# Patient Record
Sex: FEMALE | Race: WHITE | NOT HISPANIC OR LATINO | Employment: OTHER | URBAN - METROPOLITAN AREA
[De-identification: names, ages, dates, MRNs, and addresses within clinical notes are randomized per-mention and may not be internally consistent; named-entity substitution may affect disease eponyms.]

---

## 2017-01-04 ENCOUNTER — LAB VISIT (OUTPATIENT)
Dept: LAB | Facility: HOSPITAL | Age: 67
End: 2017-01-04
Attending: NURSE PRACTITIONER
Payer: COMMERCIAL

## 2017-01-04 ENCOUNTER — OFFICE VISIT (OUTPATIENT)
Dept: DIABETES | Facility: CLINIC | Age: 67
End: 2017-01-04
Payer: COMMERCIAL

## 2017-01-04 VITALS
HEIGHT: 62 IN | WEIGHT: 166.25 LBS | HEART RATE: 86 BPM | BODY MASS INDEX: 30.59 KG/M2 | DIASTOLIC BLOOD PRESSURE: 74 MMHG | SYSTOLIC BLOOD PRESSURE: 158 MMHG

## 2017-01-04 DIAGNOSIS — E11.59 HYPERTENSION ASSOCIATED WITH DIABETES: ICD-10-CM

## 2017-01-04 DIAGNOSIS — M85.80 OSTEOPENIA: ICD-10-CM

## 2017-01-04 DIAGNOSIS — I15.2 HYPERTENSION ASSOCIATED WITH DIABETES: ICD-10-CM

## 2017-01-04 DIAGNOSIS — D64.9 ANEMIA, UNSPECIFIED: ICD-10-CM

## 2017-01-04 DIAGNOSIS — E66.9 OBESITY, UNSPECIFIED OBESITY SEVERITY, UNSPECIFIED OBESITY TYPE: ICD-10-CM

## 2017-01-04 LAB
25(OH)D3+25(OH)D2 SERPL-MCNC: 13 NG/ML
ALBUMIN SERPL BCP-MCNC: 3.7 G/DL
ALP SERPL-CCNC: 68 U/L
ALT SERPL W/O P-5'-P-CCNC: 14 U/L
ANION GAP SERPL CALC-SCNC: 11 MMOL/L
AST SERPL-CCNC: 17 U/L
BILIRUB SERPL-MCNC: 0.2 MG/DL
BUN SERPL-MCNC: 25 MG/DL
CALCIUM SERPL-MCNC: 9.6 MG/DL
CHLORIDE SERPL-SCNC: 105 MMOL/L
CO2 SERPL-SCNC: 22 MMOL/L
CREAT SERPL-MCNC: 0.8 MG/DL
CREAT UR-MCNC: 207 MG/DL
EST. GFR  (AFRICAN AMERICAN): >60 ML/MIN/1.73 M^2
EST. GFR  (NON AFRICAN AMERICAN): >60 ML/MIN/1.73 M^2
FERRITIN SERPL-MCNC: 13 NG/ML
FOLATE SERPL-MCNC: 10.5 NG/ML
GLUCOSE SERPL-MCNC: 76 MG/DL
GLUCOSE SERPL-MCNC: 84 MG/DL (ref 70–110)
IRON SERPL-MCNC: 52 UG/DL
MICROALBUMIN UR DL<=1MG/L-MCNC: 47 UG/ML
MICROALBUMIN/CREATININE RATIO: 22.7 UG/MG
POTASSIUM SERPL-SCNC: 4.2 MMOL/L
PROT SERPL-MCNC: 7.5 G/DL
SATURATED IRON: 12 %
SODIUM SERPL-SCNC: 138 MMOL/L
TOTAL IRON BINDING CAPACITY: 451 UG/DL
TRANSFERRIN SERPL-MCNC: 305 MG/DL
VIT B12 SERPL-MCNC: 205 PG/ML

## 2017-01-04 PROCEDURE — 99213 OFFICE O/P EST LOW 20 MIN: CPT | Mod: S$GLB,,, | Performed by: NURSE PRACTITIONER

## 2017-01-04 PROCEDURE — 82607 VITAMIN B-12: CPT

## 2017-01-04 PROCEDURE — 36415 COLL VENOUS BLD VENIPUNCTURE: CPT

## 2017-01-04 PROCEDURE — 1157F ADVNC CARE PLAN IN RCRD: CPT | Mod: S$GLB,,, | Performed by: NURSE PRACTITIONER

## 2017-01-04 PROCEDURE — 82570 ASSAY OF URINE CREATININE: CPT

## 2017-01-04 PROCEDURE — 82306 VITAMIN D 25 HYDROXY: CPT

## 2017-01-04 PROCEDURE — 1159F MED LIST DOCD IN RCRD: CPT | Mod: S$GLB,,, | Performed by: NURSE PRACTITIONER

## 2017-01-04 PROCEDURE — 2022F DILAT RTA XM EVC RTNOPTHY: CPT | Mod: S$GLB,,, | Performed by: NURSE PRACTITIONER

## 2017-01-04 PROCEDURE — 3060F POS MICROALBUMINURIA REV: CPT | Mod: S$GLB,,, | Performed by: NURSE PRACTITIONER

## 2017-01-04 PROCEDURE — 3077F SYST BP >= 140 MM HG: CPT | Mod: S$GLB,,, | Performed by: NURSE PRACTITIONER

## 2017-01-04 PROCEDURE — 83036 HEMOGLOBIN GLYCOSYLATED A1C: CPT

## 2017-01-04 PROCEDURE — 83540 ASSAY OF IRON: CPT

## 2017-01-04 PROCEDURE — 82948 REAGENT STRIP/BLOOD GLUCOSE: CPT | Mod: S$GLB,,, | Performed by: NURSE PRACTITIONER

## 2017-01-04 PROCEDURE — 82746 ASSAY OF FOLIC ACID SERUM: CPT

## 2017-01-04 PROCEDURE — 82728 ASSAY OF FERRITIN: CPT

## 2017-01-04 PROCEDURE — 80053 COMPREHEN METABOLIC PANEL: CPT

## 2017-01-04 PROCEDURE — 3078F DIAST BP <80 MM HG: CPT | Mod: S$GLB,,, | Performed by: NURSE PRACTITIONER

## 2017-01-04 PROCEDURE — 3045F PR MOST RECENT HEMOGLOBIN A1C LEVEL 7.0-9.0%: CPT | Mod: S$GLB,,, | Performed by: NURSE PRACTITIONER

## 2017-01-04 PROCEDURE — 99999 PR PBB SHADOW E&M-EST. PATIENT-LVL III: CPT | Mod: PBBFAC,,, | Performed by: NURSE PRACTITIONER

## 2017-01-04 PROCEDURE — 1160F RVW MEDS BY RX/DR IN RCRD: CPT | Mod: S$GLB,,, | Performed by: NURSE PRACTITIONER

## 2017-01-04 NOTE — PROGRESS NOTES
"PCP: Dr. Oneal    HISTORY OF PRESENT ILLNESS: 66 year old  female patient is in clinic today for diabetes follow up.  Most recent A1C was 8.4, ADA recommends less than 7.0.  She was intolerant to Victoza, and developed extreme nausea, vomiting so medication was stopped.  Blood glucose monitoring is performed 2 x a day.  Per records, fasting BGs are 110 - 120; hs around 130 - 160, occasional 200.  She has lost 3 pounds last visit.     In November, patient had shingles.  She denies polyuria, polydipsia, polyphagia, blurred vision.  She denies nausea, vomiting, diarrhea, or hypoglycemia.     Height: 5' 1",  Weight: 166 pounds, BMI 30.90  Blood Glucose reading this am: Not Taken  Blood Glucose reading in clinic: 84 mg/dl at 4:15 pm    DM MEDICATIONS:  Glucophage 1000 mg BID  Glyburide 5 mg BID ac  Toujeo 20 units in AM    Labs Reviewed.    REVIEW OF SYSTEMS:  GENERAL: Denies fever, chills, change in appetite.  HEENT: Denies impaired hearing, dysphagia.  RESPIRATORY: Denies shortness of breath or wheezing.   CARDIOVASCULAR: Denies chest pain, palpitations.  GI: Denies nausea, vomiting, diarrhea.  : Denies hematuria, dysuria or frequency.  MS: Denies difficulty with mobility, muscle or joint pain.  NEURO: Denies numbness or tingling in the hands or feet.   PSYCH: Treated for depression.  ENDO: See HPI.    STANDARDS OF CARE:   Eye exam: Dr. Melchor, last exam 5 / 2016  FOOT EXAM: No podiatrist.  Dental Exams: Recommended regular cleanings.    ACTIVITY LEVEL: No regular exercise.   MEAL PLANNING: Number of meals - 3. Number of Snacks - 0. Breakfast, usually V8 juice or Atkins's Bar. Lunch can be salad, ham.  Dinner can be [employer] chicken tenders, occasional fish sandwich from [employer]. Per dietary recall, patient is  limiting carbohydrates, saturated fats and sodium.     SELF-MONITORING: Self monitoring,  ACCU-CHEK Aga  SOCIAL: . Lives alone with 2 cats, 23 dogs.  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 Never smoker.     PHYSICAL EXAMINATION:  GENERAL: WDWN  female in no acute distress, ambulatory, responds appropriately. AAO X 3.  NECK: Supple, no thyromegaly, no cervical or supraclavicular lymphadenopathy, no carotid bruit.  HEART: Regular rate and rhythm. No rubs, murmurs or gallops.   LUNGS: CTA bilaterally. Unlabored breathing, no use of accessory muscles.   SKIN: Warm, dry skin. No lesions or abrasions. Clean, dry, well-healed injection sites.   MUSCULOSKELETAL: Normal gait and muscle strength.  ABDOMEN: Soft, nontender, no masses. BS active X 4.   NEUROLOGIC: Cranial nerves II-XII grossly intact.   FOOT EVALUATION: Patient declined.    ASSESSMENT:  1. DiabetesType 2 - suboptimal control on metformin, glyburide, Lantus, A1C 8.4  2. Hypertension - suboptimal control on Lisinopril / HCTZ  3. Hyperlipidemia, low HDL - suboptimal control on Lipitor  4. Obesity - BMI 30.90    PLAN:  1. Continue monitoring blood glucose as directed, 2 x daily, fasting and before or 2 hr pp dinner.  Discussed ADA goal for fasting blood sugar, 80 - 130 mg/dL; pp blood sugars below 180 mg/dl. Also, discussed prevention of hypoglycemia and the need to adjust goals to higher levels if persistent hypoglycemia. Reminded her to bring records to each visit.   2. Discussed activity, benefits, methods, and precautions. Recommended patient start some form of exercise and increase as tolerated to 30 minutes per day to facilitate weight loss and aid in control of BGs.  3. Continue metformin 1,000 mg BID.  Continue glyburide 5 mg TWICE DAILY ac.  Continue Toujeo 22 units in am, same time every day.  She was intolerant to Victoza, developed extreme nausea, vomiting so med was stopped.  No records on today, so I am unable to make adjustments.  Depending on A1C results, will adjust accordingly.   4. Reviewed carb counting, portion control, importance of spacing carbs through day to prevent pp highs.  Recommended  low saturated fat, low sodium diet to aid in control of cholesterol. Recommended she eat 3 small meals, 1-2 snacks daily.   5. Labs today: A1C, micral, comprehensive metabolic panel.   6. Return to clinic in 3 months for follow up. Advised to call clinic with any questions or concerns.     Millcient Haq, RADHA-C, CDE

## 2017-01-05 ENCOUNTER — TELEPHONE (OUTPATIENT)
Dept: DIABETES | Facility: CLINIC | Age: 67
End: 2017-01-05

## 2017-01-05 LAB
ESTIMATED AVG GLUCOSE: 212 MG/DL
HBA1C MFR BLD HPLC: 9 %

## 2017-01-05 NOTE — TELEPHONE ENCOUNTER
A1C has increased to 9.0 %.  Please advise patient to increase Toujeo to 30 units daily.  Continue other medications as prescribed.  She should send BG readings for review in one week.

## 2017-01-08 DIAGNOSIS — E11.69 DIABETES MELLITUS TYPE 2 IN OBESE: ICD-10-CM

## 2017-01-08 DIAGNOSIS — E66.9 DIABETES MELLITUS TYPE 2 IN OBESE: ICD-10-CM

## 2017-01-09 RX ORDER — METFORMIN HYDROCHLORIDE 1000 MG/1
TABLET ORAL
Qty: 180 TABLET | Refills: 3 | Status: SHIPPED | OUTPATIENT
Start: 2017-01-09 | End: 2018-02-22 | Stop reason: SDUPTHER

## 2017-01-18 ENCOUNTER — PATIENT MESSAGE (OUTPATIENT)
Dept: INTERNAL MEDICINE | Facility: CLINIC | Age: 67
End: 2017-01-18

## 2017-01-18 DIAGNOSIS — E53.8 VITAMIN B12 DEFICIENCY: Primary | ICD-10-CM

## 2017-01-18 RX ORDER — LANOLIN ALCOHOL/MO/W.PET/CERES
1000 CREAM (GRAM) TOPICAL DAILY
COMMUNITY
Start: 2017-01-18 | End: 2018-11-29

## 2017-01-20 DIAGNOSIS — B02.9 HERPES ZOSTER WITHOUT COMPLICATION: ICD-10-CM

## 2017-01-20 RX ORDER — GABAPENTIN 100 MG/1
CAPSULE ORAL
Qty: 90 CAPSULE | Refills: 1 | Status: SHIPPED | OUTPATIENT
Start: 2017-01-20 | End: 2018-11-29

## 2017-02-23 ENCOUNTER — OFFICE VISIT (OUTPATIENT)
Dept: OPHTHALMOLOGY | Facility: CLINIC | Age: 67
End: 2017-02-23
Payer: COMMERCIAL

## 2017-02-23 DIAGNOSIS — H52.03 HYPEROPIA, BILATERAL: ICD-10-CM

## 2017-02-23 DIAGNOSIS — H52.4 BILATERAL PRESBYOPIA: ICD-10-CM

## 2017-02-23 DIAGNOSIS — E11.9 DIABETES MELLITUS TYPE 2 WITHOUT RETINOPATHY: Primary | ICD-10-CM

## 2017-02-23 PROCEDURE — 99999 PR PBB SHADOW E&M-EST. PATIENT-LVL I: CPT | Mod: PBBFAC,,, | Performed by: OPTOMETRIST

## 2017-02-23 PROCEDURE — 92014 COMPRE OPH EXAM EST PT 1/>: CPT | Mod: S$GLB,,, | Performed by: OPTOMETRIST

## 2017-02-23 PROCEDURE — 92015 DETERMINE REFRACTIVE STATE: CPT | Mod: S$GLB,,, | Performed by: OPTOMETRIST

## 2017-02-23 NOTE — PROGRESS NOTES
HPI     IDDM exam. Itchy, watery eyes sometimes. Last eye exam 05/07/2015 [facility].   Update glasses RX.       Last edited by Rosa Isela Smith on 2/23/2017 10:28 AM.         Assessment /Plan     For exam results, see Encounter Report.    Diabetes mellitus type 2 without retinopathy    Nuclear cataract, bilateral    Hyperopia, bilateral    Bilateral presbyopia      No Background Diabetic Retinopathy  No HTN Retinopathy    Mild cataracts, not surgical    Dispense Final Rx for glasses.  RTC 1 year

## 2017-03-09 RX ORDER — INSULIN GLARGINE 300 [IU]/ML
22 INJECTION, SOLUTION SUBCUTANEOUS DAILY
Qty: 3 SYRINGE | Refills: 2 | Status: SHIPPED | OUTPATIENT
Start: 2017-03-09 | End: 2017-06-13 | Stop reason: SDUPTHER

## 2017-03-27 RX ORDER — BLOOD SUGAR DIAGNOSTIC
STRIP MISCELLANEOUS
Qty: 300 STRIP | Refills: 3 | Status: SHIPPED | OUTPATIENT
Start: 2017-03-27 | End: 2018-04-02 | Stop reason: SDUPTHER

## 2017-04-17 ENCOUNTER — OFFICE VISIT (OUTPATIENT)
Dept: INTERNAL MEDICINE | Facility: CLINIC | Age: 67
End: 2017-04-17
Payer: COMMERCIAL

## 2017-04-17 VITALS
SYSTOLIC BLOOD PRESSURE: 140 MMHG | BODY MASS INDEX: 30.71 KG/M2 | HEART RATE: 88 BPM | WEIGHT: 166.88 LBS | DIASTOLIC BLOOD PRESSURE: 82 MMHG | TEMPERATURE: 97 F | HEIGHT: 62 IN

## 2017-04-17 DIAGNOSIS — R06.2 WHEEZING: ICD-10-CM

## 2017-04-17 DIAGNOSIS — J01.90 ACUTE SINUSITIS, RECURRENCE NOT SPECIFIED, UNSPECIFIED LOCATION: Primary | ICD-10-CM

## 2017-04-17 DIAGNOSIS — R05.9 COUGH: ICD-10-CM

## 2017-04-17 PROCEDURE — 1159F MED LIST DOCD IN RCRD: CPT | Mod: S$GLB,,, | Performed by: NURSE PRACTITIONER

## 2017-04-17 PROCEDURE — 1160F RVW MEDS BY RX/DR IN RCRD: CPT | Mod: S$GLB,,, | Performed by: NURSE PRACTITIONER

## 2017-04-17 PROCEDURE — 3077F SYST BP >= 140 MM HG: CPT | Mod: S$GLB,,, | Performed by: NURSE PRACTITIONER

## 2017-04-17 PROCEDURE — 99999 PR PBB SHADOW E&M-EST. PATIENT-LVL IV: CPT | Mod: PBBFAC,,, | Performed by: NURSE PRACTITIONER

## 2017-04-17 PROCEDURE — 1125F AMNT PAIN NOTED PAIN PRSNT: CPT | Mod: S$GLB,,, | Performed by: NURSE PRACTITIONER

## 2017-04-17 PROCEDURE — 1157F ADVNC CARE PLAN IN RCRD: CPT | Mod: 8P,S$GLB,, | Performed by: NURSE PRACTITIONER

## 2017-04-17 PROCEDURE — 99214 OFFICE O/P EST MOD 30 MIN: CPT | Mod: S$GLB,,, | Performed by: NURSE PRACTITIONER

## 2017-04-17 PROCEDURE — 3079F DIAST BP 80-89 MM HG: CPT | Mod: S$GLB,,, | Performed by: NURSE PRACTITIONER

## 2017-04-17 RX ORDER — BENZONATATE 100 MG/1
100 CAPSULE ORAL 3 TIMES DAILY PRN
Qty: 30 CAPSULE | Refills: 0 | Status: SHIPPED | OUTPATIENT
Start: 2017-04-17 | End: 2017-04-27

## 2017-04-17 RX ORDER — ASPIRIN 325 MG
325 TABLET, DELAYED RELEASE (ENTERIC COATED) ORAL DAILY
Status: ON HOLD | COMMUNITY
End: 2019-11-18 | Stop reason: HOSPADM

## 2017-04-17 RX ORDER — ALBUTEROL SULFATE 90 UG/1
2 AEROSOL, METERED RESPIRATORY (INHALATION) EVERY 6 HOURS PRN
Qty: 18 G | Refills: 1 | Status: SHIPPED | OUTPATIENT
Start: 2017-04-17 | End: 2018-11-29

## 2017-04-17 RX ORDER — DOXYCYCLINE 100 MG/1
100 CAPSULE ORAL 2 TIMES DAILY
Qty: 14 CAPSULE | Refills: 0 | Status: SHIPPED | OUTPATIENT
Start: 2017-04-17 | End: 2017-04-24

## 2017-04-17 NOTE — PROGRESS NOTES
Subjective:       Patient ID: Mateusz Bates is a 66 y.o. female.    Chief Complaint: Sore Throat (on and off for the last 4 months); Cough; and Nasal Congestion (sinus congestion)    HPI Comments: Patient presents for cough and sore throat.    Sore Throat    Associated symptoms include congestion, coughing and headaches. Pertinent negatives include no ear pain or shortness of breath.   Cough   Associated symptoms include headaches, rhinorrhea and a sore throat. Pertinent negatives include no chills, ear pain, fever, rash or shortness of breath.   Sinus Problem   This is a new problem. The current episode started 1 to 4 weeks ago. The problem is unchanged. There has been no fever. She is experiencing no pain. Associated symptoms include congestion, coughing, headaches, sinus pressure and a sore throat. Pertinent negatives include no chills, ear pain or shortness of breath. Treatments tried: mucinex sinus max. The treatment provided moderate relief.     Review of Systems   Constitutional: Negative for chills and fever.   HENT: Positive for congestion, rhinorrhea, sinus pressure and sore throat. Negative for ear pain.    Respiratory: Positive for cough. Negative for shortness of breath.    Skin: Negative for rash.   Neurological: Positive for headaches.       Objective:      Physical Exam   Constitutional: She is oriented to person, place, and time. She appears well-developed and well-nourished. No distress.   HENT:   Head: Normocephalic and atraumatic.   Right Ear: Tympanic membrane, external ear and ear canal normal.   Left Ear: Tympanic membrane, external ear and ear canal normal.   Nose: Right sinus exhibits no maxillary sinus tenderness and no frontal sinus tenderness. Left sinus exhibits no maxillary sinus tenderness and no frontal sinus tenderness.   Mouth/Throat: Oropharynx is clear and moist.   Beefy red appearance to nasal mucosa with yellow discharge.   Eyes: Conjunctivae and EOM are normal. Pupils are  equal, round, and reactive to light.   Neck: Normal range of motion. Neck supple.   Cardiovascular: Normal rate and regular rhythm.  Exam reveals no gallop and no friction rub.    No murmur heard.  Pulmonary/Chest: Effort normal. No respiratory distress. She has wheezes in the right lower field. She has no rales.   Lymphadenopathy:     She has no cervical adenopathy.   Neurological: She is alert and oriented to person, place, and time.   Skin: Skin is warm and dry. No rash noted.   Vitals reviewed.      Assessment:       1. Acute sinusitis, recurrence not specified, unspecified location    2. Cough    3. Wheezing        Plan:   Acute sinusitis, recurrence not specified, unspecified location  -     doxycycline (VIBRAMYCIN) 100 MG Cap; Take 1 capsule (100 mg total) by mouth 2 (two) times daily.  Dispense: 14 capsule; Refill: 0    Cough  -     benzonatate (TESSALON) 100 MG capsule; Take 1 capsule (100 mg total) by mouth 3 (three) times daily as needed.  Dispense: 30 capsule; Refill: 0    Wheezing  -     albuterol 90 mcg/actuation inhaler; Inhale 2 puffs into the lungs every 6 (six) hours as needed for Wheezing. Rescue  Dispense: 18 g; Refill: 1      Return if symptoms worsen or fail to improve, for keep routine follow up.

## 2017-05-26 ENCOUNTER — PATIENT OUTREACH (OUTPATIENT)
Dept: ADMINISTRATIVE | Facility: HOSPITAL | Age: 67
End: 2017-05-26

## 2017-05-29 DIAGNOSIS — F41.9 ANXIETY: ICD-10-CM

## 2017-05-30 RX ORDER — HYDROXYZINE HYDROCHLORIDE 10 MG/1
TABLET, FILM COATED ORAL
Qty: 60 TABLET | Refills: 2 | Status: SHIPPED | OUTPATIENT
Start: 2017-05-30 | End: 2017-11-06 | Stop reason: SDUPTHER

## 2017-06-08 ENCOUNTER — OFFICE VISIT (OUTPATIENT)
Dept: INTERNAL MEDICINE | Facility: CLINIC | Age: 67
End: 2017-06-08
Payer: COMMERCIAL

## 2017-06-08 VITALS
SYSTOLIC BLOOD PRESSURE: 146 MMHG | BODY MASS INDEX: 31.51 KG/M2 | HEIGHT: 61 IN | DIASTOLIC BLOOD PRESSURE: 78 MMHG | HEART RATE: 83 BPM | WEIGHT: 166.88 LBS | TEMPERATURE: 98 F | OXYGEN SATURATION: 96 %

## 2017-06-08 DIAGNOSIS — R30.0 DYSURIA: ICD-10-CM

## 2017-06-08 DIAGNOSIS — N39.0 URINARY TRACT INFECTION WITHOUT HEMATURIA, SITE UNSPECIFIED: Primary | ICD-10-CM

## 2017-06-08 LAB
BACTERIA #/AREA URNS HPF: ABNORMAL /HPF
BILIRUB UR QL STRIP: ABNORMAL
CLARITY UR: ABNORMAL
COLOR UR: ABNORMAL
GLUCOSE UR QL STRIP: ABNORMAL
HGB UR QL STRIP: ABNORMAL
HYALINE CASTS #/AREA URNS LPF: ABNORMAL /LPF
KETONES UR QL STRIP: ABNORMAL
LEUKOCYTE ESTERASE UR QL STRIP: ABNORMAL
MICROSCOPIC COMMENT: ABNORMAL
NITRITE UR QL STRIP: ABNORMAL
NON-SQ EPI CELLS #/AREA URNS HPF: 2 /HPF
PH UR STRIP: ABNORMAL [PH] (ref 5–8)
PROT UR QL STRIP: ABNORMAL
RBC #/AREA URNS HPF: 1 /HPF (ref 0–4)
SP GR UR STRIP: 1.02 (ref 1–1.03)
SQUAMOUS #/AREA URNS HPF: 3 /HPF
URN SPEC COLLECT METH UR: ABNORMAL
WBC #/AREA URNS HPF: >100 /HPF (ref 0–5)

## 2017-06-08 PROCEDURE — 87086 URINE CULTURE/COLONY COUNT: CPT

## 2017-06-08 PROCEDURE — 1159F MED LIST DOCD IN RCRD: CPT | Mod: S$GLB,,, | Performed by: NURSE PRACTITIONER

## 2017-06-08 PROCEDURE — 87186 SC STD MICRODIL/AGAR DIL: CPT

## 2017-06-08 PROCEDURE — 87088 URINE BACTERIA CULTURE: CPT

## 2017-06-08 PROCEDURE — 99999 PR PBB SHADOW E&M-EST. PATIENT-LVL IV: CPT | Mod: PBBFAC,,, | Performed by: NURSE PRACTITIONER

## 2017-06-08 PROCEDURE — 87077 CULTURE AEROBIC IDENTIFY: CPT

## 2017-06-08 PROCEDURE — 81000 URINALYSIS NONAUTO W/SCOPE: CPT

## 2017-06-08 PROCEDURE — 99214 OFFICE O/P EST MOD 30 MIN: CPT | Mod: S$GLB,,, | Performed by: NURSE PRACTITIONER

## 2017-06-08 RX ORDER — SULFAMETHOXAZOLE AND TRIMETHOPRIM 800; 160 MG/1; MG/1
1 TABLET ORAL 2 TIMES DAILY
Qty: 14 TABLET | Refills: 0 | Status: SHIPPED | OUTPATIENT
Start: 2017-06-08 | End: 2017-06-15

## 2017-06-08 NOTE — PROGRESS NOTES
Subjective:       Patient ID: Mateusz Bates is a 66 y.o. female.    Chief Complaint: Urinary Tract Infection    Patient presents for burning with urination.      Dysuria    This is a new problem. The current episode started in the past 7 days. The problem occurs every urination. The problem has been unchanged. The quality of the pain is described as burning. The pain is at a severity of 8/10. There has been no fever. She is not sexually active. Associated symptoms include flank pain, frequency and urgency. Pertinent negatives include no chills, discharge, hematuria, nausea or vomiting. Treatments tried: AZO x3 days. The treatment provided moderate relief.     Review of Systems   Constitutional: Negative for chills and fever.   Gastrointestinal: Negative for nausea and vomiting.   Genitourinary: Positive for dysuria, flank pain, frequency and urgency. Negative for hematuria.       Objective:      Physical Exam   Constitutional: She appears well-developed and well-nourished. No distress.   HENT:   Head: Normocephalic.   Eyes: Conjunctivae and EOM are normal. Pupils are equal, round, and reactive to light.   Cardiovascular: Normal rate and regular rhythm.    No murmur heard.  Pulmonary/Chest: Effort normal and breath sounds normal. No respiratory distress. She has no wheezes.   Abdominal: Bowel sounds are normal. She exhibits no distension and no mass. There is tenderness in the suprapubic area. There is no rebound, no guarding and no CVA tenderness.   Psychiatric: She has a normal mood and affect.   Vitals reviewed.      Assessment:       1. Urinary tract infection without hematuria, site unspecified    2. Dysuria        Plan:   Urinary tract infection without hematuria, site unspecified  -     sulfamethoxazole-trimethoprim 800-160mg (BACTRIM DS) 800-160 mg Tab; Take 1 tablet by mouth 2 (two) times daily.  Dispense: 14 tablet; Refill: 0    Dysuria  -     Urinalysis  -     Urine culture    Other orders  -      Urinalysis Microscopic      UA contaminated due to AZO. Will treat based off symptoms. Culture pending.   Return if symptoms worsen or fail to improve, for keep routine follow up.

## 2017-06-11 LAB — BACTERIA UR CULT: NORMAL

## 2017-06-13 RX ORDER — INSULIN GLARGINE 300 [IU]/ML
22 INJECTION, SOLUTION SUBCUTANEOUS DAILY
Qty: 3 SYRINGE | Refills: 0 | Status: SHIPPED | OUTPATIENT
Start: 2017-06-13 | End: 2017-11-13 | Stop reason: SDUPTHER

## 2017-06-26 ENCOUNTER — PATIENT OUTREACH (OUTPATIENT)
Dept: ADMINISTRATIVE | Facility: HOSPITAL | Age: 67
End: 2017-06-26

## 2017-06-26 NOTE — PROGRESS NOTES
I have attempted without success to contact this patient by phone to schedule annual mammogram exam. Patient not available, left voicemail.

## 2017-07-18 ENCOUNTER — OFFICE VISIT (OUTPATIENT)
Dept: DIABETES | Facility: CLINIC | Age: 67
End: 2017-07-18
Payer: COMMERCIAL

## 2017-07-18 ENCOUNTER — OFFICE VISIT (OUTPATIENT)
Dept: INTERNAL MEDICINE | Facility: CLINIC | Age: 67
End: 2017-07-18
Payer: COMMERCIAL

## 2017-07-18 ENCOUNTER — LAB VISIT (OUTPATIENT)
Dept: LAB | Facility: HOSPITAL | Age: 67
End: 2017-07-18
Attending: NURSE PRACTITIONER
Payer: COMMERCIAL

## 2017-07-18 VITALS
WEIGHT: 160.94 LBS | BODY MASS INDEX: 29.62 KG/M2 | DIASTOLIC BLOOD PRESSURE: 88 MMHG | HEIGHT: 62 IN | SYSTOLIC BLOOD PRESSURE: 138 MMHG

## 2017-07-18 VITALS
BODY MASS INDEX: 29.7 KG/M2 | SYSTOLIC BLOOD PRESSURE: 130 MMHG | OXYGEN SATURATION: 98 % | HEIGHT: 62 IN | WEIGHT: 161.38 LBS | RESPIRATION RATE: 18 BRPM | TEMPERATURE: 97 F | HEART RATE: 84 BPM | DIASTOLIC BLOOD PRESSURE: 82 MMHG

## 2017-07-18 DIAGNOSIS — I15.2 HYPERTENSION ASSOCIATED WITH DIABETES: ICD-10-CM

## 2017-07-18 DIAGNOSIS — F41.8 DEPRESSION WITH ANXIETY: ICD-10-CM

## 2017-07-18 DIAGNOSIS — E11.59 HYPERTENSION ASSOCIATED WITH DIABETES: ICD-10-CM

## 2017-07-18 DIAGNOSIS — E78.5 HYPERLIPIDEMIA, UNSPECIFIED HYPERLIPIDEMIA TYPE: ICD-10-CM

## 2017-07-18 DIAGNOSIS — E66.9 OBESITY, UNSPECIFIED OBESITY SEVERITY, UNSPECIFIED OBESITY TYPE: ICD-10-CM

## 2017-07-18 DIAGNOSIS — M54.32 SCIATICA, LEFT SIDE: Primary | ICD-10-CM

## 2017-07-18 LAB
ALBUMIN SERPL BCP-MCNC: 3.7 G/DL
ALP SERPL-CCNC: 81 U/L
ALT SERPL W/O P-5'-P-CCNC: 13 U/L
ANION GAP SERPL CALC-SCNC: 9 MMOL/L
AST SERPL-CCNC: 13 U/L
BILIRUB SERPL-MCNC: 0.4 MG/DL
BUN SERPL-MCNC: 11 MG/DL
CALCIUM SERPL-MCNC: 10.3 MG/DL
CHLORIDE SERPL-SCNC: 102 MMOL/L
CO2 SERPL-SCNC: 26 MMOL/L
CREAT SERPL-MCNC: 0.8 MG/DL
EST. GFR  (AFRICAN AMERICAN): >60 ML/MIN/1.73 M^2
EST. GFR  (NON AFRICAN AMERICAN): >60 ML/MIN/1.73 M^2
GLUCOSE SERPL-MCNC: 252 MG/DL
GLUCOSE SERPL-MCNC: 279 MG/DL (ref 70–110)
POTASSIUM SERPL-SCNC: 5.5 MMOL/L
PROT SERPL-MCNC: 7.6 G/DL
SODIUM SERPL-SCNC: 137 MMOL/L

## 2017-07-18 PROCEDURE — 1159F MED LIST DOCD IN RCRD: CPT | Mod: S$GLB,,, | Performed by: NURSE PRACTITIONER

## 2017-07-18 PROCEDURE — 99213 OFFICE O/P EST LOW 20 MIN: CPT | Mod: S$GLB,,, | Performed by: FAMILY MEDICINE

## 2017-07-18 PROCEDURE — 99214 OFFICE O/P EST MOD 30 MIN: CPT | Mod: S$GLB,,, | Performed by: NURSE PRACTITIONER

## 2017-07-18 PROCEDURE — 1126F AMNT PAIN NOTED NONE PRSNT: CPT | Mod: S$GLB,,, | Performed by: FAMILY MEDICINE

## 2017-07-18 PROCEDURE — 3045F PR MOST RECENT HEMOGLOBIN A1C LEVEL 7.0-9.0%: CPT | Mod: S$GLB,,, | Performed by: NURSE PRACTITIONER

## 2017-07-18 PROCEDURE — 83036 HEMOGLOBIN GLYCOSYLATED A1C: CPT

## 2017-07-18 PROCEDURE — 1159F MED LIST DOCD IN RCRD: CPT | Mod: S$GLB,,, | Performed by: FAMILY MEDICINE

## 2017-07-18 PROCEDURE — 80053 COMPREHEN METABOLIC PANEL: CPT

## 2017-07-18 PROCEDURE — 99999 PR PBB SHADOW E&M-EST. PATIENT-LVL III: CPT | Mod: PBBFAC,,, | Performed by: NURSE PRACTITIONER

## 2017-07-18 PROCEDURE — 99999 PR PBB SHADOW E&M-EST. PATIENT-LVL III: CPT | Mod: PBBFAC,,, | Performed by: FAMILY MEDICINE

## 2017-07-18 PROCEDURE — 1126F AMNT PAIN NOTED NONE PRSNT: CPT | Mod: S$GLB,,, | Performed by: NURSE PRACTITIONER

## 2017-07-18 PROCEDURE — 36415 COLL VENOUS BLD VENIPUNCTURE: CPT

## 2017-07-18 PROCEDURE — 82948 REAGENT STRIP/BLOOD GLUCOSE: CPT | Mod: S$GLB,,, | Performed by: NURSE PRACTITIONER

## 2017-07-18 RX ORDER — VENLAFAXINE HYDROCHLORIDE 37.5 MG/1
37.5 CAPSULE, EXTENDED RELEASE ORAL DAILY
Qty: 30 CAPSULE | Refills: 11 | Status: SHIPPED | OUTPATIENT
Start: 2017-07-18 | End: 2018-07-12 | Stop reason: DRUGHIGH

## 2017-07-18 NOTE — PATIENT INSTRUCTIONS
Eating a Low-Potassium Diet  Your health care provider has prescribed a low-potassium diet for you. This kind of diet is advised for people who have certain kidney problems. Potassium is needed for muscle function. But too much potassium is a health risk. Potassium is found in many foods. Read below to find out how to change your diet.    Foods to limit  Some foods are high in potassium. Limit your daily intake of the foods in the list below.  · Fruits: apricots (canned and fresh), bananas, cantaloupe, honeydew melon, kiwi, nectarines, pomegranates, oranges, orange juice, pears, dried fruits (apricots, dates, figs, prunes), and prune juice  · Vegetables: asparagus, avocado, artichoke, bamboo shoots, beets, brussels sprouts, cabbage, celery, chard, okra, potatoes (white and sweet), pumpkin, rutabaga, spinach (cooked), squash, tomato, tomato sauce, tomato juice, and vegetable juice cocktail  · Legumes: black-eyed peas, chickpeas, lentils, lima beans, navy beans, red kidney beans, soybeans, and split peas  · Nuts and seeds: almonds, Brazil nuts, cashews, peanuts, peanut butter, pecans, pumpkin seeds, sunflower seeds, and walnuts  · Breads and cereals: bran and whole-grain products  · Dairy foods: milk, cheese, ice cream, yogurt  · Animal protein: all forms of animal protein  · Other: chocolate, cocoa, coconut milk, and molasses  Tips  · Ask your health care provider how much potassium you are allowed each day. This will help you figure out serving sizes for your needs.  · Check labels for potassium. It may be listed as potassium chloride.  · Do not use salt substitutes. These often have potassium in them.  · Cook frozen fruits and vegetables in water. Rinse and drain them well before eating.  · Drain liquid from all canned fruits and vegetables. Rinse them before eating.  · Reduce the potassium in potatoes. Peel them, slice thinly, and soak in water for at least 4 hours.  · Reduce the potassium in green leafy  vegetables. Soak them in water for at least 4 hours.  · Eat white rice and refined white flour products. These include white bread, pasta, and grits.  Follow-up  Make a follow-up appointment as advised by our staff.  When to call your health care provider  Call your health care provider right away if you have any of the following:  · Fatigue  · Shortness of breath  · Chest pain  · Slow, irregular heartbeat  · Fainting  · Dizziness  · Lightheadedness  · Confusion   Date Last Reviewed: 6/21/2015 © 2000-2016 [employer]. [address] [place] [id number]. All rights reserved. This information is not intended as a substitute for professional medical care. Always follow your healthcare professional's instructions.

## 2017-07-18 NOTE — PROGRESS NOTES
"PCP: Dr. Oneal    HISTORY OF PRESENT ILLNESS: 66 year old  female patient is in clinic today for diabetes follow up.  Most recent A1C was 9.0, ADA recommends less than 7.0.  She was intolerant to Victoza, and developed extreme nausea, vomiting so medication was stopped.  Blood glucose monitoring has not been performed in the past several months.  She has lost 6 pounds last visit.  She denies polyuria, polydipsia, polyphagia, blurred vision.  She denies nausea, vomiting, diarrhea, or hypoglycemia.     Height: 5' 1",  Weight: 160 pounds, BMI 30.00  Blood Glucose reading this am: Not Taken  Blood Glucose reading in clinic: 279 mg/dl at 10:23 am    DM MEDICATIONS:  Glucophage 1000 mg BID  Glyburide 5 mg BID ac  Toujeo 22 units in AM    Labs Reviewed.    REVIEW OF SYSTEMS:  GENERAL: Denies fever, chills, change in appetite.  HEENT: Denies impaired hearing, dysphagia.  RESPIRATORY: Denies shortness of breath or wheezing.   CARDIOVASCULAR: Denies chest pain, palpitations.  GI: Denies nausea, vomiting, diarrhea.  : Denies hematuria, dysuria or frequency.  MS: Denies difficulty with mobility, muscle or joint pain.  NEURO: Denies numbness or tingling in the hands or feet.   PSYCH: Treated for depression.  ENDO: See HPI.    STANDARDS OF CARE:   Eye exam: Dr. Melchor, last exam 5 / 2016  FOOT EXAM: No podiatrist.  Dental Exams: Recommended regular cleanings.    ACTIVITY LEVEL: No regular exercise.   MEAL PLANNING: Number of meals - 3. Number of Snacks - 0. Breakfast, usually V8 juice or Atkins's Bar. Lunch can be salad, ham.  Dinner can be [facility] chicken tenders, occasional fish sandwich from [employer]. Per dietary recall, patient is  limiting carbohydrates, saturated fats and sodium.     SELF-MONITORING: Self monitoring,  ACCU-CHEK Aga  SOCIAL: . Lives alone with 2 cats, 3 dogs.  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 Never smoker.     PHYSICAL EXAMINATION:  GENERAL: WDWN "  female in no acute distress, ambulatory, responds appropriately. AAO X 3.  NECK: Supple, no thyromegaly, no cervical or supraclavicular lymphadenopathy, no carotid bruit.  HEART: Regular rate and rhythm. No rubs, murmurs or gallops.   LUNGS: CTA bilaterally. Unlabored breathing, no use of accessory muscles.   SKIN: Warm, dry skin. No lesions or abrasions. Clean, dry, well-healed injection sites.   MUSCULOSKELETAL: Normal gait and muscle strength.  ABDOMEN: Soft, nontender, no masses. BS active X 4.   NEUROLOGIC: Cranial nerves II-XII grossly intact.   FOOT EVALUATION: Protective Sensation (w/ 10 gram monofilament):  Right: Intact  Left: Intact  //  Visual Inspection:  Normal -  Bilateral  // Pedal Pulses:  Right: Present  Left: Present     ASSESSMENT:  1. DiabetesType 2 - uncontrolled on metformin, glyburide, Lantus, noncompliant with monitoring BG,  A1C 9.0  2. Hypertension - suboptimal control on Lisinopril / HCT  3. Hyperlipidemia, low HDL - suboptimal control on Lipitor  4. Obesity - BMI 30.00    PLAN:  1.) Instructed to monitor BG  2 x daily, fasting and before or 2 hr pp dinner.  Discussed ADA goal for fasting blood sugar, 80 - 130 mg/dL; pp blood sugars below 180 mg/dl. Also, discussed prevention of hypoglycemia and the need to adjust goals to higher levels if persistent hypoglycemia. Reminded her to bring records to each visit.   2.) Reviewed pathophysiology of diabetes, complications related to the disease, importance of annual dilated eye exam and daily foot examination.  3.) We discussed the ADA recommendations: Hemoglobin A1c below 7.0 %. All patients with diabetes should be on statins unless contraindicated.  ACE or ARB therapy if not contraindicated.    4.) Continue metformin 1,000 mg BID.  Continue glyburide 5 mg TWICE DAILY ac.  Increase Toujeo 25 units in am, same time every day.  She was intolerant to Victoza, developed extreme nausea and vomiting, so med was stopped.  Patient voices that  she not been monitoring blood sugars for the past several months, so I am unable to make adjustments.  Depending on A1C results, will adjust accordingly.   5.) Meal planning teaching: Carbohydrate definition - one serving is 15 gms. Carbohydrate spacing - carbohydrates should be spaced into approximately 3 meals with 2 snacks ( of one carbohydrate ) between meals or at bedtime. Increase vegetable intake to 2 or more cups of vegetables per day as well as 2 fruit servings. Recommended low saturated fat, low sodium diet to aid in control of hypertension and cholesterol.  6.) Discussed activity, benefits, methods, and precautions. Recommended patient start or continue some form of exercise and increase as tolerated to 30 minutes per day to facilitate weight loss and aid in control of BGs.  7.) Labs today: A1C, comprehensive metabolic panel.   8.) Return to clinic in 3 months for follow up. Advised to call clinic with any questions or concerns.     Millicent Haq, RADHA-C, CDE

## 2017-07-19 LAB
ESTIMATED AVG GLUCOSE: 272 MG/DL
HBA1C MFR BLD HPLC: 11.1 %

## 2017-07-21 NOTE — PROGRESS NOTES
Subjective:       Patient ID: Mateusz Bates is a 67 y.o. female.    Chief Complaint: Hip Pain    Patient presents to clinic today reporting left buttock pain with radiation down her left leg. She denies back pain. She states this seems to be resolving. She would like to decrease her effexor. Patient is otherwise without concerns today.        Review of Systems   Constitutional: Negative for chills, fatigue, fever and unexpected weight change.   Eyes: Negative for visual disturbance.   Respiratory: Negative for shortness of breath.    Cardiovascular: Negative for chest pain.   Musculoskeletal: Negative for myalgias.   Neurological: Negative for headaches.       Objective:      Physical Exam   Constitutional: She is oriented to person, place, and time. She appears well-developed and well-nourished. No distress.   HENT:   Head: Normocephalic and atraumatic.   Eyes: Conjunctivae and EOM are normal. Pupils are equal, round, and reactive to light. No scleral icterus.   Pulmonary/Chest: Effort normal.   Neurological: She is alert and oriented to person, place, and time. No cranial nerve deficit. Gait normal.   Psychiatric: She has a normal mood and affect.   Vitals reviewed.      Assessment:       1. Sciatica, left side    2. Depression with anxiety        Plan:   Mateusz was seen today for hip pain.    Diagnoses and all orders for this visit:    Sciatica, left side  Comments:  resolving, follow up if worse/recurrent    Depression with anxiety  Comments:  decrease effexor to 37.5 mg daily  Orders:  -     venlafaxine (EFFEXOR-XR) 37.5 MG 24 hr capsule; Take 1 capsule (37.5 mg total) by mouth once daily.

## 2017-07-25 ENCOUNTER — PATIENT MESSAGE (OUTPATIENT)
Dept: DIABETES | Facility: CLINIC | Age: 67
End: 2017-07-25

## 2017-07-26 ENCOUNTER — TELEPHONE (OUTPATIENT)
Dept: DIABETES | Facility: CLINIC | Age: 67
End: 2017-07-26

## 2017-07-26 NOTE — TELEPHONE ENCOUNTER
I spoke with patient via telephone yesterday and advised that A1C has increased to 11 %. I advised to increase Toujeo to 35 units daily.  Continue glyburide and metformin as prescribed.  She agrees to starting monitoring BG twice a day, before breakfast and before dinner.  She will call with BG readings in one week for review.

## 2017-07-27 ENCOUNTER — PATIENT OUTREACH (OUTPATIENT)
Dept: ADMINISTRATIVE | Facility: HOSPITAL | Age: 67
End: 2017-07-27

## 2017-08-29 ENCOUNTER — PATIENT MESSAGE (OUTPATIENT)
Dept: INTERNAL MEDICINE | Facility: CLINIC | Age: 67
End: 2017-08-29

## 2017-08-29 ENCOUNTER — PATIENT MESSAGE (OUTPATIENT)
Dept: CARDIOLOGY | Facility: CLINIC | Age: 67
End: 2017-08-29

## 2017-09-01 ENCOUNTER — PATIENT OUTREACH (OUTPATIENT)
Dept: ADMINISTRATIVE | Facility: HOSPITAL | Age: 67
End: 2017-09-01

## 2017-09-01 DIAGNOSIS — E11.59 HYPERTENSION ASSOCIATED WITH DIABETES: Primary | ICD-10-CM

## 2017-09-01 DIAGNOSIS — E78.5 HYPERLIPIDEMIA, UNSPECIFIED HYPERLIPIDEMIA TYPE: ICD-10-CM

## 2017-09-01 DIAGNOSIS — I15.2 HYPERTENSION ASSOCIATED WITH DIABETES: Primary | ICD-10-CM

## 2017-10-06 RX ORDER — AMLODIPINE BESYLATE 2.5 MG/1
TABLET ORAL
Qty: 30 TABLET | Refills: 5 | Status: SHIPPED | OUTPATIENT
Start: 2017-10-06 | End: 2018-11-14 | Stop reason: SDUPTHER

## 2017-10-06 NOTE — TELEPHONE ENCOUNTER
Patient has 37.5 mg daily listed in chart, what is she taking. Also doesn't appear to have been seen for routine visit in a while. Thanks

## 2017-10-10 RX ORDER — VENLAFAXINE HYDROCHLORIDE 75 MG/1
CAPSULE, EXTENDED RELEASE ORAL
Qty: 30 CAPSULE | Refills: 3 | Status: SHIPPED | OUTPATIENT
Start: 2017-10-10 | End: 2018-02-22 | Stop reason: SDUPTHER

## 2017-10-19 ENCOUNTER — PATIENT OUTREACH (OUTPATIENT)
Dept: ADMINISTRATIVE | Facility: HOSPITAL | Age: 67
End: 2017-10-19

## 2017-11-06 DIAGNOSIS — F41.9 ANXIETY: ICD-10-CM

## 2017-11-06 RX ORDER — HYDROXYZINE HYDROCHLORIDE 10 MG/1
10 TABLET, FILM COATED ORAL 3 TIMES DAILY
Qty: 60 TABLET | Refills: 2 | Status: SHIPPED | OUTPATIENT
Start: 2017-11-06 | End: 2018-01-09 | Stop reason: SDUPTHER

## 2017-11-06 RX ORDER — GLYBURIDE 5 MG/1
TABLET ORAL
Qty: 180 TABLET | Refills: 3 | Status: SHIPPED | OUTPATIENT
Start: 2017-11-06 | End: 2017-11-07 | Stop reason: SDUPTHER

## 2017-11-07 ENCOUNTER — PATIENT MESSAGE (OUTPATIENT)
Dept: DIABETES | Facility: CLINIC | Age: 67
End: 2017-11-07

## 2017-11-07 RX ORDER — GLYBURIDE 5 MG/1
10 TABLET ORAL
Qty: 360 TABLET | Refills: 1 | Status: SHIPPED | OUTPATIENT
Start: 2017-11-07 | End: 2018-11-26 | Stop reason: SDUPTHER

## 2017-11-13 RX ORDER — INSULIN GLARGINE 300 [IU]/ML
25 INJECTION, SOLUTION SUBCUTANEOUS DAILY
Qty: 3 SYRINGE | Refills: 3 | Status: SHIPPED | OUTPATIENT
Start: 2017-11-13 | End: 2018-04-02 | Stop reason: SDUPTHER

## 2017-11-14 DIAGNOSIS — E78.5 HYPERLIPIDEMIA, UNSPECIFIED HYPERLIPIDEMIA TYPE: Primary | ICD-10-CM

## 2017-11-14 RX ORDER — ATORVASTATIN CALCIUM 80 MG/1
TABLET, FILM COATED ORAL
Qty: 30 TABLET | Refills: 1 | Status: SHIPPED | OUTPATIENT
Start: 2017-11-14 | End: 2018-01-17 | Stop reason: SDUPTHER

## 2017-11-14 NOTE — TELEPHONE ENCOUNTER
Patient has not had lipids done in over a year. Also overdue for annual exam. Please assist with scheduling.

## 2017-11-27 RX ORDER — PEN NEEDLE, DIABETIC 31 GX5/16"
NEEDLE, DISPOSABLE MISCELLANEOUS
Qty: 100 EACH | Refills: 11 | Status: SHIPPED | OUTPATIENT
Start: 2017-11-27 | End: 2018-06-27 | Stop reason: ALTCHOICE

## 2018-01-09 DIAGNOSIS — F41.9 ANXIETY: ICD-10-CM

## 2018-01-10 RX ORDER — HYDROXYZINE HYDROCHLORIDE 10 MG/1
TABLET, FILM COATED ORAL
Qty: 60 TABLET | Refills: 2 | Status: SHIPPED | OUTPATIENT
Start: 2018-01-10 | End: 2018-07-30 | Stop reason: SDUPTHER

## 2018-01-17 DIAGNOSIS — E78.5 HYPERLIPIDEMIA, UNSPECIFIED HYPERLIPIDEMIA TYPE: ICD-10-CM

## 2018-01-17 RX ORDER — ATORVASTATIN CALCIUM 80 MG/1
TABLET, FILM COATED ORAL
Qty: 30 TABLET | Refills: 0 | Status: SHIPPED | OUTPATIENT
Start: 2018-01-17 | End: 2018-11-14 | Stop reason: SDUPTHER

## 2018-01-17 NOTE — TELEPHONE ENCOUNTER
Patient needs 6 month follow up appt with Dr. Kilpatrick or Lara.  I will refill her LIpitor once  Contact her and get her schedule for labs and appt.

## 2018-02-05 ENCOUNTER — PATIENT OUTREACH (OUTPATIENT)
Dept: ADMINISTRATIVE | Facility: HOSPITAL | Age: 68
End: 2018-02-05

## 2018-02-05 NOTE — PROGRESS NOTES
I have attempted without success to contact this patient by phone to schedule annual mammogram exam. Patient also due for pneumococcal and influenza vaccinations, colonoscopy, Hemoglobin A1C, urine micro albumin, and lipid panel. Patient not available, no answer.

## 2018-02-22 DIAGNOSIS — E11.69 DIABETES MELLITUS TYPE 2 IN OBESE: ICD-10-CM

## 2018-02-22 DIAGNOSIS — E66.9 DIABETES MELLITUS TYPE 2 IN OBESE: ICD-10-CM

## 2018-02-22 RX ORDER — VENLAFAXINE HYDROCHLORIDE 75 MG/1
CAPSULE, EXTENDED RELEASE ORAL
Qty: 30 CAPSULE | Refills: 3 | Status: SHIPPED | OUTPATIENT
Start: 2018-02-22 | End: 2018-07-11 | Stop reason: SDUPTHER

## 2018-02-22 RX ORDER — METFORMIN HYDROCHLORIDE 1000 MG/1
TABLET ORAL
Qty: 60 TABLET | Refills: 0 | Status: SHIPPED | OUTPATIENT
Start: 2018-02-22 | End: 2018-04-10 | Stop reason: SDUPTHER

## 2018-03-06 ENCOUNTER — OFFICE VISIT (OUTPATIENT)
Dept: INTERNAL MEDICINE | Facility: CLINIC | Age: 68
End: 2018-03-06
Payer: COMMERCIAL

## 2018-03-06 VITALS
OXYGEN SATURATION: 97 % | DIASTOLIC BLOOD PRESSURE: 84 MMHG | BODY MASS INDEX: 30.18 KG/M2 | HEIGHT: 62 IN | TEMPERATURE: 97 F | SYSTOLIC BLOOD PRESSURE: 140 MMHG | WEIGHT: 164 LBS | HEART RATE: 85 BPM

## 2018-03-06 DIAGNOSIS — E11.9 TYPE 2 DIABETES MELLITUS WITHOUT COMPLICATION, WITHOUT LONG-TERM CURRENT USE OF INSULIN: ICD-10-CM

## 2018-03-06 DIAGNOSIS — Z23 NEED FOR PNEUMOCOCCAL VACCINATION: ICD-10-CM

## 2018-03-06 DIAGNOSIS — Z12.39 BREAST CANCER SCREENING: ICD-10-CM

## 2018-03-06 DIAGNOSIS — E11.59 HYPERTENSION ASSOCIATED WITH DIABETES: ICD-10-CM

## 2018-03-06 DIAGNOSIS — Z12.11 COLON CANCER SCREENING: ICD-10-CM

## 2018-03-06 DIAGNOSIS — I15.2 HYPERTENSION ASSOCIATED WITH DIABETES: ICD-10-CM

## 2018-03-06 DIAGNOSIS — F41.1 GAD (GENERALIZED ANXIETY DISORDER): Primary | ICD-10-CM

## 2018-03-06 DIAGNOSIS — F32.9 CHRONIC MAJOR DEPRESSIVE DISORDER: Chronic | ICD-10-CM

## 2018-03-06 LAB
CREAT UR-MCNC: 59 MG/DL
MICROALBUMIN UR DL<=1MG/L-MCNC: 18 UG/ML
MICROALBUMIN/CREATININE RATIO: 30.5 UG/MG

## 2018-03-06 PROCEDURE — 3079F DIAST BP 80-89 MM HG: CPT | Mod: S$GLB,,, | Performed by: NURSE PRACTITIONER

## 2018-03-06 PROCEDURE — 90471 IMMUNIZATION ADMIN: CPT | Mod: S$GLB,,, | Performed by: FAMILY MEDICINE

## 2018-03-06 PROCEDURE — 99214 OFFICE O/P EST MOD 30 MIN: CPT | Mod: 25,S$GLB,, | Performed by: NURSE PRACTITIONER

## 2018-03-06 PROCEDURE — 82043 UR ALBUMIN QUANTITATIVE: CPT

## 2018-03-06 PROCEDURE — 90732 PPSV23 VACC 2 YRS+ SUBQ/IM: CPT | Mod: S$GLB,,, | Performed by: FAMILY MEDICINE

## 2018-03-06 PROCEDURE — 3077F SYST BP >= 140 MM HG: CPT | Mod: S$GLB,,, | Performed by: NURSE PRACTITIONER

## 2018-03-06 PROCEDURE — 99999 PR PBB SHADOW E&M-EST. PATIENT-LVL V: CPT | Mod: PBBFAC,,, | Performed by: NURSE PRACTITIONER

## 2018-03-06 NOTE — PATIENT INSTRUCTIONS
Anirudh Randolph, [facility]  Partha Santos, [facility]  Outpatient Counselor/Therapist  [address] [facility] [place] [id number]  [phone]

## 2018-03-06 NOTE — PROGRESS NOTES
"Subjective:       Patient ID: Mateusz Bates is a 67 y.o. female.    Chief Complaint: Depression    Patient presents to clinic today for followup of depression. She was previously on effexor but it is unclear when she stopped. She states someone told her the side effects were horrible and so she stopped taking them. She reports increased unhappiness, increased appetite, "getting into trouble at work for being rude to people" and avoiding life responsibilities. She states she has cancelled routine medical appointments due to avoidance and financial reasons. She states she is not cleaning her home and "running away" from responsibilities. She also stopped taking her amlodipine and atorvastatin several months ago and recently resumed atorvastatin again several weeks ago.       Review of Systems   Constitutional: Negative for chills, fatigue, fever and unexpected weight change.   Eyes: Negative for visual disturbance.   Respiratory: Negative for shortness of breath.    Cardiovascular: Negative for chest pain.   Musculoskeletal: Negative for myalgias.   Neurological: Negative for headaches.   Psychiatric/Behavioral: Positive for dysphoric mood.       Objective:      Physical Exam   Constitutional: She is oriented to person, place, and time. She appears well-developed and well-nourished. No distress.   HENT:   Head: Normocephalic and atraumatic.   Eyes: Conjunctivae are normal.   Cardiovascular: Normal rate and regular rhythm.  Exam reveals no gallop and no friction rub.    No murmur heard.  Pulmonary/Chest: Effort normal and breath sounds normal.   Neurological: She is alert and oriented to person, place, and time.   Skin: Skin is warm and dry.   Psychiatric: She exhibits a depressed mood.   Vitals reviewed.      Protective Sensation (w/ 10 gram monofilament):  Right: Intact  Left: Intact    Visual Inspection:  Normal -  left; rigth foot has two vesicles and Nails Intact - without Evidence of Foot Deformity- " Bilateral    Pedal Pulses:   Right: Present  Left: Present    Posterior tibialis:   Right:Present  Left: Present      Assessment:       1. FAHAD (generalized anxiety disorder)    2. Chronic major depressive disorder    3. Type 2 diabetes mellitus without complication, without long-term current use of insulin    4. Hypertension associated with diabetes    5. Breast cancer screening    6. Colon cancer screening    7. Need for pneumococcal vaccination        Plan:   FAHAD (generalized anxiety disorder)  Comments:  resume effexor, strongly encouraged counselling.    Chronic major depressive disorder  Comments:  not controlled, resume effexor, number for LCSW given    Type 2 diabetes mellitus without complication, without long-term current use of insulin  Comments:  status pending labs, continue current medications; discussed diet control  Orders:  -     Microalbumin/creatinine urine ratio  -     Ambulatory referral to Ophthalmology  -     Ambulatory referral to Outpatient Case Management    Hypertension associated with diabetes  Comments:  not taking norvasc, if elevated at 3/28 appt, will encourage patient to resume; A1C today    Breast cancer screening  -     Mammo Digital Screening Bilateral With CAD; Future; Expected date: 03/06/2018    Colon cancer screening  -     Case request GI: COLONOSCOPY    Need for pneumococcal vaccination  -     (In Office Administered) Pneumococcal Polysaccharide Vaccine (23 Valent) (SQ/IM)      Will check A1C, lipid, and urine micro today. Patient not true fasting but discussed with Dr. Kent. Patient is poor to follow up so will check today while she is here.   Health Maintenance reviewed/updated.    Follow-up if symptoms worsen or fail to improve, for keep routine follow up.

## 2018-03-07 ENCOUNTER — OFFICE VISIT (OUTPATIENT)
Dept: OPHTHALMOLOGY | Facility: CLINIC | Age: 68
End: 2018-03-07
Payer: COMMERCIAL

## 2018-03-07 ENCOUNTER — LAB VISIT (OUTPATIENT)
Dept: LAB | Facility: HOSPITAL | Age: 68
End: 2018-03-07
Payer: COMMERCIAL

## 2018-03-07 DIAGNOSIS — E78.5 HYPERLIPIDEMIA, UNSPECIFIED HYPERLIPIDEMIA TYPE: ICD-10-CM

## 2018-03-07 DIAGNOSIS — E11.9 DIABETES MELLITUS TYPE 2 WITHOUT RETINOPATHY: Primary | ICD-10-CM

## 2018-03-07 DIAGNOSIS — I15.2 HYPERTENSION ASSOCIATED WITH DIABETES: ICD-10-CM

## 2018-03-07 DIAGNOSIS — E11.59 HYPERTENSION ASSOCIATED WITH DIABETES: ICD-10-CM

## 2018-03-07 DIAGNOSIS — H52.4 BILATERAL PRESBYOPIA: ICD-10-CM

## 2018-03-07 DIAGNOSIS — H25.13 CATARACT, NUCLEAR SCLEROTIC SENILE, BILATERAL: ICD-10-CM

## 2018-03-07 DIAGNOSIS — H52.03 HYPEROPIA, BILATERAL: ICD-10-CM

## 2018-03-07 LAB
CHOLEST SERPL-MCNC: 213 MG/DL
CHOLEST/HDLC SERPL: 4.5 {RATIO}
ESTIMATED AVG GLUCOSE: 269 MG/DL
HBA1C MFR BLD HPLC: 11 %
HDLC SERPL-MCNC: 47 MG/DL
HDLC SERPL: 22.1 %
LDLC SERPL CALC-MCNC: 139.8 MG/DL
NONHDLC SERPL-MCNC: 166 MG/DL
TRIGL SERPL-MCNC: 131 MG/DL

## 2018-03-07 PROCEDURE — 36415 COLL VENOUS BLD VENIPUNCTURE: CPT

## 2018-03-07 PROCEDURE — 83036 HEMOGLOBIN GLYCOSYLATED A1C: CPT

## 2018-03-07 PROCEDURE — 92015 DETERMINE REFRACTIVE STATE: CPT | Mod: S$GLB,,, | Performed by: OPTOMETRIST

## 2018-03-07 PROCEDURE — 99999 PR PBB SHADOW E&M-EST. PATIENT-LVL I: CPT | Mod: PBBFAC,,, | Performed by: OPTOMETRIST

## 2018-03-07 PROCEDURE — 80061 LIPID PANEL: CPT

## 2018-03-07 PROCEDURE — 92014 COMPRE OPH EXAM EST PT 1/>: CPT | Mod: S$GLB,,, | Performed by: OPTOMETRIST

## 2018-03-07 NOTE — LETTER
March 7, 2018      Laila Kent NP  [address] [place] [address] Dr Shae SANCHEZ [id number]           [facility]  [address] [place] [address] Nate [place] [id number]  Phone: [phone]  Fax: [phone]          Patient: Mateusz Bates   MR Number: [id number]   YOB: 1950   Date of Visit: 3/7/2018       Dear Laila Kent:    Thank you for referring Mateusz Bates to me for evaluation. Attached you will find relevant portions of my assessment and plan of care.    If you have questions, please do not hesitate to call me. I look forward to following Mateusz Bates along with you.    Sincerely,    Ivan Melchor, OD    Enclosure  CC:  No Recipients    If you would like to receive this communication electronically, please contact [email or web address] or [phone] to request more information on scroll kit Link access.    For providers and/or their staff who would like to refer a patient to [facility], please contact us through our one-stop-shop provider referral line, Lalita Lorenzo, at [phone].    If you feel you have received this communication in error or would no longer like to receive these types of communications, please e-mail [email or web address]

## 2018-03-07 NOTE — PROGRESS NOTES
HPI     IDDM exam. Itchy eye sometimes. Last eye exam 02/23/2017 TRF. Update   glasses RX.    Last edited by Rosa Isela Smith on 3/7/2018  1:12 PM. (History)            Assessment /Plan     For exam results, see Encounter Report.    Diabetes mellitus type 2 without retinopathy    Cataract, nuclear sclerotic senile, bilateral    Hyperopia, bilateral    Bilateral presbyopia      No Background Diabetic Retinopathy    Mild cataracts OU, not surgical.    Dispense Final Rx for glasses.  RTC 1 year

## 2018-03-09 ENCOUNTER — DOCUMENTATION ONLY (OUTPATIENT)
Dept: ENDOSCOPY | Facility: HOSPITAL | Age: 68
End: 2018-03-09

## 2018-03-14 ENCOUNTER — TELEPHONE (OUTPATIENT)
Dept: INTERNAL MEDICINE | Facility: CLINIC | Age: 68
End: 2018-03-14

## 2018-03-14 NOTE — TELEPHONE ENCOUNTER
----- Message from Laila Kent NP sent at 3/8/2018 12:52 PM CST -----  Please notify patient A1c 11 which shows poor diabetes control. She sees Dr. Kilpatrick in 2 weeks but needs to see one of the diabetic NPs ASAP as well. Preferably same day if that works for patient's schedule. Referral was placed for case management so please remind patient to answer when they call. Cholesterol elevated and LDL goal is <70. Continue statin until she sees Dr. Kilpatrick.

## 2018-03-14 NOTE — TELEPHONE ENCOUNTER
Patient notified of results and recommendations. Appointment has been scheduled with  in Diabetes Management for 03/21/18 at 1 pm. Advised to keep upcoming appointment with  and to call the office as needed, patient verbalized understanding.

## 2018-03-16 ENCOUNTER — OUTPATIENT CASE MANAGEMENT (OUTPATIENT)
Dept: ADMINISTRATIVE | Facility: OTHER | Age: 68
End: 2018-03-16

## 2018-03-16 NOTE — PROGRESS NOTES
Please note the following patients information has been forwarded to [facility] for Case Management and/or .    Please see the media section in patient's chart for additional details.    Please contact Outpatient Complex care Management at ext [phone] with any questions.    Thank you,    Edwige Calvert, [facility]  Outpatient Complex Care Mgmt  Ext [phone]

## 2018-03-27 ENCOUNTER — TELEPHONE (OUTPATIENT)
Dept: DIABETES | Facility: CLINIC | Age: 68
End: 2018-03-27

## 2018-03-28 ENCOUNTER — OFFICE VISIT (OUTPATIENT)
Dept: INTERNAL MEDICINE | Facility: CLINIC | Age: 68
End: 2018-03-28
Payer: COMMERCIAL

## 2018-03-28 ENCOUNTER — LAB VISIT (OUTPATIENT)
Dept: LAB | Facility: HOSPITAL | Age: 68
End: 2018-03-28
Attending: FAMILY MEDICINE
Payer: COMMERCIAL

## 2018-03-28 VITALS
SYSTOLIC BLOOD PRESSURE: 136 MMHG | BODY MASS INDEX: 30.95 KG/M2 | TEMPERATURE: 98 F | HEART RATE: 78 BPM | WEIGHT: 168.19 LBS | OXYGEN SATURATION: 97 % | DIASTOLIC BLOOD PRESSURE: 84 MMHG | HEIGHT: 62 IN

## 2018-03-28 DIAGNOSIS — F32.9 CHRONIC MAJOR DEPRESSIVE DISORDER: Chronic | ICD-10-CM

## 2018-03-28 DIAGNOSIS — E66.9 OBESITY (BMI 30.0-34.9): ICD-10-CM

## 2018-03-28 DIAGNOSIS — E11.69 HYPERLIPIDEMIA ASSOCIATED WITH TYPE 2 DIABETES MELLITUS: ICD-10-CM

## 2018-03-28 DIAGNOSIS — E78.5 HYPERLIPIDEMIA ASSOCIATED WITH TYPE 2 DIABETES MELLITUS: ICD-10-CM

## 2018-03-28 DIAGNOSIS — I15.2 HYPERTENSION ASSOCIATED WITH DIABETES: ICD-10-CM

## 2018-03-28 DIAGNOSIS — Z00.00 ROUTINE GENERAL MEDICAL EXAMINATION AT A HEALTH CARE FACILITY: Primary | ICD-10-CM

## 2018-03-28 DIAGNOSIS — Z11.59 NEED FOR HEPATITIS C SCREENING TEST: ICD-10-CM

## 2018-03-28 DIAGNOSIS — F41.1 GAD (GENERALIZED ANXIETY DISORDER): ICD-10-CM

## 2018-03-28 DIAGNOSIS — E11.59 HYPERTENSION ASSOCIATED WITH DIABETES: ICD-10-CM

## 2018-03-28 LAB
ALBUMIN SERPL BCP-MCNC: 3.6 G/DL
ALP SERPL-CCNC: 86 U/L
ALT SERPL W/O P-5'-P-CCNC: 13 U/L
ANION GAP SERPL CALC-SCNC: 12 MMOL/L
AST SERPL-CCNC: 13 U/L
BASOPHILS # BLD AUTO: 0.03 K/UL
BASOPHILS NFR BLD: 0.4 %
BILIRUB SERPL-MCNC: 0.3 MG/DL
BUN SERPL-MCNC: 13 MG/DL
CALCIUM SERPL-MCNC: 9.3 MG/DL
CHLORIDE SERPL-SCNC: 101 MMOL/L
CO2 SERPL-SCNC: 26 MMOL/L
CREAT SERPL-MCNC: 0.8 MG/DL
DIFFERENTIAL METHOD: ABNORMAL
EOSINOPHIL # BLD AUTO: 0.2 K/UL
EOSINOPHIL NFR BLD: 2.8 %
ERYTHROCYTE [DISTWIDTH] IN BLOOD BY AUTOMATED COUNT: 13.4 %
EST. GFR  (AFRICAN AMERICAN): >60 ML/MIN/1.73 M^2
EST. GFR  (NON AFRICAN AMERICAN): >60 ML/MIN/1.73 M^2
GLUCOSE SERPL-MCNC: 235 MG/DL
HCT VFR BLD AUTO: 35.7 %
HGB BLD-MCNC: 10.8 G/DL
IMM GRANULOCYTES # BLD AUTO: 0.03 K/UL
IMM GRANULOCYTES NFR BLD AUTO: 0.4 %
LYMPHOCYTES # BLD AUTO: 1.8 K/UL
LYMPHOCYTES NFR BLD: 23 %
MCH RBC QN AUTO: 25.7 PG
MCHC RBC AUTO-ENTMCNC: 30.3 G/DL
MCV RBC AUTO: 85 FL
MONOCYTES # BLD AUTO: 0.6 K/UL
MONOCYTES NFR BLD: 7.9 %
NEUTROPHILS # BLD AUTO: 5 K/UL
NEUTROPHILS NFR BLD: 65.5 %
NRBC BLD-RTO: 0 /100 WBC
PLATELET # BLD AUTO: 421 K/UL
PMV BLD AUTO: 9.9 FL
POTASSIUM SERPL-SCNC: 4.8 MMOL/L
PROT SERPL-MCNC: 7.2 G/DL
RBC # BLD AUTO: 4.2 M/UL
SODIUM SERPL-SCNC: 139 MMOL/L
TSH SERPL DL<=0.005 MIU/L-ACNC: 0.97 UIU/ML
WBC # BLD AUTO: 7.6 K/UL

## 2018-03-28 PROCEDURE — 84443 ASSAY THYROID STIM HORMONE: CPT

## 2018-03-28 PROCEDURE — 80053 COMPREHEN METABOLIC PANEL: CPT

## 2018-03-28 PROCEDURE — 99999 PR PBB SHADOW E&M-EST. PATIENT-LVL IV: CPT | Mod: PBBFAC,,, | Performed by: FAMILY MEDICINE

## 2018-03-28 PROCEDURE — 99397 PER PM REEVAL EST PAT 65+ YR: CPT | Mod: S$GLB,,, | Performed by: FAMILY MEDICINE

## 2018-03-28 PROCEDURE — 36415 COLL VENOUS BLD VENIPUNCTURE: CPT

## 2018-03-28 PROCEDURE — 86803 HEPATITIS C AB TEST: CPT

## 2018-03-28 PROCEDURE — 85025 COMPLETE CBC W/AUTO DIFF WBC: CPT

## 2018-03-28 NOTE — ASSESSMENT & PLAN NOTE
Improving, continue effexor 37.5 mg, proceed with counseling, may call to increase effexor if needed in mid April

## 2018-03-28 NOTE — PROGRESS NOTES
Subjective:       Patient ID: Mateusz Bates is a 67 y.o. female.    Chief Complaint: Annual Exam    Patient presents to clinic today for annual physical exam.      Review of Systems   Constitutional: Negative for chills, fatigue, fever and unexpected weight change.   HENT: Negative for congestion, dental problem, ear pain, hearing loss, rhinorrhea and trouble swallowing.    Eyes: Negative for pain and visual disturbance.   Respiratory: Negative for cough and shortness of breath.    Cardiovascular: Negative for chest pain, palpitations and leg swelling.   Gastrointestinal: Negative for abdominal distention, abdominal pain, blood in stool, constipation, diarrhea, nausea and vomiting.   Genitourinary: Negative for difficulty urinating and vaginal discharge.   Musculoskeletal: Negative for arthralgias and myalgias.   Skin: Negative for rash.   Neurological: Negative for dizziness, weakness, numbness and headaches.   Hematological: Negative for adenopathy. Does not bruise/bleed easily.   Psychiatric/Behavioral: Positive for dysphoric mood (improving). Negative for sleep disturbance. The patient is not nervous/anxious.        Objective:      Physical Exam   Constitutional: She is oriented to person, place, and time. Vital signs are normal. She appears well-developed and well-nourished. No distress.   HENT:   Head: Normocephalic and atraumatic.   Right Ear: Tympanic membrane, external ear and ear canal normal.   Left Ear: Tympanic membrane, external ear and ear canal normal.   Nose: Nose normal. No mucosal edema or rhinorrhea.   Mouth/Throat: Uvula is midline, oropharynx is clear and moist and mucous membranes are normal.   Eyes: Conjunctivae, EOM and lids are normal. Pupils are equal, round, and reactive to light.   Neck: Normal range of motion. Neck supple. No thyromegaly present.   Cardiovascular: Normal rate and regular rhythm.  Exam reveals no gallop and no friction rub.    No murmur heard.  Pulmonary/Chest: Effort  normal and breath sounds normal. She has no wheezes. She has no rhonchi. She has no rales.   Abdominal: Soft. Normal appearance.   Musculoskeletal: Normal range of motion.   Lymphadenopathy:     She has no cervical adenopathy.   Neurological: She is alert and oriented to person, place, and time. She has normal strength. No cranial nerve deficit or sensory deficit. Gait normal.   Skin: Skin is warm and dry. No lesion and no rash noted. No cyanosis. Nails show no clubbing.   Psychiatric: She has a normal mood and affect.   Vitals reviewed.      Assessment:       1. Routine general medical examination at a health care facility    2. Uncontrolled type 2 diabetes mellitus without complication, with long-term current use of insulin    3. Hyperlipidemia associated with type 2 diabetes mellitus    4. Hypertension associated with diabetes    5. Chronic major depressive disorder    6. FAHAD (generalized anxiety disorder)    7. Obesity (BMI 30.0-34.9)    8. Need for hepatitis C screening test        Plan:     Problem List Items Addressed This Visit     Chronic major depressive disorder (Chronic)    Current Assessment & Plan     Improving, continue effexor 37.5 mg, proceed with counseling, may call to increase effexor if needed in mid April         Hypertension associated with diabetes    Current Assessment & Plan     Controlled, continue amlodipine         Relevant Orders    CBC auto differential    TSH    FAHAD (generalized anxiety disorder)    Current Assessment & Plan     Improving, continue effexor 37.5 mg, proceed with counseling, may call to increase effexor if needed in mid April           Diabetes mellitus type II, uncontrolled    Current Assessment & Plan     A1c 11, patient has recently resumed her diabetes medications, she will continue and keep her appointment with Mrs. Haq in diabetes on April 20th         Obesity (BMI 30.0-34.9)    Current Assessment & Plan     Recommend healthy lifestyle changes to lose weight          Hyperlipidemia associated with type 2 diabetes mellitus    Current Assessment & Plan     Above goal, , patient recently resumed her Lipitor 80 mg daily, will continue and monitor         Relevant Orders    Comprehensive metabolic panel      Other Visit Diagnoses     Routine general medical examination at a health care facility    -  Primary    Need for hepatitis C screening test        Relevant Orders    Hepatitis C antibody          Health Maintenance reviewed/updated. Mammogram scheduled. Colonoscopy ordered previously, will have staff schedule.

## 2018-03-28 NOTE — PATIENT INSTRUCTIONS
Keep appointment with Dez Eun in diabetes.    Continue medications as directed.    DIET: LOW CHOLESTEROL  Cholesterol is needed by the body to build new cells and create certain hormones. There are two kinds of cholesterol in the blood:      · Good cholesterol prevents fat deposits (plaque) from building up in the arteries. In this way it protects against heart disease and stroke.    · Bad cholesterol stays in the body and sticks to artery walls. It may eventually block blood flow to the heart and brain causing heart attack or stroke.   75% of the bodys cholesterol is made in the liver. Only 25% of the bodys cholesterol comes from the food you eat. While the amount of cholesterol in your diet should be limited, it is the cholesterol that your body makes that creates the greatest disease risk. The biggest influence on cholesterol made by your body is the mixture of fat types in your diet. There are two kinds of fats you can eat:  · Good Fats are the unsaturated fats (mono-saturated and poly-unsaturated). They raise the level of good cholesterol and lower the level of bad cholesterol. Good fats are found in vegetable oils such as olive, sunflower, corn and soybean oils, and in nuts and seeds.    · Bad Fats are the saturated fats (including foods high in cholesterol) and trans fats. These increase the risk of disease. They lower the good cholesterol and raise the level of bad cholesterol. Bad fats are found in meat and whole-milk dairy products. Some plants are also high in bad fats (coconut and palm plants). Trans fats are found in hard (stick) margarines and many fast foods and commercially baked goods. Soft margarine sold in tubs has less trans fats and are safer to use.  High blood cholesterol usually is a result of a diet high in saturated fat combined with an inactive lifestyle. In some cases, genetics plays a role in causing high cholesterol. The following tips will help you create healthy eating  habits that will help lower your blood cholesterol level.  STEPS TO CREATING A DIET HIGH IN GOOD FAT, LOW IN BAD FATS (and low in cholesterol)  1. Consult with your doctor before starting a low cholesterol diet or weight loss program.    2. Learn to read nutrition labels and select appropriate portion sizes.    3. When cooking, use plant-based unsaturated vegetable oils (sunflower, corn, soybean, canola, peanut, and olive oils).    4. Avoid saturated oils found in animal products such as meat, dairy (whole-milk, cheese and ice cream), poultry skin, and egg yolks. Plants high in saturated oils include coconut and coconut oil, palm oil and palm kernel oil.  5. If you eat meat, choose smaller portions and lean cuts.  6. Replace meat with fish at least two times a week. Fish is an important source of the unsaturated fat called omega-3 fatty acids. This fat has potential to lower the risk of heart disease.    7. Replace whole-milk dairy products with low-fat or nonfat products. Try soy products. Soy helps to reduce total cholesterol.    8. Supplement your diet with protective fibers. Eat nuts, seeds, and whole grains rather than white rice and bread. These foods lower both cholesterol and triglyceride levels. (Triglycerides are another fat found in the blood.) Walnuts are one of the best sources of an omega-3 fatty acid.  9. Eat plenty of fresh fruits and vegetables daily.  10. Avoid fast foods and commercial baked goods. Assume they contain saturated fat unless labeled otherwise.  © 5678-5115 [employer], [address] [place] [address], [place] [address]. All rights reserved. This information is not intended as a substitute for professional medical care. Always follow your healthcare professional's instructions.

## 2018-03-29 LAB — HCV AB SERPL QL IA: NEGATIVE

## 2018-04-01 DIAGNOSIS — E53.8 VITAMIN B12 DEFICIENCY: ICD-10-CM

## 2018-04-01 DIAGNOSIS — D50.9 IRON DEFICIENCY ANEMIA, UNSPECIFIED IRON DEFICIENCY ANEMIA TYPE: Primary | ICD-10-CM

## 2018-04-02 RX ORDER — INSULIN GLARGINE 300 [IU]/ML
30 INJECTION, SOLUTION SUBCUTANEOUS DAILY
Qty: 4.5 ML | Refills: 0 | Status: SHIPPED | OUTPATIENT
Start: 2018-04-02 | End: 2018-05-24 | Stop reason: SDUPTHER

## 2018-04-04 RX ORDER — BLOOD SUGAR DIAGNOSTIC
STRIP MISCELLANEOUS
Qty: 300 STRIP | Refills: 3 | Status: SHIPPED | OUTPATIENT
Start: 2018-04-04 | End: 2018-06-07 | Stop reason: SDUPTHER

## 2018-04-06 DIAGNOSIS — E11.9 DIABETES MELLITUS WITHOUT COMPLICATION: ICD-10-CM

## 2018-04-10 DIAGNOSIS — E66.9 DIABETES MELLITUS TYPE 2 IN OBESE: ICD-10-CM

## 2018-04-10 DIAGNOSIS — E11.69 DIABETES MELLITUS TYPE 2 IN OBESE: ICD-10-CM

## 2018-04-10 RX ORDER — METFORMIN HYDROCHLORIDE 1000 MG/1
1000 TABLET ORAL 2 TIMES DAILY WITH MEALS
Qty: 60 TABLET | Refills: 5 | Status: SHIPPED | OUTPATIENT
Start: 2018-04-10 | End: 2018-09-30 | Stop reason: SDUPTHER

## 2018-04-10 NOTE — TELEPHONE ENCOUNTER
Pt would like a refill of metformin. She also asked if this med was still good she has been seeing different things about it through the news.

## 2018-04-11 NOTE — TELEPHONE ENCOUNTER
Spoke with pt, informed of refill and informed that med is first line for DM. Pt voiced understanding.

## 2018-04-16 ENCOUNTER — HOSPITAL ENCOUNTER (OUTPATIENT)
Dept: RADIOLOGY | Facility: HOSPITAL | Age: 68
Discharge: HOME | End: 2018-04-16
Attending: NURSE PRACTITIONER
Payer: COMMERCIAL

## 2018-04-16 VITALS — BODY MASS INDEX: 30.91 KG/M2 | HEIGHT: 62 IN | WEIGHT: 168 LBS

## 2018-04-16 DIAGNOSIS — Z12.39 BREAST CANCER SCREENING: ICD-10-CM

## 2018-04-16 PROCEDURE — 77067 SCR MAMMO BI INCL CAD: CPT | Mod: TC

## 2018-04-16 PROCEDURE — 77063 BREAST TOMOSYNTHESIS BI: CPT | Mod: 26,,, | Performed by: RADIOLOGY

## 2018-04-16 PROCEDURE — 77067 SCR MAMMO BI INCL CAD: CPT | Mod: 26,,, | Performed by: RADIOLOGY

## 2018-04-17 ENCOUNTER — PATIENT OUTREACH (OUTPATIENT)
Dept: ADMINISTRATIVE | Facility: HOSPITAL | Age: 68
End: 2018-04-17

## 2018-04-19 RX ORDER — SODIUM, POTASSIUM,MAG SULFATES 17.5-3.13G
SOLUTION, RECONSTITUTED, ORAL ORAL
Qty: 354 ML | Refills: 0 | Status: SHIPPED | OUTPATIENT
Start: 2018-04-19 | End: 2018-10-03

## 2018-05-11 ENCOUNTER — OFFICE VISIT (OUTPATIENT)
Dept: DIABETES | Facility: CLINIC | Age: 68
End: 2018-05-11
Payer: COMMERCIAL

## 2018-05-11 VITALS
SYSTOLIC BLOOD PRESSURE: 128 MMHG | HEIGHT: 62 IN | DIASTOLIC BLOOD PRESSURE: 80 MMHG | WEIGHT: 169.56 LBS | BODY MASS INDEX: 31.2 KG/M2

## 2018-05-11 DIAGNOSIS — E78.5 HYPERLIPIDEMIA ASSOCIATED WITH TYPE 2 DIABETES MELLITUS: ICD-10-CM

## 2018-05-11 DIAGNOSIS — E11.59 HYPERTENSION ASSOCIATED WITH DIABETES: ICD-10-CM

## 2018-05-11 DIAGNOSIS — I15.2 HYPERTENSION ASSOCIATED WITH DIABETES: ICD-10-CM

## 2018-05-11 DIAGNOSIS — E11.69 HYPERLIPIDEMIA ASSOCIATED WITH TYPE 2 DIABETES MELLITUS: ICD-10-CM

## 2018-05-11 LAB — GLUCOSE SERPL-MCNC: 324 MG/DL (ref 70–110)

## 2018-05-11 PROCEDURE — 3079F DIAST BP 80-89 MM HG: CPT | Mod: CPTII,S$GLB,, | Performed by: NURSE PRACTITIONER

## 2018-05-11 PROCEDURE — 99999 PR PBB SHADOW E&M-EST. PATIENT-LVL IV: CPT | Mod: PBBFAC,,, | Performed by: NURSE PRACTITIONER

## 2018-05-11 PROCEDURE — 3074F SYST BP LT 130 MM HG: CPT | Mod: CPTII,S$GLB,, | Performed by: NURSE PRACTITIONER

## 2018-05-11 PROCEDURE — 3046F HEMOGLOBIN A1C LEVEL >9.0%: CPT | Mod: CPTII,S$GLB,, | Performed by: NURSE PRACTITIONER

## 2018-05-11 PROCEDURE — 99214 OFFICE O/P EST MOD 30 MIN: CPT | Mod: S$GLB,,, | Performed by: NURSE PRACTITIONER

## 2018-05-11 PROCEDURE — 82948 REAGENT STRIP/BLOOD GLUCOSE: CPT | Mod: S$GLB,,, | Performed by: NURSE PRACTITIONER

## 2018-05-11 NOTE — PROGRESS NOTES
"PCP: Dr. Oneal    HISTORY OF PRESENT ILLNESS: 67 year old  female patient is in clinic today for diabetes follow up.  Most recent A1C was 11.0, ADA recommends less than 7.0.  She was intolerant to Victoza, and developed extreme nausea, vomiting so medication was stopped.  Has been on Bydureon, Byetta in the past and tolerated.  She has gained 9 pounds last visit.     Blood glucose monitoring is performed, but no meter today. Per recall, fasting  - 180 s; ac dinner 200 s - 250 s.  She denies polyuria, polydipsia, polyphagia, blurred vision.  She denies nausea, vomiting, diarrhea, or hypoglycemia.     Height: 5 ' 1",  Weight: 169 pounds, BMI 31.51  Blood Glucose reading this am: Not Taken  Blood Glucose reading in clinic: 329 mg/dl at 11:41 am ( ate a donut this am )    DM MEDICATIONS:  Glucophage 1000 mg BID  Glyburide 5 mg, 2 tablets BID ac  Toujeo 30 units in AM    Labs Reviewed.    REVIEW OF SYSTEMS:  GENERAL: Denies fever, chills, change in appetite.  HEENT: Denies impaired hearing, dysphagia.  RESPIRATORY: Denies shortness of breath or wheezing.   CARDIOVASCULAR: Denies chest pain, palpitations.  GI: Denies nausea, vomiting, diarrhea.  : Denies hematuria, dysuria or frequency.  MS: Denies difficulty with mobility, muscle or joint pain.  NEURO: Denies numbness or tingling in the hands or feet.   PSYCH: Treated for depression.  ENDO: See HPI.    STANDARDS OF CARE:   Eye exam: Dr. Melchor, Last exam 3 / 2018  FOOT EXAM: No podiatrist.  Dental Exams: Recommended regular cleanings.  ACE / ARB: No  Statin: Yes    ACTIVITY LEVEL: No regular exercise.   MEAL PLANNING: Number of meals - 3. Number of Snacks - 0. Breakfast, usually V8 juice or Atkins Bar OR sausage lupe.  Lunch can be salad, ham.  Dinner can be Michael buffalo chicken tenders, occasional fish sandwich from [employer]. Per dietary recall, patient is  limiting carbohydrates, saturated fats and sodium.     SELF-MONITORING: Self monitoring,  " BARBARAAleshaCULLENSHYANNE Aga  SOCIAL: . Lives alone with 2 cats, 3 dogs.  Works at the [employer] desk at [employer]. Never smoker.     PHYSICAL EXAMINATION:  GENERAL: WDWN  female in no acute distress, ambulatory, responds appropriately. AAO X 3.  NECK: Supple, no thyromegaly, no cervical or supraclavicular lymphadenopathy, no carotid bruit.  HEART: Regular rate and rhythm. No rubs, murmurs or gallops.   LUNGS: CTA bilaterally. Unlabored breathing, no use of accessory muscles.   SKIN: Warm, dry skin. No lesions or abrasions. Clean, dry, well-healed injection sites.   MUSCULOSKELETAL: Normal gait and muscle strength.  ABDOMEN: Soft, nontender, no masses. BS active X 4.   NEUROLOGIC: Cranial nerves II-XII grossly intact.   FOOT EVALUATION: Protective Sensation (w/ 10 gram monofilament):  Right: Intact  Left: Intact  //  Visual Inspection:  Normal -  Bilateral  // Pedal Pulses:  Right: Present  Left: Present     ASSESSMENT:  1. DiabetesType 2 - uncontrolled on metformin, glyburide, Toujeo,  A1C 11.0  2. Hypertension - suboptimal control on Lisinopril / HCT  3. Hyperlipidemia, low HDL - suboptimal control on Lipitor  4. Obesity - BMI 31.51    PLAN:  1.) Instructed to monitor BG  2 x daily, fasting and before or 2 hr pp dinner.  Discussed ADA goal for fasting blood sugar, 80 - 130 mg/dL; pp blood sugars below 180 mg/dl. Also, discussed prevention of hypoglycemia and the need to adjust goals to higher levels if persistent hypoglycemia. Reminded her to bring records to each visit.   2.) Reviewed pathophysiology of diabetes, complications related to the disease, importance of annual dilated eye exam and daily foot examination.  3.) We discussed the ADA recommendations: Hemoglobin A1c below 7.0 %. All patients with diabetes should be on statins unless contraindicated.  ACE or ARB therapy if not contraindicated.    4.) Continue metformin 1,000 mg BID.  Continue glyburide 5 mg TWICE DAILY ac.  Increase  Toujeo 35 units in am, same time every day.  She was intolerant to Victoza, developed extreme nausea and vomiting, so med was stopped.  She is willing to retry Bydureon BCISE 2 mg weekly.  Discussed  importance of rotating sites.  Reviewed possible GI side effects.  Written instructions were given and patient verbalizes understanding.  If no improvement with Bydureon, will start meal time insulin.   5.) Meal planning teaching: Carbohydrate definition - one serving is 15 gms. Carbohydrate spacing - carbohydrates should be spaced into approximately 3 meals with 2 snacks ( of one carbohydrate ) between meals or at bedtime. Increase vegetable intake to 2 or more cups of vegetables per day as well as 2 fruit servings. Recommended low saturated fat, low sodium diet to aid in control of hypertension and cholesterol.  6.) Discussed activity, benefits, methods, and precautions. Recommended patient start or continue some form of exercise and increase as tolerated to 30 minutes per day to facilitate weight loss and aid in control of blood glucoses.   7.) Return to clinic in 1 month for follow up. Future Labs scheduled ( July ): A1C. Advised to call clinic with any questions or concerns.     Millicent Haq, RADHA-C, CDE    A total of 30 minutes was spent in face to face time, of which over 50 % was spent in counseling patient on disease process, complications, treatment, and side effects of medications.

## 2018-05-21 DIAGNOSIS — F41.9 ANXIETY: ICD-10-CM

## 2018-05-21 RX ORDER — HYDROXYZINE HYDROCHLORIDE 10 MG/1
TABLET, FILM COATED ORAL
Qty: 60 TABLET | Refills: 2 | OUTPATIENT
Start: 2018-05-21

## 2018-05-22 ENCOUNTER — PATIENT MESSAGE (OUTPATIENT)
Dept: DIABETES | Facility: CLINIC | Age: 68
End: 2018-05-22

## 2018-05-24 RX ORDER — INSULIN GLARGINE 300 U/ML
INJECTION, SOLUTION SUBCUTANEOUS
Qty: 4.5 ML | Refills: 3 | Status: SHIPPED | OUTPATIENT
Start: 2018-05-24 | End: 2018-06-27 | Stop reason: SDUPTHER

## 2018-05-30 ENCOUNTER — PATIENT OUTREACH (OUTPATIENT)
Dept: ADMINISTRATIVE | Facility: HOSPITAL | Age: 68
End: 2018-05-30

## 2018-06-07 ENCOUNTER — PATIENT MESSAGE (OUTPATIENT)
Dept: DIABETES | Facility: CLINIC | Age: 68
End: 2018-06-07

## 2018-06-27 ENCOUNTER — OFFICE VISIT (OUTPATIENT)
Dept: DIABETES | Facility: CLINIC | Age: 68
End: 2018-06-27
Payer: COMMERCIAL

## 2018-06-27 VITALS
DIASTOLIC BLOOD PRESSURE: 88 MMHG | WEIGHT: 174.63 LBS | SYSTOLIC BLOOD PRESSURE: 146 MMHG | BODY MASS INDEX: 32.14 KG/M2 | HEIGHT: 62 IN

## 2018-06-27 DIAGNOSIS — E11.59 HYPERTENSION ASSOCIATED WITH DIABETES: ICD-10-CM

## 2018-06-27 DIAGNOSIS — I15.2 HYPERTENSION ASSOCIATED WITH DIABETES: ICD-10-CM

## 2018-06-27 DIAGNOSIS — E78.5 HYPERLIPIDEMIA ASSOCIATED WITH TYPE 2 DIABETES MELLITUS: ICD-10-CM

## 2018-06-27 DIAGNOSIS — E66.9 OBESITY (BMI 30.0-34.9): ICD-10-CM

## 2018-06-27 DIAGNOSIS — E11.69 HYPERLIPIDEMIA ASSOCIATED WITH TYPE 2 DIABETES MELLITUS: ICD-10-CM

## 2018-06-27 LAB — GLUCOSE SERPL-MCNC: 251 MG/DL (ref 70–110)

## 2018-06-27 PROCEDURE — 82948 REAGENT STRIP/BLOOD GLUCOSE: CPT | Mod: S$GLB,,, | Performed by: NURSE PRACTITIONER

## 2018-06-27 PROCEDURE — 99999 PR PBB SHADOW E&M-EST. PATIENT-LVL III: CPT | Mod: PBBFAC,,, | Performed by: NURSE PRACTITIONER

## 2018-06-27 PROCEDURE — 99214 OFFICE O/P EST MOD 30 MIN: CPT | Mod: S$GLB,,, | Performed by: NURSE PRACTITIONER

## 2018-06-27 RX ORDER — INSULIN GLARGINE 300 [IU]/ML
45 INJECTION, SOLUTION SUBCUTANEOUS DAILY
Qty: 9 ML | Refills: 3 | Status: SHIPPED | OUTPATIENT
Start: 2018-06-27 | End: 2019-07-01 | Stop reason: SDUPTHER

## 2018-06-27 RX ORDER — PEN NEEDLE, DIABETIC 30 GX3/16"
1 NEEDLE, DISPOSABLE MISCELLANEOUS 3 TIMES DAILY
Qty: 100 EACH | Refills: 11 | Status: SHIPPED | OUTPATIENT
Start: 2018-06-27 | End: 2019-10-28 | Stop reason: SDUPTHER

## 2018-06-27 NOTE — PROGRESS NOTES
"PCP: Dr. Oneal    HISTORY OF PRESENT ILLNESS: 67 year old  female patient is in clinic today for diabetes follow up.  Most recent A1C was 11.0, ADA recommends less than 7.0.  She was intolerant to Victoza and developed extreme nausea, vomiting so medication was stopped.  Has been on Bydureon, Byetta in the past and tolerated.  She has gained 5 pounds last visit.     At her last visit, Bydureon BCISE was prescribed, but patient had difficulty with injecting medication and stopped taking it. Of note, she has also been without her Toujeo for > 1 week and is requesting a refill on her basal insulin.  Blood glucose monitoring is performed, but no meter today. Per recall, fasting  s; pp meals 250 s.     She denies polyuria, polydipsia, polyphagia, blurred vision.  She denies nausea, vomiting, diarrhea, or hypoglycemia.  No recent hospitalizations or emergency room visits.    Height: 5 ' 1",  Weight: 174 pounds, BMI 31.94  Blood Glucose reading this am: Not Taken  Blood Glucose reading in clinic: 251 mg/dl at 1:11 pm ( just finished eating )    DM MEDICATIONS:  Glucophage 1000 mg BID  Glyburide 5 mg, 2 tablets BID ac  Toujeo 30 units in AM ( has been off > 1 week )    Labs Reviewed.    REVIEW OF SYSTEMS:  GENERAL: Denies fever, chills, change in appetite.  HEENT: Denies impaired hearing, dysphagia.  RESPIRATORY: Denies shortness of breath or wheezing.   CARDIOVASCULAR: Denies chest pain, palpitations.  GI: Denies nausea, vomiting, diarrhea.  : Denies hematuria, dysuria or frequency.  MS: Denies difficulty with mobility, muscle or joint pain.  NEURO: Denies numbness or tingling in the hands or feet.   PSYCH: Treated for depression.  ENDO: See HPI.    STANDARDS OF CARE:   Eye exam: Dr. Melchor, Last exam 3 / 2018  FOOT EXAM: No podiatrist.  Dental Exams: Recommended regular cleanings.  ACE / ARB: No  Statin: Yes    ACTIVITY LEVEL: No regular exercise.   MEAL PLANNING: Number of meals - 3. Number of Snacks - " 0. Breakfast, usually V8 juice or Atkins Bar OR sausage lupe.  Lunch can be salad, ham.  Dinner can be Michael buffalo chicken tenders, occasional fish sandwich from [employer]. Per dietary recall, patient is  limiting carbohydrates, saturated fats and sodium.     SELF-MONITORING: Self monitoring,  ACCU-CHESHYANNE Yung  SOCIAL: . Lives alone with 2 cats, 3 dogs.  Works at the "[employer]" desk at [employer]. Never smoker.     PHYSICAL EXAMINATION:  GENERAL: WDWN  female in no acute distress, ambulatory, responds appropriately. AAO X 3.  NECK: Supple, no thyromegaly, no cervical or supraclavicular lymphadenopathy, no carotid bruit.  HEART: Regular rate and rhythm. No rubs, murmurs or gallops.   LUNGS: CTA bilaterally. Unlabored breathing, no use of accessory muscles.   SKIN: Warm, dry skin. No lesions or abrasions. Clean, dry, well-healed injection sites.   MUSCULOSKELETAL: Normal gait and muscle strength.  ABDOMEN: Soft, nontender, no masses. BS active X 4.   NEUROLOGIC: Cranial nerves II-XII grossly intact.   FOOT EVALUATION: Protective Sensation (w/ 10 gram monofilament):  Right: Intact  Left: Intact  //  Visual Inspection:  Normal -  Bilateral  // Pedal Pulses:  Right: Present  Left: Present     ASSESSMENT:  1. DiabetesType 2 - uncontrolled on metformin, glyburide, Toujeo,  A1C 11.0  2. Hypertension - suboptimal control on Lisinopril / HCT  3. Hyperlipidemia, low HDL - suboptimal control on Lipitor  4. Obesity - BMI 31.94    PLAN:  1.) Continue monitoring blood glucose  2 x daily, fasting and before or 2 hr pp dinner.  Discussed ADA goal for fasting blood sugar, 80 - 130 mg/dL; pp blood sugars below 180 mg/dl. Also, discussed prevention of hypoglycemia and the need to adjust goals to higher levels if persistent hypoglycemia. Reminded her to bring records to each visit.   2.) Reviewed pathophysiology of diabetes, complications related to the disease, importance of annual dilated eye exam  and daily foot examination.  3.) We discussed the ADA recommendations: Hemoglobin A1c below 7.0 %. All patients with diabetes should be on statins unless contraindicated.  ACE or ARB therapy if not contraindicated.    4.) Continue metformin 1,000 mg BID.  Continue Toujeo 30 units in am, same time every day - prescription refill sent to pharmacy.   She has been on Victoza in past, but could not tolerate.  She also reports difficulty with injecting Bydureon BCISE.  Start Byetta 5mcg SQ ac BID within one hour of meals. Discussed  importance of rotating sites.  Reviewed possible GI side effects.   Instructed to drink 8 oz of water with injection to help prevent nausea.  Written instructions were given and patient verbalizes understanding.  Since we are starting Byetta, will decrease glyburide 5 mg, 1 tablet twice daily.   We had a discussion about medication compliance to lower blood sugars and control diabetes.  She voiced understanding and plans to start taking her medications as prescribed.   5.) Meal planning teaching: Carbohydrate definition - one serving is 15 gms. Carbohydrate spacing - carbohydrates should be spaced into approximately 3 meals with 2 snacks ( of one carbohydrate ) between meals or at bedtime. Increase vegetable intake to 2 or more cups of vegetables per day as well as 2 fruit servings. Recommended low saturated fat, low sodium diet to aid in control of hypertension and cholesterol.  6.) Discussed activity, benefits, methods, and precautions. Recommended patient start or continue some form of exercise and increase as tolerated to 30 minutes per day to facilitate weight loss and aid in control of blood glucoses.   7.) Return to clinic in 1 month for follow up. Future Labs Scheduled ( July 3 ): A1C, CMP. Advised to call clinic with any questions or concerns.     Millicent Haq, RADHA-C, CDE    A total of 30 minutes was spent in face to face time, of which over 50 % was spent in counseling  patient on disease process, complications, treatment, and side effects of medications.

## 2018-06-28 ENCOUNTER — PATIENT MESSAGE (OUTPATIENT)
Dept: DIABETES | Facility: CLINIC | Age: 68
End: 2018-06-28

## 2018-07-05 ENCOUNTER — LAB VISIT (OUTPATIENT)
Dept: LAB | Facility: HOSPITAL | Age: 68
End: 2018-07-05
Attending: FAMILY MEDICINE
Payer: COMMERCIAL

## 2018-07-05 DIAGNOSIS — E53.8 VITAMIN B12 DEFICIENCY: ICD-10-CM

## 2018-07-05 DIAGNOSIS — D50.9 IRON DEFICIENCY ANEMIA, UNSPECIFIED IRON DEFICIENCY ANEMIA TYPE: ICD-10-CM

## 2018-07-05 LAB
ALBUMIN SERPL BCP-MCNC: 3.4 G/DL
ALP SERPL-CCNC: 80 U/L
ALT SERPL W/O P-5'-P-CCNC: 10 U/L
ANION GAP SERPL CALC-SCNC: 8 MMOL/L
AST SERPL-CCNC: 13 U/L
BASOPHILS # BLD AUTO: 0.03 K/UL
BASOPHILS NFR BLD: 0.4 %
BILIRUB SERPL-MCNC: 0.4 MG/DL
BUN SERPL-MCNC: 22 MG/DL
CALCIUM SERPL-MCNC: 9.6 MG/DL
CHLORIDE SERPL-SCNC: 104 MMOL/L
CO2 SERPL-SCNC: 26 MMOL/L
CREAT SERPL-MCNC: 0.7 MG/DL
DIFFERENTIAL METHOD: ABNORMAL
EOSINOPHIL # BLD AUTO: 0.2 K/UL
EOSINOPHIL NFR BLD: 3.1 %
ERYTHROCYTE [DISTWIDTH] IN BLOOD BY AUTOMATED COUNT: 14.5 %
EST. GFR  (AFRICAN AMERICAN): >60 ML/MIN/1.73 M^2
EST. GFR  (NON AFRICAN AMERICAN): >60 ML/MIN/1.73 M^2
ESTIMATED AVG GLUCOSE: 226 MG/DL
FERRITIN SERPL-MCNC: 9 NG/ML
GLUCOSE SERPL-MCNC: 107 MG/DL
HBA1C MFR BLD HPLC: 9.5 %
HCT VFR BLD AUTO: 35.2 %
HGB BLD-MCNC: 10.8 G/DL
IMM GRANULOCYTES # BLD AUTO: 0.03 K/UL
IMM GRANULOCYTES NFR BLD AUTO: 0.4 %
IRON SERPL-MCNC: 46 UG/DL
LYMPHOCYTES # BLD AUTO: 1.7 K/UL
LYMPHOCYTES NFR BLD: 24.3 %
MCH RBC QN AUTO: 26.2 PG
MCHC RBC AUTO-ENTMCNC: 30.7 G/DL
MCV RBC AUTO: 85 FL
MONOCYTES # BLD AUTO: 0.5 K/UL
MONOCYTES NFR BLD: 7.7 %
NEUTROPHILS # BLD AUTO: 4.5 K/UL
NEUTROPHILS NFR BLD: 64.1 %
NRBC BLD-RTO: 0 /100 WBC
PLATELET # BLD AUTO: 389 K/UL
PMV BLD AUTO: 10.3 FL
POTASSIUM SERPL-SCNC: 5.3 MMOL/L
PROT SERPL-MCNC: 7.2 G/DL
RBC # BLD AUTO: 4.12 M/UL
SATURATED IRON: 11 %
SODIUM SERPL-SCNC: 138 MMOL/L
TOTAL IRON BINDING CAPACITY: 428 UG/DL
TRANSFERRIN SERPL-MCNC: 289 MG/DL
VIT B12 SERPL-MCNC: 178 PG/ML
WBC # BLD AUTO: 7.04 K/UL

## 2018-07-05 PROCEDURE — 83036 HEMOGLOBIN GLYCOSYLATED A1C: CPT

## 2018-07-05 PROCEDURE — 83540 ASSAY OF IRON: CPT

## 2018-07-05 PROCEDURE — 36415 COLL VENOUS BLD VENIPUNCTURE: CPT

## 2018-07-05 PROCEDURE — 85025 COMPLETE CBC W/AUTO DIFF WBC: CPT

## 2018-07-05 PROCEDURE — 82728 ASSAY OF FERRITIN: CPT

## 2018-07-05 PROCEDURE — 80053 COMPREHEN METABOLIC PANEL: CPT

## 2018-07-05 PROCEDURE — 82607 VITAMIN B-12: CPT

## 2018-07-11 DIAGNOSIS — F41.8 DEPRESSION WITH ANXIETY: ICD-10-CM

## 2018-07-11 RX ORDER — VENLAFAXINE HYDROCHLORIDE 37.5 MG/1
37.5 CAPSULE, EXTENDED RELEASE ORAL DAILY
Qty: 30 CAPSULE | Refills: 11 | Status: CANCELLED | OUTPATIENT
Start: 2018-07-11

## 2018-07-11 NOTE — TELEPHONE ENCOUNTER
----- Message from Juany Smith sent at 7/11/2018  3:12 PM CDT -----  Patient requesting a Rx refill for Venlafaxine.    Pt use..  [id number] [facility] [place] [id number]    [id number] [place] - [address] [facility]  [address]  [place] [address]  Phone: [phone] Fax: [phone]    Please adv/call [phone].//cw

## 2018-07-12 RX ORDER — VENLAFAXINE HYDROCHLORIDE 75 MG/1
75 CAPSULE, EXTENDED RELEASE ORAL DAILY
Qty: 30 CAPSULE | Refills: 3 | Status: SHIPPED | OUTPATIENT
Start: 2018-07-12 | End: 2018-07-13 | Stop reason: SDUPTHER

## 2018-07-12 NOTE — TELEPHONE ENCOUNTER
----- Message from Darleen Herrmann sent at 7/12/2018  9:19 AM CDT -----  Contact: Patient  Patient states she needs a new script for Venlafaxine[facility] [address]. Please call patient back if needed at [phone]. Thank you

## 2018-07-13 ENCOUNTER — TELEPHONE (OUTPATIENT)
Dept: INTERNAL MEDICINE | Facility: CLINIC | Age: 68
End: 2018-07-13

## 2018-07-13 RX ORDER — VENLAFAXINE HYDROCHLORIDE 75 MG/1
75 CAPSULE, EXTENDED RELEASE ORAL DAILY
Qty: 30 CAPSULE | Refills: 3 | Status: SHIPPED | OUTPATIENT
Start: 2018-07-13 | End: 2018-10-03

## 2018-07-13 NOTE — TELEPHONE ENCOUNTER
Venlafaxine was sent to pharmacy yesterday. She needs it to be sent to the [facility] on [address] at [facility]

## 2018-07-13 NOTE — TELEPHONE ENCOUNTER
----- Message from Catherine Smith sent at 7/13/2018 11:28 AM CDT -----  Contact: pt  The pt request a call concerning a med refill, no additional info given, can be reached at [phone]///thxMW

## 2018-07-30 DIAGNOSIS — F41.9 ANXIETY: ICD-10-CM

## 2018-07-30 RX ORDER — HYDROXYZINE HYDROCHLORIDE 10 MG/1
10 TABLET, FILM COATED ORAL 3 TIMES DAILY
Qty: 60 TABLET | Refills: 2 | Status: SHIPPED | OUTPATIENT
Start: 2018-07-30 | End: 2018-09-20 | Stop reason: SDUPTHER

## 2018-07-30 NOTE — TELEPHONE ENCOUNTER
----- Message from Diana Maloney sent at 7/30/2018  8:53 AM CDT -----  Pt t [phone]//states she is needing a med refilled//med is Hydroxydine//[facility] [address]//[phone] call when sent in//jess/[facility]

## 2018-08-04 RX ORDER — EXENATIDE 250 UG/ML
5 INJECTION SUBCUTANEOUS 2 TIMES DAILY WITH MEALS
Refills: 0 | OUTPATIENT
Start: 2018-08-04 | End: 2019-08-04

## 2018-08-15 ENCOUNTER — OFFICE VISIT (OUTPATIENT)
Dept: DIABETES | Facility: CLINIC | Age: 68
End: 2018-08-15
Payer: COMMERCIAL

## 2018-08-15 VITALS
HEIGHT: 62 IN | BODY MASS INDEX: 30.16 KG/M2 | WEIGHT: 163.88 LBS | SYSTOLIC BLOOD PRESSURE: 134 MMHG | DIASTOLIC BLOOD PRESSURE: 92 MMHG

## 2018-08-15 DIAGNOSIS — E11.59 HYPERTENSION ASSOCIATED WITH DIABETES: ICD-10-CM

## 2018-08-15 DIAGNOSIS — E11.69 HYPERLIPIDEMIA ASSOCIATED WITH TYPE 2 DIABETES MELLITUS: ICD-10-CM

## 2018-08-15 DIAGNOSIS — I15.2 HYPERTENSION ASSOCIATED WITH DIABETES: ICD-10-CM

## 2018-08-15 DIAGNOSIS — E78.5 HYPERLIPIDEMIA ASSOCIATED WITH TYPE 2 DIABETES MELLITUS: ICD-10-CM

## 2018-08-15 LAB — GLUCOSE SERPL-MCNC: 226 MG/DL (ref 70–110)

## 2018-08-15 PROCEDURE — 82948 REAGENT STRIP/BLOOD GLUCOSE: CPT | Mod: S$GLB,,, | Performed by: NURSE PRACTITIONER

## 2018-08-15 PROCEDURE — 99999 PR PBB SHADOW E&M-EST. PATIENT-LVL III: CPT | Mod: PBBFAC,,, | Performed by: NURSE PRACTITIONER

## 2018-08-15 PROCEDURE — 99214 OFFICE O/P EST MOD 30 MIN: CPT | Mod: S$GLB,,, | Performed by: NURSE PRACTITIONER

## 2018-08-15 NOTE — PROGRESS NOTES
"PCP: Dr. Oneal    HISTORY OF PRESENT ILLNESS: 68 year old  female patient is in clinic today for diabetes follow up.  Most recent A1C was 9.5, ADA recommends less than 7.0.  She was intolerant to Victoza and developed extreme nausea, vomiting so medication was stopped.  A month ago, she was started on Byetta and plans to start the increased dose of 10 mcg next week.  She has lost 11 pounds since last visit.      Per recall, fasting BGs are 130 s - 180 s; hs 200 s < 310 >. She denies polyuria, polydipsia, polyphagia, blurred vision. She denies nausea, vomiting, diarrhea, or hypoglycemia.  No recent hospitalizations or emergency room visits.    Height: 5 ' 1",  Weight: 163 pounds, BMI 29.98  Blood Glucose reading this am: Not Taken  Blood Glucose reading in clinic: 226 mg/dl at 2:39 pm     DM MEDICATIONS:  Glucophage 1000 mg BID  Glyburide 5 mg, 2 tablets BID ac  Toujeo 35 units in AM   Byetta 5 mcg BID ac ( plans to start the 10 mcg dose next week )    Labs Reviewed.    REVIEW OF SYSTEMS:  GENERAL: Denies fever, chills, change in appetite.  HEENT: Denies impaired hearing, dysphagia.  RESPIRATORY: Denies shortness of breath or wheezing.   CARDIOVASCULAR: Denies chest pain, palpitations.  GI: Denies nausea, vomiting, diarrhea.  : Denies hematuria, dysuria or frequency.  MS: Denies difficulty with mobility, muscle or joint pain.  NEURO: Denies numbness or tingling in the hands or feet.   PSYCH: Treated for depression.  ENDO: See HPI.    STANDARDS OF CARE:   Eye exam: Dr. Melchor, Last exam 3 / 2018  FOOT EXAM: No podiatrist.  Dental Exams: Recommended regular cleanings.  ACE / ARB: No  Statin: Yes    ACTIVITY LEVEL: No regular exercise.   MEAL PLANNING: Number of meals - 3. Number of Snacks - 1, such as coconut pecan cookies. Breakfast, usually V8 juice or Atkins Bar OR sausage lupe.  Lunch can be salad, ham.  Dinner can be Michael buffalo chicken tenders, occasional fish sandwich from [employer]. Per " dietary recall, patient is  limiting carbohydrates, saturated fats and sodium.     SELF-MONITORING: Self monitoring,  ACCU-CHEK Aga  SOCIAL: . Lives alone with 2 cats, 3 dogs.  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 Never smoker.     PHYSICAL EXAMINATION:  GENERAL: WDWN  female in no acute distress, ambulatory, responds appropriately. AAO X 3.  NECK: Supple, no thyromegaly, no cervical or supraclavicular lymphadenopathy, no carotid bruit.  HEART: Regular rate and rhythm. No rubs, murmurs or gallops.   LUNGS: CTA bilaterally. Unlabored breathing, no use of accessory muscles.   SKIN: Warm, dry skin. No lesions or abrasions. Clean, dry, well-healed injection sites.   MUSCULOSKELETAL: Normal gait and muscle strength.  ABDOMEN: Soft, nontender, no masses. BS active X 4.   NEUROLOGIC: Cranial nerves II-XII grossly intact.   FOOT EVALUATION: Deferred, Last exam 06 / 2018    ASSESSMENT:  1. DiabetesType 2 - improving control on metformin, glyburide, Toujeo, Byetta  A1C 9.5  2. Hypertension - suboptimal control on Lisinopril / HCT  3. Hyperlipidemia, low HDL - suboptimal control on Lipitor  4. Obesity - BMI 29.98    PLAN:  1.) Continue monitoring blood glucose  2 x daily, fasting and before or 2 hr pp dinner.  Discussed ADA goal for fasting blood sugar, 80 - 130 mg/dL; pp blood sugars below 180 mg/dl. Also, discussed prevention of hypoglycemia and the need to adjust goals to higher levels if persistent hypoglycemia. Reminded her to bring records to each visit.   2.) Reviewed pathophysiology of diabetes, complications related to the disease, importance of annual dilated eye exam and daily foot examination.  3.) We discussed the ADA recommendations: Hemoglobin A1c below 7.0 %. All patients with diabetes should be on statins unless contraindicated.  ACE or ARB therapy if not contraindicated.    4.) Continue metformin 1,000 mg BID.  Continue Toujeo 35 units in am. Glyburide 5 mg, 2 tabs BID.   She has recently picked up the Byetta 10 mcg pens from the pharmacy and plans to start the increased dose next week.  She knows to take Byetta 10 mcg SQ BD ac, within one hour of meals.    5.) Meal planning teaching: Carbohydrate definition - one serving is 15 gms. Carbohydrate spacing - carbohydrates should be spaced into approximately 3 meals with 2 snacks ( of one carbohydrate ) between meals or at bedtime. Increase vegetable intake to 2 or more cups of vegetables per day as well as 2 fruit servings. Recommended low saturated fat, low sodium diet to aid in control of hypertension and cholesterol.  6.) Discussed activity, benefits, methods, and precautions. Recommended patient start or continue some form of exercise and increase as tolerated to 30 minutes per day to facilitate weight loss and aid in control of blood glucoses.   7.) Return to clinic in 6 weeks for follow up. Advised to call clinic with any questions or concerns.     Millicent Haq, NP-C, CDE    A total of 30 minutes was spent in face to face time, of which over 50 % was spent in counseling patient on disease process, complications, treatment, and side effects of medications.

## 2018-09-20 DIAGNOSIS — F41.9 ANXIETY: ICD-10-CM

## 2018-09-21 RX ORDER — HYDROXYZINE HYDROCHLORIDE 10 MG/1
TABLET, FILM COATED ORAL
Qty: 60 TABLET | Refills: 2 | Status: SHIPPED | OUTPATIENT
Start: 2018-09-21 | End: 2019-04-04 | Stop reason: SDUPTHER

## 2018-09-30 DIAGNOSIS — E66.9 DIABETES MELLITUS TYPE 2 IN OBESE: ICD-10-CM

## 2018-09-30 DIAGNOSIS — E11.69 DIABETES MELLITUS TYPE 2 IN OBESE: ICD-10-CM

## 2018-10-02 RX ORDER — METFORMIN HYDROCHLORIDE 1000 MG/1
TABLET ORAL
Qty: 60 TABLET | Refills: 0 | Status: SHIPPED | OUTPATIENT
Start: 2018-10-02 | End: 2018-10-31 | Stop reason: SDUPTHER

## 2018-10-03 ENCOUNTER — OFFICE VISIT (OUTPATIENT)
Dept: DIABETES | Facility: CLINIC | Age: 68
End: 2018-10-03
Payer: COMMERCIAL

## 2018-10-03 ENCOUNTER — LAB VISIT (OUTPATIENT)
Dept: LAB | Facility: HOSPITAL | Age: 68
End: 2018-10-03
Attending: NURSE PRACTITIONER
Payer: COMMERCIAL

## 2018-10-03 ENCOUNTER — OFFICE VISIT (OUTPATIENT)
Dept: INTERNAL MEDICINE | Facility: CLINIC | Age: 68
End: 2018-10-03
Payer: COMMERCIAL

## 2018-10-03 VITALS
BODY MASS INDEX: 30.02 KG/M2 | HEIGHT: 62 IN | DIASTOLIC BLOOD PRESSURE: 76 MMHG | SYSTOLIC BLOOD PRESSURE: 144 MMHG | WEIGHT: 163.13 LBS

## 2018-10-03 VITALS
BODY MASS INDEX: 29.7 KG/M2 | TEMPERATURE: 97 F | HEART RATE: 84 BPM | SYSTOLIC BLOOD PRESSURE: 138 MMHG | HEIGHT: 62 IN | WEIGHT: 161.38 LBS | DIASTOLIC BLOOD PRESSURE: 80 MMHG | OXYGEN SATURATION: 97 %

## 2018-10-03 DIAGNOSIS — F32.9 CHRONIC MAJOR DEPRESSIVE DISORDER: Chronic | ICD-10-CM

## 2018-10-03 DIAGNOSIS — E11.59 HYPERTENSION ASSOCIATED WITH DIABETES: ICD-10-CM

## 2018-10-03 DIAGNOSIS — E11.65 UNCONTROLLED TYPE 2 DIABETES MELLITUS WITH HYPERGLYCEMIA: ICD-10-CM

## 2018-10-03 DIAGNOSIS — E11.65 UNCONTROLLED TYPE 2 DIABETES MELLITUS WITH HYPERGLYCEMIA: Primary | ICD-10-CM

## 2018-10-03 DIAGNOSIS — Z12.11 COLON CANCER SCREENING: Primary | ICD-10-CM

## 2018-10-03 DIAGNOSIS — E66.9 OBESITY (BMI 30.0-34.9): ICD-10-CM

## 2018-10-03 DIAGNOSIS — I15.2 HYPERTENSION ASSOCIATED WITH DIABETES: ICD-10-CM

## 2018-10-03 LAB
ALBUMIN SERPL BCP-MCNC: 3.6 G/DL
ALP SERPL-CCNC: 85 U/L
ALT SERPL W/O P-5'-P-CCNC: 10 U/L
ANION GAP SERPL CALC-SCNC: 10 MMOL/L
AST SERPL-CCNC: 13 U/L
BILIRUB SERPL-MCNC: 0.2 MG/DL
BUN SERPL-MCNC: 15 MG/DL
CALCIUM SERPL-MCNC: 10.1 MG/DL
CHLORIDE SERPL-SCNC: 102 MMOL/L
CO2 SERPL-SCNC: 25 MMOL/L
CREAT SERPL-MCNC: 1 MG/DL
EST. GFR  (AFRICAN AMERICAN): >60 ML/MIN/1.73 M^2
EST. GFR  (NON AFRICAN AMERICAN): 58 ML/MIN/1.73 M^2
GLUCOSE SERPL-MCNC: 242 MG/DL
GLUCOSE SERPL-MCNC: 304 MG/DL (ref 70–110)
POTASSIUM SERPL-SCNC: 5.1 MMOL/L
PROT SERPL-MCNC: 7.6 G/DL
SODIUM SERPL-SCNC: 137 MMOL/L

## 2018-10-03 PROCEDURE — 99999 PR PBB SHADOW E&M-EST. PATIENT-LVL IV: CPT | Mod: PBBFAC,,, | Performed by: FAMILY MEDICINE

## 2018-10-03 PROCEDURE — 83036 HEMOGLOBIN GLYCOSYLATED A1C: CPT

## 2018-10-03 PROCEDURE — 1100F PTFALLS ASSESS-DOCD GE2>/YR: CPT | Mod: CPTII,S$GLB,, | Performed by: FAMILY MEDICINE

## 2018-10-03 PROCEDURE — 99999 PR PBB SHADOW E&M-EST. PATIENT-LVL IV: CPT | Mod: PBBFAC,,, | Performed by: NURSE PRACTITIONER

## 2018-10-03 PROCEDURE — 3079F DIAST BP 80-89 MM HG: CPT | Mod: CPTII,S$GLB,, | Performed by: FAMILY MEDICINE

## 2018-10-03 PROCEDURE — 99214 OFFICE O/P EST MOD 30 MIN: CPT | Mod: S$GLB,,, | Performed by: NURSE PRACTITIONER

## 2018-10-03 PROCEDURE — 99214 OFFICE O/P EST MOD 30 MIN: CPT | Mod: 25,S$GLB,, | Performed by: FAMILY MEDICINE

## 2018-10-03 PROCEDURE — 90662 IIV NO PRSV INCREASED AG IM: CPT | Mod: S$GLB,,, | Performed by: FAMILY MEDICINE

## 2018-10-03 PROCEDURE — 80053 COMPREHEN METABOLIC PANEL: CPT

## 2018-10-03 PROCEDURE — 36415 COLL VENOUS BLD VENIPUNCTURE: CPT | Mod: PO

## 2018-10-03 PROCEDURE — 3288F FALL RISK ASSESSMENT DOCD: CPT | Mod: CPTII,S$GLB,, | Performed by: FAMILY MEDICINE

## 2018-10-03 PROCEDURE — 90471 IMMUNIZATION ADMIN: CPT | Mod: S$GLB,,, | Performed by: FAMILY MEDICINE

## 2018-10-03 PROCEDURE — 3075F SYST BP GE 130 - 139MM HG: CPT | Mod: CPTII,S$GLB,, | Performed by: FAMILY MEDICINE

## 2018-10-03 PROCEDURE — 82948 REAGENT STRIP/BLOOD GLUCOSE: CPT | Mod: S$GLB,,, | Performed by: NURSE PRACTITIONER

## 2018-10-03 RX ORDER — SODIUM, POTASSIUM,MAG SULFATES 17.5-3.13G
SOLUTION, RECONSTITUTED, ORAL ORAL
Qty: 354 ML | Refills: 0 | Status: ON HOLD | OUTPATIENT
Start: 2018-10-03 | End: 2019-11-18 | Stop reason: HOSPADM

## 2018-10-03 RX ORDER — VENLAFAXINE HYDROCHLORIDE 150 MG/1
150 CAPSULE, EXTENDED RELEASE ORAL DAILY
Qty: 30 CAPSULE | Refills: 5 | Status: SHIPPED | OUTPATIENT
Start: 2018-10-03 | End: 2019-10-02 | Stop reason: SDUPTHER

## 2018-10-03 NOTE — PATIENT INSTRUCTIONS
Continue metformin 1,000 mg BID.     Continue Toujeo 30 units in am.      Decrease glyburide 5 mg, 1 tab BID.      Continue Byetta 10 mcg twice daily

## 2018-10-03 NOTE — PATIENT INSTRUCTIONS
Try 3-5 minutes daily of guided meditation  [employer] - has free 10 day introduction  Simply Being  Calm  Simple Habit    Call [phone] to schedule colonoscopy.

## 2018-10-03 NOTE — PROGRESS NOTES
"Subjective:       Patient ID: Mateusz Bates is a 68 y.o. female.    Chief Complaint: Altered Mental Status (Get confused easily affecting job)    Works at [employer]. Has to count money at times. Was helping a coworker who was asking a lot of questions, she states she did something wrong, her count was wrong. She reports this has caused excess worry. She reports "forgetting process to do certain things". She reports coworkers complain that she asks questions about things she should already know. Workplace is a stressful environment. Reports no problems at home, mainly problems only at work.       Review of Systems   Constitutional: Negative for chills, fatigue, fever and unexpected weight change.   Eyes: Negative for visual disturbance.   Respiratory: Negative for shortness of breath.    Cardiovascular: Negative for chest pain.   Musculoskeletal: Negative for myalgias.   Neurological: Negative for headaches.   Psychiatric/Behavioral: Positive for confusion. The patient is nervous/anxious.        Objective:      Physical Exam   Constitutional: She is oriented to person, place, and time. She appears well-developed and well-nourished. No distress.   HENT:   Head: Normocephalic and atraumatic.   Eyes: Conjunctivae and EOM are normal. Pupils are equal, round, and reactive to light. No scleral icterus.   Cardiovascular: Normal rate and regular rhythm. Exam reveals no gallop and no friction rub.   No murmur heard.  Pulmonary/Chest: Effort normal and breath sounds normal.   Neurological: She is alert and oriented to person, place, and time. No cranial nerve deficit. Gait normal.   Psychiatric: She has a normal mood and affect.   Vitals reviewed.      Assessment:       1. Colon cancer screening    2. Chronic major depressive disorder        Plan:     Problem List Items Addressed This Visit     Chronic major depressive disorder (Chronic)    Current Assessment & Plan     Discussed with patient that her work stressors are likely " the cause of her work issues; will Increase effexor to 150 mg daily, she will return in 1 month for reassessment. If issues persist, will consider Neurology evaluation. Patient expressed understanding and agreement with plan.         Relevant Medications    venlafaxine (EFFEXOR-XR) 150 MG Cp24      Other Visit Diagnoses     Colon cancer screening    -  Primary    Relevant Medications    sodium,potassium,mag sulfates (SUPREP BOWEL PREP KIT) 17.5-3.13-1.6 gram SolR    Other Relevant Orders    Case request GI: COLONOSCOPY (Completed)          Health Maintenance reviewed/updated.

## 2018-10-03 NOTE — PROGRESS NOTES
"PCP: Dr. Oneal    HISTORY OF PRESENT ILLNESS: 68 year old  female patient is in clinic today for diabetes follow up.  Most recent A1C has improved to 9.5, ADA recommends less than 7.0.  She was intolerant to Victoza and developed extreme nausea, vomiting so medication was stopped.  However, she is taking Byetta 10 mcg without GI problems.  She has maintained her weight since last visit.     Per meter, blood sugars range from 54 - 260. She denies polyuria, polydipsia, polyphagia, blurred vision. She denies nausea, vomiting, or diarrhea. Patient voices she is having hypoglycemia, BG in 50 s, ac dinner and overnight which she treats with sugary food.  No recent hospitalizations or emergency room visits.    Height: 5 ' 1",  Weight: 163 pounds, BMI 29.84  Blood Glucose reading this am: Not Taken  Blood Glucose reading in clinic: 304 mg/dl at 1:47 pm ( just finished eating cheeseburger, fries, and soda )    DM MEDICATIONS:  Glucophage 1000 mg BID  Glyburide 5 mg, 2 tablets BID ac  Toujeo 30 units in Hs ( bedtime )  Byetta 10 mcg BID ac     Labs Reviewed.    REVIEW OF SYSTEMS:  GENERAL: Denies fever, chills, change in appetite.  HEENT: Denies impaired hearing, dysphagia.  RESPIRATORY: Denies shortness of breath or wheezing.   CARDIOVASCULAR: Denies chest pain, palpitations.  GI: Denies nausea, vomiting, diarrhea.  : Denies hematuria, dysuria or frequency.  MS: Denies difficulty with mobility, muscle or joint pain.  NEURO: Denies numbness or tingling in the hands or feet.   PSYCH: Treated for depression.  ENDO: See HPI.    STANDARDS OF CARE:   Eye exam: Dr. Melchor, Last exam 3 / 2018  FOOT EXAM: No podiatrist.  Dental Exams: Recommended regular cleanings.  ACE / ARB: No  Statin: Yes    ACTIVITY LEVEL: No regular exercise.   MEAL PLANNING: Number of meals - 3. Number of Snacks - 1, such as coconut pecan cookies. Breakfast, usually V8 juice or Atkins Bar OR sausage lupe.  Lunch can be salad, ham.  Dinner can be " Michael buffalo chicken tenders, occasional fish sandwich from [employer]. Per dietary recall, patient is  limiting carbohydrates, saturated fats and sodium.     SELF-MONITORING: Self monitoring,  ACCU-CHEK Aga  SOCIAL: . Lives alone with 2 cats, 3 dogs.  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 Never smoker.     PHYSICAL EXAMINATION:  GENERAL: WDWN  female in no acute distress, ambulatory, responds appropriately. AAO X 3.  NECK: Supple, no thyromegaly, no cervical or supraclavicular lymphadenopathy, no carotid bruit.  HEART: Regular rate and rhythm. No rubs, murmurs or gallops.   LUNGS: CTA bilaterally. Unlabored breathing, no use of accessory muscles.   SKIN: Warm, dry skin. No lesions or abrasions. Clean, dry, well-healed injection sites.   MUSCULOSKELETAL: Normal gait and muscle strength.  ABDOMEN: Soft, nontender, no masses. BS active X 4.   NEUROLOGIC: Cranial nerves II-XII grossly intact.   FOOT EVALUATION: Protective Sensation (w/ 10 gram monofilament):  Right: Intact   Left: Intact  //  Visual Inspection:  Normal -  Bilateral //  Pedal Pulses:  Right: Present  Left: Present    ASSESSMENT:  1. DiabetesType 2 - improving control on metformin, glyburide, Toujeo, Byetta  A1C 9.5  2. Hypertension - suboptimal control on Lisinopril / HCT  3. Hyperlipidemia, low HDL - suboptimal control on Lipitor  4. Obesity - BMI 29.84    PLAN:  1.) Continue monitoring blood glucose  2 x daily, fasting and before or 2 hr pp dinner.  Discussed ADA goal for fasting blood sugar, 80 - 130 mg/dL; pp blood sugars below 180 mg/dl. Also, discussed prevention of hypoglycemia and the need to adjust goals to higher levels if persistent hypoglycemia. Reminded her to bring records to each visit.   2.) Reviewed pathophysiology of diabetes, complications related to the disease, importance of annual dilated eye exam and daily foot examination.  3.) We discussed the ADA recommendations: Hemoglobin A1c below 7.0  %. All patients with diabetes should be on statins unless contraindicated.  ACE or ARB therapy if not contraindicated.    4.) Continue metformin 1,000 mg BID.  Continue Toujeo 30 units in am.  Patient voices she is having lows ac dinner and overnight, but it sounds like she has lows when she does not eat a sufficient amount of dinner.  She has very sporadic eating patterns.  Some days, she eats very little in the evening and drops low, while other days, she eats very high carb meals that lead to postprandial spikes.  Continue Byetta 10 mcg BID ac.  Order placed for 14 day CGM.  Indications for CGMS:  Elevated A1C, Postprandial hyperglycemia, hypoglycemia, and Annual Screening.  Will make adjustments based on CGM report.   5.) Meal planning teaching: Carbohydrate definition - one serving is 15 gms. Carbohydrate spacing - carbohydrates should be spaced into approximately 3 meals with 2 snacks ( of one carbohydrate ) between meals or at bedtime. Increase vegetable intake to 2 or more cups of vegetables per day as well as 2 fruit servings. Recommended low saturated fat, low sodium diet to aid in control of hypertension and cholesterol.  6.) Discussed activity, benefits, methods, and precautions. Recommended patient start or continue some form of exercise and increase as tolerated to 30 minutes per day to facilitate weight loss and aid in control of blood glucoses.   7.) Return to clinic in 2 months for follow up. Labs Today: CMP, A1C.  Advised to call clinic with any questions or concerns.     Millicent Haq, RADHA-C, CDE    A total of 30 minutes was spent in face to face time, of which over 50 % was spent in counseling patient on disease process, complications, treatment, and side effects of medications.

## 2018-10-04 LAB
ESTIMATED AVG GLUCOSE: 212 MG/DL
HBA1C MFR BLD HPLC: 9 %

## 2018-10-08 ENCOUNTER — DOCUMENTATION ONLY (OUTPATIENT)
Dept: ENDOSCOPY | Facility: HOSPITAL | Age: 68
End: 2018-10-08

## 2018-10-08 NOTE — ASSESSMENT & PLAN NOTE
Discussed with patient that her work stressors are likely the cause of her work issues; will Increase effexor to 150 mg daily, she will return in 1 month for reassessment. If issues persist, will consider Neurology evaluation. Patient expressed understanding and agreement with plan.

## 2018-10-10 ENCOUNTER — PATIENT MESSAGE (OUTPATIENT)
Dept: DIABETES | Facility: CLINIC | Age: 68
End: 2018-10-10

## 2018-10-17 ENCOUNTER — OFFICE VISIT (OUTPATIENT)
Dept: DIABETES | Facility: CLINIC | Age: 68
End: 2018-10-17
Payer: COMMERCIAL

## 2018-10-17 VITALS — SYSTOLIC BLOOD PRESSURE: 128 MMHG | DIASTOLIC BLOOD PRESSURE: 78 MMHG | BODY MASS INDEX: 29.84 KG/M2 | HEIGHT: 62 IN

## 2018-10-17 DIAGNOSIS — E66.9 OBESITY (BMI 30.0-34.9): ICD-10-CM

## 2018-10-17 DIAGNOSIS — E11.59 HYPERTENSION ASSOCIATED WITH DIABETES: ICD-10-CM

## 2018-10-17 DIAGNOSIS — E11.69 HYPERLIPIDEMIA ASSOCIATED WITH TYPE 2 DIABETES MELLITUS: ICD-10-CM

## 2018-10-17 DIAGNOSIS — I15.2 HYPERTENSION ASSOCIATED WITH DIABETES: ICD-10-CM

## 2018-10-17 DIAGNOSIS — E11.65 UNCONTROLLED TYPE 2 DIABETES MELLITUS WITH HYPERGLYCEMIA: Primary | ICD-10-CM

## 2018-10-17 DIAGNOSIS — E78.5 HYPERLIPIDEMIA ASSOCIATED WITH TYPE 2 DIABETES MELLITUS: ICD-10-CM

## 2018-10-17 LAB — GLUCOSE SERPL-MCNC: 106 MG/DL (ref 70–110)

## 2018-10-17 PROCEDURE — 99999 PR PBB SHADOW E&M-EST. PATIENT-LVL IV: CPT | Mod: PBBFAC,,, | Performed by: NURSE PRACTITIONER

## 2018-10-17 PROCEDURE — 99214 OFFICE O/P EST MOD 30 MIN: CPT | Mod: S$GLB,,, | Performed by: NURSE PRACTITIONER

## 2018-10-17 PROCEDURE — 82948 REAGENT STRIP/BLOOD GLUCOSE: CPT | Mod: S$GLB,,, | Performed by: NURSE PRACTITIONER

## 2018-10-17 NOTE — PATIENT INSTRUCTIONS
Continue Glucophage 1000 mg twice a day    Continue Glyburide 5 mg, 2 tablets twice a day    Continue Toujeo 30 units at bedtime     Stop Byetta.    Start Januvia 100 mg daily

## 2018-10-17 NOTE — PROGRESS NOTES
"PCP: Dr. Oneal    HISTORY OF PRESENT ILLNESS: 68 year old  female patient is in clinic today for diabetes follow up.  Most recent A1C has improved to 9.0, ADA recommends less than 7.0.  She was intolerant to Victoza and developed extreme nausea, vomiting so medication was stopped.  She has been taking Byetta, but on today patient voices that Byetta is causing skin reaction.  She has a erythematous macule rash to abdomen, no drainage, but itchy ~ about the size of 2 quarters.  The rash has been there for 1 day.  Patient stopped her Byetta after seeing the rash.     She did not bring her meter to clinic today.   Per recall, blood sugars range from 50 - 280, depending upon what she eats or if she skips dinner and then has a low upon awakening.  Patient admits to inconsistent meal patterns. Some days, she eats very high carb meals for lunch and dinner, and other days, she skips meals and then has hypoglycemia.     She denies polyuria, polydipsia, polyphagia, blurred vision. She denies nausea, vomiting, or diarrhea.  No recent hospitalizations or emergency room visits.    Height: 5 ' 1 ",  Weight: 163 pounds, BMI 29.84  Blood Glucose reading this am: 42 mg/dl fasting  Blood Glucose reading in clinic: 106 mg/dl at 3:51 pm     DM MEDICATIONS:  Glucophage 1000 mg BID  Glyburide 5 mg, 2 tablets BID ac  Toujeo 30 units in Hs ( bedtime )  Byetta 10 mcg BID ac ( stopped )    Labs Reviewed.    REVIEW OF SYSTEMS:  GENERAL: Denies fever, chills, change in appetite.  HEENT: Denies impaired hearing, dysphagia.  RESPIRATORY: Denies shortness of breath or wheezing.   CARDIOVASCULAR: Denies chest pain, palpitations.  GI: Denies nausea, vomiting, diarrhea.  : Denies hematuria, dysuria or frequency.  MS: Denies difficulty with mobility, muscle or joint pain.  NEURO: Denies numbness or tingling in the hands or feet.   PSYCH: Treated for depression.  ENDO: See HPI.    STANDARDS OF CARE:   Eye exam: Dr. Melchor, Last exam 3 / " 2018  FOOT EXAM: No podiatrist.  Dental Exams: Recommended regular cleanings.  ACE / ARB: No  Statin: Yes    ACTIVITY LEVEL: No regular exercise.   MEAL PLANNING: Number of meals - 3. Number of Snacks - 1, such as coconut pecan cookies. Breakfast, usually V8 juice or Atkins Bar OR sausage lupe.  Lunch can be salad, ham.  Dinner can be Michael buffalo chicken tenders, occasional fish sandwich from [employer]. Per dietary recall, patient is  limiting carbohydrates, saturated fats and sodium.     SELF-MONITORING: Self monitoring,  ACCU-CHEK Aga  SOCIAL: . Lives alone with 2 cats, 3 dogs.  Works at the [employer]. Never smoker.     PHYSICAL EXAMINATION:  GENERAL: WDWN  female in no acute distress, ambulatory, responds appropriately. AAO X 3.  NECK: Supple, no thyromegaly, no cervical or supraclavicular lymphadenopathy, no carotid bruit.  HEART: Regular rate and rhythm. No rubs, murmurs or gallops.   LUNGS: CTA bilaterally. Unlabored breathing, no use of accessory muscles.   SKIN: Warm, dry skin. No lesions or abrasions. Clean, dry, well-healed injection sites.   MUSCULOSKELETAL: Normal gait and muscle strength.  ABDOMEN: Soft, nontender, no masses. BS active X 4.   NEUROLOGIC: Cranial nerves II-XII grossly intact.   FOOT EVALUATION: Deferred, Last exam 09 / 2018    ASSESSMENT:  1. DiabetesType 2 - improving control on metformin, glyburide, Toujeo, Byetta, A1C 9.0  2. Hypertension - suboptimal control on Lisinopril / HCT  3. Hyperlipidemia, low HDL - suboptimal control on Lipitor  4. Obesity - BMI 29.84    PLAN:  1.) Continue monitoring blood glucose  2 x daily, fasting and before or 2 hr pp dinner.  Discussed ADA goal for fasting blood sugar, 80 - 130 mg/dL; pp blood sugars below 180 mg/dl. Also, discussed prevention of hypoglycemia and the need to adjust goals to higher levels if persistent hypoglycemia. Reminded her to bring records to each visit.   2.) Reviewed  pathophysiology of diabetes, complications related to the disease, importance of annual dilated eye exam and daily foot examination.  3.) We discussed the ADA recommendations: Hemoglobin A1c below 7.0 %. All patients with diabetes should be on statins unless contraindicated.  ACE or ARB therapy if not contraindicated.    4.) Continue metformin 1,000 mg BID.  Continue Toujeo 30 units in am. CGM was not approved per insurance.  Patient voices she is having lows upon awakening, which I suspect is due to skipping or not eating enough at dinner. She has very inconsistent  eating patterns.  Some days, she eats very little in the evening and drops low, while other days, she eats very high carb meals that lead to postprandial spikes.  Referral to CDE / dietitian to discuss nutrition.  Stop Byetta due to skin rash.  We discussed starting DPP- 4.  I explained MOA and side effects.  She agrees to take Januvia 100 mg daily, same time every day.  5.) Meal planning teaching: Carbohydrate definition - one serving is 15 gms. Carbohydrate spacing - carbohydrates should be spaced into approximately 3 meals with 2 snacks ( of one carbohydrate ) between meals or at bedtime. Increase vegetable intake to 2 or more cups of vegetables per day as well as 2 fruit servings. Recommended low saturated fat, low sodium diet to aid in control of hypertension and cholesterol.  6.) Discussed activity, benefits, methods, and precautions. Recommended patient start or continue some form of exercise and increase as tolerated to 30 minutes per day to facilitate weight loss and aid in control of blood glucoses.   7.) Return to clinic in 1 month for follow up.  dvised to call clinic with any questions or concerns.     Millicent Haq, KB, CDE    A total of 30 minutes was spent in face to face time, of which over 50 % was spent in counseling patient on disease process, complications, treatment, and side effects of medications.

## 2018-10-23 ENCOUNTER — TELEPHONE (OUTPATIENT)
Dept: DIABETES | Facility: CLINIC | Age: 68
End: 2018-10-23

## 2018-10-23 NOTE — TELEPHONE ENCOUNTER
----- Message from Gabriela Cohenite sent at 10/23/2018  1:50 PM CDT -----  Contact: Pt   Pt called and stated she is in the [facility] and needs to know the dosage of how much insulin she takes.  She can be reached at [phone].     Thanks,  Tf

## 2018-10-29 RX ORDER — VENLAFAXINE HYDROCHLORIDE 75 MG/1
CAPSULE, EXTENDED RELEASE ORAL
Qty: 30 CAPSULE | Refills: 5 | OUTPATIENT
Start: 2018-10-29

## 2018-10-30 ENCOUNTER — DOCUMENTATION ONLY (OUTPATIENT)
Dept: ENDOSCOPY | Facility: HOSPITAL | Age: 68
End: 2018-10-30

## 2018-10-31 DIAGNOSIS — E11.69 DIABETES MELLITUS TYPE 2 IN OBESE: ICD-10-CM

## 2018-10-31 DIAGNOSIS — E66.9 DIABETES MELLITUS TYPE 2 IN OBESE: ICD-10-CM

## 2018-10-31 RX ORDER — METFORMIN HYDROCHLORIDE 1000 MG/1
TABLET ORAL
Qty: 60 TABLET | Refills: 0 | Status: SHIPPED | OUTPATIENT
Start: 2018-10-31 | End: 2018-12-18 | Stop reason: SDUPTHER

## 2018-11-13 ENCOUNTER — TELEPHONE (OUTPATIENT)
Dept: INTERNAL MEDICINE | Facility: CLINIC | Age: 68
End: 2018-11-13

## 2018-11-13 NOTE — TELEPHONE ENCOUNTER
Spoke with pt states she thinks she may may uti.  Pt has appt scheduled for tomorrow 11/14/18 at 9 am.

## 2018-11-13 NOTE — TELEPHONE ENCOUNTER
----- Message from Marline Collier sent at 11/13/2018 10:23 AM CST -----  Contact: pt  Please call pt @ [phone] , pt states she have a bladder infection, pt need a script called into [facility] [place].

## 2018-11-14 ENCOUNTER — LAB VISIT (OUTPATIENT)
Dept: LAB | Facility: HOSPITAL | Age: 68
End: 2018-11-14
Payer: COMMERCIAL

## 2018-11-14 ENCOUNTER — OFFICE VISIT (OUTPATIENT)
Dept: INTERNAL MEDICINE | Facility: CLINIC | Age: 68
End: 2018-11-14
Payer: COMMERCIAL

## 2018-11-14 ENCOUNTER — PATIENT MESSAGE (OUTPATIENT)
Dept: INTERNAL MEDICINE | Facility: CLINIC | Age: 68
End: 2018-11-14

## 2018-11-14 VITALS
TEMPERATURE: 97 F | HEART RATE: 87 BPM | OXYGEN SATURATION: 97 % | BODY MASS INDEX: 28.8 KG/M2 | SYSTOLIC BLOOD PRESSURE: 134 MMHG | HEIGHT: 62 IN | WEIGHT: 156.5 LBS | DIASTOLIC BLOOD PRESSURE: 78 MMHG

## 2018-11-14 DIAGNOSIS — I15.2 HYPERTENSION ASSOCIATED WITH DIABETES: ICD-10-CM

## 2018-11-14 DIAGNOSIS — R41.3 MEMORY IMPAIRMENT: ICD-10-CM

## 2018-11-14 DIAGNOSIS — E78.5 HYPERLIPIDEMIA ASSOCIATED WITH TYPE 2 DIABETES MELLITUS: ICD-10-CM

## 2018-11-14 DIAGNOSIS — E11.69 HYPERLIPIDEMIA ASSOCIATED WITH TYPE 2 DIABETES MELLITUS: ICD-10-CM

## 2018-11-14 DIAGNOSIS — E11.59 HYPERTENSION ASSOCIATED WITH DIABETES: ICD-10-CM

## 2018-11-14 DIAGNOSIS — E78.5 HYPERLIPIDEMIA, UNSPECIFIED HYPERLIPIDEMIA TYPE: ICD-10-CM

## 2018-11-14 DIAGNOSIS — R31.9 URINARY TRACT INFECTION WITH HEMATURIA, SITE UNSPECIFIED: Primary | ICD-10-CM

## 2018-11-14 DIAGNOSIS — N39.0 URINARY TRACT INFECTION WITH HEMATURIA, SITE UNSPECIFIED: Primary | ICD-10-CM

## 2018-11-14 DIAGNOSIS — E11.65 UNCONTROLLED TYPE 2 DIABETES MELLITUS WITH HYPERGLYCEMIA: ICD-10-CM

## 2018-11-14 LAB
BACTERIA #/AREA URNS HPF: ABNORMAL /HPF
BILIRUB UR QL STRIP: NEGATIVE
CHOLEST SERPL-MCNC: 255 MG/DL
CHOLEST/HDLC SERPL: 5.7 {RATIO}
CLARITY UR: ABNORMAL
COLOR UR: YELLOW
GLUCOSE UR QL STRIP: NEGATIVE
HDLC SERPL-MCNC: 45 MG/DL
HDLC SERPL: 17.6 %
HGB UR QL STRIP: ABNORMAL
HYALINE CASTS #/AREA URNS LPF: 0 /LPF
KETONES UR QL STRIP: NEGATIVE
LDLC SERPL CALC-MCNC: 171 MG/DL
LEUKOCYTE ESTERASE UR QL STRIP: ABNORMAL
MICROSCOPIC COMMENT: ABNORMAL
NITRITE UR QL STRIP: POSITIVE
NONHDLC SERPL-MCNC: 210 MG/DL
PH UR STRIP: 6 [PH] (ref 5–8)
PROT UR QL STRIP: ABNORMAL
RBC #/AREA URNS HPF: >100 /HPF (ref 0–4)
SP GR UR STRIP: 1.03 (ref 1–1.03)
SQUAMOUS #/AREA URNS HPF: 2 /HPF
TRIGL SERPL-MCNC: 195 MG/DL
URN SPEC COLLECT METH UR: ABNORMAL
WBC #/AREA URNS HPF: >100 /HPF (ref 0–5)

## 2018-11-14 PROCEDURE — 99214 OFFICE O/P EST MOD 30 MIN: CPT | Mod: S$GLB,,, | Performed by: FAMILY MEDICINE

## 2018-11-14 PROCEDURE — 99999 PR PBB SHADOW E&M-EST. PATIENT-LVL IV: CPT | Mod: PBBFAC,,, | Performed by: FAMILY MEDICINE

## 2018-11-14 PROCEDURE — 80061 LIPID PANEL: CPT

## 2018-11-14 PROCEDURE — 81000 URINALYSIS NONAUTO W/SCOPE: CPT

## 2018-11-14 PROCEDURE — 3078F DIAST BP <80 MM HG: CPT | Mod: CPTII,S$GLB,, | Performed by: FAMILY MEDICINE

## 2018-11-14 PROCEDURE — 87086 URINE CULTURE/COLONY COUNT: CPT

## 2018-11-14 PROCEDURE — 3045F PR MOST RECENT HEMOGLOBIN A1C LEVEL 7.0-9.0%: CPT | Mod: CPTII,S$GLB,, | Performed by: FAMILY MEDICINE

## 2018-11-14 PROCEDURE — 1101F PT FALLS ASSESS-DOCD LE1/YR: CPT | Mod: CPTII,S$GLB,, | Performed by: FAMILY MEDICINE

## 2018-11-14 PROCEDURE — 36415 COLL VENOUS BLD VENIPUNCTURE: CPT

## 2018-11-14 PROCEDURE — 3075F SYST BP GE 130 - 139MM HG: CPT | Mod: CPTII,S$GLB,, | Performed by: FAMILY MEDICINE

## 2018-11-14 RX ORDER — SITAGLIPTIN 100 MG/1
TABLET, FILM COATED ORAL
Qty: 30 TABLET | Refills: 3 | Status: SHIPPED | OUTPATIENT
Start: 2018-11-14 | End: 2018-11-29

## 2018-11-14 RX ORDER — ATORVASTATIN CALCIUM 80 MG/1
80 TABLET, FILM COATED ORAL DAILY
Qty: 30 TABLET | Refills: 3 | Status: SHIPPED | OUTPATIENT
Start: 2018-11-14 | End: 2019-01-11 | Stop reason: SDUPTHER

## 2018-11-14 RX ORDER — AMLODIPINE BESYLATE 2.5 MG/1
TABLET ORAL
Qty: 30 TABLET | Refills: 3 | Status: SHIPPED | OUTPATIENT
Start: 2018-11-14 | End: 2018-11-16 | Stop reason: SDUPTHER

## 2018-11-14 RX ORDER — CIPROFLOXACIN 500 MG/1
500 TABLET ORAL 2 TIMES DAILY
Qty: 14 TABLET | Refills: 0 | Status: SHIPPED | OUTPATIENT
Start: 2018-11-14 | End: 2018-11-19 | Stop reason: SDUPTHER

## 2018-11-14 NOTE — LETTER
November 14, 2018                   [facility]  Internal Medicine  [address] [place] [id number]  Phone: [phone]  Fax: [phone]   November 14, 2018     Patient: Mateusz Bates   YOB: 1950   Date of Visit: 11/14/2018       To Whom it May Concern:    Mateusz Bates was seen in my clinic on 11/14/2018. She can be excused from work from 10/21/18 - 11/14/18 due to medical conditions.  Pt can return to work 11/15/2018.    If you have any questions or concerns, please don't hesitate to call.    Sincerely,         Serenity Kilpatrick MD/ ESTHER Nowak

## 2018-11-14 NOTE — PATIENT INSTRUCTIONS
Return for office visit, bring family member and all medications that you are taking to review.  Bring family member to Neurology appointment as well.

## 2018-11-15 ENCOUNTER — TELEPHONE (OUTPATIENT)
Dept: INTERNAL MEDICINE | Facility: CLINIC | Age: 68
End: 2018-11-15

## 2018-11-15 LAB
BACTERIA UR CULT: NORMAL
BACTERIA UR CULT: NORMAL

## 2018-11-15 NOTE — TELEPHONE ENCOUNTER
----- Message from Hilda Álvarez sent at 11/15/2018 11:39 AM CST -----  FYI:work release needs to be faxed only to human resources at [phone], attn: elva encinas. pls call when done (pt at work now/urgent per pt)...[phone]

## 2018-11-16 RX ORDER — AMLODIPINE BESYLATE 2.5 MG/1
TABLET ORAL
Qty: 30 TABLET | Refills: 11 | Status: SHIPPED | OUTPATIENT
Start: 2018-11-16 | End: 2019-09-21 | Stop reason: SDUPTHER

## 2018-11-19 ENCOUNTER — TELEPHONE (OUTPATIENT)
Dept: INTERNAL MEDICINE | Facility: CLINIC | Age: 68
End: 2018-11-19

## 2018-11-19 DIAGNOSIS — N39.0 URINARY TRACT INFECTION WITH HEMATURIA, SITE UNSPECIFIED: ICD-10-CM

## 2018-11-19 DIAGNOSIS — R31.9 URINARY TRACT INFECTION WITH HEMATURIA, SITE UNSPECIFIED: ICD-10-CM

## 2018-11-19 RX ORDER — CIPROFLOXACIN 500 MG/1
500 TABLET ORAL 2 TIMES DAILY
Qty: 14 TABLET | Refills: 0 | Status: SHIPPED | OUTPATIENT
Start: 2018-11-19 | End: 2018-11-26

## 2018-11-19 NOTE — TELEPHONE ENCOUNTER
----- Message from Li Jha sent at 11/19/2018  4:13 PM CST -----  Contact: pt   Pt stated she calling to check the status of a refill on medication for bladder infection, she lost her medication, she can be reached at [phone] Thanks

## 2018-11-19 NOTE — TELEPHONE ENCOUNTER
----- Message from Zulay Ireland sent at 11/19/2018  3:42 PM CST -----  Contact: pt  States she was seen earlier and she needs something called in for her bladder infection. States she took one pills and she lost them. She needs to get another prescription called in. Please call pt at [phone]. Thank you

## 2018-11-20 DIAGNOSIS — N39.0 URINARY TRACT INFECTION WITH HEMATURIA, SITE UNSPECIFIED: ICD-10-CM

## 2018-11-20 DIAGNOSIS — R31.9 URINARY TRACT INFECTION WITH HEMATURIA, SITE UNSPECIFIED: ICD-10-CM

## 2018-11-20 NOTE — TELEPHONE ENCOUNTER
----- Message from Maren Gerber sent at 11/20/2018  2:08 PM CST -----  Contact: pt  She's calling in regards to Rx list,pls call pt back at [phone] (home) [phone] (work)

## 2018-11-20 NOTE — TELEPHONE ENCOUNTER
Pt states only took 2 of antibiotic pills Cipro and misplaced bottle ask can a new Rx be sent to pharmacy?

## 2018-11-21 RX ORDER — CIPROFLOXACIN 500 MG/1
500 TABLET ORAL 2 TIMES DAILY
Qty: 14 TABLET | Refills: 0 | OUTPATIENT
Start: 2018-11-21 | End: 2018-11-28

## 2018-11-21 NOTE — PROGRESS NOTES
Subjective:       Patient ID: Mateusz Bates is a 68 y.o. female.    Chief Complaint: Hospital Follow Up and Urinary Frequency    Patient presents to clinic today for hospital follow up. She was admitted to [facility] for confusion after presenting to ER [facility]. Admit H&P available for review. No discharge summary available for review. Patient is alone today. She reports blood sugar this am 79, she has follow up with diabetes provider 11/27. She reports taking medications as directed. She reports she is doing PT/OT at [facility].      Review of Systems   Constitutional: Negative for chills, fatigue, fever and unexpected weight change.   Eyes: Negative for visual disturbance.   Respiratory: Negative for shortness of breath.    Cardiovascular: Negative for chest pain.   Musculoskeletal: Negative for myalgias.   Neurological: Negative for headaches.       Objective:      Physical Exam   Constitutional: She is oriented to person, place, and time. She appears well-developed and well-nourished. No distress.   HENT:   Head: Normocephalic and atraumatic.   Eyes: Conjunctivae and EOM are normal. Pupils are equal, round, and reactive to light. No scleral icterus.   Cardiovascular: Normal rate and regular rhythm. Exam reveals no gallop and no friction rub.   No murmur heard.  Pulmonary/Chest: Effort normal and breath sounds normal.   Neurological: She is alert and oriented to person, place, and time. No cranial nerve deficit. Gait normal.   Psychiatric: She has a normal mood and affect.   Vitals reviewed.      Assessment:       1. Urinary tract infection with hematuria, site unspecified    2. Memory impairment    3. Hyperlipidemia associated with type 2 diabetes mellitus    4. Hypertension associated with diabetes        Plan:     Problem List Items Addressed This Visit     Hypertension associated with diabetes    Current Assessment & Plan     Blood pressure controlled today, unclear what patient is actually taking; she  will return with caregiver and medications in 2 weeks for follow up         Hyperlipidemia associated with type 2 diabetes mellitus    Relevant Orders    Lipid panel (Completed)      Other Visit Diagnoses     Urinary tract infection with hematuria, site unspecified    -  Primary    Relevant Orders    Urinalysis (Completed)    Urine culture (Completed)    Memory impairment        Relevant Orders    Ambulatory referral to Neurology

## 2018-11-21 NOTE — ASSESSMENT & PLAN NOTE
Blood pressure controlled today, unclear what patient is actually taking; she will return with caregiver and medications in 2 weeks for follow up

## 2018-11-26 ENCOUNTER — TELEPHONE (OUTPATIENT)
Dept: INTERNAL MEDICINE | Facility: CLINIC | Age: 68
End: 2018-11-26

## 2018-11-26 RX ORDER — GLYBURIDE 5 MG/1
10 TABLET ORAL
Qty: 360 TABLET | Refills: 1 | Status: ON HOLD | OUTPATIENT
Start: 2018-11-26 | End: 2019-11-20 | Stop reason: HOSPADM

## 2018-11-26 NOTE — TELEPHONE ENCOUNTER
----- Message from Ike Fabian sent at 11/26/2018  3:18 PM CST -----  Contact: pt   Pt has some information she is bringing in to office and will be in lobby in a few min.         ..[phone]

## 2018-11-26 NOTE — TELEPHONE ENCOUNTER
----- Message from Idris Ayala sent at 11/26/2018  9:31 AM CST -----  Contact: pt  Pt is requesting a call back from the nurse in regards to her job not getting her release to work paperwork not being sent to her job  [phone] (home)

## 2018-11-26 NOTE — TELEPHONE ENCOUNTER
----- Message from Darleen Herrmann sent at 11/26/2018  9:40 AM CST -----  Contact: Patient  1. What is the name of the medication you are requesting?Gliburide  2. What is the dose? Does not know  3. How do you take the medication? Orally, topically, etc? orally  4. How often do you take this medication? Twice daily  5. Do you need a 30 day or 90 day supply? 90  6. How many refills are you requesting?   7. What is your preferred pharmacy and location of the pharmacy? [facility] [address]  8. Who can we contact with further questions? Patient

## 2018-11-26 NOTE — TELEPHONE ENCOUNTER
----- Message from Marline Collier sent at 11/26/2018 10:16 AM CST -----  Contact: Cherise/ALEXI  Please all pharmacy @ [phone] regarding pt medication glipizide 5mg, please fax script to [phone]

## 2018-11-26 NOTE — TELEPHONE ENCOUNTER
----- Message from Jenna Mcgowan sent at 11/26/2018  3:34 PM CST -----  Contact: Pt  Pt request call back to verify nurse received forms to be completed to return to work  ...[phone] (home) [phone] (work)

## 2018-11-27 ENCOUNTER — TELEPHONE (OUTPATIENT)
Dept: INTERNAL MEDICINE | Facility: CLINIC | Age: 68
End: 2018-11-27

## 2018-11-27 NOTE — TELEPHONE ENCOUNTER
----- Message from Millicent Ramires sent at 11/27/2018  9:43 AM CST -----  Contact: pt  Would like to come in today and  the paperwork to bring to her employer.

## 2018-11-29 ENCOUNTER — OFFICE VISIT (OUTPATIENT)
Dept: INTERNAL MEDICINE | Facility: CLINIC | Age: 68
End: 2018-11-29
Payer: COMMERCIAL

## 2018-11-29 VITALS
WEIGHT: 156.94 LBS | RESPIRATION RATE: 18 BRPM | BODY MASS INDEX: 28.88 KG/M2 | TEMPERATURE: 98 F | HEIGHT: 62 IN | HEART RATE: 105 BPM | SYSTOLIC BLOOD PRESSURE: 154 MMHG | DIASTOLIC BLOOD PRESSURE: 82 MMHG | OXYGEN SATURATION: 98 %

## 2018-11-29 DIAGNOSIS — E78.5 HYPERLIPIDEMIA ASSOCIATED WITH TYPE 2 DIABETES MELLITUS: ICD-10-CM

## 2018-11-29 DIAGNOSIS — I15.2 HYPERTENSION ASSOCIATED WITH DIABETES: ICD-10-CM

## 2018-11-29 DIAGNOSIS — Z12.11 COLON CANCER SCREENING: ICD-10-CM

## 2018-11-29 DIAGNOSIS — E11.59 HYPERTENSION ASSOCIATED WITH DIABETES: ICD-10-CM

## 2018-11-29 DIAGNOSIS — N39.0 RECURRENT UTI: Primary | ICD-10-CM

## 2018-11-29 DIAGNOSIS — E11.69 HYPERLIPIDEMIA ASSOCIATED WITH TYPE 2 DIABETES MELLITUS: ICD-10-CM

## 2018-11-29 LAB
BACTERIA #/AREA URNS HPF: ABNORMAL /HPF
BILIRUB UR QL STRIP: NEGATIVE
CLARITY UR: CLEAR
COLOR UR: YELLOW
GLUCOSE UR QL STRIP: ABNORMAL
HGB UR QL STRIP: NEGATIVE
KETONES UR QL STRIP: NEGATIVE
LEUKOCYTE ESTERASE UR QL STRIP: NEGATIVE
MICROSCOPIC COMMENT: ABNORMAL
NITRITE UR QL STRIP: NEGATIVE
NON-SQ EPI CELLS #/AREA URNS HPF: 5 /HPF
PH UR STRIP: 6 [PH] (ref 5–8)
PROT UR QL STRIP: NEGATIVE
SP GR UR STRIP: 1 (ref 1–1.03)
URN SPEC COLLECT METH UR: ABNORMAL
YEAST URNS QL MICRO: ABNORMAL

## 2018-11-29 PROCEDURE — 3045F PR MOST RECENT HEMOGLOBIN A1C LEVEL 7.0-9.0%: CPT | Mod: CPTII,S$GLB,, | Performed by: NURSE PRACTITIONER

## 2018-11-29 PROCEDURE — 3077F SYST BP >= 140 MM HG: CPT | Mod: CPTII,S$GLB,, | Performed by: NURSE PRACTITIONER

## 2018-11-29 PROCEDURE — 3079F DIAST BP 80-89 MM HG: CPT | Mod: CPTII,S$GLB,, | Performed by: NURSE PRACTITIONER

## 2018-11-29 PROCEDURE — 81000 URINALYSIS NONAUTO W/SCOPE: CPT

## 2018-11-29 PROCEDURE — 99214 OFFICE O/P EST MOD 30 MIN: CPT | Mod: S$GLB,,, | Performed by: NURSE PRACTITIONER

## 2018-11-29 PROCEDURE — 99999 PR PBB SHADOW E&M-EST. PATIENT-LVL IV: CPT | Mod: PBBFAC,,, | Performed by: NURSE PRACTITIONER

## 2018-11-29 PROCEDURE — 1101F PT FALLS ASSESS-DOCD LE1/YR: CPT | Mod: CPTII,S$GLB,, | Performed by: NURSE PRACTITIONER

## 2018-11-29 NOTE — PROGRESS NOTES
Mateusz Wattser  11/29/2018  [id number]    Serenity Kilpatrick MD  Patient Care Team:  Serenity Kilpatrick MD as PCP - General (Family Medicine)  Tracie Fermin LPN as Care Coordinator (Internal Medicine)  Has the patient seen any provider outside of the [facility] network since the last visit? (no). If yes, HIPPA forms completed and records requested.        Visit Type:a scheduled routine follow-up visit    Chief Complaint:  Chief Complaint   Patient presents with    Urinary Tract Infection       History of Present Illness:    Patient presents for two week follow up of UTI and medication review. She was to come with her sister and bring all medications for review. Her sister was unable to come and she only has three medication bottles with her and two bottles of effexor. She also has metformin and atorvastatin. She states she is taking amlodipine and glyburide and tujeo but does not have them with her. Per Ms. Haq last note, patient was to start januvia. Patient states she is not and has not been since recent hospitalization.     History:  Past Medical History:   Diagnosis Date    Chronic major depressive disorder     Diabetes mellitus type II, uncontrolled 2000    Insulin x 10 years    DM (diabetes mellitus) 2000     am 02/22/2017 Insulin x 2006    DM (diabetes mellitus) 2000     am 03/07/2018 Insulin x 2006    Eczema     Essential hypertension     Generalized anxiety disorder     GERD (gastroesophageal reflux disease)     Glaucoma     OD    Hyperlipidemia     Obesity      Past Surgical History:   Procedure Laterality Date    APPENDECTOMY      ECTOPIC PREGNANCY SURGERY Left     LSO    OOPHORECTOMY       Family History   Problem Relation Age of Onset    Heart attack Mother     Multiple myeloma Father     Hypertension Father     Colon cancer Sister     Hypertension Sister     Diabetes Sister     Depression Maternal Grandmother     Heart disease Maternal Grandmother     Diabetes  Maternal Grandmother     Heart disease Paternal Grandmother     Heart disease Paternal Grandfather     Alcohol abuse Maternal Uncle     Glaucoma Paternal Aunt     Diabetes Paternal Aunt     Glaucoma Paternal Uncle         Great Aunt    Heart failure Sister     Hypertension Sister     Hepatitis Sister     Hypertension Sister     Drug abuse Sister     Alcohol abuse Sister      Social History     Socioeconomic History    Marital status:      Spouse name: Not on file    Number of children: Not on file    Years of education: Not on file    Highest education level: Not on file   Social Needs    Financial resource strain: Not on file    Food insecurity - worry: Not on file    Food insecurity - inability: Not on file    Transportation needs - medical: Not on file    Transportation needs - non-medical: Not on file   Occupational History    Not on file   Tobacco Use    Smoking status: Never Smoker    Smokeless tobacco: Never Used   Substance and Sexual Activity    Alcohol use: No    Drug use: No    Sexual activity: No   Other Topics Concern    Not on file   Social History Narrative    . Lives alone. Patient works full time at [employer].     Patient Active Problem List   Diagnosis    Hypertension associated with diabetes    FAHAD (generalized anxiety disorder)    Chronic major depressive disorder    Anatomical narrow angle borderline glaucoma    Diabetes mellitus type II, uncontrolled    Obesity (BMI 30.0-34.9)    GERD (gastroesophageal reflux disease)    SOB (shortness of breath)    Family history of arteriosclerotic cardiovascular disease    Hyperlipidemia associated with type 2 diabetes mellitus    Iron deficiency anemia     Review of patient's allergies indicates:   Allergen Reactions    Ace inhibitors Other (See Comments)     Cough         The following were reviewed at this visit: active problem list, medication list, allergies, family history, social  "history, and health maintenance.    Medications:  Current Outpatient Medications on File Prior to Visit   Medication Sig Dispense Refill    amLODIPine (NORVASC) 2.5 MG tablet TAKE 1 TABLET BY MOUTH EVERY DAY 30 tablet 11    aspirin (ECOTRIN) 325 MG EC tablet Take 325 mg by mouth once daily.      atorvastatin (LIPITOR) 80 MG tablet Take 1 tablet (80 mg total) by mouth once daily. 30 tablet 3    blood sugar diagnostic (ACCU-CHEK SMARTVIEW TEST STRIP) Strp TEST three times a day 300 strip 3    blood-glucose meter Misc IDDM 250.00  Insurance or Pt choice 1 each 0    glyBURIDE (DIABETA) 5 MG tablet Take 2 tablets (10 mg total) by mouth 2 (two) times daily before meals. 360 tablet 1    hydrOXYzine HCl (ATARAX) 10 MG Tab TAKE 1 TABLET BY MOUTH THREE TIMES A DAY 60 tablet 2    insulin glargine, TOUJEO, (TOUJEO SOLOSTAR U-300 INSULIN) 300 unit/mL (1.5 mL) InPn pen Inject 45 Units into the skin once daily. 3 Syringe 3    metFORMIN (GLUCOPHAGE) 1000 MG tablet TAKE 1 TABLET BY MOUTH TWICE A DAY WITH MEALS 60 tablet 0    pen needle, diabetic 32 gauge x 5/32" Ndle 1 each by Misc.(Non-Drug; Combo Route) route 3 (three) times daily. 100 each 11    sodium,potassium,mag sulfates (SUPREP BOWEL PREP KIT) 17.5-3.13-1.6 gram SolR Take as directed 354 mL 0    venlafaxine (EFFEXOR-XR) 150 MG Cp24 Take 1 capsule (150 mg total) by mouth once daily. 30 capsule 5    triamcinolone acetonide 0.1% (KENALOG) 0.1 % Lotn Apply topically 2 (two) times daily. 60 mL 1    [DISCONTINUED] albuterol 90 mcg/actuation inhaler Inhale 2 puffs into the lungs every 6 (six) hours as needed for Wheezing. Rescue 18 g 1    [DISCONTINUED] cyanocobalamin (VITAMIN B-12) 1000 MCG tablet Take 1 tablet (1,000 mcg total) by mouth once daily.      [DISCONTINUED] gabapentin (NEURONTIN) 100 MG capsule take 1 capsule by mouth three times a day 90 capsule 1    [DISCONTINUED] JANUVIA 100 mg Tab TAKE 1 TABLET BY MOUTH EVERY DAY 30 tablet 3     No current " facility-administered medications on file prior to visit.        Medications have been reviewed and reconciled with patient at this visit.  Barriers to medications present (yes)    Adverse reactions to current medications (no)    Over the counter medications reviewed (Yes ), and if needed added to active Medication list at this visit.     Exam:  Wt Readings from Last 3 Encounters:   11/29/18 71.2 kg (156 lb 15.5 oz)   11/14/18 71 kg (156 lb 8.4 oz)   10/03/18 74 kg (163 lb 2.3 oz)     Temp Readings from Last 3 Encounters:   11/29/18 97.9 °F (36.6 °C) (Tympanic)   11/14/18 97 °F (36.1 °C) (Tympanic)   10/03/18 97.2 °F (36.2 °C) (Tympanic)     BP Readings from Last 3 Encounters:   11/29/18 (!) 154/82   11/14/18 134/78   10/17/18 128/78     Pulse Readings from Last 3 Encounters:   11/29/18 105   11/14/18 87   10/03/18 84     Body mass index is 28.71 kg/m².      Review of Systems   Constitutional: Negative for chills, fever and weight loss.   Eyes: Negative for blurred vision and double vision.   Respiratory: Negative for cough and shortness of breath.    Cardiovascular: Negative for chest pain and leg swelling.   Genitourinary: Positive for frequency. Negative for dysuria.   Skin: Negative for rash.   Neurological: Negative for headaches.   Psychiatric/Behavioral: Positive for memory loss (evaluation with neuro next week).     Physical Exam   Constitutional: She appears well-developed and well-nourished. No distress.   HENT:   Head: Normocephalic and atraumatic.   Cardiovascular: Normal rate and regular rhythm.   Pulmonary/Chest: Effort normal and breath sounds normal.   Abdominal: Soft. Bowel sounds are normal. She exhibits no distension. There is no tenderness.   Neurological: She is alert.   Skin: She is not diaphoretic.   Psychiatric: She has a normal mood and affect.   Vitals reviewed.      Laboratory Reviewed ({Yes)  Lab Results   Component Value Date    WBC 7.04 07/05/2018    HGB 10.8 (L) 07/05/2018    HCT 35.2  (L) 07/05/2018     (H) 07/05/2018    CHOL 255 (H) 11/14/2018    TRIG 195 (H) 11/14/2018    HDL 45 11/14/2018    ALT 10 10/03/2018    AST 13 10/03/2018     10/03/2018    K 5.1 10/03/2018     10/03/2018    CREATININE 1.0 10/03/2018    BUN 15 10/03/2018    CO2 25 10/03/2018    TSH 0.970 03/28/2018    HGBA1C 9.0 (H) 10/03/2018       Mateusz was seen today for urinary tract infection.    Diagnoses and all orders for this visit:    Recurrent UTI  -     Urinalysis    Hypertension associated with diabetes  Comments:  not controlled, medication compliance still a concern, will recheck next week with nurse visit    Colon cancer screening  -     Case request GI: COLONOSCOPY    Hyperlipidemia associated with type 2 diabetes mellitus  Comments:  not controlled, medication compliance still a concern, education provided and will reach out to Ms. Haq regarding januvia.    Other orders  -     Urinalysis Microscopic      UA 4+ glucose, otherwise normal. Likely due to januvia.   25 minutes spent on this visit, with greater than 50% of the time spent on discussion of diagnosis and treatment/coordination of care as documented above.            Care Plan/Goals: Reviewed  (N/A)  Goals     None          Follow up: Follow-up if symptoms worsen or fail to improve.    After visit summary was printed and given to patient upon discharge today.  Patient goals and care plan are included in After Visit Summary.

## 2018-11-29 NOTE — Clinical Note
Hey, she sees you next week for follow up. I told her to bring her meds b/c she does not seem to be taking everything as directed. She said she stopped januvia b/c you told her to but I don't see that in the chart. UA today showed 4+ glucose. Just wanted to give you a heads up.Kelsey Kent, FNP-C

## 2018-12-03 ENCOUNTER — TELEPHONE (OUTPATIENT)
Dept: INTERNAL MEDICINE | Facility: CLINIC | Age: 68
End: 2018-12-03

## 2018-12-03 NOTE — TELEPHONE ENCOUNTER
Spoke with Travis Hoffman NP at [facility]. He is attempting to get a histyory on patient and  He states pt employer sent her there because she she having difficulty remembering how to perform her task at work that she has been performing for years. Pt reported to him that she has Neuro appt this week. I confirmed that she has neuro appt. He will have patient sign a consent to fax after her neuro appt for records.

## 2018-12-03 NOTE — TELEPHONE ENCOUNTER
----- Message from Rickey Lawson sent at 12/3/2018  4:11 PM CST -----  Contact: [facility]  Requesting call back regarding questions about pt previous medical issues on pt. Please call back at [phone].  Thanks,  Rickey Lawson

## 2018-12-04 NOTE — TELEPHONE ENCOUNTER
Please contact patient. It is very important that a family member or friend accompany her to Neurology visit.

## 2018-12-05 ENCOUNTER — OFFICE VISIT (OUTPATIENT)
Dept: DIABETES | Facility: CLINIC | Age: 68
End: 2018-12-05
Payer: COMMERCIAL

## 2018-12-05 VITALS
HEIGHT: 62 IN | WEIGHT: 155.44 LBS | DIASTOLIC BLOOD PRESSURE: 78 MMHG | SYSTOLIC BLOOD PRESSURE: 140 MMHG | BODY MASS INDEX: 28.61 KG/M2

## 2018-12-05 DIAGNOSIS — E11.69 HYPERLIPIDEMIA ASSOCIATED WITH TYPE 2 DIABETES MELLITUS: ICD-10-CM

## 2018-12-05 DIAGNOSIS — E11.65 UNCONTROLLED TYPE 2 DIABETES MELLITUS WITH HYPERGLYCEMIA: Primary | ICD-10-CM

## 2018-12-05 DIAGNOSIS — I15.2 HYPERTENSION ASSOCIATED WITH DIABETES: ICD-10-CM

## 2018-12-05 DIAGNOSIS — E11.59 HYPERTENSION ASSOCIATED WITH DIABETES: ICD-10-CM

## 2018-12-05 DIAGNOSIS — E66.9 OBESITY (BMI 30.0-34.9): ICD-10-CM

## 2018-12-05 DIAGNOSIS — E78.5 HYPERLIPIDEMIA ASSOCIATED WITH TYPE 2 DIABETES MELLITUS: ICD-10-CM

## 2018-12-05 LAB — GLUCOSE SERPL-MCNC: 304 MG/DL (ref 70–110)

## 2018-12-05 PROCEDURE — 82962 GLUCOSE BLOOD TEST: CPT | Mod: S$GLB,,, | Performed by: NURSE PRACTITIONER

## 2018-12-05 PROCEDURE — 99999 PR PBB SHADOW E&M-EST. PATIENT-LVL IV: CPT | Mod: PBBFAC,,, | Performed by: NURSE PRACTITIONER

## 2018-12-05 PROCEDURE — 99214 OFFICE O/P EST MOD 30 MIN: CPT | Mod: S$GLB,,, | Performed by: NURSE PRACTITIONER

## 2018-12-05 RX ORDER — LANCETS
1 EACH MISCELLANEOUS 3 TIMES DAILY
Qty: 100 EACH | Refills: 3 | Status: SHIPPED | OUTPATIENT
Start: 2018-12-05 | End: 2018-12-07 | Stop reason: SDUPTHER

## 2018-12-05 RX ORDER — INSULIN ASPART 100 [IU]/ML
10 INJECTION, SOLUTION INTRAVENOUS; SUBCUTANEOUS
Qty: 15 ML | Refills: 3 | Status: SHIPPED | OUTPATIENT
Start: 2018-12-05 | End: 2019-03-01

## 2018-12-05 NOTE — PATIENT INSTRUCTIONS
Glucophage 1000 mg twice a day    Stop glyburide.    Toujeo 30 units in at bedtime ( long acting insulin )    Start Novolog ( short acting ) using the following scale:   - 199: 2 units    - 249: 4 units   - 299: 6 units    - 349: 8 units   BG Over 350: 10 units

## 2018-12-05 NOTE — PROGRESS NOTES
"PCP: Dr. Oneal    HISTORY OF PRESENT ILLNESS: 68 year old  female patient is in clinic today for diabetes follow up.  No complications from diabetes. Most recent A1C is 9.0, ADA recommends less than 7.0.  In the past, she has been intolerant to Victoza and developed extreme nausea, vomiting so medication was stopped.  Also, failed Byetta and Bydureon.      Last month, patient was admitted to [facility] for confusion after falling in [employer]. Per patient, while in the hospital, she was treated with antibiotics for a severe UTI, which was presumed to be the cause of the confusion and memory changes she had been experiencing lately.  Also, received physical therapy due to fall.       Since discharge, she has not been monitoring blood sugars.  She has tried 3 different Accu-chek meters and they read " E-9 " error  when she inserts a strip.   She denies polyuria, polydipsia, polyphagia, blurred vision.  She denies nausea, vomiting, or diarrhea.  She has lost 8 pounds since last visit.  Of note, patient never did start Januvia.     Height: 5 ' 1 ",  Weight: 155 pounds, BMI 28.43  Blood Glucose reading this am: Not Taken  Blood Glucose reading in clinic: 304 mg/dl at 2:24 pm ( patient voices that she just finished eating cheeseburger meal )    DM MEDICATIONS:  Glucophage 1000 mg BID  Glyburide 5 mg, 2 tablets BID ac  Toujeo 30 units in Hs ( bedtime )  Januvia 100 mg - patient never did start this medication     Labs Reviewed.    REVIEW OF SYSTEMS:  GENERAL: Denies fever, chills, change in appetite.  HEENT: Denies impaired hearing, dysphagia.  RESPIRATORY: Denies shortness of breath or wheezing.   CARDIOVASCULAR: Denies chest pain, palpitations.  GI: Denies nausea, vomiting, diarrhea.  : Denies hematuria, dysuria or frequency.  MS: Denies difficulty with mobility, muscle or joint pain.  NEURO: Denies numbness or tingling in the hands or feet.   PSYCH: Treated for depression.  ENDO: See HPI.    STANDARDS OF " CARE:   Eye exam: Dr. Melchor, Last exam 3 / 2018  FOOT EXAM: No podiatrist.  Dental Exams: Recommended regular cleanings.  ACE / ARB: Yes  Statin: Yes    ACTIVITY LEVEL: No regular exercise.   MEAL PLANNING: Number of meals - 3. Number of Snacks - 1, such as coconut pecan cookies. Breakfast, usually V8 juice or Atkins Bar OR sausage lupe.  Lunch can be salad, ham.  Dinner can be Michael buffalo chicken tenders, occasional fish sandwich from [email or web address]. Per dietary recall, patient is  limiting carbohydrates, saturated fats and sodium.     SELF-MONITORING: Self monitoring,  ACCU-CHEK Aga  SOCIAL: . Lives alone with 2 cats, 3 dogs.  Works at the [employer] at [employer]. Never smoker.     PHYSICAL EXAMINATION:  GENERAL: WDWN  female in no acute distress, ambulatory, responds appropriately. AAO X 3.  NECK: Supple, no thyromegaly, no cervical or supraclavicular lymphadenopathy, no carotid bruit.  HEART: Regular rate and rhythm. No rubs, murmurs or gallops.   LUNGS: CTA bilaterally. Unlabored breathing, no use of accessory muscles.   SKIN: Warm, dry skin. No lesions or abrasions. Clean, dry, well-healed injection sites.   MUSCULOSKELETAL: Normal gait and muscle strength.  ABDOMEN: Soft, nontender, no masses. BS active X 4.   NEUROLOGIC: Cranial nerves II-XII grossly intact.   FOOT EVALUATION: Protective Sensation (w/ 10 gram monofilament):  Right: Intact   Left: Intact  //  Visual Inspection:  Normal -  Bilateral //  Pedal Pulses:   Right: Present  Left: Present    ASSESSMENT:    1.) Uncontrolled type 2 diabetes mellitus with hyperglycemia  -     POCT Glucose, Hand-Held Device  -  Rescheduled appointment with CDE for nutritional counseling.  - Prescription for Ruperto Contour meter and testing supplies sent to pharmacy.     Uncontrolled.  D/C glyburide.  She never did start Januvia, so rather than do this medication we are going to start patient on MDI with Toujeo and Novolog.   Continue Toujeo 30 units daily.  Start Novolog via sliding scale:  - 199: 2 units  //  - 249: 4 units //   - 299: 6 units   //  - 349: 8 units //  BG Over 350: 10 units.  Continue metformin 1,000 mg BID. n the past, have attempted to do professional CGM, but was denied by insurance.    2.) Hyperlipidemia associated with type 2 diabetes mellitus - uncontrolled, continue Lipitor, f/u PCP    3.) Hypertension associated with diabetes - suboptimal control, continue Norvasc, f/u PCP    4.) Obesity (BMI 30.0-34.9)    PLAN:  1.) Continue monitoring blood glucose  4 x daily, fasting and ac meals.  Discussed ADA goal for fasting blood sugar, 80 - 130 mg/dL; pp blood sugars below 180 mg/dl. Also, discussed prevention of hypoglycemia and the need to adjust goals to higher levels if persistent hypoglycemia. Reminded her to bring records to each visit.   2.) Reviewed pathophysiology of diabetes, complications related to the disease, importance of annual dilated eye exam and daily foot examination.  3.) We discussed the ADA recommendations: Hemoglobin A1c below 7.0 %. All patients with diabetes should be on statins unless contraindicated.  ACE or ARB therapy if not contraindicated.    4.) Meal planning teaching: Carbohydrate definition - one serving is 15 gms. Carbohydrate spacing - carbohydrates should be spaced into approximately 3 meals with 2 snacks ( of one carbohydrate ) between meals or at bedtime. Increase vegetable intake to 2 or more cups of vegetables per day as well as 2 fruit servings. Recommended low saturated fat, low sodium diet to aid in control of hypertension and cholesterol.  5.) Discussed activity, benefits, methods, and precautions. Recommended patient start or continue some form of exercise and increase as tolerated to 30 minutes per day to facilitate weight loss and aid in control of blood glucoses.   6.) Return to clinic in 4 weeks for follow up.  dvised to call clinic with any  questions or concerns.     Millicent Haq, RADHA-C, CDE    A total of 30 minutes was spent in face to face time, of which over 50 % was spent in counseling patient on disease process, complications, treatment, and side effects of medications.

## 2018-12-06 NOTE — TELEPHONE ENCOUNTER
----- Message from Hilda Álvarez sent at 12/6/2018  3:24 PM CST -----  test strips rx needed..[phone] (home) [phone] (work)    [id number] - [place] - [address] AT [facility]  [address]  [place] [address]  Phone: [phone] Fax: [phone]

## 2018-12-07 RX ORDER — LANCETS
1 EACH MISCELLANEOUS 3 TIMES DAILY
Qty: 100 EACH | Refills: 3 | Status: SHIPPED | OUTPATIENT
Start: 2018-12-05 | End: 2020-02-19

## 2018-12-14 ENCOUNTER — OFFICE VISIT (OUTPATIENT)
Dept: NEUROLOGY | Facility: CLINIC | Age: 68
End: 2018-12-14
Payer: COMMERCIAL

## 2018-12-14 VITALS
SYSTOLIC BLOOD PRESSURE: 132 MMHG | BODY MASS INDEX: 29.02 KG/M2 | HEIGHT: 61 IN | DIASTOLIC BLOOD PRESSURE: 74 MMHG | WEIGHT: 153.69 LBS | HEART RATE: 80 BPM

## 2018-12-14 DIAGNOSIS — F41.1 GAD (GENERALIZED ANXIETY DISORDER): ICD-10-CM

## 2018-12-14 DIAGNOSIS — E11.59 HYPERTENSION ASSOCIATED WITH DIABETES: ICD-10-CM

## 2018-12-14 DIAGNOSIS — E11.69 HYPERLIPIDEMIA ASSOCIATED WITH TYPE 2 DIABETES MELLITUS: ICD-10-CM

## 2018-12-14 DIAGNOSIS — K21.9 GASTROESOPHAGEAL REFLUX DISEASE WITHOUT ESOPHAGITIS: ICD-10-CM

## 2018-12-14 DIAGNOSIS — R41.3 MEMORY DIFFICULTIES: Primary | ICD-10-CM

## 2018-12-14 DIAGNOSIS — E11.65 UNCONTROLLED TYPE 2 DIABETES MELLITUS WITH HYPERGLYCEMIA: ICD-10-CM

## 2018-12-14 DIAGNOSIS — I15.2 HYPERTENSION ASSOCIATED WITH DIABETES: ICD-10-CM

## 2018-12-14 DIAGNOSIS — H40.033 ANATOMICAL NARROW ANGLE BORDERLINE GLAUCOMA OF BOTH EYES: ICD-10-CM

## 2018-12-14 DIAGNOSIS — D50.9 IRON DEFICIENCY ANEMIA, UNSPECIFIED IRON DEFICIENCY ANEMIA TYPE: ICD-10-CM

## 2018-12-14 DIAGNOSIS — E66.9 OBESITY (BMI 30.0-34.9): ICD-10-CM

## 2018-12-14 DIAGNOSIS — F32.9 CHRONIC MAJOR DEPRESSIVE DISORDER: Chronic | ICD-10-CM

## 2018-12-14 DIAGNOSIS — R41.89 PSEUDODEMENTIA: ICD-10-CM

## 2018-12-14 DIAGNOSIS — R29.818 NEUROLOGICAL DEFICIT PRESENT: ICD-10-CM

## 2018-12-14 DIAGNOSIS — E78.5 HYPERLIPIDEMIA ASSOCIATED WITH TYPE 2 DIABETES MELLITUS: ICD-10-CM

## 2018-12-14 DIAGNOSIS — E53.8 B12 DEFICIENCY: ICD-10-CM

## 2018-12-14 PROCEDURE — 99999 PR PBB SHADOW E&M-EST. PATIENT-LVL V: CPT | Mod: PBBFAC,,, | Performed by: PSYCHIATRY & NEUROLOGY

## 2018-12-14 PROCEDURE — 3075F SYST BP GE 130 - 139MM HG: CPT | Mod: CPTII,S$GLB,, | Performed by: PSYCHIATRY & NEUROLOGY

## 2018-12-14 PROCEDURE — 3045F PR MOST RECENT HEMOGLOBIN A1C LEVEL 7.0-9.0%: CPT | Mod: CPTII,S$GLB,, | Performed by: PSYCHIATRY & NEUROLOGY

## 2018-12-14 PROCEDURE — 1101F PT FALLS ASSESS-DOCD LE1/YR: CPT | Mod: CPTII,S$GLB,, | Performed by: PSYCHIATRY & NEUROLOGY

## 2018-12-14 PROCEDURE — 3078F DIAST BP <80 MM HG: CPT | Mod: CPTII,S$GLB,, | Performed by: PSYCHIATRY & NEUROLOGY

## 2018-12-14 PROCEDURE — 99204 OFFICE O/P NEW MOD 45 MIN: CPT | Mod: S$GLB,,, | Performed by: PSYCHIATRY & NEUROLOGY

## 2018-12-14 RX ORDER — CYANOCOBALAMIN 1000 UG/ML
INJECTION, SOLUTION INTRAMUSCULAR; SUBCUTANEOUS
Qty: 12 ML | Refills: 0 | Status: ON HOLD | OUTPATIENT
Start: 2018-12-14 | End: 2019-11-18 | Stop reason: HOSPADM

## 2018-12-14 NOTE — LETTER
December 14, 2018      Serenity Kilpatrick MD  [address] [place] [address] [address] [place] [id number]           [facility]  [address] [place] [address] [place]  [place] [address]  Phone: [phone]  Fax: [phone]          Patient: Mateusz Bates   MR Number: [id number]   YOB: 1950   Date of Visit: 12/14/2018       Dear Dr. Serenity Kilpatrick:    Thank you for referring Mateusz Bates to me for evaluation. Attached you will find relevant portions of my assessment and plan of care.    If you have questions, please do not hesitate to call me. I look forward to following Mateusz Bates along with you.    Sincerely,    Lucien Keller MD    Enclosure  CC:  No Recipients    If you would like to receive this communication electronically, please contact [email or web address] or [phone] to request more information on [facility] Link access.    For providers and/or their staff who would like to refer a patient to [facility], please contact us through our one-stop-shop provider referral line, [facility], at [phone].    If you feel you have received this communication in error or would no longer like to receive these types of communications, please e-mail [email or web address]

## 2018-12-18 ENCOUNTER — TELEPHONE (OUTPATIENT)
Dept: INTERNAL MEDICINE | Facility: CLINIC | Age: 68
End: 2018-12-18

## 2018-12-18 ENCOUNTER — TELEPHONE (OUTPATIENT)
Dept: NEUROLOGY | Facility: CLINIC | Age: 68
End: 2018-12-18

## 2018-12-18 DIAGNOSIS — E11.69 DIABETES MELLITUS TYPE 2 IN OBESE: ICD-10-CM

## 2018-12-18 DIAGNOSIS — E66.9 DIABETES MELLITUS TYPE 2 IN OBESE: ICD-10-CM

## 2018-12-18 RX ORDER — METFORMIN HYDROCHLORIDE 1000 MG/1
TABLET ORAL
Qty: 60 TABLET | Refills: 5 | Status: SHIPPED | OUTPATIENT
Start: 2018-12-18 | End: 2019-07-01 | Stop reason: SDUPTHER

## 2018-12-18 NOTE — TELEPHONE ENCOUNTER
----- Message from Colette Mukherjee sent at 12/18/2018  8:56 AM CST -----  Contact: [facility]- [phone] - opt-6  Would like to consult with the nurse. About a referral patient had  The doctor  needs notes on the patient and any test result that patient had. Please  Call back at [phone] opt-6. Thanks trace

## 2018-12-18 NOTE — TELEPHONE ENCOUNTER
----- Message from Hilda Álvarez sent at 12/18/2018  8:16 AM CST -----  Contact: dr merrill w/ [facility]  pls fax neurologist report to him at [phone]/ph:[phone]

## 2018-12-28 ENCOUNTER — HOSPITAL ENCOUNTER (OUTPATIENT)
Dept: RADIOLOGY | Facility: HOSPITAL | Age: 68
Discharge: HOME | End: 2018-12-28
Attending: PSYCHIATRY & NEUROLOGY
Payer: COMMERCIAL

## 2018-12-28 DIAGNOSIS — R29.818 NEUROLOGICAL DEFICIT PRESENT: ICD-10-CM

## 2018-12-28 DIAGNOSIS — R41.3 MEMORY DIFFICULTIES: ICD-10-CM

## 2018-12-28 PROCEDURE — 70551 MRI BRAIN STEM W/O DYE: CPT | Mod: TC

## 2019-01-03 ENCOUNTER — TELEPHONE (OUTPATIENT)
Dept: NEUROLOGY | Facility: CLINIC | Age: 69
End: 2019-01-03

## 2019-01-07 ENCOUNTER — TELEPHONE (OUTPATIENT)
Dept: NEUROLOGY | Facility: CLINIC | Age: 69
End: 2019-01-07

## 2019-01-07 NOTE — PROGRESS NOTES
01-    Brain MRI     No acute/new infarction/stroke. Old lacunar infarct within the right internal capsule (old stroke)

## 2019-01-08 ENCOUNTER — TELEPHONE (OUTPATIENT)
Dept: NEUROLOGY | Facility: CLINIC | Age: 69
End: 2019-01-08

## 2019-01-08 ENCOUNTER — PATIENT MESSAGE (OUTPATIENT)
Dept: NEUROLOGY | Facility: CLINIC | Age: 69
End: 2019-01-08

## 2019-01-08 ENCOUNTER — OFFICE VISIT (OUTPATIENT)
Dept: INTERNAL MEDICINE | Facility: CLINIC | Age: 69
End: 2019-01-08
Payer: COMMERCIAL

## 2019-01-08 VITALS
TEMPERATURE: 97 F | DIASTOLIC BLOOD PRESSURE: 84 MMHG | BODY MASS INDEX: 29.59 KG/M2 | HEART RATE: 90 BPM | OXYGEN SATURATION: 96 % | WEIGHT: 156.75 LBS | SYSTOLIC BLOOD PRESSURE: 128 MMHG | HEIGHT: 61 IN

## 2019-01-08 DIAGNOSIS — S09.90XA INJURY OF HEAD, INITIAL ENCOUNTER: Primary | ICD-10-CM

## 2019-01-08 DIAGNOSIS — R41.0 MENTAL CONFUSION: ICD-10-CM

## 2019-01-08 DIAGNOSIS — E53.8 B12 DEFICIENCY: ICD-10-CM

## 2019-01-08 LAB
BACTERIA #/AREA URNS HPF: ABNORMAL /HPF
BILIRUB UR QL STRIP: NEGATIVE
CLARITY UR: CLEAR
COLOR UR: ABNORMAL
GLUCOSE UR QL STRIP: ABNORMAL
HGB UR QL STRIP: NEGATIVE
KETONES UR QL STRIP: ABNORMAL
LEUKOCYTE ESTERASE UR QL STRIP: NEGATIVE
MICROSCOPIC COMMENT: ABNORMAL
NITRITE UR QL STRIP: POSITIVE
PH UR STRIP: 6 [PH] (ref 5–8)
PROT UR QL STRIP: ABNORMAL
RBC #/AREA URNS HPF: 0 /HPF (ref 0–4)
SP GR UR STRIP: 1.01 (ref 1–1.03)
URN SPEC COLLECT METH UR: ABNORMAL
WBC #/AREA URNS HPF: 6 /HPF (ref 0–5)
YEAST URNS QL MICRO: ABNORMAL

## 2019-01-08 PROCEDURE — 87186 SC STD MICRODIL/AGAR DIL: CPT

## 2019-01-08 PROCEDURE — 3074F PR MOST RECENT SYSTOLIC BLOOD PRESSURE < 130 MM HG: ICD-10-PCS | Mod: CPTII,S$GLB,, | Performed by: FAMILY MEDICINE

## 2019-01-08 PROCEDURE — 1100F PR PT FALLS ASSESS DOC 2+ FALLS/FALL W/INJURY/YR: ICD-10-PCS | Mod: CPTII,S$GLB,, | Performed by: FAMILY MEDICINE

## 2019-01-08 PROCEDURE — 99999 PR PBB SHADOW E&M-EST. PATIENT-LVL IV: ICD-10-PCS | Mod: PBBFAC,,, | Performed by: FAMILY MEDICINE

## 2019-01-08 PROCEDURE — 3074F SYST BP LT 130 MM HG: CPT | Mod: CPTII,S$GLB,, | Performed by: FAMILY MEDICINE

## 2019-01-08 PROCEDURE — 3079F DIAST BP 80-89 MM HG: CPT | Mod: CPTII,S$GLB,, | Performed by: FAMILY MEDICINE

## 2019-01-08 PROCEDURE — 3288F FALL RISK ASSESSMENT DOCD: CPT | Mod: CPTII,S$GLB,, | Performed by: FAMILY MEDICINE

## 2019-01-08 PROCEDURE — 99214 PR OFFICE/OUTPT VISIT, EST, LEVL IV, 30-39 MIN: ICD-10-PCS | Mod: S$GLB,,, | Performed by: FAMILY MEDICINE

## 2019-01-08 PROCEDURE — 3079F PR MOST RECENT DIASTOLIC BLOOD PRESSURE 80-89 MM HG: ICD-10-PCS | Mod: CPTII,S$GLB,, | Performed by: FAMILY MEDICINE

## 2019-01-08 PROCEDURE — 81000 URINALYSIS NONAUTO W/SCOPE: CPT

## 2019-01-08 PROCEDURE — 99999 PR PBB SHADOW E&M-EST. PATIENT-LVL IV: CPT | Mod: PBBFAC,,, | Performed by: FAMILY MEDICINE

## 2019-01-08 PROCEDURE — 3288F PR FALLS RISK ASSESSMENT DOCUMENTED: ICD-10-PCS | Mod: CPTII,S$GLB,, | Performed by: FAMILY MEDICINE

## 2019-01-08 PROCEDURE — 87088 URINE BACTERIA CULTURE: CPT

## 2019-01-08 PROCEDURE — 99214 OFFICE O/P EST MOD 30 MIN: CPT | Mod: S$GLB,,, | Performed by: FAMILY MEDICINE

## 2019-01-08 PROCEDURE — 87077 CULTURE AEROBIC IDENTIFY: CPT

## 2019-01-08 PROCEDURE — 1100F PTFALLS ASSESS-DOCD GE2>/YR: CPT | Mod: CPTII,S$GLB,, | Performed by: FAMILY MEDICINE

## 2019-01-08 PROCEDURE — 87086 URINE CULTURE/COLONY COUNT: CPT

## 2019-01-08 NOTE — PROGRESS NOTES
"Subjective:       Patient ID: Mateusz Bates is a 68 y.o. female.    Chief Complaint: Fall (knot on head)    Patient reports falling out of bed last week on Wednesday or Thursday. She report hit back left head on her night stand. She reports having a knot in the area, states it is still sore, but has "gone down". She reports going to [facility] on Friday and was advised she has a weak left eye. She does not notice a change in her vision. She has appointment to see eye doctor today per her report, see appt scheduled for 1/15/19. She reports concern about bladder infection due to feeling confused again, she states this started prior to hitting her head. Denies hypersomnolence, increased confusion since hitting her head. She states she wasn't going to come in, but was concerned that the lady at the [facility] told her her vision in her left eye was weak. She denies LOC after hitting her head.      Review of Systems   Constitutional: Negative for chills, fatigue, fever and unexpected weight change.   Eyes: Negative for visual disturbance.   Respiratory: Negative for shortness of breath.    Cardiovascular: Negative for chest pain.   Musculoskeletal: Negative for myalgias.   Neurological: Negative for dizziness, facial asymmetry, speech difficulty, weakness, numbness and headaches.   Psychiatric/Behavioral: Positive for confusion.       Objective:      Physical Exam   Constitutional: She is oriented to person, place, and time. She appears well-developed and well-nourished. No distress.   HENT:   Head: Normocephalic and atraumatic.       Eyes: Conjunctivae and EOM are normal. Pupils are equal, round, and reactive to light. No scleral icterus.   Pulmonary/Chest: Effort normal.   Neurological: She is alert and oriented to person, place, and time. No cranial nerve deficit. Coordination and gait normal.   Psychiatric: She has a normal mood and affect.   Vitals reviewed.      Assessment:       1. Injury of head, initial encounter    2. Mental " confusion    3. B12 deficiency        Plan:     Problem List Items Addressed This Visit     B12 deficiency      Other Visit Diagnoses     Injury of head, initial encounter    -  Primary    Relevant Orders    CT Head Without Contrast    Mental confusion        Relevant Orders    Urinalysis    Urine culture        Advised to seek medical attention for somnolence/worsening confusion.  Health Maintenance reviewed/updated. Colonoscopy scheduled.  Return in April for annual, sooner as needed.  Advised to contact Dr. Keller regarding if she must continue IM B12 or if she can try OTC B12. Patient expressed understanding and agreement.  Staff will contact Optometry to see if they can see patient today for evaluation.

## 2019-01-09 DIAGNOSIS — N39.0 URINARY TRACT INFECTION WITHOUT HEMATURIA, SITE UNSPECIFIED: Primary | ICD-10-CM

## 2019-01-09 RX ORDER — SULFAMETHOXAZOLE AND TRIMETHOPRIM 800; 160 MG/1; MG/1
1 TABLET ORAL 2 TIMES DAILY
Qty: 14 TABLET | Refills: 0 | Status: SHIPPED | OUTPATIENT
Start: 2019-01-09 | End: 2019-01-18

## 2019-01-11 ENCOUNTER — HOSPITAL ENCOUNTER (OUTPATIENT)
Dept: RADIOLOGY | Facility: HOSPITAL | Age: 69
Discharge: HOME | End: 2019-01-11
Attending: FAMILY MEDICINE
Payer: COMMERCIAL

## 2019-01-11 ENCOUNTER — TELEPHONE (OUTPATIENT)
Dept: INTERNAL MEDICINE | Facility: CLINIC | Age: 69
End: 2019-01-11

## 2019-01-11 DIAGNOSIS — E78.5 HYPERLIPIDEMIA, UNSPECIFIED HYPERLIPIDEMIA TYPE: ICD-10-CM

## 2019-01-11 DIAGNOSIS — S09.90XA INJURY OF HEAD, INITIAL ENCOUNTER: ICD-10-CM

## 2019-01-11 LAB — BACTERIA UR CULT: NORMAL

## 2019-01-11 PROCEDURE — 70450 CT HEAD/BRAIN W/O DYE: CPT | Mod: TC

## 2019-01-11 RX ORDER — ATORVASTATIN CALCIUM 80 MG/1
80 TABLET, FILM COATED ORAL DAILY
Qty: 30 TABLET | Refills: 3 | Status: SHIPPED | OUTPATIENT
Start: 2019-01-11 | End: 2019-06-25 | Stop reason: SDUPTHER

## 2019-01-11 NOTE — TELEPHONE ENCOUNTER
I noticed patient is schedule for head CT on 1/24/19. This study needs to be done ASAP. Please contact patient to see how she is doing and reschedule study at hospital if necessary. If this cannot be done in a timely manner, she needs to go to ER for head CT.

## 2019-01-11 NOTE — TELEPHONE ENCOUNTER
----- Message from Serenity Kilpatrick MD sent at 1/10/2019  4:21 PM CST -----  eRx please  ----- Message -----  From: Dillon Ramos, PharmD  Sent: 1/10/2019   3:58 PM  To: Serenity Kilpatrick MD    Good Evening,  She would like a refill on Lipitor 80mg.  Thanks

## 2019-01-16 ENCOUNTER — OFFICE VISIT (OUTPATIENT)
Dept: NEUROLOGY | Facility: CLINIC | Age: 69
End: 2019-01-16
Payer: COMMERCIAL

## 2019-01-16 ENCOUNTER — LAB VISIT (OUTPATIENT)
Dept: LAB | Facility: HOSPITAL | Age: 69
End: 2019-01-16
Attending: PSYCHIATRY & NEUROLOGY
Payer: COMMERCIAL

## 2019-01-16 ENCOUNTER — OFFICE VISIT (OUTPATIENT)
Dept: DIABETES | Facility: CLINIC | Age: 69
End: 2019-01-16
Payer: COMMERCIAL

## 2019-01-16 ENCOUNTER — PATIENT MESSAGE (OUTPATIENT)
Dept: DIABETES | Facility: CLINIC | Age: 69
End: 2019-01-16

## 2019-01-16 VITALS — BODY MASS INDEX: 28.4 KG/M2 | WEIGHT: 152.75 LBS | DIASTOLIC BLOOD PRESSURE: 70 MMHG | SYSTOLIC BLOOD PRESSURE: 128 MMHG

## 2019-01-16 VITALS
HEART RATE: 82 BPM | WEIGHT: 155.44 LBS | BODY MASS INDEX: 28.61 KG/M2 | HEIGHT: 62 IN | SYSTOLIC BLOOD PRESSURE: 136 MMHG | DIASTOLIC BLOOD PRESSURE: 78 MMHG

## 2019-01-16 DIAGNOSIS — R41.89 PSEUDODEMENTIA: ICD-10-CM

## 2019-01-16 DIAGNOSIS — E53.8 B12 DEFICIENCY: ICD-10-CM

## 2019-01-16 DIAGNOSIS — I15.2 HYPERTENSION ASSOCIATED WITH DIABETES: ICD-10-CM

## 2019-01-16 DIAGNOSIS — E11.59 HYPERTENSION ASSOCIATED WITH DIABETES: ICD-10-CM

## 2019-01-16 DIAGNOSIS — G44.209 TENSION HEADACHE: ICD-10-CM

## 2019-01-16 DIAGNOSIS — R41.3 MEMORY DIFFICULTIES: Primary | ICD-10-CM

## 2019-01-16 DIAGNOSIS — E11.69 HYPERLIPIDEMIA ASSOCIATED WITH TYPE 2 DIABETES MELLITUS: ICD-10-CM

## 2019-01-16 DIAGNOSIS — E11.65 UNCONTROLLED TYPE 2 DIABETES MELLITUS WITH HYPERGLYCEMIA: ICD-10-CM

## 2019-01-16 DIAGNOSIS — E66.9 OBESITY (BMI 30.0-34.9): ICD-10-CM

## 2019-01-16 DIAGNOSIS — E78.5 HYPERLIPIDEMIA ASSOCIATED WITH TYPE 2 DIABETES MELLITUS: ICD-10-CM

## 2019-01-16 DIAGNOSIS — F32.9 CHRONIC MAJOR DEPRESSIVE DISORDER: Chronic | ICD-10-CM

## 2019-01-16 DIAGNOSIS — I67.89 CEREBRAL MICROVASCULAR DISEASE: ICD-10-CM

## 2019-01-16 DIAGNOSIS — E11.65 UNCONTROLLED TYPE 2 DIABETES MELLITUS WITH HYPERGLYCEMIA: Primary | ICD-10-CM

## 2019-01-16 DIAGNOSIS — F41.1 GAD (GENERALIZED ANXIETY DISORDER): ICD-10-CM

## 2019-01-16 DIAGNOSIS — D50.9 IRON DEFICIENCY ANEMIA, UNSPECIFIED IRON DEFICIENCY ANEMIA TYPE: ICD-10-CM

## 2019-01-16 LAB
GLUCOSE SERPL-MCNC: >450 MG/DL (ref 70–110)
VIT B12 SERPL-MCNC: 369 PG/ML

## 2019-01-16 PROCEDURE — 1101F PR PT FALLS ASSESS DOC 0-1 FALLS W/OUT INJ PAST YR: ICD-10-PCS | Mod: CPTII,S$GLB,, | Performed by: PSYCHIATRY & NEUROLOGY

## 2019-01-16 PROCEDURE — 99999 PR PBB SHADOW E&M-EST. PATIENT-LVL IV: CPT | Mod: PBBFAC,,, | Performed by: NURSE PRACTITIONER

## 2019-01-16 PROCEDURE — 82962 POCT GLUCOSE, HAND-HELD DEVICE: ICD-10-PCS | Mod: S$GLB,,, | Performed by: NURSE PRACTITIONER

## 2019-01-16 PROCEDURE — 99999 PR PBB SHADOW E&M-EST. PATIENT-LVL III: ICD-10-PCS | Mod: PBBFAC,,, | Performed by: PSYCHIATRY & NEUROLOGY

## 2019-01-16 PROCEDURE — 99214 OFFICE O/P EST MOD 30 MIN: CPT | Mod: S$GLB,,, | Performed by: NURSE PRACTITIONER

## 2019-01-16 PROCEDURE — 82607 VITAMIN B-12: CPT

## 2019-01-16 PROCEDURE — 3078F PR MOST RECENT DIASTOLIC BLOOD PRESSURE < 80 MM HG: ICD-10-PCS | Mod: CPTII,S$GLB,, | Performed by: PSYCHIATRY & NEUROLOGY

## 2019-01-16 PROCEDURE — 3078F DIAST BP <80 MM HG: CPT | Mod: CPTII,S$GLB,, | Performed by: PSYCHIATRY & NEUROLOGY

## 2019-01-16 PROCEDURE — 99214 PR OFFICE/OUTPT VISIT, EST, LEVL IV, 30-39 MIN: ICD-10-PCS | Mod: S$GLB,,, | Performed by: NURSE PRACTITIONER

## 2019-01-16 PROCEDURE — 99215 OFFICE O/P EST HI 40 MIN: CPT | Mod: S$GLB,,, | Performed by: PSYCHIATRY & NEUROLOGY

## 2019-01-16 PROCEDURE — 36415 COLL VENOUS BLD VENIPUNCTURE: CPT

## 2019-01-16 PROCEDURE — 1101F PT FALLS ASSESS-DOCD LE1/YR: CPT | Mod: CPTII,S$GLB,, | Performed by: PSYCHIATRY & NEUROLOGY

## 2019-01-16 PROCEDURE — 83921 ORGANIC ACID SINGLE QUANT: CPT

## 2019-01-16 PROCEDURE — 99999 PR PBB SHADOW E&M-EST. PATIENT-LVL IV: ICD-10-PCS | Mod: PBBFAC,,, | Performed by: NURSE PRACTITIONER

## 2019-01-16 PROCEDURE — 3045F PR MOST RECENT HEMOGLOBIN A1C LEVEL 7.0-9.0%: ICD-10-PCS | Mod: CPTII,S$GLB,, | Performed by: PSYCHIATRY & NEUROLOGY

## 2019-01-16 PROCEDURE — 99215 PR OFFICE/OUTPT VISIT, EST, LEVL V, 40-54 MIN: ICD-10-PCS | Mod: S$GLB,,, | Performed by: PSYCHIATRY & NEUROLOGY

## 2019-01-16 PROCEDURE — 3075F SYST BP GE 130 - 139MM HG: CPT | Mod: CPTII,S$GLB,, | Performed by: PSYCHIATRY & NEUROLOGY

## 2019-01-16 PROCEDURE — 82962 GLUCOSE BLOOD TEST: CPT | Mod: S$GLB,,, | Performed by: NURSE PRACTITIONER

## 2019-01-16 PROCEDURE — 99999 PR PBB SHADOW E&M-EST. PATIENT-LVL III: CPT | Mod: PBBFAC,,, | Performed by: PSYCHIATRY & NEUROLOGY

## 2019-01-16 PROCEDURE — 3075F PR MOST RECENT SYSTOLIC BLOOD PRESS GE 130-139MM HG: ICD-10-PCS | Mod: CPTII,S$GLB,, | Performed by: PSYCHIATRY & NEUROLOGY

## 2019-01-16 PROCEDURE — 3045F PR MOST RECENT HEMOGLOBIN A1C LEVEL 7.0-9.0%: CPT | Mod: CPTII,S$GLB,, | Performed by: PSYCHIATRY & NEUROLOGY

## 2019-01-16 NOTE — PATIENT INSTRUCTIONS
Vitamin B-12  Does this test have other names?  VB12, serum cobalamin  What is this test?  This test measures the level of vitamin B-12 in your blood. You need this vitamin to make red blood cells and for your nervous system to function as it should.  You get vitamin B-12 from eating foods that come from animals, such as meat, eggs, and dairy products. Vitamin B-12 is also added to some cereals. You can also take this vitamin as a supplement in pill form.  Why do I need this test?  You may need this test if your healthcare provider suspects that your vitamin B-12 level is low. A low level of vitamin B-12 is called vitamin B-12 deficiency. You are more likely to have vitamin deficiency if you are an older adult, have a digestive disorder called malabsorption, have had gastrointestinal surgery, or eat a vegan diet. Women who are pregnant or breastfeeding and eat a vegetarian-type diet are at high risk for this deficiency.  You may also need this test if you've been diagnosed with or your healthcare provider suspects a disease called pernicious anemia. Pernicious anemia affects the lining of your stomach and makes it hard to absorb vitamin B-12.  These are common symptoms of vitamin B-12 deficiency:  · Fatigue  · Weakness  · Weight loss  · Tingling or numbness of the hands and feet  · Constipation  · Poor balance  · Confusion  · Depression  · Memory loss  · Soreness of the mouth or tongue  What other tests might I have along with this test?  Your healthcare provider may order other tests to help find out the cause of your vitamin B-12 deficiency. These tests may include:  · Complete blood count  · Peripheral blood smear, which involves looking at your blood cells under a microscope  · Folic acid level. This vitamin is also important for red blood cell production.  What do my test results mean?  Many things may affect your lab test results. These include the method each lab uses to do the test. Even if your test  results are different from the normal value, you may not have a problem. To learn what the results mean for you, talk with your healthcare provider.  Vitamin B-12 is measured in picograms per milliliter (pg/mL). Normal results are:  · 200 to 835 pg/mL for adults  · 160 to 1,300 pg/mL for newborns  If your results are low, you may have:  · Pernicious anemia  · Malabsorption from inflammatory bowel disease or other causes  · Poor absorption because of surgery  · Tapeworm infection  · Too little intake of animal protein  · Folic acid deficiency  · Iron deficiency  If your levels are high, you may have:  · Liver or kidney disease  · Diabetes  · Obesity  · White blood cell cancer  High levels may also mean that you have chronic obstructive pulmonary disease , congestive heart failure, or a thickening of the blood called polycythemia vera.  How is this test done?  The test requires a blood sample, which is drawn through a needle from a vein in your arm.  Does this test pose any risks?  Taking a blood sample with a needle carries risks that include bleeding, infection, bruising, or feeling dizzy. When the needle pricks your arm, you may feel a slight stinging sensation or pain. Afterward, the site may be slightly sore.  What might affect my test results?  Certain conditions may affect your test results. These include:  · Pregnancy  · Recent blood transfusions  · Smoking  Medicines in general may also affect your results. Specific medicines include supplements of vitamin A or C and birth control pills.  How do I get ready for this test?  You must fast before this test. You may be able to drink water, but you should not eat or drink anything else after midnight on the night before the test. If you are not having the test in the morning, ask your healthcare provider how long you need to fast before the test. You should not have a vitamin B-12 injection before the test. Also be sure your provider knows about all medicines,  herbs, vitamins, and supplements you are taking. This includes medicines that don't need a prescription and any illicit drugs you may use.  © 5259-5028 [employer]. [address] [place] [address], [place] [address]. All rights reserved. This information is not intended as a substitute for professional medical care. Always follow your healthcare professional's instructions.

## 2019-01-16 NOTE — PROGRESS NOTES
"PCP: Dr. Oneal    HISTORY OF PRESENT ILLNESS: 68 year old  female patient is in clinic today for diabetes follow up.  Most recent A1C is 9.0, ADA recommends less than 7.0.  In the past, she has been intolerant to Victoza and developed extreme nausea, vomiting so medication was stopped.  Also, failed Byetta and Bydureon.  Has lost 3 pounds.    In November 2018, patient was hospitalized for confusion secondary to UTI.  On today patient voices that she was just recently treated for another UTI.  Furthermore, she has been evaluated by neurology for amnesia and is currently being treated with B12 due to deficiency. MRI of brain noted an old infarct.     Patient has still NOT been monitoring blood sugars. On today, she voices that she did not know how to use her meter or testing supplies, so she elected not to monitor BG for an additional month. She did not notify our office for further training or explanation.  She also has NOT been taking her Novolog that was prescribed and instead continued glyburide.  She did not attend appointment with dietitian / CDE for nutritional counseling.     She denies polyuria, polydipsia, polyphagia, blurred vision.  She denies nausea, vomiting, or diarrhea.      Height: 5 ' 1 ",  Weight: 152 pounds, BMI 28.40  Blood Glucose reading this am: Not Taken  Blood Glucose reading in clinic: > 450 mg/dl at 2:24 pm.  With her meter, BG reads 546 mg/dl at 2:18 pm ( she only took 10 units of Toujeo yesterday and has not been taking Novolog )    DM MEDICATIONS:  Glucophage 1000 mg BID  Glyburide 5 mg, 2 tablets BID ac  Toujeo 30 units in Hs ( bedtime ) - only took 10 units last night due empty pen     Labs Reviewed.    REVIEW OF SYSTEMS:  GENERAL: Denies fever, chills, change in appetite.  HEENT: Denies impaired hearing, dysphagia.  RESPIRATORY: Denies shortness of breath or wheezing.   GI: Denies nausea, vomiting, diarrhea.  : Denies hematuria, dysuria or frequency.  MS: History of falls, " most recently while getting out of her bed a week ago.   NEURO: Denies numbness or tingling in the hands or feet.   PSYCH: Treated for depression.  ENDO: See HPI.    STANDARDS OF CARE:   Eye exam: Dr. Melchor, Last exam 3 / 2018  FOOT EXAM: No podiatrist.  Dental Exams: Recommended regular cleanings.  ACE / ARB: No  Statin: Yes    ACTIVITY LEVEL: No regular exercise.   MEAL PLANNING: Number of meals - 3. Number of Snacks - 1, such as coconut pecan cookies. Breakfast, usually V8 juice or Atkins Bar OR sausage lupe.  Lunch can be salad, ham.  Dinner can be [employer] chicken tenders, occasional fish sandwich from [employer]. Per dietary recall, patient is  limiting carbohydrates, saturated fats and sodium.     SELF-MONITORING: Self monitoring,  ACCU-CHEK Aga  SOCIAL: . Lives alone with 2 cats, 3 dogs.  Works at the [employer] at [employer]. Never smoker.     PHYSICAL EXAMINATION:  GENERAL: Well developed, well nourished  female in no acute distress, ambulatory.  NECK: Supple, no thyromegaly, normal range of motion.   HEART: Regular rate and rhythm. No rubs, murmurs or gallops.   LUNGS: CTA bilaterally. Unlabored breathing, no use of accessory muscles.   SKIN: Warm, dry skin. No lesions or abrasions.    MUSCULOSKELETAL: Normal gait and muscle strength.  ABDOMEN: Soft, nontender, no masses. BS active X 4.   NEUROLOGIC: Alert and oriented to person, place, and time. Responds appropriately.  FOOT EVALUATION: Protective Sensation (w/ 10 gram monofilament):  Right: Intact   Left: Intact  //  Visual Inspection:  Normal -  Bilateral //  Pedal Pulses:   Right: Present  Left: Present    ASSESSMENT:    1.) Uncontrolled type 2 diabetes mellitus with hyperglycemia  -     POCT Glucose, Hand-Held Device    Uncontrolled.  Advised to D/C glyburide. Continue Toujeo 30 units daily.   Advised patient to start Novolog immediately to assist with BG control. Use the following sliding scale TID ac:             - 199: 2 units  //  - 249: 4 units //   - 299: 6 units   //  - 349: 8 units //  BG Over 350: 10 units.      Continue metformin 1,000 mg BID. Patient would be a great candidate for CGM therapy.  In the meantime, I demonstrated how to test BG using her personal meter. She re-demonstrated correct technique and voiced understanding.    2.) Hyperlipidemia associated with type 2 diabetes mellitus - uncontrolled, continue Lipitor, f/u PCP    3.) Hypertension associated with diabetes - controlled, continue Norvasc    4.) Obesity (BMI 30.0-34.9)    PLAN:  1.) Continue monitoring blood glucose  4 x daily, fasting and ac meals.  Discussed ADA goal for fasting blood sugar, 80 - 130 mg/dL; pp blood sugars below 180 mg/dl. Also, discussed prevention of hypoglycemia and the need to adjust goals to higher levels if persistent hypoglycemia. Reminded her to bring records to each visit.   2.) Reviewed pathophysiology of diabetes, complications related to the disease, importance of annual dilated eye exam and daily foot examination.  3.) We discussed the ADA recommendations: Hemoglobin A1c below 7.0 %. All patients with diabetes should be on statins unless contraindicated.  ACE or ARB therapy if not contraindicated.    4.) Meal planning teaching: Carbohydrate definition - one serving is 15 gms. Carbohydrate spacing - carbohydrates should be spaced into approximately 3 meals with 2 snacks ( of one carbohydrate ) between meals or at bedtime. Increase vegetable intake to 2 or more cups of vegetables per day as well as 2 fruit servings. Recommended low saturated fat, low sodium diet to aid in control of hypertension and cholesterol.  5.) Discussed activity, benefits, methods, and precautions. Recommended patient start or continue some form of exercise and increase as tolerated to 30 minutes per day to facilitate weight loss and aid in control of blood glucoses.   6.) Return to clinic in 2 weeks for follow  up. Labs Today: A1C, CMP. Advised to call clinic with any questions or concerns.     Millicent Haq, RADHA-C, CDE    A total of 30 minutes was spent in face to face time, of which over 50 % was spent in counseling patient on disease process, complications, treatment, and side effects of medications.

## 2019-01-16 NOTE — PROGRESS NOTES
Subjective:       Patient ID: Mateusz Bates is a 68 y.o. female.    Chief Complaint: Memory Loss and Follow-up    HPI       BACKGROUND HISTORY     The patient I here for memory loss. The patient is presenting with few months history of memory loss.The patient is accompanied by her sister.The main problems the patient has are related to gaps in short term memory like forgetting her passwords and things to do. The patient excessively forgets where placed certain things. The patient does not forget familiar names. The patient is driving and denies getting lost. The patient is losing personal items and does not put them in odd places. No confusion around and inside the house. No trouble remembering the date and time, keeping up with medications and appointments and keeping up with major holidays and political changes. The patient is independent in handling finances. The patient is still independent with ADLs. No hallucinations or delusions. No seizures. No behavioral problems. No language problems. No problems handling tools. No history of strokes. No history of headaches. No history of hypothyroidism. No history of alcoholism.  From [id number] B12  has dropped remarkably from 205 to 178 . Positive history of depression. No history of STDs.  No history of HIV infection. No toxic exposures.  No history of traumatic brain injury. No tremors or abnormal movements. No falls or instability. No urinary incontinence. No change in sleep and appetite. No family history of dementia. I recommended Start B12 replacement immediately by injection daily for a week, weekly for a month then monthly of 1000 mcg (1mg) B12, Brain MRI to evaluate the frontal and temporal lobes and Comprehensive Neuropsychological testing.    INTERVAL HISTORY     No new changes in cognitive problems. Her sister seems to think that she is getting more confused. Started B12 but cannot tolerate further IM injections. 01- Brain MRI showed No acute  abnormalities with CMCs and questionable old infarcts. CNP is pending.    The patient is concerned about stroke. HTN is well-controlled, DM and HLP are not well-controlled. No smoking. She was taking Excedrin daily for 2 years.    The patient describes daily tension headache for the last 2 years as well.        Review of Systems   Constitutional: Negative for appetite change and fatigue.   HENT: Negative for hearing loss and tinnitus.    Eyes: Negative for photophobia and visual disturbance.   Respiratory: Negative for apnea and shortness of breath.    Cardiovascular: Negative for chest pain and palpitations.   Gastrointestinal: Negative for nausea and vomiting.   Endocrine: Negative for cold intolerance and heat intolerance.   Genitourinary: Negative for difficulty urinating and urgency.   Musculoskeletal: Negative for arthralgias, back pain, gait problem, joint swelling, myalgias, neck pain and neck stiffness.   Skin: Negative for color change and rash.   Allergic/Immunologic: Negative for environmental allergies and immunocompromised state.   Neurological: Positive for headaches. Negative for dizziness, tremors, seizures, syncope, facial asymmetry, speech difficulty, weakness, light-headedness and numbness.   Hematological: Negative for adenopathy. Does not bruise/bleed easily.   Psychiatric/Behavioral: Positive for confusion and dysphoric mood. Negative for agitation, behavioral problems, decreased concentration, hallucinations, self-injury, sleep disturbance and suicidal ideas. The patient is nervous/anxious. The patient is not hyperactive.          Current Outpatient Medications:     amLODIPine (NORVASC) 2.5 MG tablet, TAKE 1 TABLET BY MOUTH EVERY DAY, Disp: 30 tablet, Rfl: 11    atorvastatin (LIPITOR) 80 MG tablet, Take 1 tablet (80 mg total) by mouth once daily., Disp: 30 tablet, Rfl: 3    blood sugar diagnostic (CONTOUR NEXT TEST STRIPS) Strp, 1 strip by Misc.(Non-Drug; Combo Route) route 3 -4 times  "daily. Conto, Disp: 100 strip, Rfl: 3    blood-glucose meter Misc, IDDM 250.00  Insurance or Pt choice, Disp: 1 each, Rfl: 0    cyanocobalamin 1,000 mcg/mL injection, Daily for a week then weekly for a month then monthly, Disp: 12 mL, Rfl: 0    glyBURIDE (DIABETA) 5 MG tablet, Take 2 tablets (10 mg total) by mouth 2 (two) times daily before meals., Disp: 360 tablet, Rfl: 1    hydrOXYzine HCl (ATARAX) 10 MG Tab, TAKE 1 TABLET BY MOUTH THREE TIMES A DAY, Disp: 60 tablet, Rfl: 2    insulin aspart U-100 (NOVOLOG FLEXPEN U-100 INSULIN) 100 unit/mL InPn pen, Inject 10 Units into the skin 3 (three) times daily with meals., Disp: 15 mL, Rfl: 3    insulin glargine, TOUJEO, (TOUJEO SOLOSTAR U-300 INSULIN) 300 unit/mL (1.5 mL) InPn pen, Inject 45 Units into the skin once daily., Disp: 9 mL, Rfl: 3    insulin lispro 100 unit/mL injection, Inject into the skin., Disp: 10 mL, Rfl: 0    lancets (MICROLET LANCET) Misc, 1 lancet by Misc.(Non-Drug; Combo Route) route 3 (three) times daily., Disp: 100 each, Rfl: 3    metFORMIN (GLUCOPHAGE) 1000 MG tablet, TAKE 1 TABLET BY MOUTH TWICE A DAY WITH MEALS, Disp: 60 tablet, Rfl: 5    OPW TEST CLAIM - DO NOT FILL, 10 tablets 3 (three) times daily. OPW test claim. Do not fill., Disp: 15 mL, Rfl: 0    OPW TEST CLAIM - DO NOT FILL, 10 tablets 3 (three) times daily. OPW test claim. Do not fill., Disp: 15 mL, Rfl: 0    OPW TEST CLAIM - DO NOT FILL, Inject into the skin. OPW test claim. Do not fill., Disp: 15 mL, Rfl: 0    OPW TEST CLAIM - DO NOT FILL, Inject into the skin. OPW test claim. Do not fill., Disp: 15 mL, Rfl: 0    pen needle, diabetic 32 gauge x 5/32" Ndle, 1 each by Misc.(Non-Drug; Combo Route) route 3 (three) times daily., Disp: 100 each, Rfl: 11    sodium,potassium,mag sulfates (SUPREP BOWEL PREP KIT) 17.5-3.13-1.6 gram SolR, Take as directed, Disp: 354 mL, Rfl: 0    sulfamethoxazole-trimethoprim 800-160mg (BACTRIM DS) 800-160 mg Tab, Take 1 tablet by mouth 2 (two) " "times daily. for 7 days, Disp: 14 tablet, Rfl: 0    syringe with needle (SYRINGE 3CC/25GX1") 3 mL 25 gauge x 1" Syrg, Use as Directed, Disp: 30 Syringe, Rfl: 0    triamcinolone acetonide 0.1% (KENALOG) 0.1 % Lotn, Apply topically 2 (two) times daily., Disp: 60 mL, Rfl: 1    venlafaxine (EFFEXOR-XR) 150 MG Cp24, Take 1 capsule (150 mg total) by mouth once daily., Disp: 30 capsule, Rfl: 5    aspirin (ECOTRIN) 325 MG EC tablet, Take 325 mg by mouth once daily., Disp: , Rfl:   Past Medical History:   Diagnosis Date    Chronic major depressive disorder     Diabetes mellitus type II, uncontrolled 2000    Insulin x 10 years    DM (diabetes mellitus) 2000     am 02/22/2017 Insulin x 2006    DM (diabetes mellitus) 2000     am 03/07/2018 Insulin x 2006    Eczema     Essential hypertension     Generalized anxiety disorder     GERD (gastroesophageal reflux disease)     Glaucoma     OD    Hyperlipidemia     Obesity      Past Surgical History:   Procedure Laterality Date    APPENDECTOMY      ECTOPIC PREGNANCY SURGERY Left     LSO    OOPHORECTOMY       Social History     Socioeconomic History    Marital status:      Spouse name: Not on file    Number of children: Not on file    Years of education: Not on file    Highest education level: Not on file   Social Needs    Financial resource strain: Not on file    Food insecurity - worry: Not on file    Food insecurity - inability: Not on file    Transportation needs - medical: Not on file    Transportation needs - non-medical: Not on file   Occupational History    Not on file   Tobacco Use    Smoking status: Never Smoker    Smokeless tobacco: Never Used   Substance and Sexual Activity    Alcohol use: No    Drug use: No    Sexual activity: No   Other Topics Concern    Not on file   Social History Narrative    . Lives alone. Patient works full time at [employer].       Objective:     GENERAL APPEARANCE:     The " patient looks comfortable.    No signs of medical or psychiatric distress.    Normal breathing pattern.    No dysmorphic features    Normal eye contact.     GENERAL MEDICAL EXAM:    HEENT:  Head is atraumatic normocephalic.    Neck and Axillae: No JVD.     Cardiopulmonary: No cyanosis. No tachypnea. Normal respiratory effort.    Gastrointestinal:  No stomas or lesions. No hernias.    Skin, Hair and Nails: No pathognonomic skin rash. No neurofibromatosis. No stigmata of autoimmune disease.     Limbs: No varicose veins. No edema.    Muskoskeletal: No deformities.No signs of longstanding neuropathy. No dislocations or fractures.        Neurologic Exam     Mental Status   Oriented to person, place, and time.   Registration: recalls 3 of 3 objects. Recall at 5 minutes: recalls 3 of 3 objects. Follows 3 step commands.   Attention: normal. Concentration: normal.   Speech: speech is normal   Level of consciousness: alert  Knowledge: good and consistent with education. Able to perform simple calculations.   Able to name object. Able to read. Able to repeat. Able to write. Normal comprehension.     MOCA 30>30    Visuospatial/Executive  5   Naming                          3  Attention                        6  Language                      3  Abstraction                    2  Recall                            5  Orientation                     6       Cranial Nerves     CN II   Visual fields full to confrontation.   Visual acuity: normal with correction  Right visual field deficit: none  Left visual field deficit: none     CN III, IV, VI   Pupils are equal, round, and reactive to light.  Extraocular motions are normal.   Right pupil: Size: 2 mm. Shape: regular. Reactivity: brisk. Consensual response: intact. Accommodation: intact.   Left pupil: Size: 2 mm. Shape: regular. Reactivity: brisk. Consensual response: intact. Accommodation: intact.   CN III: no CN III palsy  CN VI: no CN VI palsy  Nystagmus: none   Diplopia:  none  Ophthalmoparesis: none  Upgaze: normal  Downgaze: normal  Conjugate gaze: present  Vestibulo-ocular reflex: present    CN V   Facial sensation intact.   Right facial sensation deficit: none  Left facial sensation deficit: none  Right corneal reflex: normal  Left corneal reflex: normal    CN VII   Right facial weakness: none  Left facial weakness: none  Right taste: normal  Left taste: normal    CN VIII   CN VIII normal.   Hearing: intact  Right Rinne: AC > BC  Left Rinne: AC > BC  Sánchez: does not lateralize     CN IX, X   CN IX normal.   CN X normal.   Palate: symmetric  Right gag reflex: normal  Left gag reflex: normal    CN XI   CN XI normal.   Right sternocleidomastoid strength: normal  Left sternocleidomastoid strength: normal  Right trapezius strength: normal  Left trapezius strength: normal    CN XII   CN XII normal.   Tongue: not atrophic  Fasciculations: absent  Tongue deviation: none    Motor Exam   Muscle bulk: normal  Overall muscle tone: normal  Right arm tone: normal  Left arm tone: normal  Right arm pronator drift: absent  Left arm pronator drift: absent  Right leg tone: normal  Left leg tone: normal    Strength   Strength 5/5 throughout.   Right neck flexion: 5/5  Left neck flexion: 5/5  Right neck extension: 5/5  Left neck extension: 5/5  Right deltoid: 5/5  Left deltoid: 5/5  Right biceps: 5/5  Left biceps: 5/5  Right triceps: 5/5  Left triceps: 5/5  Right wrist flexion: 5/5  Left wrist flexion: 5/5  Right wrist extension: 5/5  Left wrist extension: 5/5  Right interossei: 5/5  Left interossei: 5/5  Right abdominals: 5/5  Left abdominals: 5/5  Right iliopsoas: 5/5  Left iliopsoas: 5/5  Right quadriceps: 5/5  Left quadriceps: 5/5  Right hamstrin/5  Left hamstrin/5  Right glutei: 5/5  Left glutei: 5/5  Right anterior tibial: 5/5  Left anterior tibial: 5/5  Right posterior tibial: 5/5  Left posterior tibial: 5/5  Right peroneal: 5/5  Left peroneal: 5/5  Right gastroc: 5/5  Left gastroc:  5/5    Sensory Exam   Light touch normal.   Right arm light touch: normal  Left arm light touch: normal  Right leg light touch: normal  Left leg light touch: normal  Vibration normal.   Right arm vibration: normal  Left arm vibration: normal  Right leg vibration: normal  Left leg vibration: normal  Proprioception normal.   Right arm proprioception: normal  Left arm proprioception: normal  Right leg proprioception: normal  Left leg proprioception: normal  Pinprick normal.   Right arm pinprick: normal  Left arm pinprick: normal  Right leg pinprick: normal  Left leg pinprick: normal  Graphesthesia: normal  Stereognosis: normal    Gait, Coordination, and Reflexes     Gait  Gait: normal    Coordination   Romberg: negative  Finger to nose coordination: normal  Heel to shin coordination: normal  Tandem walking coordination: normal    Tremor   Resting tremor: absent  Intention tremor: absent  Action tremor: absent    Reflexes   Right brachioradialis: 2+  Left brachioradialis: 2+  Right biceps: 2+  Left biceps: 2+  Right triceps: 2+  Left triceps: 2+  Right patellar: 2+  Left patellar: 2+  Right achilles: 2+  Left achilles: 2+  Right : 2+  Left : 2+  Right plantar: normal  Left plantar: normal  Right Bonilla: absent  Left Bonilla: absent  Right ankle clonus: absent  Left ankle clonus: absent  Right pendular knee jerk: absent  Left pendular knee jerk: absent      Lab Results   Component Value Date    WBC 7.04 07/05/2018    HGB 10.8 (L) 07/05/2018    HCT 35.2 (L) 07/05/2018    MCV 85 07/05/2018     (H) 07/05/2018     Sodium   Date Value Ref Range Status   10/03/2018 137 136 - 145 mmol/L Final     Potassium   Date Value Ref Range Status   10/03/2018 5.1 3.5 - 5.1 mmol/L Final     Chloride   Date Value Ref Range Status   10/03/2018 102 95 - 110 mmol/L Final     CO2   Date Value Ref Range Status   10/03/2018 25 23 - 29 mmol/L Final     Glucose   Date Value Ref Range Status   10/03/2018 242 (H) 70 - 110 mg/dL Final      BUN, Bld   Date Value Ref Range Status   10/03/2018 15 8 - 23 mg/dL Final     Creatinine   Date Value Ref Range Status   10/03/2018 1.0 0.5 - 1.4 mg/dL Final     Calcium   Date Value Ref Range Status   10/03/2018 10.1 8.7 - 10.5 mg/dL Final     Total Protein   Date Value Ref Range Status   10/03/2018 7.6 6.0 - 8.4 g/dL Final     Albumin   Date Value Ref Range Status   10/03/2018 3.6 3.5 - 5.2 g/dL Final     Total Bilirubin   Date Value Ref Range Status   10/03/2018 0.2 0.1 - 1.0 mg/dL Final     Comment:     For infants and newborns, interpretation of results should be based  on gestational age, weight and in agreement with clinical  observations.  Premature Infant recommended reference ranges:  Up to 24 hours.............<8.0 mg/dL  Up to 48 hours............<12.0 mg/dL  3-5 days..................<15.0 mg/dL  6-29 days.................<15.0 mg/dL       Alkaline Phosphatase   Date Value Ref Range Status   10/03/2018 85 55 - 135 U/L Final     AST   Date Value Ref Range Status   10/03/2018 13 10 - 40 U/L Final     ALT   Date Value Ref Range Status   10/03/2018 10 10 - 44 U/L Final     Anion Gap   Date Value Ref Range Status   10/03/2018 10 8 - 16 mmol/L Final     eGFR if    Date Value Ref Range Status   10/03/2018 >60.0 >60 mL/min/1.73 m^2 Final     eGFR if non    Date Value Ref Range Status   10/03/2018 58.0 (A) >60 mL/min/1.73 m^2 Final     Comment:     Calculation used to obtain the estimated glomerular filtration  rate (eGFR) is the CKD-EPI equation.        Lab Results   Component Value Date    IEMMFVCS54 178 (L) 07/05/2018     Lab Results   Component Value Date    TSH 0.970 03/28/2018 2017-2018    TFT NL    FA NL    B12 205-->178    01-    Brain MRI No acute abnormalities. Old lacunar infarct within the right internal capsule (old stroke)            Assessment:       1. Memory difficulties    2. Chronic major depressive disorder    3. FAHAD (generalized anxiety  disorder)    4. Pseudodementia    5. Hyperlipidemia associated with type 2 diabetes mellitus    6. Hypertension associated with diabetes    7. Iron deficiency anemia, unspecified iron deficiency anemia type    8. Uncontrolled type 2 diabetes mellitus with hyperglycemia    9. B12 deficiency    10. Tension headache    11. Cerebral microvascular disease        Plan:         SUBCORTICAL AMNESIA SECONDARY TO B12 DEFICIENCY AND DEPRESSION     Change B12 to SL     Comprehensive Neuropsychological testing.    Join support group and manage stress    Proofing the house and use labeling.    Avoid antihistamines and anticholinergics.    Avoid changing routine.    Use written reminders.    Avoid multitasking.    Healthy diet, exercise (physical and cognitive).    Good sleep hygiene.      PREVENTION OF DELIRIUM   1. Good hydration and avoid electrolyte imbalance  2. Recognize andtreat infections immediately especially UTI.  3. Bladder emptying and prevent constipation.   4. Provide stimulating activities and familiar objects  5. Use eyeglasses and hearing aids if needed.   6. Use simple and regular communication about people, current place and time  7. Mobility and range-of-motion exercises  8. Reduce noise, lighting and avoid sleep interruptions  9. Non-narcotic pain management.  10.Nondrug treatment for sleep problems or anxiety  11. Avoid antihistamines.  12. Avoid narcotics.  13. Avoid benzodiazepines.       RADIOLOGICALLY DIAGNOSED STROKE    ASA 81 mg QD    Carotid U/S    Vascular Risk Factors (VRFs) stratification (BP, BS, BC control) is the mainstay of stroke prevention (>90%). The benefit of antiplatelets is < 20% stroke risk reduction.       Healthy diet and exercise      Call 911 if any SUDDEN:   Weakness  Numbness  Slurring of speech  Speech difficulty   Vertigo  Loss of balance  Loss of vision  Loss of hearing  Double vision  Trouble swallowing  Trouble breathing  Facial drooping      TENSION HEADACHE    Taper off  Excedrin     Increase water intake     Taper off caffeine     Stress management and massage therapy      RTC in 8 weeks         I spent 50 minutes face to face with the patient    More than 30 minutes of the time spent in counseling and coordination of care including discussions etiology of diagnosis, pathonogenesis of diagnosis, prognosis of diagnosis,, diagnostic results, impression and recommendations, diagnostic studies, management, risks and benefits of treatment, instructions of disease self management, treatment instructions, follow up requirements, patient and family counseling/involvement in care compliance with treatment regimen. All of the patient's questions were answered during this discussion.

## 2019-01-17 ENCOUNTER — PATIENT MESSAGE (OUTPATIENT)
Dept: DIABETES | Facility: CLINIC | Age: 69
End: 2019-01-17

## 2019-01-19 LAB — METHYLMALONATE SERPL-SCNC: 0.32 UMOL/L

## 2019-01-24 ENCOUNTER — TELEPHONE (OUTPATIENT)
Dept: ENDOSCOPY | Facility: HOSPITAL | Age: 69
End: 2019-01-24

## 2019-01-24 ENCOUNTER — PATIENT MESSAGE (OUTPATIENT)
Dept: ADMINISTRATIVE | Facility: OTHER | Age: 69
End: 2019-01-24

## 2019-01-24 NOTE — TELEPHONE ENCOUNTER
Patient called to reschedule appointment. Appointment rescheduled from 1/31/2019  To 3/14/19. New Procedure Instructions sent via Portal.

## 2019-01-26 ENCOUNTER — PATIENT MESSAGE (OUTPATIENT)
Dept: DIABETES | Facility: CLINIC | Age: 69
End: 2019-01-26

## 2019-01-26 ENCOUNTER — OFFICE VISIT (OUTPATIENT)
Dept: DIABETES | Facility: CLINIC | Age: 69
End: 2019-01-26
Payer: COMMERCIAL

## 2019-01-26 VITALS
SYSTOLIC BLOOD PRESSURE: 130 MMHG | BODY MASS INDEX: 28.77 KG/M2 | DIASTOLIC BLOOD PRESSURE: 80 MMHG | WEIGHT: 154.75 LBS

## 2019-01-26 DIAGNOSIS — E11.65 UNCONTROLLED TYPE 2 DIABETES MELLITUS WITH HYPERGLYCEMIA: Primary | ICD-10-CM

## 2019-01-26 DIAGNOSIS — E78.5 HYPERLIPIDEMIA ASSOCIATED WITH TYPE 2 DIABETES MELLITUS: ICD-10-CM

## 2019-01-26 DIAGNOSIS — E11.69 HYPERLIPIDEMIA ASSOCIATED WITH TYPE 2 DIABETES MELLITUS: ICD-10-CM

## 2019-01-26 DIAGNOSIS — E11.59 HYPERTENSION ASSOCIATED WITH DIABETES: ICD-10-CM

## 2019-01-26 DIAGNOSIS — I15.2 HYPERTENSION ASSOCIATED WITH DIABETES: ICD-10-CM

## 2019-01-26 DIAGNOSIS — E66.9 OBESITY (BMI 30.0-34.9): ICD-10-CM

## 2019-01-26 LAB — GLUCOSE SERPL-MCNC: 388 MG/DL (ref 70–110)

## 2019-01-26 PROCEDURE — 99214 OFFICE O/P EST MOD 30 MIN: CPT | Mod: S$GLB,,, | Performed by: NURSE PRACTITIONER

## 2019-01-26 PROCEDURE — 99999 PR PBB SHADOW E&M-EST. PATIENT-LVL IV: ICD-10-PCS | Mod: PBBFAC,,, | Performed by: NURSE PRACTITIONER

## 2019-01-26 PROCEDURE — 99999 PR PBB SHADOW E&M-EST. PATIENT-LVL IV: CPT | Mod: PBBFAC,,, | Performed by: NURSE PRACTITIONER

## 2019-01-26 PROCEDURE — 99214 PR OFFICE/OUTPT VISIT, EST, LEVL IV, 30-39 MIN: ICD-10-PCS | Mod: S$GLB,,, | Performed by: NURSE PRACTITIONER

## 2019-01-26 PROCEDURE — 82962 POCT GLUCOSE, HAND-HELD DEVICE: ICD-10-PCS | Mod: S$GLB,,, | Performed by: NURSE PRACTITIONER

## 2019-01-26 PROCEDURE — 82962 GLUCOSE BLOOD TEST: CPT | Mod: S$GLB,,, | Performed by: NURSE PRACTITIONER

## 2019-01-26 NOTE — PATIENT INSTRUCTIONS
Continue Glucophage 1000 mg twice a day    Increase Toujeo 40 units at bedtime    Continue Novolog with the following sliding scale:   - 199: 4 units   - 249: 6units     - 299: 8 units      - 349: 10 units  BG Over 350: 12 units

## 2019-01-26 NOTE — PROGRESS NOTES
"PCP: Dr. Oneal    HISTORY OF PRESENT ILLNESS: 68 year old  female patient is in clinic today for diabetes follow up.  Most recent A1C is 10.9, ADA recommends less than 7.0.  In the past, she has been intolerant to Victoza and developed extreme nausea, vomiting so medication was stopped.  Also, failed Byetta and Bydureon.  She has gained 2 pounds since last visit.    Despite being advised to bring BG readings to clinic today, patient still did NOT bring them.  She has a few readings recorded ( fasting 190, 233 and ac lunch 149, 273 ); but, not enough to assess overall BG patterns.  She has been taking Toujeo 30 units; but, she misplaced her metformin, as well as her sliding scale, so she has been taking estimated doses of Novolog     She denies polyuria, polydipsia, polyphagia, blurred vision.  She denies nausea, vomiting, or diarrhea. No recent hospitalizations or emergency room visits.    Height: 5 ' 1 ",  Weight: 154 pounds, BMI 28.77  Blood Glucose reading this am: Not Taken  Blood Glucose reading in clinic: 388 mg/dl at 12:50 pm    DM MEDICATIONS:  Glucophage 1000 mg BID  Novolog via sliding scale:   - 199: 2 units  //  - 249: 4 units //   - 299: 6 units   //  - 349: 8 units //  BG Over 350: 10 units.    Toujeo 30 units in Hs ( bedtime )     Labs Reviewed.    REVIEW OF SYSTEMS:  GENERAL: Denies fever, chills, change in appetite.  HEENT: Denies impaired hearing, dysphagia.  RESPIRATORY: Denies shortness of breath or wheezing.   GI: Denies nausea, vomiting, diarrhea.  : Denies hematuria, dysuria or frequency.  MS: History of falls, most recently while getting out of her bed a week ago.   NEURO: Denies numbness or tingling in the hands or feet.   PSYCH: Treated for depression.  ENDO: See HPI.    STANDARDS OF CARE:   Eye exam: Dr. Melchor, Last exam 3 / 2018  FOOT EXAM: No podiatrist.  Dental Exams: Recommended regular cleanings.  ACE / ARB: No  Statin: Yes    ACTIVITY LEVEL: No " regular exercise.   MEAL PLANNING: Number of meals - 3. Number of Snacks - 1, such as coconut pecan cookies. Breakfast, usually V8 juice or Atkins Bar OR sausage lupe.  Lunch can be salad, ham.  Dinner can be Michael buffalo chicken tenders, occasional fish sandwich from [employer].  Per dietary recall, patient is  limiting carbohydrates, saturated fats and sodium.     SELF-MONITORING: Self monitoring,  ACCU-CHEK Aga  SOCIAL: . Lives alone with 2 cats, 3 dogs.  Works at the [employer] at [employer]. Never smoker.     PHYSICAL EXAMINATION:  GENERAL: Well developed, well nourished  female in no acute distress, ambulatory.  NECK: Supple, no thyromegaly, normal range of motion.   HEART: Regular rate and rhythm. No rubs, murmurs or gallops.   LUNGS: CTA bilaterally. Unlabored breathing, no use of accessory muscles.   SKIN: Warm, dry skin. No lesions or abrasions.    MUSCULOSKELETAL: Normal gait and muscle strength.  NEUROLOGIC: Alert and oriented to person, place, and time. Responds appropriately.      ASSESSMENT:    1.) Uncontrolled type 2 diabetes mellitus with hyperglycemia  -     POCT Glucose, Hand-Held Device     Uncontrolled.  Increase Toujeo 40 units daily.   Advised patient to start Novolog immediately to assist with BG control. Here sliding scale was adjusted to:   - 199: 4 units  //  -  249: 6 units //   - 299: 8 units   //  - 349: 10 units //  BG Over 350: 12 units.  A printed copy of the new scale was given to patient.       Order place for outpatient case management to assist with BG control.     Continue metformin 1,000 mg BID. Prescription for Freestyle Ankita sent to pharmacy.     2.) Hyperlipidemia associated with type 2 diabetes mellitus - uncontrolled, continue Lipitor, f/u PCP    3.) Hypertension associated with diabetes - controlled, continue Norvasc    4.) Obesity (BMI 30.0-34.9)    PLAN:  1.) Continue monitoring blood glucose  4 x daily,  fasting and ac meals.  Discussed ADA goal for fasting blood sugar, 80 - 130 mg/dL; pp blood sugars below 180 mg/dl. Also, discussed prevention of hypoglycemia and the need to adjust goals to higher levels if persistent hypoglycemia. Reminded her to bring records to each visit.   2.) Reviewed pathophysiology of diabetes, complications related to the disease, importance of annual dilated eye exam and daily foot examination.  3.) We discussed the ADA recommendations: Hemoglobin A1c below 7.0 %. All patients with diabetes should be on statins unless contraindicated.  ACE or ARB therapy if not contraindicated.    4.) Meal planning teaching: Carbohydrate definition - one serving is 15 gms. Carbohydrate spacing - carbohydrates should be spaced into approximately 3 meals with 2 snacks ( of one carbohydrate ) between meals or at bedtime. Increase vegetable intake to 2 or more cups of vegetables per day as well as 2 fruit servings. Recommended low saturated fat, low sodium diet to aid in control of hypertension and cholesterol.  5.) Discussed activity, benefits, methods, and precautions. Recommended patient start or continue some form of exercise and increase as tolerated to 30 minutes per day to facilitate weight loss and aid in control of blood glucoses.   6.) Return to clinic in 2 weeks for follow up. Advised to call clinic with any questions or concerns.     Millicent Haq, RAVENC, CDE    A total of 30 minutes was spent in face to face time, of which over 50 % was spent in counseling patient on disease process, complications, treatment, and side effects of medications.

## 2019-01-30 ENCOUNTER — PATIENT MESSAGE (OUTPATIENT)
Dept: DIABETES | Facility: CLINIC | Age: 69
End: 2019-01-30

## 2019-01-31 DIAGNOSIS — E78.5 HYPERLIPIDEMIA ASSOCIATED WITH TYPE 2 DIABETES MELLITUS: Primary | ICD-10-CM

## 2019-01-31 DIAGNOSIS — E11.69 HYPERLIPIDEMIA ASSOCIATED WITH TYPE 2 DIABETES MELLITUS: Primary | ICD-10-CM

## 2019-02-05 ENCOUNTER — CLINICAL SUPPORT (OUTPATIENT)
Dept: DIABETES | Facility: CLINIC | Age: 69
End: 2019-02-05
Payer: COMMERCIAL

## 2019-02-05 ENCOUNTER — PATIENT MESSAGE (OUTPATIENT)
Dept: ENDOSCOPY | Facility: HOSPITAL | Age: 69
End: 2019-02-05

## 2019-02-05 VITALS — BODY MASS INDEX: 28.97 KG/M2 | WEIGHT: 157.44 LBS | HEIGHT: 62 IN

## 2019-02-05 DIAGNOSIS — E11.69 HYPERLIPIDEMIA ASSOCIATED WITH TYPE 2 DIABETES MELLITUS: ICD-10-CM

## 2019-02-05 DIAGNOSIS — E78.5 HYPERLIPIDEMIA ASSOCIATED WITH TYPE 2 DIABETES MELLITUS: ICD-10-CM

## 2019-02-05 PROCEDURE — 99999 PR PBB SHADOW E&M-EST. PATIENT-LVL III: ICD-10-PCS | Mod: PBBFAC,,, | Performed by: DIETITIAN, REGISTERED

## 2019-02-05 PROCEDURE — 95249 CONT GLUC MNTR PT PROV EQP: CPT | Mod: S$GLB,,, | Performed by: DIETITIAN, REGISTERED

## 2019-02-05 PROCEDURE — 99999 PR PBB SHADOW E&M-EST. PATIENT-LVL III: CPT | Mod: PBBFAC,,, | Performed by: DIETITIAN, REGISTERED

## 2019-02-05 PROCEDURE — 95249 PR GLUCOSE MONITORING, 72 HRS, SUB-Q SENSOR, PATIENT PROVIDED: ICD-10-PCS | Mod: S$GLB,,, | Performed by: DIETITIAN, REGISTERED

## 2019-02-05 NOTE — PROGRESS NOTES
"FREESTYLE MAR SENSOR START       Patient referred to  clinic today for  Freestyle Mar 10-day wear continuous glucose sensor system training.  Patient arrived with his  fully charged . Education provided using  "Quick Start Guide" per Freestyle protocol.  ·  Overview:  5min glucose reading updates, trending arrows, BG graph screens, battery life indicator, Blue Tooth Symbol.  ·  Menus: trend Graph, start sensor, enter BG, events, Alerts, Settings, Shutdown, Stop Sensor.   ·  Screens and prompts:    * Double blood drop (for start up BG 2 hrs after starting sensor)    * Shaded out blood drop (one more start up BG)    * Single Blood drop (calibration update, due every 12 hrs)     * Low battery Notification     · The  transmitter ID programmed in .  ·  Settings:    * Low alert: ***    * High alert: ***    * Rise rate: ***    * Fall Rate: ***  · Reviewed sensor site selection. Site selected and prepped using aseptic technique Inserted to ***. Transmitter placed in pod and secured.  · Practiced sensor pod/transmitter removal from site, and removal of transmitter from sensor pod.  · Patient able to demonstrate without difficulty.  Encouraged to review manual prior to starting another sensor.   · Review   problem solving aspects of sensor transmission/ variables that can disrupt RF transmission.  range  20 feet, but the first 3 hrs keep within 3 feet of transmitter.  · Advised that acetaminophen use will disrupt normal Dexcom G5 sensor results.  · Pt instructed on lag time of interstitial fluid from CBG and was advised to tx hypoglycemia and dose insulin based on SMBG values.  · Dexcom technical support contact number given and examples of when to contact them discussed. Pt will download software at home for Dexcom download.     Time spent with patient:  "

## 2019-02-06 ENCOUNTER — TELEPHONE (OUTPATIENT)
Dept: ENDOSCOPY | Facility: HOSPITAL | Age: 69
End: 2019-02-06

## 2019-02-06 NOTE — TELEPHONE ENCOUNTER
Rescheduled patient's endoscopy procedure from 3/14/19 to 3/28/19 with Dr. Burden due to transportation issues. New Instructions Mailed.

## 2019-02-08 ENCOUNTER — PATIENT MESSAGE (OUTPATIENT)
Dept: DIABETES | Facility: CLINIC | Age: 69
End: 2019-02-08

## 2019-02-13 NOTE — PROGRESS NOTES
DIABETES EDUCATION  FREESTYLE MAR CGM TRAINING     Patient referred to clinic today for Freestyle Mar continuous glucose sensor system training. Patient arrived with his reader charged and 1 sensor for application. Provided personal FreeStyle Mar training - reviewed the following with patient:   · no fingersticks required but if feeling s/s that do not match with SG reading or in event of hypoglycemia confirm with fingerstick,   · salicylic acid and ascorbic acid can cause false readings  · sensor is 10 day wear  · FDA approved for back of the arm wear  · site rotation  · 12 hour warm-up period  · scan minimum every 8 hours for continual graph. Encouraged pt to scan more often -  before each meal and before bed.      Pt successfully set up reader and placed sensor on back of upper left arm. Reviewed additional adhesive options if having any difficulty with sensor staying on.     Initiated set-up of personal [email or web address] account and discussed he will need to complete verification sent to his email.     Email: unknown   Password: unknown      Time spent with patient: 60 minutes

## 2019-02-15 RX ORDER — PEN NEEDLE, DIABETIC 30 GX3/16"
1 NEEDLE, DISPOSABLE MISCELLANEOUS 3 TIMES DAILY
Qty: 100 EACH | Refills: 11 | Status: CANCELLED | OUTPATIENT
Start: 2019-02-15

## 2019-02-15 RX ORDER — DEXTROSE 4 G
TABLET,CHEWABLE ORAL
Qty: 1 EACH | Refills: 0 | OUTPATIENT
Start: 2019-02-15

## 2019-02-23 DIAGNOSIS — E11.65 UNCONTROLLED TYPE 2 DIABETES MELLITUS WITH HYPERGLYCEMIA: ICD-10-CM

## 2019-03-01 ENCOUNTER — PATIENT MESSAGE (OUTPATIENT)
Dept: DIABETES | Facility: CLINIC | Age: 69
End: 2019-03-01

## 2019-03-02 ENCOUNTER — PATIENT MESSAGE (OUTPATIENT)
Dept: INTERNAL MEDICINE | Facility: CLINIC | Age: 69
End: 2019-03-02

## 2019-03-03 ENCOUNTER — PATIENT MESSAGE (OUTPATIENT)
Dept: DIABETES | Facility: CLINIC | Age: 69
End: 2019-03-03

## 2019-03-03 RX ORDER — INSULIN ASPART 100 [IU]/ML
10 INJECTION, SOLUTION INTRAVENOUS; SUBCUTANEOUS
Qty: 15 ML | Refills: 3 | Status: SHIPPED | OUTPATIENT
Start: 2019-03-03 | End: 2019-09-30 | Stop reason: ALTCHOICE

## 2019-03-04 ENCOUNTER — OFFICE VISIT (OUTPATIENT)
Dept: INTERNAL MEDICINE | Facility: CLINIC | Age: 69
End: 2019-03-04
Payer: COMMERCIAL

## 2019-03-04 ENCOUNTER — TELEPHONE (OUTPATIENT)
Dept: INTERNAL MEDICINE | Facility: CLINIC | Age: 69
End: 2019-03-04

## 2019-03-04 VITALS
SYSTOLIC BLOOD PRESSURE: 128 MMHG | WEIGHT: 155.44 LBS | HEART RATE: 88 BPM | HEIGHT: 62 IN | OXYGEN SATURATION: 97 % | BODY MASS INDEX: 28.61 KG/M2 | TEMPERATURE: 97 F | DIASTOLIC BLOOD PRESSURE: 84 MMHG

## 2019-03-04 DIAGNOSIS — R30.0 DYSURIA: Primary | ICD-10-CM

## 2019-03-04 DIAGNOSIS — N30.00 ACUTE CYSTITIS WITHOUT HEMATURIA: ICD-10-CM

## 2019-03-04 LAB
BACTERIA #/AREA URNS HPF: ABNORMAL /HPF
BILIRUB UR QL STRIP: NEGATIVE
CLARITY UR: ABNORMAL
COLOR UR: YELLOW
GLUCOSE UR QL STRIP: ABNORMAL
HGB UR QL STRIP: ABNORMAL
HYALINE CASTS #/AREA URNS LPF: <1 /LPF
KETONES UR QL STRIP: ABNORMAL
LEUKOCYTE ESTERASE UR QL STRIP: ABNORMAL
MICROSCOPIC COMMENT: ABNORMAL
NITRITE UR QL STRIP: NEGATIVE
PH UR STRIP: 6 [PH] (ref 5–8)
PROT UR QL STRIP: ABNORMAL
RBC #/AREA URNS HPF: 30 /HPF (ref 0–4)
SP GR UR STRIP: 1.03 (ref 1–1.03)
SQUAMOUS #/AREA URNS HPF: 5 /HPF
URN SPEC COLLECT METH UR: ABNORMAL
WBC #/AREA URNS HPF: >100 /HPF (ref 0–5)
YEAST URNS QL MICRO: ABNORMAL

## 2019-03-04 PROCEDURE — 3074F PR MOST RECENT SYSTOLIC BLOOD PRESSURE < 130 MM HG: ICD-10-PCS | Mod: CPTII,S$GLB,, | Performed by: NURSE PRACTITIONER

## 2019-03-04 PROCEDURE — 99999 PR PBB SHADOW E&M-EST. PATIENT-LVL V: CPT | Mod: PBBFAC,,, | Performed by: NURSE PRACTITIONER

## 2019-03-04 PROCEDURE — 3079F DIAST BP 80-89 MM HG: CPT | Mod: CPTII,S$GLB,, | Performed by: NURSE PRACTITIONER

## 2019-03-04 PROCEDURE — 99214 PR OFFICE/OUTPT VISIT, EST, LEVL IV, 30-39 MIN: ICD-10-PCS | Mod: S$GLB,,, | Performed by: NURSE PRACTITIONER

## 2019-03-04 PROCEDURE — 1101F PR PT FALLS ASSESS DOC 0-1 FALLS W/OUT INJ PAST YR: ICD-10-PCS | Mod: CPTII,S$GLB,, | Performed by: NURSE PRACTITIONER

## 2019-03-04 PROCEDURE — 81000 URINALYSIS NONAUTO W/SCOPE: CPT

## 2019-03-04 PROCEDURE — 1101F PT FALLS ASSESS-DOCD LE1/YR: CPT | Mod: CPTII,S$GLB,, | Performed by: NURSE PRACTITIONER

## 2019-03-04 PROCEDURE — 87086 URINE CULTURE/COLONY COUNT: CPT

## 2019-03-04 PROCEDURE — 99999 PR PBB SHADOW E&M-EST. PATIENT-LVL V: ICD-10-PCS | Mod: PBBFAC,,, | Performed by: NURSE PRACTITIONER

## 2019-03-04 PROCEDURE — 3079F PR MOST RECENT DIASTOLIC BLOOD PRESSURE 80-89 MM HG: ICD-10-PCS | Mod: CPTII,S$GLB,, | Performed by: NURSE PRACTITIONER

## 2019-03-04 PROCEDURE — 99214 OFFICE O/P EST MOD 30 MIN: CPT | Mod: S$GLB,,, | Performed by: NURSE PRACTITIONER

## 2019-03-04 PROCEDURE — 3074F SYST BP LT 130 MM HG: CPT | Mod: CPTII,S$GLB,, | Performed by: NURSE PRACTITIONER

## 2019-03-04 RX ORDER — CIPROFLOXACIN 500 MG/1
500 TABLET ORAL 2 TIMES DAILY
Qty: 14 TABLET | Refills: 0 | Status: SHIPPED | OUTPATIENT
Start: 2019-03-04 | End: 2019-04-09

## 2019-03-04 NOTE — TELEPHONE ENCOUNTER
----- Message from Catherine Smith sent at 3/4/2019  9:17 AM CST -----  Contact: pt  The pt states she has a bladder infection and needs a med called into [facility], no additional info given, the pt can be reached at [phone]///[id number]

## 2019-03-04 NOTE — PROGRESS NOTES
Subjective:       Patient ID: Mateusz Bates is a 68 y.o. female.    Chief Complaint: Urinary Tract Infection (started experiencing sx on saturday)    Patient presents for UTI symptoms.        Urinary Tract Infection    This is a new problem. The current episode started in the past 7 days (saturday). The problem occurs every urination. The problem has been gradually worsening. The quality of the pain is described as burning. The pain is severe. There has been no fever. She is not sexually active. Associated symptoms include frequency, hesitancy and urgency. Pertinent negatives include no chills, flank pain, hematuria, nausea, vomiting, bubble bath use or constipation. Treatments tried: AZO. The treatment provided mild relief.     Review of Systems   Constitutional: Negative for chills and fever.   Respiratory: Negative for shortness of breath.    Cardiovascular: Negative for chest pain.   Gastrointestinal: Negative for constipation, nausea and vomiting.   Genitourinary: Positive for decreased urine volume, dysuria, frequency, hesitancy and urgency. Negative for flank pain and hematuria.   Musculoskeletal: Negative for back pain and myalgias.       Objective:      Physical Exam   Constitutional: She appears well-developed and well-nourished. No distress.   HENT:   Head: Normocephalic.   Eyes: Conjunctivae and EOM are normal. Pupils are equal, round, and reactive to light.   Cardiovascular: Normal rate and regular rhythm.   No murmur heard.  Pulmonary/Chest: Effort normal and breath sounds normal. No respiratory distress. She has no wheezes.   Abdominal: Bowel sounds are normal. She exhibits no distension and no mass. There is tenderness. There is no rebound, no guarding and no CVA tenderness.   Psychiatric: She has a normal mood and affect.   Vitals reviewed.      Assessment:       1. Dysuria    2. Acute cystitis without hematuria        Plan:   Dysuria  -     Urinalysis  -     Urine culture    Acute cystitis without  hematuria  -     ciprofloxacin HCl (CIPRO) 500 MG tablet; Take 1 tablet (500 mg total) by mouth 2 (two) times daily.  Dispense: 14 tablet; Refill: 0    Other orders  -     Urinalysis Microscopic        Follow-up if symptoms worsen or fail to improve, for keep routine follow up.

## 2019-03-05 ENCOUNTER — PATIENT MESSAGE (OUTPATIENT)
Dept: INTERNAL MEDICINE | Facility: CLINIC | Age: 69
End: 2019-03-05

## 2019-03-06 ENCOUNTER — PATIENT MESSAGE (OUTPATIENT)
Dept: ENDOSCOPY | Facility: HOSPITAL | Age: 69
End: 2019-03-06

## 2019-03-06 LAB
BACTERIA UR CULT: NORMAL
BACTERIA UR CULT: NORMAL

## 2019-03-08 ENCOUNTER — OUTPATIENT CASE MANAGEMENT (OUTPATIENT)
Dept: ADMINISTRATIVE | Facility: OTHER | Age: 69
End: 2019-03-08

## 2019-03-08 NOTE — PROGRESS NOTES
Please note the following patients information has been forwarded to [facility] for Case Management and/or .    Please see the media section in patient's chart for additional details.    Please contact Outpatient Complex care Management at ext [phone] with any questions.    Thank you,    Edwige Calvert, [facility] Mgmt.  [phone]

## 2019-03-13 ENCOUNTER — OFFICE VISIT (OUTPATIENT)
Dept: OPHTHALMOLOGY | Facility: CLINIC | Age: 69
End: 2019-03-13
Payer: COMMERCIAL

## 2019-03-13 DIAGNOSIS — H25.13 CATARACT, NUCLEAR SCLEROTIC SENILE, BILATERAL: ICD-10-CM

## 2019-03-13 DIAGNOSIS — H40.013 AT LOW RISK FOR OPEN-ANGLE GLAUCOMA IN BOTH EYES: ICD-10-CM

## 2019-03-13 DIAGNOSIS — H52.03 HYPEROPIA, BILATERAL: ICD-10-CM

## 2019-03-13 DIAGNOSIS — E11.9 DIABETES MELLITUS TYPE 2 WITHOUT RETINOPATHY: Primary | ICD-10-CM

## 2019-03-13 DIAGNOSIS — H52.4 BILATERAL PRESBYOPIA: ICD-10-CM

## 2019-03-13 PROCEDURE — 92015 DETERMINE REFRACTIVE STATE: CPT | Mod: S$GLB,,, | Performed by: OPTOMETRIST

## 2019-03-13 PROCEDURE — 92015 PR REFRACTION: ICD-10-PCS | Mod: S$GLB,,, | Performed by: OPTOMETRIST

## 2019-03-13 PROCEDURE — 92014 PR EYE EXAM, EST PATIENT,COMPREHESV: ICD-10-PCS | Mod: S$GLB,,, | Performed by: OPTOMETRIST

## 2019-03-13 PROCEDURE — 99999 PR PBB SHADOW E&M-EST. PATIENT-LVL I: ICD-10-PCS | Mod: PBBFAC,,, | Performed by: OPTOMETRIST

## 2019-03-13 PROCEDURE — 92014 COMPRE OPH EXAM EST PT 1/>: CPT | Mod: S$GLB,,, | Performed by: OPTOMETRIST

## 2019-03-13 PROCEDURE — 99999 PR PBB SHADOW E&M-EST. PATIENT-LVL I: CPT | Mod: PBBFAC,,, | Performed by: OPTOMETRIST

## 2019-03-13 NOTE — PROGRESS NOTES
HPI     IDDM exam.  No visual complaints.   Last eye exam 03/07/2018 TRF.   Update glasses RX.    Last edited by Rosa Isela Smith on 3/13/2019  2:37 PM. (History)            Assessment /Plan     For exam results, see Encounter Report.    Diabetes mellitus type 2 without retinopathy    Cataract, nuclear sclerotic senile, bilateral    At low risk for open-angle glaucoma in both eyes    Hyperopia, bilateral    Bilateral presbyopia      No Background Diabetic Retinopathy    Moderate cataracts OU, not surgical    POAG Suspect, history of drop use, asymmetric IOP, Healthy ONH, thick corneas    Dispense Final Rx for glasses.  RTC 1 year  Discussed above and answered questions.

## 2019-03-15 DIAGNOSIS — N30.00 ACUTE CYSTITIS WITHOUT HEMATURIA: ICD-10-CM

## 2019-03-15 RX ORDER — CIPROFLOXACIN 500 MG/1
500 TABLET ORAL 2 TIMES DAILY
Qty: 14 TABLET | Refills: 0 | Status: CANCELLED | OUTPATIENT
Start: 2019-03-15

## 2019-03-19 DIAGNOSIS — N30.00 ACUTE CYSTITIS WITHOUT HEMATURIA: ICD-10-CM

## 2019-03-19 RX ORDER — CIPROFLOXACIN 500 MG/1
500 TABLET ORAL 2 TIMES DAILY
Qty: 14 TABLET | Refills: 0 | OUTPATIENT
Start: 2019-03-19

## 2019-03-21 ENCOUNTER — PATIENT MESSAGE (OUTPATIENT)
Dept: DIABETES | Facility: CLINIC | Age: 69
End: 2019-03-21

## 2019-03-21 DIAGNOSIS — E11.65 UNCONTROLLED TYPE 2 DIABETES MELLITUS WITH HYPERGLYCEMIA: ICD-10-CM

## 2019-03-24 ENCOUNTER — PATIENT MESSAGE (OUTPATIENT)
Dept: DIABETES | Facility: CLINIC | Age: 69
End: 2019-03-24

## 2019-04-04 DIAGNOSIS — F41.9 ANXIETY: ICD-10-CM

## 2019-04-05 RX ORDER — HYDROXYZINE HYDROCHLORIDE 10 MG/1
TABLET, FILM COATED ORAL
Qty: 60 TABLET | Refills: 2 | Status: SHIPPED | OUTPATIENT
Start: 2019-04-05 | End: 2019-07-24 | Stop reason: SDUPTHER

## 2019-04-09 ENCOUNTER — OFFICE VISIT (OUTPATIENT)
Dept: INTERNAL MEDICINE | Facility: CLINIC | Age: 69
End: 2019-04-09
Payer: COMMERCIAL

## 2019-04-09 VITALS
WEIGHT: 156.5 LBS | HEART RATE: 95 BPM | HEIGHT: 62 IN | SYSTOLIC BLOOD PRESSURE: 110 MMHG | BODY MASS INDEX: 28.8 KG/M2 | TEMPERATURE: 96 F | OXYGEN SATURATION: 97 % | DIASTOLIC BLOOD PRESSURE: 68 MMHG

## 2019-04-09 DIAGNOSIS — R30.0 DYSURIA: Primary | ICD-10-CM

## 2019-04-09 LAB
BACTERIA #/AREA URNS HPF: ABNORMAL /HPF
BILIRUB UR QL STRIP: NEGATIVE
CLARITY UR: ABNORMAL
COLOR UR: YELLOW
GLUCOSE UR QL STRIP: ABNORMAL
HGB UR QL STRIP: ABNORMAL
HYALINE CASTS #/AREA URNS LPF: ABNORMAL /LPF
KETONES UR QL STRIP: NEGATIVE
LEUKOCYTE ESTERASE UR QL STRIP: ABNORMAL
MICROSCOPIC COMMENT: ABNORMAL
NITRITE UR QL STRIP: NEGATIVE
NON-SQ EPI CELLS #/AREA URNS HPF: 2 /HPF
PH UR STRIP: 5 [PH] (ref 5–8)
PROT UR QL STRIP: ABNORMAL
RBC #/AREA URNS HPF: 10 /HPF (ref 0–4)
SP GR UR STRIP: 1.03 (ref 1–1.03)
SQUAMOUS #/AREA URNS HPF: 6 /HPF
URN SPEC COLLECT METH UR: ABNORMAL
WBC #/AREA URNS HPF: >100 /HPF (ref 0–5)
YEAST URNS QL MICRO: ABNORMAL

## 2019-04-09 PROCEDURE — 1101F PR PT FALLS ASSESS DOC 0-1 FALLS W/OUT INJ PAST YR: ICD-10-PCS | Mod: CPTII,S$GLB,, | Performed by: FAMILY MEDICINE

## 2019-04-09 PROCEDURE — 3078F PR MOST RECENT DIASTOLIC BLOOD PRESSURE < 80 MM HG: ICD-10-PCS | Mod: CPTII,S$GLB,, | Performed by: FAMILY MEDICINE

## 2019-04-09 PROCEDURE — 99999 PR PBB SHADOW E&M-EST. PATIENT-LVL IV: ICD-10-PCS | Mod: PBBFAC,,, | Performed by: FAMILY MEDICINE

## 2019-04-09 PROCEDURE — 87086 URINE CULTURE/COLONY COUNT: CPT

## 2019-04-09 PROCEDURE — 99999 PR PBB SHADOW E&M-EST. PATIENT-LVL IV: CPT | Mod: PBBFAC,,, | Performed by: FAMILY MEDICINE

## 2019-04-09 PROCEDURE — 3074F PR MOST RECENT SYSTOLIC BLOOD PRESSURE < 130 MM HG: ICD-10-PCS | Mod: CPTII,S$GLB,, | Performed by: FAMILY MEDICINE

## 2019-04-09 PROCEDURE — 99213 PR OFFICE/OUTPT VISIT, EST, LEVL III, 20-29 MIN: ICD-10-PCS | Mod: S$GLB,,, | Performed by: FAMILY MEDICINE

## 2019-04-09 PROCEDURE — 81000 URINALYSIS NONAUTO W/SCOPE: CPT

## 2019-04-09 PROCEDURE — 3074F SYST BP LT 130 MM HG: CPT | Mod: CPTII,S$GLB,, | Performed by: FAMILY MEDICINE

## 2019-04-09 PROCEDURE — 1101F PT FALLS ASSESS-DOCD LE1/YR: CPT | Mod: CPTII,S$GLB,, | Performed by: FAMILY MEDICINE

## 2019-04-09 PROCEDURE — 99213 OFFICE O/P EST LOW 20 MIN: CPT | Mod: S$GLB,,, | Performed by: FAMILY MEDICINE

## 2019-04-09 PROCEDURE — 3078F DIAST BP <80 MM HG: CPT | Mod: CPTII,S$GLB,, | Performed by: FAMILY MEDICINE

## 2019-04-09 RX ORDER — CIPROFLOXACIN 500 MG/1
500 TABLET ORAL 2 TIMES DAILY
Qty: 20 TABLET | Refills: 0 | Status: SHIPPED | OUTPATIENT
Start: 2019-04-09 | End: 2019-05-22 | Stop reason: ALTCHOICE

## 2019-04-09 NOTE — PROGRESS NOTES
"Mateusz aBtes  04/09/2019  [id number]    Serenity Kilpatrick MD  Patient Care Team:  Serenity Kilpatrick MD as PCP - General (Family Medicine)  Tracie Ferimn LPN as Care Coordinator (Internal Medicine)  Elroy Chavira RD as Dietitian (Endocrinology)        Chief Complaint:  Chief Complaint   Patient presents with    Cystitis     noticed one day over the weekend she noticed sx then it stopped then it started back this morning       History of Present Illness:  This patient states that she started having bladder symptoms again yesterday with pain with urination and more frequent urination.  She has not noticed any unusual smell or consistency to her urine but has had this problem multiple times over the last few months and has taken a few rounds of antibiotics.  She thinks that she did not take the full amount of Cipro the last time it was prescribed because it was put in 2 bottles at the pharmacy and she lost the 2nd bottle.  She states that she has urinary incontinence and also her blood sugars have been somewhat elevated however a week ago she experienced sweats and mild dizziness for several hours and her meter read "Low".  The patient states that the antibiotics do seem to clear her symptoms while she is on them.        History:  Past Medical History:   Diagnosis Date    Chronic major depressive disorder     Diabetes mellitus type II, uncontrolled 2000    Insulin x 10 years    DM (diabetes mellitus) 2000     am 02/22/2017 Insulin x 2006    DM (diabetes mellitus) 2000     am 03/07/2018 Insulin x 2006    Eczema     Essential hypertension     Generalized anxiety disorder     GERD (gastroesophageal reflux disease)     Glaucoma     OD    Hyperlipidemia     Obesity      Past Surgical History:   Procedure Laterality Date    APPENDECTOMY      ECTOPIC PREGNANCY SURGERY Left     LSO    OOPHORECTOMY       Family History   Problem Relation Age of Onset    Heart attack Mother     Multiple " myeloma Father     Hypertension Father     Colon cancer Sister     Hypertension Sister     Diabetes Sister     Depression Maternal Grandmother     Heart disease Maternal Grandmother     Diabetes Maternal Grandmother     Heart disease Paternal Grandmother     Heart disease Paternal Grandfather     Alcohol abuse Maternal Uncle     Glaucoma Paternal Aunt     Diabetes Paternal Aunt     Glaucoma Paternal Uncle         Great Aunt    Heart failure Sister     Hypertension Sister     Hepatitis Sister     Hypertension Sister     Drug abuse Sister     Alcohol abuse Sister      Social History     Socioeconomic History    Marital status:      Spouse name: Not on file    Number of children: Not on file    Years of education: Not on file    Highest education level: Not on file   Occupational History    Not on file   Social Needs    Financial resource strain: Not on file    Food insecurity:     Worry: Not on file     Inability: Not on file    Transportation needs:     Medical: Not on file     Non-medical: Not on file   Tobacco Use    Smoking status: Never Smoker    Smokeless tobacco: Never Used   Substance and Sexual Activity    Alcohol use: No    Drug use: No    Sexual activity: Never   Lifestyle    Physical activity:     Days per week: Not on file     Minutes per session: Not on file    Stress: Not on file   Relationships    Social connections:     Talks on phone: Not on file     Gets together: Not on file     Attends Religion service: Not on file     Active member of club or organization: Not on file     Attends meetings of clubs or organizations: Not on file     Relationship status: Not on file   Other Topics Concern    Not on file   Social History Narrative    . Lives alone. Patient works full time at [employer].     Patient Active Problem List   Diagnosis    Hypertension associated with diabetes    FAHAD (generalized anxiety disorder)    Chronic major  depressive disorder    Anatomical narrow angle borderline glaucoma    Diabetes mellitus type II, uncontrolled    Obesity (BMI 30.0-34.9)    GERD (gastroesophageal reflux disease)    SOB (shortness of breath)    Family history of arteriosclerotic cardiovascular disease    Hyperlipidemia associated with type 2 diabetes mellitus    Iron deficiency anemia    B12 deficiency    Memory difficulties    Neurological deficit present    Pseudodementia    Tension headache    Cerebral microvascular disease     Review of patient's allergies indicates:   Allergen Reactions    Ace inhibitors Other (See Comments)     Cough         The following were reviewed at this visit: active problem list, medication list, allergies, family history, social history, and health maintenance.    Medications:  Current Outpatient Medications on File Prior to Visit   Medication Sig Dispense Refill    amLODIPine (NORVASC) 2.5 MG tablet TAKE 1 TABLET BY MOUTH EVERY DAY 30 tablet 11    aspirin (ECOTRIN) 325 MG EC tablet Take 325 mg by mouth once daily.      atorvastatin (LIPITOR) 80 MG tablet Take 1 tablet (80 mg total) by mouth once daily. 30 tablet 3    blood sugar diagnostic (CONTOUR NEXT TEST STRIPS) Strp 1 strip by Misc.(Non-Drug; Combo Route) route 3 -4 times daily. Conto 100 strip 3    blood sugar diagnostic Strp 1 strip 4 (four) times daily. 100 strip 11    blood-glucose meter Misc IDDM 250.00  Insurance or Pt choice 1 each 0    flash glucose scanning reader (FREESTYLE MAR 10 DAY READER) Misc Use reader to check blood sugar as needed. 1 each 0    FREESTYLE MAR 10 DAY SENSOR Kit APPLY EVERY 10 DAYS AS DIRECTED 3 kit 3    hydrOXYzine HCl (ATARAX) 10 MG Tab TAKE 1 TABLET BY MOUTH THREE TIMES A DAY 60 tablet 2    insulin aspart U-100 (NOVOLOG FLEXPEN U-100 INSULIN) 100 unit/mL InPn pen Inject 10 Units into the skin 3 (three) times daily with meals. 15 mL 3    insulin glargine, TOUJEO, (TOUJEO SOLOSTAR U-300 INSULIN) 300  "unit/mL (1.5 mL) InPn pen Inject 45 Units into the skin once daily. 9 mL 3    lancets (MICROLET LANCET) Misc 1 lancet by Misc.(Non-Drug; Combo Route) route 3 (three) times daily. 100 each 3    metFORMIN (GLUCOPHAGE) 1000 MG tablet TAKE 1 TABLET BY MOUTH TWICE A DAY WITH MEALS 60 tablet 5    pen needle, diabetic 32 gauge x 5/32" Ndle 1 each by Misc.(Non-Drug; Combo Route) route 3 (three) times daily. 100 each 11    syringe with needle (SYRINGE 3CC/25GX1") 3 mL 25 gauge x 1" Syrg Use as Directed 30 Syringe 0    venlafaxine (EFFEXOR-XR) 150 MG Cp24 Take 1 capsule (150 mg total) by mouth once daily. 30 capsule 5    ciprofloxacin HCl (CIPRO) 500 MG tablet Take 1 tablet (500 mg total) by mouth 2 (two) times daily. 14 tablet 0    cyanocobalamin 1,000 mcg/mL injection Daily for a week then weekly for a month then monthly 12 mL 0    glyBURIDE (DIABETA) 5 MG tablet Take 2 tablets (10 mg total) by mouth 2 (two) times daily before meals. 360 tablet 1    sodium,potassium,mag sulfates (SUPREP BOWEL PREP KIT) 17.5-3.13-1.6 gram SolR Take as directed 354 mL 0    triamcinolone acetonide 0.1% (KENALOG) 0.1 % Lotn Apply topically 2 (two) times daily. 60 mL 1    [DISCONTINUED] insulin lispro 100 unit/mL injection Inject into the skin. (Patient taking differently: Inject 12 Units into the skin. ) 10 mL 0     No current facility-administered medications on file prior to visit.        Medications have been reviewed and reconciled with patient at this visit.      Exam:  Wt Readings from Last 3 Encounters:   04/09/19 71 kg (156 lb 8.4 oz)   03/04/19 70.5 kg (155 lb 6.8 oz)   02/05/19 71.4 kg (157 lb 6.5 oz)     Temp Readings from Last 3 Encounters:   04/09/19 96.4 °F (35.8 °C) (Tympanic)   03/04/19 97.4 °F (36.3 °C) (Tympanic)   01/08/19 97.4 °F (36.3 °C) (Tympanic)     BP Readings from Last 3 Encounters:   04/09/19 110/68   03/04/19 128/84   01/26/19 130/80     Pulse Readings from Last 3 Encounters:   04/09/19 95   03/04/19 88 "   01/16/19 82     Body mass index is 29.1 kg/m².      Review of Systems   Constitutional: Negative for chills, fever and weight loss.   Respiratory: Negative for shortness of breath.    Cardiovascular: Negative for chest pain and palpitations.     Physical Exam-very pleasant adult female ambulatory well-nourished well-developed and in no acute distress.  There is a mild odor consistent with urine leakage.  Heart regular rate and rhythm without murmur  Lungs clear to auscultation  Abdomen soft nontender with negative CVA tenderness.  Mild tenderness palpation in the lower mid abdominal region at the bladder area.  No cc or E  Neuro grossly intact  Psychiatric good affect cognition        Mateusz was seen today for cystitis.    Diagnoses and all orders for this visit:    Dysuria  -     Urinalysis; Future  -     Urine culture; Future  -     Urine culture  -     Urinalysis    Other orders  -     Urinalysis Microscopic                  Follow up: No follow-ups on file.    After visit summary was printed and given to patient upon discharge today.  Patient goals and care plan are included in After Visit Summary.

## 2019-04-09 NOTE — Clinical Note
Just a headsup- I put her back on Cipro til she sees you.She has leakage and high BS,recurrent UTIIs now for several months.Itz,TANIYA

## 2019-04-11 LAB
BACTERIA UR CULT: NORMAL
BACTERIA UR CULT: NORMAL

## 2019-04-13 ENCOUNTER — OFFICE VISIT (OUTPATIENT)
Dept: DIABETES | Facility: CLINIC | Age: 69
End: 2019-04-13
Payer: COMMERCIAL

## 2019-04-13 VITALS
WEIGHT: 154.75 LBS | BODY MASS INDEX: 28.48 KG/M2 | HEIGHT: 62 IN | SYSTOLIC BLOOD PRESSURE: 146 MMHG | DIASTOLIC BLOOD PRESSURE: 90 MMHG

## 2019-04-13 DIAGNOSIS — E11.649 UNCONTROLLED TYPE 2 DIABETES MELLITUS WITH HYPOGLYCEMIA WITHOUT COMA: Primary | ICD-10-CM

## 2019-04-13 DIAGNOSIS — E78.5 HYPERLIPIDEMIA ASSOCIATED WITH TYPE 2 DIABETES MELLITUS: ICD-10-CM

## 2019-04-13 DIAGNOSIS — I15.2 HYPERTENSION ASSOCIATED WITH DIABETES: ICD-10-CM

## 2019-04-13 DIAGNOSIS — E11.69 HYPERLIPIDEMIA ASSOCIATED WITH TYPE 2 DIABETES MELLITUS: ICD-10-CM

## 2019-04-13 DIAGNOSIS — E11.59 HYPERTENSION ASSOCIATED WITH DIABETES: ICD-10-CM

## 2019-04-13 LAB — GLUCOSE SERPL-MCNC: 165 MG/DL (ref 70–110)

## 2019-04-13 PROCEDURE — 82962 GLUCOSE BLOOD TEST: CPT | Mod: S$GLB,,, | Performed by: NURSE PRACTITIONER

## 2019-04-13 PROCEDURE — 82962 POCT GLUCOSE, HAND-HELD DEVICE: ICD-10-PCS | Mod: S$GLB,,, | Performed by: NURSE PRACTITIONER

## 2019-04-13 PROCEDURE — 99214 OFFICE O/P EST MOD 30 MIN: CPT | Mod: S$GLB,,, | Performed by: NURSE PRACTITIONER

## 2019-04-13 PROCEDURE — 99214 PR OFFICE/OUTPT VISIT, EST, LEVL IV, 30-39 MIN: ICD-10-PCS | Mod: S$GLB,,, | Performed by: NURSE PRACTITIONER

## 2019-04-13 PROCEDURE — 99999 PR PBB SHADOW E&M-EST. PATIENT-LVL V: ICD-10-PCS | Mod: PBBFAC,,, | Performed by: NURSE PRACTITIONER

## 2019-04-13 PROCEDURE — 99999 PR PBB SHADOW E&M-EST. PATIENT-LVL V: CPT | Mod: PBBFAC,,, | Performed by: NURSE PRACTITIONER

## 2019-04-13 NOTE — PROGRESS NOTES
"PCP: Serenity Kilpatrick MD    Subjective:     Chief Complaint: Diabetes Follow Up    HISTORY OF PRESENT ILLNESS: 68 y.o.   female presenting for diabetes follow up. Patient has had Type II diabetes for several years and has the following complications: none. She  has attended diabetes education in the past.  In the past, she has tried and failed Victoza, Byetta, and Bydureon due to GI side effects.     The patient Freestyle CGM was downloaded and reviewed. The report was technically satisfactory. For the past 14 days, patient average glucose was 178 mg/dL, with standard deviation of 82.9, which means that blood sugars are significantly fluctuating from high to low.   She was above range 45 % of the time, in range 46  % of the time, and below range 9 % of the time. The target range for this patient was 70 - 180 mg/dL.      There were 7 low events with average duration of 248 minutes.  There is no real pattern to blood sugars because they fluctuate from high and low throughout the day. She tends to have hypoglycemia either in the afternoon around 4 - 6 pm, or early am hours between MN - 6 am. On the days when she has lows, she has rebound hyperglycemia, I suspect as a result of overtreating the low.  Also, according to her sliding scale, patient only takes Novolog when BG greater than 150, but patient reports that she takes her Novolog even when BG is lower than 150, so Apr 5, BG dropped to 40 as a result of taking Novolog with BG around 80 and not eating.     I suspect dietary indiscretion is probably one reason, blood sugars fluctuate so much. Some days, she has persistent hyperglycemia throughout the entire day, BG > 200 s.     She denies any recent hospital admissions or emergency room visits.     Height: 5' 1.5" (156.2 cm)  //  Weight: 70.2 kg (154 lb 12.2 oz), Body mass index is 28.77 kg/m².  Home Blood Glucose reading this AM: 73 mg/dl fasting  Her blood sugar in clinic today is:  165 mg/dl at 9:55 " am      Labs Reviewed.       Lab Results   Component Value Date    CPEPTIDE 1.7 08/29/2016     DM MEDICATIONS:  Glucophage 1000 mg BID  Novolog via sliding scale:   - 199: 2 units  //  - 249: 4 units //   - 299: 6 units   //  - 349: 8 units //  BG Over 350: 10 units.    Toujeo 30 units in Hs ( bedtime )     Labs Reviewed.    Review of Systems   Constitutional: Negative for appetite change, fatigue and unexpected weight change.   Eyes: Negative for visual disturbance.   Gastrointestinal: Negative for abdominal pain, constipation, diarrhea and nausea.   Endocrine: Negative for polydipsia, polyphagia and polyuria.   Neurological: Negative for dizziness, numbness and headaches.   Psychiatric/Behavioral: Positive for confusion. Negative for decreased concentration. The patient is not nervous/anxious.      Diabetes Management Status  Statin: Taking  ACE/ARB: Not taking    Screening or Prevention Patient's value Goal Complete/Controlled?   HgA1C Testing and Control   Lab Results   Component Value Date    HGBA1C 10.9 (H) 01/16/2019      Annually/Less than 8% No   Lipid profile : 11/14/2018 Annually Yes   LDL control Lab Results   Component Value Date    LDLCALC 171.0 (H) 11/14/2018    Annually/Less than 100 mg/dl  No   Nephropathy screening Lab Results   Component Value Date    LABMICR 18.0 03/06/2018     Lab Results   Component Value Date    PROTEINUA 1+ (A) 04/09/2019    Annually Yes   Blood pressure BP Readings from Last 1 Encounters:   04/13/19 (!) 146/90    Less than 140/90 No   Dilated retinal exam : 03/13/2019 Annually Yes   Foot exam   : 12/05/2018 Annually Yes     ACTIVITY LEVEL: No regular exercise.   MEAL PLANNING: Number of meals - 3. Number of Snacks - 1, such as coconut pecan cookies. Breakfast, usually V8 juice or Atkins Bar OR sausage lupe.  Lunch can be salad, ham.  Dinner can be [employer] chicken tenders, occasional fish sandwich from [employer].  Per dietary recall, patient  is  limiting carbohydrates, saturated fats and sodium.     SELF-MONITORING: Freestyle CGM  SOCIAL: . Lives alone with 1 cat, 4 dogs.  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 Never smoker.     Objective:     Physical Exam   Constitutional: She appears well-developed and well-nourished.   HENT:   Head: Normocephalic and atraumatic.   Eyes: Pupils are equal, round, and reactive to light. EOM are normal.   Neck: Normal range of motion.   Cardiovascular: Normal rate and regular rhythm.   Pulses:       Dorsalis pedis pulses are 2+ on the right side, and 2+ on the left side.   Pulmonary/Chest: Effort normal and breath sounds normal.   Musculoskeletal: Normal range of motion.   Feet:   Right Foot:   Protective Sensation: 6 sites tested. 6 sites sensed.   Skin Integrity: Negative for ulcer, callus or dry skin.   Left Foot:   Protective Sensation: 6 sites tested. 6 sites sensed.   Skin Integrity: Negative for ulcer, blister, callus or dry skin.   Skin: Skin is warm and dry. No rash noted.   Psychiatric: She has a normal mood and affect. Her behavior is normal. Judgment and thought content normal.     Assessment / Plan:     1.) Uncontrolled type 2 diabetes mellitus with hypoglycemia without coma  -     POCT Glucose, Hand-Held Device  - Appointment scheduled with Elroy for nutritional counseling.  Referral placed for podiatry to evaluate for diabetic shoes.  - Future Labs: CMP, A1C, and micral    Lower Toujeo 27 units daily.  Advised patient to only take Novolog with meals if BG is greater [id number], use this scale:  - 199: 4 units  //  - 249: 6 units //   - 299: 8 units   //  - 349: 10 units //  BG Over 350: 12 units. Continue metformin 1,000 mg BID.     2.) Hyperlipidemia associated with type 2 diabetes mellitus - continue meds as prescribed    3.) Hypertension associated with diabetes - continue meds as prescribed      Additional Plan Details:  1.) Continue monitoring blood  glucose  4 x daily, fasting and ac meals.  Discussed ADA goal for fasting blood sugar, 80 - 130 mg/dL; pp blood sugars below 180 mg/dl. Also, discussed prevention of hypoglycemia and the need to adjust goals to higher levels if persistent hypoglycemia. Reminded her to bring records to each visit.    2.) Return to clinic in 7 weeks for follow up. The patient was explained the above plan and given opportunity to ask questions.  She understands, chooses and consents to this plan and accepts all the risks, which include but are not limited to the risks mentioned above. She understands the alternative of having no testing, interventions or treatments at this time. She left content and without further questions.     Millicent Haq, NP-C, CDE    A total of 30 minutes was spent in face to face time, of which over 50 % was spent in counseling patient on disease process, complications, treatment, and side effects of medications.

## 2019-04-18 ENCOUNTER — TELEPHONE (OUTPATIENT)
Dept: INTERNAL MEDICINE | Facility: CLINIC | Age: 69
End: 2019-04-18

## 2019-04-18 NOTE — TELEPHONE ENCOUNTER
----- Message from Srinath Rodriguez MD sent at 4/9/2019  2:05 PM CDT -----  The patient's urinalysis is abnormal again with possible infection and definitely with too much glucose ; continues to have microscopic amounts of blood present. Ask the patient to drink at least half of her fluid in water and not just diet Coke and to be sure and follow up with her PCP next week.

## 2019-04-24 ENCOUNTER — OFFICE VISIT (OUTPATIENT)
Dept: PODIATRY | Facility: CLINIC | Age: 69
End: 2019-04-24
Payer: COMMERCIAL

## 2019-04-24 ENCOUNTER — LAB VISIT (OUTPATIENT)
Dept: LAB | Facility: HOSPITAL | Age: 69
End: 2019-04-24
Payer: COMMERCIAL

## 2019-04-24 VITALS
BODY MASS INDEX: 29.97 KG/M2 | DIASTOLIC BLOOD PRESSURE: 70 MMHG | SYSTOLIC BLOOD PRESSURE: 130 MMHG | HEART RATE: 91 BPM | HEIGHT: 61 IN | WEIGHT: 158.75 LBS

## 2019-04-24 DIAGNOSIS — E11.65 UNCONTROLLED TYPE 2 DIABETES MELLITUS WITH HYPERGLYCEMIA: ICD-10-CM

## 2019-04-24 DIAGNOSIS — S90.212A SUBUNGUAL HEMATOMA OF GREAT TOE OF LEFT FOOT, INITIAL ENCOUNTER: ICD-10-CM

## 2019-04-24 DIAGNOSIS — L60.0 INGROWING NAIL, LEFT GREAT TOE: Primary | ICD-10-CM

## 2019-04-24 DIAGNOSIS — E11.649 UNCONTROLLED TYPE 2 DIABETES MELLITUS WITH HYPOGLYCEMIA WITHOUT COMA: ICD-10-CM

## 2019-04-24 DIAGNOSIS — M79.675 PAIN OF LEFT GREAT TOE: ICD-10-CM

## 2019-04-24 LAB
ALBUMIN SERPL BCP-MCNC: 3.5 G/DL (ref 3.5–5.2)
ALP SERPL-CCNC: 87 U/L (ref 55–135)
ALT SERPL W/O P-5'-P-CCNC: 10 U/L (ref 10–44)
ANION GAP SERPL CALC-SCNC: 12 MMOL/L (ref 8–16)
AST SERPL-CCNC: 12 U/L (ref 10–40)
BILIRUB SERPL-MCNC: 0.2 MG/DL (ref 0.1–1)
BUN SERPL-MCNC: 21 MG/DL (ref 8–23)
CALCIUM SERPL-MCNC: 9.1 MG/DL (ref 8.7–10.5)
CHLORIDE SERPL-SCNC: 102 MMOL/L (ref 95–110)
CO2 SERPL-SCNC: 24 MMOL/L (ref 23–29)
CREAT SERPL-MCNC: 0.8 MG/DL (ref 0.5–1.4)
EST. GFR  (AFRICAN AMERICAN): >60 ML/MIN/1.73 M^2
EST. GFR  (NON AFRICAN AMERICAN): >60 ML/MIN/1.73 M^2
ESTIMATED AVG GLUCOSE: 223 MG/DL (ref 68–131)
GLUCOSE SERPL-MCNC: 128 MG/DL (ref 70–110)
HBA1C MFR BLD HPLC: 9.4 % (ref 4–5.6)
POTASSIUM SERPL-SCNC: 4.7 MMOL/L (ref 3.5–5.1)
PROT SERPL-MCNC: 7.5 G/DL (ref 6–8.4)
SODIUM SERPL-SCNC: 138 MMOL/L (ref 136–145)

## 2019-04-24 PROCEDURE — 99999 PR PBB SHADOW E&M-EST. PATIENT-LVL III: ICD-10-PCS | Mod: PBBFAC,,, | Performed by: PODIATRIST

## 2019-04-24 PROCEDURE — 3078F DIAST BP <80 MM HG: CPT | Mod: CPTII,S$GLB,, | Performed by: PODIATRIST

## 2019-04-24 PROCEDURE — 3046F HEMOGLOBIN A1C LEVEL >9.0%: CPT | Mod: CPTII,S$GLB,, | Performed by: PODIATRIST

## 2019-04-24 PROCEDURE — 83036 HEMOGLOBIN GLYCOSYLATED A1C: CPT

## 2019-04-24 PROCEDURE — 99203 PR OFFICE/OUTPT VISIT, NEW, LEVL III, 30-44 MIN: ICD-10-PCS | Mod: S$GLB,,, | Performed by: PODIATRIST

## 2019-04-24 PROCEDURE — 1101F PR PT FALLS ASSESS DOC 0-1 FALLS W/OUT INJ PAST YR: ICD-10-PCS | Mod: CPTII,S$GLB,, | Performed by: PODIATRIST

## 2019-04-24 PROCEDURE — 80053 COMPREHEN METABOLIC PANEL: CPT

## 2019-04-24 PROCEDURE — 3075F SYST BP GE 130 - 139MM HG: CPT | Mod: CPTII,S$GLB,, | Performed by: PODIATRIST

## 2019-04-24 PROCEDURE — 99999 PR PBB SHADOW E&M-EST. PATIENT-LVL III: CPT | Mod: PBBFAC,,, | Performed by: PODIATRIST

## 2019-04-24 PROCEDURE — 3078F PR MOST RECENT DIASTOLIC BLOOD PRESSURE < 80 MM HG: ICD-10-PCS | Mod: CPTII,S$GLB,, | Performed by: PODIATRIST

## 2019-04-24 PROCEDURE — 3075F PR MOST RECENT SYSTOLIC BLOOD PRESS GE 130-139MM HG: ICD-10-PCS | Mod: CPTII,S$GLB,, | Performed by: PODIATRIST

## 2019-04-24 PROCEDURE — 3046F PR MOST RECENT HEMOGLOBIN A1C LEVEL > 9.0%: ICD-10-PCS | Mod: CPTII,S$GLB,, | Performed by: PODIATRIST

## 2019-04-24 PROCEDURE — 1101F PT FALLS ASSESS-DOCD LE1/YR: CPT | Mod: CPTII,S$GLB,, | Performed by: PODIATRIST

## 2019-04-24 PROCEDURE — 99203 OFFICE O/P NEW LOW 30 MIN: CPT | Mod: S$GLB,,, | Performed by: PODIATRIST

## 2019-04-24 PROCEDURE — 36415 COLL VENOUS BLD VENIPUNCTURE: CPT

## 2019-04-24 NOTE — PROGRESS NOTES
Subjective:       Patient ID: Mateusz Bates is a 68 y.o. female.    Chief Complaint: Routine Foot Care (PCP: Dr. Kilpatrick last seen by Dr. Haq on 1/26/19; pt reports no pain at present; toenail discoloring. )      HPI: Mateusz Bates presents to the office today, under referral by their Primary Care Provider, Serenity Kilpatrick MD, for her annual diabetic foot assessment and risk evalution Patient is a DMII. Patient states no complications due to the disease state. Millicent Haq, PhD, NP-C does manage her DMII.  Patient was last evaluated on 04/13/2019.    Hemoglobin A1C   Date Value Ref Range Status   01/16/2019 10.9 (H) 4.0 - 5.6 % Final     Comment:     ADA Screening Guidelines:  5.7-6.4%  Consistent with prediabetes  >or=6.5%  Consistent with diabetes  High levels of fetal hemoglobin interfere with the HbA1C  assay. Heterozygous hemoglobin variants (HbS, HgC, etc)do  not significantly interfere with this assay.   However, presence of multiple variants may affect accuracy.     10/03/2018 9.0 (H) 4.0 - 5.6 % Final     Comment:     ADA Screening Guidelines:  5.7-6.4%  Consistent with prediabetes  >or=6.5%  Consistent with diabetes  High levels of fetal hemoglobin interfere with the HbA1C  assay. Heterozygous hemoglobin variants (HbS, HgC, etc)do  not significantly interfere with this assay.   However, presence of multiple variants may affect accuracy.     07/05/2018 9.5 (H) 4.0 - 5.6 % Final     Comment:     ADA Screening Guidelines:  5.7-6.4%  Consistent with prediabetes  >or=6.5%  Consistent with diabetes  High levels of fetal hemoglobin interfere with the HbA1C  assay. Heterozygous hemoglobin variants (HbS, HgC, etc)do  not significantly interfere with this assay.   However, presence of multiple variants may affect accuracy.     .    Review of patient's allergies indicates:   Allergen Reactions    Ace inhibitors Other (See Comments)     Cough         Past Medical History:   Diagnosis Date    Chronic  major depressive disorder     Diabetes mellitus type II, uncontrolled 2000    Insulin x 10 years    DM (diabetes mellitus) 2000     am 02/22/2017 Insulin x 2006    DM (diabetes mellitus) 2000     am 03/07/2018 Insulin x 2006    Eczema     Essential hypertension     Generalized anxiety disorder     GERD (gastroesophageal reflux disease)     Glaucoma     OD    Hyperlipidemia     Obesity        Family History   Problem Relation Age of Onset    Heart attack Mother     Multiple myeloma Father     Hypertension Father     Colon cancer Sister     Hypertension Sister     Diabetes Sister     Depression Maternal Grandmother     Heart disease Maternal Grandmother     Diabetes Maternal Grandmother     Heart disease Paternal Grandmother     Heart disease Paternal Grandfather     Alcohol abuse Maternal Uncle     Glaucoma Paternal Aunt     Diabetes Paternal Aunt     Glaucoma Paternal Uncle         Great Aunt    Heart failure Sister     Hypertension Sister     Hepatitis Sister     Hypertension Sister     Drug abuse Sister     Alcohol abuse Sister        Social History     Socioeconomic History    Marital status:      Spouse name: Not on file    Number of children: Not on file    Years of education: Not on file    Highest education level: Not on file   Occupational History    Not on file   Social Needs    Financial resource strain: Not on file    Food insecurity:     Worry: Not on file     Inability: Not on file    Transportation needs:     Medical: Not on file     Non-medical: Not on file   Tobacco Use    Smoking status: Never Smoker    Smokeless tobacco: Never Used   Substance and Sexual Activity    Alcohol use: No    Drug use: No    Sexual activity: Never   Lifestyle    Physical activity:     Days per week: Not on file     Minutes per session: Not on file    Stress: Not on file   Relationships    Social connections:     Talks on phone: Not on file     Gets  "together: Not on file     Attends Druze service: Not on file     Active member of club or organization: Not on file     Attends meetings of clubs or organizations: Not on file     Relationship status: Not on file   Other Topics Concern    Not on file   Social History Narrative    . Lives alone. Patient works full time at [employer].       Past Surgical History:   Procedure Laterality Date    APPENDECTOMY      ECTOPIC PREGNANCY SURGERY Left     LSO    OOPHORECTOMY         Review of Systems   Constitutional: Negative for chills, fatigue and fever.   HENT: Negative for hearing loss.    Eyes: Negative for photophobia and visual disturbance.   Respiratory: Negative for cough, chest tightness, shortness of breath and wheezing.    Cardiovascular: Negative for chest pain and palpitations.   Gastrointestinal: Negative for constipation, diarrhea, nausea and vomiting.   Endocrine: Negative for cold intolerance and heat intolerance.   Genitourinary: Negative for flank pain.   Musculoskeletal: Negative for neck pain and neck stiffness.   Skin: Positive for wound.   Neurological: Negative for light-headedness and headaches.   Psychiatric/Behavioral: Negative for sleep disturbance.         Objective:   /70 (BP Location: Right arm, Patient Position: Sitting)   Pulse 91   Ht 5' 1" (1.549 m)   Wt 72 kg (158 lb 11.7 oz)   BMI 29.99 kg/m²     LOWER EXTREMITY PHYSICAL EXAMINATION  ORTHOPEDIC: Manual Muscle Testing is 5/5 in all planes on the left and right, without pains, with and without resistance. No pains to palpation of the medial or lateral ankle ligaments. No discomfort to palpation of the posterior tibial tendon, peroneal tendon, Achilles tendon or the anterior ankle tendons.  Rectus foot type is noted. No pain upon range of motion of the midfoot or hindfoot joints. No crepitus upon range of motion and midfoot or hindfoot joints. Bilateral mild HAV is noted. No ankle pathology is noted. No " discomfort palpation of left great toe, distal phalanx.  No edema is noted. Gait pattern is non-antalgic.    VASCULAR: Hair growth is sparse on the left and right dorsal foot and at the digits. The left dorsalis pedis pulse is 1/4 and on the right is 1/4, and the left posterior tibial pulse is 1/4 and is 1/4 on the right. Varicosities are noted. Spider veins are noted to the bilateral lower extremity. Warm to warm, proximal to distal. Capillary refill time is within normal limits and less  than 3 seconds.  Mild edema is noted.    NEUROLOGY: Proprioception is intact. Sensation to light touch is intact. Protective sensation is intact via 5.07 [place] monofilament. Straight leg raise is negative. Negative Tinel's Sign and negative Valleix Sign. No neurological sensations with compression of the area of Monteiro's Nerve in the area of the Abductor Hallucis muscle belly. Upon palpation of the interspaces, there are no neurological sensations stated that radiate proximal or distal. Upon compression of the metatarsal heads from medial to lateral, no neurological sensations or symptoms are stated. Deep tendon reflexes to the lower extremity are WNL.    DERMATOLOGY: Skin is supple, moist, dry and intact.  Subungual hematoma, left great toe. Ingrowing of the left foot a lateral nail border of the great toe. The nail is incurvated into the skin of the affected border, causing pains, which are moderate in nature. There is mild edema. There is cellulitis noted. There is no drainage. No fluctuance. Granuloma formation is absent.  No suggestion of onychomycosis of the bilateral lower extremity.      Assessment:     1. Ingrowing nail, left great toe    2. Pain of left great toe    3. Uncontrolled type 2 diabetes mellitus with hyperglycemia    4. Subungual hematoma of great toe of left foot, initial encounter        Plan:     Ingrowing nail, left great toe  Pain of left great toe  Slant-back procedure is performed to the  lateral border of the left great toe for relief. Patient is made aware that this procedure is by no means completely curative and that she may need a more definitive procedure; partial nail avulsion with matrixectomy for permanent relief and/or cure.  Patient is advised that the area may be tender for quite some time due to the chronicity of ingrowing nail plate and due to the fact that the aforementioned procedure was performed. Patient will start twice daily warm water Epsom salt soaks.    Uncontrolled type 2 diabetes mellitus with hyperglycemia  I counseled the patient on his/her Diabetic Mellitus regarding today's clinical examination and objection findings. We did also discuss recent medication changes, pertinent labs and imaging evaluations and other medical consultation notes and progress notes. Greater than 50% of this visit was spent on counseling and coordination of care. Greater than 20 minutes of this appt. was spent on education about the diabetic foot, in relation to PVD and/or neuropathy, and the prevention of limb loss.     Shoe gear is inspected and wear and proper fit/type. Patient is reminded of the importance of good nutrition and blood sugar control to help prevent podiatric complications of diabetes. Patient instructed on proper foot hygeine. We discussed wearing proper shoe gear, daily foot inspections, never walking without protective shoe gear, never putting sharp instruments to feet.  Patient  will continue to monitor the areas daily, inspect feet, wear protective shoe gear when ambulatory, moisturizer to maintain skin integrity.     Patient's DMI/DMII is managed by Primary Care Provider and/or Endocrinology Advanced Practice Provider. As per the most recent glycohemoglobin level, this patient is not at goal.    Subungual hematoma of great toe of left foot, initial encounter  Thorough discussion is had with the patient today, concerning the diagnosis, its etiology, and the treatment  algorithm at present.  No obvious clinical concern for fracture.  Please allow several months for this nail plate to grow out.       Protective Sensation (w/ 10 gram monofilament):  Right: Intact  Left: Intact    Visual Inspection:  Subungual hematoma, left great toe    Pedal Pulses:   Right: Diminshed  Left: Diminshed    Posterior tibialis:   Right:Diminshed  Left: Diminshed              Future Appointments   Date Time Provider Department Center   5/1/2019 10:30 AM Elroy Chavira RD [facility]   [facility]   6/1/2019 11:00 AM Millicent Haq, PhD, NP-C [facility]   9/18/2019 10:30 [facility]

## 2019-05-01 ENCOUNTER — CLINICAL SUPPORT (OUTPATIENT)
Dept: DIABETES | Facility: CLINIC | Age: 69
End: 2019-05-01
Payer: COMMERCIAL

## 2019-05-01 VITALS — WEIGHT: 154.63 LBS | BODY MASS INDEX: 29.19 KG/M2 | HEIGHT: 61 IN

## 2019-05-01 DIAGNOSIS — E11.649 UNCONTROLLED TYPE 2 DIABETES MELLITUS WITH HYPOGLYCEMIA WITHOUT COMA: Primary | ICD-10-CM

## 2019-05-01 PROCEDURE — 99999 PR PBB SHADOW E&M-EST. PATIENT-LVL II: CPT | Mod: PBBFAC,,, | Performed by: DIETITIAN, REGISTERED

## 2019-05-01 PROCEDURE — 99999 PR PBB SHADOW E&M-EST. PATIENT-LVL II: ICD-10-PCS | Mod: PBBFAC,,, | Performed by: DIETITIAN, REGISTERED

## 2019-05-01 PROCEDURE — G0108 PR DIAB MANAGE TRN  PER INDIV: ICD-10-PCS | Mod: S$GLB,,, | Performed by: DIETITIAN, REGISTERED

## 2019-05-01 PROCEDURE — G0108 DIAB MANAGE TRN  PER INDIV: HCPCS | Mod: S$GLB,,, | Performed by: DIETITIAN, REGISTERED

## 2019-05-01 NOTE — LETTER
May 1, 2019        Millicent Haq, PhD, NP-C  [address]  [place] [address]             [facility]  [id number] [place] [id number]  Phone: [phone]  Fax: [phone]   Patient: Mateusz Bates   MR Number: [id number]   YOB: 1950   Date of Visit: 5/1/2019       Dear Dr. Haq:    Thank you for referring Mateusz Bates to me for evaluation. Below are the relevant portions of my assessment and plan of care.            If you have questions, please do not hesitate to call me. I look forward to following Mateusz along with you.    Sincerely,      Elroy Chavira RD           CC  No Recipients

## 2019-05-01 NOTE — PATIENT INSTRUCTIONS
Per recommendations from Millicent Haq,   Lower dose of Toujeo from 30 units to 27 units    Take Novolog per sliding scale before meals

## 2019-05-01 NOTE — PROGRESS NOTES
"Diabetes Education  Author: Elroy Chavira RD  Date: 5/1/2019    Diabetes Care Management Summary  Diabetes Education Record Assessment/Progress: Initial  Current Diabetes Risk Level: Moderate     Referring Provider: Millicent Haq*  68 y.o. female in clinic today for diabetes education. Patient has taken diabetes education courses in the past, several years ago.     Weight: 70.1 kg (154 lb 10.4 oz)   Height: 5' 1" (154.9 cm)   BMI = 29.22    Last A1c:   Lab Results   Component Value Date    HGBA1C 9.4 (H) 04/24/2019     Last visit with Diabetes Educator: : 05/01/2019      Diabetes Type  Diabetes Type : Type II    Diabetes History  Diabetes Diagnosis: >10 years(dx at age 50)  Current Treatment: Diet, Insulin, Oral Medication  Reviewed Problem List with Patient: Yes   DM medications:  Toujeo, 30 units at night   Novolog SS (usually 4 units if taken; often forgets to take insulin before meal and will miss dose)  Metformin, 1000 mg bid     Health Maintenance was reviewed today with patient. Discussed with patient importance of routine eye exams, foot exams/foot care, blood work (i.e.: A1c, microalbumin, and lipid), dental visits, yearly flu vaccine, and pneumonia vaccine as indicated by PCP. Patient verbalized understanding.     Health Maintenance Topics with due status: Not Due       Topic Last Completion Date    TETANUS VACCINE 03/03/2011    DEXA SCAN 10/05/2016    Mammogram 04/16/2018    Influenza Vaccine 10/03/2018    Lipid Panel 11/14/2018    Eye Exam 03/13/2019    Low Dose Statin 04/24/2019    Foot Exam 04/24/2019    Hemoglobin A1c 04/24/2019    Urine Microalbumin 04/24/2019     Health Maintenance Due   Topic Date Due    Colonoscopy  11/30/2016       Nutrition  Meal Planning: skipping meals, 3 meals per day, eats out often  What type of sweetener do you use?: none  What type of beverages do you drink?: water, milk, diet soda/tea  Meal Plan 24 Hour Recall - Breakfast: none  Meal Plan 24 Hour Recall - " Lunch: 1/2 Whopper, Diet Coke  Meal Plan 24 Hour Recall - Dinner: Spaghetti - 2/3 cup pasta, marinara sauce, ground meat, Diet Coke   Meal Plan 24 Hour Recall - Snack: none    Monitoring   Self Monitoring : uses Ankita from 3-6 times a day (always before each meal) Pt reports fasting bg normally , this am registered low and in clinic 59  Blood Glucose Logs: No  Do you use a personal continuous glucose monitor?: Yes  What kind of glucose monitor do you use?: Freestyle Ankita Flash  In the last month, how often have you had a low blood sugar reaction?: once(pt's BG is low today, at 59; gave orange juice )  Can you tell when your blood sugar is too high?: no      Exercise   Exercise Type: none    Current Diabetes Treatment   Current Treatment: Diet, Insulin, Oral Medication    Social History  Primary Support: Self, Family(sister)  Occupation: works at [employer] - walks a lot at work   Smoking Status: Never a Smoker  Alcohol Use: Never            DDS-2 Score  ( > 3 = SIGNIFICANT DISTRESS): 2                   Barriers to Change  Barriers to Change: None  Learning Challenges : None    Readiness to Learn   Readiness to Learn : Eager    Cultural Influences  Cultural Influences: No    Diabetes Education Assessment/Progress  Diabetes Disease Process (diabetes disease process and treatment options): Discussion, Individual Session, Demonstrates Understanding/Competency(verbalizes/demonstrates)  Nutrition (Incorporating nutritional management into one's lifestyle): Discussion, Individual Session, Demonstrates Understanding/Competency (verbalizes/demonstrates), Instructed, Written Materials Provided  Physical Activity (incorporating physical activity into one's lifestyle): Discussion, Individual Session, Demonstrates Understanding/Competency (verbalizes/demonstrates)  Medications (states correct name, dose, onset, peak, duration, side effects & timing of meds): Discussion, Individual Session, Demonstrates  Understanding/Competency(verbalizes/demonstrates)  Monitoring (monitoring blood glucose/other parameters & using results): Discussion, Individual Session, Demonstrates Understanding/Competency (verbalizes/demonstrates), Written Materials Provided  Acute Complications (preventing, detecting, and treating acute complications): Discussion, Individual Session, Demonstrates Understanding/Competency (verbalizes/demonstrates), Written Materials Provided  Chronic Complications (preventing, detecting, and treating chronic complications): Discussion, Individual Session, Demonstrates Understanding/Competency (verbalizes/demonstrates)  Clinical (diabetes, other pertinent medical history, and relevant comorbidities reviewed during visit): Discussion, Individual Session, Demonstrates Understanding/Competency (verbalizes/demonstrates)  Cognitive (knowledge of self-management skills, functional health literacy): Discussion, Individual Session  Psychosocial (emotional response to diabetes): Discussion, Individual Session  Diabetes Distress and Support Systems: Discussion, Individual Session  Behavioral (readiness for change, lifestyle practices, self-care behaviors): Discussion, Individual Session    Goals  Patient has selected/evaluated goals during today's session: Yes, selected  Healthy Eating: Set(cook more and include non-starchy vegetables; avoid fried foods; Control portions of carb foods per meal )  Start Date: 05/01/19  Target Date: 08/01/19  Monitoring: Set(Check BG at least tid - fasting and ac meals )  Start Date: 05/01/19  Target Date: 08/01/19  Medications: Set(Lower Toujeo to 27 units; Take Novolog before eating meal )  Start Date: 05/01/19  Target Date: 08/01/19     Freestyle Ankita report in media  Pt's BG is very up and down. Often overcorrecting lows.   Pt's dose of Toujeo was supposed to be at 27 units per Millicent Haq, however pt didn't realize this. This should help with lows.   Stressed importance of taking  Novolog before meals to control highs.        Diabetes Care Plan/Intervention  Education Plan/Intervention: Individual Follow-Up DSMT   F/u in 2-3 wks    Diabetes Meal Plan  Calories: 1200, 1400  Carbohydrate Per Meal: 30-45g  Carbohydrate Per Snack : 15-30g    Today's Self-Management Care Plan was developed with the patient's input and is based on barriers identified during today's assessment.    The long and short-term goals in the care plan were written with the patient/caregiver's input. The patient has agreed to work toward these goals to improve her overall diabetes control.      The patient received a copy of today's self-management plan and verbalized understanding of the care plan, goals, and all of today's instructions.      The patient was encouraged to communicate with her physician and care team regarding her condition(s) and treatment.  I provided the patient with my contact information today and encouraged her to contact me via phone or patient portal as needed.     Education Units of Time   Time Spent: 60 min

## 2019-05-09 ENCOUNTER — PATIENT MESSAGE (OUTPATIENT)
Dept: DIABETES | Facility: CLINIC | Age: 69
End: 2019-05-09

## 2019-05-20 ENCOUNTER — TELEPHONE (OUTPATIENT)
Dept: INTERNAL MEDICINE | Facility: CLINIC | Age: 69
End: 2019-05-20

## 2019-05-20 NOTE — TELEPHONE ENCOUNTER
----- Message from Idris Ayala sent at 5/20/2019 11:35 AM CDT -----  Contact: pt   Type:  Needs Medical Advice    Who Called: CANELO CROSS  Symptoms (please be specific): bladder infection   How long has patient had these symptoms:  05/20/2019  Pharmacy name and phone #:       [facility] [address] [facility] [place] [place] [facility] [address] [employer]  [address] [facility] [address]  Phone: [phone] Fax: [phone]    Would the patient rather a call back or a response via [facility]? Call   Best Call Back Number:  [phone] (home)  Additional Information: please leave a message if the pt does not answer

## 2019-05-22 ENCOUNTER — OFFICE VISIT (OUTPATIENT)
Dept: INTERNAL MEDICINE | Facility: CLINIC | Age: 69
End: 2019-05-22
Payer: COMMERCIAL

## 2019-05-22 VITALS
HEART RATE: 88 BPM | HEIGHT: 62 IN | TEMPERATURE: 98 F | OXYGEN SATURATION: 97 % | BODY MASS INDEX: 28.8 KG/M2 | DIASTOLIC BLOOD PRESSURE: 70 MMHG | SYSTOLIC BLOOD PRESSURE: 130 MMHG | WEIGHT: 156.5 LBS

## 2019-05-22 DIAGNOSIS — R30.0 DYSURIA: Primary | ICD-10-CM

## 2019-05-22 LAB
BACTERIA #/AREA URNS HPF: ABNORMAL /HPF
BILIRUB UR QL STRIP: NEGATIVE
CLARITY UR: ABNORMAL
COLOR UR: YELLOW
GLUCOSE UR QL STRIP: NEGATIVE
HGB UR QL STRIP: NEGATIVE
HYALINE CASTS #/AREA URNS LPF: 0 /LPF
KETONES UR QL STRIP: NEGATIVE
LEUKOCYTE ESTERASE UR QL STRIP: ABNORMAL
MICROSCOPIC COMMENT: ABNORMAL
NITRITE UR QL STRIP: NEGATIVE
NON-SQ EPI CELLS #/AREA URNS HPF: <1 /HPF
PH UR STRIP: 5 [PH] (ref 5–8)
PROT UR QL STRIP: ABNORMAL
RBC #/AREA URNS HPF: 0 /HPF (ref 0–4)
SP GR UR STRIP: >1.03 (ref 1–1.03)
SQUAMOUS #/AREA URNS HPF: ABNORMAL /HPF
URN SPEC COLLECT METH UR: ABNORMAL
WBC #/AREA URNS HPF: 37 /HPF (ref 0–5)

## 2019-05-22 PROCEDURE — 87088 URINE BACTERIA CULTURE: CPT

## 2019-05-22 PROCEDURE — 3075F PR MOST RECENT SYSTOLIC BLOOD PRESS GE 130-139MM HG: ICD-10-PCS | Mod: CPTII,S$GLB,, | Performed by: NURSE PRACTITIONER

## 2019-05-22 PROCEDURE — 99214 OFFICE O/P EST MOD 30 MIN: CPT | Mod: S$GLB,,, | Performed by: NURSE PRACTITIONER

## 2019-05-22 PROCEDURE — 3078F PR MOST RECENT DIASTOLIC BLOOD PRESSURE < 80 MM HG: ICD-10-PCS | Mod: CPTII,S$GLB,, | Performed by: NURSE PRACTITIONER

## 2019-05-22 PROCEDURE — 87077 CULTURE AEROBIC IDENTIFY: CPT

## 2019-05-22 PROCEDURE — 3075F SYST BP GE 130 - 139MM HG: CPT | Mod: CPTII,S$GLB,, | Performed by: NURSE PRACTITIONER

## 2019-05-22 PROCEDURE — 1101F PR PT FALLS ASSESS DOC 0-1 FALLS W/OUT INJ PAST YR: ICD-10-PCS | Mod: CPTII,S$GLB,, | Performed by: NURSE PRACTITIONER

## 2019-05-22 PROCEDURE — 81000 URINALYSIS NONAUTO W/SCOPE: CPT

## 2019-05-22 PROCEDURE — 87086 URINE CULTURE/COLONY COUNT: CPT

## 2019-05-22 PROCEDURE — 3078F DIAST BP <80 MM HG: CPT | Mod: CPTII,S$GLB,, | Performed by: NURSE PRACTITIONER

## 2019-05-22 PROCEDURE — 87186 SC STD MICRODIL/AGAR DIL: CPT

## 2019-05-22 PROCEDURE — 99999 PR PBB SHADOW E&M-EST. PATIENT-LVL IV: ICD-10-PCS | Mod: PBBFAC,,, | Performed by: NURSE PRACTITIONER

## 2019-05-22 PROCEDURE — 1101F PT FALLS ASSESS-DOCD LE1/YR: CPT | Mod: CPTII,S$GLB,, | Performed by: NURSE PRACTITIONER

## 2019-05-22 PROCEDURE — 99214 PR OFFICE/OUTPT VISIT, EST, LEVL IV, 30-39 MIN: ICD-10-PCS | Mod: S$GLB,,, | Performed by: NURSE PRACTITIONER

## 2019-05-22 PROCEDURE — 99999 PR PBB SHADOW E&M-EST. PATIENT-LVL IV: CPT | Mod: PBBFAC,,, | Performed by: NURSE PRACTITIONER

## 2019-05-22 RX ORDER — NYSTATIN 100000 [USP'U]/G
POWDER TOPICAL 4 TIMES DAILY
Qty: 60 G | Refills: 0 | Status: ON HOLD | OUTPATIENT
Start: 2019-05-22 | End: 2019-11-18 | Stop reason: HOSPADM

## 2019-05-22 NOTE — PROGRESS NOTES
Subjective:       Patient ID: Mateusz Bates is a 68 y.o. female.    Chief Complaint: burning with urination    Patient presents with burning during urination. She wears incontinence pads.     Dysuria    This is a new problem. The current episode started in the past 7 days. The problem has been waxing and waning. The quality of the pain is described as burning. The pain is at a severity of 6/10. There has been no fever. Associated symptoms include frequency, hesitancy, urgency and constipation. Pertinent negatives include no chills, flank pain, hematuria, nausea or vomiting. She has tried nothing for the symptoms. The treatment provided no relief. Her past medical history is significant for diabetes mellitus and recurrent UTIs.     Review of Systems   Constitutional: Negative for chills.   Gastrointestinal: Positive for constipation. Negative for nausea and vomiting.   Genitourinary: Positive for dysuria, frequency, hesitancy and urgency. Negative for decreased urine volume, flank pain, hematuria, vaginal bleeding, vaginal discharge and vaginal pain.       Objective:      Physical Exam   Constitutional: She appears well-developed and well-nourished. No distress.   HENT:   Head: Normocephalic.   Eyes: Pupils are equal, round, and reactive to light. Conjunctivae and EOM are normal.   Cardiovascular: Normal rate and regular rhythm.   No murmur heard.  Pulmonary/Chest: Effort normal and breath sounds normal. No respiratory distress. She has no wheezes.   Abdominal: Bowel sounds are normal. She exhibits no distension and no mass. There is tenderness in the suprapubic area. There is no rebound, no guarding and no CVA tenderness.   Psychiatric: She has a normal mood and affect.   Vitals reviewed.      Assessment:       1. Dysuria        Plan:   Dysuria  -     Urinalysis  -     Urine culture    Other orders  -     Urinalysis Microscopic  -     nystatin (MYCOSTATIN) powder; Apply topically 4 (four) times daily.  Dispense: 60  g; Refill: 0      UA shows leuk.  Will see what culture grows before treating.  Possibly yeast related to urinary incontinence.   Follow up if symptoms worsen or fail to improve, for keep routine follow up.

## 2019-05-24 DIAGNOSIS — N30.00 ACUTE CYSTITIS WITHOUT HEMATURIA: Primary | ICD-10-CM

## 2019-05-24 RX ORDER — SULFAMETHOXAZOLE AND TRIMETHOPRIM 800; 160 MG/1; MG/1
1 TABLET ORAL 2 TIMES DAILY
Qty: 14 TABLET | Refills: 0 | Status: SHIPPED | OUTPATIENT
Start: 2019-05-24 | End: 2019-05-31

## 2019-05-25 LAB — BACTERIA UR CULT: NORMAL

## 2019-06-14 DIAGNOSIS — E11.65 UNCONTROLLED TYPE 2 DIABETES MELLITUS WITH HYPERGLYCEMIA: ICD-10-CM

## 2019-06-16 ENCOUNTER — PATIENT MESSAGE (OUTPATIENT)
Dept: DIABETES | Facility: CLINIC | Age: 69
End: 2019-06-16

## 2019-06-17 ENCOUNTER — PATIENT MESSAGE (OUTPATIENT)
Dept: DIABETES | Facility: CLINIC | Age: 69
End: 2019-06-17

## 2019-06-18 ENCOUNTER — PATIENT MESSAGE (OUTPATIENT)
Dept: ADMINISTRATIVE | Facility: OTHER | Age: 69
End: 2019-06-18

## 2019-06-19 DIAGNOSIS — E11.9 TYPE 2 DIABETES MELLITUS: ICD-10-CM

## 2019-06-25 DIAGNOSIS — E78.5 HYPERLIPIDEMIA, UNSPECIFIED HYPERLIPIDEMIA TYPE: ICD-10-CM

## 2019-06-26 RX ORDER — ATORVASTATIN CALCIUM 80 MG/1
80 TABLET, FILM COATED ORAL DAILY
Qty: 30 TABLET | Refills: 3 | Status: SHIPPED | OUTPATIENT
Start: 2019-06-26 | End: 2019-09-21 | Stop reason: SDUPTHER

## 2019-07-01 DIAGNOSIS — E11.69 DIABETES MELLITUS TYPE 2 IN OBESE: ICD-10-CM

## 2019-07-01 DIAGNOSIS — E66.9 DIABETES MELLITUS TYPE 2 IN OBESE: ICD-10-CM

## 2019-07-01 RX ORDER — METFORMIN HYDROCHLORIDE 1000 MG/1
TABLET ORAL
Qty: 60 TABLET | Refills: 3 | Status: SHIPPED | OUTPATIENT
Start: 2019-07-01 | End: 2019-09-25 | Stop reason: SDUPTHER

## 2019-07-01 RX ORDER — INSULIN GLARGINE 300 [IU]/ML
45 INJECTION, SOLUTION SUBCUTANEOUS DAILY
Qty: 4.5 ML | Refills: 3 | Status: ON HOLD | OUTPATIENT
Start: 2019-07-01 | End: 2019-11-20 | Stop reason: HOSPADM

## 2019-07-01 NOTE — TELEPHONE ENCOUNTER
----- Message from Jenna Mcgowan sent at 7/1/2019  3:07 PM CDT -----  Contact: pt  .Type:  RX Refill Request    Who Called: pt  Refill or New Rx: refill   RX Name and Strength: toujeo, metformin, and hydrOXYzine   How is the patient currently taking it? (ex. 1XDay): 1xday , 2xday ,   Is this a 30 day or 90 day RX:   Preferred Pharmacy with phone number:      [facility]/pharmacy #[id number] - [place] - [address] [facility]  [address]  [place] [address]  Phone: [phone] Fax: [phone]       Local or Mail Order:local   Ordering Provider: Francisco Javier   Would the patient rather a call back or a response via [facility]?  Call back   Best Call Back Number: [phone] (home)    Additional Information:   Pt is out of the toujeo and metformin

## 2019-07-03 ENCOUNTER — PATIENT OUTREACH (OUTPATIENT)
Dept: OTHER | Facility: OTHER | Age: 69
End: 2019-07-03

## 2019-07-03 NOTE — LETTER
November 25, 2019     Mateusz Bates  [address] [facility] [address] [facility] [address]       Dear Mateusz,    We have made several attempts to encourage your participation in [facility]s [facility] Program. Unfortunately, we have been unsuccessful.     This is an official notice that you are no longer enrolled in the digital medicine program, and thus, we will no longer be managing your disease states. Please note this has no impact on your relationship with [facility] or your providers. Going forward, please reach out to your primary care provider with any questions or concerns regarding your health.    Please contact [phone] if you have any additional questions.    Sincerely,  The [facility] Team

## 2019-07-03 NOTE — LETTER
November 25, 2019     Mateusz Bates  [address] [facility] [address]       Dear Mateusz,    We have made several attempts to encourage your participation in [facility]s [facility] Program. Unfortunately, we have been unsuccessful.     This is an official notice that you are no longer enrolled in the digital medicine program, and thus, we will no longer be managing your disease states. Please note this has no impact on your relationship with [facility] or your providers. Going forward, please reach out to your primary care provider with any questions or concerns regarding your health.    Please contact [phone] if you have any additional questions.    Sincerely,  The [facility] Team

## 2019-07-03 NOTE — PROGRESS NOTES
1st attempt to complete enrollment call. No answer, left voicemail.       Last 5 Patient Entered Readings                                      Current 30 Day Average: 167/82     Recent Readings 7/2/2019    SBP (mmHg) 167    DBP (mmHg) 82    Pulse 81

## 2019-07-03 NOTE — LETTER
October 17, 2019     Mateusz Bates  [address] [facility] [address]       Dear Mateusz,    Thank you for your interest in [facility], a clinically-proven program designed to help you manage your chronic condition more conveniently and effectively. [facility] gives you:   Technology that lets you monitor your health at home and send readings directly to your care team at [facility]   Medications managed by a dedicated pharmacist or clinician    A  who calls and helps you take manageable steps towards a healthier lifestyle       We have tried to reach you via phone and [facility] Message to complete your enrollment in the Digital Medicine program. Unfortunately, weve been unable to reach you.     Please call us to complete your enrollment. Our number is [phone]. If we dont hear from you by 11/7/2019, we will be unable to complete your enrollment at this time.     We look forward to hearing from you soon.    Sincerely,     The Digital Medicine Team

## 2019-07-03 NOTE — LETTER
October 17, 2019     Mateusz Bates  [address] [facility] [address]       Dear Mateusz,    Thank you for your interest in [facility], a clinically-proven program designed to help you manage your chronic condition more conveniently and effectively. [facility] gives you:   Technology that lets you monitor your health at home and send readings directly to your care team at [facility]   Medications managed by a dedicated pharmacist or clinician    A  who calls and helps you take manageable steps towards a healthier lifestyle       We have tried to reach you via phone and [facility] Message to complete your enrollment in the Digital Medicine program. Unfortunately, weve been unable to reach you.     Please call us to complete your enrollment. Our number is [phone]. If we dont hear from you by 11/7/2019, we will be unable to complete your enrollment at this time.     We look forward to hearing from you soon.    Sincerely,     The Digital Medicine Team

## 2019-07-15 NOTE — PROGRESS NOTES
Last 5 Patient Entered Readings                                      Current 30 Day Average: 167/82     Recent Readings 7/2/2019    SBP (mmHg) 167    DBP (mmHg) 82    Pulse 81

## 2019-07-15 NOTE — PROGRESS NOTES
2nd attempt for enrollment call.  No answer, left voicemail.  Sent my portal message.      Last 5 Patient Entered Readings                                      Current 30 Day Average: 167/82     Recent Readings 7/2/2019    SBP (mmHg) 167    DBP (mmHg) 82    Pulse 81

## 2019-07-22 ENCOUNTER — PATIENT MESSAGE (OUTPATIENT)
Dept: DIABETES | Facility: CLINIC | Age: 69
End: 2019-07-22

## 2019-07-22 DIAGNOSIS — F41.9 ANXIETY: ICD-10-CM

## 2019-07-22 NOTE — PROGRESS NOTES
3rd attempt for enrollment call.  No answer, left voicemail.      Last 5 Patient Entered Readings                                      Current 30 Day Average: 167/82     Recent Readings 7/2/2019    SBP (mmHg) 167    DBP (mmHg) 82    Pulse 81

## 2019-07-23 DIAGNOSIS — F41.9 ANXIETY: ICD-10-CM

## 2019-07-24 ENCOUNTER — OFFICE VISIT (OUTPATIENT)
Dept: DERMATOLOGY | Facility: CLINIC | Age: 69
End: 2019-07-24
Payer: COMMERCIAL

## 2019-07-24 DIAGNOSIS — Z85.828 HISTORY OF NONMELANOMA SKIN CANCER: ICD-10-CM

## 2019-07-24 DIAGNOSIS — L57.0 ACTINIC KERATOSIS: ICD-10-CM

## 2019-07-24 DIAGNOSIS — L82.1 SEBORRHEIC KERATOSES: ICD-10-CM

## 2019-07-24 DIAGNOSIS — R23.8 SCALP IRRITATION: Primary | ICD-10-CM

## 2019-07-24 DIAGNOSIS — L30.9 DERMATITIS: ICD-10-CM

## 2019-07-24 PROCEDURE — 17000 PR DESTRUCTION(LASER SURGERY,CRYOSURGERY,CHEMOSURGERY),PREMALIGNANT LESIONS,FIRST LESION: ICD-10-PCS | Mod: S$GLB,,, | Performed by: STUDENT IN AN ORGANIZED HEALTH CARE EDUCATION/TRAINING PROGRAM

## 2019-07-24 PROCEDURE — 17000 DESTRUCT PREMALG LESION: CPT | Mod: S$GLB,,, | Performed by: STUDENT IN AN ORGANIZED HEALTH CARE EDUCATION/TRAINING PROGRAM

## 2019-07-24 PROCEDURE — 99999 PR PBB SHADOW E&M-EST. PATIENT-LVL II: CPT | Mod: PBBFAC,,, | Performed by: STUDENT IN AN ORGANIZED HEALTH CARE EDUCATION/TRAINING PROGRAM

## 2019-07-24 PROCEDURE — 99203 OFFICE O/P NEW LOW 30 MIN: CPT | Mod: 25,S$GLB,, | Performed by: STUDENT IN AN ORGANIZED HEALTH CARE EDUCATION/TRAINING PROGRAM

## 2019-07-24 PROCEDURE — 1101F PR PT FALLS ASSESS DOC 0-1 FALLS W/OUT INJ PAST YR: ICD-10-PCS | Mod: CPTII,S$GLB,, | Performed by: STUDENT IN AN ORGANIZED HEALTH CARE EDUCATION/TRAINING PROGRAM

## 2019-07-24 PROCEDURE — 1101F PT FALLS ASSESS-DOCD LE1/YR: CPT | Mod: CPTII,S$GLB,, | Performed by: STUDENT IN AN ORGANIZED HEALTH CARE EDUCATION/TRAINING PROGRAM

## 2019-07-24 PROCEDURE — 99203 PR OFFICE/OUTPT VISIT, NEW, LEVL III, 30-44 MIN: ICD-10-PCS | Mod: 25,S$GLB,, | Performed by: STUDENT IN AN ORGANIZED HEALTH CARE EDUCATION/TRAINING PROGRAM

## 2019-07-24 PROCEDURE — 99999 PR PBB SHADOW E&M-EST. PATIENT-LVL II: ICD-10-PCS | Mod: PBBFAC,,, | Performed by: STUDENT IN AN ORGANIZED HEALTH CARE EDUCATION/TRAINING PROGRAM

## 2019-07-24 RX ORDER — CLOBETASOL PROPIONATE 0.46 MG/ML
SOLUTION TOPICAL 2 TIMES DAILY
Qty: 50 ML | Refills: 2 | Status: ON HOLD | OUTPATIENT
Start: 2019-07-24 | End: 2019-11-18 | Stop reason: HOSPADM

## 2019-07-24 RX ORDER — HYDROXYZINE HYDROCHLORIDE 10 MG/1
TABLET, FILM COATED ORAL
Qty: 60 TABLET | Refills: 2 | OUTPATIENT
Start: 2019-07-24

## 2019-07-24 RX ORDER — KETOCONAZOLE 20 MG/ML
SHAMPOO, SUSPENSION TOPICAL
Qty: 120 ML | Refills: 6 | Status: ON HOLD | OUTPATIENT
Start: 2019-07-25 | End: 2019-11-18 | Stop reason: HOSPADM

## 2019-07-24 RX ORDER — HYDROXYZINE HYDROCHLORIDE 10 MG/1
TABLET, FILM COATED ORAL
Qty: 60 TABLET | Refills: 0 | Status: SHIPPED | OUTPATIENT
Start: 2019-07-24 | End: 2019-08-26 | Stop reason: SDUPTHER

## 2019-07-24 RX ORDER — TRIAMCINOLONE ACETONIDE 1 MG/G
CREAM TOPICAL
Qty: 454 G | Refills: 0 | Status: ON HOLD | OUTPATIENT
Start: 2019-07-24 | End: 2019-11-18 | Stop reason: HOSPADM

## 2019-07-24 NOTE — PROGRESS NOTES
Subjective:       Patient ID:  Mateusz Bates is a 69 y.o. female who presents for   Chief Complaint   Patient presents with    Itching     c/o itching to scalp x years and breast x 2 weeks tx atarax     Skin Check     c/o spot to face and thighs  x 6 months tx none      History of Present Illness: The patient presents with chief complaint of a rash and itching. This is a high risk patient here to check for the development of new lesions. She has a history of NMSC on the forehead. Here for itching and also evaluation of moles.   Location: scalp, chest, lower back   Duration: months to years   Signs/Symptoms:  Itching and redness, with flaking of the scalp.   Prior treatments:  Atarax           Review of Systems   Skin: Positive for itching and rash.        Objective:    Physical Exam   Constitutional: She appears well-developed and well-nourished. No distress.   Neurological: She is alert and oriented to person, place, and time. She is not disoriented.   Psychiatric: She has a normal mood and affect.   Skin:   Areas Examined (abnormalities noted in diagram):   Scalp / Hair Palpated and Inspected  Head / Face Inspection Performed  Neck Inspection Performed  Chest / Axilla Inspection Performed  Abdomen Inspection Performed  Genitals / Buttocks / Groin Inspection Performed  Back Inspection Performed  RUE Inspected  LUE Inspection Performed  RLE Inspected  LLE Inspection Performed  Nails and Digits Inspection Performed                   Diagram Legend     Erythematous scaling macule/papule c/w actinic keratosis       Vascular papule c/w angioma      Pigmented verrucoid papule/plaque c/w seborrheic keratosis      Yellow umbilicated papule c/w sebaceous hyperplasia      Irregularly shaped tan macule c/w lentigo     1-2 mm smooth white papules consistent with Milia      Movable subcutaneous cyst with punctum c/w epidermal inclusion cyst      Subcutaneous movable cyst c/w pilar cyst      Firm pink to brown papule c/w  dermatofibroma      Pedunculated fleshy papule(s) c/w skin tag(s)      Evenly pigmented macule c/w junctional nevus     Mildly variegated pigmented, slightly irregular-bordered macule c/w mildly atypical nevus      Flesh colored to evenly pigmented papule c/w intradermal nevus       Pink pearly papule/plaque c/w basal cell carcinoma      Erythematous hyperkeratotic cursted plaque c/w SCC      Surgical scar with no sign of skin cancer recurrence      Open and closed comedones      Inflammatory papules and pustules      Verrucoid papule consistent consistent with wart     Erythematous eczematous patches and plaques     Dystrophic onycholytic nail with subungual debris c/w onychomycosis     Umbilicated papule    Erythematous-base heme-crusted tan verrucoid plaque consistent with inflamed seborrheic keratosis     Erythematous Silvery Scaling Plaque c/w Psoriasis     See annotation      Assessment / Plan:        Scalp irritation  -     clobetasol (TEMOVATE) 0.05 % external solution; Apply topically 2 (two) times daily.  Dispense: 50 mL; Refill: 2  -     ketoconazole (NIZORAL) 2 % shampoo; Apply topically twice a week.  Dispense: 120 mL; Refill: 6    Dermatitis  -     triamcinolone acetonide 0.1% (KENALOG) 0.1 % cream; AAA bid  Dispense: 454 g; Refill: 0    Actinic keratosis  Cryosurgery Procedure Note    Verbal consent from the patient is obtained including, but not limited to, risk of hypopigmentation/hyperpigmentation, scar, recurrence of lesion. The patient is aware of the precancerous quality and need for treatment of these lesions. Liquid nitrogen cryosurgery is applied to the 1 actinic keratoses, as detailed in the physical exam, to produce a freeze injury. The patient is aware that blisters may form and is instructed on wound care with gentle cleansing and use of vaseline ointment to keep moist until healed. The patient is supplied a handout on cryosurgery and is instructed to call if lesions do not completely  resolve.    Seborrheic keratoses  These are benign inherited growths without a malignant potential. Reassurance given to patient. No treatment is necessary.     History of nonmelanoma skin cancer  Area of previous NMSC examined. Site well healed with no signs of recurrence.    Total body skin examination performed today including at least 12 points as noted in physical examination. No lesions suspicious for malignancy noted.\         Follow up in about 1 year (around 7/24/2020), or if symptoms worsen or fail to improve.

## 2019-07-24 NOTE — PATIENT INSTRUCTIONS
XEROSIS (DRY SKIN)        1. Definition    Xerosis is the term for dry skin.  We all have a natural oil coating over our skin produced by the skin oil glands.  If this oil is removed, the skin becomes dry which can lead to cracking, which can lead to inflammation.  Xerosis is usually a long-term problem that recurs often, especially in the winter.    2. Cause     Long hot baths or showers can remove our natural oil and lead to xerosis.  One should never take more than one bath or shower a day and for no longer than ten minutes.   Use of harsh soaps such as Zest, Dial, and Ivory can worsen and cause xerosis.   Cold winter weather worsens xerosis because the amount of moisture contained in cold air is much less than the amount of moisture in warm air.    3. Treatment     Treatment is intended to restore the natural oil to your skin.  Keep the skin lubricated.     Do not take more than one bath or shower a day.  Use lukewarm water, not hot.  Hot water dries out the skin.     Use a gentle moisturizing soap such as Cetaphil soap, Oil of Olay, Dove, Basis, Ivory moisture care, Restoraderm cleanser.     When toweling dry, dont rub.  Blot the skin so there is still some water left on the skin.  You should apply a moisturizing cream to all of the skin such as Cerave cream, Cetaphil cream, Restoraderm or Eucerin Original Formula cream.   Alpha hydroxyacid lotions, i.e., AmLactin, also work very well for preventing dry skin, but may burn when used on inflamed or reddened skin.     If you like to swim during the winter months, you should not use soap when getting out of the pool.  When you have finished swimming, rinse off the chlorine with cool to warm water.  If this will be the only shower of the day, then you may use Cetaphil or another mild soap to cleanse your skin.  After the shower, apply a moisturizing cream to

## 2019-07-24 NOTE — TELEPHONE ENCOUNTER
----- Message from Jose Loaiza sent at 7/24/2019  3:33 PM CDT -----  Contact: [phone]  .Type:  RX Refill Request    Who Called: Mateusz Bates    Refill or New Rx:Refill  RX Name and Strength:Hydroxyzine   How is the patient currently taking it? (ex. 1XDay):3xday  Is this a 30 day or 90 day RX:90 day if possible    Preferred Pharmacy with phone number:.  [facility]/pharmacy #[id number] - [place] - [address] [facility]  [address]  [place] [address]  Phone: [phone] Fax: [phone]      Local or Mail Order:Local  Ordering Provider:Dr. Kilpatrick  Would the patient rather a call back or a response via [facility]? Call back   Best Call Back Number:[phone]  Additional Information:

## 2019-07-29 NOTE — PROGRESS NOTES
4th attempt for enrollment call.  No answer, left voicemail.      Last 5 Patient Entered Readings                                      Current 30 Day Average: 167/82     Recent Readings 7/2/2019    SBP (mmHg) 167    DBP (mmHg) 82    Pulse 81

## 2019-08-12 DIAGNOSIS — F41.9 ANXIETY: ICD-10-CM

## 2019-08-12 RX ORDER — HYDROXYZINE HYDROCHLORIDE 10 MG/1
TABLET, FILM COATED ORAL
Qty: 60 TABLET | Refills: 0 | OUTPATIENT
Start: 2019-08-12

## 2019-08-16 ENCOUNTER — PATIENT MESSAGE (OUTPATIENT)
Dept: INTERNAL MEDICINE | Facility: CLINIC | Age: 69
End: 2019-08-16

## 2019-08-16 DIAGNOSIS — F41.9 ANXIETY: ICD-10-CM

## 2019-08-19 RX ORDER — HYDROXYZINE HYDROCHLORIDE 10 MG/1
10 TABLET, FILM COATED ORAL 3 TIMES DAILY
Qty: 60 TABLET | Refills: 0 | Status: CANCELLED | OUTPATIENT
Start: 2019-08-19

## 2019-08-22 ENCOUNTER — TELEPHONE (OUTPATIENT)
Dept: INTERNAL MEDICINE | Facility: CLINIC | Age: 69
End: 2019-08-22

## 2019-08-22 DIAGNOSIS — Z12.11 COLON CANCER SCREENING: Primary | ICD-10-CM

## 2019-08-22 NOTE — TELEPHONE ENCOUNTER
----- Message from Meron Zazueta MA sent at 8/22/2019  2:38 PM CDT -----  Contact: CANELO CROSS [[id number]]  Requesting colonoscopy order due to canceling last appt.   ----- Message -----  From: Donavon Thakur  Sent: 8/22/2019   2:33 PM  To: [facility] Staff    Name of Who is Calling:CANELO CROSS [[id number]]      What is the request in detail: Would like to speak with staff in regards to scheduling a colonoscopy. Please advise      Can the clinic reply by [facility]: yes      What Number to [facility] Back [facility]: [phone]

## 2019-08-24 ENCOUNTER — PATIENT MESSAGE (OUTPATIENT)
Dept: INTERNAL MEDICINE | Facility: CLINIC | Age: 69
End: 2019-08-24

## 2019-08-24 DIAGNOSIS — F41.9 ANXIETY: ICD-10-CM

## 2019-08-25 ENCOUNTER — PATIENT MESSAGE (OUTPATIENT)
Dept: DIABETES | Facility: CLINIC | Age: 69
End: 2019-08-25

## 2019-08-26 RX ORDER — HYDROXYZINE HYDROCHLORIDE 10 MG/1
10 TABLET, FILM COATED ORAL 2 TIMES DAILY PRN
Qty: 60 TABLET | Refills: 0 | Status: SHIPPED | OUTPATIENT
Start: 2019-08-26 | End: 2019-09-23 | Stop reason: SDUPTHER

## 2019-08-27 ENCOUNTER — TELEPHONE (OUTPATIENT)
Dept: ENDOSCOPY | Facility: HOSPITAL | Age: 69
End: 2019-08-27

## 2019-08-27 DIAGNOSIS — F41.9 ANXIETY: ICD-10-CM

## 2019-08-27 RX ORDER — HYDROXYZINE HYDROCHLORIDE 10 MG/1
TABLET, FILM COATED ORAL
Qty: 60 TABLET | Refills: 0 | OUTPATIENT
Start: 2019-08-27

## 2019-08-27 NOTE — TELEPHONE ENCOUNTER
Endoscopy Scheduling Questionnaire:    1. Have you been admitted overnight to the hospital in the past 3 months? no  2. Do you get chest pain and SOB while walking up a flight of stairs? no  3. Have you had a cardiac stent placed in the past 12 months? no  4. Have you had a stroke or heart attack in the past 6 months? no  5. Have you had any chest pain in the past 3 months? no      If so, have you been evaluated by your PCP or Cardiologist? no  6. Do you take medication for weight loss? no  7. Have you been diagnosed with Diverticulitis within the past 3 months? no  8. Are you having any GI symptoms that you feel need to be evaluated prior to your procedure? no  9. Are you on dialysis? no  10. Are you diabetic? Yes. Advised to hold diabetes medication the morning of procedure.    11. Do you have any other health issues that you feel might limit your ability to safely have the procedure and/or sedation? no  12. Is the patient over 79 yo? no        If so, has the patient been seen by their PCP or GI in the last 3 months? N/A       -I have reviewed the last colonoscopy for recommendations regarding surveillance and bowel prep  is NA  -I have reviewed the patient's medications and allergies. She is not on high risk medication, N/A A clearance request NA  -I have verified the pharmacy information. The prep being used is Miralax. The patient's prep instructions were sent via [facility] patient portal..    Date Endoscopy Scheduled: Date: 10/11/19

## 2019-09-06 ENCOUNTER — PATIENT MESSAGE (OUTPATIENT)
Dept: DIABETES | Facility: CLINIC | Age: 69
End: 2019-09-06

## 2019-09-18 ENCOUNTER — PATIENT OUTREACH (OUTPATIENT)
Dept: ADMINISTRATIVE | Facility: HOSPITAL | Age: 69
End: 2019-09-18

## 2019-09-18 ENCOUNTER — OFFICE VISIT (OUTPATIENT)
Dept: DIABETES | Facility: CLINIC | Age: 69
End: 2019-09-18
Payer: COMMERCIAL

## 2019-09-18 VITALS
WEIGHT: 154.31 LBS | DIASTOLIC BLOOD PRESSURE: 82 MMHG | BODY MASS INDEX: 29.13 KG/M2 | HEIGHT: 61 IN | SYSTOLIC BLOOD PRESSURE: 112 MMHG

## 2019-09-18 DIAGNOSIS — E11.59 HYPERTENSION ASSOCIATED WITH DIABETES: ICD-10-CM

## 2019-09-18 DIAGNOSIS — I15.2 HYPERTENSION ASSOCIATED WITH DIABETES: ICD-10-CM

## 2019-09-18 DIAGNOSIS — M85.80 OSTEOPENIA, UNSPECIFIED LOCATION: Primary | ICD-10-CM

## 2019-09-18 DIAGNOSIS — E11.69 HYPERLIPIDEMIA ASSOCIATED WITH TYPE 2 DIABETES MELLITUS: Primary | ICD-10-CM

## 2019-09-18 DIAGNOSIS — E78.5 HYPERLIPIDEMIA ASSOCIATED WITH TYPE 2 DIABETES MELLITUS: Primary | ICD-10-CM

## 2019-09-18 DIAGNOSIS — E66.9 OBESITY (BMI 30.0-34.9): ICD-10-CM

## 2019-09-18 DIAGNOSIS — M89.9 BONE DISORDER: ICD-10-CM

## 2019-09-18 LAB — GLUCOSE SERPL-MCNC: 100 MG/DL (ref 70–110)

## 2019-09-18 PROCEDURE — 99999 PR PBB SHADOW E&M-EST. PATIENT-LVL IV: ICD-10-PCS | Mod: PBBFAC,,, | Performed by: NURSE PRACTITIONER

## 2019-09-18 PROCEDURE — 99214 PR OFFICE/OUTPT VISIT, EST, LEVL IV, 30-39 MIN: ICD-10-PCS | Mod: S$GLB,,, | Performed by: NURSE PRACTITIONER

## 2019-09-18 PROCEDURE — 99999 PR PBB SHADOW E&M-EST. PATIENT-LVL IV: CPT | Mod: PBBFAC,,, | Performed by: NURSE PRACTITIONER

## 2019-09-18 PROCEDURE — 82962 GLUCOSE BLOOD TEST: CPT | Mod: S$GLB,,, | Performed by: NURSE PRACTITIONER

## 2019-09-18 PROCEDURE — 99214 OFFICE O/P EST MOD 30 MIN: CPT | Mod: S$GLB,,, | Performed by: NURSE PRACTITIONER

## 2019-09-18 PROCEDURE — 82962 POCT GLUCOSE, HAND-HELD DEVICE: ICD-10-PCS | Mod: S$GLB,,, | Performed by: NURSE PRACTITIONER

## 2019-09-18 NOTE — PROGRESS NOTES
"  PCP: Serenity Kilpatrick MD    Subjective:     Chief Complaint: Diabetes Follow Up    HISTORY OF PRESENT ILLNESS: 69 y.o.   female presenting for diabetes follow up. Patient has had Type II diabetes for several years and has the following complications: none. Billiehas attended diabetes education in the past.  In the past, she has tried and failed Victoza, Byetta, and Bydureon due to GI side effects.     Patient just started using a new 14-day Freestyle reader, so very limited data was available, not enough to download.  Per meter, fasting BGs 40, 51 - 120, < 182 >; ac lunch 68 - 154; ac dinner 70 - 158;  hs 53, 55 - 122, < 388 >. She reports eating a lower carb, healthier diet, which I suspect is the reason for more hypoglycemia now.    She denies any recent hospital admissions or emergency room visits.     Height: 5' 1" (154.9 cm)  //  Weight: 70 kg (154 lb 5.2 oz), Body mass index is 29.16 kg/m².  Home Blood Glucose reading this AM: 120  mg/dl fasting  Her blood sugar in clinic today is:  83 mg/dl at  am      Labs Reviewed.       Lab Results   Component Value Date    CPEPTIDE 1.7 08/29/2016     DM MEDICATIONS:  Glucophage 1000 mg BID  Novolog via sliding scale:  - 199: 4 units  //  - 249: 6 units //   - 299: 8 units   //  - 349: 10 units //  BG Over 350: 12 units.  Toujeo 30 units in Hs ( bedtime )     Labs Reviewed.    Review of Systems   Constitutional: Negative for appetite change, fatigue and unexpected weight change.   Eyes: Negative for visual disturbance.   Gastrointestinal: Negative for abdominal pain, constipation, diarrhea and nausea.   Endocrine: Negative for polydipsia, polyphagia and polyuria.   Neurological: Negative for dizziness, numbness and headaches.   Psychiatric/Behavioral: Positive for confusion. Negative for decreased concentration. The patient is not nervous/anxious.      Diabetes Management Status  Statin: Taking  ACE/ARB: Not taking    Screening or " Prevention Patient's value Goal Complete/Controlled?   HgA1C Testing and Control   Lab Results   Component Value Date    HGBA1C 9.4 (H) 04/24/2019      Annually/Less than 8% No   Lipid profile : 11/14/2018 Annually Yes   LDL control Lab Results   Component Value Date    LDLCALC 171.0 (H) 11/14/2018    Annually/Less than 100 mg/dl  No   Nephropathy screening Lab Results   Component Value Date    LABMICR 72.0 04/24/2019     Lab Results   Component Value Date    PROTEINUA 1+ (A) 05/22/2019    Annually Yes   Blood pressure BP Readings from Last 1 Encounters:   09/18/19 112/82    Less than 140/90 No   Dilated retinal exam : 03/13/2019 Annually Yes   Foot exam   : 04/24/2019 Annually Yes     ACTIVITY LEVEL: No regular exercise.   MEAL PLANNING: Number of meals - 3. Number of Snacks - 1, such as coconut pecan cookies. Breakfast, usually V8 juice or Atkins Bar OR sausage lupe.  Lunch can be salad, ham.  Dinner can be [employer] chicken tenders, occasional fish sandwich from [employer].  Per dietary recall, patient is  limiting carbohydrates, saturated fats and sodium.     SELF-MONITORING: Freestyle CGM  SOCIAL: . Lives alone with 1 cat, 4 dogs.  Works at the [employer] desk at [employer]. Never smoker.     Objective:     Physical Exam   Constitutional: She appears well-developed and well-nourished.   HENT:   Head: Normocephalic and atraumatic.   Eyes: Pupils are equal, round, and reactive to light. EOM are normal.   Neck: Normal range of motion.   Cardiovascular: Normal rate and regular rhythm.   Pulses:       Dorsalis pedis pulses are 2+ on the right side, and 2+ on the left side.   Pulmonary/Chest: Effort normal and breath sounds normal.   Musculoskeletal: Normal range of motion.   Feet:   Right Foot:   Protective Sensation: 6 sites tested. 6 sites sensed.   Skin Integrity: Negative for ulcer, callus or dry skin.   Left Foot:   Protective Sensation: 6 sites tested. 6 sites sensed.   Skin  Integrity: Negative for ulcer, blister, callus or dry skin.   Skin: Skin is warm and dry. No rash noted.   Psychiatric: She has a normal mood and affect. Her behavior is normal. Judgment and thought content normal.     Assessment / Plan:     1.) Uncontrolled type 2 diabetes mellitus with hypoglycemia without coma  -     POCT Glucose, Hand-Held Device  - Future Labs: CMP, A1C, and micral    Lower Toujeo 25 units daily.  Advised patient to only take Novolog with meals if BG is greater [id number], use this scale:  - 199: 4 units  //  - 249: 6 units //   - 299: 8 units   //  - 349: 10 units //  BG Over 350: 12 units. Continue metformin 1,000 mg BID. We discussed trying a disposable insulin pump, VGO.  AOB signed for VGO.    2.) Hyperlipidemia associated with type 2 diabetes mellitus - continue meds as prescribed    3.) Hypertension associated with diabetes - continue meds as prescribed    Additional Plan Details:  1.) Continue monitoring blood glucose  4 x daily, fasting and ac meals.  Discussed ADA goal for fasting blood sugar, 80 - 130 mg/dL; pp blood sugars below 180 mg/dl. Also, discussed prevention of hypoglycemia and the need to adjust goals to higher levels if persistent hypoglycemia. Reminded her to bring records to each visit.    2.) Return to clinic in 6 weeks for follow up. The patient was explained the above plan and given opportunity to ask questions.  She understands, chooses and consents to this plan and accepts all the risks, which include but are not limited to the risks mentioned above. She understands the alternative of having no testing, interventions or treatments at this time. She left content and without further questions.     Millicent Haq, RADHA-C, CDE    A total of 30 minutes was spent in face to face time, of which over 50 % was spent in counseling patient on disease process, complications, treatment, and side effects of medications.

## 2019-09-19 ENCOUNTER — LAB VISIT (OUTPATIENT)
Dept: LAB | Facility: HOSPITAL | Age: 69
End: 2019-09-19
Attending: NURSE PRACTITIONER
Payer: COMMERCIAL

## 2019-09-19 DIAGNOSIS — E11.69 HYPERLIPIDEMIA ASSOCIATED WITH TYPE 2 DIABETES MELLITUS: ICD-10-CM

## 2019-09-19 DIAGNOSIS — E78.5 HYPERLIPIDEMIA ASSOCIATED WITH TYPE 2 DIABETES MELLITUS: ICD-10-CM

## 2019-09-19 LAB
ALBUMIN SERPL BCP-MCNC: 3.8 G/DL (ref 3.5–5.2)
ALP SERPL-CCNC: 85 U/L (ref 55–135)
ALT SERPL W/O P-5'-P-CCNC: 12 U/L (ref 10–44)
ANION GAP SERPL CALC-SCNC: 10 MMOL/L (ref 8–16)
AST SERPL-CCNC: 15 U/L (ref 10–40)
BILIRUB SERPL-MCNC: 0.4 MG/DL (ref 0.1–1)
BUN SERPL-MCNC: 23 MG/DL (ref 8–23)
CALCIUM SERPL-MCNC: 10.1 MG/DL (ref 8.7–10.5)
CHLORIDE SERPL-SCNC: 103 MMOL/L (ref 95–110)
CO2 SERPL-SCNC: 25 MMOL/L (ref 23–29)
CREAT SERPL-MCNC: 0.8 MG/DL (ref 0.5–1.4)
EST. GFR  (AFRICAN AMERICAN): >60 ML/MIN/1.73 M^2
EST. GFR  (NON AFRICAN AMERICAN): >60 ML/MIN/1.73 M^2
ESTIMATED AVG GLUCOSE: 212 MG/DL (ref 68–131)
GLUCOSE SERPL-MCNC: 197 MG/DL (ref 70–110)
HBA1C MFR BLD HPLC: 9 % (ref 4–5.6)
POTASSIUM SERPL-SCNC: 4.7 MMOL/L (ref 3.5–5.1)
PROT SERPL-MCNC: 7.3 G/DL (ref 6–8.4)
SODIUM SERPL-SCNC: 138 MMOL/L (ref 136–145)

## 2019-09-19 PROCEDURE — 83036 HEMOGLOBIN GLYCOSYLATED A1C: CPT

## 2019-09-19 PROCEDURE — 80053 COMPREHEN METABOLIC PANEL: CPT

## 2019-09-19 PROCEDURE — 36415 COLL VENOUS BLD VENIPUNCTURE: CPT

## 2019-09-21 DIAGNOSIS — E78.5 HYPERLIPIDEMIA, UNSPECIFIED HYPERLIPIDEMIA TYPE: ICD-10-CM

## 2019-09-23 DIAGNOSIS — F41.9 ANXIETY: ICD-10-CM

## 2019-09-23 RX ORDER — ATORVASTATIN CALCIUM 80 MG/1
TABLET, FILM COATED ORAL
Qty: 90 TABLET | Refills: 0 | Status: ON HOLD | OUTPATIENT
Start: 2019-09-23 | End: 2019-11-18 | Stop reason: HOSPADM

## 2019-09-23 RX ORDER — HYDROXYZINE HYDROCHLORIDE 10 MG/1
TABLET, FILM COATED ORAL
Qty: 60 TABLET | Refills: 0 | Status: ON HOLD | OUTPATIENT
Start: 2019-09-23 | End: 2019-11-18 | Stop reason: HOSPADM

## 2019-09-24 RX ORDER — AMLODIPINE BESYLATE 2.5 MG/1
TABLET ORAL
Qty: 90 TABLET | Refills: 0 | Status: ON HOLD | OUTPATIENT
Start: 2019-09-24 | End: 2019-11-18 | Stop reason: HOSPADM

## 2019-09-25 DIAGNOSIS — E11.69 DIABETES MELLITUS TYPE 2 IN OBESE: ICD-10-CM

## 2019-09-25 DIAGNOSIS — F41.9 ANXIETY: ICD-10-CM

## 2019-09-25 DIAGNOSIS — E66.9 DIABETES MELLITUS TYPE 2 IN OBESE: ICD-10-CM

## 2019-09-25 RX ORDER — METFORMIN HYDROCHLORIDE 1000 MG/1
TABLET ORAL
Qty: 180 TABLET | Refills: 0 | Status: ON HOLD | OUTPATIENT
Start: 2019-09-25 | End: 2019-11-20 | Stop reason: HOSPADM

## 2019-09-25 NOTE — TELEPHONE ENCOUNTER
----- Message from Prisca Quinteros sent at 9/25/2019 11:47 AM CDT -----  Contact: Pt  .Type:  RX Refill Request    Who Called:  Pt   Refill or New Rx: refill   RX Name and Strength: HYDROXIZINE   How is the patient currently taking it? (ex. 1XDay):  Is this a 30 day or 90 day RX: 30 day   Preferred Pharmacy with phone number: .  [facility]/pharmacy #[id number] - [place] - [address] AT [facility]  [address]  [place] [address]  Phone: [phone] Fax: [phone]  Local or Mail Order: local   Ordering Provider: Finesse  Would the patient rather a call back or a response via [facility]?  Call back   Best Call Back Number: .[phone] (home) [phone] (work)    Additional Information:

## 2019-09-26 ENCOUNTER — OFFICE VISIT (OUTPATIENT)
Dept: INTERNAL MEDICINE | Facility: CLINIC | Age: 69
End: 2019-09-26
Payer: COMMERCIAL

## 2019-09-26 DIAGNOSIS — L29.9 CHRONIC PRURITUS: ICD-10-CM

## 2019-09-26 DIAGNOSIS — E11.649 UNCONTROLLED TYPE 2 DIABETES MELLITUS WITH HYPOGLYCEMIA WITHOUT COMA: ICD-10-CM

## 2019-09-26 DIAGNOSIS — E11.59 HYPERTENSION ASSOCIATED WITH DIABETES: ICD-10-CM

## 2019-09-26 DIAGNOSIS — I15.2 HYPERTENSION ASSOCIATED WITH DIABETES: ICD-10-CM

## 2019-09-26 DIAGNOSIS — E11.69 HYPERLIPIDEMIA ASSOCIATED WITH TYPE 2 DIABETES MELLITUS: ICD-10-CM

## 2019-09-26 DIAGNOSIS — Z00.00 ROUTINE GENERAL MEDICAL EXAMINATION AT A HEALTH CARE FACILITY: Primary | ICD-10-CM

## 2019-09-26 DIAGNOSIS — F32.9 CHRONIC MAJOR DEPRESSIVE DISORDER: Chronic | ICD-10-CM

## 2019-09-26 DIAGNOSIS — E78.5 HYPERLIPIDEMIA ASSOCIATED WITH TYPE 2 DIABETES MELLITUS: ICD-10-CM

## 2019-09-26 PROCEDURE — 3075F PR MOST RECENT SYSTOLIC BLOOD PRESS GE 130-139MM HG: ICD-10-PCS | Mod: CPTII,S$GLB,, | Performed by: FAMILY MEDICINE

## 2019-09-26 PROCEDURE — 3075F SYST BP GE 130 - 139MM HG: CPT | Mod: CPTII,S$GLB,, | Performed by: FAMILY MEDICINE

## 2019-09-26 PROCEDURE — 3045F PR MOST RECENT HEMOGLOBIN A1C LEVEL 7.0-9.0%: ICD-10-PCS | Mod: CPTII,S$GLB,, | Performed by: FAMILY MEDICINE

## 2019-09-26 PROCEDURE — 99999 PR PBB SHADOW E&M-EST. PATIENT-LVL IV: ICD-10-PCS | Mod: PBBFAC,,, | Performed by: FAMILY MEDICINE

## 2019-09-26 PROCEDURE — 99999 PR PBB SHADOW E&M-EST. PATIENT-LVL IV: CPT | Mod: PBBFAC,,, | Performed by: FAMILY MEDICINE

## 2019-09-26 PROCEDURE — 3045F PR MOST RECENT HEMOGLOBIN A1C LEVEL 7.0-9.0%: CPT | Mod: CPTII,S$GLB,, | Performed by: FAMILY MEDICINE

## 2019-09-26 PROCEDURE — 99397 PER PM REEVAL EST PAT 65+ YR: CPT | Mod: S$GLB,,, | Performed by: FAMILY MEDICINE

## 2019-09-26 PROCEDURE — 3078F DIAST BP <80 MM HG: CPT | Mod: CPTII,S$GLB,, | Performed by: FAMILY MEDICINE

## 2019-09-26 PROCEDURE — 99397 PR PREVENTIVE VISIT,EST,65 & OVER: ICD-10-PCS | Mod: S$GLB,,, | Performed by: FAMILY MEDICINE

## 2019-09-26 PROCEDURE — 3078F PR MOST RECENT DIASTOLIC BLOOD PRESSURE < 80 MM HG: ICD-10-PCS | Mod: CPTII,S$GLB,, | Performed by: FAMILY MEDICINE

## 2019-09-27 NOTE — PROGRESS NOTES
Digital Medicine Program Enrollment  HPI     5th  attempt for enrollment call.  No answer, left voicemail. Also sent enrolling provider message

## 2019-09-30 ENCOUNTER — PATIENT MESSAGE (OUTPATIENT)
Dept: DIABETES | Facility: CLINIC | Age: 69
End: 2019-09-30

## 2019-09-30 ENCOUNTER — TELEPHONE (OUTPATIENT)
Dept: OPHTHALMOLOGY | Facility: CLINIC | Age: 69
End: 2019-09-30

## 2019-09-30 ENCOUNTER — TELEPHONE (OUTPATIENT)
Dept: DIABETES | Facility: CLINIC | Age: 69
End: 2019-09-30

## 2019-09-30 RX ORDER — INSULIN ASPART 100 [IU]/ML
INJECTION, SOLUTION INTRAVENOUS; SUBCUTANEOUS
Qty: 27 SYRINGE | Refills: 1 | OUTPATIENT
Start: 2019-09-30

## 2019-09-30 RX ORDER — INSULIN ASPART 100 [IU]/ML
60 INJECTION, SOLUTION INTRAVENOUS; SUBCUTANEOUS CONTINUOUS
Qty: 2 VIAL | Refills: 3 | Status: ON HOLD | OUTPATIENT
Start: 2019-09-30 | End: 2019-11-20 | Stop reason: HOSPADM

## 2019-09-30 NOTE — TELEPHONE ENCOUNTER
----- Message from Aviva Coleman sent at 9/30/2019  8:16 AM CDT -----  Contact: Pt   Pt is calling regarding  requesting to cancel  appt and reschedule. .[phone] (home) [phone] (work)          .Thank You  Aviva Coleman

## 2019-09-30 NOTE — TELEPHONE ENCOUNTER
----- Message from Darleen Herrmann sent at 9/30/2019 11:29 AM CDT -----  Contact: Patient  Type:  Needs Medical Advice    Who Called: Patient  Symptoms (please be specific):   How long has patient had these symptoms:    Pharmacy name and phone #:    Would the patient rather a call back or a response via [facility]? call  Best Call Back Number: [phone]  Additional Information: Patient states that she was suppose to come in and have someone show her how to use the Major machine, but she has not heard from anyone

## 2019-09-30 NOTE — TELEPHONE ENCOUNTER
Returned patient call. Patient was not sure to come in or not on Wednesday for V-Go start.  Explained to that when she has received all supplies to call back to set up V-Go Start.

## 2019-10-01 ENCOUNTER — OFFICE VISIT (OUTPATIENT)
Dept: OPHTHALMOLOGY | Facility: CLINIC | Age: 69
End: 2019-10-01
Payer: COMMERCIAL

## 2019-10-01 DIAGNOSIS — E11.9 DIABETES MELLITUS TYPE 2 WITHOUT RETINOPATHY: Primary | ICD-10-CM

## 2019-10-01 DIAGNOSIS — H25.13 CATARACT, NUCLEAR SCLEROTIC SENILE, BILATERAL: ICD-10-CM

## 2019-10-01 DIAGNOSIS — H40.013 AT LOW RISK FOR OPEN-ANGLE GLAUCOMA IN BOTH EYES: ICD-10-CM

## 2019-10-01 DIAGNOSIS — H52.4 BILATERAL PRESBYOPIA: ICD-10-CM

## 2019-10-01 DIAGNOSIS — H52.03 HYPEROPIA, BILATERAL: ICD-10-CM

## 2019-10-01 PROCEDURE — 92015 DETERMINE REFRACTIVE STATE: CPT | Mod: S$GLB,,, | Performed by: OPTOMETRIST

## 2019-10-01 PROCEDURE — 92015 PR REFRACTION: ICD-10-PCS | Mod: S$GLB,,, | Performed by: OPTOMETRIST

## 2019-10-01 PROCEDURE — 92014 PR EYE EXAM, EST PATIENT,COMPREHESV: ICD-10-PCS | Mod: S$GLB,,, | Performed by: OPTOMETRIST

## 2019-10-01 PROCEDURE — 92014 COMPRE OPH EXAM EST PT 1/>: CPT | Mod: S$GLB,,, | Performed by: OPTOMETRIST

## 2019-10-01 PROCEDURE — 99999 PR PBB SHADOW E&M-EST. PATIENT-LVL I: ICD-10-PCS | Mod: PBBFAC,,, | Performed by: OPTOMETRIST

## 2019-10-01 PROCEDURE — 99999 PR PBB SHADOW E&M-EST. PATIENT-LVL I: CPT | Mod: PBBFAC,,, | Performed by: OPTOMETRIST

## 2019-10-01 RX ORDER — VENLAFAXINE HYDROCHLORIDE 75 MG/1
CAPSULE, EXTENDED RELEASE ORAL
Qty: 90 CAPSULE | Refills: 1 | OUTPATIENT
Start: 2019-10-01

## 2019-10-01 RX ORDER — HYDROXYZINE HYDROCHLORIDE 10 MG/1
10 TABLET, FILM COATED ORAL 2 TIMES DAILY PRN
Qty: 60 TABLET | Refills: 0 | OUTPATIENT
Start: 2019-10-01

## 2019-10-01 NOTE — PROGRESS NOTES
HPI     6 months diabetic exam.  IDDM exam.  Decrease near visual acuity without glasses.  Last eye exam 03/13/2019 TRF.   Update glasses RX.    Last edited by Rosa Isela Smith on 10/1/2019  9:01 AM. (History)            Assessment /Plan     For exam results, see Encounter Report.    Diabetes mellitus type 2 without retinopathy    Cataract, nuclear sclerotic senile, bilateral    At low risk for open-angle glaucoma in both eyes    Hyperopia, bilateral    Bilateral presbyopia      No Background Diabetic Retinopathy    Mild cataracts OU, not surgical.    Stable IOP    Dispense Final Rx for glasses.  RTC 1 year  Discussed above and answered questions.

## 2019-10-02 ENCOUNTER — TELEPHONE (OUTPATIENT)
Dept: ENDOSCOPY | Facility: HOSPITAL | Age: 69
End: 2019-10-02

## 2019-10-02 VITALS
TEMPERATURE: 97 F | DIASTOLIC BLOOD PRESSURE: 70 MMHG | HEART RATE: 91 BPM | SYSTOLIC BLOOD PRESSURE: 138 MMHG | WEIGHT: 153.25 LBS | OXYGEN SATURATION: 98 % | HEIGHT: 61 IN | BODY MASS INDEX: 28.93 KG/M2

## 2019-10-02 DIAGNOSIS — F32.9 CHRONIC MAJOR DEPRESSIVE DISORDER: Chronic | ICD-10-CM

## 2019-10-02 PROBLEM — L29.9 CHRONIC PRURITUS: Status: ACTIVE | Noted: 2019-10-02

## 2019-10-02 NOTE — TELEPHONE ENCOUNTER
Spoke with patient and rescheduled procedure from 10- d/t transportation concerns to 11- with dr das.  New instructions mailed to address on file and patient verbalized understanding.

## 2019-10-02 NOTE — TELEPHONE ENCOUNTER
----- Message from Zack Gardner sent at 10/2/2019  3:22 PM CDT -----  Contact: Pt   States she's calling to reschd her colonoscopy due to transport reasons and can be reached at [phone]//thanks/dbw

## 2019-10-02 NOTE — TELEPHONE ENCOUNTER
----- Message from Jessica Marte sent at 10/2/2019  9:45 AM CDT -----  Contact: Patient  .Type:  RX Refill Request    Who Called: Patient  Refill or New Rx: Refill  RX Name and Strength:venlafaxine (EFFEXOR-XR) 150 MG Cp24  How is the patient currently taking it? (ex. 1XDay): 1X day  Is this a 30 day or 90 day RX:  Preferred Pharmacy with phone number:.    [facility]/pharmacy #[id number] - [place] - [address] AT [facility]  [address]  [place] [address]  Phone: [phone] Fax: [phone]      Local or Mail Order: Local  Ordering Provider:Finesse  Would the patient rather a call back or a response via [facility]? [facility]  Best Call Back Number: .  Additional Information:

## 2019-10-03 ENCOUNTER — PATIENT MESSAGE (OUTPATIENT)
Dept: DIABETES | Facility: CLINIC | Age: 69
End: 2019-10-03

## 2019-10-03 RX ORDER — VENLAFAXINE HYDROCHLORIDE 150 MG/1
150 CAPSULE, EXTENDED RELEASE ORAL DAILY
Qty: 30 CAPSULE | Refills: 5 | Status: SHIPPED | OUTPATIENT
Start: 2019-10-03 | End: 2020-02-19

## 2019-10-03 NOTE — PROGRESS NOTES
Subjective:       Patient ID: Mateusz Bates is a 69 y.o. female.    Chief Complaint: Annual Exam    Patient presents to clinic today for annual physical exam.    Review of Systems   Constitutional: Negative for activity change and unexpected weight change.   HENT: Negative for hearing loss, rhinorrhea and trouble swallowing.    Eyes: Negative for discharge and visual disturbance.   Respiratory: Negative for chest tightness and wheezing.    Cardiovascular: Negative for chest pain and palpitations.   Gastrointestinal: Negative for blood in stool, constipation, diarrhea and vomiting.   Endocrine: Negative for polydipsia and polyuria.   Genitourinary: Negative for difficulty urinating, dysuria, hematuria and menstrual problem.   Musculoskeletal: Negative for arthralgias, joint swelling and neck pain.   Neurological: Positive for headaches. Negative for weakness.   Psychiatric/Behavioral: Positive for dysphoric mood. Negative for confusion.       Objective:      Physical Exam   Constitutional: She is oriented to person, place, and time. Vital signs are normal. She appears well-developed and well-nourished. No distress.   HENT:   Head: Normocephalic and atraumatic.   Right Ear: Tympanic membrane, external ear and ear canal normal.   Left Ear: Tympanic membrane, external ear and ear canal normal.   Nose: Nose normal. No mucosal edema or rhinorrhea.   Mouth/Throat: Uvula is midline, oropharynx is clear and moist and mucous membranes are normal.   Eyes: Pupils are equal, round, and reactive to light. Conjunctivae, EOM and lids are normal.   Neck: Normal range of motion. Neck supple. No thyromegaly present.   Cardiovascular: Normal rate and regular rhythm. Exam reveals no gallop and no friction rub.   No murmur heard.  Pulmonary/Chest: Effort normal and breath sounds normal. She has no wheezes. She has no rhonchi. She has no rales.   Abdominal: Soft. Normal appearance and bowel sounds are normal. She exhibits no distension  and no mass. There is no hepatosplenomegaly. There is no tenderness.   Musculoskeletal: Normal range of motion.   Lymphadenopathy:     She has no cervical adenopathy.   Neurological: She is alert and oriented to person, place, and time. She has normal strength. No cranial nerve deficit or sensory deficit. Gait normal.   Reflex Scores:       Patellar reflexes are 2+ on the right side and 2+ on the left side.  Skin: Skin is warm and dry. No lesion and no rash noted. No cyanosis. Nails show no clubbing.   Psychiatric: She has a normal mood and affect.   Vitals reviewed.      Assessment:       1. Routine general medical examination at a health care facility    2. Hypertension associated with diabetes    3. Hyperlipidemia associated with type 2 diabetes mellitus    4. Chronic pruritus    5. Chronic major depressive disorder    6. Uncontrolled type 2 diabetes mellitus with hypoglycemia without coma        Plan:     Problem List Items Addressed This Visit     Chronic major depressive disorder (Chronic)    Current Assessment & Plan     Stable on effexor         Chronic pruritus    Current Assessment & Plan     Advised follow up with Dermatology; advised to discuss need for hydroxyzine with dermatology; if purely for anxiety advised we can consider other treatment options; Patient expressed understanding.           Relevant Orders    Ambulatory Referral to Dermatology    Diabetes mellitus type II, uncontrolled    Current Assessment & Plan     Followed by diabetic education         Hyperlipidemia associated with type 2 diabetes mellitus    Current Assessment & Plan     Status pending labs, continue atorvastatin           Relevant Orders    Lipid panel    Hypertension associated with diabetes    Current Assessment & Plan     Controlled, continue amlodipine         Relevant Orders    TSH    CBC auto differential      Other Visit Diagnoses     Routine general medical examination at a health care facility    -  Primary           Health Maintenance reviewed/updated.  Patient reports having evaluation by Dr. Mccann; she has follow up with Dr. Keller, plans to bring family member to appointment.

## 2019-10-03 NOTE — ASSESSMENT & PLAN NOTE
Advised follow up with Dermatology; advised to discuss need for hydroxyzine with dermatology; if purely for anxiety advised we can consider other treatment options; Patient expressed understanding.

## 2019-10-04 ENCOUNTER — LAB VISIT (OUTPATIENT)
Dept: LAB | Facility: HOSPITAL | Age: 69
End: 2019-10-04
Attending: FAMILY MEDICINE
Payer: COMMERCIAL

## 2019-10-04 ENCOUNTER — INITIAL CONSULT (OUTPATIENT)
Dept: DERMATOLOGY | Facility: CLINIC | Age: 69
End: 2019-10-04
Payer: COMMERCIAL

## 2019-10-04 DIAGNOSIS — E11.69 HYPERLIPIDEMIA ASSOCIATED WITH TYPE 2 DIABETES MELLITUS: ICD-10-CM

## 2019-10-04 DIAGNOSIS — E11.59 HYPERTENSION ASSOCIATED WITH DIABETES: ICD-10-CM

## 2019-10-04 DIAGNOSIS — E78.5 HYPERLIPIDEMIA ASSOCIATED WITH TYPE 2 DIABETES MELLITUS: ICD-10-CM

## 2019-10-04 DIAGNOSIS — I15.2 HYPERTENSION ASSOCIATED WITH DIABETES: ICD-10-CM

## 2019-10-04 DIAGNOSIS — L30.9 DERMATITIS: Primary | ICD-10-CM

## 2019-10-04 LAB
BASOPHILS # BLD AUTO: 0.02 K/UL (ref 0–0.2)
BASOPHILS NFR BLD: 0.3 % (ref 0–1.9)
CHOLEST SERPL-MCNC: 189 MG/DL (ref 120–199)
CHOLEST/HDLC SERPL: 4.5 {RATIO} (ref 2–5)
DIFFERENTIAL METHOD: ABNORMAL
EOSINOPHIL # BLD AUTO: 0.2 K/UL (ref 0–0.5)
EOSINOPHIL NFR BLD: 2.9 % (ref 0–8)
ERYTHROCYTE [DISTWIDTH] IN BLOOD BY AUTOMATED COUNT: 14.6 % (ref 11.5–14.5)
HCT VFR BLD AUTO: 37.3 % (ref 37–48.5)
HDLC SERPL-MCNC: 42 MG/DL (ref 40–75)
HDLC SERPL: 22.2 % (ref 20–50)
HGB BLD-MCNC: 11.1 G/DL (ref 12–16)
IMM GRANULOCYTES # BLD AUTO: 0.03 K/UL (ref 0–0.04)
IMM GRANULOCYTES NFR BLD AUTO: 0.4 % (ref 0–0.5)
LDLC SERPL CALC-MCNC: 123.2 MG/DL (ref 63–159)
LYMPHOCYTES # BLD AUTO: 1.7 K/UL (ref 1–4.8)
LYMPHOCYTES NFR BLD: 23.3 % (ref 18–48)
MCH RBC QN AUTO: 26.6 PG (ref 27–31)
MCHC RBC AUTO-ENTMCNC: 29.8 G/DL (ref 32–36)
MCV RBC AUTO: 89 FL (ref 82–98)
MONOCYTES # BLD AUTO: 0.6 K/UL (ref 0.3–1)
MONOCYTES NFR BLD: 8.2 % (ref 4–15)
NEUTROPHILS # BLD AUTO: 4.8 K/UL (ref 1.8–7.7)
NEUTROPHILS NFR BLD: 64.9 % (ref 38–73)
NONHDLC SERPL-MCNC: 147 MG/DL
NRBC BLD-RTO: 0 /100 WBC
PLATELET # BLD AUTO: 325 K/UL (ref 150–350)
PMV BLD AUTO: 10.8 FL (ref 9.2–12.9)
RBC # BLD AUTO: 4.17 M/UL (ref 4–5.4)
TRIGL SERPL-MCNC: 119 MG/DL (ref 30–150)
TSH SERPL DL<=0.005 MIU/L-ACNC: 0.82 UIU/ML (ref 0.4–4)
WBC # BLD AUTO: 7.46 K/UL (ref 3.9–12.7)

## 2019-10-04 PROCEDURE — 1101F PT FALLS ASSESS-DOCD LE1/YR: CPT | Mod: CPTII,S$GLB,, | Performed by: STUDENT IN AN ORGANIZED HEALTH CARE EDUCATION/TRAINING PROGRAM

## 2019-10-04 PROCEDURE — 99213 PR OFFICE/OUTPT VISIT, EST, LEVL III, 20-29 MIN: ICD-10-PCS | Mod: S$GLB,,, | Performed by: STUDENT IN AN ORGANIZED HEALTH CARE EDUCATION/TRAINING PROGRAM

## 2019-10-04 PROCEDURE — 85025 COMPLETE CBC W/AUTO DIFF WBC: CPT

## 2019-10-04 PROCEDURE — 1101F PR PT FALLS ASSESS DOC 0-1 FALLS W/OUT INJ PAST YR: ICD-10-PCS | Mod: CPTII,S$GLB,, | Performed by: STUDENT IN AN ORGANIZED HEALTH CARE EDUCATION/TRAINING PROGRAM

## 2019-10-04 PROCEDURE — 99999 PR PBB SHADOW E&M-EST. PATIENT-LVL II: CPT | Mod: PBBFAC,,, | Performed by: STUDENT IN AN ORGANIZED HEALTH CARE EDUCATION/TRAINING PROGRAM

## 2019-10-04 PROCEDURE — 80061 LIPID PANEL: CPT

## 2019-10-04 PROCEDURE — 99213 OFFICE O/P EST LOW 20 MIN: CPT | Mod: S$GLB,,, | Performed by: STUDENT IN AN ORGANIZED HEALTH CARE EDUCATION/TRAINING PROGRAM

## 2019-10-04 PROCEDURE — 84443 ASSAY THYROID STIM HORMONE: CPT

## 2019-10-04 PROCEDURE — 99999 PR PBB SHADOW E&M-EST. PATIENT-LVL II: ICD-10-PCS | Mod: PBBFAC,,, | Performed by: STUDENT IN AN ORGANIZED HEALTH CARE EDUCATION/TRAINING PROGRAM

## 2019-10-04 PROCEDURE — 36415 COLL VENOUS BLD VENIPUNCTURE: CPT

## 2019-10-04 RX ORDER — LEVOCETIRIZINE DIHYDROCHLORIDE 5 MG/1
5 TABLET, FILM COATED ORAL NIGHTLY
Qty: 30 TABLET | Refills: 3 | Status: SHIPPED | OUTPATIENT
Start: 2019-10-04 | End: 2019-10-07 | Stop reason: SDUPTHER

## 2019-10-04 RX ORDER — BETAMETHASONE VALERATE 1.2 MG/G
CREAM TOPICAL 2 TIMES DAILY
Qty: 45 G | Refills: 2 | Status: ON HOLD | OUTPATIENT
Start: 2019-10-04 | End: 2019-11-18 | Stop reason: HOSPADM

## 2019-10-04 NOTE — LETTER
October 4, 2019      Serenity Kilpatrick MD  [address] [place] [address] [address] [place] [address]           [facility]  [address] [facility]  [place] [address]  Phone: [phone]  Fax: [phone]          Patient: Mateusz Bates   MR Number: [id number]   YOB: 1950   Date of Visit: 10/4/2019       Dear Dr. Serenity Kilpatrick:    Thank you for referring Mateusz Bates to me for evaluation. Attached you will find relevant portions of my assessment and plan of care.    If you have questions, please do not hesitate to call me. I look forward to following Mateusz Bates along with you.    Sincerely,    Robbie Cochran MD    Enclosure  CC:  No Recipients    If you would like to receive this communication electronically, please contact [email or web address] or [phone] to request more information on [employer] Link access.    For providers and/or their staff who would like to refer a patient to [facility], please contact us through our one-stop-shop provider referral line, [facility], at [phone].    If you feel you have received this communication in error or would no longer like to receive these types of communications, please e-mail [email or web address]

## 2019-10-04 NOTE — PROGRESS NOTES
Subjective:       Patient ID:  Mateusz Bates is a 69 y.o. female who presents for   Chief Complaint   Patient presents with    Itching     c/o itching to body x weeks tx none      History of Present Illness: The patient presents for follow-up of a rash. Last seen on 7/24/19. Not currently using anything for her rash. Was previously using hydroxyzine but was taken off treatment. Reports that her symptoms are flared and complains of itching.           Review of Systems   Skin: Positive for itching, rash and dry skin.        Objective:    Physical Exam   Constitutional: She appears well-developed and well-nourished. No distress.   Neurological: She is alert and oriented to person, place, and time. She is not disoriented.   Psychiatric: She has a normal mood and affect.   Skin:   Areas Examined (abnormalities noted in diagram):   Head / Face Inspection Performed  Neck Inspection Performed  Chest / Axilla Inspection Performed  Abdomen Inspection Performed  Back Inspection Performed  RUE Inspected  LUE Inspection Performed              Diagram Legend     Erythematous scaling macule/papule c/w actinic keratosis       Vascular papule c/w angioma      Pigmented verrucoid papule/plaque c/w seborrheic keratosis      Yellow umbilicated papule c/w sebaceous hyperplasia      Irregularly shaped tan macule c/w lentigo     1-2 mm smooth white papules consistent with Milia      Movable subcutaneous cyst with punctum c/w epidermal inclusion cyst      Subcutaneous movable cyst c/w pilar cyst      Firm pink to brown papule c/w dermatofibroma      Pedunculated fleshy papule(s) c/w skin tag(s)      Evenly pigmented macule c/w junctional nevus     Mildly variegated pigmented, slightly irregular-bordered macule c/w mildly atypical nevus      Flesh colored to evenly pigmented papule c/w intradermal nevus       Pink pearly papule/plaque c/w basal cell carcinoma      Erythematous hyperkeratotic cursted plaque c/w SCC      Surgical scar with  no sign of skin cancer recurrence      Open and closed comedones      Inflammatory papules and pustules      Verrucoid papule consistent consistent with wart     Erythematous eczematous patches and plaques     Dystrophic onycholytic nail with subungual debris c/w onychomycosis     Umbilicated papule    Erythematous-base heme-crusted tan verrucoid plaque consistent with inflamed seborrheic keratosis     Erythematous Silvery Scaling Plaque c/w Psoriasis     See annotation      Assessment / Plan:        Dermatitis  -     betamethasone valerate 0.1% (VALISONE) 0.1 % Crea; Apply topically 2 (two) times daily.  Dispense: 45 g; Refill: 2  -     levocetirizine (XYZAL) 5 MG tablet; Take 1 tablet (5 mg total) by mouth every evening.  Dispense: 30 tablet; Refill: 3             Follow up in about 6 weeks (around 11/15/2019).

## 2019-10-05 ENCOUNTER — PATIENT MESSAGE (OUTPATIENT)
Dept: DERMATOLOGY | Facility: CLINIC | Age: 69
End: 2019-10-05

## 2019-10-07 ENCOUNTER — PATIENT MESSAGE (OUTPATIENT)
Dept: DERMATOLOGY | Facility: CLINIC | Age: 69
End: 2019-10-07

## 2019-10-07 DIAGNOSIS — L30.9 DERMATITIS: ICD-10-CM

## 2019-10-08 RX ORDER — LEVOCETIRIZINE DIHYDROCHLORIDE 5 MG/1
5 TABLET, FILM COATED ORAL NIGHTLY
Qty: 30 TABLET | Refills: 3 | Status: ON HOLD | OUTPATIENT
Start: 2019-10-08 | End: 2019-11-18 | Stop reason: HOSPADM

## 2019-10-17 NOTE — PROGRESS NOTES
6th attempt to complete enrollment call. No answer, left voicemail. Sent incomplete enrollment letter.

## 2019-10-27 DIAGNOSIS — F41.9 ANXIETY: ICD-10-CM

## 2019-10-28 ENCOUNTER — OFFICE VISIT (OUTPATIENT)
Dept: INTERNAL MEDICINE | Facility: CLINIC | Age: 69
End: 2019-10-28
Payer: COMMERCIAL

## 2019-10-28 ENCOUNTER — HOSPITAL ENCOUNTER (INPATIENT)
Facility: HOSPITAL | Age: 69
LOS: 23 days | Discharge: NURSING, REHAB OR LONG-TERM CARE | DRG: 233 | End: 2019-11-20
Attending: EMERGENCY MEDICINE | Admitting: INTERNAL MEDICINE
Payer: COMMERCIAL

## 2019-10-28 VITALS
BODY MASS INDEX: 29.41 KG/M2 | WEIGHT: 155.63 LBS | TEMPERATURE: 98 F | OXYGEN SATURATION: 97 % | SYSTOLIC BLOOD PRESSURE: 130 MMHG | HEART RATE: 94 BPM | DIASTOLIC BLOOD PRESSURE: 70 MMHG

## 2019-10-28 DIAGNOSIS — I21.4 NSTEMI (NON-ST ELEVATED MYOCARDIAL INFARCTION): ICD-10-CM

## 2019-10-28 DIAGNOSIS — R07.9 CHEST PAIN, UNSPECIFIED TYPE: Primary | ICD-10-CM

## 2019-10-28 DIAGNOSIS — Z95.1 S/P CABG X 3: ICD-10-CM

## 2019-10-28 DIAGNOSIS — I10 ESSENTIAL (PRIMARY) HYPERTENSION: ICD-10-CM

## 2019-10-28 DIAGNOSIS — I15.2 HYPERTENSION ASSOCIATED WITH DIABETES: ICD-10-CM

## 2019-10-28 DIAGNOSIS — R60.0 EDEMA OF HAND: ICD-10-CM

## 2019-10-28 DIAGNOSIS — I25.10 CAD, MULTIPLE VESSEL: ICD-10-CM

## 2019-10-28 DIAGNOSIS — D69.6 THROMBOCYTOPENIA: ICD-10-CM

## 2019-10-28 DIAGNOSIS — Z99.11 ON MECHANICALLY ASSISTED VENTILATION: ICD-10-CM

## 2019-10-28 DIAGNOSIS — R94.31 EKG ABNORMALITIES: ICD-10-CM

## 2019-10-28 DIAGNOSIS — R06.02 SOB (SHORTNESS OF BREATH): ICD-10-CM

## 2019-10-28 DIAGNOSIS — I82.621 ACUTE DEEP VEIN THROMBOSIS (DVT) OF RIGHT UPPER EXTREMITY: ICD-10-CM

## 2019-10-28 DIAGNOSIS — J96.01 ACUTE HYPOXEMIC RESPIRATORY FAILURE: ICD-10-CM

## 2019-10-28 DIAGNOSIS — D75.829 HIT (HEPARIN-INDUCED THROMBOCYTOPENIA): ICD-10-CM

## 2019-10-28 DIAGNOSIS — I20.0 UNSTABLE ANGINA PECTORIS: ICD-10-CM

## 2019-10-28 DIAGNOSIS — F32.A ANXIETY AND DEPRESSION: ICD-10-CM

## 2019-10-28 DIAGNOSIS — D50.9 IRON DEFICIENCY ANEMIA, UNSPECIFIED IRON DEFICIENCY ANEMIA TYPE: ICD-10-CM

## 2019-10-28 DIAGNOSIS — I46.9 CARDIAC ARREST: ICD-10-CM

## 2019-10-28 DIAGNOSIS — E11.59 HYPERTENSION ASSOCIATED WITH DIABETES: ICD-10-CM

## 2019-10-28 DIAGNOSIS — Z01.810 PRE-OPERATIVE CARDIOVASCULAR EXAMINATION: ICD-10-CM

## 2019-10-28 DIAGNOSIS — F41.9 ANXIETY AND DEPRESSION: ICD-10-CM

## 2019-10-28 DIAGNOSIS — I50.41 ACUTE COMBINED SYSTOLIC AND DIASTOLIC CHF, NYHA CLASS 2: ICD-10-CM

## 2019-10-28 DIAGNOSIS — Z98.890 POST-OPERATIVE STATE: ICD-10-CM

## 2019-10-28 DIAGNOSIS — E16.2 HYPOGLYCEMIA: Primary | ICD-10-CM

## 2019-10-28 DIAGNOSIS — I82.90 ACUTE DEEP VEIN THROMBOSIS (DVT) OF NON-EXTREMITY VEIN: ICD-10-CM

## 2019-10-28 DIAGNOSIS — E11.65 UNCONTROLLED TYPE 2 DIABETES MELLITUS WITH HYPERGLYCEMIA: Chronic | ICD-10-CM

## 2019-10-28 DIAGNOSIS — R07.9 CHEST PAIN: ICD-10-CM

## 2019-10-28 DIAGNOSIS — D62 ACUTE BLOOD LOSS ANEMIA: ICD-10-CM

## 2019-10-28 DIAGNOSIS — I25.10 CAD (CORONARY ARTERY DISEASE): ICD-10-CM

## 2019-10-28 DIAGNOSIS — I25.10 CORONARY ARTERY DISEASE, ANGINA PRESENCE UNSPECIFIED, UNSPECIFIED VESSEL OR LESION TYPE, UNSPECIFIED WHETHER NATIVE OR TRANSPLANTED HEART: ICD-10-CM

## 2019-10-28 PROBLEM — I25.9 CHEST PAIN DUE TO MYOCARDIAL ISCHEMIA: Status: ACTIVE | Noted: 2019-10-28

## 2019-10-28 LAB
ALBUMIN SERPL BCP-MCNC: 3.6 G/DL (ref 3.5–5.2)
ALP SERPL-CCNC: 79 U/L (ref 55–135)
ALT SERPL W/O P-5'-P-CCNC: 13 U/L (ref 10–44)
ANION GAP SERPL CALC-SCNC: 14 MMOL/L (ref 8–16)
AORTIC VALVE REGURGITATION: ABNORMAL
APTT BLDCRRT: 26.2 SEC (ref 21–32)
AST SERPL-CCNC: 29 U/L (ref 10–40)
BASOPHILS # BLD AUTO: 0.03 K/UL (ref 0–0.2)
BASOPHILS NFR BLD: 0.3 % (ref 0–1.9)
BILIRUB SERPL-MCNC: 0.5 MG/DL (ref 0.1–1)
BNP SERPL-MCNC: 1305 PG/ML (ref 0–99)
BUN SERPL-MCNC: 15 MG/DL (ref 8–23)
CALCIUM SERPL-MCNC: 9.6 MG/DL (ref 8.7–10.5)
CHLORIDE SERPL-SCNC: 102 MMOL/L (ref 95–110)
CHOLEST SERPL-MCNC: 181 MG/DL (ref 120–199)
CHOLEST/HDLC SERPL: 4.4 {RATIO} (ref 2–5)
CK SERPL-CCNC: 197 U/L (ref 20–180)
CO2 SERPL-SCNC: 24 MMOL/L (ref 23–29)
CORONARY STENOSIS: ABNORMAL
CREAT SERPL-MCNC: 0.7 MG/DL (ref 0.5–1.4)
DIASTOLIC DYSFUNCTION: YES
DIFFERENTIAL METHOD: ABNORMAL
EOSINOPHIL # BLD AUTO: 0.2 K/UL (ref 0–0.5)
EOSINOPHIL NFR BLD: 1.7 % (ref 0–8)
ERYTHROCYTE [DISTWIDTH] IN BLOOD BY AUTOMATED COUNT: 15.2 % (ref 11.5–14.5)
EST. GFR  (AFRICAN AMERICAN): >60 ML/MIN/1.73 M^2
EST. GFR  (NON AFRICAN AMERICAN): >60 ML/MIN/1.73 M^2
GLUCOSE SERPL-MCNC: 64 MG/DL (ref 70–110)
HCT VFR BLD AUTO: 35.9 % (ref 37–48.5)
HCV AB SERPL QL IA: NEGATIVE
HDLC SERPL-MCNC: 41 MG/DL (ref 40–75)
HDLC SERPL: 22.7 % (ref 20–50)
HGB BLD-MCNC: 11.2 G/DL (ref 12–16)
IMM GRANULOCYTES # BLD AUTO: 0.03 K/UL (ref 0–0.04)
IMM GRANULOCYTES NFR BLD AUTO: 0.3 % (ref 0–0.5)
INR PPP: 1 (ref 0.8–1.2)
LDLC SERPL CALC-MCNC: 124.4 MG/DL (ref 63–159)
LYMPHOCYTES # BLD AUTO: 2.2 K/UL (ref 1–4.8)
LYMPHOCYTES NFR BLD: 25.1 % (ref 18–48)
MCH RBC QN AUTO: 26.9 PG (ref 27–31)
MCHC RBC AUTO-ENTMCNC: 31.2 G/DL (ref 32–36)
MCV RBC AUTO: 86 FL (ref 82–98)
MITRAL VALVE MOBILITY: NORMAL
MITRAL VALVE REGURGITATION: ABNORMAL
MONOCYTES # BLD AUTO: 0.6 K/UL (ref 0.3–1)
MONOCYTES NFR BLD: 7.2 % (ref 4–15)
NEUTROPHILS # BLD AUTO: 5.8 K/UL (ref 1.8–7.7)
NEUTROPHILS NFR BLD: 65.4 % (ref 38–73)
NONHDLC SERPL-MCNC: 140 MG/DL
NRBC BLD-RTO: 0 /100 WBC
PLATELET # BLD AUTO: 381 K/UL (ref 150–350)
PMV BLD AUTO: 9.9 FL (ref 9.2–12.9)
POCT GLUCOSE: 123 MG/DL (ref 70–110)
POCT GLUCOSE: 126 MG/DL (ref 70–110)
POTASSIUM SERPL-SCNC: 3.4 MMOL/L (ref 3.5–5.1)
PROT SERPL-MCNC: 7.4 G/DL (ref 6–8.4)
PROTHROMBIN TIME: 10.6 SEC (ref 9–12.5)
RBC # BLD AUTO: 4.17 M/UL (ref 4–5.4)
RETIRED EF AND QEF - SEE NOTES: 30 (ref 55–65)
SODIUM SERPL-SCNC: 140 MMOL/L (ref 136–145)
TRIGL SERPL-MCNC: 78 MG/DL (ref 30–150)
TROPONIN I SERPL DL<=0.01 NG/ML-MCNC: 2.1 NG/ML (ref 0–0.03)
TROPONIN I SERPL DL<=0.01 NG/ML-MCNC: 3.06 NG/ML (ref 0–0.03)
TROPONIN I SERPL DL<=0.01 NG/ML-MCNC: 3.67 NG/ML (ref 0–0.03)
WBC # BLD AUTO: 8.89 K/UL (ref 3.9–12.7)

## 2019-10-28 PROCEDURE — 80061 LIPID PANEL: CPT

## 2019-10-28 PROCEDURE — 82550 ASSAY OF CK (CPK): CPT

## 2019-10-28 PROCEDURE — 25000003 PHARM REV CODE 250: Performed by: EMERGENCY MEDICINE

## 2019-10-28 PROCEDURE — 1101F PR PT FALLS ASSESS DOC 0-1 FALLS W/OUT INJ PAST YR: ICD-10-PCS | Mod: CPTII,S$GLB,, | Performed by: FAMILY MEDICINE

## 2019-10-28 PROCEDURE — 63600175 PHARM REV CODE 636 W HCPCS

## 2019-10-28 PROCEDURE — 85730 THROMBOPLASTIN TIME PARTIAL: CPT

## 2019-10-28 PROCEDURE — 93005 ELECTROCARDIOGRAM TRACING: CPT

## 2019-10-28 PROCEDURE — 93010 ELECTROCARDIOGRAM REPORT: CPT | Mod: S$GLB,,, | Performed by: INTERNAL MEDICINE

## 2019-10-28 PROCEDURE — 80053 COMPREHEN METABOLIC PANEL: CPT

## 2019-10-28 PROCEDURE — 25000003 PHARM REV CODE 250: Performed by: PHYSICIAN ASSISTANT

## 2019-10-28 PROCEDURE — 99152 CATH LAB PROCEDURE: ICD-10-PCS | Mod: ,,, | Performed by: INTERNAL MEDICINE

## 2019-10-28 PROCEDURE — 93005 EKG 12-LEAD: ICD-10-PCS | Mod: S$GLB,,, | Performed by: FAMILY MEDICINE

## 2019-10-28 PROCEDURE — 85610 PROTHROMBIN TIME: CPT

## 2019-10-28 PROCEDURE — 75625 CONTRAST EXAM ABDOMINL AORTA: CPT | Mod: 26,59,, | Performed by: INTERNAL MEDICINE

## 2019-10-28 PROCEDURE — 93010 EKG 12-LEAD: ICD-10-PCS | Mod: ,,, | Performed by: INTERNAL MEDICINE

## 2019-10-28 PROCEDURE — 99152 MOD SED SAME PHYS/QHP 5/>YRS: CPT

## 2019-10-28 PROCEDURE — 85025 COMPLETE CBC W/AUTO DIFF WBC: CPT

## 2019-10-28 PROCEDURE — 93005 ELECTROCARDIOGRAM TRACING: CPT | Mod: S$GLB,,, | Performed by: FAMILY MEDICINE

## 2019-10-28 PROCEDURE — 75625 CONTRAST EXAM ABDOMINL AORTA: CPT | Mod: 59

## 2019-10-28 PROCEDURE — 99255 PR INITIAL INPATIENT CONSULT,LEVL V: ICD-10-PCS | Mod: 25,,, | Performed by: INTERNAL MEDICINE

## 2019-10-28 PROCEDURE — 99214 OFFICE O/P EST MOD 30 MIN: CPT | Mod: S$GLB,,, | Performed by: FAMILY MEDICINE

## 2019-10-28 PROCEDURE — 86803 HEPATITIS C AB TEST: CPT

## 2019-10-28 PROCEDURE — 93306 2D ECHO WITH COLOR FLOW DOPPLER: ICD-10-PCS | Mod: 26,,, | Performed by: INTERNAL MEDICINE

## 2019-10-28 PROCEDURE — 3078F PR MOST RECENT DIASTOLIC BLOOD PRESSURE < 80 MM HG: ICD-10-PCS | Mod: CPTII,S$GLB,, | Performed by: FAMILY MEDICINE

## 2019-10-28 PROCEDURE — 93306 TTE W/DOPPLER COMPLETE: CPT | Mod: 26,,, | Performed by: INTERNAL MEDICINE

## 2019-10-28 PROCEDURE — 84484 ASSAY OF TROPONIN QUANT: CPT

## 2019-10-28 PROCEDURE — 93458 CATH LAB PROCEDURE: ICD-10-PCS | Mod: 26,,, | Performed by: INTERNAL MEDICINE

## 2019-10-28 PROCEDURE — 36415 COLL VENOUS BLD VENIPUNCTURE: CPT

## 2019-10-28 PROCEDURE — 25000003 PHARM REV CODE 250

## 2019-10-28 PROCEDURE — 75625 CATH LAB PROCEDURE: ICD-10-PCS | Mod: 26,59,, | Performed by: INTERNAL MEDICINE

## 2019-10-28 PROCEDURE — 93010 EKG 12-LEAD: ICD-10-PCS | Mod: S$GLB,,, | Performed by: INTERNAL MEDICINE

## 2019-10-28 PROCEDURE — 3075F PR MOST RECENT SYSTOLIC BLOOD PRESS GE 130-139MM HG: ICD-10-PCS | Mod: CPTII,S$GLB,, | Performed by: FAMILY MEDICINE

## 2019-10-28 PROCEDURE — 99999 PR PBB SHADOW E&M-EST. PATIENT-LVL IV: CPT | Mod: PBBFAC,,, | Performed by: FAMILY MEDICINE

## 2019-10-28 PROCEDURE — 63600175 PHARM REV CODE 636 W HCPCS: Performed by: EMERGENCY MEDICINE

## 2019-10-28 PROCEDURE — 93458 L HRT ARTERY/VENTRICLE ANGIO: CPT | Mod: 26,,, | Performed by: INTERNAL MEDICINE

## 2019-10-28 PROCEDURE — 99291 CRITICAL CARE FIRST HOUR: CPT | Mod: 25

## 2019-10-28 PROCEDURE — 63600175 PHARM REV CODE 636 W HCPCS: Performed by: PHYSICIAN ASSISTANT

## 2019-10-28 PROCEDURE — 93306 TTE W/DOPPLER COMPLETE: CPT

## 2019-10-28 PROCEDURE — 99255 IP/OBS CONSLTJ NEW/EST HI 80: CPT | Mod: 25,,, | Performed by: INTERNAL MEDICINE

## 2019-10-28 PROCEDURE — 84484 ASSAY OF TROPONIN QUANT: CPT | Mod: 91

## 2019-10-28 PROCEDURE — 93010 ELECTROCARDIOGRAM REPORT: CPT | Mod: ,,, | Performed by: INTERNAL MEDICINE

## 2019-10-28 PROCEDURE — 1101F PT FALLS ASSESS-DOCD LE1/YR: CPT | Mod: CPTII,S$GLB,, | Performed by: FAMILY MEDICINE

## 2019-10-28 PROCEDURE — 99999 PR PBB SHADOW E&M-EST. PATIENT-LVL IV: ICD-10-PCS | Mod: PBBFAC,,, | Performed by: FAMILY MEDICINE

## 2019-10-28 PROCEDURE — 3075F SYST BP GE 130 - 139MM HG: CPT | Mod: CPTII,S$GLB,, | Performed by: FAMILY MEDICINE

## 2019-10-28 PROCEDURE — 63600175 PHARM REV CODE 636 W HCPCS: Performed by: INTERNAL MEDICINE

## 2019-10-28 PROCEDURE — 99214 PR OFFICE/OUTPT VISIT, EST, LEVL IV, 30-39 MIN: ICD-10-PCS | Mod: S$GLB,,, | Performed by: FAMILY MEDICINE

## 2019-10-28 PROCEDURE — 63600175 PHARM REV CODE 636 W HCPCS: Performed by: THORACIC SURGERY (CARDIOTHORACIC VASCULAR SURGERY)

## 2019-10-28 PROCEDURE — 21400001 HC TELEMETRY ROOM

## 2019-10-28 PROCEDURE — 25000003 PHARM REV CODE 250: Performed by: THORACIC SURGERY (CARDIOTHORACIC VASCULAR SURGERY)

## 2019-10-28 PROCEDURE — 25000003 PHARM REV CODE 250: Performed by: INTERNAL MEDICINE

## 2019-10-28 PROCEDURE — 99152 MOD SED SAME PHYS/QHP 5/>YRS: CPT | Mod: ,,, | Performed by: INTERNAL MEDICINE

## 2019-10-28 PROCEDURE — 3078F DIAST BP <80 MM HG: CPT | Mod: CPTII,S$GLB,, | Performed by: FAMILY MEDICINE

## 2019-10-28 PROCEDURE — 83880 ASSAY OF NATRIURETIC PEPTIDE: CPT

## 2019-10-28 RX ORDER — AMIODARONE HYDROCHLORIDE 200 MG/1
200 TABLET ORAL ONCE
Status: COMPLETED | OUTPATIENT
Start: 2019-10-30 | End: 2019-10-30

## 2019-10-28 RX ORDER — ACETAMINOPHEN 325 MG/1
650 TABLET ORAL EVERY 6 HOURS PRN
Status: DISCONTINUED | OUTPATIENT
Start: 2019-10-28 | End: 2019-10-31

## 2019-10-28 RX ORDER — POTASSIUM CHLORIDE 20 MEQ/1
20 TABLET, EXTENDED RELEASE ORAL 2 TIMES DAILY
Status: DISCONTINUED | OUTPATIENT
Start: 2019-10-29 | End: 2019-10-31

## 2019-10-28 RX ORDER — ONDANSETRON 8 MG/1
8 TABLET, ORALLY DISINTEGRATING ORAL EVERY 8 HOURS PRN
Status: DISCONTINUED | OUTPATIENT
Start: 2019-10-28 | End: 2019-10-31

## 2019-10-28 RX ORDER — CETIRIZINE HYDROCHLORIDE 10 MG/1
10 TABLET ORAL NIGHTLY
Status: DISCONTINUED | OUTPATIENT
Start: 2019-10-28 | End: 2019-10-29

## 2019-10-28 RX ORDER — POTASSIUM CHLORIDE 20 MEQ/1
40 TABLET, EXTENDED RELEASE ORAL ONCE
Status: COMPLETED | OUTPATIENT
Start: 2019-10-28 | End: 2019-10-28

## 2019-10-28 RX ORDER — METOPROLOL SUCCINATE 25 MG/1
25 TABLET, EXTENDED RELEASE ORAL NIGHTLY
Status: DISCONTINUED | OUTPATIENT
Start: 2019-10-28 | End: 2019-10-31

## 2019-10-28 RX ORDER — AMLODIPINE BESYLATE 2.5 MG/1
2.5 TABLET ORAL DAILY
Status: DISCONTINUED | OUTPATIENT
Start: 2019-10-28 | End: 2019-10-31

## 2019-10-28 RX ORDER — GLUCAGON 1 MG
1 KIT INJECTION
Status: DISCONTINUED | OUTPATIENT
Start: 2019-10-28 | End: 2019-10-30

## 2019-10-28 RX ORDER — HEPARIN SODIUM,PORCINE/D5W 25000/250
12 INTRAVENOUS SOLUTION INTRAVENOUS CONTINUOUS
Status: DISPENSED | OUTPATIENT
Start: 2019-10-28 | End: 2019-10-31

## 2019-10-28 RX ORDER — BLOOD SUGAR DIAGNOSTIC
STRIP MISCELLANEOUS
Qty: 100 STRIP | Refills: 3 | Status: SHIPPED | OUTPATIENT
Start: 2019-10-28 | End: 2020-02-19

## 2019-10-28 RX ORDER — INSULIN ASPART 100 [IU]/ML
0-5 INJECTION, SOLUTION INTRAVENOUS; SUBCUTANEOUS
Status: DISCONTINUED | OUTPATIENT
Start: 2019-10-28 | End: 2019-10-30

## 2019-10-28 RX ORDER — IBUPROFEN 200 MG
16 TABLET ORAL
Status: DISCONTINUED | OUTPATIENT
Start: 2019-10-28 | End: 2019-10-30

## 2019-10-28 RX ORDER — ASPIRIN 325 MG
325 TABLET, DELAYED RELEASE (ENTERIC COATED) ORAL
Status: COMPLETED | OUTPATIENT
Start: 2019-10-28 | End: 2019-10-28

## 2019-10-28 RX ORDER — DEXTROSE MONOHYDRATE 100 MG/ML
25 INJECTION, SOLUTION INTRAVENOUS
Status: COMPLETED | OUTPATIENT
Start: 2019-10-28 | End: 2019-10-28

## 2019-10-28 RX ORDER — VENLAFAXINE HYDROCHLORIDE 150 MG/1
150 CAPSULE, EXTENDED RELEASE ORAL DAILY
Status: DISCONTINUED | OUTPATIENT
Start: 2019-10-28 | End: 2019-10-31

## 2019-10-28 RX ORDER — IBUPROFEN 200 MG
24 TABLET ORAL
Status: DISCONTINUED | OUTPATIENT
Start: 2019-10-28 | End: 2019-10-30

## 2019-10-28 RX ORDER — FUROSEMIDE 10 MG/ML
20 INJECTION INTRAMUSCULAR; INTRAVENOUS 2 TIMES DAILY
Status: DISCONTINUED | OUTPATIENT
Start: 2019-10-28 | End: 2019-10-29

## 2019-10-28 RX ORDER — SODIUM CHLORIDE 9 MG/ML
INJECTION, SOLUTION INTRAVENOUS CONTINUOUS
Status: DISCONTINUED | OUTPATIENT
Start: 2019-10-28 | End: 2019-10-29

## 2019-10-28 RX ORDER — ATORVASTATIN CALCIUM 40 MG/1
80 TABLET, FILM COATED ORAL NIGHTLY
Status: DISCONTINUED | OUTPATIENT
Start: 2019-10-28 | End: 2019-10-31

## 2019-10-28 RX ORDER — PEN NEEDLE, DIABETIC 30 GX3/16"
1 NEEDLE, DISPOSABLE MISCELLANEOUS 3 TIMES DAILY
Qty: 100 EACH | Refills: 11 | Status: SHIPPED | OUTPATIENT
Start: 2019-10-28 | End: 2020-02-19

## 2019-10-28 RX ORDER — SODIUM CHLORIDE 9 MG/ML
INJECTION, SOLUTION INTRAVENOUS CONTINUOUS
Status: DISCONTINUED | OUTPATIENT
Start: 2019-10-28 | End: 2019-10-28

## 2019-10-28 RX ORDER — ASPIRIN 81 MG/1
81 TABLET ORAL DAILY
Status: DISCONTINUED | OUTPATIENT
Start: 2019-10-29 | End: 2019-10-31

## 2019-10-28 RX ORDER — HYDROCODONE BITARTRATE AND ACETAMINOPHEN 5; 325 MG/1; MG/1
1 TABLET ORAL EVERY 6 HOURS PRN
Status: DISCONTINUED | OUTPATIENT
Start: 2019-10-28 | End: 2019-10-31

## 2019-10-28 RX ADMIN — FUROSEMIDE 20 MG: 10 INJECTION, SOLUTION INTRAMUSCULAR; INTRAVENOUS at 08:10

## 2019-10-28 RX ADMIN — ATORVASTATIN CALCIUM 80 MG: 40 TABLET, FILM COATED ORAL at 08:10

## 2019-10-28 RX ADMIN — VENLAFAXINE HYDROCHLORIDE 150 MG: 75 CAPSULE, EXTENDED RELEASE ORAL at 03:10

## 2019-10-28 RX ADMIN — DEXTROSE 25 G: 10 SOLUTION INTRAVENOUS at 01:10

## 2019-10-28 RX ADMIN — SODIUM CHLORIDE: 0.9 INJECTION, SOLUTION INTRAVENOUS at 06:10

## 2019-10-28 RX ADMIN — HEPARIN SODIUM 12 UNITS/KG/HR: 10000 INJECTION, SOLUTION INTRAVENOUS at 02:10

## 2019-10-28 RX ADMIN — AMLODIPINE BESYLATE 2.5 MG: 2.5 TABLET ORAL at 03:10

## 2019-10-28 RX ADMIN — ASPIRIN 325 MG: 325 TABLET, COATED ORAL at 01:10

## 2019-10-28 RX ADMIN — POTASSIUM CHLORIDE 40 MEQ: 1500 TABLET, EXTENDED RELEASE ORAL at 08:10

## 2019-10-28 RX ADMIN — CETIRIZINE HYDROCHLORIDE 10 MG: 10 TABLET, FILM COATED ORAL at 08:10

## 2019-10-28 RX ADMIN — METOPROLOL SUCCINATE 25 MG: 25 TABLET, EXTENDED RELEASE ORAL at 08:10

## 2019-10-28 RX ADMIN — ACETAMINOPHEN 650 MG: 325 TABLET ORAL at 08:10

## 2019-10-28 RX ADMIN — SODIUM CHLORIDE: 0.9 INJECTION, SOLUTION INTRAVENOUS at 01:10

## 2019-10-28 NOTE — ED PROVIDER NOTES
"69 year old female with complaint of chest pain and SOB 2 days ago.  Reports that she has had SOB with exertion since onset of pain 2 days ago. No fever or chills.         SCRIBE #1 NOTE: I, Darby Rice, am scribing for, and in the presence of, Michael Mcdowell Do, MD. I have scribed the entire note.       History     Chief Complaint   Patient presents with    Shortness of Breath     intermittent SOB and chest discomfort x2 months; sent from PCP for abnormal EKG     Review of patient's allergies indicates:   Allergen Reactions    Ace inhibitors Other (See Comments)     Cough           History of Present Illness     HPI    10/28/2019, 12:27 PM  History obtained from the patient      History of Present Illness: Mateusz Bates is a 69 y.o. female patient with a PMHx of HLD, GERD, HTN, and DM who presents to the Emergency Department for evaluation of SOB which onset gradually 2 days ago. Pt visited with Dr. Kilpatrick (PCP) earlier today for sxs and was sent to the ED for further evaluation due to an abnormal EKG. Symptoms are constant and moderate in severity. Sxs are exacerbated with exertion, no mitigating factors reported. Associated sxs include intermittent CP which onset a few days ago. She is c/o "tightness" that radiates to her back. Pt reports she is not currently experiencing any CP. Patient denies any fever, chills, diaphoresis, cough, wheezing, chest tightness, leg swelling, palpitations, n/v, dizziness, extremity weakness/numbness, and all other sxs at this time. No prior Tx reported. No further complaints or concerns at this time.       Arrival mode: Personal vehicle    PCP: Serenity Kilpatrick MD        Past Medical History:  Past Medical History:   Diagnosis Date    Chronic major depressive disorder     Diabetes mellitus type II, uncontrolled 2000    Insulin x 10 years    Eczema     Essential hypertension     Generalized anxiety disorder     GERD (gastroesophageal reflux disease)     Glaucoma     " OD    Hyperlipidemia     Obesity        Past Surgical History:  Past Surgical History:   Procedure Laterality Date    APPENDECTOMY      ECTOPIC PREGNANCY SURGERY Left     LSO    OOPHORECTOMY           Family History:  Family History   Problem Relation Age of Onset    Heart attack Mother     Multiple myeloma Father     Hypertension Father     Colon cancer Sister     Hypertension Sister     Diabetes Sister     Depression Maternal Grandmother     Heart disease Maternal Grandmother     Diabetes Maternal Grandmother     Heart disease Paternal Grandmother     Heart disease Paternal Grandfather     Alcohol abuse Maternal Uncle     Glaucoma Paternal Aunt     Diabetes Paternal Aunt     Glaucoma Paternal Uncle         Great Aunt    Heart failure Sister     Hypertension Sister     Hepatitis Sister     Hypertension Sister     Drug abuse Sister     Alcohol abuse Sister        Social History:  Social History     Tobacco Use    Smoking status: Never Smoker    Smokeless tobacco: Never Used   Substance and Sexual Activity    Alcohol use: No    Drug use: No    Sexual activity: Never        Review of Systems     Review of Systems   Constitutional: Negative for chills, diaphoresis and fever.   HENT: Negative for rhinorrhea and sore throat.    Respiratory: Positive for shortness of breath. Negative for cough, chest tightness and wheezing.    Cardiovascular: Positive for chest pain. Negative for palpitations and leg swelling.   Gastrointestinal: Negative for abdominal pain, diarrhea, nausea and vomiting.   Genitourinary: Negative for dysuria, frequency and urgency.   Musculoskeletal: Negative for back pain.   Skin: Negative for rash.   Neurological: Negative for dizziness, weakness and numbness.   All other systems reviewed and are negative.       Physical Exam     Initial Vitals [10/28/19 1054]   BP Pulse Resp Temp SpO2   119/61 87 16 98.5 °F (36.9 °C) 97 %      MAP       --          Physical Exam  Nursing  Notes and Vital Signs Reviewed.  Constitutional: Patient is in no acute distress. Well-developed and well-nourished.  Head: Atraumatic. Normocephalic.  Eyes: PERRL. EOM intact. Conjunctivae are not pale. No scleral icterus.  ENT: Mucous membranes are moist. Oropharynx is clear and symmetric.    Neck: Supple. Full ROM. No lymphadenopathy.  Cardiovascular: Regular rate. Regular rhythm. No murmurs, rubs, or gallops. Distal pulses are 2+ and symmetric.  Pulmonary/Chest: No respiratory distress. Clear to auscultation bilaterally. No wheezing or rales.  Abdominal: Soft and non-distended.  There is no tenderness.  No rebound, guarding, or rigidity.  Musculoskeletal: Moves all extremities. No obvious deformities. No edema. No calf tenderness.  Skin: Warm and dry.  Neurological:  Alert, awake, and appropriate.  Normal speech.  No acute focal neurological deficits are appreciated.  Psychiatric: Normal affect. Good eye contact. Appropriate in content.     ED Course   Critical Care  Date/Time: 10/28/2019 12:59 PM  Performed by: Michael Mcdowell Do, MD  Authorized by: Michael Mcdowell Do, MD   Direct patient critical care time: 15 minutes  Additional history critical care time: 5 minutes  Ordering / reviewing critical care time: 5 minutes  Documentation critical care time: 5 minutes  Consulting other physicians critical care time: 5 minutes  Total critical care time (exclusive of procedural time) : 35 minutes  Critical care time was exclusive of separately billable procedures and treating other patients and teaching time.  Critical care was necessary to treat or prevent imminent or life-threatening deterioration of the following conditions: NSTEMI.  Critical care was time spent personally by me on the following activities: blood draw for specimens, discussions with consultants, development of treatment plan with patient or surrogate, interpretation of cardiac output measurements, examination of patient, ordering and performing  "treatments and interventions, ordering and review of radiographic studies, re-evaluation of patient's condition, pulse oximetry, ordering and review of laboratory studies, evaluation of patient's response to treatment and obtaining history from patient or surrogate.        ED Vital Signs:  Vitals:    10/28/19 1054 10/28/19 1145 10/28/19 1201 10/28/19 1312   BP: 119/61 133/74 133/70 127/78   Pulse: 87 85 84 90   Resp: 16 18 16 14   Temp: 98.5 °F (36.9 °C)      TempSrc: Oral      SpO2: 97% 96% 97% 100%   Weight: 70 kg (154 lb 5.2 oz)      Height: 5' 1" (1.549 m)       10/28/19 1401 10/28/19 1505 10/28/19 1601 10/28/19 1610   BP: 139/70 139/65 (!) 154/74    Pulse: 85 85 87 86   Resp: 16      Temp:       TempSrc:       SpO2: 99% 98% 99% 99%   Weight:       Height:           Abnormal Lab Results:  Labs Reviewed   CBC W/ AUTO DIFFERENTIAL - Abnormal; Notable for the following components:       Result Value    Hemoglobin 11.2 (*)     Hematocrit 35.9 (*)     Mean Corpuscular Hemoglobin 26.9 (*)     Mean Corpuscular Hemoglobin Conc 31.2 (*)     RDW 15.2 (*)     Platelets 381 (*)     All other components within normal limits   COMPREHENSIVE METABOLIC PANEL - Abnormal; Notable for the following components:    Potassium 3.4 (*)     Glucose 64 (*)     All other components within normal limits   CK - Abnormal; Notable for the following components:     (*)     All other components within normal limits   TROPONIN I - Abnormal; Notable for the following components:    Troponin I 3.665 (*)     All other components within normal limits   B-TYPE NATRIURETIC PEPTIDE - Abnormal; Notable for the following components:    BNP 1,305 (*)     All other components within normal limits   TROPONIN I - Abnormal; Notable for the following components:    Troponin I 3.058 (*)     All other components within normal limits   POCT GLUCOSE - Abnormal; Notable for the following components:    POCT Glucose 126 (*)     All other components within " normal limits   HEPATITIS C ANTIBODY   PROTIME-INR   LIPID PANEL   TROPONIN I   APTT   POCT GLUCOSE MONITORING CONTINUOUS        All Lab Results:  Results for orders placed or performed during the hospital encounter of 10/28/19   Hepatitis C antibody   Result Value Ref Range    Hepatitis C Ab Negative Negative   CBC auto differential   Result Value Ref Range    WBC 8.89 3.90 - 12.70 K/uL    RBC 4.17 4.00 - 5.40 M/uL    Hemoglobin 11.2 (L) 12.0 - 16.0 g/dL    Hematocrit 35.9 (L) 37.0 - 48.5 %    Mean Corpuscular Volume 86 82 - 98 fL    Mean Corpuscular Hemoglobin 26.9 (L) 27.0 - 31.0 pg    Mean Corpuscular Hemoglobin Conc 31.2 (L) 32.0 - 36.0 g/dL    RDW 15.2 (H) 11.5 - 14.5 %    Platelets 381 (H) 150 - 350 K/uL    MPV 9.9 9.2 - 12.9 fL    Immature Granulocytes 0.3 0.0 - 0.5 %    Gran # (ANC) 5.8 1.8 - 7.7 K/uL    Immature Grans (Abs) 0.03 0.00 - 0.04 K/uL    Lymph # 2.2 1.0 - 4.8 K/uL    Mono # 0.6 0.3 - 1.0 K/uL    Eos # 0.2 0.0 - 0.5 K/uL    Baso # 0.03 0.00 - 0.20 K/uL    nRBC 0 0 /100 WBC    Gran% 65.4 38.0 - 73.0 %    Lymph% 25.1 18.0 - 48.0 %    Mono% 7.2 4.0 - 15.0 %    Eosinophil% 1.7 0.0 - 8.0 %    Basophil% 0.3 0.0 - 1.9 %    Differential Method Automated    Comprehensive metabolic panel   Result Value Ref Range    Sodium 140 136 - 145 mmol/L    Potassium 3.4 (L) 3.5 - 5.1 mmol/L    Chloride 102 95 - 110 mmol/L    CO2 24 23 - 29 mmol/L    Glucose 64 (L) 70 - 110 mg/dL    BUN, Bld 15 8 - 23 mg/dL    Creatinine 0.7 0.5 - 1.4 mg/dL    Calcium 9.6 8.7 - 10.5 mg/dL    Total Protein 7.4 6.0 - 8.4 g/dL    Albumin 3.6 3.5 - 5.2 g/dL    Total Bilirubin 0.5 0.1 - 1.0 mg/dL    Alkaline Phosphatase 79 55 - 135 U/L    AST 29 10 - 40 U/L    ALT 13 10 - 44 U/L    Anion Gap 14 8 - 16 mmol/L    eGFR if African American >60 >60 mL/min/1.73 m^2    eGFR if non African American >60 >60 mL/min/1.73 m^2   CPK   Result Value Ref Range     (H) 20 - 180 U/L   Troponin I   Result Value Ref Range    Troponin I 3.665 (H) 0.000  - 0.026 ng/mL   Brain natriuretic peptide   Result Value Ref Range    BNP 1,305 (H) 0 - 99 pg/mL   Protime-INR   Result Value Ref Range    Prothrombin Time 10.6 9.0 - 12.5 sec    INR 1.0 0.8 - 1.2   Troponin I   Result Value Ref Range    Troponin I 3.058 (H) 0.000 - 0.026 ng/mL   2D echo with color flow doppler   Result Value Ref Range    QEF 30 (A) 55 - 65    Mitral Valve Regurgitation MILD TO MODERATE     Diastolic Dysfunction Yes (A)     Aortic Valve Regurgitation MILD     Mitral Valve Mobility NORMAL    POCT glucose   Result Value Ref Range    POCT Glucose 126 (H) 70 - 110 mg/dL       Imaging Results:  Imaging Results          IR Heart Cath Images (Final result)  Result time 10/28/19 17:11:12    Final result by Romina Mclaughlin RN (10/28/19 17:11:12)                 Narrative:    See OP Notes for results.     IMPRESSION: See OP Notes for results.             This procedure was auto-finalized by: [facility]                                 X-Ray Chest PA And Lateral (Final result)  Result time 10/28/19 11:37:01    Final result by Shaun Gutierrez MD (10/28/19 11:37:01)                 Impression:      No acute findings.      Electronically signed by: Shaun Gutierrez  Date:    10/28/2019  Time:    11:37             Narrative:    EXAMINATION:  XR CHEST PA AND LATERAL    CLINICAL HISTORY:  Shortness of breath    COMPARISON:  None    FINDINGS:  Lungs are clear.  Heart size within normal limits.No significant bony findings.  Metallic object overlies the left inferior neck measuring 1.1 x 0.2 cm which is thought to be related to patient's clothing.                                 The EKG was ordered, reviewed, and independently interpreted by the ED provider.  Interpretation time: 1055  Rate: 87 BPM  Rhythm: normal sinus rhythm  Interpretation: Possible left atrial enlargement. LAD. LBBB. No STEMI.           The Emergency Provider reviewed the vital signs and test results, which are outlined  above.     ED Discussion     12:51 PM: Troponin is elevated. Pt has a new LBBB from previous EKG done on 10/31/2016. She is not experiencing any CP actively. Aspirin will be given. Cardiology has been consulted.    12:55 PM: Discussed pt's case with Dr. Tarango (Cardiology) who agrees with plan to start Heparin drip. He recommends keeping NPO, ASA, statin, beta blocker, and pt will need to be taken to cath lab.     12:57 PM: Dr. Tarango is at bedside.     12:58 PM: Emelina wants pt NPO. Will turn off insulin pump, give an amp of D-50, and monitor pt's glucose closely.    1:18 PM: Discussed case with Abdon Winn NP (Hospital Medicine). Dr. Lam agrees with current care and management of pt and accepts admission.   Admitting Service: Hospital Medicine  Admitting Physician: Dr. Lam  Admit to: Telemetry    1:19 PM: Re-evaluated pt. I have discussed test results, shared treatment plan, and the need for admission with patient and family at bedside. Pt and family express understanding at this time and agree with all information. All questions answered. Pt and family have no further questions or concerns at this time. Pt is ready for admit.         Medical Decision Making:   Clinical Tests:   Lab Tests: Ordered and Reviewed  Radiological Study: Ordered and Reviewed  Medical Tests: Ordered and Reviewed           ED Medication(s):  Medications   heparin 25,000 units in dextrose 5% 250 mL (100 units/mL) infusion LOW INTENSITY nomogram - [facility] (12 Units/kg/hr × 56.7 kg (Adjusted) Intravenous New Bag 10/28/19 6774)   heparin 25,000 units in dextrose 5% (100 units/ml) IV bolus from bag - ADDITIONAL PRN BOLUS - 60 units/kg (max bolus 4000 units) (has no administration in time range)   heparin 25,000 units in dextrose 5% (100 units/ml) IV bolus from bag - ADDITIONAL PRN BOLUS - 30 units/kg (max bolus 4000 units) (has no administration in time range)   0.9%  NaCl infusion ( Intravenous New Bag 10/28/19 0548)   metoprolol  "succinate (TOPROL-XL) 24 hr tablet 25 mg (has no administration in time range)   aspirin EC tablet 81 mg (has no administration in time range)   atorvastatin tablet 80 mg (has no administration in time range)   amLODIPine tablet 2.5 mg (2.5 mg Oral Given 10/28/19 1518)   cetirizine tablet 10 mg (has no administration in time range)   venlafaxine 24 hr capsule 150 mg (150 mg Oral Given 10/28/19 1518)   acetaminophen tablet 650 mg (has no administration in time range)   HYDROcodone-acetaminophen 5-325 mg per tablet 1 tablet (has no administration in time range)   ondansetron disintegrating tablet 8 mg (has no administration in time range)   insulin aspart U-100 pen 0-5 Units (has no administration in time range)   glucose chewable tablet 16 g (has no administration in time range)   glucose chewable tablet 24 g (has no administration in time range)   dextrose 10% (D10W) Bolus (has no administration in time range)   dextrose 10% (D10W) Bolus (has no administration in time range)   glucagon (human recombinant) injection 1 mg (has no administration in time range)   aspirin EC tablet 325 mg (325 mg Oral Given 10/28/19 1302)   dextrose 10 % infusion (0 g Intravenous Stopped 10/28/19 1400)   heparin 25,000 units in dextrose 5% (100 units/ml) IV bolus from bag INITIAL BOLUS (max bolus 4000 units) (3,400 Units Intravenous Bolus from Bag 10/28/19 1359)       Current Discharge Medication List      START taking these medications    Details   !! CONTOUR NEXT TEST STRIPS Strp USE ONE STRIP THREE TIMES A DAY  Qty: 100 strip, Refills: 3      pen needle, diabetic 32 gauge x 5/32" Ndle 1 each by Misc.(Non-Drug; Combo Route) route 3 (three) times daily.  Qty: 100 each, Refills: 11    Associated Diagnoses: Uncontrolled type 2 diabetes mellitus without complication, with long-term current use of insulin       !! - Potential duplicate medications found. Please discuss with provider.                  Scribe Attestation:   Scribe #1: I " performed the above scribed service and the documentation accurately describes the services I performed. I attest to the accuracy of the note.     Attending:   Physician Attestation Statement for Scribe #1: I, Michael Mcdowell Do, MD, personally performed the services described in this documentation, as scribed by Darby Rice, in my presence, and it is both accurate and complete.           Clinical Impression       ICD-10-CM ICD-9-CM   1. Hypoglycemia E16.2 251.2   2. Chest pain R07.9 786.50   3. SOB (shortness of breath) R06.02 786.05   4. NSTEMI (non-ST elevated myocardial infarction) I21.4 410.70       Disposition:   Disposition: Admitted  Condition: Serious         Michael Mcdowell Do, MD  10/28/19 0521

## 2019-10-28 NOTE — CONSULTS
[facility]  Cardiology  Consult Note    Patient Name: Mateusz Bates  MRN: [id number]  Admission Date: 10/28/2019  Hospital Length of Stay: 0 days  Code Status: No Order   Attending Provider: Michael Mcdowell Do, MD   Consulting Provider: Josselyn Vann PA-C  Primary Care Physician: Serenity Kilpatrick MD  Principal Problem:<principal problem not specified>    Patient information was obtained from patient, past medical records and ER records.     Inpatient consult to Cardiology  Consult performed by: Josselyn Vann PA-C  Consult ordered by: Michael Mcdowell Do, MD        Subjective:     Chief Complaint:  Chest pain    HPI:   Ms. Bates is a 69 year old female patient whose current medical conditions include HTN, hyperlipidemia, obesity, DM type II, cerebral microvascular disease, FAHAD, and B12 deficiency who was sent to [facility] ED today from her PCP's office due to chest pain and abnormal EKG. Patient complained of substernal chest tightness/heaviness that radiated to her back on Saturday while at rest. Associated symptoms included orthopnea and SOB. Patient denied any associated nausea, vomiting, diaphoresis, palpitations, near syncope, or syncope. Initial workup in ED revealed troponin of 3.665 and BNP of 1305 and patient subsequently admitted for further evaluation and treatment of NSTEMI. Cardiology consulted to assist with management. Patient seen and examined today while in ED. Feels well at time of exam, denies chest pain. She does report similar symptoms over the past few months but states they never lasted as long and were less intense. She reports compliance with her medications. States her father had history of CABG. Chart reviewed. Repeat troponin pending. EKG reviewed and shows SR with new LBBB. 2D echo pending.    Past Medical History:   Diagnosis Date    Chronic major depressive disorder     Diabetes mellitus type II, uncontrolled 2000    Insulin x 10 years    Eczema     Essential  hypertension     Generalized anxiety disorder     GERD (gastroesophageal reflux disease)     Glaucoma     OD    Hyperlipidemia     Obesity        Past Surgical History:   Procedure Laterality Date    APPENDECTOMY      ECTOPIC PREGNANCY SURGERY Left     LSO    OOPHORECTOMY         Review of patient's allergies indicates:   Allergen Reactions    Ace inhibitors Other (See Comments)     Cough         No current facility-administered medications on file prior to encounter.      Current Outpatient Medications on File Prior to Encounter   Medication Sig    amLODIPine (NORVASC) 2.5 MG tablet TAKE 1 TABLET BY MOUTH EVERY DAY    atorvastatin (LIPITOR) 80 MG tablet TAKE 1 TABLET BY MOUTH EVERY DAY    betamethasone valerate 0.1% (VALISONE) 0.1 % Crea Apply topically 2 (two) times daily.    cyanocobalamin 1,000 mcg/mL injection Daily for a week then weekly for a month then monthly    insulin aspart U-100 (NOVOLOG U-100 INSULIN ASPART) 100 unit/mL injection Inject 60 Units into the skin continuous. Via VGO insulin pump.    metFORMIN (GLUCOPHAGE) 1000 MG tablet TAKE 1 TABLET BY MOUTH TWICE A DAY WITH MEALS    aspirin (ECOTRIN) 325 MG EC tablet Take 325 mg by mouth once daily.    blood sugar diagnostic (CONTOUR NEXT TEST STRIPS) Strp 1 strip by Misc.(Non-Drug; Combo Route) route 3 -4 times daily. Conto    blood sugar diagnostic Strp 1 strip 4 (four) times daily.    blood-glucose meter Misc IDDM 250.00  Insurance or Pt choice    clobetasol (TEMOVATE) 0.05 % external solution Apply topically 2 (two) times daily.    CONTOUR NEXT TEST STRIPS Strp USE ONE STRIP THREE TIMES A DAY    flash glucose scanning reader (FREESTYLE MAR 14 DAY READER) Misc 1 Device by Misc.(Non-Drug; Combo Route) route continuous.    flash glucose sensor (FREESTYLE MAR 14 DAY SENSOR) Kit Use 1 sensor every 14 days.    glyBURIDE (DIABETA) 5 MG tablet Take 2 tablets (10 mg total) by mouth 2 (two) times daily before meals.    hydrOXYzine  "HCl (ATARAX) 10 MG Tab TAKE 1 TABLET BY MOUTH TWICE DAILY AS NEEDED (Patient not taking: Reported on 10/28/2019)    insulin glargine, TOUJEO, (TOUJEO SOLOSTAR U-300 INSULIN) 300 unit/mL (1.5 mL) InPn pen Inject 45 Units into the skin once daily.    ketoconazole (NIZORAL) 2 % shampoo Apply topically twice a week.    lancets (MICROLET LANCET) Misc 1 lancet by Misc.(Non-Drug; Combo Route) route 3 (three) times daily.    levocetirizine (XYZAL) 5 MG tablet Take 1 tablet (5 mg total) by mouth every evening.    nystatin (MYCOSTATIN) powder Apply topically 4 (four) times daily. (Patient not taking: Reported on 10/28/2019)    pen needle, diabetic 32 gauge x 5/32" Ndle 1 each by Misc.(Non-Drug; Combo Route) route 3 (three) times daily.    sodium,potassium,mag sulfates (SUPREP BOWEL PREP KIT) 17.5-3.13-1.6 gram SolR Take as directed    sub-q insulin device, 20 unit (V-GO 20) Naty 1 Device by Misc.(Non-Drug; Combo Route) route once daily.    syringe with needle (SYRINGE 3CC/25GX1") 3 mL 25 gauge x 1" Syrg Use as Directed    triamcinolone acetonide 0.1% (KENALOG) 0.1 % cream AAA bid    venlafaxine (EFFEXOR-XR) 150 MG Cp24 Take 1 capsule (150 mg total) by mouth once daily.    [DISCONTINUED] insulin lispro 100 unit/mL injection Inject into the skin. (Patient taking differently: Inject 12 Units into the skin. )    [DISCONTINUED] pen needle, diabetic 32 gauge x 5/32" Ndle 1 each by Misc.(Non-Drug; Combo Route) route 3 (three) times daily.     Family History     Problem Relation (Age of Onset)    Alcohol abuse Maternal Uncle, Sister    Colon cancer Sister    Depression Maternal Grandmother    Diabetes Sister, Maternal Grandmother, Paternal Aunt    Drug abuse Sister    Glaucoma Paternal Aunt, Paternal Uncle    Heart attack Mother    Heart disease Maternal Grandmother, Paternal Grandmother, Paternal Grandfather    Heart failure Sister    Hepatitis Sister    Hypertension Father, Sister, Sister, Sister    Multiple myeloma " Father        Tobacco Use    Smoking status: Never Smoker    Smokeless tobacco: Never Used   Substance and Sexual Activity    Alcohol use: No    Drug use: No    Sexual activity: Never     Review of Systems   Constitution: Negative.   HENT: Negative.    Eyes: Negative.    Cardiovascular: Positive for chest pain, dyspnea on exertion and orthopnea.   Respiratory: Positive for shortness of breath.    Endocrine: Negative.    Hematologic/Lymphatic: Negative.    Skin: Negative.    Musculoskeletal: Negative.    Gastrointestinal: Negative.    Genitourinary: Negative.    Neurological: Negative.    Psychiatric/Behavioral: Negative.    Allergic/Immunologic: Negative.      Objective:     Vital Signs (Most Recent):  Temp: 98.5 °F (36.9 °C) (10/28/19 1054)  Pulse: 87 (10/28/19 1054)  Resp: 16 (10/28/19 1054)  BP: 119/61 (10/28/19 1054)  SpO2: 97 % (10/28/19 1054) Vital Signs (24h Range):  Temp:  [97.7 °F (36.5 °C)-98.5 °F (36.9 °C)] 98.5 °F (36.9 °C)  Pulse:  [87-94] 87  Resp:  [16] 16  SpO2:  [97 %] 97 %  BP: (119-130)/(61-70) 119/61     Weight: 70 kg (154 lb 5.2 oz)  Body mass index is 29.16 kg/m².    SpO2: 97 %  O2 Device (Oxygen Therapy): room air    No intake or output data in the 24 hours ending 10/28/19 1350    Lines/Drains/Airways     Peripheral Intravenous Line                 Peripheral IV - Single Lumen 10/28/19 1201 20 G Left Antecubital less than 1 day         Peripheral IV - Single Lumen 10/28/19 1322 20 G Right Hand less than 1 day                Physical Exam   Constitutional: She is oriented to person, place, and time. She appears well-developed and well-nourished. No distress.   HENT:   Head: Normocephalic and atraumatic.   Eyes: Pupils are equal, round, and reactive to light. Right eye exhibits no discharge. Left eye exhibits no discharge.   Neck: Neck supple. No JVD present.   Cardiovascular: Normal rate, regular rhythm, S1 normal, S2 normal and normal heart sounds. PMI is not displaced.   No murmur  heard.  Pulmonary/Chest: Effort normal and breath sounds normal. No respiratory distress. She has no wheezes. She has no rales.   Abdominal: Soft. She exhibits no distension.   Musculoskeletal: She exhibits no edema.   Neurological: She is alert and oriented to person, place, and time.   Skin: Skin is warm and dry. She is not diaphoretic. No erythema.   Psychiatric: She has a normal mood and affect. Her behavior is normal. Thought content normal.   Nursing note and vitals reviewed.      Significant Labs:   CMP   Recent Labs   Lab 10/28/19  1202      K 3.4*      CO2 24   GLU 64*   BUN 15   CREATININE 0.7   CALCIUM 9.6   PROT 7.4   ALBUMIN 3.6   BILITOT 0.5   ALKPHOS 79   AST 29   ALT 13   ANIONGAP 14   ESTGFRAFRICA >60   EGFRNONAA >60   , CBC   Recent Labs   Lab 10/28/19  1202   WBC 8.89   HGB 11.2*   HCT 35.9*   *   , Troponin   Recent Labs   Lab 10/28/19  1202   TROPONINI 3.665*    and All pertinent lab results from the last 24 hours have been reviewed.    Significant Imaging: Echocardiogram: 2D echo with color flow doppler: No results found for this or any previous visit., EKG: Reviewed and X-Ray: CXR: X-Ray Chest 1 View (CXR): No results found for this visit on 10/28/19. and X-Ray Chest PA and Lateral (CXR):   Results for orders placed or performed during the hospital encounter of 10/28/19   X-Ray Chest PA And Lateral    Narrative    EXAMINATION:  XR CHEST PA AND LATERAL    CLINICAL HISTORY:  Shortness of breath    COMPARISON:  None    FINDINGS:  Lungs are clear.  Heart size within normal limits.No significant bony findings.  Metallic object overlies the left inferior neck measuring 1.1 x 0.2 cm which is thought to be related to patient's clothing.      Impression    No acute findings.      Electronically signed by: Shaun Gutierrez  Date:    10/28/2019  Time:    11:37     Assessment and Plan:   Patient who presents with chest pain/NSTEMI. Continue ASA, statin, BB. Start heparin gtt. [facility]  planned for today. Will make decision regarding DAPT, if needed, post-cath. No ACEi/ARB given impending procedure. Check 2D echo.    Chest pain due to myocardial ischemia  -See plan under NSTEMI    Abnormal EKG  -LBBB noted on EKG  -See plan under NSTEMI    NSTEMI (non-ST elevated myocardial infarction)  -Patient presented with intermittent chest tightness/heaviness that radiated to her back since Saturday evening  -Associated with JONES/orthopnea  -Initial troponin 3.665  -EKG showed SR with new LBBB  -Presentation consistent with NSTEMI  -Continue ASA, statin  -Start Toprol XL  -Start heparin gtt  -Check 2D echo  -Continue to trend troponin  -Keep NPO. [facility] today for further evaluation and PCI if indicated. All risks, benefits, and treatment alternatives explained to patient in detail. All questions answered. She has agreed to proceed. Further rec's to follow.    Hyperlipidemia associated with type 2 diabetes mellitus  -Continue statin    Obesity (BMI 30.0-34.9)  -Weight loss    Diabetes mellitus type II, uncontrolled  -Mgmt as per hospital medicine    Hypertension associated with diabetes  -Continue home meds as BP permits        VTE Risk Mitigation (From admission, onward)         Ordered     heparin 25,000 units in dextrose 5% (100 units/ml) IV bolus from bag INITIAL BOLUS (max bolus 4000 units)  Once      10/28/19 1257     heparin 25,000 units in dextrose 5% 250 mL (100 units/mL) infusion LOW INTENSITY nomogram - [facility]  Continuous      10/28/19 1257     heparin 25,000 units in dextrose 5% (100 units/ml) IV bolus from bag - ADDITIONAL PRN BOLUS - 60 units/kg (max bolus 4000 units)  As needed (PRN)      10/28/19 1257     heparin 25,000 units in dextrose 5% (100 units/ml) IV bolus from bag - ADDITIONAL PRN BOLUS - 30 units/kg (max bolus 4000 units)  As needed (PRN)      10/28/19 1257                Thank you for your consult. I will follow-up with patient. Please contact us if you have any additional  questions.    Josselyn Vann PA-C  Cardiology   [facility]

## 2019-10-28 NOTE — NURSING TRANSFER
Nursing Transfer Note      10/28/2019     Transfer 241    Transfer via BED  Transfer with BELONGINGS AND MONITOR    Transported by CHRISTAL BRONSON RN    Medicines sent: NO    Chart send with patient: YES    Notified: FAMILY AT BEDSIDE     Patient reassessed at: 10/28/19  1800    REPORT TO TWAN    Upon arrival to floor:TRANSFERRED TO ROOM. TRANSFERRED TO BED.  TELEMETRY MONITER ON.  IV FLUIDS ON PUMP AT PRESCRIBED RATE.  PROCEDURAL ACCESS SITE WITHOUT BLEEDING OR HEMATOMA.  DRESSING DRY AND INTACT.  CARE HANDED OFF TO..TWAN........

## 2019-10-28 NOTE — HPI
Ms. Bates is a 69 year old female patient whose current medical conditions include HTN, hyperlipidemia, obesity, DM type II, cerebral microvascular disease, FAHAD, and B12 deficiency who was sent to [facility] ED today from her PCP's office due to chest pain and abnormal EKG. Patient complained of substernal chest tightness/heaviness that radiated to her back on Saturday while at rest. Associated symptoms included orthopnea and SOB. Patient denied any associated nausea, vomiting, diaphoresis, palpitations, near syncope, or syncope. Initial workup in ED revealed troponin of 3.665 and BNP of 1305 and patient subsequently admitted for further evaluation and treatment of NSTEMI. Cardiology consulted to assist with management. Patient seen and examined today while in ED. Feels well at time of exam, denies chest pain. She does report similar symptoms over the past few months but states they never lasted as long and were less intense. She reports compliance with her medications. States her father had history of CABG. Chart reviewed. Repeat troponin pending. EKG reviewed and shows SR with new LBBB. 2D echo pending.

## 2019-10-28 NOTE — SUBJECTIVE & OBJECTIVE
Past Medical History:   Diagnosis Date    Chronic major depressive disorder     Diabetes mellitus type II, uncontrolled 2000    Insulin x 10 years    Eczema     Essential hypertension     Generalized anxiety disorder     GERD (gastroesophageal reflux disease)     Glaucoma     OD    Hyperlipidemia     Obesity        Past Surgical History:   Procedure Laterality Date    APPENDECTOMY      ECTOPIC PREGNANCY SURGERY Left     LSO    OOPHORECTOMY         Review of patient's allergies indicates:   Allergen Reactions    Ace inhibitors Other (See Comments)     Cough         No current facility-administered medications on file prior to encounter.      Current Outpatient Medications on File Prior to Encounter   Medication Sig    amLODIPine (NORVASC) 2.5 MG tablet TAKE 1 TABLET BY MOUTH EVERY DAY    atorvastatin (LIPITOR) 80 MG tablet TAKE 1 TABLET BY MOUTH EVERY DAY    betamethasone valerate 0.1% (VALISONE) 0.1 % Crea Apply topically 2 (two) times daily.    cyanocobalamin 1,000 mcg/mL injection Daily for a week then weekly for a month then monthly    insulin aspart U-100 (NOVOLOG U-100 INSULIN ASPART) 100 unit/mL injection Inject 60 Units into the skin continuous. Via VGO insulin pump.    metFORMIN (GLUCOPHAGE) 1000 MG tablet TAKE 1 TABLET BY MOUTH TWICE A DAY WITH MEALS    aspirin (ECOTRIN) 325 MG EC tablet Take 325 mg by mouth once daily.    blood sugar diagnostic (CONTOUR NEXT TEST STRIPS) Strp 1 strip by Misc.(Non-Drug; Combo Route) route 3 -4 times daily. Conto    blood sugar diagnostic Strp 1 strip 4 (four) times daily.    blood-glucose meter Misc IDDM 250.00  Insurance or Pt choice    clobetasol (TEMOVATE) 0.05 % external solution Apply topically 2 (two) times daily.    CONTOUR NEXT TEST STRIPS Strp USE ONE STRIP THREE TIMES A DAY    flash glucose scanning reader (FREESTYLE MAR 14 DAY READER) Misc 1 Device by Misc.(Non-Drug; Combo Route) route continuous.    flash glucose sensor (FREESTYLE  "MAR 14 DAY SENSOR) Kit Use 1 sensor every 14 days.    glyBURIDE (DIABETA) 5 MG tablet Take 2 tablets (10 mg total) by mouth 2 (two) times daily before meals.    hydrOXYzine HCl (ATARAX) 10 MG Tab TAKE 1 TABLET BY MOUTH TWICE DAILY AS NEEDED (Patient not taking: Reported on 10/28/2019)    insulin glargine, TOUJEO, (TOUJEO SOLOSTAR U-300 INSULIN) 300 unit/mL (1.5 mL) InPn pen Inject 45 Units into the skin once daily.    ketoconazole (NIZORAL) 2 % shampoo Apply topically twice a week.    lancets (MICROLET LANCET) Misc 1 lancet by Misc.(Non-Drug; Combo Route) route 3 (three) times daily.    levocetirizine (XYZAL) 5 MG tablet Take 1 tablet (5 mg total) by mouth every evening.    nystatin (MYCOSTATIN) powder Apply topically 4 (four) times daily. (Patient not taking: Reported on 10/28/2019)    pen needle, diabetic 32 gauge x 5/32" Ndle 1 each by Misc.(Non-Drug; Combo Route) route 3 (three) times daily.    sodium,potassium,mag sulfates (SUPREP BOWEL PREP KIT) 17.5-3.13-1.6 gram SolR Take as directed    sub-q insulin device, 20 unit (V-GO 20) Naty 1 Device by Misc.(Non-Drug; Combo Route) route once daily.    syringe with needle (SYRINGE 3CC/25GX1") 3 mL 25 gauge x 1" Syrg Use as Directed    triamcinolone acetonide 0.1% (KENALOG) 0.1 % cream AAA bid    venlafaxine (EFFEXOR-XR) 150 MG Cp24 Take 1 capsule (150 mg total) by mouth once daily.    [DISCONTINUED] insulin lispro 100 unit/mL injection Inject into the skin. (Patient taking differently: Inject 12 Units into the skin. )    [DISCONTINUED] pen needle, diabetic 32 gauge x 5/32" Ndle 1 each by Misc.(Non-Drug; Combo Route) route 3 (three) times daily.     Family History     Problem Relation (Age of Onset)    Alcohol abuse Maternal Uncle, Sister    Colon cancer Sister    Depression Maternal Grandmother    Diabetes Sister, Maternal Grandmother, Paternal Aunt    Drug abuse Sister    Glaucoma Paternal Aunt, Paternal Uncle    Heart attack Mother    Heart disease " Maternal Grandmother, Paternal Grandmother, Paternal Grandfather    Heart failure Sister    Hepatitis Sister    Hypertension Father, Sister, Sister, Sister    Multiple myeloma Father        Tobacco Use    Smoking status: Never Smoker    Smokeless tobacco: Never Used   Substance and Sexual Activity    Alcohol use: No    Drug use: No    Sexual activity: Never     Review of Systems   Constitution: Negative.   HENT: Negative.    Eyes: Negative.    Cardiovascular: Positive for chest pain, dyspnea on exertion and orthopnea.   Respiratory: Positive for shortness of breath.    Endocrine: Negative.    Hematologic/Lymphatic: Negative.    Skin: Negative.    Musculoskeletal: Negative.    Gastrointestinal: Negative.    Genitourinary: Negative.    Neurological: Negative.    Psychiatric/Behavioral: Negative.    Allergic/Immunologic: Negative.      Objective:     Vital Signs (Most Recent):  Temp: 98.5 °F (36.9 °C) (10/28/19 1054)  Pulse: 87 (10/28/19 1054)  Resp: 16 (10/28/19 1054)  BP: 119/61 (10/28/19 1054)  SpO2: 97 % (10/28/19 1054) Vital Signs (24h Range):  Temp:  [97.7 °F (36.5 °C)-98.5 °F (36.9 °C)] 98.5 °F (36.9 °C)  Pulse:  [87-94] 87  Resp:  [16] 16  SpO2:  [97 %] 97 %  BP: (119-130)/(61-70) 119/61     Weight: 70 kg (154 lb 5.2 oz)  Body mass index is 29.16 kg/m².    SpO2: 97 %  O2 Device (Oxygen Therapy): room air    No intake or output data in the 24 hours ending 10/28/19 1350    Lines/Drains/Airways     Peripheral Intravenous Line                 Peripheral IV - Single Lumen 10/28/19 1201 20 G Left Antecubital less than 1 day         Peripheral IV - Single Lumen 10/28/19 1322 20 G Right Hand less than 1 day                Physical Exam   Constitutional: She is oriented to person, place, and time. She appears well-developed and well-nourished. No distress.   HENT:   Head: Normocephalic and atraumatic.   Eyes: Pupils are equal, round, and reactive to light. Right eye exhibits no discharge. Left eye exhibits no  discharge.   Neck: Neck supple. No JVD present.   Cardiovascular: Normal rate, regular rhythm, S1 normal, S2 normal and normal heart sounds. PMI is not displaced.   No murmur heard.  Pulmonary/Chest: Effort normal and breath sounds normal. No respiratory distress. She has no wheezes. She has no rales.   Abdominal: Soft. She exhibits no distension.   Musculoskeletal: She exhibits no edema.   Neurological: She is alert and oriented to person, place, and time.   Skin: Skin is warm and dry. She is not diaphoretic. No erythema.   Psychiatric: She has a normal mood and affect. Her behavior is normal. Thought content normal.   Nursing note and vitals reviewed.      Significant Labs:   CMP   Recent Labs   Lab 10/28/19  1202      K 3.4*      CO2 24   GLU 64*   BUN 15   CREATININE 0.7   CALCIUM 9.6   PROT 7.4   ALBUMIN 3.6   BILITOT 0.5   ALKPHOS 79   AST 29   ALT 13   ANIONGAP 14   ESTGFRAFRICA >60   EGFRNONAA >60   , CBC   Recent Labs   Lab 10/28/19  1202   WBC 8.89   HGB 11.2*   HCT 35.9*   *   , Troponin   Recent Labs   Lab 10/28/19  1202   TROPONINI 3.665*    and All pertinent lab results from the last 24 hours have been reviewed.    Significant Imaging: Echocardiogram: 2D echo with color flow doppler: No results found for this or any previous visit., EKG: Reviewed and X-Ray: CXR: X-Ray Chest 1 View (CXR): No results found for this visit on 10/28/19. and X-Ray Chest PA and Lateral (CXR):   Results for orders placed or performed during the hospital encounter of 10/28/19   X-Ray Chest PA And Lateral    Narrative    EXAMINATION:  XR CHEST PA AND LATERAL    CLINICAL HISTORY:  Shortness of breath    COMPARISON:  None    FINDINGS:  Lungs are clear.  Heart size within normal limits.No significant bony findings.  Metallic object overlies the left inferior neck measuring 1.1 x 0.2 cm which is thought to be related to patient's clothing.      Impression    No acute findings.      Electronically signed by: Shaun  Brenda  Date:    10/28/2019  Time:    11:37

## 2019-10-28 NOTE — ED NOTES
Patient identifiers verified and correct for Mateusz Bates.    LOC: The patient is awake, alert and aware of environment with an appropriate affect, the patient is oriented x 3 and speaking appropriately.  APPEARANCE: Patient resting comfortably and in no acute distress, patient is clean and well groomed, patient's clothing is properly fastened.  SKIN: The skin is warm and dry, color consistent with ethnicity, patient has normal skin turgor and moist mucus membranes, skin intact, no breakdown or bruising noted.  MUSCULOSKELETAL: Patient moving all extremities spontaneously, no obvious swelling or deformities noted.  RESPIRATORY: Airway is open and patent, respirations are spontaneous, patient has a normal effort and rate, no accessory muscle use noted, bilateral breath sounds clear. Pt reports SOB  CARDIAC: Patient has a normal rate and regular rhythm, no periphreal edema noted, capillary refill < 3 seconds. Pt reports chest tightness over the weekend with radiation to jaw. Pt no longer reports these symptoms.   ABDOMEN: Soft and non tender to palpation, no distention noted, normoactive bowel sounds present in all four quadrants.  NEUROLOGIC: PERRL, **mm bilaterally, eyes open spontaneously, behavior appropriate to situation, follows commands, facial expression symmetrical, bilateral hand grasp equal and even, purposeful motor response noted, normal sensation in all extremities when touched with a finger.

## 2019-10-28 NOTE — ASSESSMENT & PLAN NOTE
-Patient presented with intermittent chest tightness/heaviness that radiated to her back since Saturday evening  -Associated with JONES/orthopnea  -Initial troponin 3.665  -EKG showed SR with new LBBB  -Presentation consistent with NSTEMI  -Continue ASA, statin  -Start Toprol XL  -Start heparin gtt  -Check 2D echo  -Continue to trend troponin  -Keep NPO. [facility] today for further evaluation and PCI if indicated. All risks, benefits, and treatment alternatives explained to patient in detail. All questions answered. She has agreed to proceed. Further rec's to follow.

## 2019-10-28 NOTE — INTERVAL H&P NOTE
The patient has been examined and the H&P has been reviewed:    I concur with the findings and no changes have occurred since H&P was written.    Anesthesia/Surgery risks, benefits and alternative options discussed and understood by patient/family.  I have explained the risks, benefits , and alternatives of the procedure in detail.the patient voices understanding and all questions have been answered.the patient agrees to proceed as planned.          Active Hospital Problems    Diagnosis  POA    *NSTEMI (non-ST elevated myocardial infarction) [I21.4]  Yes    Abnormal EKG [R94.31]  Yes    Hyperlipidemia associated with type 2 diabetes mellitus [E11.69, E78.5]  Yes    Diabetes mellitus type II, uncontrolled [E11.65]  Yes    Obesity (BMI 30.0-34.9) [E66.9]  Yes    Anxiety and depression [F41.9, F32.9]  Yes    Hypertension associated with diabetes [E11.59, I10]  Yes      Resolved Hospital Problems   No resolved problems to display.

## 2019-10-28 NOTE — H&P
[facility] Hospital Medicine  History & Physical    Patient Name: Mateusz Bates  MRN: [id number]  Admission Date: 10/28/2019  Attending Physician: Michael Mcdowell Do, MD   Primary Care Provider: Serenity Kilpatrick MD         Patient information was obtained from patient, past medical records and ER records.     Subjective:     Principal Problem:NSTEMI (non-ST elevated myocardial infarction)    Chief Complaint:   Chief Complaint   Patient presents with    Shortness of Breath     intermittent SOB and chest discomfort x2 months; sent from PCP for abnormal EKG        HPI: Mateusz Bates is a 69 year old female with DM II, hypertension and hyperlipidemia who presented to the ED at the request of her PCP due to finding of an abnormal EKG and complaints of chest tightness with SOB. The patient reports having intermittent symptoms for the last 1-2 months. On 10/26/19, she began having a tightness in her mid chest that radiated into her back with associated SOB. Symptoms were worse with laying flat and improved with sitting up. They lasted only minutes, but this was longer than previous episodes. She denies a trend in when these episodes occur as well as associated nausea, vomiting and diaphoresis. The patient's EKG was significant for a new LBBB compared to previous in 2016. Labs were significant for a BNP of 1,305, troponin of 3.665 and potassium of 3.4. Code status was discussed with the patient and her sister. She is a full code. Her sister, Christina Hernandez, is her surrogate medical decision maker.     Past Medical History:   Diagnosis Date    Chronic major depressive disorder     Diabetes mellitus type II, uncontrolled 2000    Insulin x 10 years    Eczema     Essential hypertension     Generalized anxiety disorder     GERD (gastroesophageal reflux disease)     Glaucoma     OD    Hyperlipidemia     Obesity        Past Surgical History:   Procedure Laterality Date    APPENDECTOMY      ECTOPIC  PREGNANCY SURGERY Left     LSO    OOPHORECTOMY         Review of patient's allergies indicates:   Allergen Reactions    Ace inhibitors Other (See Comments)     Cough         No current facility-administered medications on file prior to encounter.      Current Outpatient Medications on File Prior to Encounter   Medication Sig    amLODIPine (NORVASC) 2.5 MG tablet TAKE 1 TABLET BY MOUTH EVERY DAY    atorvastatin (LIPITOR) 80 MG tablet TAKE 1 TABLET BY MOUTH EVERY DAY    betamethasone valerate 0.1% (VALISONE) 0.1 % Crea Apply topically 2 (two) times daily.    cyanocobalamin 1,000 mcg/mL injection Daily for a week then weekly for a month then monthly    insulin aspart U-100 (NOVOLOG U-100 INSULIN ASPART) 100 unit/mL injection Inject 60 Units into the skin continuous. Via VGO insulin pump.    metFORMIN (GLUCOPHAGE) 1000 MG tablet TAKE 1 TABLET BY MOUTH TWICE A DAY WITH MEALS    aspirin (ECOTRIN) 325 MG EC tablet Take 325 mg by mouth once daily.    blood sugar diagnostic (CONTOUR NEXT TEST STRIPS) Strp 1 strip by Misc.(Non-Drug; Combo Route) route 3 -4 times daily. Conto    blood sugar diagnostic Strp 1 strip 4 (four) times daily.    blood-glucose meter Misc IDDM 250.00  Insurance or Pt choice    clobetasol (TEMOVATE) 0.05 % external solution Apply topically 2 (two) times daily.    CONTOUR NEXT TEST STRIPS Strp USE ONE STRIP THREE TIMES A DAY    flash glucose scanning reader (FREESTYLE MAR 14 DAY READER) Misc 1 Device by Misc.(Non-Drug; Combo Route) route continuous.    flash glucose sensor (FREESTYLE MAR 14 DAY SENSOR) Kit Use 1 sensor every 14 days.    glyBURIDE (DIABETA) 5 MG tablet Take 2 tablets (10 mg total) by mouth 2 (two) times daily before meals.    hydrOXYzine HCl (ATARAX) 10 MG Tab TAKE 1 TABLET BY MOUTH TWICE DAILY AS NEEDED (Patient not taking: Reported on 10/28/2019)    insulin glargine, TOUJEO, (TOUJEO SOLOSTAR U-300 INSULIN) 300 unit/mL (1.5 mL) InPn pen Inject 45 Units into the  "skin once daily.    ketoconazole (NIZORAL) 2 % shampoo Apply topically twice a week.    lancets (MICROLET LANCET) Misc 1 lancet by Misc.(Non-Drug; Combo Route) route 3 (three) times daily.    levocetirizine (XYZAL) 5 MG tablet Take 1 tablet (5 mg total) by mouth every evening.    nystatin (MYCOSTATIN) powder Apply topically 4 (four) times daily. (Patient not taking: Reported on 10/28/2019)    pen needle, diabetic 32 gauge x 5/32" Ndle 1 each by Misc.(Non-Drug; Combo Route) route 3 (three) times daily.    sodium,potassium,mag sulfates (SUPREP BOWEL PREP KIT) 17.5-3.13-1.6 gram SolR Take as directed    sub-q insulin device, 20 unit (V-GO 20) Naty 1 Device by Misc.(Non-Drug; Combo Route) route once daily.    syringe with needle (SYRINGE 3CC/25GX1") 3 mL 25 gauge x 1" Syrg Use as Directed    triamcinolone acetonide 0.1% (KENALOG) 0.1 % cream AAA bid    venlafaxine (EFFEXOR-XR) 150 MG Cp24 Take 1 capsule (150 mg total) by mouth once daily.    [DISCONTINUED] insulin lispro 100 unit/mL injection Inject into the skin. (Patient taking differently: Inject 12 Units into the skin. )    [DISCONTINUED] pen needle, diabetic 32 gauge x 5/32" Ndle 1 each by Misc.(Non-Drug; Combo Route) route 3 (three) times daily.     Family History     Problem Relation (Age of Onset)    Alcohol abuse Maternal Uncle, Sister    Colon cancer Sister    Depression Maternal Grandmother    Diabetes Sister, Maternal Grandmother, Paternal Aunt    Drug abuse Sister    Glaucoma Paternal Aunt, Paternal Uncle    Heart attack Mother    Heart disease Maternal Grandmother, Paternal Grandmother, Paternal Grandfather    Heart failure Sister    Hepatitis Sister    Hypertension Father, Sister, Sister, Sister    Multiple myeloma Father        Tobacco Use    Smoking status: Never Smoker    Smokeless tobacco: Never Used   Substance and Sexual Activity    Alcohol use: No    Drug use: No    Sexual activity: Never     Review of Systems   Constitutional: " Negative for appetite change, chills, diaphoresis, fatigue and fever.   HENT: Negative for congestion, ear pain, mouth sores, sore throat and trouble swallowing.    Eyes: Negative for pain and visual disturbance.   Respiratory: Positive for shortness of breath. Negative for cough and chest tightness.    Cardiovascular: Positive for chest pain. Negative for palpitations and leg swelling.   Gastrointestinal: Negative for abdominal pain, constipation and nausea.   Endocrine: Negative for cold intolerance, heat intolerance, polydipsia and polyuria.   Genitourinary: Negative for dysuria, frequency and hematuria.   Musculoskeletal: Negative for arthralgias, back pain, myalgias and neck pain.   Skin: Negative for pallor, rash and wound.   Allergic/Immunologic: Negative for environmental allergies and immunocompromised state.   Neurological: Negative for dizziness, seizures, syncope, weakness, numbness and headaches.   Hematological: Negative for adenopathy. Does not bruise/bleed easily.   Psychiatric/Behavioral: Negative for agitation, confusion and sleep disturbance.     Objective:     Vital Signs (Most Recent):  Temp: 98.5 °F (36.9 °C) (10/28/19 1054)  Pulse: 87 (10/28/19 1054)  Resp: 16 (10/28/19 1054)  BP: 119/61 (10/28/19 1054)  SpO2: 97 % (10/28/19 1054) Vital Signs (24h Range):  Temp:  [97.7 °F (36.5 °C)-98.5 °F (36.9 °C)] 98.5 °F (36.9 °C)  Pulse:  [87-94] 87  Resp:  [16] 16  SpO2:  [97 %] 97 %  BP: (119-130)/(61-70) 119/61     Weight: 70 kg (154 lb 5.2 oz)  Body mass index is 29.16 kg/m².    Physical Exam   Constitutional: She is oriented to person, place, and time. She appears well-developed and well-nourished. No distress.   HENT:   Head: Normocephalic and atraumatic.   Eyes: Conjunctivae are normal.   PERRL   Neck: Neck supple. No JVD present.   Cardiovascular: Normal rate, regular rhythm and normal heart sounds.   Pulmonary/Chest: Effort normal and breath sounds normal.   Abdominal: Soft. Bowel sounds are  normal. She exhibits no distension. There is no tenderness.   Musculoskeletal: Normal range of motion.   Neurological: She is alert and oriented to person, place, and time.   Skin: Skin is warm and dry.   Psychiatric: She has a normal mood and affect. Her behavior is normal. Thought content normal.   Nursing note and vitals reviewed.          Significant Labs:   CBC:   Recent Labs   Lab 10/28/19  1202   WBC 8.89   HGB 11.2*   HCT 35.9*   *     CMP:   Recent Labs   Lab 10/28/19  1202      K 3.4*      CO2 24   GLU 64*   BUN 15   CREATININE 0.7   CALCIUM 9.6   PROT 7.4   ALBUMIN 3.6   BILITOT 0.5   ALKPHOS 79   AST 29   ALT 13   ANIONGAP 14   EGFRNONAA >60     Troponin:   Recent Labs   Lab 10/28/19  1202   TROPONINI 3.665*     All pertinent labs within the past 24 hours have been reviewed.    Significant Imaging: I have reviewed all pertinent imaging results/findings within the past 24 hours.   Imaging Results          X-Ray Chest PA And Lateral (Final result)  Result time 10/28/19 11:37:01    Final result by Shaun Gutierrez MD (10/28/19 11:37:01)                 Impression:      No acute findings.      Electronically signed by: Shaun Gutierrez  Date:    10/28/2019  Time:    11:37             Narrative:    EXAMINATION:  XR CHEST PA AND LATERAL    CLINICAL HISTORY:  Shortness of breath    COMPARISON:  None    FINDINGS:  Lungs are clear.  Heart size within normal limits.No significant bony findings.  Metallic object overlies the left inferior neck measuring 1.1 x 0.2 cm which is thought to be related to patient's clothing.                                  Assessment/Plan:     * NSTEMI (non-ST elevated myocardial infarction)  -Trend troponin.   -Heparin, metoprolol, ASA and statin.  -Check echocardiogram and lipid panel.    -Hold ACE inhibitor due to intolerance with cough. Hold ARB as patient's blood pressure is marginal. Will consider adding, if her blood pressure will tolerate.   -Hold  second antiplatelet due to pending Cardiology procedure.   -Patient was evaluated for Cardiac Rehab and is not a candidate at this time, but will be referred, when appropriate.   -Cardiology following and plans [facility] on 10/28/19.     Hyperlipidemia associated with type 2 diabetes mellitus  -Patient was improved, but not at goal on 10/4/19.   -Continue statin.       Diabetes mellitus type II, uncontrolled  -Hemoglobin A1C was 9 on 9/19/19.   -NISS.   -ADA diet.       Anxiety and depression  -Stable.   -Continue Effexor.       Hypertension associated with diabetes  -Stable.   -Continue Norvasc.         VTE Risk Mitigation (From admission, onward)         Ordered     Reason for no Mechanical VTE Prophylaxis  Once      10/28/19 1414     heparin 25,000 units in dextrose 5% 250 mL (100 units/mL) infusion LOW INTENSITY nomogram - [facility]  Continuous      10/28/19 1257     heparin 25,000 units in dextrose 5% (100 units/ml) IV bolus from bag - ADDITIONAL PRN BOLUS - 60 units/kg (max bolus 4000 units)  As needed (PRN)      10/28/19 1257     heparin 25,000 units in dextrose 5% (100 units/ml) IV bolus from bag - ADDITIONAL PRN BOLUS - 30 units/kg (max bolus 4000 units)  As needed (PRN)      10/28/19 1257                   MARIZOL Amin  Department of Hospital Medicine   [facility]

## 2019-10-28 NOTE — PROGRESS NOTES
Subjective:       Patient ID: Mateusz Bates is a 69 y.o. female.    Chief Complaint: Chest Pain (and chest tightness with shortness of breath for about a couple of months on and off)    Reports intermittent chest pain/SOB x 2 months. She reports episode over the weekend. She reports trouble breathing with lying down. She reports mild cough. She reports noting something crackling in her chest. She reports symptoms have completely resolved. She reports this episode was longer than previous episodes. She states this has occurred over the last 2 months 3-4 times. No fever or chills. Patient denies current symptoms.    Review of Systems   Constitutional: Negative for chills, fatigue, fever and unexpected weight change.   Eyes: Negative for visual disturbance.   Respiratory: Positive for shortness of breath.    Cardiovascular: Positive for chest pain.   Musculoskeletal: Negative for myalgias.   Neurological: Negative for headaches.       Objective:      Physical Exam   Constitutional: She is oriented to person, place, and time. She appears well-developed and well-nourished. No distress.   HENT:   Head: Normocephalic and atraumatic.   Eyes: Pupils are equal, round, and reactive to light. Conjunctivae and EOM are normal. No scleral icterus.   Cardiovascular: Normal rate and regular rhythm. Exam reveals no gallop and no friction rub.   No murmur heard.  Pulmonary/Chest: Effort normal and breath sounds normal.   Neurological: She is alert and oriented to person, place, and time. No cranial nerve deficit. Gait normal.   Psychiatric: She has a normal mood and affect.   Vitals reviewed.      Assessment:       1. Chest pain, unspecified type        Plan:     Problem List Items Addressed This Visit     None      Visit Diagnoses     Chest pain, unspecified type    -  Primary    Relevant Orders    X-Ray Chest PA And Lateral    EKG 12-lead    Ambulatory referral to Cardiology        EKG shows new left bundle branch block. After  discussion with patient she agrees to go to ER for further evaluation. Patient declines transport by EMS, currently asymptomatic. Patient expressed understanding of risks, but prefers to drive herself to ER next door.     Spoke with Dr. Hodge in ER regarding patient. She is aware patient will be coming for further evaluation.    Patient scheduled to see Dr. Lopez on Thursday this week.

## 2019-10-28 NOTE — PATIENT INSTRUCTIONS
If you have another episode of chest pain/SOB or other concerning symptoms, please seek immediate medical attention in the emergency room.

## 2019-10-28 NOTE — OP NOTE
INPATIENT Operative Note         SUMMARY     Surgery Date: 10/28/2019     Surgeon(s) and Role:     * Ingrid Lopez MD - Primary    ASSISTANT:none    Pre-op Diagnosis:  nstemi cardiomyopathy lbbb      Post-op Diagnosis:  Same as preop +3 vessel cad.    Procedure(s) (LRB):  CATHETERIZATION, HEART, LEFT (Left)  Abdominal aogram left subclavian angio  COMPLICATION:none    Anesthesia: RN IV Sedation    Findings/Key Components:  Lm normal   lcx diffusely diseased.  Ramus 80%   Lad 70% long lesion distal 80%   d1 70%   rca occluded.   Ef 20%   Infra renal aorta has a plaque 30% with 10 mm hg gradient.  Estimated Blood Loss: < 50 ML.         SPECIMEN: NONE    Devices/Prostetics: None    PLAN:  Consult cardiac surgery

## 2019-10-28 NOTE — HPI
Mateusz Bates is a 69 year old female with DM II, hypertension and hyperlipidemia who presented to the ED at the request of her PCP due to finding of an abnormal EKG and complaints of chest tightness with SOB. The patient reports having intermittent symptoms for the last 1-2 months. On 10/26/19, she began having a tightness in her mid chest that radiated into her back with associated SOB. Symptoms were worse with laying flat and improved with sitting up. They lasted only minutes, but this was longer than previous episodes. She denies a trend in when these episodes occur as well as associated nausea, vomiting and diaphoresis. The patient's EKG was significant for a new LBBB compared to previous in 2016. Labs were significant for a BNP of 1,305, troponin of 3.665 and potassium of 3.4. Code status was discussed with the patient and her sister. She is a full code. Her sister, Christina Hernandez, is her surrogate medical decision maker.

## 2019-10-28 NOTE — SUBJECTIVE & OBJECTIVE
Past Medical History:   Diagnosis Date    Chronic major depressive disorder     Diabetes mellitus type II, uncontrolled 2000    Insulin x 10 years    Eczema     Essential hypertension     Generalized anxiety disorder     GERD (gastroesophageal reflux disease)     Glaucoma     OD    Hyperlipidemia     Obesity        Past Surgical History:   Procedure Laterality Date    APPENDECTOMY      ECTOPIC PREGNANCY SURGERY Left     LSO    OOPHORECTOMY         Review of patient's allergies indicates:   Allergen Reactions    Ace inhibitors Other (See Comments)     Cough         No current facility-administered medications on file prior to encounter.      Current Outpatient Medications on File Prior to Encounter   Medication Sig    amLODIPine (NORVASC) 2.5 MG tablet TAKE 1 TABLET BY MOUTH EVERY DAY    atorvastatin (LIPITOR) 80 MG tablet TAKE 1 TABLET BY MOUTH EVERY DAY    betamethasone valerate 0.1% (VALISONE) 0.1 % Crea Apply topically 2 (two) times daily.    cyanocobalamin 1,000 mcg/mL injection Daily for a week then weekly for a month then monthly    insulin aspart U-100 (NOVOLOG U-100 INSULIN ASPART) 100 unit/mL injection Inject 60 Units into the skin continuous. Via VGO insulin pump.    metFORMIN (GLUCOPHAGE) 1000 MG tablet TAKE 1 TABLET BY MOUTH TWICE A DAY WITH MEALS    aspirin (ECOTRIN) 325 MG EC tablet Take 325 mg by mouth once daily.    blood sugar diagnostic (CONTOUR NEXT TEST STRIPS) Strp 1 strip by Misc.(Non-Drug; Combo Route) route 3 -4 times daily. Conto    blood sugar diagnostic Strp 1 strip 4 (four) times daily.    blood-glucose meter Misc IDDM 250.00  Insurance or Pt choice    clobetasol (TEMOVATE) 0.05 % external solution Apply topically 2 (two) times daily.    CONTOUR NEXT TEST STRIPS Strp USE ONE STRIP THREE TIMES A DAY    flash glucose scanning reader (FREESTYLE MAR 14 DAY READER) Misc 1 Device by Misc.(Non-Drug; Combo Route) route continuous.    flash glucose sensor (FREESTYLE  "MAR 14 DAY SENSOR) Kit Use 1 sensor every 14 days.    glyBURIDE (DIABETA) 5 MG tablet Take 2 tablets (10 mg total) by mouth 2 (two) times daily before meals.    hydrOXYzine HCl (ATARAX) 10 MG Tab TAKE 1 TABLET BY MOUTH TWICE DAILY AS NEEDED (Patient not taking: Reported on 10/28/2019)    insulin glargine, TOUJEO, (TOUJEO SOLOSTAR U-300 INSULIN) 300 unit/mL (1.5 mL) InPn pen Inject 45 Units into the skin once daily.    ketoconazole (NIZORAL) 2 % shampoo Apply topically twice a week.    lancets (MICROLET LANCET) Misc 1 lancet by Misc.(Non-Drug; Combo Route) route 3 (three) times daily.    levocetirizine (XYZAL) 5 MG tablet Take 1 tablet (5 mg total) by mouth every evening.    nystatin (MYCOSTATIN) powder Apply topically 4 (four) times daily. (Patient not taking: Reported on 10/28/2019)    pen needle, diabetic 32 gauge x 5/32" Ndle 1 each by Misc.(Non-Drug; Combo Route) route 3 (three) times daily.    sodium,potassium,mag sulfates (SUPREP BOWEL PREP KIT) 17.5-3.13-1.6 gram SolR Take as directed    sub-q insulin device, 20 unit (V-GO 20) Naty 1 Device by Misc.(Non-Drug; Combo Route) route once daily.    syringe with needle (SYRINGE 3CC/25GX1") 3 mL 25 gauge x 1" Syrg Use as Directed    triamcinolone acetonide 0.1% (KENALOG) 0.1 % cream AAA bid    venlafaxine (EFFEXOR-XR) 150 MG Cp24 Take 1 capsule (150 mg total) by mouth once daily.    [DISCONTINUED] insulin lispro 100 unit/mL injection Inject into the skin. (Patient taking differently: Inject 12 Units into the skin. )    [DISCONTINUED] pen needle, diabetic 32 gauge x 5/32" Ndle 1 each by Misc.(Non-Drug; Combo Route) route 3 (three) times daily.     Family History     Problem Relation (Age of Onset)    Alcohol abuse Maternal Uncle, Sister    Colon cancer Sister    Depression Maternal Grandmother    Diabetes Sister, Maternal Grandmother, Paternal Aunt    Drug abuse Sister    Glaucoma Paternal Aunt, Paternal Uncle    Heart attack Mother    Heart disease " Maternal Grandmother, Paternal Grandmother, Paternal Grandfather    Heart failure Sister    Hepatitis Sister    Hypertension Father, Sister, Sister, Sister    Multiple myeloma Father        Tobacco Use    Smoking status: Never Smoker    Smokeless tobacco: Never Used   Substance and Sexual Activity    Alcohol use: No    Drug use: No    Sexual activity: Never     Review of Systems   Constitutional: Negative for appetite change, chills, diaphoresis, fatigue and fever.   HENT: Negative for congestion, ear pain, mouth sores, sore throat and trouble swallowing.    Eyes: Negative for pain and visual disturbance.   Respiratory: Positive for shortness of breath. Negative for cough and chest tightness.    Cardiovascular: Positive for chest pain. Negative for palpitations and leg swelling.   Gastrointestinal: Negative for abdominal pain, constipation and nausea.   Endocrine: Negative for cold intolerance, heat intolerance, polydipsia and polyuria.   Genitourinary: Negative for dysuria, frequency and hematuria.   Musculoskeletal: Negative for arthralgias, back pain, myalgias and neck pain.   Skin: Negative for pallor, rash and wound.   Allergic/Immunologic: Negative for environmental allergies and immunocompromised state.   Neurological: Negative for dizziness, seizures, syncope, weakness, numbness and headaches.   Hematological: Negative for adenopathy. Does not bruise/bleed easily.   Psychiatric/Behavioral: Negative for agitation, confusion and sleep disturbance.     Objective:     Vital Signs (Most Recent):  Temp: 98.5 °F (36.9 °C) (10/28/19 1054)  Pulse: 87 (10/28/19 1054)  Resp: 16 (10/28/19 1054)  BP: 119/61 (10/28/19 1054)  SpO2: 97 % (10/28/19 1054) Vital Signs (24h Range):  Temp:  [97.7 °F (36.5 °C)-98.5 °F (36.9 °C)] 98.5 °F (36.9 °C)  Pulse:  [87-94] 87  Resp:  [16] 16  SpO2:  [97 %] 97 %  BP: (119-130)/(61-70) 119/61     Weight: 70 kg (154 lb 5.2 oz)  Body mass index is 29.16 kg/m².    Physical Exam    Constitutional: She is oriented to person, place, and time. She appears well-developed and well-nourished. No distress.   HENT:   Head: Normocephalic and atraumatic.   Eyes: Conjunctivae are normal.   PERRL   Neck: Neck supple. No JVD present.   Cardiovascular: Normal rate, regular rhythm and normal heart sounds.   Pulmonary/Chest: Effort normal and breath sounds normal.   Abdominal: Soft. Bowel sounds are normal. She exhibits no distension. There is no tenderness.   Musculoskeletal: Normal range of motion.   Neurological: She is alert and oriented to person, place, and time.   Skin: Skin is warm and dry.   Psychiatric: She has a normal mood and affect. Her behavior is normal. Thought content normal.   Nursing note and vitals reviewed.          Significant Labs:   CBC:   Recent Labs   Lab 10/28/19  1202   WBC 8.89   HGB 11.2*   HCT 35.9*   *     CMP:   Recent Labs   Lab 10/28/19  1202      K 3.4*      CO2 24   GLU 64*   BUN 15   CREATININE 0.7   CALCIUM 9.6   PROT 7.4   ALBUMIN 3.6   BILITOT 0.5   ALKPHOS 79   AST 29   ALT 13   ANIONGAP 14   EGFRNONAA >60     Troponin:   Recent Labs   Lab 10/28/19  1202   TROPONINI 3.665*     All pertinent labs within the past 24 hours have been reviewed.    Significant Imaging: I have reviewed all pertinent imaging results/findings within the past 24 hours.   Imaging Results          X-Ray Chest PA And Lateral (Final result)  Result time 10/28/19 11:37:01    Final result by Shaun Gutierrez MD (10/28/19 11:37:01)                 Impression:      No acute findings.      Electronically signed by: Shaun Gutierrez  Date:    10/28/2019  Time:    11:37             Narrative:    EXAMINATION:  XR CHEST PA AND LATERAL    CLINICAL HISTORY:  Shortness of breath    COMPARISON:  None    FINDINGS:  Lungs are clear.  Heart size within normal limits.No significant bony findings.  Metallic object overlies the left inferior neck measuring 1.1 x 0.2 cm which is thought  to be related to patient's clothing.

## 2019-10-28 NOTE — H&P (VIEW-ONLY)
[facility]  Cardiology  Consult Note    Patient Name: Mateusz Bates  MRN: [id number]  Admission Date: 10/28/2019  Hospital Length of Stay: 0 days  Code Status: No Order   Attending Provider: Michael Mcdowell Do, MD   Consulting Provider: Josselyn Vann PA-C  Primary Care Physician: Serenity Kilpatrick MD  Principal Problem:<principal problem not specified>    Patient information was obtained from patient, past medical records and ER records.     Inpatient consult to Cardiology  Consult performed by: Josselyn Vann PA-C  Consult ordered by: Michael Mcdowell Do, MD        Subjective:     Chief Complaint:  Chest pain    HPI:   Ms. Bates is a 69 year old female patient whose current medical conditions include HTN, hyperlipidemia, obesity, DM type II, cerebral microvascular disease, FAHAD, and B12 deficiency who was sent to [facility] ED today from her PCP's office due to chest pain and abnormal EKG. Patient complained of substernal chest tightness/heaviness that radiated to her back on Saturday while at rest. Associated symptoms included orthopnea and SOB. Patient denied any associated nausea, vomiting, diaphoresis, palpitations, near syncope, or syncope. Initial workup in ED revealed troponin of 3.665 and BNP of 1305 and patient subsequently admitted for further evaluation and treatment of NSTEMI. Cardiology consulted to assist with management. Patient seen and examined today while in ED. Feels well at time of exam, denies chest pain. She does report similar symptoms over the past few months but states they never lasted as long and were less intense. She reports compliance with her medications. States her father had history of CABG. Chart reviewed. Repeat troponin pending. EKG reviewed and shows SR with new LBBB. 2D echo pending.    Past Medical History:   Diagnosis Date    Chronic major depressive disorder     Diabetes mellitus type II, uncontrolled 2000    Insulin x 10 years    Eczema     Essential  hypertension     Generalized anxiety disorder     GERD (gastroesophageal reflux disease)     Glaucoma     OD    Hyperlipidemia     Obesity        Past Surgical History:   Procedure Laterality Date    APPENDECTOMY      ECTOPIC PREGNANCY SURGERY Left     LSO    OOPHORECTOMY         Review of patient's allergies indicates:   Allergen Reactions    Ace inhibitors Other (See Comments)     Cough         No current facility-administered medications on file prior to encounter.      Current Outpatient Medications on File Prior to Encounter   Medication Sig    amLODIPine (NORVASC) 2.5 MG tablet TAKE 1 TABLET BY MOUTH EVERY DAY    atorvastatin (LIPITOR) 80 MG tablet TAKE 1 TABLET BY MOUTH EVERY DAY    betamethasone valerate 0.1% (VALISONE) 0.1 % Crea Apply topically 2 (two) times daily.    cyanocobalamin 1,000 mcg/mL injection Daily for a week then weekly for a month then monthly    insulin aspart U-100 (NOVOLOG U-100 INSULIN ASPART) 100 unit/mL injection Inject 60 Units into the skin continuous. Via VGO insulin pump.    metFORMIN (GLUCOPHAGE) 1000 MG tablet TAKE 1 TABLET BY MOUTH TWICE A DAY WITH MEALS    aspirin (ECOTRIN) 325 MG EC tablet Take 325 mg by mouth once daily.    blood sugar diagnostic (CONTOUR NEXT TEST STRIPS) Strp 1 strip by Misc.(Non-Drug; Combo Route) route 3 -4 times daily. Conto    blood sugar diagnostic Strp 1 strip 4 (four) times daily.    blood-glucose meter Misc IDDM 250.00  Insurance or Pt choice    clobetasol (TEMOVATE) 0.05 % external solution Apply topically 2 (two) times daily.    CONTOUR NEXT TEST STRIPS Strp USE ONE STRIP THREE TIMES A DAY    flash glucose scanning reader (FREESTYLE MAR 14 DAY READER) Misc 1 Device by Misc.(Non-Drug; Combo Route) route continuous.    flash glucose sensor (FREESTYLE AMR 14 DAY SENSOR) Kit Use 1 sensor every 14 days.    glyBURIDE (DIABETA) 5 MG tablet Take 2 tablets (10 mg total) by mouth 2 (two) times daily before meals.    hydrOXYzine  "HCl (ATARAX) 10 MG Tab TAKE 1 TABLET BY MOUTH TWICE DAILY AS NEEDED (Patient not taking: Reported on 10/28/2019)    insulin glargine, TOUJEO, (TOUJEO SOLOSTAR U-300 INSULIN) 300 unit/mL (1.5 mL) InPn pen Inject 45 Units into the skin once daily.    ketoconazole (NIZORAL) 2 % shampoo Apply topically twice a week.    lancets (MICROLET LANCET) Misc 1 lancet by Misc.(Non-Drug; Combo Route) route 3 (three) times daily.    levocetirizine (XYZAL) 5 MG tablet Take 1 tablet (5 mg total) by mouth every evening.    nystatin (MYCOSTATIN) powder Apply topically 4 (four) times daily. (Patient not taking: Reported on 10/28/2019)    pen needle, diabetic 32 gauge x 5/32" Ndle 1 each by Misc.(Non-Drug; Combo Route) route 3 (three) times daily.    sodium,potassium,mag sulfates (SUPREP BOWEL PREP KIT) 17.5-3.13-1.6 gram SolR Take as directed    sub-q insulin device, 20 unit (V-GO 20) Naty 1 Device by Misc.(Non-Drug; Combo Route) route once daily.    syringe with needle (SYRINGE 3CC/25GX1") 3 mL 25 gauge x 1" Syrg Use as Directed    triamcinolone acetonide 0.1% (KENALOG) 0.1 % cream AAA bid    venlafaxine (EFFEXOR-XR) 150 MG Cp24 Take 1 capsule (150 mg total) by mouth once daily.    [DISCONTINUED] insulin lispro 100 unit/mL injection Inject into the skin. (Patient taking differently: Inject 12 Units into the skin. )    [DISCONTINUED] pen needle, diabetic 32 gauge x 5/32" Ndle 1 each by Misc.(Non-Drug; Combo Route) route 3 (three) times daily.     Family History     Problem Relation (Age of Onset)    Alcohol abuse Maternal Uncle, Sister    Colon cancer Sister    Depression Maternal Grandmother    Diabetes Sister, Maternal Grandmother, Paternal Aunt    Drug abuse Sister    Glaucoma Paternal Aunt, Paternal Uncle    Heart attack Mother    Heart disease Maternal Grandmother, Paternal Grandmother, Paternal Grandfather    Heart failure Sister    Hepatitis Sister    Hypertension Father, Sister, Sister, Sister    Multiple myeloma " Father        Tobacco Use    Smoking status: Never Smoker    Smokeless tobacco: Never Used   Substance and Sexual Activity    Alcohol use: No    Drug use: No    Sexual activity: Never     Review of Systems   Constitution: Negative.   HENT: Negative.    Eyes: Negative.    Cardiovascular: Positive for chest pain, dyspnea on exertion and orthopnea.   Respiratory: Positive for shortness of breath.    Endocrine: Negative.    Hematologic/Lymphatic: Negative.    Skin: Negative.    Musculoskeletal: Negative.    Gastrointestinal: Negative.    Genitourinary: Negative.    Neurological: Negative.    Psychiatric/Behavioral: Negative.    Allergic/Immunologic: Negative.      Objective:     Vital Signs (Most Recent):  Temp: 98.5 °F (36.9 °C) (10/28/19 1054)  Pulse: 87 (10/28/19 1054)  Resp: 16 (10/28/19 1054)  BP: 119/61 (10/28/19 1054)  SpO2: 97 % (10/28/19 1054) Vital Signs (24h Range):  Temp:  [97.7 °F (36.5 °C)-98.5 °F (36.9 °C)] 98.5 °F (36.9 °C)  Pulse:  [87-94] 87  Resp:  [16] 16  SpO2:  [97 %] 97 %  BP: (119-130)/(61-70) 119/61     Weight: 70 kg (154 lb 5.2 oz)  Body mass index is 29.16 kg/m².    SpO2: 97 %  O2 Device (Oxygen Therapy): room air    No intake or output data in the 24 hours ending 10/28/19 1350    Lines/Drains/Airways     Peripheral Intravenous Line                 Peripheral IV - Single Lumen 10/28/19 1201 20 G Left Antecubital less than 1 day         Peripheral IV - Single Lumen 10/28/19 1322 20 G Right Hand less than 1 day                Physical Exam   Constitutional: She is oriented to person, place, and time. She appears well-developed and well-nourished. No distress.   HENT:   Head: Normocephalic and atraumatic.   Eyes: Pupils are equal, round, and reactive to light. Right eye exhibits no discharge. Left eye exhibits no discharge.   Neck: Neck supple. No JVD present.   Cardiovascular: Normal rate, regular rhythm, S1 normal, S2 normal and normal heart sounds. PMI is not displaced.   No murmur  heard.  Pulmonary/Chest: Effort normal and breath sounds normal. No respiratory distress. She has no wheezes. She has no rales.   Abdominal: Soft. She exhibits no distension.   Musculoskeletal: She exhibits no edema.   Neurological: She is alert and oriented to person, place, and time.   Skin: Skin is warm and dry. She is not diaphoretic. No erythema.   Psychiatric: She has a normal mood and affect. Her behavior is normal. Thought content normal.   Nursing note and vitals reviewed.      Significant Labs:   CMP   Recent Labs   Lab 10/28/19  1202      K 3.4*      CO2 24   GLU 64*   BUN 15   CREATININE 0.7   CALCIUM 9.6   PROT 7.4   ALBUMIN 3.6   BILITOT 0.5   ALKPHOS 79   AST 29   ALT 13   ANIONGAP 14   ESTGFRAFRICA >60   EGFRNONAA >60   , CBC   Recent Labs   Lab 10/28/19  1202   WBC 8.89   HGB 11.2*   HCT 35.9*   *   , Troponin   Recent Labs   Lab 10/28/19  1202   TROPONINI 3.665*    and All pertinent lab results from the last 24 hours have been reviewed.    Significant Imaging: Echocardiogram: 2D echo with color flow doppler: No results found for this or any previous visit., EKG: Reviewed and X-Ray: CXR: X-Ray Chest 1 View (CXR): No results found for this visit on 10/28/19. and X-Ray Chest PA and Lateral (CXR):   Results for orders placed or performed during the hospital encounter of 10/28/19   X-Ray Chest PA And Lateral    Narrative    EXAMINATION:  XR CHEST PA AND LATERAL    CLINICAL HISTORY:  Shortness of breath    COMPARISON:  None    FINDINGS:  Lungs are clear.  Heart size within normal limits.No significant bony findings.  Metallic object overlies the left inferior neck measuring 1.1 x 0.2 cm which is thought to be related to patient's clothing.      Impression    No acute findings.      Electronically signed by: Shaun Gutierrez  Date:    10/28/2019  Time:    11:37     Assessment and Plan:   Patient who presents with chest pain/NSTEMI. Continue ASA, statin, BB. Start heparin gtt. [facility]  planned for today. Will make decision regarding DAPT, if needed, post-cath. No ACEi/ARB given impending procedure. Check 2D echo.    Chest pain due to myocardial ischemia  -See plan under NSTEMI    Abnormal EKG  -LBBB noted on EKG  -See plan under NSTEMI    NSTEMI (non-ST elevated myocardial infarction)  -Patient presented with intermittent chest tightness/heaviness that radiated to her back since Saturday evening  -Associated with JONES/orthopnea  -Initial troponin 3.665  -EKG showed SR with new LBBB  -Presentation consistent with NSTEMI  -Continue ASA, statin  -Start Toprol XL  -Start heparin gtt  -Check 2D echo  -Continue to trend troponin  -Keep NPO. [facility] today for further evaluation and PCI if indicated. All risks, benefits, and treatment alternatives explained to patient in detail. All questions answered. She has agreed to proceed. Further rec's to follow.    Hyperlipidemia associated with type 2 diabetes mellitus  -Continue statin    Obesity (BMI 30.0-34.9)  -Weight loss    Diabetes mellitus type II, uncontrolled  -Mgmt as per hospital medicine    Hypertension associated with diabetes  -Continue home meds as BP permits        VTE Risk Mitigation (From admission, onward)         Ordered     heparin 25,000 units in dextrose 5% (100 units/ml) IV bolus from bag INITIAL BOLUS (max bolus 4000 units)  Once      10/28/19 1257     heparin 25,000 units in dextrose 5% 250 mL (100 units/mL) infusion LOW INTENSITY nomogram - [facility]  Continuous      10/28/19 1257     heparin 25,000 units in dextrose 5% (100 units/ml) IV bolus from bag - ADDITIONAL PRN BOLUS - 60 units/kg (max bolus 4000 units)  As needed (PRN)      10/28/19 1257     heparin 25,000 units in dextrose 5% (100 units/ml) IV bolus from bag - ADDITIONAL PRN BOLUS - 30 units/kg (max bolus 4000 units)  As needed (PRN)      10/28/19 1257                Thank you for your consult. I will follow-up with patient. Please contact us if you have any additional  questions.    Josselyn Vann PA-C  Cardiology   [facility]

## 2019-10-28 NOTE — ASSESSMENT & PLAN NOTE
-Trend troponin.   -Heparin, metoprolol, ASA and statin.  -Check echocardiogram and lipid panel.    -Hold ACE inhibitor due to intolerance with cough. Hold ARB as patient's blood pressure is marginal. Will consider adding, if her blood pressure will tolerate.   -Hold second antiplatelet due to pending Cardiology procedure.   -Patient was evaluated for Cardiac Rehab and is not a candidate at this time, but will be referred, when appropriate.   -Cardiology following and plans [facility] on 10/28/19.

## 2019-10-29 ENCOUNTER — ANESTHESIA EVENT (OUTPATIENT)
Dept: SURGERY | Facility: HOSPITAL | Age: 69
DRG: 233 | End: 2019-10-29
Payer: COMMERCIAL

## 2019-10-29 PROBLEM — I50.41 ACUTE COMBINED SYSTOLIC AND DIASTOLIC CHF, NYHA CLASS 2: Status: ACTIVE | Noted: 2019-10-29

## 2019-10-29 LAB
ANION GAP SERPL CALC-SCNC: 10 MMOL/L (ref 8–16)
APTT BLDCRRT: 24.7 SEC (ref 21–32)
APTT BLDCRRT: 25.6 SEC (ref 21–32)
APTT BLDCRRT: 27.2 SEC (ref 21–32)
APTT BLDCRRT: 31.7 SEC (ref 21–32)
APTT BLDCRRT: 57.8 SEC (ref 21–32)
BASOPHILS # BLD AUTO: 0.02 K/UL (ref 0–0.2)
BASOPHILS NFR BLD: 0.2 % (ref 0–1.9)
BILIRUB UR QL STRIP: NEGATIVE
BUN SERPL-MCNC: 14 MG/DL (ref 8–23)
CALCIUM SERPL-MCNC: 8.7 MG/DL (ref 8.7–10.5)
CHLORIDE SERPL-SCNC: 105 MMOL/L (ref 95–110)
CLARITY UR: CLEAR
CO2 SERPL-SCNC: 21 MMOL/L (ref 23–29)
COLOR UR: YELLOW
CREAT SERPL-MCNC: 0.7 MG/DL (ref 0.5–1.4)
DIFFERENTIAL METHOD: ABNORMAL
EOSINOPHIL # BLD AUTO: 0.2 K/UL (ref 0–0.5)
EOSINOPHIL NFR BLD: 2.4 % (ref 0–8)
ERYTHROCYTE [DISTWIDTH] IN BLOOD BY AUTOMATED COUNT: 15.2 % (ref 11.5–14.5)
EST. GFR  (AFRICAN AMERICAN): >60 ML/MIN/1.73 M^2
EST. GFR  (NON AFRICAN AMERICAN): >60 ML/MIN/1.73 M^2
ESTIMATED AVG GLUCOSE: 189 MG/DL (ref 68–131)
GLUCOSE SERPL-MCNC: 173 MG/DL (ref 70–110)
GLUCOSE UR QL STRIP: ABNORMAL
HBA1C MFR BLD HPLC: 8.2 % (ref 4–5.6)
HCT VFR BLD AUTO: 33.6 % (ref 37–48.5)
HGB BLD-MCNC: 10.1 G/DL (ref 12–16)
HGB UR QL STRIP: ABNORMAL
IMM GRANULOCYTES # BLD AUTO: 0.02 K/UL (ref 0–0.04)
IMM GRANULOCYTES NFR BLD AUTO: 0.2 % (ref 0–0.5)
KETONES UR QL STRIP: ABNORMAL
LEUKOCYTE ESTERASE UR QL STRIP: NEGATIVE
LYMPHOCYTES # BLD AUTO: 2.2 K/UL (ref 1–4.8)
LYMPHOCYTES NFR BLD: 26.8 % (ref 18–48)
MAGNESIUM SERPL-MCNC: 1.2 MG/DL (ref 1.6–2.6)
MCH RBC QN AUTO: 26.4 PG (ref 27–31)
MCHC RBC AUTO-ENTMCNC: 30.1 G/DL (ref 32–36)
MCV RBC AUTO: 88 FL (ref 82–98)
MONOCYTES # BLD AUTO: 0.7 K/UL (ref 0.3–1)
MONOCYTES NFR BLD: 9 % (ref 4–15)
NEUTROPHILS # BLD AUTO: 4.9 K/UL (ref 1.8–7.7)
NEUTROPHILS NFR BLD: 61.4 % (ref 38–73)
NITRITE UR QL STRIP: NEGATIVE
NRBC BLD-RTO: 0 /100 WBC
PH UR STRIP: 6 [PH] (ref 5–8)
PHOSPHATE SERPL-MCNC: 3.6 MG/DL (ref 2.7–4.5)
PLATELET # BLD AUTO: 324 K/UL (ref 150–350)
PMV BLD AUTO: 10.2 FL (ref 9.2–12.9)
POCT GLUCOSE: 186 MG/DL (ref 70–110)
POCT GLUCOSE: 229 MG/DL (ref 70–110)
POCT GLUCOSE: 257 MG/DL (ref 70–110)
POCT GLUCOSE: 323 MG/DL (ref 70–110)
POTASSIUM SERPL-SCNC: 3.8 MMOL/L (ref 3.5–5.1)
PROT UR QL STRIP: NEGATIVE
RBC # BLD AUTO: 3.82 M/UL (ref 4–5.4)
SODIUM SERPL-SCNC: 136 MMOL/L (ref 136–145)
SP GR UR STRIP: 1.01 (ref 1–1.03)
URN SPEC COLLECT METH UR: ABNORMAL
UROBILINOGEN UR STRIP-ACNC: NEGATIVE EU/DL
WBC # BLD AUTO: 8.03 K/UL (ref 3.9–12.7)

## 2019-10-29 PROCEDURE — 25000003 PHARM REV CODE 250: Performed by: INTERNAL MEDICINE

## 2019-10-29 PROCEDURE — 63600175 PHARM REV CODE 636 W HCPCS: Performed by: INTERNAL MEDICINE

## 2019-10-29 PROCEDURE — 63600175 PHARM REV CODE 636 W HCPCS: Performed by: NURSE PRACTITIONER

## 2019-10-29 PROCEDURE — 85730 THROMBOPLASTIN TIME PARTIAL: CPT | Mod: 91

## 2019-10-29 PROCEDURE — 36415 COLL VENOUS BLD VENIPUNCTURE: CPT

## 2019-10-29 PROCEDURE — 84100 ASSAY OF PHOSPHORUS: CPT

## 2019-10-29 PROCEDURE — 80048 BASIC METABOLIC PNL TOTAL CA: CPT

## 2019-10-29 PROCEDURE — 99233 PR SUBSEQUENT HOSPITAL CARE,LEVL III: ICD-10-PCS | Mod: ,,, | Performed by: INTERNAL MEDICINE

## 2019-10-29 PROCEDURE — 99233 SBSQ HOSP IP/OBS HIGH 50: CPT | Mod: ,,, | Performed by: INTERNAL MEDICINE

## 2019-10-29 PROCEDURE — 83735 ASSAY OF MAGNESIUM: CPT

## 2019-10-29 PROCEDURE — 21400001 HC TELEMETRY ROOM

## 2019-10-29 PROCEDURE — 83036 HEMOGLOBIN GLYCOSYLATED A1C: CPT

## 2019-10-29 PROCEDURE — 81003 URINALYSIS AUTO W/O SCOPE: CPT

## 2019-10-29 PROCEDURE — 25000003 PHARM REV CODE 250: Performed by: THORACIC SURGERY (CARDIOTHORACIC VASCULAR SURGERY)

## 2019-10-29 PROCEDURE — 85025 COMPLETE CBC W/AUTO DIFF WBC: CPT

## 2019-10-29 PROCEDURE — 63600175 PHARM REV CODE 636 W HCPCS: Performed by: THORACIC SURGERY (CARDIOTHORACIC VASCULAR SURGERY)

## 2019-10-29 RX ORDER — FUROSEMIDE 10 MG/ML
40 INJECTION INTRAMUSCULAR; INTRAVENOUS 2 TIMES DAILY
Status: DISCONTINUED | OUTPATIENT
Start: 2019-10-29 | End: 2019-10-31

## 2019-10-29 RX ORDER — HYDROXYZINE HYDROCHLORIDE 10 MG/1
TABLET, FILM COATED ORAL
Qty: 60 TABLET | Refills: 0 | OUTPATIENT
Start: 2019-10-29

## 2019-10-29 RX ADMIN — POTASSIUM CHLORIDE 20 MEQ: 20 TABLET, EXTENDED RELEASE ORAL at 09:10

## 2019-10-29 RX ADMIN — ASPIRIN 81 MG: 81 TABLET, COATED ORAL at 09:10

## 2019-10-29 RX ADMIN — POTASSIUM CHLORIDE 20 MEQ: 20 TABLET, EXTENDED RELEASE ORAL at 08:10

## 2019-10-29 RX ADMIN — METOPROLOL SUCCINATE 25 MG: 25 TABLET, EXTENDED RELEASE ORAL at 08:10

## 2019-10-29 RX ADMIN — AMLODIPINE BESYLATE 2.5 MG: 2.5 TABLET ORAL at 09:10

## 2019-10-29 RX ADMIN — VENLAFAXINE HYDROCHLORIDE 150 MG: 75 CAPSULE, EXTENDED RELEASE ORAL at 09:10

## 2019-10-29 RX ADMIN — FUROSEMIDE 40 MG: 10 INJECTION, SOLUTION INTRAMUSCULAR; INTRAVENOUS at 05:10

## 2019-10-29 RX ADMIN — FUROSEMIDE 20 MG: 10 INJECTION, SOLUTION INTRAMUSCULAR; INTRAVENOUS at 09:10

## 2019-10-29 RX ADMIN — SODIUM CHLORIDE: 0.9 INJECTION, SOLUTION INTRAVENOUS at 06:10

## 2019-10-29 RX ADMIN — INSULIN ASPART 2 UNITS: 100 INJECTION, SOLUTION INTRAVENOUS; SUBCUTANEOUS at 05:10

## 2019-10-29 RX ADMIN — ATORVASTATIN CALCIUM 80 MG: 40 TABLET, FILM COATED ORAL at 08:10

## 2019-10-29 RX ADMIN — INSULIN ASPART 2 UNITS: 100 INJECTION, SOLUTION INTRAVENOUS; SUBCUTANEOUS at 09:10

## 2019-10-29 RX ADMIN — HEPARIN SODIUM 15 UNITS/KG/HR: 10000 INJECTION, SOLUTION INTRAVENOUS at 01:10

## 2019-10-29 RX ADMIN — INSULIN ASPART 3 UNITS: 100 INJECTION, SOLUTION INTRAVENOUS; SUBCUTANEOUS at 12:10

## 2019-10-29 RX ADMIN — HEPARIN SODIUM 18 UNITS/KG/HR: 10000 INJECTION, SOLUTION INTRAVENOUS at 09:10

## 2019-10-29 NOTE — PROGRESS NOTES
[facility]  Cardiology  Progress Note    Patient Name: Mateusz Bates  MRN: [id number]  Admission Date: 10/28/2019  Hospital Length of Stay: 1 days  Code Status: Full Code   Attending Physician: Shravan Lam MD   Primary Care Physician: Serenity Kilpatrick MD  Expected Discharge Date:   Principal Problem:NSTEMI (non-ST elevated myocardial infarction)    Subjective:   HPI:  Ms. Bates is a 69 year old female patient whose current medical conditions include HTN, hyperlipidemia, obesity, DM type II, cerebral microvascular disease, FAHAD, and B12 deficiency who was sent to [facility] ED today from her PCP's office due to chest pain and abnormal EKG. Patient complained of substernal chest tightness/heaviness that radiated to her back on Saturday while at rest. Associated symptoms included orthopnea and SOB. Patient denied any associated nausea, vomiting, diaphoresis, palpitations, near syncope, or syncope. Initial workup in ED revealed troponin of 3.665 and BNP of 1305 and patient subsequently admitted for further evaluation and treatment of NSTEMI. Cardiology consulted to assist with management. Patient seen and examined today while in ED. Feels well at time of exam, denies chest pain. She does report similar symptoms over the past few months but states they never lasted as long and were less intense. She reports compliance with her medications. States her father had history of CABG. Chart reviewed. Repeat troponin pending. EKG reviewed and shows SR with new LBBB. 2D echo pending.    Hospital Course:   10/29/19-Patient seen and examined today, s/p C yesterday that showed multivessel CAD. CVT consulted, CABG planned for Thursday. Feels ok. No chest pain or SOB. Labs stable. Being diuresed. 2D echo showed EF of 30-35%, DD.        Review of Systems   Constitution: Negative.   HENT: Negative.    Eyes: Negative.    Cardiovascular: Negative.    Respiratory: Negative.    Endocrine: Negative.     Hematologic/Lymphatic: Negative.    Skin: Negative.    Musculoskeletal: Negative.    Gastrointestinal: Negative.    Genitourinary: Negative.    Neurological: Negative.    Psychiatric/Behavioral: Negative.    Allergic/Immunologic: Negative.      Objective:     Vital Signs (Most Recent):  Temp: 98.5 °F (36.9 °C) (10/29/19 0746)  Pulse: 78 (10/29/19 0900)  Resp: 16 (10/29/19 0746)  BP: 123/63 (10/29/19 0746)  SpO2: 97 % (10/29/19 0746) Vital Signs (24h Range):  Temp:  [97 °F (36.1 °C)-98.5 °F (36.9 °C)] 98.5 °F (36.9 °C)  Pulse:  [73-92] 78  Resp:  [14-21] 16  SpO2:  [94 %-100 %] 97 %  BP: (106-154)/(59-83) 123/63     Weight: 75.3 kg (166 lb 0.1 oz)  Body mass index is 31.37 kg/m².     SpO2: 97 %  O2 Device (Oxygen Therapy): room air      Intake/Output Summary (Last 24 hours) at 10/29/2019 1122  Last data filed at 10/29/2019 0603  Gross per 24 hour   Intake 1670.67 ml   Output 1000 ml   Net 670.67 ml       Lines/Drains/Airways     Peripheral Intravenous Line                 Peripheral IV - Single Lumen 10/28/19 1322 20 G Right Hand less than 1 day                Physical Exam   Constitutional: She is oriented to person, place, and time. She appears well-developed and well-nourished. No distress.   HENT:   Head: Normocephalic and atraumatic.   Eyes: Pupils are equal, round, and reactive to light. Right eye exhibits no discharge. Left eye exhibits no discharge.   Neck: Neck supple. No JVD present.   Cardiovascular: Normal rate, regular rhythm, S1 normal, S2 normal and normal heart sounds.   No murmur heard.  Pulmonary/Chest: Effort normal and breath sounds normal. No respiratory distress. She has no wheezes. She has no rales.   Abdominal: Soft. She exhibits no distension. There is no tenderness.   Musculoskeletal: She exhibits no edema.   Neurological: She is alert and oriented to person, place, and time.   Skin: Skin is warm and dry. She is not diaphoretic. No erythema.   Groin access site C/D/I; no bleeding erythema  or drainage   Psychiatric: She has a normal mood and affect. Her behavior is normal. Thought content normal.   Nursing note and vitals reviewed.      Significant Labs:   CMP   Recent Labs   Lab 10/28/19  1202 10/29/19  0337    136   K 3.4* 3.8    105   CO2 24 21*   GLU 64* 173*   BUN 15 14   CREATININE 0.7 0.7   CALCIUM 9.6 8.7   PROT 7.4  --    ALBUMIN 3.6  --    BILITOT 0.5  --    ALKPHOS 79  --    AST 29  --    ALT 13  --    ANIONGAP 14 10   ESTGFRAFRICA >60 >60   EGFRNONAA >60 >60   , CBC   Recent Labs   Lab 10/28/19  1202 10/29/19  0337   WBC 8.89 8.03   HGB 11.2* 10.1*   HCT 35.9* 33.6*   * 324   , Troponin   Recent Labs   Lab 10/28/19  1202 10/28/19  1437 10/28/19  2007   TROPONINI 3.665* 3.058* 2.095*    and All pertinent lab results from the last 24 hours have been reviewed.    Significant Imaging: Echocardiogram:   2D echo with color flow doppler:   Results for orders placed or performed during the hospital encounter of 10/28/19   2D echo with color flow doppler   Result Value Ref Range    QEF 30 (A) 55 - 65    Mitral Valve Regurgitation MILD TO MODERATE     Diastolic Dysfunction Yes (A)     Aortic Valve Regurgitation MILD     Mitral Valve Mobility NORMAL     Narrative    Date of Procedure: 10/28/2019        TEST DESCRIPTION   Technical Quality: This is a technically adequate study.     Aorta: The aortic root is normal in size, measuring 3.0 cm at sinotubular junction and 3.2 cm at Sinuses of Valsalva. The proximal ascending aorta is normal in size, measuring 3.1 cm across.     Left Atrium: The left atrial volume index is severely enlarged, measuring 52.97 cc/m2.     Left Ventricle: The left ventricle is normal in size, with an end-diastolic diameter of 4.1 cm, and an end-systolic diameter of 3.7 cm. Septal wall thickness is mildly increased, with the septum measuring 1.6 cm and the posterior wall measuring 1.3 cm   across. Relative wall thickness was increased at 0.63, and the LV mass  index was increased at 160.6 g/m2 consistent with concentric left ventricular hypertrophy. The anterior septum is severely hypokinetic. There is global hypokinesis. Left ventricular   systolic function appears moderately depressed. Visually estimated ejection fraction is 30-35%. The LV Doppler derived stroke volume equals 57.0 ccs.     Diastolic indices: E wave velocity 1.1 m/s, E/A ratio 0.9,  msec., E/e' ratio(avg) 16. There is pseudonormalization of mitral inflow pattern consistent with significant diastolic dysfunction.     Right Atrium: The right atrium is normal in size, measuring 3.7 cm in length and 2.8 cm in width in the apical view.     Right Ventricle: The right ventricle is normal in size. Global right ventricular systolic function appears normal. Tricuspid annular plane systolic excursion (TAPSE) is 2.2 cm.     Aortic Valve:  The aortic valve is normal in structure with normal leaflet mobility. The mean gradient obtained across the aortic valve is 6 mmHg. Additionally, there is mild aortic regurgitation.     Mitral Valve:  The mitral valve is normal in structure with normal leaflet mobility. The pressure half time is 45 msec. The calculated mitral valve area is 4.89 cm2. There is mild to moderate mitral regurgitation.     Tricuspid Valve:  The tricuspid valve is normal in structure with normal leaflet mobility.     Pulmonary Valve:  The pulmonic valve is not well seen.     IVC: IVC is normal in size and collapses > 50% with a sniff, suggesting normal right atrial pressure of 3 mmHg.     Intracavitary: There is no evidence of pericardial effusion, intracavity mass, thrombi, or vegetation.         CONCLUSIONS     1 - Moderately depressed left ventricular systolic function (EF 30-35%).     2 - Impaired LV relaxation, elevated LAP (grade 2 diastolic dysfunction).     3 - Normal right ventricular systolic function .     4 - Mild aortic regurgitation.     5 - Severe left atrial enlargement.     6 -  Concentric hypertrophy.     7 - Mild to moderate mitral regurgitation.             This document has been electronically    SIGNED BY: Terry Tarango MD On: 10/28/2019 16:38   , EKG: Reviewed and X-Ray: CXR: X-Ray Chest 1 View (CXR): No results found for this visit on 10/28/19. and X-Ray Chest PA and Lateral (CXR):   Results for orders placed or performed during the hospital encounter of 10/28/19   X-Ray Chest PA And Lateral    Narrative    EXAMINATION:  XR CHEST PA AND LATERAL    CLINICAL HISTORY:  Pre Op;    COMPARISON:  10/28/2019    FINDINGS:  Cardiac silhouette is normal. Aorta demonstrates atherosclerotic disease. The lungs demonstrate no evidence of active disease.  No evidence of pleural effusion or pneumothorax.  Bones appear intact.      Impression    No acute process seen.      Electronically signed by: Jh Anne MD  Date:    10/28/2019  Time:    21:38     Assessment and Plan:   Patient who presents with NSTEMI/ICM/acute CHF s/p LHC that showed multivessel CAD. Continue diuresis. CABG planned for 10/31/19.    * NSTEMI (non-ST elevated myocardial infarction)  -Patient presented with intermittent chest tightness/heaviness that radiated to her back since Saturday evening  -Associated with JONES/orthopnea  -Initial troponin 3.665  -EKG showed SR with new LBBB  -Presentation consistent with NSTEMI  -Continue ASA, statin  -Start Toprol XL  -Start heparin gtt  -Check 2D echo  -Continue to trend troponin  -Keep NPO. LHC today for further evaluation and PCI if indicated. All risks, benefits, and treatment alternatives explained to patient in detail. All questions answered. She has agreed to proceed. Further rec's to follow.    10/29/19  -s/p LHC yesterday that showed multivessel CAD  -CVT consulted, CABG planned for 10/31/19  -Stable overnight, no chest pain  -Continue ASA, statin, BB  -Continue heparin gtt  -Will add ARB if BP permits (ACEi caused cough)    Acute combined systolic and diastolic CHF, NYHA class  2  -Presents with ICM/acute combined CHF  -EF 30-35% by 2D echo  -Continue diuresis with IV Lasix  -Continue BB  -Will add ARB if BP permits    Chest pain  -See plan under NSTEMI    Abnormal EKG  -LBBB noted on EKG  -See plan under NSTEMI    Hyperlipidemia associated with type 2 diabetes mellitus  -Continue statin    Obesity (BMI 30.0-34.9)  -Weight loss    Diabetes mellitus type II, uncontrolled  -Mgmt as per hospital medicine    Hypertension associated with diabetes  -Continue home meds as BP permits        VTE Risk Mitigation (From admission, onward)         Ordered     Reason for no Mechanical VTE Prophylaxis  Once      10/28/19 1414     heparin 25,000 units in dextrose 5% 250 mL (100 units/mL) infusion LOW INTENSITY nomogram - [facility]  Continuous      10/28/19 1257     heparin 25,000 units in dextrose 5% (100 units/ml) IV bolus from bag - ADDITIONAL PRN BOLUS - 60 units/kg (max bolus 4000 units)  As needed (PRN)      10/28/19 1257     heparin 25,000 units in dextrose 5% (100 units/ml) IV bolus from bag - ADDITIONAL PRN BOLUS - 30 units/kg (max bolus 4000 units)  As needed (PRN)      10/28/19 1257                Josselyn Vann PA-C  Cardiology  [facility]

## 2019-10-29 NOTE — ASSESSMENT & PLAN NOTE
-Presents with ICM/acute combined CHF  -EF 30-35% by 2D echo  -Continue diuresis with IV Lasix  -Continue BB  -Will add ARB if BP permits

## 2019-10-29 NOTE — SUBJECTIVE & OBJECTIVE
Interval History:  No acute problems or complaints.  No chest pain.    Review of Systems   Constitutional: Negative for chills and fever.   HENT: Negative for congestion and sore throat.    Eyes: Negative for visual disturbance.   Respiratory: Positive for shortness of breath. Negative for cough and wheezing.    Cardiovascular: Negative for chest pain, palpitations and leg swelling.   Gastrointestinal: Negative for abdominal pain, blood in stool, constipation, diarrhea, nausea and vomiting.   Genitourinary: Negative for dysuria and hematuria.   Musculoskeletal: Negative for arthralgias and back pain.   Skin: Negative for rash and wound.   Neurological: Negative for dizziness, weakness, light-headedness and numbness.   Hematological: Negative for adenopathy.     Objective:     Vital Signs (Most Recent):  Temp: 97.9 °F (36.6 °C) (10/29/19 1241)  Pulse: 82 (10/29/19 1300)  Resp: 17 (10/29/19 1241)  BP: 129/60 (10/29/19 1241)  SpO2: 96 % (10/29/19 1241) Vital Signs (24h Range):  Temp:  [97 °F (36.1 °C)-98.5 °F (36.9 °C)] 97.9 °F (36.6 °C)  Pulse:  [73-92] 82  Resp:  [16-21] 17  SpO2:  [94 %-99 %] 96 %  BP: (106-154)/(59-83) 129/60     Weight: 75.3 kg (166 lb 0.1 oz)  Body mass index is 31.37 kg/m².    Intake/Output Summary (Last 24 hours) at 10/29/2019 1457  Last data filed at 10/29/2019 0603  Gross per 24 hour   Intake 1420.67 ml   Output 1000 ml   Net 420.67 ml      Physical Exam   Constitutional: She is oriented to person, place, and time. She appears well-developed and well-nourished. No distress.   HENT:   Head: Normocephalic and atraumatic.   Mouth/Throat: Oropharynx is clear and moist.   Eyes: Pupils are equal, round, and reactive to light. Conjunctivae and EOM are normal.   Neck: Neck supple. No JVD present. No thyromegaly present.   Cardiovascular: Normal rate and regular rhythm. Exam reveals no gallop and no friction rub.   No murmur heard.  Pulmonary/Chest: Effort normal and breath sounds normal. She has no  wheezes. She has no rales.   Abdominal: Soft. Bowel sounds are normal. She exhibits no distension. There is no tenderness. There is no rebound and no guarding.   Musculoskeletal: Normal range of motion. She exhibits no edema or deformity.   Lymphadenopathy:     She has no cervical adenopathy.   Neurological: She is alert and oriented to person, place, and time. She has normal reflexes.   Skin: Skin is warm and dry. No rash noted.   Psychiatric: She has a normal mood and affect. Her behavior is normal. Judgment and thought content normal.   Nursing note and vitals reviewed.      Significant Labs: All pertinent labs within the past 24 hours have been reviewed.    Significant Imaging: I have reviewed all pertinent imaging results/findings within the past 24 hours.

## 2019-10-29 NOTE — HPI
The patient is a 69-year-old female with hypertension, diabetes, hyperlipidemia, obesity, anxiety and depression, iron deficiency anemia, vitamin B12 deficiency, who presented to the emergency room an NSTEMI.  The patient was in her usual state of health until about a month to 2 months ago when she noted is episodic periods of shortness of breath dyspnea on exertion and chest tightness.  This was most severe about a day or 2 prior to admission.  Has shortness of breath was with mild to moderate exertion and on exposure to cold temperature.  She denies shortness of breath or chest tightness at rest.  She denies any palpitations, dizziness, orthopnea or PND, cough, and ankle swelling. In the emergency room patient had an elevated troponin.  The patient was taken for cardiac catheterization which showed severe multivessel coronary artery disease with a severely depressed ejection fraction and elevated LVEDP.

## 2019-10-29 NOTE — HOSPITAL COURSE
Admitted for evaluation and treatment of chest pain concerning for acute coronary syndrome.  Evaluation by Cardiology and urgent left heart catheterization.  Angiogram discovered severe multi-vessel coronary artery disease.  Cardiac echo showed severely reduced LV function.  Referral to Cardiothoracic Surgery who recommended CABG.  Balloon pump placed 30 October.  CABG x3 by Dr. Zaidi 31 October.  Successfully extubated morning of 01 November.  11/2 - Patient improved s/p extubation. CABG x 3. Discussed with CC Team. BS mild elevation. SA and LA insulin initiated.  11/3 - Improved BS control. Patient + chest tenderness. Tolerating diet. Discussed with cc team  11/4 - Patient with improved strength interactivity. Patient denies n/v/d + Sx site tenderness. Patient reports she is weakened.  11/5 - Patient denies CP. Reports she is weak. Denies SOB. Patient lethargic and clammy sitting in chair at bedside. Discussed with care team. Patient to return to bed with warming blankets, Blood Sugar 88 last check.   11/6 - Patient remains intubated R Hand cyanotic. Patient discussed with CC Team / CV sx.  11/7 - Patient in weaning trial. Extubation attempt this AM. R Hand improved blood flow - warmer to touch. Discussed with CC Team  11/8 - Patient improved Alert and appropriate. R Hand with improved blood flow / warmer.   11/9 - pulled NG tube out overnight. Will await swallow study.   11/10 - ST recommended pureed with thin liquids  11/12 - stepped down from icu

## 2019-10-29 NOTE — HOSPITAL COURSE
Ms. Bates is a 69 year old female patient whose current medical conditions include HTN, hyperlipidemia, obesity, DM type II, cerebral microvascular disease, FAHAD, and B12 deficiency who was sent to [facility] ED today from her PCP's office due to chest pain and abnormal EKG. Patient complained of substernal chest tightness/heaviness that radiated to her back on Saturday while at rest. Associated symptoms included orthopnea and SOB. Patient denied any associated nausea, vomiting, diaphoresis, palpitations, near syncope, or syncope. Initial workup in ED revealed troponin of 3.665 and BNP of 1305 and patient subsequently admitted for further evaluation and treatment of NSTEMI. Cardiology consulted to assist with management. Patient seen and examined today while in ED. Feels well at time of exam, denies chest pain. She does report similar symptoms over the past few months but states they never lasted as long and were less intense. She reports compliance with her medications. States her father had history of CABG. Chart reviewed. Repeat troponin pending. EKG reviewed and shows SR with new LBBB. 2D echo pending.    10/29/19-Patient seen and examined today, s/p LHC yesterday that showed multivessel CAD. CVT consulted, CABG planned for Thursday. Feels ok. No chest pain or SOB. Labs stable. Being diuresed. 2D echo showed EF of 30-35%, DD.    10/30/19-Patient seen and examined today. Feels well. No complaints. Denies chest pain or SOB. Labs stable. IABP placement scheduled for today.    10/31/19- Patient is s/p CABG x3 with LIMA to LAD reverse saphenous vein graft to the RCA and reverse saphenous vein graft to the ramus. Has been transferred to ICU. Is intubated and sedated. CT x3. IABP at 1:1 still in place. Transfused 3 units of PRBC in the OR and has received additional 3 units in ICU, has received 500cc albumin, epi and milrinone gtt.  H/H 6.9/21.0. Renal function stable. Paced rhythm on monitor     11/1/2019--patient seen  and examined in ICU. Extubated o/n, remains on IABP 1:1 today, plans to possibly wean off later today per Dr. Zaidi. Remains on Epi and Milrinone gtt today. Paced on monitor. Labs stable today.     11/2/19-Patient seen and examined today, lying in bed. Feels ok. Complains of post-op pain and fatigue. Labs reviewed. Platelets 47,000. Heme/onc on board.     11/3/19-Patient seen and examined today, sitting up in chair. Feeling better. Still weak. Labs reviewed. Platelets 89,000.    11/4/19- Patient is POD 4 s/p CABG x3. OOB in chair. Feels better this morning. Pain controlled. Platelets improving since holding Plavix.Vitals stable     11/5/19- Patient is POD 5 s/p CABGx3. Has been transferred to [facility]. Is clammy and generally weak this morning. Glucose and vitals stable. Noted to have bump in WBC. Primary team has been notified by nurse. Platelets 73, but H/H stable     11/6 in ICU, intubated, on epi gtt at 0.15 mcg CI 2.0, PAP 20. Sinus rhythm, H&H and Cr stable. Urine output ok. Improved lactate 1.6. const stent cardiogenic shock    11/7 INTUBATED, WEAN OFF EPI AND CONTINUE AMILIRONE, ON SINU RHYTHM AND CI 3.3, Cr STABLE AND DECENT URINE OUTPUT.     11/8 extubated, off pressors, up to the chair.diffuse DVT on upper extremities, no chest pain,pt is weak, Cr normal    11/9/weak, mild reps. Distress, good urine output. BP high.  RUE venous thrombus/DVT in deep and superficial arm veins. H&H stable Cr normal.      11/10 weakness H&H 7.6/24, BP and HR wnl., good urine output. No chest pain. Right arm swelling and ecchymosis. Pulses ok    11/11/19--Patient seen and examined in ICU, sitting on side of bed. Feels ok today. Continued right arm swelling and ecchymosis.     11/12/19-Patient seen and examined today, lying in bed. Feels ok. Denies chest pain. Admits to some SOB. CXR, showed no effusion or infiltrate. Labs stable, platelets 75,000 H/H 7.3/23.1. Hematology noted reviewed, ROSANNA positive.     11/13/19--Patient seen  and examined today, lying in bed. Sleepy on exam today. No chest pain today. Labs reviewed, H/H 7/23, plts 79, INR 4.3, Mag 1.8.     11/14/19--Patient seen and examined in room, lying in bed. Plans for PRBC transfusion today. Labs reviewed, H/H 6.7/21.9.     11/15/19--Patient seen and examined in room, lying in bed. Feels ok today. Has been started on Eliquis. Labs reviewed, H/H 8.2/26.1, K+ 3.6, Cr 0.8.     11/16/19--Patient seen and examined in room, sitting in bedside chair. Feels ok today. Labs reviewed, stable.     11/17/19--Patient seen and examined in room, sitting in chair eating breakfast. Feels good today. Needs to ambulate in hallway. Labs reviewed, stable.     11/18/19-Patient seen and examined today, sitting up in bedside chair. No acute events overnight. Feels ok, does admit to fatigue. Labs reviewed and are stable.

## 2019-10-29 NOTE — PLAN OF CARE
Pt AAO x 4. VSS.  Meds administered per order.   Pt remained free of falls this shift.   Pt has no complaints at this time.   CABG scheduled for Thursday. US of legs performed today,  Educated pt on causes of CAD, managing anxiety, & monitoring I&Os.  Plan of care reviewed. Patient verbalizes understanding.  Pt moving/turing independently. Frequent weight shifting encouraged.  Patient SR on monitor.   Bed low, side rails up x 2, wheels locked, call light in reach.   Bed alarm maintained for safety.   Family is not currently visiting the patient.  Patient instructed to call for assistance.   Hourly rounding completed.   24 hour chart check completed.  Will continue to monitor.

## 2019-10-29 NOTE — ASSESSMENT & PLAN NOTE
The patient is a 69-year-old female with hypertension, diabetes, hyperlipidemia, obesity, anxiety and depression, iron deficiency anemia, vitamin B12 deficiency, who presented to the emergency room with an NSTEMI.  Workup included a cardiac catheterization that shows severe multivessel coronary artery disease with proximal LAD stenosis of 70% and severely depressed ejection fraction of 20%.  The patient is a candidate for urgent coronary artery bypass grafting.  The risks and benefits of surgery were explained to the patient.  The patient verbalized understanding of the issues discussed.  She a segs the risks of surgery and has agreed to proceed with urgent coronary artery bypass grafting.  She understands that the risk of neurologic complication postoperatively is increased due to the presence of cerebral microvascular disease.

## 2019-10-29 NOTE — NURSING
Patient assessed for diabetes educational needs following chart review  Family at bedside for info  She reports was diagnosed over 20 years ago and has attended diabetes education classes in the past  She has a Ankita glucose monitor at home.  She uses the V-Go disposable insulin pump device.  She removed it on 10/28/19.  She does not identify any diabetes educational needs at this time

## 2019-10-29 NOTE — ASSESSMENT & PLAN NOTE
-Troponin peaked at 3.66 now trending down.   -Heparin, metoprolol, ASA and statin.  -Check echocardiogram and lipid panel.    -Hold ACE inhibitor due to intolerance with cough. Hold ARB as patient's blood pressure is marginal. Will consider adding, if her blood pressure will tolerate.   -Hold second antiplatelet due to pending Cardiology procedure.   -Patient was evaluated for Cardiac Rehab and is not a candidate at this time, but will be referred, when appropriate.   -LHC on 28 on October showed severe multi-vessel CAD.  Referral to CVT Surgery who recommended CABG planned for Thursday 31 October.

## 2019-10-29 NOTE — ASSESSMENT & PLAN NOTE
-Patient presented with intermittent chest tightness/heaviness that radiated to her back since Saturday evening  -Associated with JONES/orthopnea  -Initial troponin 3.665  -EKG showed SR with new LBBB  -Presentation consistent with NSTEMI  -Continue ASA, statin  -Start Toprol XL  -Start heparin gtt  -Check 2D echo  -Continue to trend troponin  -Keep NPO. C today for further evaluation and PCI if indicated. All risks, benefits, and treatment alternatives explained to patient in detail. All questions answered. She has agreed to proceed. Further rec's to follow.    10/29/19  -s/p LHC yesterday that showed multivessel CAD  -CVT consulted, CABG planned for 10/31/19  -Stable overnight, no chest pain  -Continue ASA, statin, BB  -Continue heparin gtt  -Will add ARB if BP permits (ACEi caused cough)

## 2019-10-29 NOTE — PROGRESS NOTES
[facility] Hospital Medicine  Progress Note    Patient Name: Mateusz Bates  MRN: [id number]  Patient Class: IP- Inpatient   Admission Date: 10/28/2019  Length of Stay: 1 days  Attending Physician: Shravan Lam MD  Primary Care Provider: Serenity Kilpatrick MD        Subjective:     Principal Problem:NSTEMI (non-ST elevated myocardial infarction)    HPI:  Mateusz Bates is a 69 year old female with DM II, hypertension and hyperlipidemia who presented to the ED at the request of her PCP due to finding of an abnormal EKG and complaints of chest tightness with SOB. The patient reports having intermittent symptoms for the last 1-2 months. On 10/26/19, she began having a tightness in her mid chest that radiated into her back with associated SOB. Symptoms were worse with laying flat and improved with sitting up. They lasted only minutes, but this was longer than previous episodes. She denies a trend in when these episodes occur as well as associated nausea, vomiting and diaphoresis. The patient's EKG was significant for a new LBBB compared to previous in 2016. Labs were significant for a BNP of 1,305, troponin of 3.665 and potassium of 3.4. Code status was discussed with the patient and her sister. She is a full code. Her sister, Christina Hernandez, is her surrogate medical decision maker.     Overview/Hospital Course:  Admitted for evaluation and treatment of chest pain concerning for acute coronary syndrome.  Evaluation by Cardiology and urgent left heart catheterization.  Angiogram discovered severe multi-vessel coronary artery disease.  Cardiac echo showed severely reduced LV function.  Referral to Cardiothoracic Surgery who recommended CABG.    Interval History:  No acute problems or complaints.  No chest pain.    Review of Systems   Constitutional: Negative for chills and fever.   HENT: Negative for congestion and sore throat.    Eyes: Negative for visual disturbance.   Respiratory: Positive for  shortness of breath. Negative for cough and wheezing.    Cardiovascular: Negative for chest pain, palpitations and leg swelling.   Gastrointestinal: Negative for abdominal pain, blood in stool, constipation, diarrhea, nausea and vomiting.   Genitourinary: Negative for dysuria and hematuria.   Musculoskeletal: Negative for arthralgias and back pain.   Skin: Negative for rash and wound.   Neurological: Negative for dizziness, weakness, light-headedness and numbness.   Hematological: Negative for adenopathy.     Objective:     Vital Signs (Most Recent):  Temp: 97.9 °F (36.6 °C) (10/29/19 1241)  Pulse: 82 (10/29/19 1300)  Resp: 17 (10/29/19 1241)  BP: 129/60 (10/29/19 1241)  SpO2: 96 % (10/29/19 1241) Vital Signs (24h Range):  Temp:  [97 °F (36.1 °C)-98.5 °F (36.9 °C)] 97.9 °F (36.6 °C)  Pulse:  [73-92] 82  Resp:  [16-21] 17  SpO2:  [94 %-99 %] 96 %  BP: (106-154)/(59-83) 129/60     Weight: 75.3 kg (166 lb 0.1 oz)  Body mass index is 31.37 kg/m².    Intake/Output Summary (Last 24 hours) at 10/29/2019 1457  Last data filed at 10/29/2019 0603  Gross per 24 hour   Intake 1420.67 ml   Output 1000 ml   Net 420.67 ml      Physical Exam   Constitutional: She is oriented to person, place, and time. She appears well-developed and well-nourished. No distress.   HENT:   Head: Normocephalic and atraumatic.   Mouth/Throat: Oropharynx is clear and moist.   Eyes: Pupils are equal, round, and reactive to light. Conjunctivae and EOM are normal.   Neck: Neck supple. No JVD present. No thyromegaly present.   Cardiovascular: Normal rate and regular rhythm. Exam reveals no gallop and no friction rub.   No murmur heard.  Pulmonary/Chest: Effort normal and breath sounds normal. She has no wheezes. She has no rales.   Abdominal: Soft. Bowel sounds are normal. She exhibits no distension. There is no tenderness. There is no rebound and no guarding.   Musculoskeletal: Normal range of motion. She exhibits no edema or deformity.   Lymphadenopathy:      She has no cervical adenopathy.   Neurological: She is alert and oriented to person, place, and time. She has normal reflexes.   Skin: Skin is warm and dry. No rash noted.   Psychiatric: She has a normal mood and affect. Her behavior is normal. Judgment and thought content normal.   Nursing note and vitals reviewed.      Significant Labs: All pertinent labs within the past 24 hours have been reviewed.    Significant Imaging: I have reviewed all pertinent imaging results/findings within the past 24 hours.      Assessment/Plan:      * NSTEMI (non-ST elevated myocardial infarction)  -Troponin peaked at 3.66 now trending down.   -Heparin, metoprolol, ASA and statin.  -Check echocardiogram and lipid panel.    -Hold ACE inhibitor due to intolerance with cough. Hold ARB as patient's blood pressure is marginal. Will consider adding, if her blood pressure will tolerate.   -Hold second antiplatelet due to pending Cardiology procedure.   -Patient was evaluated for Cardiac Rehab and is not a candidate at this time, but will be referred, when appropriate.   -LHC on 28 on October showed severe multi-vessel CAD.  Referral to CVT Surgery who recommended CABG planned for Thursday 31 October.    Acute combined systolic and diastolic CHF, NYHA class 2  Probable ischemic cardiomyopathy.  Anticipate CABG 31 October.  Optimize medical management.    Hyperlipidemia associated with type 2 diabetes mellitus  -Patient was improved, but not at goal on 10/4/19.   -Continue statin.       Diabetes mellitus type II, uncontrolled  -Hemoglobin A1C was 9 on 9/19/19.   -NISS.   -ADA diet.       Anxiety and depression  -Stable.   -Continue Effexor.       Hypertension associated with diabetes  -Stable.   -Continue Norvasc.         VTE Risk Mitigation (From admission, onward)         Ordered     Reason for no Mechanical VTE Prophylaxis  Once      10/28/19 1414     heparin 25,000 units in dextrose 5% 250 mL (100 units/mL) infusion LOW INTENSITY  nomogram - [facility]  Continuous      10/28/19 1257     heparin 25,000 units in dextrose 5% (100 units/ml) IV bolus from bag - ADDITIONAL PRN BOLUS - 60 units/kg (max bolus 4000 units)  As needed (PRN)      10/28/19 1257     heparin 25,000 units in dextrose 5% (100 units/ml) IV bolus from bag - ADDITIONAL PRN BOLUS - 30 units/kg (max bolus 4000 units)  As needed (PRN)      10/28/19 1257                      Shravan Lam MD  Department of Hospital Medicine   [facility]

## 2019-10-29 NOTE — PLAN OF CARE
Met with patient and her sister. Patient lives at home alone, but has support of her sister. She is independent and seldom uses a straight cane to walk. She transports herself to doctor appointments via car. She states that her sister will help her if needed.  Patient denies any other discharge needs at this time.  CM provided a transitional care folder, information on advanced directives, information on pharmacy bedside delivery (patient opts for bedside delivery), and discharge planning begins on admission with contact information for any needs/questions. Patient has [place] PPO insurance.      D/C Plan: Home no needs  PCP: Dr Ree Kilpatrick  Discharge transportation: Sister (Christina)  My [facility]: Declined        [id number] - [place], [place] - [address]  [address]  [place] [address]  Phone: [phone] Fax: [phone]    [id number] - [place], [place] - [address]  [address]  [place] [address]  Phone: [phone] Fax: [phone]    [id number] - [place], [place] - [address]  [address]  [place] [address]  Phone: [phone] Fax: [phone]    Serenity Kilpatrick MD  Payor: [facility] / Plan: [place] PPO / Product Type: PPO /       10/29/19 1045   Discharge Assessment   Assessment Type Discharge Planning Assessment   Confirmed/corrected address and phone number on facesheet? Yes   Assessment information obtained from? Patient   Communicated expected length of stay with patient/caregiver no   Prior to hospitilization cognitive status: Alert/Oriented   Prior to hospitalization functional status: Independent;Assistive Equipment   Current cognitive status: Alert/Oriented   Current Functional Status: Independent;Assistive Equipment   Facility Arrived From: home   Lives With alone  (support of sisterChristina)   Able to Return to Prior Arrangements yes   Is patient able to  care for self after discharge? Yes   Who are your caregiver(s) and their phone number(s)? colleen Khaner- [phone] or [place] [phone]   Patient's perception of discharge disposition home or selfcare   Readmission Within the Last 30 Days no previous admission in last 30 days   Patient currently being followed by outpatient case management? No   Patient currently receives any other outside agency services? No   Equipment Currently Used at Home cane, straight;shower chair   Do you have any problems affording any of your prescribed medications? No   Is the patient taking medications as prescribed? yes   Does the patient have transportation home? Yes  (Sister will transport patient home on discharge)   Transportation Anticipated family or friend will provide   Dialysis Name and Scheduled days n/a   Does the patient receive services at the [facility]mad[facility] Clinic? No   Discharge Plan A Home   Discharge Plan B Home   DME Needed Upon Discharge  none   Patient/Family in Agreement with Plan yes

## 2019-10-29 NOTE — SUBJECTIVE & OBJECTIVE
No current facility-administered medications on file prior to encounter.      Current Outpatient Medications on File Prior to Encounter   Medication Sig    amLODIPine (NORVASC) 2.5 MG tablet TAKE 1 TABLET BY MOUTH EVERY DAY    atorvastatin (LIPITOR) 80 MG tablet TAKE 1 TABLET BY MOUTH EVERY DAY    betamethasone valerate 0.1% (VALISONE) 0.1 % Crea Apply topically 2 (two) times daily.    cyanocobalamin 1,000 mcg/mL injection Daily for a week then weekly for a month then monthly    insulin aspart U-100 (NOVOLOG U-100 INSULIN ASPART) 100 unit/mL injection Inject 60 Units into the skin continuous. Via VGO insulin pump.    metFORMIN (GLUCOPHAGE) 1000 MG tablet TAKE 1 TABLET BY MOUTH TWICE A DAY WITH MEALS    aspirin (ECOTRIN) 325 MG EC tablet Take 325 mg by mouth once daily.    blood sugar diagnostic (CONTOUR NEXT TEST STRIPS) Strp 1 strip by Misc.(Non-Drug; Combo Route) route 3 -4 times daily. Conto    blood sugar diagnostic Strp 1 strip 4 (four) times daily.    blood-glucose meter Misc IDDM 250.00  Insurance or Pt choice    clobetasol (TEMOVATE) 0.05 % external solution Apply topically 2 (two) times daily.    CONTOUR NEXT TEST STRIPS Strp USE ONE STRIP THREE TIMES A DAY    flash glucose scanning reader (FREESTYLE MAR 14 DAY READER) Misc 1 Device by Misc.(Non-Drug; Combo Route) route continuous.    flash glucose sensor (FREESTYLE MAR 14 DAY SENSOR) Kit Use 1 sensor every 14 days.    glyBURIDE (DIABETA) 5 MG tablet Take 2 tablets (10 mg total) by mouth 2 (two) times daily before meals.    hydrOXYzine HCl (ATARAX) 10 MG Tab TAKE 1 TABLET BY MOUTH TWICE DAILY AS NEEDED (Patient not taking: Reported on 10/28/2019)    insulin glargine, TOUJEO, (TOUJEO SOLOSTAR U-300 INSULIN) 300 unit/mL (1.5 mL) InPn pen Inject 45 Units into the skin once daily.    ketoconazole (NIZORAL) 2 % shampoo Apply topically twice a week.    lancets (MICROLET LANCET) Misc 1 lancet by Misc.(Non-Drug; Combo Route) route 3 (three)  "times daily.    levocetirizine (XYZAL) 5 MG tablet Take 1 tablet (5 mg total) by mouth every evening.    nystatin (MYCOSTATIN) powder Apply topically 4 (four) times daily. (Patient not taking: Reported on 10/28/2019)    pen needle, diabetic 32 gauge x 5/32" Ndle 1 each by Misc.(Non-Drug; Combo Route) route 3 (three) times daily.    sodium,potassium,mag sulfates (SUPREP BOWEL PREP KIT) 17.5-3.13-1.6 gram SolR Take as directed    sub-q insulin device, 20 unit (V-GO 20) Naty 1 Device by Misc.(Non-Drug; Combo Route) route once daily.    syringe with needle (SYRINGE 3CC/25GX1") 3 mL 25 gauge x 1" Syrg Use as Directed    triamcinolone acetonide 0.1% (KENALOG) 0.1 % cream AAA bid    venlafaxine (EFFEXOR-XR) 150 MG Cp24 Take 1 capsule (150 mg total) by mouth once daily.    [DISCONTINUED] insulin lispro 100 unit/mL injection Inject into the skin. (Patient taking differently: Inject 12 Units into the skin. )    [DISCONTINUED] pen needle, diabetic 32 gauge x 5/32" Ndle 1 each by Misc.(Non-Drug; Combo Route) route 3 (three) times daily.       Review of patient's allergies indicates:   Allergen Reactions    Ace inhibitors Other (See Comments)     Cough         Past Medical History:   Diagnosis Date    Chronic major depressive disorder     Diabetes mellitus type II, uncontrolled 2000    Insulin x 10 years    Eczema     Essential hypertension     Generalized anxiety disorder     GERD (gastroesophageal reflux disease)     Glaucoma     OD    Hyperlipidemia     Obesity      Past Surgical History:   Procedure Laterality Date    APPENDECTOMY      ECTOPIC PREGNANCY SURGERY Left     LSO    OOPHORECTOMY       Family History     Problem Relation (Age of Onset)    Alcohol abuse Maternal Uncle, Sister    Colon cancer Sister    Depression Maternal Grandmother    Diabetes Sister, Maternal Grandmother, Paternal Aunt    Drug abuse Sister    Glaucoma Paternal Aunt, Paternal Uncle    Heart attack Mother    Heart disease " Maternal Grandmother, Paternal Grandmother, Paternal Grandfather    Heart failure Sister    Hepatitis Sister    Hypertension Father, Sister, Sister, Sister    Multiple myeloma Father        Tobacco Use    Smoking status: Never Smoker    Smokeless tobacco: Never Used   Substance and Sexual Activity    Alcohol use: No    Drug use: No    Sexual activity: Never     Review of Systems   Constitutional: Positive for activity change and fatigue. Negative for chills and fever.   HENT: Positive for congestion and sinus pressure. Negative for hearing loss and rhinorrhea.    Eyes: Negative for pain and discharge.   Respiratory: Positive for chest tightness and shortness of breath. Negative for cough.    Cardiovascular: Negative for chest pain, palpitations and leg swelling.   Gastrointestinal: Negative for abdominal pain, blood in stool, constipation and diarrhea.   Endocrine: Negative for polyuria.   Genitourinary: Negative for dysuria and hematuria.   Musculoskeletal: Positive for back pain. Negative for arthralgias and gait problem.   Allergic/Immunologic: Positive for environmental allergies.   Neurological: Negative for dizziness and light-headedness.   Hematological: Bruises/bleeds easily.   Psychiatric/Behavioral: Positive for confusion. Negative for hallucinations. The patient is not nervous/anxious.      Objective:     Vital Signs (Most Recent):  Temp: 98 °F (36.7 °C) (10/28/19 1835)  Pulse: 87 (10/28/19 1835)  Resp: 18 (10/28/19 1835)  BP: 130/73 (10/28/19 1835)  SpO2: 97 % (10/28/19 1835) Vital Signs (24h Range):  Temp:  [97.7 °F (36.5 °C)-98.5 °F (36.9 °C)] 98 °F (36.7 °C)  Pulse:  [84-94] 87  Resp:  [14-21] 18  SpO2:  [96 %-100 %] 97 %  BP: (106-154)/(61-78) 130/73     Weight: 70 kg (154 lb 5.2 oz)  Body mass index is 29.16 kg/m².    SpO2: 97 %  O2 Device (Oxygen Therapy): room air     Intake/Output - Last 3 Shifts       10/26 0700 - 10/27 0659 10/27 0700 - 10/28 0659 10/28 0700 - 10/29 0659    I.V. (mL/kg)    250 (3.6)    Total Intake(mL/kg)   250 (3.6)    Net   +250                  Lines/Drains/Airways     Peripheral Intravenous Line                 Peripheral IV - Single Lumen 10/28/19 1201 20 G Left Antecubital less than 1 day         Peripheral IV - Single Lumen 10/28/19 1322 20 G Right Hand less than 1 day                 STS Risk Score:     Risk of Mortality: 3.649%  Renal Failure: 2.933%  Permanent Stroke: 2.989%  Prolonged Ventilation: 12.218%  DSW Infection: 0.270%  Reoperation: 1.974%  Morbidity or Mortality: 19.378%  Short Length of Stay: 24.285%  Long Length of Stay: 7.226%    Physical Exam   Constitutional: She is oriented to person, place, and time. She appears well-developed and well-nourished. No distress.   HENT:   Head: Normocephalic and atraumatic.   Mouth/Throat: Oropharynx is clear and moist.   Eyes: Pupils are equal, round, and reactive to light. EOM are normal. Right eye exhibits no discharge. Left eye exhibits no discharge.   Neck: Normal range of motion. Neck supple. No JVD present.   Cardiovascular: Normal rate, regular rhythm, normal heart sounds and intact distal pulses.   Pulmonary/Chest: Effort normal and breath sounds normal.   Abdominal: Soft. Bowel sounds are normal.   Musculoskeletal: She exhibits no edema.   Neurological: She is alert and oriented to person, place, and time.   Skin: Skin is warm and dry. She is not diaphoretic.   Psychiatric: She has a normal mood and affect. Her behavior is normal. Thought content normal.       Significant Labs:  All pertinent labs from the last 24 hours have been reviewed.    Significant Diagnostics:  I have reviewed all pertinent imaging results/findings within the past 24 hours.

## 2019-10-29 NOTE — SUBJECTIVE & OBJECTIVE
Review of Systems   Constitution: Negative.   HENT: Negative.    Eyes: Negative.    Cardiovascular: Negative.    Respiratory: Negative.    Endocrine: Negative.    Hematologic/Lymphatic: Negative.    Skin: Negative.    Musculoskeletal: Negative.    Gastrointestinal: Negative.    Genitourinary: Negative.    Neurological: Negative.    Psychiatric/Behavioral: Negative.    Allergic/Immunologic: Negative.      Objective:     Vital Signs (Most Recent):  Temp: 98.5 °F (36.9 °C) (10/29/19 0746)  Pulse: 78 (10/29/19 0900)  Resp: 16 (10/29/19 0746)  BP: 123/63 (10/29/19 0746)  SpO2: 97 % (10/29/19 0746) Vital Signs (24h Range):  Temp:  [97 °F (36.1 °C)-98.5 °F (36.9 °C)] 98.5 °F (36.9 °C)  Pulse:  [73-92] 78  Resp:  [14-21] 16  SpO2:  [94 %-100 %] 97 %  BP: (106-154)/(59-83) 123/63     Weight: 75.3 kg (166 lb 0.1 oz)  Body mass index is 31.37 kg/m².     SpO2: 97 %  O2 Device (Oxygen Therapy): room air      Intake/Output Summary (Last 24 hours) at 10/29/2019 1122  Last data filed at 10/29/2019 0603  Gross per 24 hour   Intake 1670.67 ml   Output 1000 ml   Net 670.67 ml       Lines/Drains/Airways     Peripheral Intravenous Line                 Peripheral IV - Single Lumen 10/28/19 1322 20 G Right Hand less than 1 day                Physical Exam   Constitutional: She is oriented to person, place, and time. She appears well-developed and well-nourished. No distress.   HENT:   Head: Normocephalic and atraumatic.   Eyes: Pupils are equal, round, and reactive to light. Right eye exhibits no discharge. Left eye exhibits no discharge.   Neck: Neck supple. No JVD present.   Cardiovascular: Normal rate, regular rhythm, S1 normal, S2 normal and normal heart sounds.   No murmur heard.  Pulmonary/Chest: Effort normal and breath sounds normal. No respiratory distress. She has no wheezes. She has no rales.   Abdominal: Soft. She exhibits no distension. There is no tenderness.   Musculoskeletal: She exhibits no edema.   Neurological: She  is alert and oriented to person, place, and time.   Skin: Skin is warm and dry. She is not diaphoretic. No erythema.   Groin access site C/D/I; no bleeding erythema or drainage   Psychiatric: She has a normal mood and affect. Her behavior is normal. Thought content normal.   Nursing note and vitals reviewed.      Significant Labs:   CMP   Recent Labs   Lab 10/28/19  1202 10/29/19  0337    136   K 3.4* 3.8    105   CO2 24 21*   GLU 64* 173*   BUN 15 14   CREATININE 0.7 0.7   CALCIUM 9.6 8.7   PROT 7.4  --    ALBUMIN 3.6  --    BILITOT 0.5  --    ALKPHOS 79  --    AST 29  --    ALT 13  --    ANIONGAP 14 10   ESTGFRAFRICA >60 >60   EGFRNONAA >60 >60   , CBC   Recent Labs   Lab 10/28/19  1202 10/29/19  0337   WBC 8.89 8.03   HGB 11.2* 10.1*   HCT 35.9* 33.6*   * 324   , Troponin   Recent Labs   Lab 10/28/19  1202 10/28/19  1437 10/28/19  2007   TROPONINI 3.665* 3.058* 2.095*    and All pertinent lab results from the last 24 hours have been reviewed.    Significant Imaging: Echocardiogram:   2D echo with color flow doppler:   Results for orders placed or performed during the hospital encounter of 10/28/19   2D echo with color flow doppler   Result Value Ref Range    QEF 30 (A) 55 - 65    Mitral Valve Regurgitation MILD TO MODERATE     Diastolic Dysfunction Yes (A)     Aortic Valve Regurgitation MILD     Mitral Valve Mobility NORMAL     Narrative    Date of Procedure: 10/28/2019        TEST DESCRIPTION   Technical Quality: This is a technically adequate study.     Aorta: The aortic root is normal in size, measuring 3.0 cm at sinotubular junction and 3.2 cm at Sinuses of Valsalva. The proximal ascending aorta is normal in size, measuring 3.1 cm across.     Left Atrium: The left atrial volume index is severely enlarged, measuring 52.97 cc/m2.     Left Ventricle: The left ventricle is normal in size, with an end-diastolic diameter of 4.1 cm, and an end-systolic diameter of 3.7 cm. Septal wall thickness is  mildly increased, with the septum measuring 1.6 cm and the posterior wall measuring 1.3 cm   across. Relative wall thickness was increased at 0.63, and the LV mass index was increased at 160.6 g/m2 consistent with concentric left ventricular hypertrophy. The anterior septum is severely hypokinetic. There is global hypokinesis. Left ventricular   systolic function appears moderately depressed. Visually estimated ejection fraction is 30-35%. The LV Doppler derived stroke volume equals 57.0 ccs.     Diastolic indices: E wave velocity 1.1 m/s, E/A ratio 0.9,  msec., E/e' ratio(avg) 16. There is pseudonormalization of mitral inflow pattern consistent with significant diastolic dysfunction.     Right Atrium: The right atrium is normal in size, measuring 3.7 cm in length and 2.8 cm in width in the apical view.     Right Ventricle: The right ventricle is normal in size. Global right ventricular systolic function appears normal. Tricuspid annular plane systolic excursion (TAPSE) is 2.2 cm.     Aortic Valve:  The aortic valve is normal in structure with normal leaflet mobility. The mean gradient obtained across the aortic valve is 6 mmHg. Additionally, there is mild aortic regurgitation.     Mitral Valve:  The mitral valve is normal in structure with normal leaflet mobility. The pressure half time is 45 msec. The calculated mitral valve area is 4.89 cm2. There is mild to moderate mitral regurgitation.     Tricuspid Valve:  The tricuspid valve is normal in structure with normal leaflet mobility.     Pulmonary Valve:  The pulmonic valve is not well seen.     IVC: IVC is normal in size and collapses > 50% with a sniff, suggesting normal right atrial pressure of 3 mmHg.     Intracavitary: There is no evidence of pericardial effusion, intracavity mass, thrombi, or vegetation.         CONCLUSIONS     1 - Moderately depressed left ventricular systolic function (EF 30-35%).     2 - Impaired LV relaxation, elevated LAP (grade 2  diastolic dysfunction).     3 - Normal right ventricular systolic function .     4 - Mild aortic regurgitation.     5 - Severe left atrial enlargement.     6 - Concentric hypertrophy.     7 - Mild to moderate mitral regurgitation.             This document has been electronically    SIGNED BY: Terry Tarango MD On: 10/28/2019 16:38   , EKG: Reviewed and X-Ray: CXR: X-Ray Chest 1 View (CXR): No results found for this visit on 10/28/19. and X-Ray Chest PA and Lateral (CXR):   Results for orders placed or performed during the hospital encounter of 10/28/19   X-Ray Chest PA And Lateral    Narrative    EXAMINATION:  XR CHEST PA AND LATERAL    CLINICAL HISTORY:  Pre Op;    COMPARISON:  10/28/2019    FINDINGS:  Cardiac silhouette is normal. Aorta demonstrates atherosclerotic disease. The lungs demonstrate no evidence of active disease.  No evidence of pleural effusion or pneumothorax.  Bones appear intact.      Impression    No acute process seen.      Electronically signed by: Jh Anne MD  Date:    10/28/2019  Time:    21:38

## 2019-10-29 NOTE — CONSULTS
[facility] -   Cardiothoracic Surgery  Consult Note    Patient Name: Mateusz Bates  MRN: [id number]  Admission Date: 10/28/2019  Attending Physician: Shravan Lam MD  Referring Provider: Self, Aaareferral    Patient information was obtained from patient and ER records.     Consults  Subjective:     Principal Problem: NSTEMI (non-ST elevated myocardial infarction)    History of Present Illness: The patient is a 69-year-old female with hypertension, diabetes, hyperlipidemia, obesity, anxiety and depression, iron deficiency anemia, vitamin B12 deficiency, who presented to the emergency room an NSTEMI.  The patient was in her usual state of health until about a month to 2 months ago when she noted is episodic periods of shortness of breath dyspnea on exertion and chest tightness.  The symptoms were stable until about a day or 2 ago when she developed increased severity of symptoms.  She then presented to her primary care physician who sent her to the emergency room..  The patient has  shortness of breath with mild to moderate exertion and on exposure to cold temperature.  She denies shortness of breath or chest tightness at rest.  She denies any palpitations, dizziness, orthopnea or PND, cough, and ankle swelling. In the emergency room patient had an elevated troponin.  The patient was taken for cardiac catheterization which showed severe multivessel coronary artery disease with a severely depressed ejection fraction and elevated LVEDP.  In addition, the patient has elevated BNP of 1305.    No current facility-administered medications on file prior to encounter.      Current Outpatient Medications on File Prior to Encounter   Medication Sig    amLODIPine (NORVASC) 2.5 MG tablet TAKE 1 TABLET BY MOUTH EVERY DAY    atorvastatin (LIPITOR) 80 MG tablet TAKE 1 TABLET BY MOUTH EVERY DAY    betamethasone valerate 0.1% (VALISONE) 0.1 % Crea Apply topically 2 (two) times daily.    cyanocobalamin 1,000  "mcg/mL injection Daily for a week then weekly for a month then monthly    insulin aspart U-100 (NOVOLOG U-100 INSULIN ASPART) 100 unit/mL injection Inject 60 Units into the skin continuous. Via VGO insulin pump.    metFORMIN (GLUCOPHAGE) 1000 MG tablet TAKE 1 TABLET BY MOUTH TWICE A DAY WITH MEALS    aspirin (ECOTRIN) 325 MG EC tablet Take 325 mg by mouth once daily.    blood sugar diagnostic (CONTOUR NEXT TEST STRIPS) Strp 1 strip by Misc.(Non-Drug; Combo Route) route 3 -4 times daily. Conto    blood sugar diagnostic Strp 1 strip 4 (four) times daily.    blood-glucose meter Misc IDDM 250.00  Insurance or Pt choice    clobetasol (TEMOVATE) 0.05 % external solution Apply topically 2 (two) times daily.    CONTOUR NEXT TEST STRIPS Strp USE ONE STRIP THREE TIMES A DAY    flash glucose scanning reader (FREESTYLE MAR 14 DAY READER) Misc 1 Device by Misc.(Non-Drug; Combo Route) route continuous.    flash glucose sensor (FREESTYLE MAR 14 DAY SENSOR) Kit Use 1 sensor every 14 days.    glyBURIDE (DIABETA) 5 MG tablet Take 2 tablets (10 mg total) by mouth 2 (two) times daily before meals.    hydrOXYzine HCl (ATARAX) 10 MG Tab TAKE 1 TABLET BY MOUTH TWICE DAILY AS NEEDED (Patient not taking: Reported on 10/28/2019)    insulin glargine, TOUJEO, (TOUJEO SOLOSTAR U-300 INSULIN) 300 unit/mL (1.5 mL) InPn pen Inject 45 Units into the skin once daily.    ketoconazole (NIZORAL) 2 % shampoo Apply topically twice a week.    lancets (MICROLET LANCET) Misc 1 lancet by Misc.(Non-Drug; Combo Route) route 3 (three) times daily.    levocetirizine (XYZAL) 5 MG tablet Take 1 tablet (5 mg total) by mouth every evening.    nystatin (MYCOSTATIN) powder Apply topically 4 (four) times daily. (Patient not taking: Reported on 10/28/2019)    pen needle, diabetic 32 gauge x 5/32" Ndle 1 each by Misc.(Non-Drug; Combo Route) route 3 (three) times daily.    sodium,potassium,mag sulfates (SUPREP BOWEL PREP KIT) 17.5-3.13-1.6 gram SolR " "Take as directed    sub-q insulin device, 20 unit (V-GO 20) Naty 1 Device by Misc.(Non-Drug; Combo Route) route once daily.    syringe with needle (SYRINGE 3CC/25GX1") 3 mL 25 gauge x 1" Syrg Use as Directed    triamcinolone acetonide 0.1% (KENALOG) 0.1 % cream AAA bid    venlafaxine (EFFEXOR-XR) 150 MG Cp24 Take 1 capsule (150 mg total) by mouth once daily.    [DISCONTINUED] insulin lispro 100 unit/mL injection Inject into the skin. (Patient taking differently: Inject 12 Units into the skin. )    [DISCONTINUED] pen needle, diabetic 32 gauge x 5/32" Ndle 1 each by Misc.(Non-Drug; Combo Route) route 3 (three) times daily.       Review of patient's allergies indicates:   Allergen Reactions    Ace inhibitors Other (See Comments)     Cough         Past Medical History:   Diagnosis Date    Chronic major depressive disorder     Diabetes mellitus type II, uncontrolled 2000    Insulin x 10 years    Eczema     Essential hypertension     Generalized anxiety disorder     GERD (gastroesophageal reflux disease)     Glaucoma     OD    Hyperlipidemia     Obesity      Past Surgical History:   Procedure Laterality Date    APPENDECTOMY      ECTOPIC PREGNANCY SURGERY Left     LSO    OOPHORECTOMY       Family History     Problem Relation (Age of Onset)    Alcohol abuse Maternal Uncle, Sister    Colon cancer Sister    Depression Maternal Grandmother    Diabetes Sister, Maternal Grandmother, Paternal Aunt    Drug abuse Sister    Glaucoma Paternal Aunt, Paternal Uncle    Heart attack Mother    Heart disease Maternal Grandmother, Paternal Grandmother, Paternal Grandfather    Heart failure Sister    Hepatitis Sister    Hypertension Father, Sister, Sister, Sister    Multiple myeloma Father        Tobacco Use    Smoking status: Never Smoker    Smokeless tobacco: Never Used   Substance and Sexual Activity    Alcohol use: No    Drug use: No    Sexual activity: Never     Review of Systems   Constitutional: Positive for " activity change and fatigue. Negative for chills and fever.   HENT: Positive for congestion and sinus pressure. Negative for hearing loss and rhinorrhea.    Eyes: Negative for pain and discharge.   Respiratory: Positive for chest tightness and shortness of breath. Negative for cough.    Cardiovascular: Negative for chest pain, palpitations and leg swelling.   Gastrointestinal: Negative for abdominal pain, blood in stool, constipation and diarrhea.   Endocrine: Negative for polyuria.   Genitourinary: Negative for dysuria and hematuria.   Musculoskeletal: Positive for back pain. Negative for arthralgias and gait problem.   Allergic/Immunologic: Positive for environmental allergies.   Neurological: Negative for dizziness and light-headedness.   Hematological: Bruises/bleeds easily.   Psychiatric/Behavioral: Positive for confusion. Negative for hallucinations. The patient is not nervous/anxious.      Objective:     Vital Signs (Most Recent):  Temp: 98 °F (36.7 °C) (10/28/19 1835)  Pulse: 87 (10/28/19 1835)  Resp: 18 (10/28/19 1835)  BP: 130/73 (10/28/19 1835)  SpO2: 97 % (10/28/19 1835) Vital Signs (24h Range):  Temp:  [97.7 °F (36.5 °C)-98.5 °F (36.9 °C)] 98 °F (36.7 °C)  Pulse:  [84-94] 87  Resp:  [14-21] 18  SpO2:  [96 %-100 %] 97 %  BP: (106-154)/(61-78) 130/73     Weight: 70 kg (154 lb 5.2 oz)  Body mass index is 29.16 kg/m².    SpO2: 97 %  O2 Device (Oxygen Therapy): room air     Intake/Output - Last 3 Shifts       10/26 0700 - 10/27 0659 10/27 0700 - 10/28 0659 10/28 0700 - 10/29 0659    I.V. (mL/kg)   250 (3.6)    Total Intake(mL/kg)   250 (3.6)    Net   +250                  Lines/Drains/Airways     Peripheral Intravenous Line                 Peripheral IV - Single Lumen 10/28/19 1201 20 G Left Antecubital less than 1 day         Peripheral IV - Single Lumen 10/28/19 1322 20 G Right Hand less than 1 day                 STS Risk Score:     Risk of Mortality: 3.649%  Renal Failure: 2.933%  Permanent Stroke:  2.989%  Prolonged Ventilation: 12.218%  DSW Infection: 0.270%  Reoperation: 1.974%  Morbidity or Mortality: 19.378%  Short Length of Stay: 24.285%  Long Length of Stay: 7.226%    Physical Exam   Constitutional: She is oriented to person, place, and time. She appears well-developed and well-nourished. No distress.   HENT:   Head: Normocephalic and atraumatic.   Mouth/Throat: Oropharynx is clear and moist.   Eyes: Pupils are equal, round, and reactive to light. EOM are normal. Right eye exhibits no discharge. Left eye exhibits no discharge.   Neck: Normal range of motion. Neck supple. No JVD present.   Cardiovascular: Normal rate, regular rhythm, normal heart sounds and intact distal pulses.   Pulmonary/Chest: Effort normal and breath sounds normal.   Abdominal: Soft. Bowel sounds are normal.   Musculoskeletal: She exhibits no edema.   Neurological: She is alert and oriented to person, place, and time.   Skin: Skin is warm and dry. She is not diaphoretic.   Psychiatric: She has a normal mood and affect. Her behavior is normal. Thought content normal.       Significant Labs:  All pertinent labs from the last 24 hours have been reviewed.    Significant Diagnostics:  I have reviewed all pertinent imaging results/findings within the past 24 hours.    Assessment/Plan:     NYHA Score: NYHA III: marked limitation of physical activity, comfortable at rest    * NSTEMI (non-ST elevated myocardial infarction)  The patient is a 69-year-old female with hypertension, diabetes, hyperlipidemia, obesity, anxiety and depression, iron deficiency anemia, vitamin B12 deficiency, who presented to the emergency room with an NSTEMI.  Workup included a cardiac catheterization that shows severe multivessel coronary artery disease with proximal LAD stenosis of 70% and severely depressed ejection fraction of 20%.  The patient is a candidate for urgent coronary artery bypass grafting.  The patient has a moderately high risk for coronary artery  bypass surgery.  The risks and benefits of surgery were explained to the patient.  The patient verbalized understanding of the issues discussed.  She understands the risks of surgery and has agreed to proceed with urgent coronary artery bypass grafting.  In the presence of an elevated LVEDP, and BNP of 1305, the patient will be started on diuresis prior to surgery. She understands that the risk of neurologic complication postoperatively is increased due to the presence of cerebral microvascular disease.        Thank you for your consult. I will follow-up with patient. Please contact us if you have any additional questions.    Keny Zaidi MD  Cardiothoracic Surgery  [facility] -

## 2019-10-29 NOTE — PLAN OF CARE
Patient awake and alert free from falls and injury, normal sinus rhythm on monitor, pain controlled with prn medication, frequent weight shift encouraged, dressing clean, dry and intact to right groin, heparin restarted verified with hospital medicine and pharmacy, POC reviewed with patient,  bed low locked, call light within reach, will continue to monitor

## 2019-10-29 NOTE — H&P (VIEW-ONLY)
[facility] -   Cardiothoracic Surgery  Consult Note    Patient Name: Mateusz Bates  MRN: [id number]  Admission Date: 10/28/2019  Attending Physician: Shravan Lam MD  Referring Provider: Self, Aaareferral    Patient information was obtained from patient and ER records.     Consults  Subjective:     Principal Problem: NSTEMI (non-ST elevated myocardial infarction)    History of Present Illness: The patient is a 69-year-old female with hypertension, diabetes, hyperlipidemia, obesity, anxiety and depression, iron deficiency anemia, vitamin B12 deficiency, who presented to the emergency room an NSTEMI.  The patient was in her usual state of health until about a month to 2 months ago when she noted is episodic periods of shortness of breath dyspnea on exertion and chest tightness.  The symptoms were stable until about a day or 2 ago when she developed increased severity of symptoms.  She then presented to her primary care physician who sent her to the emergency room..  The patient has  shortness of breath with mild to moderate exertion and on exposure to cold temperature.  She denies shortness of breath or chest tightness at rest.  She denies any palpitations, dizziness, orthopnea or PND, cough, and ankle swelling. In the emergency room patient had an elevated troponin.  The patient was taken for cardiac catheterization which showed severe multivessel coronary artery disease with a severely depressed ejection fraction and elevated LVEDP.  In addition, the patient has elevated BNP of 1305.    No current facility-administered medications on file prior to encounter.      Current Outpatient Medications on File Prior to Encounter   Medication Sig    amLODIPine (NORVASC) 2.5 MG tablet TAKE 1 TABLET BY MOUTH EVERY DAY    atorvastatin (LIPITOR) 80 MG tablet TAKE 1 TABLET BY MOUTH EVERY DAY    betamethasone valerate 0.1% (VALISONE) 0.1 % Crea Apply topically 2 (two) times daily.    cyanocobalamin 1,000  "mcg/mL injection Daily for a week then weekly for a month then monthly    insulin aspart U-100 (NOVOLOG U-100 INSULIN ASPART) 100 unit/mL injection Inject 60 Units into the skin continuous. Via VGO insulin pump.    metFORMIN (GLUCOPHAGE) 1000 MG tablet TAKE 1 TABLET BY MOUTH TWICE A DAY WITH MEALS    aspirin (ECOTRIN) 325 MG EC tablet Take 325 mg by mouth once daily.    blood sugar diagnostic (CONTOUR NEXT TEST STRIPS) Strp 1 strip by Misc.(Non-Drug; Combo Route) route 3 -4 times daily. Conto    blood sugar diagnostic Strp 1 strip 4 (four) times daily.    blood-glucose meter Misc IDDM 250.00  Insurance or Pt choice    clobetasol (TEMOVATE) 0.05 % external solution Apply topically 2 (two) times daily.    CONTOUR NEXT TEST STRIPS Strp USE ONE STRIP THREE TIMES A DAY    flash glucose scanning reader (FREESTYLE MAR 14 DAY READER) Misc 1 Device by Misc.(Non-Drug; Combo Route) route continuous.    flash glucose sensor (FREESTYLE MAR 14 DAY SENSOR) Kit Use 1 sensor every 14 days.    glyBURIDE (DIABETA) 5 MG tablet Take 2 tablets (10 mg total) by mouth 2 (two) times daily before meals.    hydrOXYzine HCl (ATARAX) 10 MG Tab TAKE 1 TABLET BY MOUTH TWICE DAILY AS NEEDED (Patient not taking: Reported on 10/28/2019)    insulin glargine, TOUJEO, (TOUJEO SOLOSTAR U-300 INSULIN) 300 unit/mL (1.5 mL) InPn pen Inject 45 Units into the skin once daily.    ketoconazole (NIZORAL) 2 % shampoo Apply topically twice a week.    lancets (MICROLET LANCET) Misc 1 lancet by Misc.(Non-Drug; Combo Route) route 3 (three) times daily.    levocetirizine (XYZAL) 5 MG tablet Take 1 tablet (5 mg total) by mouth every evening.    nystatin (MYCOSTATIN) powder Apply topically 4 (four) times daily. (Patient not taking: Reported on 10/28/2019)    pen needle, diabetic 32 gauge x 5/32" Ndle 1 each by Misc.(Non-Drug; Combo Route) route 3 (three) times daily.    sodium,potassium,mag sulfates (SUPREP BOWEL PREP KIT) 17.5-3.13-1.6 gram SolR " "Take as directed    sub-q insulin device, 20 unit (V-GO 20) Naty 1 Device by Misc.(Non-Drug; Combo Route) route once daily.    syringe with needle (SYRINGE 3CC/25GX1") 3 mL 25 gauge x 1" Syrg Use as Directed    triamcinolone acetonide 0.1% (KENALOG) 0.1 % cream AAA bid    venlafaxine (EFFEXOR-XR) 150 MG Cp24 Take 1 capsule (150 mg total) by mouth once daily.    [DISCONTINUED] insulin lispro 100 unit/mL injection Inject into the skin. (Patient taking differently: Inject 12 Units into the skin. )    [DISCONTINUED] pen needle, diabetic 32 gauge x 5/32" Ndle 1 each by Misc.(Non-Drug; Combo Route) route 3 (three) times daily.       Review of patient's allergies indicates:   Allergen Reactions    Ace inhibitors Other (See Comments)     Cough         Past Medical History:   Diagnosis Date    Chronic major depressive disorder     Diabetes mellitus type II, uncontrolled 2000    Insulin x 10 years    Eczema     Essential hypertension     Generalized anxiety disorder     GERD (gastroesophageal reflux disease)     Glaucoma     OD    Hyperlipidemia     Obesity      Past Surgical History:   Procedure Laterality Date    APPENDECTOMY      ECTOPIC PREGNANCY SURGERY Left     LSO    OOPHORECTOMY       Family History     Problem Relation (Age of Onset)    Alcohol abuse Maternal Uncle, Sister    Colon cancer Sister    Depression Maternal Grandmother    Diabetes Sister, Maternal Grandmother, Paternal Aunt    Drug abuse Sister    Glaucoma Paternal Aunt, Paternal Uncle    Heart attack Mother    Heart disease Maternal Grandmother, Paternal Grandmother, Paternal Grandfather    Heart failure Sister    Hepatitis Sister    Hypertension Father, Sister, Sister, Sister    Multiple myeloma Father        Tobacco Use    Smoking status: Never Smoker    Smokeless tobacco: Never Used   Substance and Sexual Activity    Alcohol use: No    Drug use: No    Sexual activity: Never     Review of Systems   Constitutional: Positive for " activity change and fatigue. Negative for chills and fever.   HENT: Positive for congestion and sinus pressure. Negative for hearing loss and rhinorrhea.    Eyes: Negative for pain and discharge.   Respiratory: Positive for chest tightness and shortness of breath. Negative for cough.    Cardiovascular: Negative for chest pain, palpitations and leg swelling.   Gastrointestinal: Negative for abdominal pain, blood in stool, constipation and diarrhea.   Endocrine: Negative for polyuria.   Genitourinary: Negative for dysuria and hematuria.   Musculoskeletal: Positive for back pain. Negative for arthralgias and gait problem.   Allergic/Immunologic: Positive for environmental allergies.   Neurological: Negative for dizziness and light-headedness.   Hematological: Bruises/bleeds easily.   Psychiatric/Behavioral: Positive for confusion. Negative for hallucinations. The patient is not nervous/anxious.      Objective:     Vital Signs (Most Recent):  Temp: 98 °F (36.7 °C) (10/28/19 1835)  Pulse: 87 (10/28/19 1835)  Resp: 18 (10/28/19 1835)  BP: 130/73 (10/28/19 1835)  SpO2: 97 % (10/28/19 1835) Vital Signs (24h Range):  Temp:  [97.7 °F (36.5 °C)-98.5 °F (36.9 °C)] 98 °F (36.7 °C)  Pulse:  [84-94] 87  Resp:  [14-21] 18  SpO2:  [96 %-100 %] 97 %  BP: (106-154)/(61-78) 130/73     Weight: 70 kg (154 lb 5.2 oz)  Body mass index is 29.16 kg/m².    SpO2: 97 %  O2 Device (Oxygen Therapy): room air     Intake/Output - Last 3 Shifts       10/26 0700 - 10/27 0659 10/27 0700 - 10/28 0659 10/28 0700 - 10/29 0659    I.V. (mL/kg)   250 (3.6)    Total Intake(mL/kg)   250 (3.6)    Net   +250                  Lines/Drains/Airways     Peripheral Intravenous Line                 Peripheral IV - Single Lumen 10/28/19 1201 20 G Left Antecubital less than 1 day         Peripheral IV - Single Lumen 10/28/19 1322 20 G Right Hand less than 1 day                 STS Risk Score:     Risk of Mortality: 3.649%  Renal Failure: 2.933%  Permanent Stroke:  2.989%  Prolonged Ventilation: 12.218%  DSW Infection: 0.270%  Reoperation: 1.974%  Morbidity or Mortality: 19.378%  Short Length of Stay: 24.285%  Long Length of Stay: 7.226%    Physical Exam   Constitutional: She is oriented to person, place, and time. She appears well-developed and well-nourished. No distress.   HENT:   Head: Normocephalic and atraumatic.   Mouth/Throat: Oropharynx is clear and moist.   Eyes: Pupils are equal, round, and reactive to light. EOM are normal. Right eye exhibits no discharge. Left eye exhibits no discharge.   Neck: Normal range of motion. Neck supple. No JVD present.   Cardiovascular: Normal rate, regular rhythm, normal heart sounds and intact distal pulses.   Pulmonary/Chest: Effort normal and breath sounds normal.   Abdominal: Soft. Bowel sounds are normal.   Musculoskeletal: She exhibits no edema.   Neurological: She is alert and oriented to person, place, and time.   Skin: Skin is warm and dry. She is not diaphoretic.   Psychiatric: She has a normal mood and affect. Her behavior is normal. Thought content normal.       Significant Labs:  All pertinent labs from the last 24 hours have been reviewed.    Significant Diagnostics:  I have reviewed all pertinent imaging results/findings within the past 24 hours.    Assessment/Plan:     NYHA Score: NYHA III: marked limitation of physical activity, comfortable at rest    * NSTEMI (non-ST elevated myocardial infarction)  The patient is a 69-year-old female with hypertension, diabetes, hyperlipidemia, obesity, anxiety and depression, iron deficiency anemia, vitamin B12 deficiency, who presented to the emergency room with an NSTEMI.  Workup included a cardiac catheterization that shows severe multivessel coronary artery disease with proximal LAD stenosis of 70% and severely depressed ejection fraction of 20%.  The patient is a candidate for urgent coronary artery bypass grafting.  The patient has a moderately high risk for coronary artery  bypass surgery.  The risks and benefits of surgery were explained to the patient.  The patient verbalized understanding of the issues discussed.  She understands the risks of surgery and has agreed to proceed with urgent coronary artery bypass grafting.  In the presence of an elevated LVEDP, and BNP of 1305, the patient will be started on diuresis prior to surgery. She understands that the risk of neurologic complication postoperatively is increased due to the presence of cerebral microvascular disease.        Thank you for your consult. I will follow-up with patient. Please contact us if you have any additional questions.    Keny Zaidi MD  Cardiothoracic Surgery  [facility] -

## 2019-10-30 LAB
ABO + RH BLD: NORMAL
ALBUMIN SERPL BCP-MCNC: 3.5 G/DL (ref 3.5–5.2)
ALP SERPL-CCNC: 77 U/L (ref 55–135)
ALT SERPL W/O P-5'-P-CCNC: 8 U/L (ref 10–44)
ANION GAP SERPL CALC-SCNC: 10 MMOL/L (ref 8–16)
ANION GAP SERPL CALC-SCNC: 12 MMOL/L (ref 8–16)
APTT BLDCRRT: 31.1 SEC (ref 21–32)
APTT BLDCRRT: 33 SEC (ref 21–32)
APTT BLDCRRT: 46 SEC (ref 21–32)
APTT BLDCRRT: 52.9 SEC (ref 21–32)
APTT BLDCRRT: 53.1 SEC (ref 21–32)
AST SERPL-CCNC: 14 U/L (ref 10–40)
BACTERIA #/AREA URNS HPF: ABNORMAL /HPF
BASOPHILS # BLD AUTO: 0.02 K/UL (ref 0–0.2)
BASOPHILS # BLD AUTO: 0.03 K/UL (ref 0–0.2)
BASOPHILS NFR BLD: 0.3 % (ref 0–1.9)
BASOPHILS NFR BLD: 0.4 % (ref 0–1.9)
BILIRUB SERPL-MCNC: 0.4 MG/DL (ref 0.1–1)
BILIRUB UR QL STRIP: NEGATIVE
BLD GP AB SCN CELLS X3 SERPL QL: NORMAL
BNP SERPL-MCNC: 963 PG/ML (ref 0–99)
BRPFT: ABNORMAL
BUN SERPL-MCNC: 15 MG/DL (ref 8–23)
BUN SERPL-MCNC: 16 MG/DL (ref 8–23)
CALCIUM SERPL-MCNC: 9.4 MG/DL (ref 8.7–10.5)
CALCIUM SERPL-MCNC: 9.9 MG/DL (ref 8.7–10.5)
CHLORIDE SERPL-SCNC: 101 MMOL/L (ref 95–110)
CHLORIDE SERPL-SCNC: 99 MMOL/L (ref 95–110)
CLARITY UR: CLEAR
CO2 SERPL-SCNC: 23 MMOL/L (ref 23–29)
CO2 SERPL-SCNC: 28 MMOL/L (ref 23–29)
COLOR UR: YELLOW
CREAT SERPL-MCNC: 0.8 MG/DL (ref 0.5–1.4)
CREAT SERPL-MCNC: 0.8 MG/DL (ref 0.5–1.4)
DIFFERENTIAL METHOD: ABNORMAL
DIFFERENTIAL METHOD: ABNORMAL
EOSINOPHIL # BLD AUTO: 0.2 K/UL (ref 0–0.5)
EOSINOPHIL # BLD AUTO: 0.2 K/UL (ref 0–0.5)
EOSINOPHIL NFR BLD: 2.2 % (ref 0–8)
EOSINOPHIL NFR BLD: 3.1 % (ref 0–8)
ERYTHROCYTE [DISTWIDTH] IN BLOOD BY AUTOMATED COUNT: 15.1 % (ref 11.5–14.5)
ERYTHROCYTE [DISTWIDTH] IN BLOOD BY AUTOMATED COUNT: 15.2 % (ref 11.5–14.5)
EST. GFR  (AFRICAN AMERICAN): >60 ML/MIN/1.73 M^2
EST. GFR  (AFRICAN AMERICAN): >60 ML/MIN/1.73 M^2
EST. GFR  (NON AFRICAN AMERICAN): >60 ML/MIN/1.73 M^2
EST. GFR  (NON AFRICAN AMERICAN): >60 ML/MIN/1.73 M^2
ESTIMATED AVG GLUCOSE: 192 MG/DL (ref 68–131)
FEF 25 75 LLN: 0.83
FEF 25 75 PRE REF: 73.9 %
FEF 25 75 REF: 1.77
FEV1 FVC LLN: 65
FEV1 FVC PRE REF: 87.4 %
FEV1 FVC REF: 79
FEV1 LLN: 1.47
FEV1 PRE REF: 76.9 %
FEV1 REF: 2.01
FIBRINOGEN PPP-MCNC: 522 MG/DL (ref 182–366)
FVC LLN: 1.89
FVC PRE REF: 87.5 %
FVC REF: 2.58
GLUCOSE SERPL-MCNC: 163 MG/DL (ref 70–110)
GLUCOSE SERPL-MCNC: 279 MG/DL (ref 70–110)
GLUCOSE UR QL STRIP: NEGATIVE
HBA1C MFR BLD HPLC: 8.3 % (ref 4–5.6)
HCT VFR BLD AUTO: 35.9 % (ref 37–48.5)
HCT VFR BLD AUTO: 36.2 % (ref 37–48.5)
HGB BLD-MCNC: 10.8 G/DL (ref 12–16)
HGB BLD-MCNC: 11.2 G/DL (ref 12–16)
HGB UR QL STRIP: ABNORMAL
HYALINE CASTS #/AREA URNS LPF: 10 /LPF
IMM GRANULOCYTES # BLD AUTO: 0.03 K/UL (ref 0–0.04)
IMM GRANULOCYTES # BLD AUTO: 0.04 K/UL (ref 0–0.04)
IMM GRANULOCYTES NFR BLD AUTO: 0.4 % (ref 0–0.5)
IMM GRANULOCYTES NFR BLD AUTO: 0.6 % (ref 0–0.5)
INR PPP: 0.9 (ref 0.8–1.2)
KETONES UR QL STRIP: NEGATIVE
LEUKOCYTE ESTERASE UR QL STRIP: NEGATIVE
LYMPHOCYTES # BLD AUTO: 1.7 K/UL (ref 1–4.8)
LYMPHOCYTES # BLD AUTO: 2 K/UL (ref 1–4.8)
LYMPHOCYTES NFR BLD: 21.9 % (ref 18–48)
LYMPHOCYTES NFR BLD: 28.1 % (ref 18–48)
MAGNESIUM SERPL-MCNC: 1.3 MG/DL (ref 1.6–2.6)
MAGNESIUM SERPL-MCNC: 2.5 MG/DL (ref 1.6–2.6)
MCH RBC QN AUTO: 26.2 PG (ref 27–31)
MCH RBC QN AUTO: 26.9 PG (ref 27–31)
MCHC RBC AUTO-ENTMCNC: 29.8 G/DL (ref 32–36)
MCHC RBC AUTO-ENTMCNC: 31.2 G/DL (ref 32–36)
MCV RBC AUTO: 86 FL (ref 82–98)
MCV RBC AUTO: 88 FL (ref 82–98)
MICROSCOPIC COMMENT: ABNORMAL
MONOCYTES # BLD AUTO: 0.7 K/UL (ref 0.3–1)
MONOCYTES # BLD AUTO: 0.7 K/UL (ref 0.3–1)
MONOCYTES NFR BLD: 8.6 % (ref 4–15)
MONOCYTES NFR BLD: 9.5 % (ref 4–15)
NEUTROPHILS # BLD AUTO: 4.2 K/UL (ref 1.8–7.7)
NEUTROPHILS # BLD AUTO: 5.1 K/UL (ref 1.8–7.7)
NEUTROPHILS NFR BLD: 58.3 % (ref 38–73)
NEUTROPHILS NFR BLD: 66.6 % (ref 38–73)
NITRITE UR QL STRIP: POSITIVE
NRBC BLD-RTO: 0 /100 WBC
NRBC BLD-RTO: 0 /100 WBC
PEF LLN: 3.72
PEF PRE REF: 34.3 %
PEF REF: 5.27
PH UR STRIP: 6 [PH] (ref 5–8)
PHOSPHATE SERPL-MCNC: 3.6 MG/DL (ref 2.7–4.5)
PLATELET # BLD AUTO: 330 K/UL (ref 150–350)
PLATELET # BLD AUTO: 362 K/UL (ref 150–350)
PMV BLD AUTO: 10.1 FL (ref 9.2–12.9)
PMV BLD AUTO: 10.3 FL (ref 9.2–12.9)
POCT GLUCOSE: 118 MG/DL (ref 70–110)
POCT GLUCOSE: 171 MG/DL (ref 70–110)
POCT GLUCOSE: 221 MG/DL (ref 70–110)
POCT GLUCOSE: 252 MG/DL (ref 70–110)
POCT GLUCOSE: 300 MG/DL (ref 70–110)
POTASSIUM SERPL-SCNC: 4.1 MMOL/L (ref 3.5–5.1)
POTASSIUM SERPL-SCNC: 4.5 MMOL/L (ref 3.5–5.1)
PRE FEF 25 75: 1.31 L/S (ref 0.83–3.11)
PRE FET 100: 7.06 SEC
PRE FEV1 FVC: 68.73 % (ref 65.39–90.01)
PRE FEV1: 1.55 L (ref 1.47–2.53)
PRE FVC: 2.25 L (ref 1.89–3.3)
PRE PEF: 1.81 L/S (ref 3.72–6.82)
PREALB SERPL-MCNC: 15 MG/DL (ref 20–43)
PREALB SERPL-MCNC: 15 MG/DL (ref 20–43)
PROT SERPL-MCNC: 7.5 G/DL (ref 6–8.4)
PROT UR QL STRIP: NEGATIVE
PROTHROMBIN TIME: 10.3 SEC (ref 9–12.5)
RBC # BLD AUTO: 4.13 M/UL (ref 4–5.4)
RBC # BLD AUTO: 4.17 M/UL (ref 4–5.4)
RBC #/AREA URNS HPF: 0 /HPF (ref 0–4)
SODIUM SERPL-SCNC: 136 MMOL/L (ref 136–145)
SODIUM SERPL-SCNC: 137 MMOL/L (ref 136–145)
SP GR UR STRIP: 1.02 (ref 1–1.03)
URN SPEC COLLECT METH UR: ABNORMAL
UROBILINOGEN UR STRIP-ACNC: NEGATIVE EU/DL
WBC # BLD AUTO: 7.18 K/UL (ref 3.9–12.7)
WBC # BLD AUTO: 7.71 K/UL (ref 3.9–12.7)
WBC #/AREA URNS HPF: 2 /HPF (ref 0–5)

## 2019-10-30 PROCEDURE — 85384 FIBRINOGEN ACTIVITY: CPT

## 2019-10-30 PROCEDURE — 83880 ASSAY OF NATRIURETIC PEPTIDE: CPT

## 2019-10-30 PROCEDURE — 84134 ASSAY OF PREALBUMIN: CPT

## 2019-10-30 PROCEDURE — 99152 CATH LAB PROCEDURE: ICD-10-PCS | Mod: ,,, | Performed by: INTERNAL MEDICINE

## 2019-10-30 PROCEDURE — 83735 ASSAY OF MAGNESIUM: CPT | Mod: 91

## 2019-10-30 PROCEDURE — 25000003 PHARM REV CODE 250: Performed by: INTERNAL MEDICINE

## 2019-10-30 PROCEDURE — 86920 COMPATIBILITY TEST SPIN: CPT

## 2019-10-30 PROCEDURE — 63600175 PHARM REV CODE 636 W HCPCS: Performed by: NURSE PRACTITIONER

## 2019-10-30 PROCEDURE — C9399 UNCLASSIFIED DRUGS OR BIOLOG: HCPCS | Performed by: INTERNAL MEDICINE

## 2019-10-30 PROCEDURE — 25000003 PHARM REV CODE 250: Performed by: THORACIC SURGERY (CARDIOTHORACIC VASCULAR SURGERY)

## 2019-10-30 PROCEDURE — 93010 ELECTROCARDIOGRAM REPORT: CPT | Mod: ,,, | Performed by: INTERNAL MEDICINE

## 2019-10-30 PROCEDURE — 85730 THROMBOPLASTIN TIME PARTIAL: CPT

## 2019-10-30 PROCEDURE — 63600175 PHARM REV CODE 636 W HCPCS: Performed by: INTERNAL MEDICINE

## 2019-10-30 PROCEDURE — 94010 BREATHING CAPACITY TEST: CPT

## 2019-10-30 PROCEDURE — 33967 INSERT I-AORT PERCUT DEVICE: CPT | Mod: ,,, | Performed by: INTERNAL MEDICINE

## 2019-10-30 PROCEDURE — 99291 CRITICAL CARE FIRST HOUR: CPT | Mod: ,,, | Performed by: NURSE PRACTITIONER

## 2019-10-30 PROCEDURE — 33967 CATH LAB PROCEDURE: ICD-10-PCS | Mod: ,,, | Performed by: INTERNAL MEDICINE

## 2019-10-30 PROCEDURE — 84100 ASSAY OF PHOSPHORUS: CPT

## 2019-10-30 PROCEDURE — 93010 EKG 12-LEAD: ICD-10-PCS | Mod: ,,, | Performed by: INTERNAL MEDICINE

## 2019-10-30 PROCEDURE — 94799 UNLISTED PULMONARY SVC/PX: CPT

## 2019-10-30 PROCEDURE — 86901 BLOOD TYPING SEROLOGIC RH(D): CPT

## 2019-10-30 PROCEDURE — 99152 MOD SED SAME PHYS/QHP 5/>YRS: CPT | Mod: ,,, | Performed by: INTERNAL MEDICINE

## 2019-10-30 PROCEDURE — 80053 COMPREHEN METABOLIC PANEL: CPT

## 2019-10-30 PROCEDURE — 63600175 PHARM REV CODE 636 W HCPCS

## 2019-10-30 PROCEDURE — 99900035 HC TECH TIME PER 15 MIN (STAT)

## 2019-10-30 PROCEDURE — 85610 PROTHROMBIN TIME: CPT

## 2019-10-30 PROCEDURE — 85025 COMPLETE CBC W/AUTO DIFF WBC: CPT

## 2019-10-30 PROCEDURE — 93005 ELECTROCARDIOGRAM TRACING: CPT

## 2019-10-30 PROCEDURE — 36415 COLL VENOUS BLD VENIPUNCTURE: CPT

## 2019-10-30 PROCEDURE — 80048 BASIC METABOLIC PNL TOTAL CA: CPT

## 2019-10-30 PROCEDURE — 27200234 CATH LAB PROCEDURE

## 2019-10-30 PROCEDURE — 99291 PR CRITICAL CARE, E/M 30-74 MINUTES: ICD-10-PCS | Mod: ,,, | Performed by: NURSE PRACTITIONER

## 2019-10-30 PROCEDURE — 85730 THROMBOPLASTIN TIME PARTIAL: CPT | Mod: 91

## 2019-10-30 PROCEDURE — 99233 SBSQ HOSP IP/OBS HIGH 50: CPT | Mod: ,,, | Performed by: INTERNAL MEDICINE

## 2019-10-30 PROCEDURE — 83036 HEMOGLOBIN GLYCOSYLATED A1C: CPT

## 2019-10-30 PROCEDURE — 83735 ASSAY OF MAGNESIUM: CPT

## 2019-10-30 PROCEDURE — 81000 URINALYSIS NONAUTO W/SCOPE: CPT

## 2019-10-30 PROCEDURE — 20000000 HC ICU ROOM

## 2019-10-30 PROCEDURE — 84134 ASSAY OF PREALBUMIN: CPT | Mod: 91

## 2019-10-30 PROCEDURE — 99233 PR SUBSEQUENT HOSPITAL CARE,LEVL III: ICD-10-PCS | Mod: ,,, | Performed by: INTERNAL MEDICINE

## 2019-10-30 RX ORDER — GLUCAGON 1 MG
1 KIT INJECTION
Status: DISCONTINUED | OUTPATIENT
Start: 2019-10-30 | End: 2019-10-31

## 2019-10-30 RX ORDER — LANOLIN ALCOHOL/MO/W.PET/CERES
400 CREAM (GRAM) TOPICAL 2 TIMES DAILY
Status: DISCONTINUED | OUTPATIENT
Start: 2019-10-30 | End: 2019-10-30

## 2019-10-30 RX ORDER — METOPROLOL TARTRATE 25 MG/1
25 TABLET, FILM COATED ORAL
Status: DISCONTINUED | OUTPATIENT
Start: 2019-10-31 | End: 2019-11-06

## 2019-10-30 RX ORDER — SODIUM CHLORIDE 450 MG/100ML
INJECTION, SOLUTION INTRAVENOUS CONTINUOUS
Status: DISCONTINUED | OUTPATIENT
Start: 2019-10-30 | End: 2019-10-31

## 2019-10-30 RX ORDER — MAGNESIUM SULFATE HEPTAHYDRATE 40 MG/ML
2 INJECTION, SOLUTION INTRAVENOUS 3 TIMES DAILY
Status: DISCONTINUED | OUTPATIENT
Start: 2019-10-30 | End: 2019-10-31

## 2019-10-30 RX ORDER — CEFAZOLIN SODIUM 2 G/50ML
2 SOLUTION INTRAVENOUS
Status: DISCONTINUED | OUTPATIENT
Start: 2019-10-31 | End: 2019-11-01 | Stop reason: ALTCHOICE

## 2019-10-30 RX ORDER — IBUPROFEN 200 MG
16 TABLET ORAL
Status: DISCONTINUED | OUTPATIENT
Start: 2019-10-30 | End: 2019-10-31

## 2019-10-30 RX ORDER — CHLORHEXIDINE GLUCONATE ORAL RINSE 1.2 MG/ML
10 SOLUTION DENTAL
Status: DISCONTINUED | OUTPATIENT
Start: 2019-10-31 | End: 2019-11-06

## 2019-10-30 RX ORDER — IBUPROFEN 200 MG
24 TABLET ORAL
Status: DISCONTINUED | OUTPATIENT
Start: 2019-10-30 | End: 2019-10-31

## 2019-10-30 RX ORDER — VANCOMYCIN HCL IN 5 % DEXTROSE 1G/250ML
1000 PLASTIC BAG, INJECTION (ML) INTRAVENOUS
Status: DISCONTINUED | OUTPATIENT
Start: 2019-10-31 | End: 2019-10-31

## 2019-10-30 RX ORDER — INSULIN ASPART 100 [IU]/ML
1-10 INJECTION, SOLUTION INTRAVENOUS; SUBCUTANEOUS
Status: DISCONTINUED | OUTPATIENT
Start: 2019-10-30 | End: 2019-10-31

## 2019-10-30 RX ADMIN — Medication 400 MG: at 09:10

## 2019-10-30 RX ADMIN — FUROSEMIDE 40 MG: 10 INJECTION, SOLUTION INTRAMUSCULAR; INTRAVENOUS at 09:10

## 2019-10-30 RX ADMIN — METOPROLOL SUCCINATE 25 MG: 25 TABLET, EXTENDED RELEASE ORAL at 09:10

## 2019-10-30 RX ADMIN — VENLAFAXINE HYDROCHLORIDE 150 MG: 75 CAPSULE, EXTENDED RELEASE ORAL at 09:10

## 2019-10-30 RX ADMIN — ATORVASTATIN CALCIUM 80 MG: 40 TABLET, FILM COATED ORAL at 09:10

## 2019-10-30 RX ADMIN — INSULIN DETEMIR 30 UNITS: 100 INJECTION, SOLUTION SUBCUTANEOUS at 09:10

## 2019-10-30 RX ADMIN — HEPARIN SODIUM 14 UNITS/KG/HR: 10000 INJECTION, SOLUTION INTRAVENOUS at 09:10

## 2019-10-30 RX ADMIN — AMLODIPINE BESYLATE 2.5 MG: 2.5 TABLET ORAL at 09:10

## 2019-10-30 RX ADMIN — POTASSIUM CHLORIDE 20 MEQ: 20 TABLET, EXTENDED RELEASE ORAL at 09:10

## 2019-10-30 RX ADMIN — AMIODARONE HYDROCHLORIDE 200 MG: 200 TABLET ORAL at 09:10

## 2019-10-30 RX ADMIN — MAGNESIUM SULFATE 2 G: 2 INJECTION INTRAVENOUS at 09:10

## 2019-10-30 RX ADMIN — HEPARIN SODIUM 18 UNITS/KG/HR: 10000 INJECTION, SOLUTION INTRAVENOUS at 07:10

## 2019-10-30 RX ADMIN — ACETAMINOPHEN 650 MG: 325 TABLET ORAL at 05:10

## 2019-10-30 RX ADMIN — FUROSEMIDE 40 MG: 10 INJECTION, SOLUTION INTRAMUSCULAR; INTRAVENOUS at 05:10

## 2019-10-30 RX ADMIN — INSULIN ASPART 6 UNITS: 100 INJECTION, SOLUTION INTRAVENOUS; SUBCUTANEOUS at 11:10

## 2019-10-30 RX ADMIN — ASPIRIN 81 MG: 81 TABLET, COATED ORAL at 09:10

## 2019-10-30 RX ADMIN — SODIUM CHLORIDE: 0.45 INJECTION, SOLUTION INTRAVENOUS at 02:10

## 2019-10-30 RX ADMIN — INSULIN ASPART 3 UNITS: 100 INJECTION, SOLUTION INTRAVENOUS; SUBCUTANEOUS at 06:10

## 2019-10-30 NOTE — OP NOTE
INPATIENT Operative Note         SUMMARY     Surgery Date: 10/30/2019     Surgeon(s) and Role:     * Ingrid Lopez MD - Primary    ASSISTANT:none    Pre-op Diagnosis:  Coronary artery disease, angina presence unspecified, unspecified vessel or lesion type, unspecified whether native or transplanted heart [I25.10]      Post-op Diagnosis:  Same as preop    Procedure(s) (LRB):  INSERTION, INTRA-AORTIC BALLOON PUMP (N/A)    COMPLICATION:    Anesthesia: RN IV Sedation    Findings/Key Components:  Successful placement of IABP LEFT CFA.    Estimated Blood Loss: < 50 ML.         SPECIMEN: NONE    Devices/Prostetics: None    PLAN:  PROCEED WITH CABG IN AM.

## 2019-10-30 NOTE — SUBJECTIVE & OBJECTIVE
Interval History:  No acute problems or complaints.  No chest pain.  Balloon pump to be placed today.    Review of Systems   Constitutional: Negative for chills and fever.   HENT: Negative for congestion and sore throat.    Eyes: Negative for visual disturbance.   Respiratory: Positive for shortness of breath. Negative for cough and wheezing.    Cardiovascular: Negative for chest pain, palpitations and leg swelling.   Gastrointestinal: Negative for abdominal pain, blood in stool, constipation, diarrhea, nausea and vomiting.   Genitourinary: Negative for dysuria and hematuria.   Musculoskeletal: Negative for arthralgias and back pain.   Skin: Negative for rash and wound.   Neurological: Negative for dizziness, weakness, light-headedness and numbness.   Hematological: Negative for adenopathy.     Objective:     Vital Signs (Most Recent):  Temp: 98.1 °F (36.7 °C) (10/30/19 1505)  Pulse: 71 (10/30/19 1800)  Resp: (!) 21 (10/30/19 1800)  BP: (!) 166/103 (10/30/19 1800)  SpO2: 96 % (10/30/19 1800) Vital Signs (24h Range):  Temp:  [96.8 °F (36 °C)-98.1 °F (36.7 °C)] 98.1 °F (36.7 °C)  Pulse:  [66-82] 71  Resp:  [16-26] 21  SpO2:  [93 %-100 %] 96 %  BP: (109-171)/() 166/103     Weight: 70.5 kg (155 lb 6.8 oz)  Body mass index is 29.37 kg/m².    Intake/Output Summary (Last 24 hours) at 10/30/2019 1842  Last data filed at 10/30/2019 1800  Gross per 24 hour   Intake 1025.48 ml   Output 500 ml   Net 525.48 ml      Physical Exam   Constitutional: She is oriented to person, place, and time. She appears well-developed and well-nourished. No distress.   HENT:   Head: Normocephalic and atraumatic.   Mouth/Throat: Oropharynx is clear and moist.   Eyes: Pupils are equal, round, and reactive to light. Conjunctivae and EOM are normal.   Neck: Neck supple. No JVD present. No thyromegaly present.   Cardiovascular: Normal rate and regular rhythm. Exam reveals no gallop and no friction rub.   No murmur heard.  Pulmonary/Chest: Effort  normal and breath sounds normal. She has no wheezes. She has no rales.   Abdominal: Soft. Bowel sounds are normal. She exhibits no distension. There is no tenderness. There is no rebound and no guarding.   Musculoskeletal: Normal range of motion. She exhibits no edema or deformity.   Lymphadenopathy:     She has no cervical adenopathy.   Neurological: She is alert and oriented to person, place, and time. She has normal reflexes.   Skin: Skin is warm and dry. No rash noted.   Psychiatric: She has a normal mood and affect. Her behavior is normal. Judgment and thought content normal.   Nursing note and vitals reviewed.      Significant Labs: All pertinent labs within the past 24 hours have been reviewed.    Significant Imaging: I have reviewed all pertinent imaging results/findings within the past 24 hours.

## 2019-10-30 NOTE — HOSPITAL COURSE
Transferred to ICU post IABP placement for hemodynamic monitoring until surgery  10/31 - Admitted to ICU today from OR post 3-vessel CABG.  Intubated and sedated on [facility] ventilation.  Upon arrival to ICU on Vasopressin, Epi, Primacor and Lidocaine infusions.  11/1 - Extubated overnight.  Supine in bed awake and alert and responsive still with IABP in place as well as on Epi, Primacor and Insulin infusions.  No distress noted  11/05: admitted back to MICU; Code blue called, Agonal breathing, cold and clammy, Hypotensive, peripheral dopamine started and intubated, weak thready pulses, Central line [place] and A line placed in MICU, Bicarb drip and LR bolus and albumin given, storm placed. Recent rise in WBCC  11/06: seen and examined: SVR is high CO 3.3, SP 1 PRBC, Insulin drip @ 21, Epi drip @ 0.15, right hand cyanotic and swollen,  mls overnight  11/07: seen and examined : Off precedex, Epi off , primacor @ 0.25 mcg/kg /min, right hand remain swollen dusky, good UO, Na low. CO 6.4, CI 3.8, , CVP 12, T max 100.8F: awake off sedation  11/08: seen and examined : on tube feeds, in chair, awake, converses appropriately, argatroban drip, got IV iron yesterday, cultures -ve so far, Normal WBCC, Na+ improved  11/09: seen and examined; pulled out KO feeding tube, argatroban @3.5mcg/kg/min. Afebrile, US left cephalic vein DVT: blistering and deep cyanosis right thumb and index. Moves with some pain

## 2019-10-30 NOTE — PROGRESS NOTES
Preformed beside spirometry.   Started IS for after surgery.   Educated patient on ventilator and after surgery process.  All Questions answered by patient and family.

## 2019-10-30 NOTE — PROGRESS NOTES
Instructed pt altaf orellana ok to give all pt am meds. Pt up at bedside. brusing teeth. No distress noted. Instructed on balloon cath.

## 2019-10-30 NOTE — PLAN OF CARE
Patient awake and alert resting in bed, normal sinus rhythm on monitor, denies pain at this time, NPO at midnight, frequent weight shift encouraged, dressing clean, dry and intact to right groin, POC reviewed with patient,  bed low locked, call light within reach, will continue to monitor

## 2019-10-30 NOTE — SUBJECTIVE & OBJECTIVE
Review of Systems   Constitution: Negative.   HENT: Negative.    Eyes: Negative.    Cardiovascular: Negative.    Respiratory: Negative.    Endocrine: Negative.    Hematologic/Lymphatic: Negative.    Skin: Negative.    Musculoskeletal: Negative.    Gastrointestinal: Negative.    Genitourinary: Negative.    Neurological: Negative.    Psychiatric/Behavioral: Negative.    Allergic/Immunologic: Negative.      Objective:     Vital Signs (Most Recent):  Temp: 96.8 °F (36 °C) (10/30/19 0947)  Pulse: 69 (10/30/19 0947)  Resp: 18 (10/30/19 0947)  BP: (!) 120/58 (10/30/19 0947)  SpO2: 96 % (10/30/19 0947) Vital Signs (24h Range):  Temp:  [96.8 °F (36 °C)-98.1 °F (36.7 °C)] 96.8 °F (36 °C)  Pulse:  [69-82] 69  Resp:  [16-20] 18  SpO2:  [93 %-99 %] 96 %  BP: (109-136)/(58-62) 120/58     Weight: 70.5 kg (155 lb 6.8 oz)  Body mass index is 29.37 kg/m².     SpO2: 96 %  O2 Device (Oxygen Therapy): room air      Intake/Output Summary (Last 24 hours) at 10/30/2019 1243  Last data filed at 10/30/2019 0600  Gross per 24 hour   Intake 506.93 ml   Output 850 ml   Net -343.07 ml       Lines/Drains/Airways     Peripheral Intravenous Line                 Peripheral IV - Single Lumen 10/28/19 1322 20 G Right Hand 1 day                Physical Exam   Constitutional: She is oriented to person, place, and time. She appears well-developed and well-nourished. No distress.   HENT:   Head: Normocephalic and atraumatic.   Eyes: Pupils are equal, round, and reactive to light. Right eye exhibits no discharge. Left eye exhibits no discharge.   Neck: Neck supple. No JVD present.   Cardiovascular: Normal rate, regular rhythm, S1 normal, S2 normal and normal heart sounds.   No murmur heard.  Pulmonary/Chest: Effort normal and breath sounds normal. No respiratory distress. She has no wheezes. She has no rales.   Abdominal: Soft. She exhibits no distension.   Musculoskeletal: She exhibits no edema.   Neurological: She is alert and oriented to person,  place, and time.   Skin: Skin is warm and dry. She is not diaphoretic. No erythema.   Groin access C/D/I; no bleeding erythema or drainage   Psychiatric: She has a normal mood and affect. Her behavior is normal. Thought content normal.   Nursing note and vitals reviewed.      Significant Labs:   CMP   Recent Labs   Lab 10/29/19  0337 10/30/19  0434    136   K 3.8 4.1    101   CO2 21* 23   * 279*   BUN 14 16   CREATININE 0.7 0.8   CALCIUM 8.7 9.4   ANIONGAP 10 12   ESTGFRAFRICA >60 >60   EGFRNONAA >60 >60   , CBC   Recent Labs   Lab 10/29/19  0337 10/30/19  0434   WBC 8.03 7.18   HGB 10.1* 10.8*   HCT 33.6* 36.2*    330   , Troponin   Recent Labs   Lab 10/28/19  1437 10/28/19  2007   TROPONINI 3.058* 2.095*    and All pertinent lab results from the last 24 hours have been reviewed.    Significant Imaging: Echocardiogram:   2D echo with color flow doppler:   Results for orders placed or performed during the hospital encounter of 10/28/19   2D echo with color flow doppler   Result Value Ref Range    QEF 30 (A) 55 - 65    Mitral Valve Regurgitation MILD TO MODERATE     Diastolic Dysfunction Yes (A)     Aortic Valve Regurgitation MILD     Mitral Valve Mobility NORMAL     Narrative    Date of Procedure: 10/28/2019        TEST DESCRIPTION   Technical Quality: This is a technically adequate study.     Aorta: The aortic root is normal in size, measuring 3.0 cm at sinotubular junction and 3.2 cm at Sinuses of Valsalva. The proximal ascending aorta is normal in size, measuring 3.1 cm across.     Left Atrium: The left atrial volume index is severely enlarged, measuring 52.97 cc/m2.     Left Ventricle: The left ventricle is normal in size, with an end-diastolic diameter of 4.1 cm, and an end-systolic diameter of 3.7 cm. Septal wall thickness is mildly increased, with the septum measuring 1.6 cm and the posterior wall measuring 1.3 cm   across. Relative wall thickness was increased at 0.63, and the LV  mass index was increased at 160.6 g/m2 consistent with concentric left ventricular hypertrophy. The anterior septum is severely hypokinetic. There is global hypokinesis. Left ventricular   systolic function appears moderately depressed. Visually estimated ejection fraction is 30-35%. The LV Doppler derived stroke volume equals 57.0 ccs.     Diastolic indices: E wave velocity 1.1 m/s, E/A ratio 0.9,  msec., E/e' ratio(avg) 16. There is pseudonormalization of mitral inflow pattern consistent with significant diastolic dysfunction.     Right Atrium: The right atrium is normal in size, measuring 3.7 cm in length and 2.8 cm in width in the apical view.     Right Ventricle: The right ventricle is normal in size. Global right ventricular systolic function appears normal. Tricuspid annular plane systolic excursion (TAPSE) is 2.2 cm.     Aortic Valve:  The aortic valve is normal in structure with normal leaflet mobility. The mean gradient obtained across the aortic valve is 6 mmHg. Additionally, there is mild aortic regurgitation.     Mitral Valve:  The mitral valve is normal in structure with normal leaflet mobility. The pressure half time is 45 msec. The calculated mitral valve area is 4.89 cm2. There is mild to moderate mitral regurgitation.     Tricuspid Valve:  The tricuspid valve is normal in structure with normal leaflet mobility.     Pulmonary Valve:  The pulmonic valve is not well seen.     IVC: IVC is normal in size and collapses > 50% with a sniff, suggesting normal right atrial pressure of 3 mmHg.     Intracavitary: There is no evidence of pericardial effusion, intracavity mass, thrombi, or vegetation.         CONCLUSIONS     1 - Moderately depressed left ventricular systolic function (EF 30-35%).     2 - Impaired LV relaxation, elevated LAP (grade 2 diastolic dysfunction).     3 - Normal right ventricular systolic function .     4 - Mild aortic regurgitation.     5 - Severe left atrial enlargement.     6 -  Concentric hypertrophy.     7 - Mild to moderate mitral regurgitation.             This document has been electronically    SIGNED BY: Terry Tarango MD On: 10/28/2019 16:38   , EKG: Reviewed and X-Ray: CXR: X-Ray Chest 1 View (CXR): No results found for this visit on 10/28/19. and X-Ray Chest PA and Lateral (CXR):   Results for orders placed or performed during the hospital encounter of 10/28/19   X-Ray Chest PA And Lateral    Narrative    EXAMINATION:  XR CHEST PA AND LATERAL    CLINICAL HISTORY:  Pre Op;    COMPARISON:  10/28/2019    FINDINGS:  Cardiac silhouette is normal. Aorta demonstrates atherosclerotic disease. The lungs demonstrate no evidence of active disease.  No evidence of pleural effusion or pneumothorax.  Bones appear intact.      Impression    No acute process seen.      Electronically signed by: Jh Anne MD  Date:    10/28/2019  Time:    21:38

## 2019-10-30 NOTE — ASSESSMENT & PLAN NOTE
-Troponin peaked at 3.66 now trending down.   -Heparin, metoprolol, ASA and statin.   -Hold ACE inhibitor due to intolerance with cough. Hold ARB as patient's blood pressure is marginal. Will consider adding, if her blood pressure will tolerate.  Hold second antiplatelet due to pending Cardiology procedure.  Patient was evaluated for Cardiac Rehab and is not a candidate at this time, but will be referred, when appropriate.  LHC on 28 on October showed severe multi-vessel CAD.  Referral to CVT Surgery who recommended CABG planned for Thursday 31 October.  Balloon pump placed 30 October.

## 2019-10-30 NOTE — ASSESSMENT & PLAN NOTE
Multi vessel CAD on [facility]  Now with IABP support  Planned CABG in am  Continue ASA, statin, BB, heparin infusion

## 2019-10-30 NOTE — PLAN OF CARE
Pt AAOx3. NSR on monitor, HR 60-70s. BP stable. Afebrile. IABP to L groin intact - site CDI, no hematoma, pulses +2. Criticore placed. Blood glucose monitored. PIVs intact - heparin and 0.45% NS infusing. NPO after midnight for CABG in AM. Skin intact  - noted some bruises/scabs, previous puncture site from heart cath. Bed low, wheels locked, alarms audible, call light within reach. POC reviewed with patient and sister.

## 2019-10-30 NOTE — PROGRESS NOTES
[facility] Hospital Medicine  Progress Note    Patient Name: Mateusz Bates  MRN: [id number]  Patient Class: IP- Inpatient   Admission Date: 10/28/2019  Length of Stay: 2 days  Attending Physician: Shravan Lam MD  Primary Care Provider: Serenity Kilpatrick MD        Subjective:     Principal Problem:NSTEMI (non-ST elevated myocardial infarction)    HPI:  Mateusz Bates is a 69 year old female with DM II, hypertension and hyperlipidemia who presented to the ED at the request of her PCP due to finding of an abnormal EKG and complaints of chest tightness with SOB. The patient reports having intermittent symptoms for the last 1-2 months. On 10/26/19, she began having a tightness in her mid chest that radiated into her back with associated SOB. Symptoms were worse with laying flat and improved with sitting up. They lasted only minutes, but this was longer than previous episodes. She denies a trend in when these episodes occur as well as associated nausea, vomiting and diaphoresis. The patient's EKG was significant for a new LBBB compared to previous in 2016. Labs were significant for a BNP of 1,305, troponin of 3.665 and potassium of 3.4. Code status was discussed with the patient and her sister. She is a full code. Her sister, Christina Hernandez, is her surrogate medical decision maker.     Overview/Hospital Course:  Admitted for evaluation and treatment of chest pain concerning for acute coronary syndrome.  Evaluation by Cardiology and urgent left heart catheterization.  Angiogram discovered severe multi-vessel coronary artery disease.  Cardiac echo showed severely reduced LV function.  Referral to Cardiothoracic Surgery who recommended CABG.    Interval History:  No acute problems or complaints.  No chest pain.  Balloon pump to be placed today.    Review of Systems   Constitutional: Negative for chills and fever.   HENT: Negative for congestion and sore throat.    Eyes: Negative for visual  disturbance.   Respiratory: Positive for shortness of breath. Negative for cough and wheezing.    Cardiovascular: Negative for chest pain, palpitations and leg swelling.   Gastrointestinal: Negative for abdominal pain, blood in stool, constipation, diarrhea, nausea and vomiting.   Genitourinary: Negative for dysuria and hematuria.   Musculoskeletal: Negative for arthralgias and back pain.   Skin: Negative for rash and wound.   Neurological: Negative for dizziness, weakness, light-headedness and numbness.   Hematological: Negative for adenopathy.     Objective:     Vital Signs (Most Recent):  Temp: 98.1 °F (36.7 °C) (10/30/19 1505)  Pulse: 71 (10/30/19 1800)  Resp: (!) 21 (10/30/19 1800)  BP: (!) 166/103 (10/30/19 1800)  SpO2: 96 % (10/30/19 1800) Vital Signs (24h Range):  Temp:  [96.8 °F (36 °C)-98.1 °F (36.7 °C)] 98.1 °F (36.7 °C)  Pulse:  [66-82] 71  Resp:  [16-26] 21  SpO2:  [93 %-100 %] 96 %  BP: (109-171)/() 166/103     Weight: 70.5 kg (155 lb 6.8 oz)  Body mass index is 29.37 kg/m².    Intake/Output Summary (Last 24 hours) at 10/30/2019 1842  Last data filed at 10/30/2019 1800  Gross per 24 hour   Intake 1025.48 ml   Output 500 ml   Net 525.48 ml      Physical Exam   Constitutional: She is oriented to person, place, and time. She appears well-developed and well-nourished. No distress.   HENT:   Head: Normocephalic and atraumatic.   Mouth/Throat: Oropharynx is clear and moist.   Eyes: Pupils are equal, round, and reactive to light. Conjunctivae and EOM are normal.   Neck: Neck supple. No JVD present. No thyromegaly present.   Cardiovascular: Normal rate and regular rhythm. Exam reveals no gallop and no friction rub.   No murmur heard.  Pulmonary/Chest: Effort normal and breath sounds normal. She has no wheezes. She has no rales.   Abdominal: Soft. Bowel sounds are normal. She exhibits no distension. There is no tenderness. There is no rebound and no guarding.   Musculoskeletal: Normal range of motion. She  exhibits no edema or deformity.   Lymphadenopathy:     She has no cervical adenopathy.   Neurological: She is alert and oriented to person, place, and time. She has normal reflexes.   Skin: Skin is warm and dry. No rash noted.   Psychiatric: She has a normal mood and affect. Her behavior is normal. Judgment and thought content normal.   Nursing note and vitals reviewed.      Significant Labs: All pertinent labs within the past 24 hours have been reviewed.    Significant Imaging: I have reviewed all pertinent imaging results/findings within the past 24 hours.      Assessment/Plan:      * NSTEMI (non-ST elevated myocardial infarction)  -Troponin peaked at 3.66 now trending down.   -Heparin, metoprolol, ASA and statin.   -Hold ACE inhibitor due to intolerance with cough. Hold ARB as patient's blood pressure is marginal. Will consider adding, if her blood pressure will tolerate.  Hold second antiplatelet due to pending Cardiology procedure.  Patient was evaluated for Cardiac Rehab and is not a candidate at this time, but will be referred, when appropriate.  LHC on 28 on October showed severe multi-vessel CAD.  Referral to CVT Surgery who recommended CABG planned for Thursday 31 October.  Balloon pump placed 30 October.    Acute combined systolic and diastolic CHF, NYHA class 2  Probable ischemic cardiomyopathy.  Anticipate CABG 31 October.  Optimize medical management.    Hyperlipidemia associated with type 2 diabetes mellitus  -Patient was improved, but not at goal on 10/4/19.   -Continue statin.       Diabetes mellitus type II, uncontrolled  -Hemoglobin A1C was 9 on 9/19/19.   -NISS.   -ADA diet.       Anxiety and depression  -Stable.   -Continue Effexor.       Hypertension associated with diabetes  -Stable.   -Continue Norvasc.         VTE Risk Mitigation (From admission, onward)         Ordered     IP VTE LOW RISK PATIENT  Once      10/30/19 1405     Place NATHANIEL hose  Until discontinued      10/30/19 1405     Place  sequential compression device  Until discontinued      10/30/19 1405     Reason for no Mechanical VTE Prophylaxis  Once      10/28/19 1414     heparin 25,000 units in dextrose 5% 250 mL (100 units/mL) infusion LOW INTENSITY nomogram - [facility]  Continuous      10/28/19 1257     heparin 25,000 units in dextrose 5% (100 units/ml) IV bolus from bag - ADDITIONAL PRN BOLUS - 60 units/kg (max bolus 4000 units)  As needed (PRN)      10/28/19 1257     heparin 25,000 units in dextrose 5% (100 units/ml) IV bolus from bag - ADDITIONAL PRN BOLUS - 30 units/kg (max bolus 4000 units)  As needed (PRN)      10/28/19 1257                Critical care time spent on the evaluation and treatment of severe organ dysfunction, review of pertinent labs and imaging studies, discussions with consulting providers and discussions with patient/family: 30 minutes.      Shravan Lam MD  Department of Hospital Medicine   [facility]

## 2019-10-30 NOTE — PROGRESS NOTES
Pt arrived to ICU room 2 from cath lab. AAOx3. NAD. IABP in place, pulses +2, site CDI. Heparin & 0.45%NS infusing.

## 2019-10-30 NOTE — PROGRESS NOTES
Pt up on chair. Educated on importance of dbc 10  Q1h. Instructed on getting up atleast 3 x a day. Instructed on showering early am and sitting in recliner. Verbalized understanding.

## 2019-10-30 NOTE — HPI
69 year old female with PMH including DM2 on insulin; HTN; HLD; GERD; anxiety  Presented to ED on 10/28 at instruction of PCP s/t abnormal EKG with c/o chest tightness and SOB  Eval revealed LBBB on EKG along with BNP 1300 and troponin 3.66     Taken for LHC on 10/28 finding 3 vessel CAD, EF 20%, and infra renal aorta plaque 30%  CTS consulted and agree pt is candidate for urgent CABG  Today 10/30 taken to cath lab for placement of IABP to support for planned CABG in am

## 2019-10-30 NOTE — ANESTHESIA PREPROCEDURE EVALUATION
10/30/2019  Mateusz Bates is a 69 y.o., female.    Pre-op Assessment         Review of Systems  Social:  Non-Smoker   Cardiovascular:   Hypertension Past MI (NSTEMI (non-ST elevated myocardial infarction)  ECG has been reviewed. 10/30/2019 EKG  Normal sinus rhythm  Left bundle branch block  Abnormal ECG  When compared with ECG of 28-OCT-2019 10:55,  T wave inversion more evident in Anterior-lateral leads    10/28/2019 Echo  CONCLUSIONS     1 - Moderately depressed left ventricular systolic function (EF 30-35%).     2 - Impaired LV relaxation, elevated LAP (grade 2 diastolic dysfunction).     3 - Normal right ventricular systolic function .     4 - Mild aortic regurgitation.     5 - Severe left atrial enlargement.     6 - Concentric hypertrophy.     7 - Mild to moderate mitral regurgitation.     Cath    The proximal LAD has a 70% stenosis.    The proximal LCX has a 90% stenosis    The proximal RCA has chronic total occlusion   Hepatic/GI:   GERD    Endocrine:   Diabetes, type 2    Psych:   Psychiatric History anxiety depression          Physical Exam  General:  Well nourished    Airway/Jaw/Neck:  Airway Findings: Mouth Opening: Normal Tongue: Normal  Mallampati: II      Dental:  Dental Findings: In tact   Chest/Lungs:  Chest/Lungs Clear    Heart/Vascular:  Heart Findings: Normal Heart murmur: negative       Mental Status:  Mental Status Findings:  Cooperative, Alert and Oriented         Anesthesia Plan  Type of Anesthesia, risks & benefits discussed:  Anesthesia Type:  general  Patient's Preference:   Intra-op Monitoring Plan: standard ASA monitors, cardiac output, [place], central line and arterial line  Intra-op Monitoring Plan Comments:   Post Op Pain Control Plan: multimodal analgesia  Post Op Pain Control Plan Comments:   Induction:   IV  Beta Blocker:  Patient is not currently on a Beta-Blocker (No  further documentation required).       Informed Consent: Patient understands risks and agrees with Anesthesia plan.  Questions answered. Anesthesia consent signed with patient.  ASA Score: 4     Day of Surgery Review of History & Physical: I have interviewed and examined the patient. I have reviewed the patient's H&P dated:    H&P update referred to the surgeon.

## 2019-10-30 NOTE — SUBJECTIVE & OBJECTIVE
Past Medical History:   Diagnosis Date    Chronic major depressive disorder     Diabetes mellitus type II, uncontrolled 2000    Insulin x 10 years    Eczema     Essential hypertension     Generalized anxiety disorder     GERD (gastroesophageal reflux disease)     Glaucoma     OD    Hyperlipidemia     Obesity        Past Surgical History:   Procedure Laterality Date    APPENDECTOMY      ECTOPIC PREGNANCY SURGERY Left     LSO    OOPHORECTOMY         Review of patient's allergies indicates:   Allergen Reactions    Ace inhibitors Other (See Comments)     Cough         Family History     Problem Relation (Age of Onset)    Alcohol abuse Maternal Uncle, Sister    Colon cancer Sister    Depression Maternal Grandmother    Diabetes Sister, Maternal Grandmother, Paternal Aunt    Drug abuse Sister    Glaucoma Paternal Aunt, Paternal Uncle    Heart attack Mother    Heart disease Maternal Grandmother, Paternal Grandmother, Paternal Grandfather    Heart failure Sister    Hepatitis Sister    Hypertension Father, Sister, Sister, Sister    Multiple myeloma Father        Tobacco Use    Smoking status: Never Smoker    Smokeless tobacco: Never Used   Substance and Sexual Activity    Alcohol use: No    Drug use: No    Sexual activity: Never         Review of Systems   Constitutional: Negative for fatigue and fever.        Drowsy post sedation   HENT: Negative.    Respiratory: Negative for chest tightness and shortness of breath.    Cardiovascular: Negative for chest pain and palpitations.   Gastrointestinal: Negative.    Genitourinary: Negative.    Musculoskeletal: Negative.    Neurological: Negative.    Psychiatric/Behavioral: Negative.      Objective:     Vital Signs (Most Recent):  Temp: 96.8 °F (36 °C) (10/30/19 0947)  Pulse: 67 (10/30/19 1500)  Resp: 20 (10/30/19 1500)  BP: (!) 149/81 (10/30/19 1500)  SpO2: 95 % (10/30/19 1500) Vital Signs (24h Range):  Temp:  [96.8 °F (36 °C)-98.1 °F (36.7 °C)] 96.8 °F (36  °C)  Pulse:  [66-82] 67  Resp:  [16-25] 20  SpO2:  [93 %-99 %] 95 %  BP: (109-163)/(58-81) 149/81     Weight: 70.5 kg (155 lb 6.8 oz)  Body mass index is 29.37 kg/m².      Intake/Output Summary (Last 24 hours) at 10/30/2019 1622  Last data filed at 10/30/2019 0600  Gross per 24 hour   Intake 506.93 ml   Output 850 ml   Net -343.07 ml       Physical Exam   Constitutional: She is oriented to person, place, and time. She appears well-developed and well-nourished. No distress.   HENT:   Head: Atraumatic.   Eyes: Pupils are equal, round, and reactive to light. Conjunctivae are normal.   Neck: No JVD present. No tracheal deviation present.   Cardiovascular: Normal rate and regular rhythm.   Pulses:       Radial pulses are 2+ on the right side, and 2+ on the left side.        Dorsalis pedis pulses are 2+ on the right side, and 2+ on the left side.   Pulmonary/Chest: Effort normal and breath sounds normal. No stridor. No respiratory distress.   Abdominal: Soft. Bowel sounds are normal. She exhibits no distension. There is no tenderness.   Musculoskeletal: She exhibits no edema.   Neurological: She is alert and oriented to person, place, and time.   Skin: Skin is warm and dry. Capillary refill takes 2 to 3 seconds.            Vents:       Lines/Drains/Airways     Line                 IABP 10/30/19 1400 less than 1 day          Peripheral Intravenous Line                 Peripheral IV - Single Lumen 10/28/19 1322 20 G Right Hand 2 days                Significant Labs:    CBC/Anemia Profile:  Recent Labs   Lab 10/29/19  0337 10/30/19  0434 10/30/19  1425   WBC 8.03 7.18 7.71   HGB 10.1* 10.8* 11.2*   HCT 33.6* 36.2* 35.9*    330 362*   MCV 88 88 86   RDW 15.2* 15.2* 15.1*        Chemistries:  Recent Labs   Lab 10/29/19  0337 10/30/19  0434 10/30/19  1425    136 137   K 3.8 4.1 4.5    101 99   CO2 21* 23 28   BUN 14 16 15   CREATININE 0.7 0.8 0.8   CALCIUM 8.7 9.4 9.9   ALBUMIN  --   --  3.5   PROT  --   --   7.5   BILITOT  --   --  0.4   ALKPHOS  --   --  77   ALT  --   --  8*   AST  --   --  14   MG 1.2* 1.3* 2.5   PHOS 3.6 3.6  --        All pertinent labs within the past 24 hours have been reviewed.    Significant Imaging:   I have reviewed all pertinent imaging results/findings within the past 24 hours.

## 2019-10-30 NOTE — ASSESSMENT & PLAN NOTE
-Patient presented with intermittent chest tightness/heaviness that radiated to her back since Saturday evening  -Associated with JONES/orthopnea  -Initial troponin 3.665  -EKG showed SR with new LBBB  -Presentation consistent with NSTEMI  -Continue ASA, statin  -Start Toprol XL  -Start heparin gtt  -Check 2D echo  -Continue to trend troponin  -Keep NPO. [facility] today for further evaluation and PCI if indicated. All risks, benefits, and treatment alternatives explained to patient in detail. All questions answered. She has agreed to proceed. Further rec's to follow.    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 All risks, benefits, and treatment alternatives explained to patient in detail. All questions answered. She has agreed to proceed.

## 2019-10-30 NOTE — CONSULTS
[facility]   Critical Care Medicine  Consult Note    Patient Name: Mateusz Bates  MRN: [id number]  Admission Date: 10/28/2019  Hospital Length of Stay: 2 days  Code Status: Full Code  Attending Physician: Shravan Lam MD   Primary Care Provider: Serenity Kilpatrick MD   Principal Problem: NSTEMI (non-ST elevated myocardial infarction)      Subjective:     HPI:  69 year old female with PMH including DM2 on insulin; HTN; HLD; GERD; anxiety  Presented to ED on 10/28 at instruction of PCP s/t abnormal EKG with c/o chest tightness and SOB  Eval revealed LBBB on EKG along with BNP 1300 and troponin 3.66     Taken for LHC on 10/28 finding 3 vessel CAD, EF 20%, and infra renal aorta plaque 30%  CTS consulted and agree pt is candidate for urgent CABG  Today 10/30 taken to cath lab for placement of IABP to support for planned CABG in am    Hospital/ICU Course:  Transferred to ICU post IABP placement for hemodynamic monitoring until surgery    Past Medical History:   Diagnosis Date    Chronic major depressive disorder     Diabetes mellitus type II, uncontrolled 2000    Insulin x 10 years    Eczema     Essential hypertension     Generalized anxiety disorder     GERD (gastroesophageal reflux disease)     Glaucoma     OD    Hyperlipidemia     Obesity        Past Surgical History:   Procedure Laterality Date    APPENDECTOMY      ECTOPIC PREGNANCY SURGERY Left     LSO    OOPHORECTOMY         Review of patient's allergies indicates:   Allergen Reactions    Ace inhibitors Other (See Comments)     Cough         Family History     Problem Relation (Age of Onset)    Alcohol abuse Maternal Uncle, Sister    Colon cancer Sister    Depression Maternal Grandmother    Diabetes Sister, Maternal Grandmother, Paternal Aunt    Drug abuse Sister    Glaucoma Paternal Aunt, Paternal Uncle    Heart attack Mother    Heart disease Maternal Grandmother, Paternal Grandmother, Paternal Grandfather    Heart failure Sister     Hepatitis Sister    Hypertension Father, Sister, Sister, Sister    Multiple myeloma Father        Tobacco Use    Smoking status: Never Smoker    Smokeless tobacco: Never Used   Substance and Sexual Activity    Alcohol use: No    Drug use: No    Sexual activity: Never         Review of Systems   Constitutional: Negative for fatigue and fever.        Drowsy post sedation   HENT: Negative.    Respiratory: Negative for chest tightness and shortness of breath.    Cardiovascular: Negative for chest pain and palpitations.   Gastrointestinal: Negative.    Genitourinary: Negative.    Musculoskeletal: Negative.    Neurological: Negative.    Psychiatric/Behavioral: Negative.      Objective:     Vital Signs (Most Recent):  Temp: 96.8 °F (36 °C) (10/30/19 0947)  Pulse: 67 (10/30/19 1500)  Resp: 20 (10/30/19 1500)  BP: (!) 149/81 (10/30/19 1500)  SpO2: 95 % (10/30/19 1500) Vital Signs (24h Range):  Temp:  [96.8 °F (36 °C)-98.1 °F (36.7 °C)] 96.8 °F (36 °C)  Pulse:  [66-82] 67  Resp:  [16-25] 20  SpO2:  [93 %-99 %] 95 %  BP: (109-163)/(58-81) 149/81     Weight: 70.5 kg (155 lb 6.8 oz)  Body mass index is 29.37 kg/m².      Intake/Output Summary (Last 24 hours) at 10/30/2019 1622  Last data filed at 10/30/2019 0600  Gross per 24 hour   Intake 506.93 ml   Output 850 ml   Net -343.07 ml       Physical Exam   Constitutional: She is oriented to person, place, and time. She appears well-developed and well-nourished. No distress.   HENT:   Head: Atraumatic.   Eyes: Pupils are equal, round, and reactive to light. Conjunctivae are normal.   Neck: No JVD present. No tracheal deviation present.   Cardiovascular: Normal rate and regular rhythm.   Pulses:       Radial pulses are 2+ on the right side, and 2+ on the left side.        Dorsalis pedis pulses are 2+ on the right side, and 2+ on the left side.   Pulmonary/Chest: Effort normal and breath sounds normal. No stridor. No respiratory distress.   Abdominal: Soft. Bowel sounds are  normal. She exhibits no distension. There is no tenderness.   Musculoskeletal: She exhibits no edema.   Neurological: She is alert and oriented to person, place, and time.   Skin: Skin is warm and dry. Capillary refill takes 2 to 3 seconds.            Vents:       Lines/Drains/Airways     Line                 IABP 10/30/19 1400 less than 1 day          Peripheral Intravenous Line                 Peripheral IV - Single Lumen 10/28/19 1322 20 G Right Hand 2 days                Significant Labs:    CBC/Anemia Profile:  Recent Labs   Lab 10/29/19  0337 10/30/19  0434 10/30/19  1425   WBC 8.03 7.18 7.71   HGB 10.1* 10.8* 11.2*   HCT 33.6* 36.2* 35.9*    330 362*   MCV 88 88 86   RDW 15.2* 15.2* 15.1*        Chemistries:  Recent Labs   Lab 10/29/19  0337 10/30/19  0434 10/30/19  1425    136 137   K 3.8 4.1 4.5    101 99   CO2 21* 23 28   BUN 14 16 15   CREATININE 0.7 0.8 0.8   CALCIUM 8.7 9.4 9.9   ALBUMIN  --   --  3.5   PROT  --   --  7.5   BILITOT  --   --  0.4   ALKPHOS  --   --  77   ALT  --   --  8*   AST  --   --  14   MG 1.2* 1.3* 2.5   PHOS 3.6 3.6  --        All pertinent labs within the past 24 hours have been reviewed.    Significant Imaging:   I have reviewed all pertinent imaging results/findings within the past 24 hours.      ABG  No results for input(s): PH, PO2, PCO2, HCO3, BE in the last 168 hours.  Assessment/Plan:     Cardiac/Vascular  * NSTEMI (non-ST elevated myocardial infarction)  Multi vessel CAD on [facility]  Now with IABP support  Planned CABG in am  Continue ASA, statin, BB, heparin infusion    Acute combined systolic and diastolic CHF, NYHA class 2  Strict I/O  Daily wt  Diuresis per cards    Endocrine  Diabetes mellitus type II, uncontrolled  Long acting plus SSI prn with monitoring for glucose control and prevention of insulin toxicity        Critical Care Daily Checklist:    A: Awake: RASS Goal/Actual Goal:    Actual:     B: Spontaneous Breathing Trial Performed?     C: SAT &  SBT Coordinated?  n/a                      D: Delirium: CAM-ICU Overall CAM-ICU: Negative   E: Early Mobility Performed? Mobilization as allowed with IABP   F: Feeding Goal:    Status:     Current Diet Order   Procedures    Diet NPO Except for: Sips with Medication     Order Specific Question:   Except for     Answer:   Sips with Medication      AS: Analgesia/Sedation prn   T: Thromboembolic Prophylaxis Heparin infusion   H: HOB > 300 Yes   U: Stress Ulcer Prophylaxis (if needed) Add pepcid   G: Glucose Control As above   B: Bowel Function     I: Indwelling Catheter (Lines & Jones) Necessity reviewed   D: De-escalation of Antimicrobials/Pharmacotherapies reviewed    Plan for the day/ETD CABG in am    Code Status:  Family/Goals of Care: Full Code  Home post CABG recovery   I have discussed case and plan of care in detail with Dr Baez and Dr Lam; Status and plan of care were discussed with team on multidisciplinary rounds.    Critical Care Time: 45 minutes  Critical secondary to NSTEMI, CAD, with IABP support; Critical care was time spent personally by me on the following activities: development of treatment plan with patient or surrogate and bedside caregivers, discussions with consultants, evaluation of patient's response to treatment, examination of patient, ordering and performing treatments and interventions, ordering and review of laboratory studies, ordering and review of radiographic studies, pulse oximetry, re-evaluation of patient's condition. This critical care time did not overlap with that of any other provider or involve time for any procedures.    Thank you for your consult.      BENITA Olsen-BC  Critical Care Medicine  [facility]

## 2019-10-30 NOTE — PROGRESS NOTES
[facility]  Cardiology  Progress Note    Patient Name: Mateusz Bates  MRN: [id number]  Admission Date: 10/28/2019  Hospital Length of Stay: 2 days  Code Status: Full Code   Attending Physician: Shravan Lam MD   Primary Care Physician: Serenity Kilpatrick MD  Expected Discharge Date:   Principal Problem:NSTEMI (non-ST elevated myocardial infarction)    Subjective:   HPI:  Ms. Bates is a 69 year old female patient whose current medical conditions include HTN, hyperlipidemia, obesity, DM type II, cerebral microvascular disease, FAHAD, and B12 deficiency who was sent to [facility] ED today from her PCP's office due to chest pain and abnormal EKG. Patient complained of substernal chest tightness/heaviness that radiated to her back on Saturday while at rest. Associated symptoms included orthopnea and SOB. Patient denied any associated nausea, vomiting, diaphoresis, palpitations, near syncope, or syncope. Initial workup in ED revealed troponin of 3.665 and BNP of 1305 and patient subsequently admitted for further evaluation and treatment of NSTEMI. Cardiology consulted to assist with management. Patient seen and examined today while in ED. Feels well at time of exam, denies chest pain. She does report similar symptoms over the past few months but states they never lasted as long and were less intense. She reports compliance with her medications. States her father had history of CABG. Chart reviewed. Repeat troponin pending. EKG reviewed and shows SR with new LBBB. 2D echo pending.    Hospital Course:   10/29/19-Patient seen and examined today, s/p LHC yesterday that showed multivessel CAD. CVT consulted, CABG planned for Thursday. Feels ok. No chest pain or SOB. Labs stable. Being diuresed. 2D echo showed EF of 30-35%, DD.    10/30/19-Patient seen and examined today. Feels well. No complaints. Denies chest pain or SOB. Labs stable. IABP placement scheduled for today.        Review of Systems    Constitution: Negative.   HENT: Negative.    Eyes: Negative.    Cardiovascular: Negative.    Respiratory: Negative.    Endocrine: Negative.    Hematologic/Lymphatic: Negative.    Skin: Negative.    Musculoskeletal: Negative.    Gastrointestinal: Negative.    Genitourinary: Negative.    Neurological: Negative.    Psychiatric/Behavioral: Negative.    Allergic/Immunologic: Negative.      Objective:     Vital Signs (Most Recent):  Temp: 96.8 °F (36 °C) (10/30/19 0947)  Pulse: 69 (10/30/19 0947)  Resp: 18 (10/30/19 0947)  BP: (!) 120/58 (10/30/19 0947)  SpO2: 96 % (10/30/19 0947) Vital Signs (24h Range):  Temp:  [96.8 °F (36 °C)-98.1 °F (36.7 °C)] 96.8 °F (36 °C)  Pulse:  [69-82] 69  Resp:  [16-20] 18  SpO2:  [93 %-99 %] 96 %  BP: (109-136)/(58-62) 120/58     Weight: 70.5 kg (155 lb 6.8 oz)  Body mass index is 29.37 kg/m².     SpO2: 96 %  O2 Device (Oxygen Therapy): room air      Intake/Output Summary (Last 24 hours) at 10/30/2019 1243  Last data filed at 10/30/2019 0600  Gross per 24 hour   Intake 506.93 ml   Output 850 ml   Net -343.07 ml       Lines/Drains/Airways     Peripheral Intravenous Line                 Peripheral IV - Single Lumen 10/28/19 1322 20 G Right Hand 1 day                Physical Exam   Constitutional: She is oriented to person, place, and time. She appears well-developed and well-nourished. No distress.   HENT:   Head: Normocephalic and atraumatic.   Eyes: Pupils are equal, round, and reactive to light. Right eye exhibits no discharge. Left eye exhibits no discharge.   Neck: Neck supple. No JVD present.   Cardiovascular: Normal rate, regular rhythm, S1 normal, S2 normal and normal heart sounds.   No murmur heard.  Pulmonary/Chest: Effort normal and breath sounds normal. No respiratory distress. She has no wheezes. She has no rales.   Abdominal: Soft. She exhibits no distension.   Musculoskeletal: She exhibits no edema.   Neurological: She is alert and oriented to person, place, and time.   Skin:  Skin is warm and dry. She is not diaphoretic. No erythema.   Groin access C/D/I; no bleeding erythema or drainage   Psychiatric: She has a normal mood and affect. Her behavior is normal. Thought content normal.   Nursing note and vitals reviewed.      Significant Labs:   CMP   Recent Labs   Lab 10/29/19  0337 10/30/19  0434    136   K 3.8 4.1    101   CO2 21* 23   * 279*   BUN 14 16   CREATININE 0.7 0.8   CALCIUM 8.7 9.4   ANIONGAP 10 12   ESTGFRAFRICA >60 >60   EGFRNONAA >60 >60   , CBC   Recent Labs   Lab 10/29/19  0337 10/30/19  0434   WBC 8.03 7.18   HGB 10.1* 10.8*   HCT 33.6* 36.2*    330   , Troponin   Recent Labs   Lab 10/28/19  1437 10/28/19  2007   TROPONINI 3.058* 2.095*    and All pertinent lab results from the last 24 hours have been reviewed.    Significant Imaging: Echocardiogram:   2D echo with color flow doppler:   Results for orders placed or performed during the hospital encounter of 10/28/19   2D echo with color flow doppler   Result Value Ref Range    QEF 30 (A) 55 - 65    Mitral Valve Regurgitation MILD TO MODERATE     Diastolic Dysfunction Yes (A)     Aortic Valve Regurgitation MILD     Mitral Valve Mobility NORMAL     Narrative    Date of Procedure: 10/28/2019        TEST DESCRIPTION   Technical Quality: This is a technically adequate study.     Aorta: The aortic root is normal in size, measuring 3.0 cm at sinotubular junction and 3.2 cm at Sinuses of Valsalva. The proximal ascending aorta is normal in size, measuring 3.1 cm across.     Left Atrium: The left atrial volume index is severely enlarged, measuring 52.97 cc/m2.     Left Ventricle: The left ventricle is normal in size, with an end-diastolic diameter of 4.1 cm, and an end-systolic diameter of 3.7 cm. Septal wall thickness is mildly increased, with the septum measuring 1.6 cm and the posterior wall measuring 1.3 cm   across. Relative wall thickness was increased at 0.63, and the LV mass index was increased at  160.6 g/m2 consistent with concentric left ventricular hypertrophy. The anterior septum is severely hypokinetic. There is global hypokinesis. Left ventricular   systolic function appears moderately depressed. Visually estimated ejection fraction is 30-35%. The LV Doppler derived stroke volume equals 57.0 ccs.     Diastolic indices: E wave velocity 1.1 m/s, E/A ratio 0.9,  msec., E/e' ratio(avg) 16. There is pseudonormalization of mitral inflow pattern consistent with significant diastolic dysfunction.     Right Atrium: The right atrium is normal in size, measuring 3.7 cm in length and 2.8 cm in width in the apical view.     Right Ventricle: The right ventricle is normal in size. Global right ventricular systolic function appears normal. Tricuspid annular plane systolic excursion (TAPSE) is 2.2 cm.     Aortic Valve:  The aortic valve is normal in structure with normal leaflet mobility. The mean gradient obtained across the aortic valve is 6 mmHg. Additionally, there is mild aortic regurgitation.     Mitral Valve:  The mitral valve is normal in structure with normal leaflet mobility. The pressure half time is 45 msec. The calculated mitral valve area is 4.89 cm2. There is mild to moderate mitral regurgitation.     Tricuspid Valve:  The tricuspid valve is normal in structure with normal leaflet mobility.     Pulmonary Valve:  The pulmonic valve is not well seen.     IVC: IVC is normal in size and collapses > 50% with a sniff, suggesting normal right atrial pressure of 3 mmHg.     Intracavitary: There is no evidence of pericardial effusion, intracavity mass, thrombi, or vegetation.         CONCLUSIONS     1 - Moderately depressed left ventricular systolic function (EF 30-35%).     2 - Impaired LV relaxation, elevated LAP (grade 2 diastolic dysfunction).     3 - Normal right ventricular systolic function .     4 - Mild aortic regurgitation.     5 - Severe left atrial enlargement.     6 - Concentric hypertrophy.      7 - Mild to moderate mitral regurgitation.             This document has been electronically    SIGNED BY: Terry Tarango MD On: 10/28/2019 16:38   , EKG: Reviewed and X-Ray: CXR: X-Ray Chest 1 View (CXR): No results found for this visit on 10/28/19. and X-Ray Chest PA and Lateral (CXR):   Results for orders placed or performed during the hospital encounter of 10/28/19   X-Ray Chest PA And Lateral    Narrative    EXAMINATION:  XR CHEST PA AND LATERAL    CLINICAL HISTORY:  Pre Op;    COMPARISON:  10/28/2019    FINDINGS:  Cardiac silhouette is normal. Aorta demonstrates atherosclerotic disease. The lungs demonstrate no evidence of active disease.  No evidence of pleural effusion or pneumothorax.  Bones appear intact.      Impression    No acute process seen.      Electronically signed by: Jh Anne MD  Date:    10/28/2019  Time:    21:38     Assessment and Plan:   Patient who presents with NSTEMI/acute CHF s/p LHC that showed multivessel CAD. CVT on board, CABG planned for 10/31/19. Continue same cardiac meds/mgmt. IABP placement today.    * NSTEMI (non-ST elevated myocardial infarction)  -Patient presented with intermittent chest tightness/heaviness that radiated to her back since Saturday evening  -Associated with JONES/orthopnea  -Initial troponin 3.665  -EKG showed SR with new LBBB  -Presentation consistent with NSTEMI  -Continue ASA, statin  -Start Toprol XL  -Start heparin gtt  -Check 2D echo  -Continue to trend troponin  -Keep NPO. LHC today for further evaluation and PCI if indicated. All risks, benefits, and treatment alternatives explained to patient in detail. All questions answered. She has agreed to proceed. Further rec's to follow.    10/29/19  -s/p LHC yesterday that showed multivessel CAD  -CVT consulted, CABG planned for 10/31/19  -Stable overnight, no chest pain  -Continue ASA, statin, BB  -Continue heparin gtt  -Will add ARB if BP permits (ACEi caused cough)    10/30/19  -Stable overnight  -No chest  pain or SOB  -Continue ASA, statin, BB, heparin gtt  -Will add ARB if BP permits  -IABP placement scheduled for today. All risks, benefits, and treatment alternatives explained to patient in detail. All questions answered. She has agreed to proceed.    Acute combined systolic and diastolic CHF, NYHA class 2  -Presents with ICM/acute combined CHF  -EF 30-35% by 2D echo  -Continue diuresis with IV Lasix  -Continue BB  -Will add ARB if BP permits    Chest pain  -See plan under NSTEMI    Abnormal EKG  -LBBB noted on EKG  -See plan under NSTEMI    Hyperlipidemia associated with type 2 diabetes mellitus  -Continue statin    Obesity (BMI 30.0-34.9)  -Weight loss    Diabetes mellitus type II, uncontrolled  -Mgmt as per hospital medicine    Hypertension associated with diabetes  -Continue home meds as BP permits        VTE Risk Mitigation (From admission, onward)         Ordered     Reason for no Mechanical VTE Prophylaxis  Once      10/28/19 1414     heparin 25,000 units in dextrose 5% 250 mL (100 units/mL) infusion LOW INTENSITY nomogram - [facility]  Continuous      10/28/19 1257     heparin 25,000 units in dextrose 5% (100 units/ml) IV bolus from bag - ADDITIONAL PRN BOLUS - 60 units/kg (max bolus 4000 units)  As needed (PRN)      10/28/19 1257     heparin 25,000 units in dextrose 5% (100 units/ml) IV bolus from bag - ADDITIONAL PRN BOLUS - 30 units/kg (max bolus 4000 units)  As needed (PRN)      10/28/19 1257                Josselyn Vann PA-C  Cardiology  [facility]

## 2019-10-30 NOTE — INTERVAL H&P NOTE
The patient has been examined and the H&P has been reviewed:    I concur with the findings and changes have been noted since the H&P was written: has 3 vessel cad with cardiomyopathy will place iabp preop.    Anesthesia/Surgery risks, benefits and alternative options discussed and understood by patient/family.  I have explained the risks, benefits , and alternatives of the procedure in detail.the patient voices understanding and all questions have been answered.the patient agrees to proceed as planned.          Active Hospital Problems    Diagnosis  POA    *NSTEMI (non-ST elevated myocardial infarction) [I21.4]  Yes    Acute combined systolic and diastolic CHF, NYHA class 2 [I50.41]  Yes    Abnormal EKG [R94.31]  Yes    Chest pain [R07.9]  Yes    Hyperlipidemia associated with type 2 diabetes mellitus [E11.69, E78.5]  Yes    Diabetes mellitus type II, uncontrolled [E11.65]  Yes    Obesity (BMI 30.0-34.9) [E66.9]  Yes    Anxiety and depression [F41.9, F32.9]  Yes    Hypertension associated with diabetes [E11.59, I10]  Yes      Resolved Hospital Problems   No resolved problems to display.

## 2019-10-31 ENCOUNTER — ANESTHESIA (OUTPATIENT)
Dept: SURGERY | Facility: HOSPITAL | Age: 69
DRG: 233 | End: 2019-10-31
Payer: COMMERCIAL

## 2019-10-31 PROBLEM — Z95.1 S/P CABG X 3: Status: ACTIVE | Noted: 2019-10-31

## 2019-10-31 PROBLEM — Z99.11 ON MECHANICALLY ASSISTED VENTILATION: Status: ACTIVE | Noted: 2019-10-31

## 2019-10-31 PROBLEM — D62 ACUTE BLOOD LOSS ANEMIA: Status: ACTIVE | Noted: 2019-10-31

## 2019-10-31 PROBLEM — I25.10 CAD, MULTIPLE VESSEL: Status: ACTIVE | Noted: 2019-10-31

## 2019-10-31 LAB
ALLENS TEST: ABNORMAL
ANION GAP SERPL CALC-SCNC: 11 MMOL/L (ref 8–16)
ANION GAP SERPL CALC-SCNC: 14 MMOL/L (ref 8–16)
APTT BLDCRRT: 34.7 SEC (ref 21–32)
APTT BLDCRRT: 36 SEC (ref 21–32)
APTT BLDCRRT: 39.5 SEC (ref 21–32)
BASOPHILS # BLD AUTO: 0.02 K/UL (ref 0–0.2)
BASOPHILS # BLD AUTO: 0.03 K/UL (ref 0–0.2)
BASOPHILS # BLD AUTO: 0.04 K/UL (ref 0–0.2)
BASOPHILS NFR BLD: 0.2 % (ref 0–1.9)
BASOPHILS NFR BLD: 0.2 % (ref 0–1.9)
BASOPHILS NFR BLD: 0.4 % (ref 0–1.9)
BLD PROD TYP BPU: NORMAL
BLOOD UNIT EXPIRATION DATE: NORMAL
BLOOD UNIT TYPE CODE: 6200
BLOOD UNIT TYPE: NORMAL
BNP SERPL-MCNC: 628 PG/ML (ref 0–99)
BUN SERPL-MCNC: 16 MG/DL (ref 8–23)
BUN SERPL-MCNC: 17 MG/DL (ref 8–23)
BUN SERPL-MCNC: 17 MG/DL (ref 8–23)
BUN SERPL-MCNC: 20 MG/DL (ref 8–23)
CALCIUM SERPL-MCNC: 10.9 MG/DL (ref 8.7–10.5)
CALCIUM SERPL-MCNC: 8.4 MG/DL (ref 8.7–10.5)
CALCIUM SERPL-MCNC: 8.4 MG/DL (ref 8.7–10.5)
CALCIUM SERPL-MCNC: 9.7 MG/DL (ref 8.7–10.5)
CHLORIDE SERPL-SCNC: 100 MMOL/L (ref 95–110)
CHLORIDE SERPL-SCNC: 106 MMOL/L (ref 95–110)
CHLORIDE SERPL-SCNC: 111 MMOL/L (ref 95–110)
CHLORIDE SERPL-SCNC: 111 MMOL/L (ref 95–110)
CO2 SERPL-SCNC: 21 MMOL/L (ref 23–29)
CO2 SERPL-SCNC: 21 MMOL/L (ref 23–29)
CO2 SERPL-SCNC: 22 MMOL/L (ref 23–29)
CO2 SERPL-SCNC: 22 MMOL/L (ref 23–29)
CODING SYSTEM: NORMAL
CREAT SERPL-MCNC: 0.8 MG/DL (ref 0.5–1.4)
CREAT SERPL-MCNC: 0.9 MG/DL (ref 0.5–1.4)
CREAT SERPL-MCNC: 0.9 MG/DL (ref 0.5–1.4)
CREAT SERPL-MCNC: 1 MG/DL (ref 0.5–1.4)
DELSYS: ABNORMAL
DIFFERENTIAL METHOD: ABNORMAL
DISPENSE STATUS: NORMAL
EOSINOPHIL # BLD AUTO: 0 K/UL (ref 0–0.5)
EOSINOPHIL # BLD AUTO: 0.1 K/UL (ref 0–0.5)
EOSINOPHIL # BLD AUTO: 0.3 K/UL (ref 0–0.5)
EOSINOPHIL NFR BLD: 0.2 % (ref 0–8)
EOSINOPHIL NFR BLD: 0.3 % (ref 0–8)
EOSINOPHIL NFR BLD: 2.7 % (ref 0–8)
ERYTHROCYTE [DISTWIDTH] IN BLOOD BY AUTOMATED COUNT: 14.3 % (ref 11.5–14.5)
ERYTHROCYTE [DISTWIDTH] IN BLOOD BY AUTOMATED COUNT: 14.6 % (ref 11.5–14.5)
ERYTHROCYTE [DISTWIDTH] IN BLOOD BY AUTOMATED COUNT: 14.7 % (ref 11.5–14.5)
ERYTHROCYTE [DISTWIDTH] IN BLOOD BY AUTOMATED COUNT: 15 % (ref 11.5–14.5)
EST. GFR  (AFRICAN AMERICAN): >60 ML/MIN/1.73 M^2
EST. GFR  (NON AFRICAN AMERICAN): 58 ML/MIN/1.73 M^2
EST. GFR  (NON AFRICAN AMERICAN): >60 ML/MIN/1.73 M^2
FIBRINOGEN PPP-MCNC: 152 MG/DL (ref 182–366)
FIBRINOGEN PPP-MCNC: 193 MG/DL (ref 182–366)
GLUCOSE SERPL-MCNC: 116 MG/DL (ref 70–110)
GLUCOSE SERPL-MCNC: 161 MG/DL (ref 70–110)
GLUCOSE SERPL-MCNC: 173 MG/DL (ref 70–110)
GLUCOSE SERPL-MCNC: 174 MG/DL (ref 70–110)
GLUCOSE SERPL-MCNC: 174 MG/DL (ref 70–110)
GLUCOSE SERPL-MCNC: 184 MG/DL (ref 70–110)
GLUCOSE SERPL-MCNC: 199 MG/DL (ref 70–110)
GLUCOSE SERPL-MCNC: 202 MG/DL (ref 70–110)
GLUCOSE SERPL-MCNC: 219 MG/DL (ref 70–110)
GLUCOSE SERPL-MCNC: 225 MG/DL (ref 70–110)
GLUCOSE SERPL-MCNC: 265 MG/DL (ref 70–110)
GLUCOSE SERPL-MCNC: 298 MG/DL (ref 70–110)
HCO3 UR-SCNC: 20.2 MMOL/L (ref 24–28)
HCO3 UR-SCNC: 21.9 MMOL/L (ref 24–28)
HCO3 UR-SCNC: 22.4 MMOL/L (ref 24–28)
HCO3 UR-SCNC: 22.5 MMOL/L (ref 24–28)
HCO3 UR-SCNC: 23.4 MMOL/L (ref 24–28)
HCO3 UR-SCNC: 24.5 MMOL/L (ref 24–28)
HCO3 UR-SCNC: 25.1 MMOL/L (ref 24–28)
HCO3 UR-SCNC: 25.7 MMOL/L (ref 24–28)
HCO3 UR-SCNC: 28.4 MMOL/L (ref 24–28)
HCT VFR BLD AUTO: 21 % (ref 37–48.5)
HCT VFR BLD AUTO: 30.3 % (ref 37–48.5)
HCT VFR BLD AUTO: 34.2 % (ref 37–48.5)
HCT VFR BLD AUTO: 35.9 % (ref 37–48.5)
HCT VFR BLD CALC: 18 %PCV (ref 36–54)
HCT VFR BLD CALC: 21 %PCV (ref 36–54)
HCT VFR BLD CALC: 22 %PCV (ref 36–54)
HCT VFR BLD CALC: 23 %PCV (ref 36–54)
HCT VFR BLD CALC: 24 %PCV (ref 36–54)
HCT VFR BLD CALC: 24 %PCV (ref 36–54)
HCT VFR BLD CALC: 29 %PCV (ref 36–54)
HCT VFR BLD CALC: 29 %PCV (ref 36–54)
HGB BLD-MCNC: 10.1 G/DL (ref 12–16)
HGB BLD-MCNC: 11.1 G/DL (ref 12–16)
HGB BLD-MCNC: 11.6 G/DL (ref 12–16)
HGB BLD-MCNC: 6.9 G/DL (ref 12–16)
IMM GRANULOCYTES # BLD AUTO: 0.05 K/UL (ref 0–0.04)
IMM GRANULOCYTES # BLD AUTO: 0.09 K/UL (ref 0–0.04)
IMM GRANULOCYTES # BLD AUTO: 0.15 K/UL (ref 0–0.04)
IMM GRANULOCYTES NFR BLD AUTO: 0.5 % (ref 0–0.5)
IMM GRANULOCYTES NFR BLD AUTO: 0.7 % (ref 0–0.5)
IMM GRANULOCYTES NFR BLD AUTO: 0.8 % (ref 0–0.5)
INR PPP: 1.4 (ref 0.8–1.2)
INR PPP: 1.6 (ref 0.8–1.2)
LYMPHOCYTES # BLD AUTO: 1.1 K/UL (ref 1–4.8)
LYMPHOCYTES # BLD AUTO: 1.9 K/UL (ref 1–4.8)
LYMPHOCYTES # BLD AUTO: 2.7 K/UL (ref 1–4.8)
LYMPHOCYTES NFR BLD: 13.8 % (ref 18–48)
LYMPHOCYTES NFR BLD: 17.8 % (ref 18–48)
LYMPHOCYTES NFR BLD: 9.1 % (ref 18–48)
MAGNESIUM SERPL-MCNC: 1.7 MG/DL (ref 1.6–2.6)
MAGNESIUM SERPL-MCNC: 2 MG/DL (ref 1.6–2.6)
MAGNESIUM SERPL-MCNC: 2.3 MG/DL (ref 1.6–2.6)
MCH RBC QN AUTO: 26.7 PG (ref 27–31)
MCH RBC QN AUTO: 28.2 PG (ref 27–31)
MCH RBC QN AUTO: 28.9 PG (ref 27–31)
MCH RBC QN AUTO: 28.9 PG (ref 27–31)
MCHC RBC AUTO-ENTMCNC: 30.9 G/DL (ref 32–36)
MCHC RBC AUTO-ENTMCNC: 32.9 G/DL (ref 32–36)
MCHC RBC AUTO-ENTMCNC: 33.3 G/DL (ref 32–36)
MCHC RBC AUTO-ENTMCNC: 33.9 G/DL (ref 32–36)
MCV RBC AUTO: 85 FL (ref 82–98)
MCV RBC AUTO: 86 FL (ref 82–98)
MCV RBC AUTO: 87 FL (ref 82–98)
MCV RBC AUTO: 87 FL (ref 82–98)
MODE: ABNORMAL
MONOCYTES # BLD AUTO: 0.8 K/UL (ref 0.3–1)
MONOCYTES # BLD AUTO: 1.3 K/UL (ref 0.3–1)
MONOCYTES # BLD AUTO: 1.6 K/UL (ref 0.3–1)
MONOCYTES NFR BLD: 10.8 % (ref 4–15)
MONOCYTES NFR BLD: 7.9 % (ref 4–15)
MONOCYTES NFR BLD: 8.3 % (ref 4–15)
NEUTROPHILS # BLD AUTO: 14.8 K/UL (ref 1.8–7.7)
NEUTROPHILS # BLD AUTO: 7.5 K/UL (ref 1.8–7.7)
NEUTROPHILS # BLD AUTO: 9.7 K/UL (ref 1.8–7.7)
NEUTROPHILS NFR BLD: 70.7 % (ref 38–73)
NEUTROPHILS NFR BLD: 76.6 % (ref 38–73)
NEUTROPHILS NFR BLD: 79 % (ref 38–73)
NRBC BLD-RTO: 0 /100 WBC
NUM UNITS TRANS PACKED RBC: NORMAL
PCO2 BLDA: 33.9 MMHG (ref 35–45)
PCO2 BLDA: 34.9 MMHG (ref 35–45)
PCO2 BLDA: 35.6 MMHG (ref 35–45)
PCO2 BLDA: 36.3 MMHG (ref 35–45)
PCO2 BLDA: 36.5 MMHG (ref 35–45)
PCO2 BLDA: 37.3 MMHG (ref 35–45)
PCO2 BLDA: 39 MMHG (ref 35–45)
PCO2 BLDA: 39.8 MMHG (ref 35–45)
PCO2 BLDA: 45.9 MMHG (ref 35–45)
PH SMN: 7.32 [PH] (ref 7.35–7.45)
PH SMN: 7.38 [PH] (ref 7.35–7.45)
PH SMN: 7.39 [PH] (ref 7.35–7.45)
PH SMN: 7.4 [PH] (ref 7.35–7.45)
PH SMN: 7.4 [PH] (ref 7.35–7.45)
PH SMN: 7.42 [PH] (ref 7.35–7.45)
PH SMN: 7.42 [PH] (ref 7.35–7.45)
PH SMN: 7.45 [PH] (ref 7.35–7.45)
PH SMN: 7.51 [PH] (ref 7.35–7.45)
PHOSPHATE SERPL-MCNC: 4 MG/DL (ref 2.7–4.5)
PLATELET # BLD AUTO: 101 K/UL (ref 150–350)
PLATELET # BLD AUTO: 121 K/UL (ref 150–350)
PLATELET # BLD AUTO: 317 K/UL (ref 150–350)
PLATELET # BLD AUTO: 78 K/UL (ref 150–350)
PMV BLD AUTO: 10.6 FL (ref 9.2–12.9)
PMV BLD AUTO: 11 FL (ref 9.2–12.9)
PMV BLD AUTO: 9.8 FL (ref 9.2–12.9)
PMV BLD AUTO: 9.9 FL (ref 9.2–12.9)
PO2 BLDA: 149 MMHG (ref 80–100)
PO2 BLDA: 206 MMHG (ref 80–100)
PO2 BLDA: 281 MMHG (ref 80–100)
PO2 BLDA: 309 MMHG (ref 80–100)
PO2 BLDA: 315 MMHG (ref 80–100)
PO2 BLDA: 385 MMHG (ref 80–100)
PO2 BLDA: 447 MMHG (ref 80–100)
PO2 BLDA: 450 MMHG (ref 80–100)
PO2 BLDA: 94 MMHG (ref 80–100)
POC ACTIVATED CLOTTING TIME K: 120 SEC (ref 74–137)
POC ACTIVATED CLOTTING TIME K: 125 SEC (ref 74–137)
POC ACTIVATED CLOTTING TIME K: 357 SEC (ref 74–137)
POC ACTIVATED CLOTTING TIME K: 516 SEC (ref 74–137)
POC ACTIVATED CLOTTING TIME K: 538 SEC (ref 74–137)
POC ACTIVATED CLOTTING TIME K: 582 SEC (ref 74–137)
POC ACTIVATED CLOTTING TIME K: 604 SEC (ref 74–137)
POC ACTIVATED CLOTTING TIME K: 632 SEC (ref 74–137)
POC ACTIVATED CLOTTING TIME K: 637 SEC (ref 74–137)
POC BE: -2 MMOL/L
POC BE: -2 MMOL/L
POC BE: -3 MMOL/L
POC BE: -3 MMOL/L
POC BE: -5 MMOL/L
POC BE: 0 MMOL/L
POC BE: 1 MMOL/L
POC BE: 2 MMOL/L
POC BE: 5 MMOL/L
POC IONIZED CALCIUM: 0.83 MMOL/L (ref 1.06–1.42)
POC IONIZED CALCIUM: 0.86 MMOL/L (ref 1.06–1.42)
POC IONIZED CALCIUM: 0.93 MMOL/L (ref 1.06–1.42)
POC IONIZED CALCIUM: 0.96 MMOL/L (ref 1.06–1.42)
POC IONIZED CALCIUM: 0.97 MMOL/L (ref 1.06–1.42)
POC IONIZED CALCIUM: 0.98 MMOL/L (ref 1.06–1.42)
POC IONIZED CALCIUM: 1.15 MMOL/L (ref 1.06–1.42)
POC IONIZED CALCIUM: 1.24 MMOL/L (ref 1.06–1.42)
POC SATURATED O2: 100 % (ref 95–100)
POC SATURATED O2: 97 % (ref 95–100)
POC SATURATED O2: 99 % (ref 95–100)
POCT GLUCOSE: 125 MG/DL (ref 70–110)
POCT GLUCOSE: 191 MG/DL (ref 70–110)
POCT GLUCOSE: 237 MG/DL (ref 70–110)
POCT GLUCOSE: 250 MG/DL (ref 70–110)
POCT GLUCOSE: 252 MG/DL (ref 70–110)
POCT GLUCOSE: 275 MG/DL (ref 70–110)
POCT GLUCOSE: 280 MG/DL (ref 70–110)
POCT GLUCOSE: 303 MG/DL (ref 70–110)
POCT GLUCOSE: 310 MG/DL (ref 70–110)
POTASSIUM BLD-SCNC: 3.9 MMOL/L (ref 3.5–5.1)
POTASSIUM BLD-SCNC: 4 MMOL/L (ref 3.5–5.1)
POTASSIUM BLD-SCNC: 4.2 MMOL/L (ref 3.5–5.1)
POTASSIUM BLD-SCNC: 4.6 MMOL/L (ref 3.5–5.1)
POTASSIUM BLD-SCNC: 5.9 MMOL/L (ref 3.5–5.1)
POTASSIUM BLD-SCNC: 6.2 MMOL/L (ref 3.5–5.1)
POTASSIUM BLD-SCNC: 6.8 MMOL/L (ref 3.5–5.1)
POTASSIUM BLD-SCNC: 7 MMOL/L (ref 3.5–5.1)
POTASSIUM SERPL-SCNC: 4 MMOL/L (ref 3.5–5.1)
POTASSIUM SERPL-SCNC: 4.4 MMOL/L (ref 3.5–5.1)
PROTHROMBIN TIME: 14.8 SEC (ref 9–12.5)
PROTHROMBIN TIME: 16.6 SEC (ref 9–12.5)
RBC # BLD AUTO: 2.45 M/UL (ref 4–5.4)
RBC # BLD AUTO: 3.49 M/UL (ref 4–5.4)
RBC # BLD AUTO: 4.01 M/UL (ref 4–5.4)
RBC # BLD AUTO: 4.15 M/UL (ref 4–5.4)
SAMPLE: ABNORMAL
SAMPLE: NORMAL
SAMPLE: NORMAL
SITE: ABNORMAL
SODIUM BLD-SCNC: 132 MMOL/L (ref 136–145)
SODIUM BLD-SCNC: 132 MMOL/L (ref 136–145)
SODIUM BLD-SCNC: 133 MMOL/L (ref 136–145)
SODIUM BLD-SCNC: 133 MMOL/L (ref 136–145)
SODIUM BLD-SCNC: 134 MMOL/L (ref 136–145)
SODIUM BLD-SCNC: 140 MMOL/L (ref 136–145)
SODIUM BLD-SCNC: 141 MMOL/L (ref 136–145)
SODIUM BLD-SCNC: 142 MMOL/L (ref 136–145)
SODIUM SERPL-SCNC: 133 MMOL/L (ref 136–145)
SODIUM SERPL-SCNC: 142 MMOL/L (ref 136–145)
SODIUM SERPL-SCNC: 143 MMOL/L (ref 136–145)
SODIUM SERPL-SCNC: 143 MMOL/L (ref 136–145)
WBC # BLD AUTO: 10.56 K/UL (ref 3.9–12.7)
WBC # BLD AUTO: 12.26 K/UL (ref 3.9–12.7)
WBC # BLD AUTO: 13.63 K/UL (ref 3.9–12.7)
WBC # BLD AUTO: 19.27 K/UL (ref 3.9–12.7)

## 2019-10-31 PROCEDURE — 33518 CABG ARTERY-VEIN TWO: CPT | Mod: ,,, | Performed by: THORACIC SURGERY (CARDIOTHORACIC VASCULAR SURGERY)

## 2019-10-31 PROCEDURE — 33533 CABG ARTERIAL SINGLE: CPT | Mod: ,,, | Performed by: THORACIC SURGERY (CARDIOTHORACIC VASCULAR SURGERY)

## 2019-10-31 PROCEDURE — C1884 EMBOLIZATION PROTECT SYST: HCPCS | Performed by: THORACIC SURGERY (CARDIOTHORACIC VASCULAR SURGERY)

## 2019-10-31 PROCEDURE — 85730 THROMBOPLASTIN TIME PARTIAL: CPT | Mod: 91

## 2019-10-31 PROCEDURE — C1729 CATH, DRAINAGE: HCPCS | Performed by: THORACIC SURGERY (CARDIOTHORACIC VASCULAR SURGERY)

## 2019-10-31 PROCEDURE — 80048 BASIC METABOLIC PNL TOTAL CA: CPT | Mod: 91

## 2019-10-31 PROCEDURE — 71000028 HC RECOVERY HIGH ACUITY/ PER HOUR

## 2019-10-31 PROCEDURE — 80048 BASIC METABOLIC PNL TOTAL CA: CPT

## 2019-10-31 PROCEDURE — P9045 ALBUMIN (HUMAN), 5%, 250 ML: HCPCS | Mod: JG | Performed by: NURSE PRACTITIONER

## 2019-10-31 PROCEDURE — 85027 COMPLETE CBC AUTOMATED: CPT

## 2019-10-31 PROCEDURE — 25000003 PHARM REV CODE 250

## 2019-10-31 PROCEDURE — 94002 VENT MGMT INPAT INIT DAY: CPT

## 2019-10-31 PROCEDURE — 85730 THROMBOPLASTIN TIME PARTIAL: CPT

## 2019-10-31 PROCEDURE — 63600175 PHARM REV CODE 636 W HCPCS: Performed by: NURSE ANESTHETIST, CERTIFIED REGISTERED

## 2019-10-31 PROCEDURE — 99291 PR CRITICAL CARE, E/M 30-74 MINUTES: ICD-10-PCS | Mod: ,,, | Performed by: NURSE PRACTITIONER

## 2019-10-31 PROCEDURE — S0017 INJECTION, AMINOCAPROIC ACID: HCPCS | Performed by: NURSE ANESTHETIST, CERTIFIED REGISTERED

## 2019-10-31 PROCEDURE — 63600175 PHARM REV CODE 636 W HCPCS: Performed by: THORACIC SURGERY (CARDIOTHORACIC VASCULAR SURGERY)

## 2019-10-31 PROCEDURE — 20000000 HC ICU ROOM

## 2019-10-31 PROCEDURE — 93010 ELECTROCARDIOGRAM REPORT: CPT | Mod: ,,, | Performed by: INTERNAL MEDICINE

## 2019-10-31 PROCEDURE — 63600175 PHARM REV CODE 636 W HCPCS

## 2019-10-31 PROCEDURE — 36000713 HC OR TIME LEV V EA ADD 15 MIN: Performed by: THORACIC SURGERY (CARDIOTHORACIC VASCULAR SURGERY)

## 2019-10-31 PROCEDURE — 33508 PR ENDOSCOPY W/VIDEO-ASST VEIN HARVEST,CABG: ICD-10-PCS | Mod: ,,, | Performed by: THORACIC SURGERY (CARDIOTHORACIC VASCULAR SURGERY)

## 2019-10-31 PROCEDURE — 99900038 HC OT GENERIC THERAPY SCREENING (STAT)

## 2019-10-31 PROCEDURE — 37799 UNLISTED PX VASCULAR SURGERY: CPT

## 2019-10-31 PROCEDURE — 63600175 PHARM REV CODE 636 W HCPCS: Performed by: NURSE PRACTITIONER

## 2019-10-31 PROCEDURE — 27200667 HC PACEMAKER, TEMPORARY MONITORING, PER SHIFT

## 2019-10-31 PROCEDURE — 36000712 HC OR TIME LEV V 1ST 15 MIN: Performed by: THORACIC SURGERY (CARDIOTHORACIC VASCULAR SURGERY)

## 2019-10-31 PROCEDURE — 94640 AIRWAY INHALATION TREATMENT: CPT

## 2019-10-31 PROCEDURE — 33533 PR CABG, ARTERIAL, SINGLE: ICD-10-PCS | Mod: ,,, | Performed by: THORACIC SURGERY (CARDIOTHORACIC VASCULAR SURGERY)

## 2019-10-31 PROCEDURE — 25000003 PHARM REV CODE 250: Performed by: THORACIC SURGERY (CARDIOTHORACIC VASCULAR SURGERY)

## 2019-10-31 PROCEDURE — 36600 WITHDRAWAL OF ARTERIAL BLOOD: CPT

## 2019-10-31 PROCEDURE — 25000003 PHARM REV CODE 250: Performed by: INTERNAL MEDICINE

## 2019-10-31 PROCEDURE — 84132 ASSAY OF SERUM POTASSIUM: CPT

## 2019-10-31 PROCEDURE — 33518 CABG ARTERY-VEIN TWO: CPT | Mod: AS,,, | Performed by: NURSE PRACTITIONER

## 2019-10-31 PROCEDURE — P9047 ALBUMIN (HUMAN), 25%, 50ML: HCPCS | Mod: JG

## 2019-10-31 PROCEDURE — 82330 ASSAY OF CALCIUM: CPT

## 2019-10-31 PROCEDURE — 83880 ASSAY OF NATRIURETIC PEPTIDE: CPT

## 2019-10-31 PROCEDURE — 99291 CRITICAL CARE FIRST HOUR: CPT | Mod: ,,, | Performed by: NURSE PRACTITIONER

## 2019-10-31 PROCEDURE — 33533 CABG ARTERIAL SINGLE: CPT | Mod: AS,,, | Performed by: NURSE PRACTITIONER

## 2019-10-31 PROCEDURE — 37000009 HC ANESTHESIA EA ADD 15 MINS: Performed by: THORACIC SURGERY (CARDIOTHORACIC VASCULAR SURGERY)

## 2019-10-31 PROCEDURE — 99900037 HC PT THERAPY SCREENING (STAT): Performed by: PHYSICAL THERAPIST

## 2019-10-31 PROCEDURE — 99291 PR CRITICAL CARE, E/M 30-74 MINUTES: ICD-10-PCS | Mod: ,,, | Performed by: INTERNAL MEDICINE

## 2019-10-31 PROCEDURE — 84100 ASSAY OF PHOSPHORUS: CPT

## 2019-10-31 PROCEDURE — 33508 ENDOSCOPIC VEIN HARVEST: CPT | Mod: ,,, | Performed by: THORACIC SURGERY (CARDIOTHORACIC VASCULAR SURGERY)

## 2019-10-31 PROCEDURE — 85384 FIBRINOGEN ACTIVITY: CPT

## 2019-10-31 PROCEDURE — 82803 BLOOD GASES ANY COMBINATION: CPT

## 2019-10-31 PROCEDURE — 25000003 PHARM REV CODE 250: Performed by: NURSE ANESTHETIST, CERTIFIED REGISTERED

## 2019-10-31 PROCEDURE — 83735 ASSAY OF MAGNESIUM: CPT | Mod: 91

## 2019-10-31 PROCEDURE — 25000003 PHARM REV CODE 250: Performed by: NURSE PRACTITIONER

## 2019-10-31 PROCEDURE — 85610 PROTHROMBIN TIME: CPT

## 2019-10-31 PROCEDURE — 93005 ELECTROCARDIOGRAM TRACING: CPT

## 2019-10-31 PROCEDURE — 33533 PR CABG, ARTERIAL, SINGLE: ICD-10-PCS | Mod: AS,,, | Performed by: NURSE PRACTITIONER

## 2019-10-31 PROCEDURE — 37000008 HC ANESTHESIA 1ST 15 MINUTES: Performed by: THORACIC SURGERY (CARDIOTHORACIC VASCULAR SURGERY)

## 2019-10-31 PROCEDURE — P9016 RBC LEUKOCYTES REDUCED: HCPCS

## 2019-10-31 PROCEDURE — 27201423 OPTIME MED/SURG SUP & DEVICES STERILE SUPPLY: Performed by: THORACIC SURGERY (CARDIOTHORACIC VASCULAR SURGERY)

## 2019-10-31 PROCEDURE — 99900035 HC TECH TIME PER 15 MIN (STAT)

## 2019-10-31 PROCEDURE — 33518 PR CABG, ARTERY-VEIN, TWO: ICD-10-PCS | Mod: AS,,, | Performed by: NURSE PRACTITIONER

## 2019-10-31 PROCEDURE — 33518 PR CABG, ARTERY-VEIN, TWO: ICD-10-PCS | Mod: ,,, | Performed by: THORACIC SURGERY (CARDIOTHORACIC VASCULAR SURGERY)

## 2019-10-31 PROCEDURE — 85014 HEMATOCRIT: CPT

## 2019-10-31 PROCEDURE — S0017 INJECTION, AMINOCAPROIC ACID: HCPCS

## 2019-10-31 PROCEDURE — P9045 ALBUMIN (HUMAN), 5%, 250 ML: HCPCS | Mod: JG | Performed by: NURSE ANESTHETIST, CERTIFIED REGISTERED

## 2019-10-31 PROCEDURE — 83735 ASSAY OF MAGNESIUM: CPT

## 2019-10-31 PROCEDURE — 85025 COMPLETE CBC W/AUTO DIFF WBC: CPT | Mod: 91

## 2019-10-31 PROCEDURE — 85610 PROTHROMBIN TIME: CPT | Mod: 91

## 2019-10-31 PROCEDURE — 85384 FIBRINOGEN ACTIVITY: CPT | Mod: 91

## 2019-10-31 PROCEDURE — 93010 EKG 12-LEAD: ICD-10-PCS | Mod: ,,, | Performed by: INTERNAL MEDICINE

## 2019-10-31 PROCEDURE — 36430 TRANSFUSION BLD/BLD COMPNT: CPT

## 2019-10-31 PROCEDURE — C9290 INJ, BUPIVACAINE LIPOSOME: HCPCS | Performed by: THORACIC SURGERY (CARDIOTHORACIC VASCULAR SURGERY)

## 2019-10-31 PROCEDURE — 99291 CRITICAL CARE FIRST HOUR: CPT | Mod: ,,, | Performed by: INTERNAL MEDICINE

## 2019-10-31 PROCEDURE — 84295 ASSAY OF SERUM SODIUM: CPT

## 2019-10-31 PROCEDURE — 36415 COLL VENOUS BLD VENIPUNCTURE: CPT

## 2019-10-31 PROCEDURE — 25000242 PHARM REV CODE 250 ALT 637 W/ HCPCS: Performed by: NURSE PRACTITIONER

## 2019-10-31 RX ORDER — POLYETHYLENE GLYCOL 3350 17 G/17G
17 POWDER, FOR SOLUTION ORAL DAILY
Status: DISCONTINUED | OUTPATIENT
Start: 2019-11-01 | End: 2019-10-31 | Stop reason: SDUPTHER

## 2019-10-31 RX ORDER — METOPROLOL TARTRATE 25 MG/1
25 TABLET, FILM COATED ORAL 2 TIMES DAILY
Status: DISCONTINUED | OUTPATIENT
Start: 2019-11-01 | End: 2019-11-02

## 2019-10-31 RX ORDER — MAGNESIUM SULFATE HEPTAHYDRATE 40 MG/ML
4 INJECTION, SOLUTION INTRAVENOUS
Status: DISCONTINUED | OUTPATIENT
Start: 2019-10-31 | End: 2019-11-20 | Stop reason: HOSPADM

## 2019-10-31 RX ORDER — ALBUMIN HUMAN 50 G/1000ML
250 SOLUTION INTRAVENOUS
Status: DISCONTINUED | OUTPATIENT
Start: 2019-10-31 | End: 2019-11-20 | Stop reason: HOSPADM

## 2019-10-31 RX ORDER — NICARDIPINE HYDROCHLORIDE 0.2 MG/ML
5 INJECTION INTRAVENOUS CONTINUOUS
Status: DISCONTINUED | OUTPATIENT
Start: 2019-10-31 | End: 2019-11-03

## 2019-10-31 RX ORDER — ALBUMIN HUMAN 50 G/1000ML
SOLUTION INTRAVENOUS CONTINUOUS PRN
Status: DISCONTINUED | OUTPATIENT
Start: 2019-10-31 | End: 2019-10-31

## 2019-10-31 RX ORDER — OXYCODONE HYDROCHLORIDE 5 MG/1
5 TABLET ORAL EVERY 4 HOURS PRN
Status: DISCONTINUED | OUTPATIENT
Start: 2019-10-31 | End: 2019-11-02

## 2019-10-31 RX ORDER — OXYCODONE HYDROCHLORIDE 5 MG/1
10 TABLET ORAL EVERY 4 HOURS PRN
Status: DISCONTINUED | OUTPATIENT
Start: 2019-10-31 | End: 2019-11-02

## 2019-10-31 RX ORDER — ETOMIDATE 2 MG/ML
INJECTION INTRAVENOUS
Status: DISCONTINUED | OUTPATIENT
Start: 2019-10-31 | End: 2019-10-31

## 2019-10-31 RX ORDER — CLOPIDOGREL BISULFATE 75 MG/1
75 TABLET ORAL DAILY
Status: DISCONTINUED | OUTPATIENT
Start: 2019-11-01 | End: 2019-11-01

## 2019-10-31 RX ORDER — POTASSIUM CHLORIDE 14.9 MG/ML
20 INJECTION INTRAVENOUS
Status: DISCONTINUED | OUTPATIENT
Start: 2019-10-31 | End: 2019-11-15

## 2019-10-31 RX ORDER — CEFAZOLIN SODIUM 1 G/3ML
INJECTION, POWDER, FOR SOLUTION INTRAMUSCULAR; INTRAVENOUS
Status: DISCONTINUED | OUTPATIENT
Start: 2019-10-31 | End: 2019-10-31

## 2019-10-31 RX ORDER — VASOPRESSIN 20 [USP'U]/ML
20 INJECTION, SOLUTION INTRAMUSCULAR; SUBCUTANEOUS ONCE
Status: DISCONTINUED | OUTPATIENT
Start: 2019-10-31 | End: 2019-10-31

## 2019-10-31 RX ORDER — MILRINONE LACTATE 0.2 MG/ML
0.25 INJECTION, SOLUTION INTRAVENOUS CONTINUOUS
Status: DISCONTINUED | OUTPATIENT
Start: 2019-10-31 | End: 2019-11-04

## 2019-10-31 RX ORDER — MILRINONE LACTATE 0.2 MG/ML
INJECTION, SOLUTION INTRAVENOUS CONTINUOUS PRN
Status: DISCONTINUED | OUTPATIENT
Start: 2019-10-31 | End: 2019-10-31

## 2019-10-31 RX ORDER — HYDROCODONE BITARTRATE AND ACETAMINOPHEN 500; 5 MG/1; MG/1
TABLET ORAL
Status: DISCONTINUED | OUTPATIENT
Start: 2019-10-31 | End: 2019-11-06

## 2019-10-31 RX ORDER — FUROSEMIDE 10 MG/ML
40 INJECTION INTRAMUSCULAR; INTRAVENOUS 2 TIMES DAILY
Status: DISCONTINUED | OUTPATIENT
Start: 2019-11-01 | End: 2019-11-03

## 2019-10-31 RX ORDER — POTASSIUM CHLORIDE 14.9 MG/ML
60 INJECTION INTRAVENOUS
Status: DISCONTINUED | OUTPATIENT
Start: 2019-10-31 | End: 2019-11-15

## 2019-10-31 RX ORDER — FAMOTIDINE 10 MG/ML
20 INJECTION INTRAVENOUS 2 TIMES DAILY
Status: DISCONTINUED | OUTPATIENT
Start: 2019-11-01 | End: 2019-11-01

## 2019-10-31 RX ORDER — ALBUTEROL SULFATE 0.83 MG/ML
2.5 SOLUTION RESPIRATORY (INHALATION) EVERY 4 HOURS PRN
Status: DISCONTINUED | OUTPATIENT
Start: 2019-10-31 | End: 2019-11-20 | Stop reason: HOSPADM

## 2019-10-31 RX ORDER — ONDANSETRON 2 MG/ML
INJECTION INTRAMUSCULAR; INTRAVENOUS
Status: DISCONTINUED | OUTPATIENT
Start: 2019-10-31 | End: 2019-10-31

## 2019-10-31 RX ORDER — FENTANYL CITRATE 50 UG/ML
INJECTION, SOLUTION INTRAMUSCULAR; INTRAVENOUS
Status: DISCONTINUED | OUTPATIENT
Start: 2019-10-31 | End: 2019-10-31

## 2019-10-31 RX ORDER — ROCURONIUM BROMIDE 10 MG/ML
INJECTION, SOLUTION INTRAVENOUS
Status: DISCONTINUED | OUTPATIENT
Start: 2019-10-31 | End: 2019-10-31

## 2019-10-31 RX ORDER — ASPIRIN 81 MG/1
81 TABLET ORAL DAILY
Status: DISCONTINUED | OUTPATIENT
Start: 2019-11-01 | End: 2019-11-02

## 2019-10-31 RX ORDER — POTASSIUM CHLORIDE 20 MEQ/1
20 TABLET, EXTENDED RELEASE ORAL EVERY 12 HOURS
Status: DISCONTINUED | OUTPATIENT
Start: 2019-11-01 | End: 2019-11-05

## 2019-10-31 RX ORDER — SODIUM CHLORIDE, SODIUM LACTATE, POTASSIUM CHLORIDE, CALCIUM CHLORIDE 600; 310; 30; 20 MG/100ML; MG/100ML; MG/100ML; MG/100ML
500 INJECTION, SOLUTION INTRAVENOUS
Status: DISCONTINUED | OUTPATIENT
Start: 2019-10-31 | End: 2019-11-20 | Stop reason: HOSPADM

## 2019-10-31 RX ORDER — MAGNESIUM SULFATE HEPTAHYDRATE 40 MG/ML
2 INJECTION, SOLUTION INTRAVENOUS
Status: DISCONTINUED | OUTPATIENT
Start: 2019-10-31 | End: 2019-11-20 | Stop reason: HOSPADM

## 2019-10-31 RX ORDER — PROTAMINE SULFATE 10 MG/ML
INJECTION, SOLUTION INTRAVENOUS
Status: DISCONTINUED | OUTPATIENT
Start: 2019-10-31 | End: 2019-10-31

## 2019-10-31 RX ORDER — DEXTROSE, SODIUM CHLORIDE, SODIUM LACTATE, POTASSIUM CHLORIDE, AND CALCIUM CHLORIDE 5; .6; .31; .03; .02 G/100ML; G/100ML; G/100ML; G/100ML; G/100ML
INJECTION, SOLUTION INTRAVENOUS CONTINUOUS
Status: ACTIVE | OUTPATIENT
Start: 2019-10-31 | End: 2019-11-01

## 2019-10-31 RX ORDER — NOREPINEPHRINE BITARTRATE 1 MG/ML
INJECTION, SOLUTION INTRAVENOUS
Status: DISCONTINUED | OUTPATIENT
Start: 2019-10-31 | End: 2019-10-31

## 2019-10-31 RX ORDER — FENTANYL CITRATE 50 UG/ML
25 INJECTION, SOLUTION INTRAMUSCULAR; INTRAVENOUS
Status: DISCONTINUED | OUTPATIENT
Start: 2019-10-31 | End: 2019-11-02

## 2019-10-31 RX ORDER — PAPAVERINE HYDROCHLORIDE 30 MG/ML
INJECTION INTRAMUSCULAR; INTRAVENOUS
Status: DISCONTINUED | OUTPATIENT
Start: 2019-10-31 | End: 2019-10-31 | Stop reason: HOSPADM

## 2019-10-31 RX ORDER — BUPIVACAINE HYDROCHLORIDE 2.5 MG/ML
INJECTION, SOLUTION EPIDURAL; INFILTRATION; INTRACAUDAL
Status: DISCONTINUED | OUTPATIENT
Start: 2019-10-31 | End: 2019-10-31 | Stop reason: HOSPADM

## 2019-10-31 RX ORDER — LIDOCAINE HCL/PF 100 MG/5ML
SYRINGE (ML) INTRAVENOUS
Status: DISCONTINUED | OUTPATIENT
Start: 2019-10-31 | End: 2019-10-31

## 2019-10-31 RX ORDER — LIDOCAINE HYDROCHLORIDE ANHYDROUS AND DEXTROSE MONOHYDRATE .8; 5 G/100ML; G/100ML
INJECTION, SOLUTION INTRAVENOUS CONTINUOUS PRN
Status: DISCONTINUED | OUTPATIENT
Start: 2019-10-31 | End: 2019-10-31

## 2019-10-31 RX ORDER — MIDAZOLAM HYDROCHLORIDE 1 MG/ML
INJECTION, SOLUTION INTRAMUSCULAR; INTRAVENOUS
Status: DISCONTINUED | OUTPATIENT
Start: 2019-10-31 | End: 2019-10-31

## 2019-10-31 RX ORDER — CHLORHEXIDINE GLUCONATE ORAL RINSE 1.2 MG/ML
10 SOLUTION DENTAL 2 TIMES DAILY
Status: COMPLETED | OUTPATIENT
Start: 2019-10-31 | End: 2019-11-05

## 2019-10-31 RX ORDER — FENTANYL CITRATE 50 UG/ML
50 INJECTION, SOLUTION INTRAMUSCULAR; INTRAVENOUS
Status: DISCONTINUED | OUTPATIENT
Start: 2019-10-31 | End: 2019-11-02

## 2019-10-31 RX ORDER — VASOPRESSIN 20 [USP'U]/ML
INJECTION, SOLUTION INTRAMUSCULAR; SUBCUTANEOUS
Status: DISCONTINUED | OUTPATIENT
Start: 2019-10-31 | End: 2019-10-31

## 2019-10-31 RX ORDER — SODIUM CHLORIDE, SODIUM LACTATE, POTASSIUM CHLORIDE, CALCIUM CHLORIDE 600; 310; 30; 20 MG/100ML; MG/100ML; MG/100ML; MG/100ML
INJECTION, SOLUTION INTRAVENOUS CONTINUOUS PRN
Status: DISCONTINUED | OUTPATIENT
Start: 2019-10-31 | End: 2019-10-31

## 2019-10-31 RX ORDER — METOCLOPRAMIDE HYDROCHLORIDE 5 MG/ML
5 INJECTION INTRAMUSCULAR; INTRAVENOUS EVERY 6 HOURS PRN
Status: DISCONTINUED | OUTPATIENT
Start: 2019-10-31 | End: 2019-11-20 | Stop reason: HOSPADM

## 2019-10-31 RX ORDER — ADENOSINE 3 MG/ML
INJECTION INTRAVENOUS
Status: DISCONTINUED | OUTPATIENT
Start: 2019-10-31 | End: 2019-10-31 | Stop reason: HOSPADM

## 2019-10-31 RX ORDER — AMINOCAPROIC ACID 250 MG/ML
INJECTION, SOLUTION INTRAVENOUS
Status: DISCONTINUED | OUTPATIENT
Start: 2019-10-31 | End: 2019-10-31

## 2019-10-31 RX ORDER — PANTOPRAZOLE SODIUM 40 MG/10ML
40 INJECTION, POWDER, LYOPHILIZED, FOR SOLUTION INTRAVENOUS DAILY
Status: DISCONTINUED | OUTPATIENT
Start: 2019-11-01 | End: 2019-10-31 | Stop reason: RX

## 2019-10-31 RX ORDER — HEPARIN SODIUM 1000 [USP'U]/ML
INJECTION, SOLUTION INTRAVENOUS; SUBCUTANEOUS
Status: DISCONTINUED | OUTPATIENT
Start: 2019-10-31 | End: 2019-10-31 | Stop reason: HOSPADM

## 2019-10-31 RX ORDER — VANCOMYCIN HCL IN 5 % DEXTROSE 1G/250ML
1000 PLASTIC BAG, INJECTION (ML) INTRAVENOUS
Status: COMPLETED | OUTPATIENT
Start: 2019-10-31 | End: 2019-10-31

## 2019-10-31 RX ORDER — SODIUM BICARBONATE 84 MG/ML
100 INJECTION, SOLUTION INTRAVENOUS ONCE
Status: COMPLETED | OUTPATIENT
Start: 2019-10-31 | End: 2019-10-31

## 2019-10-31 RX ORDER — DOCUSATE SODIUM 100 MG/1
100 CAPSULE, LIQUID FILLED ORAL 2 TIMES DAILY
Status: DISCONTINUED | OUTPATIENT
Start: 2019-11-01 | End: 2019-11-07

## 2019-10-31 RX ORDER — IPRATROPIUM BROMIDE AND ALBUTEROL SULFATE 2.5; .5 MG/3ML; MG/3ML
3 SOLUTION RESPIRATORY (INHALATION) EVERY 4 HOURS
Status: COMPLETED | OUTPATIENT
Start: 2019-10-31 | End: 2019-11-01

## 2019-10-31 RX ORDER — DEXMEDETOMIDINE HYDROCHLORIDE 4 UG/ML
0.2 INJECTION INTRAVENOUS CONTINUOUS
Status: DISCONTINUED | OUTPATIENT
Start: 2019-10-31 | End: 2019-11-04

## 2019-10-31 RX ORDER — POLYETHYLENE GLYCOL 3350 17 G/17G
17 POWDER, FOR SOLUTION ORAL DAILY
Status: DISCONTINUED | OUTPATIENT
Start: 2019-11-01 | End: 2019-11-08

## 2019-10-31 RX ORDER — POTASSIUM CHLORIDE 29.8 MG/ML
40 INJECTION INTRAVENOUS
Status: DISCONTINUED | OUTPATIENT
Start: 2019-10-31 | End: 2019-11-15

## 2019-10-31 RX ORDER — CEFAZOLIN SODIUM 2 G/50ML
2 SOLUTION INTRAVENOUS
Status: COMPLETED | OUTPATIENT
Start: 2019-10-31 | End: 2019-11-01

## 2019-10-31 RX ORDER — HEPARIN SODIUM 1000 [USP'U]/ML
INJECTION, SOLUTION INTRAVENOUS; SUBCUTANEOUS
Status: DISCONTINUED | OUTPATIENT
Start: 2019-10-31 | End: 2019-10-31

## 2019-10-31 RX ORDER — ONDANSETRON 2 MG/ML
4 INJECTION INTRAMUSCULAR; INTRAVENOUS EVERY 12 HOURS PRN
Status: DISCONTINUED | OUTPATIENT
Start: 2019-10-31 | End: 2019-11-20 | Stop reason: HOSPADM

## 2019-10-31 RX ADMIN — ROCURONIUM BROMIDE 50 MG: 10 INJECTION, SOLUTION INTRAVENOUS at 09:10

## 2019-10-31 RX ADMIN — NOREPINEPHRINE BITARTRATE 0.01 MCG/KG/MIN: 1 INJECTION, SOLUTION, CONCENTRATE INTRAVENOUS at 08:10

## 2019-10-31 RX ADMIN — IPRATROPIUM BROMIDE AND ALBUTEROL SULFATE 3 ML: .5; 3 SOLUTION RESPIRATORY (INHALATION) at 07:10

## 2019-10-31 RX ADMIN — ETOMIDATE 20 MG: 2 INJECTION INTRAVENOUS at 07:10

## 2019-10-31 RX ADMIN — FENTANYL CITRATE 25 MCG: 50 INJECTION INTRAMUSCULAR; INTRAVENOUS at 09:10

## 2019-10-31 RX ADMIN — MIDAZOLAM 4 MG: 1 INJECTION INTRAMUSCULAR; INTRAVENOUS at 07:10

## 2019-10-31 RX ADMIN — SODIUM CHLORIDE 3 UNITS/HR: 9 INJECTION, SOLUTION INTRAVENOUS at 10:10

## 2019-10-31 RX ADMIN — EPINEPHRINE 0.04 MCG/KG/MIN: 1 INJECTION, SOLUTION, CONCENTRATE INTRAVENOUS at 04:10

## 2019-10-31 RX ADMIN — NICARDIPINE HYDROCHLORIDE 2.5 MG/HR: 0.2 INJECTION, SOLUTION INTRAVENOUS at 06:10

## 2019-10-31 RX ADMIN — ROCURONIUM BROMIDE 100 MG: 10 INJECTION, SOLUTION INTRAVENOUS at 07:10

## 2019-10-31 RX ADMIN — VASOPRESSIN 0.04 UNITS/HR: 20 INJECTION INTRAVENOUS at 03:10

## 2019-10-31 RX ADMIN — PROTAMINE SULFATE 400 MG: 10 INJECTION, SOLUTION INTRAVENOUS at 12:10

## 2019-10-31 RX ADMIN — FENTANYL CITRATE 150 MCG: 50 INJECTION, SOLUTION INTRAMUSCULAR; INTRAVENOUS at 07:10

## 2019-10-31 RX ADMIN — ALBUMIN (HUMAN) 250 ML: 12.5 SOLUTION INTRAVENOUS at 07:10

## 2019-10-31 RX ADMIN — LIDOCAINE HYDROCHLORIDE 100 MG: 20 INJECTION, SOLUTION INTRAVENOUS at 07:10

## 2019-10-31 RX ADMIN — SODIUM CHLORIDE, SODIUM LACTATE, POTASSIUM CHLORIDE, AND CALCIUM CHLORIDE: 600; 310; 30; 20 INJECTION, SOLUTION INTRAVENOUS at 09:10

## 2019-10-31 RX ADMIN — AMINOCAPROIC ACID 5 G: 250 INJECTION, SOLUTION INTRAVENOUS at 07:10

## 2019-10-31 RX ADMIN — LIDOCAINE HYDROCHLORIDE 2 MG/MIN: 8 INJECTION, SOLUTION INTRAVENOUS at 08:10

## 2019-10-31 RX ADMIN — ALBUMIN (HUMAN): 12.5 INJECTION, SOLUTION INTRAVENOUS at 01:10

## 2019-10-31 RX ADMIN — NOREPINEPHRINE BITARTRATE 16 MCG: 1 INJECTION, SOLUTION, CONCENTRATE INTRAVENOUS at 07:10

## 2019-10-31 RX ADMIN — INSULIN HUMAN 3 UNITS: 100 INJECTION, SOLUTION PARENTERAL at 10:10

## 2019-10-31 RX ADMIN — EPINEPHRINE 0.01 MCG/KG/MIN: 1 INJECTION PARENTERAL at 11:10

## 2019-10-31 RX ADMIN — ROCURONIUM BROMIDE 50 MG: 10 INJECTION, SOLUTION INTRAVENOUS at 02:10

## 2019-10-31 RX ADMIN — CALCIUM CHLORIDE 1 G: 100 INJECTION, SOLUTION INTRAVENOUS at 12:10

## 2019-10-31 RX ADMIN — INSULIN HUMAN 10 UNITS: 100 INJECTION, SOLUTION PARENTERAL at 10:10

## 2019-10-31 RX ADMIN — SODIUM CHLORIDE, SODIUM LACTATE, POTASSIUM CHLORIDE, AND CALCIUM CHLORIDE: 600; 310; 30; 20 INJECTION, SOLUTION INTRAVENOUS at 01:10

## 2019-10-31 RX ADMIN — NICARDIPINE HYDROCHLORIDE 5 MG/HR: 0.2 INJECTION, SOLUTION INTRAVENOUS at 03:10

## 2019-10-31 RX ADMIN — IPRATROPIUM BROMIDE AND ALBUTEROL SULFATE 3 ML: .5; 3 SOLUTION RESPIRATORY (INHALATION) at 03:10

## 2019-10-31 RX ADMIN — VANCOMYCIN HYDROCHLORIDE 1000 MG: 1 INJECTION, POWDER, LYOPHILIZED, FOR SOLUTION INTRAVENOUS at 08:10

## 2019-10-31 RX ADMIN — SODIUM CHLORIDE, SODIUM LACTATE, POTASSIUM CHLORIDE, AND CALCIUM CHLORIDE: 600; 310; 30; 20 INJECTION, SOLUTION INTRAVENOUS at 07:10

## 2019-10-31 RX ADMIN — SODIUM CHLORIDE 1 UNITS/HR: 9 INJECTION, SOLUTION INTRAVENOUS at 05:10

## 2019-10-31 RX ADMIN — CEFAZOLIN 2 G: 1 INJECTION, POWDER, FOR SOLUTION INTRAMUSCULAR; INTRAVENOUS at 12:10

## 2019-10-31 RX ADMIN — CEFAZOLIN SODIUM 2 G: 2 SOLUTION INTRAVENOUS at 09:10

## 2019-10-31 RX ADMIN — AMINOCAPROIC ACID 5 G: 250 INJECTION, SOLUTION INTRAVENOUS at 12:10

## 2019-10-31 RX ADMIN — SODIUM CHLORIDE 1.4 UNITS/HR: 9 INJECTION, SOLUTION INTRAVENOUS at 05:10

## 2019-10-31 RX ADMIN — DEXMEDETOMIDINE HYDROCHLORIDE 0.2 MCG/KG/HR: 4 INJECTION INTRAVENOUS at 05:10

## 2019-10-31 RX ADMIN — HEPARIN SODIUM 24000 UNITS: 1000 INJECTION, SOLUTION INTRAVENOUS; SUBCUTANEOUS at 08:10

## 2019-10-31 RX ADMIN — FENTANYL CITRATE 50 MCG: 50 INJECTION, SOLUTION INTRAMUSCULAR; INTRAVENOUS at 08:10

## 2019-10-31 RX ADMIN — CEFAZOLIN 2 G: 1 INJECTION, POWDER, FOR SOLUTION INTRAMUSCULAR; INTRAVENOUS at 07:10

## 2019-10-31 RX ADMIN — MILRINONE LACTATE 0.75 MCG/KG/MIN: 200 INJECTION, SOLUTION INTRAVENOUS at 05:10

## 2019-10-31 RX ADMIN — HEPARIN SODIUM 15000 UNITS: 1000 INJECTION, SOLUTION INTRAVENOUS; SUBCUTANEOUS at 09:10

## 2019-10-31 RX ADMIN — VASOPRESSIN 1 UNITS: 20 INJECTION, SOLUTION INTRAMUSCULAR; SUBCUTANEOUS at 12:10

## 2019-10-31 RX ADMIN — CHLORHEXIDINE GLUCONATE 0.12% ORAL RINSE 10 ML: 1.2 LIQUID ORAL at 08:10

## 2019-10-31 RX ADMIN — CALCIUM GLUCONATE 3000 MG: 98 INJECTION, SOLUTION INTRAVENOUS at 04:10

## 2019-10-31 RX ADMIN — DEXTROSE, SODIUM CHLORIDE, SODIUM LACTATE, POTASSIUM CHLORIDE, AND CALCIUM CHLORIDE: 5; .6; .31; .03; .02 INJECTION, SOLUTION INTRAVENOUS at 04:10

## 2019-10-31 RX ADMIN — ROCURONIUM BROMIDE 50 MG: 10 INJECTION, SOLUTION INTRAVENOUS at 11:10

## 2019-10-31 RX ADMIN — SODIUM CHLORIDE, SODIUM LACTATE, POTASSIUM CHLORIDE, AND CALCIUM CHLORIDE: 600; 310; 30; 20 INJECTION, SOLUTION INTRAVENOUS at 02:10

## 2019-10-31 RX ADMIN — VANCOMYCIN HYDROCHLORIDE 1 G: 1 INJECTION, POWDER, LYOPHILIZED, FOR SOLUTION INTRAVENOUS at 07:10

## 2019-10-31 RX ADMIN — ALBUMIN (HUMAN) 250 ML: 12.5 SOLUTION INTRAVENOUS at 03:10

## 2019-10-31 RX ADMIN — ROCURONIUM BROMIDE 50 MG: 10 INJECTION, SOLUTION INTRAVENOUS at 08:10

## 2019-10-31 RX ADMIN — SODIUM BICARBONATE 100 MEQ: 84 INJECTION, SOLUTION INTRAVENOUS at 04:10

## 2019-10-31 RX ADMIN — CALCIUM GLUCONATE 3000 MG: 98 INJECTION, SOLUTION INTRAVENOUS at 07:10

## 2019-10-31 RX ADMIN — MILRINONE LACTATE 0.5 MCG/KG/MIN: 200 INJECTION, SOLUTION INTRAVENOUS at 11:10

## 2019-10-31 RX ADMIN — BUPIVACAINE 266 MG: 13.3 INJECTION, SUSPENSION, LIPOSOMAL INFILTRATION at 08:10

## 2019-10-31 RX ADMIN — MILRINONE LACTATE 0.75 MCG/KG/MIN: 200 INJECTION, SOLUTION INTRAVENOUS at 04:10

## 2019-10-31 RX ADMIN — SODIUM CHLORIDE 1 UNITS/HR: 9 INJECTION, SOLUTION INTRAVENOUS at 04:10

## 2019-10-31 RX ADMIN — ROCURONIUM BROMIDE 50 MG: 10 INJECTION, SOLUTION INTRAVENOUS at 01:10

## 2019-10-31 RX ADMIN — VASOPRESSIN 2 UNITS: 20 INJECTION, SOLUTION INTRAMUSCULAR; SUBCUTANEOUS at 12:10

## 2019-10-31 NOTE — OP NOTE
[facility]  Cardiothoracic Surgery  Operative Note    SUMMARY     Date of Procedure: 10/31/2019     Procedure: Procedure(s) (LRB):  CORONARY ARTERY BYPASS GRAFT (CABG) (N/A)  ECHOCARDIOGRAM,TRANSESOPHAGEAL (N/A)  BLOCK, NERVE, INTERCOSTAL, 2 OR MORE (N/A)    Pre artery bypass grafting x3 with LIMA to LAD reverse saphenous vein graft to the RCA and reverse saphenous vein graft to the ramus.  Surgeon(s) and Role:     * Keny Zaidi MD - Primary     * Yovani Dobbins NP - Assisting        Pre-Operative Diagnosis: NSTEMI (non-ST elevated myocardial infarction) [I21.4]    Post-Operative Diagnosis: Post-Op Diagnosis Codes:     * NSTEMI (non-ST elevated myocardial infarction) [I21.4]    Anesthesia: General    Technical Procedures Used:  The patient was prepped and draped sterilely.  Intercostal spaces in the mid axillary line were then infiltrated with liposomal bupivacaine.  A median sternotomy incision was then made this was carried down sharply to the sternum was encountered the sternum was then opened with a sternal saw.  The left half of the sternum was then elevated with an SHARON retractor.  The left internal mammary artery was then dissected down as a pedicle.  While this was being performed a 2nd member of the team was harvesting the greater saphenous vein from the patient's right lower extremity.  Using an endoscopic vein harvesting technique. The patient was then given heparin.  Pericardium was opened.  Pericardial sutures were placed.  An epiaortic ultrasound was then performed to assess the ascending aorta.  The ascending aorta was found to be free of atherosclerotic plaques.  The aortic cannula pursestring was then placed in the distal ascending aorta.  The venous pursestring was placed in the right atrial appendage.  And the retrograde cardioplegia cannula pursestring was placed in the free wall of the right atrium.  And the antegrade cardioplegia cannula pursestring was placed in the mid  ascending aorta.  Therapeutic ACT was then confirmed.  The patient was then cannulated with a 21 [place] aortic cannula.  Three way venous cannula was then placed in the right atrium.  And the retrograde cardioplegia cannula was placed in the coronary sinus.  And the antegrade cardioplegia cannula was placed in the mid ascending aorta.  The patient was then started on cardiopulmonary bypass.  The aorta was crossclamped.  The heart was then arrested with antegrade warm cardioplegia.  Throughout the procedure the patient was perfused continuously with retrograde cold blood which was intermittently supplemented with antegrade cold blood cardioplegia.  First attention was drawn to the RCA.  Which was identified. The distal RCA was found to be adequate for bypass.  The distal RCA was then opened. The saphenous vein graft was then anastomosed to the out CA as an end-to-side anastomosis using 7 0 continuous Prolene sutures. Next attention was drawn to the aorta with 4.4 punch aortotomy was made the proximal anastomosis was then performed to the aorta using 6 0 continuous Prolene sutures.  Next attention was drawn to the ramus which was found to immediately become intra myocardial.  The dissection was then carried down through the myocardium until an area that was soft enough for bypass was identified. The ramus was then opened. The distal anastomosis of the reverse saphenous vein graft was then performed to the ramus as an end-to-side anastomosis using 7 0 continuous Prolene sutures. Attention was then drawn to the LAD and the diagonal.  The proximal 2/3 of the LAD as well as the diagonals were found to be intra myocardial.  However the LAD became epicardial in the distal 1/3.  The LAD was then opened in this area. The LIMA was then anastomosed to the LAD as an end-to-side anastomosis using 8 0 continuous Prolene sutures.  By this time the patient had been rewarming.  The patient was given hotshot cardioplegia.  Atrial and  ventricular pacemaker wires were placed 2 mediastinal and a left pleural chest tube were placed ventilation was restarted.  The patient was then weaned down from cardiopulmonary bypass without any problems.  The patient was then decannulated.  Surgical sites were inspected.  The right atrial cannulation site as well as the antegrade suture cannulation site where observed to be bleeding. This was then repaired with pledgeted 4 Prolene sutures. Protamine was then given to reverse heparin.  Surgical sites were again inspected and found to be free of bleeding.  The grafts were interrogated with a Doppler probe.  The grafts had excellent Doppler signals.  The patient's chest was then closed with figure-of-eight sternal wires.  The fascia was closed with 1 Vicryl sutures.  And the skin was closed with 4 Monocryl sutures.     Once the chest was closed, with the skin sutures finished, the patient was noticed to have an excessive amount of blood through 1 of the chest tubes.  The decision was then made to reopen the patient's chest.  Upon reopening the chest a chest retractor was placed.  Inspection of the pericardium revealed a consistent bleeding/oozing from the retrograde cardioplegia cannula site. This was then repaired with pledgeted 4 Prolene sutures. Further exploration of the chest was unremarkable.  The intrapericardial space was then irrigated out copiously with warm saline solution. The patient's sternum was then reclosed with figure-of-eight sternal wires.  The fascia was closed with 1.  Vicryl sutures.  And the skin was closed with 4 Monocryl sutures.    Description of the Findings of the Procedure:  See detailed description above    Significant Surgical Tasks Conducted by the Assistant(s), if Applicable:  Endoscopic vein harvesting    Complications: No    Estimated Blood Loss (EBL): 500ml           Implants: * No implants in log *    Specimens:   Specimen (12h ago, onward)    None                  Condition:  Stable    Disposition: ICU - intubated and hemodynamically stable.    Attestation: I performed the procedure.

## 2019-10-31 NOTE — SUBJECTIVE & OBJECTIVE
Review of Systems   Unable to perform ROS: Intubated         Objective:     Vital Signs (Most Recent):  Temp: 98.8 °F (37.1 °C) (10/31/19 0315)  Pulse: 71 (10/31/19 0430)  Resp: 20 (10/31/19 0430)  BP: (!) 155/95 (10/31/19 0400)  SpO2: (!) 94 % (10/31/19 0430) Vital Signs (24h Range):  Temp:  [98.1 °F (36.7 °C)-99 °F (37.2 °C)] 98.8 °F (37.1 °C)  Pulse:  [66-75] 71  Resp:  [11-40] 20  SpO2:  [93 %-100 %] 94 %  BP: (132-176)/() 155/95     Weight: 70.5 kg (155 lb 6.8 oz)  Body mass index is 29.37 kg/m².      Intake/Output Summary (Last 24 hours) at 10/31/2019 1501  Last data filed at 10/31/2019 1438  Gross per 24 hour   Intake 6945.09 ml   Output 792 ml   Net 6153.09 ml       Physical Exam   Constitutional: She appears well-developed and well-nourished.  Non-toxic appearance. She does not have a sickly appearance. She appears ill. No distress. She is sedated and intubated.   HENT:   Head: Normocephalic and atraumatic.   Mouth/Throat: Oropharynx is clear and moist and mucous membranes are normal.   Eyes: Pupils are equal, round, and reactive to light. Lids are normal.   Neck: Trachea normal. Neck supple. Carotid bruit is not present.       Cardiovascular: Normal rate, regular rhythm, normal heart sounds and normal pulses.   Pulses:       Radial pulses are 2+ on the right side, and 2+ on the left side.        Dorsalis pedis pulses are 2+ on the right side, and 2+ on the left side.   Pulmonary/Chest: Effort normal and breath sounds normal. No accessory muscle usage. No tachypnea. She is intubated. No respiratory distress.       Abdominal: Soft. Normal appearance. She exhibits no distension. Bowel sounds are absent. There is no tenderness.   Genitourinary:   Genitourinary Comments: Jones in place   Musculoskeletal:        Right foot: There is no deformity.        Left foot: There is no deformity.   No edema   Lymphadenopathy:     She has no cervical adenopathy.   Neurological: She is unresponsive.   Sedated post op    Skin: Skin is warm and dry. Capillary refill takes less than 2 seconds. No rash noted. No cyanosis.            Lines/Drains/Airways     Central Venous Catheter Line                 Introducer 10/31/19 0745 left internal jugular less than 1 day          Drain                 Chest Tube 10/31/19 1234 3 Right Pleural 24 Fr. less than 1 day         Closed/Suction Drain 10/31/19 0951 Right Leg Bulb 19 Fr. less than 1 day         Closed/Suction Drain 10/31/19 0952 Right Leg Bulb 19 Fr. less than 1 day         Closed/Suction Drain 10/31/19 1423 Midline Chest Other (Comment) less than 1 day         Urethral Catheter 10/30/19 1716 Temperature probe less than 1 day         Y Chest Tube 1 and 2 10/31/19 1233 1 Right Mediastinal 24 Fr. 2 Left Mediastinal 24 Fr. less than 1 day          Airway                 Airway - Non-Surgical 10/31/19 0716 Endotracheal Tube less than 1 day          Arterial Line                 Arterial Line 10/31/19 0723 Right Radial less than 1 day          Line                 IABP 10/30/19 1400 1 day          Peripheral Intravenous Line                 Peripheral IV - Single Lumen 10/28/19 1322 20 G Right Hand 3 days         Peripheral IV - Single Lumen 10/30/19 1715 20 G Anterior;Left Forearm less than 1 day                Significant Labs:    CBC/Anemia Profile:  Recent Labs   Lab 10/30/19  1425 10/31/19  0327 10/31/19  1255   WBC 7.71 10.56 13.63*   HGB 11.2* 11.1* 6.9*   HCT 35.9* 35.9* 21.0*   * 317 121*   MCV 86 87 86   RDW 15.1* 15.0* 14.3        Chemistries:  Recent Labs   Lab 10/30/19  0434 10/30/19  1425 10/31/19  0326    137 133*   K 4.1 4.5 4.4    99 100   CO2 23 28 22*   BUN 16 15 20   CREATININE 0.8 0.8 0.8   CALCIUM 9.4 9.9 9.7   ALBUMIN  --  3.5  --    PROT  --  7.5  --    BILITOT  --  0.4  --    ALKPHOS  --  77  --    ALT  --  8*  --    AST  --  14  --    MG 1.3* 2.5 2.3   PHOS 3.6  --  4.0       ABGs: No results for input(s): PH, PCO2, HCO3, POCSATURATED, BE in the  last 48 hours.  Coagulation:   Recent Labs   Lab 10/31/19  1255   INR 1.6*   APTT 34.7*     POCT Glucose:   Recent Labs   Lab 10/30/19  1728 10/30/19  2140 10/31/19  0625   POCTGLUCOSE 118* 221* 125*     All pertinent labs within the past 24 hours have been reviewed.    Significant Imaging:  CXR pending

## 2019-10-31 NOTE — PROGRESS NOTES
[facility]  Cardiology  Progress Note    Patient Name: Mateusz Bates  MRN: [id number]  Admission Date: 10/28/2019  Hospital Length of Stay: 3 days  Code Status: Full Code   Attending Physician: Shravan Lam MD   Primary Care Physician: Serenity Kilpatrick MD  Expected Discharge Date:   Principal Problem:CAD (coronary artery disease)    Subjective:   Brief HPI:  Ms. Bates is a 69 year old female patient whose current medical conditions include HTN, hyperlipidemia, obesity, DM type II, cerebral microvascular disease, FAHAD, and B12 deficiency who was sent to [facility] ED today from her PCP's office due to chest pain and abnormal EKG. Patient complained of substernal chest tightness/heaviness that radiated to her back on Saturday while at rest. Associated symptoms included orthopnea and SOB. Patient denied any associated nausea, vomiting, diaphoresis, palpitations, near syncope, or syncope. Initial workup in ED revealed troponin of 3.665 and BNP of 1305 and patient subsequently admitted for further evaluation and treatment of NSTEMI. Cardiology consulted to assist with management. Patient seen and examined today while in ED. Feels well at time of exam, denies chest pain. She does report similar symptoms over the past few months but states they never lasted as long and were less intense. She reports compliance with her medications. States her father had history of CABG. Chart reviewed. Repeat troponin pending. EKG reviewed and shows SR with new LBBB. 2D echo pending.    Hospital Course:   10/29/19-Patient seen and examined today, s/p C yesterday that showed multivessel CAD. CVT consulted, CABG planned for Thursday. Feels ok. No chest pain or SOB. Labs stable. Being diuresed. 2D echo showed EF of 30-35%, DD.    10/30/19-Patient seen and examined today. Feels well. No complaints. Denies chest pain or SOB. Labs stable. IABP placement scheduled for today.    10/31/19- Patient is s/p CABG x3 with LIMA to  LAD reverse saphenous vein graft to the RCA and reverse saphenous vein graft to the ramus. Has been transferred to ICU. Is intubated and sedated. CT x3. IABP at 1:1 still in place. Transfused 3 units of PRBC in the OR and has received additional 3 units in ICU, has received 500cc albumin, epi and milrinone gtt.  H/H 6.9/21.0. Renal function stable. Paced rhythm on monitor         Review of Systems   Reason unable to perform ROS: intubated and sedated.     Objective:     Vital Signs (Most Recent):  Temp: 98.8 °F (37.1 °C) (10/31/19 0315)  Pulse: 71 (10/31/19 0430)  Resp: 20 (10/31/19 0430)  BP: (!) 155/95 (10/31/19 0400)  SpO2: 100 % (10/31/19 1512) Vital Signs (24h Range):  Temp:  [98.7 °F (37.1 °C)-99 °F (37.2 °C)] 98.8 °F (37.1 °C)  Pulse:  [67-75] 71  Resp:  [11-40] 20  SpO2:  [93 %-100 %] 100 %  BP: (132-176)/() 155/95     Weight: 70.5 kg (155 lb 6.8 oz)  Body mass index is 29.37 kg/m².     SpO2: 100 %  O2 Device (Oxygen Therapy): ventilator      Intake/Output Summary (Last 24 hours) at 10/31/2019 1538  Last data filed at 10/31/2019 1438  Gross per 24 hour   Intake 6945.09 ml   Output 792 ml   Net 6153.09 ml       Lines/Drains/Airways     Central Venous Catheter Line                 Introducer 10/31/19 0745 left internal jugular less than 1 day          Drain                 Chest Tube 10/31/19 1234 3 Right Pleural 24 Fr. less than 1 day         Closed/Suction Drain 10/31/19 0951 Right Leg Bulb 19 Fr. less than 1 day         Closed/Suction Drain 10/31/19 0952 Right Leg Bulb 19 Fr. less than 1 day         Closed/Suction Drain 10/31/19 1423 Midline Chest Other (Comment) less than 1 day         Urethral Catheter 10/30/19 1716 Temperature probe less than 1 day         Y Chest Tube 1 and 2 10/31/19 1233 1 Right Mediastinal 24 Fr. 2 Left Mediastinal 24 Fr. less than 1 day          Airway                 Airway - Non-Surgical 10/31/19 0716 Endotracheal Tube less than 1 day          Arterial Line                  Arterial Line 10/31/19 0723 Right Radial less than 1 day          Line                 IABP 10/30/19 1400 1 day          Peripheral Intravenous Line                 Peripheral IV - Single Lumen 10/28/19 1322 20 G Right Hand 3 days         Peripheral IV - Single Lumen 10/30/19 1715 20 G Anterior;Left Forearm less than 1 day                Physical Exam   Constitutional: She appears well-developed and well-nourished. She is sedated and intubated.   Neck: Neck supple. No JVD present.   Cardiovascular: Normal rate, regular rhythm, normal heart sounds and normal pulses. Exam reveals no friction rub.   No murmur heard.  Pulmonary/Chest: Effort normal and breath sounds normal. She is intubated. No respiratory distress. She has no wheezes. She has no rales.   Sternal incision CDI  CT x3 to suction   Abdominal: Soft. Bowel sounds are normal. She exhibits no distension.   Musculoskeletal: She exhibits no edema or tenderness.   Neurological:   Patient intubated and sedated      Skin: Skin is warm and dry. No rash noted.   Left femoral IABP   Psychiatric: She has a normal mood and affect. Her behavior is normal.   Nursing note and vitals reviewed.      Significant Labs:   All pertinent lab results from the last 24 hours have been reviewed. and   Recent Lab Results       10/31/19  1513   10/31/19  1255   10/31/19  0625   10/31/19  0327   10/31/19  0326        FEF 25 75 LLN               FEF 25 75 Pre Ref               FEF 25 75 Ref               FEV1 FVC LLN               FEV1 FVC Pre Ref               FEV1 FVC Ref               FEV1 LLN               FEV1 Pre Ref               FEV1 Ref               FVC LLN               FVC Pre Ref               FVC Ref               PEF LLN               PEF Pre Ref               PEF Ref               Allens Test N/A             Anion Gap         11     Appearance, UA               aPTT   34.7  Comment:  aPTT therapeutic range = 39-69 seconds   39.5  Comment:  aPTT therapeutic range = 39-69  seconds       Bacteria, UA               Baso #       0.04       Basophil%       0.4       Bilirubin (UA)               BNP       628  Comment:  Values of less than 100 pg/ml are consistent with non-CHF populations.       Site [place]             Interpretation               BUN, Bld         20     Calcium         9.7     Chloride         100     CO2         22     Color, UA               Creatinine         0.8     Differential Method       Automated       eGFR if          >60     eGFR if non          >60  Comment:  Calculation used to obtain the estimated glomerular filtration  rate (eGFR) is the CKD-EPI equation.        Eos #       0.3       Eosinophil%       2.7       Fibrinogen   193           Glucose         161     Glucose, UA               Gran # (ANC)       7.5       Gran%       70.7       Hematocrit   21.0  Comment:  Results confirmed, test repeated   35.9       Hemoglobin   6.9  Comment:  Results confirmed, test repeated   11.1       Hyaline Casts, UA               Immature Grans (Abs)       0.05  Comment:  Mild elevation in immature granulocytes is non specific and   can be seen in a variety of conditions including stress response,   acute inflammation, trauma and pregnancy. Correlation with other   laboratory and clinical findings is essential.         Immature Granulocytes       0.5       Coumadin Monitoring INR   1.6  Comment:  Coumadin Therapy:  2.0 - 3.0 for INR for all indicators except mechanical heart valves  and antiphospholipid syndromes which should use 2.5 - 3.5.             Ketones, UA               Leukocytes, UA               Lymph #       1.9       Lymph%       17.8       Magnesium         2.3     MCH   28.2   26.7       MCHC   32.9   30.9       MCV   86   87       Microscopic Comment               Mono #       0.8       Mono%       7.9       MPV   10.6   9.8       NITRITE UA               nRBC       0       Occult Blood UA               pH, UA                Phosphorus         4.0     Platelets   121  Comment:  Results confirmed, test repeated   317       POC BE -3             POC Glucose 173             POC HCO3 21.9             POC Hematocrit 29             POC Ionized Calcium 1.24             POC PCO2 36.3             POC PH 7.388             POC PO2 94             POC Potassium 3.9             POC SATURATED O2 97             POC Sodium 142             POCT Glucose     125         Potassium         4.4     Pre FEF 25 75               Pre                Pre FEV1               Pre FEV1 FVC               Pre FVC               Pre PEF               Protein, UA               Protime   16.6           RBC   2.45   4.15       RBC, UA               RDW   14.3   15.0       Sample ARTERIAL             Sodium         133     Specific [place], UA               Specimen UA               UROBILINOGEN UA               WBC, UA               WBC   13.63   10.56                        10/30/19  2140   10/30/19  2006   10/30/19  1746   10/30/19  1728   10/30/19  1723        FEF 25 75 LLN     0.83         FEF 25 75 Pre Ref     73.9         FEF 25 75 Ref     1.77         FEV1 FVC LLN     65         FEV1 FVC Pre Ref     87.4         FEV1 FVC Ref     79         FEV1 LLN     1.47         FEV1 Pre Ref     76.9         FEV1 Ref     2.01         FVC LLN     1.89         FVC Pre Ref     87.5         FVC Ref     2.58         PEF LLN     3.72         PEF Pre Ref     34.3         PEF Ref     5.27         Allens Test               Anion Gap               Appearance, UA         Clear     aPTT   33.0  Comment:  aPTT therapeutic range = 39-69 seconds           Bacteria, UA         Occasional     Baso #               Basophil%               Bilirubin (UA)         Negative     BNP               Site               Interpretation     Normal Airflow.          BUN, Bld               Calcium               Chloride               CO2               Color, UA         Yellow     Creatinine                Differential Method               eGFR if                eGFR if non                Eos #               Eosinophil%               Fibrinogen               Glucose               Glucose, UA         Negative     Gran # (ANC)               Gran%               Hematocrit               Hemoglobin               Hyaline Casts, UA         10     Immature Grans (Abs)               Immature Granulocytes               Coumadin Monitoring INR               Ketones, UA         Negative     Leukocytes, UA         Negative     Lymph #               Lymph%               Magnesium               MCH               MCHC               MCV               Microscopic Comment         SEE COMMENT  Comment:  Other formed elements not mentioned in the report are not   present in the microscopic examination.        Mono #               Mono%               MPV               NITRITE UA         Positive     nRBC               Occult Blood UA         Trace     pH, UA         6.0     Phosphorus               Platelets               POC BE               POC Glucose               POC HCO3               POC Hematocrit               POC Ionized Calcium               POC PCO2               POC PH               POC PO2               POC Potassium               POC SATURATED O2               POC Sodium               POCT Glucose 221     118       Potassium               Pre FEF 25 75     1.31         Pre      7.06         Pre FEV1     1.55         Pre FEV1 FVC     68.73         Pre FVC     2.25         Pre PEF     1.81         Protein, UA         Negative  Comment:  Recommend a 24 hour urine protein or a urine   protein/creatinine ratio if globulin induced proteinuria is  clinically suspected.       Protime               RBC               RBC, UA         0     RDW               Sample               Sodium               Specific [place], UA         1.025     Specimen UA         Urine, Catheterized     UROBILINOGEN UA          Negative     WBC, UA         2     WBC                                    Significant Imaging: Echocardiogram:   2D echo with color flow doppler:   Results for orders placed or performed during the hospital encounter of 10/28/19   2D echo with color flow doppler   Result Value Ref Range    QEF 30 (A) 55 - 65    Mitral Valve Regurgitation MILD TO MODERATE     Diastolic Dysfunction Yes (A)     Aortic Valve Regurgitation MILD     Mitral Valve Mobility NORMAL     Narrative    Date of Procedure: 10/28/2019        TEST DESCRIPTION   Technical Quality: This is a technically adequate study.     Aorta: The aortic root is normal in size, measuring 3.0 cm at sinotubular junction and 3.2 cm at Sinuses of Valsalva. The proximal ascending aorta is normal in size, measuring 3.1 cm across.     Left Atrium: The left atrial volume index is severely enlarged, measuring 52.97 cc/m2.     Left Ventricle: The left ventricle is normal in size, with an end-diastolic diameter of 4.1 cm, and an end-systolic diameter of 3.7 cm. Septal wall thickness is mildly increased, with the septum measuring 1.6 cm and the posterior wall measuring 1.3 cm   across. Relative wall thickness was increased at 0.63, and the LV mass index was increased at 160.6 g/m2 consistent with concentric left ventricular hypertrophy. The anterior septum is severely hypokinetic. There is global hypokinesis. Left ventricular   systolic function appears moderately depressed. Visually estimated ejection fraction is 30-35%. The LV Doppler derived stroke volume equals 57.0 ccs.     Diastolic indices: E wave velocity 1.1 m/s, E/A ratio 0.9,  msec., E/e' ratio(avg) 16. There is pseudonormalization of mitral inflow pattern consistent with significant diastolic dysfunction.     Right Atrium: The right atrium is normal in size, measuring 3.7 cm in length and 2.8 cm in width in the apical view.     Right Ventricle: The right ventricle is normal in size. Global right  ventricular systolic function appears normal. Tricuspid annular plane systolic excursion (TAPSE) is 2.2 cm.     Aortic Valve:  The aortic valve is normal in structure with normal leaflet mobility. The mean gradient obtained across the aortic valve is 6 mmHg. Additionally, there is mild aortic regurgitation.     Mitral Valve:  The mitral valve is normal in structure with normal leaflet mobility. The pressure half time is 45 msec. The calculated mitral valve area is 4.89 cm2. There is mild to moderate mitral regurgitation.     Tricuspid Valve:  The tricuspid valve is normal in structure with normal leaflet mobility.     Pulmonary Valve:  The pulmonic valve is not well seen.     IVC: IVC is normal in size and collapses > 50% with a sniff, suggesting normal right atrial pressure of 3 mmHg.     Intracavitary: There is no evidence of pericardial effusion, intracavity mass, thrombi, or vegetation.         CONCLUSIONS     1 - Moderately depressed left ventricular systolic function (EF 30-35%).     2 - Impaired LV relaxation, elevated LAP (grade 2 diastolic dysfunction).     3 - Normal right ventricular systolic function .     4 - Mild aortic regurgitation.     5 - Severe left atrial enlargement.     6 - Concentric hypertrophy.     7 - Mild to moderate mitral regurgitation.             This document has been electronically    SIGNED BY: Terry Tarango MD On: 10/28/2019 16:38     Assessment and Plan:       Acute blood loss anemia  -Patient transfused 3 units in the OR and additional 3 units in ICU    S/P CABG x 3  -Patient is s/p CABG x3 with LIMA to LAD reverse saphenous vein graft to the RCA and reverse saphenous vein graft to the ramus.  -Continue post CABG supportive therapy  -Continue ASA, Plavix, Statin, BB  -Will continue to follow along     Acute combined systolic and diastolic CHF, NYHA class 2  -Presents with ICM/acute combined CHF  -EF 30-35% by 2D echo  -Continue diuresis with IV Lasix  -Continue BB  -Will add ARB  if BP permits    Chest pain  -See plan under NSTEMI    Abnormal EKG  -LBBB noted on EKG  -See plan under NSTEMI    NSTEMI (non-ST elevated myocardial infarction)  -Patient presented with intermittent chest tightness/heaviness that radiated to her back since Saturday evening  -Associated with JONES/orthopnea  -Initial troponin 3.665  -EKG showed SR with new LBBB  -Presentation consistent with NSTEMI  -Continue ASA, statin  -Start Toprol XL  -Start heparin gtt  -Check 2D echo  -Continue to trend troponin  -Keep NPO. [facility] today for further evaluation and PCI if indicated. All risks, benefits, and treatment alternatives explained to patient in detail. All questions answered. She has agreed to proceed. Further rec's to follow.    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 All risks, benefits, and treatment alternatives explained to patient in detail. All questions answered. She has agreed to proceed.    10/31/19  -Patient is s/p CABG x3.   See plan for CABG     Hyperlipidemia associated with type 2 diabetes mellitus  -Continue statin    Obesity (BMI 30.0-34.9)  -Weight loss    Diabetes mellitus type II, uncontrolled  -Mgmt as per hospital medicine    Hypertension associated with diabetes  -Continue home meds as BP permits        VTE Risk Mitigation (From admission, onward)         Ordered     IP VTE LOW RISK PATIENT  Once      10/30/19 1405     Place NATHANIEL hose  Until discontinued      10/30/19 1405     Place sequential compression device  Until discontinued      10/30/19 1405     Reason for no Mechanical VTE Prophylaxis  Once      10/28/19 1414     heparin 25,000 units in dextrose  5% 250 mL (100 units/mL) infusion LOW INTENSITY nomogram - [facility]  Continuous      10/28/19 1257     heparin 25,000 units in dextrose 5% (100 units/ml) IV bolus from bag - ADDITIONAL PRN BOLUS - 60 units/kg (max bolus 4000 units)  As needed (PRN)      10/28/19 1257     heparin 25,000 units in dextrose 5% (100 units/ml) IV bolus from bag - ADDITIONAL PRN BOLUS - 30 units/kg (max bolus 4000 units)  As needed (PRN)      10/28/19 1257              Chart reviewed. Patient examined by Dr. Tarango and agrees with plan that has been outlined.     Toan Carbone, FNP  Cardiology  [facility]

## 2019-10-31 NOTE — ASSESSMENT & PLAN NOTE
-Patient presented with intermittent chest tightness/heaviness that radiated to her back since Saturday evening  -Associated with JONES/orthopnea  -Initial troponin 3.665  -EKG showed SR with new LBBB  -Presentation consistent with NSTEMI  -Continue ASA, statin  -Start Toprol XL  -Start heparin gtt  -Check 2D echo  -Continue to trend troponin  -Keep NPO. [facility] today for further evaluation and PCI if indicated. All risks, benefits, and treatment alternatives explained to patient in detail. All questions answered. She has agreed to proceed. Further rec's to follow.    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 All risks, benefits, and treatment alternatives explained to patient in detail. All questions answered. She has agreed to proceed.    10/31/19  -Patient is s/p CABG x3.   See plan for CABG

## 2019-10-31 NOTE — SUBJECTIVE & OBJECTIVE
Review of Systems   Reason unable to perform ROS: intubated and sedated.     Objective:     Vital Signs (Most Recent):  Temp: 98.8 °F (37.1 °C) (10/31/19 0315)  Pulse: 71 (10/31/19 0430)  Resp: 20 (10/31/19 0430)  BP: (!) 155/95 (10/31/19 0400)  SpO2: 100 % (10/31/19 1512) Vital Signs (24h Range):  Temp:  [98.7 °F (37.1 °C)-99 °F (37.2 °C)] 98.8 °F (37.1 °C)  Pulse:  [67-75] 71  Resp:  [11-40] 20  SpO2:  [93 %-100 %] 100 %  BP: (132-176)/() 155/95     Weight: 70.5 kg (155 lb 6.8 oz)  Body mass index is 29.37 kg/m².     SpO2: 100 %  O2 Device (Oxygen Therapy): ventilator      Intake/Output Summary (Last 24 hours) at 10/31/2019 1538  Last data filed at 10/31/2019 1438  Gross per 24 hour   Intake 6945.09 ml   Output 792 ml   Net 6153.09 ml       Lines/Drains/Airways     Central Venous Catheter Line                 Introducer 10/31/19 0745 left internal jugular less than 1 day          Drain                 Chest Tube 10/31/19 1234 3 Right Pleural 24 Fr. less than 1 day         Closed/Suction Drain 10/31/19 0951 Right Leg Bulb 19 Fr. less than 1 day         Closed/Suction Drain 10/31/19 0952 Right Leg Bulb 19 Fr. less than 1 day         Closed/Suction Drain 10/31/19 1423 Midline Chest Other (Comment) less than 1 day         Urethral Catheter 10/30/19 1716 Temperature probe less than 1 day         Y Chest Tube 1 and 2 10/31/19 1233 1 Right Mediastinal 24 Fr. 2 Left Mediastinal 24 Fr. less than 1 day          Airway                 Airway - Non-Surgical 10/31/19 0716 Endotracheal Tube less than 1 day          Arterial Line                 Arterial Line 10/31/19 0723 Right Radial less than 1 day          Line                 IABP 10/30/19 1400 1 day          Peripheral Intravenous Line                 Peripheral IV - Single Lumen 10/28/19 1322 20 G Right Hand 3 days         Peripheral IV - Single Lumen 10/30/19 1715 20 G Anterior;Left Forearm less than 1 day                Physical Exam   Constitutional: She  appears well-developed and well-nourished. She is sedated and intubated.   Neck: Neck supple. No JVD present.   Cardiovascular: Normal rate, regular rhythm, normal heart sounds and normal pulses. Exam reveals no friction rub.   No murmur heard.  Pulmonary/Chest: Effort normal and breath sounds normal. She is intubated. No respiratory distress. She has no wheezes. She has no rales.   Sternal incision CDI  CT x3 to suction   Abdominal: Soft. Bowel sounds are normal. She exhibits no distension.   Musculoskeletal: She exhibits no edema or tenderness.   Neurological:   Patient intubated and sedated      Skin: Skin is warm and dry. No rash noted.   Left femoral IABP   Psychiatric: She has a normal mood and affect. Her behavior is normal.   Nursing note and vitals reviewed.      Significant Labs:   All pertinent lab results from the last 24 hours have been reviewed. and   Recent Lab Results       10/31/19  1513   10/31/19  1255   10/31/19  0625   10/31/19  0327   10/31/19  0326        FEF 25 75 LLN               FEF 25 75 Pre Ref               FEF 25 75 Ref               FEV1 FVC LLN               FEV1 FVC Pre Ref               FEV1 FVC Ref               FEV1 LLN               FEV1 Pre Ref               FEV1 Ref               FVC LLN               FVC Pre Ref               FVC Ref               PEF LLN               PEF Pre Ref               PEF Ref               Allens Test N/A             Anion Gap         11     Appearance, UA               aPTT   34.7  Comment:  aPTT therapeutic range = 39-69 seconds   39.5  Comment:  aPTT therapeutic range = 39-69 seconds       Bacteria, UA               Baso #       0.04       Basophil%       0.4       Bilirubin (UA)               BNP       628  Comment:  Values of less than 100 pg/ml are consistent with non-CHF populations.       Site Rajinder/UAC             Interpretation               BUN, Bld         20     Calcium         9.7     Chloride         100     CO2         22     Color,  UA               Creatinine         0.8     Differential Method       Automated       eGFR if          >60     eGFR if non          >60  Comment:  Calculation used to obtain the estimated glomerular filtration  rate (eGFR) is the CKD-EPI equation.        Eos #       0.3       Eosinophil%       2.7       Fibrinogen   193           Glucose         161     Glucose, UA               Gran # (ANC)       7.5       Gran%       70.7       Hematocrit   21.0  Comment:  Results confirmed, test repeated   35.9       Hemoglobin   6.9  Comment:  Results confirmed, test repeated   11.1       Hyaline Casts, UA               Immature Grans (Abs)       0.05  Comment:  Mild elevation in immature granulocytes is non specific and   can be seen in a variety of conditions including stress response,   acute inflammation, trauma and pregnancy. Correlation with other   laboratory and clinical findings is essential.         Immature Granulocytes       0.5       Coumadin Monitoring INR   1.6  Comment:  Coumadin Therapy:  2.0 - 3.0 for INR for all indicators except mechanical heart valves  and antiphospholipid syndromes which should use 2.5 - 3.5.             Ketones, UA               Leukocytes, UA               Lymph #       1.9       Lymph%       17.8       Magnesium         2.3     MCH   28.2   26.7       MCHC   32.9   30.9       MCV   86   87       Microscopic Comment               Mono #       0.8       Mono%       7.9       MPV   10.6   9.8       NITRITE UA               nRBC       0       Occult Blood UA               pH, UA               Phosphorus         4.0     Platelets   121  Comment:  Results confirmed, test repeated   317       POC BE -3             POC Glucose 173             POC HCO3 21.9             POC Hematocrit 29             POC Ionized Calcium 1.24             POC PCO2 36.3             POC PH 7.388             POC PO2 94             POC Potassium 3.9             POC SATURATED O2 97              POC Sodium 142             POCT Glucose     125         Potassium         4.4     Pre FEF 25 75               Pre                Pre FEV1               Pre FEV1 FVC               Pre FVC               Pre PEF               Protein, UA               Protime   16.6           RBC   2.45   4.15       RBC, UA               RDW   14.3   15.0       Sample ARTERIAL             Sodium         133     Specific [place], UA               Specimen UA               UROBILINOGEN UA               WBC, UA               WBC   13.63   10.56                        10/30/19  2140   10/30/19  2006   10/30/19  1746   10/30/19  1728   10/30/19  1723        FEF 25 75 LLN     0.83         FEF 25 75 Pre Ref     73.9         FEF 25 75 Ref     1.77         FEV1 FVC LLN     65         FEV1 FVC Pre Ref     87.4         FEV1 FVC Ref     79         FEV1 LLN     1.47         FEV1 Pre Ref     76.9         FEV1 Ref     2.01         FVC LLN     1.89         FVC Pre Ref     87.5         FVC Ref     2.58         PEF LLN     3.72         PEF Pre Ref     34.3         PEF Ref     5.27         Allens Test               Anion Gap               Appearance, UA         Clear     aPTT   33.0  Comment:  aPTT therapeutic range = 39-69 seconds           Bacteria, UA         Occasional     Baso #               Basophil%               Bilirubin (UA)         Negative     BNP               Site               Interpretation     Normal Airflow.          BUN, Bld               Calcium               Chloride               CO2               Color, UA         Yellow     Creatinine               Differential Method               eGFR if                eGFR if non                Eos #               Eosinophil%               Fibrinogen               Glucose               Glucose, UA         Negative     Gran # (ANC)               Gran%               Hematocrit               Hemoglobin               Hyaline Casts, UA         10      Immature Grans (Abs)               Immature Granulocytes               Coumadin Monitoring INR               Ketones, UA         Negative     Leukocytes, UA         Negative     Lymph #               Lymph%               Magnesium               MCH               MCHC               MCV               Microscopic Comment         SEE COMMENT  Comment:  Other formed elements not mentioned in the report are not   present in the microscopic examination.        Mono #               Mono%               MPV               NITRITE UA         Positive     nRBC               Occult Blood UA         Trace     pH, UA         6.0     Phosphorus               Platelets               POC BE               POC Glucose               POC HCO3               POC Hematocrit               POC Ionized Calcium               POC PCO2               POC PH               POC PO2               POC Potassium               POC SATURATED O2               POC Sodium               POCT Glucose 221     118       Potassium               Pre FEF 25 75     1.31         Pre      7.06         Pre FEV1     1.55         Pre FEV1 FVC     68.73         Pre FVC     2.25         Pre PEF     1.81         Protein, UA         Negative  Comment:  Recommend a 24 hour urine protein or a urine   protein/creatinine ratio if globulin induced proteinuria is  clinically suspected.       Protime               RBC               RBC, UA         0     RDW               Sample               Sodium               Specific [place], UA         1.025     Specimen UA         Urine, Catheterized     UROBILINOGEN UA         Negative     WBC, UA         2     WBC                                    Significant Imaging: Echocardiogram:   2D echo with color flow doppler:   Results for orders placed or performed during the hospital encounter of 10/28/19   2D echo with color flow doppler   Result Value Ref Range    QEF 30 (A) 55 - 65    Mitral Valve Regurgitation MILD TO MODERATE      Diastolic Dysfunction Yes (A)     Aortic Valve Regurgitation MILD     Mitral Valve Mobility NORMAL     Narrative    Date of Procedure: 10/28/2019        TEST DESCRIPTION   Technical Quality: This is a technically adequate study.     Aorta: The aortic root is normal in size, measuring 3.0 cm at sinotubular junction and 3.2 cm at Sinuses of Valsalva. The proximal ascending aorta is normal in size, measuring 3.1 cm across.     Left Atrium: The left atrial volume index is severely enlarged, measuring 52.97 cc/m2.     Left Ventricle: The left ventricle is normal in size, with an end-diastolic diameter of 4.1 cm, and an end-systolic diameter of 3.7 cm. Septal wall thickness is mildly increased, with the septum measuring 1.6 cm and the posterior wall measuring 1.3 cm   across. Relative wall thickness was increased at 0.63, and the LV mass index was increased at 160.6 g/m2 consistent with concentric left ventricular hypertrophy. The anterior septum is severely hypokinetic. There is global hypokinesis. Left ventricular   systolic function appears moderately depressed. Visually estimated ejection fraction is 30-35%. The LV Doppler derived stroke volume equals 57.0 ccs.     Diastolic indices: E wave velocity 1.1 m/s, E/A ratio 0.9,  msec., E/e' ratio(avg) 16. There is pseudonormalization of mitral inflow pattern consistent with significant diastolic dysfunction.     Right Atrium: The right atrium is normal in size, measuring 3.7 cm in length and 2.8 cm in width in the apical view.     Right Ventricle: The right ventricle is normal in size. Global right ventricular systolic function appears normal. Tricuspid annular plane systolic excursion (TAPSE) is 2.2 cm.     Aortic Valve:  The aortic valve is normal in structure with normal leaflet mobility. The mean gradient obtained across the aortic valve is 6 mmHg. Additionally, there is mild aortic regurgitation.     Mitral Valve:  The mitral valve is normal in structure with  normal leaflet mobility. The pressure half time is 45 msec. The calculated mitral valve area is 4.89 cm2. There is mild to moderate mitral regurgitation.     Tricuspid Valve:  The tricuspid valve is normal in structure with normal leaflet mobility.     Pulmonary Valve:  The pulmonic valve is not well seen.     IVC: IVC is normal in size and collapses > 50% with a sniff, suggesting normal right atrial pressure of 3 mmHg.     Intracavitary: There is no evidence of pericardial effusion, intracavity mass, thrombi, or vegetation.         CONCLUSIONS     1 - Moderately depressed left ventricular systolic function (EF 30-35%).     2 - Impaired LV relaxation, elevated LAP (grade 2 diastolic dysfunction).     3 - Normal right ventricular systolic function .     4 - Mild aortic regurgitation.     5 - Severe left atrial enlargement.     6 - Concentric hypertrophy.     7 - Mild to moderate mitral regurgitation.             This document has been electronically    SIGNED BY: Terry Tarango MD On: 10/28/2019 16:38

## 2019-10-31 NOTE — INTERVAL H&P NOTE
The patient has been examined and the H&P has been reviewed:    I concur with the findings and no changes have occurred since H&P was written.    Anesthesia/Surgery risks, benefits and alternative options discussed and understood by patient/family.          Active Hospital Problems    Diagnosis  POA    *NSTEMI (non-ST elevated myocardial infarction) [I21.4]  Yes    CAD, multiple vessel [I25.10]  Yes    Acute combined systolic and diastolic CHF, NYHA class 2 [I50.41]  Yes    Abnormal EKG [R94.31]  Yes    Chest pain [R07.9]  Yes    Hyperlipidemia associated with type 2 diabetes mellitus [E11.69, E78.5]  Yes    Diabetes mellitus type II, uncontrolled [E11.65]  Yes    Obesity (BMI 30.0-34.9) [E66.9]  Yes    Anxiety and depression [F41.9, F32.9]  Yes    Hypertension associated with diabetes [E11.59, I10]  Yes      Resolved Hospital Problems   No resolved problems to display.

## 2019-10-31 NOTE — PLAN OF CARE
Patient vital signs stable overnight. Heparin discontinued at 0400. NPO after midnight for cardiac surgery this morning. Preop teaching reinforced, verbalizes understanding. No complaints of chest pain or shortness of breath, only a slight headache.

## 2019-10-31 NOTE — ASSESSMENT & PLAN NOTE
Received 3 units PRBCs intra-op and Hct 27 post op  Transfusing more PRBCs per CVT surgery  Follow up CBC

## 2019-10-31 NOTE — PROGRESS NOTES
Patient is a 69-year-old female with hypertension diabetes, obesity, anxiety, iron deficiency anemia who presents to the hospital with an NSTEMI.  The patient had a cardiac catheterization the done which showed multivessel coronary artery disease.  In addition her left ventricular ejection fraction was severely depressed about 20%.  The patient was also in heart failure with elevated LVEDP and a BNP of 1305.  The patient had an intra-aortic balloon pump placed prior to surgery. The patient is 9 the operating room for urgent coronary artery bypass grafting.  The risk of the surgery include but not limited to death, stroke, myocardial infarction, heart failure, infection, renal failure, bleeding, return to surgery for reexploration and possible prolonged hospital stay.  The patient understands and has agreed to proceed with urgent coronary artery bypass grafting.

## 2019-10-31 NOTE — PROGRESS NOTES
[facility] -   Critical Care Medicine  Progress Note    Patient Name: Mateusz Bates  MRN: [id number]  Admission Date: 10/28/2019  Hospital Length of Stay: 3 days  Code Status: Full Code  Attending Provider: Shravan Lam MD  Primary Care Provider: Serenity Kilpatrick MD   Principal Problem: CAD, multiple vessel    Subjective:     HPI:  69 year old female with PMH including DM2 on insulin; HTN; HLD; GERD; anxiety  Presented to ED on 10/28 at instruction of PCP s/t abnormal EKG with c/o chest tightness and SOB  Eval revealed LBBB on EKG along with BNP 1300 and troponin 3.66     Taken for LHC on 10/28 finding 3 vessel CAD, EF 20%, and infra renal aorta plaque 30%  CTS consulted and agree pt is candidate for urgent CABG  Today 10/30 taken to cath lab for placement of IABP to support for planned CABG in am    Hospital/ICU Course:  Transferred to ICU post IABP placement for hemodynamic monitoring until surgery  10/31 - Admitted to ICU today from OR post 3-vessel CABG.  Intubated and sedated on [facility] ventilation    Review of Systems   Unable to perform ROS: Intubated         Objective:     Vital Signs (Most Recent):  Temp: 98.8 °F (37.1 °C) (10/31/19 0315)  Pulse: 71 (10/31/19 0430)  Resp: 20 (10/31/19 0430)  BP: (!) 155/95 (10/31/19 0400)  SpO2: (!) 94 % (10/31/19 0430) Vital Signs (24h Range):  Temp:  [98.1 °F (36.7 °C)-99 °F (37.2 °C)] 98.8 °F (37.1 °C)  Pulse:  [66-75] 71  Resp:  [11-40] 20  SpO2:  [93 %-100 %] 94 %  BP: (132-176)/() 155/95     Weight: 70.5 kg (155 lb 6.8 oz)  Body mass index is 29.37 kg/m².      Intake/Output Summary (Last 24 hours) at 10/31/2019 1501  Last data filed at 10/31/2019 1438  Gross per 24 hour   Intake 6945.09 ml   Output 792 ml   Net 6153.09 ml       Physical Exam   Constitutional: She appears well-developed and well-nourished.  Non-toxic appearance. She does not have a sickly appearance. She appears ill. No distress. She is sedated and intubated.   HENT:   Head:  Normocephalic and atraumatic.   Mouth/Throat: Oropharynx is clear and moist and mucous membranes are normal.   Eyes: Pupils are equal, round, and reactive to light. Lids are normal.   Neck: Trachea normal. Neck supple. Carotid bruit is not present.       Cardiovascular: Normal rate, regular rhythm, normal heart sounds and normal pulses.   Pulses:       Radial pulses are 2+ on the right side, and 2+ on the left side.        Dorsalis pedis pulses are 2+ on the right side, and 2+ on the left side.   Pulmonary/Chest: Effort normal and breath sounds normal. No accessory muscle usage. No tachypnea. She is intubated. No respiratory distress.       Abdominal: Soft. Normal appearance. She exhibits no distension. Bowel sounds are absent. There is no tenderness.   Genitourinary:   Genitourinary Comments: Jones in place   Musculoskeletal:        Right foot: There is no deformity.        Left foot: There is no deformity.   No edema   Lymphadenopathy:     She has no cervical adenopathy.   Neurological: She is unresponsive.   Sedated post op   Skin: Skin is warm and dry. Capillary refill takes less than 2 seconds. No rash noted. No cyanosis.            Lines/Drains/Airways     Central Venous Catheter Line                 Introducer 10/31/19 0745 left internal jugular less than 1 day          Drain                 Chest Tube 10/31/19 1234 3 Right Pleural 24 Fr. less than 1 day         Closed/Suction Drain 10/31/19 0951 Right Leg Bulb 19 Fr. less than 1 day         Closed/Suction Drain 10/31/19 0952 Right Leg Bulb 19 Fr. less than 1 day         Closed/Suction Drain 10/31/19 1423 Midline Chest Other (Comment) less than 1 day         Urethral Catheter 10/30/19 1716 Temperature probe less than 1 day         Y Chest Tube 1 and 2 10/31/19 1233 1 Right Mediastinal 24 Fr. 2 Left Mediastinal 24 Fr. less than 1 day          Airway                 Airway - Non-Surgical 10/31/19 0716 Endotracheal Tube less than 1 day          Arterial Line                  Arterial Line 10/31/19 0723 Right Radial less than 1 day          Line                 IABP 10/30/19 1400 1 day          Peripheral Intravenous Line                 Peripheral IV - Single Lumen 10/28/19 1322 20 G Right Hand 3 days         Peripheral IV - Single Lumen 10/30/19 1715 20 G Anterior;Left Forearm less than 1 day                Significant Labs:    CBC/Anemia Profile:  Recent Labs   Lab 10/30/19  1425 10/31/19  0327 10/31/19  1255   WBC 7.71 10.56 13.63*   HGB 11.2* 11.1* 6.9*   HCT 35.9* 35.9* 21.0*   * 317 121*   MCV 86 87 86   RDW 15.1* 15.0* 14.3        Chemistries:  Recent Labs   Lab 10/30/19  0434 10/30/19  1425 10/31/19  0326    137 133*   K 4.1 4.5 4.4    99 100   CO2 23 28 22*   BUN 16 15 20   CREATININE 0.8 0.8 0.8   CALCIUM 9.4 9.9 9.7   ALBUMIN  --  3.5  --    PROT  --  7.5  --    BILITOT  --  0.4  --    ALKPHOS  --  77  --    ALT  --  8*  --    AST  --  14  --    MG 1.3* 2.5 2.3   PHOS 3.6  --  4.0       ABGs: No results for input(s): PH, PCO2, HCO3, POCSATURATED, BE in the last 48 hours.  Coagulation:   Recent Labs   Lab 10/31/19  1255   INR 1.6*   APTT 34.7*     POCT Glucose:   Recent Labs   Lab 10/30/19  1728 10/30/19  2140 10/31/19  0625   POCTGLUCOSE 118* 221* 125*     All pertinent labs within the past 24 hours have been reviewed.    Significant Imaging:  CXR pending    Assessment/Plan:     Psychiatric  Anxiety and depression  Resume home Effexor post extubation    Pulmonary  On mechanically assisted ventilation  SAT/SBT per CVT surgery later today for extubation  Will assist with vent weaning in AM if needed    Cardiac/Vascular  * CAD, multiple vessel  Per Cards and CVT surgery  3-vessel CABG today  Cont ASA, statin, Loppressor    Acute combined systolic and diastolic CHF, NYHA class 2  Strict I/O  Daily wt  Diuresis per cards  IABP in place    S/P CABG x 3  Per CVT surgery  Monitor chest tube output  ICU hemodynamic monitoring post op    NSTEMI (non-ST  elevated myocardial infarction)  Multi vessel CAD now post CABG  Continue ASA, statin, and Lopressor  ICU cardiac monitoring    Oncology  Acute blood loss anemia  Received 3 units PRBCs intra-op and Hct 27 post op  Transfusing more PRBCs per CVT surgery  Follow up CBC    Endocrine  Diabetes mellitus type II, uncontrolled  Insulin infusion post op      Preventive Measures and Monitoring:   Stress Ulcer: PPI  Nutrition: NPO  Glucose control: insulin infusion  Bowel prophylaxis: Miralax  DVT prophylaxis: SCDs  Hx CAD on B-Blocker: Lopressor  Head of Bed/Reposition: Elevate HOB and turn Q1-2 hours   Early Mobility: bed rest today  SAT/SBT: later today  Vent Day: #1  OG Day: #1  Central Line Right IJ PA Catheter Day: #1  Right Radial Arterial Line Day: #1  IABP Day: #2  Jones Day: #1  IVAB Day: #1  Code Status: Full    Counseling/Consultation:I have discussed the care of this patient in detail with the bedside nursing staff and Dr. Rae and Dr. Zaidi    Critical Care Time: 46 minutes  Critical secondary to Patient has a condition that poses threat to life and bodily function: intubated on [facility] ventilation  Patient is currently on drug therapy requiring intensive monitoring for toxicity: on Epi, Vasopressin, Lidocaine and Primacor infusions      Critical care was time spent personally by me on the following activities: development of treatment plan with patient or surrogate and bedside caregivers, discussions with consultants, evaluation of patient's response to treatment, examination of patient, ordering and performing treatments and interventions, ordering and review of laboratory studies, ordering and review of radiographic studies, pulse oximetry, re-evaluation of patient's condition. This critical care time did not overlap with that of any other provider or involve time for any procedures.     Valentin Voss, NP  Critical Care Medicine  [facility] [place]

## 2019-10-31 NOTE — TRANSFER OF CARE
"Anesthesia Transfer of Care Note    Patient: Mateusz Bates    Procedure(s) Performed: Procedure(s) (LRB):  CORONARY ARTERY BYPASS GRAFT (CABG) (N/A)  ECHOCARDIOGRAM,TRANSESOPHAGEAL (N/A)  BLOCK, NERVE, INTERCOSTAL, 2 OR MORE (N/A)    Patient location: ICU    Anesthesia Type: general    Transport from OR: Transported from OR intubated on 100% O2 by AMBU with adequate controlled ventilation. Upon arrival to PACU/ICU, patient attached to ventilator and auscultated to confirm bilateral breath sounds and adequate TV. Continuous ECG monitoring in transport. Continuos invasive BP monitoring in transport    Post pain: adequate analgesia    Post assessment: no apparent anesthetic complications    Post vital signs: stable    Level of consciousness: sedated    Nausea/Vomiting: no nausea/vomiting    Transfer of care protocol was followed      Last vitals:   Visit Vitals  BP (!) 155/95   Pulse 71   Temp 37.1 °C (98.8 °F) (Core Bladder)   Resp 20   Ht 5' 1" (1.549 m)   Wt 70.5 kg (155 lb 6.8 oz)   SpO2 100%   Breastfeeding? No   BMI 29.37 kg/m²     "

## 2019-10-31 NOTE — ANESTHESIA RELEASE NOTE
"Anesthesia Release from PACU Note    Patient: Mateusz Bates    Procedure(s) Performed: Procedure(s) (LRB):  CORONARY ARTERY BYPASS GRAFT (CABG) (N/A)  ECHOCARDIOGRAM,TRANSESOPHAGEAL (N/A)  BLOCK, NERVE, INTERCOSTAL, 2 OR MORE (N/A)    Anesthesia type: general    Post pain: Adequate analgesia    Post assessment: no apparent anesthetic complications    Last Vitals:   Visit Vitals  BP (!) 155/95   Pulse 71   Temp 37.1 °C (98.8 °F) (Core Bladder)   Resp 20   Ht 5' 1" (1.549 m)   Wt 70.5 kg (155 lb 6.8 oz)   SpO2 100%   Breastfeeding? No   BMI 29.37 kg/m²       Post vital signs: stable    Level of consciousness: sedated    Nausea/Vomiting: no nausea/no vomiting    Complications: none    Airway Patency: patent    Respiratory: ETT, ventilator    Cardiovascular: stable and blood pressure at baseline    Hydration: hypovolemic  "

## 2019-10-31 NOTE — ASSESSMENT & PLAN NOTE
-Patient is s/p CABG x3 with LIMA to LAD reverse saphenous vein graft to the RCA and reverse saphenous vein graft to the ramus.  -Continue post CABG supportive therapy  -Continue ASA, Plavix, Statin, BB  -Will continue to follow along

## 2019-10-31 NOTE — PT/OT/SLP PROGRESS
Physical Therapy      Patient Name:  Mateusz Bates   MRN:  [id number]    PT eval initiated through chart review. Pt will be having surgery this AM, PT will attempt to complete eval at later date.     Iza Vieyra, PT/OT   10/31/2019

## 2019-10-31 NOTE — PROGRESS NOTES
CABG teaching done at BS, informed pt what to expect pre and post op, and also gave pt CABG information manual/book for post op. Pt verbalizes understanding. Will reinforce throughout the night.

## 2019-10-31 NOTE — PROGRESS NOTES
Pt arrived at ICU and secured to monitor. IABP secured. VSS. Dr Zaidi at bedside and titrated milirone and epi. PRBC ordered. Albumin and  Calcium given. Bicarb given. PM secured. Chest tubes secured. Vasopressin off on arrival to ICU. Cardene used as needed. precedex started. ACCU checks q 1hr

## 2019-10-31 NOTE — ANESTHESIA POSTPROCEDURE EVALUATION
Anesthesia Post Evaluation    Patient: Mateusz Bates    Procedure(s) Performed: Procedure(s) (LRB):  CORONARY ARTERY BYPASS GRAFT (CABG) (N/A)  ECHOCARDIOGRAM,TRANSESOPHAGEAL (N/A)  BLOCK, NERVE, INTERCOSTAL, 2 OR MORE (N/A)    Final Anesthesia Type: general  Patient location during evaluation: ICU  Patient participation: No - Unable to Participate, Intubation  Level of consciousness: sedated  Post-procedure vital signs: reviewed and stable  Pain management: adequate  Airway patency: patent  PONV status at discharge: No PONV  Anesthetic complications: no      Cardiovascular status: hemodynamically stable  Respiratory status: ETT and ventilator  Hydration status: hypovolemic  Follow-up needed           Vitals Value Taken Time   /56 10/31/2019  3:13 PM   Temp 37.1 °C (98.8 °F) 10/31/2019  3:15 AM   Pulse 90 10/31/2019  3:14 PM   Resp 9 10/31/2019  3:14 PM   SpO2 100 % 10/31/2019  3:14 PM   Vitals shown include unvalidated device data.      No case tracking events are documented in the log.      Pain/Porfirio Score: Pain Rating Prior to Med Admin: 3 (10/30/2019  5:07 PM)  Pain Rating Post Med Admin: 0 (10/30/2019  5:45 PM)

## 2019-10-31 NOTE — PT/OT/SLP PROGRESS
Occupational Therapy      Patient Name:  Mateusz Bates   MRN:  [id number]  Eval initiated via chart review . Pt in surgery. Will complete Eval  on later date  Susan Kilgore OT  10/31/2019   0910

## 2019-10-31 NOTE — ASSESSMENT & PLAN NOTE
SAT/SBT per CVT surgery later today for extubation  Will assist with vent weaning in AM if needed   no

## 2019-11-01 PROBLEM — D69.6 THROMBOCYTOPENIA: Status: ACTIVE | Noted: 2019-11-01

## 2019-11-01 PROBLEM — J96.01 ACUTE HYPOXEMIC RESPIRATORY FAILURE: Status: ACTIVE | Noted: 2019-10-31

## 2019-11-01 LAB
ALLENS TEST: ABNORMAL
ALLENS TEST: ABNORMAL
ANION GAP SERPL CALC-SCNC: 11 MMOL/L (ref 8–16)
ANION GAP SERPL CALC-SCNC: 13 MMOL/L (ref 8–16)
APTT BLDCRRT: 40.7 SEC (ref 21–32)
BASOPHILS # BLD AUTO: 0.02 K/UL (ref 0–0.2)
BASOPHILS # BLD AUTO: 0.03 K/UL (ref 0–0.2)
BASOPHILS # BLD AUTO: 0.03 K/UL (ref 0–0.2)
BASOPHILS NFR BLD: 0.1 % (ref 0–1.9)
BASOPHILS NFR BLD: 0.3 % (ref 0–1.9)
BASOPHILS NFR BLD: 0.3 % (ref 0–1.9)
BUN SERPL-MCNC: 16 MG/DL (ref 8–23)
BUN SERPL-MCNC: 16 MG/DL (ref 8–23)
BUN SERPL-MCNC: 17 MG/DL (ref 8–23)
BUN SERPL-MCNC: 18 MG/DL (ref 8–23)
BUN SERPL-MCNC: 18 MG/DL (ref 8–23)
CALCIUM SERPL-MCNC: 9.1 MG/DL (ref 8.7–10.5)
CALCIUM SERPL-MCNC: 9.2 MG/DL (ref 8.7–10.5)
CALCIUM SERPL-MCNC: 9.2 MG/DL (ref 8.7–10.5)
CALCIUM SERPL-MCNC: 9.6 MG/DL (ref 8.7–10.5)
CALCIUM SERPL-MCNC: 9.6 MG/DL (ref 8.7–10.5)
CHLORIDE SERPL-SCNC: 103 MMOL/L (ref 95–110)
CHLORIDE SERPL-SCNC: 107 MMOL/L (ref 95–110)
CO2 SERPL-SCNC: 22 MMOL/L (ref 23–29)
CO2 SERPL-SCNC: 23 MMOL/L (ref 23–29)
CO2 SERPL-SCNC: 25 MMOL/L (ref 23–29)
CREAT SERPL-MCNC: 0.8 MG/DL (ref 0.5–1.4)
CREAT SERPL-MCNC: 0.9 MG/DL (ref 0.5–1.4)
DELSYS: ABNORMAL
DELSYS: ABNORMAL
DIFFERENTIAL METHOD: ABNORMAL
EOSINOPHIL # BLD AUTO: 0 K/UL (ref 0–0.5)
EOSINOPHIL NFR BLD: 0.1 % (ref 0–8)
EOSINOPHIL NFR BLD: 0.3 % (ref 0–8)
EOSINOPHIL NFR BLD: 0.3 % (ref 0–8)
ERYTHROCYTE [DISTWIDTH] IN BLOOD BY AUTOMATED COUNT: 14.8 % (ref 11.5–14.5)
ERYTHROCYTE [DISTWIDTH] IN BLOOD BY AUTOMATED COUNT: 14.8 % (ref 11.5–14.5)
ERYTHROCYTE [DISTWIDTH] IN BLOOD BY AUTOMATED COUNT: 15.1 % (ref 11.5–14.5)
ERYTHROCYTE [SEDIMENTATION RATE] IN BLOOD BY WESTERGREN METHOD: 20 MM/H
EST. GFR  (AFRICAN AMERICAN): >60 ML/MIN/1.73 M^2
EST. GFR  (NON AFRICAN AMERICAN): >60 ML/MIN/1.73 M^2
FIBRINOGEN PPP-MCNC: 282 MG/DL (ref 182–366)
FIO2: 40
FIO2: 65
GLUCOSE SERPL-MCNC: 133 MG/DL (ref 70–110)
GLUCOSE SERPL-MCNC: 149 MG/DL (ref 70–110)
GLUCOSE SERPL-MCNC: 175 MG/DL (ref 70–110)
GLUCOSE SERPL-MCNC: 250 MG/DL (ref 70–110)
GLUCOSE SERPL-MCNC: 250 MG/DL (ref 70–110)
HCO3 UR-SCNC: 23.2 MMOL/L (ref 24–28)
HCO3 UR-SCNC: 23.6 MMOL/L (ref 24–28)
HCT VFR BLD AUTO: 31.6 % (ref 37–48.5)
HCT VFR BLD AUTO: 34.1 % (ref 37–48.5)
HCT VFR BLD AUTO: 34.1 % (ref 37–48.5)
HGB BLD-MCNC: 10.7 G/DL (ref 12–16)
HGB BLD-MCNC: 11.3 G/DL (ref 12–16)
HGB BLD-MCNC: 11.3 G/DL (ref 12–16)
IMM GRANULOCYTES # BLD AUTO: 0.06 K/UL (ref 0–0.04)
IMM GRANULOCYTES # BLD AUTO: 0.06 K/UL (ref 0–0.04)
IMM GRANULOCYTES # BLD AUTO: 0.08 K/UL (ref 0–0.04)
IMM GRANULOCYTES NFR BLD AUTO: 0.5 % (ref 0–0.5)
IMM GRANULOCYTES NFR BLD AUTO: 0.5 % (ref 0–0.5)
IMM GRANULOCYTES NFR BLD AUTO: 0.6 % (ref 0–0.5)
INR PPP: 1.1 (ref 0.8–1.2)
LYMPHOCYTES # BLD AUTO: 1 K/UL (ref 1–4.8)
LYMPHOCYTES NFR BLD: 7.1 % (ref 18–48)
LYMPHOCYTES NFR BLD: 8.3 % (ref 18–48)
LYMPHOCYTES NFR BLD: 8.3 % (ref 18–48)
MAGNESIUM SERPL-MCNC: 1.6 MG/DL (ref 1.6–2.6)
MAGNESIUM SERPL-MCNC: 2.6 MG/DL (ref 1.6–2.6)
MCH RBC QN AUTO: 28.7 PG (ref 27–31)
MCH RBC QN AUTO: 28.7 PG (ref 27–31)
MCH RBC QN AUTO: 28.8 PG (ref 27–31)
MCHC RBC AUTO-ENTMCNC: 33.1 G/DL (ref 32–36)
MCHC RBC AUTO-ENTMCNC: 33.1 G/DL (ref 32–36)
MCHC RBC AUTO-ENTMCNC: 33.9 G/DL (ref 32–36)
MCV RBC AUTO: 85 FL (ref 82–98)
MCV RBC AUTO: 87 FL (ref 82–98)
MCV RBC AUTO: 87 FL (ref 82–98)
MODE: ABNORMAL
MODE: ABNORMAL
MONOCYTES # BLD AUTO: 1.2 K/UL (ref 0.3–1)
MONOCYTES NFR BLD: 10.3 % (ref 4–15)
MONOCYTES NFR BLD: 10.3 % (ref 4–15)
MONOCYTES NFR BLD: 8.7 % (ref 4–15)
NEUTROPHILS # BLD AUTO: 11.9 K/UL (ref 1.8–7.7)
NEUTROPHILS # BLD AUTO: 9.7 K/UL (ref 1.8–7.7)
NEUTROPHILS # BLD AUTO: 9.7 K/UL (ref 1.8–7.7)
NEUTROPHILS NFR BLD: 80.3 % (ref 38–73)
NEUTROPHILS NFR BLD: 80.3 % (ref 38–73)
NEUTROPHILS NFR BLD: 83.4 % (ref 38–73)
NRBC BLD-RTO: 0 /100 WBC
PCO2 BLDA: 33 MMHG (ref 35–45)
PCO2 BLDA: 34.1 MMHG (ref 35–45)
PEEP: 5
PEEP: 5
PH SMN: 7.44 [PH] (ref 7.35–7.45)
PH SMN: 7.46 [PH] (ref 7.35–7.45)
PHOSPHATE SERPL-MCNC: 5.7 MG/DL (ref 2.7–4.5)
PLATELET # BLD AUTO: 76 K/UL (ref 150–350)
PLATELET # BLD AUTO: 82 K/UL (ref 150–350)
PLATELET # BLD AUTO: 82 K/UL (ref 150–350)
PLATELET BLD QL SMEAR: ABNORMAL
PMV BLD AUTO: 11.1 FL (ref 9.2–12.9)
PMV BLD AUTO: 11.1 FL (ref 9.2–12.9)
PMV BLD AUTO: 11.9 FL (ref 9.2–12.9)
PO2 BLDA: 64 MMHG (ref 80–100)
PO2 BLDA: 67 MMHG (ref 80–100)
POC BE: -1 MMOL/L
POC BE: 0 MMOL/L
POC SATURATED O2: 93 % (ref 95–100)
POC SATURATED O2: 94 % (ref 95–100)
POCT GLUCOSE: 119 MG/DL (ref 70–110)
POCT GLUCOSE: 131 MG/DL (ref 70–110)
POCT GLUCOSE: 138 MG/DL (ref 70–110)
POCT GLUCOSE: 144 MG/DL (ref 70–110)
POCT GLUCOSE: 145 MG/DL (ref 70–110)
POCT GLUCOSE: 149 MG/DL (ref 70–110)
POCT GLUCOSE: 150 MG/DL (ref 70–110)
POCT GLUCOSE: 152 MG/DL (ref 70–110)
POCT GLUCOSE: 157 MG/DL (ref 70–110)
POCT GLUCOSE: 161 MG/DL (ref 70–110)
POCT GLUCOSE: 170 MG/DL (ref 70–110)
POCT GLUCOSE: 171 MG/DL (ref 70–110)
POCT GLUCOSE: 179 MG/DL (ref 70–110)
POCT GLUCOSE: 202 MG/DL (ref 70–110)
POCT GLUCOSE: 229 MG/DL (ref 70–110)
POCT GLUCOSE: 234 MG/DL (ref 70–110)
POCT GLUCOSE: 234 MG/DL (ref 70–110)
POCT GLUCOSE: 253 MG/DL (ref 70–110)
POCT GLUCOSE: 268 MG/DL (ref 70–110)
POCT GLUCOSE: 321 MG/DL (ref 70–110)
POCT GLUCOSE: 68 MG/DL (ref 70–110)
POCT GLUCOSE: 79 MG/DL (ref 70–110)
POCT GLUCOSE: 98 MG/DL (ref 70–110)
POTASSIUM SERPL-SCNC: 3.9 MMOL/L (ref 3.5–5.1)
POTASSIUM SERPL-SCNC: 4.3 MMOL/L (ref 3.5–5.1)
POTASSIUM SERPL-SCNC: 4.3 MMOL/L (ref 3.5–5.1)
POTASSIUM SERPL-SCNC: 4.4 MMOL/L (ref 3.5–5.1)
POTASSIUM SERPL-SCNC: 4.9 MMOL/L (ref 3.5–5.1)
PROTHROMBIN TIME: 12.3 SEC (ref 9–12.5)
PS: 6
RBC # BLD AUTO: 3.72 M/UL (ref 4–5.4)
RBC # BLD AUTO: 3.94 M/UL (ref 4–5.4)
RBC # BLD AUTO: 3.94 M/UL (ref 4–5.4)
SAMPLE: ABNORMAL
SAMPLE: ABNORMAL
SITE: ABNORMAL
SITE: ABNORMAL
SODIUM SERPL-SCNC: 139 MMOL/L (ref 136–145)
SODIUM SERPL-SCNC: 140 MMOL/L (ref 136–145)
SODIUM SERPL-SCNC: 140 MMOL/L (ref 136–145)
SODIUM SERPL-SCNC: 141 MMOL/L (ref 136–145)
SODIUM SERPL-SCNC: 142 MMOL/L (ref 136–145)
VT: 430
WBC # BLD AUTO: 12 K/UL (ref 3.9–12.7)
WBC # BLD AUTO: 12 K/UL (ref 3.9–12.7)
WBC # BLD AUTO: 14.27 K/UL (ref 3.9–12.7)

## 2019-11-01 PROCEDURE — 99291 CRITICAL CARE FIRST HOUR: CPT | Mod: ,,, | Performed by: INTERNAL MEDICINE

## 2019-11-01 PROCEDURE — 37799 UNLISTED PX VASCULAR SURGERY: CPT

## 2019-11-01 PROCEDURE — P9045 ALBUMIN (HUMAN), 5%, 250 ML: HCPCS | Mod: JG | Performed by: NURSE PRACTITIONER

## 2019-11-01 PROCEDURE — 80048 BASIC METABOLIC PNL TOTAL CA: CPT | Mod: 91

## 2019-11-01 PROCEDURE — 99291 CRITICAL CARE FIRST HOUR: CPT | Mod: ,,, | Performed by: NURSE PRACTITIONER

## 2019-11-01 PROCEDURE — 83735 ASSAY OF MAGNESIUM: CPT

## 2019-11-01 PROCEDURE — 99291 PR CRITICAL CARE, E/M 30-74 MINUTES: ICD-10-PCS | Mod: ,,, | Performed by: INTERNAL MEDICINE

## 2019-11-01 PROCEDURE — 27100092 HC HIGH FLOW DELIVERY CANNULA

## 2019-11-01 PROCEDURE — 27200667 HC PACEMAKER, TEMPORARY MONITORING, PER SHIFT

## 2019-11-01 PROCEDURE — 25000003 PHARM REV CODE 250: Performed by: NURSE PRACTITIONER

## 2019-11-01 PROCEDURE — 94799 UNLISTED PULMONARY SVC/PX: CPT

## 2019-11-01 PROCEDURE — 94640 AIRWAY INHALATION TREATMENT: CPT

## 2019-11-01 PROCEDURE — 99900017 HC EXTUBATION W/PARAMETERS (STAT)

## 2019-11-01 PROCEDURE — 84100 ASSAY OF PHOSPHORUS: CPT

## 2019-11-01 PROCEDURE — 25000003 PHARM REV CODE 250: Performed by: THORACIC SURGERY (CARDIOTHORACIC VASCULAR SURGERY)

## 2019-11-01 PROCEDURE — 85730 THROMBOPLASTIN TIME PARTIAL: CPT

## 2019-11-01 PROCEDURE — 99900035 HC TECH TIME PER 15 MIN (STAT)

## 2019-11-01 PROCEDURE — 63600175 PHARM REV CODE 636 W HCPCS: Performed by: NURSE PRACTITIONER

## 2019-11-01 PROCEDURE — 80048 BASIC METABOLIC PNL TOTAL CA: CPT

## 2019-11-01 PROCEDURE — 20000000 HC ICU ROOM

## 2019-11-01 PROCEDURE — 85384 FIBRINOGEN ACTIVITY: CPT

## 2019-11-01 PROCEDURE — 99900038 HC OT GENERIC THERAPY SCREENING (STAT)

## 2019-11-01 PROCEDURE — 63600175 PHARM REV CODE 636 W HCPCS: Performed by: THORACIC SURGERY (CARDIOTHORACIC VASCULAR SURGERY)

## 2019-11-01 PROCEDURE — 94010 BREATHING CAPACITY TEST: CPT

## 2019-11-01 PROCEDURE — 27100171 HC OXYGEN HIGH FLOW UP TO 24 HOURS

## 2019-11-01 PROCEDURE — 27000221 HC OXYGEN, UP TO 24 HOURS

## 2019-11-01 PROCEDURE — 25000242 PHARM REV CODE 250 ALT 637 W/ HCPCS: Performed by: NURSE PRACTITIONER

## 2019-11-01 PROCEDURE — 83735 ASSAY OF MAGNESIUM: CPT | Mod: 91

## 2019-11-01 PROCEDURE — 85025 COMPLETE CBC W/AUTO DIFF WBC: CPT | Mod: 91

## 2019-11-01 PROCEDURE — 99900026 HC AIRWAY MAINTENANCE (STAT)

## 2019-11-01 PROCEDURE — 85610 PROTHROMBIN TIME: CPT

## 2019-11-01 PROCEDURE — 99291 PR CRITICAL CARE, E/M 30-74 MINUTES: ICD-10-PCS | Mod: ,,, | Performed by: NURSE PRACTITIONER

## 2019-11-01 PROCEDURE — 82803 BLOOD GASES ANY COMBINATION: CPT

## 2019-11-01 RX ORDER — MAGNESIUM SULFATE HEPTAHYDRATE 40 MG/ML
2 INJECTION, SOLUTION INTRAVENOUS ONCE
Status: COMPLETED | OUTPATIENT
Start: 2019-11-01 | End: 2019-11-01

## 2019-11-01 RX ORDER — FUROSEMIDE 10 MG/ML
40 INJECTION INTRAMUSCULAR; INTRAVENOUS ONCE
Status: COMPLETED | OUTPATIENT
Start: 2019-11-01 | End: 2019-11-01

## 2019-11-01 RX ORDER — PANTOPRAZOLE SODIUM 40 MG/1
40 TABLET, DELAYED RELEASE ORAL DAILY
Status: DISCONTINUED | OUTPATIENT
Start: 2019-11-01 | End: 2019-11-05

## 2019-11-01 RX ORDER — METOLAZONE 5 MG/1
5 TABLET ORAL ONCE
Status: COMPLETED | OUTPATIENT
Start: 2019-11-01 | End: 2019-11-01

## 2019-11-01 RX ORDER — ATORVASTATIN CALCIUM 40 MG/1
80 TABLET, FILM COATED ORAL NIGHTLY
Status: DISCONTINUED | OUTPATIENT
Start: 2019-11-01 | End: 2019-11-08

## 2019-11-01 RX ADMIN — DOCUSATE SODIUM 100 MG: 100 CAPSULE, LIQUID FILLED ORAL at 09:11

## 2019-11-01 RX ADMIN — IPRATROPIUM BROMIDE AND ALBUTEROL SULFATE 3 ML: .5; 3 SOLUTION RESPIRATORY (INHALATION) at 11:11

## 2019-11-01 RX ADMIN — FUROSEMIDE 40 MG: 10 INJECTION, SOLUTION INTRAMUSCULAR; INTRAVENOUS at 09:11

## 2019-11-01 RX ADMIN — MILRINONE LACTATE 0.75 MCG/KG/MIN: 200 INJECTION, SOLUTION INTRAVENOUS at 05:11

## 2019-11-01 RX ADMIN — METOPROLOL TARTRATE 25 MG: 25 TABLET ORAL at 09:11

## 2019-11-01 RX ADMIN — FENTANYL CITRATE 25 MCG: 50 INJECTION INTRAMUSCULAR; INTRAVENOUS at 01:11

## 2019-11-01 RX ADMIN — FUROSEMIDE 40 MG: 10 INJECTION, SOLUTION INTRAMUSCULAR; INTRAVENOUS at 05:11

## 2019-11-01 RX ADMIN — POTASSIUM CHLORIDE 20 MEQ: 200 INJECTION, SOLUTION INTRAVENOUS at 02:11

## 2019-11-01 RX ADMIN — FENTANYL CITRATE 25 MCG: 50 INJECTION INTRAMUSCULAR; INTRAVENOUS at 04:11

## 2019-11-01 RX ADMIN — ASPIRIN 81 MG: 81 TABLET, COATED ORAL at 09:11

## 2019-11-01 RX ADMIN — DEXTROSE 125 ML: 10 SOLUTION INTRAVENOUS at 07:11

## 2019-11-01 RX ADMIN — METOLAZONE 5 MG: 5 TABLET ORAL at 05:11

## 2019-11-01 RX ADMIN — CHLORHEXIDINE GLUCONATE 0.12% ORAL RINSE 10 ML: 1.2 LIQUID ORAL at 09:11

## 2019-11-01 RX ADMIN — SODIUM CHLORIDE 4.5 UNITS/HR: 9 INJECTION, SOLUTION INTRAVENOUS at 02:11

## 2019-11-01 RX ADMIN — POLYETHYLENE GLYCOL (3350) 17 G: 17 POWDER, FOR SOLUTION ORAL at 09:11

## 2019-11-01 RX ADMIN — POTASSIUM CHLORIDE 20 MEQ: 20 TABLET, EXTENDED RELEASE ORAL at 09:11

## 2019-11-01 RX ADMIN — FUROSEMIDE 40 MG: 10 INJECTION, SOLUTION INTRAMUSCULAR; INTRAVENOUS at 07:11

## 2019-11-01 RX ADMIN — IPRATROPIUM BROMIDE AND ALBUTEROL SULFATE 3 ML: .5; 3 SOLUTION RESPIRATORY (INHALATION) at 09:11

## 2019-11-01 RX ADMIN — OXYCODONE HYDROCHLORIDE 10 MG: 5 TABLET ORAL at 10:11

## 2019-11-01 RX ADMIN — SODIUM CHLORIDE, SODIUM LACTATE, POTASSIUM CHLORIDE, AND CALCIUM CHLORIDE 500 ML: .6; .31; .03; .02 INJECTION, SOLUTION INTRAVENOUS at 01:11

## 2019-11-01 RX ADMIN — MAGNESIUM SULFATE 2 G: 2 INJECTION INTRAVENOUS at 07:11

## 2019-11-01 RX ADMIN — MAGNESIUM SULFATE 2 G: 2 INJECTION INTRAVENOUS at 12:11

## 2019-11-01 RX ADMIN — IPRATROPIUM BROMIDE AND ALBUTEROL SULFATE 3 ML: .5; 3 SOLUTION RESPIRATORY (INHALATION) at 04:11

## 2019-11-01 RX ADMIN — CEFAZOLIN SODIUM 2 G: 2 SOLUTION INTRAVENOUS at 04:11

## 2019-11-01 RX ADMIN — OXYCODONE HYDROCHLORIDE 5 MG: 5 TABLET ORAL at 09:11

## 2019-11-01 RX ADMIN — ATORVASTATIN CALCIUM 80 MG: 40 TABLET, FILM COATED ORAL at 09:11

## 2019-11-01 RX ADMIN — ALBUMIN (HUMAN) 250 ML: 12.5 SOLUTION INTRAVENOUS at 11:11

## 2019-11-01 RX ADMIN — OXYCODONE HYDROCHLORIDE 10 MG: 5 TABLET ORAL at 03:11

## 2019-11-01 RX ADMIN — MAGNESIUM SULFATE 2 G: 2 INJECTION INTRAVENOUS at 06:11

## 2019-11-01 RX ADMIN — DEXMEDETOMIDINE HYDROCHLORIDE 0.2 MCG/KG/HR: 4 INJECTION INTRAVENOUS at 06:11

## 2019-11-01 RX ADMIN — MILRINONE LACTATE 0.75 MCG/KG/MIN: 200 INJECTION, SOLUTION INTRAVENOUS at 12:11

## 2019-11-01 RX ADMIN — PANTOPRAZOLE SODIUM 40 MG: 40 TABLET, DELAYED RELEASE ORAL at 09:11

## 2019-11-01 RX ADMIN — IPRATROPIUM BROMIDE AND ALBUTEROL SULFATE 3 ML: .5; 3 SOLUTION RESPIRATORY (INHALATION) at 01:11

## 2019-11-01 RX ADMIN — MILRINONE LACTATE 0.5 MCG/KG/MIN: 200 INJECTION, SOLUTION INTRAVENOUS at 01:11

## 2019-11-01 NOTE — SUBJECTIVE & OBJECTIVE
Interval History:  Post procedure from CABG x3 by Dr. Zaidi.    Review of Systems   Unable to perform ROS: Intubated   Constitutional: Negative for chills and fever.   HENT: Negative for congestion and sore throat.    Eyes: Negative for visual disturbance.   Respiratory: Negative for cough, shortness of breath and wheezing.    Cardiovascular: Negative for chest pain, palpitations and leg swelling.   Gastrointestinal: Negative for abdominal pain, blood in stool, constipation, diarrhea, nausea and vomiting.   Genitourinary: Negative for dysuria and hematuria.   Musculoskeletal: Negative for arthralgias and back pain.   Skin: Negative for rash and wound.   Neurological: Negative for dizziness, weakness, light-headedness and numbness.   Hematological: Negative for adenopathy.     Objective:     Vital Signs (Most Recent):  Temp: 99.1 °F (37.3 °C) (10/31/19 1745)  Pulse: 91 (10/31/19 1800)  Resp: 20 (10/31/19 1800)  BP: (!) 172/91 (10/31/19 1800)  SpO2: (!) 92 % (10/31/19 1800) Vital Signs (24h Range):  Temp:  [97 °F (36.1 °C)-99.1 °F (37.3 °C)] 99.1 °F (37.3 °C)  Pulse:  [67-91] 91  Resp:  [9-40] 20  SpO2:  [92 %-100 %] 92 %  BP: ()/() 172/91     Weight: 70.5 kg (155 lb 6.8 oz)  Body mass index is 29.37 kg/m².    Intake/Output Summary (Last 24 hours) at 10/31/2019 1910  Last data filed at 10/31/2019 1824  Gross per 24 hour   Intake 50340.19 ml   Output 1071 ml   Net 9276.19 ml      Physical Exam   Constitutional: She is oriented to person, place, and time. She appears well-developed and well-nourished. No distress.   HENT:   Head: Normocephalic and atraumatic.   Mouth/Throat: Oropharynx is clear and moist.   Eyes: Pupils are equal, round, and reactive to light. Conjunctivae and EOM are normal.   Neck: Neck supple. No JVD present. No thyromegaly present.   Cardiovascular: Normal rate and regular rhythm. Exam reveals no gallop and no friction rub.   No murmur heard.  Paced regular rhythm.  Three subxyphoid  mediastinal drain tubes with sero-sanguinous fluid.   Pulmonary/Chest: Effort normal and breath sounds normal. She has no wheezes. She has no rales.   Right IJV PA catheter and CVL.  ETT in place with clear condensation in tube.   Abdominal: Soft. Bowel sounds are normal. She exhibits no distension. There is no tenderness. There is no rebound and no guarding.   Genitourinary:   Genitourinary Comments: Jones catheter with clear yellow urine.   Musculoskeletal: Normal range of motion. She exhibits no edema or deformity.   Lymphadenopathy:     She has no cervical adenopathy.   Neurological: She is alert and oriented to person, place, and time. She has normal reflexes.   Skin: Skin is warm and dry. No rash noted.   Psychiatric: She has a normal mood and affect. Her behavior is normal. Judgment and thought content normal.   Nursing note and vitals reviewed.      Significant Labs: All pertinent labs within the past 24 hours have been reviewed.    Significant Imaging: I have reviewed all pertinent imaging results/findings within the past 24 hours.

## 2019-11-01 NOTE — PROGRESS NOTES
Respiratory at bedside.  Informed of patient beginning to follow commands.  Rate on ventilator decreased by RT to see how patient would tolerate.  Patient made no effort to breath over ventilator.  Previous ventilator rate resumed by RT.

## 2019-11-01 NOTE — ASSESSMENT & PLAN NOTE
-Patient is s/p CABG x3 with LIMA to LAD reverse saphenous vein graft to the RCA and reverse saphenous vein graft to the ramus.  -Continue post CABG supportive therapy  -Continue ASA, Plavix, Statin, BB  -Will continue to follow along     11/1/19  -POD #1 s/p CABG x3  -Continue ASA, Statin, BB  -Remains on Epi and Milrinone gtt today  -Mgmt per CT surgery  -Will follow along

## 2019-11-01 NOTE — PROGRESS NOTES
Yovani ECHEVARRIA at bedside, noted hypotension and low PAD pressures. Orders received to admin 500ml Albumin and 3GM Ca Gluconate now.

## 2019-11-01 NOTE — PLAN OF CARE
intervention: carbohydrate/fat/sodium modified diet     Recommendation:   1) Change diet to DM 1500 kcal, cardiac diet- fluid restriction per MD   2) NFPE and nutrition education at f/u   3) Weigh pt weekly or as needed     Goals: 1) PO intakes > 50% of meals at f/u  Nutrition Goal Status: new  Communication of RD Recs: (POC, sticky note, second sign )

## 2019-11-01 NOTE — PROGRESS NOTES
[facility]  Cardiology  Progress Note    Patient Name: Mateusz Bates  MRN: [id number]  Admission Date: 10/28/2019  Hospital Length of Stay: 4 days  Code Status: Full Code   Attending Physician: Shravan Lam MD   Primary Care Physician: Serenity Kilpatrick MD  Expected Discharge Date:   Principal Problem:CAD (coronary artery disease)    Subjective:   HPI    Ms. Bates is a 69 year old female patient whose current medical conditions include HTN, hyperlipidemia, obesity, DM type II, cerebral microvascular disease, FAHAD, and B12 deficiency who was sent to [facility] ED today from her PCP's office due to chest pain and abnormal EKG. Patient complained of substernal chest tightness/heaviness that radiated to her back on Saturday while at rest. Associated symptoms included orthopnea and SOB. Patient denied any associated nausea, vomiting, diaphoresis, palpitations, near syncope, or syncope. Initial workup in ED revealed troponin of 3.665 and BNP of 1305 and patient subsequently admitted for further evaluation and treatment of NSTEMI. Cardiology consulted to assist with management. Patient seen and examined today while in ED. Feels well at time of exam, denies chest pain. She does report similar symptoms over the past few months but states they never lasted as long and were less intense. She reports compliance with her medications. States her father had history of CABG. Chart reviewed. Repeat troponin pending. EKG reviewed and shows SR with new LBBB. 2D echo pending.    Hospital Course:   10/29/19-Patient seen and examined today, s/p C yesterday that showed multivessel CAD. CVT consulted, CABG planned for Thursday. Feels ok. No chest pain or SOB. Labs stable. Being diuresed. 2D echo showed EF of 30-35%, DD.    10/30/19-Patient seen and examined today. Feels well. No complaints. Denies chest pain or SOB. Labs stable. IABP placement scheduled for today.    10/31/19- Patient is s/p CABG x3 with LIMA to LAD  reverse saphenous vein graft to the RCA and reverse saphenous vein graft to the ramus. Has been transferred to ICU. Is intubated and sedated. CT x3. IABP at 1:1 still in place. Transfused 3 units of PRBC in the OR and has received additional 3 units in ICU, has received 500cc albumin, epi and milrinone gtt.  H/H 6.9/21.0. Renal function stable. Paced rhythm on monitor     11/1/2019--patient seen and examined in ICU. Extubated o/n, remains on IABP 1:1 today, plans to possibly wean off later today per Dr. Zaidi. Remains on Epi and Milrinone gtt today. Paced on monitor. Labs stable today.     Interval History: Extubated o/n. Denies chest pain or SOB today on exam. IABP in place today, plans to possible wean today per CT Surgery.     Review of Systems   Constitution: Positive for malaise/fatigue.   HENT: Negative for hearing loss and hoarse voice.    Eyes: Negative for blurred vision and visual disturbance.   Cardiovascular: Negative for chest pain, claudication, dyspnea on exertion, irregular heartbeat, leg swelling, near-syncope, orthopnea, palpitations, paroxysmal nocturnal dyspnea and syncope.   Respiratory: Negative for cough, hemoptysis, shortness of breath, sleep disturbances due to breathing, snoring and wheezing.    Endocrine: Negative for cold intolerance and heat intolerance.   Hematologic/Lymphatic: Bruises/bleeds easily.   Skin: Negative for color change, dry skin and nail changes.   Musculoskeletal: Positive for arthritis. Negative for back pain, joint pain and myalgias.   Gastrointestinal: Negative for bloating, abdominal pain, constipation, nausea and vomiting.   Genitourinary: Negative for dysuria, flank pain, hematuria and hesitancy.   Neurological: Negative for headaches, light-headedness, loss of balance, numbness, paresthesias and weakness.   Psychiatric/Behavioral: Negative for altered mental status.   Allergic/Immunologic: Negative for environmental allergies.     Objective:     Vital Signs  (Most Recent):  Temp: 98.8 °F (37.1 °C) (11/01/19 1000)  Pulse: 91 (11/01/19 1000)  Resp: (!) 24 (11/01/19 1000)  BP: 96/69 (11/01/19 0900)  SpO2: 97 % (11/01/19 1000) Vital Signs (24h Range):  Temp:  [97 °F (36.1 °C)-100.3 °F (37.9 °C)] 98.8 °F (37.1 °C)  Pulse:  [89-92] 91  Resp:  [9-30] 24  SpO2:  [82 %-100 %] 97 %  BP: ()/() 96/69     Weight: 76.2 kg (167 lb 15.9 oz)  Body mass index is 31.74 kg/m².     SpO2: 97 %  O2 Device (Oxygen Therapy): Vapotherm      Intake/Output Summary (Last 24 hours) at 11/1/2019 1030  Last data filed at 11/1/2019 1000  Gross per 24 hour   Intake 13619.54 ml   Output 2370 ml   Net 8420.54 ml       Lines/Drains/Airways     Central Venous Catheter Line                 Introducer 10/31/19 0745 left internal jugular 1 day         Pulmonary Artery Catheter Assessment  10/31/19 right internal jugular 1 day          Drain                 Closed/Suction Drain 10/31/19 0951 Right Leg Bulb 19 Fr. 1 day         Closed/Suction Drain 10/31/19 0952 Right Leg Bulb 19 Fr. 1 day         Urethral Catheter 10/30/19 1716 Temperature probe 1 day         Chest Tube 10/31/19 1234 3 Right Pleural 24 Fr. less than 1 day         Y Chest Tube 1 and 2 10/31/19 1233 1 Right Mediastinal 24 Fr. 2 Left Mediastinal 24 Fr. less than 1 day          Arterial Line                 Arterial Line 10/31/19 0723 Right Radial 1 day          Line                 IABP 10/30/19 1400 1 day          Peripheral Intravenous Line                 Peripheral IV - Single Lumen 10/28/19 1322 20 G Right Hand 3 days         Peripheral IV - Single Lumen 10/30/19 1715 20 G Anterior;Left Forearm 1 day                Physical Exam   Constitutional: She is oriented to person, place, and time. She appears well-developed and well-nourished. No distress.   HENT:   Head: Normocephalic and atraumatic.   Eyes: Pupils are equal, round, and reactive to light.   Neck: Normal range of motion and full passive range of motion without pain. Neck  supple. No JVD present.   Cardiovascular: Normal rate, regular rhythm, S1 normal, S2 normal and intact distal pulses. PMI is not displaced. Exam reveals no distant heart sounds.   No murmur heard.  Pulses:       Radial pulses are 2+ on the right side, and 2+ on the left side.        Dorsalis pedis pulses are 1+ on the right side, and 1+ on the left side.   IABP 1:1 C/D/I    Mid sternal incision wound vac in place  Pacing wires secured to chest wall  CT x 3 to pleuravac x 2.   Pulmonary/Chest: Effort normal and breath sounds normal. No accessory muscle usage. No respiratory distress. She has no decreased breath sounds. She has no wheezes. She has no rales.   Nasal cannula in place   Abdominal: Soft. Bowel sounds are normal. She exhibits no distension. There is no tenderness.   Musculoskeletal: Normal range of motion. She exhibits no edema.        Right ankle: She exhibits no swelling.        Left ankle: She exhibits no swelling.   RLE NICOLASA Drain x 2   Neurological: She is alert and oriented to person, place, and time.   Skin: Skin is warm and dry. She is not diaphoretic. No cyanosis. Nails show no clubbing.   Psychiatric: She has a normal mood and affect. Her speech is normal and behavior is normal. Judgment and thought content normal. Cognition and memory are normal.   Nursing note and vitals reviewed.      Significant Labs:   CMP   Recent Labs   Lab 10/30/19  1425  10/31/19  1510 10/31/19  2007 11/01/19  0320      < > 143  143 142 140  140   K 4.5   < > 4.0  4.0 4.0 4.3  4.3   CL 99   < > 111*  111* 106 107  107   CO2 28   < > 21*  21* 22* 22*  22*   *   < > 174*  174* 298* 250*  250*   BUN 15   < > 17  17 16 16  16   CREATININE 0.8   < > 0.9  0.9 1.0 0.9  0.9   CALCIUM 9.9   < > 8.4*  8.4* 10.9* 9.6  9.6   PROT 7.5  --   --   --   --    ALBUMIN 3.5  --   --   --   --    BILITOT 0.4  --   --   --   --    ALKPHOS 77  --   --   --   --    AST 14  --   --   --   --    ALT 8*  --   --   --    --    ANIONGAP 10   < > 11  11 14 11  11   ESTGFRAFRICA >60   < > >60  >60 >60 >60  >60   EGFRNONAA >60   < > >60  >60 58* >60  >60    < > = values in this interval not displayed.   , CBC   Recent Labs   Lab 10/31/19  1510  10/31/19  2007 11/01/19  0320   WBC 19.27*  --  12.26 12.00  12.00   HGB 10.1*  --  11.6* 11.3*  11.3*   HCT 30.3*   < > 34.2* 34.1*  34.1*   *  --  78* 82*  82*    < > = values in this interval not displayed.   , Troponin No results for input(s): TROPONINI in the last 48 hours. and All pertinent lab results from the last 24 hours have been reviewed.    Significant Imaging: Echocardiogram:   2D echo with color flow doppler:   Results for orders placed or performed during the hospital encounter of 10/28/19   2D echo with color flow doppler   Result Value Ref Range    QEF 30 (A) 55 - 65    Mitral Valve Regurgitation MILD TO MODERATE     Diastolic Dysfunction Yes (A)     Aortic Valve Regurgitation MILD     Mitral Valve Mobility NORMAL     Narrative    Date of Procedure: 10/28/2019        TEST DESCRIPTION   Technical Quality: This is a technically adequate study.     Aorta: The aortic root is normal in size, measuring 3.0 cm at sinotubular junction and 3.2 cm at Sinuses of Valsalva. The proximal ascending aorta is normal in size, measuring 3.1 cm across.     Left Atrium: The left atrial volume index is severely enlarged, measuring 52.97 cc/m2.     Left Ventricle: The left ventricle is normal in size, with an end-diastolic diameter of 4.1 cm, and an end-systolic diameter of 3.7 cm. Septal wall thickness is mildly increased, with the septum measuring 1.6 cm and the posterior wall measuring 1.3 cm   across. Relative wall thickness was increased at 0.63, and the LV mass index was increased at 160.6 g/m2 consistent with concentric left ventricular hypertrophy. The anterior septum is severely hypokinetic. There is global hypokinesis. Left ventricular   systolic function appears moderately  depressed. Visually estimated ejection fraction is 30-35%. The LV Doppler derived stroke volume equals 57.0 ccs.     Diastolic indices: E wave velocity 1.1 m/s, E/A ratio 0.9,  msec., E/e' ratio(avg) 16. There is pseudonormalization of mitral inflow pattern consistent with significant diastolic dysfunction.     Right Atrium: The right atrium is normal in size, measuring 3.7 cm in length and 2.8 cm in width in the apical view.     Right Ventricle: The right ventricle is normal in size. Global right ventricular systolic function appears normal. Tricuspid annular plane systolic excursion (TAPSE) is 2.2 cm.     Aortic Valve:  The aortic valve is normal in structure with normal leaflet mobility. The mean gradient obtained across the aortic valve is 6 mmHg. Additionally, there is mild aortic regurgitation.     Mitral Valve:  The mitral valve is normal in structure with normal leaflet mobility. The pressure half time is 45 msec. The calculated mitral valve area is 4.89 cm2. There is mild to moderate mitral regurgitation.     Tricuspid Valve:  The tricuspid valve is normal in structure with normal leaflet mobility.     Pulmonary Valve:  The pulmonic valve is not well seen.     IVC: IVC is normal in size and collapses > 50% with a sniff, suggesting normal right atrial pressure of 3 mmHg.     Intracavitary: There is no evidence of pericardial effusion, intracavity mass, thrombi, or vegetation.         CONCLUSIONS     1 - Moderately depressed left ventricular systolic function (EF 30-35%).     2 - Impaired LV relaxation, elevated LAP (grade 2 diastolic dysfunction).     3 - Normal right ventricular systolic function .     4 - Mild aortic regurgitation.     5 - Severe left atrial enlargement.     6 - Concentric hypertrophy.     7 - Mild to moderate mitral regurgitation.             This document has been electronically    SIGNED BY: Terry Tarango MD On: 10/28/2019 16:38    and X-Ray: CXR: X-Ray Chest 1 View (CXR): No  results found for this visit on 10/28/19. and X-Ray Chest PA and Lateral (CXR):   Results for orders placed or performed during the hospital encounter of 10/28/19   X-Ray Chest PA And Lateral    Narrative    EXAMINATION:  XR CHEST PA AND LATERAL    CLINICAL HISTORY:  Pre Op;    COMPARISON:  10/28/2019    FINDINGS:  Cardiac silhouette is normal. Aorta demonstrates atherosclerotic disease. The lungs demonstrate no evidence of active disease.  No evidence of pleural effusion or pneumothorax.  Bones appear intact.      Impression    No acute process seen.      Electronically signed by: Jh Anne MD  Date:    10/28/2019  Time:    21:38     Assessment and Plan:       Acute blood loss anemia  -Patient transfused 3 units in the OR and additional 3 units in ICU    S/P CABG x 3  -Patient is s/p CABG x3 with LIMA to LAD reverse saphenous vein graft to the RCA and reverse saphenous vein graft to the ramus.  -Continue post CABG supportive therapy  -Continue ASA, Plavix, Statin, BB  -Will continue to follow along     11/1/19  -POD #1 s/p CABG x3  -Continue ASA, Statin, BB  -Remains on Epi and Milrinone gtt today  -Mgmt per CT surgery  -Will follow along    Acute combined systolic and diastolic CHF, NYHA class 2  -Presents with ICM/acute combined CHF  -EF 30-35% by 2D echo  -Continue diuresis with IV Lasix  -Continue BB  -Will add ARB if BP permits    Chest pain  -See plan under NSTEMI    Abnormal EKG  -LBBB noted on EKG  -See plan under NSTEMI    NSTEMI (non-ST elevated myocardial infarction)  -Patient presented with intermittent chest tightness/heaviness that radiated to her back since Saturday evening  -Associated with JONES/orthopnea  -Initial troponin 3.665  -EKG showed SR with new LBBB  -Presentation consistent with NSTEMI  -Continue ASA, statin  -Start Toprol XL  -Start heparin gtt  -Check 2D echo  -Continue to trend troponin  -Keep NPO. C today for further evaluation and PCI if indicated. All risks, benefits, and  treatment alternatives explained to patient in detail. All questions answered. She has agreed to proceed. Further rec's to follow.    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 All risks, benefits, and treatment alternatives explained to patient in detail. All questions answered. She has agreed to proceed.    10/31/19  -Patient is s/p CABG x3.   See plan for CABG     Hyperlipidemia associated with type 2 diabetes mellitus  -Continue statin    Obesity (BMI 30.0-34.9)  -Weight loss    Diabetes mellitus type II, uncontrolled  -Mgmt as per hospital medicine    Hypertension associated with diabetes  -Continue home meds as BP permits        VTE Risk Mitigation (From admission, onward)         Ordered     IP VTE LOW RISK PATIENT  Once      10/30/19 1405     Place NATHANIEL hose  Until discontinued      10/30/19 1405     Place sequential compression device  Until discontinued      10/30/19 1405     Reason for no Mechanical VTE Prophylaxis  Once      10/28/19 1414     heparin 25,000 units in dextrose 5% 250 mL (100 units/mL) infusion LOW INTENSITY nomogram - [facility]  Continuous      10/28/19 1257                KOLE Faulkner  Cardiology  [facility]

## 2019-11-01 NOTE — PT/OT/SLP PROGRESS
Occupational Therapy      Patient Name:  Mateusz Bates   MRN:  [id number]    Patient not seen today secondary per nurse Alesha Blevins. Pt is still having balloon pump and is not ready for therapy. Eval initiated via chart review. Will follow-up at next visit.    Flavia Vaughn OT  11/1/2019

## 2019-11-01 NOTE — SUBJECTIVE & OBJECTIVE
Review of Systems   Constitutional: Positive for malaise/fatigue. Negative for chills and fever.   HENT: Negative for congestion.    Eyes: Negative for blurred vision.   Respiratory: Positive for cough. Negative for sputum production and shortness of breath.    Cardiovascular: Positive for chest pain (post op). Negative for leg swelling.   Gastrointestinal: Negative for abdominal pain, nausea and vomiting.   Genitourinary: Negative for dysuria.   Musculoskeletal: Positive for back pain. Negative for myalgias.   Skin: Negative for rash.   Neurological: Negative for dizziness, weakness and headaches.   Endo/Heme/Allergies: Does not bruise/bleed easily.   Psychiatric/Behavioral: The patient is nervous/anxious.          Objective:     Vital Signs (Most Recent):  Temp: 98.8 °F (37.1 °C) (11/01/19 1000)  Pulse: 91 (11/01/19 1159)  Resp: (!) 22 (11/01/19 1159)  BP: 96/69 (11/01/19 0900)  SpO2: 97 % (11/01/19 1159) Vital Signs (24h Range):  Temp:  [97 °F (36.1 °C)-100.3 °F (37.9 °C)] 98.8 °F (37.1 °C)  Pulse:  [89-92] 91  Resp:  [9-30] 22  SpO2:  [82 %-100 %] 97 %  BP: ()/() 96/69     Weight: 76.2 kg (167 lb 15.9 oz)  Body mass index is 31.74 kg/m².      Intake/Output Summary (Last 24 hours) at 11/1/2019 1235  Last data filed at 11/1/2019 1200  Gross per 24 hour   Intake 05685.54 ml   Output 2490 ml   Net 8370.54 ml       Physical Exam   Constitutional: She is oriented to person, place, and time. She appears well-developed and well-nourished. She is cooperative.  Non-toxic appearance. She does not have a sickly appearance. She appears ill. No distress. Nasal cannula in place.   HENT:   Head: Normocephalic and atraumatic.   Mouth/Throat: Oropharynx is clear and moist and mucous membranes are normal.   Eyes: Pupils are equal, round, and reactive to light. EOM and lids are normal.   Neck: Trachea normal and full passive range of motion without pain. Carotid bruit is not present.       Cardiovascular: Normal rate,  regular rhythm, normal heart sounds and normal pulses.   Pulses:       Radial pulses are 2+ on the right side, and 2+ on the left side.        Dorsalis pedis pulses are 2+ on the right side, and 2+ on the left side.   Pulmonary/Chest: Effort normal. No accessory muscle usage. No tachypnea. No respiratory distress. She has decreased breath sounds in the right lower field and the left lower field.       Abdominal: Soft. She exhibits no distension. Bowel sounds are decreased. There is no tenderness.   Genitourinary:   Genitourinary Comments: Jones in place   Musculoskeletal:        Right foot: There is no deformity.        Left foot: There is no deformity.   No edema   Lymphadenopathy:     She has no cervical adenopathy.   Neurological: She is alert and oriented to person, place, and time.   Skin: Skin is warm and dry. Capillary refill takes less than 2 seconds. No rash noted. No cyanosis.        Psychiatric: She has a normal mood and affect. Her speech is normal and behavior is normal. Judgment and thought content normal. Cognition and memory are normal.       Vents:  Vent Mode: Spont (11/01/19 0303)  Ventilator Initiated: Yes (10/31/19 1508)  Set Rate: 0 bmp (11/01/19 0303)  Vt Set: 430 mL (11/01/19 0303)  Pressure Support: 6 cmH20 (11/01/19 0303)  PEEP/CPAP: 5 cmH20 (11/01/19 0303)  Oxygen Concentration (%): 70 (11/01/19 1159)  Peak Airway Pressure: 12 cmH2O (11/01/19 0303)  Plateau Pressure: 0 cmH20 (11/01/19 0303)  Total Ve: 12 mL (11/01/19 0303)  Negative Inspiratory Force (cm H2O): 26 (11/01/19 0250)  F/VT Ratio<105 (RSBI): (!) 43.8 (11/01/19 0100)    Lines/Drains/Airways     Central Venous Catheter Line                 Introducer 10/31/19 0745 left internal jugular 1 day         Pulmonary Artery Catheter Assessment  10/31/19 right internal jugular 1 day          Drain                 Chest Tube 10/31/19 1234 3 Right Pleural 24 Fr. 1 day         Closed/Suction Drain 10/31/19 0951 Right Leg Bulb 19 Fr. 1 day          Closed/Suction Drain 10/31/19 0952 Right Leg Bulb 19 Fr. 1 day         Urethral Catheter 10/30/19 1716 Temperature probe 1 day         Y Chest Tube 1 and 2 10/31/19 1233 1 Right Mediastinal 24 Fr. 2 Left Mediastinal 24 Fr. 1 day          Arterial Line                 Arterial Line 10/31/19 0723 Right Radial 1 day          Line                 IABP 10/30/19 1400 1 day          Peripheral Intravenous Line                 Peripheral IV - Single Lumen 10/28/19 1322 20 G Right Hand 3 days         Peripheral IV - Single Lumen 10/30/19 1715 20 G Anterior;Left Forearm 1 day                Significant Labs:    CBC/Anemia Profile:  Recent Labs   Lab 10/31/19  2007 11/01/19  0320 11/01/19  1126   WBC 12.26 12.00  12.00 14.27*   HGB 11.6* 11.3*  11.3* 10.7*   HCT 34.2* 34.1*  34.1* 31.6*   PLT 78* 82*  82* 76*   MCV 85 87  87 85   RDW 14.7* 14.8*  14.8* 15.1*        Chemistries:  Recent Labs   Lab 10/30/19  1425 10/31/19  0326 10/31/19  1510 10/31/19  2007 11/01/19  0320 11/01/19  1126    133* 143  143 142 140  140 142   K 4.5 4.4 4.0  4.0 4.0 4.3  4.3 3.9   CL 99 100 111*  111* 106 107  107 107   CO2 28 22* 21*  21* 22* 22*  22* 22*   BUN 15 20 17  17 16 16  16 17   CREATININE 0.8 0.8 0.9  0.9 1.0 0.9  0.9 0.9   CALCIUM 9.9 9.7 8.4*  8.4* 10.9* 9.6  9.6 9.2   ALBUMIN 3.5  --   --   --   --   --    PROT 7.5  --   --   --   --   --    BILITOT 0.4  --   --   --   --   --    ALKPHOS 77  --   --   --   --   --    ALT 8*  --   --   --   --   --    AST 14  --   --   --   --   --    MG 2.5 2.3 2.0 1.7 1.6  --    PHOS  --  4.0  --   --  5.7*  --        ABGs:   Recent Labs   Lab 11/01/19  0300   PH 7.440   PCO2 34.1*   HCO3 23.2*   POCSATURATED 93*   BE -1     Coagulation:   Recent Labs   Lab 11/01/19  0320   INR 1.1   APTT 40.7*     POCT Glucose:   Recent Labs   Lab 11/01/19  1015 11/01/19  1122 11/01/19  1212   POCTGLUCOSE 152* 150* 170*     All pertinent labs within the past 24 hours have been  reviewed.    Significant Imaging:  CXR: I have reviewed all pertinent results/findings within the past 24 hours and my personal findings are:  no change from prior film

## 2019-11-01 NOTE — PLAN OF CARE
S/P CAB x 3.  Extubated at 0300, unable to maintain adequate O2 sats with nasal cannula or venturi mask, vapotherm applied at 40L/90% with oxygen sats high 90's.  100% AV paced with epicardial pacemaker all shift. On Epi and Milrinone for hemodynamic status, Epi titrated as needed to maintain CI of > 2.2. Insulin infusion per nomogram.  Rec'd 500 ml Albumin and 500ml LR this shift. Incision vac ot midsternal incision, + seal noted.  IABP @ 1:1 with full augmentation to L groin.  Site WDL. Doppler pulses to L foot.  CT x3, with moderate amount of bleeding noted.  NICOLASA x2 with heavy bloody drainage to NICOLASA #2- MD made aware on rounds this am. UOP tapered off towards end of shift despite fluids, MD aware. Frequent c/o pain and noted increased restlessness.  Pain meds IV and PO given. Sedation restarted at shift change due to restlessness.  Hemodynamically stable. Safety maintained.

## 2019-11-01 NOTE — ASSESSMENT & PLAN NOTE
Received 3 units PRBCs intra-op and Hct 27 post op  Transfused 2 more PRBCs per CVT surgery post op yesterday  Follow up CBC  Monitor chest tube output

## 2019-11-01 NOTE — ASSESSMENT & PLAN NOTE
Contributing Nutrition Diagnosis  Altered nutrition related lab values    Related to (etiology):   Altered absorption of nutrients     Signs and Symptoms (as evidenced by):   Elevated glucose/ A1C    Interventions/Recommendations (treatment strategy):  Above    Nutrition Diagnosis Status:   New

## 2019-11-01 NOTE — HOSPITAL COURSE
11/01/2019  The patient is post-operative day 1, status-post coronary artery bypass grafting x 3.     11/02/2019  The patient is post-operative day 2, status-post coronary artery bypass grafting x 3.     11/03/2019  The patient is post-operative day 3, status-post coronary artery bypass grafting x 3.     11/04/2019  The patient is post-operative day 4, status-post coronary artery bypass grafting x 3.     11/05/2019  The patient is post-operative day 5, status-post coronary artery bypass grafting x 3.     11/06/2019  The patient is post-operative day 6, status-post coronary artery bypass grafting x 3.     11/07/2019  The patient is post-operative day 7, status-post coronary artery bypass grafting x 3.    11/08/2019  The patient is post-operative day 8, status-post coronary artery bypass grafting x 3.    11/09/2019    The patient is postop day 9.  Status post coronary artery bypass grafting x3.    11/10/2019  The patient is postop day 10.  Status post coronary artery bypass grafting x3.    11/11/2019  The patient is post-operative day 11.  Status post coronary artery bypass grafting x3.    11/12/2019  The patient is post-operative day 12.  Status post coronary artery bypass grafting x3.    11/13/2019  The patient is post-operative day 13.  Status post coronary artery bypass grafting x3.    11/14/2019  The patient is post-operative day 14.  Status post coronary artery bypass grafting x3.    11/15/2019  The patient is post-operative day 15.  Status post coronary artery bypass grafting x3.    11/16/2019  The patient is postop day 16.  Status post coronary artery bypass grafting x3.    11/17/2019  The patient is postop day 17.  Status post coronary artery bypass grafting x3    11/18/2019  The patient is post-operative day 18.  Status post coronary artery bypass grafting x3.    11/19/2019  The patient is post-operative day 19.  Status post coronary artery bypass grafting x3.    11/20/2019  The patient is post-operative day  20.  Status post coronary artery bypass grafting x3.

## 2019-11-01 NOTE — CONSULTS
"  [facility] -   Adult Nutrition  Consult Note    SUMMARY    intervention: carbohydrate/fat/sodium modified diet    Recommendation:   1) Change diet to DM 1500 kcal, cardiac diet- fluid restriction per MD   2) NFPE and nutrition education at f/u   3) Weigh pt weekly or as needed    Goals: 1) PO intakes > 50% of meals at f/u  Nutrition Goal Status: new  Communication of RD Recs: (POC, sticky note, second sign )    Reason for Assessment    Reason For Assessment: consult  Diagnosis: (CAD)  Relevant Medical History: DM2, HTN, HLD, GERD, anxiety  Interdisciplinary Rounds: did not attend    General Information Comments: Remote RD note: 68 y/o female s/p NSTEMI and POD 1 CABG x 3. Eating well priot to sx. Just extubated after sx. today, no PO intakes recorded. NFPE and nutrition education to be completed by on-site RD at f/u. Left nutrition education materials in discharge packet. Wt gain since 10/28/19 likely r/t fluid shifts as pt with CHF.     Nutrition Discharge Planning: To be determined- DM 1500 kcal, cardaic diet    Nutrition Risk Screen    Nutrition Risk Screen: no indicators present    Nutrition/Diet History    Spiritual, Cultural Beliefs, Caodaism Practices, Values that Affect Care: no  Food Allergies: NKFA  Factors Affecting Nutritional Intake: None identified at this time    Anthropometrics    Temp: 99.3 °F (37.4 °C)  Height Method: Measured(offcie 19)  Height: 5' 1"  Height (inches): 61 in  Weight Method: Standard Scale(10/29/19)  Weight: 75.3 kg (166 lb 0.1 oz)  Weight (lb): 166.01 lb  Ideal Body Weight (IBW), Female: 105 lb  % Ideal Body Weight, Female (lb): 158.1 lb  BMI (Calculated): 31.4  BMI Grade: 30 - 34.9- obesity - grade I  Usual Body Weight (UBW), k.6 kg  % Usual Body Weight: 106.88  % Weight Change From Usual Weight: 6.66 %       Lab/Procedures/Meds    Pertinent Labs Reviewed: reviewed  BMP  Lab Results   Component Value Date     2019    K 3.9 2019    CL " 107 11/01/2019    CO2 22 (L) 11/01/2019    BUN 17 11/01/2019    CREATININE 0.9 11/01/2019    CALCIUM 9.2 11/01/2019    ANIONGAP 13 11/01/2019    ESTGFRAFRICA >60 11/01/2019    EGFRNONAA >60 11/01/2019     Lab Results   Component Value Date    HGBA1C 8.3 (H) 10/30/2019     POCT Glucose   Date Value Ref Range Status   11/01/2019 161 (H) 70 - 110 mg/dL Final   11/01/2019 170 (H) 70 - 110 mg/dL Final   11/01/2019 150 (H) 70 - 110 mg/dL Final   11/01/2019 152 (H) 70 - 110 mg/dL Final   11/01/2019 157 (H) 70 - 110 mg/dL Final   11/01/2019 179 (H) 70 - 110 mg/dL Final   11/01/2019 202 (H) 70 - 110 mg/dL Final   11/01/2019 234 (H) 70 - 110 mg/dL Final   11/01/2019 229 (H) 70 - 110 mg/dL Final   11/01/2019 234 (H) 70 - 110 mg/dL Final   11/01/2019 253 (H) 70 - 110 mg/dL Final   11/01/2019 268 (H) 70 - 110 mg/dL Final   10/31/2019 321 (H) 70 - 110 mg/dL Final   10/31/2019 310 (H) 70 - 110 mg/dL Final   10/31/2019 303 (H) 70 - 110 mg/dL Final   10/31/2019 275 (H) 70 - 110 mg/dL Final   10/31/2019 280 (H) 70 - 110 mg/dL Final   10/31/2019 252 (H) 70 - 110 mg/dL Final   10/31/2019 250 (H) 70 - 110 mg/dL Final   10/31/2019 237 (H) 70 - 110 mg/dL Final   10/31/2019 191 (H) 70 - 110 mg/dL Final   10/31/2019 125 (H) 70 - 110 mg/dL Final   10/30/2019 221 (H) 70 - 110 mg/dL Final   10/30/2019 118 (H) 70 - 110 mg/dL Final   10/30/2019 171 (H) 70 - 110 mg/dL Final   10/30/2019 300 (H) 70 - 110 mg/dL Final   10/30/2019 252 (H) 70 - 110 mg/dL Final   10/29/2019 323 (H) 70 - 110 mg/dL Final   10/29/2019 229 (H) 70 - 110 mg/dL Final     Lab Results   Component Value Date    CHOL 181 10/28/2019    CHOL 189 10/04/2019    CHOL 255 (H) 11/14/2018     Lab Results   Component Value Date    HDL 41 10/28/2019    HDL 42 10/04/2019    HDL 45 11/14/2018     Lab Results   Component Value Date    LDLCALC 124.4 10/28/2019    LDLCALC 123.2 10/04/2019    LDLCALC 171.0 (H) 11/14/2018     Lab Results   Component Value Date    TRIG 78 10/28/2019    TRIG 119  10/04/2019    TRIG 195 (H) 11/14/2018     Lab Results   Component Value Date    CHOLHDL 22.7 10/28/2019    CHOLHDL 22.2 10/04/2019    CHOLHDL 17.6 (L) 11/14/2018       Pertinent Medications Reviewed: reviewed  Pertinent Medications Comments: statin, docusate, Mg sulfate, polyethylene glycol, KCl, insulin    Estimated/Assessed Needs    Weight Used For Calorie Calculations: 70.6 kg (155 lb 10.3 oz)(estimated dry weight)  Energy Calorie Requirements (kcal): [place] ( x 1.2 as BMI with dry weight 29 kg/m2) = 1401 kcal  Energy Need Method: [place] Jeor  Protein Requirements: 1.1 g protein/kg ( age) = 77 g protein  Weight Used For Protein Calculations: 70.6 kg (155 lb 10.3 oz)  Fluid Requirements (mL): 1500 ml  Estimated Fluid Requirement Method: RDA Method(with CHF)  CHO Requirement: 157-175 g      Nutrition Prescription Ordered    Current Diet Order: DM 2000 kcal, cardiac    Evaluation of Received Nutrient/Fluid Intake    Energy Calories Required: not meeting needs  Protein Required: not meeting needs  Tolerance: tolerating  % Intake of Estimated Energy Needs: 0-100%  % Meal Intake: 0-75% x 4 days     Nutrition Risk    Level of Risk/Frequency of Follow-up: moderate 2 x weekly     Assessment and Plan    Diabetes mellitus type II, uncontrolled  Contributing Nutrition Diagnosis  Altered nutrition related lab values    Related to (etiology):   Altered absorption of nutrients     Signs and Symptoms (as evidenced by):   Elevated glucose/ A1C    Interventions/Recommendations (treatment strategy):  Above    Nutrition Diagnosis Status:   New           Monitor and Evaluation    Food and Nutrient Intake: energy intake, food and beverage intake  Food and Nutrient Adminstration: diet order  Anthropometric Measurements: weight  Biochemical Data, Medical Tests and Procedures: electrolyte and renal panel, glucose/endocrine profile, lipid profile  Nutrition-Focused Physical Findings: overall appearance     Malnutrition  Assessment     Skin (Micronutrient): (ana = 15, chest and leg incisions)  Teeth (Micronutrient): (missing some)   Micronutrient Evaluation: suspected deficiency  Micronutrient Evaluation Comments: check iron               Edema (Fluid Accumulation): (1-2+)             Nutrition Follow-Up    RD Follow-up?: Yes

## 2019-11-01 NOTE — PROGRESS NOTES
[facility] -   Cardiothoracic Surgery  Progress Note    Patient Name: Mateusz Bates  MRN: [id number]  Admission Date: 10/28/2019  Hospital Length of Stay: 4 days  Code Status: Full Code   Attending Physician: Shravan Lam MD   Referring Provider: Self, Aaareferral  Principal Problem:CAD, multiple vessel            Subjective:     Post-Op Info:  Procedure(s) (LRB):  CORONARY ARTERY BYPASS GRAFT (CABG) (N/A)  ECHOCARDIOGRAM,TRANSESOPHAGEAL (N/A)  BLOCK, NERVE, INTERCOSTAL, 2 OR MORE (N/A)   1 Day Post-Op     Interval History:  11/01/2019  The patient is post-operative day 1, status-post coronary artery bypass grafting x 3.     ROS  Medications:  Continuous Infusions:   dexmedetomidine (PRECEDEX) infusion 0.3 mcg/kg/hr (11/01/19 1600)    epinephrine 0.03 mcg/kg/min (11/01/19 1615)    insulin (HUMAN R) infusion (adults) 4.5 Units/hr (11/01/19 1600)    milrinone 20mg/100ml D5W (200mcg/ml) 0.375 mcg/kg/min (11/01/19 1630)    niCARdipine Stopped (10/31/19 1824)    vasopressin (PITRESSIN) infusion       Scheduled Meds:   aspirin  81 mg Oral Daily    atorvastatin  80 mg Oral QHS    chlorhexidine  10 mL Mouth/Throat BID    docusate sodium  100 mg Oral BID    furosemide  40 mg Intravenous BID    furosemide  40 mg Intravenous Once    magnesium sulfate IVPB  2 g Intravenous Once    metOLazone  5 mg Oral Once    metoprolol tartrate  25 mg Oral BID    nozaseptin   Each Nostril BID    pantoprazole  40 mg Oral Daily    polyethylene glycol  17 g Oral Daily    potassium chloride  20 mEq Oral Q12H     PRN Meds:sodium chloride, albumin human 5%, albuterol sulfate, chlorhexidine, Dextrose 10% Bolus, Dextrose 10% Bolus, fentaNYL, fentaNYL, insulin (HUMAN R) infusion (adults), lactated ringers, magnesium sulfate IVPB, magnesium sulfate IVPB, metoclopramide HCl, metoprolol tartrate, nozaseptin, ondansetron, oxyCODONE, oxyCODONE, potassium chloride in water **AND** potassium chloride in water **AND**  potassium chloride in water     Objective:     Vital Signs (Most Recent):  Temp: 99.3 °F (37.4 °C) (11/01/19 1600)  Pulse: 90 (11/01/19 1630)  Resp: (!) 21 (11/01/19 1630)  BP: (!) 111/49 (11/01/19 1500)  SpO2: 95 % (11/01/19 1630) Vital Signs (24h Range):  Temp:  [98.5 °F (36.9 °C)-100.3 °F (37.9 °C)] 99.3 °F (37.4 °C)  Pulse:  [89-92] 90  Resp:  [19-30] 21  SpO2:  [82 %-98 %] 95 %  BP: ()/() 111/49     Weight: 75.3 kg (166 lb 0.1 oz)  Body mass index is 31.37 kg/m².    SpO2: 95 %  O2 Device (Oxygen Therapy): Vapotherm    Intake/Output - Last 3 Shifts       10/30 0700 - 10/31 0659 10/31 0700 - 11/01 0659 11/01 0700 - 11/02 0659    P.O.   160    I.V. (mL/kg) 1643.2 (23.3) 6874 (90.2)     Blood  4396.5     IV Piggyback 17 450     Total Intake(mL/kg) 1660.2 (23.5) 38646.5 (153.8) 160 (2.1)    Urine (mL/kg/hr) 792 (0.5) 842 (0.5) 543 (0.7)    Drains  310     Chest Tube  622 369    Total Output 792 1774 912    Net +868.2 +9946.5 -752           Urine Occurrence 1 x            Lines/Drains/Airways     Central Venous Catheter Line                 Introducer 10/31/19 0745 left internal jugular 1 day         Pulmonary Artery Catheter Assessment  10/31/19 right internal jugular 1 day          Drain                 Urethral Catheter 10/30/19 1716 Temperature probe 2 days         Chest Tube 10/31/19 1234 3 Right Pleural 24 Fr. 1 day         Closed/Suction Drain 10/31/19 0951 Right Leg Bulb 19 Fr. 1 day         Closed/Suction Drain 10/31/19 0952 Right Leg Bulb 19 Fr. 1 day         Y Chest Tube 1 and 2 10/31/19 1233 1 Right Mediastinal 24 Fr. 2 Left Mediastinal 24 Fr. 1 day          Arterial Line                 Arterial Line 10/31/19 0723 Right Radial 1 day          Line                 IABP 10/30/19 1400 2 days          Peripheral Intravenous Line                 Peripheral IV - Single Lumen 10/28/19 1322 20 G Right Hand 4 days         Peripheral IV - Single Lumen 10/30/19 1715 20 G Anterior;Left Forearm 2 days                 Physical Exam   Constitutional: She is oriented to person, place, and time. She appears well-developed and well-nourished. No distress.   HENT:   Head: Normocephalic and atraumatic.   Eyes: Pupils are equal, round, and reactive to light. EOM are normal.   Neck: Normal range of motion. Neck supple. No JVD present.   Cardiovascular: Normal rate, regular rhythm, normal heart sounds and intact distal pulses. Exam reveals no gallop and no friction rub.   No murmur heard.  IABP in place to the left groin. Paced DDD at 90 bpm,NSR via external pacemaker.    Pulmonary/Chest: Effort normal. No stridor. No respiratory distress. She has no wheezes. She has rales. She exhibits tenderness.   Coarse breath sounds generalized.    Abdominal: Soft. She exhibits no distension and no mass. There is no tenderness. There is no rebound and no guarding. No hernia.   Hypoactive bowel sounds, POD 1.    Musculoskeletal: Normal range of motion. She exhibits edema. She exhibits no tenderness or deformity.   1+ pitting edema to the KRISTINE LE. KRISTINE hands and face appear swollen.    Neurological: She is alert and oriented to person, place, and time. She displays normal reflexes. No cranial nerve deficit or sensory deficit. She exhibits normal muscle tone. Coordination normal.   Skin: Skin is warm and dry. Capillary refill takes less than 2 seconds. No rash noted. She is not diaphoretic. No erythema. No pallor.   Psychiatric: She has a normal mood and affect. Her behavior is normal. Judgment and thought content normal.   Nursing note and vitals reviewed.      Significant Labs:  BMP:   Recent Labs   Lab 11/01/19  1608   *      K 4.4      CO2 23   BUN 18   CREATININE 0.9   CALCIUM 9.2   MG 2.6     CBC:   Recent Labs   Lab 11/01/19  1126   WBC 14.27*   RBC 3.72*   HGB 10.7*   HCT 31.6*   PLT 76*   MCV 85   MCH 28.8   MCHC 33.9       Significant Diagnostics:  I have reviewed all pertinent imaging results/findings within the  past 24 hours.    Assessment/Plan:     S/P CABG x 3       11/01/2019  The patient is post-operative day 1, status-post coronary artery bypass grafting x 3.  Overall the patient is making progress, however IABP and inotropic agents will continue today.   Neuro:  The patient is awake alert and oriented x3.  Neuro exam is nonfocal.  Pain is well controlled with IV pain medication.  Cardiac:  Patient is hemodynamically improving. Patient remains on epinephrine and milrinone gtts, which will be weaned over the next 24 hours.  Cardiac index is improving. IABP will remain in place over the next 24 hours.  Patient will be started on beta blockers once gtts are weaned off.  Respiratory: Good sats on high flow nasal cannula. Continue respiratory toilet, continue incentive spirometer. Wean to low flow nasal cannula.  GI:  Diet: sips of water with medications, will slowly advance diet as tolerated. Benign abdominal exam.  Renal:  Urine output is slowly improving. Cr 0.9. K 4.3. Mag 1.6. Replace mag. Continue IV diuresis. Add metolazone.   Heme:  Hct 34.1  Platelet  82. Continue aspirin. Discontinue Plavix for now.  Id:  WBC 12. Tmax 100.3. Continue post-operative antibiotics. Will continue to follow.   Endocrine:  Glucose is controlled with insulin gtt.  Continue insulin gtt for now, until weaned off inotropic agents.   Activities:  Patient is currently bed bound due to IABP.  Advance activities as tolerated once IABP is removed.   Lines tubes and drains:  Continue IABP. Continue [place] and Cord[place].  Continue A-line.  Chest tubes will continue. NICOLASA x 2 will continue. Jones catheter will continue.  Continue pacer wires. Continue prevena wound vac.  Plan: Remain ICU.            NSTEMI (non-ST elevated myocardial infarction)  The patient is a 69-year-old female with hypertension, diabetes, hyperlipidemia, obesity, anxiety and depression, iron deficiency anemia, vitamin B12 deficiency, who presented to the emergency room with an  NSTEMI.  Workup included a cardiac catheterization that shows severe multivessel coronary artery disease with proximal LAD stenosis of 70% and severely depressed ejection fraction of 20%.  The patient is a candidate for urgent coronary artery bypass grafting.  The risks and benefits of surgery were explained to the patient.  The patient verbalized understanding of the issues discussed.  She a segs the risks of surgery and has agreed to proceed with urgent coronary artery bypass grafting.  She understands that the risk of neurologic complication postoperatively is increased due to the presence of cerebral microvascular disease.        Yovani Dobbins NP  Cardiothoracic Surgery  [facility] [place]

## 2019-11-01 NOTE — ASSESSMENT & PLAN NOTE
-Troponin peaked at 3.66 now trending down.   -Heparin, metoprolol, ASA and statin.   -Hold ACE inhibitor due to intolerance with cough. Hold ARB as patient's blood pressure is marginal. Will consider adding, if her blood pressure will tolerate.  Hold second antiplatelet due to pending Cardiology procedure.  Patient was evaluated for Cardiac Rehab and is not a candidate at this time, but will be referred, when appropriate.  LHC on 28 on October showed severe multi-vessel CAD.  Referral to CVT Surgery who recommended CABG planned for Thursday 31 October.  Balloon pump placed 30 October.  CABG x3 by Dr. Zaidi on 31 October.

## 2019-11-01 NOTE — PT/OT/SLP PROGRESS
Physical Therapy      Patient Name:  Mateusz Bates   MRN:  [id number]    PT eval attempted at 8:40am, pt on hold per Nurse Blevins secondary to pt still having balloon pump and not ready for therapy at this time. PT eval initiated through chart review, will attempt tx at later date/ time.     Iza Vieyra, PT/OT   11/1/2019

## 2019-11-01 NOTE — ASSESSMENT & PLAN NOTE
-Patient presented with intermittent chest tightness/heaviness that radiated to her back since Saturday evening  -Associated with JONES/orthopnea  -Initial troponin 3.665  -EKG showed SR with new LBBB  -Presentation consistent with NSTEMI  -Continue ASA, statin  -Start Toprol XL  -Start heparin gtt  -Check 2D echo  -Continue to trend troponin  -Keep NPO. [facility] today for further evaluation and PCI if indicated. All risks, benefits, and treatment alternatives explained to patient in detail. All questions answered. She has agreed to proceed. Further rec's to follow.    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 All risks, benefits, and treatment alternatives explained to patient in detail. All questions answered. She has agreed to proceed.    10/31/19  -Patient is s/p CABG x3.   See plan for CABG

## 2019-11-01 NOTE — ASSESSMENT & PLAN NOTE
11/01/2019  The patient is post-operative day 1, status-post coronary artery bypass grafting x 3.  Overall the patient is making progress, however IABP and inotropic agents will continue today.   Neuro:  The patient is awake alert and oriented x3.  Neuro exam is nonfocal.  Pain is well controlled with IV pain medication.  Cardiac:  Patient is hemodynamically improving. Patient remains on epinephrine and milrinone gtts, which will be weaned over the next 24 hours.  Cardiac index is improving. IABP will remain in place over the next 24 hours.  Patient will be started on beta blockers once gtts are weaned off.  Respiratory: Good sats on high flow nasal cannula. Continue respiratory toilet, continue incentive spirometer. Wean to low flow nasal cannula.  GI:  Diet: sips of water with medications, will slowly advance diet as tolerated. Benign abdominal exam.  Renal:  Urine output is slowly improving. Cr 0.9. K 4.3. Mag 1.6. Replace mag. Continue IV diuresis. Add metolazone.   Heme:  Hct 34.1  Platelet 82. Continue aspirin. Discontinue Plavix for now.  Id:  WBC 12. Tmax 100.3. Continue post-operative antibiotics. Will continue to follow.   Endocrine:  Glucose is controlled with insulin gtt.  Continue insulin gtt for now, until weaned off inotropic agents.   Activities:  Patient is currently bed bound due to IABP.  Advance activities as tolerated once IABP is removed.   Lines tubes and drains:  Continue IABP. Continue [place] and Cordis.  Continue A-line.  Chest tubes will continue. NICOLASA x 2 will continue. Jones catheter will continue.  Continue pacer wires. Continue prevena wound vac.  Plan: Remain ICU.

## 2019-11-01 NOTE — ASSESSMENT & PLAN NOTE
Likely due to post op atelectasis  Extubated overnight  Cont Vapotherm and wean as tolerated  Encourage C&DB and IS use  OOB asap once IABP removed  CXR in AM

## 2019-11-01 NOTE — SUBJECTIVE & OBJECTIVE
Interval History:  11/01/2019  The patient is post-operative day 1, status-post coronary artery bypass grafting x 3.     ROS  Medications:  Continuous Infusions:   dexmedetomidine (PRECEDEX) infusion 0.3 mcg/kg/hr (11/01/19 1600)    epinephrine 0.03 mcg/kg/min (11/01/19 1615)    insulin (HUMAN R) infusion (adults) 4.5 Units/hr (11/01/19 1600)    milrinone 20mg/100ml D5W (200mcg/ml) 0.375 mcg/kg/min (11/01/19 1630)    niCARdipine Stopped (10/31/19 1824)    vasopressin (PITRESSIN) infusion       Scheduled Meds:   aspirin  81 mg Oral Daily    atorvastatin  80 mg Oral QHS    chlorhexidine  10 mL Mouth/Throat BID    docusate sodium  100 mg Oral BID    furosemide  40 mg Intravenous BID    furosemide  40 mg Intravenous Once    magnesium sulfate IVPB  2 g Intravenous Once    metOLazone  5 mg Oral Once    metoprolol tartrate  25 mg Oral BID    nozaseptin   Each Nostril BID    pantoprazole  40 mg Oral Daily    polyethylene glycol  17 g Oral Daily    potassium chloride  20 mEq Oral Q12H     PRN Meds:sodium chloride, albumin human 5%, albuterol sulfate, chlorhexidine, Dextrose 10% Bolus, Dextrose 10% Bolus, fentaNYL, fentaNYL, insulin (HUMAN R) infusion (adults), lactated ringers, magnesium sulfate IVPB, magnesium sulfate IVPB, metoclopramide HCl, metoprolol tartrate, nozaseptin, ondansetron, oxyCODONE, oxyCODONE, potassium chloride in water **AND** potassium chloride in water **AND** potassium chloride in water     Objective:     Vital Signs (Most Recent):  Temp: 99.3 °F (37.4 °C) (11/01/19 1600)  Pulse: 90 (11/01/19 1630)  Resp: (!) 21 (11/01/19 1630)  BP: (!) 111/49 (11/01/19 1500)  SpO2: 95 % (11/01/19 1630) Vital Signs (24h Range):  Temp:  [98.5 °F (36.9 °C)-100.3 °F (37.9 °C)] 99.3 °F (37.4 °C)  Pulse:  [89-92] 90  Resp:  [19-30] 21  SpO2:  [82 %-98 %] 95 %  BP: ()/() 111/49     Weight: 75.3 kg (166 lb 0.1 oz)  Body mass index is 31.37 kg/m².    SpO2: 95 %  O2 Device (Oxygen Therapy):  Vapotherm    Intake/Output - Last 3 Shifts       10/30 0700 - 10/31 0659 10/31 0700 - 11/01 0659 11/01 0700 - 11/02 0659    P.O.   160    I.V. (mL/kg) 1643.2 (23.3) 6874 (90.2)     Blood  4396.5     IV Piggyback 17 450     Total Intake(mL/kg) 1660.2 (23.5) 77174.5 (153.8) 160 (2.1)    Urine (mL/kg/hr) 792 (0.5) 842 (0.5) 543 (0.7)    Drains  310     Chest Tube  622 369    Total Output 792 1774 912    Net +868.2 +9946.5 -752           Urine Occurrence 1 x            Lines/Drains/Airways     Central Venous Catheter Line                 Introducer 10/31/19 0745 left internal jugular 1 day         Pulmonary Artery Catheter Assessment  10/31/19 right internal jugular 1 day          Drain                 Urethral Catheter 10/30/19 1716 Temperature probe 2 days         Chest Tube 10/31/19 1234 3 Right Pleural 24 Fr. 1 day         Closed/Suction Drain 10/31/19 0951 Right Leg Bulb 19 Fr. 1 day         Closed/Suction Drain 10/31/19 0952 Right Leg Bulb 19 Fr. 1 day         Y Chest Tube 1 and 2 10/31/19 1233 1 Right Mediastinal 24 Fr. 2 Left Mediastinal 24 Fr. 1 day          Arterial Line                 Arterial Line 10/31/19 0723 Right Radial 1 day          Line                 IABP 10/30/19 1400 2 days          Peripheral Intravenous Line                 Peripheral IV - Single Lumen 10/28/19 1322 20 G Right Hand 4 days         Peripheral IV - Single Lumen 10/30/19 1715 20 G Anterior;Left Forearm 2 days                Physical Exam   Constitutional: She is oriented to person, place, and time. She appears well-developed and well-nourished. No distress.   HENT:   Head: Normocephalic and atraumatic.   Eyes: Pupils are equal, round, and reactive to light. EOM are normal.   Neck: Normal range of motion. Neck supple. No JVD present.   Cardiovascular: Normal rate, regular rhythm, normal heart sounds and intact distal pulses. Exam reveals no gallop and no friction rub.   No murmur heard.  IABP in place to the left groin. Paced DDD at  90 bpm,NSR via external pacemaker.    Pulmonary/Chest: Effort normal. No stridor. No respiratory distress. She has no wheezes. She has rales. She exhibits tenderness.   Coarse breath sounds generalized.    Abdominal: Soft. She exhibits no distension and no mass. There is no tenderness. There is no rebound and no guarding. No hernia.   Hypoactive bowel sounds, POD 1.    Musculoskeletal: Normal range of motion. She exhibits edema. She exhibits no tenderness or deformity.   1+ pitting edema to the KRISTINE LE. KRISTINE hands and face appear swollen.    Neurological: She is alert and oriented to person, place, and time. She displays normal reflexes. No cranial nerve deficit or sensory deficit. She exhibits normal muscle tone. Coordination normal.   Skin: Skin is warm and dry. Capillary refill takes less than 2 seconds. No rash noted. She is not diaphoretic. No erythema. No pallor.   Psychiatric: She has a normal mood and affect. Her behavior is normal. Judgment and thought content normal.   Nursing note and vitals reviewed.      Significant Labs:  BMP:   Recent Labs   Lab 11/01/19  1608   *      K 4.4      CO2 23   BUN 18   CREATININE 0.9   CALCIUM 9.2   MG 2.6     CBC:   Recent Labs   Lab 11/01/19  1126   WBC 14.27*   RBC 3.72*   HGB 10.7*   HCT 31.6*   PLT 76*   MCV 85   MCH 28.8   MCHC 33.9       Significant Diagnostics:  I have reviewed all pertinent imaging results/findings within the past 24 hours.

## 2019-11-01 NOTE — PLAN OF CARE
Pt steadily progressing post-op. Titrated gtts and vapotherm. Pain control continues to be an issue. Pt to be left on precedex for pain vs PO or IVP meds if possible per Dr. Zaidi's request. Continuing to diurese. Lungs have become and stayed clear throughout shift. Minimal CT output. IABP to be left through evening. Discussed POC with pt and pt's sister extensively throughout day. All questions answered. Pt alert and calm.

## 2019-11-01 NOTE — SUBJECTIVE & OBJECTIVE
Interval History: Extubated o/n. Denies chest pain or SOB today on exam. IABP in place today, plans to possible wean today per CT Surgery.     Review of Systems   Constitution: Positive for malaise/fatigue.   HENT: Negative for hearing loss and hoarse voice.    Eyes: Negative for blurred vision and visual disturbance.   Cardiovascular: Negative for chest pain, claudication, dyspnea on exertion, irregular heartbeat, leg swelling, near-syncope, orthopnea, palpitations, paroxysmal nocturnal dyspnea and syncope.   Respiratory: Negative for cough, hemoptysis, shortness of breath, sleep disturbances due to breathing, snoring and wheezing.    Endocrine: Negative for cold intolerance and heat intolerance.   Hematologic/Lymphatic: Bruises/bleeds easily.   Skin: Negative for color change, dry skin and nail changes.   Musculoskeletal: Positive for arthritis. Negative for back pain, joint pain and myalgias.   Gastrointestinal: Negative for bloating, abdominal pain, constipation, nausea and vomiting.   Genitourinary: Negative for dysuria, flank pain, hematuria and hesitancy.   Neurological: Negative for headaches, light-headedness, loss of balance, numbness, paresthesias and weakness.   Psychiatric/Behavioral: Negative for altered mental status.   Allergic/Immunologic: Negative for environmental allergies.     Objective:     Vital Signs (Most Recent):  Temp: 98.8 °F (37.1 °C) (11/01/19 1000)  Pulse: 91 (11/01/19 1000)  Resp: (!) 24 (11/01/19 1000)  BP: 96/69 (11/01/19 0900)  SpO2: 97 % (11/01/19 1000) Vital Signs (24h Range):  Temp:  [97 °F (36.1 °C)-100.3 °F (37.9 °C)] 98.8 °F (37.1 °C)  Pulse:  [89-92] 91  Resp:  [9-30] 24  SpO2:  [82 %-100 %] 97 %  BP: ()/() 96/69     Weight: 76.2 kg (167 lb 15.9 oz)  Body mass index is 31.74 kg/m².     SpO2: 97 %  O2 Device (Oxygen Therapy): Vapotherm      Intake/Output Summary (Last 24 hours) at 11/1/2019 1030  Last data filed at 11/1/2019 1000  Gross per 24 hour   Intake  50194.54 ml   Output 2370 ml   Net 8420.54 ml       Lines/Drains/Airways     Central Venous Catheter Line                 Introducer 10/31/19 0745 left internal jugular 1 day         Pulmonary Artery Catheter Assessment  10/31/19 right internal jugular 1 day          Drain                 Closed/Suction Drain 10/31/19 0951 Right Leg Bulb 19 Fr. 1 day         Closed/Suction Drain 10/31/19 0952 Right Leg Bulb 19 Fr. 1 day         Urethral Catheter 10/30/19 1716 Temperature probe 1 day         Chest Tube 10/31/19 1234 3 Right Pleural 24 Fr. less than 1 day         Y Chest Tube 1 and 2 10/31/19 1233 1 Right Mediastinal 24 Fr. 2 Left Mediastinal 24 Fr. less than 1 day          Arterial Line                 Arterial Line 10/31/19 0723 Right Radial 1 day          Line                 IABP 10/30/19 1400 1 day          Peripheral Intravenous Line                 Peripheral IV - Single Lumen 10/28/19 1322 20 G Right Hand 3 days         Peripheral IV - Single Lumen 10/30/19 1715 20 G Anterior;Left Forearm 1 day                Physical Exam   Constitutional: She is oriented to person, place, and time. She appears well-developed and well-nourished. No distress.   HENT:   Head: Normocephalic and atraumatic.   Eyes: Pupils are equal, round, and reactive to light.   Neck: Normal range of motion and full passive range of motion without pain. Neck supple. No JVD present.   Cardiovascular: Normal rate, regular rhythm, S1 normal, S2 normal and intact distal pulses. PMI is not displaced. Exam reveals no distant heart sounds.   No murmur heard.  Pulses:       Radial pulses are 2+ on the right side, and 2+ on the left side.        Dorsalis pedis pulses are 1+ on the right side, and 1+ on the left side.   IABP 1:1 C/D/I    Mid sternal incision wound vac in place  Pacing wires secured to chest wall  CT x 3 to pleuravac x 2.   Pulmonary/Chest: Effort normal and breath sounds normal. No accessory muscle usage. No respiratory distress. She has  no decreased breath sounds. She has no wheezes. She has no rales.   Nasal cannula in place   Abdominal: Soft. Bowel sounds are normal. She exhibits no distension. There is no tenderness.   Musculoskeletal: Normal range of motion. She exhibits no edema.        Right ankle: She exhibits no swelling.        Left ankle: She exhibits no swelling.   RLE NICOLASA Drain x 2   Neurological: She is alert and oriented to person, place, and time.   Skin: Skin is warm and dry. She is not diaphoretic. No cyanosis. Nails show no clubbing.   Psychiatric: She has a normal mood and affect. Her speech is normal and behavior is normal. Judgment and thought content normal. Cognition and memory are normal.   Nursing note and vitals reviewed.      Significant Labs:   CMP   Recent Labs   Lab 10/30/19  1425  10/31/19  1510 10/31/19  2007 11/01/19  0320      < > 143  143 142 140  140   K 4.5   < > 4.0  4.0 4.0 4.3  4.3   CL 99   < > 111*  111* 106 107  107   CO2 28   < > 21*  21* 22* 22*  22*   *   < > 174*  174* 298* 250*  250*   BUN 15   < > 17  17 16 16  16   CREATININE 0.8   < > 0.9  0.9 1.0 0.9  0.9   CALCIUM 9.9   < > 8.4*  8.4* 10.9* 9.6  9.6   PROT 7.5  --   --   --   --    ALBUMIN 3.5  --   --   --   --    BILITOT 0.4  --   --   --   --    ALKPHOS 77  --   --   --   --    AST 14  --   --   --   --    ALT 8*  --   --   --   --    ANIONGAP 10   < > 11  11 14 11  11   ESTGFRAFRICA >60   < > >60  >60 >60 >60  >60   EGFRNONAA >60   < > >60  >60 58* >60  >60    < > = values in this interval not displayed.   , CBC   Recent Labs   Lab 10/31/19  1510  10/31/19  2007 11/01/19  0320   WBC 19.27*  --  12.26 12.00  12.00   HGB 10.1*  --  11.6* 11.3*  11.3*   HCT 30.3*   < > 34.2* 34.1*  34.1*   *  --  78* 82*  82*    < > = values in this interval not displayed.   , Troponin No results for input(s): TROPONINI in the last 48 hours. and All pertinent lab results from the last 24 hours have been  reviewed.    Significant Imaging: Echocardiogram:   2D echo with color flow doppler:   Results for orders placed or performed during the hospital encounter of 10/28/19   2D echo with color flow doppler   Result Value Ref Range    QEF 30 (A) 55 - 65    Mitral Valve Regurgitation MILD TO MODERATE     Diastolic Dysfunction Yes (A)     Aortic Valve Regurgitation MILD     Mitral Valve Mobility NORMAL     Narrative    Date of Procedure: 10/28/2019        TEST DESCRIPTION   Technical Quality: This is a technically adequate study.     Aorta: The aortic root is normal in size, measuring 3.0 cm at sinotubular junction and 3.2 cm at Sinuses of Valsalva. The proximal ascending aorta is normal in size, measuring 3.1 cm across.     Left Atrium: The left atrial volume index is severely enlarged, measuring 52.97 cc/m2.     Left Ventricle: The left ventricle is normal in size, with an end-diastolic diameter of 4.1 cm, and an end-systolic diameter of 3.7 cm. Septal wall thickness is mildly increased, with the septum measuring 1.6 cm and the posterior wall measuring 1.3 cm   across. Relative wall thickness was increased at 0.63, and the LV mass index was increased at 160.6 g/m2 consistent with concentric left ventricular hypertrophy. The anterior septum is severely hypokinetic. There is global hypokinesis. Left ventricular   systolic function appears moderately depressed. Visually estimated ejection fraction is 30-35%. The LV Doppler derived stroke volume equals 57.0 ccs.     Diastolic indices: E wave velocity 1.1 m/s, E/A ratio 0.9,  msec., E/e' ratio(avg) 16. There is pseudonormalization of mitral inflow pattern consistent with significant diastolic dysfunction.     Right Atrium: The right atrium is normal in size, measuring 3.7 cm in length and 2.8 cm in width in the apical view.     Right Ventricle: The right ventricle is normal in size. Global right ventricular systolic function appears normal. Tricuspid annular plane  systolic excursion (TAPSE) is 2.2 cm.     Aortic Valve:  The aortic valve is normal in structure with normal leaflet mobility. The mean gradient obtained across the aortic valve is 6 mmHg. Additionally, there is mild aortic regurgitation.     Mitral Valve:  The mitral valve is normal in structure with normal leaflet mobility. The pressure half time is 45 msec. The calculated mitral valve area is 4.89 cm2. There is mild to moderate mitral regurgitation.     Tricuspid Valve:  The tricuspid valve is normal in structure with normal leaflet mobility.     Pulmonary Valve:  The pulmonic valve is not well seen.     IVC: IVC is normal in size and collapses > 50% with a sniff, suggesting normal right atrial pressure of 3 mmHg.     Intracavitary: There is no evidence of pericardial effusion, intracavity mass, thrombi, or vegetation.         CONCLUSIONS     1 - Moderately depressed left ventricular systolic function (EF 30-35%).     2 - Impaired LV relaxation, elevated LAP (grade 2 diastolic dysfunction).     3 - Normal right ventricular systolic function .     4 - Mild aortic regurgitation.     5 - Severe left atrial enlargement.     6 - Concentric hypertrophy.     7 - Mild to moderate mitral regurgitation.             This document has been electronically    SIGNED BY: Terry Tarango MD On: 10/28/2019 16:38    and X-Ray: CXR: X-Ray Chest 1 View (CXR): No results found for this visit on 10/28/19. and X-Ray Chest PA and Lateral (CXR):   Results for orders placed or performed during the hospital encounter of 10/28/19   X-Ray Chest PA And Lateral    Narrative    EXAMINATION:  XR CHEST PA AND LATERAL    CLINICAL HISTORY:  Pre Op;    COMPARISON:  10/28/2019    FINDINGS:  Cardiac silhouette is normal. Aorta demonstrates atherosclerotic disease. The lungs demonstrate no evidence of active disease.  No evidence of pleural effusion or pneumothorax.  Bones appear intact.      Impression    No acute process seen.      Electronically signed  by: Jh Anne MD  Date:    10/28/2019  Time:    21:38

## 2019-11-01 NOTE — PROGRESS NOTES
[facility] Hospital Medicine  Progress Note    Patient Name: Mateusz Bates  MRN: [id number]  Patient Class: IP- Inpatient   Admission Date: 10/28/2019  Length of Stay: 3 days  Attending Physician: Shravan Lam MD  Primary Care Provider: Serenity Kilpatrick MD        Subjective:     Principal Problem:CAD (coronary artery disease)    HPI:  Mateusz Bates is a 69 year old female with DM II, hypertension and hyperlipidemia who presented to the ED at the request of her PCP due to finding of an abnormal EKG and complaints of chest tightness with SOB. The patient reports having intermittent symptoms for the last 1-2 months. On 10/26/19, she began having a tightness in her mid chest that radiated into her back with associated SOB. Symptoms were worse with laying flat and improved with sitting up. They lasted only minutes, but this was longer than previous episodes. She denies a trend in when these episodes occur as well as associated nausea, vomiting and diaphoresis. The patient's EKG was significant for a new LBBB compared to previous in 2016. Labs were significant for a BNP of 1,305, troponin of 3.665 and potassium of 3.4. Code status was discussed with the patient and her sister. She is a full code. Her sister, Christina Hernandez, is her surrogate medical decision maker.     Overview/Hospital Course:  Admitted for evaluation and treatment of chest pain concerning for acute coronary syndrome.  Evaluation by Cardiology and urgent left heart catheterization.  Angiogram discovered severe multi-vessel coronary artery disease.  Cardiac echo showed severely reduced LV function.  Referral to Cardiothoracic Surgery who recommended CABG.    Interval History:  Post procedure from CABG x3 by Dr. Zaidi.    Review of Systems   Unable to perform ROS: Intubated   Constitutional: Negative for chills and fever.   HENT: Negative for congestion and sore throat.    Eyes: Negative for visual disturbance.   Respiratory:  Negative for cough, shortness of breath and wheezing.    Cardiovascular: Negative for chest pain, palpitations and leg swelling.   Gastrointestinal: Negative for abdominal pain, blood in stool, constipation, diarrhea, nausea and vomiting.   Genitourinary: Negative for dysuria and hematuria.   Musculoskeletal: Negative for arthralgias and back pain.   Skin: Negative for rash and wound.   Neurological: Negative for dizziness, weakness, light-headedness and numbness.   Hematological: Negative for adenopathy.     Objective:     Vital Signs (Most Recent):  Temp: 99.1 °F (37.3 °C) (10/31/19 1745)  Pulse: 91 (10/31/19 1800)  Resp: 20 (10/31/19 1800)  BP: (!) 172/91 (10/31/19 1800)  SpO2: (!) 92 % (10/31/19 1800) Vital Signs (24h Range):  Temp:  [97 °F (36.1 °C)-99.1 °F (37.3 °C)] 99.1 °F (37.3 °C)  Pulse:  [67-91] 91  Resp:  [9-40] 20  SpO2:  [92 %-100 %] 92 %  BP: ()/() 172/91     Weight: 70.5 kg (155 lb 6.8 oz)  Body mass index is 29.37 kg/m².    Intake/Output Summary (Last 24 hours) at 10/31/2019 1910  Last data filed at 10/31/2019 1824  Gross per 24 hour   Intake 88695.19 ml   Output 1071 ml   Net 9276.19 ml      Physical Exam   Constitutional: She is oriented to person, place, and time. She appears well-developed and well-nourished. No distress.   HENT:   Head: Normocephalic and atraumatic.   Mouth/Throat: Oropharynx is clear and moist.   Eyes: Pupils are equal, round, and reactive to light. Conjunctivae and EOM are normal.   Neck: Neck supple. No JVD present. No thyromegaly present.   Cardiovascular: Normal rate and regular rhythm. Exam reveals no gallop and no friction rub.   No murmur heard.  Paced regular rhythm.  Three subxyphoid mediastinal drain tubes with sero-sanguinous fluid.   Pulmonary/Chest: Effort normal and breath sounds normal. She has no wheezes. She has no rales.   Right IJV PA catheter and CVL.  ETT in place with clear condensation in tube.   Abdominal: Soft. Bowel sounds are normal. She  exhibits no distension. There is no tenderness. There is no rebound and no guarding.   Genitourinary:   Genitourinary Comments: Jones catheter with clear yellow urine.   Musculoskeletal: Normal range of motion. She exhibits no edema or deformity.   Lymphadenopathy:     She has no cervical adenopathy.   Neurological: She is alert and oriented to person, place, and time. She has normal reflexes.   Skin: Skin is warm and dry. No rash noted.   Psychiatric: She has a normal mood and affect. Her behavior is normal. Judgment and thought content normal.   Nursing note and vitals reviewed.      Significant Labs: All pertinent labs within the past 24 hours have been reviewed.    Significant Imaging: I have reviewed all pertinent imaging results/findings within the past 24 hours.      Assessment/Plan:      NSTEMI (non-ST elevated myocardial infarction)  -Troponin peaked at 3.66 now trending down.   -Heparin, metoprolol, ASA and statin.   -Hold ACE inhibitor due to intolerance with cough. Hold ARB as patient's blood pressure is marginal. Will consider adding, if her blood pressure will tolerate.  Hold second antiplatelet due to pending Cardiology procedure.  Patient was evaluated for Cardiac Rehab and is not a candidate at this time, but will be referred, when appropriate.  LHC on 28 on October showed severe multi-vessel CAD.  Referral to CVT Surgery who recommended CABG planned for Thursday 31 October.  Balloon pump placed 30 October.  CABG x3 by Dr. Zaidi on 31 October.    Acute combined systolic and diastolic CHF, NYHA class 2  Probable ischemic cardiomyopathy.  Anticipate CABG 31 October.  Optimize medical management.    Hyperlipidemia associated with type 2 diabetes mellitus  -Patient was improved, but not at goal on 10/4/19.   -Continue statin.       Diabetes mellitus type II, uncontrolled  -Hemoglobin A1C was 9 on 9/19/19.   -NISS.   -ADA diet.       Anxiety and depression  -Stable.   -Continue Effexor.        Hypertension associated with diabetes  -Stable.   -Continue Norvasc.         VTE Risk Mitigation (From admission, onward)         Ordered     IP VTE LOW RISK PATIENT  Once      10/30/19 1405     Place NATHANIEL hose  Until discontinued      10/30/19 1405     Place sequential compression device  Until discontinued      10/30/19 1405     Reason for no Mechanical VTE Prophylaxis  Once      10/28/19 1414     heparin 25,000 units in dextrose 5% 250 mL (100 units/mL) infusion LOW INTENSITY nomogram - [facility]  Continuous      10/28/19 1257                Critical care time spent on the evaluation and treatment of severe organ dysfunction, review of pertinent labs and imaging studies, discussions with consulting providers and discussions with patient/family: 30 minutes.      Shravan Lam MD  Department of Hospital Medicine   [facility]

## 2019-11-01 NOTE — PROGRESS NOTES
RT attempted to wean vent rate to 10. No spontaneous breaths or effort noted x5 minutes. Rate returned to ordered settings. Will evaluate pt for weaning in the next hour.

## 2019-11-01 NOTE — PROGRESS NOTES
Arousing to voice, beginning to follow commands such as hand squeezing and toe wiggling.  Unable to lift head off bed at this time.  IABP to L groin, site WDL. Posterior ibial pulse + with doppler.

## 2019-11-01 NOTE — PROGRESS NOTES
[facility] -   Critical Care Medicine  Progress Note    Patient Name: Mateusz Bates  MRN: [id number]  Admission Date: 10/28/2019  Hospital Length of Stay: 4 days  Code Status: Full Code  Attending Provider: Shravan Lam MD  Primary Care Provider: Serenity Kilpatrick MD   Principal Problem: CAD (coronary artery disease)    Subjective:     HPI:  69 year old female with PMH including DM2 on insulin; HTN; HLD; GERD; anxiety  Presented to ED on 10/28 at instruction of PCP s/t abnormal EKG with c/o chest tightness and SOB  Eval revealed LBBB on EKG along with BNP 1300 and troponin 3.66     Taken for LHC on 10/28 finding 3 vessel CAD, EF 20%, and infra renal aorta plaque 30%  CTS consulted and agree pt is candidate for urgent CABG  Today 10/30 taken to cath lab for placement of IABP to support for planned CABG in am    Hospital/ICU Course:  Transferred to ICU post IABP placement for hemodynamic monitoring until surgery  10/31 - Admitted to ICU today from OR post 3-vessel CABG.  [facility]  Upon arrival to ICU on Vasopressin, Epi, Primacor and Lidocaine infusions.  11/1 - Extubated overnight.  Supine in bed awake and alert and responsive still with IABP in place as well as on Epi, Primacor and Insulin infusions.  No distress noted    Review of Systems   Constitutional: Positive for malaise/fatigue. Negative for chills and fever.   HENT: Negative for congestion.    Eyes: Negative for blurred vision.   Respiratory: Positive for cough. Negative for sputum production and shortness of breath.    Cardiovascular: Positive for chest pain (post op). Negative for leg swelling.   Gastrointestinal: Negative for abdominal pain, nausea and vomiting.   Genitourinary: Negative for dysuria.   Musculoskeletal: Positive for back pain. Negative for myalgias.   Skin: Negative for rash.   Neurological: Negative for dizziness, weakness and headaches.   Endo/Heme/Allergies: Does not bruise/bleed  easily.   Psychiatric/Behavioral: The patient is nervous/anxious.          Objective:     Vital Signs (Most Recent):  Temp: 98.8 °F (37.1 °C) (11/01/19 1000)  Pulse: 91 (11/01/19 1159)  Resp: (!) 22 (11/01/19 1159)  BP: 96/69 (11/01/19 0900)  SpO2: 97 % (11/01/19 1159) Vital Signs (24h Range):  Temp:  [97 °F (36.1 °C)-100.3 °F (37.9 °C)] 98.8 °F (37.1 °C)  Pulse:  [89-92] 91  Resp:  [9-30] 22  SpO2:  [82 %-100 %] 97 %  BP: ()/() 96/69     Weight: 76.2 kg (167 lb 15.9 oz)  Body mass index is 31.74 kg/m².      Intake/Output Summary (Last 24 hours) at 11/1/2019 1235  Last data filed at 11/1/2019 1200  Gross per 24 hour   Intake 31193.54 ml   Output 2490 ml   Net 8370.54 ml       Physical Exam   Constitutional: She is oriented to person, place, and time. She appears well-developed and well-nourished. She is cooperative.  Non-toxic appearance. She does not have a sickly appearance. She appears ill. No distress. Nasal cannula in place.   HENT:   Head: Normocephalic and atraumatic.   Mouth/Throat: Oropharynx is clear and moist and mucous membranes are normal.   Eyes: Pupils are equal, round, and reactive to light. EOM and lids are normal.   Neck: Trachea normal and full passive range of motion without pain. Carotid bruit is not present.       Cardiovascular: Normal rate, regular rhythm, normal heart sounds and normal pulses.   Pulses:       Radial pulses are 2+ on the right side, and 2+ on the left side.        Dorsalis pedis pulses are 2+ on the right side, and 2+ on the left side.   Pulmonary/Chest: Effort normal. No accessory muscle usage. No tachypnea. No respiratory distress. She has decreased breath sounds in the right lower field and the left lower field.       Abdominal: Soft. She exhibits no distension. Bowel sounds are decreased. There is no tenderness.   Genitourinary:   Genitourinary Comments: Jones in place   Musculoskeletal:        Right foot: There is no deformity.        Left foot: There is no  deformity.   No edema   Lymphadenopathy:     She has no cervical adenopathy.   Neurological: She is alert and oriented to person, place, and time.   Skin: Skin is warm and dry. Capillary refill takes less than 2 seconds. No rash noted. No cyanosis.        Psychiatric: She has a normal mood and affect. Her speech is normal and behavior is normal. Judgment and thought content normal. Cognition and memory are normal.       Vents:  Vent Mode: Spont (11/01/19 0303)  Ventilator Initiated: Yes (10/31/19 1508)  Set Rate: 0 bmp (11/01/19 0303)  Vt Set: 430 mL (11/01/19 0303)  Pressure Support: 6 cmH20 (11/01/19 0303)  PEEP/CPAP: 5 cmH20 (11/01/19 0303)  Oxygen Concentration (%): 70 (11/01/19 1159)  Peak Airway Pressure: 12 cmH2O (11/01/19 0303)  Plateau Pressure: 0 cmH20 (11/01/19 0303)  Total Ve: 12 mL (11/01/19 0303)  Negative Inspiratory Force (cm H2O): 26 (11/01/19 0250)  F/VT Ratio<105 (RSBI): (!) 43.8 (11/01/19 0100)    Lines/Drains/Airways     Central Venous Catheter Line                 Introducer 10/31/19 0745 left internal jugular 1 day         Pulmonary Artery Catheter Assessment  10/31/19 right internal jugular 1 day          Drain                 Chest Tube 10/31/19 1234 3 Right Pleural 24 Fr. 1 day         Closed/Suction Drain 10/31/19 0951 Right Leg Bulb 19 Fr. 1 day         Closed/Suction Drain 10/31/19 0952 Right Leg Bulb 19 Fr. 1 day         Urethral Catheter 10/30/19 1716 Temperature probe 1 day         Y Chest Tube 1 and 2 10/31/19 1233 1 Right Mediastinal 24 Fr. 2 Left Mediastinal 24 Fr. 1 day          Arterial Line                 Arterial Line 10/31/19 0723 Right Radial 1 day          Line                 IABP 10/30/19 1400 1 day          Peripheral Intravenous Line                 Peripheral IV - Single Lumen 10/28/19 1322 20 G Right Hand 3 days         Peripheral IV - Single Lumen 10/30/19 1715 20 G Anterior;Left Forearm 1 day                Significant Labs:    CBC/Anemia Profile:  Recent Labs   Lab  10/31/19  2007 11/01/19  0320 11/01/19  1126   WBC 12.26 12.00  12.00 14.27*   HGB 11.6* 11.3*  11.3* 10.7*   HCT 34.2* 34.1*  34.1* 31.6*   PLT 78* 82*  82* 76*   MCV 85 87  87 85   RDW 14.7* 14.8*  14.8* 15.1*        Chemistries:  Recent Labs   Lab 10/30/19  1425 10/31/19  0326 10/31/19  1510 10/31/19  2007 11/01/19  0320 11/01/19  1126    133* 143  143 142 140  140 142   K 4.5 4.4 4.0  4.0 4.0 4.3  4.3 3.9   CL 99 100 111*  111* 106 107  107 107   CO2 28 22* 21*  21* 22* 22*  22* 22*   BUN 15 20 17  17 16 16  16 17   CREATININE 0.8 0.8 0.9  0.9 1.0 0.9  0.9 0.9   CALCIUM 9.9 9.7 8.4*  8.4* 10.9* 9.6  9.6 9.2   ALBUMIN 3.5  --   --   --   --   --    PROT 7.5  --   --   --   --   --    BILITOT 0.4  --   --   --   --   --    ALKPHOS 77  --   --   --   --   --    ALT 8*  --   --   --   --   --    AST 14  --   --   --   --   --    MG 2.5 2.3 2.0 1.7 1.6  --    PHOS  --  4.0  --   --  5.7*  --        ABGs:   Recent Labs   Lab 11/01/19  0300   PH 7.440   PCO2 34.1*   HCO3 23.2*   POCSATURATED 93*   BE -1     Coagulation:   Recent Labs   Lab 11/01/19  0320   INR 1.1   APTT 40.7*     POCT Glucose:   Recent Labs   Lab 11/01/19  1015 11/01/19  1122 11/01/19  1212   POCTGLUCOSE 152* 150* 170*     All pertinent labs within the past 24 hours have been reviewed.    Significant Imaging:  CXR: I have reviewed all pertinent results/findings within the past 24 hours and my personal findings are:  no change from prior film      ABG  Recent Labs   Lab 11/01/19  0300   PH 7.440   PO2 64*   PCO2 34.1*   HCO3 23.2*   BE -1     Assessment/Plan:     Psychiatric  Anxiety and depression  Recommend resume home Effexor     Pulmonary  Acute hypoxemic respiratory failure  Likely due to post op atelectasis  Extubated overnight  Cont Vapotherm and wean as tolerated  Encourage C&DB and IS use  OOB asap once IABP removed  CXR in AM    Cardiac/Vascular  * CAD (coronary artery disease)  Per Cards and CVT surgery  POD #1 S/P  3-vessel CABG   Cont ASA, statin, Loppressor    Acute combined systolic and diastolic CHF, NYHA class 2  Strict I/O  Daily wt  Diuresis per cards  IABP in place recommend removal asap    S/P CABG x 3  POD #1  Per CVT surgery  Monitor chest tube output  Cont hemodynamic monitoring post op    NSTEMI (non-ST elevated myocardial infarction)  Multi vessel CAD now post CABG  Continue ASA, statin, and Lopressor  Cont cardiac monitoring    Hematology  Thrombocytopenia  Remove IABP asap  Recommend hold ASA  CBC in AM  No active bleeding    Oncology  Acute blood loss anemia  Received 3 units PRBCs intra-op and Hct 27 post op  Transfused 2 more PRBCs per CVT surgery post op yesterday  Follow up CBC  Monitor chest tube output    Endocrine  Diabetes mellitus type II, uncontrolled  Insulin infusion post op      Preventive Measures and Monitoring:   Stress Ulcer: PPI  Nutrition: ice chips  Glucose control: insulin infusion  Bowel prophylaxis: Miralax  DVT prophylaxis: SCDs  Hx CAD on B-Blocker: Lopressor  Head of Bed/Reposition: Elevate HOB and turn Q1-2 hours   Early Mobility: bed rest today due to IABP  Central Line Right IJ PA Catheter Day: #2  Right Radial Arterial Line Day: #2  IABP Day: #3  Chest tubes X 3 Day: #2  Jones Day: #2  IVAB Day: #2  Code Status: Full     Counseling/Consultation:I have discussed the care of this patient in detail with the bedside nursing staff and Dr. Rae and Dr. Lam.    Dr. Zaidi prefers total care management.  Will sign off, please call if needed.      Critical Care Time: 46 minutes  Critical secondary to Patient has a condition that poses threat to life and bodily function: post op hypoxic resp failure  Patient is currently on drug therapy requiring intensive monitoring for toxicity: on Epi, Primacor and insulin infusions      Critical care was time spent personally by me on the following activities: development of treatment plan with patient or surrogate and bedside caregivers,  discussions with consultants, evaluation of patient's response to treatment, examination of patient, ordering and performing treatments and interventions, ordering and review of laboratory studies, ordering and review of radiographic studies, pulse oximetry, re-evaluation of patient's condition. This critical care time did not overlap with that of any other provider or involve time for any procedures.       Valentin Voss NP  Critical Care Medicine  [facility]

## 2019-11-01 NOTE — CARE UPDATE
Pt extubated per unit Vent weaning postop protocols without difficulty following appropriate ABG and NIF values. Pt following commands and tolerated procedure well. Pt placed on NC. Will continue to monitor. RN @ bedside t/o procedure

## 2019-11-02 LAB
ALBUMIN SERPL BCP-MCNC: 2.8 G/DL (ref 3.5–5.2)
ALLENS TEST: ABNORMAL
ALP SERPL-CCNC: 53 U/L (ref 55–135)
ALT SERPL W/O P-5'-P-CCNC: <5 U/L (ref 10–44)
ANION GAP SERPL CALC-SCNC: 12 MMOL/L (ref 8–16)
ANION GAP SERPL CALC-SCNC: 12 MMOL/L (ref 8–16)
ANION GAP SERPL CALC-SCNC: 13 MMOL/L (ref 8–16)
AST SERPL-CCNC: 24 U/L (ref 10–40)
BASOPHILS # BLD AUTO: 0.01 K/UL (ref 0–0.2)
BASOPHILS # BLD AUTO: 0.02 K/UL (ref 0–0.2)
BASOPHILS NFR BLD: 0.1 % (ref 0–1.9)
BASOPHILS NFR BLD: 0.1 % (ref 0–1.9)
BILIRUB SERPL-MCNC: 1.9 MG/DL (ref 0.1–1)
BUN SERPL-MCNC: 20 MG/DL (ref 8–23)
BUN SERPL-MCNC: 24 MG/DL (ref 8–23)
BUN SERPL-MCNC: 28 MG/DL (ref 8–23)
C3 SERPL-MCNC: 70 MG/DL (ref 50–180)
C4 SERPL-MCNC: 15 MG/DL (ref 11–44)
CALCIUM SERPL-MCNC: 8.6 MG/DL (ref 8.7–10.5)
CALCIUM SERPL-MCNC: 8.8 MG/DL (ref 8.7–10.5)
CALCIUM SERPL-MCNC: 9.4 MG/DL (ref 8.7–10.5)
CHLORIDE SERPL-SCNC: 103 MMOL/L (ref 95–110)
CHLORIDE SERPL-SCNC: 103 MMOL/L (ref 95–110)
CHLORIDE SERPL-SCNC: 99 MMOL/L (ref 95–110)
CO2 SERPL-SCNC: 23 MMOL/L (ref 23–29)
CO2 SERPL-SCNC: 23 MMOL/L (ref 23–29)
CO2 SERPL-SCNC: 27 MMOL/L (ref 23–29)
CREAT SERPL-MCNC: 0.9 MG/DL (ref 0.5–1.4)
D DIMER PPP IA.FEU-MCNC: 0.91 MG/L FEU
DELSYS: ABNORMAL
DIFFERENTIAL METHOD: ABNORMAL
DIFFERENTIAL METHOD: ABNORMAL
EOSINOPHIL # BLD AUTO: 0.1 K/UL (ref 0–0.5)
EOSINOPHIL # BLD AUTO: 0.2 K/UL (ref 0–0.5)
EOSINOPHIL NFR BLD: 0.5 % (ref 0–8)
EOSINOPHIL NFR BLD: 1.1 % (ref 0–8)
ERYTHROCYTE [DISTWIDTH] IN BLOOD BY AUTOMATED COUNT: 15.6 % (ref 11.5–14.5)
ERYTHROCYTE [DISTWIDTH] IN BLOOD BY AUTOMATED COUNT: 15.7 % (ref 11.5–14.5)
EST. GFR  (AFRICAN AMERICAN): >60 ML/MIN/1.73 M^2
EST. GFR  (NON AFRICAN AMERICAN): >60 ML/MIN/1.73 M^2
FIBRINOGEN PPP-MCNC: 532 MG/DL (ref 182–366)
FIO2: 30
FLOW: 20
GLUCOSE SERPL-MCNC: 203 MG/DL (ref 70–110)
GLUCOSE SERPL-MCNC: 232 MG/DL (ref 70–110)
GLUCOSE SERPL-MCNC: 270 MG/DL (ref 70–110)
HCO3 UR-SCNC: 26 MMOL/L (ref 24–28)
HCT VFR BLD AUTO: 28.3 % (ref 37–48.5)
HCT VFR BLD AUTO: 32.8 % (ref 37–48.5)
HGB BLD-MCNC: 10.9 G/DL (ref 12–16)
HGB BLD-MCNC: 9.5 G/DL (ref 12–16)
IMM GRANULOCYTES # BLD AUTO: 0.07 K/UL (ref 0–0.04)
IMM GRANULOCYTES # BLD AUTO: 0.08 K/UL (ref 0–0.04)
IMM GRANULOCYTES NFR BLD AUTO: 0.6 % (ref 0–0.5)
IMM GRANULOCYTES NFR BLD AUTO: 0.7 % (ref 0–0.5)
LDH SERPL L TO P-CCNC: 473 U/L (ref 110–260)
LYMPHOCYTES # BLD AUTO: 0.9 K/UL (ref 1–4.8)
LYMPHOCYTES # BLD AUTO: 1.1 K/UL (ref 1–4.8)
LYMPHOCYTES NFR BLD: 7.6 % (ref 18–48)
LYMPHOCYTES NFR BLD: 8.6 % (ref 18–48)
MAGNESIUM SERPL-MCNC: 2.1 MG/DL (ref 1.6–2.6)
MAGNESIUM SERPL-MCNC: 2.2 MG/DL (ref 1.6–2.6)
MCH RBC QN AUTO: 28.9 PG (ref 27–31)
MCH RBC QN AUTO: 29.1 PG (ref 27–31)
MCHC RBC AUTO-ENTMCNC: 33.2 G/DL (ref 32–36)
MCHC RBC AUTO-ENTMCNC: 33.6 G/DL (ref 32–36)
MCV RBC AUTO: 86 FL (ref 82–98)
MCV RBC AUTO: 88 FL (ref 82–98)
MODE: ABNORMAL
MONOCYTES # BLD AUTO: 0.9 K/UL (ref 0.3–1)
MONOCYTES # BLD AUTO: 1 K/UL (ref 0.3–1)
MONOCYTES NFR BLD: 7.2 % (ref 4–15)
MONOCYTES NFR BLD: 8.5 % (ref 4–15)
NEUTROPHILS # BLD AUTO: 11.7 K/UL (ref 1.8–7.7)
NEUTROPHILS # BLD AUTO: 8.6 K/UL (ref 1.8–7.7)
NEUTROPHILS NFR BLD: 81.6 % (ref 38–73)
NEUTROPHILS NFR BLD: 83.4 % (ref 38–73)
NRBC BLD-RTO: 0 /100 WBC
NRBC BLD-RTO: 0 /100 WBC
PATH REV BLD -IMP: NORMAL
PCO2 BLDA: 34.1 MMHG (ref 35–45)
PH SMN: 7.49 [PH] (ref 7.35–7.45)
PLATELET # BLD AUTO: 47 K/UL (ref 150–350)
PLATELET # BLD AUTO: 73 K/UL (ref 150–350)
PMV BLD AUTO: 12 FL (ref 9.2–12.9)
PMV BLD AUTO: 12 FL (ref 9.2–12.9)
PO2 BLDA: 72 MMHG (ref 80–100)
POC BE: 3 MMOL/L
POC SATURATED O2: 96 % (ref 95–100)
POCT GLUCOSE: 161 MG/DL (ref 70–110)
POCT GLUCOSE: 175 MG/DL (ref 70–110)
POCT GLUCOSE: 180 MG/DL (ref 70–110)
POCT GLUCOSE: 186 MG/DL (ref 70–110)
POCT GLUCOSE: 189 MG/DL (ref 70–110)
POCT GLUCOSE: 196 MG/DL (ref 70–110)
POCT GLUCOSE: 214 MG/DL (ref 70–110)
POCT GLUCOSE: 225 MG/DL (ref 70–110)
POCT GLUCOSE: 247 MG/DL (ref 70–110)
POCT GLUCOSE: 251 MG/DL (ref 70–110)
POCT GLUCOSE: 257 MG/DL (ref 70–110)
POCT GLUCOSE: 277 MG/DL (ref 70–110)
POTASSIUM SERPL-SCNC: 4.4 MMOL/L (ref 3.5–5.1)
POTASSIUM SERPL-SCNC: 4.6 MMOL/L (ref 3.5–5.1)
POTASSIUM SERPL-SCNC: 4.9 MMOL/L (ref 3.5–5.1)
PROT SERPL-MCNC: 5 G/DL (ref 6–8.4)
RBC # BLD AUTO: 3.29 M/UL (ref 4–5.4)
RBC # BLD AUTO: 3.74 M/UL (ref 4–5.4)
RETICS/RBC NFR AUTO: 2.1 % (ref 0.5–2.5)
SAMPLE: ABNORMAL
SITE: ABNORMAL
SODIUM SERPL-SCNC: 138 MMOL/L (ref 136–145)
SODIUM SERPL-SCNC: 138 MMOL/L (ref 136–145)
SODIUM SERPL-SCNC: 139 MMOL/L (ref 136–145)
WBC # BLD AUTO: 10.48 K/UL (ref 3.9–12.7)
WBC # BLD AUTO: 14.01 K/UL (ref 3.9–12.7)

## 2019-11-02 PROCEDURE — 99255 PR INITIAL INPATIENT CONSULT,LEVL V: ICD-10-PCS | Mod: ,,, | Performed by: INTERNAL MEDICINE

## 2019-11-02 PROCEDURE — 85060 BLOOD SMEAR INTERPRETATION: CPT | Mod: ,,, | Performed by: PATHOLOGY

## 2019-11-02 PROCEDURE — 86160 COMPLEMENT ANTIGEN: CPT

## 2019-11-02 PROCEDURE — 27200667 HC PACEMAKER, TEMPORARY MONITORING, PER SHIFT

## 2019-11-02 PROCEDURE — 83615 LACTATE (LD) (LDH) ENZYME: CPT

## 2019-11-02 PROCEDURE — 99900035 HC TECH TIME PER 15 MIN (STAT)

## 2019-11-02 PROCEDURE — 27000221 HC OXYGEN, UP TO 24 HOURS

## 2019-11-02 PROCEDURE — 85379 FIBRIN DEGRADATION QUANT: CPT

## 2019-11-02 PROCEDURE — 27100171 HC OXYGEN HIGH FLOW UP TO 24 HOURS

## 2019-11-02 PROCEDURE — 27100092 HC HIGH FLOW DELIVERY CANNULA

## 2019-11-02 PROCEDURE — 85060 PATHOLOGIST REVIEW: ICD-10-PCS | Mod: ,,, | Performed by: PATHOLOGY

## 2019-11-02 PROCEDURE — 83735 ASSAY OF MAGNESIUM: CPT

## 2019-11-02 PROCEDURE — 25000003 PHARM REV CODE 250: Performed by: NURSE PRACTITIONER

## 2019-11-02 PROCEDURE — 85045 AUTOMATED RETICULOCYTE COUNT: CPT

## 2019-11-02 PROCEDURE — 63600175 PHARM REV CODE 636 W HCPCS: Performed by: NURSE PRACTITIONER

## 2019-11-02 PROCEDURE — 99291 CRITICAL CARE FIRST HOUR: CPT | Mod: ,,, | Performed by: INTERNAL MEDICINE

## 2019-11-02 PROCEDURE — 85025 COMPLETE CBC W/AUTO DIFF WBC: CPT

## 2019-11-02 PROCEDURE — 99255 IP/OBS CONSLTJ NEW/EST HI 80: CPT | Mod: ,,, | Performed by: INTERNAL MEDICINE

## 2019-11-02 PROCEDURE — 36600 WITHDRAWAL OF ARTERIAL BLOOD: CPT

## 2019-11-02 PROCEDURE — 94799 UNLISTED PULMONARY SVC/PX: CPT

## 2019-11-02 PROCEDURE — 36569 INSJ PICC 5 YR+ W/O IMAGING: CPT

## 2019-11-02 PROCEDURE — C9399 UNCLASSIFIED DRUGS OR BIOLOG: HCPCS | Performed by: NURSE PRACTITIONER

## 2019-11-02 PROCEDURE — C1751 CATH, INF, PER/CENT/MIDLINE: HCPCS

## 2019-11-02 PROCEDURE — 99291 PR CRITICAL CARE, E/M 30-74 MINUTES: ICD-10-PCS | Mod: ,,, | Performed by: INTERNAL MEDICINE

## 2019-11-02 PROCEDURE — 20000000 HC ICU ROOM

## 2019-11-02 PROCEDURE — 80053 COMPREHEN METABOLIC PANEL: CPT

## 2019-11-02 PROCEDURE — 80048 BASIC METABOLIC PNL TOTAL CA: CPT | Mod: 91

## 2019-11-02 PROCEDURE — 86160 COMPLEMENT ANTIGEN: CPT | Mod: 59

## 2019-11-02 PROCEDURE — 85384 FIBRINOGEN ACTIVITY: CPT

## 2019-11-02 PROCEDURE — 82803 BLOOD GASES ANY COMBINATION: CPT

## 2019-11-02 PROCEDURE — 86022 PLATELET ANTIBODIES: CPT

## 2019-11-02 RX ORDER — IBUPROFEN 200 MG
16 TABLET ORAL
Status: DISCONTINUED | OUTPATIENT
Start: 2019-11-02 | End: 2019-11-05

## 2019-11-02 RX ORDER — SODIUM CHLORIDE 0.9 % (FLUSH) 0.9 %
10 SYRINGE (ML) INJECTION
Status: DISCONTINUED | OUTPATIENT
Start: 2019-11-02 | End: 2019-11-06

## 2019-11-02 RX ORDER — IBUPROFEN 200 MG
24 TABLET ORAL
Status: DISCONTINUED | OUTPATIENT
Start: 2019-11-02 | End: 2019-11-05

## 2019-11-02 RX ORDER — TRAMADOL HYDROCHLORIDE 50 MG/1
50 TABLET ORAL EVERY 8 HOURS PRN
Status: DISCONTINUED | OUTPATIENT
Start: 2019-11-02 | End: 2019-11-20 | Stop reason: HOSPADM

## 2019-11-02 RX ORDER — INSULIN ASPART 100 [IU]/ML
1-10 INJECTION, SOLUTION INTRAVENOUS; SUBCUTANEOUS
Status: DISCONTINUED | OUTPATIENT
Start: 2019-11-02 | End: 2019-11-05

## 2019-11-02 RX ORDER — GLUCAGON 1 MG
1 KIT INJECTION
Status: DISCONTINUED | OUTPATIENT
Start: 2019-11-02 | End: 2019-11-05

## 2019-11-02 RX ORDER — METOPROLOL TARTRATE 50 MG/1
50 TABLET ORAL 2 TIMES DAILY
Status: DISCONTINUED | OUTPATIENT
Start: 2019-11-02 | End: 2019-11-03

## 2019-11-02 RX ADMIN — CHLORHEXIDINE GLUCONATE 0.12% ORAL RINSE 10 ML: 1.2 LIQUID ORAL at 08:11

## 2019-11-02 RX ADMIN — POLYETHYLENE GLYCOL (3350) 17 G: 17 POWDER, FOR SOLUTION ORAL at 10:11

## 2019-11-02 RX ADMIN — DOCUSATE SODIUM 100 MG: 100 CAPSULE, LIQUID FILLED ORAL at 08:11

## 2019-11-02 RX ADMIN — INSULIN ASPART 6 UNITS: 100 INJECTION, SOLUTION INTRAVENOUS; SUBCUTANEOUS at 05:11

## 2019-11-02 RX ADMIN — INSULIN ASPART 4 UNITS: 100 INJECTION, SOLUTION INTRAVENOUS; SUBCUTANEOUS at 11:11

## 2019-11-02 RX ADMIN — CHLORHEXIDINE GLUCONATE 0.12% ORAL RINSE 10 ML: 1.2 LIQUID ORAL at 10:11

## 2019-11-02 RX ADMIN — INSULIN ASPART 3 UNITS: 100 INJECTION, SOLUTION INTRAVENOUS; SUBCUTANEOUS at 08:11

## 2019-11-02 RX ADMIN — METOPROLOL TARTRATE 50 MG: 50 TABLET ORAL at 08:11

## 2019-11-02 RX ADMIN — FUROSEMIDE 40 MG: 10 INJECTION, SOLUTION INTRAMUSCULAR; INTRAVENOUS at 10:11

## 2019-11-02 RX ADMIN — METOPROLOL TARTRATE 25 MG: 25 TABLET ORAL at 10:11

## 2019-11-02 RX ADMIN — PANTOPRAZOLE SODIUM 40 MG: 40 TABLET, DELAYED RELEASE ORAL at 10:11

## 2019-11-02 RX ADMIN — FUROSEMIDE 40 MG: 10 INJECTION, SOLUTION INTRAMUSCULAR; INTRAVENOUS at 08:11

## 2019-11-02 RX ADMIN — INSULIN DETEMIR 10 UNITS: 100 INJECTION, SOLUTION SUBCUTANEOUS at 08:11

## 2019-11-02 RX ADMIN — INSULIN DETEMIR 10 UNITS: 100 INJECTION, SOLUTION SUBCUTANEOUS at 11:11

## 2019-11-02 RX ADMIN — DOCUSATE SODIUM 100 MG: 100 CAPSULE, LIQUID FILLED ORAL at 10:11

## 2019-11-02 RX ADMIN — ATORVASTATIN CALCIUM 80 MG: 40 TABLET, FILM COATED ORAL at 08:11

## 2019-11-02 RX ADMIN — POTASSIUM CHLORIDE 20 MEQ: 20 TABLET, EXTENDED RELEASE ORAL at 08:11

## 2019-11-02 RX ADMIN — OXYCODONE HYDROCHLORIDE 10 MG: 5 TABLET ORAL at 03:11

## 2019-11-02 NOTE — NURSING
Spoke to Dr. Zaidi. Informed of pt's persistent high CBGs despite long and short term insulins given (see MAR). Also informed of pt's continued weakness with need for total assist to chair with minimal input into transfer from pt. Continue to monitor. No new orders noted.

## 2019-11-02 NOTE — ASSESSMENT & PLAN NOTE
Platelets normal at 300K on 10/30, then declined 10/31 78K, 11/1 76K, 11/2 47K.  Possible cause of acute thrombocytopenia: drug induced immune thrombocytopenia (amiodarone given 10/30, lasix started 10/28 and currently receiving), HIT (4T score low prob), TTP (plasmic score intermediate prob), ITP, DIC.  Have sent: HIT antibodies, [id number], fibrinogen, ddimer, C3/C4 complements, pathology review for schistocytes, reticulocytes, haptoglobin, LDH.  If platelets continue to decline, consider stop Lasix, and use bumex.

## 2019-11-02 NOTE — SUBJECTIVE & OBJECTIVE
Interval History:  Successfully extubated around 3 am.  No acute events.    Review of Systems   Constitutional: Negative for chills and fever.   HENT: Negative for congestion and sore throat.    Eyes: Negative for visual disturbance.   Respiratory: Negative for cough, shortness of breath and wheezing.    Cardiovascular: Negative for chest pain, palpitations and leg swelling.   Gastrointestinal: Negative for abdominal pain, blood in stool, constipation, diarrhea, nausea and vomiting.   Genitourinary: Negative for dysuria and hematuria.   Musculoskeletal: Negative for arthralgias and back pain.   Skin: Negative for rash and wound.   Neurological: Negative for dizziness, weakness, light-headedness and numbness.   Hematological: Negative for adenopathy.     Objective:     Vital Signs (Most Recent):  Temp: 99 °F (37.2 °C) (11/01/19 1900)  Pulse: 91 (11/01/19 1900)  Resp: (!) 21 (11/01/19 1900)  BP: 124/69 (11/01/19 1900)  SpO2: 96 % (11/01/19 1900) Vital Signs (24h Range):  Temp:  [98.5 °F (36.9 °C)-100.3 °F (37.9 °C)] 99 °F (37.2 °C)  Pulse:  [89-92] 91  Resp:  [19-30] 21  SpO2:  [82 %-98 %] 96 %  BP: ()/() 124/69     Weight: 75.3 kg (166 lb 0.1 oz)  Body mass index is 31.37 kg/m².    Intake/Output Summary (Last 24 hours) at 11/1/2019 1950  Last data filed at 11/1/2019 1900  Gross per 24 hour   Intake 2805.24 ml   Output 2481 ml   Net 324.24 ml      Physical Exam   Constitutional: She is oriented to person, place, and time. She appears well-developed and well-nourished. No distress.   HENT:   Head: Normocephalic and atraumatic.   Mouth/Throat: Oropharynx is clear and moist.   Eyes: Pupils are equal, round, and reactive to light. Conjunctivae and EOM are normal.   Neck: Neck supple. No JVD present. No thyromegaly present.   Cardiovascular: Normal rate and regular rhythm. Exam reveals no gallop and no friction rub.   No murmur heard.  Paced regular rhythm.  Three subxyphoid mediastinal drain tubes with  sero-sanguinous fluid.   Pulmonary/Chest: Effort normal and breath sounds normal. She has no wheezes. She has no rales.   Right IJV PA catheter and CVL.  ETT in place with clear condensation in tube.   Abdominal: Soft. Bowel sounds are normal. She exhibits no distension. There is no tenderness. There is no rebound and no guarding.   Genitourinary:   Genitourinary Comments: Jones catheter with clear yellow urine.   Musculoskeletal: Normal range of motion. She exhibits no edema or deformity.   Lymphadenopathy:     She has no cervical adenopathy.   Neurological: She is alert and oriented to person, place, and time. She has normal reflexes.   Skin: Skin is warm and dry. No rash noted.   Psychiatric: She has a normal mood and affect. Her behavior is normal. Judgment and thought content normal.   Nursing note and vitals reviewed.      Significant Labs: All pertinent labs within the past 24 hours have been reviewed.    Significant Imaging: I have reviewed all pertinent imaging results/findings within the past 24 hours.

## 2019-11-02 NOTE — PROGRESS NOTES
[facility] Hospital Medicine  Progress Note    Patient Name: Mateusz Bates  MRN: [id number]  Patient Class: IP- Inpatient   Admission Date: 10/28/2019  Length of Stay: 4 days  Attending Physician: Shravan Lam MD  Primary Care Provider: Serenity Kilpatrick MD        Subjective:     Principal Problem:CAD, multiple vessel    HPI:  Mateusz Bates is a 69 year old female with DM II, hypertension and hyperlipidemia who presented to the ED at the request of her PCP due to finding of an abnormal EKG and complaints of chest tightness with SOB. The patient reports having intermittent symptoms for the last 1-2 months. On 10/26/19, she began having a tightness in her mid chest that radiated into her back with associated SOB. Symptoms were worse with laying flat and improved with sitting up. They lasted only minutes, but this was longer than previous episodes. She denies a trend in when these episodes occur as well as associated nausea, vomiting and diaphoresis. The patient's EKG was significant for a new LBBB compared to previous in 2016. Labs were significant for a BNP of 1,305, troponin of 3.665 and potassium of 3.4. Code status was discussed with the patient and her sister. She is a full code. Her sister, Christina Hernandez, is her surrogate medical decision maker.     Overview/Hospital Course:  Admitted for evaluation and treatment of chest pain concerning for acute coronary syndrome.  Evaluation by Cardiology and urgent left heart catheterization.  Angiogram discovered severe multi-vessel coronary artery disease.  Cardiac echo showed severely reduced LV function.  Referral to Cardiothoracic Surgery who recommended CABG.    Interval History:  Successfully extubated around 3 am.  No acute events.    Review of Systems   Constitutional: Negative for chills and fever.   HENT: Negative for congestion and sore throat.    Eyes: Negative for visual disturbance.   Respiratory: Negative for cough, shortness of  breath and wheezing.    Cardiovascular: Negative for chest pain, palpitations and leg swelling.   Gastrointestinal: Negative for abdominal pain, blood in stool, constipation, diarrhea, nausea and vomiting.   Genitourinary: Negative for dysuria and hematuria.   Musculoskeletal: Negative for arthralgias and back pain.   Skin: Negative for rash and wound.   Neurological: Negative for dizziness, weakness, light-headedness and numbness.   Hematological: Negative for adenopathy.     Objective:     Vital Signs (Most Recent):  Temp: 99 °F (37.2 °C) (11/01/19 1900)  Pulse: 91 (11/01/19 1900)  Resp: (!) 21 (11/01/19 1900)  BP: 124/69 (11/01/19 1900)  SpO2: 96 % (11/01/19 1900) Vital Signs (24h Range):  Temp:  [98.5 °F (36.9 °C)-100.3 °F (37.9 °C)] 99 °F (37.2 °C)  Pulse:  [89-92] 91  Resp:  [19-30] 21  SpO2:  [82 %-98 %] 96 %  BP: ()/() 124/69     Weight: 75.3 kg (166 lb 0.1 oz)  Body mass index is 31.37 kg/m².    Intake/Output Summary (Last 24 hours) at 11/1/2019 1950  Last data filed at 11/1/2019 1900  Gross per 24 hour   Intake 2805.24 ml   Output 2481 ml   Net 324.24 ml      Physical Exam   Constitutional: She is oriented to person, place, and time. She appears well-developed and well-nourished. No distress.   HENT:   Head: Normocephalic and atraumatic.   Mouth/Throat: Oropharynx is clear and moist.   Eyes: Pupils are equal, round, and reactive to light. Conjunctivae and EOM are normal.   Neck: Neck supple. No JVD present. No thyromegaly present.   Cardiovascular: Normal rate and regular rhythm. Exam reveals no gallop and no friction rub.   No murmur heard.  Paced regular rhythm.  Three subxyphoid mediastinal drain tubes with sero-sanguinous fluid.   Pulmonary/Chest: Effort normal and breath sounds normal. She has no wheezes. She has no rales.   Right IJV PA catheter and CVL.  ETT in place with clear condensation in tube.   Abdominal: Soft. Bowel sounds are normal. She exhibits no distension. There is no  tenderness. There is no rebound and no guarding.   Genitourinary:   Genitourinary Comments: Jones catheter with clear yellow urine.   Musculoskeletal: Normal range of motion. She exhibits no edema or deformity.   Lymphadenopathy:     She has no cervical adenopathy.   Neurological: She is alert and oriented to person, place, and time. She has normal reflexes.   Skin: Skin is warm and dry. No rash noted.   Psychiatric: She has a normal mood and affect. Her behavior is normal. Judgment and thought content normal.   Nursing note and vitals reviewed.      Significant Labs: All pertinent labs within the past 24 hours have been reviewed.    Significant Imaging: I have reviewed all pertinent imaging results/findings within the past 24 hours.      Assessment/Plan:      NSTEMI (non-ST elevated myocardial infarction)  Troponin peaked at 3.66 now trending down.  Heparin, metoprolol, ASA and statin. Hold ACE inhibitor due to intolerance with cough. Hold ARB as patient's blood pressure is marginal. Will consider adding, if her blood pressure will tolerate.  Hold second antiplatelet due to pending Cardiology procedure.  Patient was evaluated for Cardiac Rehab and is not a candidate at this time, but will be referred, when appropriate.  LHC on 28 on October showed severe multi-vessel CAD.  Referral to CVT Surgery who recommended CABG planned for Thursday 31 October.  Balloon pump placed 30 October.  CABG x3 by Dr. Zaidi on 31 October.    Acute combined systolic and diastolic CHF, NYHA class 2  Probable ischemic cardiomyopathy.  Anticipate CABG 31 October.  Optimize medical management.    Hyperlipidemia associated with type 2 diabetes mellitus  -Patient was improved, but not at goal on 10/4/19.   -Continue statin.       Diabetes mellitus type II, uncontrolled  -Hemoglobin A1C was 9 on 9/19/19.   -NISS.   -ADA diet.       Anxiety and depression  -Stable.   -Continue Effexor.       Hypertension associated with diabetes  -Stable.    -Continue Norvasc.         VTE Risk Mitigation (From admission, onward)         Ordered     IP VTE LOW RISK PATIENT  Once      10/30/19 1405     Place NATHANIEL hose  Until discontinued      10/30/19 1405     Place sequential compression device  Until discontinued      10/30/19 1405     Reason for no Mechanical VTE Prophylaxis  Once      10/28/19 1414     heparin 25,000 units in dextrose 5% 250 mL (100 units/mL) infusion LOW INTENSITY nomogram - [facility]  Continuous      10/28/19 1257                Critical care time spent on the evaluation and treatment of severe organ dysfunction, review of pertinent labs and imaging studies, discussions with consulting providers and discussions with patient/family: 30 minutes.      Shravan Lam MD  Department of Hospital Medicine   [facility]

## 2019-11-02 NOTE — ASSESSMENT & PLAN NOTE
11/01/2019  The patient is post-operative day 1, status-post coronary artery bypass grafting x 3.  Overall the patient is making progress, however IABP and inotropic agents will continue today.   Neuro:  The patient is awake alert and oriented x3.  Neuro exam is nonfocal.  Pain is well controlled with IV pain medication.  Cardiac:  Patient is hemodynamically improving. Patient remains on epinephrine and milrinone gtts, which will be weaned over the next 24 hours.  Cardiac index is improving. IABP will remain in place over the next 24 hours.  Patient will be started on beta blockers once gtts are weaned off.  Respiratory: Good sats on high flow nasal cannula. Continue respiratory toilet, continue incentive spirometer. Wean to low flow nasal cannula.  GI:  Diet: sips of water with medications, will slowly advance diet as tolerated. Benign abdominal exam.  Renal:  Urine output is slowly improving. Cr 0.9. K 4.3. Mag 1.6. Replace mag. Continue IV diuresis. Add metolazone.   Heme:  Hct 34.1  Platelet 82. Continue aspirin. Discontinue Plavix for now.  Id:  WBC 12. Tmax 100.3. Continue post-operative antibiotics. Will continue to follow.   Endocrine:  Glucose is controlled with insulin gtt.  Continue insulin gtt for now, until weaned off inotropic agents.   Activities:  Patient is currently bed bound due to IABP.  Advance activities as tolerated once IABP is removed.   Lines tubes and drains:  Continue IABP. Continue [place] and Cordis.  Continue A-line.  Chest tubes will continue. NICOLASA x 2 will continue. Jones catheter will continue.  Continue pacer wires. Continue prevena wound vac.  Plan: Remain ICU.          11/02/2019  The patient is post-operative day 2, status-post coronary artery bypass grafting x 3.  Overall the patient is making progress, IABP removed and inotropic agents discontinued.   Neuro:  The patient is awake alert and oriented x3.  Neuro exam is nonfocal.  Pain is well controlled. Discontinue fentanyl.    Cardiac:  Patient is hemodynamically improving. Inotropic gtts discontinued. Excellent cardiac index. IABP discontinued.  Patient will be started on beta blockers today.  Respiratory: Good sats on high flow nasal cannula. Continue respiratory toilet, continue incentive spirometer. Wean to low flow nasal cannula.  GI:  Advance diabetic/cardiac diet as tolerated. Benign abdominal exam.  Renal:  Urine output is improving. Cr 0.9. K 4.9. Mag 2.2. Continue IV diuresis. Hold AM K.   Heme:  Hct 28.3  Platelet 47. Discontinue aspirin. Discontinue Plavix. HIT panel. Consult hematology r/t thrombocytopenia.   Id:  WBC 10.4. Tmax 99.4. Will continue to follow.   Endocrine:  Discontinue insulin gtt. Start sliding scale insulin. Start long acting insulin detemir.   Activities: Advance activities as tolerated.   Lines tubes and drains:  Discontinue IABP. Discontinue [place] and Cordis.  Discontinue A-line.  Discontinue chest tubes x 3. Discontinue NICOLASA x 2. Discontinue arterial line. Jones catheter will continue for now.  Continue pacer wires. Continue prevena wound vac. Place PICC line.   Plan: Remain ICU.

## 2019-11-02 NOTE — PLAN OF CARE
/P CAB x 3. Vapotherm weaned to 25L/90%.  100% AV paced with epicardial pacemaker all shift. Remains on Milrinone yareli ZEPEDA order for hemodynamic status. Insulin infusion off but hourly monitoring of CBGs continues.  Incision vac ot midsternal incision, + seal noted.  IABP @ 1:1 with full augmentation to L groin.  Site WDL. DL foot pulses palpable.  CT x3, with moderate amount of bleeding noted.  NICOLASA x2.  UOP 1600ml this shift.  Pain managed wih PO meds only this shift.Remains on low dose Precedex for restlessness.  Hemodynamically stable. Safety maintained.

## 2019-11-02 NOTE — PLAN OF CARE
Pt steadily but slowly progressing. IABP,  CTs x3, NICOLASA drains x 2, swan sada/introducer, and arterial line removed today. Off all gtts by 830 am. Right upper forearm PICC placed. Pt progressed from need for vapotherm to 3LNC in evening. VSS. Pt continues to be generally very, very weak. Pt was assisted to chair as a total assist with minimal input into movement. Pt needs lots of instruction and guidance and encouragement. Continues to have poor appetite. UO remains adequate with diuresis. CBGs remain high. Long and short acting insulin therapy added today.

## 2019-11-02 NOTE — PROGRESS NOTES
[facility] -   Hematology/Oncology  Progress Note    Patient Name: Mateusz Bates  Admission Date: 10/28/2019  Hospital Length of Stay: 5 days  Code Status: Full Code     Subjective:     HPI:  Mrs. Bates is a 69-year-old female with hypertension, diabetes, hyperlipidemia, obesity, anxiety and depression, iron deficiency anemia, vitamin B12 deficiency, who presented to the emergency room with an NSTEMI.  Workup included a cardiac catheterization that shows severe multivessel coronary artery disease with proximal LAD stenosis of 70% and severely depressed ejection fraction of 20%.  The patient is a candidate for urgent coronary artery bypass grafting. S/p CABG today.  Hematology is consulted for thrombocytopenia.    Interval History: Recovering from anesthesia, still drowsy. No active bleeding.    Oncology Treatment Plan:   [No treatment plan]    Medications:  Continuous Infusions:   dexmedetomidine (PRECEDEX) infusion Stopped (11/02/19 0830)    epinephrine Stopped (11/01/19 1800)    milrinone 20mg/100ml D5W (200mcg/ml) Stopped (11/02/19 0830)    niCARdipine Stopped (10/31/19 1824)    vasopressin (PITRESSIN) infusion       Scheduled Meds:   atorvastatin  80 mg Oral QHS    chlorhexidine  10 mL Mouth/Throat BID    docusate sodium  100 mg Oral BID    furosemide  40 mg Intravenous BID    insulin detemir U-100  10 Units Subcutaneous BID    metoprolol tartrate  25 mg Oral BID    nozaseptin   Each Nostril BID    pantoprazole  40 mg Oral Daily    polyethylene glycol  17 g Oral Daily    potassium chloride  20 mEq Oral Q12H     PRN Meds:sodium chloride, albumin human 5%, albuterol sulfate, chlorhexidine, Dextrose 10% Bolus, Dextrose 10% Bolus, Dextrose 10% Bolus, dextrose 50%, glucagon (human recombinant), glucose, glucose, insulin aspart U-100, lactated ringers, magnesium sulfate IVPB, magnesium sulfate IVPB, metoclopramide HCl, metoprolol tartrate, nozaseptin, ondansetron, oxyCODONE, oxyCODONE,  potassium chloride in water **AND** potassium chloride in water **AND** potassium chloride in water     Review of Systems   Constitutional: Negative.    HENT: Negative.    Eyes: Negative.    Respiratory: Negative.    Cardiovascular: Negative.    Gastrointestinal: Negative.    Endocrine: Negative.    Genitourinary: Negative.    Musculoskeletal: Negative.    Skin: Negative.    Allergic/Immunologic: Negative.    Neurological: Negative.    Hematological: Negative.    Psychiatric/Behavioral: Negative.      Objective:     Vital Signs (Most Recent):  Temp: 99 °F (37.2 °C) (11/02/19 0700)  Pulse: 91 (11/02/19 1138)  Resp: (!) 23 (11/02/19 1138)  BP: 136/64 (11/02/19 1028)  SpO2: 95 % (11/02/19 1138) Vital Signs (24h Range):  Temp:  [99 °F (37.2 °C)-99.9 °F (37.7 °C)] 99 °F (37.2 °C)  Pulse:  [89-91] 91  Resp:  [19-31] 23  SpO2:  [89 %-98 %] 95 %  BP: ()/(44-71) 136/64     Weight: 75.3 kg (166 lb 0.1 oz)  Body mass index is 31.37 kg/m².  Body surface area is 1.8 meters squared.      Intake/Output Summary (Last 24 hours) at 11/2/2019 1142  Last data filed at 11/2/2019 0700  Gross per 24 hour   Intake 1487.88 ml   Output 2589 ml   Net -1101.12 ml       Physical Exam   Constitutional: She is oriented to person, place, and time. She appears well-developed and well-nourished.   HENT:   Head: Normocephalic and atraumatic.   Eyes: Conjunctivae and EOM are normal.   Neck: Normal range of motion. Neck supple.   Cardiovascular: Normal rate and regular rhythm.   Pulmonary/Chest: Effort normal and breath sounds normal.   Abdominal: Soft. Bowel sounds are normal.   Musculoskeletal: Normal range of motion.   Neurological: She is alert and oriented to person, place, and time.   Skin: Skin is warm and dry.   Psychiatric: She has a normal mood and affect. Her behavior is normal. Judgment and thought content normal.   Nursing note and vitals reviewed.      Significant Labs:   All pertinent labs from the last 24 hours have been  reviewed.    Diagnostic Results:  I have reviewed all pertinent imaging results/findings within the past 24 hours.    Assessment/Plan:     Thrombocytopenia  Platelets normal at 300K on 10/30, then declined 10/31 78K, 11/1 76K, 11/2 47K.  Possible cause of acute thrombocytopenia: drug induced immune thrombocytopenia (amiodarone given 10/30, lasix started 10/28 and currently receiving), HIT (4T score low prob), TTP (plasmic score intermediate prob), ITP, DIC.  Have sent: HIT antibodies, [id number], fibrinogen, ddimer, C3/C4 complements, pathology review for schistocytes, reticulocytes, haptoglobin, LDH, US of lower and upper extremities.  If platelets continue to decline, consider stop Lasix, and use bumex.        Thank you for your consult. I will follow-up with patient. Please contact us if you have any additional questions.     Dimitri Johnson MD  Hematology/Oncology  [facility] [place]

## 2019-11-02 NOTE — NURSING
Dr. Zaidi and Gustabo Dobbins, NP at bedside. Informed of pt's extreme weakness with inability to perform fine motor skills, need for frequent reorienting, and pt hardly able to keep eyes open with precedex off since 0800 and no narcotics given since 3 am. Neuro assessment performed at bedside with both present. Continue to monitor. No narcotics to be given. No new orders noted.

## 2019-11-02 NOTE — ASSESSMENT & PLAN NOTE
Troponin peaked at 3.66 now trending down.  Heparin, metoprolol, ASA and statin. Hold ACE inhibitor due to intolerance with cough. Hold ARB as patient's blood pressure is marginal. Will consider adding, if her blood pressure will tolerate.  Hold second antiplatelet due to pending Cardiology procedure.  Patient was evaluated for Cardiac Rehab and is not a candidate at this time, but will be referred, when appropriate.  LHC on 28 on October showed severe multi-vessel CAD.  Referral to CVT Surgery who recommended CABG planned for Thursday 31 October.  Balloon pump placed 30 October.  CABG x3 by Dr. Zaidi on 31 October.

## 2019-11-02 NOTE — HPI
Mrs. Bates is a 69-year-old female with hypertension, diabetes, hyperlipidemia, obesity, anxiety and depression, iron deficiency anemia, vitamin B12 deficiency, who presented to the emergency room with an NSTEMI.  Workup included a cardiac catheterization that shows severe multivessel coronary artery disease with proximal LAD stenosis of 70% and severely depressed ejection fraction of 20%.  The patient is a candidate for urgent coronary artery bypass grafting. S/p CABG today.  Hematology is consulted for thrombocytopenia.

## 2019-11-02 NOTE — SUBJECTIVE & OBJECTIVE
Interval History:  11/02/2019  The patient is post-operative day 2, status-post coronary artery bypass grafting x 3.     ROS  Medications:  Continuous Infusions:   dexmedetomidine (PRECEDEX) infusion Stopped (11/02/19 0830)    epinephrine Stopped (11/01/19 1800)    milrinone 20mg/100ml D5W (200mcg/ml) Stopped (11/02/19 0830)    niCARdipine Stopped (10/31/19 1824)    vasopressin (PITRESSIN) infusion       Scheduled Meds:   atorvastatin  80 mg Oral QHS    chlorhexidine  10 mL Mouth/Throat BID    docusate sodium  100 mg Oral BID    furosemide  40 mg Intravenous BID    metoprolol tartrate  25 mg Oral BID    nozaseptin   Each Nostril BID    pantoprazole  40 mg Oral Daily    polyethylene glycol  17 g Oral Daily    potassium chloride  20 mEq Oral Q12H     PRN Meds:sodium chloride, albumin human 5%, albuterol sulfate, chlorhexidine, Dextrose 10% Bolus, Dextrose 10% Bolus, Dextrose 10% Bolus, dextrose 50%, glucagon (human recombinant), glucose, glucose, insulin aspart U-100, lactated ringers, magnesium sulfate IVPB, magnesium sulfate IVPB, metoclopramide HCl, metoprolol tartrate, nozaseptin, ondansetron, oxyCODONE, oxyCODONE, potassium chloride in water **AND** potassium chloride in water **AND** potassium chloride in water     Objective:     Vital Signs (Most Recent):  Temp: 99 °F (37.2 °C) (11/02/19 0700)  Pulse: 90 (11/02/19 0745)  Resp: 20 (11/02/19 0745)  BP: (!) 106/57 (11/02/19 0700)  SpO2: (!) 93 % (11/02/19 0745) Vital Signs (24h Range):  Temp:  [98.8 °F (37.1 °C)-99.9 °F (37.7 °C)] 99 °F (37.2 °C)  Pulse:  [89-91] 90  Resp:  [19-31] 20  SpO2:  [89 %-98 %] 93 %  BP: ()/(44-71) 106/57     Weight: 75.3 kg (166 lb 0.1 oz)  Body mass index is 31.37 kg/m².    SpO2: (!) 93 %  O2 Device (Oxygen Therapy): Vapotherm    Intake/Output - Last 3 Shifts       10/31 0700 - 11/01 0659 11/01 0700 - 11/02 0659 11/02 0700 - 11/03 0659    P.O.  210 0    I.V. (mL/kg) 6874 (90.2) 1247.7 (16.6) 25.2 (0.3)    Blood  4396.5      IV Piggyback 450 125     Total Intake(mL/kg) 92850.5 (153.8) 1582.7 (21) 25.2 (0.3)    Urine (mL/kg/hr) 842 (0.5) 2596 (1.4) 8 (0)    Drains 310 38     Other  0     Chest Tube 622 559 30    Total Output 1774 3193 38    Net +9946.5 -1610.3 -12.8                 Lines/Drains/Airways     Central Venous Catheter Line                 Introducer 10/31/19 0745 left internal jugular 2 days         Pulmonary Artery Catheter Assessment  10/31/19 right internal jugular 2 days          Drain                 Closed/Suction Drain 10/31/19 0951 Right Leg Bulb 19 Fr. 2 days         Closed/Suction Drain 10/31/19 0952 Right Leg Bulb 19 Fr. 2 days         Urethral Catheter 10/30/19 1716 Temperature probe 2 days         Chest Tube 10/31/19 1234 3 Right Pleural 24 Fr. 1 day         Y Chest Tube 1 and 2 10/31/19 1233 1 Right Mediastinal 24 Fr. 2 Left Mediastinal 24 Fr. 1 day          Arterial Line                 Arterial Line 10/31/19 0723 Right Radial 2 days          Line                 IABP 10/30/19 1400 2 days          Peripheral Intravenous Line                 Peripheral IV - Single Lumen 10/28/19 1322 20 G Right Hand 4 days         Peripheral IV - Single Lumen 10/30/19 1715 20 G Anterior;Left Forearm 2 days                Physical Exam   Constitutional: She is oriented to person, place, and time. She appears well-developed and well-nourished. No distress.   HENT:   Head: Normocephalic and atraumatic.   Eyes: Pupils are equal, round, and reactive to light. EOM are normal.   Neck: Normal range of motion. Neck supple. No JVD present.   Cardiovascular: Normal rate, regular rhythm, normal heart sounds and intact distal pulses. Exam reveals no gallop and no friction rub.   No murmur heard.  IABP removed. Paced AAI @ 90 bpm, NSR.    Pulmonary/Chest: Effort normal and breath sounds normal. No stridor. No respiratory distress. She has no wheezes. She has no rales. She exhibits tenderness.   Coarse breath sounds generalized.     Abdominal: Soft. Bowel sounds are normal. She exhibits no distension and no mass. There is no tenderness. There is no rebound and no guarding. No hernia.   Musculoskeletal: Normal range of motion. She exhibits edema. She exhibits no tenderness or deformity.   1+ pitting edema to the KRISTINE LE.    Neurological: She is alert and oriented to person, place, and time. She displays normal reflexes. No cranial nerve deficit or sensory deficit. She exhibits normal muscle tone. Coordination normal.   Skin: Skin is warm and dry. Capillary refill takes less than 2 seconds. No rash noted. She is not diaphoretic. No erythema. No pallor.   Surgical dressings CDI.    Psychiatric: She has a normal mood and affect. Her behavior is normal. Judgment and thought content normal.   Nursing note and vitals reviewed.      Significant Labs:  BMP:   Recent Labs   Lab 11/02/19  0440   *      K 4.9      CO2 23   BUN 20   CREATININE 0.9   CALCIUM 8.8   MG 2.2     CBC:   Recent Labs   Lab 11/02/19  0440   WBC 10.48   RBC 3.29*   HGB 9.5*   HCT 28.3*   PLT 47*   MCV 86   MCH 28.9   MCHC 33.6       Significant Diagnostics:  I have reviewed all pertinent imaging results/findings within the past 24 hours.

## 2019-11-02 NOTE — ASSESSMENT & PLAN NOTE
-Patient is s/p CABG x3 with LIMA to LAD reverse saphenous vein graft to the RCA and reverse saphenous vein graft to the ramus.  -Continue post CABG supportive therapy  -Continue ASA, Plavix, Statin, BB  -Will continue to follow along     11/1/19  -POD #1 s/p CABG x3  -Continue ASA, Statin, BB  -Remains on Epi and Milrinone gtt today  -Mgmt per CT surgery  -Will follow along    11/2/19  -Recovering s/p CABG x 3  -Continue statin, BB  -Platelets 47,000 this AM; heme/onc on board, ASA held  -Await further rec's

## 2019-11-02 NOTE — PROGRESS NOTES
[facility] -   Cardiothoracic Surgery  Progress Note    Patient Name: Mateusz Bates  MRN: [id number]  Admission Date: 10/28/2019  Hospital Length of Stay: 5 days  Code Status: Full Code   Attending Physician: Josse Patel MD   Referring Provider: Self, Aaareferral  Principal Problem:CAD, multiple vessel            Subjective:     Post-Op Info:  Procedure(s) (LRB):  CORONARY ARTERY BYPASS GRAFT (CABG) (N/A)  ECHOCARDIOGRAM,TRANSESOPHAGEAL (N/A)  BLOCK, NERVE, INTERCOSTAL, 2 OR MORE (N/A)   2 Days Post-Op     Interval History:  11/02/2019  The patient is post-operative day 2, status-post coronary artery bypass grafting x 3.     ROS  Medications:  Continuous Infusions:   dexmedetomidine (PRECEDEX) infusion Stopped (11/02/19 0830)    epinephrine Stopped (11/01/19 1800)    milrinone 20mg/100ml D5W (200mcg/ml) Stopped (11/02/19 0830)    niCARdipine Stopped (10/31/19 1824)    vasopressin (PITRESSIN) infusion       Scheduled Meds:   atorvastatin  80 mg Oral QHS    chlorhexidine  10 mL Mouth/Throat BID    docusate sodium  100 mg Oral BID    furosemide  40 mg Intravenous BID    metoprolol tartrate  25 mg Oral BID    nozaseptin   Each Nostril BID    pantoprazole  40 mg Oral Daily    polyethylene glycol  17 g Oral Daily    potassium chloride  20 mEq Oral Q12H     PRN Meds:sodium chloride, albumin human 5%, albuterol sulfate, chlorhexidine, Dextrose 10% Bolus, Dextrose 10% Bolus, Dextrose 10% Bolus, dextrose 50%, glucagon (human recombinant), glucose, glucose, insulin aspart U-100, lactated ringers, magnesium sulfate IVPB, magnesium sulfate IVPB, metoclopramide HCl, metoprolol tartrate, nozaseptin, ondansetron, oxyCODONE, oxyCODONE, potassium chloride in water **AND** potassium chloride in water **AND** potassium chloride in water     Objective:     Vital Signs (Most Recent):  Temp: 99 °F (37.2 °C) (11/02/19 0700)  Pulse: 90 (11/02/19 0745)  Resp: 20 (11/02/19 0745)  BP: (!) 106/57 (11/02/19  0700)  SpO2: (!) 93 % (11/02/19 0745) Vital Signs (24h Range):  Temp:  [98.8 °F (37.1 °C)-99.9 °F (37.7 °C)] 99 °F (37.2 °C)  Pulse:  [89-91] 90  Resp:  [19-31] 20  SpO2:  [89 %-98 %] 93 %  BP: ()/(44-71) 106/57     Weight: 75.3 kg (166 lb 0.1 oz)  Body mass index is 31.37 kg/m².    SpO2: (!) 93 %  O2 Device (Oxygen Therapy): Vapotherm    Intake/Output - Last 3 Shifts       10/31 0700 - 11/01 0659 11/01 0700 - 11/02 0659 11/02 0700 - 11/03 0659    P.O.  210 0    I.V. (mL/kg) 6874 (90.2) 1247.7 (16.6) 25.2 (0.3)    Blood 4396.5      IV Piggyback 450 125     Total Intake(mL/kg) 20477.5 (153.8) 1582.7 (21) 25.2 (0.3)    Urine (mL/kg/hr) 842 (0.5) 2596 (1.4) 8 (0)    Drains 310 38     Other  0     Chest Tube 622 559 30    Total Output 1774 3193 38    Net +9946.5 -1610.3 -12.8                 Lines/Drains/Airways     Central Venous Catheter Line                 Introducer 10/31/19 0745 left internal jugular 2 days         Pulmonary Artery Catheter Assessment  10/31/19 right internal jugular 2 days          Drain                 Closed/Suction Drain 10/31/19 0951 Right Leg Bulb 19 Fr. 2 days         Closed/Suction Drain 10/31/19 0952 Right Leg Bulb 19 Fr. 2 days         Urethral Catheter 10/30/19 1716 Temperature probe 2 days         Chest Tube 10/31/19 1234 3 Right Pleural 24 Fr. 1 day         Y Chest Tube 1 and 2 10/31/19 1233 1 Right Mediastinal 24 Fr. 2 Left Mediastinal 24 Fr. 1 day          Arterial Line                 Arterial Line 10/31/19 0723 Right Radial 2 days          Line                 IABP 10/30/19 1400 2 days          Peripheral Intravenous Line                 Peripheral IV - Single Lumen 10/28/19 1322 20 G Right Hand 4 days         Peripheral IV - Single Lumen 10/30/19 1715 20 G Anterior;Left Forearm 2 days                Physical Exam   Constitutional: She is oriented to person, place, and time. She appears well-developed and well-nourished. No distress.   HENT:   Head: Normocephalic and  atraumatic.   Eyes: Pupils are equal, round, and reactive to light. EOM are normal.   Neck: Normal range of motion. Neck supple. No JVD present.   Cardiovascular: Normal rate, regular rhythm, normal heart sounds and intact distal pulses. Exam reveals no gallop and no friction rub.   No murmur heard.  IABP removed. Paced AAI @ 90 bpm, NSR.    Pulmonary/Chest: Effort normal and breath sounds normal. No stridor. No respiratory distress. She has no wheezes. She has no rales. She exhibits tenderness.   Coarse breath sounds generalized.    Abdominal: Soft. Bowel sounds are normal. She exhibits no distension and no mass. There is no tenderness. There is no rebound and no guarding. No hernia.   Musculoskeletal: Normal range of motion. She exhibits edema. She exhibits no tenderness or deformity.   1+ pitting edema to the KRISTINE LE.    Neurological: She is alert and oriented to person, place, and time. She displays normal reflexes. No cranial nerve deficit or sensory deficit. She exhibits normal muscle tone. Coordination normal.   Skin: Skin is warm and dry. Capillary refill takes less than 2 seconds. No rash noted. She is not diaphoretic. No erythema. No pallor.   Surgical dressings CDI.    Psychiatric: She has a normal mood and affect. Her behavior is normal. Judgment and thought content normal.   Nursing note and vitals reviewed.      Significant Labs:  BMP:   Recent Labs   Lab 11/02/19  0440   *      K 4.9      CO2 23   BUN 20   CREATININE 0.9   CALCIUM 8.8   MG 2.2     CBC:   Recent Labs   Lab 11/02/19  0440   WBC 10.48   RBC 3.29*   HGB 9.5*   HCT 28.3*   PLT 47*   MCV 86   MCH 28.9   MCHC 33.6       Significant Diagnostics:  I have reviewed all pertinent imaging results/findings within the past 24 hours.    Assessment/Plan:     S/P CABG x 3       11/01/2019  The patient is post-operative day 1, status-post coronary artery bypass grafting x 3.  Overall the patient is making progress, however IABP and  inotropic agents will continue today.   Neuro:  The patient is awake alert and oriented x3.  Neuro exam is nonfocal.  Pain is well controlled with IV pain medication.  Cardiac:  Patient is hemodynamically improving. Patient remains on epinephrine and milrinone gtts, which will be weaned over the next 24 hours.  Cardiac index is improving. IABP will remain in place over the next 24 hours.  Patient will be started on beta blockers once gtts are weaned off.  Respiratory: Good sats on high flow nasal cannula. Continue respiratory toilet, continue incentive spirometer. Wean to low flow nasal cannula.  GI:  Diet: sips of water with medications, will slowly advance diet as tolerated. Benign abdominal exam.  Renal:  Urine output is slowly improving. Cr 0.9. K 4.3. Mag 1.6. Replace mag. Continue IV diuresis. Add metolazone.   Heme:  Hct 34.1  Platelet 82. Continue aspirin. Discontinue Plavix for now.  Id:  WBC 12. Tmax 100.3. Continue post-operative antibiotics. Will continue to follow.   Endocrine:  Glucose is controlled with insulin gtt.  Continue insulin gtt for now, until weaned off inotropic agents.   Activities:  Patient is currently bed bound due to IABP.  Advance activities as tolerated once IABP is removed.   Lines tubes and drains:  Continue IABP. Continue [place] and Cordis.  Continue A-line.  Chest tubes will continue. NICOLASA x 2 will continue. Jones catheter will continue.  Continue pacer wires. Continue prevena wound vac.  Plan: Remain ICU.          11/02/2019  The patient is post-operative day 2, status-post coronary artery bypass grafting x 3.  Overall the patient is making progress, IABP removed and inotropic agents discontinued.   Neuro:  The patient is awake alert and oriented x3.  Neuro exam is nonfocal.  Pain is well controlled. Discontinue fentanyl.   Cardiac:  Patient is hemodynamically improving. Inotropic gtts discontinued. Excellent cardiac index. IABP discontinued.  Patient will be started on beta  blockers today.  Respiratory: Good sats on high flow nasal cannula. Continue respiratory toilet, continue incentive spirometer. Wean to low flow nasal cannula.  GI:  Advance diabetic/cardiac diet as tolerated. Benign abdominal exam.  Renal:  Urine output is improving. Cr 0.9. K 4.9. Mag 2.2. Continue IV diuresis. Hold AM K.   Heme:  Hct  28.3  Platelet  47. Discontinue aspirin. Discontinue Plavix. HIT panel. Consult hematology r/t thrombocytopenia.   Id:  WBC  10.4. Tmax 99.4. Will continue to follow.   Endocrine:  Discontinue insulin gtt. Start sliding scale insulin. Start long acting insulin detemir.   Activities: Advance activities as tolerated.   Lines tubes and drains:  Discontinue IABP. Discontinue [place] and Cordis.  Discontinue A-line.  Discontinue chest tubes x 3. Discontinue NICOLASA x 2. Discontinue arterial line. Jones catheter will continue for now.  Continue pacer wires. Continue prevena wound vac. Place PICC line.   Plan: Remain ICU.            NSTEMI (non-ST elevated myocardial infarction)  The patient is a 69-year-old female with hypertension, diabetes, hyperlipidemia, obesity, anxiety and depression, iron deficiency anemia, vitamin B12 deficiency, who presented to the emergency room with an NSTEMI.  Workup included a cardiac catheterization that shows severe multivessel coronary artery disease with proximal LAD stenosis of 70% and severely depressed ejection fraction of 20%.  The patient is a candidate for urgent coronary artery bypass grafting.  The risks and benefits of surgery were explained to the patient.  The patient verbalized understanding of the issues discussed.  She a segs the risks of surgery and has agreed to proceed with urgent coronary artery bypass grafting.  She understands that the risk of neurologic complication postoperatively is increased due to the presence of cerebral microvascular disease.        Yovani Dobbins NP  Cardiothoracic Surgery  [facility] -

## 2019-11-02 NOTE — SUBJECTIVE & OBJECTIVE
Interval History: s/p CABG x 3v. BS elevation addressed with insulin protocol. Thrombocytopenia likely drug related. Discussed with Heme Onc    Review of Systems   Constitutional: Positive for activity change and fatigue. Negative for unexpected weight change.   HENT: Negative.  Negative for trouble swallowing.    Eyes: Negative.    Respiratory: Negative.  Negative for cough, shortness of breath and wheezing.    Cardiovascular: Negative.  Negative for chest pain.   Gastrointestinal: Negative.    Endocrine: Negative.    Genitourinary: Negative.    Musculoskeletal: Positive for myalgias.   Skin: Positive for wound.   Allergic/Immunologic: Negative.    Neurological: Positive for weakness. Negative for dizziness.   Hematological: Negative.    Psychiatric/Behavioral: Negative.    All other systems reviewed and are negative.    Objective:     Vital Signs (Most Recent):  Temp: 99 °F (37.2 °C) (11/02/19 0700)  Pulse: 91 (11/02/19 1028)  Resp: 20 (11/02/19 0745)  BP: 136/64 (11/02/19 1028)  SpO2: (!) 93 % (11/02/19 0745) Vital Signs (24h Range):  Temp:  [99 °F (37.2 °C)-99.9 °F (37.7 °C)] 99 °F (37.2 °C)  Pulse:  [89-91] 91  Resp:  [19-31] 20  SpO2:  [89 %-98 %] 93 %  BP: ()/(44-71) 136/64     Weight: 75.3 kg (166 lb 0.1 oz)  Body mass index is 31.37 kg/m².    Intake/Output Summary (Last 24 hours) at 11/2/2019 1112  Last data filed at 11/2/2019 0700  Gross per 24 hour   Intake 1487.88 ml   Output 2589 ml   Net -1101.12 ml      Physical Exam   Constitutional: She is oriented to person, place, and time. She appears well-developed and well-nourished. No distress.   HENT:   Head: Normocephalic and atraumatic.   Mouth/Throat: Oropharynx is clear and moist.   Eyes: Pupils are equal, round, and reactive to light. Conjunctivae and EOM are normal.   Neck: Neck supple. No JVD present. No thyromegaly present.   Cardiovascular: Normal rate and regular rhythm. Exam reveals no gallop and no friction rub.   No murmur heard.  Paced  regular rhythm.  Three subxyphoid mediastinal drain tubes with sero-sanguinous fluid.   Pulmonary/Chest: Effort normal and breath sounds normal. She has no wheezes. She has no rales.   Right IJV PA catheter and CVL.   Abdominal: Soft. Bowel sounds are normal. She exhibits no distension. There is no tenderness. There is no rebound and no guarding.   Genitourinary:   Genitourinary Comments: Jones catheter with clear yellow urine.   Musculoskeletal: Normal range of motion. She exhibits no edema or deformity.   Lymphadenopathy:     She has no cervical adenopathy.   Neurological: She is alert and oriented to person, place, and time. She has normal reflexes.   Skin: Skin is warm and dry. Capillary refill takes 2 to 3 seconds. No rash noted.   Psychiatric: She has a normal mood and affect. Her behavior is normal. Judgment and thought content normal.   Nursing note and vitals reviewed.      Significant Labs:   ABGs:   Recent Labs   Lab 11/01/19  0300   PH 7.440   PCO2 34.1*   HCO3 23.2*   POCSATURATED 93*   BE -1     Blood Culture: No results for input(s): LABBLOO in the last 48 hours.  BMP:   Recent Labs   Lab 11/02/19  0440   *      K 4.9      CO2 23   BUN 20   CREATININE 0.9   CALCIUM 8.8   MG 2.2     CBC:   Recent Labs   Lab 11/01/19  0320 11/01/19  1126 11/02/19  0440   WBC 12.00  12.00 14.27* 10.48   HGB 11.3*  11.3* 10.7* 9.5*   HCT 34.1*  34.1* 31.6* 28.3*   PLT 82*  82* 76* 47*     CMP:   Recent Labs   Lab 11/01/19  1608 11/01/19  2203 11/02/19  0440    139 138   K 4.4 4.9 4.9    103 103   CO2 23 25 23   * 149* 203*   BUN 18 18 20   CREATININE 0.9 0.8 0.9   CALCIUM 9.2 9.1 8.8   ANIONGAP 11 11 12   EGFRNONAA >60 >60 >60     All pertinent labs within the past 24 hours have been reviewed.    Significant Imaging: I have reviewed all pertinent imaging results/findings within the past 24 hours.

## 2019-11-02 NOTE — SUBJECTIVE & OBJECTIVE
Review of Systems   Constitution: Positive for malaise/fatigue.   HENT: Negative.    Eyes: Negative.    Cardiovascular: Positive for chest pain (incisional).   Respiratory: Negative.    Endocrine: Negative.    Hematologic/Lymphatic: Negative.    Skin: Negative.    Musculoskeletal: Negative.    Gastrointestinal: Negative.    Genitourinary: Negative.    Neurological: Negative.    Psychiatric/Behavioral: Negative.    Allergic/Immunologic: Negative.      Objective:     Vital Signs (Most Recent):  Temp: 99 °F (37.2 °C) (11/02/19 0700)  Pulse: 91 (11/02/19 1138)  Resp: (!) 23 (11/02/19 1138)  BP: 136/64 (11/02/19 1028)  SpO2: 95 % (11/02/19 1138) Vital Signs (24h Range):  Temp:  [99 °F (37.2 °C)-99.9 °F (37.7 °C)] 99 °F (37.2 °C)  Pulse:  [89-91] 91  Resp:  [19-31] 23  SpO2:  [89 %-98 %] 95 %  BP: ()/(44-71) 136/64     Weight: 75.3 kg (166 lb 0.1 oz)  Body mass index is 31.37 kg/m².     SpO2: 95 %  O2 Device (Oxygen Therapy): Vapotherm      Intake/Output Summary (Last 24 hours) at 11/2/2019 1223  Last data filed at 11/2/2019 0700  Gross per 24 hour   Intake 1467.88 ml   Output 2515 ml   Net -1047.12 ml       Lines/Drains/Airways     Central Venous Catheter Line                 Introducer 10/31/19 0745 left internal jugular 2 days         Pulmonary Artery Catheter Assessment  10/31/19 right internal jugular 2 days          Drain                 Closed/Suction Drain 10/31/19 0951 Right Leg Bulb 19 Fr. 2 days         Closed/Suction Drain 10/31/19 0952 Right Leg Bulb 19 Fr. 2 days         Urethral Catheter 10/30/19 1716 Temperature probe 2 days         Chest Tube 10/31/19 1234 3 Right Pleural 24 Fr. 1 day         Y Chest Tube 1 and 2 10/31/19 1233 1 Right Mediastinal 24 Fr. 2 Left Mediastinal 24 Fr. 1 day          Arterial Line                 Arterial Line 10/31/19 0723 Right Radial 2 days          Line                 IABP 10/30/19 1400 2 days          Peripheral Intravenous Line                 Peripheral IV -  Single Lumen 10/28/19 1322 20 G Right Hand 4 days         Peripheral IV - Single Lumen 10/30/19 1715 20 G Anterior;Left Forearm 2 days                Physical Exam   Constitutional: She is oriented to person, place, and time. She appears well-developed and well-nourished. No distress.   HENT:   Head: Normocephalic and atraumatic.   Eyes: Pupils are equal, round, and reactive to light. Right eye exhibits no discharge. Left eye exhibits no discharge.   Neck: Neck supple. No JVD present.   Cardiovascular: Normal rate, regular rhythm, S1 normal, S2 normal and normal heart sounds.   No murmur heard.  Pulmonary/Chest: Effort normal. No respiratory distress. She has no wheezes. She has no rales.   CABG site C/D/I; no bleeding erythema or drainage    Diminished BS at bases   Abdominal: Soft. She exhibits no distension.   Musculoskeletal: She exhibits no edema.   Neurological: She is alert and oriented to person, place, and time.   Skin: Skin is warm and dry. She is not diaphoretic. No erythema.   SVG sites C/D/I; no bleeding erythema or drainage   Psychiatric: She has a normal mood and affect. Her behavior is normal. Thought content normal.   Nursing note and vitals reviewed.      Significant Labs:   CMP   Recent Labs   Lab 11/01/19  1608 11/01/19  2203 11/02/19  0440    139 138   K 4.4 4.9 4.9    103 103   CO2 23 25 23   * 149* 203*   BUN 18 18 20   CREATININE 0.9 0.8 0.9   CALCIUM 9.2 9.1 8.8   ANIONGAP 11 11 12   ESTGFRAFRICA >60 >60 >60   EGFRNONAA >60 >60 >60   , CBC   Recent Labs   Lab 11/01/19  1126 11/02/19  0440 11/02/19  1122   WBC 14.27* 10.48 10.99   HGB 10.7* 9.5* 9.7*   HCT 31.6* 28.3* 29.4*   PLT 76* 47* 46*   , Troponin No results for input(s): TROPONINI in the last 48 hours. and All pertinent lab results from the last 24 hours have been reviewed.    Significant Imaging: Echocardiogram:   2D echo with color flow doppler:   Results for orders placed or performed during the hospital  encounter of 10/28/19   2D echo with color flow doppler   Result Value Ref Range    QEF 30 (A) 55 - 65    Mitral Valve Regurgitation MILD TO MODERATE     Diastolic Dysfunction Yes (A)     Aortic Valve Regurgitation MILD     Mitral Valve Mobility NORMAL     Narrative    Date of Procedure: 10/28/2019        TEST DESCRIPTION   Technical Quality: This is a technically adequate study.     Aorta: The aortic root is normal in size, measuring 3.0 cm at sinotubular junction and 3.2 cm at Sinuses of Valsalva. The proximal ascending aorta is normal in size, measuring 3.1 cm across.     Left Atrium: The left atrial volume index is severely enlarged, measuring 52.97 cc/m2.     Left Ventricle: The left ventricle is normal in size, with an end-diastolic diameter of 4.1 cm, and an end-systolic diameter of 3.7 cm. Septal wall thickness is mildly increased, with the septum measuring 1.6 cm and the posterior wall measuring 1.3 cm   across. Relative wall thickness was increased at 0.63, and the LV mass index was increased at 160.6 g/m2 consistent with concentric left ventricular hypertrophy. The anterior septum is severely hypokinetic. There is global hypokinesis. Left ventricular   systolic function appears moderately depressed. Visually estimated ejection fraction is 30-35%. The LV Doppler derived stroke volume equals 57.0 ccs.     Diastolic indices: E wave velocity 1.1 m/s, E/A ratio 0.9,  msec., E/e' ratio(avg) 16. There is pseudonormalization of mitral inflow pattern consistent with significant diastolic dysfunction.     Right Atrium: The right atrium is normal in size, measuring 3.7 cm in length and 2.8 cm in width in the apical view.     Right Ventricle: The right ventricle is normal in size. Global right ventricular systolic function appears normal. Tricuspid annular plane systolic excursion (TAPSE) is 2.2 cm.     Aortic Valve:  The aortic valve is normal in structure with normal leaflet mobility. The mean gradient  obtained across the aortic valve is 6 mmHg. Additionally, there is mild aortic regurgitation.     Mitral Valve:  The mitral valve is normal in structure with normal leaflet mobility. The pressure half time is 45 msec. The calculated mitral valve area is 4.89 cm2. There is mild to moderate mitral regurgitation.     Tricuspid Valve:  The tricuspid valve is normal in structure with normal leaflet mobility.     Pulmonary Valve:  The pulmonic valve is not well seen.     IVC: IVC is normal in size and collapses > 50% with a sniff, suggesting normal right atrial pressure of 3 mmHg.     Intracavitary: There is no evidence of pericardial effusion, intracavity mass, thrombi, or vegetation.         CONCLUSIONS     1 - Moderately depressed left ventricular systolic function (EF 30-35%).     2 - Impaired LV relaxation, elevated LAP (grade 2 diastolic dysfunction).     3 - Normal right ventricular systolic function .     4 - Mild aortic regurgitation.     5 - Severe left atrial enlargement.     6 - Concentric hypertrophy.     7 - Mild to moderate mitral regurgitation.             This document has been electronically    SIGNED BY: Terry Tarango MD On: 10/28/2019 16:38   , EKG: Reviewed and X-Ray: CXR: X-Ray Chest 1 View (CXR): No results found for this visit on 10/28/19. and X-Ray Chest PA and Lateral (CXR):   Results for orders placed or performed during the hospital encounter of 10/28/19   X-Ray Chest PA And Lateral    Narrative    EXAMINATION:  XR CHEST PA AND LATERAL    CLINICAL HISTORY:  Pre Op;    COMPARISON:  10/28/2019    FINDINGS:  Cardiac silhouette is normal. Aorta demonstrates atherosclerotic disease. The lungs demonstrate no evidence of active disease.  No evidence of pleural effusion or pneumothorax.  Bones appear intact.      Impression    No acute process seen.      Electronically signed by: Jh Anne MD  Date:    10/28/2019  Time:    21:38

## 2019-11-02 NOTE — PROGRESS NOTES
[facility]  Cardiology  Progress Note    Patient Name: Mateusz Bates  MRN: [id number]  Admission Date: 10/28/2019  Hospital Length of Stay: 5 days  Code Status: Full Code   Attending Physician: Josse Patel MD   Primary Care Physician: Serenity Kilpatrick MD  Expected Discharge Date: 11/6/2019  Principal Problem:CAD, multiple vessel    Subjective:   HPI:  Ms. Bates is a 69 year old female patient whose current medical conditions include HTN, hyperlipidemia, obesity, DM type II, cerebral microvascular disease, FAHAD, and B12 deficiency who was sent to [facility] ED today from her PCP's office due to chest pain and abnormal EKG. Patient complained of substernal chest tightness/heaviness that radiated to her back on Saturday while at rest. Associated symptoms included orthopnea and SOB. Patient denied any associated nausea, vomiting, diaphoresis, palpitations, near syncope, or syncope. Initial workup in ED revealed troponin of 3.665 and BNP of 1305 and patient subsequently admitted for further evaluation and treatment of NSTEMI. Cardiology consulted to assist with management. Patient seen and examined today while in ED. Feels well at time of exam, denies chest pain. She does report similar symptoms over the past few months but states they never lasted as long and were less intense. She reports compliance with her medications. States her father had history of CABG. Chart reviewed. Repeat troponin pending. EKG reviewed and shows SR with new LBBB. 2D echo pending.    Hospital Course:   10/29/19-Patient seen and examined today, s/p C yesterday that showed multivessel CAD. CVT consulted, CABG planned for Thursday. Feels ok. No chest pain or SOB. Labs stable. Being diuresed. 2D echo showed EF of 30-35%, DD.    10/30/19-Patient seen and examined today. Feels well. No complaints. Denies chest pain or SOB. Labs stable. IABP placement scheduled for today.    10/31/19- Patient is s/p CABG x3 with LIMA to LAD  reverse saphenous vein graft to the RCA and reverse saphenous vein graft to the ramus. Has been transferred to ICU. Is intubated and sedated. CT x3. IABP at 1:1 still in place. Transfused 3 units of PRBC in the OR and has received additional 3 units in ICU, has received 500cc albumin, epi and milrinone gtt.  H/H 6.9/21.0. Renal function stable. Paced rhythm on monitor     11/1/2019--patient seen and examined in ICU. Extubated o/n, remains on IABP 1:1 today, plans to possibly wean off later today per Dr. Zaidi. Remains on Epi and Milrinone gtt today. Paced on monitor. Labs stable today.     11/2/19-Patient seen and examined today, lying in bed. Feels ok. Complains of post-op pain and fatigue. Labs reviewed. Platelets 47,000. Heme/onc on board.       Review of Systems   Constitution: Positive for malaise/fatigue.   HENT: Negative.    Eyes: Negative.    Cardiovascular: Positive for chest pain (incisional).   Respiratory: Negative.    Endocrine: Negative.    Hematologic/Lymphatic: Negative.    Skin: Negative.    Musculoskeletal: Negative.    Gastrointestinal: Negative.    Genitourinary: Negative.    Neurological: Negative.    Psychiatric/Behavioral: Negative.    Allergic/Immunologic: Negative.      Objective:     Vital Signs (Most Recent):  Temp: 99 °F (37.2 °C) (11/02/19 0700)  Pulse: 91 (11/02/19 1138)  Resp: (!) 23 (11/02/19 1138)  BP: 136/64 (11/02/19 1028)  SpO2: 95 % (11/02/19 1138) Vital Signs (24h Range):  Temp:  [99 °F (37.2 °C)-99.9 °F (37.7 °C)] 99 °F (37.2 °C)  Pulse:  [89-91] 91  Resp:  [19-31] 23  SpO2:  [89 %-98 %] 95 %  BP: ()/(44-71) 136/64     Weight: 75.3 kg (166 lb 0.1 oz)  Body mass index is 31.37 kg/m².     SpO2: 95 %  O2 Device (Oxygen Therapy): Vapotherm      Intake/Output Summary (Last 24 hours) at 11/2/2019 1223  Last data filed at 11/2/2019 0700  Gross per 24 hour   Intake 1467.88 ml   Output 2515 ml   Net -1047.12 ml       Lines/Drains/Airways     Central Venous Catheter Line                  Introducer 10/31/19 0745 left internal jugular 2 days         Pulmonary Artery Catheter Assessment  10/31/19 right internal jugular 2 days          Drain                 Closed/Suction Drain 10/31/19 0951 Right Leg Bulb 19 Fr. 2 days         Closed/Suction Drain 10/31/19 0952 Right Leg Bulb 19 Fr. 2 days         Urethral Catheter 10/30/19 1716 Temperature probe 2 days         Chest Tube 10/31/19 1234 3 Right Pleural 24 Fr. 1 day         Y Chest Tube 1 and 2 10/31/19 1233 1 Right Mediastinal 24 Fr. 2 Left Mediastinal 24 Fr. 1 day          Arterial Line                 Arterial Line 10/31/19 0723 Right Radial 2 days          Line                 IABP 10/30/19 1400 2 days          Peripheral Intravenous Line                 Peripheral IV - Single Lumen 10/28/19 1322 20 G Right Hand 4 days         Peripheral IV - Single Lumen 10/30/19 1715 20 G Anterior;Left Forearm 2 days                Physical Exam   Constitutional: She is oriented to person, place, and time. She appears well-developed and well-nourished. No distress.   HENT:   Head: Normocephalic and atraumatic.   Eyes: Pupils are equal, round, and reactive to light. Right eye exhibits no discharge. Left eye exhibits no discharge.   Neck: Neck supple. No JVD present.   Cardiovascular: Normal rate, regular rhythm, S1 normal, S2 normal and normal heart sounds.   No murmur heard.  Pulmonary/Chest: Effort normal. No respiratory distress. She has no wheezes. She has no rales.   CABG site C/D/I; no bleeding erythema or drainage    Diminished BS at bases   Abdominal: Soft. She exhibits no distension.   Musculoskeletal: She exhibits no edema.   Neurological: She is alert and oriented to person, place, and time.   Skin: Skin is warm and dry. She is not diaphoretic. No erythema.   SVG sites C/D/I; no bleeding erythema or drainage   Psychiatric: She has a normal mood and affect. Her behavior is normal. Thought content normal.   Nursing note and vitals  reviewed.      Significant Labs:   CMP   Recent Labs   Lab 11/01/19  1608 11/01/19  2203 11/02/19  0440    139 138   K 4.4 4.9 4.9    103 103   CO2 23 25 23   * 149* 203*   BUN 18 18 20   CREATININE 0.9 0.8 0.9   CALCIUM 9.2 9.1 8.8   ANIONGAP 11 11 12   ESTGFRAFRICA >60 >60 >60   EGFRNONAA >60 >60 >60   , CBC   Recent Labs   Lab 11/01/19  1126 11/02/19  0440 11/02/19  1122   WBC 14.27* 10.48 10.99   HGB 10.7* 9.5* 9.7*   HCT 31.6* 28.3* 29.4*   PLT 76* 47* 46*   , Troponin No results for input(s): TROPONINI in the last 48 hours. and All pertinent lab results from the last 24 hours have been reviewed.    Significant Imaging: Echocardiogram:   2D echo with color flow doppler:   Results for orders placed or performed during the hospital encounter of 10/28/19   2D echo with color flow doppler   Result Value Ref Range    QEF 30 (A) 55 - 65    Mitral Valve Regurgitation MILD TO MODERATE     Diastolic Dysfunction Yes (A)     Aortic Valve Regurgitation MILD     Mitral Valve Mobility NORMAL     Narrative    Date of Procedure: 10/28/2019        TEST DESCRIPTION   Technical Quality: This is a technically adequate study.     Aorta: The aortic root is normal in size, measuring 3.0 cm at sinotubular junction and 3.2 cm at Sinuses of Valsalva. The proximal ascending aorta is normal in size, measuring 3.1 cm across.     Left Atrium: The left atrial volume index is severely enlarged, measuring 52.97 cc/m2.     Left Ventricle: The left ventricle is normal in size, with an end-diastolic diameter of 4.1 cm, and an end-systolic diameter of 3.7 cm. Septal wall thickness is mildly increased, with the septum measuring 1.6 cm and the posterior wall measuring 1.3 cm   across. Relative wall thickness was increased at 0.63, and the LV mass index was increased at 160.6 g/m2 consistent with concentric left ventricular hypertrophy. The anterior septum is severely hypokinetic. There is global hypokinesis. Left ventricular    systolic function appears moderately depressed. Visually estimated ejection fraction is 30-35%. The LV Doppler derived stroke volume equals 57.0 ccs.     Diastolic indices: E wave velocity 1.1 m/s, E/A ratio 0.9,  msec., E/e' ratio(avg) 16. There is pseudonormalization of mitral inflow pattern consistent with significant diastolic dysfunction.     Right Atrium: The right atrium is normal in size, measuring 3.7 cm in length and 2.8 cm in width in the apical view.     Right Ventricle: The right ventricle is normal in size. Global right ventricular systolic function appears normal. Tricuspid annular plane systolic excursion (TAPSE) is 2.2 cm.     Aortic Valve:  The aortic valve is normal in structure with normal leaflet mobility. The mean gradient obtained across the aortic valve is 6 mmHg. Additionally, there is mild aortic regurgitation.     Mitral Valve:  The mitral valve is normal in structure with normal leaflet mobility. The pressure half time is 45 msec. The calculated mitral valve area is 4.89 cm2. There is mild to moderate mitral regurgitation.     Tricuspid Valve:  The tricuspid valve is normal in structure with normal leaflet mobility.     Pulmonary Valve:  The pulmonic valve is not well seen.     IVC: IVC is normal in size and collapses > 50% with a sniff, suggesting normal right atrial pressure of 3 mmHg.     Intracavitary: There is no evidence of pericardial effusion, intracavity mass, thrombi, or vegetation.         CONCLUSIONS     1 - Moderately depressed left ventricular systolic function (EF 30-35%).     2 - Impaired LV relaxation, elevated LAP (grade 2 diastolic dysfunction).     3 - Normal right ventricular systolic function .     4 - Mild aortic regurgitation.     5 - Severe left atrial enlargement.     6 - Concentric hypertrophy.     7 - Mild to moderate mitral regurgitation.             This document has been electronically    SIGNED BY: Terry Tarango MD On: 10/28/2019 16:38   , EKG:  Reviewed and X-Ray: CXR: X-Ray Chest 1 View (CXR): No results found for this visit on 10/28/19. and X-Ray Chest PA and Lateral (CXR):   Results for orders placed or performed during the hospital encounter of 10/28/19   X-Ray Chest PA And Lateral    Narrative    EXAMINATION:  XR CHEST PA AND LATERAL    CLINICAL HISTORY:  Pre Op;    COMPARISON:  10/28/2019    FINDINGS:  Cardiac silhouette is normal. Aorta demonstrates atherosclerotic disease. The lungs demonstrate no evidence of active disease.  No evidence of pleural effusion or pneumothorax.  Bones appear intact.      Impression    No acute process seen.      Electronically signed by: Jh Anne MD  Date:    10/28/2019  Time:    21:38     Assessment and Plan:   Patient recovering s/p CABG. Platelets 47,000 this AM. Heme/onc on board.    * CAD, multiple vessel  -See plan under CABG    Thrombocytopenia  -Mgmt as per heme/onc    Acute blood loss anemia  -Patient transfused 3 units in the OR and additional 3 units in ICU    11/2/19  -Mgmt as per CVT    S/P CABG x 3  -Patient is s/p CABG x3 with LIMA to LAD reverse saphenous vein graft to the RCA and reverse saphenous vein graft to the ramus.  -Continue post CABG supportive therapy  -Continue ASA, Plavix, Statin, BB  -Will continue to follow along     11/1/19  -POD #1 s/p CABG x3  -Continue ASA, Statin, BB  -Remains on Epi and Milrinone gtt today  -Mgmt per CT surgery  -Will follow along    11/2/19  -Recovering s/p CABG x 3  -Continue statin, BB  -Platelets 47,000 this AM; heme/onc on board, ASA held  -Await further rec's    Acute combined systolic and diastolic CHF, NYHA class 2  -Presents with ICM/acute combined CHF  -EF 30-35% by 2D echo  -Continue diuresis with IV Lasix  -Continue BB  -Will add ARB if BP permits    Chest pain  -See plan under NSTEMI    Abnormal EKG  -LBBB noted on EKG  -See plan under NSTEMI    NSTEMI (non-ST elevated myocardial infarction)  -Patient presented with intermittent chest  tightness/heaviness that radiated to her back since Saturday evening  -Associated with JONES/orthopnea  -Initial troponin 3.665  -EKG showed SR with new LBBB  -Presentation consistent with NSTEMI  -Continue ASA, statin  -Start Toprol XL  -Start heparin gtt  -Check 2D echo  -Continue to trend troponin  -Keep NPO. [facility] today for further evaluation and PCI if indicated. All risks, benefits, and treatment alternatives explained to patient in detail. All questions answered. She has agreed to proceed. Further rec's to follow.    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 All risks, benefits, and treatment alternatives explained to patient in detail. All questions answered. She has agreed to proceed.    10/31/19  -Patient is s/p CABG x3.   See plan for CABG     Hyperlipidemia associated with type 2 diabetes mellitus  -Continue statin    Obesity (BMI 30.0-34.9)  -Weight loss    Diabetes mellitus type II, uncontrolled  -Mgmt as per hospital medicine    Hypertension associated with diabetes  -Continue home meds as BP permits        VTE Risk Mitigation (From admission, onward)         Ordered     IP VTE LOW RISK PATIENT  Once      10/30/19 1405     Place NATHANIEL hose  Until discontinued      10/30/19 1405     Place sequential compression device  Until discontinued      10/30/19 1405     Reason for no Mechanical VTE Prophylaxis  Once      10/28/19 1414     heparin 25,000 units in dextrose 5% 250 mL (100 units/mL) infusion LOW INTENSITY nomogram - [facility]  Continuous      10/28/19 1257                Josselyn Vann PA-C  Cardiology  [facility]

## 2019-11-02 NOTE — PT/OT/SLP PROGRESS
Occupational Therapy      Patient Name:  Mateusz Bates   MRN:  [id number]    OT attempted eal at 8:40am, pt getting balloon pump pulled at this time. Spoke to Nurse Loaiza and pt needs to remain flat and in bed for some time, therefore eval can be attempted again after 11:00am. OT eval initiated through chart review, will attempt to complete at later time.     Eval attempted again at 11:05am, pt's Nurses Josse and Marizol report that pt is too sedated to get out of bed at this time. Will attempt to complete eval at later date.     Iza Vieyra, PT/OT  11/2/2019

## 2019-11-02 NOTE — PROCEDURES
"Mateusz Bates is a 69 y.o. female patient.    Temp: 99 °F (37.2 °C) (11/02/19 0700)  Pulse: 91 (11/02/19 1337)  Resp: (!) 23 (11/02/19 1337)  BP: (!) 148/78 (11/02/19 1215)  SpO2: (!) 93 % (11/02/19 1337)  Weight: 75.3 kg (166 lb 0.1 oz) (11/01/19 1429)  Height: 5' 1" (154.9 cm) (11/01/19 1433)    PICC  Date/Time: 11/2/2019 1:26 PM  Performed by: Chris Madison RN  Supervising provider: Yumiko Rosales RN  Consent Done: Yes  Time out: Immediately prior to procedure a time out was called to verify the correct patient, procedure, equipment, support staff and site/side marked as required  Indications: vascular access and med administration  Anesthesia: local infiltration  Local anesthetic: lidocaine 1% without epinephrine  Anesthetic Total (mL): 3  Skin prep agent dried: skin prep agent completely dried prior to procedure  Sterile barriers: all five maximum sterile barriers used - cap, mask, sterile gown, sterile gloves, and large sterile sheet  Hand hygiene: hand hygiene performed prior to central venous catheter insertion  Location details: right basilic  Catheter type: double lumen  Catheter size: 5 Fr  Catheter Length: 36cm    Ultrasound guidance: yes  Vessel Caliber: medium and patent, compressibility normal  Vascular Doppler: not done  Needle advanced into vessel with real time Ultrasound guidance.  Guidewire confirmed in vessel.  Sterile sheath used.  no esophageal manometryNumber of attempts: 1  Post-procedure: blood return through all ports, chlorhexidine patch and sterile dressing applied  Estimated blood loss (mL): 0  Specimens: No  Implants: No  Assessment: placement verified by x-ray  Tip Termination Explanation: see rad report  Comments: Do not use until placement verified by MD Chris Madison  11/2/2019  "

## 2019-11-02 NOTE — SUBJECTIVE & OBJECTIVE
Interval History: Recovering from anesthesia, still drowsy.    Oncology Treatment Plan:   [No treatment plan]    Medications:  Continuous Infusions:   dexmedetomidine (PRECEDEX) infusion Stopped (11/02/19 0830)    epinephrine Stopped (11/01/19 1800)    milrinone 20mg/100ml D5W (200mcg/ml) Stopped (11/02/19 0830)    niCARdipine Stopped (10/31/19 1824)    vasopressin (PITRESSIN) infusion       Scheduled Meds:   atorvastatin  80 mg Oral QHS    chlorhexidine  10 mL Mouth/Throat BID    docusate sodium  100 mg Oral BID    furosemide  40 mg Intravenous BID    insulin detemir U-100  10 Units Subcutaneous BID    metoprolol tartrate  25 mg Oral BID    nozaseptin   Each Nostril BID    pantoprazole  40 mg Oral Daily    polyethylene glycol  17 g Oral Daily    potassium chloride  20 mEq Oral Q12H     PRN Meds:sodium chloride, albumin human 5%, albuterol sulfate, chlorhexidine, Dextrose 10% Bolus, Dextrose 10% Bolus, Dextrose 10% Bolus, dextrose 50%, glucagon (human recombinant), glucose, glucose, insulin aspart U-100, lactated ringers, magnesium sulfate IVPB, magnesium sulfate IVPB, metoclopramide HCl, metoprolol tartrate, nozaseptin, ondansetron, oxyCODONE, oxyCODONE, potassium chloride in water **AND** potassium chloride in water **AND** potassium chloride in water     Review of Systems   Constitutional: Negative.    HENT: Negative.    Eyes: Negative.    Respiratory: Negative.    Cardiovascular: Negative.    Gastrointestinal: Negative.    Endocrine: Negative.    Genitourinary: Negative.    Musculoskeletal: Negative.    Skin: Negative.    Allergic/Immunologic: Negative.    Neurological: Negative.    Hematological: Negative.    Psychiatric/Behavioral: Negative.      Objective:     Vital Signs (Most Recent):  Temp: 99 °F (37.2 °C) (11/02/19 0700)  Pulse: 91 (11/02/19 1138)  Resp: (!) 23 (11/02/19 1138)  BP: 136/64 (11/02/19 1028)  SpO2: 95 % (11/02/19 1138) Vital Signs (24h Range):  Temp:  [99 °F (37.2 °C)-99.9 °F  (37.7 °C)] 99 °F (37.2 °C)  Pulse:  [89-91] 91  Resp:  [19-31] 23  SpO2:  [89 %-98 %] 95 %  BP: ()/(44-71) 136/64     Weight: 75.3 kg (166 lb 0.1 oz)  Body mass index is 31.37 kg/m².  Body surface area is 1.8 meters squared.      Intake/Output Summary (Last 24 hours) at 11/2/2019 1142  Last data filed at 11/2/2019 0700  Gross per 24 hour   Intake 1487.88 ml   Output 2589 ml   Net -1101.12 ml       Physical Exam   Constitutional: She is oriented to person, place, and time. She appears well-developed and well-nourished.   HENT:   Head: Normocephalic and atraumatic.   Eyes: Conjunctivae and EOM are normal.   Neck: Normal range of motion. Neck supple.   Cardiovascular: Normal rate and regular rhythm.   Pulmonary/Chest: Effort normal and breath sounds normal.   Abdominal: Soft. Bowel sounds are normal.   Musculoskeletal: Normal range of motion.   Neurological: She is alert and oriented to person, place, and time.   Skin: Skin is warm and dry.   Psychiatric: She has a normal mood and affect. Her behavior is normal. Judgment and thought content normal.   Nursing note and vitals reviewed.      Significant Labs:   All pertinent labs from the last 24 hours have been reviewed.    Diagnostic Results:  I have reviewed all pertinent imaging results/findings within the past 24 hours.

## 2019-11-02 NOTE — ASSESSMENT & PLAN NOTE
Troponin peaked at 3.66 now trending down.  Heparin, metoprolol, ASA and statin. Hold ACE inhibitor due to intolerance with cough. Hold ARB as patient's blood pressure is marginal. Will consider adding, if her blood pressure will tolerate.  Hold second antiplatelet due to pending Cardiology procedure.  Patient was evaluated for Cardiac Rehab and is not a candidate at this time, but will be referred, when appropriate.  LHC on 28 on October showed severe multi-vessel CAD.  Referral to CVT Surgery who recommended CABG planned for Thursday 31 October.  Balloon pump placed 30 October.  CABG x3 by Dr. Zaidi on 31 October.  11/2  S/p CABG  Cards  CT Sx  Statin  BB

## 2019-11-02 NOTE — PT/OT/SLP PROGRESS
Physical Therapy      Patient Name:  Mateusz Bates   MRN:  [id number]    Patient not seen today secondary to having balloon pump removed on first attempt and too lethargic on 2nd attempt. Will follow-up on next session.    Emigdio Bean, PT

## 2019-11-02 NOTE — PROGRESS NOTES
[facility] Hospital Medicine  Progress Note    Patient Name: Mateusz Bates  MRN: [id number]  Patient Class: IP- Inpatient   Admission Date: 10/28/2019  Length of Stay: 5 days  Attending Physician: Josse Patel MD  Primary Care Provider: Serenity Kilpatrick MD        Subjective:     Principal Problem:CAD, multiple vessel        HPI:  Mateusz Bates is a 69 year old female with DM II, hypertension and hyperlipidemia who presented to the ED at the request of her PCP due to finding of an abnormal EKG and complaints of chest tightness with SOB. The patient reports having intermittent symptoms for the last 1-2 months. On 10/26/19, she began having a tightness in her mid chest that radiated into her back with associated SOB. Symptoms were worse with laying flat and improved with sitting up. They lasted only minutes, but this was longer than previous episodes. She denies a trend in when these episodes occur as well as associated nausea, vomiting and diaphoresis. The patient's EKG was significant for a new LBBB compared to previous in 2016. Labs were significant for a BNP of 1,305, troponin of 3.665 and potassium of 3.4. Code status was discussed with the patient and her sister. She is a full code. Her sister, Christina Hernandez, is her surrogate medical decision maker.     Overview/Hospital Course:  Admitted for evaluation and treatment of chest pain concerning for acute coronary syndrome.  Evaluation by Cardiology and urgent left heart catheterization.  Angiogram discovered severe multi-vessel coronary artery disease.  Cardiac echo showed severely reduced LV function.  Referral to Cardiothoracic Surgery who recommended CABG.  Balloon pump placed 30 October.  CABG x3 by Dr. Zaidi 31 October.  Successfully extubated morning of 01 November.  11/2 - Patient improved s/p extubation. CABG x 3. Discussed with CC Team. BS mild elevation. SA and LA insulin initiated.    Interval History: s/p CABG x 3v. BS elevation  addressed with insulin protocol. Thrombocytopenia likely drug related. Discussed with Heme Onc    Review of Systems   Constitutional: Positive for activity change and fatigue. Negative for unexpected weight change.   HENT: Negative.  Negative for trouble swallowing.    Eyes: Negative.    Respiratory: Negative.  Negative for cough, shortness of breath and wheezing.    Cardiovascular: Negative.  Negative for chest pain.   Gastrointestinal: Negative.    Endocrine: Negative.    Genitourinary: Negative.    Musculoskeletal: Positive for myalgias.   Skin: Positive for wound.   Allergic/Immunologic: Negative.    Neurological: Positive for weakness. Negative for dizziness.   Hematological: Negative.    Psychiatric/Behavioral: Negative.    All other systems reviewed and are negative.    Objective:     Vital Signs (Most Recent):  Temp: 99 °F (37.2 °C) (11/02/19 0700)  Pulse: 91 (11/02/19 1028)  Resp: 20 (11/02/19 0745)  BP: 136/64 (11/02/19 1028)  SpO2: (!) 93 % (11/02/19 0745) Vital Signs (24h Range):  Temp:  [99 °F (37.2 °C)-99.9 °F (37.7 °C)] 99 °F (37.2 °C)  Pulse:  [89-91] 91  Resp:  [19-31] 20  SpO2:  [89 %-98 %] 93 %  BP: ()/(44-71) 136/64     Weight: 75.3 kg (166 lb 0.1 oz)  Body mass index is 31.37 kg/m².    Intake/Output Summary (Last 24 hours) at 11/2/2019 1112  Last data filed at 11/2/2019 0700  Gross per 24 hour   Intake 1487.88 ml   Output 2589 ml   Net -1101.12 ml      Physical Exam   Constitutional: She is oriented to person, place, and time. She appears well-developed and well-nourished. No distress.   HENT:   Head: Normocephalic and atraumatic.   Mouth/Throat: Oropharynx is clear and moist.   Eyes: Pupils are equal, round, and reactive to light. Conjunctivae and EOM are normal.   Neck: Neck supple. No JVD present. No thyromegaly present.   Cardiovascular: Normal rate and regular rhythm. Exam reveals no gallop and no friction rub.   No murmur heard.  Paced regular rhythm.  Three subxyphoid mediastinal  drain tubes with sero-sanguinous fluid.   Pulmonary/Chest: Effort normal and breath sounds normal. She has no wheezes. She has no rales.   Right IJV PA catheter and CVL.   Abdominal: Soft. Bowel sounds are normal. She exhibits no distension. There is no tenderness. There is no rebound and no guarding.   Genitourinary:   Genitourinary Comments: Jones catheter with clear yellow urine.   Musculoskeletal: Normal range of motion. She exhibits no edema or deformity.   Lymphadenopathy:     She has no cervical adenopathy.   Neurological: She is alert and oriented to person, place, and time. She has normal reflexes.   Skin: Skin is warm and dry. Capillary refill takes 2 to 3 seconds. No rash noted.   Psychiatric: She has a normal mood and affect. Her behavior is normal. Judgment and thought content normal.   Nursing note and vitals reviewed.      Significant Labs:   ABGs:   Recent Labs   Lab 11/01/19  0300   PH 7.440   PCO2 34.1*   HCO3 23.2*   POCSATURATED 93*   BE -1     Blood Culture: No results for input(s): LABBLOO in the last 48 hours.  BMP:   Recent Labs   Lab 11/02/19  0440   *      K 4.9      CO2 23   BUN 20   CREATININE 0.9   CALCIUM 8.8   MG 2.2     CBC:   Recent Labs   Lab 11/01/19  0320 11/01/19  1126 11/02/19  0440   WBC 12.00  12.00 14.27* 10.48   HGB 11.3*  11.3* 10.7* 9.5*   HCT 34.1*  34.1* 31.6* 28.3*   PLT 82*  82* 76* 47*     CMP:   Recent Labs   Lab 11/01/19  1608 11/01/19  2203 11/02/19  0440    139 138   K 4.4 4.9 4.9    103 103   CO2 23 25 23   * 149* 203*   BUN 18 18 20   CREATININE 0.9 0.8 0.9   CALCIUM 9.2 9.1 8.8   ANIONGAP 11 11 12   EGFRNONAA >60 >60 >60     All pertinent labs within the past 24 hours have been reviewed.    Significant Imaging: I have reviewed all pertinent imaging results/findings within the past 24 hours.      Assessment/Plan:      Thrombocytopenia  11/2  Heme On on Consult  HIT panel  Likely drug induced  Monitor      Acute hypoxemic  respiratory failure  11/2  Extubated  Monitor  Pulmonary CC      Acute combined systolic and diastolic CHF, NYHA class 2  Probable ischemic cardiomyopathy.  Anticipate CABG 31 October.  Optimize medical management.    NSTEMI (non-ST elevated myocardial infarction)  Troponin peaked at 3.66 now trending down.  Heparin, metoprolol, ASA and statin. Hold ACE inhibitor due to intolerance with cough. Hold ARB as patient's blood pressure is marginal. Will consider adding, if her blood pressure will tolerate.  Hold second antiplatelet due to pending Cardiology procedure.  Patient was evaluated for Cardiac Rehab and is not a candidate at this time, but will be referred, when appropriate.  LHC on 28 on October showed severe multi-vessel CAD.  Referral to CVT Surgery who recommended CABG planned for Thursday 31 October.  Balloon pump placed 30 October.  CABG x3 by Dr. Zaidi on 31 October.  11/2  S/p CABG  Cards  CT Sx  Statin  BB      Hyperlipidemia associated with type 2 diabetes mellitus  -Patient was improved, but not at goal on 10/4/19.   -Continue statin.       Obesity (BMI 30.0-34.9)  Diet  Nutrition        Diabetes mellitus type II, uncontrolled  -Hemoglobin A1C was 9 on 9/19/19.   -NISS.   -ADA diet.   11/2  Levemir / NISS / Accuchecks      Anxiety and depression  -Stable.   -Continue Effexor.       Hypertension associated with diabetes  -Stable.   -Continue Norvasc.  11/2 -  BB , CCB        VTE Risk Mitigation (From admission, onward)         Ordered     IP VTE LOW RISK PATIENT  Once      10/30/19 1405     Place NATHANIEL hose  Until discontinued      10/30/19 1405     Place sequential compression device  Until discontinued      10/30/19 1405     Reason for no Mechanical VTE Prophylaxis  Once      10/28/19 1414     heparin 25,000 units in dextrose 5% 250 mL (100 units/mL) infusion LOW INTENSITY nomogram - [facility]  Continuous      10/28/19 1257                Critical care time spent on the evaluation and treatment of severe  organ dysfunction, review of pertinent labs and imaging studies, discussions with consulting providers and discussions with patient/family: 32 minutes.      Josse Patel MD  Department of Hospital Medicine   [facility]

## 2019-11-02 NOTE — NURSING
Dr. Zaidi and Gustabo Dobbins NP at bedside. IABP removed by Gustabo Dobbins NP with manual pressure held for 25 minutes. VSS. Hemostasis achieved. Medium sized hematoma brought to their attention. Pressure dressing applied x 1 hour. When dressing removed, site was soft with no hematoma. Bruising around site. Occlusive dressing applied.

## 2019-11-02 NOTE — ASSESSMENT & PLAN NOTE
-Patient presented with intermittent chest tightness/heaviness that radiated to her back since Saturday evening  -Associated with JONES/orthopnea  -Initial troponin 3.665  -EKG showed SR with new LBBB  -Presentation consistent with NSTEMI  -Continue ASA, statin  -Start Toprol XL  -Start heparin gtt  -Check 2D echo  -Continue to trend troponin  -Keep NPO. [facility] today for further evaluation and PCI if indicated. All risks, benefits, and treatment alternatives explained to patient in detail. All questions answered. She has agreed to proceed. Further rec's to follow.    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 All risks, benefits, and treatment alternatives explained to patient in detail. All questions answered. She has agreed to proceed.    10/31/19  -Patient is s/p CABG x3.   See plan for CABG

## 2019-11-03 LAB
ANION GAP SERPL CALC-SCNC: 11 MMOL/L (ref 8–16)
ANION GAP SERPL CALC-SCNC: 13 MMOL/L (ref 8–16)
BASOPHILS # BLD AUTO: 0.03 K/UL (ref 0–0.2)
BASOPHILS NFR BLD: 0.2 % (ref 0–1.9)
BLD PROD TYP BPU: NORMAL
BLOOD UNIT EXPIRATION DATE: NORMAL
BLOOD UNIT TYPE CODE: 5100
BLOOD UNIT TYPE: NORMAL
BUN SERPL-MCNC: 30 MG/DL (ref 8–23)
BUN SERPL-MCNC: 32 MG/DL (ref 8–23)
CALCIUM SERPL-MCNC: 10 MG/DL (ref 8.7–10.5)
CALCIUM SERPL-MCNC: 9.5 MG/DL (ref 8.7–10.5)
CHLORIDE SERPL-SCNC: 93 MMOL/L (ref 95–110)
CHLORIDE SERPL-SCNC: 98 MMOL/L (ref 95–110)
CO2 SERPL-SCNC: 29 MMOL/L (ref 23–29)
CO2 SERPL-SCNC: 30 MMOL/L (ref 23–29)
CODING SYSTEM: NORMAL
CREAT SERPL-MCNC: 0.9 MG/DL (ref 0.5–1.4)
CREAT SERPL-MCNC: 0.9 MG/DL (ref 0.5–1.4)
DIFFERENTIAL METHOD: ABNORMAL
DISPENSE STATUS: NORMAL
EOSINOPHIL # BLD AUTO: 0.2 K/UL (ref 0–0.5)
EOSINOPHIL NFR BLD: 1.5 % (ref 0–8)
ERYTHROCYTE [DISTWIDTH] IN BLOOD BY AUTOMATED COUNT: 15.7 % (ref 11.5–14.5)
EST. GFR  (AFRICAN AMERICAN): >60 ML/MIN/1.73 M^2
EST. GFR  (AFRICAN AMERICAN): >60 ML/MIN/1.73 M^2
EST. GFR  (NON AFRICAN AMERICAN): >60 ML/MIN/1.73 M^2
EST. GFR  (NON AFRICAN AMERICAN): >60 ML/MIN/1.73 M^2
FERRITIN SERPL-MCNC: 186 NG/ML (ref 20–300)
FOLATE SERPL-MCNC: 8.5 NG/ML (ref 4–24)
GLUCOSE SERPL-MCNC: 137 MG/DL (ref 70–110)
GLUCOSE SERPL-MCNC: 389 MG/DL (ref 70–110)
HAPTOGLOB SERPL-MCNC: 156 MG/DL (ref 30–250)
HCT VFR BLD AUTO: 34.7 % (ref 37–48.5)
HGB BLD-MCNC: 11.3 G/DL (ref 12–16)
IMM GRANULOCYTES # BLD AUTO: 0.09 K/UL (ref 0–0.04)
IMM GRANULOCYTES NFR BLD AUTO: 0.6 % (ref 0–0.5)
IRON SERPL-MCNC: 20 UG/DL (ref 30–160)
LYMPHOCYTES # BLD AUTO: 1.7 K/UL (ref 1–4.8)
LYMPHOCYTES NFR BLD: 12.3 % (ref 18–48)
MAGNESIUM SERPL-MCNC: 2 MG/DL (ref 1.6–2.6)
MAGNESIUM SERPL-MCNC: 2.5 MG/DL (ref 1.6–2.6)
MCH RBC QN AUTO: 29 PG (ref 27–31)
MCHC RBC AUTO-ENTMCNC: 32.6 G/DL (ref 32–36)
MCV RBC AUTO: 89 FL (ref 82–98)
MONOCYTES # BLD AUTO: 1.1 K/UL (ref 0.3–1)
MONOCYTES NFR BLD: 7.7 % (ref 4–15)
NEUTROPHILS # BLD AUTO: 10.8 K/UL (ref 1.8–7.7)
NEUTROPHILS NFR BLD: 77.7 % (ref 38–73)
NRBC BLD-RTO: 0 /100 WBC
NUM UNITS TRANS PACKED RBC: NORMAL
PLATELET # BLD AUTO: 89 K/UL (ref 150–350)
PMV BLD AUTO: 11.4 FL (ref 9.2–12.9)
POCT GLUCOSE: 151 MG/DL (ref 70–110)
POCT GLUCOSE: 331 MG/DL (ref 70–110)
POCT GLUCOSE: 380 MG/DL (ref 70–110)
POTASSIUM SERPL-SCNC: 3.8 MMOL/L (ref 3.5–5.1)
POTASSIUM SERPL-SCNC: 4.1 MMOL/L (ref 3.5–5.1)
RBC # BLD AUTO: 3.9 M/UL (ref 4–5.4)
SATURATED IRON: 10 % (ref 20–50)
SODIUM SERPL-SCNC: 134 MMOL/L (ref 136–145)
SODIUM SERPL-SCNC: 140 MMOL/L (ref 136–145)
TOTAL IRON BINDING CAPACITY: 209 UG/DL (ref 250–450)
TRANSFERRIN SERPL-MCNC: 141 MG/DL (ref 200–375)
TSH SERPL DL<=0.005 MIU/L-ACNC: 1.82 UIU/ML (ref 0.4–4)
VIT B12 SERPL-MCNC: 1101 PG/ML (ref 210–950)
WBC # BLD AUTO: 13.88 K/UL (ref 3.9–12.7)

## 2019-11-03 PROCEDURE — 82668 ASSAY OF ERYTHROPOIETIN: CPT

## 2019-11-03 PROCEDURE — 80048 BASIC METABOLIC PNL TOTAL CA: CPT

## 2019-11-03 PROCEDURE — 99233 SBSQ HOSP IP/OBS HIGH 50: CPT | Mod: ,,, | Performed by: INTERNAL MEDICINE

## 2019-11-03 PROCEDURE — 63600175 PHARM REV CODE 636 W HCPCS: Performed by: NURSE PRACTITIONER

## 2019-11-03 PROCEDURE — 27000221 HC OXYGEN, UP TO 24 HOURS

## 2019-11-03 PROCEDURE — 94799 UNLISTED PULMONARY SVC/PX: CPT

## 2019-11-03 PROCEDURE — 83735 ASSAY OF MAGNESIUM: CPT | Mod: 91

## 2019-11-03 PROCEDURE — 80048 BASIC METABOLIC PNL TOTAL CA: CPT | Mod: 91

## 2019-11-03 PROCEDURE — 27200667 HC PACEMAKER, TEMPORARY MONITORING, PER SHIFT

## 2019-11-03 PROCEDURE — 83010 ASSAY OF HAPTOGLOBIN QUANT: CPT

## 2019-11-03 PROCEDURE — 99291 PR CRITICAL CARE, E/M 30-74 MINUTES: ICD-10-PCS | Mod: ,,, | Performed by: INTERNAL MEDICINE

## 2019-11-03 PROCEDURE — 97162 PT EVAL MOD COMPLEX 30 MIN: CPT

## 2019-11-03 PROCEDURE — 97167 OT EVAL HIGH COMPLEX 60 MIN: CPT

## 2019-11-03 PROCEDURE — 25000003 PHARM REV CODE 250: Performed by: NURSE PRACTITIONER

## 2019-11-03 PROCEDURE — 83540 ASSAY OF IRON: CPT

## 2019-11-03 PROCEDURE — 82607 VITAMIN B-12: CPT

## 2019-11-03 PROCEDURE — 84443 ASSAY THYROID STIM HORMONE: CPT

## 2019-11-03 PROCEDURE — 20000000 HC ICU ROOM

## 2019-11-03 PROCEDURE — 82746 ASSAY OF FOLIC ACID SERUM: CPT

## 2019-11-03 PROCEDURE — 99291 CRITICAL CARE FIRST HOUR: CPT | Mod: ,,, | Performed by: INTERNAL MEDICINE

## 2019-11-03 PROCEDURE — 85025 COMPLETE CBC W/AUTO DIFF WBC: CPT

## 2019-11-03 PROCEDURE — 99900035 HC TECH TIME PER 15 MIN (STAT)

## 2019-11-03 PROCEDURE — 97530 THERAPEUTIC ACTIVITIES: CPT

## 2019-11-03 PROCEDURE — 99233 PR SUBSEQUENT HOSPITAL CARE,LEVL III: ICD-10-PCS | Mod: ,,, | Performed by: INTERNAL MEDICINE

## 2019-11-03 PROCEDURE — 82728 ASSAY OF FERRITIN: CPT

## 2019-11-03 RX ORDER — FUROSEMIDE 40 MG/1
40 TABLET ORAL 2 TIMES DAILY
Status: DISCONTINUED | OUTPATIENT
Start: 2019-11-03 | End: 2019-11-04

## 2019-11-03 RX ORDER — METOPROLOL TARTRATE 50 MG/1
100 TABLET ORAL 2 TIMES DAILY
Status: DISCONTINUED | OUTPATIENT
Start: 2019-11-03 | End: 2019-11-05

## 2019-11-03 RX ORDER — SYRING-NEEDL,DISP,INSUL,0.3 ML 29 G X1/2"
296 SYRINGE, EMPTY DISPOSABLE MISCELLANEOUS ONCE
Status: COMPLETED | OUTPATIENT
Start: 2019-11-03 | End: 2019-11-03

## 2019-11-03 RX ORDER — MAGNESIUM SULFATE HEPTAHYDRATE 40 MG/ML
2 INJECTION, SOLUTION INTRAVENOUS ONCE
Status: COMPLETED | OUTPATIENT
Start: 2019-11-03 | End: 2019-11-03

## 2019-11-03 RX ORDER — AMLODIPINE BESYLATE 2.5 MG/1
2.5 TABLET ORAL DAILY
Status: DISCONTINUED | OUTPATIENT
Start: 2019-11-03 | End: 2019-11-05

## 2019-11-03 RX ORDER — ASPIRIN 81 MG/1
81 TABLET ORAL DAILY
Status: DISCONTINUED | OUTPATIENT
Start: 2019-11-03 | End: 2019-11-05

## 2019-11-03 RX ADMIN — METOPROLOL TARTRATE 100 MG: 50 TABLET ORAL at 08:11

## 2019-11-03 RX ADMIN — DOCUSATE SODIUM 100 MG: 100 CAPSULE, LIQUID FILLED ORAL at 09:11

## 2019-11-03 RX ADMIN — AMLODIPINE BESYLATE 2.5 MG: 2.5 TABLET ORAL at 09:11

## 2019-11-03 RX ADMIN — MAGNESIUM CITRATE 296 ML: 1.75 LIQUID ORAL at 10:11

## 2019-11-03 RX ADMIN — INSULIN ASPART 2 UNITS: 100 INJECTION, SOLUTION INTRAVENOUS; SUBCUTANEOUS at 05:11

## 2019-11-03 RX ADMIN — INSULIN ASPART 8 UNITS: 100 INJECTION, SOLUTION INTRAVENOUS; SUBCUTANEOUS at 01:11

## 2019-11-03 RX ADMIN — INSULIN ASPART 10 UNITS: 100 INJECTION, SOLUTION INTRAVENOUS; SUBCUTANEOUS at 05:11

## 2019-11-03 RX ADMIN — PANTOPRAZOLE SODIUM 40 MG: 40 TABLET, DELAYED RELEASE ORAL at 09:11

## 2019-11-03 RX ADMIN — DOCUSATE SODIUM 100 MG: 100 CAPSULE, LIQUID FILLED ORAL at 08:11

## 2019-11-03 RX ADMIN — METOPROLOL TARTRATE 100 MG: 50 TABLET ORAL at 09:11

## 2019-11-03 RX ADMIN — ASPIRIN 81 MG: 81 TABLET, COATED ORAL at 09:11

## 2019-11-03 RX ADMIN — POLYETHYLENE GLYCOL (3350) 17 G: 17 POWDER, FOR SOLUTION ORAL at 09:11

## 2019-11-03 RX ADMIN — POTASSIUM CHLORIDE 20 MEQ: 20 TABLET, EXTENDED RELEASE ORAL at 09:11

## 2019-11-03 RX ADMIN — ATORVASTATIN CALCIUM 80 MG: 40 TABLET, FILM COATED ORAL at 08:11

## 2019-11-03 RX ADMIN — FUROSEMIDE 40 MG: 40 TABLET ORAL at 09:11

## 2019-11-03 RX ADMIN — POTASSIUM CHLORIDE 20 MEQ: 20 TABLET, EXTENDED RELEASE ORAL at 08:11

## 2019-11-03 RX ADMIN — MAGNESIUM SULFATE IN WATER 2 G: 40 INJECTION, SOLUTION INTRAVENOUS at 08:11

## 2019-11-03 RX ADMIN — CHLORHEXIDINE GLUCONATE 0.12% ORAL RINSE 10 ML: 1.2 LIQUID ORAL at 09:11

## 2019-11-03 RX ADMIN — POTASSIUM CHLORIDE 20 MEQ: 200 INJECTION, SOLUTION INTRAVENOUS at 12:11

## 2019-11-03 RX ADMIN — CHLORHEXIDINE GLUCONATE 0.12% ORAL RINSE 15 ML: 1.2 LIQUID ORAL at 08:11

## 2019-11-03 NOTE — PT/OT/SLP EVAL
Physical Therapy Evaluation    Patient Name:  Mateusz Bates   MRN:  [id number]    Recommendations:     Discharge Recommendations:  rehabilitation facility(Patient lives alone)   Discharge Equipment Recommendations: (tbd by next level of care)   Barriers to discharge: Decreased caregiver support    Assessment:     Mateusz Bates is a 69 y.o. female admitted with a medical diagnosis of CAD, multiple vessel.  She presents with the following impairments/functional limitations:  weakness, gait instability, impaired endurance, impaired balance, decreased safety awareness, pain, decreased coordination, impaired functional mobilty, impaired self care skills, impaired cardiopulmonary response to activity, other (comment)(sternal prec).    Rehab Prognosis: Good; patient would benefit from acute skilled PT services to address these deficits and reach maximum level of function.    Recent Surgery: Procedure(s) (LRB):  CORONARY ARTERY BYPASS GRAFT (CABG) (N/A)  ECHOCARDIOGRAM,TRANSESOPHAGEAL (N/A)  BLOCK, NERVE, INTERCOSTAL, 2 OR MORE (N/A) 3 Days Post-Op    Plan:     During this hospitalization, patient to be seen 5 x/week to address the identified rehab impairments via therapeutic activities, therapeutic exercises, gait training and progress toward the following goals:    · Plan of Care Expires:  11/10/19    Subjective     Chief Complaint: weakness/dizziness in standing  Patient/Family Comments/goals: to get stronger/walk and go home/return to work?  Pain/Comfort:  · Pain Rating 1: 5/10  · Location - Orientation 1: generalized  · Location 1: sternal  · Pain Addressed 1: Reposition, Cessation of Activity, Distraction  · Pain Rating Post-Intervention 1: (at rest no pain)    Patients cultural, spiritual, [employer] conflicts given the current situation:      Living Environment:  Lives alone with dogs in 2 story home with 12 stairs inside home and a rail - unsure of accuracy of home environment*  Prior to admission, patients level of  function was mod indep per patient.  Equipment used at home: shower chair, cane, straight(need to check for accuracy).  DME owned (not currently used): ?.  Upon discharge, patient will have assistance from ???/rec inpt. Rehab and to contact patient's family.    Objective:     Communicated with RN Josse prior to session.  Patient found up in chair with blood pressure cuff, storm catheter, wound vac, peripheral IV, PICC line, telemetry, oxygen  upon PT entry to room.    General Precautions: Standard, fall, sternal   Orthopedic Precautions:N/A   Braces: N/A     Exams:  · b LE strength 3+/5 grossly; rom wfl    Functional Mobility:  · Bed Mobility - per nurse report max A with cues for log-rolling and sternal prec  · Transfers - sit to/from stand mod a r/t backward lean; Patient got weak in stance and had to sit  · Gt - wt shifted in place mod A      Therapeutic Activities and Exercises:   PT educated patient on POC, le rom x 15 reps in chair with tc's for technique and safety/fall/sternal precautions with mobility.    AM-PAC 6 CLICK MOBILITY  Total Score:10     Patient left up in chair/reclined with all lines intact, call button in reach and RN notified.    GOALS:   Multidisciplinary Problems     Physical Therapy Goals        Problem: Physical Therapy Goal    Goal Priority Disciplines Outcome Goal Variances Interventions   Physical Therapy Goal     PT, PT/OT      Description:  By 11/10/19  1. Patient will perform supine to/from sit mod A x 1 per sternal prec.  2. Patient will perform sit to/from stand min a no AD per sternal prec.  3. Patient will amb 100ft no AD min A                    History:     Past Medical History:   Diagnosis Date    Chronic major depressive disorder     Diabetes mellitus type II, uncontrolled 2000    Insulin x 10 years    Eczema     Essential hypertension     Generalized anxiety disorder     GERD (gastroesophageal reflux disease)     Glaucoma     OD    Hyperlipidemia     Obesity         Past Surgical History:   Procedure Laterality Date    APPENDECTOMY      CORONARY ARTERY BYPASS GRAFT (CABG) N/A 10/31/2019    Procedure: CORONARY ARTERY BYPASS GRAFT (CABG);  Surgeon: Keny Zaidi MD;  Location: [facility] OR;  Service: Cardiothoracic;  Laterality: N/A;  3-Vessel, Epi-Aortic Ultrasound    ECTOPIC PREGNANCY SURGERY Left     LSO    INJECTION OF ANESTHETIC AGENT AROUND MULTIPLE INTERCOSTAL NERVES N/A 10/31/2019    Procedure: BLOCK, NERVE, INTERCOSTAL, 2 OR MORE;  Surgeon: Keny Zaidi MD;  Location: [facility] OR;  Service: Cardiothoracic;  Laterality: N/A;  Parasternal    OOPHORECTOMY         Time Tracking:     PT Received On: 10/31/19  PT Start Time: 0745     PT Stop Time: 0810  PT Total Time (min): 25 min     Billable Minutes: Evaluation 15 and Therapeutic Activity 10      Emigdio Bean, PT  11/03/2019

## 2019-11-03 NOTE — PT/OT/SLP EVAL
Occupational Therapy   Evaluation    Name: Mateusz Bates  MRN: [id number]  Admitting Diagnosis:  CAD, multiple vessel 3 Days Post-Op    Recommendations:     Discharge Recommendations: rehabilitation facility  Discharge Equipment Recommendations:  (TBA)  Barriers to discharge:  Decreased caregiver support    Assessment:     Mateusz Bates is a 69 y.o. female with a medical diagnosis of CAD, multiple vessel.  She presents with DEBILITY, IMPAIRED ADLS, FUNCTIONAL MOBILITY AND SAFETY AWARENESS. Performance deficits affecting function: weakness, impaired endurance, decreased safety awareness, pain, impaired balance, impaired cognition, impaired self care skills, impaired functional mobilty, orthopedic precautions.      Rehab Prognosis: Good; patient would benefit from acute skilled OT services to address these deficits and reach maximum level of function.       Plan:     Patient to be seen 3 x/week to address the above listed problems via self-care/home management, therapeutic activities, therapeutic exercises  · Plan of Care Expires: 11/10/19  · Plan of Care Reviewed with: patient    Subjective     Chief Complaint: PAIN  Patient/Family Comments/goals: RETURN HOME    Occupational Profile:  Living Environment: LIVES ALONE WITH DOGS IN 2 STORY HOUSE; BED/BATH ON 2ND FLOOR  Previous level of function: INDEPENDENT  Roles and Routines: DRIVES, WORKS FULL TIME AT [employer]  Equipment Used at Home:  cane, straight, shower chair  Assistance upon Discharge: NONE    Pain/Comfort:  · Pain Rating 1: 5/10  · Location - Side 1: Bilateral  · Location - Orientation 1: upper  · Location 1: chest  · Pain Rating Post-Intervention 1: 5/10    Patients cultural, spiritual, Religion conflicts given the current situation:      Objective:     Communicated with: NURSE BREWER prior to session.  Patient found up in chair with blood pressure cuff, peripheral IV, oxygen, telemetry, pulse ox (continuous), wound vac, storm catheter, arterial line upon OT  entry to room.    General Precautions: Standard, fall, sternal   Orthopedic Precautions:N/A   Braces: N/A     Occupational Performance:    Bed Mobility:    · Patient completed Scooting/Bridging with minimum assistance    Functional Mobility/Transfers:  · Patient completed Sit <> Stand Transfer with moderate assistance and of 2 persons  with  hand-held assist   · Functional Mobility: DID NOT OCCUR- PT BECAME CLAMMY AND DIZZY, LOWERED HER BACK TO CHAIR MOD A X2.    Activities of Daily Living:  · Lower Body Dressing: total assistance SOCKS  · Toileting: dependence DELANEY    Cognitive/Visual Perceptual:  Cognitive/Psychosocial Skills:     -       Oriented to: Person and Place   -       Follows Commands/attention:Follows one-step commands  -       Memory: Poor immediate recall  -       Safety awareness/insight to disability: impaired     Physical Exam:  Balance:    -       POOR  Upper Extremity Range of Motion:     -       Right Upper Extremity: WFL WITHIN STERNAL PREC  -       Left Upper Extremity: WFL WITHIN STERNAL PREC  Upper Extremity Strength:    -       Right Upper Extremity: NT  -       Left Upper Extremity: NT    AMPAC 6 Click ADL:  AMPAC Total Score: 13    Treatment & Education:  PT PARTICIPATED IN INITIAL OT EVALUATION TO ASSESS CURRENT LEVEL OF FUNCTION. PT WILL BENEFIT FROM SKILLED OT SERVICES TO INCREASE FUNCTIONAL INDEPENDENCE AND SAFETY AWARENESS.  Education:    Patient left up in chair with all lines intact, call button in reach and NURSING notified    GOALS:   Multidisciplinary Problems     Occupational Therapy Goals        Problem: Occupational Therapy Goal    Goal Priority Disciplines Outcome Interventions   Occupational Therapy Goal     OT, PT/OT     Description:  Goals to be met by: 11/10/19     Patient will increase functional independence with ADLs by performing:    UE Dressing with Moderate Assistance.  Grooming while seated with Supervision.  Toileting from bedside commode with Moderate  Assistance for hygiene and clothing management.   Toilet transfer to bedside commode with Moderate Assistance.  Upper extremity exercise program x10 reps per handout, with assistance as needed.                      History:     Past Medical History:   Diagnosis Date    Chronic major depressive disorder     Diabetes mellitus type II, uncontrolled 2000    Insulin x 10 years    Eczema     Essential hypertension     Generalized anxiety disorder     GERD (gastroesophageal reflux disease)     Glaucoma     OD    Hyperlipidemia     Obesity        Past Surgical History:   Procedure Laterality Date    APPENDECTOMY      CORONARY ARTERY BYPASS GRAFT (CABG) N/A 10/31/2019    Procedure: CORONARY ARTERY BYPASS GRAFT (CABG);  Surgeon: Keny Zaidi MD;  Location: [facility] OR;  Service: Cardiothoracic;  Laterality: N/A;  3-Vessel, Epi-Aortic Ultrasound    ECTOPIC PREGNANCY SURGERY Left     LSO    INJECTION OF ANESTHETIC AGENT AROUND MULTIPLE INTERCOSTAL NERVES N/A 10/31/2019    Procedure: BLOCK, NERVE, INTERCOSTAL, 2 OR MORE;  Surgeon: Kney Zaidi MD;  Location: [facility] OR;  Service: Cardiothoracic;  Laterality: N/A;  Parasternal    OOPHORECTOMY         Time Tracking:     OT Date of Treatment: 11/03/19  OT Start Time: 0810  OT Stop Time: 0825  OT Total Time (min): 15 min    Billable Minutes:Evaluation 15    Selina Fabian OT  11/3/2019

## 2019-11-03 NOTE — SUBJECTIVE & OBJECTIVE
Interval History: s/p CABG x3, reports no chest pain or SOB.    Oncology Treatment Plan:   [No treatment plan]    Medications:  Continuous Infusions:   dexmedetomidine (PRECEDEX) infusion Stopped (11/02/19 0830)    epinephrine Stopped (11/01/19 1800)    milrinone 20mg/100ml D5W (200mcg/ml) Stopped (11/02/19 0830)    vasopressin (PITRESSIN) infusion       Scheduled Meds:   amLODIPine  2.5 mg Oral Daily    aspirin  81 mg Oral Daily    atorvastatin  80 mg Oral QHS    chlorhexidine  10 mL Mouth/Throat BID    docusate sodium  100 mg Oral BID    furosemide  40 mg Oral BID    insulin detemir U-100  12 Units Subcutaneous BID    metoprolol tartrate  100 mg Oral BID    nozaseptin   Each Nostril BID    pantoprazole  40 mg Oral Daily    polyethylene glycol  17 g Oral Daily    potassium chloride  20 mEq Oral Q12H     PRN Meds:sodium chloride, albumin human 5%, albuterol sulfate, chlorhexidine, Dextrose 10% Bolus, Dextrose 10% Bolus, Dextrose 10% Bolus, dextrose 50%, glucagon (human recombinant), glucose, glucose, insulin aspart U-100, lactated ringers, magnesium sulfate IVPB, magnesium sulfate IVPB, metoclopramide HCl, metoprolol tartrate, nozaseptin, ondansetron, potassium chloride in water **AND** potassium chloride in water **AND** potassium chloride in water, Flushing PICC Protocol **AND** sodium chloride 0.9%, traMADol     Review of Systems   Constitutional: Negative.    HENT: Negative.    Eyes: Negative.    Respiratory: Negative.    Cardiovascular: Negative.    Gastrointestinal: Negative.    Endocrine: Negative.    Genitourinary: Negative.    Musculoskeletal: Negative.    Skin: Negative.    Allergic/Immunologic: Negative.    Neurological: Negative.    Hematological: Negative.    Psychiatric/Behavioral: Negative.      Objective:     Vital Signs (Most Recent):  Temp: 98.1 °F (36.7 °C) (11/03/19 0715)  Pulse: 90 (11/03/19 1000)  Resp: (!) 27 (11/03/19 1000)  BP: (!) 147/28 (11/03/19 1000)  SpO2: 100 %  (11/03/19 1000) Vital Signs (24h Range):  Temp:  [97.5 °F (36.4 °C)-99.4 °F (37.4 °C)] 98.1 °F (36.7 °C)  Pulse:  [73-91] 90  Resp:  [20-28] 27  SpO2:  [92 %-100 %] 100 %  BP: (106-164)/(28-78) 147/28     Weight: 75.3 kg (166 lb 0.1 oz)  Body mass index is 31.37 kg/m².  Body surface area is 1.8 meters squared.      Intake/Output Summary (Last 24 hours) at 11/3/2019 1104  Last data filed at 11/3/2019 0600  Gross per 24 hour   Intake 470 ml   Output 2664 ml   Net -2194 ml       Physical Exam   Constitutional: She is oriented to person, place, and time. She appears well-developed and well-nourished.   HENT:   Head: Normocephalic and atraumatic.   Eyes: Conjunctivae and EOM are normal.   Neck: Normal range of motion. Neck supple.   Cardiovascular: Normal rate and regular rhythm.   Pulmonary/Chest: Effort normal and breath sounds normal.   Abdominal: Soft. Bowel sounds are normal.   Musculoskeletal: Normal range of motion.   Neurological: She is alert and oriented to person, place, and time.   Skin: Skin is warm and dry.   Psychiatric: She has a normal mood and affect. Her behavior is normal. Judgment and thought content normal.   Nursing note and vitals reviewed.      Significant Labs:   All pertinent labs from the last 24 hours have been reviewed.    Diagnostic Results:  I have reviewed all pertinent imaging results/findings within the past 24 hours.

## 2019-11-03 NOTE — PLAN OF CARE
Patient currently resting quietly in bed. VS currently stable. Patient paced rhythm on monitor at this time. Patient currently receiving oxygen via 3L nasal cannula. Patient has no complaints of shortness of breath at this time. Patient turned in bed every 2 hours to avoid skin breakdown to back side and prevent wound development. No sign of wound development or skin breakdown noted. Patient has no complaints of pain at this time. Plan of care updated with patient. There are no further questions after updated on plan of care at this time. No distress noted. Will continue to monitor.

## 2019-11-03 NOTE — PROGRESS NOTES
[facility] Hospital Medicine  Progress Note    Patient Name: Mateusz Bates  MRN: [id number]  Patient Class: IP- Inpatient   Admission Date: 10/28/2019  Length of Stay: 6 days  Attending Physician: Josse Patel MD  Primary Care Provider: Serenity Kilpatrick MD        Subjective:     Principal Problem:CAD, multiple vessel        HPI:  Mateusz Bates is a 69 year old female with DM II, hypertension and hyperlipidemia who presented to the ED at the request of her PCP due to finding of an abnormal EKG and complaints of chest tightness with SOB. The patient reports having intermittent symptoms for the last 1-2 months. On 10/26/19, she began having a tightness in her mid chest that radiated into her back with associated SOB. Symptoms were worse with laying flat and improved with sitting up. They lasted only minutes, but this was longer than previous episodes. She denies a trend in when these episodes occur as well as associated nausea, vomiting and diaphoresis. The patient's EKG was significant for a new LBBB compared to previous in 2016. Labs were significant for a BNP of 1,305, troponin of 3.665 and potassium of 3.4. Code status was discussed with the patient and her sister. She is a full code. Her sister, Christina Hernandez, is her surrogate medical decision maker.     Overview/Hospital Course:  Admitted for evaluation and treatment of chest pain concerning for acute coronary syndrome.  Evaluation by Cardiology and urgent left heart catheterization.  Angiogram discovered severe multi-vessel coronary artery disease.  Cardiac echo showed severely reduced LV function.  Referral to Cardiothoracic Surgery who recommended CABG.  Balloon pump placed 30 October.  CABG x3 by Dr. Zaidi 31 October.  Successfully extubated morning of 01 November.  11/2 - Patient improved s/p extubation. CABG x 3. Discussed with CC Team. BS mild elevation. SA and LA insulin initiated.  11/3 - Improved BS control. Patient + chest  tenderness. Tolerating diet. Discussed with cc team    Interval History: s/p CABG x 3v. BS elevation improving. Thrombocytopenia likely drug related improving at 89 today.     Review of Systems   Constitutional: Positive for activity change and fatigue. Negative for unexpected weight change.   HENT: Negative.  Negative for trouble swallowing.    Eyes: Negative.    Respiratory: Negative.  Negative for cough, shortness of breath and wheezing.    Cardiovascular: Negative.  Negative for chest pain.   Gastrointestinal: Negative.    Endocrine: Negative.    Genitourinary: Negative.    Musculoskeletal: Positive for myalgias.   Skin: Positive for wound.   Allergic/Immunologic: Negative.    Neurological: Positive for weakness. Negative for dizziness.   Hematological: Negative.    Psychiatric/Behavioral: Negative.    All other systems reviewed and are negative.    Objective:     Vital Signs (Most Recent):  Temp: 98.1 °F (36.7 °C) (11/03/19 0715)  Pulse: 90 (11/03/19 1000)  Resp: (!) 27 (11/03/19 1000)  BP: (!) 147/28 (11/03/19 1000)  SpO2: 100 % (11/03/19 1000) Vital Signs (24h Range):  Temp:  [97.5 °F (36.4 °C)-98.1 °F (36.7 °C)] 98.1 °F (36.7 °C)  Pulse:  [73-91] 90  Resp:  [20-28] 27  SpO2:  [93 %-100 %] 100 %  BP: (106-164)/(28-78) 147/28     Weight: 75.3 kg (166 lb 0.1 oz)  Body mass index is 31.37 kg/m².    Intake/Output Summary (Last 24 hours) at 11/3/2019 1205  Last data filed at 11/3/2019 0600  Gross per 24 hour   Intake 370 ml   Output 2600 ml   Net -2230 ml      Physical Exam   Constitutional: She is oriented to person, place, and time. She appears well-developed and well-nourished. No distress.   HENT:   Head: Normocephalic and atraumatic.   Mouth/Throat: Oropharynx is clear and moist.   Eyes: Pupils are equal, round, and reactive to light. Conjunctivae and EOM are normal.   Neck: Neck supple. No JVD present. No thyromegaly present.   Cardiovascular: Normal rate and regular rhythm. Exam reveals no gallop and no  friction rub.   No murmur heard.  Paced regular rhythm.  Three subxyphoid mediastinal drain tubes with sero-sanguinous fluid.   Pulmonary/Chest: Effort normal and breath sounds normal. She has no wheezes. She has no rales.   Right IJV PA catheter and CVL.   Abdominal: Soft. Bowel sounds are normal. She exhibits no distension. There is no tenderness. There is no rebound and no guarding.   Genitourinary:   Genitourinary Comments: Jones catheter with clear yellow urine.   Musculoskeletal: Normal range of motion. She exhibits no edema or deformity.   Lymphadenopathy:     She has no cervical adenopathy.   Neurological: She is alert and oriented to person, place, and time. She has normal reflexes.   Skin: Skin is warm and dry. Capillary refill takes 2 to 3 seconds. No rash noted.   Psychiatric: She has a normal mood and affect. Her behavior is normal. Judgment and thought content normal.   Nursing note and vitals reviewed.      Significant Labs:   ABGs:   Recent Labs   Lab 11/02/19  1354   PH 7.490*   PCO2 34.1*   HCO3 26.0   POCSATURATED 96   BE 3     Blood Culture: No results for input(s): LABBLOO in the last 48 hours.  BMP:   Recent Labs   Lab 11/03/19  0355   *      K 3.8   CL 98   CO2 29   BUN 30*   CREATININE 0.9   CALCIUM 10.0   MG 2.0     CBC:   Recent Labs   Lab 11/02/19  1122 11/02/19  1916 11/03/19  0355   WBC 10.99 14.01* 13.88*   HGB 9.7* 10.9* 11.3*   HCT 29.4* 32.8* 34.7*   PLT 46* 73* 89*     CMP:   Recent Labs   Lab 11/02/19  1014 11/02/19  1622 11/03/19  0355    138 140   K 4.6 4.4 3.8    99 98   CO2 23 27 29   * 270* 137*   BUN 24* 28* 30*   CREATININE 0.9 0.9 0.9   CALCIUM 8.6* 9.4 10.0   PROT 5.0*  --   --    ALBUMIN 2.8*  --   --    BILITOT 1.9*  --   --    ALKPHOS 53*  --   --    AST 24  --   --    ALT <5*  --   --    ANIONGAP 13 12 13   EGFRNONAA >60 >60 >60     All pertinent labs within the past 24 hours have been reviewed.    Significant Imaging: I have reviewed  all pertinent imaging results/findings within the past 24 hours.      Assessment/Plan:      Thrombocytopenia  11/2  Heme On on Consult  HIT panel  Likely drug induced  Monitor  11/3  Improved at 89 today  Monitor      Acute hypoxemic respiratory failure  11/2  Extubated  Monitor  Pulmonary CC      Acute combined systolic and diastolic CHF, NYHA class 2  Probable ischemic cardiomyopathy.  Anticipate CABG 31 October.  Optimize medical management.    NSTEMI (non-ST elevated myocardial infarction)  Troponin peaked at 3.66 now trending down.  Heparin, metoprolol, ASA and statin. Hold ACE inhibitor due to intolerance with cough. Hold ARB as patient's blood pressure is marginal. Will consider adding, if her blood pressure will tolerate.  Hold second antiplatelet due to pending Cardiology procedure.  Patient was evaluated for Cardiac Rehab and is not a candidate at this time, but will be referred, when appropriate.  LHC on 28 on October showed severe multi-vessel CAD.  Referral to CVT Surgery who recommended CABG planned for Thursday 31 October.  Balloon pump placed 30 October.  CABG x3 by Dr. Zaidi on 31 October.  11/2  S/p CABG  Cards  CT Sx  Statin  BB      Hyperlipidemia associated with type 2 diabetes mellitus  -Patient was improved, but not at goal on 10/4/19.   -Continue statin.       Obesity (BMI 30.0-34.9)  Diet  Nutrition        Diabetes mellitus type II, uncontrolled  -Hemoglobin A1C was 9 on 9/19/19.   -NISS.   -ADA diet.   11/2  Levemir / NISS / Accuchecks  11/3  Levemir increased to 15 sq bid  NISS  Accuchecks        Anxiety and depression  -Stable.   -Continue Effexor.       Hypertension associated with diabetes  -Stable.   -Continue Norvasc.  11/2 -  BB , CCB  11/3 - Controlled  BB  CCB        VTE Risk Mitigation (From admission, onward)         Ordered     IP VTE LOW RISK PATIENT  Once      10/30/19 1405     Place NATHANIEL hose  Until discontinued      10/30/19 1405     Place sequential compression device   Until discontinued      10/30/19 1405     Reason for no Mechanical VTE Prophylaxis  Once      10/28/19 1414     heparin 25,000 units in dextrose 5% 250 mL (100 units/mL) infusion LOW INTENSITY nomogram - [facility]  Continuous      10/28/19 1257                Critical care time spent on the evaluation and treatment of severe organ dysfunction, review of pertinent labs and imaging studies, discussions with consulting providers and discussions with patient/family: 31 minutes.      Josse Patel MD  Department of Hospital Medicine   [facility]

## 2019-11-03 NOTE — SUBJECTIVE & OBJECTIVE
Interval History:  11/03/2019  The patient is post-operative day 3, status-post coronary artery bypass grafting x 3.     ROS  Medications:  Continuous Infusions:   dexmedetomidine (PRECEDEX) infusion Stopped (11/02/19 0830)    epinephrine Stopped (11/01/19 1800)    milrinone 20mg/100ml D5W (200mcg/ml) Stopped (11/02/19 0830)    vasopressin (PITRESSIN) infusion       Scheduled Meds:   amLODIPine  2.5 mg Oral Daily    aspirin  81 mg Oral Daily    atorvastatin  80 mg Oral QHS    chlorhexidine  10 mL Mouth/Throat BID    docusate sodium  100 mg Oral BID    furosemide  40 mg Oral BID    insulin detemir U-100  12 Units Subcutaneous BID    magnesium citrate  296 mL Oral Once    magnesium sulfate IVPB  2 g Intravenous Once    metoprolol tartrate  100 mg Oral BID    nozaseptin   Each Nostril BID    pantoprazole  40 mg Oral Daily    polyethylene glycol  17 g Oral Daily    potassium chloride  20 mEq Oral Q12H     PRN Meds:sodium chloride, albumin human 5%, albuterol sulfate, chlorhexidine, Dextrose 10% Bolus, Dextrose 10% Bolus, Dextrose 10% Bolus, dextrose 50%, glucagon (human recombinant), glucose, glucose, insulin aspart U-100, lactated ringers, magnesium sulfate IVPB, magnesium sulfate IVPB, metoclopramide HCl, metoprolol tartrate, nozaseptin, ondansetron, potassium chloride in water **AND** potassium chloride in water **AND** potassium chloride in water, Flushing PICC Protocol **AND** sodium chloride 0.9%, traMADol     Objective:     Vital Signs (Most Recent):  Temp: 97.5 °F (36.4 °C) (11/03/19 0305)  Pulse: 74 (11/03/19 0700)  Resp: (!) 27 (11/03/19 0700)  BP: (!) 151/51 (11/03/19 0700)  SpO2: 98 % (11/03/19 0700) Vital Signs (24h Range):  Temp:  [97.5 °F (36.4 °C)-99.4 °F (37.4 °C)] 97.5 °F (36.4 °C)  Pulse:  [73-91] 74  Resp:  [20-28] 27  SpO2:  [92 %-100 %] 98 %  BP: (106-164)/(50-78) 151/51     Weight: 75.3 kg (166 lb 0.1 oz)  Body mass index is 31.37 kg/m².    SpO2: 98 %  O2 Device (Oxygen Therapy):  nasal cannula    Intake/Output - Last 3 Shifts       11/01 0700 - 11/02 0659 11/02 0700 - 11/03 0659 11/03 0700 - 11/04 0659    P.O. 210 520     I.V. (mL/kg) 1247.7 (16.6) 25.2 (0.3)     Blood       IV Piggyback 125      Total Intake(mL/kg) 1582.7 (21) 545.2 (7.2)     Urine (mL/kg/hr) 2596 (1.4) 2847 (1.6)     Drains 38      Other 0      Stool  0     Chest Tube 559 40     Total Output 3193 2887     Net -1610.3 -2341.8            Stool Occurrence  0 x           Lines/Drains/Airways     Peripherally Inserted Central Catheter Line                 PICC Double Lumen 11/02/19 1326 right basilic less than 1 day          Drain                 Urethral Catheter 10/30/19 1716 Temperature probe 3 days          Peripheral Intravenous Line                 Peripheral IV - Single Lumen 10/28/19 1322 20 G Right Hand 5 days                Physical Exam   Constitutional: She is oriented to person, place, and time. She appears well-developed and well-nourished. No distress.   HENT:   Head: Normocephalic and atraumatic.   Eyes: Pupils are equal, round, and reactive to light. EOM are normal.   Neck: Normal range of motion. Neck supple. No JVD present.   Cardiovascular: Normal rate, regular rhythm, normal heart sounds and intact distal pulses. Exam reveals no gallop and no friction rub.   No murmur heard.  Pulmonary/Chest: Effort normal and breath sounds normal. No stridor. No respiratory distress. She has no wheezes. She has no rales. She exhibits tenderness.   Abdominal: Soft. Bowel sounds are normal. She exhibits no distension and no mass. There is no tenderness. There is no rebound and no guarding. No hernia.   Musculoskeletal: Normal range of motion. She exhibits edema. She exhibits no tenderness or deformity.   Neurological: She is alert and oriented to person, place, and time. She displays normal reflexes. No cranial nerve deficit or sensory deficit. She exhibits normal muscle tone. Coordination normal.   Skin: Skin is warm and  dry. Capillary refill takes less than 2 seconds. No rash noted. She is not diaphoretic. No erythema. No pallor.   Psychiatric: She has a normal mood and affect. Her behavior is normal. Judgment and thought content normal.   Nursing note and vitals reviewed.      Significant Labs:  BMP:   Recent Labs   Lab 11/03/19  0355   *      K 3.8   CL 98   CO2 29   BUN 30*   CREATININE 0.9   CALCIUM 10.0   MG 2.0     CBC:   Recent Labs   Lab 11/03/19 0355   WBC 13.88*   RBC 3.90*   HGB 11.3*   HCT 34.7*   PLT 89*   MCV 89   MCH 29.0   MCHC 32.6       Significant Diagnostics:  I have reviewed all pertinent imaging results/findings within the past 24 hours.

## 2019-11-03 NOTE — SUBJECTIVE & OBJECTIVE
Interval History: s/p CABG x 3v. BS elevation improving. Thrombocytopenia likely drug related improving at 89 today.     Review of Systems   Constitutional: Positive for activity change and fatigue. Negative for unexpected weight change.   HENT: Negative.  Negative for trouble swallowing.    Eyes: Negative.    Respiratory: Negative.  Negative for cough, shortness of breath and wheezing.    Cardiovascular: Negative.  Negative for chest pain.   Gastrointestinal: Negative.    Endocrine: Negative.    Genitourinary: Negative.    Musculoskeletal: Positive for myalgias.   Skin: Positive for wound.   Allergic/Immunologic: Negative.    Neurological: Positive for weakness. Negative for dizziness.   Hematological: Negative.    Psychiatric/Behavioral: Negative.    All other systems reviewed and are negative.    Objective:     Vital Signs (Most Recent):  Temp: 98.1 °F (36.7 °C) (11/03/19 0715)  Pulse: 90 (11/03/19 1000)  Resp: (!) 27 (11/03/19 1000)  BP: (!) 147/28 (11/03/19 1000)  SpO2: 100 % (11/03/19 1000) Vital Signs (24h Range):  Temp:  [97.5 °F (36.4 °C)-98.1 °F (36.7 °C)] 98.1 °F (36.7 °C)  Pulse:  [73-91] 90  Resp:  [20-28] 27  SpO2:  [93 %-100 %] 100 %  BP: (106-164)/(28-78) 147/28     Weight: 75.3 kg (166 lb 0.1 oz)  Body mass index is 31.37 kg/m².    Intake/Output Summary (Last 24 hours) at 11/3/2019 1205  Last data filed at 11/3/2019 0600  Gross per 24 hour   Intake 370 ml   Output 2600 ml   Net -2230 ml      Physical Exam   Constitutional: She is oriented to person, place, and time. She appears well-developed and well-nourished. No distress.   HENT:   Head: Normocephalic and atraumatic.   Mouth/Throat: Oropharynx is clear and moist.   Eyes: Pupils are equal, round, and reactive to light. Conjunctivae and EOM are normal.   Neck: Neck supple. No JVD present. No thyromegaly present.   Cardiovascular: Normal rate and regular rhythm. Exam reveals no gallop and no friction rub.   No murmur heard.  Paced regular rhythm.   Three subxyphoid mediastinal drain tubes with sero-sanguinous fluid.   Pulmonary/Chest: Effort normal and breath sounds normal. She has no wheezes. She has no rales.   Right IJV PA catheter and CVL.   Abdominal: Soft. Bowel sounds are normal. She exhibits no distension. There is no tenderness. There is no rebound and no guarding.   Genitourinary:   Genitourinary Comments: Jones catheter with clear yellow urine.   Musculoskeletal: Normal range of motion. She exhibits no edema or deformity.   Lymphadenopathy:     She has no cervical adenopathy.   Neurological: She is alert and oriented to person, place, and time. She has normal reflexes.   Skin: Skin is warm and dry. Capillary refill takes 2 to 3 seconds. No rash noted.   Psychiatric: She has a normal mood and affect. Her behavior is normal. Judgment and thought content normal.   Nursing note and vitals reviewed.      Significant Labs:   ABGs:   Recent Labs   Lab 11/02/19  1354   PH 7.490*   PCO2 34.1*   HCO3 26.0   POCSATURATED 96   BE 3     Blood Culture: No results for input(s): LABBLOO in the last 48 hours.  BMP:   Recent Labs   Lab 11/03/19  0355   *      K 3.8   CL 98   CO2 29   BUN 30*   CREATININE 0.9   CALCIUM 10.0   MG 2.0     CBC:   Recent Labs   Lab 11/02/19  1122 11/02/19  1916 11/03/19  0355   WBC 10.99 14.01* 13.88*   HGB 9.7* 10.9* 11.3*   HCT 29.4* 32.8* 34.7*   PLT 46* 73* 89*     CMP:   Recent Labs   Lab 11/02/19  1014 11/02/19  1622 11/03/19  0355    138 140   K 4.6 4.4 3.8    99 98   CO2 23 27 29   * 270* 137*   BUN 24* 28* 30*   CREATININE 0.9 0.9 0.9   CALCIUM 8.6* 9.4 10.0   PROT 5.0*  --   --    ALBUMIN 2.8*  --   --    BILITOT 1.9*  --   --    ALKPHOS 53*  --   --    AST 24  --   --    ALT <5*  --   --    ANIONGAP 13 12 13   EGFRNONAA >60 >60 >60     All pertinent labs within the past 24 hours have been reviewed.    Significant Imaging: I have reviewed all pertinent imaging results/findings within the past 24  hours.

## 2019-11-03 NOTE — ASSESSMENT & PLAN NOTE
Platelets normal at 300K on 10/30, then declined 10/31 78K, 11/1 76K, 11/2 47K.  Possible cause of acute thrombocytopenia: drug induced immune thrombocytopenia (amiodarone given 10/30, lasix started 10/28 and currently receiving), HIT (4T score low prob), TTP (plasmic score intermediate prob), ITP, DIC.  Have sent: HIT antibodies, [id number], fibrinogen, ddimer, C3/C4 complements, pathology review for schistocytes, reticulocytes, haptoglobin, LDH.  If platelets continue to decline, consider stop Lasix, and use bumex.    11/3/2019 platelets improving 89K today.  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

## 2019-11-03 NOTE — PROGRESS NOTES
[facility]  Cardiology  Progress Note    Patient Name: Mateusz Bates  MRN: [id number]  Admission Date: 10/28/2019  Hospital Length of Stay: 6 days  Code Status: Full Code   Attending Physician: Josse Patel MD   Primary Care Physician: Serenity Kilpatrick MD  Expected Discharge Date: 11/6/2019  Principal Problem:CAD, multiple vessel    Subjective:   HPI:  Ms. Bates is a 69 year old female patient whose current medical conditions include HTN, hyperlipidemia, obesity, DM type II, cerebral microvascular disease, FAHAD, and B12 deficiency who was sent to [facility] ED today from her PCP's office due to chest pain and abnormal EKG. Patient complained of substernal chest tightness/heaviness that radiated to her back on Saturday while at rest. Associated symptoms included orthopnea and SOB. Patient denied any associated nausea, vomiting, diaphoresis, palpitations, near syncope, or syncope. Initial workup in ED revealed troponin of 3.665 and BNP of 1305 and patient subsequently admitted for further evaluation and treatment of NSTEMI. Cardiology consulted to assist with management. Patient seen and examined today while in ED. Feels well at time of exam, denies chest pain. She does report similar symptoms over the past few months but states they never lasted as long and were less intense. She reports compliance with her medications. States her father had history of CABG. Chart reviewed. Repeat troponin pending. EKG reviewed and shows SR with new LBBB. 2D echo pending.    Hospital Course:   10/29/19-Patient seen and examined today, s/p C yesterday that showed multivessel CAD. CVT consulted, CABG planned for Thursday. Feels ok. No chest pain or SOB. Labs stable. Being diuresed. 2D echo showed EF of 30-35%, DD.    10/30/19-Patient seen and examined today. Feels well. No complaints. Denies chest pain or SOB. Labs stable. IABP placement scheduled for today.    10/31/19- Patient is s/p CABG x3 with LIMA to LAD  reverse saphenous vein graft to the RCA and reverse saphenous vein graft to the ramus. Has been transferred to ICU. Is intubated and sedated. CT x3. IABP at 1:1 still in place. Transfused 3 units of PRBC in the OR and has received additional 3 units in ICU, has received 500cc albumin, epi and milrinone gtt.  H/H 6.9/21.0. Renal function stable. Paced rhythm on monitor     11/1/2019--patient seen and examined in ICU. Extubated o/n, remains on IABP 1:1 today, plans to possibly wean off later today per Dr. Zaidi. Remains on Epi and Milrinone gtt today. Paced on monitor. Labs stable today.     11/2/19-Patient seen and examined today, lying in bed. Feels ok. Complains of post-op pain and fatigue. Labs reviewed. Platelets 47,000. Heme/onc on board.     11/3/19-Patient seen and examined today, sitting up in chair. Feeling better. Still weak. Labs reviewed. Platelets 89,000.        Review of Systems   Constitution: Positive for malaise/fatigue.   HENT: Negative.    Eyes: Negative.    Cardiovascular: Negative.         Incisional chest pain     Respiratory: Negative.    Endocrine: Negative.    Hematologic/Lymphatic: Negative.    Skin: Negative.    Musculoskeletal: Negative.    Gastrointestinal: Negative.    Genitourinary: Negative.    Neurological: Positive for weakness.   Psychiatric/Behavioral: Negative.    Allergic/Immunologic: Negative.      Objective:     Vital Signs (Most Recent):  Temp: 98.1 °F (36.7 °C) (11/03/19 0715)  Pulse: 90 (11/03/19 1000)  Resp: (!) 27 (11/03/19 1000)  BP: (!) 147/28 (11/03/19 1000)  SpO2: 100 % (11/03/19 1000) Vital Signs (24h Range):  Temp:  [97.5 °F (36.4 °C)-99.4 °F (37.4 °C)] 98.1 °F (36.7 °C)  Pulse:  [73-91] 90  Resp:  [20-28] 27  SpO2:  [92 %-100 %] 100 %  BP: (106-164)/(28-78) 147/28     Weight: 75.3 kg (166 lb 0.1 oz)  Body mass index is 31.37 kg/m².     SpO2: 100 %  O2 Device (Oxygen Therapy): nasal cannula      Intake/Output Summary (Last 24 hours) at 11/3/2019 1122  Last data  filed at 11/3/2019 0600  Gross per 24 hour   Intake 470 ml   Output 2664 ml   Net -2194 ml       Lines/Drains/Airways     Peripherally Inserted Central Catheter Line                 PICC Double Lumen 11/02/19 1326 right basilic less than 1 day          Drain                 Urethral Catheter 10/30/19 1716 Temperature probe 3 days          Peripheral Intravenous Line                 Peripheral IV - Single Lumen 10/28/19 1322 20 G Right Hand 5 days                Physical Exam   Constitutional: She is oriented to person, place, and time. She appears well-developed and well-nourished. No distress.   HENT:   Head: Normocephalic and atraumatic.   Eyes: Pupils are equal, round, and reactive to light. Right eye exhibits no discharge. Left eye exhibits no discharge.   Neck: Neck supple. No JVD present.   Cardiovascular: Normal rate, regular rhythm, S1 normal, S2 normal and normal heart sounds.   No murmur heard.  Pulmonary/Chest: Effort normal. No respiratory distress. She has no wheezes.   CABG site C/D/I; no bleeding erythema or drainage    Diminished BS at bases   Abdominal: Soft. She exhibits no distension.   Musculoskeletal: She exhibits no edema.   Neurological: She is alert and oriented to person, place, and time.   Skin: Skin is warm and dry. She is not diaphoretic. No erythema.   SVG sites C/D/I; no bleeding erythema or drainage   Psychiatric: She has a normal mood and affect. Her behavior is normal. Thought content normal.   Nursing note and vitals reviewed.      Significant Labs:   CMP   Recent Labs   Lab 11/02/19  1014 11/02/19  1622 11/03/19  0355    138 140   K 4.6 4.4 3.8    99 98   CO2 23 27 29   * 270* 137*   BUN 24* 28* 30*   CREATININE 0.9 0.9 0.9   CALCIUM 8.6* 9.4 10.0   PROT 5.0*  --   --    ALBUMIN 2.8*  --   --    BILITOT 1.9*  --   --    ALKPHOS 53*  --   --    AST 24  --   --    ALT <5*  --   --    ANIONGAP 13 12 13   ESTGFRAFRICA >60 >60 >60   EGFRNONAA >60 >60 >60   , CBC    Recent Labs   Lab 11/02/19  1122 11/02/19  1916 11/03/19  0355   WBC 10.99 14.01* 13.88*   HGB 9.7* 10.9* 11.3*   HCT 29.4* 32.8* 34.7*   PLT 46* 73* 89*   , Troponin No results for input(s): TROPONINI in the last 48 hours. and All pertinent lab results from the last 24 hours have been reviewed.    Significant Imaging: Echocardiogram:   2D echo with color flow doppler:   Results for orders placed or performed during the hospital encounter of 10/28/19   2D echo with color flow doppler   Result Value Ref Range    QEF 30 (A) 55 - 65    Mitral Valve Regurgitation MILD TO MODERATE     Diastolic Dysfunction Yes (A)     Aortic Valve Regurgitation MILD     Mitral Valve Mobility NORMAL     Narrative    Date of Procedure: 10/28/2019        TEST DESCRIPTION   Technical Quality: This is a technically adequate study.     Aorta: The aortic root is normal in size, measuring 3.0 cm at sinotubular junction and 3.2 cm at Sinuses of Valsalva. The proximal ascending aorta is normal in size, measuring 3.1 cm across.     Left Atrium: The left atrial volume index is severely enlarged, measuring 52.97 cc/m2.     Left Ventricle: The left ventricle is normal in size, with an end-diastolic diameter of 4.1 cm, and an end-systolic diameter of 3.7 cm. Septal wall thickness is mildly increased, with the septum measuring 1.6 cm and the posterior wall measuring 1.3 cm   across. Relative wall thickness was increased at 0.63, and the LV mass index was increased at 160.6 g/m2 consistent with concentric left ventricular hypertrophy. The anterior septum is severely hypokinetic. There is global hypokinesis. Left ventricular   systolic function appears moderately depressed. Visually estimated ejection fraction is 30-35%. The LV Doppler derived stroke volume equals 57.0 ccs.     Diastolic indices: E wave velocity 1.1 m/s, E/A ratio 0.9,  msec., E/e' ratio(avg) 16. There is pseudonormalization of mitral inflow pattern consistent with significant  diastolic dysfunction.     Right Atrium: The right atrium is normal in size, measuring 3.7 cm in length and 2.8 cm in width in the apical view.     Right Ventricle: The right ventricle is normal in size. Global right ventricular systolic function appears normal. Tricuspid annular plane systolic excursion (TAPSE) is 2.2 cm.     Aortic Valve:  The aortic valve is normal in structure with normal leaflet mobility. The mean gradient obtained across the aortic valve is 6 mmHg. Additionally, there is mild aortic regurgitation.     Mitral Valve:  The mitral valve is normal in structure with normal leaflet mobility. The pressure half time is 45 msec. The calculated mitral valve area is 4.89 cm2. There is mild to moderate mitral regurgitation.     Tricuspid Valve:  The tricuspid valve is normal in structure with normal leaflet mobility.     Pulmonary Valve:  The pulmonic valve is not well seen.     IVC: IVC is normal in size and collapses > 50% with a sniff, suggesting normal right atrial pressure of 3 mmHg.     Intracavitary: There is no evidence of pericardial effusion, intracavity mass, thrombi, or vegetation.         CONCLUSIONS     1 - Moderately depressed left ventricular systolic function (EF 30-35%).     2 - Impaired LV relaxation, elevated LAP (grade 2 diastolic dysfunction).     3 - Normal right ventricular systolic function .     4 - Mild aortic regurgitation.     5 - Severe left atrial enlargement.     6 - Concentric hypertrophy.     7 - Mild to moderate mitral regurgitation.             This document has been electronically    SIGNED BY: Terry Tarango MD On: 10/28/2019 16:38   , EKG: Reviewed and X-Ray: CXR: X-Ray Chest 1 View (CXR):   Results for orders placed or performed during the hospital encounter of 10/28/19   X-Ray Chest 1 View for PICC_Central line    Narrative    EXAMINATION:  XR CHEST 1 VIEW    CLINICAL HISTORY:  Evaluate PICC line placement;    COMPARISON:  A chest x-ray performed at 06:07 on  11/02/2019.    FINDINGS:  There has been interval placement of a right PICC.  Its tip is in the superior vena cava.  A right internal jugular venous line remains in place.  There has been interval removal of the right Frost Waldo line.  The borders of the heart are not well seen.  There is a mild amount of interstitial and alveolar opacities seen in both lungs with Kerley B-lines visualized bilaterally.  There is blunting of the right costophrenic angle.  The left costophrenic angle is sharp.  There is no pneumothorax.      Impression    1. There has been interval placement of a right PICC.  Its tip is in the superior vena cava.  There is no pneumothorax.  2. A right internal jugular venous line remains in place. There has been interval removal of the right Frost Waldo line.  3. The borders of the heart are not well seen. There is a mild amount of interstitial and alveolar opacities seen in both lungs with Kerley B-lines visualized bilaterally.  This is characteristic of pulmonary edema.  4. There is blunting of the right costophrenic angle.  This is characteristic of a tiny pleural effusion.  .      Electronically signed by: Shaun Doyle MD  Date:    11/02/2019  Time:    14:09    and X-Ray Chest PA and Lateral (CXR):   Results for orders placed or performed during the hospital encounter of 10/28/19   X-Ray Chest PA And Lateral    Narrative    EXAMINATION:  XR CHEST PA AND LATERAL    CLINICAL HISTORY:  Pre Op;    COMPARISON:  10/28/2019    FINDINGS:  Cardiac silhouette is normal. Aorta demonstrates atherosclerotic disease. The lungs demonstrate no evidence of active disease.  No evidence of pleural effusion or pneumothorax.  Bones appear intact.      Impression    No acute process seen.      Electronically signed by: Jh Anne MD  Date:    10/28/2019  Time:    21:38     Assessment and Plan:   Patient who presents with multivessel CAD/ICM, recovering s/p CABG. Continue same meds/mgmt.    * CAD, multiple  vessel  -See plan under CABG    Thrombocytopenia  -Mgmt as per heme/onc    Acute blood loss anemia  -Patient transfused 3 units in the OR and additional 3 units in ICU    11/2/19  -Mgmt as per CVT    S/P CABG x 3  -Patient is s/p CABG x3 with LIMA to LAD reverse saphenous vein graft to the RCA and reverse saphenous vein graft to the ramus.  -Continue post CABG supportive therapy  -Continue ASA, Plavix, Statin, BB  -Will continue to follow along     11/1/19  -POD #1 s/p CABG x3  -Continue ASA, Statin, BB  -Remains on Epi and Milrinone gtt today  -Mgmt per CT surgery  -Will follow along    11/2/19  -Recovering s/p CABG x 3  -Continue statin, BB  -Platelets 47,000 this AM; heme/onc on board, ASA held  -Await further rec's    11/3/19  -Stable, progressing post CABG x 3  -Continue statin, BB  -ASA re-started  -Platelets 89,000, heme/onc on board    Acute combined systolic and diastolic CHF, NYHA class 2  -Presents with ICM/acute combined CHF  -EF 30-35% by 2D echo  -Continue diuresis with IV Lasix  -Continue BB  -Will add ARB if BP permits    Chest pain  -See plan under NSTEMI    Abnormal EKG  -LBBB noted on EKG  -See plan under NSTEMI    NSTEMI (non-ST elevated myocardial infarction)  -Patient presented with intermittent chest tightness/heaviness that radiated to her back since Saturday evening  -Associated with JONES/orthopnea  -Initial troponin 3.665  -EKG showed SR with new LBBB  -Presentation consistent with NSTEMI  -Continue ASA, statin  -Start Toprol XL  -Start heparin gtt  -Check 2D echo  -Continue to trend troponin  -Keep NPO. [facility] today for further evaluation and PCI if indicated. All risks, benefits, and treatment alternatives explained to patient in detail. All questions answered. She has agreed to proceed. Further rec's to follow.    10/29/19  -s/p LHC yesterday that showed multivessel CAD  -CVT consulted, CABG planned for 10/31/19  -Stable overnight, no chest pain  -Continue ASA, statin, BB  -Continue heparin  gtt  -Will add ARB if BP permits (ACEi caused cough)    10/30/19  -Stable overnight  -No chest pain or SOB  -Continue ASA, statin, BB, heparin gtt  -Will add ARB if BP permits  -IABP placement scheduled for today. All risks, benefits, and treatment alternatives explained to patient in detail. All questions answered. She has agreed to proceed.    10/31/19  -Patient is s/p CABG x3.   See plan for CABG     Hyperlipidemia associated with type 2 diabetes mellitus  -Continue statin    Obesity (BMI 30.0-34.9)  -Weight loss    Diabetes mellitus type II, uncontrolled  -Mgmt as per hospital medicine    Hypertension associated with diabetes  -Continue home meds as BP permits        VTE Risk Mitigation (From admission, onward)         Ordered     IP VTE LOW RISK PATIENT  Once      10/30/19 1405     Place NATHANIEL hose  Until discontinued      10/30/19 1405     Place sequential compression device  Until discontinued      10/30/19 1405     Reason for no Mechanical VTE Prophylaxis  Once      10/28/19 1414     heparin 25,000 units in dextrose 5% 250 mL (100 units/mL) infusion LOW INTENSITY nomogram - [facility]  Continuous      10/28/19 1257                Josselyn Vann PA-C  Cardiology  [facility]

## 2019-11-03 NOTE — PROGRESS NOTES
[facility] -   Hematology/Oncology  Progress Note    Patient Name: Mateusz Bates  Admission Date: 10/28/2019  Hospital Length of Stay: 6 days  Code Status: Full Code     Subjective:     HPI:  Mrs. Bates is a 69-year-old female with hypertension, diabetes, hyperlipidemia, obesity, anxiety and depression, iron deficiency anemia, vitamin B12 deficiency, who presented to the emergency room with an NSTEMI.  Workup included a cardiac catheterization that shows severe multivessel coronary artery disease with proximal LAD stenosis of 70% and severely depressed ejection fraction of 20%.  The patient is a candidate for urgent coronary artery bypass grafting. S/p CABG today.  Hematology is consulted for thrombocytopenia.    Interval History: s/p CABG x3, reports no chest pain or SOB.    Oncology Treatment Plan:   [No treatment plan]    Medications:  Continuous Infusions:   dexmedetomidine (PRECEDEX) infusion Stopped (11/02/19 0830)    epinephrine Stopped (11/01/19 1800)    milrinone 20mg/100ml D5W (200mcg/ml) Stopped (11/02/19 0830)    vasopressin (PITRESSIN) infusion       Scheduled Meds:   amLODIPine  2.5 mg Oral Daily    aspirin  81 mg Oral Daily    atorvastatin  80 mg Oral QHS    chlorhexidine  10 mL Mouth/Throat BID    docusate sodium  100 mg Oral BID    furosemide  40 mg Oral BID    insulin detemir U-100  12 Units Subcutaneous BID    metoprolol tartrate  100 mg Oral BID    nozaseptin   Each Nostril BID    pantoprazole  40 mg Oral Daily    polyethylene glycol  17 g Oral Daily    potassium chloride  20 mEq Oral Q12H     PRN Meds:sodium chloride, albumin human 5%, albuterol sulfate, chlorhexidine, Dextrose 10% Bolus, Dextrose 10% Bolus, Dextrose 10% Bolus, dextrose 50%, glucagon (human recombinant), glucose, glucose, insulin aspart U-100, lactated ringers, magnesium sulfate IVPB, magnesium sulfate IVPB, metoclopramide HCl, metoprolol tartrate, nozaseptin, ondansetron, potassium chloride in  water **AND** potassium chloride in water **AND** potassium chloride in water, Flushing PICC Protocol **AND** sodium chloride 0.9%, traMADol     Review of Systems   Constitutional: Negative.    HENT: Negative.    Eyes: Negative.    Respiratory: Negative.    Cardiovascular: Negative.    Gastrointestinal: Negative.    Endocrine: Negative.    Genitourinary: Negative.    Musculoskeletal: Negative.    Skin: Negative.    Allergic/Immunologic: Negative.    Neurological: Negative.    Hematological: Negative.    Psychiatric/Behavioral: Negative.      Objective:     Vital Signs (Most Recent):  Temp: 98.1 °F (36.7 °C) (11/03/19 0715)  Pulse: 90 (11/03/19 1000)  Resp: (!) 27 (11/03/19 1000)  BP: (!) 147/28 (11/03/19 1000)  SpO2: 100 % (11/03/19 1000) Vital Signs (24h Range):  Temp:  [97.5 °F (36.4 °C)-99.4 °F (37.4 °C)] 98.1 °F (36.7 °C)  Pulse:  [73-91] 90  Resp:  [20-28] 27  SpO2:  [92 %-100 %] 100 %  BP: (106-164)/(28-78) 147/28     Weight: 75.3 kg (166 lb 0.1 oz)  Body mass index is 31.37 kg/m².  Body surface area is 1.8 meters squared.      Intake/Output Summary (Last 24 hours) at 11/3/2019 1104  Last data filed at 11/3/2019 0600  Gross per 24 hour   Intake 470 ml   Output 2664 ml   Net -2194 ml       Physical Exam   Constitutional: She is oriented to person, place, and time. She appears well-developed and well-nourished.   HENT:   Head: Normocephalic and atraumatic.   Eyes: Conjunctivae and EOM are normal.   Neck: Normal range of motion. Neck supple.   Cardiovascular: Normal rate and regular rhythm.   Pulmonary/Chest: Effort normal and breath sounds normal.   Abdominal: Soft. Bowel sounds are normal.   Musculoskeletal: Normal range of motion.   Neurological: She is alert and oriented to person, place, and time.   Skin: Skin is warm and dry.   Psychiatric: She has a normal mood and affect. Her behavior is normal. Judgment and thought content normal.   Nursing note and vitals reviewed.      Significant Labs:   All pertinent  labs from the last 24 hours have been reviewed.    Diagnostic Results:  I have reviewed all pertinent imaging results/findings within the past 24 hours.    Assessment/Plan:     Thrombocytopenia  Platelets normal at 300K on 10/30, then declined 10/31 78K, 11/1 76K, 11/2 47K.  Possible cause of acute thrombocytopenia: drug induced immune thrombocytopenia (amiodarone given 10/30, lasix started 10/28 and currently receiving), HIT (4T score low prob), TTP (plasmic score intermediate prob), ITP, DIC.  Have sent: HIT antibodies, [id number], fibrinogen, ddimer, C3/C4 complements, pathology review for schistocytes, reticulocytes, haptoglobin, LDH.  If platelets continue to decline, consider stop Lasix, and use bumex.    11/3/2019 platelets improving 89K today.  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        Thank you for your consult. I will follow-up with patient. Please contact us if you have any additional questions.     Dimitri Johnson MD  Hematology/Oncology  [facility]

## 2019-11-03 NOTE — PROGRESS NOTES
Dr. meneses and Yovani NP at bedside.  Pt up in chair.  Pt continues to be extremely weak and sluggish with delayed responses to questioning.  Pt is oriented x 4.  Pt maybe slightly weaker on left side that right, but so weak overall it is tough to tell.  MD notified.

## 2019-11-03 NOTE — ASSESSMENT & PLAN NOTE
-Patient presented with intermittent chest tightness/heaviness that radiated to her back since Saturday evening  -Associated with JONES/orthopnea  -Initial troponin 3.665  -EKG showed SR with new LBBB  -Presentation consistent with NSTEMI  -Continue ASA, statin  -Start Toprol XL  -Start heparin gtt  -Check 2D echo  -Continue to trend troponin  -Keep NPO. [facility] today for further evaluation and PCI if indicated. All risks, benefits, and treatment alternatives explained to patient in detail. All questions answered. She has agreed to proceed. Further rec's to follow.    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 All risks, benefits, and treatment alternatives explained to patient in detail. All questions answered. She has agreed to proceed.    10/31/19  -Patient is s/p CABG x3.   See plan for CABG

## 2019-11-03 NOTE — ASSESSMENT & PLAN NOTE
11/2  Heme On on Consult  HIT panel  Likely drug induced  Monitor  11/3  Improved at 89 today  Monitor

## 2019-11-03 NOTE — ASSESSMENT & PLAN NOTE
-Hemoglobin A1C was 9 on 9/19/19.   -NISS.   -ADA diet.   11/2  Levemir / NISS / Accuchecks  11/3  Levemir increased to 15 sq bid  NISS  Accuchecks

## 2019-11-03 NOTE — PROGRESS NOTES
[facility] -   Cardiothoracic Surgery  Progress Note    Patient Name: Mateusz Bates  MRN: [id number]  Admission Date: 10/28/2019  Hospital Length of Stay: 6 days  Code Status: Full Code   Attending Physician: Josse Patel MD   Referring Provider: Self, Aaareferral  Principal Problem:CAD, multiple vessel            Subjective:     Post-Op Info:  Procedure(s) (LRB):  CORONARY ARTERY BYPASS GRAFT (CABG) (N/A)  ECHOCARDIOGRAM,TRANSESOPHAGEAL (N/A)  BLOCK, NERVE, INTERCOSTAL, 2 OR MORE (N/A)   3 Days Post-Op     Interval History:  11/03/2019  The patient is post-operative day 3, status-post coronary artery bypass grafting x 3.     ROS  Medications:  Continuous Infusions:   dexmedetomidine (PRECEDEX) infusion Stopped (11/02/19 0830)    epinephrine Stopped (11/01/19 1800)    milrinone 20mg/100ml D5W (200mcg/ml) Stopped (11/02/19 0830)    vasopressin (PITRESSIN) infusion       Scheduled Meds:   amLODIPine  2.5 mg Oral Daily    aspirin  81 mg Oral Daily    atorvastatin  80 mg Oral QHS    chlorhexidine  10 mL Mouth/Throat BID    docusate sodium  100 mg Oral BID    furosemide  40 mg Oral BID    insulin detemir U-100  12 Units Subcutaneous BID    magnesium citrate  296 mL Oral Once    magnesium sulfate IVPB  2 g Intravenous Once    metoprolol tartrate  100 mg Oral BID    nozaseptin   Each Nostril BID    pantoprazole  40 mg Oral Daily    polyethylene glycol  17 g Oral Daily    potassium chloride  20 mEq Oral Q12H     PRN Meds:sodium chloride, albumin human 5%, albuterol sulfate, chlorhexidine, Dextrose 10% Bolus, Dextrose 10% Bolus, Dextrose 10% Bolus, dextrose 50%, glucagon (human recombinant), glucose, glucose, insulin aspart U-100, lactated ringers, magnesium sulfate IVPB, magnesium sulfate IVPB, metoclopramide HCl, metoprolol tartrate, nozaseptin, ondansetron, potassium chloride in water **AND** potassium chloride in water **AND** potassium chloride in water, Flushing PICC Protocol **AND**  sodium chloride 0.9%, traMADol     Objective:     Vital Signs (Most Recent):  Temp: 97.5 °F (36.4 °C) (11/03/19 0305)  Pulse: 74 (11/03/19 0700)  Resp: (!) 27 (11/03/19 0700)  BP: (!) 151/51 (11/03/19 0700)  SpO2: 98 % (11/03/19 0700) Vital Signs (24h Range):  Temp:  [97.5 °F (36.4 °C)-99.4 °F (37.4 °C)] 97.5 °F (36.4 °C)  Pulse:  [73-91] 74  Resp:  [20-28] 27  SpO2:  [92 %-100 %] 98 %  BP: (106-164)/(50-78) 151/51     Weight: 75.3 kg (166 lb 0.1 oz)  Body mass index is 31.37 kg/m².    SpO2: 98 %  O2 Device (Oxygen Therapy): nasal cannula    Intake/Output - Last 3 Shifts       11/01 0700 - 11/02 0659 11/02 0700 - 11/03 0659 11/03 0700 - 11/04 0659    P.O. 210 520     I.V. (mL/kg) 1247.7 (16.6) 25.2 (0.3)     Blood       IV Piggyback 125      Total Intake(mL/kg) 1582.7 (21) 545.2 (7.2)     Urine (mL/kg/hr) 2596 (1.4) 2847 (1.6)     Drains 38      Other 0      Stool  0     Chest Tube 559 40     Total Output 3193 2887     Net -1610.3 -2341.8            Stool Occurrence  0 x           Lines/Drains/Airways     Peripherally Inserted Central Catheter Line                 PICC Double Lumen 11/02/19 1326 right basilic less than 1 day          Drain                 Urethral Catheter 10/30/19 1716 Temperature probe 3 days          Peripheral Intravenous Line                 Peripheral IV - Single Lumen 10/28/19 1322 20 G Right Hand 5 days                Physical Exam   Constitutional: She is oriented to person, place, and time. She appears well-developed and well-nourished. No distress.   HENT:   Head: Normocephalic and atraumatic.   Eyes: Pupils are equal, round, and reactive to light. EOM are normal.   Neck: Normal range of motion. Neck supple. No JVD present.   Cardiovascular: Normal rate, regular rhythm, normal heart sounds and intact distal pulses. Exam reveals no gallop and no friction rub.   No murmur heard.  Pulmonary/Chest: Effort normal and breath sounds normal. No stridor. No respiratory distress. She has no  wheezes. She has no rales. She exhibits tenderness.   Abdominal: Soft. Bowel sounds are normal. She exhibits no distension and no mass. There is no tenderness. There is no rebound and no guarding. No hernia.   Musculoskeletal: Normal range of motion. She exhibits edema. She exhibits no tenderness or deformity.   Neurological: She is alert and oriented to person, place, and time. She displays normal reflexes. No cranial nerve deficit or sensory deficit. She exhibits normal muscle tone. Coordination normal.   Skin: Skin is warm and dry. Capillary refill takes less than 2 seconds. No rash noted. She is not diaphoretic. No erythema. No pallor.   Psychiatric: She has a normal mood and affect. Her behavior is normal. Judgment and thought content normal.   Nursing note and vitals reviewed.      Significant Labs:  BMP:   Recent Labs   Lab 11/03/19  0355   *      K 3.8   CL 98   CO2 29   BUN 30*   CREATININE 0.9   CALCIUM 10.0   MG 2.0     CBC:   Recent Labs   Lab 11/03/19  0355   WBC 13.88*   RBC 3.90*   HGB 11.3*   HCT 34.7*   PLT 89*   MCV 89   MCH 29.0   MCHC 32.6       Significant Diagnostics:  I have reviewed all pertinent imaging results/findings within the past 24 hours.    Assessment/Plan:     S/P CABG x 3       11/01/2019  The patient is post-operative day 1, status-post coronary artery bypass grafting x 3.  Overall the patient is making progress, however IABP and inotropic agents will continue today.   Neuro:  The patient is awake alert and oriented x3.  Neuro exam is nonfocal.  Pain is well controlled with IV pain medication.  Cardiac:  Patient is hemodynamically improving. Patient remains on epinephrine and milrinone gtts, which will be weaned over the next 24 hours.  Cardiac index is improving. IABP will remain in place over the next 24 hours.  Patient will be started on beta blockers once gtts are weaned off.  Respiratory: Good sats on high flow nasal cannula. Continue respiratory toilet, continue  incentive spirometer. Wean to low flow nasal cannula.  GI:  Diet: sips of water with medications, will slowly advance diet as tolerated. Benign abdominal exam.  Renal:  Urine output is slowly improving. Cr 0.9. K 4.3. Mag 1.6. Replace mag. Continue IV diuresis. Add metolazone.   Heme:  Hct 34.1  Platelet 82. Continue aspirin. Discontinue Plavix for now.  Id:  WBC 12. Tmax 100.3. Continue post-operative antibiotics. Will continue to follow.   Endocrine:  Glucose is controlled with insulin gtt.  Continue insulin gtt for now, until weaned off inotropic agents.   Activities:  Patient is currently bed bound due to IABP.  Advance activities as tolerated once IABP is removed.   Lines tubes and drains:  Continue IABP. Continue [place] and Cordis.  Continue A-line.  Chest tubes will continue. NICOLASA x 2 will continue. Jones catheter will continue.  Continue pacer wires. Continue prevena wound vac.  Plan: Remain ICU.          11/02/2019  The patient is post-operative day 2, status-post coronary artery bypass grafting x 3.  Overall the patient is making progress, IABP removed and inotropic agents discontinued.   Neuro:  The patient is awake alert and oriented x3.  Neuro exam is nonfocal.  Pain is well controlled. Discontinue fentanyl.   Cardiac:  Patient is hemodynamically improving. Inotropic gtts discontinued. Excellent cardiac index. IABP discontinued.  Patient will be started on beta blockers today.  Respiratory: Good sats on high flow nasal cannula. Continue respiratory toilet, continue incentive spirometer. Wean to low flow nasal cannula.  GI:  Advance diabetic/cardiac diet as tolerated. Benign abdominal exam.  Renal:  Urine output is improving. Cr 0.9. K 4.9. Mag 2.2. Continue IV diuresis. Hold AM K.   Heme:  Hct 28.3  Platelet 47. Discontinue aspirin. Discontinue Plavix. HIT panel. Consult hematology r/t thrombocytopenia.   Id:  WBC 10.4. Tmax 99.4. Will continue to follow.   Endocrine:  Discontinue insulin gtt. Start sliding  scale insulin. Start long acting insulin detemir.   Activities: Advance activities as tolerated.   Lines tubes and drains:  Discontinue IABP. Discontinue [place] and Cordis.  Discontinue A-line.  Discontinue chest tubes x 3. Discontinue NICOLASA x 2. Discontinue arterial line. Jones catheter will continue for now.  Continue pacer wires. Continue prevena wound vac. Place PICC line.   Plan: Remain ICU.           11/03/2019  The patient is post-operative day 3, status-post coronary artery bypass grafting x 3.  Overall the patient is making progress.  Neuro:  The patient is awake alert and oriented x3.  Neuro exam is nonfocal.  Pain is well controlled. Continue tramadol.   Cardiac:  Patient is hemodynamically stable. Increase metoprolol dose. Start amlodipine.   Respiratory: Good sats on nasal cannula. Continue respiratory toilet, continue incentive spirometer. Wean to room air.  GI:  Advance diabetic/cardiac diet as tolerated. Benign abdominal exam. Passing gas, no BM. Add 1 bottle of mag citrate.   Renal:  Good urine output. Cr 0.9. K 3.8. Mag 2.0. Replace K. Replace Mag. Change to PO diuresis.   Heme:  Hct  34.7  Platelet  89. Restart aspirin. Continue to hold Plavix. HIT panel pending. Hematology following.  Id:  WBC 13.8. Tmax 99.4. Will continue to follow.    Endocrine:  Glucose controlled on sliding scale insulin. Increase long acting insulin detemir dose.   Activities: Advance activities as tolerated.   Lines tubes and drains:  Jones catheter will continue for now.  Continue pacer wires. Continue prevena wound vac. Continue PICC line.   Plan: Remain ICU.            NSTEMI (non-ST elevated myocardial infarction)  The patient is a 69-year-old female with hypertension, diabetes, hyperlipidemia, obesity, anxiety and depression, iron deficiency anemia, vitamin B12 deficiency, who presented to the emergency room with an NSTEMI.  Workup included a cardiac catheterization that shows severe multivessel coronary artery disease  with proximal LAD stenosis of 70% and severely depressed ejection fraction of 20%.  The patient is a candidate for urgent coronary artery bypass grafting.  The risks and benefits of surgery were explained to the patient.  The patient verbalized understanding of the issues discussed.  She a segs the risks of surgery and has agreed to proceed with urgent coronary artery bypass grafting.  She understands that the risk of neurologic complication postoperatively is increased due to the presence of cerebral microvascular disease.        Yovani Dobbins NP  Cardiothoracic Surgery  [facility] [place]

## 2019-11-03 NOTE — ASSESSMENT & PLAN NOTE
-Patient is s/p CABG x3 with LIMA to LAD reverse saphenous vein graft to the RCA and reverse saphenous vein graft to the ramus.  -Continue post CABG supportive therapy  -Continue ASA, Plavix, Statin, BB  -Will continue to follow along     11/1/19  -POD #1 s/p CABG x3  -Continue ASA, Statin, BB  -Remains on Epi and Milrinone gtt today  -Mgmt per CT surgery  -Will follow along    11/2/19  -Recovering s/p CABG x 3  -Continue statin, BB  -Platelets 47,000 this AM; heme/onc on board, ASA held  -Await further rec's    11/3/19  -Stable, progressing post CABG x 3  -Continue statin, BB  -ASA re-started  -Platelets 89,000, heme/onc on board

## 2019-11-03 NOTE — PROGRESS NOTES
Pt helped up to bedside commode.  Pt took shuffle steps to commode with 2 person max assist.  Pt is able to hold her weight up.  Pt tolerated ok.  Pt did not have a bowel movement, only gas.

## 2019-11-03 NOTE — SUBJECTIVE & OBJECTIVE
Review of Systems   Constitution: Positive for malaise/fatigue.   HENT: Negative.    Eyes: Negative.    Cardiovascular: Negative.         Incisional chest pain     Respiratory: Negative.    Endocrine: Negative.    Hematologic/Lymphatic: Negative.    Skin: Negative.    Musculoskeletal: Negative.    Gastrointestinal: Negative.    Genitourinary: Negative.    Neurological: Positive for weakness.   Psychiatric/Behavioral: Negative.    Allergic/Immunologic: Negative.      Objective:     Vital Signs (Most Recent):  Temp: 98.1 °F (36.7 °C) (11/03/19 0715)  Pulse: 90 (11/03/19 1000)  Resp: (!) 27 (11/03/19 1000)  BP: (!) 147/28 (11/03/19 1000)  SpO2: 100 % (11/03/19 1000) Vital Signs (24h Range):  Temp:  [97.5 °F (36.4 °C)-99.4 °F (37.4 °C)] 98.1 °F (36.7 °C)  Pulse:  [73-91] 90  Resp:  [20-28] 27  SpO2:  [92 %-100 %] 100 %  BP: (106-164)/(28-78) 147/28     Weight: 75.3 kg (166 lb 0.1 oz)  Body mass index is 31.37 kg/m².     SpO2: 100 %  O2 Device (Oxygen Therapy): nasal cannula      Intake/Output Summary (Last 24 hours) at 11/3/2019 1122  Last data filed at 11/3/2019 0600  Gross per 24 hour   Intake 470 ml   Output 2664 ml   Net -2194 ml       Lines/Drains/Airways     Peripherally Inserted Central Catheter Line                 PICC Double Lumen 11/02/19 1326 right basilic less than 1 day          Drain                 Urethral Catheter 10/30/19 1716 Temperature probe 3 days          Peripheral Intravenous Line                 Peripheral IV - Single Lumen 10/28/19 1322 20 G Right Hand 5 days                Physical Exam   Constitutional: She is oriented to person, place, and time. She appears well-developed and well-nourished. No distress.   HENT:   Head: Normocephalic and atraumatic.   Eyes: Pupils are equal, round, and reactive to light. Right eye exhibits no discharge. Left eye exhibits no discharge.   Neck: Neck supple. No JVD present.   Cardiovascular: Normal rate, regular rhythm, S1 normal, S2 normal and normal  heart sounds.   No murmur heard.  Pulmonary/Chest: Effort normal. No respiratory distress. She has no wheezes.   CABG site C/D/I; no bleeding erythema or drainage    Diminished BS at bases   Abdominal: Soft. She exhibits no distension.   Musculoskeletal: She exhibits no edema.   Neurological: She is alert and oriented to person, place, and time.   Skin: Skin is warm and dry. She is not diaphoretic. No erythema.   SVG sites C/D/I; no bleeding erythema or drainage   Psychiatric: She has a normal mood and affect. Her behavior is normal. Thought content normal.   Nursing note and vitals reviewed.      Significant Labs:   CMP   Recent Labs   Lab 11/02/19  1014 11/02/19  1622 11/03/19  0355    138 140   K 4.6 4.4 3.8    99 98   CO2 23 27 29   * 270* 137*   BUN 24* 28* 30*   CREATININE 0.9 0.9 0.9   CALCIUM 8.6* 9.4 10.0   PROT 5.0*  --   --    ALBUMIN 2.8*  --   --    BILITOT 1.9*  --   --    ALKPHOS 53*  --   --    AST 24  --   --    ALT <5*  --   --    ANIONGAP 13 12 13   ESTGFRAFRICA >60 >60 >60   EGFRNONAA >60 >60 >60   , CBC   Recent Labs   Lab 11/02/19  1122 11/02/19  1916 11/03/19  0355   WBC 10.99 14.01* 13.88*   HGB 9.7* 10.9* 11.3*   HCT 29.4* 32.8* 34.7*   PLT 46* 73* 89*   , Troponin No results for input(s): TROPONINI in the last 48 hours. and All pertinent lab results from the last 24 hours have been reviewed.    Significant Imaging: Echocardiogram:   2D echo with color flow doppler:   Results for orders placed or performed during the hospital encounter of 10/28/19   2D echo with color flow doppler   Result Value Ref Range    QEF 30 (A) 55 - 65    Mitral Valve Regurgitation MILD TO MODERATE     Diastolic Dysfunction Yes (A)     Aortic Valve Regurgitation MILD     Mitral Valve Mobility NORMAL     Narrative    Date of Procedure: 10/28/2019        TEST DESCRIPTION   Technical Quality: This is a technically adequate study.     Aorta: The aortic root is normal in size, measuring 3.0 cm at  sinotubular junction and 3.2 cm at Sinuses of Valsalva. The proximal ascending aorta is normal in size, measuring 3.1 cm across.     Left Atrium: The left atrial volume index is severely enlarged, measuring 52.97 cc/m2.     Left Ventricle: The left ventricle is normal in size, with an end-diastolic diameter of 4.1 cm, and an end-systolic diameter of 3.7 cm. Septal wall thickness is mildly increased, with the septum measuring 1.6 cm and the posterior wall measuring 1.3 cm   across. Relative wall thickness was increased at 0.63, and the LV mass index was increased at 160.6 g/m2 consistent with concentric left ventricular hypertrophy. The anterior septum is severely hypokinetic. There is global hypokinesis. Left ventricular   systolic function appears moderately depressed. Visually estimated ejection fraction is 30-35%. The LV Doppler derived stroke volume equals 57.0 ccs.     Diastolic indices: E wave velocity 1.1 m/s, E/A ratio 0.9,  msec., E/e' ratio(avg) 16. There is pseudonormalization of mitral inflow pattern consistent with significant diastolic dysfunction.     Right Atrium: The right atrium is normal in size, measuring 3.7 cm in length and 2.8 cm in width in the apical view.     Right Ventricle: The right ventricle is normal in size. Global right ventricular systolic function appears normal. Tricuspid annular plane systolic excursion (TAPSE) is 2.2 cm.     Aortic Valve:  The aortic valve is normal in structure with normal leaflet mobility. The mean gradient obtained across the aortic valve is 6 mmHg. Additionally, there is mild aortic regurgitation.     Mitral Valve:  The mitral valve is normal in structure with normal leaflet mobility. The pressure half time is 45 msec. The calculated mitral valve area is 4.89 cm2. There is mild to moderate mitral regurgitation.     Tricuspid Valve:  The tricuspid valve is normal in structure with normal leaflet mobility.     Pulmonary Valve:  The pulmonic valve is not  well seen.     IVC: IVC is normal in size and collapses > 50% with a sniff, suggesting normal right atrial pressure of 3 mmHg.     Intracavitary: There is no evidence of pericardial effusion, intracavity mass, thrombi, or vegetation.         CONCLUSIONS     1 - Moderately depressed left ventricular systolic function (EF 30-35%).     2 - Impaired LV relaxation, elevated LAP (grade 2 diastolic dysfunction).     3 - Normal right ventricular systolic function .     4 - Mild aortic regurgitation.     5 - Severe left atrial enlargement.     6 - Concentric hypertrophy.     7 - Mild to moderate mitral regurgitation.             This document has been electronically    SIGNED BY: Terry Tarango MD On: 10/28/2019 16:38   , EKG: Reviewed and X-Ray: CXR: X-Ray Chest 1 View (CXR):   Results for orders placed or performed during the hospital encounter of 10/28/19   X-Ray Chest 1 View for PICC_Central line    Narrative    EXAMINATION:  XR CHEST 1 VIEW    CLINICAL HISTORY:  Evaluate PICC line placement;    COMPARISON:  A chest x-ray performed at 06:07 on 11/02/2019.    FINDINGS:  There has been interval placement of a right PICC.  Its tip is in the superior vena cava.  A right internal jugular venous line remains in place.  There has been interval removal of the right [place] line.  The borders of the heart are not well seen.  There is a mild amount of interstitial and alveolar opacities seen in both lungs with Ker[place] B-lines visualized bilaterally.  There is blunting of the right costophrenic angle.  The left costophrenic angle is sharp.  There is no pneumothorax.      Impression    1. There has been interval placement of a right PICC.  Its tip is in the superior vena cava.  There is no pneumothorax.  2. A right internal jugular venous line remains in place. There has been interval removal of the right [place] line.  3. The borders of the heart are not well seen. There is a mild amount of interstitial and alveolar opacities seen  in both lungs with [place] B-lines visualized bilaterally.  This is characteristic of pulmonary edema.  4. There is blunting of the right costophrenic angle.  This is characteristic of a tiny pleural effusion.  .      Electronically signed by: Shaun Doyle MD  Date:    11/02/2019  Time:    14:09    and X-Ray Chest PA and Lateral (CXR):   Results for orders placed or performed during the hospital encounter of 10/28/19   X-Ray Chest PA And Lateral    Narrative    EXAMINATION:  XR CHEST PA AND LATERAL    CLINICAL HISTORY:  Pre Op;    COMPARISON:  10/28/2019    FINDINGS:  Cardiac silhouette is normal. Aorta demonstrates atherosclerotic disease. The lungs demonstrate no evidence of active disease.  No evidence of pleural effusion or pneumothorax.  Bones appear intact.      Impression    No acute process seen.      Electronically signed by: Jh Anne MD  Date:    10/28/2019  Time:    21:38

## 2019-11-04 PROBLEM — J96.01 ACUTE HYPOXEMIC RESPIRATORY FAILURE: Status: RESOLVED | Noted: 2019-10-31 | Resolved: 2019-11-04

## 2019-11-04 LAB
ANION GAP SERPL CALC-SCNC: 10 MMOL/L (ref 8–16)
ANION GAP SERPL CALC-SCNC: 11 MMOL/L (ref 8–16)
ANION GAP SERPL CALC-SCNC: 14 MMOL/L (ref 8–16)
ANISOCYTOSIS BLD QL SMEAR: SLIGHT
ANISOCYTOSIS BLD QL SMEAR: SLIGHT
BASOPHILS # BLD AUTO: 0.02 K/UL (ref 0–0.2)
BASOPHILS # BLD AUTO: 0.04 K/UL (ref 0–0.2)
BASOPHILS NFR BLD: 0.2 % (ref 0–1.9)
BASOPHILS NFR BLD: 0.3 % (ref 0–1.9)
BUN SERPL-MCNC: 27 MG/DL (ref 8–23)
BUN SERPL-MCNC: 29 MG/DL (ref 8–23)
BUN SERPL-MCNC: 30 MG/DL (ref 8–23)
CALCIUM SERPL-MCNC: 9 MG/DL (ref 8.7–10.5)
CALCIUM SERPL-MCNC: 9.5 MG/DL (ref 8.7–10.5)
CALCIUM SERPL-MCNC: 9.9 MG/DL (ref 8.7–10.5)
CHLORIDE SERPL-SCNC: 91 MMOL/L (ref 95–110)
CHLORIDE SERPL-SCNC: 96 MMOL/L (ref 95–110)
CHLORIDE SERPL-SCNC: 97 MMOL/L (ref 95–110)
CO2 SERPL-SCNC: 25 MMOL/L (ref 23–29)
CO2 SERPL-SCNC: 29 MMOL/L (ref 23–29)
CO2 SERPL-SCNC: 30 MMOL/L (ref 23–29)
CREAT SERPL-MCNC: 0.7 MG/DL (ref 0.5–1.4)
CREAT SERPL-MCNC: 0.8 MG/DL (ref 0.5–1.4)
CREAT SERPL-MCNC: 0.9 MG/DL (ref 0.5–1.4)
DIFFERENTIAL METHOD: ABNORMAL
DIFFERENTIAL METHOD: ABNORMAL
EOSINOPHIL # BLD AUTO: 0.1 K/UL (ref 0–0.5)
EOSINOPHIL # BLD AUTO: 0.3 K/UL (ref 0–0.5)
EOSINOPHIL NFR BLD: 1.1 % (ref 0–8)
EOSINOPHIL NFR BLD: 2.6 % (ref 0–8)
ERYTHROCYTE [DISTWIDTH] IN BLOOD BY AUTOMATED COUNT: 15.3 % (ref 11.5–14.5)
ERYTHROCYTE [DISTWIDTH] IN BLOOD BY AUTOMATED COUNT: 15.6 % (ref 11.5–14.5)
EST. GFR  (AFRICAN AMERICAN): >60 ML/MIN/1.73 M^2
EST. GFR  (NON AFRICAN AMERICAN): >60 ML/MIN/1.73 M^2
GLUCOSE SERPL-MCNC: 150 MG/DL (ref 70–110)
GLUCOSE SERPL-MCNC: 162 MG/DL (ref 70–110)
GLUCOSE SERPL-MCNC: 407 MG/DL (ref 70–110)
HCT VFR BLD AUTO: 29.4 % (ref 37–48.5)
HCT VFR BLD AUTO: 34.4 % (ref 37–48.5)
HEPARIN INDUCED THROMBOCYTOPENIA: 0.1 OD (ref 0–0.4)
HEPARIN INDUCED THROMBOCYTOPENIA: 0.1 OD (ref 0–0.4)
HGB BLD-MCNC: 11.1 G/DL (ref 12–16)
HGB BLD-MCNC: 9.7 G/DL (ref 12–16)
IMM GRANULOCYTES # BLD AUTO: 0.08 K/UL (ref 0–0.04)
IMM GRANULOCYTES # BLD AUTO: 0.1 K/UL (ref 0–0.04)
IMM GRANULOCYTES NFR BLD AUTO: 0.7 % (ref 0–0.5)
IMM GRANULOCYTES NFR BLD AUTO: 0.8 % (ref 0–0.5)
LYMPHOCYTES # BLD AUTO: 0.9 K/UL (ref 1–4.8)
LYMPHOCYTES # BLD AUTO: 1.9 K/UL (ref 1–4.8)
LYMPHOCYTES NFR BLD: 14.2 % (ref 18–48)
LYMPHOCYTES NFR BLD: 8.1 % (ref 18–48)
MAGNESIUM SERPL-MCNC: 2.3 MG/DL (ref 1.6–2.6)
MCH RBC QN AUTO: 28.8 PG (ref 27–31)
MCH RBC QN AUTO: 29.1 PG (ref 27–31)
MCHC RBC AUTO-ENTMCNC: 32.3 G/DL (ref 32–36)
MCHC RBC AUTO-ENTMCNC: 33 G/DL (ref 32–36)
MCV RBC AUTO: 88 FL (ref 82–98)
MCV RBC AUTO: 89 FL (ref 82–98)
MONOCYTES # BLD AUTO: 0.8 K/UL (ref 0.3–1)
MONOCYTES # BLD AUTO: 1.2 K/UL (ref 0.3–1)
MONOCYTES NFR BLD: 7.5 % (ref 4–15)
MONOCYTES NFR BLD: 8.9 % (ref 4–15)
NEUTROPHILS # BLD AUTO: 9.1 K/UL (ref 1.8–7.7)
NEUTROPHILS # BLD AUTO: 9.6 K/UL (ref 1.8–7.7)
NEUTROPHILS NFR BLD: 73.2 % (ref 38–73)
NEUTROPHILS NFR BLD: 82.4 % (ref 38–73)
NRBC BLD-RTO: 0 /100 WBC
NRBC BLD-RTO: 0 /100 WBC
OVALOCYTES BLD QL SMEAR: ABNORMAL
OVALOCYTES BLD QL SMEAR: ABNORMAL
PATH REV BLD -IMP: NORMAL
PLATELET # BLD AUTO: 140 K/UL (ref 150–350)
PLATELET # BLD AUTO: 46 K/UL (ref 150–350)
PLATELET BLD QL SMEAR: ABNORMAL
PLATELET BLD QL SMEAR: ABNORMAL
PMV BLD AUTO: 10.7 FL (ref 9.2–12.9)
PMV BLD AUTO: 12.8 FL (ref 9.2–12.9)
POCT GLUCOSE: 172 MG/DL (ref 70–110)
POCT GLUCOSE: 254 MG/DL (ref 70–110)
POCT GLUCOSE: 302 MG/DL (ref 70–110)
POCT GLUCOSE: 316 MG/DL (ref 70–110)
POCT GLUCOSE: 331 MG/DL (ref 70–110)
POCT GLUCOSE: 65 MG/DL (ref 70–110)
POCT GLUCOSE: 79 MG/DL (ref 70–110)
POTASSIUM SERPL-SCNC: 4 MMOL/L (ref 3.5–5.1)
POTASSIUM SERPL-SCNC: 4.5 MMOL/L (ref 3.5–5.1)
POTASSIUM SERPL-SCNC: 4.6 MMOL/L (ref 3.5–5.1)
RBC # BLD AUTO: 3.33 M/UL (ref 4–5.4)
RBC # BLD AUTO: 3.86 M/UL (ref 4–5.4)
SODIUM SERPL-SCNC: 131 MMOL/L (ref 136–145)
SODIUM SERPL-SCNC: 136 MMOL/L (ref 136–145)
SODIUM SERPL-SCNC: 136 MMOL/L (ref 136–145)
WBC # BLD AUTO: 10.99 K/UL (ref 3.9–12.7)
WBC # BLD AUTO: 13.13 K/UL (ref 3.9–12.7)

## 2019-11-04 PROCEDURE — 27200667 HC PACEMAKER, TEMPORARY MONITORING, PER SHIFT

## 2019-11-04 PROCEDURE — 80048 BASIC METABOLIC PNL TOTAL CA: CPT

## 2019-11-04 PROCEDURE — 99232 PR SUBSEQUENT HOSPITAL CARE,LEVL II: ICD-10-PCS | Mod: ,,, | Performed by: INTERNAL MEDICINE

## 2019-11-04 PROCEDURE — 97530 THERAPEUTIC ACTIVITIES: CPT

## 2019-11-04 PROCEDURE — 80048 BASIC METABOLIC PNL TOTAL CA: CPT | Mod: 91

## 2019-11-04 PROCEDURE — 97803 MED NUTRITION INDIV SUBSEQ: CPT

## 2019-11-04 PROCEDURE — 25000003 PHARM REV CODE 250: Performed by: NURSE PRACTITIONER

## 2019-11-04 PROCEDURE — 83735 ASSAY OF MAGNESIUM: CPT

## 2019-11-04 PROCEDURE — 99233 PR SUBSEQUENT HOSPITAL CARE,LEVL III: ICD-10-PCS | Mod: ,,, | Performed by: INTERNAL MEDICINE

## 2019-11-04 PROCEDURE — 25000003 PHARM REV CODE 250: Performed by: THORACIC SURGERY (CARDIOTHORACIC VASCULAR SURGERY)

## 2019-11-04 PROCEDURE — 99233 SBSQ HOSP IP/OBS HIGH 50: CPT | Mod: ,,, | Performed by: INTERNAL MEDICINE

## 2019-11-04 PROCEDURE — 85025 COMPLETE CBC W/AUTO DIFF WBC: CPT

## 2019-11-04 PROCEDURE — 97116 GAIT TRAINING THERAPY: CPT

## 2019-11-04 PROCEDURE — 21400001 HC TELEMETRY ROOM

## 2019-11-04 PROCEDURE — 99232 SBSQ HOSP IP/OBS MODERATE 35: CPT | Mod: ,,, | Performed by: INTERNAL MEDICINE

## 2019-11-04 PROCEDURE — 94799 UNLISTED PULMONARY SVC/PX: CPT

## 2019-11-04 RX ORDER — CLOPIDOGREL BISULFATE 75 MG/1
75 TABLET ORAL DAILY
Status: DISCONTINUED | OUTPATIENT
Start: 2019-11-04 | End: 2019-11-05

## 2019-11-04 RX ORDER — FUROSEMIDE 40 MG/1
40 TABLET ORAL DAILY
Status: DISCONTINUED | OUTPATIENT
Start: 2019-11-05 | End: 2019-11-05

## 2019-11-04 RX ADMIN — POLYETHYLENE GLYCOL (3350) 17 G: 17 POWDER, FOR SOLUTION ORAL at 08:11

## 2019-11-04 RX ADMIN — DOCUSATE SODIUM 100 MG: 100 CAPSULE, LIQUID FILLED ORAL at 09:11

## 2019-11-04 RX ADMIN — DOCUSATE SODIUM 100 MG: 100 CAPSULE, LIQUID FILLED ORAL at 08:11

## 2019-11-04 RX ADMIN — ASPIRIN 81 MG: 81 TABLET, COATED ORAL at 08:11

## 2019-11-04 RX ADMIN — INSULIN ASPART 6 UNITS: 100 INJECTION, SOLUTION INTRAVENOUS; SUBCUTANEOUS at 01:11

## 2019-11-04 RX ADMIN — CHLORHEXIDINE GLUCONATE 0.12% ORAL RINSE 10 ML: 1.2 LIQUID ORAL at 09:11

## 2019-11-04 RX ADMIN — PANTOPRAZOLE SODIUM 40 MG: 40 TABLET, DELAYED RELEASE ORAL at 08:11

## 2019-11-04 RX ADMIN — AMLODIPINE BESYLATE 2.5 MG: 2.5 TABLET ORAL at 08:11

## 2019-11-04 RX ADMIN — INSULIN ASPART 8 UNITS: 100 INJECTION, SOLUTION INTRAVENOUS; SUBCUTANEOUS at 05:11

## 2019-11-04 RX ADMIN — DEXTROSE 125 ML: 10 SOLUTION INTRAVENOUS at 01:11

## 2019-11-04 RX ADMIN — INSULIN ASPART 4 UNITS: 100 INJECTION, SOLUTION INTRAVENOUS; SUBCUTANEOUS at 09:11

## 2019-11-04 RX ADMIN — FUROSEMIDE 40 MG: 40 TABLET ORAL at 08:11

## 2019-11-04 RX ADMIN — METOPROLOL TARTRATE 100 MG: 50 TABLET ORAL at 09:11

## 2019-11-04 RX ADMIN — METOPROLOL TARTRATE 100 MG: 50 TABLET ORAL at 08:11

## 2019-11-04 RX ADMIN — CHLORHEXIDINE GLUCONATE 0.12% ORAL RINSE 10 ML: 1.2 LIQUID ORAL at 08:11

## 2019-11-04 RX ADMIN — POTASSIUM CHLORIDE 20 MEQ: 20 TABLET, EXTENDED RELEASE ORAL at 09:11

## 2019-11-04 RX ADMIN — POTASSIUM CHLORIDE 20 MEQ: 20 TABLET, EXTENDED RELEASE ORAL at 08:11

## 2019-11-04 RX ADMIN — CLOPIDOGREL BISULFATE 75 MG: 75 TABLET ORAL at 02:11

## 2019-11-04 RX ADMIN — ATORVASTATIN CALCIUM 80 MG: 40 TABLET, FILM COATED ORAL at 09:11

## 2019-11-04 NOTE — SUBJECTIVE & OBJECTIVE
Interval History: Tmax of 100.6 over night.  No complaints today.     Oncology Treatment Plan:   [No treatment plan]    Medications:  Continuous Infusions:    Scheduled Meds:   amLODIPine  2.5 mg Oral Daily    aspirin  81 mg Oral Daily    atorvastatin  80 mg Oral QHS    chlorhexidine  10 mL Mouth/Throat BID    docusate sodium  100 mg Oral BID    furosemide  40 mg Oral BID    insulin detemir U-100  15 Units Subcutaneous BID    metoprolol tartrate  100 mg Oral BID    nozaseptin   Each Nostril BID    pantoprazole  40 mg Oral Daily    polyethylene glycol  17 g Oral Daily    potassium chloride  20 mEq Oral Q12H     PRN Meds:sodium chloride, albumin human 5%, albuterol sulfate, chlorhexidine, Dextrose 10% Bolus, Dextrose 10% Bolus, Dextrose 10% Bolus, dextrose 50%, glucagon (human recombinant), glucose, glucose, insulin aspart U-100, lactated ringers, magnesium sulfate IVPB, magnesium sulfate IVPB, metoclopramide HCl, metoprolol tartrate, nozaseptin, ondansetron, potassium chloride in water **AND** potassium chloride in water **AND** potassium chloride in water, Flushing PICC Protocol **AND** sodium chloride 0.9%, traMADol     Review of Systems   Constitutional: Negative.    HENT: Negative.    Eyes: Negative.    Respiratory: Positive for shortness of breath (with exertion).    Cardiovascular: Negative.    Gastrointestinal: Negative.    Endocrine: Negative.    Genitourinary: Negative.    Musculoskeletal: Negative.    Skin: Positive for wound.   Allergic/Immunologic: Negative.    Neurological: Negative.    Hematological: Negative.    Psychiatric/Behavioral: Negative.      Objective:     Vital Signs (Most Recent):  Temp: 97.1 °F (36.2 °C) (11/04/19 0715)  Pulse: 91 (11/04/19 0743)  Resp: (!) 24 (11/04/19 0743)  BP: 113/65 (11/04/19 0700)  SpO2: (!) 94 % (11/04/19 0743) Vital Signs (24h Range):  Temp:  [97.1 °F (36.2 °C)-100.6 °F (38.1 °C)] 97.1 °F (36.2 °C)  Pulse:  [78-91] 91  Resp:  [20-30] 24  SpO2:  [92 %-100  %] 94 %  BP: (100-147)/(28-65) 113/65     Weight: 75.3 kg (166 lb 0.1 oz)  Body mass index is 31.37 kg/m².  Body surface area is 1.8 meters squared.      Intake/Output Summary (Last 24 hours) at 11/4/2019 0937  Last data filed at 11/4/2019 0800  Gross per 24 hour   Intake 1405 ml   Output 909 ml   Net 496 ml       Physical Exam   Constitutional: She is oriented to person, place, and time. She appears well-developed and well-nourished. No distress.   HENT:   Head: Normocephalic and atraumatic.   Eyes: Conjunctivae and EOM are normal.   Neck: Normal range of motion. Neck supple.   Cardiovascular: Normal rate and regular rhythm.   Pulmonary/Chest: Effort normal and breath sounds normal.   Abdominal: Soft. Bowel sounds are normal.   Musculoskeletal: Normal range of motion.   Neurological: She is alert and oriented to person, place, and time.   Skin: Skin is warm and dry. She is not diaphoretic.   Psychiatric: She has a normal mood and affect. Her behavior is normal. Judgment and thought content normal.   Nursing note and vitals reviewed.      Significant Labs:   All pertinent labs from the last 24 hours have been reviewed.    Diagnostic Results:  I have reviewed all pertinent imaging results/findings within the past 24 hours.

## 2019-11-04 NOTE — SUBJECTIVE & OBJECTIVE
Interval History:  11/04/2019  The patient is post-operative day 4, status-post coronary artery bypass grafting x 3.     ROS  Medications:  Continuous Infusions:  Scheduled Meds:   amLODIPine  2.5 mg Oral Daily    aspirin  81 mg Oral Daily    atorvastatin  80 mg Oral QHS    chlorhexidine  10 mL Mouth/Throat BID    clopidogrel  75 mg Oral Daily    docusate sodium  100 mg Oral BID    [START ON 11/5/2019] furosemide  40 mg Oral Daily    insulin detemir U-100  15 Units Subcutaneous BID    metoprolol tartrate  100 mg Oral BID    nozaseptin   Each Nostril BID    pantoprazole  40 mg Oral Daily    polyethylene glycol  17 g Oral Daily    potassium chloride  20 mEq Oral Q12H     PRN Meds:sodium chloride, albumin human 5%, albuterol sulfate, chlorhexidine, Dextrose 10% Bolus, Dextrose 10% Bolus, Dextrose 10% Bolus, dextrose 50%, glucagon (human recombinant), glucose, glucose, insulin aspart U-100, lactated ringers, magnesium sulfate IVPB, magnesium sulfate IVPB, metoclopramide HCl, metoprolol tartrate, nozaseptin, ondansetron, potassium chloride in water **AND** potassium chloride in water **AND** potassium chloride in water, Flushing PICC Protocol **AND** sodium chloride 0.9%, traMADol     Objective:     Vital Signs (Most Recent):  Temp: 98 °F (36.7 °C) (11/04/19 1130)  Pulse: 90 (11/04/19 1430)  Resp: 20 (11/04/19 1430)  BP: (!) 105/55 (11/04/19 1430)  SpO2: (Warming fingers in order to obtain SpO2. Pt denies SOB.) (11/04/19 1430) Vital Signs (24h Range):  Temp:  [97.1 °F (36.2 °C)-100.6 °F (38.1 °C)] 98 °F (36.7 °C)  Pulse:  [90-91] 90  Resp:  [20-30] 20  SpO2:  [92 %-97 %] 95 %  BP: (100-133)/(44-65) 105/55     Weight: 75.3 kg (166 lb 0.1 oz)  Body mass index is 31.37 kg/m².    SpO2: (Warming fingers in order to obtain SpO2. Pt denies SOB.)  O2 Device (Oxygen Therapy): room air    Intake/Output - Last 3 Shifts       11/02 0700 - 11/03 0659 11/03 0700 - 11/04 0659 11/04 0700 - 11/05 0659    P.O. 520 700 480     I.V. (mL/kg) 25.2 (0.3) 50 (0.7)     IV Piggyback  225     Total Intake(mL/kg) 545.2 (7.2) 975 (12.9) 480 (6.4)    Urine (mL/kg/hr) 2847 (1.6) 1033 (0.6)     Drains       Other       Stool 0 0     Chest Tube 40      Total Output 2887 1033     Net -2341.8 -58 +480           Urine Occurrence   3 x    Stool Occurrence 0 x 2 x 2 x          Lines/Drains/Airways     Peripherally Inserted Central Catheter Line                 PICC Double Lumen 11/02/19 1326 right basilic 2 days                Physical Exam   Constitutional: She is oriented to person, place, and time. She appears well-developed and well-nourished. No distress.   HENT:   Head: Normocephalic and atraumatic.   Eyes: Pupils are equal, round, and reactive to light. EOM are normal.   Neck: Normal range of motion. Neck supple. No JVD present.   Cardiovascular: Normal rate, regular rhythm, normal heart sounds and intact distal pulses. Exam reveals no gallop and no friction rub.   No murmur heard.  Pacing wires in place.    Pulmonary/Chest: Effort normal and breath sounds normal. No stridor. No respiratory distress. She has no wheezes. She has no rales. She exhibits no tenderness.   Abdominal: Soft. Bowel sounds are normal. She exhibits no distension and no mass. There is no tenderness. There is no rebound and no guarding. No hernia.   Musculoskeletal: Normal range of motion. She exhibits edema. She exhibits no tenderness or deformity.   Neurological: She is alert and oriented to person, place, and time. She displays normal reflexes. No cranial nerve deficit or sensory deficit. She exhibits normal muscle tone. Coordination normal.   Skin: Skin is warm and dry. Capillary refill takes less than 2 seconds. No rash noted. She is not diaphoretic. No erythema. No pallor.   Surgical dressings CDI.    Psychiatric: She has a normal mood and affect. Her behavior is normal. Judgment and thought content normal.   Nursing note and vitals reviewed.      Significant Labs:  BMP:    Recent Labs   Lab 11/04/19  0435 11/04/19  0659   * 150*    136   K 4.6 4.0   CL 97 96   CO2 25 30*   BUN 29* 27*   CREATININE 0.8 0.7   CALCIUM 9.9 9.5   MG 2.3  --      CBC:   Recent Labs   Lab 11/04/19 0659   WBC 13.13*   RBC 3.86*   HGB 11.1*   HCT 34.4*   *   MCV 89   MCH 28.8   MCHC 32.3       Significant Diagnostics:  I have reviewed all pertinent imaging results/findings within the past 24 hours.

## 2019-11-04 NOTE — PROGRESS NOTES
[facility]  Cardiology  Progress Note    Patient Name: Mateusz Bates  MRN: [id number]  Admission Date: 10/28/2019  Hospital Length of Stay: 7 days  Code Status: Full Code   Attending Physician: Josse Patel MD   Primary Care Physician: Serenity Kilpatrick MD  Expected Discharge Date: 11/6/2019  Principal Problem:CAD, multiple vessel    Subjective:   Brief HPI:  Ms. Bates is a 69 year old female patient whose current medical conditions include HTN, hyperlipidemia, obesity, DM type II, cerebral microvascular disease, FAHAD, and B12 deficiency who was sent to [facility] ED today from her PCP's office due to chest pain and abnormal EKG. Patient complained of substernal chest tightness/heaviness that radiated to her back on Saturday while at rest. Associated symptoms included orthopnea and SOB. Patient denied any associated nausea, vomiting, diaphoresis, palpitations, near syncope, or syncope. Initial workup in ED revealed troponin of 3.665 and BNP of 1305 and patient subsequently admitted for further evaluation and treatment of NSTEMI. Cardiology consulted to assist with management. Patient seen and examined today while in ED. Feels well at time of exam, denies chest pain. She does report similar symptoms over the past few months but states they never lasted as long and were less intense. She reports compliance with her medications. States her father had history of CABG. Chart reviewed. Repeat troponin pending. EKG reviewed and shows SR with new LBBB. 2D echo pending.      Hospital Course:   10/29/19-Patient seen and examined today, s/p C yesterday that showed multivessel CAD. CVT consulted, CABG planned for Thursday. Feels ok. No chest pain or SOB. Labs stable. Being diuresed. 2D echo showed EF of 30-35%, DD.    10/30/19-Patient seen and examined today. Feels well. No complaints. Denies chest pain or SOB. Labs stable. IABP placement scheduled for today.    10/31/19- Patient is s/p CABG x3 with LIMA to  LAD reverse saphenous vein graft to the RCA and reverse saphenous vein graft to the ramus. Has been transferred to ICU. Is intubated and sedated. CT x3. IABP at 1:1 still in place. Transfused 3 units of PRBC in the OR and has received additional 3 units in ICU, has received 500cc albumin, epi and milrinone gtt.  H/H 6.9/21.0. Renal function stable. Paced rhythm on monitor     11/1/2019--patient seen and examined in ICU. Extubated o/n, remains on IABP 1:1 today, plans to possibly wean off later today per Dr. Zaidi. Remains on Epi and Milrinone gtt today. Paced on monitor. Labs stable today.     11/2/19-Patient seen and examined today, lying in bed. Feels ok. Complains of post-op pain and fatigue. Labs reviewed. Platelets 47,000. Heme/onc on board.     11/3/19-Patient seen and examined today, sitting up in chair. Feeling better. Still weak. Labs reviewed. Platelets 89,000.    11/4/19- Patient is POD 4 s/p CABG x3. OOB in chair. Feels better this morning. Pain controlled. Platelets improving since holding Plavix.Vitals stable         Review of Systems   Constitution: Positive for malaise/fatigue. Negative for diaphoresis, weight gain and weight loss.   HENT: Negative for congestion and nosebleeds.    Cardiovascular: Negative for chest pain, claudication, cyanosis, dyspnea on exertion, irregular heartbeat, leg swelling, near-syncope, orthopnea, palpitations, paroxysmal nocturnal dyspnea and syncope.   Respiratory: Negative for cough, hemoptysis, shortness of breath, sleep disturbances due to breathing, snoring, sputum production and wheezing.    Hematologic/Lymphatic: Negative for bleeding problem. Does not bruise/bleed easily.   Skin: Negative for rash.   Musculoskeletal: Negative for arthritis, back pain, falls, joint pain, muscle cramps and muscle weakness.   Gastrointestinal: Negative for abdominal pain, constipation, diarrhea, heartburn, hematemesis, hematochezia, melena, nausea and vomiting.   Genitourinary:  Negative for dysuria, hematuria and nocturia.   Neurological: Negative for excessive daytime sleepiness, dizziness, headaches, light-headedness, loss of balance, numbness, vertigo and weakness.     Objective:     Vital Signs (Most Recent):  Temp: 97.1 °F (36.2 °C) (11/04/19 0715)  Pulse: 91 (11/04/19 0743)  Resp: (!) 24 (11/04/19 0743)  BP: 113/65 (11/04/19 0700)  SpO2: (!) 94 % (11/04/19 0743) Vital Signs (24h Range):  Temp:  [97.1 °F (36.2 °C)-100.6 °F (38.1 °C)] 97.1 °F (36.2 °C)  Pulse:  [78-91] 91  Resp:  [20-30] 24  SpO2:  [92 %-100 %] 94 %  BP: (100-147)/(28-65) 113/65     Weight: 75.3 kg (166 lb 0.1 oz)  Body mass index is 31.37 kg/m².     SpO2: (!) 94 %  O2 Device (Oxygen Therapy): room air      Intake/Output Summary (Last 24 hours) at 11/4/2019 0947  Last data filed at 11/4/2019 0800  Gross per 24 hour   Intake 1405 ml   Output 909 ml   Net 496 ml       Lines/Drains/Airways     Peripherally Inserted Central Catheter Line                 PICC Double Lumen 11/02/19 1326 right basilic 1 day                Physical Exam   Constitutional: She is oriented to person, place, and time. She appears well-developed and well-nourished. No distress.   HENT:   Head: Normocephalic and atraumatic.   Eyes: Pupils are equal, round, and reactive to light. Right eye exhibits no discharge. Left eye exhibits no discharge.   Neck: Neck supple. No JVD present.   Cardiovascular: Normal rate, regular rhythm, S1 normal, S2 normal and normal heart sounds.   No murmur heard.  Pulmonary/Chest: Effort normal. No respiratory distress. She has no wheezes.   CABG site C/D/I; no bleeding erythema or drainage    Diminished BS at bases   Abdominal: Soft. She exhibits no distension.   Musculoskeletal: She exhibits no edema.   Neurological: She is alert and oriented to person, place, and time.   Skin: Skin is warm and dry. She is not diaphoretic. No erythema.   SVG sites C/D/I; no bleeding erythema or drainage   Psychiatric: She has a normal  mood and affect. Her behavior is normal. Thought content normal.   Nursing note and vitals reviewed.      Significant Labs:   All pertinent lab results from the last 24 hours have been reviewed. and   Recent Lab Results       11/04/19  0659   11/04/19  0602   11/04/19  0435   11/04/19  0147   11/04/19  0057        Anion Gap 10   14         Aniso Slight             Baso # 0.04             Basophil% 0.3             BUN, Bld 27   29         Calcium 9.5   9.9         Chloride 96   97         CO2 30   25         Creatinine 0.7   0.8         Differential Method Automated             eGFR if  >60   >60         eGFR if non  >60  Comment:  Calculation used to obtain the estimated glomerular filtration  rate (eGFR) is the CKD-EPI equation.      >60  Comment:  Calculation used to obtain the estimated glomerular filtration  rate (eGFR) is the CKD-EPI equation.            Eos # 0.3             Eosinophil% 2.6             Glucose 150   162         Gran # (ANC) 9.6             Gran% 73.2             Hematocrit 34.4             Hemoglobin 11.1             Immature Grans (Abs) 0.10  Comment:  Mild elevation in immature granulocytes is non specific and   can be seen in a variety of conditions including stress response,   acute inflammation, trauma and pregnancy. Correlation with other   laboratory and clinical findings is essential.               Immature Granulocytes 0.8             Lymph # 1.9             Lymph% 14.2             Magnesium     2.3         MCH 28.8             MCHC 32.3             MCV 89             Mono # 1.2             Mono% 8.9             MPV 10.7             nRBC 0             Ovalocytes Occasional             Platelet Estimate Decreased             Platelets 140             POCT Glucose   172   254 65     Potassium 4.0   4.6         RBC 3.86             RDW 15.3             Sodium 136   136         WBC 13.13                              11/04/19  0050   11/03/19  1740    11/03/19  1706   11/03/19  1308        Anion Gap     11       Aniso             Baso #             Basophil%             BUN, Bld     32       Calcium     9.5       Chloride     93       CO2     30       Creatinine     0.9       Differential Method             eGFR if      >60       eGFR if non      >60  Comment:  Calculation used to obtain the estimated glomerular filtration  rate (eGFR) is the CKD-EPI equation.          Eos #             Eosinophil%             Glucose     389       Gran # (ANC)             Gran%             Hematocrit             Hemoglobin             Immature Grans (Abs)             Immature Granulocytes             Lymph #             Lymph%             Magnesium     2.5       MCH             MCHC             MCV             Mono #             Mono%             MPV             nRBC             Ovalocytes             Platelet Estimate             Platelets             POCT Glucose 79 380   331     Potassium     4.1       RBC             RDW             Sodium     134       WBC                   Significant Imaging: Echocardiogram:   2D echo with color flow doppler:   Results for orders placed or performed during the hospital encounter of 10/28/19   2D echo with color flow doppler   Result Value Ref Range    QEF 30 (A) 55 - 65    Mitral Valve Regurgitation MILD TO MODERATE     Diastolic Dysfunction Yes (A)     Aortic Valve Regurgitation MILD     Mitral Valve Mobility NORMAL     Narrative    Date of Procedure: 10/28/2019        TEST DESCRIPTION   Technical Quality: This is a technically adequate study.     Aorta: The aortic root is normal in size, measuring 3.0 cm at sinotubular junction and 3.2 cm at Sinuses of Valsalva. The proximal ascending aorta is normal in size, measuring 3.1 cm across.     Left Atrium: The left atrial volume index is severely enlarged, measuring 52.97 cc/m2.     Left Ventricle: The left ventricle is normal in size, with an end-diastolic  diameter of 4.1 cm, and an end-systolic diameter of 3.7 cm. Septal wall thickness is mildly increased, with the septum measuring 1.6 cm and the posterior wall measuring 1.3 cm   across. Relative wall thickness was increased at 0.63, and the LV mass index was increased at 160.6 g/m2 consistent with concentric left ventricular hypertrophy. The anterior septum is severely hypokinetic. There is global hypokinesis. Left ventricular   systolic function appears moderately depressed. Visually estimated ejection fraction is 30-35%. The LV Doppler derived stroke volume equals 57.0 ccs.     Diastolic indices: E wave velocity 1.1 m/s, E/A ratio 0.9,  msec., E/e' ratio(avg) 16. There is pseudonormalization of mitral inflow pattern consistent with significant diastolic dysfunction.     Right Atrium: The right atrium is normal in size, measuring 3.7 cm in length and 2.8 cm in width in the apical view.     Right Ventricle: The right ventricle is normal in size. Global right ventricular systolic function appears normal. Tricuspid annular plane systolic excursion (TAPSE) is 2.2 cm.     Aortic Valve:  The aortic valve is normal in structure with normal leaflet mobility. The mean gradient obtained across the aortic valve is 6 mmHg. Additionally, there is mild aortic regurgitation.     Mitral Valve:  The mitral valve is normal in structure with normal leaflet mobility. The pressure half time is 45 msec. The calculated mitral valve area is 4.89 cm2. There is mild to moderate mitral regurgitation.     Tricuspid Valve:  The tricuspid valve is normal in structure with normal leaflet mobility.     Pulmonary Valve:  The pulmonic valve is not well seen.     IVC: IVC is normal in size and collapses > 50% with a sniff, suggesting normal right atrial pressure of 3 mmHg.     Intracavitary: There is no evidence of pericardial effusion, intracavity mass, thrombi, or vegetation.         CONCLUSIONS     1 - Moderately depressed left ventricular  systolic function (EF 30-35%).     2 - Impaired LV relaxation, elevated LAP (grade 2 diastolic dysfunction).     3 - Normal right ventricular systolic function .     4 - Mild aortic regurgitation.     5 - Severe left atrial enlargement.     6 - Concentric hypertrophy.     7 - Mild to moderate mitral regurgitation.             This document has been electronically    SIGNED BY: Terry Tarango MD On: 10/28/2019 16:38     Assessment and Plan:       * CAD, multiple vessel  -See plan under CABG    Thrombocytopenia  -Mgmt as per heme/onc  -Plavix on hold.   -ASA has been resumed     Acute blood loss anemia  -Patient transfused 3 units in the OR and additional 3 units in ICU    11/2/19  -Mgmt as per CVT    S/P CABG x 3  -Patient is s/p CABG x3 with LIMA to LAD reverse saphenous vein graft to the RCA and reverse saphenous vein graft to the ramus.  -Continue post CABG supportive therapy  -Continue ASA, Plavix, Statin, BB  -Will continue to follow along     11/1/19  -POD #1 s/p CABG x3  -Continue ASA, Statin, BB  -Remains on Epi and Milrinone gtt today  -Mgmt per CT surgery  -Will follow along    11/2/19  -Recovering s/p CABG x 3  -Continue statin, BB  -Platelets 47,000 this AM; heme/onc on board, ASA held  -Await further rec's    11/3/19  -Stable, progressing post CABG x 3  -Continue statin, BB  -ASA re-started  -Platelets 89,000, heme/onc on board    11/4/19  -Patient slowly improving post CABG x3  -Continue ASA, Statin and BB  -plavix on hold due to thrombocytopenia that is improving   -IS use encouraged and early ambulation with PT  -Will continue to follow along     Acute combined systolic and diastolic CHF, NYHA class 2  -Presents with ICM/acute combined CHF  -EF 30-35% by 2D echo  -Continue diuresis with IV Lasix  -Continue BB  -Will add ARB if BP permits    Chest pain  -See plan under NSTEMI    Abnormal EKG  -LBBB noted on EKG  -See plan under NSTEMI    NSTEMI (non-ST elevated myocardial infarction)  -Patient presented  with intermittent chest tightness/heaviness that radiated to her back since Saturday evening  -Associated with JONES/orthopnea  -Initial troponin 3.665  -EKG showed SR with new LBBB  -Presentation consistent with NSTEMI  -Continue ASA, statin  -Start Toprol XL  -Start heparin gtt  -Check 2D echo  -Continue to trend troponin  -Keep NPO. C today for further evaluation and PCI if indicated. All risks, benefits, and treatment alternatives explained to patient in detail. All questions answered. She has agreed to proceed. Further rec's to follow.    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 All risks, benefits, and treatment alternatives explained to patient in detail. All questions answered. She has agreed to proceed.    10/31/19  -Patient is s/p CABG x3.   See plan for CABG     Hyperlipidemia associated with type 2 diabetes mellitus  -Continue statin    Obesity (BMI 30.0-34.9)  -Weight loss    Diabetes mellitus type II, uncontrolled  -Mgmt as per hospital medicine    Hypertension associated with diabetes  -Continue home meds as BP permits        VTE Risk Mitigation (From admission, onward)         Ordered     IP VTE LOW RISK PATIENT  Once      10/30/19 1405     Place NATHANIEL hose  Until discontinued      10/30/19 1405     Place sequential compression device  Until discontinued      10/30/19 1405     Reason for no Mechanical VTE Prophylaxis  Once      10/28/19 1414     heparin 25,000 units in dextrose 5% 250 mL (100 units/mL) infusion LOW INTENSITY nomogram - [facility]  Continuous      10/28/19 1257              Chart reviewed. Patient examined by Dr. Matias and agrees with plan that  has been outlined.     Toan Carbone, GURUP  Cardiology  [facility]

## 2019-11-04 NOTE — PT/OT/SLP PROGRESS
Physical Therapy  Treatment    Mateusz Bates   MRN: [id number]   Admitting Diagnosis: CAD, multiple vessel    PT Received On: 11/04/19  PT Start Time: 1410     PT Stop Time: 1435    PT Total Time (min): 25 min       Billable Minutes:  Gait Training 15 and Therapeutic Activity 10    Treatment Type: Treatment  PT/PTA: PTA     PTA Visit Number: 1       General Precautions: Standard, fall, sternal  Orthopedic Precautions: RUE non weight bearing, LUE non weight bearing   Braces: N/A    Spiritual, Cultural Beliefs, Buddhism Practices, Values that Affect Care: no    Subjective:  Communicated with PATIENT NURSE, DENYS AND [facility] CHART REVIEW  prior to session.  PATIENT AGREE TO TX NOW.      Pain/Comfort  Pain Rating 1: 0/10    Objective:   Patient found with: telemetry, LONGSEATED IN BED. ASSISTED PATIENT WITH OOB T/FS, STERNAL PERCAUTIONS REVEIWED, HAND OUT GIVEN, KRISTINE LE EXERCISE INSTRUCTION, STEP ACTIVITY AT HHAX2 AT MOD ASSIST X2.    Functional Mobility:  Bed Mobility:    SUPINE TO SIT AT MOD ASSIST X2.    Transfers:   SIT TO STAND , STAND TO SIT AT MOD ASSIST X2, MAX CUES FOR STERNAL PERCAUTIONS.    Gait:    SEVERAL STEPS TAKEN AT B/S AT HHAX2 AT MOD ASSIST X2, MAX CUES FOR STEP SEQUENCE FOLLOW THROUGH FOR T/F TO B/S CHAIR,    Stairs:N/A    Balance:   Static Sit: FAIR-: Maintains without assist but inconsistent   Dynamic Sit: FAIR: Cannot move trunk without losing balance  Static Stand: POOR: Needs MODERATE assist to maintain  Dynamic stand: POOR: Needs MOD (moderate) assist during gait     Therapeutic Activities and Exercises:  KRISTINE LE EXERCISES, STERNAL PERCAUTIONS REVIEW, OOB T/FS TO B/S CHAIR , STEP GT ACTIVITY.    AM-PAC 6 CLICK MOBILITY  How much help from another person does this patient currently need?   1 = Unable, Total/Dependent Assistance  2 = A lot, Maximum/Moderate Assistance  3 = A little, Minimum/Contact Guard/Supervision  4 = None, Modified [place]/Independent    Turning over in bed (including  adjusting bedclothes, sheets and blankets)?: 2  Sitting down on and standing up from a chair with arms (e.g., wheelchair, bedside commode, etc.): 2  Moving from lying on back to sitting on the side of the bed?: 2  Moving to and from a bed to a chair (including a wheelchair)?: 2  Need to walk in hospital room?: 2  Climbing 3-5 steps with a railing?: 1  Basic Mobility Total Score: 11    AM-PAC Raw Score CMS G-Code Modifier Level of Impairment Assistance   6 % Total / Unable   7 - 9 CM 80 - 100% Maximal Assist   10 - 14 CL 60 - 80% Moderate Assist   15 - 19 CK 40 - 60% Moderate Assist   20 - 22 CJ 20 - 40% Minimal Assist   23 CI 1-20% SBA / CGA   24 CH 0% Independent/ Mod I     Patient left up in chair with all lines intact, call button in reach and chair alarm on.    Assessment:  Mateusz Bates is a 69 y.o. female with a medical diagnosis of CAD, multiple vessel . PATIENT COOPERATIVE, APPEAR LETHARGIC AT TIMES, NURSE NOTIFIED. PATIENT REQUIRE MUCH ASSIST TO MAINTAIN STERNAL PERCAUTIONS AS WELL AS MUCH ASSIST FOR T/FS, STEP GT ACTIVITY. PATIENT APPEAR MOTIVATED TO INCREASE ACTIVITY AS TOLERATED.    Rehab identified problem list/impairments: Rehab identified problem list/impairments: weakness, gait instability, decreased upper extremity function, decreased ROM, impaired balance, impaired endurance, decreased safety awareness, impaired functional mobilty, impaired self care skills    Rehab potential is good.    Activity tolerance: Good    Discharge recommendations: Discharge Facility/Level of Care Needs: rehabilitation facility     Barriers to discharge:      Equipment recommendations: Equipment Needed After Discharge: none     GOALS:   Multidisciplinary Problems     Physical Therapy Goals        Problem: Physical Therapy Goal    Goal Priority Disciplines Outcome Goal Variances Interventions   Physical Therapy Goal     PT, PT/OT      Description:  By 11/10/19  1. Patient will perform supine to/from sit mod A x 1  per sternal prec.  2. Patient will perform sit to/from stand min a no AD per sternal prec.  3. Patient will amb 100ft no AD min A                    PLAN:    Patient to be seen 5 x/week  to address the above listed problems via gait training, therapeutic activities, therapeutic exercises  Plan of Care expires: 11/10/19  Plan of Care reviewed with: patient         Diana Yuen, PTA  11/04/2019

## 2019-11-04 NOTE — PROGRESS NOTES
"  [facility] -   Adult Nutrition  Progress Note    SUMMARY     Recommendations    Recommendation: 1. Continue current diet & ONS. 2. RD to f/u.   Goals: 1) PO intakes > 50% of meals at f/u  Nutrition Goal Status: goal met   Communication of RD Recs: (POC, sticky note)    Reason for Assessment    Reason For Assessment: RD f/u   Diagnosis: (CAD)  Relevant Medical History: DM2, HTN, HLD, GERD, anxiety  Interdisciplinary Rounds: attended   General Information Comments: S/p CABG x 3. Remains in ICU. Currently on diabetic cardiac diet + ONS.  Pt reports fair appetite today, PO intake 75 %. Pt reports good intake PTA & no wt loss. Per epic records, pt weighed 153 lbs on 9/26/19, current wt = 166 lbs (no recent wt loss). NFPE not performed d/t pt w/ no signs of malnutrition. RD provided cardiac and diabetic diet education w/ handouts from the Nutrition Care Manual. RD contact info provided.   Nutrition Discharge Planning: Cardiac diabetic     Nutrition Risk Screen    Nutrition Risk Screen: no indicators present    Nutrition/Diet History    Spiritual, Cultural Beliefs, [employer] Practices, Values that Affect Care: no  Food Allergies: NKFA    Anthropometrics    Temp: 97.1 °F (36.2 °C)  Height Method: Measured(offcie 9/26/19)  Height: 5' 1" (154.9 cm)  Height (inches): 61 in  Weight Method: Standard Scale(10/29/19)  Weight: 75.3 kg (166 lb 0.1 oz)  Weight (lb): 166.01 lb  Ideal Body Weight (IBW), Female: 105 lb  % Ideal Body Weight, Female (lb): 158.1 lb  BMI (Calculated): 31.4  BMI Grade: 30 - 34.9- obesity - grade I         Lab/Procedures/Meds    Pertinent Labs Reviewed: reviewed  BMP  Lab Results   Component Value Date     11/04/2019    K 4.0 11/04/2019    CL 96 11/04/2019    CO2 30 (H) 11/04/2019    BUN 27 (H) 11/04/2019    CREATININE 0.7 11/04/2019    CALCIUM 9.5 11/04/2019    ANIONGAP 10 11/04/2019    ESTGFRAFRICA >60 11/04/2019    EGFRNONAA >60 11/04/2019       Lab Results   Component Value Date    " ALBUMIN 2.8 (L) 11/02/2019     Recent Labs   Lab 11/04/19  0602   POCTGLUCOSE 172*     Lab Results   Component Value Date    HGBA1C 8.3 (H) 10/30/2019       Pertinent Medications Reviewed: reviewed      Estimated/Assessed Needs    Weight Used For Calorie Calculations: 75.3 kg (166 lb 0.1 oz)  Energy Calorie Requirements (kcal): 1215- 1458  Energy Need Method: [place] Jeor(x 1 -1.2 )  Protein Requirements: 95 g  Weight Used For Protein Calculations: 47.6 kg (105 lb)(IBW - Obese ICU)  Estimated Fluid Requirement Method: RDA Method or per MD  RDA Method (mL): 1215  CHO Requirement: 151 g      Nutrition Prescription Ordered    Current Diet Order: DM 2000 kcal, cardiac + boost glucose control all meals     Evaluation of Received Nutrient/Fluid Intake      Intake/Output Summary (Last 24 hours) at 11/4/2019 1058  Last data filed at 11/4/2019 0800  Gross per 24 hour   Intake 1405 ml   Output 898 ml   Net 507 ml     % Intake of Estimated Energy Needs: 75 - 100 %  % Meal Intake: 75 - 100 %    Nutrition Risk    1xweekly    Assessment and Plan    Nutrition Problem  Nutrient decreased needs (Na, fat)    Related to (etiology):   Current medical dx and past medical hx    Signs and Symptoms (as evidenced by):   NSTEMI, CAD, s/p CABG x 3 and hx of HLD, HTN     Interventions:  Nutrition education     Nutrition Diagnosis Status:   New        Monitor and Evaluation    Food and Nutrient Intake: energy intake, food and beverage intake  Food and Nutrient Adminstration: diet order  Anthropometric Measurements: weight  Biochemical Data, Medical Tests and Procedures: electrolyte and renal panel, glucose/endocrine profile, lipid profile  Nutrition-Focused Physical Findings: overall appearance       Nutrition Follow-Up    RD Follow-up?: Yes

## 2019-11-04 NOTE — PLAN OF CARE
CM received a call from patient's sister, Christina.  Patient and sister have discussed the SNF list and would like the following referrals made in this order: 1. [facility]  2. [facility]  3. [facility].  Choice form completed and witnessed.  Referrals initiated in right care.

## 2019-11-04 NOTE — PHYSICIAN QUERY
PT Name: Mateusz Bates  MR #: [id number]    Physician Query Form - Postoperative Relationship Clarification     CDS/: Monica Marcos               Contact information:[email or web address]    This form is a permanent document in the medical record.     Query Date: November 4, 2019    Dear Provider,    By submitting this query, we are merely seeking further clarification of documentation. Please utilize your independent clinical judgment when addressing the question(s) below.    The Medical Record contains the following:    Supporting Clinical Findings   Location in Medical Record   Pulmonary  Acute hypoxemic respiratory failure  Likely due to post op atelectasis  Extubated overnight  Cont Vapotherm and wean as tolerated  Encourage C&DB and IS use  OOB asap once IABP removed  CXR in AM      CABG x3 by Dr. Zaidi on 31 October.       FINDINGS:  The right IJ [place] catheter tip is in the right ventricular outflow tract.  Mediastinal drains are noted and epicardial pacing wires are present.  The intra-aortic balloon pump has its tip at least 4 cm inferior to the aortic knob.    There is no pneumothorax.  There is bilateral hilar and left infrahilar atelectasis and patchy atelectasis along the minor fissure in the right lung. Pulmonology pn 11/1                  HM pn 11/1      Chest xray 11-2       Please clarify the term postoperative as it relates to atelectasis.                 [ x  ]   Condition occurred in the postoperative period (not a complication of surgery)               [   ]   Condition is a complication of surgery               [   ]   Condition is a complication of anesthesia               [   ]   Other intended meaning (please specify) ____________________________

## 2019-11-04 NOTE — ASSESSMENT & PLAN NOTE
11/01/2019  The patient is post-operative day 1, status-post coronary artery bypass grafting x 3.  Overall the patient is making progress, however IABP and inotropic agents will continue today.   Neuro:  The patient is awake alert and oriented x3.  Neuro exam is nonfocal.  Pain is well controlled with IV pain medication.  Cardiac:  Patient is hemodynamically improving. Patient remains on epinephrine and milrinone gtts, which will be weaned over the next 24 hours.  Cardiac index is improving. IABP will remain in place over the next 24 hours.  Patient will be started on beta blockers once gtts are weaned off.  Respiratory: Good sats on high flow nasal cannula. Continue respiratory toilet, continue incentive spirometer. Wean to low flow nasal cannula.  GI:  Diet: sips of water with medications, will slowly advance diet as tolerated. Benign abdominal exam.  Renal:  Urine output is slowly improving. Cr 0.9. K 4.3. Mag 1.6. Replace mag. Continue IV diuresis. Add metolazone.   Heme:  Hct 34.1  Platelet 82. Continue aspirin. Discontinue Plavix for now.  Id:  WBC 12. Tmax 100.3. Continue post-operative antibiotics. Will continue to follow.   Endocrine:  Glucose is controlled with insulin gtt.  Continue insulin gtt for now, until weaned off inotropic agents.   Activities:  Patient is currently bed bound due to IABP.  Advance activities as tolerated once IABP is removed.   Lines tubes and drains:  Continue IABP. Continue [place] and Cordis.  Continue A-line.  Chest tubes will continue. NICOLASA x 2 will continue. Jones catheter will continue.  Continue pacer wires. Continue prevena wound vac.  Plan: Remain ICU.          11/02/2019  The patient is post-operative day 2, status-post coronary artery bypass grafting x 3.  Overall the patient is making progress, IABP removed and inotropic agents discontinued.   Neuro:  The patient is awake alert and oriented x3.  Neuro exam is nonfocal.  Pain is well controlled. Discontinue fentanyl.    Cardiac:  Patient is hemodynamically improving. Inotropic gtts discontinued. Excellent cardiac index. IABP discontinued.  Patient will be started on beta blockers today.  Respiratory: Good sats on high flow nasal cannula. Continue respiratory toilet, continue incentive spirometer. Wean to low flow nasal cannula.  GI:  Advance diabetic/cardiac diet as tolerated. Benign abdominal exam.  Renal:  Urine output is improving. Cr 0.9. K 4.9. Mag 2.2. Continue IV diuresis. Hold AM K.   Heme:  Hct 28.3  Platelet 47. Discontinue aspirin. Discontinue Plavix. HIT panel. Consult hematology r/t thrombocytopenia.   Id:  WBC 10.4. Tmax 99.4. Will continue to follow.   Endocrine:  Discontinue insulin gtt. Start sliding scale insulin. Start long acting insulin detemir.   Activities: Advance activities as tolerated.   Lines tubes and drains:  Discontinue IABP. Discontinue [place] and Cordis.  Discontinue A-line.  Discontinue chest tubes x 3. Discontinue NICOLASA x 2. Discontinue arterial line. Jones catheter will continue for now.  Continue pacer wires. Continue prevena wound vac. Place PICC line.   Plan: Remain ICU.           11/03/2019  The patient is post-operative day 3, status-post coronary artery bypass grafting x 3.  Overall the patient is making progress.  Neuro:  The patient is awake alert and oriented x3.  Neuro exam is nonfocal.  Pain is well controlled. Continue tramadol.   Cardiac:  Patient is hemodynamically stable. Increase metoprolol dose. Start amlodipine.   Respiratory: Good sats on nasal cannula. Continue respiratory toilet, continue incentive spirometer. Wean to room air.  GI:  Advance diabetic/cardiac diet as tolerated. Benign abdominal exam. Passing gas, no BM. Add 1 bottle of mag citrate.   Renal:  Good urine output. Cr 0.9. K 3.8. Mag 2.0. Replace K. Replace Mag. Change to PO diuresis.   Heme:  Hct 34.7  Platelet 89. Restart aspirin. Continue to hold Plavix. HIT panel pending. Hematology following.  Id:  WBC 13.8. Tmax  99.4. Will continue to follow.    Endocrine:  Glucose controlled on sliding scale insulin. Increase long acting insulin detemir dose.   Activities: Advance activities as tolerated.   Lines tubes and drains:  Storm catheter will continue for now.  Continue pacer wires. Continue prevena wound vac. Continue PICC line.   Plan: Remain ICU.          11/04/2019  The patient is post-operative day 4, status-post coronary artery bypass grafting x 3.  Overall the patient is making progress.  Neuro:  The patient is awake alert and oriented x3.  Neuro exam is nonfocal.  Pain is well controlled. Continue tramadol.   Cardiac:  Patient is hemodynamically stable. Continue metoprolol dose. Continue amlodipine dose.   Respiratory: Good sats on room air. Continue respiratory toilet, continue incentive spirometer.  GI:  Advance diabetic/cardiac diet as tolerated. Benign abdominal exam. Regular bowel movements.   Renal:  Good urine output. Cr 0.7. K 4.0. Mag 2.3. Replace K. Decrease PO diuresis.   Heme:  Hct 34.4  Platelet 140. Continue aspirin. Restart Plavix. HIT panel pending. Hematology following.  Id:  WBC down to 13.13. Tmax 98. Will continue to follow.    Endocrine:  Glucose controlled on sliding scale insulin. Increased long acting insulin detemir dose.   Activities: Advance activities as tolerated.   Lines tubes and drains:  Discontinued storm catheter. Continue pacer wires. Continue prevena wound vac. Continue PICC line.   Plan: Transfer to telemetry. Anticipate discharge to SNF in 48 hours.

## 2019-11-04 NOTE — PT/OT/SLP PROGRESS
"Occupational Therapy  Treatment    Mateusz Bates   MRN: [id number]   Admitting Diagnosis: CAD, multiple vessel    OT Date of Treatment: 11/04/19   OT Start Time: 1435  OT Stop Time: 1505  OT Total Time (min): 30 min    Billable Minutes:  Therapeutic Activity 30 MINUTES    General Precautions: Standard, fall, sternal  Orthopedic Precautions: RUE non weight bearing  Braces: N/A         Subjective:  Communicated with NURSE [place]  AND Epic CHART REVIEW prior to session.    Pain/Comfort  Pain Rating 1: 0/10    Objective:  Patient found with: telemetry     Functional Mobility:  Bed Mobility:  MAX A WITH SUPINE>SIT AND ADHERE TO STERNAL PRECAUTIONS  Transfers:       Functional Ambulation: NA    Activities of Daily Living:     Feeding adaptive equipment: NA     UE adaptive equipment: NA     LE adaptive equipment: NA     Bathing adaptive equipment: NA    Balance:   Static Sit: FAIR-: Maintains without assist but inconsistent   Dynamic Sit: FAIR: Cannot move trunk without losing balance  Static Stand: POOR: Needs MODERATE assist to maintain  Dynamic stand: POOR-    Therapeutic Activities and Exercises:  PT EDUCATED ON STERNAL PRECAUTIONS. PT VERBALIZED 0/5 STERNAL PRECAUTIONS.  AM-PAC 6 CLICK ADL   How much help from another person does this patient currently need?   1 = Unable, Total/Dependent Assistance  2 = A lot, Maximum/Moderate Assistance  3 = A little, Minimum/Contact Guard/Supervision  4 = None, Modified [place]/Independent    Putting on and taking off regular lower body clothing? : 2  Bathing (including washing, rinsing, drying)?: 2  Toileting, which includes using toilet, bedpan, or urinal? : 2  Putting on and taking off regular upper body clothing?: 2  Taking care of personal grooming such as brushing teeth?: 2  Eating meals?: 3  Daily Activity Total Score: 13     AM-PAC Raw Score CMS "G-Code Modifier Level of Impairment Assistance   6 % Total / Unable   7 - 8 CM 80 - 100% Maximal Assist   9-13 CL 60 - " 80% Moderate Assist   14 - 19 CK 40 - 60% Moderate Assist   20 - 22 CJ 20 - 40% Minimal Assist   23 CI 1-20% SBA / CGA   24 CH 0% Independent/ Mod I       Patient left up in chair with all lines intact, call button in reach, chair alarm on and NURSE notified    ASSESSMENT:  Mateusz Bates is a 69 y.o. female with a medical diagnosis of CAD, multiple vessel and presents with DEBILITY AND GENERALIZED WEAKNESS. PT WILL CONTINUE WITH POC  .    Rehab identified problem list/impairments: Rehab identified problem list/impairments: weakness, impaired functional mobilty, impaired balance, decreased upper extremity function, decreased safety awareness, impaired endurance, impaired self care skills, gait instability    Rehab potential is good.    Activity tolerance: Good    Discharge recommendations: Discharge Facility/Level of Care Needs: rehabilitation facility     Barriers to discharge: Barriers to Discharge: Decreased caregiver support    Equipment recommendations: none     GOALS:   Multidisciplinary Problems     Occupational Therapy Goals        Problem: Occupational Therapy Goal    Goal Priority Disciplines Outcome Interventions   Occupational Therapy Goal     OT, PT/OT Ongoing, Progressing    Description:  Goals to be met by: 11/10/19     Patient will increase functional independence with ADLs by performing:    UE Dressing with Moderate Assistance.  Grooming while seated with Supervision.  Toileting from bedside commode with Moderate Assistance for hygiene and clothing management.   Toilet transfer to bedside commode with Moderate Assistance.  Upper extremity exercise program x10 reps per handout, with assistance as needed.                      Plan:  Patient to be seen 3 x/week to address the above listed problems via self-care/home management, therapeutic activities, therapeutic exercises  Plan of Care expires: 11/10/19  Plan of Care reviewed with: patient         Susan Joelzier, OT  11/04/2019

## 2019-11-04 NOTE — PLAN OF CARE
CM met with patient/sister (Christina Hernandez) at the bedside to discuss the discharge plan. Patient was living at home alone and still working prior to admission.  Patient states that she will need SNF placement when she is ready to discharge.  CM provided patient/sister with an integrated partner list and an in network BC list.  Patient will review this list with her sister.  CM requested 3 preferences.  Patient will have her sister call when she has looked over the list.       11/04/19 1012   Discharge Reassessment   Assessment Type Discharge Planning Reassessment   Provided patient/caregiver education on the expected discharge date and the discharge plan Yes   Do you have any problems affording any of your prescribed medications? No   Discharge Plan A Skilled Nursing Facility   DME Needed Upon Discharge  none   Patient choice form signed by patient/caregiver N/A   Anticipated Discharge Disposition SNF   Can the patient answer the patient profile reliably? Yes, cognitively intact

## 2019-11-04 NOTE — PROGRESS NOTES
[facility] -   Hematology/Oncology  Progress Note    Patient Name: Mateusz Bates  Admission Date: 10/28/2019  Hospital Length of Stay: 7 days  Code Status: Full Code     Subjective:     HPI:  Mrs. Bates is a 69-year-old female with hypertension, diabetes, hyperlipidemia, obesity, anxiety and depression, iron deficiency anemia, vitamin B12 deficiency, who presented to the emergency room with an NSTEMI.  Workup included a cardiac catheterization that shows severe multivessel coronary artery disease with proximal LAD stenosis of 70% and severely depressed ejection fraction of 20%.  The patient is a candidate for urgent coronary artery bypass grafting. S/p CABG today.  Hematology is consulted for thrombocytopenia.    Interval History: Tmax of 100.6 over night.  No complaints today.     Oncology Treatment Plan:   [No treatment plan]    Medications:  Continuous Infusions:    Scheduled Meds:   amLODIPine  2.5 mg Oral Daily    aspirin  81 mg Oral Daily    atorvastatin  80 mg Oral QHS    chlorhexidine  10 mL Mouth/Throat BID    docusate sodium  100 mg Oral BID    furosemide  40 mg Oral BID    insulin detemir U-100  15 Units Subcutaneous BID    metoprolol tartrate  100 mg Oral BID    nozaseptin   Each Nostril BID    pantoprazole  40 mg Oral Daily    polyethylene glycol  17 g Oral Daily    potassium chloride  20 mEq Oral Q12H     PRN Meds:sodium chloride, albumin human 5%, albuterol sulfate, chlorhexidine, Dextrose 10% Bolus, Dextrose 10% Bolus, Dextrose 10% Bolus, dextrose 50%, glucagon (human recombinant), glucose, glucose, insulin aspart U-100, lactated ringers, magnesium sulfate IVPB, magnesium sulfate IVPB, metoclopramide HCl, metoprolol tartrate, nozaseptin, ondansetron, potassium chloride in water **AND** potassium chloride in water **AND** potassium chloride in water, Flushing PICC Protocol **AND** sodium chloride 0.9%, traMADol     Review of Systems   Constitutional: Negative.    HENT:  Negative.    Eyes: Negative.    Respiratory: Positive for shortness of breath (with exertion).    Cardiovascular: Negative.    Gastrointestinal: Negative.    Endocrine: Negative.    Genitourinary: Negative.    Musculoskeletal: Negative.    Skin: Positive for wound.   Allergic/Immunologic: Negative.    Neurological: Negative.    Hematological: Negative.    Psychiatric/Behavioral: Negative.      Objective:     Vital Signs (Most Recent):  Temp: 97.1 °F (36.2 °C) (11/04/19 0715)  Pulse: 91 (11/04/19 0743)  Resp: (!) 24 (11/04/19 0743)  BP: 113/65 (11/04/19 0700)  SpO2: (!) 94 % (11/04/19 0743) Vital Signs (24h Range):  Temp:  [97.1 °F (36.2 °C)-100.6 °F (38.1 °C)] 97.1 °F (36.2 °C)  Pulse:  [78-91] 91  Resp:  [20-30] 24  SpO2:  [92 %-100 %] 94 %  BP: (100-147)/(28-65) 113/65     Weight: 75.3 kg (166 lb 0.1 oz)  Body mass index is 31.37 kg/m².  Body surface area is 1.8 meters squared.      Intake/Output Summary (Last 24 hours) at 11/4/2019 0937  Last data filed at 11/4/2019 0800  Gross per 24 hour   Intake 1405 ml   Output 909 ml   Net 496 ml       Physical Exam   Constitutional: She is oriented to person, place, and time. She appears well-developed and well-nourished. No distress.   HENT:   Head: Normocephalic and atraumatic.   Eyes: Conjunctivae and EOM are normal.   Neck: Normal range of motion. Neck supple.   Cardiovascular: Normal rate and regular rhythm.   Pulmonary/Chest: Effort normal and breath sounds normal.   Abdominal: Soft. Bowel sounds are normal.   Musculoskeletal: Normal range of motion.   Neurological: She is alert and oriented to person, place, and time.   Skin: Skin is warm and dry. She is not diaphoretic.   Psychiatric: She has a normal mood and affect. Her behavior is normal. Judgment and thought content normal.   Nursing note and vitals reviewed.      Significant Labs:   All pertinent labs from the last 24 hours have been reviewed.    Diagnostic Results:  I have reviewed all pertinent imaging  results/findings within the past 24 hours.    Assessment/Plan:     Thrombocytopenia  Platelets normal at 300K on 10/30, then declined 10/31 78K, 11/1 76K, 11/2 47K.  Possible cause of acute thrombocytopenia: drug induced immune thrombocytopenia (amiodarone given 10/30, lasix started 10/28 and currently receiving), HIT (4T score low prob), TTP (plasmic score intermediate prob), ITP, DIC.  Have sent: HIT antibodies, [id number], fibrinogen, ddimer, C3/C4 complements, pathology review for schistocytes, reticulocytes, haptoglobin, LDH.  If platelets continue to decline, consider stop Lasix, and use bumex.    11/3/2019 platelets improving 89K today.  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    11/4/19 Platelets improved to 140 K.  Most likely due to previous medication.  Continuing to trend upward daily.  Hit panel pending.  --CBC daily        Thank you for your consult. I will follow-up with patient. Please contact us if you have any additional questions.     Anitha Skinner NP  Hematology/Oncology  [facility]

## 2019-11-04 NOTE — ASSESSMENT & PLAN NOTE
Platelets normal at 300K on 10/30, then declined 10/31 78K, 11/1 76K, 11/2 47K.  Possible cause of acute thrombocytopenia: drug induced immune thrombocytopenia (amiodarone given 10/30, lasix started 10/28 and currently receiving), HIT (4T score low prob), TTP (plasmic score intermediate prob), ITP, DIC.  Have sent: HIT antibodies, [id number], fibrinogen, ddimer, C3/C4 complements, pathology review for schistocytes, reticulocytes, haptoglobin, LDH.  If platelets continue to decline, consider stop Lasix, and use bumex.    11/3/2019 platelets improving 89K today.  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    11/4/19 Platelets improved to 140 K.  Most likely due to previous medication.  Continuing to trend upward daily.  Hit panel pending.  --CBC daily

## 2019-11-04 NOTE — PLAN OF CARE
Patient transferred and oriented to the unit this shift.  Plan of care updated and reviewed with patient. Cardiac monitor in place. Pacer wires in place. Patient is paced at 90 bpm. Wound vac in place. Sternal precautions in place. Patient remains free of falls this shift. Safety precautions in place, including call light in reach. Will continue to monitor and treat.

## 2019-11-04 NOTE — PHYSICIAN QUERY
PT Name: Mateusz Bates  MR #: [id number]    Physician Query Form - Respiratory Condition Clarification      CDS/: Monica Marcos               Contact information:[email or web address]     This form is a permanent document in the medical record.    Query Date: November 4, 2019    By submitting this query, we are merely seeking further clarification of documentation. Please utilize your independent clinical judgment when addressing the question(s) below.    The Medical record contains the following   Indicators   Supporting Clinical Findings Location in Medical Record   x   SOB, JONES, Wheezing, Productive Cough, Use of Accessory Muscles, etc. Negative for cough, shortness of breath and wheezing  pn 11/3   x   Acute/Chronic Illness NSTEMI, Acute combined systolic and diastolic CHF H&P      Radiology Findings     x   Respiratory Distress or Failure Acute hypoxemic respiratory failure   Critical care medicine pn 11/1      Hypoxia or Hypercapnia     x   RR         ABGs         O2 sat Resp:  [9-30]   SpO2:  [82 %-100 %]    ABG  PH=7.440  PCO2=34.1  PO2=64  O2 sat=93%   Pulmonology pn 11/1      ABG on vent  11-1 Lab   x   BiPAP/Intubation Intubated  10/31  Extubated 11/1   Critical care medicine pn 11/1  HM pn 11/4   x   Supplemental O2 Vapotherm @25l/min  O2 @3lnc   Vs record 11-1  Vs record 11-2      Home O2, Oxygen Dependence        Treatment     x   Other Acute hypoxemic respiratory failure  Likely due to post op atelectasis  Extubated overnight  Cont Vapotherm and wean as tolerated  S/P CABG x 3 POD # 1     Critical care medicine pn 11/1     Respiratory failure can be acute, chronic or both. It is generally further specified as hypoxic, hypercapnic or both. Lastly, it is important to identify an etiology, if known or suspected.   References::  https://www.acphospitalist.org/archives/2013/10/coding.htm http://[employer]/acute-respiratory-failure-know     The clinical guidelines noted below  are only system guidelines, and do not replace the providers clinical judgment.    Provider, please specify diagnosis or diagnoses associated with above clinical findings.       Based on the signs, symptoms, and treatment Pleasae further specify the respiratory condition.    [  x ] Acute Respiratory Failure with Hypoxia - ABG pO2 < 60 mmHg or O2 sat of 88% on RA and respiratory symptoms documented   [   ] Acute Respiratory Insufficiency - Generally describes less severe respiratory symptoms and measurements (pO2, SpO2, pH, and pCO2) NOT meeting criteria for respiratory failure     [   ] No Respiratory Failure, Maintained on Vent for Routine Care or Airway Protection -  purposely intubated for airway protection (e.g.: angioedema, stroke, trauma); without meeting the criteria for respiratory failure.    [   ] Other Respiratory Diagnosis (please specify): _________________________________   [   ]  Clinically Undetermined       Please document in your progress notes daily for the duration of treatment until resolved and include in your discharge summary.

## 2019-11-04 NOTE — PROGRESS NOTES
[facility] Hospital Medicine  Progress Note    Patient Name: Mateusz Bates  MRN: [id number]  Patient Class: IP- Inpatient   Admission Date: 10/28/2019  Length of Stay: 7 days  Attending Physician: Josse Patel MD  Primary Care Provider: Serenity Kilpatrick MD        Subjective:     Principal Problem:CAD, multiple vessel        HPI:  Mateusz Bates is a 69 year old female with DM II, hypertension and hyperlipidemia who presented to the ED at the request of her PCP due to finding of an abnormal EKG and complaints of chest tightness with SOB. The patient reports having intermittent symptoms for the last 1-2 months. On 10/26/19, she began having a tightness in her mid chest that radiated into her back with associated SOB. Symptoms were worse with laying flat and improved with sitting up. They lasted only minutes, but this was longer than previous episodes. She denies a trend in when these episodes occur as well as associated nausea, vomiting and diaphoresis. The patient's EKG was significant for a new LBBB compared to previous in 2016. Labs were significant for a BNP of 1,305, troponin of 3.665 and potassium of 3.4. Code status was discussed with the patient and her sister. She is a full code. Her sister, Christina Hernandez, is her surrogate medical decision maker.     Overview/Hospital Course:  Admitted for evaluation and treatment of chest pain concerning for acute coronary syndrome.  Evaluation by Cardiology and urgent left heart catheterization.  Angiogram discovered severe multi-vessel coronary artery disease.  Cardiac echo showed severely reduced LV function.  Referral to Cardiothoracic Surgery who recommended CABG.  Balloon pump placed 30 October.  CABG x3 by Dr. Zaidi 31 October.  Successfully extubated morning of 01 November.  11/2 - Patient improved s/p extubation. CABG x 3. Discussed with CC Team. BS mild elevation. SA and LA insulin initiated.  11/3 - Improved BS control. Patient + chest  tenderness. Tolerating diet. Discussed with cc team  11/4 - Patient with improved strength interactivity. Patient denies n/v/d + Sx site tenderness. Patient reports she is weakened.    Interval History: Improving strength, interactivity. Tolerating therapies / diet    Review of Systems   Constitutional: Positive for activity change and fatigue. Negative for unexpected weight change.   HENT: Negative.  Negative for trouble swallowing.    Eyes: Negative.    Respiratory: Negative.  Negative for cough, shortness of breath and wheezing.    Cardiovascular: Negative.  Negative for chest pain.   Gastrointestinal: Negative.    Endocrine: Negative.    Genitourinary: Negative.    Musculoskeletal: Positive for myalgias.   Skin: Positive for wound.   Allergic/Immunologic: Negative.    Neurological: Positive for weakness. Negative for dizziness.   Hematological: Negative.    Psychiatric/Behavioral: Negative.    All other systems reviewed and are negative.    Objective:     Vital Signs (Most Recent):  Temp: 97.9 °F (36.6 °C) (11/04/19 0300)  Pulse: 91 (11/04/19 0743)  Resp: (!) 24 (11/04/19 0743)  BP: (!) 133/59 (11/04/19 0600)  SpO2: (!) 94 % (11/04/19 0743) Vital Signs (24h Range):  Temp:  [97.9 °F (36.6 °C)-100.6 °F (38.1 °C)] 97.9 °F (36.6 °C)  Pulse:  [78-91] 91  Resp:  [20-30] 24  SpO2:  [92 %-100 %] 94 %  BP: (100-147)/(28-62) 133/59     Weight: 75.3 kg (166 lb 0.1 oz)  Body mass index is 31.37 kg/m².    Intake/Output Summary (Last 24 hours) at 11/4/2019 0746  Last data filed at 11/3/2019 1700  Gross per 24 hour   Intake 850 ml   Output 1033 ml   Net -183 ml      Physical Exam   Constitutional: She is oriented to person, place, and time. She appears well-developed and well-nourished. No distress.   HENT:   Head: Normocephalic and atraumatic.   Mouth/Throat: Oropharynx is clear and moist.   Eyes: Pupils are equal, round, and reactive to light. Conjunctivae and EOM are normal.   Neck: Neck supple. No JVD present. No  thyromegaly present.   Cardiovascular: Normal rate and regular rhythm. Exam reveals no gallop and no friction rub.   No murmur heard.  Pulmonary/Chest: Effort normal and breath sounds normal. She has no wheezes. She has no rales. She exhibits tenderness.   + midline chest sx site   Abdominal: Soft. Bowel sounds are normal. She exhibits no distension. There is no tenderness. There is no rebound and no guarding.   Musculoskeletal: Normal range of motion. She exhibits edema. She exhibits no deformity.   Lymphadenopathy:     She has no cervical adenopathy.   Neurological: She is alert and oriented to person, place, and time. She has normal reflexes.   Skin: Skin is warm and dry. Capillary refill takes 2 to 3 seconds. No rash noted.   Psychiatric: She has a normal mood and affect. Her behavior is normal. Judgment and thought content normal.   Nursing note and vitals reviewed.      Significant Labs:   ABGs:   Recent Labs   Lab 11/02/19  1354   PH 7.490*   PCO2 34.1*   HCO3 26.0   POCSATURATED 96   BE 3     Blood Culture: No results for input(s): LABBLOO in the last 48 hours.  BMP:   Recent Labs   Lab 11/04/19  0435   *      K 4.6   CL 97   CO2 25   BUN 29*   CREATININE 0.8   CALCIUM 9.9   MG 2.3     CBC:   Recent Labs   Lab 11/02/19  1916 11/03/19  0355 11/04/19  0659   WBC 14.01* 13.88* 13.13*   HGB 10.9* 11.3* 11.1*   HCT 32.8* 34.7* 34.4*   PLT 73* 89* 140*     CMP:   Recent Labs   Lab 11/02/19  1014  11/03/19  0355 11/03/19  1706 11/04/19  0435      < > 140 134* 136   K 4.6   < > 3.8 4.1 4.6      < > 98 93* 97   CO2 23   < > 29 30* 25   *   < > 137* 389* 162*   BUN 24*   < > 30* 32* 29*   CREATININE 0.9   < > 0.9 0.9 0.8   CALCIUM 8.6*   < > 10.0 9.5 9.9   PROT 5.0*  --   --   --   --    ALBUMIN 2.8*  --   --   --   --    BILITOT 1.9*  --   --   --   --    ALKPHOS 53*  --   --   --   --    AST 24  --   --   --   --    ALT <5*  --   --   --   --    ANIONGAP 13   < > 13 11 14   EGFRNONAA  >60   < > >60 >60 >60    < > = values in this interval not displayed.     All pertinent labs within the past 24 hours have been reviewed.    Significant Imaging: I have reviewed all pertinent imaging results/findings within the past 24 hours.      Assessment/Plan:      * CAD, multiple vessel        Thrombocytopenia  11/2  Heme On on Consult  HIT panel  Likely drug induced  Monitor  11/3  Improved at 89 today  Monitor      S/P CABG x 3  11/4  CABG x 3  ASA  Statin  BB  CV sx      Acute combined systolic and diastolic CHF, NYHA class 2  Probable ischemic cardiomyopathy.  Anticipate CABG 31 October.  Optimize medical management.    NSTEMI (non-ST elevated myocardial infarction)  Troponin peaked at 3.66 now trending down.  Heparin, metoprolol, ASA and statin. Hold ACE inhibitor due to intolerance with cough. Hold ARB as patient's blood pressure is marginal. Will consider adding, if her blood pressure will tolerate.  Hold second antiplatelet due to pending Cardiology procedure.  Patient was evaluated for Cardiac Rehab and is not a candidate at this time, but will be referred, when appropriate.  LHC on 28 on October showed severe multi-vessel CAD.  Referral to CVT Surgery who recommended CABG planned for Thursday 31 October.  Balloon pump placed 30 October.  CABG x3 by Dr. Zaidi on 31 October.  11/2  S/p CABG  Cards  CT Sx  Statin  BB      Hyperlipidemia associated with type 2 diabetes mellitus  -Patient was improved, but not at goal on 10/4/19.   -Continue statin.       Obesity (BMI 30.0-34.9)  Diet  Nutrition        Diabetes mellitus type II, uncontrolled  -Hemoglobin A1C was 9 on 9/19/19.   -NISS.   -ADA diet.   11/2  Levemir / NISS / Accuchecks  11/3  Levemir increased to 15 sq bid  NISS  Accuchecks  11/4  Levemir  NISS  Accuchecks      Anxiety and depression  -Stable.   -Continue Effexor.       Hypertension associated with diabetes  -Stable.   -Continue Norvasc.  11/2 -  BB , CCB  11/3 -  Controlled  BB  CCB  11/4  Controlled  BB  CCB        VTE Risk Mitigation (From admission, onward)         Ordered     IP VTE LOW RISK PATIENT  Once      10/30/19 1405     Place NATHANIEL hose  Until discontinued      10/30/19 1405     Place sequential compression device  Until discontinued      10/30/19 1405     Reason for no Mechanical VTE Prophylaxis  Once      10/28/19 1414     heparin 25,000 units in dextrose 5% 250 mL (100 units/mL) infusion LOW INTENSITY nomogram - [facility]  Continuous      10/28/19 1257                Critical care time spent on the evaluation and treatment of severe organ dysfunction, review of pertinent labs and imaging studies, discussions with consulting providers and discussions with patient/family: 37 minutes.      Josse Patel MD  Department of Hospital Medicine   [facility]

## 2019-11-04 NOTE — PLAN OF CARE
History  Chief Complaint   Patient presents with    Arm Injury     states yesterday fell down a flight of steps  today R arm bruised and cant move arm  denies hitting head,loc or thinners     Pt in the ER with c/o pain to her right arm from a mechanical fall last night, while trying to go to the bathroom  She denies head trauma or LOC  Pt now with limited ROM in RUE secondary to pain  She hasn't taken any meds for pain relief  Pt is right hand dominant  Prior to Admission Medications   Prescriptions Last Dose Informant Patient Reported? Taking?    OXcarbazepine (TRILEPTAL) 600 mg tablet   Yes No   Sig: Take 300 mg by mouth every 12 (twelve) hours   OXcarbazepine (TRILEPTAL) 600 mg tablet   No No   Sig: Take 0 5 tablets (300 mg total) by mouth every 12 (twelve) hours for 30 days   albuterol (2 5 mg/3 mL) 0 083 % nebulizer solution   Yes No   Sig: Inhale   clonazePAM (KlonoPIN) 1 mg tablet   No No   Sig: Take 1 tablet (1 mg total) by mouth 2 (two) times a day for 7 days   docusate sodium (COLACE) 100 mg capsule   No No   Sig: Take 1 capsule (100 mg total) by mouth 2 (two) times a day   escitalopram (LEXAPRO) 10 mg tablet   No No   Sig: Take 1 tablet (10 mg total) by mouth daily   hydrOXYzine HCL (ATARAX) 50 mg tablet   No No   Sig: Take 1 tablet (50 mg total) by mouth 3 (three) times a day   meclizine (ANTIVERT) 12 5 MG tablet   No No   Sig: Take 1 tablet (12 5 mg total) by mouth every 8 (eight) hours as needed for dizziness   meclizine (ANTIVERT) 25 mg tablet   No No   Sig: Take 1 tablet (25 mg total) by mouth 3 (three) times a day as needed for dizziness for up to 3 days   nicotine (NICODERM CQ) 21 mg/24 hr TD 24 hr patch Not Taking at Unknown time  No No   Sig: Place 1 patch on the skin daily   Patient not taking: Reported on 8/24/2019   pantoprazole (PROTONIX) 40 mg tablet   No No   Sig: Take 1 tablet (40 mg total) by mouth daily in the early morning for 30 days   traZODone (DESYREL) 100 mg tablet   Yes PATIENT LETHARGIC , COOPERATIVE WITH OOB T/FS, STERNAL PERCAUTIONS REVIEWED , SEVERAL SMALL STEPS TAKEN TO B/[place]ARDI[place] [facility] [place].   No   Sig: Take 100 mg by mouth daily at bedtime   traZODone (DESYREL) 100 mg tablet   No No   Sig: Take 1 tablet (100 mg total) by mouth daily at bedtime for 30 days   ziprasidone (GEODON) 40 mg capsule   Yes No   Sig: Take 40 mg by mouth 2 (two) times a day with meals   ziprasidone (GEODON) 40 mg capsule   No No   Sig: Take 1 capsule (40 mg total) by mouth daily with breakfast for 30 days Please also take Geodon 80 mg with dinner   ziprasidone (GEODON) 80 mg capsule   No No   Sig: Take 1 capsule (80 mg total) by mouth daily with dinner for 30 days      Facility-Administered Medications: None       Past Medical History:   Diagnosis Date    ADHD     Anxiety     Asthma     Bipolar 1 disorder [facility]     Depression     Diabetes mellitus ([facility]     GERD (gastroesophageal reflux disease)     Mental and behavioral problem     Migraine     Palpitations     Last Assessed [id number]    Psychiatric disorder     Psychiatric illness     Psychosis ([facility]     Schizoid personality ([facility] [facility]     Seizures ([facility] [facility]     last time 2 weeks ago- petit mal    Seizures ([facility] [facility]     Pseudoseizures    Stabbing chest pain     Last Assessed 29Nov2016    [facility])     Tachycardia     Last Assessed 29Nov2016    Upper respiratory disease     Last Assessed 27Jan2016       Past Surgical History:   Procedure Laterality Date    KNEE SURGERY      SC LAP,TUBAL CAUTERY N/A 10/3/2018    Procedure: LAPAROSCOPIC TUBAL LIGATION;  Surgeon: Abby Zabala MD;  Location: [facility];  Service: Gynecology    TUBAL LIGATION         Family History   Problem Relation Age of Onset    Migraines Sister     Cancer Mother     Diabetes Mother     Cancer Father     Diabetes Father     Arthritis Father     Cancer Maternal Grandmother     Cancer Maternal Aunt     Cancer Paternal Uncle     Diabetes Other      I have reviewed and agree with the history as documented      Social History     Tobacco Use    Smoking status: Current Every Day Smoker Packs/day: 1 00     Types: Cigarettes    Smokeless tobacco: Never Used   Substance Use Topics    Alcohol use: Never     Frequency: Never    Drug use: Yes     Frequency: 1 0 times per week     Types: Marijuana     Comment: last smoked 3-4 days ago        Review of Systems   Constitutional: Negative for chills and fever  Respiratory: Negative for cough, chest tightness and shortness of breath  Gastrointestinal: Negative for abdominal pain, diarrhea, nausea and vomiting  Genitourinary: Negative for dysuria, frequency, hematuria and urgency  Musculoskeletal: Negative for back pain, joint swelling, neck pain and neck stiffness  Skin: Positive for color change  Negative for wound  All other systems reviewed and are negative  Physical Exam  Physical Exam   Constitutional: She is oriented to person, place, and time  She appears well-developed and well-nourished  No distress  HENT:   Head: Normocephalic and atraumatic  Eyes: Pupils are equal, round, and reactive to light  Conjunctivae and EOM are normal    Musculoskeletal:        Right shoulder: She exhibits decreased range of motion and pain  She exhibits no tenderness, no bony tenderness, no swelling, no effusion, no crepitus, no deformity, no laceration, no spasm, normal pulse and normal strength  Right elbow: She exhibits decreased range of motion  She exhibits no swelling, no effusion, no deformity and no laceration  Tenderness found  Olecranon process tenderness noted  No radial head, no medial epicondyle and no lateral epicondyle tenderness noted  Right upper arm: She exhibits tenderness, bony tenderness and swelling  She exhibits no edema, no deformity and no laceration  Right forearm: Normal    Ecchymoses noted to right upper arm   Neurological: She is alert and oriented to person, place, and time  Psychiatric: She has a normal mood and affect   Her behavior is normal  Thought content normal    Nursing note and vitals reviewed  Vital Signs  ED Triage Vitals [08/24/19 2246]   Temperature Pulse Respirations Blood Pressure SpO2   98 2 °F (36 8 °C) 104 20 122/74 98 %      Temp Source Heart Rate Source Patient Position - Orthostatic VS BP Location FiO2 (%)   Tympanic Monitor Sitting Left arm --      Pain Score       9           Vitals:    08/24/19 2246   BP: 122/74   Pulse: 104   Patient Position - Orthostatic VS: Sitting         Visual Acuity      ED Medications  Medications   acetaminophen (TYLENOL) tablet 650 mg (650 mg Oral Given 8/24/19 2309)       Diagnostic Studies  Results Reviewed     None                 XR humerus RIGHT   ED Interpretation by Maco Ramachandran DO (08/24 2354)   nad      XR elbow 3+ vw right   ED Interpretation by Maco Ramachandran DO (08/24 2354)   nad       by Imtiaz Ramires (08/24 2340)      XR shoulder 2+ views RIGHT    (Results Pending)              Procedures  Procedures       ED Course                               MDM    Disposition  Final diagnoses:   Contusion of right upper arm, initial encounter   Elbow sprain, initial encounter     Time reflects when diagnosis was documented in both MDM as applicable and the Disposition within this note     Time User Action Codes Description Comment    8/25/2019 12:00 AM Sridhar ACHARYA Add [S40 021A] Contusion of right upper arm, initial encounter     8/25/2019 12:00 AM Sridhar Chamorro [E44 012A] Elbow sprain, initial encounter       ED Disposition     ED Disposition Condition Date/Time Comment    Discharge Stable Sun Aug 25, 2019 12:00 AM [place] Dove discharge to home/self care              Follow-up Information     Follow up With Specialties Details Why Contact Info    Jah Yung DO Family Medicine Schedule an appointment as soon as possible for a visit in 2 days for follow up Uzair Naylor, DO Orthopedic Surgery Schedule an appointment as soon as possible for a visit in 2 days for follow up [address] [place] [address] 90  [phone]            Discharge Medication List as of 8/25/2019 12:03 AM      CONTINUE these medications which have NOT CHANGED    Details   albuterol (2 5 mg/3 mL) 0 083 % nebulizer solution Inhale, Starting Fri 2/6/2015, Historical Med      clonazePAM (KlonoPIN) 1 mg tablet Take 1 tablet (1 mg total) by mouth 2 (two) times a day for 7 days, Starting Tue 4/2/2019, Until Wed 5/22/2019, Print      docusate sodium (COLACE) 100 mg capsule Take 1 capsule (100 mg total) by mouth 2 (two) times a day, Starting Wed 7/10/2019, Print      escitalopram (LEXAPRO) 10 mg tablet Take 1 tablet (10 mg total) by mouth daily, Starting Tue 4/2/2019, Normal      hydrOXYzine HCL (ATARAX) 50 mg tablet Take 1 tablet (50 mg total) by mouth 3 (three) times a day, Starting Wed 7/10/2019, Print      meclizine (ANTIVERT) 12 5 MG tablet Take 1 tablet (12 5 mg total) by mouth every 8 (eight) hours as needed for dizziness, Starting Wed 7/10/2019, Print      nicotine (NICODERM CQ) 21 mg/24 hr TD 24 hr patch Place 1 patch on the skin daily, Starting Thu 7/11/2019, Print      OXcarbazepine (TRILEPTAL) 600 mg tablet Take 300 mg by mouth every 12 (twelve) hours, Historical Med      pantoprazole (PROTONIX) 40 mg tablet Take 1 tablet (40 mg total) by mouth daily in the early morning for 30 days, Starting Thu 7/11/2019, Until Sat 8/10/2019, Print      traZODone (DESYREL) 100 mg tablet Take 100 mg by mouth daily at bedtime, Historical Med      ziprasidone (GEODON) 40 mg capsule Take 40 mg by mouth 2 (two) times a day with meals, Historical Med           No discharge procedures on file      ED Provider  Electronically Signed by           Angel Dove DO  08/25/19 9856

## 2019-11-04 NOTE — SUBJECTIVE & OBJECTIVE
Interval History: Improving strength, interactivity. Tolerating therapies / diet    Review of Systems   Constitutional: Positive for activity change and fatigue. Negative for unexpected weight change.   HENT: Negative.  Negative for trouble swallowing.    Eyes: Negative.    Respiratory: Negative.  Negative for cough, shortness of breath and wheezing.    Cardiovascular: Negative.  Negative for chest pain.   Gastrointestinal: Negative.    Endocrine: Negative.    Genitourinary: Negative.    Musculoskeletal: Positive for myalgias.   Skin: Positive for wound.   Allergic/Immunologic: Negative.    Neurological: Positive for weakness. Negative for dizziness.   Hematological: Negative.    Psychiatric/Behavioral: Negative.    All other systems reviewed and are negative.    Objective:     Vital Signs (Most Recent):  Temp: 97.9 °F (36.6 °C) (11/04/19 0300)  Pulse: 91 (11/04/19 0743)  Resp: (!) 24 (11/04/19 0743)  BP: (!) 133/59 (11/04/19 0600)  SpO2: (!) 94 % (11/04/19 0743) Vital Signs (24h Range):  Temp:  [97.9 °F (36.6 °C)-100.6 °F (38.1 °C)] 97.9 °F (36.6 °C)  Pulse:  [78-91] 91  Resp:  [20-30] 24  SpO2:  [92 %-100 %] 94 %  BP: (100-147)/(28-62) 133/59     Weight: 75.3 kg (166 lb 0.1 oz)  Body mass index is 31.37 kg/m².    Intake/Output Summary (Last 24 hours) at 11/4/2019 0746  Last data filed at 11/3/2019 1700  Gross per 24 hour   Intake 850 ml   Output 1033 ml   Net -183 ml      Physical Exam   Constitutional: She is oriented to person, place, and time. She appears well-developed and well-nourished. No distress.   HENT:   Head: Normocephalic and atraumatic.   Mouth/Throat: Oropharynx is clear and moist.   Eyes: Pupils are equal, round, and reactive to light. Conjunctivae and EOM are normal.   Neck: Neck supple. No JVD present. No thyromegaly present.   Cardiovascular: Normal rate and regular rhythm. Exam reveals no gallop and no friction rub.   No murmur heard.  Pulmonary/Chest: Effort normal and breath sounds normal.  She has no wheezes. She has no rales. She exhibits tenderness.   + midline chest sx site   Abdominal: Soft. Bowel sounds are normal. She exhibits no distension. There is no tenderness. There is no rebound and no guarding.   Musculoskeletal: Normal range of motion. She exhibits edema. She exhibits no deformity.   Lymphadenopathy:     She has no cervical adenopathy.   Neurological: She is alert and oriented to person, place, and time. She has normal reflexes.   Skin: Skin is warm and dry. Capillary refill takes 2 to 3 seconds. No rash noted.   Psychiatric: She has a normal mood and affect. Her behavior is normal. Judgment and thought content normal.   Nursing note and vitals reviewed.      Significant Labs:   ABGs:   Recent Labs   Lab 11/02/19  1354   PH 7.490*   PCO2 34.1*   HCO3 26.0   POCSATURATED 96   BE 3     Blood Culture: No results for input(s): LABBLOO in the last 48 hours.  BMP:   Recent Labs   Lab 11/04/19  0435   *      K 4.6   CL 97   CO2 25   BUN 29*   CREATININE 0.8   CALCIUM 9.9   MG 2.3     CBC:   Recent Labs   Lab 11/02/19  1916 11/03/19  0355 11/04/19  0659   WBC 14.01* 13.88* 13.13*   HGB 10.9* 11.3* 11.1*   HCT 32.8* 34.7* 34.4*   PLT 73* 89* 140*     CMP:   Recent Labs   Lab 11/02/19  1014  11/03/19  0355 11/03/19  1706 11/04/19  0435      < > 140 134* 136   K 4.6   < > 3.8 4.1 4.6      < > 98 93* 97   CO2 23   < > 29 30* 25   *   < > 137* 389* 162*   BUN 24*   < > 30* 32* 29*   CREATININE 0.9   < > 0.9 0.9 0.8   CALCIUM 8.6*   < > 10.0 9.5 9.9   PROT 5.0*  --   --   --   --    ALBUMIN 2.8*  --   --   --   --    BILITOT 1.9*  --   --   --   --    ALKPHOS 53*  --   --   --   --    AST 24  --   --   --   --    ALT <5*  --   --   --   --    ANIONGAP 13   < > 13 11 14   EGFRNONAA >60   < > >60 >60 >60    < > = values in this interval not displayed.     All pertinent labs within the past 24 hours have been reviewed.    Significant Imaging: I have reviewed all pertinent  imaging results/findings within the past 24 hours.

## 2019-11-04 NOTE — ASSESSMENT & PLAN NOTE
-Hemoglobin A1C was 9 on 9/19/19.   -NISS.   -ADA diet.   11/2  Levemir / NISS / Accuchecks  11/3  Levemir increased to 15 sq bid  NISS  Accuchecks  11/4  Levemir  NISS  Accuchecks

## 2019-11-04 NOTE — PROGRESS NOTES
[facility] -   Cardiothoracic Surgery  Progress Note    Patient Name: Mateusz Bates  MRN: [id number]  Admission Date: 10/28/2019  Hospital Length of Stay: 7 days  Code Status: Full Code   Attending Physician: Keny Zaidi MD   Referring Provider: Self, Aaareferral  Principal Problem:CAD, multiple vessel            Subjective:     Post-Op Info:  Procedure(s) (LRB):  CORONARY ARTERY BYPASS GRAFT (CABG) (N/A)  ECHOCARDIOGRAM,TRANSESOPHAGEAL (N/A)  BLOCK, NERVE, INTERCOSTAL, 2 OR MORE (N/A)   4 Days Post-Op     Interval History:  11/04/2019  The patient is post-operative day 4, status-post coronary artery bypass grafting x 3.     ROS  Medications:  Continuous Infusions:  Scheduled Meds:   amLODIPine  2.5 mg Oral Daily    aspirin  81 mg Oral Daily    atorvastatin  80 mg Oral QHS    chlorhexidine  10 mL Mouth/Throat BID    clopidogrel  75 mg Oral Daily    docusate sodium  100 mg Oral BID    [START ON 11/5/2019] furosemide  40 mg Oral Daily    insulin detemir U-100  15 Units Subcutaneous BID    metoprolol tartrate  100 mg Oral BID    nozaseptin   Each Nostril BID    pantoprazole  40 mg Oral Daily    polyethylene glycol  17 g Oral Daily    potassium chloride  20 mEq Oral Q12H     PRN Meds:sodium chloride, albumin human 5%, albuterol sulfate, chlorhexidine, Dextrose 10% Bolus, Dextrose 10% Bolus, Dextrose 10% Bolus, dextrose 50%, glucagon (human recombinant), glucose, glucose, insulin aspart U-100, lactated ringers, magnesium sulfate IVPB, magnesium sulfate IVPB, metoclopramide HCl, metoprolol tartrate, nozaseptin, ondansetron, potassium chloride in water **AND** potassium chloride in water **AND** potassium chloride in water, Flushing PICC Protocol **AND** sodium chloride 0.9%, traMADol     Objective:     Vital Signs (Most Recent):  Temp: 98 °F (36.7 °C) (11/04/19 1130)  Pulse: 90 (11/04/19 1430)  Resp: 20 (11/04/19 1430)  BP: (!) 105/55 (11/04/19 1430)  SpO2: (Warming fingers in order to obtain  SpO2. Pt denies SOB.) (11/04/19 1430) Vital Signs (24h Range):  Temp:  [97.1 °F (36.2 °C)-100.6 °F (38.1 °C)] 98 °F (36.7 °C)  Pulse:  [90-91] 90  Resp:  [20-30] 20  SpO2:  [92 %-97 %] 95 %  BP: (100-133)/(44-65) 105/55     Weight: 75.3 kg (166 lb 0.1 oz)  Body mass index is 31.37 kg/m².    SpO2: (Warming fingers in order to obtain SpO2. Pt denies SOB.)  O2 Device (Oxygen Therapy): room air    Intake/Output - Last 3 Shifts       11/02 0700 - 11/03 0659 11/03 0700 - 11/04 0659 11/04 0700 - 11/05 0659    P.O. 520 700 480    I.V. (mL/kg) 25.2 (0.3) 50 (0.7)     IV Piggyback  225     Total Intake(mL/kg) 545.2 (7.2) 975 (12.9) 480 (6.4)    Urine (mL/kg/hr) 2847 (1.6) 1033 (0.6)     Drains       Other       Stool 0 0     Chest Tube 40      Total Output 2887 1033     Net -2341.8 -58 +480           Urine Occurrence   3 x    Stool Occurrence 0 x 2 x 2 x          Lines/Drains/Airways     Peripherally Inserted Central Catheter Line                 PICC Double Lumen 11/02/19 1326 right basilic 2 days                Physical Exam   Constitutional: She is oriented to person, place, and time. She appears well-developed and well-nourished. No distress.   HENT:   Head: Normocephalic and atraumatic.   Eyes: Pupils are equal, round, and reactive to light. EOM are normal.   Neck: Normal range of motion. Neck supple. No JVD present.   Cardiovascular: Normal rate, regular rhythm, normal heart sounds and intact distal pulses. Exam reveals no gallop and no friction rub.   No murmur heard.  Pacing wires in place.    Pulmonary/Chest: Effort normal and breath sounds normal. No stridor. No respiratory distress. She has no wheezes. She has no rales. She exhibits no tenderness.   Abdominal: Soft. Bowel sounds are normal. She exhibits no distension and no mass. There is no tenderness. There is no rebound and no guarding. No hernia.   Musculoskeletal: Normal range of motion. She exhibits edema. She exhibits no tenderness or deformity.    Neurological: She is alert and oriented to person, place, and time. She displays normal reflexes. No cranial nerve deficit or sensory deficit. She exhibits normal muscle tone. Coordination normal.   Skin: Skin is warm and dry. Capillary refill takes less than 2 seconds. No rash noted. She is not diaphoretic. No erythema. No pallor.   Surgical dressings CDI.    Psychiatric: She has a normal mood and affect. Her behavior is normal. Judgment and thought content normal.   Nursing note and vitals reviewed.      Significant Labs:  BMP:   Recent Labs   Lab 11/04/19  0435 11/04/19  0659   * 150*    136   K 4.6 4.0   CL 97 96   CO2 25 30*   BUN 29* 27*   CREATININE 0.8 0.7   CALCIUM 9.9 9.5   MG 2.3  --      CBC:   Recent Labs   Lab 11/04/19  0659   WBC 13.13*   RBC 3.86*   HGB 11.1*   HCT 34.4*   *   MCV 89   MCH 28.8   MCHC 32.3       Significant Diagnostics:  I have reviewed all pertinent imaging results/findings within the past 24 hours.    Assessment/Plan:     S/P CABG x 3       11/01/2019  The patient is post-operative day 1, status-post coronary artery bypass grafting x 3.  Overall the patient is making progress, however IABP and inotropic agents will continue today.   Neuro:  The patient is awake alert and oriented x3.  Neuro exam is nonfocal.  Pain is well controlled with IV pain medication.  Cardiac:  Patient is hemodynamically improving. Patient remains on epinephrine and milrinone gtts, which will be weaned over the next 24 hours.  Cardiac index is improving. IABP will remain in place over the next 24 hours.  Patient will be started on beta blockers once gtts are weaned off.  Respiratory: Good sats on high flow nasal cannula. Continue respiratory toilet, continue incentive spirometer. Wean to low flow nasal cannula.  GI:  Diet: sips of water with medications, will slowly advance diet as tolerated. Benign abdominal exam.  Renal:  Urine output is slowly improving. Cr 0.9. K 4.3. Mag 1.6.  Replace mag. Continue IV diuresis. Add metolazone.   Heme:  Hct 34.1  Platelet 82. Continue aspirin. Discontinue Plavix for now.  Id:  WBC 12. Tmax 100.3. Continue post-operative antibiotics. Will continue to follow.   Endocrine:  Glucose is controlled with insulin gtt.  Continue insulin gtt for now, until weaned off inotropic agents.   Activities:  Patient is currently bed bound due to IABP.  Advance activities as tolerated once IABP is removed.   Lines tubes and drains:  Continue IABP. Continue Rockledge and Cordis.  Continue A-line.  Chest tubes will continue. NICOLASA x 2 will continue. Jones catheter will continue.  Continue pacer wires. Continue prevena wound vac.  Plan: Remain ICU.          11/02/2019  The patient is post-operative day 2, status-post coronary artery bypass grafting x 3.  Overall the patient is making progress, IABP removed and inotropic agents discontinued.   Neuro:  The patient is awake alert and oriented x3.  Neuro exam is nonfocal.  Pain is well controlled. Discontinue fentanyl.   Cardiac:  Patient is hemodynamically improving. Inotropic gtts discontinued. Excellent cardiac index. IABP discontinued.  Patient will be started on beta blockers today.  Respiratory: Good sats on high flow nasal cannula. Continue respiratory toilet, continue incentive spirometer. Wean to low flow nasal cannula.  GI:  Advance diabetic/cardiac diet as tolerated. Benign abdominal exam.  Renal:  Urine output is improving. Cr 0.9. K 4.9. Mag 2.2. Continue IV diuresis. Hold AM K.   Heme:  Hct 28.3  Platelet 47. Discontinue aspirin. Discontinue Plavix. HIT panel. Consult hematology r/t thrombocytopenia.   Id:  WBC 10.4. Tmax 99.4. Will continue to follow.   Endocrine:  Discontinue insulin gtt. Start sliding scale insulin. Start long acting insulin detemir.   Activities: Advance activities as tolerated.   Lines tubes and drains:  Discontinue IABP. Discontinue Rockledge and Cordis.  Discontinue A-line.  Discontinue chest tubes x 3.  Discontinue NICOLASA x 2. Discontinue arterial line. Jones catheter will continue for now.  Continue pacer wires. Continue prevena wound vac. Place PICC line.   Plan: Remain ICU.           11/03/2019  The patient is post-operative day 3, status-post coronary artery bypass grafting x 3.  Overall the patient is making progress.  Neuro:  The patient is awake alert and oriented x3.  Neuro exam is nonfocal.  Pain is well controlled. Continue tramadol.   Cardiac:  Patient is hemodynamically stable. Increase metoprolol dose. Start amlodipine.   Respiratory: Good sats on nasal cannula. Continue respiratory toilet, continue incentive spirometer. Wean to room air.  GI:  Advance diabetic/cardiac diet as tolerated. Benign abdominal exam. Passing gas, no BM. Add 1 bottle of mag citrate.   Renal:  Good urine output. Cr 0.9. K 3.8. Mag 2.0. Replace K. Replace Mag. Change to PO diuresis.   Heme:  Hct 34.7  Platelet 89. Restart aspirin. Continue to hold Plavix. HIT panel pending. Hematology following.  Id:  WBC 13.8. Tmax 99.4. Will continue to follow.    Endocrine:  Glucose controlled on sliding scale insulin. Increase long acting insulin detemir dose.   Activities: Advance activities as tolerated.   Lines tubes and drains:  Jones catheter will continue for now.  Continue pacer wires. Continue prevena wound vac. Continue PICC line.   Plan: Remain ICU.          11/04/2019  The patient is post-operative day 4, status-post coronary artery bypass grafting x 3.  Overall the patient is making progress.  Neuro:  The patient is awake alert and oriented x3.  Neuro exam is nonfocal.  Pain is well controlled. Continue tramadol.   Cardiac:  Patient is hemodynamically stable. Continue metoprolol dose. Continue amlodipine dose.   Respiratory: Good sats on room air. Continue respiratory toilet, continue incentive spirometer.  GI:  Advance diabetic/cardiac diet as tolerated. Benign abdominal exam. Regular bowel movements.   Renal:  Good urine output. Cr  0.7. K 4.0. Mag 2.3. Replace K. Decrease PO diuresis.   Heme:  Hct 34.4  Platelet  140. Continue aspirin. Restart Plavix. HIT panel pending. Hematology following.  Id:  WBC  down to 13.13. Tmax 98. Will continue to follow.    Endocrine:  Glucose controlled on sliding scale insulin. Increased long acting insulin detemir dose.   Activities: Advance activities as tolerated.   Lines tubes and drains:  Discontinued storm catheter. Continue pacer wires. Continue prevena wound vac. Continue PICC line.   Plan: Transfer to telemetry. Anticipate discharge to SNF in 48 hours.           NSTEMI (non-ST elevated myocardial infarction)  The patient is a 69-year-old female with hypertension, diabetes, hyperlipidemia, obesity, anxiety and depression, iron deficiency anemia, vitamin B12 deficiency, who presented to the emergency room with an NSTEMI.  Workup included a cardiac catheterization that shows severe multivessel coronary artery disease with proximal LAD stenosis of 70% and severely depressed ejection fraction of 20%.  The patient is a candidate for urgent coronary artery bypass grafting.  The risks and benefits of surgery were explained to the patient.  The patient verbalized understanding of the issues discussed.  She a segs the risks of surgery and has agreed to proceed with urgent coronary artery bypass grafting.  She understands that the risk of neurologic complication postoperatively is increased due to the presence of cerebral microvascular disease.        Yovani Dobbins NP  Cardiothoracic Surgery  [facility] [place]

## 2019-11-04 NOTE — SUBJECTIVE & OBJECTIVE
Review of Systems   Constitution: Positive for malaise/fatigue. Negative for diaphoresis, weight gain and weight loss.   HENT: Negative for congestion and nosebleeds.    Cardiovascular: Negative for chest pain, claudication, cyanosis, dyspnea on exertion, irregular heartbeat, leg swelling, near-syncope, orthopnea, palpitations, paroxysmal nocturnal dyspnea and syncope.   Respiratory: Negative for cough, hemoptysis, shortness of breath, sleep disturbances due to breathing, snoring, sputum production and wheezing.    Hematologic/Lymphatic: Negative for bleeding problem. Does not bruise/bleed easily.   Skin: Negative for rash.   Musculoskeletal: Negative for arthritis, back pain, falls, joint pain, muscle cramps and muscle weakness.   Gastrointestinal: Negative for abdominal pain, constipation, diarrhea, heartburn, hematemesis, hematochezia, melena, nausea and vomiting.   Genitourinary: Negative for dysuria, hematuria and nocturia.   Neurological: Negative for excessive daytime sleepiness, dizziness, headaches, light-headedness, loss of balance, numbness, vertigo and weakness.     Objective:     Vital Signs (Most Recent):  Temp: 97.1 °F (36.2 °C) (11/04/19 0715)  Pulse: 91 (11/04/19 0743)  Resp: (!) 24 (11/04/19 0743)  BP: 113/65 (11/04/19 0700)  SpO2: (!) 94 % (11/04/19 0743) Vital Signs (24h Range):  Temp:  [97.1 °F (36.2 °C)-100.6 °F (38.1 °C)] 97.1 °F (36.2 °C)  Pulse:  [78-91] 91  Resp:  [20-30] 24  SpO2:  [92 %-100 %] 94 %  BP: (100-147)/(28-65) 113/65     Weight: 75.3 kg (166 lb 0.1 oz)  Body mass index is 31.37 kg/m².     SpO2: (!) 94 %  O2 Device (Oxygen Therapy): room air      Intake/Output Summary (Last 24 hours) at 11/4/2019 0947  Last data filed at 11/4/2019 0800  Gross per 24 hour   Intake 1405 ml   Output 909 ml   Net 496 ml       Lines/Drains/Airways     Peripherally Inserted Central Catheter Line                 PICC Double Lumen 11/02/19 1326 right basilic 1 day                Physical Exam    Constitutional: She is oriented to person, place, and time. She appears well-developed and well-nourished. No distress.   HENT:   Head: Normocephalic and atraumatic.   Eyes: Pupils are equal, round, and reactive to light. Right eye exhibits no discharge. Left eye exhibits no discharge.   Neck: Neck supple. No JVD present.   Cardiovascular: Normal rate, regular rhythm, S1 normal, S2 normal and normal heart sounds.   No murmur heard.  Pulmonary/Chest: Effort normal. No respiratory distress. She has no wheezes.   CABG site C/D/I; no bleeding erythema or drainage    Diminished BS at bases   Abdominal: Soft. She exhibits no distension.   Musculoskeletal: She exhibits no edema.   Neurological: She is alert and oriented to person, place, and time.   Skin: Skin is warm and dry. She is not diaphoretic. No erythema.   SVG sites C/D/I; no bleeding erythema or drainage   Psychiatric: She has a normal mood and affect. Her behavior is normal. Thought content normal.   Nursing note and vitals reviewed.      Significant Labs:   All pertinent lab results from the last 24 hours have been reviewed. and   Recent Lab Results       11/04/19  0659   11/04/19  0602   11/04/19  0435   11/04/19  0147   11/04/19  0057        Anion Gap 10   14         Aniso Slight             Baso # 0.04             Basophil% 0.3             BUN, Bld 27   29         Calcium 9.5   9.9         Chloride 96   97         CO2 30   25         Creatinine 0.7   0.8         Differential Method Automated             eGFR if  >60   >60         eGFR if non  >60  Comment:  Calculation used to obtain the estimated glomerular filtration  rate (eGFR) is the CKD-EPI equation.      >60  Comment:  Calculation used to obtain the estimated glomerular filtration  rate (eGFR) is the CKD-EPI equation.            Eos # 0.3             Eosinophil% 2.6             Glucose 150   162         Gran # (ANC) 9.6             Gran% 73.2             Hematocrit  34.4             Hemoglobin 11.1             Immature Grans (Abs) 0.10  Comment:  Mild elevation in immature granulocytes is non specific and   can be seen in a variety of conditions including stress response,   acute inflammation, trauma and pregnancy. Correlation with other   laboratory and clinical findings is essential.               Immature Granulocytes 0.8             Lymph # 1.9             Lymph% 14.2             Magnesium     2.3         MCH 28.8             MCHC 32.3             MCV 89             Mono # 1.2             Mono% 8.9             MPV 10.7             nRBC 0             Ovalocytes Occasional             Platelet Estimate Decreased             Platelets 140             POCT Glucose   172   254 65     Potassium 4.0   4.6         RBC 3.86             RDW 15.3             Sodium 136   136         WBC 13.13                              11/04/19  0050   11/03/19  1740   11/03/19  1706   11/03/19  1308        Anion Gap     11       Aniso             Baso #             Basophil%             BUN, Bld     32       Calcium     9.5       Chloride     93       CO2     30       Creatinine     0.9       Differential Method             eGFR if      >60       eGFR if non      >60  Comment:  Calculation used to obtain the estimated glomerular filtration  rate (eGFR) is the CKD-EPI equation.          Eos #             Eosinophil%             Glucose     389       Gran # (ANC)             Gran%             Hematocrit             Hemoglobin             Immature Grans (Abs)             Immature Granulocytes             Lymph #             Lymph%             Magnesium     2.5       MCH             MCHC             MCV             Mono #             Mono%             MPV             nRBC             Ovalocytes             Platelet Estimate             Platelets             POCT Glucose 79 380   331     Potassium     4.1       RBC             RDW             Sodium     134       WBC                    Significant Imaging: Echocardiogram:   2D echo with color flow doppler:   Results for orders placed or performed during the hospital encounter of 10/28/19   2D echo with color flow doppler   Result Value Ref Range    QEF 30 (A) 55 - 65    Mitral Valve Regurgitation MILD TO MODERATE     Diastolic Dysfunction Yes (A)     Aortic Valve Regurgitation MILD     Mitral Valve Mobility NORMAL     Narrative    Date of Procedure: 10/28/2019        TEST DESCRIPTION   Technical Quality: This is a technically adequate study.     Aorta: The aortic root is normal in size, measuring 3.0 cm at sinotubular junction and 3.2 cm at Sinuses of Valsalva. The proximal ascending aorta is normal in size, measuring 3.1 cm across.     Left Atrium: The left atrial volume index is severely enlarged, measuring 52.97 cc/m2.     Left Ventricle: The left ventricle is normal in size, with an end-diastolic diameter of 4.1 cm, and an end-systolic diameter of 3.7 cm. Septal wall thickness is mildly increased, with the septum measuring 1.6 cm and the posterior wall measuring 1.3 cm   across. Relative wall thickness was increased at 0.63, and the LV mass index was increased at 160.6 g/m2 consistent with concentric left ventricular hypertrophy. The anterior septum is severely hypokinetic. There is global hypokinesis. Left ventricular   systolic function appears moderately depressed. Visually estimated ejection fraction is 30-35%. The LV Doppler derived stroke volume equals 57.0 ccs.     Diastolic indices: E wave velocity 1.1 m/s, E/A ratio 0.9,  msec., E/e' ratio(avg) 16. There is pseudonormalization of mitral inflow pattern consistent with significant diastolic dysfunction.     Right Atrium: The right atrium is normal in size, measuring 3.7 cm in length and 2.8 cm in width in the apical view.     Right Ventricle: The right ventricle is normal in size. Global right ventricular systolic function appears normal. Tricuspid annular plane  systolic excursion (TAPSE) is 2.2 cm.     Aortic Valve:  The aortic valve is normal in structure with normal leaflet mobility. The mean gradient obtained across the aortic valve is 6 mmHg. Additionally, there is mild aortic regurgitation.     Mitral Valve:  The mitral valve is normal in structure with normal leaflet mobility. The pressure half time is 45 msec. The calculated mitral valve area is 4.89 cm2. There is mild to moderate mitral regurgitation.     Tricuspid Valve:  The tricuspid valve is normal in structure with normal leaflet mobility.     Pulmonary Valve:  The pulmonic valve is not well seen.     IVC: IVC is normal in size and collapses > 50% with a sniff, suggesting normal right atrial pressure of 3 mmHg.     Intracavitary: There is no evidence of pericardial effusion, intracavity mass, thrombi, or vegetation.         CONCLUSIONS     1 - Moderately depressed left ventricular systolic function (EF 30-35%).     2 - Impaired LV relaxation, elevated LAP (grade 2 diastolic dysfunction).     3 - Normal right ventricular systolic function .     4 - Mild aortic regurgitation.     5 - Severe left atrial enlargement.     6 - Concentric hypertrophy.     7 - Mild to moderate mitral regurgitation.             This document has been electronically    SIGNED BY: Terry Tarango MD On: 10/28/2019 16:38

## 2019-11-04 NOTE — PROGRESS NOTES
Pt paperwork for medical leave given to Isaias DISLA who put it at the charge nurse's desk with note saying that it needs to be given to Dejuan ZEPEDA or Shilpi GARCIA.  MD or NP needs to complete and give back to pt or pt sister.

## 2019-11-04 NOTE — PROGRESS NOTES
Report received from Felicity.  This RN assumed care at this time.  Pt resting comfortably in bed.  Will continue to monitor.

## 2019-11-04 NOTE — PHYSICIAN QUERY
PT Name: Mateusz Bates  MR #: [id number]     PHYSICIAN QUERY -  ELECTROLYTE CLARIFICATION      CDS/: Monica Marcos               Contact information:[email or web address]  This form is a permanent document in the medical record.     Query Date: November 4, 2019    By submitting this query, we are merely seeking further clarification of documentation to reflect the severity of illness of your patient. Please utilize your independent clinical judgment when addressing the question(s) below.    The Medical record reflects the following:     Indicators   Supporting Clinical Findings Location in Medical Record   x Lab Value(s) Magnesium=1.2 to 2.6 Lab 10-29 to 11-1   x Treatment                                 Medication IVPB Magnesium sulfate  Mar 10-30 to 11-1  Mar 11-3    Other       Provider, please specify the diagnosis or diagnoses that correspond(s) to the above indicators. Keny all that apply:    [ x  ] Hypomagnesemia   [   ] Other electrolyte disturbance (please specify): _______   [   ]  Clinically Undetermined       Please document in your progress notes daily for the duration of treatment until resolved, and include in your discharge summary.

## 2019-11-04 NOTE — PROGRESS NOTES
Pt assisted out of bed to bedside toilet. Pt became very lethagic/diaphoretic. Pt lifted back to bed with assist from nurses. Pt VSS. Pt VERY WEAK. Unable to move self without much assist.

## 2019-11-04 NOTE — NURSING
Patient transferred to the telemetry unit from ICU at approximately 2:30 p.m. No signs of acute distress noted. Bed alarm set. Patient oriented to unit. Will continue to monitor and treat.

## 2019-11-04 NOTE — PROGRESS NOTES
Report given to Annabella DISLA.  Pt moved to tele room 206.  Pt transported via bed with meds, IS and lunch tray in tow.  Portable monitor on and working.  This RN called and notified pt sister of transfer to new room via voicemail.

## 2019-11-04 NOTE — ASSESSMENT & PLAN NOTE
-Stable.   -Continue Norvasc.  11/2 -  BB , CCB  11/3 - Controlled  BB  CCB  11/4  Controlled  BB  CCB

## 2019-11-04 NOTE — ASSESSMENT & PLAN NOTE
-Patient is s/p CABG x3 with LIMA to LAD reverse saphenous vein graft to the RCA and reverse saphenous vein graft to the ramus.  -Continue post CABG supportive therapy  -Continue ASA, Plavix, Statin, BB  -Will continue to follow along     11/1/19  -POD #1 s/p CABG x3  -Continue ASA, Statin, BB  -Remains on Epi and Milrinone gtt today  -Mgmt per CT surgery  -Will follow along    11/2/19  -Recovering s/p CABG x 3  -Continue statin, BB  -Platelets 47,000 this AM; heme/onc on board, ASA held  -Await further rec's    11/3/19  -Stable, progressing post CABG x 3  -Continue statin, BB  -ASA re-started  -Platelets 89,000, heme/onc on board    11/4/19  -Patient slowly improving post CABG x3  -Continue ASA, Statin and BB  -plavix on hold due to thrombocytopenia that is improving   -IS use encouraged and early ambulation with PT  -Will continue to follow along

## 2019-11-04 NOTE — PLAN OF CARE
Recommendations     Recommendation: 1. Continue current diet & ONS. 2. RD to f/u.   Goals: 1) PO intakes > 50% of meals at f/u  Nutrition Goal Status: goal met   Communication of RD Recs: (POC, sticky note)

## 2019-11-05 ENCOUNTER — TELEPHONE (OUTPATIENT)
Dept: ENDOSCOPY | Facility: HOSPITAL | Age: 69
End: 2019-11-05

## 2019-11-05 PROBLEM — R65.21 SEPTIC SHOCK: Status: ACTIVE | Noted: 2019-11-05

## 2019-11-05 PROBLEM — A41.9 SEPTIC SHOCK: Status: ACTIVE | Noted: 2019-11-05

## 2019-11-05 PROBLEM — E87.20 METABOLIC ACIDEMIA: Status: ACTIVE | Noted: 2019-11-05

## 2019-11-05 PROBLEM — R34 OLIGURIA: Status: ACTIVE | Noted: 2019-11-05

## 2019-11-05 PROBLEM — I31.4 CARDIAC TAMPONADE: Status: ACTIVE | Noted: 2019-11-05

## 2019-11-05 PROBLEM — E87.20 LACTIC ACID ACIDOSIS: Status: ACTIVE | Noted: 2019-11-05

## 2019-11-05 PROBLEM — R57.8 NONTRAUMATIC HEMORRHAGIC SHOCK: Status: ACTIVE | Noted: 2019-11-05

## 2019-11-05 LAB
ABO + RH BLD: NORMAL
ALBUMIN SERPL BCP-MCNC: 0.9 G/DL (ref 3.5–5.2)
ALBUMIN SERPL BCP-MCNC: 2.7 G/DL (ref 3.5–5.2)
ALLENS TEST: ABNORMAL
ALLENS TEST: ABNORMAL
ALP SERPL-CCNC: 34 U/L (ref 55–135)
ALP SERPL-CCNC: 56 U/L (ref 55–135)
ALT SERPL W/O P-5'-P-CCNC: 151 U/L (ref 10–44)
ALT SERPL W/O P-5'-P-CCNC: <5 U/L (ref 10–44)
ANION GAP SERPL CALC-SCNC: 12 MMOL/L (ref 8–16)
ANION GAP SERPL CALC-SCNC: 13 MMOL/L (ref 8–16)
ANION GAP SERPL CALC-SCNC: 14 MMOL/L (ref 8–16)
ANION GAP SERPL CALC-SCNC: 19 MMOL/L (ref 8–16)
AST SERPL-CCNC: 10 U/L (ref 10–40)
AST SERPL-CCNC: 266 U/L (ref 10–40)
BASOPHILS # BLD AUTO: 0.01 K/UL (ref 0–0.2)
BASOPHILS # BLD AUTO: 0.01 K/UL (ref 0–0.2)
BASOPHILS # BLD AUTO: 0.07 K/UL (ref 0–0.2)
BASOPHILS NFR BLD: 0.1 % (ref 0–1.9)
BASOPHILS NFR BLD: 0.1 % (ref 0–1.9)
BASOPHILS NFR BLD: 0.4 % (ref 0–1.9)
BILIRUB SERPL-MCNC: 0.4 MG/DL (ref 0.1–1)
BILIRUB SERPL-MCNC: 1.4 MG/DL (ref 0.1–1)
BILIRUB UR QL STRIP: NEGATIVE
BLD GP AB SCN CELLS X3 SERPL QL: NORMAL
BUN SERPL-MCNC: 15 MG/DL (ref 8–23)
BUN SERPL-MCNC: 26 MG/DL (ref 8–23)
BUN SERPL-MCNC: 34 MG/DL (ref 8–23)
BUN SERPL-MCNC: 35 MG/DL (ref 8–23)
BURR CELLS BLD QL SMEAR: ABNORMAL
BURR CELLS BLD QL SMEAR: ABNORMAL
CALCIUM SERPL-MCNC: 6.5 MG/DL (ref 8.7–10.5)
CALCIUM SERPL-MCNC: 7.6 MG/DL (ref 8.7–10.5)
CALCIUM SERPL-MCNC: 7.6 MG/DL (ref 8.7–10.5)
CALCIUM SERPL-MCNC: 9.5 MG/DL (ref 8.7–10.5)
CHLORIDE SERPL-SCNC: 101 MMOL/L (ref 95–110)
CHLORIDE SERPL-SCNC: 94 MMOL/L (ref 95–110)
CHLORIDE SERPL-SCNC: 96 MMOL/L (ref 95–110)
CHLORIDE SERPL-SCNC: 99 MMOL/L (ref 95–110)
CLARITY UR: CLEAR
CO2 SERPL-SCNC: 20 MMOL/L (ref 23–29)
CO2 SERPL-SCNC: 22 MMOL/L (ref 23–29)
CO2 SERPL-SCNC: 23 MMOL/L (ref 23–29)
CO2 SERPL-SCNC: 9 MMOL/L (ref 23–29)
COLOR UR: YELLOW
CREAT SERPL-MCNC: 0.6 MG/DL (ref 0.5–1.4)
CREAT SERPL-MCNC: 0.7 MG/DL (ref 0.5–1.4)
CREAT SERPL-MCNC: 1.4 MG/DL (ref 0.5–1.4)
CREAT SERPL-MCNC: 1.4 MG/DL (ref 0.5–1.4)
DACRYOCYTES BLD QL SMEAR: ABNORMAL
DACRYOCYTES BLD QL SMEAR: ABNORMAL
DELSYS: ABNORMAL
DELSYS: ABNORMAL
DIFFERENTIAL METHOD: ABNORMAL
EOSINOPHIL # BLD AUTO: 0.1 K/UL (ref 0–0.5)
EOSINOPHIL # BLD AUTO: 0.1 K/UL (ref 0–0.5)
EOSINOPHIL # BLD AUTO: 0.5 K/UL (ref 0–0.5)
EOSINOPHIL NFR BLD: 1.4 % (ref 0–8)
EOSINOPHIL NFR BLD: 1.4 % (ref 0–8)
EOSINOPHIL NFR BLD: 3.1 % (ref 0–8)
ERYTHROCYTE [DISTWIDTH] IN BLOOD BY AUTOMATED COUNT: 15 % (ref 11.5–14.5)
ERYTHROCYTE [SEDIMENTATION RATE] IN BLOOD BY WESTERGREN METHOD: 20 MM/H
ERYTHROCYTE [SEDIMENTATION RATE] IN BLOOD BY WESTERGREN METHOD: 20 MM/H
EST. GFR  (AFRICAN AMERICAN): 44 ML/MIN/1.73 M^2
EST. GFR  (AFRICAN AMERICAN): 44 ML/MIN/1.73 M^2
EST. GFR  (AFRICAN AMERICAN): >60 ML/MIN/1.73 M^2
EST. GFR  (AFRICAN AMERICAN): >60 ML/MIN/1.73 M^2
EST. GFR  (NON AFRICAN AMERICAN): 38 ML/MIN/1.73 M^2
EST. GFR  (NON AFRICAN AMERICAN): 38 ML/MIN/1.73 M^2
EST. GFR  (NON AFRICAN AMERICAN): >60 ML/MIN/1.73 M^2
EST. GFR  (NON AFRICAN AMERICAN): >60 ML/MIN/1.73 M^2
FIO2: 100
FIO2: 80
GLUCOSE SERPL-MCNC: 109 MG/DL (ref 70–110)
GLUCOSE SERPL-MCNC: 249 MG/DL (ref 70–110)
GLUCOSE SERPL-MCNC: 442 MG/DL (ref 70–110)
GLUCOSE SERPL-MCNC: 495 MG/DL (ref 70–110)
GLUCOSE SERPL-MCNC: 555 MG/DL (ref 70–110)
GLUCOSE UR QL STRIP: ABNORMAL
HCO3 UR-SCNC: 21.8 MMOL/L (ref 24–28)
HCO3 UR-SCNC: 25.9 MMOL/L (ref 24–28)
HCT VFR BLD AUTO: 22 % (ref 37–48.5)
HCT VFR BLD AUTO: 22 % (ref 37–48.5)
HCT VFR BLD AUTO: 28.8 % (ref 37–48.5)
HCT VFR BLD AUTO: 39.5 % (ref 37–48.5)
HGB BLD-MCNC: 12.5 G/DL (ref 12–16)
HGB BLD-MCNC: 6.7 G/DL (ref 12–16)
HGB BLD-MCNC: 6.7 G/DL (ref 12–16)
HGB BLD-MCNC: 9.1 G/DL (ref 12–16)
HGB UR QL STRIP: NEGATIVE
HYPOCHROMIA BLD QL SMEAR: ABNORMAL
HYPOCHROMIA BLD QL SMEAR: ABNORMAL
IMM GRANULOCYTES # BLD AUTO: 0.25 K/UL (ref 0–0.04)
IMM GRANULOCYTES # BLD AUTO: 0.42 K/UL (ref 0–0.04)
IMM GRANULOCYTES # BLD AUTO: 0.42 K/UL (ref 0–0.04)
IMM GRANULOCYTES NFR BLD AUTO: 1.6 % (ref 0–0.5)
IMM GRANULOCYTES NFR BLD AUTO: 4.6 % (ref 0–0.5)
IMM GRANULOCYTES NFR BLD AUTO: 4.6 % (ref 0–0.5)
KETONES UR QL STRIP: ABNORMAL
LACTATE SERPL-SCNC: 1.6 MMOL/L (ref 0.5–2.2)
LACTATE SERPL-SCNC: >12 MMOL/L (ref 0.5–2.2)
LEUKOCYTE ESTERASE UR QL STRIP: NEGATIVE
LYMPHOCYTES # BLD AUTO: 1.2 K/UL (ref 1–4.8)
LYMPHOCYTES # BLD AUTO: 1.2 K/UL (ref 1–4.8)
LYMPHOCYTES # BLD AUTO: 2.6 K/UL (ref 1–4.8)
LYMPHOCYTES NFR BLD: 13 % (ref 18–48)
LYMPHOCYTES NFR BLD: 13 % (ref 18–48)
LYMPHOCYTES NFR BLD: 16.2 % (ref 18–48)
MAGNESIUM SERPL-MCNC: 2.1 MG/DL (ref 1.6–2.6)
MCH RBC QN AUTO: 28.2 PG (ref 27–31)
MCH RBC QN AUTO: 28.9 PG (ref 27–31)
MCH RBC QN AUTO: 28.9 PG (ref 27–31)
MCHC RBC AUTO-ENTMCNC: 30.5 G/DL (ref 32–36)
MCHC RBC AUTO-ENTMCNC: 30.5 G/DL (ref 32–36)
MCHC RBC AUTO-ENTMCNC: 31.6 G/DL (ref 32–36)
MCV RBC AUTO: 89 FL (ref 82–98)
MCV RBC AUTO: 95 FL (ref 82–98)
MCV RBC AUTO: 95 FL (ref 82–98)
MODE: ABNORMAL
MODE: ABNORMAL
MONOCYTES # BLD AUTO: 0.2 K/UL (ref 0.3–1)
MONOCYTES # BLD AUTO: 0.2 K/UL (ref 0.3–1)
MONOCYTES # BLD AUTO: 2.4 K/UL (ref 0.3–1)
MONOCYTES NFR BLD: 1.9 % (ref 4–15)
MONOCYTES NFR BLD: 1.9 % (ref 4–15)
MONOCYTES NFR BLD: 15 % (ref 4–15)
NEUTROPHILS # BLD AUTO: 10.3 K/UL (ref 1.8–7.7)
NEUTROPHILS # BLD AUTO: 7.3 K/UL (ref 1.8–7.7)
NEUTROPHILS # BLD AUTO: 7.3 K/UL (ref 1.8–7.7)
NEUTROPHILS NFR BLD: 63.7 % (ref 38–73)
NEUTROPHILS NFR BLD: 79 % (ref 38–73)
NEUTROPHILS NFR BLD: 79 % (ref 38–73)
NITRITE UR QL STRIP: NEGATIVE
NRBC BLD-RTO: 0 /100 WBC
OVALOCYTES BLD QL SMEAR: ABNORMAL
OVALOCYTES BLD QL SMEAR: ABNORMAL
PCO2 BLDA: 34.1 MMHG (ref 35–45)
PCO2 BLDA: 34.8 MMHG (ref 35–45)
PEEP: 5
PEEP: 5
PH SMN: 7.41 [PH] (ref 7.35–7.45)
PH SMN: 7.48 [PH] (ref 7.35–7.45)
PH UR STRIP: 7 [PH] (ref 5–8)
PLATELET # BLD AUTO: 59 K/UL (ref 150–350)
PLATELET # BLD AUTO: 59 K/UL (ref 150–350)
PLATELET # BLD AUTO: 73 K/UL (ref 150–350)
PLATELET BLD QL SMEAR: ABNORMAL
PMV BLD AUTO: 11.2 FL (ref 9.2–12.9)
PMV BLD AUTO: 11.2 FL (ref 9.2–12.9)
PMV BLD AUTO: 11.5 FL (ref 9.2–12.9)
PO2 BLDA: 279 MMHG (ref 80–100)
PO2 BLDA: 292 MMHG (ref 80–100)
POC BE: -3 MMOL/L
POC BE: 2 MMOL/L
POC SATURATED O2: 100 % (ref 95–100)
POC SATURATED O2: 100 % (ref 95–100)
POCT GLUCOSE: 270 MG/DL (ref 70–110)
POCT GLUCOSE: 63 MG/DL (ref 70–110)
POCT GLUCOSE: 88 MG/DL (ref 70–110)
POCT GLUCOSE: 94 MG/DL (ref 70–110)
POIKILOCYTOSIS BLD QL SMEAR: SLIGHT
POIKILOCYTOSIS BLD QL SMEAR: SLIGHT
POTASSIUM SERPL-SCNC: 4 MMOL/L (ref 3.5–5.1)
POTASSIUM SERPL-SCNC: 4.1 MMOL/L (ref 3.5–5.1)
POTASSIUM SERPL-SCNC: 4.2 MMOL/L (ref 3.5–5.1)
POTASSIUM SERPL-SCNC: 4.8 MMOL/L (ref 3.5–5.1)
PROCALCITONIN SERPL IA-MCNC: 4.44 NG/ML
PROT SERPL-MCNC: 2.1 G/DL (ref 6–8.4)
PROT SERPL-MCNC: 4.8 G/DL (ref 6–8.4)
PROT UR QL STRIP: NEGATIVE
RBC # BLD AUTO: 2.32 M/UL (ref 4–5.4)
RBC # BLD AUTO: 2.32 M/UL (ref 4–5.4)
RBC # BLD AUTO: 4.44 M/UL (ref 4–5.4)
RETIRED EF AND QEF - SEE NOTES: 30 (ref 55–65)
SAMPLE: ABNORMAL
SAMPLE: ABNORMAL
SCHISTOCYTES BLD QL SMEAR: PRESENT
SCHISTOCYTES BLD QL SMEAR: PRESENT
SITE: ABNORMAL
SITE: ABNORMAL
SODIUM SERPL-SCNC: 128 MMOL/L (ref 136–145)
SODIUM SERPL-SCNC: 129 MMOL/L (ref 136–145)
SODIUM SERPL-SCNC: 129 MMOL/L (ref 136–145)
SODIUM SERPL-SCNC: 136 MMOL/L (ref 136–145)
SP GR UR STRIP: 1.01 (ref 1–1.03)
TARGETS BLD QL SMEAR: ABNORMAL
TARGETS BLD QL SMEAR: ABNORMAL
URN SPEC COLLECT METH UR: ABNORMAL
UROBILINOGEN UR STRIP-ACNC: NEGATIVE EU/DL
VT: 450
VT: 450
WBC # BLD AUTO: 16.09 K/UL (ref 3.9–12.7)
WBC # BLD AUTO: 9.18 K/UL (ref 3.9–12.7)
WBC # BLD AUTO: 9.18 K/UL (ref 3.9–12.7)

## 2019-11-05 PROCEDURE — 31500 INSERT EMERGENCY AIRWAY: CPT

## 2019-11-05 PROCEDURE — 36415 COLL VENOUS BLD VENIPUNCTURE: CPT

## 2019-11-05 PROCEDURE — 63600175 PHARM REV CODE 636 W HCPCS: Performed by: INTERNAL MEDICINE

## 2019-11-05 PROCEDURE — 31500 PR INSERT, EMERGENCY ENDOTRACH AIRWAY: ICD-10-PCS | Mod: ,,, | Performed by: INTERNAL MEDICINE

## 2019-11-05 PROCEDURE — 86901 BLOOD TYPING SEROLOGIC RH(D): CPT

## 2019-11-05 PROCEDURE — 85018 HEMOGLOBIN: CPT

## 2019-11-05 PROCEDURE — S0028 INJECTION, FAMOTIDINE, 20 MG: HCPCS | Performed by: INTERNAL MEDICINE

## 2019-11-05 PROCEDURE — 99233 SBSQ HOSP IP/OBS HIGH 50: CPT | Mod: ,,, | Performed by: INTERNAL MEDICINE

## 2019-11-05 PROCEDURE — 83605 ASSAY OF LACTIC ACID: CPT | Mod: 91

## 2019-11-05 PROCEDURE — P9016 RBC LEUKOCYTES REDUCED: HCPCS

## 2019-11-05 PROCEDURE — 97530 THERAPEUTIC ACTIVITIES: CPT

## 2019-11-05 PROCEDURE — 63600175 PHARM REV CODE 636 W HCPCS: Performed by: NURSE PRACTITIONER

## 2019-11-05 PROCEDURE — 87205 SMEAR GRAM STAIN: CPT

## 2019-11-05 PROCEDURE — 84145 PROCALCITONIN (PCT): CPT

## 2019-11-05 PROCEDURE — P9045 ALBUMIN (HUMAN), 5%, 250 ML: HCPCS | Mod: JG | Performed by: NURSE PRACTITIONER

## 2019-11-05 PROCEDURE — 20000000 HC ICU ROOM

## 2019-11-05 PROCEDURE — 51702 INSERT TEMP BLADDER CATH: CPT

## 2019-11-05 PROCEDURE — 93307 2D ECHO ONLY: ICD-10-PCS | Mod: 26,,, | Performed by: INTERNAL MEDICINE

## 2019-11-05 PROCEDURE — 25000003 PHARM REV CODE 250: Performed by: THORACIC SURGERY (CARDIOTHORACIC VASCULAR SURGERY)

## 2019-11-05 PROCEDURE — 85025 COMPLETE CBC W/AUTO DIFF WBC: CPT | Mod: 91

## 2019-11-05 PROCEDURE — 82947 ASSAY GLUCOSE BLOOD QUANT: CPT

## 2019-11-05 PROCEDURE — 25500020 PHARM REV CODE 255: Performed by: THORACIC SURGERY (CARDIOTHORACIC VASCULAR SURGERY)

## 2019-11-05 PROCEDURE — 94002 VENT MGMT INPAT INIT DAY: CPT

## 2019-11-05 PROCEDURE — 97110 THERAPEUTIC EXERCISES: CPT

## 2019-11-05 PROCEDURE — 99291 PR CRITICAL CARE, E/M 30-74 MINUTES: ICD-10-PCS | Mod: 25,,, | Performed by: INTERNAL MEDICINE

## 2019-11-05 PROCEDURE — 80048 BASIC METABOLIC PNL TOTAL CA: CPT | Mod: 91

## 2019-11-05 PROCEDURE — 93010 ELECTROCARDIOGRAM REPORT: CPT | Mod: ,,, | Performed by: INTERNAL MEDICINE

## 2019-11-05 PROCEDURE — 25000003 PHARM REV CODE 250: Performed by: NURSE PRACTITIONER

## 2019-11-05 PROCEDURE — 99233 PR SUBSEQUENT HOSPITAL CARE,LEVL III: ICD-10-PCS | Mod: ,,, | Performed by: INTERNAL MEDICINE

## 2019-11-05 PROCEDURE — 93503 INSERT/PLACE HEART CATHETER: CPT

## 2019-11-05 PROCEDURE — 36620 INSERTION CATHETER ARTERY: CPT

## 2019-11-05 PROCEDURE — 80053 COMPREHEN METABOLIC PANEL: CPT | Mod: 91

## 2019-11-05 PROCEDURE — 99291 CRITICAL CARE FIRST HOUR: CPT | Mod: 25,,, | Performed by: INTERNAL MEDICINE

## 2019-11-05 PROCEDURE — 25000003 PHARM REV CODE 250

## 2019-11-05 PROCEDURE — 81003 URINALYSIS AUTO W/O SCOPE: CPT

## 2019-11-05 PROCEDURE — 63600175 PHARM REV CODE 636 W HCPCS

## 2019-11-05 PROCEDURE — 86920 COMPATIBILITY TEST SPIN: CPT

## 2019-11-05 PROCEDURE — 99900035 HC TECH TIME PER 15 MIN (STAT)

## 2019-11-05 PROCEDURE — 93307 TTE W/O DOPPLER COMPLETE: CPT | Mod: 26,,, | Performed by: INTERNAL MEDICINE

## 2019-11-05 PROCEDURE — 31500 INSERT EMERGENCY AIRWAY: CPT | Mod: ,,, | Performed by: INTERNAL MEDICINE

## 2019-11-05 PROCEDURE — 93010 EKG 12-LEAD: ICD-10-PCS | Mod: ,,, | Performed by: INTERNAL MEDICINE

## 2019-11-05 PROCEDURE — 87070 CULTURE OTHR SPECIMN AEROBIC: CPT

## 2019-11-05 PROCEDURE — 85014 HEMATOCRIT: CPT

## 2019-11-05 PROCEDURE — 83735 ASSAY OF MAGNESIUM: CPT

## 2019-11-05 PROCEDURE — 80048 BASIC METABOLIC PNL TOTAL CA: CPT

## 2019-11-05 PROCEDURE — 93005 ELECTROCARDIOGRAM TRACING: CPT

## 2019-11-05 PROCEDURE — 36600 WITHDRAWAL OF ARTERIAL BLOOD: CPT

## 2019-11-05 PROCEDURE — 37799 UNLISTED PX VASCULAR SURGERY: CPT

## 2019-11-05 PROCEDURE — 63600175 PHARM REV CODE 636 W HCPCS: Performed by: THORACIC SURGERY (CARDIOTHORACIC VASCULAR SURGERY)

## 2019-11-05 PROCEDURE — 25000003 PHARM REV CODE 250: Performed by: INTERNAL MEDICINE

## 2019-11-05 PROCEDURE — 82803 BLOOD GASES ANY COMBINATION: CPT

## 2019-11-05 PROCEDURE — 36430 TRANSFUSION BLD/BLD COMPNT: CPT

## 2019-11-05 PROCEDURE — 93307 TTE W/O DOPPLER COMPLETE: CPT

## 2019-11-05 PROCEDURE — 87040 BLOOD CULTURE FOR BACTERIA: CPT | Mod: 59

## 2019-11-05 RX ORDER — FENTANYL CITRAT/DEXTROSE 5%/PF 100 MCG/10
25 PATIENT CONTROLLED ANALGESIA SYRINGE INTRAVENOUS CONTINUOUS
Status: DISCONTINUED | OUTPATIENT
Start: 2019-11-05 | End: 2019-11-05

## 2019-11-05 RX ORDER — MAGNESIUM SULFATE HEPTAHYDRATE 40 MG/ML
2 INJECTION, SOLUTION INTRAVENOUS ONCE
Status: COMPLETED | OUTPATIENT
Start: 2019-11-05 | End: 2019-11-05

## 2019-11-05 RX ORDER — ALBUMIN HUMAN 50 G/1000ML
25 SOLUTION INTRAVENOUS ONCE
Status: COMPLETED | OUTPATIENT
Start: 2019-11-05 | End: 2019-11-05

## 2019-11-05 RX ORDER — FAMOTIDINE 10 MG/ML
20 INJECTION INTRAVENOUS 2 TIMES DAILY
Status: DISCONTINUED | OUTPATIENT
Start: 2019-11-05 | End: 2019-11-12

## 2019-11-05 RX ORDER — DEXMEDETOMIDINE HYDROCHLORIDE 4 UG/ML
0.2 INJECTION INTRAVENOUS CONTINUOUS
Status: DISCONTINUED | OUTPATIENT
Start: 2019-11-05 | End: 2019-11-07

## 2019-11-05 RX ORDER — METOPROLOL TARTRATE 25 MG/1
25 TABLET, FILM COATED ORAL 2 TIMES DAILY
Status: DISCONTINUED | OUTPATIENT
Start: 2019-11-05 | End: 2019-11-06

## 2019-11-05 RX ORDER — FENTANYL CITRATE 50 UG/ML
50 INJECTION, SOLUTION INTRAMUSCULAR; INTRAVENOUS ONCE
Status: COMPLETED | OUTPATIENT
Start: 2019-11-05 | End: 2019-11-05

## 2019-11-05 RX ORDER — NOREPINEPHRINE BITARTRATE/D5W 4MG/250ML
0.02 PLASTIC BAG, INJECTION (ML) INTRAVENOUS CONTINUOUS
Status: DISCONTINUED | OUTPATIENT
Start: 2019-11-05 | End: 2019-11-08

## 2019-11-05 RX ORDER — CHLORHEXIDINE GLUCONATE ORAL RINSE 1.2 MG/ML
15 SOLUTION DENTAL 2 TIMES DAILY
Status: DISCONTINUED | OUTPATIENT
Start: 2019-11-05 | End: 2019-11-17

## 2019-11-05 RX ORDER — FENTANYL CITRAT/DEXTROSE 5%/PF 100 MCG/10
25 PATIENT CONTROLLED ANALGESIA SYRINGE INTRAVENOUS CONTINUOUS
Status: DISCONTINUED | OUTPATIENT
Start: 2019-11-05 | End: 2019-11-06

## 2019-11-05 RX ORDER — HYDROCODONE BITARTRATE AND ACETAMINOPHEN 500; 5 MG/1; MG/1
TABLET ORAL
Status: DISCONTINUED | OUTPATIENT
Start: 2019-11-05 | End: 2019-11-06

## 2019-11-05 RX ORDER — DOPAMINE HYDROCHLORIDE 160 MG/100ML
INJECTION, SOLUTION INTRAVENOUS
Status: COMPLETED | OUTPATIENT
Start: 2019-11-05 | End: 2019-11-05

## 2019-11-05 RX ORDER — DOPAMINE HYDROCHLORIDE 160 MG/100ML
5 INJECTION, SOLUTION INTRAVENOUS CONTINUOUS
Status: DISCONTINUED | OUTPATIENT
Start: 2019-11-05 | End: 2019-11-06

## 2019-11-05 RX ADMIN — CHLORHEXIDINE GLUCONATE 0.12% ORAL RINSE 15 ML: 1.2 LIQUID ORAL at 09:11

## 2019-11-05 RX ADMIN — Medication 0.02 MCG/KG/MIN: at 03:11

## 2019-11-05 RX ADMIN — DOPAMINE HYDROCHLORIDE IN DEXTROSE 5 MCG/KG/MIN: 1.6 INJECTION, SOLUTION INTRAVENOUS at 03:11

## 2019-11-05 RX ADMIN — TRAMADOL HYDROCHLORIDE 50 MG: 50 TABLET, FILM COATED ORAL at 07:11

## 2019-11-05 RX ADMIN — ALBUMIN (HUMAN) 250 ML: 12.5 SOLUTION INTRAVENOUS at 05:11

## 2019-11-05 RX ADMIN — EPINEPHRINE 0.02 MCG/KG/MIN: 1 INJECTION INTRAMUSCULAR; INTRAVENOUS; SUBCUTANEOUS at 05:11

## 2019-11-05 RX ADMIN — MAGNESIUM SULFATE 2 G: 2 INJECTION INTRAVENOUS at 07:11

## 2019-11-05 RX ADMIN — Medication 25 MCG/HR: at 09:11

## 2019-11-05 RX ADMIN — FENTANYL CITRATE 50 MCG: 50 INJECTION INTRAMUSCULAR; INTRAVENOUS at 05:11

## 2019-11-05 RX ADMIN — DOPAMINE HYDROCHLORIDE 5 MCG/KG/MIN: 160 INJECTION, SOLUTION INTRAVENOUS at 02:11

## 2019-11-05 RX ADMIN — DEXTROSE MONOHYDRATE: 5 INJECTION, SOLUTION INTRAVENOUS at 05:11

## 2019-11-05 RX ADMIN — DEXMEDETOMIDINE HYDROCHLORIDE 1.5 MCG/KG/HR: 4 INJECTION INTRAVENOUS at 05:11

## 2019-11-05 RX ADMIN — INSULIN ASPART 6 UNITS: 100 INJECTION, SOLUTION INTRAVENOUS; SUBCUTANEOUS at 11:11

## 2019-11-05 RX ADMIN — SODIUM CHLORIDE 2 UNITS/HR: 9 INJECTION, SOLUTION INTRAVENOUS at 10:11

## 2019-11-05 RX ADMIN — PIPERACILLIN AND TAZOBACTAM 4.5 G: 4; .5 INJECTION, POWDER, FOR SOLUTION INTRAVENOUS at 11:11

## 2019-11-05 RX ADMIN — SODIUM CHLORIDE 500 ML: 0.9 INJECTION, SOLUTION INTRAVENOUS at 02:11

## 2019-11-05 RX ADMIN — ATORVASTATIN CALCIUM 80 MG: 40 TABLET, FILM COATED ORAL at 09:11

## 2019-11-05 RX ADMIN — CHLORHEXIDINE GLUCONATE 0.12% ORAL RINSE 10 ML: 1.2 LIQUID ORAL at 10:11

## 2019-11-05 RX ADMIN — ALBUMIN (HUMAN) 12.5 G: 12.5 SOLUTION INTRAVENOUS at 07:11

## 2019-11-05 RX ADMIN — CLOPIDOGREL BISULFATE 75 MG: 75 TABLET ORAL at 10:11

## 2019-11-05 RX ADMIN — ASPIRIN 81 MG: 81 TABLET, COATED ORAL at 10:11

## 2019-11-05 RX ADMIN — FAMOTIDINE 20 MG: 10 INJECTION INTRAVENOUS at 09:11

## 2019-11-05 RX ADMIN — VANCOMYCIN HYDROCHLORIDE 2000 MG: 100 INJECTION, POWDER, LYOPHILIZED, FOR SOLUTION INTRAVENOUS at 10:11

## 2019-11-05 RX ADMIN — ETOMIDATE 20 MG: 2 INJECTION INTRAVENOUS at 02:11

## 2019-11-05 RX ADMIN — METOPROLOL TARTRATE 100 MG: 50 TABLET ORAL at 10:11

## 2019-11-05 RX ADMIN — Medication 0.02 MCG/KG/MIN: at 05:11

## 2019-11-05 RX ADMIN — PIPERACILLIN AND TAZOBACTAM 4.5 G: 4; .5 INJECTION, POWDER, FOR SOLUTION INTRAVENOUS at 02:11

## 2019-11-05 RX ADMIN — DEXMEDETOMIDINE HYDROCHLORIDE 0.9 MCG/KG/HR: 4 INJECTION INTRAVENOUS at 09:11

## 2019-11-05 RX ADMIN — PANTOPRAZOLE SODIUM 40 MG: 40 TABLET, DELAYED RELEASE ORAL at 10:11

## 2019-11-05 RX ADMIN — IOHEXOL 30 ML: 350 INJECTION, SOLUTION INTRAVENOUS at 09:11

## 2019-11-05 RX ADMIN — LOSARTAN POTASSIUM 12.5 MG: 25 TABLET, FILM COATED ORAL at 10:11

## 2019-11-05 NOTE — PROCEDURES
"Mateusz Bates is a 69 y.o. female patient.    Temp: 97.9 °F (36.6 °C) (11/05/19 1139)  Pulse: 80 (11/05/19 1709)  Resp: (!) 21 (11/05/19 1709)  BP: (!) 126/59 (11/05/19 1139)  SpO2: (!) 85 % (11/05/19 1451)  Weight: 68.6 kg (151 lb 3.8 oz) (11/05/19 0600)  Height: 5' 1" (154.9 cm) (11/01/19 1433)  Mallampati Scale: Class II  ASA Classification: Class 2    Intubation  Date/Time: 11/5/2019 3:29 PM  Performed by: Riaz Tanner MD  Authorized by: Riaz Tanner MD   Consent Done: Emergent Situation  Indications: respiratory distress and  airway protection  Intubation method: direct  Patient status: sedated  Preoxygenation: mask  Sedatives: etomidate  Laryngoscope size: Mac 3  Tube size: 8.0 mm  Tube type: cuffed  Number of attempts: 1  Ventilation between attempts: BVM  Cricoid pressure: yes  Cords visualized: yes  Post-procedure assessment: CO2 detector  Breath sounds: clear  Cuff inflated: yes  ETT to lip: 24 cm  Tube secured with: ETT dolan  Chest x-ray interpreted by me.  Chest x-ray findings: endotracheal tube in appropriate position  Patient tolerance: Patient tolerated the procedure well with no immediate complications  Complications: No  Specimens: No  Implants: No          Riaz Tanner  11/5/2019  "

## 2019-11-05 NOTE — SUBJECTIVE & OBJECTIVE
Interval History: Uneventful evening.     Oncology Treatment Plan:   [No treatment plan]    Medications:  Continuous Infusions:    Scheduled Meds:   aspirin  81 mg Oral Daily    atorvastatin  80 mg Oral QHS    chlorhexidine  10 mL Mouth/Throat BID    clopidogrel  75 mg Oral Daily    docusate sodium  100 mg Oral BID    insulin detemir U-100  15 Units Subcutaneous BID    losartan  12.5 mg Oral Daily    magnesium sulfate IVPB  2 g Intravenous Once    metoprolol tartrate  100 mg Oral BID    nozaseptin   Each Nostril BID    pantoprazole  40 mg Oral Daily    polyethylene glycol  17 g Oral Daily     PRN Meds:sodium chloride, albumin human 5%, albuterol sulfate, chlorhexidine, Dextrose 10% Bolus, Dextrose 10% Bolus, Dextrose 10% Bolus, dextrose 50%, glucagon (human recombinant), glucose, glucose, insulin aspart U-100, lactated ringers, magnesium sulfate IVPB, magnesium sulfate IVPB, metoclopramide HCl, metoprolol tartrate, nozaseptin, ondansetron, potassium chloride in water **AND** potassium chloride in water **AND** potassium chloride in water, Flushing PICC Protocol **AND** sodium chloride 0.9%, traMADol     Review of Systems   Constitutional: Negative.    HENT: Negative.    Eyes: Negative.    Respiratory: Positive for shortness of breath (with exertion).    Cardiovascular: Negative.    Gastrointestinal: Positive for diarrhea. Negative for nausea and vomiting.   Endocrine: Negative.    Genitourinary: Negative.    Musculoskeletal: Negative.    Skin: Positive for wound.   Allergic/Immunologic: Negative.    Neurological: Negative.    Hematological: Negative.    Psychiatric/Behavioral: Negative.      Objective:     Vital Signs (Most Recent):  Temp: 97.6 °F (36.4 °C) (11/05/19 0801)  Pulse: 79 (11/05/19 0808)  Resp: (!) 22 (11/05/19 0801)  BP: (!) 104/52 (11/05/19 0808)  SpO2: (!) 93 % (11/05/19 0321) Vital Signs (24h Range):  Temp:  [97.6 °F (36.4 °C)-99 °F (37.2 °C)] 97.6 °F (36.4 °C)  Pulse:  [69-91] 79  Resp:   [18-27] 22  SpO2:  [93 %-97 %] 93 %  BP: (104-133)/(44-65) 104/52     Weight: 68.6 kg (151 lb 3.8 oz)  Body mass index is 28.58 kg/m².  Body surface area is 1.72 meters squared.      Intake/Output Summary (Last 24 hours) at 11/5/2019 0853  Last data filed at 11/5/2019 0600  Gross per 24 hour   Intake 120 ml   Output --   Net 120 ml       Physical Exam   Constitutional: She is oriented to person, place, and time. She appears well-developed and well-nourished. No distress.   HENT:   Head: Normocephalic and atraumatic.   Eyes: Conjunctivae and EOM are normal.   Neck: Normal range of motion. Neck supple.   Cardiovascular: Normal rate and regular rhythm.   Pulmonary/Chest: Effort normal and breath sounds normal.   Abdominal: Soft. Bowel sounds are normal.   Musculoskeletal: Normal range of motion.   Neurological: She is alert and oriented to person, place, and time.   Skin: Skin is warm and dry. She is not diaphoretic.   Psychiatric: She has a normal mood and affect. Her behavior is normal. Judgment and thought content normal.   Nursing note and vitals reviewed.      Significant Labs:   All pertinent labs from the last 24 hours have been reviewed.    Diagnostic Results:  I have reviewed all pertinent imaging results/findings within the past 24 hours.

## 2019-11-05 NOTE — PROGRESS NOTES
Spoke with dr. rodriguez about pt taking plavix due to low plt. Count. . Instructed to give per mar.

## 2019-11-05 NOTE — PROGRESS NOTES
Message sent thru secure chat to dr. meneses of pt wbc , bs of 64 , weakness, color , and drop in bp upon getting up to bsc and then chair.

## 2019-11-05 NOTE — ASSESSMENT & PLAN NOTE
-Hemoglobin A1C was 9 on 9/19/19.   -NISS.   -ADA diet.   11/2  Levemir / NISS / Accuchecks  11/3  Levemir increased to 15 sq bid  NISS  Accuchecks  11/4  Levemir  NISS  Accuchecks  11/5  Levemir 10 units BID  NISS  Accuchecks

## 2019-11-05 NOTE — PLAN OF CARE
POC reviewed, verbalized understanding. Pt remained free from falls, fall precautions in place. Pt is paced on monitor. VSS. No other c/o at this time. PICC intact, saline locked. Blood glucose monitored. Pt given chlorhexidine bath. Sternal precautions in place. External pace set to DDD. Wound vac intact. Call bell and personal belongings within reach. Hourly rounding complete. Reminded to call for assistance. Will continue to monitor.

## 2019-11-05 NOTE — PROGRESS NOTES
[facility] Hospital Medicine  Progress Note    Patient Name: Mateusz Bates  MRN: [id number]  Patient Class: IP- Inpatient   Admission Date: 10/28/2019  Length of Stay: 8 days  Attending Physician: Keny Zaidi MD  Primary Care Provider: Serenity Kilpatrick MD        Subjective:     Principal Problem:CAD, multiple vessel        HPI:  Mateusz Bates is a 69 year old female with DM II, hypertension and hyperlipidemia who presented to the ED at the request of her PCP due to finding of an abnormal EKG and complaints of chest tightness with SOB. The patient reports having intermittent symptoms for the last 1-2 months. On 10/26/19, she began having a tightness in her mid chest that radiated into her back with associated SOB. Symptoms were worse with laying flat and improved with sitting up. They lasted only minutes, but this was longer than previous episodes. She denies a trend in when these episodes occur as well as associated nausea, vomiting and diaphoresis. The patient's EKG was significant for a new LBBB compared to previous in 2016. Labs were significant for a BNP of 1,305, troponin of 3.665 and potassium of 3.4. Code status was discussed with the patient and her sister. She is a full code. Her sister, Christina Hernandez, is her surrogate medical decision maker.     Overview/Hospital Course:  Admitted for evaluation and treatment of chest pain concerning for acute coronary syndrome.  Evaluation by Cardiology and urgent left heart catheterization.  Angiogram discovered severe multi-vessel coronary artery disease.  Cardiac echo showed severely reduced LV function.  Referral to Cardiothoracic Surgery who recommended CABG.  Balloon pump placed 30 October.  CABG x3 by Dr. Zaidi 31 October.  Successfully extubated morning of 01 November.  11/2 - Patient improved s/p extubation. CABG x 3. Discussed with CC Team. BS mild elevation. SA and LA insulin initiated.  11/3 - Improved BS control. Patient + chest  tenderness. Tolerating diet. Discussed with cc team  11/4 - Patient with improved strength interactivity. Patient denies n/v/d + Sx site tenderness. Patient reports she is weakened.  11/5 - Patient denies CP. Reports she is weak. Denies SOB. Patient lethargic and clammy sitting in chair at bedside. Discussed with care team. Patient to return to bed with warming blankets, Blood Sugar 88 last check.     Interval History: Lethargic, clammy. BS 88 monitor. Patient denies cp/sob. Sitting in chair at bedside. Appropriate and responds to query. Discussed with [facility] Staff.    Review of Systems   Constitutional: Positive for activity change and fatigue. Negative for unexpected weight change.   HENT: Negative.  Negative for trouble swallowing.    Eyes: Negative.    Respiratory: Negative.  Negative for cough, shortness of breath and wheezing.    Cardiovascular: Negative.  Negative for chest pain.   Gastrointestinal: Negative.    Endocrine: Negative.    Genitourinary: Negative.    Musculoskeletal: Positive for myalgias.   Skin: Positive for wound.   Allergic/Immunologic: Negative.    Neurological: Positive for weakness. Negative for dizziness.   Hematological: Negative.    Psychiatric/Behavioral: Negative.    All other systems reviewed and are negative.    Objective:     Vital Signs (Most Recent):  Temp: 97.6 °F (36.4 °C) (11/05/19 0801)  Pulse: 79 (11/05/19 1024)  Resp: (!) 22 (11/05/19 0801)  BP: (!) 143/63 (11/05/19 1024)  SpO2: (!) 94 % (11/05/19 0945) Vital Signs (24h Range):  Temp:  [97.6 °F (36.4 °C)-99 °F (37.2 °C)] 97.6 °F (36.4 °C)  Pulse:  [69-91] 79  Resp:  [18-27] 22  SpO2:  [93 %-96 %] 94 %  BP: (104-143)/(49-65) 143/63     Weight: 68.6 kg (151 lb 3.8 oz)  Body mass index is 28.58 kg/m².    Intake/Output Summary (Last 24 hours) at 11/5/2019 1044  Last data filed at 11/5/2019 0600  Gross per 24 hour   Intake 120 ml   Output --   Net 120 ml      Physical Exam   Constitutional: She is oriented to person, place, and  time. She appears well-developed and well-nourished. No distress.   Lethargic   HENT:   Head: Normocephalic and atraumatic.   Mouth/Throat: Oropharynx is clear and moist.   Eyes: Pupils are equal, round, and reactive to light. Conjunctivae and EOM are normal.   Neck: Neck supple. No JVD present. No thyromegaly present.   Cardiovascular: Normal rate and regular rhythm. Exam reveals no gallop and no friction rub.   No murmur heard.  Pulmonary/Chest: Effort normal and breath sounds normal. She has no wheezes. She has no rales. She exhibits tenderness.   + midline chest sx site   Abdominal: Soft. Bowel sounds are normal. She exhibits no distension. There is no tenderness. There is no rebound and no guarding.   Musculoskeletal: Normal range of motion. She exhibits edema. She exhibits no deformity.   Lymphadenopathy:     She has no cervical adenopathy.   Neurological: She is oriented to person, place, and time. She has normal reflexes.   Skin: Skin is warm and dry. Capillary refill takes 2 to 3 seconds. No rash noted.   Psychiatric: She has a normal mood and affect.   Nursing note and vitals reviewed.      Significant Labs:   ABGs: No results for input(s): PH, PCO2, HCO3, POCSATURATED, BE, TOTALHB, COHB, METHB, O2HB, POCFIO2 in the last 48 hours.  Blood Culture: No results for input(s): LABBLOO in the last 48 hours.  BMP:   Recent Labs   Lab 11/05/19  0502         K 4.8   CL 99   CO2 23   BUN 26*   CREATININE 0.7   CALCIUM 9.5   MG 2.1     CBC:   Recent Labs   Lab 11/04/19  0659 11/05/19  0502   WBC 13.13* 16.09*   HGB 11.1* 12.5   HCT 34.4* 39.5   * 73*     CMP:   Recent Labs   Lab 11/04/19  0659 11/04/19  1713 11/05/19  0502    131* 136   K 4.0 4.5 4.8   CL 96 91* 99   CO2 30* 29 23   * 407* 109   BUN 27* 30* 26*   CREATININE 0.7 0.9 0.7   CALCIUM 9.5 9.0 9.5   ANIONGAP 10 11 14   EGFRNONAA >60 >60 >60     All pertinent labs within the past 24 hours have been reviewed.    Significant  Imaging: I have reviewed all pertinent imaging results/findings within the past 24 hours.      Assessment/Plan:      * CAD, multiple vessel        Thrombocytopenia  11/2  Heme On on Consult  HIT panel  Likely drug induced  Monitor  11/3  Improved at 89 today  Monitor  11/5  Plts 73  Monitor    S/P CABG x 3  11/4  CABG x 3  ASA  Statin  BB  CV sx      Acute combined systolic and diastolic CHF, NYHA class 2  Probable ischemic cardiomyopathy.  Anticipate CABG 31 October.  Optimize medical management.    NSTEMI (non-ST elevated myocardial infarction)  Troponin peaked at 3.66 now trending down.  Heparin, metoprolol, ASA and statin. Hold ACE inhibitor due to intolerance with cough. Hold ARB as patient's blood pressure is marginal. Will consider adding, if her blood pressure will tolerate.  Hold second antiplatelet due to pending Cardiology procedure.  Patient was evaluated for Cardiac Rehab and is not a candidate at this time, but will be referred, when appropriate.  LHC on 28 on October showed severe multi-vessel CAD.  Referral to CVT Surgery who recommended CABG planned for Thursday 31 October.  Balloon pump placed 30 October.  CABG x3 by Dr. Zaidi on 31 October.  11/2  S/p CABG  Cards  CT Sx  Statin  BB      Hyperlipidemia associated with type 2 diabetes mellitus  -Patient was improved, but not at goal on 10/4/19.   -Continue statin.       Obesity (BMI 30.0-34.9)  Diet  Nutrition        Diabetes mellitus type II, uncontrolled  -Hemoglobin A1C was 9 on 9/19/19.   -NISS.   -ADA diet.   11/2  Levemir / NISS / Accuchecks  11/3  Levemir increased to 15 sq bid  NISS  Accuchecks  11/4  Levemir  NISS  Accuchecks  11/5  Levemir 10 units BID  NISS  Accuchecks      Anxiety and depression  -Stable.   -Continue Effexor.       Hypertension associated with diabetes  -Stable.   -Continue Norvasc.  11/2 -  BB , CCB  11/3 - Controlled  BB  CCB  11/4  Controlled  BB  CCB    11/5  Controlled  BB  CCB        VTE Risk Mitigation (From  admission, onward)         Ordered     IP VTE LOW RISK PATIENT  Once      10/30/19 1405     Place NATHANIEL hose  Until discontinued      10/30/19 1405     Place sequential compression device  Until discontinued      10/30/19 1405     Reason for no Mechanical VTE Prophylaxis  Once      10/28/19 1414     heparin 25,000 units in dextrose 5% 250 mL (100 units/mL) infusion LOW INTENSITY nomogram - [facility]  Continuous      10/28/19 1257                      Josse Patel MD  Department of Hospital Medicine   [facility]

## 2019-11-05 NOTE — PROGRESS NOTES
Code blue called , patient intubated per  . Unable to get abg done. EKG shown to Doctor at bedside . Transported to ICU .  aware of not getting blood gas he said he needs to get in femoral artery

## 2019-11-05 NOTE — TELEPHONE ENCOUNTER
Pt's caregiver (Christina) cancelled procedure due to having heart surgery on 10-31-19. Will call back after recovery period. irina.

## 2019-11-05 NOTE — ASSESSMENT & PLAN NOTE
Platelets normal at 300K on 10/30, then declined 10/31 78K, 11/1 76K, 11/2 47K.  Possible cause of acute thrombocytopenia: drug induced immune thrombocytopenia (amiodarone given 10/30, lasix started 10/28 and currently receiving), HIT (4T score low prob), TTP (plasmic score intermediate prob), ITP, DIC.  Have sent: HIT antibodies, [id number], fibrinogen, ddimer, C3/C4 complements, pathology review for schistocytes, reticulocytes, haptoglobin, LDH.  If platelets continue to decline, consider stop Lasix, and use bumex.    11/3/2019 platelets improving 89K today.  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    11/4/19 Platelets improved to 140 K.  Most likely due to previous medication.  Continuing to trend upward daily.  Hit panel pending.  --CBC daily    11/5/19 Platelets reduced to 73 K today from 140 K yesterday.  Review of peripheral smear show no schistocytes - so not likely DIC or TTP.  Doesn't appear to be on any medications currently that are known to cause thrombocytopenia; however previously on Amiodarone - this has since been stopped.  HIT is negative.  Possible infection.  Temp of 100.6 yesterday; none over the past 24 hours.  Worsening leukocytosis.  States currently with diarrhea without abdominal pain.  Denies dysuria.   Also mid sternal incision is erythremic and well approximated.  Wound vac DC'd today.    --CBC daily  --Consider work up for infection

## 2019-11-05 NOTE — PROGRESS NOTES
picc line remove. Cath tip intact. Pressure to site. New iv to rt wrist. With  20g using aseptic technique. Tolerated well. Good blood return. Pt up to bsc with max assist. Pt more weak than this am. Pt falling asleep while talking to her. Pt did void. Urine for ua. Blood culture done per lab. Sister at bedside. Pt back to bed with max assist.

## 2019-11-05 NOTE — PROGRESS NOTES
[facility]  Cardiology  Progress Note    Patient Name: Mateusz Bates  MRN: [id number]  Admission Date: 10/28/2019  Hospital Length of Stay: 8 days  Code Status: Full Code   Attending Physician: Keny Zaidi MD   Primary Care Physician: Serenity Kilpatrick MD  Expected Discharge Date: 11/8/2019  Principal Problem:CAD, multiple vessel    Subjective:   Brief HPI:  Ms. Bates is a 69 year old female patient whose current medical conditions include HTN, hyperlipidemia, obesity, DM type II, cerebral microvascular disease, FAHAD, and B12 deficiency who was sent to [facility] ED today from her PCP's office due to chest pain and abnormal EKG. Patient complained of substernal chest tightness/heaviness that radiated to her back on Saturday while at rest. Associated symptoms included orthopnea and SOB. Patient denied any associated nausea, vomiting, diaphoresis, palpitations, near syncope, or syncope. Initial workup in ED revealed troponin of 3.665 and BNP of 1305 and patient subsequently admitted for further evaluation and treatment of NSTEMI. Cardiology consulted to assist with management. Patient seen and examined today while in ED. Feels well at time of exam, denies chest pain. She does report similar symptoms over the past few months but states they never lasted as long and were less intense. She reports compliance with her medications. States her father had history of CABG. Chart reviewed. Repeat troponin pending. EKG reviewed and shows SR with new LBBB. 2D echo pending.      Hospital Course:   10/29/19-Patient seen and examined today, s/p C yesterday that showed multivessel CAD. CVT consulted, CABG planned for Thursday. Feels ok. No chest pain or SOB. Labs stable. Being diuresed. 2D echo showed EF of 30-35%, DD.    10/30/19-Patient seen and examined today. Feels well. No complaints. Denies chest pain or SOB. Labs stable. IABP placement scheduled for today.    10/31/19- Patient is s/p CABG x3 with LIMA to LAD  reverse saphenous vein graft to the RCA and reverse saphenous vein graft to the ramus. Has been transferred to ICU. Is intubated and sedated. CT x3. IABP at 1:1 still in place. Transfused 3 units of PRBC in the OR and has received additional 3 units in ICU, has received 500cc albumin, epi and milrinone gtt.  H/H 6.9/21.0. Renal function stable. Paced rhythm on monitor     11/1/2019--patient seen and examined in ICU. Extubated o/n, remains on IABP 1:1 today, plans to possibly wean off later today per Dr. Zaidi. Remains on Epi and Milrinone gtt today. Paced on monitor. Labs stable today.     11/2/19-Patient seen and examined today, lying in bed. Feels ok. Complains of post-op pain and fatigue. Labs reviewed. Platelets 47,000. Heme/onc on board.     11/3/19-Patient seen and examined today, sitting up in chair. Feeling better. Still weak. Labs reviewed. Platelets 89,000.    11/4/19- Patient is POD 4 s/p CABG x3. OOB in chair. Feels better this morning. Pain controlled. Platelets improving since holding Plavix.Vitals stable     11/5/19- Patient is POD 5 s/p CABGx3. Has been transferred to Tele. Is clammy and generally weak this morning. Glucose and vitals stable. Noted to have bump in WBC. Primary team has been notified by nurse. Platelets 73, but H/H stable         Review of Systems   Constitution: Positive for malaise/fatigue. Negative for diaphoresis, weight gain and weight loss.   HENT: Negative for congestion and nosebleeds.    Cardiovascular: Negative for chest pain, claudication, cyanosis, dyspnea on exertion, irregular heartbeat, leg swelling, near-syncope, orthopnea, palpitations, paroxysmal nocturnal dyspnea and syncope.   Respiratory: Negative for cough, hemoptysis, shortness of breath, sleep disturbances due to breathing, snoring, sputum production and wheezing.    Hematologic/Lymphatic: Negative for bleeding problem. Does not bruise/bleed easily.   Skin: Negative for rash.   Musculoskeletal: Negative for  arthritis, back pain, falls, joint pain, muscle cramps and muscle weakness.   Gastrointestinal: Negative for abdominal pain, constipation, diarrhea, heartburn, hematemesis, hematochezia, melena, nausea and vomiting.   Genitourinary: Negative for dysuria, hematuria and nocturia.   Neurological: Negative for excessive daytime sleepiness, dizziness, headaches, light-headedness, loss of balance, numbness, vertigo and weakness.     Objective:     Vital Signs (Most Recent):  Temp: 97.9 °F (36.6 °C) (11/05/19 1139)  Pulse: 73 (11/05/19 1139)  Resp: 18 (11/05/19 1139)  BP: (!) 126/59 (11/05/19 1139)  SpO2: 96 % (11/05/19 1139) Vital Signs (24h Range):  Temp:  [97.6 °F (36.4 °C)-99 °F (37.2 °C)] 97.9 °F (36.6 °C)  Pulse:  [69-91] 73  Resp:  [18-24] 18  SpO2:  [93 %-96 %] 96 %  BP: (104-143)/(52-65) 126/59     Weight: 68.6 kg (151 lb 3.8 oz)  Body mass index is 28.58 kg/m².     SpO2: 96 %  O2 Device (Oxygen Therapy): room air      Intake/Output Summary (Last 24 hours) at 11/5/2019 1230  Last data filed at 11/5/2019 0600  Gross per 24 hour   Intake 120 ml   Output --   Net 120 ml       Lines/Drains/Airways     Peripherally Inserted Central Catheter Line                 PICC Double Lumen 11/02/19 1326 right basilic 3 days                Physical Exam   Constitutional: She is oriented to person, place, and time. She appears well-developed and well-nourished. No distress.   HENT:   Head: Normocephalic and atraumatic.   Eyes: Pupils are equal, round, and reactive to light. Right eye exhibits no discharge. Left eye exhibits no discharge.   Neck: Neck supple. No JVD present.   Cardiovascular: Normal rate, regular rhythm, S1 normal, S2 normal and normal heart sounds.   No murmur heard.  Pulmonary/Chest: Effort normal. No respiratory distress. She has no wheezes.   CABG site C/D/I; no bleeding erythema or drainage    Diminished BS at bases   Abdominal: Soft. She exhibits no distension.   Musculoskeletal: She exhibits no edema.    Neurological: She is alert and oriented to person, place, and time.   Skin: Skin is warm and dry. She is not diaphoretic. No erythema.   SVG sites C/D/I; no bleeding erythema or drainage   Psychiatric: She has a normal mood and affect. Her behavior is normal. Thought content normal.   Nursing note and vitals reviewed.      Significant Labs:   All pertinent lab results from the last 24 hours have been reviewed. and   Recent Lab Results       11/05/19  1044   11/05/19  0838   11/05/19  0808   11/05/19  0502   11/05/19  0434        Anion Gap       14       Baso #       0.07       Basophil%       0.4       BUN, Bld       26       Calcium       9.5       Chloride       99       CO2       23       Creatinine       0.7       Differential Method       Automated       eGFR if        >60       eGFR if non        >60  Comment:  Calculation used to obtain the estimated glomerular filtration  rate (eGFR) is the CKD-EPI equation.          Eos #       0.5       Eosinophil%       3.1       Glucose       109       Gran # (ANC)       10.3       Gran%       63.7       Hematocrit       39.5       Hemoglobin       12.5       Immature Grans (Abs)       0.25  Comment:  Mild elevation in immature granulocytes is non specific and   can be seen in a variety of conditions including stress response,   acute inflammation, trauma and pregnancy. Correlation with other   laboratory and clinical findings is essential.         Immature Granulocytes       1.6       Lymph #       2.6       Lymph%       16.2       Magnesium       2.1       MCH       28.2       MCHC       31.6       MCV       89       Mono #       2.4       Mono%       15.0       MPV       11.5       nRBC       0       Platelet Estimate       Decreased       Platelets       73       POCT Glucose 270 88 63   94     Potassium       4.8       RBC       4.44       RDW       15.0       Sodium       136       WBC       16.09                         11/04/19  2131   11/04/19  1728   11/04/19  1713   11/04/19  1246        Anion Gap     11       Baso #             Basophil%             BUN, Bld     30       Calcium     9.0       Chloride     91       CO2     29       Creatinine     0.9       Differential Method             eGFR if      >60       eGFR if non      >60  Comment:  Calculation used to obtain the estimated glomerular filtration  rate (eGFR) is the CKD-EPI equation.          Eos #             Eosinophil%             Glucose     407       Gran # (ANC)             Gran%             Hematocrit             Hemoglobin             Immature Grans (Abs)             Immature Granulocytes             Lymph #             Lymph%             Magnesium             MCH             MCHC             MCV             Mono #             Mono%             MPV             nRBC             Platelet Estimate             Platelets             POCT Glucose 331 302   316     Potassium     4.5       RBC             RDW             Sodium     131       WBC                   Significant Imaging: Echocardiogram:   2D echo with color flow doppler:   Results for orders placed or performed during the hospital encounter of 10/28/19   2D echo with color flow doppler   Result Value Ref Range    QEF 30 (A) 55 - 65    Mitral Valve Regurgitation MILD TO MODERATE     Diastolic Dysfunction Yes (A)     Aortic Valve Regurgitation MILD     Mitral Valve Mobility NORMAL     Narrative    Date of Procedure: 10/28/2019        TEST DESCRIPTION   Technical Quality: This is a technically adequate study.     Aorta: The aortic root is normal in size, measuring 3.0 cm at sinotubular junction and 3.2 cm at Sinuses of Valsalva. The proximal ascending aorta is normal in size, measuring 3.1 cm across.     Left Atrium: The left atrial volume index is severely enlarged, measuring 52.97 cc/m2.     Left Ventricle: The left ventricle is normal in size, with an end-diastolic diameter of  4.1 cm, and an end-systolic diameter of 3.7 cm. Septal wall thickness is mildly increased, with the septum measuring 1.6 cm and the posterior wall measuring 1.3 cm   across. Relative wall thickness was increased at 0.63, and the LV mass index was increased at 160.6 g/m2 consistent with concentric left ventricular hypertrophy. The anterior septum is severely hypokinetic. There is global hypokinesis. Left ventricular   systolic function appears moderately depressed. Visually estimated ejection fraction is 30-35%. The LV Doppler derived stroke volume equals 57.0 ccs.     Diastolic indices: E wave velocity 1.1 m/s, E/A ratio 0.9,  msec., E/e' ratio(avg) 16. There is pseudonormalization of mitral inflow pattern consistent with significant diastolic dysfunction.     Right Atrium: The right atrium is normal in size, measuring 3.7 cm in length and 2.8 cm in width in the apical view.     Right Ventricle: The right ventricle is normal in size. Global right ventricular systolic function appears normal. Tricuspid annular plane systolic excursion (TAPSE) is 2.2 cm.     Aortic Valve:  The aortic valve is normal in structure with normal leaflet mobility. The mean gradient obtained across the aortic valve is 6 mmHg. Additionally, there is mild aortic regurgitation.     Mitral Valve:  The mitral valve is normal in structure with normal leaflet mobility. The pressure half time is 45 msec. The calculated mitral valve area is 4.89 cm2. There is mild to moderate mitral regurgitation.     Tricuspid Valve:  The tricuspid valve is normal in structure with normal leaflet mobility.     Pulmonary Valve:  The pulmonic valve is not well seen.     IVC: IVC is normal in size and collapses > 50% with a sniff, suggesting normal right atrial pressure of 3 mmHg.     Intracavitary: There is no evidence of pericardial effusion, intracavity mass, thrombi, or vegetation.         CONCLUSIONS     1 - Moderately depressed left ventricular systolic  function (EF 30-35%).     2 - Impaired LV relaxation, elevated LAP (grade 2 diastolic dysfunction).     3 - Normal right ventricular systolic function .     4 - Mild aortic regurgitation.     5 - Severe left atrial enlargement.     6 - Concentric hypertrophy.     7 - Mild to moderate mitral regurgitation.             This document has been electronically    SIGNED BY: Terry Tarango MD On: 10/28/2019 16:38     Assessment and Plan:       * CAD, multiple vessel  -See plan under CABG    Thrombocytopenia  -Mgmt as per heme/onc  -Plavix was on hold, but resumed by primary team   -ASA has been resumed     11/5/19  -consider holding Plavix  -Hematology has been consulted     Acute blood loss anemia  -Patient transfused 3 units in the OR and additional 3 units in ICU    11/2/19  -Mgmt as per CVT    S/P CABG x 3  -Patient is s/p CABG x3 with LIMA to LAD reverse saphenous vein graft to the RCA and reverse saphenous vein graft to the ramus.  -Continue post CABG supportive therapy  -Continue ASA, Plavix, Statin, BB  -Will continue to follow along     11/1/19  -POD #1 s/p CABG x3  -Continue ASA, Statin, BB  -Remains on Epi and Milrinone gtt today  -Mgmt per CT surgery  -Will follow along    11/2/19  -Recovering s/p CABG x 3  -Continue statin, BB  -Platelets 47,000 this AM; heme/onc on board, ASA held  -Await further rec's    11/3/19  -Stable, progressing post CABG x 3  -Continue statin, BB  -ASA re-started  -Platelets 89,000, heme/onc on board    11/4/19  -Patient slowly improving post CABG x3  -Continue ASA, Statin and BB  -plavix on hold due to thrombocytopenia that is improving   -IS use encouraged and early ambulation with PT  -Will continue to follow along     11/5/19  -Will continue ASA, Statin and BB  -Consider holding Plavix again for drop in plts after resuming by primary team. Will see if CT surgery ok with holding  -Recommend CXR to rule out post op PNA given bump in WBC and cough  -will need to monitor for  temp  -ambulate as tolerated and encourage IS use     Acute combined systolic and diastolic CHF, NYHA class 2  -Presents with ICM/acute combined CHF  -EF 30-35% by 2D echo  -Continue diuresis with IV Lasix  -Continue BB  -Will add ARB if BP permits    Chest pain  -See plan under NSTEMI    Abnormal EKG  -LBBB noted on EKG  -See plan under NSTEMI    NSTEMI (non-ST elevated myocardial infarction)  -Patient presented with intermittent chest tightness/heaviness that radiated to her back since Saturday evening  -Associated with JONES/orthopnea  -Initial troponin 3.665  -EKG showed SR with new LBBB  -Presentation consistent with NSTEMI  -Continue ASA, statin  -Start Toprol XL  -Start heparin gtt  -Check 2D echo  -Continue to trend troponin  -Keep NPO. [facility] today for further evaluation and PCI if indicated. All risks, benefits, and treatment alternatives explained to patient in detail. All questions answered. She has agreed to proceed. Further rec's to follow.    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 All risks, benefits, and treatment alternatives explained to patient in detail. All questions answered. She has agreed to proceed.    10/31/19  -Patient is s/p CABG x3.   See plan for CABG     Hyperlipidemia associated with type 2 diabetes mellitus  -Continue statin    Obesity (BMI 30.0-34.9)  -Weight loss    Diabetes mellitus type II, uncontrolled  -Mgmt as per hospital medicine    Hypertension associated with diabetes  -Continue home meds as BP permits        VTE Risk Mitigation (From admission, onward)         Ordered     IP VTE LOW RISK PATIENT  Once      10/30/19 1405      Place NATHANIEL hose  Until discontinued      10/30/19 1405     Place sequential compression device  Until discontinued      10/30/19 1405     Reason for no Mechanical VTE Prophylaxis  Once      10/28/19 1414     heparin 25,000 units in dextrose 5% 250 mL (100 units/mL) infusion LOW INTENSITY nomogram - [facility]  Continuous      10/28/19 1257              Chart reviewed. Patient examined by Dr. Matias and agrees with plan that has been outlined.     Toan Carbone, GURUP  Cardiology  [facility]

## 2019-11-05 NOTE — ASSESSMENT & PLAN NOTE
-Stable.   -Continue Norvasc.  11/2 -  BB , CCB  11/3 - Controlled  BB  CCB  11/4  Controlled  BB  CCB    11/5  Controlled  BB  CCB

## 2019-11-05 NOTE — PROGRESS NOTES
Pt up to bsc and chair with max assist. Pt very weak. C/o dizziness. sbp upon getting into chair 104. Pt had bm. Blood sugar checked =64 pt eating breakfast and consumed cranberry juice. Call light water and phone in reach. Pt did IS. Only 400 noted. Encouraged deep breaths. Will monitor. Will notify md of wbc this am

## 2019-11-05 NOTE — PROGRESS NOTES
[facility]   Hematology/Oncology  Progress Note    Patient Name: Mateusz Bates  Admission Date: 10/28/2019  Hospital Length of Stay: 8 days  Code Status: Full Code     Subjective:     HPI:  Mrs. Bates is a 69-year-old female with hypertension, diabetes, hyperlipidemia, obesity, anxiety and depression, iron deficiency anemia, vitamin B12 deficiency, who presented to the emergency room with an NSTEMI.  Workup included a cardiac catheterization that shows severe multivessel coronary artery disease with proximal LAD stenosis of 70% and severely depressed ejection fraction of 20%.  The patient is a candidate for urgent coronary artery bypass grafting. S/p CABG today.  Hematology is consulted for thrombocytopenia.    Interval History: Uneventful evening.     Oncology Treatment Plan:   [No treatment plan]    Medications:  Continuous Infusions:    Scheduled Meds:   aspirin  81 mg Oral Daily    atorvastatin  80 mg Oral QHS    chlorhexidine  10 mL Mouth/Throat BID    clopidogrel  75 mg Oral Daily    docusate sodium  100 mg Oral BID    insulin detemir U-100  15 Units Subcutaneous BID    losartan  12.5 mg Oral Daily    magnesium sulfate IVPB  2 g Intravenous Once    metoprolol tartrate  100 mg Oral BID    nozaseptin   Each Nostril BID    pantoprazole  40 mg Oral Daily    polyethylene glycol  17 g Oral Daily     PRN Meds:sodium chloride, albumin human 5%, albuterol sulfate, chlorhexidine, Dextrose 10% Bolus, Dextrose 10% Bolus, Dextrose 10% Bolus, dextrose 50%, glucagon (human recombinant), glucose, glucose, insulin aspart U-100, lactated ringers, magnesium sulfate IVPB, magnesium sulfate IVPB, metoclopramide HCl, metoprolol tartrate, nozaseptin, ondansetron, potassium chloride in water **AND** potassium chloride in water **AND** potassium chloride in water, Flushing PICC Protocol **AND** sodium chloride 0.9%, traMADol     Review of Systems   Constitutional: Negative.    HENT: Negative.    Eyes:  Negative.    Respiratory: Positive for shortness of breath (with exertion).    Cardiovascular: Negative.    Gastrointestinal: Positive for diarrhea. Negative for nausea and vomiting.   Endocrine: Negative.    Genitourinary: Negative.    Musculoskeletal: Negative.    Skin: Positive for wound.   Allergic/Immunologic: Negative.    Neurological: Negative.    Hematological: Negative.    Psychiatric/Behavioral: Negative.      Objective:     Vital Signs (Most Recent):  Temp: 97.6 °F (36.4 °C) (11/05/19 0801)  Pulse: 79 (11/05/19 0808)  Resp: (!) 22 (11/05/19 0801)  BP: (!) 104/52 (11/05/19 0808)  SpO2: (!) 93 % (11/05/19 0321) Vital Signs (24h Range):  Temp:  [97.6 °F (36.4 °C)-99 °F (37.2 °C)] 97.6 °F (36.4 °C)  Pulse:  [69-91] 79  Resp:  [18-27] 22  SpO2:  [93 %-97 %] 93 %  BP: (104-133)/(44-65) 104/52     Weight: 68.6 kg (151 lb 3.8 oz)  Body mass index is 28.58 kg/m².  Body surface area is 1.72 meters squared.      Intake/Output Summary (Last 24 hours) at 11/5/2019 0853  Last data filed at 11/5/2019 0600  Gross per 24 hour   Intake 120 ml   Output --   Net 120 ml       Physical Exam   Constitutional: She is oriented to person, place, and time. She appears well-developed and well-nourished. No distress.   HENT:   Head: Normocephalic and atraumatic.   Eyes: Conjunctivae and EOM are normal.   Neck: Normal range of motion. Neck supple.   Cardiovascular: Normal rate and regular rhythm.   Pulmonary/Chest: Effort normal and breath sounds normal.   Abdominal: Soft. Bowel sounds are normal.   Musculoskeletal: Normal range of motion.   Neurological: She is alert and oriented to person, place, and time.   Skin: Skin is warm and dry. She is not diaphoretic.   Psychiatric: She has a normal mood and affect. Her behavior is normal. Judgment and thought content normal.   Nursing note and vitals reviewed.      Significant Labs:   All pertinent labs from the last 24 hours have been reviewed.    Diagnostic Results:  I have reviewed all  pertinent imaging results/findings within the past 24 hours.    Assessment/Plan:     Thrombocytopenia  Platelets normal at 300K on 10/30, then declined 10/31 78K, 11/1 76K, 11/2 47K.  Possible cause of acute thrombocytopenia: drug induced immune thrombocytopenia (amiodarone given 10/30, lasix started 10/28 and currently receiving), HIT (4T score low prob), TTP (plasmic score intermediate prob), ITP, DIC.  Have sent: HIT antibodies, [id number], fibrinogen, ddimer, C3/C4 complements, pathology review for schistocytes, reticulocytes, haptoglobin, LDH.  If platelets continue to decline, consider stop Lasix, and use bumex.    11/3/2019 platelets improving 89K today.  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    11/4/19 Platelets improved to 140 K.  Most likely due to previous medication.  Continuing to trend upward daily.  Hit panel pending.  --CBC daily    11/5/19 Platelets reduced to 73 K today from 140 K yesterday.  Review of peripheral smear show no schistocytes - so not likely DIC or TTP.  Doesn't appear to be on any medications currently that are known to cause thrombocytopenia; however previously on Amiodarone - this has since been stopped.  HIT is negative.  Possible infection.  Temp of 100.6 yesterday; none over the past 24 hours.  Worsening leukocytosis.  States currently with diarrhea without abdominal pain.  Denies dysuria.   Also mid sternal incision is erythremic and well approximated.  Wound vac DC'd today.    --CBC daily  --Consider work up for infection        Thank you for your consult. I will follow-up with patient. Please contact us if you have any additional questions.     Anitha Skinner NP  Hematology/Oncology  [facility]

## 2019-11-05 NOTE — PROGRESS NOTES
Dr. vásquez and dr. rodriguez aware of pt status. Dr. rodriguez in and saw pt. Pt back to bed. bp wnl. Will monitor.

## 2019-11-05 NOTE — PT/OT/SLP PROGRESS
"Occupational Therapy  Treatment    Mateusz Bates   MRN: [id number]   Admitting Diagnosis: CAD, multiple vessel    OT Date of Treatment: 11/05/19   OT Start Time: 1022  OT Stop Time: 1045  OT Total Time (min): 23 min    Billable Minutes:  Therapeutic Activity 23 minutes    General Precautions: Standard, fall, sternal  Orthopedic Precautions: LUE non weight bearing, RUE non weight bearing  Braces: N/A         Subjective:  Communicated with nurse mcclure and epic chart review prior to session.    Pain/Comfort  Pain Rating 1: 0/10  Location - Side 1: Bilateral  Location - Orientation 1: generalized  Location 1: sternal  Pain Addressed 1: Reposition, Cessation of Activity, Distraction    Objective:  Patient found with: telemetry     Functional Mobility:  Bed Mobility:  Max a x2 with scooting higher in bed    Transfers:   squat pivot max a x 2 with bed side chair> bed and max a x 1-2 with sit>supine  Functional Ambulation: na    Activities of Daily Living:     Feeding adaptive equipment: na     UE adaptive equipment: na     LE adaptive equipment: na     Bathing adaptive equipment: na    Balance:   Static Sit: FAIR-: Maintains without assist but inconsistent   Dynamic Sit: POOR: N/A  Static Stand: 0: Needs MAXIMAL assist to maintain   Dynamic stand: total a      AM-PAC 6 CLICK ADL   How much help from another person does this patient currently need?   1 = Unable, Total/Dependent Assistance  2 = A lot, Maximum/Moderate Assistance  3 = A little, Minimum/Contact Guard/Supervision  4 = None, Modified [place]/Independent    Putting on and taking off regular lower body clothing? : 2  Bathing (including washing, rinsing, drying)?: 2  Toileting, which includes using toilet, bedpan, or urinal? : 2  Putting on and taking off regular upper body clothing?: 2  Taking care of personal grooming such as brushing teeth?: 2     AM-PAC Raw Score CMS "G-Code Modifier Level of Impairment Assistance   6 % Total / Unable   7 - 8 CM 80 - " 100% Maximal Assist   9-13 CL 60 - 80% Moderate Assist   14 - 19 CK 40 - 60% Moderate Assist   20 - 22 CJ 20 - 40% Minimal Assist   23 CI 1-20% SBA / CGA   24 CH 0% Independent/ Mod I       Patient left HOB elevated with all lines intact, call button in reach, nurse ren notified and nurse ren present    ASSESSMENT:  Mateusz Bates is a 69 y.o. female with a medical diagnosis of CAD, multiple vessel and presents with debility and generalized weakness. Pt will continue to benefit from skilled OT.    Rehab identified problem list/impairments: Rehab identified problem list/impairments: weakness, impaired functional mobilty, impaired balance, decreased upper extremity function, decreased safety awareness, impaired endurance, impaired self care skills, gait instability    Rehab potential is good.    Activity tolerance: Good    Discharge recommendations: Discharge Facility/Level of Care Needs: rehabilitation facility     Barriers to discharge: Barriers to Discharge: Decreased caregiver support    Equipment recommendations: none     GOALS:   Multidisciplinary Problems     Occupational Therapy Goals        Problem: Occupational Therapy Goal    Goal Priority Disciplines Outcome Interventions   Occupational Therapy Goal     OT, PT/OT Ongoing, Progressing    Description:  Goals to be met by: 11/10/19     Patient will increase functional independence with ADLs by performing:    UE Dressing with Moderate Assistance.  Grooming while seated with Supervision.  Toileting from bedside commode with Moderate Assistance for hygiene and clothing management.   Toilet transfer to bedside commode with Moderate Assistance.  Upper extremity exercise program x10 reps per handout, with assistance as needed.                      Plan:  Patient to be seen 3 x/week to address the above listed problems via self-care/home management, therapeutic exercises, therapeutic activities  Plan of Care expires: 11/10/19  Plan of Care reviewed with:  patient         Susan Kilgore, OT  11/05/2019

## 2019-11-05 NOTE — ASSESSMENT & PLAN NOTE
11/2  Heme On on Consult  HIT panel  Likely drug induced  Monitor  11/3  Improved at 89 today  Monitor  11/5  Plts 73  Monitor

## 2019-11-05 NOTE — ASSESSMENT & PLAN NOTE
-Patient is s/p CABG x3 with LIMA to LAD reverse saphenous vein graft to the RCA and reverse saphenous vein graft to the ramus.  -Continue post CABG supportive therapy  -Continue ASA, Plavix, Statin, BB  -Will continue to follow along     11/1/19  -POD #1 s/p CABG x3  -Continue ASA, Statin, BB  -Remains on Epi and Milrinone gtt today  -Mgmt per CT surgery  -Will follow along    11/2/19  -Recovering s/p CABG x 3  -Continue statin, BB  -Platelets 47,000 this AM; heme/onc on board, ASA held  -Await further rec's    11/3/19  -Stable, progressing post CABG x 3  -Continue statin, BB  -ASA re-started  -Platelets 89,000, heme/onc on board    11/4/19  -Patient slowly improving post CABG x3  -Continue ASA, Statin and BB  -plavix on hold due to thrombocytopenia that is improving   -IS use encouraged and early ambulation with PT  -Will continue to follow along     11/5/19  -Will continue ASA, Statin and BB  -Consider holding Plavix again for drop in plts after resuming by primary team   -Recommend CXR to rule out post op PNA given bump in WBC and cough  -will need to monitor for temp  -ambulate as tolerated and encourage IS use

## 2019-11-05 NOTE — PROGRESS NOTES
Cardiothoracic in to see pt. Pacers and wound vac removed pacer sites with dressing. Midline chest incision with redness. Swelling. Small area rt upper chest abrasion. Abrasion to rt mid back and lt upper back. Pt with multiple small scabs to feet. [place] site to rt leg wnl. Pt cool to touch. Pale. Pt dazed.

## 2019-11-05 NOTE — PROGRESS NOTES
In room. Pt cool to touch. Clamy. Blood sugar and bp was recently checked. Drowsy. Pt did ultram for pain. Will notify hospital medicine. No response back from dr. meneses or heron gupta

## 2019-11-05 NOTE — PT/OT/SLP PROGRESS
Physical Therapy  Treatment    Mateusz Bates   MRN: [id number]   Admitting Diagnosis: CAD, multiple vessel    PT Received On: 11/05/19  PT Start Time: 0950     PT Stop Time: 1015    PT Total Time (min): 25 min       Billable Minutes:  Therapeutic Activity 15 and Therapeutic Exercise 10    Treatment Type: Treatment  PT/PTA: PTA     PTA Visit Number: 2       General Precautions: Standard, fall, sternal  Orthopedic Precautions: LUE non weight bearing, RUE non weight bearing   Braces: N/A    Spiritual, Cultural Beliefs, Mandaeism Practices, Values that Affect Care: no    Subjective:  Communicated with PATIENT NURSE, OLIVIA AND [facility] CHART REVIEW prior to session.  PATIENT AGREE TO TX NOW. PATIENT VERY LETHARGIC , EYES FIXATING MORE TODAY THAN YESTERDAY.    Pain/Comfort  Pain Rating 1: 0/10    Objective:   Patient found with: telemetry, SHORTSEATED IN B/S CHAIR. ASSISTED PATIENT WITH T/FS AT SQUAT PIVOT TO BED, KRISTINE LE EXERISE INSTRUCTION, STERNAL PERCAUTION REVIEW.    Functional Mobility:  TRANSFERS;   SIT TO STAND / SQUAT PIVOT TO BE AT MOD ASSIST X2 FOR SAFETY, MAX CUES FOR STERNAL PERCAUTIONS.    BED MOBILITY;  SIT TO SUPINE AT MOD ASSIST X2.    Gait:    SMALL STEP TAKEN AT B/S AT MOD ASSIST X2.    Stairs:  N/A    Balance:   Static Sit: POOR: Needs MODERATE assist to maintain  Dynamic Sit: POOR: N/A  Static Stand: POOR: Needs MODERATE assist to maintain  Dynamic stand: POOR: Needs MOD (moderate) assist during gait     Therapeutic Activities and Exercises:  KRISTINE LE EXERCISES, T/FS FROM SHORTSEATED POSITION, SMALL STEP ACTIVITY AT B/S.    AM-PAC 6 CLICK MOBILITY  How much help from another person does this patient currently need?   1 = Unable, Total/Dependent Assistance  2 = A lot, Maximum/Moderate Assistance  3 = A little, Minimum/Contact Guard/Supervision  4 = None, Modified [place]/Independent    Turning over in bed (including adjusting bedclothes, sheets and blankets)?: 2  Sitting down on and standing up from a  chair with arms (e.g., wheelchair, bedside commode, etc.): 2  Moving from lying on back to sitting on the side of the bed?: 2  Moving to and from a bed to a chair (including a wheelchair)?: 2  Need to walk in hospital room?: 2  Climbing 3-5 steps with a railing?: 1  Basic Mobility Total Score: 11    AM-PAC Raw Score CMS G-Code Modifier Level of Impairment Assistance   6 % Total / Unable   7 - 9 CM 80 - 100% Maximal Assist   10 - 14 CL 60 - 80% Moderate Assist   15 - 19 CK 40 - 60% Moderate Assist   20 - 22 CJ 20 - 40% Minimal Assist   23 CI 1-20% SBA / CGA   24 CH 0% Independent/ Mod I     Patient left supine with all lines intact, call button in reach and bed alarm on.    Assessment:  Mateusz Bates is a 69 y.o. female with a medical diagnosis of CAD, multiple vessel . PATIENT VERY LETHARGIC, CONFUSED AT TIMES, EYES FIXATING MORE TODAY THAN YESTERDAY. PATIENT CONVERSING ON AND OFF. PATIENT REQUIRE MUCH ASSIST FOR MAINTAINING STERNAL PERCAUTIONS, FOLLOW THRU WITH KRISTINE LE EXERCISES, AS WELL AS T/FS , STEP ACTIVITY. PATIENT COOPERATIVE WITH ALL THERAPUETIC ACTIVITIES.     Rehab identified problem list/impairments: Rehab identified problem list/impairments: weakness, impaired self care skills, impaired balance, impaired endurance, impaired functional mobilty, gait instability, decreased upper extremity function, impaired cognition, decreased safety awareness, decreased ROM    Rehab potential is good.    Activity tolerance: Good    Discharge recommendations: Discharge Facility/Level of Care Needs: rehabilitation facility     Barriers to discharge:      Equipment recommendations: Equipment Needed After Discharge: none     GOALS:   Multidisciplinary Problems     Physical Therapy Goals        Problem: Physical Therapy Goal    Goal Priority Disciplines Outcome Goal Variances Interventions   Physical Therapy Goal     PT, PT/OT      Description:  By 11/10/19  1. Patient will perform supine to/from sit mod A x 1 per  sternal prec.  2. Patient will perform sit to/from stand min a no AD per sternal prec.  3. Patient will amb 100ft no AD min A                    PLAN:    Patient to be seen 5 x/week  to address the above listed problems via gait training, therapeutic exercises, therapeutic activities  Plan of Care expires: 11/10/19  Plan of Care reviewed with: patient         Diana Yuen, PTA  11/05/2019

## 2019-11-05 NOTE — SUBJECTIVE & OBJECTIVE
Review of Systems   Constitution: Positive for malaise/fatigue. Negative for diaphoresis, weight gain and weight loss.   HENT: Negative for congestion and nosebleeds.    Cardiovascular: Negative for chest pain, claudication, cyanosis, dyspnea on exertion, irregular heartbeat, leg swelling, near-syncope, orthopnea, palpitations, paroxysmal nocturnal dyspnea and syncope.   Respiratory: Negative for cough, hemoptysis, shortness of breath, sleep disturbances due to breathing, snoring, sputum production and wheezing.    Hematologic/Lymphatic: Negative for bleeding problem. Does not bruise/bleed easily.   Skin: Negative for rash.   Musculoskeletal: Negative for arthritis, back pain, falls, joint pain, muscle cramps and muscle weakness.   Gastrointestinal: Negative for abdominal pain, constipation, diarrhea, heartburn, hematemesis, hematochezia, melena, nausea and vomiting.   Genitourinary: Negative for dysuria, hematuria and nocturia.   Neurological: Negative for excessive daytime sleepiness, dizziness, headaches, light-headedness, loss of balance, numbness, vertigo and weakness.     Objective:     Vital Signs (Most Recent):  Temp: 97.9 °F (36.6 °C) (11/05/19 1139)  Pulse: 73 (11/05/19 1139)  Resp: 18 (11/05/19 1139)  BP: (!) 126/59 (11/05/19 1139)  SpO2: 96 % (11/05/19 1139) Vital Signs (24h Range):  Temp:  [97.6 °F (36.4 °C)-99 °F (37.2 °C)] 97.9 °F (36.6 °C)  Pulse:  [69-91] 73  Resp:  [18-24] 18  SpO2:  [93 %-96 %] 96 %  BP: (104-143)/(52-65) 126/59     Weight: 68.6 kg (151 lb 3.8 oz)  Body mass index is 28.58 kg/m².     SpO2: 96 %  O2 Device (Oxygen Therapy): room air      Intake/Output Summary (Last 24 hours) at 11/5/2019 1230  Last data filed at 11/5/2019 0600  Gross per 24 hour   Intake 120 ml   Output --   Net 120 ml       Lines/Drains/Airways     Peripherally Inserted Central Catheter Line                 PICC Double Lumen 11/02/19 1326 right basilic 3 days                Physical Exam   Constitutional: She  is oriented to person, place, and time. She appears well-developed and well-nourished. No distress.   HENT:   Head: Normocephalic and atraumatic.   Eyes: Pupils are equal, round, and reactive to light. Right eye exhibits no discharge. Left eye exhibits no discharge.   Neck: Neck supple. No JVD present.   Cardiovascular: Normal rate, regular rhythm, S1 normal, S2 normal and normal heart sounds.   No murmur heard.  Pulmonary/Chest: Effort normal. No respiratory distress. She has no wheezes.   CABG site C/D/I; no bleeding erythema or drainage    Diminished BS at bases   Abdominal: Soft. She exhibits no distension.   Musculoskeletal: She exhibits no edema.   Neurological: She is alert and oriented to person, place, and time.   Skin: Skin is warm and dry. She is not diaphoretic. No erythema.   SVG sites C/D/I; no bleeding erythema or drainage   Psychiatric: She has a normal mood and affect. Her behavior is normal. Thought content normal.   Nursing note and vitals reviewed.      Significant Labs:   All pertinent lab results from the last 24 hours have been reviewed. and   Recent Lab Results       11/05/19  1044   11/05/19  0838   11/05/19  0808   11/05/19  0502   11/05/19  0434        Anion Gap       14       Baso #       0.07       Basophil%       0.4       BUN, Bld       26       Calcium       9.5       Chloride       99       CO2       23       Creatinine       0.7       Differential Method       Automated       eGFR if        >60       eGFR if non        >60  Comment:  Calculation used to obtain the estimated glomerular filtration  rate (eGFR) is the CKD-EPI equation.          Eos #       0.5       Eosinophil%       3.1       Glucose       109       Gran # (ANC)       10.3       Gran%       63.7       Hematocrit       39.5       Hemoglobin       12.5       Immature Grans (Abs)       0.25  Comment:  Mild elevation in immature granulocytes is non specific and   can be seen in a variety of  conditions including stress response,   acute inflammation, trauma and pregnancy. Correlation with other   laboratory and clinical findings is essential.         Immature Granulocytes       1.6       Lymph #       2.6       Lymph%       16.2       Magnesium       2.1       MCH       28.2       MCHC       31.6       MCV       89       Mono #       2.4       Mono%       15.0       MPV       11.5       nRBC       0       Platelet Estimate       Decreased       Platelets       73       POCT Glucose 270 88 63   94     Potassium       4.8       RBC       4.44       RDW       15.0       Sodium       136       WBC       16.09                        11/04/19  2131   11/04/19  1728   11/04/19  1713   11/04/19  1246        Anion Gap     11       Baso #             Basophil%             BUN, Bld     30       Calcium     9.0       Chloride     91       CO2     29       Creatinine     0.9       Differential Method             eGFR if      >60       eGFR if non      >60  Comment:  Calculation used to obtain the estimated glomerular filtration  rate (eGFR) is the CKD-EPI equation.          Eos #             Eosinophil%             Glucose     407       Gran # (ANC)             Gran%             Hematocrit             Hemoglobin             Immature Grans (Abs)             Immature Granulocytes             Lymph #             Lymph%             Magnesium             MCH             MCHC             MCV             Mono #             Mono%             MPV             nRBC             Platelet Estimate             Platelets             POCT Glucose 331 302   316     Potassium     4.5       RBC             RDW             Sodium     131       WBC                   Significant Imaging: Echocardiogram:   2D echo with color flow doppler:   Results for orders placed or performed during the hospital encounter of 10/28/19   2D echo with color flow doppler   Result Value Ref Range    QEF 30 (A) 55 - 65     Mitral Valve Regurgitation MILD TO MODERATE     Diastolic Dysfunction Yes (A)     Aortic Valve Regurgitation MILD     Mitral Valve Mobility NORMAL     Narrative    Date of Procedure: 10/28/2019        TEST DESCRIPTION   Technical Quality: This is a technically adequate study.     Aorta: The aortic root is normal in size, measuring 3.0 cm at sinotubular junction and 3.2 cm at Sinuses of Valsalva. The proximal ascending aorta is normal in size, measuring 3.1 cm across.     Left Atrium: The left atrial volume index is severely enlarged, measuring 52.97 cc/m2.     Left Ventricle: The left ventricle is normal in size, with an end-diastolic diameter of 4.1 cm, and an end-systolic diameter of 3.7 cm. Septal wall thickness is mildly increased, with the septum measuring 1.6 cm and the posterior wall measuring 1.3 cm   across. Relative wall thickness was increased at 0.63, and the LV mass index was increased at 160.6 g/m2 consistent with concentric left ventricular hypertrophy. The anterior septum is severely hypokinetic. There is global hypokinesis. Left ventricular   systolic function appears moderately depressed. Visually estimated ejection fraction is 30-35%. The LV Doppler derived stroke volume equals 57.0 ccs.     Diastolic indices: E wave velocity 1.1 m/s, E/A ratio 0.9,  msec., E/e' ratio(avg) 16. There is pseudonormalization of mitral inflow pattern consistent with significant diastolic dysfunction.     Right Atrium: The right atrium is normal in size, measuring 3.7 cm in length and 2.8 cm in width in the apical view.     Right Ventricle: The right ventricle is normal in size. Global right ventricular systolic function appears normal. Tricuspid annular plane systolic excursion (TAPSE) is 2.2 cm.     Aortic Valve:  The aortic valve is normal in structure with normal leaflet mobility. The mean gradient obtained across the aortic valve is 6 mmHg. Additionally, there is mild aortic regurgitation.     Mitral Valve:   The mitral valve is normal in structure with normal leaflet mobility. The pressure half time is 45 msec. The calculated mitral valve area is 4.89 cm2. There is mild to moderate mitral regurgitation.     Tricuspid Valve:  The tricuspid valve is normal in structure with normal leaflet mobility.     Pulmonary Valve:  The pulmonic valve is not well seen.     IVC: IVC is normal in size and collapses > 50% with a sniff, suggesting normal right atrial pressure of 3 mmHg.     Intracavitary: There is no evidence of pericardial effusion, intracavity mass, thrombi, or vegetation.         CONCLUSIONS     1 - Moderately depressed left ventricular systolic function (EF 30-35%).     2 - Impaired LV relaxation, elevated LAP (grade 2 diastolic dysfunction).     3 - Normal right ventricular systolic function .     4 - Mild aortic regurgitation.     5 - Severe left atrial enlargement.     6 - Concentric hypertrophy.     7 - Mild to moderate mitral regurgitation.             This document has been electronically    SIGNED BY: Terry Tarango MD On: 10/28/2019 16:38

## 2019-11-05 NOTE — PLAN OF CARE
Dx cabg x 3  Poc instructed on dbc 10 x q1h wa instructed on turnign q2h. Instructed on sternal precautions. Pt clamy, lethargic. Monitor vs. Blood sugar ac and hs. Levemir decreased to 10 u sq daily. Increase in wbc. Dr. Zaidi, dr. Patel and dr. Matias aware. Up in chair with max assist. Fall risk and precautions. Medicated for pain prn. Pacer wires and wound vac removed. Pt only getting 350 -400 on IS.

## 2019-11-05 NOTE — SUBJECTIVE & OBJECTIVE
Review of Systems   Unable to perform ROS: Intubated         Objective:     Vital Signs (Most Recent):  Temp: 97.9 °F (36.6 °C) (11/05/19 1139)  Pulse: 80 (11/05/19 1709)  Resp: (!) 21 (11/05/19 1709)  BP: (!) 126/59 (11/05/19 1139)  SpO2: (!) 85 % (11/05/19 1451) Vital Signs (24h Range):  Temp:  [97.6 °F (36.4 °C)-99 °F (37.2 °C)] 97.9 °F (36.6 °C)  Pulse:  [65-91] 80  Resp:  [18-22] 21  SpO2:  [85 %-96 %] 85 %  BP: (104-143)/(52-64) 126/59     Weight: 68.6 kg (151 lb 3.8 oz)  Body mass index is 28.58 kg/m².      Intake/Output Summary (Last 24 hours) at 11/5/2019 1736  Last data filed at 11/5/2019 0600  Gross per 24 hour   Intake 120 ml   Output --   Net 120 ml       Physical Exam   Constitutional: Vital signs are normal. She appears well-developed and well-nourished. She has a sickly appearance. She is intubated.       Peripheries cold clammy, cyanotic   HENT:   Head: Normocephalic and atraumatic.   Mouth/Throat: Oropharynx is clear and moist.   Eyes: Pupils are equal, round, and reactive to light. Conjunctivae and EOM are normal.   Neck: Normal range of motion. Neck supple.   Cardiovascular: Normal rate, regular rhythm and intact distal pulses.   No murmur heard.  Pulmonary/Chest: Effort normal and breath sounds normal. No stridor. She is intubated. No respiratory distress.   Abdominal: Soft. Bowel sounds are normal.   Musculoskeletal: Normal range of motion. She exhibits no edema.   Neurological: No cranial nerve deficit.   Skin: Capillary refill takes 2 to 3 seconds.   Psychiatric: She has a normal mood and affect.   Nursing note and vitals reviewed.      Vents:  Vent Mode: A/C (11/05/19 1709)  Ventilator Initiated: Yes (11/05/19 1516)  Set Rate: 20 bmp (11/05/19 1709)  Vt Set: 450 mL (11/05/19 1709)  Pressure Support: 0 cmH20 (11/05/19 1709)  PEEP/CPAP: 5 cmH20 (11/05/19 1709)  Oxygen Concentration (%): 100 (11/05/19 1709)  Peak Airway Pressure: 40 cmH2O (11/05/19 1709)  Plateau Pressure: 0 cmH20 (11/05/19  1709)  Total Ve: 9.96 mL (11/05/19 1709)  Negative Inspiratory Force (cm H2O): 26 (11/01/19 0250)  F/VT Ratio<105 (RSBI): (!) 22.2 (11/05/19 1709)    Lines/Drains/Airways     Central Venous Catheter Line                 Pulmonary Artery Catheter Assessment  11/05/19 1721 right internal jugular less than 1 day          Drain                 NG/OG Tube 11/05/19 1720 orogastric Center mouth less than 1 day         Urethral Catheter 11/05/19 1718 Straight-tip less than 1 day          Airway                 Airway - Non-Surgical 11/05/19 1452 Endotracheal Tube less than 1 day          Arterial Line                 Arterial Line 11/05/19 1719 Right Radial less than 1 day          Peripheral Intravenous Line                 Peripheral IV - Single Lumen 11/05/19 1352 20 G Posterior;Right Hand less than 1 day                Significant Labs:    CBC/Anemia Profile:  Recent Labs   Lab 11/04/19  0659 11/05/19  0502 11/05/19  1541   WBC 13.13* 16.09* 9.18  9.18   HGB 11.1* 12.5 6.7*  6.7*   HCT 34.4* 39.5 22.0*  22.0*   * 73* 59*  59*   MCV 89 89 95  95   RDW 15.3* 15.0* 15.0*  15.0*        Chemistries:  Recent Labs   Lab 11/04/19  0435  11/04/19  1713 11/05/19  0502 11/05/19  1541      < > 131* 136 129*   K 4.6   < > 4.5 4.8 4.1   CL 97   < > 91* 99 101   CO2 25   < > 29 23 9*   BUN 29*   < > 30* 26* 15   CREATININE 0.8   < > 0.9 0.7 0.6   CALCIUM 9.9   < > 9.0 9.5 6.5*   ALBUMIN  --   --   --   --  0.9*   PROT  --   --   --   --  2.1*   BILITOT  --   --   --   --  0.4   ALKPHOS  --   --   --   --  34*   ALT  --   --   --   --  <5*   AST  --   --   --   --  10   MG 2.3  --   --  2.1  --     < > = values in this interval not displayed.        ABGs:   Recent Labs   Lab 11/05/19  1706   PH 7.414   PCO2 34.1*   HCO3 21.8*   POCSATURATED 100   BE -3     Blood Culture: No results for input(s): LABBLOO in the last 48 hours.  Cardiac Markers: No results for input(s): CKMB, TROPONINT, MYOGLOBIN in the last 48  hours.  Coagulation: No results for input(s): PT, INR, APTT in the last 48 hours.  Lactic Acid:   Recent Labs   Lab 11/05/19  1541   LACTATE >12.0*     POCT Glucose:   Recent Labs   Lab 11/05/19  0808 11/05/19  0838 11/05/19  1044   POCTGLUCOSE 63* 88 270*     Respiratory Culture: No results for input(s): GSRESP, RESPIRATORYC in the last 48 hours.  Urine Culture: No results for input(s): LABURIN in the last 48 hours.  All pertinent labs within the past 24 hours have been reviewed.    Significant Imaging:  I have reviewed and interpreted all pertinent imaging results/findings within the past 24 hours.     CXR  COMPARISON:  11/05/2019 at 13:53    FINDINGS:  Endotracheal tube tip approximately 1.5 cm above the mu.  Pulmonary artery catheter tip projects over the pulmonary trunk.  Nasogastric tube below the diaphragm.  No acute pulmonary infiltrates.  Sternotomy wires.      Impression       Devices in place as described.       EKG  Normal sinus rhythm  Nonspecific intraventricular block  Cannot rule out Septal infarct (cited on or before 05-NOV-2019)  T wave abnormality, consider lateral ischemia  Abnormal ECG  When compared with ECG of 31-OCT-2019 05:51,  Premature supraventricular complexes are no longer Present  Serial changes of evolving Septal infarct Present    ECHO today   1 - Moderately depressed left ventricular systolic function (EF 30-35%).     2 - Mildly depressed right ventricular systolic function .     3 - NO PERICARDIAL EFFUSION    4 - LIMITED ECHO

## 2019-11-05 NOTE — ASSESSMENT & PLAN NOTE
-Mgmt as per heme/onc  -Plavix was on hold, but resumed by primary team   -ASA has been resumed     11/5/19  -consider holding Plavix  -Hematology has been consulted

## 2019-11-05 NOTE — SUBJECTIVE & OBJECTIVE
Interval History: Lethargic, clammy. BS 88 monitor. Patient denies cp/sob. Sitting in chair at bedside. Appropriate and responds to query. Discussed with [facility].    Review of Systems   Constitutional: Positive for activity change and fatigue. Negative for unexpected weight change.   HENT: Negative.  Negative for trouble swallowing.    Eyes: Negative.    Respiratory: Negative.  Negative for cough, shortness of breath and wheezing.    Cardiovascular: Negative.  Negative for chest pain.   Gastrointestinal: Negative.    Endocrine: Negative.    Genitourinary: Negative.    Musculoskeletal: Positive for myalgias.   Skin: Positive for wound.   Allergic/Immunologic: Negative.    Neurological: Positive for weakness. Negative for dizziness.   Hematological: Negative.    Psychiatric/Behavioral: Negative.    All other systems reviewed and are negative.    Objective:     Vital Signs (Most Recent):  Temp: 97.6 °F (36.4 °C) (11/05/19 0801)  Pulse: 79 (11/05/19 1024)  Resp: (!) 22 (11/05/19 0801)  BP: (!) 143/63 (11/05/19 1024)  SpO2: (!) 94 % (11/05/19 0945) Vital Signs (24h Range):  Temp:  [97.6 °F (36.4 °C)-99 °F (37.2 °C)] 97.6 °F (36.4 °C)  Pulse:  [69-91] 79  Resp:  [18-27] 22  SpO2:  [93 %-96 %] 94 %  BP: (104-143)/(49-65) 143/63     Weight: 68.6 kg (151 lb 3.8 oz)  Body mass index is 28.58 kg/m².    Intake/Output Summary (Last 24 hours) at 11/5/2019 1044  Last data filed at 11/5/2019 0600  Gross per 24 hour   Intake 120 ml   Output --   Net 120 ml      Physical Exam   Constitutional: She is oriented to person, place, and time. She appears well-developed and well-nourished. No distress.   Lethargic   HENT:   Head: Normocephalic and atraumatic.   Mouth/Throat: Oropharynx is clear and moist.   Eyes: Pupils are equal, round, and reactive to light. Conjunctivae and EOM are normal.   Neck: Neck supple. No JVD present. No thyromegaly present.   Cardiovascular: Normal rate and regular rhythm. Exam reveals no gallop and no  friction rub.   No murmur heard.  Pulmonary/Chest: Effort normal and breath sounds normal. She has no wheezes. She has no rales. She exhibits tenderness.   + midline chest sx site   Abdominal: Soft. Bowel sounds are normal. She exhibits no distension. There is no tenderness. There is no rebound and no guarding.   Musculoskeletal: Normal range of motion. She exhibits edema. She exhibits no deformity.   Lymphadenopathy:     She has no cervical adenopathy.   Neurological: She is oriented to person, place, and time. She has normal reflexes.   Skin: Skin is warm and dry. Capillary refill takes 2 to 3 seconds. No rash noted.   Psychiatric: She has a normal mood and affect.   Nursing note and vitals reviewed.      Significant Labs:   ABGs: No results for input(s): PH, PCO2, HCO3, POCSATURATED, BE, TOTALHB, COHB, METHB, O2HB, POCFIO2 in the last 48 hours.  Blood Culture: No results for input(s): LABBLOO in the last 48 hours.  BMP:   Recent Labs   Lab 11/05/19  0502         K 4.8   CL 99   CO2 23   BUN 26*   CREATININE 0.7   CALCIUM 9.5   MG 2.1     CBC:   Recent Labs   Lab 11/04/19  0659 11/05/19  0502   WBC 13.13* 16.09*   HGB 11.1* 12.5   HCT 34.4* 39.5   * 73*     CMP:   Recent Labs   Lab 11/04/19  0659 11/04/19  1713 11/05/19  0502    131* 136   K 4.0 4.5 4.8   CL 96 91* 99   CO2 30* 29 23   * 407* 109   BUN 27* 30* 26*   CREATININE 0.7 0.9 0.7   CALCIUM 9.5 9.0 9.5   ANIONGAP 10 11 14   EGFRNONAA >60 >60 >60     All pertinent labs within the past 24 hours have been reviewed.    Significant Imaging: I have reviewed all pertinent imaging results/findings within the past 24 hours.

## 2019-11-06 PROBLEM — R57.0 CARDIOGENIC SHOCK: Status: ACTIVE | Noted: 2019-11-06

## 2019-11-06 LAB
ALLENS TEST: ABNORMAL
ALLENS TEST: ABNORMAL
ANION GAP SERPL CALC-SCNC: 10 MMOL/L (ref 8–16)
ANION GAP SERPL CALC-SCNC: 10 MMOL/L (ref 8–16)
ANION GAP SERPL CALC-SCNC: 11 MMOL/L (ref 8–16)
ANION GAP SERPL CALC-SCNC: 12 MMOL/L (ref 8–16)
ANION GAP SERPL CALC-SCNC: 12 MMOL/L (ref 8–16)
ANION GAP SERPL CALC-SCNC: 14 MMOL/L (ref 8–16)
BASOPHILS # BLD AUTO: 0.05 K/UL (ref 0–0.2)
BASOPHILS # BLD AUTO: 0.05 K/UL (ref 0–0.2)
BASOPHILS # BLD AUTO: 0.08 K/UL (ref 0–0.2)
BASOPHILS NFR BLD: 0.3 % (ref 0–1.9)
BASOPHILS NFR BLD: 0.3 % (ref 0–1.9)
BASOPHILS NFR BLD: 0.4 % (ref 0–1.9)
BUN SERPL-MCNC: 24 MG/DL (ref 8–23)
BUN SERPL-MCNC: 25 MG/DL (ref 8–23)
BUN SERPL-MCNC: 25 MG/DL (ref 8–23)
BUN SERPL-MCNC: 29 MG/DL (ref 8–23)
BUN SERPL-MCNC: 29 MG/DL (ref 8–23)
BUN SERPL-MCNC: 32 MG/DL (ref 8–23)
CALCIUM SERPL-MCNC: 7.2 MG/DL (ref 8.7–10.5)
CALCIUM SERPL-MCNC: 7.3 MG/DL (ref 8.7–10.5)
CALCIUM SERPL-MCNC: 7.5 MG/DL (ref 8.7–10.5)
CALCIUM SERPL-MCNC: 7.5 MG/DL (ref 8.7–10.5)
CHLORIDE SERPL-SCNC: 88 MMOL/L (ref 95–110)
CHLORIDE SERPL-SCNC: 92 MMOL/L (ref 95–110)
CHLORIDE SERPL-SCNC: 92 MMOL/L (ref 95–110)
CHLORIDE SERPL-SCNC: 93 MMOL/L (ref 95–110)
CO2 SERPL-SCNC: 23 MMOL/L (ref 23–29)
CO2 SERPL-SCNC: 24 MMOL/L (ref 23–29)
CO2 SERPL-SCNC: 25 MMOL/L (ref 23–29)
CREAT SERPL-MCNC: 0.9 MG/DL (ref 0.5–1.4)
CREAT SERPL-MCNC: 1.1 MG/DL (ref 0.5–1.4)
CREAT SERPL-MCNC: 1.1 MG/DL (ref 0.5–1.4)
CREAT SERPL-MCNC: 1.3 MG/DL (ref 0.5–1.4)
DELSYS: ABNORMAL
DELSYS: ABNORMAL
DIASTOLIC DYSFUNCTION: YES
DIFFERENTIAL METHOD: ABNORMAL
EOSINOPHIL # BLD AUTO: 0.2 K/UL (ref 0–0.5)
EOSINOPHIL # BLD AUTO: 0.2 K/UL (ref 0–0.5)
EOSINOPHIL # BLD AUTO: 0.3 K/UL (ref 0–0.5)
EOSINOPHIL NFR BLD: 1.1 % (ref 0–8)
EOSINOPHIL NFR BLD: 1.1 % (ref 0–8)
EOSINOPHIL NFR BLD: 1.7 % (ref 0–8)
EPO SERPL-ACNC: 26.9 MIU/ML (ref 2.6–18.5)
ERYTHROCYTE [DISTWIDTH] IN BLOOD BY AUTOMATED COUNT: 14.7 % (ref 11.5–14.5)
ERYTHROCYTE [DISTWIDTH] IN BLOOD BY AUTOMATED COUNT: 14.7 % (ref 11.5–14.5)
ERYTHROCYTE [DISTWIDTH] IN BLOOD BY AUTOMATED COUNT: 14.8 % (ref 11.5–14.5)
ERYTHROCYTE [SEDIMENTATION RATE] IN BLOOD BY WESTERGREN METHOD: 20 MM/H
ERYTHROCYTE [SEDIMENTATION RATE] IN BLOOD BY WESTERGREN METHOD: 20 MM/H
EST. GFR  (AFRICAN AMERICAN): 48 ML/MIN/1.73 M^2
EST. GFR  (AFRICAN AMERICAN): 59 ML/MIN/1.73 M^2
EST. GFR  (AFRICAN AMERICAN): 59 ML/MIN/1.73 M^2
EST. GFR  (AFRICAN AMERICAN): >60 ML/MIN/1.73 M^2
EST. GFR  (NON AFRICAN AMERICAN): 42 ML/MIN/1.73 M^2
EST. GFR  (NON AFRICAN AMERICAN): 51 ML/MIN/1.73 M^2
EST. GFR  (NON AFRICAN AMERICAN): 51 ML/MIN/1.73 M^2
EST. GFR  (NON AFRICAN AMERICAN): >60 ML/MIN/1.73 M^2
ESTIMATED PA SYSTOLIC PRESSURE: 33.03
FIO2: 50
FIO2: 50
GLUCOSE SERPL-MCNC: 101 MG/DL (ref 70–110)
GLUCOSE SERPL-MCNC: 358 MG/DL (ref 70–110)
GLUCOSE SERPL-MCNC: 358 MG/DL (ref 70–110)
GLUCOSE SERPL-MCNC: 615 MG/DL (ref 70–110)
GLUCOSE SERPL-MCNC: 99 MG/DL (ref 70–110)
GLUCOSE SERPL-MCNC: 99 MG/DL (ref 70–110)
HCO3 UR-SCNC: 25.7 MMOL/L (ref 24–28)
HCO3 UR-SCNC: 26.8 MMOL/L (ref 24–28)
HCT VFR BLD AUTO: 28.6 % (ref 37–48.5)
HCT VFR BLD AUTO: 31.9 % (ref 37–48.5)
HCT VFR BLD AUTO: 31.9 % (ref 37–48.5)
HGB BLD-MCNC: 10.4 G/DL (ref 12–16)
HGB BLD-MCNC: 10.4 G/DL (ref 12–16)
HGB BLD-MCNC: 9.5 G/DL (ref 12–16)
IMM GRANULOCYTES # BLD AUTO: 0.34 K/UL (ref 0–0.04)
IMM GRANULOCYTES # BLD AUTO: 0.34 K/UL (ref 0–0.04)
IMM GRANULOCYTES # BLD AUTO: 0.51 K/UL (ref 0–0.04)
IMM GRANULOCYTES NFR BLD AUTO: 2.2 % (ref 0–0.5)
IMM GRANULOCYTES NFR BLD AUTO: 2.2 % (ref 0–0.5)
IMM GRANULOCYTES NFR BLD AUTO: 2.8 % (ref 0–0.5)
LACTATE SERPL-SCNC: 2.3 MMOL/L (ref 0.5–2.2)
LYMPHOCYTES # BLD AUTO: 1.4 K/UL (ref 1–4.8)
LYMPHOCYTES # BLD AUTO: 1.9 K/UL (ref 1–4.8)
LYMPHOCYTES # BLD AUTO: 1.9 K/UL (ref 1–4.8)
LYMPHOCYTES NFR BLD: 11.9 % (ref 18–48)
LYMPHOCYTES NFR BLD: 11.9 % (ref 18–48)
LYMPHOCYTES NFR BLD: 7.5 % (ref 18–48)
MAGNESIUM SERPL-MCNC: 1.7 MG/DL (ref 1.6–2.6)
MCH RBC QN AUTO: 27.7 PG (ref 27–31)
MCH RBC QN AUTO: 27.7 PG (ref 27–31)
MCH RBC QN AUTO: 28.5 PG (ref 27–31)
MCHC RBC AUTO-ENTMCNC: 32.6 G/DL (ref 32–36)
MCHC RBC AUTO-ENTMCNC: 32.6 G/DL (ref 32–36)
MCHC RBC AUTO-ENTMCNC: 33.2 G/DL (ref 32–36)
MCV RBC AUTO: 85 FL (ref 82–98)
MCV RBC AUTO: 85 FL (ref 82–98)
MCV RBC AUTO: 86 FL (ref 82–98)
MODE: ABNORMAL
MODE: ABNORMAL
MONOCYTES # BLD AUTO: 0.6 K/UL (ref 0.3–1)
MONOCYTES # BLD AUTO: 0.6 K/UL (ref 0.3–1)
MONOCYTES # BLD AUTO: 1.2 K/UL (ref 0.3–1)
MONOCYTES NFR BLD: 3.5 % (ref 4–15)
MONOCYTES NFR BLD: 3.5 % (ref 4–15)
MONOCYTES NFR BLD: 6.7 % (ref 4–15)
NEUTROPHILS # BLD AUTO: 12.6 K/UL (ref 1.8–7.7)
NEUTROPHILS # BLD AUTO: 12.6 K/UL (ref 1.8–7.7)
NEUTROPHILS # BLD AUTO: 14.7 K/UL (ref 1.8–7.7)
NEUTROPHILS NFR BLD: 80.9 % (ref 38–73)
NEUTROPHILS NFR BLD: 81 % (ref 38–73)
NEUTROPHILS NFR BLD: 81 % (ref 38–73)
NRBC BLD-RTO: 0 /100 WBC
PCO2 BLDA: 33 MMHG (ref 35–45)
PCO2 BLDA: 33.7 MMHG (ref 35–45)
PEEP: 5
PEEP: 5
PH SMN: 7.5 [PH] (ref 7.35–7.45)
PH SMN: 7.51 [PH] (ref 7.35–7.45)
PLATELET # BLD AUTO: 67 K/UL (ref 150–350)
PLATELET # BLD AUTO: 89 K/UL (ref 150–350)
PLATELET # BLD AUTO: 89 K/UL (ref 150–350)
PMV BLD AUTO: 11.4 FL (ref 9.2–12.9)
PMV BLD AUTO: 11.4 FL (ref 9.2–12.9)
PMV BLD AUTO: 11.5 FL (ref 9.2–12.9)
PO2 BLDA: 122 MMHG (ref 80–100)
PO2 BLDA: 122 MMHG (ref 80–100)
POC BE: 2 MMOL/L
POC BE: 4 MMOL/L
POC SATURATED O2: 99 % (ref 95–100)
POC SATURATED O2: 99 % (ref 95–100)
POCT GLUCOSE: 103 MG/DL (ref 70–110)
POCT GLUCOSE: 120 MG/DL (ref 70–110)
POCT GLUCOSE: 134 MG/DL (ref 70–110)
POCT GLUCOSE: 148 MG/DL (ref 70–110)
POCT GLUCOSE: 173 MG/DL (ref 70–110)
POCT GLUCOSE: 21 MG/DL (ref 70–110)
POCT GLUCOSE: 239 MG/DL (ref 70–110)
POCT GLUCOSE: 258 MG/DL (ref 70–110)
POCT GLUCOSE: 318 MG/DL (ref 70–110)
POCT GLUCOSE: 345 MG/DL (ref 70–110)
POCT GLUCOSE: 379 MG/DL (ref 70–110)
POCT GLUCOSE: 38 MG/DL (ref 70–110)
POCT GLUCOSE: 415 MG/DL (ref 70–110)
POCT GLUCOSE: 45 MG/DL (ref 70–110)
POCT GLUCOSE: 45 MG/DL (ref 70–110)
POCT GLUCOSE: 467 MG/DL (ref 70–110)
POCT GLUCOSE: 497 MG/DL (ref 70–110)
POCT GLUCOSE: 88 MG/DL (ref 70–110)
POCT GLUCOSE: >500 MG/DL (ref 70–110)
POTASSIUM SERPL-SCNC: 3.5 MMOL/L (ref 3.5–5.1)
POTASSIUM SERPL-SCNC: 3.6 MMOL/L (ref 3.5–5.1)
POTASSIUM SERPL-SCNC: 3.7 MMOL/L (ref 3.5–5.1)
RBC # BLD AUTO: 3.33 M/UL (ref 4–5.4)
RBC # BLD AUTO: 3.75 M/UL (ref 4–5.4)
RBC # BLD AUTO: 3.75 M/UL (ref 4–5.4)
RETIRED EF AND QEF - SEE NOTES: 30 (ref 55–65)
SAMPLE: ABNORMAL
SAMPLE: ABNORMAL
SITE: ABNORMAL
SITE: ABNORMAL
SODIUM SERPL-SCNC: 125 MMOL/L (ref 136–145)
SODIUM SERPL-SCNC: 127 MMOL/L (ref 136–145)
SODIUM SERPL-SCNC: 127 MMOL/L (ref 136–145)
SODIUM SERPL-SCNC: 128 MMOL/L (ref 136–145)
SODIUM SERPL-SCNC: 128 MMOL/L (ref 136–145)
SODIUM SERPL-SCNC: 129 MMOL/L (ref 136–145)
VANCOMYCIN SERPL-MCNC: 17.1 UG/ML
VT: 450
VT: 450
WBC # BLD AUTO: 15.53 K/UL (ref 3.9–12.7)
WBC # BLD AUTO: 15.53 K/UL (ref 3.9–12.7)
WBC # BLD AUTO: 18.17 K/UL (ref 3.9–12.7)

## 2019-11-06 PROCEDURE — 99900026 HC AIRWAY MAINTENANCE (STAT)

## 2019-11-06 PROCEDURE — S0028 INJECTION, FAMOTIDINE, 20 MG: HCPCS | Performed by: INTERNAL MEDICINE

## 2019-11-06 PROCEDURE — 83605 ASSAY OF LACTIC ACID: CPT

## 2019-11-06 PROCEDURE — 63600175 PHARM REV CODE 636 W HCPCS: Performed by: NURSE PRACTITIONER

## 2019-11-06 PROCEDURE — 85025 COMPLETE CBC W/AUTO DIFF WBC: CPT

## 2019-11-06 PROCEDURE — 93010 ELECTROCARDIOGRAM REPORT: CPT | Mod: 76,,, | Performed by: INTERNAL MEDICINE

## 2019-11-06 PROCEDURE — 99291 CRITICAL CARE FIRST HOUR: CPT | Mod: ,,, | Performed by: INTERNAL MEDICINE

## 2019-11-06 PROCEDURE — 93010 ELECTROCARDIOGRAM REPORT: CPT | Mod: ,,, | Performed by: INTERNAL MEDICINE

## 2019-11-06 PROCEDURE — 99233 PR SUBSEQUENT HOSPITAL CARE,LEVL III: ICD-10-PCS | Mod: ,,, | Performed by: INTERNAL MEDICINE

## 2019-11-06 PROCEDURE — 93306 TTE W/DOPPLER COMPLETE: CPT

## 2019-11-06 PROCEDURE — 80048 BASIC METABOLIC PNL TOTAL CA: CPT

## 2019-11-06 PROCEDURE — 93010 EKG 12-LEAD: ICD-10-PCS | Mod: ,,, | Performed by: INTERNAL MEDICINE

## 2019-11-06 PROCEDURE — 99900035 HC TECH TIME PER 15 MIN (STAT)

## 2019-11-06 PROCEDURE — 82803 BLOOD GASES ANY COMBINATION: CPT

## 2019-11-06 PROCEDURE — 63600175 PHARM REV CODE 636 W HCPCS: Performed by: INTERNAL MEDICINE

## 2019-11-06 PROCEDURE — 80048 BASIC METABOLIC PNL TOTAL CA: CPT | Mod: 91

## 2019-11-06 PROCEDURE — 83735 ASSAY OF MAGNESIUM: CPT

## 2019-11-06 PROCEDURE — 93005 ELECTROCARDIOGRAM TRACING: CPT

## 2019-11-06 PROCEDURE — 25000003 PHARM REV CODE 250: Performed by: THORACIC SURGERY (CARDIOTHORACIC VASCULAR SURGERY)

## 2019-11-06 PROCEDURE — 25000003 PHARM REV CODE 250: Performed by: INTERNAL MEDICINE

## 2019-11-06 PROCEDURE — 80202 ASSAY OF VANCOMYCIN: CPT

## 2019-11-06 PROCEDURE — 20000000 HC ICU ROOM

## 2019-11-06 PROCEDURE — 36620 INSERTION CATHETER ARTERY: CPT

## 2019-11-06 PROCEDURE — 63600175 PHARM REV CODE 636 W HCPCS: Performed by: THORACIC SURGERY (CARDIOTHORACIC VASCULAR SURGERY)

## 2019-11-06 PROCEDURE — 99291 PR CRITICAL CARE, E/M 30-74 MINUTES: ICD-10-PCS | Mod: ,,, | Performed by: INTERNAL MEDICINE

## 2019-11-06 PROCEDURE — 99233 SBSQ HOSP IP/OBS HIGH 50: CPT | Mod: ,,, | Performed by: INTERNAL MEDICINE

## 2019-11-06 PROCEDURE — 93306 2D ECHO WITH COLOR FLOW DOPPLER: ICD-10-PCS | Mod: 26,,, | Performed by: INTERNAL MEDICINE

## 2019-11-06 PROCEDURE — 93306 TTE W/DOPPLER COMPLETE: CPT | Mod: 26,,, | Performed by: INTERNAL MEDICINE

## 2019-11-06 PROCEDURE — 94003 VENT MGMT INPAT SUBQ DAY: CPT

## 2019-11-06 PROCEDURE — 97803 MED NUTRITION INDIV SUBSEQ: CPT

## 2019-11-06 PROCEDURE — 37799 UNLISTED PX VASCULAR SURGERY: CPT

## 2019-11-06 RX ORDER — GLUCAGON 1 MG
1 KIT INJECTION
Status: DISCONTINUED | OUTPATIENT
Start: 2019-11-06 | End: 2019-11-14

## 2019-11-06 RX ORDER — ETOMIDATE 2 MG/ML
INJECTION INTRAVENOUS CODE/TRAUMA/SEDATION MEDICATION
Status: COMPLETED | OUTPATIENT
Start: 2019-11-05 | End: 2019-11-05

## 2019-11-06 RX ORDER — MILRINONE LACTATE 0.2 MG/ML
0.25 INJECTION, SOLUTION INTRAVENOUS CONTINUOUS
Status: DISCONTINUED | OUTPATIENT
Start: 2019-11-06 | End: 2019-11-06

## 2019-11-06 RX ORDER — INSULIN ASPART 100 [IU]/ML
1-10 INJECTION, SOLUTION INTRAVENOUS; SUBCUTANEOUS EVERY 4 HOURS PRN
Status: DISCONTINUED | OUTPATIENT
Start: 2019-11-06 | End: 2019-11-14

## 2019-11-06 RX ORDER — MILRINONE LACTATE 0.2 MG/ML
0.38 INJECTION, SOLUTION INTRAVENOUS CONTINUOUS
Status: DISCONTINUED | OUTPATIENT
Start: 2019-11-06 | End: 2019-11-08

## 2019-11-06 RX ORDER — MAGNESIUM SULFATE HEPTAHYDRATE 40 MG/ML
2 INJECTION, SOLUTION INTRAVENOUS ONCE
Status: COMPLETED | OUTPATIENT
Start: 2019-11-06 | End: 2019-11-06

## 2019-11-06 RX ADMIN — CHLORHEXIDINE GLUCONATE 0.12% ORAL RINSE 15 ML: 1.2 LIQUID ORAL at 09:11

## 2019-11-06 RX ADMIN — MAGNESIUM SULFATE IN WATER 2 G: 40 INJECTION, SOLUTION INTRAVENOUS at 01:11

## 2019-11-06 RX ADMIN — INSULIN ASPART 8 UNITS: 100 INJECTION, SOLUTION INTRAVENOUS; SUBCUTANEOUS at 11:11

## 2019-11-06 RX ADMIN — DEXMEDETOMIDINE HYDROCHLORIDE 0.9 MCG/KG/HR: 4 INJECTION INTRAVENOUS at 12:11

## 2019-11-06 RX ADMIN — DEXMEDETOMIDINE HYDROCHLORIDE 0.8 MCG/KG/HR: 4 INJECTION INTRAVENOUS at 04:11

## 2019-11-06 RX ADMIN — FAMOTIDINE 20 MG: 10 INJECTION INTRAVENOUS at 09:11

## 2019-11-06 RX ADMIN — DEXTROSE MONOHYDRATE: 5 INJECTION, SOLUTION INTRAVENOUS at 12:11

## 2019-11-06 RX ADMIN — ATORVASTATIN CALCIUM 80 MG: 40 TABLET, FILM COATED ORAL at 09:11

## 2019-11-06 RX ADMIN — DOCUSATE SODIUM 100 MG: 100 CAPSULE, LIQUID FILLED ORAL at 09:11

## 2019-11-06 RX ADMIN — DEXTROSE 125 ML: 10 SOLUTION INTRAVENOUS at 10:11

## 2019-11-06 RX ADMIN — POTASSIUM CHLORIDE 20 MEQ: 200 INJECTION, SOLUTION INTRAVENOUS at 02:11

## 2019-11-06 RX ADMIN — PIPERACILLIN AND TAZOBACTAM 4.5 G: 4; .5 INJECTION, POWDER, FOR SOLUTION INTRAVENOUS at 05:11

## 2019-11-06 RX ADMIN — PIPERACILLIN AND TAZOBACTAM 4.5 G: 4; .5 INJECTION, POWDER, FOR SOLUTION INTRAVENOUS at 02:11

## 2019-11-06 RX ADMIN — INSULIN ASPART 3 UNITS: 100 INJECTION, SOLUTION INTRAVENOUS; SUBCUTANEOUS at 09:11

## 2019-11-06 RX ADMIN — DEXTROSE 250 ML: 10 SOLUTION INTRAVENOUS at 02:11

## 2019-11-06 RX ADMIN — EPINEPHRINE 0.15 MCG/KG/MIN: 1 INJECTION INTRAMUSCULAR; INTRAVENOUS; SUBCUTANEOUS at 03:11

## 2019-11-06 RX ADMIN — MAGNESIUM SULFATE HEPTAHYDRATE 2 G: 40 INJECTION, SOLUTION INTRAVENOUS at 05:11

## 2019-11-06 RX ADMIN — POTASSIUM CHLORIDE 20 MEQ: 200 INJECTION, SOLUTION INTRAVENOUS at 05:11

## 2019-11-06 RX ADMIN — SODIUM CHLORIDE 21 UNITS/HR: 9 INJECTION, SOLUTION INTRAVENOUS at 07:11

## 2019-11-06 RX ADMIN — MILRINONE LACTATE IN DEXTROSE 0.38 MCG/KG/MIN: 200 INJECTION, SOLUTION INTRAVENOUS at 02:11

## 2019-11-06 RX ADMIN — MILRINONE LACTATE IN DEXTROSE 0.25 MCG/KG/MIN: 200 INJECTION, SOLUTION INTRAVENOUS at 07:11

## 2019-11-06 RX ADMIN — SODIUM CHLORIDE 42 UNITS/HR: 9 INJECTION, SOLUTION INTRAVENOUS at 05:11

## 2019-11-06 RX ADMIN — DEXMEDETOMIDINE HYDROCHLORIDE 0.9 MCG/KG/HR: 4 INJECTION INTRAVENOUS at 07:11

## 2019-11-06 RX ADMIN — PIPERACILLIN AND TAZOBACTAM 4.5 G: 4; .5 INJECTION, POWDER, FOR SOLUTION INTRAVENOUS at 09:11

## 2019-11-06 RX ADMIN — DEXTROSE 125 ML: 10 SOLUTION INTRAVENOUS at 09:11

## 2019-11-06 RX ADMIN — VANCOMYCIN HYDROCHLORIDE 1250 MG: 100 INJECTION, POWDER, LYOPHILIZED, FOR SOLUTION INTRAVENOUS at 04:11

## 2019-11-06 RX ADMIN — MILRINONE LACTATE IN DEXTROSE 0.38 MCG/KG/MIN: 200 INJECTION, SOLUTION INTRAVENOUS at 04:11

## 2019-11-06 RX ADMIN — POLYETHYLENE GLYCOL (3350) 17 G: 17 POWDER, FOR SOLUTION ORAL at 09:11

## 2019-11-06 RX ADMIN — DEXMEDETOMIDINE HYDROCHLORIDE 1.1 MCG/KG/HR: 4 INJECTION INTRAVENOUS at 10:11

## 2019-11-06 RX ADMIN — EPINEPHRINE 0.14 MCG/KG/MIN: 1 INJECTION INTRAMUSCULAR; INTRAVENOUS; SUBCUTANEOUS at 11:11

## 2019-11-06 NOTE — PROGRESS NOTES
Pt sent to icu. Report given to ozzie manning. Pt was intubated  In 206. Pt sister notified of transfer to unit by ozzie avalos

## 2019-11-06 NOTE — ASSESSMENT & PLAN NOTE
Per Cards and CVT surgery   S/P 3-vessel CABG   Cont ASA, statin,   Lopressor and Losartan on hold for hypotension on pressors

## 2019-11-06 NOTE — PROGRESS NOTES
Pt arrived to ICU and secured to Monitor. DR. Gray and Dr. Zaidi at bedside. Pt on Vent. SG placed to Right IJ. R Art line placed. Jones cath placed. Pressors started. Sedation given.

## 2019-11-06 NOTE — PROGRESS NOTES
[facility] -   Hematology/Oncology  Progress Note    Patient Name: Mateusz Bates  Admission Date: 10/28/2019  Hospital Length of Stay: 9 days  Code Status: Full Code     Subjective:     HPI:  Mrs. Bates is a 69-year-old female with hypertension, diabetes, hyperlipidemia, obesity, anxiety and depression, iron deficiency anemia, vitamin B12 deficiency, who presented to the emergency room with an NSTEMI.  Workup included a cardiac catheterization that shows severe multivessel coronary artery disease with proximal LAD stenosis of 70% and severely depressed ejection fraction of 20%.  The patient is a candidate for urgent coronary artery bypass grafting. S/p CABG today.  Hematology is consulted for thrombocytopenia.    Interval History: Patient found yesterday with agonal breathing, unresponsive, thready pulse, cold, and clammy.  Transferred to ICU. Is currently intubated on inotropic support, restrained, and with pressor support.  Transfused 1 unit prbc's for hemoglobin of 6.7.  Hemoglobin this morning 10.4.     Oncology Treatment Plan:   [No treatment plan]    Medications:  Continuous Infusions:   dexmedetomidine (PRECEDEX) infusion 0.7 mcg/kg/hr (11/06/19 0815)    epinephrine 0.15 mcg/kg/min (11/06/19 0800)    insulin (HUMAN R) infusion (adults) 13 Units/hr (11/06/19 0800)    milrinone 20mg/100ml D5W (200mcg/ml) 0.25 mcg/kg/min (11/06/19 0800)    norepinephrine bitartrate-D5W Stopped (11/06/19 0030)     Scheduled Meds:   atorvastatin  80 mg Oral QHS    chlorhexidine  15 mL Mouth/Throat BID    docusate sodium  100 mg Oral BID    famotidine (PF)  20 mg Intravenous BID    lactated ringers  1,000 mL Intravenous Once    magnesium sulfate IVPB  2 g Intravenous Once    nozaseptin   Each Nostril BID    piperacillin-tazobactam (ZOSYN) IVPB  4.5 g Intravenous Q8H    polyethylene glycol  17 g Oral Daily    [START ON 11/10/2019] vancomycin (VANCOCIN) IVPB  750 mg Intravenous Q72H     PRN Meds:sodium  chloride, sodium chloride, albumin human 5%, albuterol sulfate, Dextrose 10% Bolus, Dextrose 10% Bolus, iohexol, lactated ringers, magnesium sulfate IVPB, magnesium sulfate IVPB, metoclopramide HCl, ondansetron, potassium chloride in water **AND** potassium chloride in water **AND** potassium chloride in water, traMADol     Review of Systems   Unable to perform ROS: Acuity of condition     Objective:     Vital Signs (Most Recent):  Temp: 99.2 °F (37.3 °C) (11/06/19 0800)  Pulse: 86 (11/06/19 0800)  Resp: 20 (11/06/19 0800)  BP: (!) 106/48 (11/06/19 0800)  SpO2: 100 % (11/06/19 0800) Vital Signs (24h Range):  Temp:  [97.9 °F (36.6 °C)-99.9 °F (37.7 °C)] 99.2 °F (37.3 °C)  Pulse:  [41-87] 86  Resp:  [0-27] 20  SpO2:  [85 %-100 %] 100 %  BP: ()/() 106/48     Weight: 75.2 kg (165 lb 12.6 oz)  Body mass index is 31.32 kg/m².  Body surface area is 1.8 meters squared.      Intake/Output Summary (Last 24 hours) at 11/6/2019 0914  Last data filed at 11/6/2019 0800  Gross per 24 hour   Intake 4755.41 ml   Output 915 ml   Net 3840.41 ml       Physical Exam   Constitutional: She appears well-developed and well-nourished. She appears toxic. She is sedated, intubated and restrained.   HENT:   Head: Normocephalic and atraumatic.   Cardiovascular: Normal rate and regular rhythm.   Pulmonary/Chest: Breath sounds normal. She is intubated.   Abdominal: Soft. Normal appearance and bowel sounds are normal. She exhibits no distension.   Musculoskeletal: She exhibits edema (generalized).   Skin: Skin is warm and dry. She is not diaphoretic. There is pallor.   Nursing note and vitals reviewed.      Significant Labs:   All pertinent labs from the last 24 hours have been reviewed.    Diagnostic Results:  I have reviewed all pertinent imaging results/findings within the past 24 hours.    Assessment/Plan:     Thrombocytopenia  Platelets normal at 300K on 10/30, then declined 10/31 78K, 11/1 76K, 11/2 47K.  Possible cause of acute  thrombocytopenia: drug induced immune thrombocytopenia (amiodarone given 10/30, lasix started 10/28 and currently receiving), HIT (4T score low prob), TTP (plasmic score intermediate prob), ITP, DIC.  Have sent: HIT antibodies, [id number], fibrinogen, ddimer, C3/C4 complements, pathology review for schistocytes, reticulocytes, haptoglobin, LDH.  If platelets continue to decline, consider stop Lasix, and use bumex.    11/3/2019 platelets improving 89K today.  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    11/4/19 Platelets improved to 140 K.  Most likely due to previous medication.  Continuing to trend upward daily.  Hit panel pending.  --CBC daily    11/5/19 Platelets reduced to 73 K today from 140 K yesterday.  Review of peripheral smear show no schistocytes - so not likely DIC or TTP.  Doesn't appear to be on any medications currently that are known to cause thrombocytopenia; however previously on Amiodarone - this has since been stopped.  HIT is negative.  Possible infection.  Temp of 100.6 yesterday; none over the past 24 hours.  Worsening leukocytosis.  States currently with diarrhea without abdominal pain.  Denies dysuria.   Also mid sternal incision is erythremic and well approximated.  Wound vac DC'd today.    --CBC daily  --Consider work up for infection    11/6/19 Platelets today 89 K - stable compared to yesterday. Sternal incision with less erythremia today as compared to yesterday.  Continue treatment for suspected infection.  So far blood cultures NGTD.  Chest xray negative for infectious process.  UA negative for infection.  Was with diarrhea yesterday.    --CBC daily  --Continue IV abx per primary team  --Continue Plavix as long as platelets >50 K; if less than 50 K then hold Plavix.   --Transfuse for platelets <10 K          Thank you for your consult. I will follow-up with patient. Please contact us if you have any additional questions.     Anitha Skinner,  NP  Hematology/Oncology  [facility]

## 2019-11-06 NOTE — SUBJECTIVE & OBJECTIVE
Interval History: Stable overnight. Patient evaluated by Vascular. Remains intubated.     Review of Systems   Unable to perform ROS: Intubated     Objective:     Vital Signs (Most Recent):  Temp: 99.2 °F (37.3 °C) (11/06/19 1500)  Pulse: 97 (11/06/19 1700)  Resp: (!) 22 (11/06/19 1700)  BP: (!) 117/44 (11/06/19 1700)  SpO2: 100 % (11/06/19 1700) Vital Signs (24h Range):  Temp:  [98.9 °F (37.2 °C)-99.9 °F (37.7 °C)] 99.2 °F (37.3 °C)  Pulse:  [] 97  Resp:  [0-27] 22  SpO2:  [89 %-100 %] 100 %  BP: ()/(26-56) 117/44     Weight: 75.2 kg (165 lb 12.6 oz)  Body mass index is 31.32 kg/m².    Intake/Output Summary (Last 24 hours) at 11/6/2019 1711  Last data filed at 11/6/2019 1700  Gross per 24 hour   Intake 5222.41 ml   Output 1085 ml   Net 4137.41 ml      Physical Exam   Constitutional: She is oriented to person, place, and time. She appears well-developed and well-nourished. No distress.   Lethargic   HENT:   Head: Normocephalic and atraumatic.   Mouth/Throat: Oropharynx is clear and moist.   Eyes: Pupils are equal, round, and reactive to light. Conjunctivae and EOM are normal.   Neck: Neck supple. No JVD present. No thyromegaly present.   Cardiovascular: Normal rate and regular rhythm. Exam reveals no gallop and no friction rub.   No murmur heard.  Pulmonary/Chest: Effort normal and breath sounds normal. She has no wheezes. She has no rales. She exhibits tenderness.   + midline chest sx site   Abdominal: Soft. Bowel sounds are normal. She exhibits no distension. There is no tenderness. There is no rebound and no guarding.   Musculoskeletal: Normal range of motion. She exhibits edema. She exhibits no deformity.   Lymphadenopathy:     She has no cervical adenopathy.   Neurological: She is oriented to person, place, and time. She has normal reflexes.   Skin: Skin is dry. Capillary refill takes 2 to 3 seconds. No rash noted.   Psychiatric: She has a normal mood and affect.   Nursing note and vitals  reviewed.      Significant Labs:   Blood Culture:   Recent Labs   Lab 11/05/19  1325 11/05/19  1335   LABBLOO No Growth to date No Growth to date     BMP:   Recent Labs   Lab 11/06/19  0403  11/06/19  1610   *  358*   < > 99  99   *  128*   < > 127*  127*   K 3.6  3.6   < > 3.6  3.6   CL 92*  92*   < > 93*  93*   CO2 24  24   < > 24  24   BUN 29*  29*   < > 25*  25*   CREATININE 1.1  1.1   < > 0.9  0.9   CALCIUM 7.5*  7.5*   < > 7.3*  7.3*   MG 1.7  --   --     < > = values in this interval not displayed.     CBC:   Recent Labs   Lab 11/05/19  1541 11/05/19  1757 11/06/19  0403 11/06/19  1610   WBC 9.18  9.18  --  15.53*  15.53* 18.17*   HGB 6.7*  6.7* 9.1* 10.4*  10.4* 9.5*   HCT 22.0*  22.0* 28.8* 31.9*  31.9* 28.6*   PLT 59*  59*  --  89*  89* 67*     CMP:   Recent Labs   Lab 11/05/19  1541  11/05/19  1919  11/06/19  0403 11/06/19  1246 11/06/19  1610   *   < > 128*   < > 128*  128* 129* 127*  127*   K 4.1   < > 4.0   < > 3.6  3.6 3.5 3.6  3.6      < > 94*   < > 92*  92* 93* 93*  93*   CO2 9*   < > 22*   < > 24  24 25 24  24   *   < > 495*   < > 358*  358* 101 99  99   BUN 15   < > 35*   < > 29*  29* 24* 25*  25*   CREATININE 0.6   < > 1.4   < > 1.1  1.1 0.9 0.9  0.9   CALCIUM 6.5*   < > 7.6*   < > 7.5*  7.5* 7.3* 7.3*  7.3*   PROT 2.1*  --  4.8*  --   --   --   --    ALBUMIN 0.9*  --  2.7*  --   --   --   --    BILITOT 0.4  --  1.4*  --   --   --   --    ALKPHOS 34*  --  56  --   --   --   --    AST 10  --  266*  --   --   --   --    ALT <5*  --  151*  --   --   --   --    ANIONGAP 19*   < > 12   < > 12  12 11 10  10   EGFRNONAA >60   < > 38*   < > 51*  51* >60 >60  >60    < > = values in this interval not displayed.     Urine Studies:   Recent Labs   Lab 11/05/19  1416   COLORU Yellow   APPEARANCEUA Clear   PHUR 7.0   SPECGRAV 1.010   PROTEINUA Negative   GLUCUA Trace*   KETONESU Trace*   BILIRUBINUA Negative   OCCULTUA Negative    NITRITE Negative   UROBILINOGEN Negative   LEUKOCYTESUR Negative     All pertinent labs within the past 24 hours have been reviewed.    Significant Imaging: I have reviewed all pertinent imaging results/findings within the past 24 hours.

## 2019-11-06 NOTE — ASSESSMENT & PLAN NOTE
On 1 pressors: EPI  Transfused 1 PRBC  CT chest Abd pelvis post transfusion: no external obvious source, sputum, urine stool clear, abd soft  Follow H&H

## 2019-11-06 NOTE — ASSESSMENT & PLAN NOTE
CO 3.2  Added MILRINONE      16 29 14 14 8 8  CVP (mean       PAP: (26-54)/(10-40) 27/16  PAP (Mean):  [17 mmHg-45 mmHg] 20 mmHg

## 2019-11-06 NOTE — ASSESSMENT & PLAN NOTE
11/01/2019  The patient is post-operative day 1, status-post coronary artery bypass grafting x 3.  Overall the patient is making progress, however IABP and inotropic agents will continue today.   Neuro:  The patient is awake alert and oriented x3.  Neuro exam is nonfocal.  Pain is well controlled with IV pain medication.  Cardiac:  Patient is hemodynamically improving. Patient remains on epinephrine and milrinone gtts, which will be weaned over the next 24 hours.  Cardiac index is improving. IABP will remain in place over the next 24 hours.  Patient will be started on beta blockers once gtts are weaned off.  Respiratory: Good sats on high flow nasal cannula. Continue respiratory toilet, continue incentive spirometer. Wean to low flow nasal cannula.  GI:  Diet: sips of water with medications, will slowly advance diet as tolerated. Benign abdominal exam.  Renal:  Urine output is slowly improving. Cr 0.9. K 4.3. Mag 1.6. Replace mag. Continue IV diuresis. Add metolazone.   Heme:  Hct 34.1  Platelet 82. Continue aspirin. Discontinue Plavix for now.  Id:  WBC 12. Tmax 100.3. Continue post-operative antibiotics. Will continue to follow.   Endocrine:  Glucose is controlled with insulin gtt.  Continue insulin gtt for now, until weaned off inotropic agents.   Activities:  Patient is currently bed bound due to IABP.  Advance activities as tolerated once IABP is removed.   Lines tubes and drains:  Continue IABP. Continue [place] and Cordis.  Continue A-line.  Chest tubes will continue. NICOLASA x 2 will continue. Jones catheter will continue.  Continue pacer wires. Continue prevena wound vac.  Plan: Remain ICU.          11/02/2019  The patient is post-operative day 2, status-post coronary artery bypass grafting x 3.  Overall the patient is making progress, IABP removed and inotropic agents discontinued.   Neuro:  The patient is awake alert and oriented x3.  Neuro exam is nonfocal.  Pain is well controlled. Discontinue fentanyl.    Cardiac:  Patient is hemodynamically improving. Inotropic gtts discontinued. Excellent cardiac index. IABP discontinued.  Patient will be started on beta blockers today.  Respiratory: Good sats on high flow nasal cannula. Continue respiratory toilet, continue incentive spirometer. Wean to low flow nasal cannula.  GI:  Advance diabetic/cardiac diet as tolerated. Benign abdominal exam.  Renal:  Urine output is improving. Cr 0.9. K 4.9. Mag 2.2. Continue IV diuresis. Hold AM K.   Heme:  Hct 28.3  Platelet 47. Discontinue aspirin. Discontinue Plavix. HIT panel. Consult hematology r/t thrombocytopenia.   Id:  WBC 10.4. Tmax 99.4. Will continue to follow.   Endocrine:  Discontinue insulin gtt. Start sliding scale insulin. Start long acting insulin detemir.   Activities: Advance activities as tolerated.   Lines tubes and drains:  Discontinue IABP. Discontinue [place] and Cordis.  Discontinue A-line.  Discontinue chest tubes x 3. Discontinue NICOLASA x 2. Discontinue arterial line. Jones catheter will continue for now.  Continue pacer wires. Continue prevena wound vac. Place PICC line.   Plan: Remain ICU.           11/03/2019  The patient is post-operative day 3, status-post coronary artery bypass grafting x 3.  Overall the patient is making progress.  Neuro:  The patient is awake alert and oriented x3.  Neuro exam is nonfocal.  Pain is well controlled. Continue tramadol.   Cardiac:  Patient is hemodynamically stable. Increase metoprolol dose. Start amlodipine.   Respiratory: Good sats on nasal cannula. Continue respiratory toilet, continue incentive spirometer. Wean to room air.  GI:  Advance diabetic/cardiac diet as tolerated. Benign abdominal exam. Passing gas, no BM. Add 1 bottle of mag citrate.   Renal:  Good urine output. Cr 0.9. K 3.8. Mag 2.0. Replace K. Replace Mag. Change to PO diuresis.   Heme:  Hct 34.7  Platelet 89. Restart aspirin. Continue to hold Plavix. HIT panel pending. Hematology following.  Id:  WBC 13.8. Tmax  99.4. Will continue to follow.    Endocrine:  Glucose controlled on sliding scale insulin. Increase long acting insulin detemir dose.   Activities: Advance activities as tolerated.   Lines tubes and drains:  Storm catheter will continue for now.  Continue pacer wires. Continue prevena wound vac. Continue PICC line.   Plan: Remain ICU.          11/04/2019  The patient is post-operative day 4, status-post coronary artery bypass grafting x 3.  Overall the patient is making progress.  Neuro:  The patient is awake alert and oriented x3.  Neuro exam is nonfocal.  Pain is well controlled. Continue tramadol.   Cardiac:  Patient is hemodynamically stable. Continue metoprolol dose. Continue amlodipine dose.   Respiratory: Good sats on room air. Continue respiratory toilet, continue incentive spirometer.  GI:  Advance diabetic/cardiac diet as tolerated. Benign abdominal exam. Regular bowel movements.   Renal:  Good urine output. Cr 0.7. K 4.0. Mag 2.3. Replace K. Decrease PO diuresis.   Heme:  Hct 34.4  Platelet 140. Continue aspirin. Restart Plavix. HIT panel pending. Hematology following.  Id:  WBC down to 13.13. Tmax 98. Will continue to follow.    Endocrine:  Glucose controlled on sliding scale insulin. Increased long acting insulin detemir dose.   Activities: Advance activities as tolerated.   Lines tubes and drains:  Discontinued storm catheter. Continue pacer wires. Continue prevena wound vac. Continue PICC line.   Plan: Transfer to telemetry. Anticipate discharge to SNF in 48 hours.         11/05/2019  The patient is post-operative day 5, status-post coronary artery bypass grafting x 3.  Overall the patient is making progress.  Neuro:  The patient is awake alert and oriented x3.  Neuro exam is nonfocal. Patient slow to answer questions, however answers are appropriate.  Pain is well controlled. Continue tramadol PRN.   Cardiac:  Patient is hemodynamically stable. Continue metoprolol dose. Discontinue amlodipine. Start  losartan.    Respiratory: Good sats on room air. Continue respiratory toilet, continue incentive spirometer.  GI:  Advance diabetic/cardiac diet as tolerated. Benign abdominal exam. Regular bowel movements.   Renal:  Good urine output. Cr 0.7. K 4.8. Mag 2.1. Replace Mag. Discontinue PO diuresis.   Heme:  Hct 39.5  Platelet 73. Continue aspirin. Hold Plavix. HIT panel pending. Hematology following.  Id:  WBC is up to 16. Tmax 99. Pan-culture. DC PICC line. Chest xray. Start broad spectrum empiric antibiotics. Will continue to follow.    Endocrine:  Glucose controlled on sliding scale insulin. Decrease long acting insulin detemir dose.   Activities: Advance activities as tolerated.   Lines tubes and drains:  Discontinue pacer wires. Discontinue prevena wound vac. Continue PICC line.   Plan: Anticipate discharge to SNF in 24-48 hours.         11/06/2019  The patient is post-operative day 6, status-post coronary artery bypass grafting x 3. Patient had an episode of decompensation and unresponsiveness yesterday evening. The patient was found unresponsive, cool, and diaphoretic, with agonal breathing. Patient was intubated and transferred to the ICU. Bedside EKG and echocardiogram performed at the bedside showed no significant changes. CT head, chest, abdomen pelvis performed, showed no significant findings.     Neuro:  The patient is intubated and sedated. On Precedex gtt. Begin weaning off sedation.    Cardiac:  Patient is hemodynamically improving. Inotropic agent dependent. Currently on Epinephrine gtt. Start Milrinone gtt. Discontinue metoprolol. Discontinue losartan.    Respiratory: Ventilator dependent. tV 450, RR 20, PEEP 5. Wean down sedation. Wean off ventilator today.    GI:  Begin enteral tube feedings. Benign abdominal exam. Previously having regular bowel movements.   Renal: Adequate urine output. Cr 1.1. K 3.6. Mag 1.7. Replace Mag. Replace K. Discontinue diuresis.   Heme:  Hct 31.9  Platelet 89. Hold  aspirin. Hold Plavix. HIT panel negative. Patient received 1 units PRBC last night. Hematology following.  Id:  WBC is down to 15.5. Tmax 99.9. BC NGTD. Urinalysis negative. Sputum culture negative. Vancomycin and zosyn day 2. Will continue to follow closely.    Endocrine:  Glucose controlled on insulin gtt, continue at this time.   Activities: Intubated and sedated.   Musculoskeletal: Unable to find right radial pulse via doppler. Tips of the fingers of the right hand are mottled, dusky, appear cyanotic. Recent right radial arterial line in place, which has been relocated. US of arteries and veins of the right arm ordered. Consult to vascular surgery placed.    Lines tubes and drains:  Continue swan and cordis. Continue arterial line. Continue storm.    Plan: Remain ICU

## 2019-11-06 NOTE — PROGRESS NOTES
[facility] -   Cardiothoracic Surgery  Progress Note    Patient Name: Mateusz Bates  MRN: [id number]  Admission Date: 10/28/2019  Hospital Length of Stay: 9 days  Code Status: Full Code   Attending Physician: Keny Zaidi MD   Referring Provider: Self, Aaareferral  Principal Problem:Acute hypoxemic respiratory failure            Subjective:     Post-Op Info:  Procedure(s) (LRB):  CORONARY ARTERY BYPASS GRAFT (CABG) (N/A)  ECHOCARDIOGRAM,TRANSESOPHAGEAL (N/A)  BLOCK, NERVE, INTERCOSTAL, 2 OR MORE (N/A)   6 Days Post-Op     Interval History:  11/06/2019  The patient is post-operative day 6, status-post coronary artery bypass grafting x 3.     ROS  Medications:  Continuous Infusions:   dexmedetomidine (PRECEDEX) infusion 0.6 mcg/kg/hr (11/06/19 1500)    epinephrine 0.1 mcg/kg/min (11/06/19 1500)    insulin (HUMAN R) infusion (adults) Stopped (11/06/19 0900)    milrinone 20mg/100ml D5W (200mcg/ml) 0.375 mcg/kg/min (11/06/19 1500)    norepinephrine bitartrate-D5W Stopped (11/06/19 0030)     Scheduled Meds:   atorvastatin  80 mg Oral QHS    chlorhexidine  15 mL Mouth/Throat BID    docusate sodium  100 mg Oral BID    famotidine (PF)  20 mg Intravenous BID    lactated ringers  1,000 mL Intravenous Once    nozaseptin   Each Nostril BID    piperacillin-tazobactam (ZOSYN) IVPB  4.5 g Intravenous Q8H    polyethylene glycol  17 g Oral Daily    vancomycin (VANCOCIN) IVPB  1,250 mg Intravenous Once    [START ON 11/10/2019] vancomycin (VANCOCIN) IVPB  750 mg Intravenous Q72H     PRN Meds:sodium chloride, sodium chloride, albumin human 5%, albuterol sulfate, Dextrose 10% Bolus, Dextrose 10% Bolus, iohexol, lactated ringers, magnesium sulfate IVPB, magnesium sulfate IVPB, metoclopramide HCl, ondansetron, potassium chloride in water **AND** potassium chloride in water **AND** potassium chloride in water, traMADol     Objective:     Vital Signs (Most Recent):  Temp: 99.2 °F (37.3 °C) (11/06/19  1500)  Pulse: 82 (11/06/19 1545)  Resp: 20 (11/06/19 1545)  BP: (!) 102/39 (11/06/19 1530)  SpO2: 100 % (11/06/19 1545) Vital Signs (24h Range):  Temp:  [98.9 °F (37.2 °C)-99.9 °F (37.7 °C)] 99.2 °F (37.3 °C)  Pulse:  [] 82  Resp:  [0-27] 20  SpO2:  [89 %-100 %] 100 %  BP: ()/(26-63) 102/39     Weight: 75.2 kg (165 lb 12.6 oz)  Body mass index is 31.32 kg/m².    SpO2: 100 %  O2 Device (Oxygen Therapy): ventilator    Intake/Output - Last 3 Shifts       11/04 0700 - 11/05 0659 11/05 0700 - 11/06 0659 11/06 0700 - 11/07 0659    P.O. 600 480     I.V. (mL/kg)  3065.4 (40.8) 222 (3)    Blood  700     NG/GT   45    IV Piggyback  800 100    Total Intake(mL/kg) 600 (8.7) 5045.4 (67.1) 367 (4.9)    Urine (mL/kg/hr)  825 (0.5) 270 (0.4)    Stool  0     Total Output  825 270    Net +600 +4220.4 +97           Urine Occurrence 5 x 1 x     Stool Occurrence 2 x 1 x           Lines/Drains/Airways     Central Venous Catheter Line                 Pulmonary Artery Catheter Assessment  11/05/19 1721 right internal jugular less than 1 day          Drain                 NG/OG Tube 11/05/19 1720 orogastric Center mouth less than 1 day         Urethral Catheter 11/05/19 1718 Straight-tip less than 1 day          Airway                 Airway - Non-Surgical 11/05/19 1452 Endotracheal Tube 1 day          Arterial Line                 Arterial Line 11/06/19 0945 Left Radial less than 1 day          Peripheral Intravenous Line                 Peripheral IV - Single Lumen 11/05/19 2300 18 G Left Other less than 1 day                Physical Exam   Constitutional: She appears well-developed.   Intubated and Sedated   HENT:   Head: Normocephalic and atraumatic.   Eyes: Pupils are equal, round, and reactive to light. EOM are normal.   Neck: Normal range of motion. Neck supple. No JVD present.   Cardiovascular: Normal rate, regular rhythm and normal heart sounds. Exam reveals no gallop and no friction rub.   No murmur heard.  Unable to  find right radial pulse via doppler. Tips of the fingers of the right hand are mottled, dusky, appear cyanotic. Recent right radial arterial line in place, which has been removed.    Pulmonary/Chest: Effort normal and breath sounds normal. No stridor. No respiratory distress. She has no wheezes. She has no rales. She exhibits no tenderness.   On ventilation support.    Abdominal: Soft. She exhibits no distension and no mass. There is no tenderness. There is no rebound and no guarding. No hernia.   Hypoactive bowel sounds x 4 quadrants.    Musculoskeletal: She exhibits edema. She exhibits no tenderness or deformity.   Sedated.    Neurological:   Unable to assess, intubated and sedated. PERRLA.     Skin: Skin is warm and dry. Capillary refill takes 2 to 3 seconds. No rash noted. She is not diaphoretic. No erythema. No pallor.   Psychiatric:   Sedated.    Nursing note and vitals reviewed.      Significant Labs:  BMP:   Recent Labs   Lab 11/06/19  0403 11/06/19  1246   *  358* 101   *  128* 129*   K 3.6  3.6 3.5   CL 92*  92* 93*   CO2 24  24 25   BUN 29*  29* 24*   CREATININE 1.1  1.1 0.9   CALCIUM 7.5*  7.5* 7.3*   MG 1.7  --      CBC:   Recent Labs   Lab 11/06/19  0403   WBC 15.53*  15.53*   RBC 3.75*  3.75*   HGB 10.4*  10.4*   HCT 31.9*  31.9*   PLT 89*  89*   MCV 85  85   MCH 27.7  27.7   MCHC 32.6  32.6       Significant Diagnostics:  I have reviewed all pertinent imaging results/findings within the past 24 hours.    Assessment/Plan:     S/P CABG x 3       11/01/2019  The patient is post-operative day 1, status-post coronary artery bypass grafting x 3.  Overall the patient is making progress, however IABP and inotropic agents will continue today.   Neuro:  The patient is awake alert and oriented x3.  Neuro exam is nonfocal.  Pain is well controlled with IV pain medication.  Cardiac:  Patient is hemodynamically improving. Patient remains on epinephrine and milrinone gtts, which will be  weaned over the next 24 hours.  Cardiac index is improving. IABP will remain in place over the next 24 hours.  Patient will be started on beta blockers once gtts are weaned off.  Respiratory: Good sats on high flow nasal cannula. Continue respiratory toilet, continue incentive spirometer. Wean to low flow nasal cannula.  GI:  Diet: sips of water with medications, will slowly advance diet as tolerated. Benign abdominal exam.  Renal:  Urine output is slowly improving. Cr 0.9. K 4.3. Mag 1.6. Replace mag. Continue IV diuresis. Add metolazone.   Heme:  Hct 34.1  Platelet 82. Continue aspirin. Discontinue Plavix for now.  Id:  WBC 12. Tmax 100.3. Continue post-operative antibiotics. Will continue to follow.   Endocrine:  Glucose is controlled with insulin gtt.  Continue insulin gtt for now, until weaned off inotropic agents.   Activities:  Patient is currently bed bound due to IABP.  Advance activities as tolerated once IABP is removed.   Lines tubes and drains:  Continue IABP. Continue [place] and Cordis.  Continue A-line.  Chest tubes will continue. NICOLASA x 2 will continue. Jones catheter will continue.  Continue pacer wires. Continue prevena wound vac.  Plan: Remain ICU.          11/02/2019  The patient is post-operative day 2, status-post coronary artery bypass grafting x 3.  Overall the patient is making progress, IABP removed and inotropic agents discontinued.   Neuro:  The patient is awake alert and oriented x3.  Neuro exam is nonfocal.  Pain is well controlled. Discontinue fentanyl.   Cardiac:  Patient is hemodynamically improving. Inotropic gtts discontinued. Excellent cardiac index. IABP discontinued.  Patient will be started on beta blockers today.  Respiratory: Good sats on high flow nasal cannula. Continue respiratory toilet, continue incentive spirometer. Wean to low flow nasal cannula.  GI:  Advance diabetic/cardiac diet as tolerated. Benign abdominal exam.  Renal:  Urine output is improving. Cr 0.9. K 4.9. Mag  2.2. Continue IV diuresis. Hold AM K.   Heme:  Hct 28.3  Platelet 47. Discontinue aspirin. Discontinue Plavix. HIT panel. Consult hematology r/t thrombocytopenia.   Id:  WBC 10.4. Tmax 99.4. Will continue to follow.   Endocrine:  Discontinue insulin gtt. Start sliding scale insulin. Start long acting insulin detemir.   Activities: Advance activities as tolerated.   Lines tubes and drains:  Discontinue IABP. Discontinue [place] and Cordis.  Discontinue A-line.  Discontinue chest tubes x 3. Discontinue NICOLASA x 2. Discontinue arterial line. Jones catheter will continue for now.  Continue pacer wires. Continue prevena wound vac. Place PICC line.   Plan: Remain ICU.           11/03/2019  The patient is post-operative day 3, status-post coronary artery bypass grafting x 3.  Overall the patient is making progress.  Neuro:  The patient is awake alert and oriented x3.  Neuro exam is nonfocal.  Pain is well controlled. Continue tramadol.   Cardiac:  Patient is hemodynamically stable. Increase metoprolol dose. Start amlodipine.   Respiratory: Good sats on nasal cannula. Continue respiratory toilet, continue incentive spirometer. Wean to room air.  GI:  Advance diabetic/cardiac diet as tolerated. Benign abdominal exam. Passing gas, no BM. Add 1 bottle of mag citrate.   Renal:  Good urine output. Cr 0.9. K 3.8. Mag 2.0. Replace K. Replace Mag. Change to PO diuresis.   Heme:  Hct 34.7  Platelet 89. Restart aspirin. Continue to hold Plavix. HIT panel pending. Hematology following.  Id:  WBC 13.8. Tmax 99.4. Will continue to follow.    Endocrine:  Glucose controlled on sliding scale insulin. Increase long acting insulin detemir dose.   Activities: Advance activities as tolerated.   Lines tubes and drains:  Jones catheter will continue for now.  Continue pacer wires. Continue prevena wound vac. Continue PICC line.   Plan: Remain ICU.          11/04/2019  The patient is post-operative day 4, status-post coronary artery bypass grafting x 3.   Overall the patient is making progress.  Neuro:  The patient is awake alert and oriented x3.  Neuro exam is nonfocal.  Pain is well controlled. Continue tramadol.   Cardiac:  Patient is hemodynamically stable. Continue metoprolol dose. Continue amlodipine dose.   Respiratory: Good sats on room air. Continue respiratory toilet, continue incentive spirometer.  GI:  Advance diabetic/cardiac diet as tolerated. Benign abdominal exam. Regular bowel movements.   Renal:  Good urine output. Cr 0.7. K 4.0. Mag 2.3. Replace K. Decrease PO diuresis.   Heme:  Hct 34.4  Platelet 140. Continue aspirin. Restart Plavix. HIT panel pending. Hematology following.  Id:  WBC down to 13.13. Tmax 98. Will continue to follow.    Endocrine:  Glucose controlled on sliding scale insulin. Increased long acting insulin detemir dose.   Activities: Advance activities as tolerated.   Lines tubes and drains:  Discontinued storm catheter. Continue pacer wires. Continue prevena wound vac. Continue PICC line.   Plan: Transfer to telemetry. Anticipate discharge to SNF in 48 hours.         11/05/2019  The patient is post-operative day 5, status-post coronary artery bypass grafting x 3.  Overall the patient is making progress.  Neuro:  The patient is awake alert and oriented x3.  Neuro exam is nonfocal. Patient slow to answer questions, however answers are appropriate.  Pain is well controlled. Continue tramadol PRN.   Cardiac:  Patient is hemodynamically stable. Continue metoprolol dose. Discontinue amlodipine. Start losartan.    Respiratory: Good sats on room air. Continue respiratory toilet, continue incentive spirometer.  GI:  Advance diabetic/cardiac diet as tolerated. Benign abdominal exam. Regular bowel movements.   Renal:  Good urine output. Cr 0.7. K 4.8. Mag 2.1. Replace Mag. Discontinue PO diuresis.   Heme:  Hct 39.5  Platelet 73. Continue aspirin. Hold Plavix. HIT panel pending. Hematology following.  Id:  WBC is up to 16. Tmax 99.  Pan-culture. DC PICC line. Chest xray. Start broad spectrum empiric antibiotics. Will continue to follow.    Endocrine:  Glucose controlled on sliding scale insulin. Decrease long acting insulin detemir dose.   Activities: Advance activities as tolerated.   Lines tubes and drains:  Discontinue pacer wires. Discontinue prevena wound vac. Continue PICC line.   Plan: Anticipate discharge to SNF in 24-48 hours.         11/06/2019  The patient is post-operative day 6, status-post coronary artery bypass grafting x 3. Patient had an episode of decompensation and unresponsiveness yesterday evening. The patient was found unresponsive, cool, and diaphoretic, with agonal breathing. Patient was intubated and transferred to the ICU. Bedside EKG and echocardiogram performed at the bedside showed no significant changes. CT head, chest, abdomen pelvis performed, showed no significant findings.     Neuro:  The patient is intubated and sedated. On Precedex gtt. Begin weaning off sedation.    Cardiac:  Patient is hemodynamically improving. Inotropic agent dependent. Currently on Epinephrine gtt. Start Milrinone gtt. Discontinue metoprolol. Discontinue losartan.    Respiratory: Ventilator dependent. tV 450, RR 20, PEEP 5. Wean down sedation. Wean off ventilator today.    GI:  Begin enteral tube feedings. Benign abdominal exam. Previously having regular bowel movements.   Renal: Adequate urine output. Cr 1.1. K 3.6. Mag 1.7. Replace Mag. Replace K. Discontinue diuresis.   Heme:  Hct 31.9  Platelet  89. Hold aspirin. Hold Plavix. HIT panel negative. Patient received 1 units PRBC last night. Hematology following.  Id:  WBC is down to 15.5. Tmax 99.9. BC NGTD. Urinalysis negative. Sputum culture negative. Vancomycin and zosyn day 2. Will continue to follow closely.    Endocrine:  Glucose controlled on insulin gtt, continue at this time.   Activities: Intubated and sedated.   Musculoskeletal: Unable to find right radial pulse via doppler.  Tips of the fingers of the right hand are mottled, dusky, appear cyanotic. Recent right radial arterial line in place, which has been relocated. US of arteries and veins of the right arm ordered. Consult to vascular surgery placed.    Lines tubes and drains:  Continue swan and cordis. Continue arterial line. Continue storm.    Plan: Remain ICU           NSTEMI (non-ST elevated myocardial infarction)  The patient is a 69-year-old female with hypertension, diabetes, hyperlipidemia, obesity, anxiety and depression, iron deficiency anemia, vitamin B12 deficiency, who presented to the emergency room with an NSTEMI.  Workup included a cardiac catheterization that shows severe multivessel coronary artery disease with proximal LAD stenosis of 70% and severely depressed ejection fraction of 20%.  The patient is a candidate for urgent coronary artery bypass grafting.  The risks and benefits of surgery were explained to the patient.  The patient verbalized understanding of the issues discussed.  She a segs the risks of surgery and has agreed to proceed with urgent coronary artery bypass grafting.  She understands that the risk of neurologic complication postoperatively is increased due to the presence of cerebral microvascular disease.        Yovani Dobbins NP  Cardiothoracic Surgery  [facility] [place]

## 2019-11-06 NOTE — SUBJECTIVE & OBJECTIVE
Review of Systems   Unable to perform ROS: Intubated         Objective:     Vital Signs (Most Recent):  Temp: 99.3 °F (37.4 °C) (11/06/19 1100)  Pulse: 90 (11/06/19 1130)  Resp: 20 (11/06/19 1130)  BP: (!) 93/40 (11/06/19 1130)  SpO2: 100 % (11/06/19 1130) Vital Signs (24h Range):  Temp:  [98.9 °F (37.2 °C)-99.9 °F (37.7 °C)] 99.3 °F (37.4 °C)  Pulse:  [41-96] 90  Resp:  [0-27] 20  SpO2:  [85 %-100 %] 100 %  BP: ()/() 93/40     Weight: 75.2 kg (165 lb 12.6 oz)  Body mass index is 31.32 kg/m².      Intake/Output Summary (Last 24 hours) at 11/6/2019 1140  Last data filed at 11/6/2019 1100  Gross per 24 hour   Intake 4927.41 ml   Output 990 ml   Net 3937.41 ml       Physical Exam   Constitutional: Vital signs are normal. She appears well-developed and well-nourished. She has a sickly appearance. She is intubated.       Peripheries cold clammy, cyanotic   HENT:   Head: Normocephalic and atraumatic.   Mouth/Throat: Oropharynx is clear and moist.   Eyes: Pupils are equal, round, and reactive to light. Conjunctivae and EOM are normal.   Neck: Normal range of motion. Neck supple.   Cardiovascular: Normal rate, regular rhythm and intact distal pulses.   No murmur heard.  Pulmonary/Chest: Effort normal and breath sounds normal. No stridor. She is intubated. No respiratory distress.   Abdominal: Soft. Bowel sounds are normal.   Musculoskeletal: Normal range of motion. She exhibits no edema.   Neurological: No cranial nerve deficit.   Skin: Capillary refill takes 2 to 3 seconds.   Psychiatric: She has a normal mood and affect.   Nursing note and vitals reviewed.      Vents:  Vent Mode: A/C (11/06/19 1129)  Ventilator Initiated: Yes (11/05/19 1516)  Set Rate: 20 bmp (11/06/19 1129)  Vt Set: 450 mL (11/06/19 1129)  Pressure Support: 0 cmH20 (11/06/19 1129)  PEEP/CPAP: 5 cmH20 (11/06/19 1129)  Oxygen Concentration (%): 40 (11/06/19 1130)  Peak Airway Pressure: 28 cmH2O (11/06/19 1129)  Plateau Pressure: 0 cmH20  (11/06/19 1129)  Total Ve: 9.43 mL (11/06/19 1129)  Negative Inspiratory Force (cm H2O): 26 (11/01/19 0250)  F/VT Ratio<105 (RSBI): (!) 42.46 (11/06/19 1129)    Lines/Drains/Airways     Central Venous Catheter Line                 Pulmonary Artery Catheter Assessment  11/05/19 1721 right internal jugular less than 1 day          Drain                 NG/OG Tube 11/05/19 1720 orogastric Center mouth less than 1 day         Urethral Catheter 11/05/19 1718 Straight-tip less than 1 day          Airway                 Airway - Non-Surgical 11/05/19 1452 Endotracheal Tube less than 1 day          Arterial Line                 Arterial Line 11/06/19 0945 Left Radial less than 1 day          Peripheral Intravenous Line                 Peripheral IV - Single Lumen 11/05/19 2300 18 G Left Other less than 1 day                Significant Labs:    CBC/Anemia Profile:  Recent Labs   Lab 11/05/19  0502 11/05/19  1541 11/05/19  1757 11/06/19  0403   WBC 16.09* 9.18  9.18  --  15.53*  15.53*   HGB 12.5 6.7*  6.7* 9.1* 10.4*  10.4*   HCT 39.5 22.0*  22.0* 28.8* 31.9*  31.9*   PLT 73* 59*  59*  --  89*  89*   MCV 89 95  95  --  85  85   RDW 15.0* 15.0*  15.0*  --  14.7*  14.7*        Chemistries:  Recent Labs   Lab 11/05/19  0502 11/05/19  1541  11/05/19  1919 11/06/19  0030 11/06/19  0403    129*   < > 128* 125* 128*  128*   K 4.8 4.1   < > 4.0 3.7 3.6  3.6   CL 99 101   < > 94* 88* 92*  92*   CO2 23 9*   < > 22* 23 24  24   BUN 26* 15   < > 35* 32* 29*  29*   CREATININE 0.7 0.6   < > 1.4 1.3 1.1  1.1   CALCIUM 9.5 6.5*   < > 7.6* 7.2* 7.5*  7.5*   ALBUMIN  --  0.9*  --  2.7*  --   --    PROT  --  2.1*  --  4.8*  --   --    BILITOT  --  0.4  --  1.4*  --   --    ALKPHOS  --  34*  --  56  --   --    ALT  --  <5*  --  151*  --   --    AST  --  10  --  266*  --   --    MG 2.1  --   --   --   --  1.7    < > = values in this interval not displayed.        ABGs:   Recent Labs   Lab 11/06/19  0352   PH 7.499*    PCO2 33.0*   HCO3 25.7   POCSATURATED 99   BE 2     Blood Culture:   Recent Labs   Lab 11/05/19  1325 11/05/19  1335   LABBLOO No Growth to date No Growth to date     Cardiac Markers: No results for input(s): CKMB, TROPONINT, MYOGLOBIN in the last 48 hours.  Coagulation: No results for input(s): PT, INR, APTT in the last 48 hours.  Lactic Acid:   Recent Labs   Lab 11/05/19  1541 11/05/19  1919 11/06/19  0732   LACTATE >12.0* 1.6 2.3*     POCT Glucose:   Recent Labs   Lab 11/06/19  0805 11/06/19  0935 11/06/19  1043   POCTGLUCOSE 120* 45* 45*     Respiratory Culture:   Recent Labs   Lab 11/05/19 1912   GSRESP <10 epithelial cells per low power field.  Rare WBC's  Rare Gram positive cocci     Urine Culture: No results for input(s): LABURIN in the last 48 hours.  All pertinent labs within the past 24 hours have been reviewed.    Significant Imaging:  I have reviewed and interpreted all pertinent imaging results/findings within the past 24 hours.     CXR  11/06 reviewed  TECHNIQUE:  AP view of the chest was performed.    COMPARISON:  11/05/2019    FINDINGS:  No significant change in the position of the supporting tubes and lines.  Stable heart size.  Aortic atherosclerosis.  Status post CABG.  No acute infiltrates.    Repeat ECHO

## 2019-11-06 NOTE — ASSESSMENT & PLAN NOTE
Vent Mode: A/C  Oxygen Concentration (%):  [] 80  Resp Rate Total:  [20 br/min-44 br/min] 20 br/min  Vt Set:  [450 mL] 450 mL  PEEP/CPAP:  [5 cmH20] 5 cmH20  Pressure Support:  [0 cmH20] 0 cmH20  Mean Airway Pressure:  [11 cmH20-15 cmH20] 11 cmH20  Chlorohexidine  Optimize PEEP  CXR in AM

## 2019-11-06 NOTE — ASSESSMENT & PLAN NOTE
Blood culture NGTD  Urine culture NGTD  Sputum culture  Procal pending  Lactate Normal  CT imaging unremarkable  Afebrile  Abx: vancomycin Piperacillin tazobactam

## 2019-11-06 NOTE — SUBJECTIVE & OBJECTIVE
Interval History:  11/05/2019  The patient is post-operative day 5, status-post coronary artery bypass grafting x 3.     ROS  Medications:  Continuous Infusions:   dexmedetomidine (PRECEDEX) infusion 1 mcg/kg/hr (11/05/19 1800)    custom IV infusion builder 100 mL/hr at 11/05/19 1800    DOPamine Stopped (11/05/19 1734)    epinephrine 0.025 mcg/kg/min (11/05/19 1800)    fentanyl      norepinephrine bitartrate-D5W 0.04 mcg/kg/min (11/05/19 1800)     Scheduled Meds:   albumin human 5%  25 g Intravenous Once    aspirin  81 mg Oral Daily    atorvastatin  80 mg Oral QHS    chlorhexidine  15 mL Mouth/Throat BID    docusate sodium  100 mg Oral BID    famotidine (PF)  20 mg Intravenous BID    insulin detemir U-100  5 Units Subcutaneous BID    lactated ringers  1,000 mL Intravenous Once    losartan  12.5 mg Oral Daily    metoprolol tartrate  25 mg Oral BID    nozaseptin   Each Nostril BID    piperacillin-tazobactam (ZOSYN) IVPB  4.5 g Intravenous Q8H    polyethylene glycol  17 g Oral Daily    vancomycin (VANCOCIN) IVPB  2,000 mg Intravenous Once     PRN Meds:sodium chloride, sodium chloride, albumin human 5%, albuterol sulfate, chlorhexidine, Dextrose 10% Bolus, Dextrose 10% Bolus, Dextrose 10% Bolus, dextrose 50%, glucagon (human recombinant), glucose, glucose, insulin aspart U-100, lactated ringers, magnesium sulfate IVPB, magnesium sulfate IVPB, metoclopramide HCl, metoprolol tartrate, nozaseptin, ondansetron, potassium chloride in water **AND** potassium chloride in water **AND** potassium chloride in water, Flushing PICC Protocol **AND** sodium chloride 0.9%, traMADol     Objective:     Vital Signs (Most Recent):  Temp: 99.9 °F (37.7 °C) (11/05/19 1830)  Pulse: 72 (11/05/19 1830)  Resp: 20 (11/05/19 1830)  BP: (!) 112/43 (11/05/19 1830)  SpO2: 95 % (11/05/19 1830) Vital Signs (24h Range):  Temp:  [97.6 °F (36.4 °C)-99.9 °F (37.7 °C)] 99.9 °F (37.7 °C)  Pulse:  [60-91] 72  Resp:  [15-27] 20  SpO2:  [85  %-96 %] 95 %  BP: ()/() 112/43     Weight: 68.6 kg (151 lb 3.8 oz)  Body mass index is 28.58 kg/m².    SpO2: 95 %  O2 Device (Oxygen Therapy): ventilator    Intake/Output - Last 3 Shifts       11/03 0700 - 11/04 0659 11/04 0700 - 11/05 0659 11/05 0700 - 11/06 0659    P.O. 700 600     I.V. (mL/kg) 50 (0.7)  957.8 (14)    IV Piggyback 225      Total Intake(mL/kg) 975 (12.9) 600 (8.7) 957.8 (14)    Urine (mL/kg/hr) 1033 (0.6)      Stool 0      Chest Tube       Total Output 1033      Net -58 +600 +957.8           Urine Occurrence  5 x     Stool Occurrence 2 x 2 x 1 x          Lines/Drains/Airways     Central Venous Catheter Line                 Pulmonary Artery Catheter Assessment  11/05/19 1721 right internal jugular less than 1 day          Drain                 NG/OG Tube 11/05/19 1720 orogastric Center mouth less than 1 day         Urethral Catheter 11/05/19 1718 Straight-tip less than 1 day          Airway                 Airway - Non-Surgical 11/05/19 1452 Endotracheal Tube less than 1 day          Arterial Line                 Arterial Line 11/05/19 1719 Right Radial less than 1 day          Peripheral Intravenous Line                 Peripheral IV - Single Lumen 11/05/19 1352 20 G Posterior;Right Hand less than 1 day                Physical Exam   Constitutional: She is oriented to person, place, and time. She appears well-developed and well-nourished. No distress.   HENT:   Head: Normocephalic and atraumatic.   Eyes: Pupils are equal, round, and reactive to light. EOM are normal.   Neck: Normal range of motion. Neck supple. No JVD present.   Cardiovascular: Normal rate, regular rhythm, normal heart sounds and intact distal pulses. Exam reveals no gallop and no friction rub.   No murmur heard.  Pulmonary/Chest: Effort normal and breath sounds normal. No stridor. No respiratory distress. She has no wheezes. She has no rales. She exhibits no tenderness.   Abdominal: Soft. Bowel sounds are normal. She  exhibits no distension and no mass. There is no tenderness. There is no rebound and no guarding. No hernia.   Musculoskeletal: Normal range of motion. She exhibits no edema, tenderness or deformity.   Neurological: She is alert and oriented to person, place, and time. She displays normal reflexes. No cranial nerve deficit or sensory deficit. She exhibits normal muscle tone. Coordination normal.   Slow to answer questions. AAOX3. No focal deficits.    Skin: Skin is warm and dry. Capillary refill takes less than 2 seconds. No rash noted. She is not diaphoretic. No erythema. No pallor.   Psychiatric: She has a normal mood and affect. Her behavior is normal. Judgment and thought content normal.   Nursing note and vitals reviewed.      Significant Labs:  BMP:   Recent Labs   Lab 11/05/19  0502 11/05/19  1541    249*    129*   K 4.8 4.1   CL 99 101   CO2 23 9*   BUN 26* 15   CREATININE 0.7 0.6   CALCIUM 9.5 6.5*   MG 2.1  --      CBC:   Recent Labs   Lab 11/05/19  1541   WBC 9.18  9.18   RBC 2.32*  2.32*   HGB 6.7*  6.7*   HCT 22.0*  22.0*   PLT 59*  59*   MCV 95  95   MCH 28.9  28.9   MCHC 30.5*  30.5*       Significant Diagnostics:  I have reviewed all pertinent imaging results/findings within the past 24 hours.

## 2019-11-06 NOTE — CONSULTS
[facility] -   General Surgery  Consult Note    Inpatient consult to Vascular Surgery  Consult performed by: Paolo Dior MD  Consult ordered by: Yovani Dobbins NP        Subjective:     Chief Complaint/Reason for Admission: chest pain    History of Present Illness: asked to eval 68 y/o female history of HTN, DM, HLD admitted with acute CP last about 10 days ago.  C identified 3 vessel disease and pt underwent urgent CABG.  She is about 1 week postop and had been tranferred to the floor where she decompensated and was returned to ICU 1-2 days ago. Intubated on pressor support.  Did have RUE IVs and RUE arterial line placed yesterday.  Today, pt noted with cyanotic appearing right fingertip.  A-henrry removed, pressors weaned but pt currently conts on epinephrine drip.     No current facility-administered medications on file prior to encounter.      Current Outpatient Medications on File Prior to Encounter   Medication Sig    amLODIPine (NORVASC) 2.5 MG tablet TAKE 1 TABLET BY MOUTH EVERY DAY    atorvastatin (LIPITOR) 80 MG tablet TAKE 1 TABLET BY MOUTH EVERY DAY    betamethasone valerate 0.1% (VALISONE) 0.1 % Crea Apply topically 2 (two) times daily.    cyanocobalamin 1,000 mcg/mL injection Daily for a week then weekly for a month then monthly    insulin aspart U-100 (NOVOLOG U-100 INSULIN ASPART) 100 unit/mL injection Inject 60 Units into the skin continuous. Via VGO insulin pump.    metFORMIN (GLUCOPHAGE) 1000 MG tablet TAKE 1 TABLET BY MOUTH TWICE A DAY WITH MEALS    aspirin (ECOTRIN) 325 MG EC tablet Take 325 mg by mouth once daily.    blood sugar diagnostic (CONTOUR NEXT TEST STRIPS) Strp 1 strip by Misc.(Non-Drug; Combo Route) route 3 -4 times daily. Conto    blood sugar diagnostic Strp 1 strip 4 (four) times daily.    blood-glucose meter Misc IDDM 250.00  Insurance or Pt choice    clobetasol (TEMOVATE) 0.05 % external solution Apply topically 2 (two) times daily.    CONTOUR NEXT  "TEST STRIPS Strp USE ONE STRIP THREE TIMES A DAY    flash glucose scanning reader (FREESTYLE MAR 14 DAY READER) Misc 1 Device by Misc.(Non-Drug; Combo Route) route continuous.    flash glucose sensor (FREESTYLE MAR 14 DAY SENSOR) Kit Use 1 sensor every 14 days.    glyBURIDE (DIABETA) 5 MG tablet Take 2 tablets (10 mg total) by mouth 2 (two) times daily before meals.    hydrOXYzine HCl (ATARAX) 10 MG Tab TAKE 1 TABLET BY MOUTH TWICE DAILY AS NEEDED (Patient not taking: Reported on 10/28/2019)    insulin glargine, TOUJEO, (TOUJEO SOLOSTAR U-300 INSULIN) 300 unit/mL (1.5 mL) InPn pen Inject 45 Units into the skin once daily.    ketoconazole (NIZORAL) 2 % shampoo Apply topically twice a week.    lancets (MICROLET LANCET) Misc 1 lancet by Misc.(Non-Drug; Combo Route) route 3 (three) times daily.    levocetirizine (XYZAL) 5 MG tablet Take 1 tablet (5 mg total) by mouth every evening.    nystatin (MYCOSTATIN) powder Apply topically 4 (four) times daily. (Patient not taking: Reported on 10/28/2019)    pen needle, diabetic 32 gauge x 5/32" Ndle 1 each by Misc.(Non-Drug; Combo Route) route 3 (three) times daily.    sodium,potassium,mag sulfates (SUPREP BOWEL PREP KIT) 17.5-3.13-1.6 gram SolR Take as directed    sub-q insulin device, 20 unit (V-GO 20) Naty 1 Device by Misc.(Non-Drug; Combo Route) route once daily.    syringe with needle (SYRINGE 3CC/25GX1") 3 mL 25 gauge x 1" Syrg Use as Directed    triamcinolone acetonide 0.1% (KENALOG) 0.1 % cream AAA bid    venlafaxine (EFFEXOR-XR) 150 MG Cp24 Take 1 capsule (150 mg total) by mouth once daily.    [DISCONTINUED] insulin lispro 100 unit/mL injection Inject into the skin. (Patient taking differently: Inject 12 Units into the skin. )       Review of patient's allergies indicates:   Allergen Reactions    Ace inhibitors Other (See Comments)     Cough         Past Medical History:   Diagnosis Date    Chronic major depressive disorder     Diabetes mellitus " type II, uncontrolled 2000    Insulin x 10 years    Eczema     Essential hypertension     Generalized anxiety disorder     GERD (gastroesophageal reflux disease)     Glaucoma     OD    Hyperlipidemia     Obesity      Past Surgical History:   Procedure Laterality Date    APPENDECTOMY      CORONARY ARTERY BYPASS GRAFT (CABG) N/A 10/31/2019    Procedure: CORONARY ARTERY BYPASS GRAFT (CABG);  Surgeon: Keny Zaidi MD;  Location: [facility] OR;  Service: Cardiothoracic;  Laterality: N/A;  3-Vessel, Epi-Aortic Ultrasound    ECTOPIC PREGNANCY SURGERY Left     LSO    INJECTION OF ANESTHETIC AGENT AROUND MULTIPLE INTERCOSTAL NERVES N/A 10/31/2019    Procedure: BLOCK, NERVE, INTERCOSTAL, 2 OR MORE;  Surgeon: Keny Zaidi MD;  Location: [facility] OR;  Service: Cardiothoracic;  Laterality: N/A;  Parasternal    OOPHORECTOMY       Family History     Problem Relation (Age of Onset)    Alcohol abuse Maternal Uncle, Sister    Colon cancer Sister    Depression Maternal Grandmother    Diabetes Sister, Maternal Grandmother, Paternal Aunt    Drug abuse Sister    Glaucoma Paternal Aunt, Paternal Uncle    Heart attack Mother    Heart disease Maternal Grandmother, Paternal Grandmother, Paternal Grandfather    Heart failure Sister    Hepatitis Sister    Hypertension Father, Sister, Sister, Sister    Multiple myeloma Father        Tobacco Use    Smoking status: Never Smoker    Smokeless tobacco: Never Used   Substance and Sexual Activity    Alcohol use: No    Drug use: No    Sexual activity: Never     Review of Systems  Objective:     Vital Signs (Most Recent):  Temp: 99.2 °F (37.3 °C) (11/06/19 1500)  Pulse: 80 (11/06/19 1530)  Resp: 20 (11/06/19 1530)  BP: (!) 102/39 (11/06/19 1530)  SpO2: 100 % (11/06/19 1530) Vital Signs (24h Range):  Temp:  [98.9 °F (37.2 °C)-99.9 °F (37.7 °C)] 99.2 °F (37.3 °C)  Pulse:  [] 80  Resp:  [0-27] 20  SpO2:  [89 %-100 %] 100 %  BP: ()/() 102/39     Weight: 75.2 kg (165 lb 12.6  oz)  Body mass index is 31.32 kg/m².      Intake/Output Summary (Last 24 hours) at 11/6/2019 1552  Last data filed at 11/6/2019 1500  Gross per 24 hour   Intake 4882.41 ml   Output 1045 ml   Net 3837.41 ml       Physical Exam  Intubated/sedated  Rrr/tachy  Coarse BS    RUE 1-2+ edema  Hand cool, unable to assess motor/sens  Distal finger tiups of thumb/index/4th digit cyanotic/dusky  Strong biphasic doppler signal - ulnar at the wrist, palmar arch in the hand  Unable to find radial doppler    Significant Labs:  CBC:   Recent Labs   Lab 11/06/19  0403   WBC 15.53*  15.53*   RBC 3.75*  3.75*   HGB 10.4*  10.4*   HCT 31.9*  31.9*   PLT 89*  89*   MCV 85  85   MCH 27.7  27.7   MCHC 32.6  32.6     CMP:   Recent Labs   Lab 11/05/19  1919  11/06/19  1246   *   < > 101   CALCIUM 7.6*   < > 7.3*   ALBUMIN 2.7*  --   --    PROT 4.8*  --   --    *   < > 129*   K 4.0   < > 3.5   CO2 22*   < > 25   CL 94*   < > 93*   BUN 35*   < > 24*   CREATININE 1.4   < > 0.9   ALKPHOS 56  --   --    *  --   --    *  --   --    BILITOT 1.4*  --   --     < > = values in this interval not displayed.     Coagulation: No results for input(s): PT, INR, APTT in the last 48 hours.    Significant Diagnostics:  U/S: I have reviewed all pertinent results/findings within the past 24 hours. pending    Assessment/Plan:     Active Diagnoses:    Diagnosis Date Noted POA    PRINCIPAL PROBLEM:  Acute hypoxemic respiratory failure [J96.01] 10/31/2019 No    Cardiogenic shock [R57.0] 11/06/2019 No    Cardiac tamponade [I31.4] 11/05/2019 Yes    Septic shock [A41.9, R65.21] 11/05/2019 No    Nontraumatic hemorrhagic shock [R57.8] 11/05/2019 No    Lactic acid acidosis [E87.2] 11/05/2019 No    Metabolic acidemia [E87.2] 11/05/2019 No    Oliguria [R34] 11/05/2019 No    Thrombocytopenia [D69.6] 11/01/2019 No    CAD, multiple vessel [I25.10] 10/31/2019 Yes    S/P CABG x 3 [Z95.1] 10/31/2019 Not Applicable    Acute blood loss  anemia [D62] 10/31/2019 No    Acute combined systolic and diastolic CHF, NYHA class 2 [I50.41] 10/29/2019 Yes    NSTEMI (non-ST elevated myocardial infarction) [I21.4] 10/28/2019 Yes    Abnormal EKG [R94.31] 10/28/2019 Yes    Chest pain [R07.9] 10/28/2019 Yes    Hyperlipidemia associated with type 2 diabetes mellitus [E11.69, E78.5] 03/28/2018 Yes    Diabetes mellitus type II, uncontrolled [E11.65]  Yes     Chronic    Obesity (BMI 30.0-34.9) [E66.9]  Yes    Anxiety and depression [F41.9, F32.9] 09/25/2013 Yes    Hypertension associated with diabetes [E11.59, I10] 08/13/2013 Yes      Problems Resolved During this Admission:     70 y/o female DM/htn/HLD CAD s/p urgent CABG POD#6 requiring return to ICU, for hemodynamic decompensation, re-intubation, pressor support.    Exam of her hand does suggest some ischemic changes likely secondary to combination of vasoconstrictors and potentially radial artery thrombosis.  She does have intact flow to the hand via the ulnar artery and does have a strong doppler signals in the the palmar arch    Recommend:  Wean vasocontrictors/epi as tolerates  Keep hand warm  Can consider IV heparin if no contraindications with CTS  F/u arterial/venous US studies      Thank you for your consult.     Paolo Dior MD  General Surgery  [facility] -

## 2019-11-06 NOTE — PROGRESS NOTES
Code team in room. Unable to get bp. Hr sr 67. Pulse weak. bs in 200's. Dr. Zaidi, dr. Patel, dr. urban duggan in room. Nurses from icu in room. See code flow sheet.

## 2019-11-06 NOTE — PT/OT/SLP PROGRESS
Occupational Therapy      Patient Name:  Mateusz Bates   MRN:  [id number]    Patient not seen today secondary to pt on hold per nurse Peñaloza.  Will follow-up on later date    Susan Kilgore OT  11/6/2019   1025

## 2019-11-06 NOTE — ASSESSMENT & PLAN NOTE
On 2 pressors  Transfuse 2 PRBC  CT chest Abd pelvis post transfusion: no external obvious source, sputum, urine stool clear, abd soft

## 2019-11-06 NOTE — PROGRESS NOTES
"  [facility] -   Adult Nutrition  Consult Note    SUMMARY     Recommendations    Recommendation: 1. When pt hemodynamically stable, consider enteral nutrition support. Rec peptamen intense VHP @ 45 ml/hr (1080 kcal, 100 g pro and 907 ml free water). Flushes per MD/NP. 2. RD to f/u.   Goals: Initiate nutrition within 48-72 hrs   Nutrition Goal Status: New  Communication of RD Recs: (POC, sticky note, reviewed with NP)    Reason for Assessment    Reason For Assessment: RD f/u   Diagnosis: (CAD)  Relevant Medical History: DM2, HTN, HLD, GERD, anxiety  Interdisciplinary Rounds: attended   General Information Comments:  11.4.19. S/p CABG x 3. Remains in ICU. Currently on diabetic cardiac diet + ONS.  Pt reports fair appetite today, PO intake 75 %. Pt reports good intake PTA & no wt loss. Per epic records, pt weighed 153 lbs on 9/26/19, current wt = 166 lbs (no recent wt loss). NFPE not performed d/t pt w/ no signs of malnutrition. RD provided cardiac and diabetic diet education w/ handouts from the Nutrition Care Manual. RD contact info provided.   11.6.19. RD received consult for "respiratory failure". Pt in ICU. Now intubated & sedated w/ pressor support.   Nutrition Discharge Planning: pending medical course     Nutrition Risk Screen    Nutrition Risk Screen: no indicators present    Nutrition/Diet History    Spiritual, Cultural Beliefs, Roman Catholic Practices, Values that Affect Care: no  Food Allergies: NKFA    Anthropometrics    Temp: 97.1 °F (36.2 °C)  Height Method: Measured(offcie 9/26/19)  Height: 5' 1" (154.9 cm)  Height (inches): 61 in  Weight Method: Standard Scale(10/29/19)  Weight: 75.3 kg (166 lb 0.1 oz)  Weight (lb): 166.01 lb  Ideal Body Weight (IBW), Female: 105 lb  % Ideal Body Weight, Female (lb): 158.1 lb  BMI (Calculated): 31.4  BMI Grade: 30 - 34.9- obesity - grade I         Lab/Procedures/Meds    Pertinent Labs Reviewed: reviewed  BMP  Lab Results   Component Value Date     (L) " 11/06/2019     (L) 11/06/2019    K 3.6 11/06/2019    K 3.6 11/06/2019    CL 92 (L) 11/06/2019    CL 92 (L) 11/06/2019    CO2 24 11/06/2019    CO2 24 11/06/2019    BUN 29 (H) 11/06/2019    BUN 29 (H) 11/06/2019    CREATININE 1.1 11/06/2019    CREATININE 1.1 11/06/2019    CALCIUM 7.5 (L) 11/06/2019    CALCIUM 7.5 (L) 11/06/2019    ANIONGAP 12 11/06/2019    ANIONGAP 12 11/06/2019    ESTGFRAFRICA 59 (A) 11/06/2019    ESTGFRAFRICA 59 (A) 11/06/2019    EGFRNONAA 51 (A) 11/06/2019    EGFRNONAA 51 (A) 11/06/2019       Lab Results   Component Value Date    ALBUMIN 2.7 (L) 11/05/2019       Lab Results   Component Value Date    HGBA1C 8.3 (H) 10/30/2019       Pertinent Medications Reviewed: reviewed      Estimated/Assessed Needs    Weight Used For Calorie Calculations: 75.2 kg (165 lb 12.6 oz)  Energy Calorie Requirements (kcal):  1112  Energy Need Method: [place] (modified) x .70 (Permissive underfeeding)   Protein Requirements: 95 -119 g  Weight Used For Protein Calculations: 47.6 kg (105 lb)(IBW - Obese ICU)  Estimated Fluid Requirement Method: RDA Method or per MD  RDA Method (mL): 1112  CHO Requirement: 151 g      Nutrition Prescription Ordered    Current Diet Order: NPO    Evaluation of Received Nutrient/Fluid Intake      Intake/Output Summary (Last 24 hours) at 11/6/2019 1019  Last data filed at 11/6/2019 0800  Gross per 24 hour   Intake 4755.41 ml   Output 915 ml   Net 3840.41 ml     % Intake of Estimated Energy Needs: 0 - 25 %  % Meal Intake: NPO    Nutrition Risk    2xweekly    Assessment and Plan    Nutrition Problem  Inadequate energy intake     Related to (etiology):   NPO status, no alternative means of nutrition    Signs and Symptoms (as evidenced by):   Meeting < 85 % EEN/EPN     Interventions:  collaboration with other providers    Nutrition Diagnosis Status:   New          Monitor and Evaluation    Food and Nutrient Intake: energy intake  Food and Nutrient Adminstration: enteral and parenteral  nutrition administration   Anthropometric Measurements: weight  Biochemical Data, Medical Tests and Procedures: electrolyte and renal panel, glucose/endocrine profile, lipid profile  Nutrition-Focused Physical Findings: overall appearance       Nutrition Follow-Up    RD Follow-up?: Yes

## 2019-11-06 NOTE — ASSESSMENT & PLAN NOTE
Blood culture  Urine culture  Sputum culture  Abx: vancomycin Piperacillin tazobactam  IVF resuscitation  Serial lactate

## 2019-11-06 NOTE — PLAN OF CARE
PT REMAINS INTUBATED, SEDATED, RESTRAINED, A-FEBRILE AND IN NSR.  LEVOPHED HAS BEEN WEANED OFF AND EPINEPHRINE REMAINS ON WITHOUT ANY FLUCTUATION IN DOSE THROUGHOUT SHIFT.  INSULIN DRIP HAS BEEN STARTED AND INCREASED PER MD ORDER AND NOMOGRAM TO A CURRENT TOTAL OF 42 UNITS/HOUR.  BLOOD GLUCOSE IS NOW TRENDING DOWN SLOWLY.  CT OF HEAD, CHEST AND ABDOMEN WERE OBTAINED EARLY THIS AM.  PT TOLERATED TRANSPORT WELL.  PT HAS RECENTLY TOLERATED TURNING WITH PILLOW AND HEELS FLOATED ON PILLOWS.  RIGHT RADIAL RENO IN INTACT AND PATENT WITH BLOOD RETURN, HOWEVER ALL THE FINGERS ON THAT HAND ARE CYANOTIC BUT WITH GOOD CAPILLARY RETURN.  URINE OUTPUT HAS PICKED UP FOR A TOTAL  ML.  PT HAS INTERMITTENT PERIODS WHEN BP DROPS FOR A SHORT PERIOD OF TIME, BUT RECOVERS SLOWLY WITHOUT INTERVENTION.

## 2019-11-06 NOTE — SUBJECTIVE & OBJECTIVE
ROS  Objective:     Vital Signs (Most Recent):  Temp: 99.2 °F (37.3 °C) (11/06/19 1500)  Pulse: 100 (11/06/19 1715)  Resp: (!) 24 (11/06/19 1715)  BP: (!) 117/44 (11/06/19 1700)  SpO2: 100 % (11/06/19 1715) Vital Signs (24h Range):  Temp:  [98.9 °F (37.2 °C)-99.9 °F (37.7 °C)] 99.2 °F (37.3 °C)  Pulse:  [] 100  Resp:  [0-26] 24  SpO2:  [89 %-100 %] 100 %  BP: ()/(26-56) 117/44     Weight: 75.2 kg (165 lb 12.6 oz)  Body mass index is 31.32 kg/m².     SpO2: 100 %  O2 Device (Oxygen Therapy): ventilator      Intake/Output Summary (Last 24 hours) at 11/6/2019 1739  Last data filed at 11/6/2019 1700  Gross per 24 hour   Intake 4689.08 ml   Output 1085 ml   Net 3604.08 ml       Lines/Drains/Airways     Central Venous Catheter Line                 Pulmonary Artery Catheter Assessment  11/05/19 1721 right internal jugular 1 day          Drain                 NG/OG Tube 11/05/19 1720 orogastric Center mouth 1 day         Urethral Catheter 11/05/19 1718 Straight-tip 1 day          Airway                 Airway - Non-Surgical 11/05/19 1452 Endotracheal Tube 1 day          Arterial Line                 Arterial Line 11/06/19 0945 Left Radial less than 1 day          Peripheral Intravenous Line                 Peripheral IV - Single Lumen 11/05/19 2300 18 G Left Other less than 1 day                Physical Exam   Constitutional: She is oriented to person, place, and time. She appears well-developed and well-nourished. No distress.   HENT:   Head: Normocephalic and atraumatic.   Eyes: Pupils are equal, round, and reactive to light. Right eye exhibits no discharge. Left eye exhibits no discharge.   Neck: Neck supple. No JVD present.   Cardiovascular: Normal rate, regular rhythm, S1 normal, S2 normal and normal heart sounds.   No murmur heard.  Pulmonary/Chest: Effort normal. No respiratory distress. She has no wheezes.   CABG site C/D/I; no bleeding erythema or drainage    Diminished BS at bases   Abdominal: Soft.  She exhibits no distension.   Musculoskeletal: She exhibits no edema.   Neurological: She is alert and oriented to person, place, and time.   Skin: Skin is warm and dry. She is not diaphoretic. No erythema.   SVG sites C/D/I; no bleeding erythema or drainage   Psychiatric: She has a normal mood and affect. Her behavior is normal. Thought content normal.   Nursing note and vitals reviewed.      Significant Labs:   ABG:   Recent Labs   Lab 11/05/19  2006 11/06/19  0015 11/06/19  0352   PH 7.480* 7.508* 7.499*   PCO2 34.8* 33.7* 33.0*   HCO3 25.9 26.8 25.7   POCSATURATED 100 99 99   BE 2 4 2   , Blood Culture:   Recent Labs   Lab 11/05/19  1325 11/05/19  1335   LABBLOO No Growth to date No Growth to date   , BMP:   Recent Labs   Lab 11/05/19  0502  11/06/19  0403 11/06/19  1246 11/06/19  1610      < > 358*  358* 101 99  99      < > 128*  128* 129* 127*  127*   K 4.8   < > 3.6  3.6 3.5 3.6  3.6   CL 99   < > 92*  92* 93* 93*  93*   CO2 23   < > 24  24 25 24  24   BUN 26*   < > 29*  29* 24* 25*  25*   CREATININE 0.7   < > 1.1  1.1 0.9 0.9  0.9   CALCIUM 9.5   < > 7.5*  7.5* 7.3* 7.3*  7.3*   MG 2.1  --  1.7  --   --     < > = values in this interval not displayed.   , CMP   Recent Labs   Lab 11/05/19  1541  11/05/19  1919  11/06/19  0403 11/06/19  1246 11/06/19  1610   *   < > 128*   < > 128*  128* 129* 127*  127*   K 4.1   < > 4.0   < > 3.6  3.6 3.5 3.6  3.6      < > 94*   < > 92*  92* 93* 93*  93*   CO2 9*   < > 22*   < > 24  24 25 24  24   *   < > 495*   < > 358*  358* 101 99  99   BUN 15   < > 35*   < > 29*  29* 24* 25*  25*   CREATININE 0.6   < > 1.4   < > 1.1  1.1 0.9 0.9  0.9   CALCIUM 6.5*   < > 7.6*   < > 7.5*  7.5* 7.3* 7.3*  7.3*   PROT 2.1*  --  4.8*  --   --   --   --    ALBUMIN 0.9*  --  2.7*  --   --   --   --    BILITOT 0.4  --  1.4*  --   --   --   --    ALKPHOS 34*  --  56  --   --   --   --    AST 10  --  266*  --   --   --   --    ALT <5*   --  151*  --   --   --   --    ANIONGAP 19*   < > 12   < > 12  12 11 10  10   ESTGFRAFRICA >60   < > 44*   < > 59*  59* >60 >60  >60   EGFRNONAA >60   < > 38*   < > 51*  51* >60 >60  >60    < > = values in this interval not displayed.   , CBC   Recent Labs   Lab 11/05/19  1541 11/05/19  1757 11/06/19  0403 11/06/19  1610   WBC 9.18  9.18  --  15.53*  15.53* 18.17*   HGB 6.7*  6.7* 9.1* 10.4*  10.4* 9.5*   HCT 22.0*  22.0* 28.8* 31.9*  31.9* 28.6*   PLT 59*  59*  --  89*  89* 67*   , INR No results for input(s): INR, PROTIME in the last 48 hours., Lipid Panel No results for input(s): CHOL, HDL, LDLCALC, TRIG, CHOLHDL in the last 48 hours. and Troponin No results for input(s): TROPONINI in the last 48 hours.    Significant Imaging: EKG:

## 2019-11-06 NOTE — ASSESSMENT & PLAN NOTE
-Hemoglobin A1C was 9 on 9/19/19.   -NISS.   -ADA diet.   11/2  Levemir / NISS / Accuchecks  11/3  Levemir increased to 15 sq bid  NISS  Accuchecks  11/4  Levemir  NISS  Accuchecks  11/5  Levemir 10 units BID  NISS  Accuchecks    11/6  UnControlled  Accuchecks  Insulin Gtt  Improved control  Last BS 99

## 2019-11-06 NOTE — PROGRESS NOTES
Screening attempted for this 69 year old female patient due to low ana however unable to be completed due to her status. Will follow up when she is more stable.

## 2019-11-06 NOTE — PROGRESS NOTES
[facility] -   Critical Care Medicine  Progress Note    Patient Name: Mateusz Bates  MRN: [id number]  Admission Date: 10/28/2019  Hospital Length of Stay: 9 days  Code Status: Full Code  Attending Provider: Keny Zaidi MD  Primary Care Provider: Serenity Kilpatrick MD   Principal Problem: Acute hypoxemic respiratory failure    Subjective:     HPI:  69 year old female with PMH including DM2 on insulin; HTN; HLD; GERD; anxiety  Presented to ED on 10/28 at instruction of PCP s/t abnormal EKG with c/o chest tightness and SOB  Eval revealed LBBB on EKG along with BNP 1300 and troponin 3.66     Taken for LHC on 10/28 finding 3 vessel CAD, EF 20%, and infra renal aorta plaque 30%  CTS consulted and agree pt is candidate for urgent CABG  Today 10/30 taken to cath lab for placement of IABP to support for planned CABG in am    Hospital/ICU Course:  Transferred to ICU post IABP placement for hemodynamic monitoring until surgery  10/31 - Admitted to ICU today from OR post 3-vessel CABG.  [facility]  Upon arrival to ICU on Vasopressin, Epi, Primacor and Lidocaine infusions.  11/1 - Extubated overnight.  Supine in bed awake and alert and responsive still with IABP in place as well as on Epi, Primacor and Insulin infusions.  No distress noted  11/05: admitted back to MICU; Code blue called, Agonal breathing, cold and clammy, Hypotensive, peripheral dopamine started and intubated, weak thready pulses, Central line [place] and A line placed in MICU, Bicarb drip and LR bolus and albumin given, storm placed. Recent rise in WBCC  11/06: seen and examined: SVR is high CO 3.3, SP 1 PRBC, Insulin drip @ 21, Epi drip @ 0.15, right hand cyanotic and swollen,  mls overnight     Review of Systems   Unable to perform ROS: Intubated         Objective:     Vital Signs (Most Recent):  Temp: 99.3 °F (37.4 °C) (11/06/19 1100)  Pulse: 90 (11/06/19 1130)  Resp: 20 (11/06/19 1130)  BP: (!) 93/40  (11/06/19 1130)  SpO2: 100 % (11/06/19 1130) Vital Signs (24h Range):  Temp:  [98.9 °F (37.2 °C)-99.9 °F (37.7 °C)] 99.3 °F (37.4 °C)  Pulse:  [41-96] 90  Resp:  [0-27] 20  SpO2:  [85 %-100 %] 100 %  BP: ()/() 93/40     Weight: 75.2 kg (165 lb 12.6 oz)  Body mass index is 31.32 kg/m².      Intake/Output Summary (Last 24 hours) at 11/6/2019 1140  Last data filed at 11/6/2019 1100  Gross per 24 hour   Intake 4927.41 ml   Output 990 ml   Net 3937.41 ml       Physical Exam   Constitutional: Vital signs are normal. She appears well-developed and well-nourished. She has a sickly appearance. She is intubated.       Peripheries cold clammy, cyanotic   HENT:   Head: Normocephalic and atraumatic.   Mouth/Throat: Oropharynx is clear and moist.   Eyes: Pupils are equal, round, and reactive to light. Conjunctivae and EOM are normal.   Neck: Normal range of motion. Neck supple.   Cardiovascular: Normal rate, regular rhythm and intact distal pulses.   No murmur heard.  Pulmonary/Chest: Effort normal and breath sounds normal. No stridor. She is intubated. No respiratory distress.   Abdominal: Soft. Bowel sounds are normal.   Musculoskeletal: Normal range of motion. She exhibits no edema.   Neurological: No cranial nerve deficit.   Skin: Capillary refill takes 2 to 3 seconds.   Psychiatric: She has a normal mood and affect.   Nursing note and vitals reviewed.      Vents:  Vent Mode: A/C (11/06/19 1129)  Ventilator Initiated: Yes (11/05/19 1516)  Set Rate: 20 bmp (11/06/19 1129)  Vt Set: 450 mL (11/06/19 1129)  Pressure Support: 0 cmH20 (11/06/19 1129)  PEEP/CPAP: 5 cmH20 (11/06/19 1129)  Oxygen Concentration (%): 40 (11/06/19 1130)  Peak Airway Pressure: 28 cmH2O (11/06/19 1129)  Plateau Pressure: 0 cmH20 (11/06/19 1129)  Total Ve: 9.43 mL (11/06/19 1129)  Negative Inspiratory Force (cm H2O): 26 (11/01/19 0250)  F/VT Ratio<105 (RSBI): (!) 42.46 (11/06/19 1129)    Lines/Drains/Airways     Central Venous Catheter Line                  Pulmonary Artery Catheter Assessment  11/05/19 1721 right internal jugular less than 1 day          Drain                 NG/OG Tube 11/05/19 1720 orogastric Center mouth less than 1 day         Urethral Catheter 11/05/19 1718 Straight-tip less than 1 day          Airway                 Airway - Non-Surgical 11/05/19 1452 Endotracheal Tube less than 1 day          Arterial Line                 Arterial Line 11/06/19 0945 Left Radial less than 1 day          Peripheral Intravenous Line                 Peripheral IV - Single Lumen 11/05/19 2300 18 G Left Other less than 1 day                Significant Labs:    CBC/Anemia Profile:  Recent Labs   Lab 11/05/19  0502 11/05/19  1541 11/05/19  1757 11/06/19  0403   WBC 16.09* 9.18  9.18  --  15.53*  15.53*   HGB 12.5 6.7*  6.7* 9.1* 10.4*  10.4*   HCT 39.5 22.0*  22.0* 28.8* 31.9*  31.9*   PLT 73* 59*  59*  --  89*  89*   MCV 89 95  95  --  85  85   RDW 15.0* 15.0*  15.0*  --  14.7*  14.7*        Chemistries:  Recent Labs   Lab 11/05/19  0502 11/05/19  1541  11/05/19  1919 11/06/19  0030 11/06/19  0403    129*   < > 128* 125* 128*  128*   K 4.8 4.1   < > 4.0 3.7 3.6  3.6   CL 99 101   < > 94* 88* 92*  92*   CO2 23 9*   < > 22* 23 24  24   BUN 26* 15   < > 35* 32* 29*  29*   CREATININE 0.7 0.6   < > 1.4 1.3 1.1  1.1   CALCIUM 9.5 6.5*   < > 7.6* 7.2* 7.5*  7.5*   ALBUMIN  --  0.9*  --  2.7*  --   --    PROT  --  2.1*  --  4.8*  --   --    BILITOT  --  0.4  --  1.4*  --   --    ALKPHOS  --  34*  --  56  --   --    ALT  --  <5*  --  151*  --   --    AST  --  10  --  266*  --   --    MG 2.1  --   --   --   --  1.7    < > = values in this interval not displayed.        ABGs:   Recent Labs   Lab 11/06/19  0352   PH 7.499*   PCO2 33.0*   HCO3 25.7   POCSATURATED 99   BE 2     Blood Culture:   Recent Labs   Lab 11/05/19  1325 11/05/19  1335   LABBLOO No Growth to date No Growth to date     Cardiac Markers: No results for input(s): CKMB,  TROPONINT, MYOGLOBIN in the last 48 hours.  Coagulation: No results for input(s): PT, INR, APTT in the last 48 hours.  Lactic Acid:   Recent Labs   Lab 11/05/19  1541 11/05/19  1919 11/06/19  0732   LACTATE >12.0* 1.6 2.3*     POCT Glucose:   Recent Labs   Lab 11/06/19  0805 11/06/19  0935 11/06/19  1043   POCTGLUCOSE 120* 45* 45*     Respiratory Culture:   Recent Labs   Lab 11/05/19  1912   GSRESP <10 epithelial cells per low power field.  Rare WBC's  Rare Gram positive cocci     Urine Culture: No results for input(s): LABURIN in the last 48 hours.  All pertinent labs within the past 24 hours have been reviewed.    Significant Imaging:  I have reviewed and interpreted all pertinent imaging results/findings within the past 24 hours.     CXR  11/06 reviewed  TECHNIQUE:  AP view of the chest was performed.    COMPARISON:  11/05/2019    FINDINGS:  No significant change in the position of the supporting tubes and lines.  Stable heart size.  Aortic atherosclerosis.  Status post CABG.  No acute infiltrates.    Repeat ECHO      ABG  Recent Labs   Lab 11/06/19  0352   PH 7.499*   PO2 122*   PCO2 33.0*   HCO3 25.7   BE 2     Assessment/Plan:     Psychiatric  Anxiety and depression  Recommend resume home Effexor     Pulmonary  * Acute hypoxemic respiratory failure  Vent Mode: A/C  Oxygen Concentration (%):  [] 40  Resp Rate Total:  [20 br/min-44 br/min] 20 br/min  Vt Set:  [450 mL] 450 mL  PEEP/CPAP:  [5 cmH20] 5 cmH20  Pressure Support:  [0 cmH20] 0 cmH20  Mean Airway Pressure:  [9.4 cmH20-15 cmH20] 10 cmH20    Chlorohexidine  Optimize PEEP  CXR in AM daily    Cardiac/Vascular  Cardiogenic shock  CO 3.2  Added MILRINONE      16 29 14 14 8 8  CVP (mean       PAP: (26-54)/(10-40) 27/16  PAP (Mean):  [17 mmHg-45 mmHg] 20 mmHg        S/P CABG x 3  Follow up echo  Per CVT surgery  Cont hemodynamic monitoring post op  Atorvastatin      CAD, multiple vessel  Per Cards and CVT surgery  S/P 3-vessel CABG   POD#6  Cont  ASA, statin,   Lopressor and Losartan on hold for hypotension on pressors    Acute combined systolic and diastolic CHF, NYHA class 2  Strict I/O  Daily wt  May challenge with lasix       NSTEMI (non-ST elevated myocardial infarction)  POD #6  Multi vessel CAD now post CABG  Continue ASA, statin  Cont cardiac monitoring    Renal/  Oliguria  CVP and cardiac index suggest adequate preload CVP 16  May need IV lasix    Metabolic acidemia  resolved    Lactic acid acidosis  resolved    ID  Septic shock  Blood culture NGTD  Urine culture NGTD  Sputum culture  Procal pending  Lactate Normal  CT imaging unremarkable  Afebrile  Abx: vancomycin Piperacillin tazobactam         Hematology  Thrombocytopenia  Monitor, plat 89 k IMPROVED  Hematology consulted  SCD for now    Oncology  Acute blood loss anemia  On 1 pressors: EPI  Transfused 1 PRBC  CT chest Abd pelvis post transfusion: no external obvious source, sputum, urine stool clear, abd soft  Follow H&H    Endocrine  Diabetes mellitus type II, uncontrolled  Sliding scale  -180  Low threshold restart Drip    Other  Nontraumatic hemorrhagic shock  Hemodynamic monitoring: CI up from 1.9 to 4.1  IVF resuscitation  2 PRBC  Surgical sites clear       Critical Care Daily Checklist:    A: Awake: RASS Goal/Actual Goal: RASS Goal: -2-->light sedation  Actual: Alexander Agitation Sedation Scale (RASS): Light sedation   B: Spontaneous Breathing Trial Performed? Spon. Breathing Trial Initiated?: Initiated (11/01/19 0144)   C: SAT & SBT Coordinated?  NO                      D: Delirium: CAM-ICU Overall CAM-ICU: Positive   E: Early Mobility Performed? No   F: Feeding Goal: Goals: 1) PO intakes > 50% of meals at f/u  Status: Nutrition Goal Status: new   Current Diet Order   Procedures    Diet NPO Except for: Sips with Medication     Order Specific Question:   Except for     Answer:   Sips with Medication      AS: Analgesia/Sedation Fentanyl and precedex   T: Thromboembolic Prophylaxis  SCD   H: HOB > 300 Yes   U: Stress Ulcer Prophylaxis (if needed) YES   G: Glucose Control INSULIN DRIP   B: Bowel Function Stool Occurrence: 1   I: Indwelling Catheter (Lines & Jones) Necessity YES: Cordis, PA cath, Jones, A line, ETT   D: De-escalation of Antimicrobials/Pharmacotherapies Vanco and Zosyn    Plan for the day/ETD Improved UO , wean off pressors    Code Status:  Family/Goals of Care: Full Code        Critical Care Time: 45 minutes  Critical secondary to Patient has a condition that poses threat to life and bodily function: CARDIOGENIC SHOCK       Critical care was time spent personally by me on the following activities: development of treatment plan with patient or surrogate and bedside caregivers, discussions with consultants, evaluation of patient's response to treatment, examination of patient, ordering and performing treatments and interventions, ordering and review of laboratory studies, ordering and review of radiographic studies, pulse oximetry, re-evaluation of patient's condition. This critical care time did not overlap with that of any other provider or involve time for any procedures.     Riaz Tanner MD  Critical Care Medicine  [facility] -

## 2019-11-06 NOTE — SUBJECTIVE & OBJECTIVE
Interval History:  11/06/2019  The patient is post-operative day 6, status-post coronary artery bypass grafting x 3.     ROS  Medications:  Continuous Infusions:   dexmedetomidine (PRECEDEX) infusion 0.6 mcg/kg/hr (11/06/19 1500)    epinephrine 0.1 mcg/kg/min (11/06/19 1500)    insulin (HUMAN R) infusion (adults) Stopped (11/06/19 0900)    milrinone 20mg/100ml D5W (200mcg/ml) 0.375 mcg/kg/min (11/06/19 1500)    norepinephrine bitartrate-D5W Stopped (11/06/19 0030)     Scheduled Meds:   atorvastatin  80 mg Oral QHS    chlorhexidine  15 mL Mouth/Throat BID    docusate sodium  100 mg Oral BID    famotidine (PF)  20 mg Intravenous BID    lactated ringers  1,000 mL Intravenous Once    nozaseptin   Each Nostril BID    piperacillin-tazobactam (ZOSYN) IVPB  4.5 g Intravenous Q8H    polyethylene glycol  17 g Oral Daily    vancomycin (VANCOCIN) IVPB  1,250 mg Intravenous Once    [START ON 11/10/2019] vancomycin (VANCOCIN) IVPB  750 mg Intravenous Q72H     PRN Meds:sodium chloride, sodium chloride, albumin human 5%, albuterol sulfate, Dextrose 10% Bolus, Dextrose 10% Bolus, iohexol, lactated ringers, magnesium sulfate IVPB, magnesium sulfate IVPB, metoclopramide HCl, ondansetron, potassium chloride in water **AND** potassium chloride in water **AND** potassium chloride in water, traMADol     Objective:     Vital Signs (Most Recent):  Temp: 99.2 °F (37.3 °C) (11/06/19 1500)  Pulse: 82 (11/06/19 1545)  Resp: 20 (11/06/19 1545)  BP: (!) 102/39 (11/06/19 1530)  SpO2: 100 % (11/06/19 1545) Vital Signs (24h Range):  Temp:  [98.9 °F (37.2 °C)-99.9 °F (37.7 °C)] 99.2 °F (37.3 °C)  Pulse:  [] 82  Resp:  [0-27] 20  SpO2:  [89 %-100 %] 100 %  BP: ()/(26-63) 102/39     Weight: 75.2 kg (165 lb 12.6 oz)  Body mass index is 31.32 kg/m².    SpO2: 100 %  O2 Device (Oxygen Therapy): ventilator    Intake/Output - Last 3 Shifts       11/04 0700 - 11/05 0659 11/05 0700 - 11/06 0659 11/06 0700 - 11/07 0659    P.O. 600 480      I.V. (mL/kg)  3065.4 (40.8) 222 (3)    Blood  700     NG/GT   45    IV Piggyback  800 100    Total Intake(mL/kg) 600 (8.7) 5045.4 (67.1) 367 (4.9)    Urine (mL/kg/hr)  825 (0.5) 270 (0.4)    Stool  0     Total Output  825 270    Net +600 +4220.4 +97           Urine Occurrence 5 x 1 x     Stool Occurrence 2 x 1 x           Lines/Drains/Airways     Central Venous Catheter Line                 Pulmonary Artery Catheter Assessment  11/05/19 1721 right internal jugular less than 1 day          Drain                 NG/OG Tube 11/05/19 1720 orogastric Center mouth less than 1 day         Urethral Catheter 11/05/19 1718 Straight-tip less than 1 day          Airway                 Airway - Non-Surgical 11/05/19 1452 Endotracheal Tube 1 day          Arterial Line                 Arterial Line 11/06/19 0945 Left Radial less than 1 day          Peripheral Intravenous Line                 Peripheral IV - Single Lumen 11/05/19 2300 18 G Left Other less than 1 day                Physical Exam   Constitutional: She appears well-developed.   Intubated and Sedated   HENT:   Head: Normocephalic and atraumatic.   Eyes: Pupils are equal, round, and reactive to light. EOM are normal.   Neck: Normal range of motion. Neck supple. No JVD present.   Cardiovascular: Normal rate, regular rhythm and normal heart sounds. Exam reveals no gallop and no friction rub.   No murmur heard.  Unable to find right radial pulse via doppler. Tips of the fingers of the right hand are mottled, dusky, appear cyanotic. Recent right radial arterial line in place, which has been removed.    Pulmonary/Chest: Effort normal and breath sounds normal. No stridor. No respiratory distress. She has no wheezes. She has no rales. She exhibits no tenderness.   On ventilation support.    Abdominal: Soft. She exhibits no distension and no mass. There is no tenderness. There is no rebound and no guarding. No hernia.   Hypoactive bowel sounds x 4 quadrants.     Musculoskeletal: She exhibits edema. She exhibits no tenderness or deformity.   Sedated.    Neurological:   Unable to assess, intubated and sedated. PERRLA.     Skin: Skin is warm and dry. Capillary refill takes 2 to 3 seconds. No rash noted. She is not diaphoretic. No erythema. No pallor.   Psychiatric:   Sedated.    Nursing note and vitals reviewed.      Significant Labs:  BMP:   Recent Labs   Lab 11/06/19  0403 11/06/19  1246   *  358* 101   *  128* 129*   K 3.6  3.6 3.5   CL 92*  92* 93*   CO2 24  24 25   BUN 29*  29* 24*   CREATININE 1.1  1.1 0.9   CALCIUM 7.5*  7.5* 7.3*   MG 1.7  --      CBC:   Recent Labs   Lab 11/06/19  0403   WBC 15.53*  15.53*   RBC 3.75*  3.75*   HGB 10.4*  10.4*   HCT 31.9*  31.9*   PLT 89*  89*   MCV 85  85   MCH 27.7  27.7   MCHC 32.6  32.6       Significant Diagnostics:  I have reviewed all pertinent imaging results/findings within the past 24 hours.

## 2019-11-06 NOTE — PROGRESS NOTES
Dr. rodriguez notified that pt feels clammy. Cold, pale. Extremely. Instructed that pt does not look well and to assess. Dr. vásquez with cardiology notified of pt feeling clammy, dazed. , weakness.

## 2019-11-06 NOTE — PROGRESS NOTES
[facility] Hospital Medicine  Progress Note    Patient Name: Mateusz Bates  MRN: [id number]  Patient Class: IP- Inpatient   Admission Date: 10/28/2019  Length of Stay: 9 days  Attending Physician: Keny Zaidi MD  Primary Care Provider: Serenity Kilpatrick MD        Subjective:     Principal Problem:Acute hypoxemic respiratory failure        HPI:  Mateusz Bates is a 69 year old female with DM II, hypertension and hyperlipidemia who presented to the ED at the request of her PCP due to finding of an abnormal EKG and complaints of chest tightness with SOB. The patient reports having intermittent symptoms for the last 1-2 months. On 10/26/19, she began having a tightness in her mid chest that radiated into her back with associated SOB. Symptoms were worse with laying flat and improved with sitting up. They lasted only minutes, but this was longer than previous episodes. She denies a trend in when these episodes occur as well as associated nausea, vomiting and diaphoresis. The patient's EKG was significant for a new LBBB compared to previous in 2016. Labs were significant for a BNP of 1,305, troponin of 3.665 and potassium of 3.4. Code status was discussed with the patient and her sister. She is a full code. Her sister, Christina Hernandez, is her surrogate medical decision maker.     Overview/Hospital Course:  Admitted for evaluation and treatment of chest pain concerning for acute coronary syndrome.  Evaluation by Cardiology and urgent left heart catheterization.  Angiogram discovered severe multi-vessel coronary artery disease.  Cardiac echo showed severely reduced LV function.  Referral to Cardiothoracic Surgery who recommended CABG.  Balloon pump placed 30 October.  CABG x3 by Dr. Zaidi 31 October.  Successfully extubated morning of 01 November.  11/2 - Patient improved s/p extubation. CABG x 3. Discussed with CC Team. BS mild elevation. SA and LA insulin initiated.  11/3 - Improved BS control.  Patient + chest tenderness. Tolerating diet. Discussed with cc team  11/4 - Patient with improved strength interactivity. Patient denies n/v/d + Sx site tenderness. Patient reports she is weakened.  11/5 - Patient denies CP. Reports she is weak. Denies SOB. Patient lethargic and clammy sitting in chair at bedside. Discussed with care team. Patient to return to bed with warming blankets, Blood Sugar 88 last check.   11/6 - Patient remains intubated R Hand cyanotic. Patient discussed with CC Team / CV sx.    Interval History: Stable overnight. Patient evaluated by Vascular. Remains intubated.     Review of Systems   Unable to perform ROS: Intubated     Objective:     Vital Signs (Most Recent):  Temp: 99.2 °F (37.3 °C) (11/06/19 1500)  Pulse: 97 (11/06/19 1700)  Resp: (!) 22 (11/06/19 1700)  BP: (!) 117/44 (11/06/19 1700)  SpO2: 100 % (11/06/19 1700) Vital Signs (24h Range):  Temp:  [98.9 °F (37.2 °C)-99.9 °F (37.7 °C)] 99.2 °F (37.3 °C)  Pulse:  [] 97  Resp:  [0-27] 22  SpO2:  [89 %-100 %] 100 %  BP: ()/(26-56) 117/44     Weight: 75.2 kg (165 lb 12.6 oz)  Body mass index is 31.32 kg/m².    Intake/Output Summary (Last 24 hours) at 11/6/2019 1711  Last data filed at 11/6/2019 1700  Gross per 24 hour   Intake 5222.41 ml   Output 1085 ml   Net 4137.41 ml      Physical Exam   Constitutional: She is oriented to person, place, and time. She appears well-developed and well-nourished. No distress.   Lethargic   HENT:   Head: Normocephalic and atraumatic.   Mouth/Throat: Oropharynx is clear and moist.   Eyes: Pupils are equal, round, and reactive to light. Conjunctivae and EOM are normal.   Neck: Neck supple. No JVD present. No thyromegaly present.   Cardiovascular: Normal rate and regular rhythm. Exam reveals no gallop and no friction rub.   No murmur heard.  Pulmonary/Chest: Effort normal and breath sounds normal. She has no wheezes. She has no rales. She exhibits tenderness.   + midline chest sx site   Abdominal:  Soft. Bowel sounds are normal. She exhibits no distension. There is no tenderness. There is no rebound and no guarding.   Musculoskeletal: Normal range of motion. She exhibits edema. She exhibits no deformity.   Lymphadenopathy:     She has no cervical adenopathy.   Neurological: She is oriented to person, place, and time. She has normal reflexes.   Skin: Skin is dry. Capillary refill takes 2 to 3 seconds. No rash noted.   Psychiatric: She has a normal mood and affect.   Nursing note and vitals reviewed.      Significant Labs:   Blood Culture:   Recent Labs   Lab 11/05/19  1325 11/05/19  1335   LABBLOO No Growth to date No Growth to date     BMP:   Recent Labs   Lab 11/06/19  0403  11/06/19  1610   *  358*   < > 99  99   *  128*   < > 127*  127*   K 3.6  3.6   < > 3.6  3.6   CL 92*  92*   < > 93*  93*   CO2 24  24   < > 24  24   BUN 29*  29*   < > 25*  25*   CREATININE 1.1  1.1   < > 0.9  0.9   CALCIUM 7.5*  7.5*   < > 7.3*  7.3*   MG 1.7  --   --     < > = values in this interval not displayed.     CBC:   Recent Labs   Lab 11/05/19  1541 11/05/19  1757 11/06/19  0403 11/06/19  1610   WBC 9.18  9.18  --  15.53*  15.53* 18.17*   HGB 6.7*  6.7* 9.1* 10.4*  10.4* 9.5*   HCT 22.0*  22.0* 28.8* 31.9*  31.9* 28.6*   PLT 59*  59*  --  89*  89* 67*     CMP:   Recent Labs   Lab 11/05/19  1541  11/05/19  1919  11/06/19  0403 11/06/19  1246 11/06/19  1610   *   < > 128*   < > 128*  128* 129* 127*  127*   K 4.1   < > 4.0   < > 3.6  3.6 3.5 3.6  3.6      < > 94*   < > 92*  92* 93* 93*  93*   CO2 9*   < > 22*   < > 24  24 25 24  24   *   < > 495*   < > 358*  358* 101 99  99   BUN 15   < > 35*   < > 29*  29* 24* 25*  25*   CREATININE 0.6   < > 1.4   < > 1.1  1.1 0.9 0.9  0.9   CALCIUM 6.5*   < > 7.6*   < > 7.5*  7.5* 7.3* 7.3*  7.3*   PROT 2.1*  --  4.8*  --   --   --   --    ALBUMIN 0.9*  --  2.7*  --   --   --   --    BILITOT 0.4  --  1.4*  --   --   --   --     ALKPHOS 34*  --  56  --   --   --   --    AST 10  --  266*  --   --   --   --    ALT <5*  --  151*  --   --   --   --    ANIONGAP 19*   < > 12   < > 12  12 11 10  10   EGFRNONAA >60   < > 38*   < > 51*  51* >60 >60  >60    < > = values in this interval not displayed.     Urine Studies:   Recent Labs   Lab 11/05/19  1416   COLORU Yellow   APPEARANCEUA Clear   PHUR 7.0   SPECGRAV 1.010   PROTEINUA Negative   GLUCUA Trace*   KETONESU Trace*   BILIRUBINUA Negative   OCCULTUA Negative   NITRITE Negative   UROBILINOGEN Negative   LEUKOCYTESUR Negative     All pertinent labs within the past 24 hours have been reviewed.    Significant Imaging: I have reviewed all pertinent imaging results/findings within the past 24 hours.      Assessment/Plan:      * Acute hypoxemic respiratory failure  11/2  Extubated  Monitor  Pulmonary CC      Septic shock  11/6  ABX  ID on consult  Vanc / Zosyn      Thrombocytopenia  11/2  Heme On on Consult  HIT panel  Likely drug induced  Monitor  11/3  Improved at 89 today  Monitor  11/5  Plts 73  Monitor    S/P CABG x 3  11/4  CABG x 3  ASA  Statin  BB  CV sx    11/6  CV sx  Statin        CAD, multiple vessel        Acute combined systolic and diastolic CHF, NYHA class 2  Probable ischemic cardiomyopathy.  Anticipate CABG 31 October.  Optimize medical management.    NSTEMI (non-ST elevated myocardial infarction)  Troponin peaked at 3.66 now trending down.  Heparin, metoprolol, ASA and statin. Hold ACE inhibitor due to intolerance with cough. Hold ARB as patient's blood pressure is marginal. Will consider adding, if her blood pressure will tolerate.  Hold second antiplatelet due to pending Cardiology procedure.  Patient was evaluated for Cardiac Rehab and is not a candidate at this time, but will be referred, when appropriate.  LHC on 28 on October showed severe multi-vessel CAD.  Referral to CVT Surgery who recommended CABG planned for Thursday 31 October.  Balloon pump placed 30 October.   CABG x3 by Dr. Zaidi on 31 October.  11/2  S/p CABG  Cards  CT Sx  Statin  BB      Hyperlipidemia associated with type 2 diabetes mellitus  -Patient was improved, but not at goal on 10/4/19.   -Continue statin.       Obesity (BMI 30.0-34.9)  Diet  Nutrition        Diabetes mellitus type II, uncontrolled  -Hemoglobin A1C was 9 on 9/19/19.   -NISS.   -ADA diet.   11/2  Levemir / NISS / Accuchecks  11/3  Levemir increased to 15 sq bid  NISS  Accuchecks  11/4  Levemir  NISS  Accuchecks  11/5  Levemir 10 units BID  NISS  Accuchecks    11/6  UnControlled  Accuchecks  Insulin Gtt  Improved control  Last BS 99      Anxiety and depression  -Stable.   -Continue Effexor.       Hypertension associated with diabetes  -Stable.   -Continue Norvasc.  11/2 -  BB , CCB  11/3 - Controlled  BB  CCB  11/4  Controlled  BB  CCB    11/5  Controlled  BB  CCB    11/6  On pressors  Hold Htn meds for now      VTE Risk Mitigation (From admission, onward)         Ordered     Reason for no Mechanical VTE Prophylaxis  Once      10/28/19 1414     heparin 25,000 units in dextrose 5% 250 mL (100 units/mL) infusion LOW INTENSITY nomogram - [facility]  Continuous      10/28/19 1257                Critical care time spent on the evaluation and treatment of severe organ dysfunction, review of pertinent labs and imaging studies, discussions with consulting providers and discussions with patient/family: 33 minutes.      Josse Patel MD  Department of Hospital Medicine   [facility]

## 2019-11-06 NOTE — PLAN OF CARE
Titrating epi as tolerated, weaned off fentanyl gtt, Dr Dior with vascular surgery saw patient for rt hand discoloration, recs starting heparin gtt, Dequan NP informed of this. Treating hypoglycemic episodes with D10. New artline to left wrist. Started tubes feeds per OGT. Pt's sister visited, update given, POC discussed.

## 2019-11-06 NOTE — PROGRESS NOTES
Pharmacokinetic Initial Assessment: IV Vancomycin    Assessment/Plan:    Initiate intravenous vancomycin with loading dose of 2000 mg once with subsequent doses when random concentrations are less than 20 mcg/mL  Desired empiric serum trough concentration is 10 to 20 mcg/mL  Draw vancomycin random level on 11/06 at 2100.  Pharmacy will continue to follow and monitor vancomycin. A placeholder dose of Vancomycin 750 mg has been ordered while we continue daily random collections.     As the patient's renal function improves we may be able to  her to a q 24 hour dosing interval.     Please contact pharmacy at extension [phone] with any questions regarding this assessment.     Thank you for the consult,   Jh Akhtar       Patient brief summary:  Mateusz Bates is a 69 y.o. female initiated on antimicrobial therapy with IV Vancomycin for treatment of suspected Opportunistic infection prophylaxis   post CABG tx    Drug Allergies:   Review of patient's allergies indicates:   Allergen Reactions    Ace inhibitors Other (See Comments)     Cough         Actual Body Weight:   68.6 kg    Renal Function:   Estimated Creatinine Clearance: 33.6 mL/min (based on SCr of 1.4 mg/dL).,     Dialysis Method (if applicable):  Nothing scheduled, no Hx of HD     CBC (last 72 hours):  Recent Labs   Lab Result Units 11/03/19  0355 11/04/19  0659 11/05/19  0502 11/05/19  1541 11/05/19  1757   WBC K/uL 13.88* 13.13* 16.09* 9.18  9.18  --    Hemoglobin g/dL 11.3* 11.1* 12.5 6.7*  6.7* 9.1*   Hematocrit % 34.7* 34.4* 39.5 22.0*  22.0* 28.8*   Platelets K/uL 89* 140* 73* 59*  59*  --    Gran% % 77.7* 73.2* 63.7 79.0*  79.0*  --    Lymph% % 12.3* 14.2* 16.2* 13.0*  13.0*  --    Mono% % 7.7 8.9 15.0 1.9*  1.9*  --    Eosinophil% % 1.5 2.6 3.1 1.4  1.4  --    Basophil% % 0.2 0.3 0.4 0.1  0.1  --    Differential Method  Automated Automated Automated Automated  Automated  --        Metabolic Panel (last 72 hours):  Recent Labs   Lab  Result Units 11/03/19  0355 11/03/19  1706 11/04/19  0435 11/04/19  0659 11/04/19  1713 11/05/19  0502 11/05/19  1416 11/05/19  1541 11/05/19  1757 11/05/19  1919 11/05/19  2243   Sodium mmol/L 140 134* 136 136 131* 136  --  129* 129* 128*  --    Potassium mmol/L 3.8 4.1 4.6 4.0 4.5 4.8  --  4.1 4.2 4.0  --    Chloride mmol/L 98 93* 97 96 91* 99  --  101 96 94*  --    CO2 mmol/L 29 30* 25 30* 29 23  --  9* 20* 22*  --    Glucose mg/dL 137* 389* 162* 150* 407* 109  --  249* 442* 495*  --    Glucose, Fasting mg/dL  --   --   --   --   --   --   --   --   --   --  555*   Glucose, UA   --   --   --   --   --   --  Trace*  --   --   --   --    BUN, Bld mg/dL 30* 32* 29* 27* 30* 26*  --  15 34* 35*  --    Creatinine mg/dL 0.9 0.9 0.8 0.7 0.9 0.7  --  0.6 1.4 1.4  --    Albumin g/dL  --   --   --   --   --   --   --  0.9*  --  2.7*  --    Total Bilirubin mg/dL  --   --   --   --   --   --   --  0.4  --  1.4*  --    Alkaline Phosphatase U/L  --   --   --   --   --   --   --  34*  --  56  --    AST U/L  --   --   --   --   --   --   --  10  --  266*  --    ALT U/L  --   --   --   --   --   --   --  <5*  --  151*  --    Magnesium mg/dL 2.0 2.5 2.3  --   --  2.1  --   --   --   --   --        Drug levels (last 3 results):  No results for input(s): VANCOMYCINRA, VANCOMYCINPE, VANCOMYCINTR in the last 72 hours.    Microbiologic Results:  Microbiology Results (last 7 days)       Procedure Component Value Units Date/Time    Blood culture [[id number]] Collected:  11/05/19 1335    Order Status:  Sent Specimen:  Blood Updated:  11/05/19 2044    Blood culture [[id number]] Collected:  11/05/19 1325    Order Status:  Sent Specimen:  Blood Updated:  11/05/19 2044    Culture, Respiratory with Gram Stain [[id number]] Collected:  11/05/19 1912    Order Status:  Sent Specimen:  Respiratory from Sputum, Expectorated Updated:  11/05/19 1928    Culture, Respiratory with Gram Stain [[id number]] Collected:  11/05/19 1912    Order Status:  Sent  Specimen:  Respiratory from Sputum, Expectorated Updated:  11/05/19 1912

## 2019-11-06 NOTE — PROGRESS NOTES
[facility] -   Critical Care Medicine  Progress Note    Patient Name: Mateusz Bates  MRN: [id number]  Admission Date: 10/28/2019  Hospital Length of Stay: 8 days  Code Status: Full Code  Attending Provider: Keny Zaidi MD  Primary Care Provider: Serenity Kilpatrick MD   Principal Problem: Acute hypoxemic respiratory failure    Subjective:     HPI:  69 year old female with PMH including DM2 on insulin; HTN; HLD; GERD; anxiety  Presented to ED on 10/28 at instruction of PCP s/t abnormal EKG with c/o chest tightness and SOB  Eval revealed LBBB on EKG along with BNP 1300 and troponin 3.66     Taken for LHC on 10/28 finding 3 vessel CAD, EF 20%, and infra renal aorta plaque 30%  CTS consulted and agree pt is candidate for urgent CABG  Today 10/30 taken to cath lab for placement of IABP to support for planned CABG in am    Hospital/ICU Course:  Transferred to ICU post IABP placement for hemodynamic monitoring until surgery  10/31 - Admitted to ICU today from OR post 3-vessel CABG.  [facility] and [facility].  Upon arrival to ICU on Vasopressin, Epi, Primacor and Lidocaine infusions.  11/1 - Extubated overnight.  Supine in bed awake and alert and responsive still with IABP in place as well as on Epi, Primacor and Insulin infusions.  No distress noted  11/05: admitted back to MICU; Code blue called, Agonal breathing, cold and clammy, Hypotensive, peripheral dopamine started and intubated, weak thready pulses, Central line [place] and A line placed in MICU, Bicarb drip and LR bolus and albumin given, storm placed. Recent rise in WBCC     Review of Systems   Unable to perform ROS: Intubated         Objective:     Vital Signs (Most Recent):  Temp: 97.9 °F (36.6 °C) (11/05/19 1139)  Pulse: 80 (11/05/19 1709)  Resp: (!) 21 (11/05/19 1709)  BP: (!) 126/59 (11/05/19 1139)  SpO2: (!) 85 % (11/05/19 1451) Vital Signs (24h Range):  Temp:  [97.6 °F (36.4 °C)-99 °F (37.2 °C)] 97.9 °F (36.6 °C)  Pulse:   [65-91] 80  Resp:  [18-22] 21  SpO2:  [85 %-96 %] 85 %  BP: (104-143)/(52-64) 126/59     Weight: 68.6 kg (151 lb 3.8 oz)  Body mass index is 28.58 kg/m².      Intake/Output Summary (Last 24 hours) at 11/5/2019 1736  Last data filed at 11/5/2019 0600  Gross per 24 hour   Intake 120 ml   Output --   Net 120 ml       Physical Exam   Constitutional: Vital signs are normal. She appears well-developed and well-nourished. She has a sickly appearance. She is intubated.       Peripheries cold clammy, cyanotic   HENT:   Head: Normocephalic and atraumatic.   Mouth/Throat: Oropharynx is clear and moist.   Eyes: Pupils are equal, round, and reactive to light. Conjunctivae and EOM are normal.   Neck: Normal range of motion. Neck supple.   Cardiovascular: Normal rate, regular rhythm and intact distal pulses.   No murmur heard.  Pulmonary/Chest: Effort normal and breath sounds normal. No stridor. She is intubated. No respiratory distress.   Abdominal: Soft. Bowel sounds are normal.   Musculoskeletal: Normal range of motion. She exhibits no edema.   Neurological: No cranial nerve deficit.   Skin: Capillary refill takes 2 to 3 seconds.   Psychiatric: She has a normal mood and affect.   Nursing note and vitals reviewed.      Vents:  Vent Mode: A/C (11/05/19 1709)  Ventilator Initiated: Yes (11/05/19 1516)  Set Rate: 20 bmp (11/05/19 1709)  Vt Set: 450 mL (11/05/19 1709)  Pressure Support: 0 cmH20 (11/05/19 1709)  PEEP/CPAP: 5 cmH20 (11/05/19 1709)  Oxygen Concentration (%): 100 (11/05/19 1709)  Peak Airway Pressure: 40 cmH2O (11/05/19 1709)  Plateau Pressure: 0 cmH20 (11/05/19 1709)  Total Ve: 9.96 mL (11/05/19 1709)  Negative Inspiratory Force (cm H2O): 26 (11/01/19 0250)  F/VT Ratio<105 (RSBI): (!) 22.2 (11/05/19 1709)    Lines/Drains/Airways     Central Venous Catheter Line                 Pulmonary Artery Catheter Assessment  11/05/19 1721 right internal jugular less than 1 day          Drain                 NG/OG Tube 11/05/19  1720 orogastric Center mouth less than 1 day         Urethral Catheter 11/05/19 1718 Straight-tip less than 1 day          Airway                 Airway - Non-Surgical 11/05/19 1452 Endotracheal Tube less than 1 day          Arterial Line                 Arterial Line 11/05/19 1719 Right Radial less than 1 day          Peripheral Intravenous Line                 Peripheral IV - Single Lumen 11/05/19 1352 20 G Posterior;Right Hand less than 1 day                Significant Labs:    CBC/Anemia Profile:  Recent Labs   Lab 11/04/19  0659 11/05/19  0502 11/05/19  1541   WBC 13.13* 16.09* 9.18  9.18   HGB 11.1* 12.5 6.7*  6.7*   HCT 34.4* 39.5 22.0*  22.0*   * 73* 59*  59*   MCV 89 89 95  95   RDW 15.3* 15.0* 15.0*  15.0*        Chemistries:  Recent Labs   Lab 11/04/19  0435  11/04/19 1713 11/05/19  0502 11/05/19  1541      < > 131* 136 129*   K 4.6   < > 4.5 4.8 4.1   CL 97   < > 91* 99 101   CO2 25   < > 29 23 9*   BUN 29*   < > 30* 26* 15   CREATININE 0.8   < > 0.9 0.7 0.6   CALCIUM 9.9   < > 9.0 9.5 6.5*   ALBUMIN  --   --   --   --  0.9*   PROT  --   --   --   --  2.1*   BILITOT  --   --   --   --  0.4   ALKPHOS  --   --   --   --  34*   ALT  --   --   --   --  <5*   AST  --   --   --   --  10   MG 2.3  --   --  2.1  --     < > = values in this interval not displayed.        ABGs:   Recent Labs   Lab 11/05/19  1706   PH 7.414   PCO2 34.1*   HCO3 21.8*   POCSATURATED 100   BE -3     Blood Culture: No results for input(s): LABBLOO in the last 48 hours.  Cardiac Markers: No results for input(s): CKMB, TROPONINT, MYOGLOBIN in the last 48 hours.  Coagulation: No results for input(s): PT, INR, APTT in the last 48 hours.  Lactic Acid:   Recent Labs   Lab 11/05/19  1541   LACTATE >12.0*     POCT Glucose:   Recent Labs   Lab 11/05/19  0808 11/05/19  0838 11/05/19  1044   POCTGLUCOSE 63* 88 270*     Respiratory Culture: No results for input(s): GSRESP, RESPIRATORYC in the last 48 hours.  Urine Culture: No  results for input(s): LABURIN in the last 48 hours.  All pertinent labs within the past 24 hours have been reviewed.    Significant Imaging:  I have reviewed and interpreted all pertinent imaging results/findings within the past 24 hours.     CXR  COMPARISON:  11/05/2019 at 13:53    FINDINGS:  Endotracheal tube tip approximately 1.5 cm above the mu.  Pulmonary artery catheter tip projects over the pulmonary trunk.  Nasogastric tube below the diaphragm.  No acute pulmonary infiltrates.  Sternotomy wires.      Impression       Devices in place as described.       EKG  Normal sinus rhythm  Nonspecific intraventricular block  Cannot rule out Septal infarct (cited on or before 05-NOV-2019)  T wave abnormality, consider lateral ischemia  Abnormal ECG  When compared with ECG of 31-OCT-2019 05:51,  Premature supraventricular complexes are no longer Present  Serial changes of evolving Septal infarct Present    ECHO today   1 - Moderately depressed left ventricular systolic function (EF 30-35%).     2 - Mildly depressed right ventricular systolic function .     3 - NO PERICARDIAL EFFUSION    4 - LIMITED ECHO      ABG  Recent Labs   Lab 11/05/19  1706   PH 7.414   PO2 292*   PCO2 34.1*   HCO3 21.8*   BE -3     Assessment/Plan:     Psychiatric  Anxiety and depression  Recommend resume home Effexor     Pulmonary  * Acute hypoxemic respiratory failure  Vent Mode: A/C  Oxygen Concentration (%):  [] 80  Resp Rate Total:  [20 br/min-44 br/min] 20 br/min  Vt Set:  [450 mL] 450 mL  PEEP/CPAP:  [5 cmH20] 5 cmH20  Pressure Support:  [0 cmH20] 0 cmH20  Mean Airway Pressure:  [11 cmH20-15 cmH20] 11 cmH20  Chlorohexidine  Optimize PEEP  CXR in AM    Cardiac/Vascular  S/P CABG x 3  Follow up echo  Per CVT surgery  Cont hemodynamic monitoring post op  Atorvastatin      CAD, multiple vessel  Per Cards and CVT surgery   S/P 3-vessel CABG   Cont ASA, statin,   Lopressor and Losartan on hold for hypotension on pressors    Acute  combined systolic and diastolic CHF, NYHA class 2  Strict I/O  Daily wt       NSTEMI (non-ST elevated myocardial infarction)  Multi vessel CAD now post CABG  Continue ASA, statin  Cont cardiac monitoring    Renal/  Oliguria  CVP and cardiac index suggest adequate preload CVP 16  May need IV lasix    ID  Septic shock  Blood culture  Urine culture  Sputum culture  Abx: vancomycin Piperacillin tazobactam  IVF resuscitation  Serial lactate      Hematology  Thrombocytopenia  Monitor, plat 59 k previously 73K    Oncology  Acute blood loss anemia  On 2 pressors  Transfuse 2 PRBC  CT chest Abd pelvis post transfusion: no external obvious source, sputum, urine stool clear, abd soft    Endocrine  Diabetes mellitus type II, uncontrolled  Sliding scale  -180  Low threshold restart Drip    Other  Nontraumatic hemorrhagic shock  Hemodynamic monitoring: CI up from 1.9 to 4.1  IVF resuscitation  2 PRBC  Surgical sites clear     Critical Care Daily Checklist:    A: Awake: RASS Goal/Actual Goal: RASS Goal: -2-->light sedation  Actual: Alexander Agitation Sedation Scale (RASS): Alert and calm   B: Spontaneous Breathing Trial Performed? Spon. Breathing Trial Initiated?: Initiated (11/01/19 0144)   C: SAT & SBT Coordinated?  No                      D: Delirium: CAM-ICU Overall CAM-ICU: Negative   E: Early Mobility Performed? No   F: Feeding Goal: Goals: 1) PO intakes > 50% of meals at f/u  Status: Nutrition Goal Status: new   Current Diet Order   Procedures    Diet NPO Except for: Sips with Medication     Order Specific Question:   Except for     Answer:   Sips with Medication      AS: Analgesia/Sedation Fentanyl and precedex   T: Thromboembolic Prophylaxis Heparin on hold due to drop in platelets   H: HOB > 300 Yes   U: Stress Ulcer Prophylaxis (if needed) YES   G: Glucose Control Goal 110-180   B: Bowel Function Stool Occurrence: 1   I: Indwelling Catheter (Lines & Jones) Necessity yes   D: De-escalation of  Antimicrobials/Pharmacotherapies vanco and Zosyn    Plan for the day/ETD Ct chest abd    Code Status:  Family/Goals of Care: Full Code        Critical Care Time: 45 minutes  Critical secondary to Patient has a condition that poses threat to life and bodily function: Severe Respiratory Distress and Acute Renal Failure      Critical care was time spent personally by me on the following activities: development of treatment plan with patient or surrogate and bedside caregivers, discussions with consultants, evaluation of patient's response to treatment, examination of patient, ordering and performing treatments and interventions, ordering and review of laboratory studies, ordering and review of radiographic studies, pulse oximetry, re-evaluation of patient's condition. This critical care time did not overlap with that of any other provider or involve time for any procedures.     Riaz Tanner MD  Critical Care Medicine  [facility]

## 2019-11-06 NOTE — PT/OT/SLP PROGRESS
Physical Therapy      Patient Name:  Mateusz Bates   MRN:  [id number]    Patient not seen today secondary to not appropriate for Tx per Donte (15:00)  . Will follow-up tomorrow.    Joellen Farfan PTA

## 2019-11-06 NOTE — ASSESSMENT & PLAN NOTE
Per Cards and CVT surgery  S/P 3-vessel CABG   POD#6  Cont ASA, statin,   Lopressor and Losartan on hold for hypotension on pressors

## 2019-11-06 NOTE — PLAN OF CARE
Recommendations     Recommendation: 1. When pt hemodynamically stable, consider enteral nutrition support. Rec peptamen intense VHP @ 45 ml/hr (1080 kcal, 100 g pro and 907 ml free water). Flushes per MD/NP. 2. RD to f/u.   Goals: Initiate nutrition within 48-72 hrs   Nutrition Goal Status: New  Communication of RD Recs: (POC, sticky note, reviewed with NP)

## 2019-11-06 NOTE — ASSESSMENT & PLAN NOTE
11/01/2019  The patient is post-operative day 1, status-post coronary artery bypass grafting x 3.  Overall the patient is making progress, however IABP and inotropic agents will continue today.   Neuro:  The patient is awake alert and oriented x3.  Neuro exam is nonfocal.  Pain is well controlled with IV pain medication.  Cardiac:  Patient is hemodynamically improving. Patient remains on epinephrine and milrinone gtts, which will be weaned over the next 24 hours.  Cardiac index is improving. IABP will remain in place over the next 24 hours.  Patient will be started on beta blockers once gtts are weaned off.  Respiratory: Good sats on high flow nasal cannula. Continue respiratory toilet, continue incentive spirometer. Wean to low flow nasal cannula.  GI:  Diet: sips of water with medications, will slowly advance diet as tolerated. Benign abdominal exam.  Renal:  Urine output is slowly improving. Cr 0.9. K 4.3. Mag 1.6. Replace mag. Continue IV diuresis. Add metolazone.   Heme:  Hct 34.1  Platelet 82. Continue aspirin. Discontinue Plavix for now.  Id:  WBC 12. Tmax 100.3. Continue post-operative antibiotics. Will continue to follow.   Endocrine:  Glucose is controlled with insulin gtt.  Continue insulin gtt for now, until weaned off inotropic agents.   Activities:  Patient is currently bed bound due to IABP.  Advance activities as tolerated once IABP is removed.   Lines tubes and drains:  Continue IABP. Continue Mayer and Cordis.  Continue A-line.  Chest tubes will continue. NICOLASA x 2 will continue. Jones catheter will continue.  Continue pacer wires. Continue prevena wound vac.  Plan: Remain ICU.          11/02/2019  The patient is post-operative day 2, status-post coronary artery bypass grafting x 3.  Overall the patient is making progress, IABP removed and inotropic agents discontinued.   Neuro:  The patient is awake alert and oriented x3.  Neuro exam is nonfocal.  Pain is well controlled. Discontinue fentanyl.    Cardiac:  Patient is hemodynamically improving. Inotropic gtts discontinued. Excellent cardiac index. IABP discontinued.  Patient will be started on beta blockers today.  Respiratory: Good sats on high flow nasal cannula. Continue respiratory toilet, continue incentive spirometer. Wean to low flow nasal cannula.  GI:  Advance diabetic/cardiac diet as tolerated. Benign abdominal exam.  Renal:  Urine output is improving. Cr 0.9. K 4.9. Mag 2.2. Continue IV diuresis. Hold AM K.   Heme:  Hct 28.3  Platelet 47. Discontinue aspirin. Discontinue Plavix. HIT panel. Consult hematology r/t thrombocytopenia.   Id:  WBC 10.4. Tmax 99.4. Will continue to follow.   Endocrine:  Discontinue insulin gtt. Start sliding scale insulin. Start long acting insulin detemir.   Activities: Advance activities as tolerated.   Lines tubes and drains:  Discontinue IABP. Discontinue Kidder and Cordis.  Discontinue A-line.  Discontinue chest tubes x 3. Discontinue NICOLASA x 2. Discontinue arterial line. Jones catheter will continue for now.  Continue pacer wires. Continue prevena wound vac. Place PICC line.   Plan: Remain ICU.           11/03/2019  The patient is post-operative day 3, status-post coronary artery bypass grafting x 3.  Overall the patient is making progress.  Neuro:  The patient is awake alert and oriented x3.  Neuro exam is nonfocal.  Pain is well controlled. Continue tramadol.   Cardiac:  Patient is hemodynamically stable. Increase metoprolol dose. Start amlodipine.   Respiratory: Good sats on nasal cannula. Continue respiratory toilet, continue incentive spirometer. Wean to room air.  GI:  Advance diabetic/cardiac diet as tolerated. Benign abdominal exam. Passing gas, no BM. Add 1 bottle of mag citrate.   Renal:  Good urine output. Cr 0.9. K 3.8. Mag 2.0. Replace K. Replace Mag. Change to PO diuresis.   Heme:  Hct 34.7  Platelet 89. Restart aspirin. Continue to hold Plavix. HIT panel pending. Hematology following.  Id:  WBC 13.8. Tmax  99.4. Will continue to follow.    Endocrine:  Glucose controlled on sliding scale insulin. Increase long acting insulin detemir dose.   Activities: Advance activities as tolerated.   Lines tubes and drains:  Storm catheter will continue for now.  Continue pacer wires. Continue prevena wound vac. Continue PICC line.   Plan: Remain ICU.          11/04/2019  The patient is post-operative day 4, status-post coronary artery bypass grafting x 3.  Overall the patient is making progress.  Neuro:  The patient is awake alert and oriented x3.  Neuro exam is nonfocal.  Pain is well controlled. Continue tramadol.   Cardiac:  Patient is hemodynamically stable. Continue metoprolol dose. Continue amlodipine dose.   Respiratory: Good sats on room air. Continue respiratory toilet, continue incentive spirometer.  GI:  Advance diabetic/cardiac diet as tolerated. Benign abdominal exam. Regular bowel movements.   Renal:  Good urine output. Cr 0.7. K 4.0. Mag 2.3. Replace K. Decrease PO diuresis.   Heme:  Hct 34.4  Platelet 140. Continue aspirin. Restart Plavix. HIT panel pending. Hematology following.  Id:  WBC down to 13.13. Tmax 98. Will continue to follow.    Endocrine:  Glucose controlled on sliding scale insulin. Increased long acting insulin detemir dose.   Activities: Advance activities as tolerated.   Lines tubes and drains:  Discontinued storm catheter. Continue pacer wires. Continue prevena wound vac. Continue PICC line.   Plan: Transfer to telemetry. Anticipate discharge to SNF in 48 hours.         11/05/2019  The patient is post-operative day 5, status-post coronary artery bypass grafting x 3.  Overall the patient is making progress.  Neuro:  The patient is awake alert and oriented x3.  Neuro exam is nonfocal. Patient slow to answer questions, however answers are appropriate.  Pain is well controlled. Continue tramadol PRN.   Cardiac:  Patient is hemodynamically stable. Continue metoprolol dose. Discontinue amlodipine. Start  losartan.    Respiratory: Good sats on room air. Continue respiratory toilet, continue incentive spirometer.  GI:  Advance diabetic/cardiac diet as tolerated. Benign abdominal exam. Regular bowel movements.   Renal:  Good urine output. Cr 0.7. K 4.8. Mag 2.1. Replace Mag. Discontinue PO diuresis.   Heme:  Hct 39.5  Platelet 73. Continue aspirin. Hold Plavix. HIT panel pending. Hematology following.  Id:  WBC is up to 16. Tmax 99. Pan-culture. DC PICC line. Chest xray. Start broad spectrum empiric antibiotics. Will continue to follow.    Endocrine:  Glucose controlled on sliding scale insulin. Decrease long acting insulin detemir dose.   Activities: Advance activities as tolerated.   Lines tubes and drains:  Discontinue pacer wires. Discontinue prevena wound vac. Continue PICC line.   Plan: Anticipate discharge to SNF in 24-48 hours.

## 2019-11-06 NOTE — ASSESSMENT & PLAN NOTE
Platelets normal at 300K on 10/30, then declined 10/31 78K, 11/1 76K, 11/2 47K.  Possible cause of acute thrombocytopenia: drug induced immune thrombocytopenia (amiodarone given 10/30, lasix started 10/28 and currently receiving), HIT (4T score low prob), TTP (plasmic score intermediate prob), ITP, DIC.  Have sent: HIT antibodies, [id number], fibrinogen, ddimer, C3/C4 complements, pathology review for schistocytes, reticulocytes, haptoglobin, LDH.  If platelets continue to decline, consider stop Lasix, and use bumex.    11/3/2019 platelets improving 89K today.  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    11/4/19 Platelets improved to 140 K.  Most likely due to previous medication.  Continuing to trend upward daily.  Hit panel pending.  --CBC daily    11/5/19 Platelets reduced to 73 K today from 140 K yesterday.  Review of peripheral smear show no schistocytes - so not likely DIC or TTP.  Doesn't appear to be on any medications currently that are known to cause thrombocytopenia; however previously on Amiodarone - this has since been stopped.  HIT is negative.  Possible infection.  Temp of 100.6 yesterday; none over the past 24 hours.  Worsening leukocytosis.  States currently with diarrhea without abdominal pain.  Denies dysuria.   Also mid sternal incision is erythremic and well approximated.  Wound vac DC'd today.    --CBC daily  --Consider work up for infection    11/6/19 Platelets today 89 K - stable compared to yesterday. Sternal incision with less erythremia today as compared to yesterday.  Continue treatment for suspected infection.  So far blood cultures NGTD.  Chest xray negative for infectious process.  UA negative for infection.  Was with diarrhea yesterday.    --CBC daily  --Continue IV abx per primary team  --Continue Plavix as long as platelets >50 K; if less than 50 K then hold Plavix.   --Transfuse for platelets <10 K

## 2019-11-06 NOTE — PLAN OF CARE
Intubated. SUKUMAR Sy placed. A-line. Pressors. Bicarb GTT. Fluids. Restraints. Jones placed. Family notified. 2 UPRBC.

## 2019-11-06 NOTE — PROGRESS NOTES
Patient assessed.  Soft bilat wrist restraints renewed due to risk of pulling lines, tubes and/or climbing OOB.    [facility]

## 2019-11-06 NOTE — PROGRESS NOTES
[facility] -   Cardiothoracic Surgery  Progress Note    Patient Name: Mateusz Bates  MRN: [id number]  Admission Date: 10/28/2019  Hospital Length of Stay: 8 days  Code Status: Full Code   Attending Physician: Keny Zaidi MD   Referring Provider: Self, Aaareferral  Principal Problem:CAD, multiple vessel            Subjective:     Post-Op Info:  Procedure(s) (LRB):  CORONARY ARTERY BYPASS GRAFT (CABG) (N/A)  ECHOCARDIOGRAM,TRANSESOPHAGEAL (N/A)  BLOCK, NERVE, INTERCOSTAL, 2 OR MORE (N/A)   5 Days Post-Op     Interval History:  11/05/2019  The patient is post-operative day 5, status-post coronary artery bypass grafting x 3.     ROS  Medications:  Continuous Infusions:   dexmedetomidine (PRECEDEX) infusion 1 mcg/kg/hr (11/05/19 1800)    custom IV infusion builder 100 mL/hr at 11/05/19 1800    DOPamine Stopped (11/05/19 1734)    epinephrine 0.025 mcg/kg/min (11/05/19 1800)    fentanyl      norepinephrine bitartrate-D5W 0.04 mcg/kg/min (11/05/19 1800)     Scheduled Meds:   albumin human 5%  25 g Intravenous Once    aspirin  81 mg Oral Daily    atorvastatin  80 mg Oral QHS    chlorhexidine  15 mL Mouth/Throat BID    docusate sodium  100 mg Oral BID    famotidine (PF)  20 mg Intravenous BID    insulin detemir U-100  5 Units Subcutaneous BID    lactated ringers  1,000 mL Intravenous Once    losartan  12.5 mg Oral Daily    metoprolol tartrate  25 mg Oral BID    nozaseptin   Each Nostril BID    piperacillin-tazobactam (ZOSYN) IVPB  4.5 g Intravenous Q8H    polyethylene glycol  17 g Oral Daily    vancomycin (VANCOCIN) IVPB  2,000 mg Intravenous Once     PRN Meds:sodium chloride, sodium chloride, albumin human 5%, albuterol sulfate, chlorhexidine, Dextrose 10% Bolus, Dextrose 10% Bolus, Dextrose 10% Bolus, dextrose 50%, glucagon (human recombinant), glucose, glucose, insulin aspart U-100, lactated ringers, magnesium sulfate IVPB, magnesium sulfate IVPB, metoclopramide HCl, metoprolol tartrate,  nozaseptin, ondansetron, potassium chloride in water **AND** potassium chloride in water **AND** potassium chloride in water, Flushing PICC Protocol **AND** sodium chloride 0.9%, traMADol     Objective:     Vital Signs (Most Recent):  Temp: 99.9 °F (37.7 °C) (11/05/19 1830)  Pulse: 72 (11/05/19 1830)  Resp: 20 (11/05/19 1830)  BP: (!) 112/43 (11/05/19 1830)  SpO2: 95 % (11/05/19 1830) Vital Signs (24h Range):  Temp:  [97.6 °F (36.4 °C)-99.9 °F (37.7 °C)] 99.9 °F (37.7 °C)  Pulse:  [60-91] 72  Resp:  [15-27] 20  SpO2:  [85 %-96 %] 95 %  BP: ()/() 112/43     Weight: 68.6 kg (151 lb 3.8 oz)  Body mass index is 28.58 kg/m².    SpO2: 95 %  O2 Device (Oxygen Therapy): ventilator    Intake/Output - Last 3 Shifts       11/03 0700 - 11/04 0659 11/04 0700 - 11/05 0659 11/05 0700 - 11/06 0659    P.O. 700 600     I.V. (mL/kg) 50 (0.7)  957.8 (14)    IV Piggyback 225      Total Intake(mL/kg) 975 (12.9) 600 (8.7) 957.8 (14)    Urine (mL/kg/hr) 1033 (0.6)      Stool 0      Chest Tube       Total Output 1033      Net -58 +600 +957.8           Urine Occurrence  5 x     Stool Occurrence 2 x 2 x 1 x          Lines/Drains/Airways     Central Venous Catheter Line                 Pulmonary Artery Catheter Assessment  11/05/19 1721 right internal jugular less than 1 day          Drain                 NG/OG Tube 11/05/19 1720 orogastric Center mouth less than 1 day         Urethral Catheter 11/05/19 1718 Straight-tip less than 1 day          Airway                 Airway - Non-Surgical 11/05/19 1452 Endotracheal Tube less than 1 day          Arterial Line                 Arterial Line 11/05/19 1719 Right Radial less than 1 day          Peripheral Intravenous Line                 Peripheral IV - Single Lumen 11/05/19 1352 20 G Posterior;Right Hand less than 1 day                Physical Exam   Constitutional: She is oriented to person, place, and time. She appears well-developed and well-nourished. No distress.   HENT:   Head:  Normocephalic and atraumatic.   Eyes: Pupils are equal, round, and reactive to light. EOM are normal.   Neck: Normal range of motion. Neck supple. No JVD present.   Cardiovascular: Normal rate, regular rhythm, normal heart sounds and intact distal pulses. Exam reveals no gallop and no friction rub.   No murmur heard.  Pulmonary/Chest: Effort normal and breath sounds normal. No stridor. No respiratory distress. She has no wheezes. She has no rales. She exhibits no tenderness.   Abdominal: Soft. Bowel sounds are normal. She exhibits no distension and no mass. There is no tenderness. There is no rebound and no guarding. No hernia.   Musculoskeletal: Normal range of motion. She exhibits no edema, tenderness or deformity.   Neurological: She is alert and oriented to person, place, and time. She displays normal reflexes. No cranial nerve deficit or sensory deficit. She exhibits normal muscle tone. Coordination normal.   Slow to answer questions. AAOX3. No focal deficits.    Skin: Skin is warm and dry. Capillary refill takes less than 2 seconds. No rash noted. She is not diaphoretic. No erythema. No pallor.   Psychiatric: She has a normal mood and affect. Her behavior is normal. Judgment and thought content normal.   Nursing note and vitals reviewed.      Significant Labs:  BMP:   Recent Labs   Lab 11/05/19  0502 11/05/19  1541    249*    129*   K 4.8 4.1   CL 99 101   CO2 23 9*   BUN 26* 15   CREATININE 0.7 0.6   CALCIUM 9.5 6.5*   MG 2.1  --      CBC:   Recent Labs   Lab 11/05/19  1541   WBC 9.18  9.18   RBC 2.32*  2.32*   HGB 6.7*  6.7*   HCT 22.0*  22.0*   PLT 59*  59*   MCV 95  95   MCH 28.9  28.9   MCHC 30.5*  30.5*       Significant Diagnostics:  I have reviewed all pertinent imaging results/findings within the past 24 hours.    Assessment/Plan:     S/P CABG x 3       11/01/2019  The patient is post-operative day 1, status-post coronary artery bypass grafting x 3.  Overall the patient is making  progress, however IABP and inotropic agents will continue today.   Neuro:  The patient is awake alert and oriented x3.  Neuro exam is nonfocal.  Pain is well controlled with IV pain medication.  Cardiac:  Patient is hemodynamically improving. Patient remains on epinephrine and milrinone gtts, which will be weaned over the next 24 hours.  Cardiac index is improving. IABP will remain in place over the next 24 hours.  Patient will be started on beta blockers once gtts are weaned off.  Respiratory: Good sats on high flow nasal cannula. Continue respiratory toilet, continue incentive spirometer. Wean to low flow nasal cannula.  GI:  Diet: sips of water with medications, will slowly advance diet as tolerated. Benign abdominal exam.  Renal:  Urine output is slowly improving. Cr 0.9. K 4.3. Mag 1.6. Replace mag. Continue IV diuresis. Add metolazone.   Heme:  Hct 34.1  Platelet 82. Continue aspirin. Discontinue Plavix for now.  Id:  WBC 12. Tmax 100.3. Continue post-operative antibiotics. Will continue to follow.   Endocrine:  Glucose is controlled with insulin gtt.  Continue insulin gtt for now, until weaned off inotropic agents.   Activities:  Patient is currently bed bound due to IABP.  Advance activities as tolerated once IABP is removed.   Lines tubes and drains:  Continue IABP. Continue Sunflower and Cordis.  Continue A-line.  Chest tubes will continue. NICOLASA x 2 will continue. Jones catheter will continue.  Continue pacer wires. Continue prevena wound vac.  Plan: Remain ICU.          11/02/2019  The patient is post-operative day 2, status-post coronary artery bypass grafting x 3.  Overall the patient is making progress, IABP removed and inotropic agents discontinued.   Neuro:  The patient is awake alert and oriented x3.  Neuro exam is nonfocal.  Pain is well controlled. Discontinue fentanyl.   Cardiac:  Patient is hemodynamically improving. Inotropic gtts discontinued. Excellent cardiac index. IABP discontinued.  Patient will  be started on beta blockers today.  Respiratory: Good sats on high flow nasal cannula. Continue respiratory toilet, continue incentive spirometer. Wean to low flow nasal cannula.  GI:  Advance diabetic/cardiac diet as tolerated. Benign abdominal exam.  Renal:  Urine output is improving. Cr 0.9. K 4.9. Mag 2.2. Continue IV diuresis. Hold AM K.   Heme:  Hct 28.3  Platelet 47. Discontinue aspirin. Discontinue Plavix. HIT panel. Consult hematology r/t thrombocytopenia.   Id:  WBC 10.4. Tmax 99.4. Will continue to follow.   Endocrine:  Discontinue insulin gtt. Start sliding scale insulin. Start long acting insulin detemir.   Activities: Advance activities as tolerated.   Lines tubes and drains:  Discontinue IABP. Discontinue Marquez and Cordis.  Discontinue A-line.  Discontinue chest tubes x 3. Discontinue NICOLASA x 2. Discontinue arterial line. Jones catheter will continue for now.  Continue pacer wires. Continue prevena wound vac. Place PICC line.   Plan: Remain ICU.           11/03/2019  The patient is post-operative day 3, status-post coronary artery bypass grafting x 3.  Overall the patient is making progress.  Neuro:  The patient is awake alert and oriented x3.  Neuro exam is nonfocal.  Pain is well controlled. Continue tramadol.   Cardiac:  Patient is hemodynamically stable. Increase metoprolol dose. Start amlodipine.   Respiratory: Good sats on nasal cannula. Continue respiratory toilet, continue incentive spirometer. Wean to room air.  GI:  Advance diabetic/cardiac diet as tolerated. Benign abdominal exam. Passing gas, no BM. Add 1 bottle of mag citrate.   Renal:  Good urine output. Cr 0.9. K 3.8. Mag 2.0. Replace K. Replace Mag. Change to PO diuresis.   Heme:  Hct 34.7  Platelet 89. Restart aspirin. Continue to hold Plavix. HIT panel pending. Hematology following.  Id:  WBC 13.8. Tmax 99.4. Will continue to follow.    Endocrine:  Glucose controlled on sliding scale insulin. Increase long acting insulin detemir dose.    Activities: Advance activities as tolerated.   Lines tubes and drains:  Storm catheter will continue for now.  Continue pacer wires. Continue prevena wound vac. Continue PICC line.   Plan: Remain ICU.          11/04/2019  The patient is post-operative day 4, status-post coronary artery bypass grafting x 3.  Overall the patient is making progress.  Neuro:  The patient is awake alert and oriented x3.  Neuro exam is nonfocal.  Pain is well controlled. Continue tramadol.   Cardiac:  Patient is hemodynamically stable. Continue metoprolol dose. Continue amlodipine dose.   Respiratory: Good sats on room air. Continue respiratory toilet, continue incentive spirometer.  GI:  Advance diabetic/cardiac diet as tolerated. Benign abdominal exam. Regular bowel movements.   Renal:  Good urine output. Cr 0.7. K 4.0. Mag 2.3. Replace K. Decrease PO diuresis.   Heme:  Hct 34.4  Platelet 140. Continue aspirin. Restart Plavix. HIT panel pending. Hematology following.  Id:  WBC down to 13.13. Tmax 98. Will continue to follow.    Endocrine:  Glucose controlled on sliding scale insulin. Increased long acting insulin detemir dose.   Activities: Advance activities as tolerated.   Lines tubes and drains:  Discontinued storm catheter. Continue pacer wires. Continue prevena wound vac. Continue PICC line.   Plan: Transfer to telemetry. Anticipate discharge to SNF in 48 hours.         11/05/2019  The patient is post-operative day 5, status-post coronary artery bypass grafting x 3.  Overall the patient is making progress.  Neuro:  The patient is awake alert and oriented x3.  Neuro exam is nonfocal. Patient slow to answer questions, however answers are appropriate.  Pain is well controlled. Continue tramadol PRN.   Cardiac:  Patient is hemodynamically stable. Continue metoprolol dose. Discontinue amlodipine. Start losartan.    Respiratory: Good sats on room air. Continue respiratory toilet, continue incentive spirometer.  GI:  Advance  diabetic/cardiac diet as tolerated. Benign abdominal exam. Regular bowel movements.   Renal:  Good urine output. Cr 0.7. K 4.8. Mag 2.1. Replace Mag. Discontinue PO diuresis.   Heme:  Hct 39.5  Platelet  73. Continue aspirin. Hold Plavix. HIT panel pending. Hematology following.  Id:  WBC  is up to 16. Tmax 99. Pan-culture. DC PICC line. Chest xray. Start broad spectrum empiric antibiotics. Will continue to follow.    Endocrine:  Glucose controlled on sliding scale insulin. Decrease long acting insulin detemir dose.   Activities: Advance activities as tolerated.   Lines tubes and drains:  Discontinue pacer wires. Discontinue prevena wound vac. Continue PICC line.   Plan: Anticipate discharge to SNF in 24-48 hours.           NSTEMI (non-ST elevated myocardial infarction)  The patient is a 69-year-old female with hypertension, diabetes, hyperlipidemia, obesity, anxiety and depression, iron deficiency anemia, vitamin B12 deficiency, who presented to the emergency room with an NSTEMI.  Workup included a cardiac catheterization that shows severe multivessel coronary artery disease with proximal LAD stenosis of 70% and severely depressed ejection fraction of 20%.  The patient is a candidate for urgent coronary artery bypass grafting.  The risks and benefits of surgery were explained to the patient.  The patient verbalized understanding of the issues discussed.  She a segs the risks of surgery and has agreed to proceed with urgent coronary artery bypass grafting.  She understands that the risk of neurologic complication postoperatively is increased due to the presence of cerebral microvascular disease.        Yovani Dobbins NP  Cardiothoracic Surgery  [facility] [place]

## 2019-11-06 NOTE — PROGRESS NOTES
After coming from lab. Checked on pt. Pt with agonal breathing. Pt not responding. Pulse palpable. Pt color pale. ozzie Esparza in room code called.

## 2019-11-06 NOTE — ASSESSMENT & PLAN NOTE
-Stable.   -Continue Norvasc.  11/2 -  BB , CCB  11/3 - Controlled  BB  CCB  11/4  Controlled  BB  CCB    11/5  Controlled  BB  CCB    11/6  On pressors  Hold Htn meds for now

## 2019-11-06 NOTE — PROGRESS NOTES
Pharmacokinetic Assessment Follow Up: IV Vancomycin    Vancomycin serum concentration assessment(s):    The random level was drawn correctly and can be used to guide therapy at this time. The measurement is within the desired definitive target range of 15 to 20 mcg/mL.    Vancomycin Regimen Plan:    Re-dose when the random level is less than 20 mcg/mL, next level to be drawn at 0430 on 11/7     Drug levels (last 3 results):  Recent Labs   Lab Result Units 11/06/19  1420   Vancomycin, Random ug/mL 17.1       Pharmacy will continue to follow and monitor vancomycin.    Please contact pharmacy at extension [phone] for questions regarding this assessment.    Thank you for the consult,   Chrissy Patton       Patient brief summary:  Mateusz Bates is a 69 y.o. female initiated on antimicrobial therapy with IV Vancomycin for treatment of opportunistic infection       Drug Allergies:   Review of patient's allergies indicates:   Allergen Reactions    Ace inhibitors Other (See Comments)     Cough         Actual Body Weight:   75.2 kg    Renal Function:   Estimated Creatinine Clearance: 54.8 mL/min (based on SCr of 0.9 mg/dL).,     Dialysis Method (if applicable):  No dialysis     Will give a 1 x dose of 1250mg iv for a Random level of 17.1 today    CBC (last 72 hours):  Recent Labs   Lab Result Units 11/04/19  0659 11/05/19  0502 11/05/19  1541 11/05/19  1757 11/06/19  0403   WBC K/uL 13.13* 16.09* 9.18  9.18  --  15.53*  15.53*   Hemoglobin g/dL 11.1* 12.5 6.7*  6.7* 9.1* 10.4*  10.4*   Hematocrit % 34.4* 39.5 22.0*  22.0* 28.8* 31.9*  31.9*   Platelets K/uL 140* 73* 59*  59*  --  89*  89*   Gran% % 73.2* 63.7 79.0*  79.0*  --  81.0*  81.0*   Lymph% % 14.2* 16.2* 13.0*  13.0*  --  11.9*  11.9*   Mono% % 8.9 15.0 1.9*  1.9*  --  3.5*  3.5*   Eosinophil% % 2.6 3.1 1.4  1.4  --  1.1  1.1   Basophil% % 0.3 0.4 0.1  0.1  --  0.3  0.3   Differential Method  Automated Automated Automated  Automated  --  Automated   Automated       Metabolic Panel (last 72 hours):  Recent Labs   Lab Result Units 11/03/19  1706 11/04/19  0435 11/04/19  0659 11/04/19  1713 11/05/19  0502 11/05/19  1416 11/05/19  1541 11/05/19  1757 11/05/19  1919 11/05/19  2243 11/06/19  0030 11/06/19  0403 11/06/19  1246   Sodium mmol/L 134* 136 136 131* 136  --  129* 129* 128*  --  125* 128*  128* 129*   Potassium mmol/L 4.1 4.6 4.0 4.5 4.8  --  4.1 4.2 4.0  --  3.7 3.6  3.6 3.5   Chloride mmol/L 93* 97 96 91* 99  --  101 96 94*  --  88* 92*  92* 93*   CO2 mmol/L 30* 25 30* 29 23  --  9* 20* 22*  --  23 24  24 25   Glucose mg/dL 389* 162* 150* 407* 109  --  249* 442* 495*  --  615* 358*  358* 101   Glucose, Fasting mg/dL  --   --   --   --   --   --   --   --   --  555*  --   --   --    Glucose, UA   --   --   --   --   --  Trace*  --   --   --   --   --   --   --    BUN, Bld mg/dL 32* 29* 27* 30* 26*  --  15 34* 35*  --  32* 29*  29* 24*   Creatinine mg/dL 0.9 0.8 0.7 0.9 0.7  --  0.6 1.4 1.4  --  1.3 1.1  1.1 0.9   Albumin g/dL  --   --   --   --   --   --  0.9*  --  2.7*  --   --   --   --    Total Bilirubin mg/dL  --   --   --   --   --   --  0.4  --  1.4*  --   --   --   --    Alkaline Phosphatase U/L  --   --   --   --   --   --  34*  --  56  --   --   --   --    AST U/L  --   --   --   --   --   --  10  --  266*  --   --   --   --    ALT U/L  --   --   --   --   --   --  <5*  --  151*  --   --   --   --    Magnesium mg/dL 2.5 2.3  --   --  2.1  --   --   --   --   --   --  1.7  --        Vancomycin Administrations:  vancomycin given in the last 96 hours                     vancomycin 2 g in dextrose 5 % 500 mL IVPB (mg) 2,000 mg New Bag 11/05/19 9823                      Microbiologic Results:  Microbiology Results (last 7 days)       Procedure Component Value Units Date/Time    Blood culture [[id number]] Collected:  11/05/19 1325    Order Status:  Completed Specimen:  Blood Updated:  11/06/19 2436     Blood Culture, Routine No Growth to date     Blood culture [[id number]] Collected:  11/05/19 1335    Order Status:  Completed Specimen:  Blood Updated:  11/06/19 0345     Blood Culture, Routine No Growth to date    Culture, Respiratory with Gram Stain [[id number]] Collected:  11/05/19 1912    Order Status:  Completed Specimen:  Respiratory from Sputum, Expectorated Updated:  11/06/19 0308     Gram Stain (Respiratory) <10 epithelial cells per low power field.     Gram Stain (Respiratory) Rare WBC's     Gram Stain (Respiratory) Rare Gram positive cocci    Culture, Respiratory with Gram Stain [[id number]] Collected:  11/05/19 1912    Order Status:  Sent Specimen:  Respiratory from Sputum, Expectorated Updated:  11/05/19 1912        Chrissy Patton [facility] 11/6/2019 3:38 PM

## 2019-11-06 NOTE — SUBJECTIVE & OBJECTIVE
Interval History: Patient found yesterday with agonal breathing, unresponsive, thready pulse, cold, and clammy.  Transferred to ICU. Is currently intubated on inotropic support, restrained, and with pressor support.  Transfused 1 unit prbc's for hemoglobin of 6.7.  Hemoglobin this morning 10.4.     Oncology Treatment Plan:   [No treatment plan]    Medications:  Continuous Infusions:   dexmedetomidine (PRECEDEX) infusion 0.7 mcg/kg/hr (11/06/19 0815)    epinephrine 0.15 mcg/kg/min (11/06/19 0800)    insulin (HUMAN R) infusion (adults) 13 Units/hr (11/06/19 0800)    milrinone 20mg/100ml D5W (200mcg/ml) 0.25 mcg/kg/min (11/06/19 0800)    norepinephrine bitartrate-D5W Stopped (11/06/19 0030)     Scheduled Meds:   atorvastatin  80 mg Oral QHS    chlorhexidine  15 mL Mouth/Throat BID    docusate sodium  100 mg Oral BID    famotidine (PF)  20 mg Intravenous BID    lactated ringers  1,000 mL Intravenous Once    magnesium sulfate IVPB  2 g Intravenous Once    nozaseptin   Each Nostril BID    piperacillin-tazobactam (ZOSYN) IVPB  4.5 g Intravenous Q8H    polyethylene glycol  17 g Oral Daily    [START ON 11/10/2019] vancomycin (VANCOCIN) IVPB  750 mg Intravenous Q72H     PRN Meds:sodium chloride, sodium chloride, albumin human 5%, albuterol sulfate, Dextrose 10% Bolus, Dextrose 10% Bolus, iohexol, lactated ringers, magnesium sulfate IVPB, magnesium sulfate IVPB, metoclopramide HCl, ondansetron, potassium chloride in water **AND** potassium chloride in water **AND** potassium chloride in water, traMADol     Review of Systems   Unable to perform ROS: Acuity of condition     Objective:     Vital Signs (Most Recent):  Temp: 99.2 °F (37.3 °C) (11/06/19 0800)  Pulse: 86 (11/06/19 0800)  Resp: 20 (11/06/19 0800)  BP: (!) 106/48 (11/06/19 0800)  SpO2: 100 % (11/06/19 0800) Vital Signs (24h Range):  Temp:  [97.9 °F (36.6 °C)-99.9 °F (37.7 °C)] 99.2 °F (37.3 °C)  Pulse:  [41-87] 86  Resp:  [0-27] 20  SpO2:  [85 %-100 %]  100 %  BP: ()/() 106/48     Weight: 75.2 kg (165 lb 12.6 oz)  Body mass index is 31.32 kg/m².  Body surface area is 1.8 meters squared.      Intake/Output Summary (Last 24 hours) at 11/6/2019 0914  Last data filed at 11/6/2019 0800  Gross per 24 hour   Intake 4755.41 ml   Output 915 ml   Net 3840.41 ml       Physical Exam   Constitutional: She appears well-developed and well-nourished. She appears toxic. She is sedated, intubated and restrained.   HENT:   Head: Normocephalic and atraumatic.   Cardiovascular: Normal rate and regular rhythm.   Pulmonary/Chest: Breath sounds normal. She is intubated.   Abdominal: Soft. Normal appearance and bowel sounds are normal. She exhibits no distension.   Musculoskeletal: She exhibits edema (generalized).   Skin: Skin is warm and dry. She is not diaphoretic. There is pallor.   Nursing note and vitals reviewed.      Significant Labs:   All pertinent labs from the last 24 hours have been reviewed.    Diagnostic Results:  I have reviewed all pertinent imaging results/findings within the past 24 hours.

## 2019-11-07 PROBLEM — R60.0 EDEMA OF HAND: Status: ACTIVE | Noted: 2019-11-07

## 2019-11-07 PROBLEM — E87.20 LACTIC ACID ACIDOSIS: Status: RESOLVED | Noted: 2019-11-05 | Resolved: 2019-11-07

## 2019-11-07 PROBLEM — D62 ACUTE BLOOD LOSS ANEMIA: Status: RESOLVED | Noted: 2019-10-31 | Resolved: 2019-11-07

## 2019-11-07 PROBLEM — E87.20 METABOLIC ACIDEMIA: Status: RESOLVED | Noted: 2019-11-05 | Resolved: 2019-11-07

## 2019-11-07 PROBLEM — R57.8 NONTRAUMATIC HEMORRHAGIC SHOCK: Status: RESOLVED | Noted: 2019-11-05 | Resolved: 2019-11-07

## 2019-11-07 PROBLEM — R34 OLIGURIA: Status: RESOLVED | Noted: 2019-11-05 | Resolved: 2019-11-07

## 2019-11-07 PROBLEM — E87.1 HYPONATREMIA: Status: ACTIVE | Noted: 2019-11-07

## 2019-11-07 LAB
ALLENS TEST: ABNORMAL
ALLENS TEST: ABNORMAL
ANION GAP SERPL CALC-SCNC: 10 MMOL/L (ref 8–16)
ANION GAP SERPL CALC-SCNC: 11 MMOL/L (ref 8–16)
ANION GAP SERPL CALC-SCNC: 11 MMOL/L (ref 8–16)
ANION GAP SERPL CALC-SCNC: 12 MMOL/L (ref 8–16)
APTT BLDCRRT: 105.3 SEC (ref 21–32)
APTT BLDCRRT: 50.9 SEC (ref 21–32)
APTT BLDCRRT: 70.9 SEC (ref 21–32)
BASOPHILS # BLD AUTO: 0.02 K/UL (ref 0–0.2)
BASOPHILS # BLD AUTO: 0.02 K/UL (ref 0–0.2)
BASOPHILS # BLD AUTO: 0.03 K/UL (ref 0–0.2)
BASOPHILS # BLD AUTO: 0.03 K/UL (ref 0–0.2)
BASOPHILS NFR BLD: 0.1 % (ref 0–1.9)
BASOPHILS NFR BLD: 0.2 % (ref 0–1.9)
BUN SERPL-MCNC: 22 MG/DL (ref 8–23)
BUN SERPL-MCNC: 23 MG/DL (ref 8–23)
BUN SERPL-MCNC: 28 MG/DL (ref 8–23)
BUN SERPL-MCNC: 28 MG/DL (ref 8–23)
CALCIUM SERPL-MCNC: 7.3 MG/DL (ref 8.7–10.5)
CALCIUM SERPL-MCNC: 7.6 MG/DL (ref 8.7–10.5)
CALCIUM SERPL-MCNC: 7.8 MG/DL (ref 8.7–10.5)
CALCIUM SERPL-MCNC: 7.9 MG/DL (ref 8.7–10.5)
CHLORIDE SERPL-SCNC: 90 MMOL/L (ref 95–110)
CHLORIDE SERPL-SCNC: 90 MMOL/L (ref 95–110)
CHLORIDE SERPL-SCNC: 95 MMOL/L (ref 95–110)
CHLORIDE SERPL-SCNC: 95 MMOL/L (ref 95–110)
CO2 SERPL-SCNC: 22 MMOL/L (ref 23–29)
CO2 SERPL-SCNC: 23 MMOL/L (ref 23–29)
CREAT SERPL-MCNC: 0.8 MG/DL (ref 0.5–1.4)
CREAT SERPL-MCNC: 0.8 MG/DL (ref 0.5–1.4)
CREAT SERPL-MCNC: 1 MG/DL (ref 0.5–1.4)
CREAT SERPL-MCNC: 1.1 MG/DL (ref 0.5–1.4)
D DIMER PPP IA.FEU-MCNC: >33 MG/L FEU
DELSYS: ABNORMAL
DELSYS: ABNORMAL
DIFFERENTIAL METHOD: ABNORMAL
EOSINOPHIL # BLD AUTO: 0.1 K/UL (ref 0–0.5)
EOSINOPHIL # BLD AUTO: 0.2 K/UL (ref 0–0.5)
EOSINOPHIL NFR BLD: 1.1 % (ref 0–8)
EOSINOPHIL NFR BLD: 1.1 % (ref 0–8)
EOSINOPHIL NFR BLD: 1.2 % (ref 0–8)
EOSINOPHIL NFR BLD: 1.4 % (ref 0–8)
ERYTHROCYTE [DISTWIDTH] IN BLOOD BY AUTOMATED COUNT: 14.7 % (ref 11.5–14.5)
ERYTHROCYTE [DISTWIDTH] IN BLOOD BY AUTOMATED COUNT: 14.8 % (ref 11.5–14.5)
ERYTHROCYTE [DISTWIDTH] IN BLOOD BY AUTOMATED COUNT: 14.9 % (ref 11.5–14.5)
ERYTHROCYTE [DISTWIDTH] IN BLOOD BY AUTOMATED COUNT: 15 % (ref 11.5–14.5)
ERYTHROCYTE [SEDIMENTATION RATE] IN BLOOD BY WESTERGREN METHOD: 20 MM/H
EST. GFR  (AFRICAN AMERICAN): 59 ML/MIN/1.73 M^2
EST. GFR  (AFRICAN AMERICAN): >60 ML/MIN/1.73 M^2
EST. GFR  (NON AFRICAN AMERICAN): 51 ML/MIN/1.73 M^2
EST. GFR  (NON AFRICAN AMERICAN): 58 ML/MIN/1.73 M^2
EST. GFR  (NON AFRICAN AMERICAN): >60 ML/MIN/1.73 M^2
EST. GFR  (NON AFRICAN AMERICAN): >60 ML/MIN/1.73 M^2
FIBRINOGEN PPP-MCNC: 449 MG/DL (ref 182–366)
FIO2: 40
FIO2: 40
GLUCOSE SERPL-MCNC: 140 MG/DL (ref 70–110)
GLUCOSE SERPL-MCNC: 157 MG/DL (ref 70–110)
GLUCOSE SERPL-MCNC: 305 MG/DL (ref 70–110)
GLUCOSE SERPL-MCNC: 356 MG/DL (ref 70–110)
HAPTOGLOB SERPL-MCNC: 190 MG/DL (ref 30–250)
HCO3 UR-SCNC: 23.6 MMOL/L (ref 24–28)
HCO3 UR-SCNC: 28.8 MMOL/L (ref 24–28)
HCT VFR BLD AUTO: 24.6 % (ref 37–48.5)
HCT VFR BLD AUTO: 25.4 % (ref 37–48.5)
HCT VFR BLD AUTO: 25.8 % (ref 37–48.5)
HCT VFR BLD AUTO: 27 % (ref 37–48.5)
HGB BLD-MCNC: 8.3 G/DL (ref 12–16)
HGB BLD-MCNC: 8.4 G/DL (ref 12–16)
HGB BLD-MCNC: 8.5 G/DL (ref 12–16)
HGB BLD-MCNC: 8.8 G/DL (ref 12–16)
IMM GRANULOCYTES # BLD AUTO: 0.21 K/UL (ref 0–0.04)
IMM GRANULOCYTES # BLD AUTO: 0.24 K/UL (ref 0–0.04)
IMM GRANULOCYTES # BLD AUTO: 0.25 K/UL (ref 0–0.04)
IMM GRANULOCYTES # BLD AUTO: 0.37 K/UL (ref 0–0.04)
IMM GRANULOCYTES NFR BLD AUTO: 1.5 % (ref 0–0.5)
IMM GRANULOCYTES NFR BLD AUTO: 1.7 % (ref 0–0.5)
IMM GRANULOCYTES NFR BLD AUTO: 1.9 % (ref 0–0.5)
IMM GRANULOCYTES NFR BLD AUTO: 2.6 % (ref 0–0.5)
LDH SERPL L TO P-CCNC: 613 U/L (ref 110–260)
LYMPHOCYTES # BLD AUTO: 0.9 K/UL (ref 1–4.8)
LYMPHOCYTES # BLD AUTO: 0.9 K/UL (ref 1–4.8)
LYMPHOCYTES # BLD AUTO: 1 K/UL (ref 1–4.8)
LYMPHOCYTES # BLD AUTO: 1.1 K/UL (ref 1–4.8)
LYMPHOCYTES NFR BLD: 6.5 % (ref 18–48)
LYMPHOCYTES NFR BLD: 6.7 % (ref 18–48)
LYMPHOCYTES NFR BLD: 7.1 % (ref 18–48)
LYMPHOCYTES NFR BLD: 7.8 % (ref 18–48)
MAGNESIUM SERPL-MCNC: 2.8 MG/DL (ref 1.6–2.6)
MCH RBC QN AUTO: 28.2 PG (ref 27–31)
MCH RBC QN AUTO: 28.3 PG (ref 27–31)
MCH RBC QN AUTO: 28.5 PG (ref 27–31)
MCH RBC QN AUTO: 29 PG (ref 27–31)
MCHC RBC AUTO-ENTMCNC: 32.6 G/DL (ref 32–36)
MCHC RBC AUTO-ENTMCNC: 32.7 G/DL (ref 32–36)
MCHC RBC AUTO-ENTMCNC: 32.9 G/DL (ref 32–36)
MCHC RBC AUTO-ENTMCNC: 34.1 G/DL (ref 32–36)
MCV RBC AUTO: 85 FL (ref 82–98)
MCV RBC AUTO: 87 FL (ref 82–98)
MODE: ABNORMAL
MODE: ABNORMAL
MONOCYTES # BLD AUTO: 0.8 K/UL (ref 0.3–1)
MONOCYTES # BLD AUTO: 0.8 K/UL (ref 0.3–1)
MONOCYTES # BLD AUTO: 0.9 K/UL (ref 0.3–1)
MONOCYTES # BLD AUTO: 0.9 K/UL (ref 0.3–1)
MONOCYTES NFR BLD: 5.4 % (ref 4–15)
MONOCYTES NFR BLD: 6.1 % (ref 4–15)
MONOCYTES NFR BLD: 6.1 % (ref 4–15)
MONOCYTES NFR BLD: 6.2 % (ref 4–15)
NEUTROPHILS # BLD AUTO: 10.9 K/UL (ref 1.8–7.7)
NEUTROPHILS # BLD AUTO: 11.8 K/UL (ref 1.8–7.7)
NEUTROPHILS # BLD AUTO: 11.9 K/UL (ref 1.8–7.7)
NEUTROPHILS # BLD AUTO: 12 K/UL (ref 1.8–7.7)
NEUTROPHILS NFR BLD: 82 % (ref 38–73)
NEUTROPHILS NFR BLD: 83.7 % (ref 38–73)
NEUTROPHILS NFR BLD: 84 % (ref 38–73)
NEUTROPHILS NFR BLD: 85.2 % (ref 38–73)
NRBC BLD-RTO: 0 /100 WBC
PATH REV BLD -IMP: NORMAL
PATH REV BLD -IMP: NORMAL
PCO2 BLDA: 29.1 MMHG (ref 35–45)
PCO2 BLDA: 30.6 MMHG (ref 35–45)
PEEP: 5
PEEP: 5
PH SMN: 7.52 [PH] (ref 7.35–7.45)
PH SMN: 7.58 [PH] (ref 7.35–7.45)
PLATELET # BLD AUTO: 44 K/UL (ref 150–350)
PLATELET # BLD AUTO: 46 K/UL (ref 150–350)
PLATELET # BLD AUTO: 47 K/UL (ref 150–350)
PLATELET # BLD AUTO: 60 K/UL (ref 150–350)
PMV BLD AUTO: 11 FL (ref 9.2–12.9)
PMV BLD AUTO: 11.4 FL (ref 9.2–12.9)
PMV BLD AUTO: 12.2 FL (ref 9.2–12.9)
PMV BLD AUTO: 12.8 FL (ref 9.2–12.9)
PO2 BLDA: 120 MMHG (ref 80–100)
PO2 BLDA: 90 MMHG (ref 80–100)
POC BE: 1 MMOL/L
POC BE: 7 MMOL/L
POC SATURATED O2: 98 % (ref 95–100)
POC SATURATED O2: 99 % (ref 95–100)
POCT GLUCOSE: 125 MG/DL (ref 70–110)
POCT GLUCOSE: 142 MG/DL (ref 70–110)
POCT GLUCOSE: 146 MG/DL (ref 70–110)
POCT GLUCOSE: 152 MG/DL (ref 70–110)
POCT GLUCOSE: 184 MG/DL (ref 70–110)
POCT GLUCOSE: 186 MG/DL (ref 70–110)
POCT GLUCOSE: 316 MG/DL (ref 70–110)
POCT GLUCOSE: 342 MG/DL (ref 70–110)
POTASSIUM SERPL-SCNC: 3.9 MMOL/L (ref 3.5–5.1)
POTASSIUM SERPL-SCNC: 4 MMOL/L (ref 3.5–5.1)
POTASSIUM SERPL-SCNC: 4.1 MMOL/L (ref 3.5–5.1)
POTASSIUM SERPL-SCNC: 4.3 MMOL/L (ref 3.5–5.1)
PS: 10
RBC # BLD AUTO: 2.9 M/UL (ref 4–5.4)
RBC # BLD AUTO: 2.93 M/UL (ref 4–5.4)
RBC # BLD AUTO: 2.98 M/UL (ref 4–5.4)
RBC # BLD AUTO: 3.12 M/UL (ref 4–5.4)
SAMPLE: ABNORMAL
SAMPLE: ABNORMAL
SITE: ABNORMAL
SITE: ABNORMAL
SODIUM SERPL-SCNC: 122 MMOL/L (ref 136–145)
SODIUM SERPL-SCNC: 124 MMOL/L (ref 136–145)
SODIUM SERPL-SCNC: 128 MMOL/L (ref 136–145)
SODIUM SERPL-SCNC: 129 MMOL/L (ref 136–145)
SP02: 100
SPONT RATE: 25
VANCOMYCIN SERPL-MCNC: 20.5 UG/ML
VT: 450
WBC # BLD AUTO: 12.96 K/UL (ref 3.9–12.7)
WBC # BLD AUTO: 14.03 K/UL (ref 3.9–12.7)
WBC # BLD AUTO: 14.11 K/UL (ref 3.9–12.7)
WBC # BLD AUTO: 14.46 K/UL (ref 3.9–12.7)

## 2019-11-07 PROCEDURE — 25000003 PHARM REV CODE 250: Performed by: INTERNAL MEDICINE

## 2019-11-07 PROCEDURE — 85379 FIBRIN DEGRADATION QUANT: CPT

## 2019-11-07 PROCEDURE — 85397 CLOTTING FUNCT ACTIVITY: CPT

## 2019-11-07 PROCEDURE — 99292 CRITICAL CARE ADDL 30 MIN: CPT | Mod: ,,, | Performed by: INTERNAL MEDICINE

## 2019-11-07 PROCEDURE — 99233 SBSQ HOSP IP/OBS HIGH 50: CPT | Mod: ,,, | Performed by: INTERNAL MEDICINE

## 2019-11-07 PROCEDURE — 99233 PR SUBSEQUENT HOSPITAL CARE,LEVL III: ICD-10-PCS | Mod: ,,, | Performed by: INTERNAL MEDICINE

## 2019-11-07 PROCEDURE — 63600175 PHARM REV CODE 636 W HCPCS: Performed by: THORACIC SURGERY (CARDIOTHORACIC VASCULAR SURGERY)

## 2019-11-07 PROCEDURE — 83010 ASSAY OF HAPTOGLOBIN QUANT: CPT

## 2019-11-07 PROCEDURE — 85025 COMPLETE CBC W/AUTO DIFF WBC: CPT

## 2019-11-07 PROCEDURE — 99900017 HC EXTUBATION W/PARAMETERS (STAT)

## 2019-11-07 PROCEDURE — 85384 FIBRINOGEN ACTIVITY: CPT

## 2019-11-07 PROCEDURE — 85060 PATHOLOGIST REVIEW: ICD-10-PCS | Mod: ,,, | Performed by: PATHOLOGY

## 2019-11-07 PROCEDURE — 80048 BASIC METABOLIC PNL TOTAL CA: CPT | Mod: 91

## 2019-11-07 PROCEDURE — 94003 VENT MGMT INPAT SUBQ DAY: CPT

## 2019-11-07 PROCEDURE — 83735 ASSAY OF MAGNESIUM: CPT

## 2019-11-07 PROCEDURE — 99291 CRITICAL CARE FIRST HOUR: CPT | Mod: ,,, | Performed by: INTERNAL MEDICINE

## 2019-11-07 PROCEDURE — 25500020 PHARM REV CODE 255: Performed by: THORACIC SURGERY (CARDIOTHORACIC VASCULAR SURGERY)

## 2019-11-07 PROCEDURE — 85060 BLOOD SMEAR INTERPRETATION: CPT | Mod: ,,, | Performed by: PATHOLOGY

## 2019-11-07 PROCEDURE — 99292 PR CRITICAL CARE, ADDL 30 MIN: ICD-10-PCS | Mod: ,,, | Performed by: INTERNAL MEDICINE

## 2019-11-07 PROCEDURE — 83615 LACTATE (LD) (LDH) ENZYME: CPT

## 2019-11-07 PROCEDURE — 83520 IMMUNOASSAY QUANT NOS NONAB: CPT

## 2019-11-07 PROCEDURE — 63600175 PHARM REV CODE 636 W HCPCS: Performed by: INTERNAL MEDICINE

## 2019-11-07 PROCEDURE — 85730 THROMBOPLASTIN TIME PARTIAL: CPT

## 2019-11-07 PROCEDURE — 63600175 PHARM REV CODE 636 W HCPCS: Performed by: NURSE PRACTITIONER

## 2019-11-07 PROCEDURE — 25000242 PHARM REV CODE 250 ALT 637 W/ HCPCS: Performed by: INTERNAL MEDICINE

## 2019-11-07 PROCEDURE — 94640 AIRWAY INHALATION TREATMENT: CPT

## 2019-11-07 PROCEDURE — 37799 UNLISTED PX VASCULAR SURGERY: CPT

## 2019-11-07 PROCEDURE — 27000221 HC OXYGEN, UP TO 24 HOURS

## 2019-11-07 PROCEDURE — 80202 ASSAY OF VANCOMYCIN: CPT

## 2019-11-07 PROCEDURE — 94010 BREATHING CAPACITY TEST: CPT

## 2019-11-07 PROCEDURE — 85730 THROMBOPLASTIN TIME PARTIAL: CPT | Mod: 91

## 2019-11-07 PROCEDURE — 99291 PR CRITICAL CARE, E/M 30-74 MINUTES: ICD-10-PCS | Mod: ,,, | Performed by: INTERNAL MEDICINE

## 2019-11-07 PROCEDURE — 99900035 HC TECH TIME PER 15 MIN (STAT)

## 2019-11-07 PROCEDURE — S0028 INJECTION, FAMOTIDINE, 20 MG: HCPCS | Performed by: INTERNAL MEDICINE

## 2019-11-07 PROCEDURE — 82803 BLOOD GASES ANY COMBINATION: CPT

## 2019-11-07 PROCEDURE — 20000000 HC ICU ROOM

## 2019-11-07 PROCEDURE — 25000003 PHARM REV CODE 250: Performed by: NURSE PRACTITIONER

## 2019-11-07 RX ORDER — HYDRALAZINE HYDROCHLORIDE 20 MG/ML
10 INJECTION INTRAMUSCULAR; INTRAVENOUS EVERY 8 HOURS PRN
Status: DISCONTINUED | OUTPATIENT
Start: 2019-11-07 | End: 2019-11-08

## 2019-11-07 RX ORDER — NICARDIPINE HYDROCHLORIDE 0.2 MG/ML
1 INJECTION INTRAVENOUS CONTINUOUS
Status: DISCONTINUED | OUTPATIENT
Start: 2019-11-07 | End: 2019-11-08

## 2019-11-07 RX ORDER — DOCUSATE SODIUM 50 MG/5ML
100 LIQUID ORAL 2 TIMES DAILY
Status: DISCONTINUED | OUTPATIENT
Start: 2019-11-07 | End: 2019-11-08

## 2019-11-07 RX ORDER — ARGATROBAN 1 MG/ML
1 INJECTION INTRAVENOUS CONTINUOUS
Status: DISCONTINUED | OUTPATIENT
Start: 2019-11-07 | End: 2019-11-08

## 2019-11-07 RX ORDER — DOCUSATE SODIUM 50 MG/5ML
100 LIQUID ORAL 2 TIMES DAILY
Status: DISCONTINUED | OUTPATIENT
Start: 2019-11-07 | End: 2019-11-07

## 2019-11-07 RX ORDER — VANCOMYCIN HCL IN 5 % DEXTROSE 1G/250ML
1000 PLASTIC BAG, INJECTION (ML) INTRAVENOUS ONCE
Status: COMPLETED | OUTPATIENT
Start: 2019-11-07 | End: 2019-11-07

## 2019-11-07 RX ORDER — METOPROLOL TARTRATE 1 MG/ML
2.5 INJECTION, SOLUTION INTRAVENOUS EVERY 6 HOURS
Status: DISCONTINUED | OUTPATIENT
Start: 2019-11-07 | End: 2019-11-07

## 2019-11-07 RX ORDER — FUROSEMIDE 10 MG/ML
20 INJECTION INTRAMUSCULAR; INTRAVENOUS 2 TIMES DAILY
Status: DISCONTINUED | OUTPATIENT
Start: 2019-11-07 | End: 2019-11-12

## 2019-11-07 RX ORDER — DEXMEDETOMIDINE HYDROCHLORIDE 4 UG/ML
0.2 INJECTION INTRAVENOUS CONTINUOUS
Status: DISCONTINUED | OUTPATIENT
Start: 2019-11-07 | End: 2019-11-08

## 2019-11-07 RX ORDER — POTASSIUM CHLORIDE 750 MG/1
10 TABLET, EXTENDED RELEASE ORAL 2 TIMES DAILY
Status: DISCONTINUED | OUTPATIENT
Start: 2019-11-07 | End: 2019-11-12

## 2019-11-07 RX ADMIN — IOHEXOL 75 ML: 350 INJECTION, SOLUTION INTRAVENOUS at 06:11

## 2019-11-07 RX ADMIN — CHLORHEXIDINE GLUCONATE 0.12% ORAL RINSE 15 ML: 1.2 LIQUID ORAL at 09:11

## 2019-11-07 RX ADMIN — Medication 2 G: at 09:11

## 2019-11-07 RX ADMIN — ALBUTEROL SULFATE 2.5 MG: 2.5 SOLUTION RESPIRATORY (INHALATION) at 11:11

## 2019-11-07 RX ADMIN — PIPERACILLIN AND TAZOBACTAM 4.5 G: 4; .5 INJECTION, POWDER, FOR SOLUTION INTRAVENOUS at 11:11

## 2019-11-07 RX ADMIN — FAMOTIDINE 20 MG: 10 INJECTION INTRAVENOUS at 09:11

## 2019-11-07 RX ADMIN — METOPROLOL TARTRATE 2.5 MG: 1 INJECTION, SOLUTION INTRAVENOUS at 12:11

## 2019-11-07 RX ADMIN — DEXMEDETOMIDINE HYDROCHLORIDE 0.2 MCG/KG/HR: 4 INJECTION INTRAVENOUS at 02:11

## 2019-11-07 RX ADMIN — Medication 2 G: at 06:11

## 2019-11-07 RX ADMIN — VANCOMYCIN HYDROCHLORIDE 1000 MG: 1 INJECTION, POWDER, LYOPHILIZED, FOR SOLUTION INTRAVENOUS at 09:11

## 2019-11-07 RX ADMIN — IRON SUCROSE 500 MG: 20 INJECTION, SOLUTION INTRAVENOUS at 09:11

## 2019-11-07 RX ADMIN — DOCUSATE SODIUM 100 MG: 100 CAPSULE, LIQUID FILLED ORAL at 09:11

## 2019-11-07 RX ADMIN — DEXMEDETOMIDINE HYDROCHLORIDE 1.2 MCG/KG/HR: 4 INJECTION INTRAVENOUS at 03:11

## 2019-11-07 RX ADMIN — ARGATROBAN 1 MCG/KG/MIN: 125 SOLUTION INTRAVENOUS at 03:11

## 2019-11-07 RX ADMIN — POTASSIUM CHLORIDE 10 MEQ: 750 TABLET, EXTENDED RELEASE ORAL at 09:11

## 2019-11-07 RX ADMIN — MILRINONE LACTATE IN DEXTROSE 0.38 MCG/KG/MIN: 200 INJECTION, SOLUTION INTRAVENOUS at 06:11

## 2019-11-07 RX ADMIN — FUROSEMIDE 20 MG: 10 INJECTION, SOLUTION INTRAMUSCULAR; INTRAVENOUS at 06:11

## 2019-11-07 RX ADMIN — INSULIN ASPART 8 UNITS: 100 INJECTION, SOLUTION INTRAVENOUS; SUBCUTANEOUS at 04:11

## 2019-11-07 RX ADMIN — Medication 2 G: at 03:11

## 2019-11-07 RX ADMIN — NICARDIPINE HYDROCHLORIDE 1 MG/HR: 0.2 INJECTION, SOLUTION INTRAVENOUS at 02:11

## 2019-11-07 RX ADMIN — HYDRALAZINE HYDROCHLORIDE 10 MG: 20 INJECTION INTRAMUSCULAR; INTRAVENOUS at 12:11

## 2019-11-07 RX ADMIN — POLYETHYLENE GLYCOL (3350) 17 G: 17 POWDER, FOR SOLUTION ORAL at 09:11

## 2019-11-07 RX ADMIN — INSULIN ASPART 2 UNITS: 100 INJECTION, SOLUTION INTRAVENOUS; SUBCUTANEOUS at 12:11

## 2019-11-07 RX ADMIN — PIPERACILLIN AND TAZOBACTAM 4.5 G: 4; .5 INJECTION, POWDER, FOR SOLUTION INTRAVENOUS at 06:11

## 2019-11-07 RX ADMIN — FUROSEMIDE 20 MG: 10 INJECTION, SOLUTION INTRAMUSCULAR; INTRAVENOUS at 09:11

## 2019-11-07 RX ADMIN — ATORVASTATIN CALCIUM 80 MG: 40 TABLET, FILM COATED ORAL at 09:11

## 2019-11-07 RX ADMIN — PIPERACILLIN AND TAZOBACTAM 4.5 G: 4; .5 INJECTION, POWDER, FOR SOLUTION INTRAVENOUS at 02:11

## 2019-11-07 NOTE — ASSESSMENT & PLAN NOTE
Post CABG  EF 30%  S/p swan catheter  CI 2.2  Repeat echo today showed EF 30%, septal hypokinesis , compared to prior echo before the cabg, no significant change  ekg reviewed    Continue EPI and milrinone gtt   F/u urine output  Continue Lipitor  Continue ICU supportive care

## 2019-11-07 NOTE — PROGRESS NOTES
[facility]  Cardiology  Progress Note    Patient Name: Mateusz Bates  MRN: [id number]  Admission Date: 10/28/2019  Hospital Length of Stay: 9 days  Code Status: Full Code   Attending Physician: Keny Zaidi MD   Primary Care Physician: Serenity Kilpatrick MD  Expected Discharge Date: 11/8/2019  Principal Problem:Acute hypoxemic respiratory failure    Subjective:     Hospital Course:   Ms. Bates is a 69 year old female patient whose current medical conditions include HTN, hyperlipidemia, obesity, DM type II, cerebral microvascular disease, FAHAD, and B12 deficiency who was sent to [facility] ED today from her PCP's office due to chest pain and abnormal EKG. Patient complained of substernal chest tightness/heaviness that radiated to her back on Saturday while at rest. Associated symptoms included orthopnea and SOB. Patient denied any associated nausea, vomiting, diaphoresis, palpitations, near syncope, or syncope. Initial workup in ED revealed troponin of 3.665 and BNP of 1305 and patient subsequently admitted for further evaluation and treatment of NSTEMI. Cardiology consulted to assist with management. Patient seen and examined today while in ED. Feels well at time of exam, denies chest pain. She does report similar symptoms over the past few months but states they never lasted as long and were less intense. She reports compliance with her medications. States her father had history of CABG. Chart reviewed. Repeat troponin pending. EKG reviewed and shows SR with new LBBB. 2D echo pending.    10/29/19-Patient seen and examined today, s/p LHC yesterday that showed multivessel CAD. CVT consulted, CABG planned for Thursday. Feels ok. No chest pain or SOB. Labs stable. Being diuresed. 2D echo showed EF of 30-35%, DD.    10/30/19-Patient seen and examined today. Feels well. No complaints. Denies chest pain or SOB. Labs stable. IABP placement scheduled for today.    10/31/19- Patient is s/p CABG x3 with LIMA to  LAD reverse saphenous vein graft to the RCA and reverse saphenous vein graft to the ramus. Has been transferred to ICU. Is intubated and sedated. CT x3. IABP at 1:1 still in place. Transfused 3 units of PRBC in the OR and has received additional 3 units in ICU, has received 500cc albumin, epi and milrinone gtt.  H/H 6.9/21.0. Renal function stable. Paced rhythm on monitor     11/1/2019--patient seen and examined in ICU. Extubated o/n, remains on IABP 1:1 today, plans to possibly wean off later today per Dr. Zaidi. Remains on Epi and Milrinone gtt today. Paced on monitor. Labs stable today.     11/2/19-Patient seen and examined today, lying in bed. Feels ok. Complains of post-op pain and fatigue. Labs reviewed. Platelets 47,000. Heme/onc on board.     11/3/19-Patient seen and examined today, sitting up in chair. Feeling better. Still weak. Labs reviewed. Platelets 89,000.    11/4/19- Patient is POD 4 s/p CABG x3. OOB in chair. Feels better this morning. Pain controlled. Platelets improving since holding Plavix.Vitals stable     11/5/19- Patient is POD 5 s/p CABGx3. Has been transferred to Tele. Is clammy and generally weak this morning. Glucose and vitals stable. Noted to have bump in WBC. Primary team has been notified by nurse. Platelets 73, but H/H stable     11/6 in ICU, intubated, on epi gtt at 0.15 mcg CI 2.0, PAP 20. Sinus rhythm, H&H and Cr stable. Urine output ok. Improved lactate 1.6. const stent cardiogenic shock      ROS  Objective:     Vital Signs (Most Recent):  Temp: 99.2 °F (37.3 °C) (11/06/19 1500)  Pulse: 100 (11/06/19 1715)  Resp: (!) 24 (11/06/19 1715)  BP: (!) 117/44 (11/06/19 1700)  SpO2: 100 % (11/06/19 1715) Vital Signs (24h Range):  Temp:  [98.9 °F (37.2 °C)-99.9 °F (37.7 °C)] 99.2 °F (37.3 °C)  Pulse:  [] 100  Resp:  [0-26] 24  SpO2:  [89 %-100 %] 100 %  BP: ()/(26-56) 117/44     Weight: 75.2 kg (165 lb 12.6 oz)  Body mass index is 31.32 kg/m².     SpO2: 100 %  O2 Device  (Oxygen Therapy): ventilator      Intake/Output Summary (Last 24 hours) at 11/6/2019 1739  Last data filed at 11/6/2019 1700  Gross per 24 hour   Intake 4689.08 ml   Output 1085 ml   Net 3604.08 ml       Lines/Drains/Airways     Central Venous Catheter Line                 Pulmonary Artery Catheter Assessment  11/05/19 1721 right internal jugular 1 day          Drain                 NG/OG Tube 11/05/19 1720 orogastric Center mouth 1 day         Urethral Catheter 11/05/19 1718 Straight-tip 1 day          Airway                 Airway - Non-Surgical 11/05/19 1452 Endotracheal Tube 1 day          Arterial Line                 Arterial Line 11/06/19 0945 Left Radial less than 1 day          Peripheral Intravenous Line                 Peripheral IV - Single Lumen 11/05/19 2300 18 G Left Other less than 1 day                Physical Exam   Constitutional: She is oriented to person, place, and time. She appears well-developed and well-nourished. No distress.   HENT:   Head: Normocephalic and atraumatic.   Eyes: Pupils are equal, round, and reactive to light. Right eye exhibits no discharge. Left eye exhibits no discharge.   Neck: Neck supple. No JVD present.   Cardiovascular: Normal rate, regular rhythm, S1 normal, S2 normal and normal heart sounds.   No murmur heard.  Pulmonary/Chest: Effort normal. No respiratory distress. She has no wheezes.   CABG site C/D/I; no bleeding erythema or drainage    Diminished BS at bases   Abdominal: Soft. She exhibits no distension.   Musculoskeletal: She exhibits no edema.   Neurological: She is alert and oriented to person, place, and time.   Skin: Skin is warm and dry. She is not diaphoretic. No erythema.   SVG sites C/D/I; no bleeding erythema or drainage   Psychiatric: She has a normal mood and affect. Her behavior is normal. Thought content normal.   Nursing note and vitals reviewed.      Significant Labs:   ABG:   Recent Labs   Lab 11/05/19 2006 11/06/19  0015 11/06/19  0352   PH  7.480* 7.508* 7.499*   PCO2 34.8* 33.7* 33.0*   HCO3 25.9 26.8 25.7   POCSATURATED 100 99 99   BE 2 4 2   , Blood Culture:   Recent Labs   Lab 11/05/19  1325 11/05/19  1335   LABBLOO No Growth to date No Growth to date   , BMP:   Recent Labs   Lab 11/05/19  0502  11/06/19  0403 11/06/19  1246 11/06/19  1610      < > 358*  358* 101 99  99      < > 128*  128* 129* 127*  127*   K 4.8   < > 3.6  3.6 3.5 3.6  3.6   CL 99   < > 92*  92* 93* 93*  93*   CO2 23   < > 24  24 25 24  24   BUN 26*   < > 29*  29* 24* 25*  25*   CREATININE 0.7   < > 1.1  1.1 0.9 0.9  0.9   CALCIUM 9.5   < > 7.5*  7.5* 7.3* 7.3*  7.3*   MG 2.1  --  1.7  --   --     < > = values in this interval not displayed.   , CMP   Recent Labs   Lab 11/05/19  1541  11/05/19  1919  11/06/19  0403 11/06/19  1246 11/06/19  1610   *   < > 128*   < > 128*  128* 129* 127*  127*   K 4.1   < > 4.0   < > 3.6  3.6 3.5 3.6  3.6      < > 94*   < > 92*  92* 93* 93*  93*   CO2 9*   < > 22*   < > 24  24 25 24  24   *   < > 495*   < > 358*  358* 101 99  99   BUN 15   < > 35*   < > 29*  29* 24* 25*  25*   CREATININE 0.6   < > 1.4   < > 1.1  1.1 0.9 0.9  0.9   CALCIUM 6.5*   < > 7.6*   < > 7.5*  7.5* 7.3* 7.3*  7.3*   PROT 2.1*  --  4.8*  --   --   --   --    ALBUMIN 0.9*  --  2.7*  --   --   --   --    BILITOT 0.4  --  1.4*  --   --   --   --    ALKPHOS 34*  --  56  --   --   --   --    AST 10  --  266*  --   --   --   --    ALT <5*  --  151*  --   --   --   --    ANIONGAP 19*   < > 12   < > 12  12 11 10  10   ESTGFRAFRICA >60   < > 44*   < > 59*  59* >60 >60  >60   EGFRNONAA >60   < > 38*   < > 51*  51* >60 >60  >60    < > = values in this interval not displayed.   , CBC   Recent Labs   Lab 11/05/19  1541 11/05/19  1757 11/06/19  0403 11/06/19  1610   WBC 9.18  9.18  --  15.53*  15.53* 18.17*   HGB 6.7*  6.7* 9.1* 10.4*  10.4* 9.5*   HCT 22.0*  22.0* 28.8* 31.9*  31.9* 28.6*   PLT 59*  59*  --  89*   89* 67*   , INR No results for input(s): INR, PROTIME in the last 48 hours., Lipid Panel No results for input(s): CHOL, HDL, LDLCALC, TRIG, CHOLHDL in the last 48 hours. and Troponin No results for input(s): TROPONINI in the last 48 hours.    Significant Imaging: EKG:      Assessment and Plan:       Cardiogenic shock  Post CABG  EF 30%  S/p swan catheter  CI 2.2  Repeat echo today showed EF 30%, septal hypokinesis , compared to prior echo before the cabg, no significant change  ekg reviewed    Continue EPI and milrinone gtt   F/u urine output  Continue Lipitor  Continue ICU supportive care        Thrombocytopenia  -Mgmt as per heme/onc  -Plavix was on hold, but resumed by primary team   -ASA has been resumed     11/5/19  -consider holding Plavix  -Hematology has been consulted     Acute blood loss anemia  -Patient transfused 3 units in the OR and additional 3 units in ICU    11/2/19  -Mgmt as per CVT    S/P CABG x 3  -Patient is s/p CABG x3 with LIMA to LAD reverse saphenous vein graft to the RCA and reverse saphenous vein graft to the ramus.  -Continue post CABG supportive therapy  -Continue ASA, Plavix, Statin, BB  -Will continue to follow along     11/1/19  -POD #1 s/p CABG x3  -Continue ASA, Statin, BB  -Remains on Epi and Milrinone gtt today  -Mgmt per CT surgery  -Will follow along    11/2/19  -Recovering s/p CABG x 3  -Continue statin, BB  -Platelets 47,000 this AM; heme/onc on board, ASA held  -Await further rec's    11/3/19  -Stable, progressing post CABG x 3  -Continue statin, BB  -ASA re-started  -Platelets 89,000, heme/onc on board    11/4/19  -Patient slowly improving post CABG x3  -Continue ASA, Statin and BB  -plavix on hold due to thrombocytopenia that is improving   -IS use encouraged and early ambulation with PT  -Will continue to follow along     11/5/19  -Will continue ASA, Statin and BB  -Consider holding Plavix again for drop in plts after resuming by primary team   -Recommend CXR to rule out  post op PNA given bump in WBC and cough  -will need to monitor for temp  -ambulate as tolerated and encourage IS use     11/06  Hold ASA, Plavix due to anemia  Hold BB due to cardiogenic shock    CAD, multiple vessel  -See plan under CABG    Acute combined systolic and diastolic CHF, NYHA class 2  -Presents with ICM/acute combined CHF  -EF 30-35% by 2D echo        Chest pain  -See plan under NSTEMI    Abnormal EKG  -LBBB noted on EKG  -See plan under NSTEMI    NSTEMI (non-ST elevated myocardial infarction)  -Patient presented with intermittent chest tightness/heaviness that radiated to her back since Saturday evening  -Associated with JONES/orthopnea  -Initial troponin 3.665  -EKG showed SR with new LBBB  -Presentation consistent with NSTEMI  -Continue ASA, statin  -Start Toprol XL  -Start heparin gtt  -Check 2D echo  -Continue to trend troponin  -Keep NPO. [facility] today for further evaluation and PCI if indicated. All risks, benefits, and treatment alternatives explained to patient in detail. All questions answered. She has agreed to proceed. Further rec's to follow.    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 All risks, benefits, and treatment alternatives explained to patient in detail. All questions answered. She has agreed to proceed.    10/31/19  -Patient is s/p CABG x3.   See plan for CABG     Hyperlipidemia associated with type 2 diabetes mellitus  -Continue statin    Obesity (BMI 30.0-34.9)  -Weight loss    Diabetes mellitus type II, uncontrolled  -Mgmt as per hospital medicine    Hypertension associated with diabetes  -Continue home meds as BP permits        VTE  Risk Mitigation (From admission, onward)         Ordered     Reason for no Mechanical VTE Prophylaxis  Once      10/28/19 1414     heparin 25,000 units in dextrose 5% 250 mL (100 units/mL) infusion LOW INTENSITY nomogram - [facility]  Continuous      10/28/19 1257                Charles Matias MD  Cardiology  [facility]

## 2019-11-07 NOTE — ASSESSMENT & PLAN NOTE
-Hemoglobin A1C was 9 on 9/19/19.   -NISS.   -ADA diet.   11/2  Levemir / NISS / Accuchecks  11/3  Levemir increased to 15 sq bid  NISS  Accuchecks  11/4  Levemir  NISS  Accuchecks  11/5  Levemir 10 units BID  NISS  Accuchecks    11/6  UnControlled  Accuchecks  Insulin Gtt  Improved control  Last BS 99    11/7  Uncontrolled  Accuchecks  NISS

## 2019-11-07 NOTE — PROGRESS NOTES
[facility]   Hematology/Oncology  Progress Note    Patient Name: Mateusz Bates  Admission Date: 10/28/2019  Hospital Length of Stay: 10 days  Code Status: Full Code     Subjective:     HPI:  Mrs. Bates is a 69-year-old female with hypertension, diabetes, hyperlipidemia, obesity, anxiety and depression, iron deficiency anemia, vitamin B12 deficiency, who presented to the emergency room with an NSTEMI.  Workup included a cardiac catheterization that shows severe multivessel coronary artery disease with proximal LAD stenosis of 70% and severely depressed ejection fraction of 20%.  The patient is a candidate for urgent coronary artery bypass grafting. S/p CABG today.  Hematology is consulted for thrombocytopenia.    Interval History: Sedation being weaned for preparation of extubation.  Tmax of 100.8 over night.  Restless.     Oncology Treatment Plan:   [No treatment plan]    Medications:  Continuous Infusions:   dexmedetomidine (PRECEDEX) infusion 0.06 mcg/kg/hr (11/07/19 0630)    epinephrine 0.06 mcg/kg/min (11/07/19 0630)    milrinone 20mg/100ml D5W (200mcg/ml) 0.25 mcg/kg/min (11/07/19 0630)    norepinephrine bitartrate-D5W Stopped (11/06/19 0030)     Scheduled Meds:   atorvastatin  80 mg Oral QHS    chlorhexidine  15 mL Mouth/Throat BID    docusate  100 mg Oral BID    famotidine (PF)  20 mg Intravenous BID    furosemide  20 mg Intravenous BID    iron sucrose (VENOFER) IVPB  500 mg Intravenous Once    lactated ringers  1,000 mL Intravenous Once    nozaseptin   Each Nostril BID    piperacillin-tazobactam (ZOSYN) IVPB  4.5 g Intravenous Q8H    polyethylene glycol  17 g Oral Daily    potassium chloride  10 mEq Oral BID    sodium chloride  2 g Oral Q6H    [START ON 11/10/2019] vancomycin (VANCOCIN) IVPB  750 mg Intravenous Q72H     PRN Meds:albumin human 5%, albuterol sulfate, Dextrose 10% Bolus, Dextrose 10% Bolus, glucagon (human recombinant), insulin aspart U-100, iohexol, lactated  ringers, magnesium sulfate IVPB, magnesium sulfate IVPB, metoclopramide HCl, ondansetron, potassium chloride in water **AND** potassium chloride in water **AND** potassium chloride in water, traMADol     Review of Systems   Unable to perform ROS: Acuity of condition     Objective:     Vital Signs (Most Recent):  Temp: 100.2 °F (37.9 °C) (11/07/19 0500)  Pulse: 81 (11/07/19 0747)  Resp: (!) 29 (11/07/19 0747)  BP: (!) 129/42 (11/07/19 0600)  SpO2: 100 % (11/07/19 0747) Vital Signs (24h Range):  Temp:  [99.2 °F (37.3 °C)-100.8 °F (38.2 °C)] 100.2 °F (37.9 °C)  Pulse:  [] 81  Resp:  [19-29] 29  SpO2:  [99 %-100 %] 100 %  BP: ()/(28-90) 129/42     Weight: 77.8 kg (171 lb 8.3 oz)  Body mass index is 32.41 kg/m².  Body surface area is 1.83 meters squared.      Intake/Output Summary (Last 24 hours) at 11/7/2019 1050  Last data filed at 11/7/2019 0700  Gross per 24 hour   Intake 2440.27 ml   Output 845 ml   Net 1595.27 ml       Physical Exam   Constitutional: She appears well-developed and well-nourished. She appears toxic. She is intubated and restrained.   HENT:   Head: Normocephalic and atraumatic.   Cardiovascular: Normal rate and regular rhythm.   Pulmonary/Chest: Breath sounds normal. She is intubated.   Abdominal: Soft. Normal appearance and bowel sounds are normal. She exhibits no distension.   Musculoskeletal: She exhibits edema (generalized).   Skin: Skin is warm and dry. Ecchymosis noted. She is not diaphoretic. There is pallor.   Nursing note and vitals reviewed.      Significant Labs:   All pertinent labs from the last 24 hours have been reviewed.    Diagnostic Results:  I have reviewed all pertinent imaging results/findings within the past 24 hours.    Assessment/Plan:     Thrombocytopenia  Platelets normal at 300K on 10/30, then declined 10/31 78K, 11/1 76K, 11/2 47K.  Possible cause of acute thrombocytopenia: drug induced immune thrombocytopenia (amiodarone given 10/30, lasix started 10/28 and  currently receiving), HIT (4T score low prob), TTP (plasmic score intermediate prob), ITP, DIC.  Have sent: HIT antibodies, NPOBAB92, fibrinogen, ddimer, C3/C4 complements, pathology review for schistocytes, reticulocytes, haptoglobin, LDH.  If platelets continue to decline, consider stop Lasix, and use bumex.    11/3/2019 platelets improving 89K today.  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    11/4/19 Platelets improved to 140 K.  Most likely due to previous medication.  Continuing to trend upward daily.  Hit panel pending.  --CBC daily    11/5/19 Platelets reduced to 73 K today from 140 K yesterday.  Review of peripheral smear show no schistocytes - so not likely DIC or TTP.  Doesn't appear to be on any medications currently that are known to cause thrombocytopenia; however previously on Amiodarone - this has since been stopped.  HIT is negative.  Possible infection.  Temp of 100.6 yesterday; none over the past 24 hours.  Worsening leukocytosis.  States currently with diarrhea without abdominal pain.  Denies dysuria.   Also mid sternal incision is erythremic and well approximated.  Wound vac DC'd today.    --CBC daily  --Consider work up for infection    11/6/19 Platelets today 89 K - stable compared to yesterday. Sternal incision with less erythremia today as compared to yesterday.  Continue treatment for suspected infection.  So far blood cultures NGTD.  Chest xray negative for infectious process.  UA negative for infection.  Was with diarrhea yesterday.    --CBC daily  --Continue IV abx per primary team  --Continue Plavix as long as platelets >50 K; if less than 50 K then hold Plavix.   --Transfuse for platelets <10 K      11/7/19 Platelets today 60 K.  No active s/s of bleeding.  With ecchymosis to right leg - graph sites however does not appear to be worsened.  To order testing to rule out DIC, TTP.  --LDH, haptoglobin; fibrinogen/d-dimer; ADAMSNENA 13, path review for  schistocytes   --CBC daily  --Continue IV abx per primary team  --Continue Plavix as long as platelets >50 K and with recommendations per cardiology; if less than 50 K then hold Plavix.   --Transfuse for platelets <10 K      Acute blood loss anemia  11/7/19 hemoglobin today.  Iron studies shows patient is iron deficient  --Venofer 500 mg IV x 1 today  --CBC daily        Thank you for your consult. I will follow-up with patient. Please contact us if you have any additional questions.     Anitha Skinner NP  Hematology/Oncology  [facility]

## 2019-11-07 NOTE — SUBJECTIVE & OBJECTIVE
Interval History:  11/07/2019  The patient is post-operative day 7, status-post coronary artery bypass grafting x 3.    ROS  Medications:  Continuous Infusions:   dexmedetomidine (PRECEDEX) infusion 0.06 mcg/kg/hr (11/07/19 0630)    epinephrine 0.06 mcg/kg/min (11/07/19 0630)    milrinone 20mg/100ml D5W (200mcg/ml) 0.25 mcg/kg/min (11/07/19 0630)    norepinephrine bitartrate-D5W Stopped (11/06/19 0030)     Scheduled Meds:   atorvastatin  80 mg Oral QHS    chlorhexidine  15 mL Mouth/Throat BID    docusate sodium  100 mg Oral BID    famotidine (PF)  20 mg Intravenous BID    furosemide  20 mg Intravenous BID    lactated ringers  1,000 mL Intravenous Once    nozaseptin   Each Nostril BID    piperacillin-tazobactam (ZOSYN) IVPB  4.5 g Intravenous Q8H    polyethylene glycol  17 g Oral Daily    potassium chloride  10 mEq Oral BID    sodium chloride  2 g Oral Q6H    [START ON 11/10/2019] vancomycin (VANCOCIN) IVPB  750 mg Intravenous Q72H     PRN Meds:albumin human 5%, albuterol sulfate, Dextrose 10% Bolus, Dextrose 10% Bolus, glucagon (human recombinant), insulin aspart U-100, iohexol, lactated ringers, magnesium sulfate IVPB, magnesium sulfate IVPB, metoclopramide HCl, ondansetron, potassium chloride in water **AND** potassium chloride in water **AND** potassium chloride in water, traMADol     Objective:     Vital Signs (Most Recent):  Temp: 100.2 °F (37.9 °C) (11/07/19 0500)  Pulse: 88 (11/07/19 0600)  Resp: (!) 26 (11/07/19 0600)  BP: (!) 129/42 (11/07/19 0600)  SpO2: 100 % (11/07/19 0600) Vital Signs (24h Range):  Temp:  [99.2 °F (37.3 °C)-100.8 °F (38.2 °C)] 100.2 °F (37.9 °C)  Pulse:  [] 88  Resp:  [19-29] 26  SpO2:  [94 %-100 %] 100 %  BP: ()/(28-90) 129/42     Weight: 77.8 kg (171 lb 8.3 oz)  Body mass index is 32.41 kg/m².    SpO2: 100 %  O2 Device (Oxygen Therapy): ventilator    Intake/Output - Last 3 Shifts       11/05 0700 - 11/06 0659 11/06 0700 - 11/07 0659 11/07 0700 - 11/08 0659     P.O. 480      I.V. (mL/kg) 3065.4 (40.8) 1192.3 (15.3)     Blood 700      NG/GT  770     IV Piggyback 800 650     Total Intake(mL/kg) 5045.4 (67.1) 2612.3 (33.6)     Urine (mL/kg/hr) 825 (0.5) 955 (0.5)     Stool 0      Total Output 825 955     Net +4220.4 +1657.3            Urine Occurrence 1 x      Stool Occurrence 1 x            Lines/Drains/Airways     Central Venous Catheter Line                 Pulmonary Artery Catheter Assessment  11/05/19 1721 right internal jugular 1 day          Drain                 NG/OG Tube 11/05/19 1720 orogastric Center mouth 1 day         Urethral Catheter 11/05/19 1718 Straight-tip 1 day          Airway                 Airway - Non-Surgical 11/05/19 1452 Endotracheal Tube 1 day          Arterial Line                 Arterial Line 11/06/19 0945 Left Radial less than 1 day          Peripheral Intravenous Line                 Peripheral IV - Single Lumen 11/05/19 2300 18 G Left Other 1 day                Physical Exam   Constitutional: She is oriented to person, place, and time. She appears well-nourished. No distress.   Intubated and sedated.    HENT:   Head: Normocephalic and atraumatic.   Eyes: Pupils are equal, round, and reactive to light. EOM are normal.   Neck: Normal range of motion. Neck supple. No JVD present.   Cardiovascular: Normal rate, regular rhythm, normal heart sounds and intact distal pulses. Exam reveals no gallop and no friction rub.   No murmur heard.  The right upper extremity has 2+ distal to the elbow. The right hand and fingertips are mottled, and dusky, right radial pulse found via doppler.    Pulmonary/Chest: No stridor. No respiratory distress. She has no wheezes. She has no rales. She exhibits no tenderness.   Sedated, ventilator dependent. Coarse breath sounds.    Abdominal: Soft. Bowel sounds are normal. She exhibits no distension and no mass. There is no tenderness. There is no rebound and no guarding. No hernia.   Genitourinary:   Genitourinary  Comments: Jones in place.    Musculoskeletal: Normal range of motion. She exhibits edema. She exhibits no tenderness or deformity.   Sedated. Moves all 4. No focal deficits noted.    Neurological: She is alert and oriented to person, place, and time. She displays normal reflexes. No cranial nerve deficit or sensory deficit. She exhibits normal muscle tone. Coordination normal.   Sedated and Intubated. However, moves all 4, full ROM. No focal deficits noted.     Skin: Skin is warm and dry. Capillary refill takes less than 2 seconds. No rash noted. She is not diaphoretic. No erythema. No pallor.   Psychiatric: She has a normal mood and affect. Her behavior is normal. Judgment and thought content normal.   Nursing note and vitals reviewed.      Significant Labs:  BMP:   Recent Labs   Lab 11/07/19  0342   *   *   K 4.1   CL 90*   CO2 22*   BUN 28*   CREATININE 1.1   CALCIUM 7.6*   MG 2.8*     CBC:   Recent Labs   Lab 11/07/19  0342   WBC 14.46*   RBC 2.90*   HGB 8.4*   HCT 24.6*   PLT 60*   MCV 85   MCH 29.0   MCHC 34.1       Significant Diagnostics:  I have reviewed all pertinent imaging results/findings within the past 24 hours.

## 2019-11-07 NOTE — ASSESSMENT & PLAN NOTE
11/7/19 hemoglobin today.  Iron studies shows patient is iron deficient  --Venofer 500 mg IV x 1 today  --CBC daily

## 2019-11-07 NOTE — ASSESSMENT & PLAN NOTE
-Patient is s/p CABG x3 with LIMA to LAD reverse saphenous vein graft to the RCA and reverse saphenous vein graft to the ramus.  -Continue post CABG supportive therapy  -Continue ASA, Plavix, Statin, BB  -Will continue to follow along     11/1/19  -POD #1 s/p CABG x3  -Continue ASA, Statin, BB  -Remains on Epi and Milrinone gtt today  -Mgmt per CT surgery  -Will follow along    11/2/19  -Recovering s/p CABG x 3  -Continue statin, BB  -Platelets 47,000 this AM; heme/onc on board, ASA held  -Await further rec's    11/3/19  -Stable, progressing post CABG x 3  -Continue statin, BB  -ASA re-started  -Platelets 89,000, heme/onc on board    11/4/19  -Patient slowly improving post CABG x3  -Continue ASA, Statin and BB  -plavix on hold due to thrombocytopenia that is improving   -IS use encouraged and early ambulation with PT  -Will continue to follow along     11/5/19  -Will continue ASA, Statin and BB  -Consider holding Plavix again for drop in plts after resuming by primary team   -Recommend CXR to rule out post op PNA given bump in WBC and cough  -will need to monitor for temp  -ambulate as tolerated and encourage IS use     11/06  Hold ASA, Plavix due to anemia  Hold BB due to cardiogenic shock

## 2019-11-07 NOTE — PROGRESS NOTES
Prelim US vas  DVT in right  Arm and Right IJ  Will start ARGATOBAN  DW DR Zaidi and Dr FRANKLIN

## 2019-11-07 NOTE — ASSESSMENT & PLAN NOTE
Vent Mode: Spont  Oxygen Concentration (%):  [40-50] 40  Resp Rate Total:  [20 br/min-43 br/min] 27 br/min  Vt Set:  [400 mL-450 mL] 400 mL  PEEP/CPAP:  [5 cmH20] 5 cmH20  Pressure Support:  [0 cmH20-10 cmH20] 10 cmH20  Mean Airway Pressure:  [9 vkI52-84 cmH20] 9.3 cmH20     Chlorohexidine  Optimize PEEP  CXR in AM daily  For SAT, SBT, extubation  Hold tube feeds

## 2019-11-07 NOTE — PROGRESS NOTES
ABG ordered not to be completed at this time verified with Monica Caldwell NP; pt currently on SBT.

## 2019-11-07 NOTE — PROGRESS NOTES
[facility] Hospital Medicine  Progress Note    Patient Name: Mateusz Bates  MRN: [id number]  Patient Class: IP- Inpatient   Admission Date: 10/28/2019  Length of Stay: 10 days  Attending Physician: Keny Zaidi MD  Primary Care Provider: Serenity Kilpatrick MD        Subjective:     Principal Problem:Acute hypoxemic respiratory failure        HPI:  Mateusz Bates is a 69 year old female with DM II, hypertension and hyperlipidemia who presented to the ED at the request of her PCP due to finding of an abnormal EKG and complaints of chest tightness with SOB. The patient reports having intermittent symptoms for the last 1-2 months. On 10/26/19, she began having a tightness in her mid chest that radiated into her back with associated SOB. Symptoms were worse with laying flat and improved with sitting up. They lasted only minutes, but this was longer than previous episodes. She denies a trend in when these episodes occur as well as associated nausea, vomiting and diaphoresis. The patient's EKG was significant for a new LBBB compared to previous in 2016. Labs were significant for a BNP of 1,305, troponin of 3.665 and potassium of 3.4. Code status was discussed with the patient and her sister. She is a full code. Her sister, Christina Hernandez, is her surrogate medical decision maker.     Overview/Hospital Course:  Admitted for evaluation and treatment of chest pain concerning for acute coronary syndrome.  Evaluation by Cardiology and urgent left heart catheterization.  Angiogram discovered severe multi-vessel coronary artery disease.  Cardiac echo showed severely reduced LV function.  Referral to Cardiothoracic Surgery who recommended CABG.  Balloon pump placed 30 October.  CABG x3 by Dr. Zaidi 31 October.  Successfully extubated morning of 01 November.  11/2 - Patient improved s/p extubation. CABG x 3. Discussed with CC Team. BS mild elevation. SA and LA insulin initiated.  11/3 - Improved BS control.  Patient + chest tenderness. Tolerating diet. Discussed with cc team  11/4 - Patient with improved strength interactivity. Patient denies n/v/d + Sx site tenderness. Patient reports she is weakened.  11/5 - Patient denies CP. Reports she is weak. Denies SOB. Patient lethargic and clammy sitting in chair at bedside. Discussed with care team. Patient to return to bed with warming blankets, Blood Sugar 88 last check.   11/6 - Patient remains intubated R Hand cyanotic. Patient discussed with CC Team / CV sx.  11/7 - Patient in weaning trial. Extubation attempt this AM. R Hand improved blood flow - warmer to touch. Discussed with CC Team    Interval History: Improving. Weaning in progress    Review of Systems   Unable to perform ROS: Intubated     Objective:     Vital Signs (Most Recent):  Temp: 100.2 °F (37.9 °C) (11/07/19 0500)  Pulse: 81 (11/07/19 0747)  Resp: (!) 29 (11/07/19 0747)  BP: (!) 129/42 (11/07/19 0600)  SpO2: 100 % (11/07/19 0747) Vital Signs (24h Range):  Temp:  [99.2 °F (37.3 °C)-100.8 °F (38.2 °C)] 100.2 °F (37.9 °C)  Pulse:  [] 81  Resp:  [19-29] 29  SpO2:  [94 %-100 %] 100 %  BP: ()/(28-90) 129/42     Weight: 77.8 kg (171 lb 8.3 oz)  Body mass index is 32.41 kg/m².    Intake/Output Summary (Last 24 hours) at 11/7/2019 0807  Last data filed at 11/7/2019 0630  Gross per 24 hour   Intake 2612.27 ml   Output 865 ml   Net 1747.27 ml      Physical Exam   Constitutional: She is oriented to person, place, and time. She appears well-nourished. No distress.   intubated   HENT:   Head: Normocephalic and atraumatic.   Eyes: Pupils are equal, round, and reactive to light. EOM are normal.   Neck: Normal range of motion. Neck supple. No JVD present.   Cardiovascular: Normal rate, regular rhythm, normal heart sounds and intact distal pulses. Exam reveals no gallop and no friction rub.   No murmur heard.  The right upper extremity has 2+ distal to the elbow. The right hand and fingertips are mottled, and  dusky, right radial pulse found via doppler.    Pulmonary/Chest: No stridor. No respiratory distress. She has no wheezes. She has rales. She exhibits no tenderness.   Coarse breath sounds.    Abdominal: Soft. Bowel sounds are normal. She exhibits no distension and no mass. There is no tenderness. There is no rebound and no guarding. No hernia.   Genitourinary:   Genitourinary Comments: Jones in place.    Musculoskeletal: Normal range of motion. She exhibits edema. She exhibits no tenderness or deformity.   Sedated. Moves all 4. No focal deficits noted.    Neurological: She is alert and oriented to person, place, and time. She displays normal reflexes. No cranial nerve deficit or sensory deficit. She exhibits normal muscle tone. Coordination normal.   Sedated and Intubated. However, moves all 4, full ROM. No focal deficits noted.     Skin: Skin is warm and dry. Capillary refill takes 2 to 3 seconds. No rash noted. She is not diaphoretic. No erythema. No pallor.   Nursing note and vitals reviewed.      Significant Labs:   ABGs:   Recent Labs   Lab 11/07/19  0452   PH 7.582*   PCO2 30.6*   HCO3 28.8*   POCSATURATED 98   BE 7     Blood Culture:   Recent Labs   Lab 11/05/19  1325 11/05/19  1335   LABBLOO No Growth to date  No Growth to date No Growth to date  No Growth to date     BMP:   Recent Labs   Lab 11/07/19  0342   *   *   K 4.1   CL 90*   CO2 22*   BUN 28*   CREATININE 1.1   CALCIUM 7.6*   MG 2.8*     CBC:   Recent Labs   Lab 11/06/19  0403 11/06/19  1610 11/07/19  0342   WBC 15.53*  15.53* 18.17* 14.46*   HGB 10.4*  10.4* 9.5* 8.4*   HCT 31.9*  31.9* 28.6* 24.6*   PLT 89*  89* 67* 60*     CMP:   Recent Labs   Lab 11/05/19  1541  11/05/19  1919  11/06/19  1610 11/06/19  2337 11/07/19  0342   *   < > 128*   < > 127*  127* 122* 124*   K 4.1   < > 4.0   < > 3.6  3.6 4.3 4.1      < > 94*   < > 93*  93* 90* 90*   CO2 9*   < > 22*   < > 24  24 22* 22*   *   < > 495*   < > 99   99 356* 305*   BUN 15   < > 35*   < > 25*  25* 28* 28*   CREATININE 0.6   < > 1.4   < > 0.9  0.9 1.0 1.1   CALCIUM 6.5*   < > 7.6*   < > 7.3*  7.3* 7.3* 7.6*   PROT 2.1*  --  4.8*  --   --   --   --    ALBUMIN 0.9*  --  2.7*  --   --   --   --    BILITOT 0.4  --  1.4*  --   --   --   --    ALKPHOS 34*  --  56  --   --   --   --    AST 10  --  266*  --   --   --   --    ALT <5*  --  151*  --   --   --   --    ANIONGAP 19*   < > 12   < > 10  10 10 12   EGFRNONAA >60   < > 38*   < > >60  >60 58* 51*    < > = values in this interval not displayed.     Troponin: No results for input(s): TROPONINI in the last 48 hours.  Urine Studies:   Recent Labs   Lab 11/05/19  1416   COLORU Yellow   APPEARANCEUA Clear   PHUR 7.0   SPECGRAV 1.010   PROTEINUA Negative   GLUCUA Trace*   KETONESU Trace*   BILIRUBINUA Negative   OCCULTUA Negative   NITRITE Negative   UROBILINOGEN Negative   LEUKOCYTESUR Negative     All pertinent labs within the past 24 hours have been reviewed.    Significant Imaging: I have reviewed all pertinent imaging results/findings within the past 24 hours.      Assessment/Plan:      * Acute hypoxemic respiratory failure  11/2  Extubated  Monitor  Pulmonary CC    11/7  Intubated  Weaning trial in progress  Pulmonary CC      Septic shock  11/6  ABX  ID on consult  Vanc / Zosyn      Thrombocytopenia  11/2  Heme On on Consult  HIT panel  Likely drug induced  Monitor  11/3  Improved at 89 today  Monitor  11/5  Plts 73  Monitor    S/P CABG x 3  11/4  CABG x 3  ASA  Statin  BB  CV sx    11/6  CV sx  Statin      11/7  Per CV sx      CAD, multiple vessel        Acute combined systolic and diastolic CHF, NYHA class 2  Probable ischemic cardiomyopathy.  Anticipate CABG 31 October.  Optimize medical management.    NSTEMI (non-ST elevated myocardial infarction)  Troponin peaked at 3.66 now trending down.  Heparin, metoprolol, ASA and statin. Hold ACE inhibitor due to intolerance with cough. Hold ARB as patient's blood  pressure is marginal. Will consider adding, if her blood pressure will tolerate.  Hold second antiplatelet due to pending Cardiology procedure.  Patient was evaluated for Cardiac Rehab and is not a candidate at this time, but will be referred, when appropriate.  C on 28 on October showed severe multi-vessel CAD.  Referral to CVT Surgery who recommended CABG planned for Thursday 31 October.  Balloon pump placed 30 October.  CABG x3 by Dr. Zaidi on 31 October.  11/2  S/p CABG  Cards  CT Sx  Statin  BB      Hyperlipidemia associated with type 2 diabetes mellitus  -Patient was improved, but not at goal on 10/4/19.   -Continue statin.       Obesity (BMI 30.0-34.9)  Diet  Nutrition        Diabetes mellitus type II, uncontrolled  -Hemoglobin A1C was 9 on 9/19/19.   -NISS.   -ADA diet.   11/2  Levemir / NISS / Accuchecks  11/3  Levemir increased to 15 sq bid  NISS  Accuchecks  11/4  Levemir  NISS  Accuchecks  11/5  Levemir 10 units BID  NISS  Accuchecks    11/6  UnControlled  Accuchecks  Insulin Gtt  Improved control  Last BS 99    11/7  Uncontrolled  Accuchecks  NISS      Anxiety and depression  -Stable.   -Continue Effexor.       Hypertension associated with diabetes  -Stable.   -Continue Norvasc.  11/2 -  BB , CCB  11/3 - Controlled  BB  CCB  11/4  Controlled  BB  CCB    11/5  Controlled  BB  CCB    11/6  On pressors  Hold Htn meds for now    11/7  Pressors  Monitor      VTE Risk Mitigation (From admission, onward)         Ordered     Reason for no Mechanical VTE Prophylaxis  Once      10/28/19 1414     heparin 25,000 units in dextrose 5% 250 mL (100 units/mL) infusion LOW INTENSITY nomogram - [facility]  Continuous      10/28/19 1257                Critical care time spent on the evaluation and treatment of severe organ dysfunction, review of pertinent labs and imaging studies, discussions with consulting providers and discussions with patient/family: 31 minutes.      Josse Patel MD  Department of Hospital Medicine    [facility]

## 2019-11-07 NOTE — EICU
ABG notified. Resp alkalosis, hco3 up 28. Mild met alkalosis .   NSTEMI/CHF/DM    Plan:  - Vt for her height should be under 400. Getting 450.  Changed vent: 400/5/18/50%.  -ABG at 7.30 AM.

## 2019-11-07 NOTE — ASSESSMENT & PLAN NOTE
Follow up echo EF 30%  Per CVT surgery  Cont hemodynamic monitoring post op: off pressors except MILNORONE  Atorvastatin

## 2019-11-07 NOTE — OP NOTE
Pre Op Diagnosis: iv access     Post Op Diagnosis: same     Procedure:  LUE picc     Procedure performed by: Heriberto ZEPEDA, Daniel CANO     Written Informed Consent Obtained: Yes     Specimen Removed:  no     Estimated Blood Loss:  minimal     Findings: Local anesthesia.     The patient tolerated the procedure well and there were no complications.      The LUE was sterilely prepped and draped.  Lidocaine was used as a local anesthetic.  Using u/s guidance a 5 Syrian 45 cm picc was placed into the basilic vein into the SVC/right atrium junction.  Placement was verified using X Ray.  PICC was secured in place with attachment device and is ready to use.  Pt tolerated proc well without immediate complications.

## 2019-11-07 NOTE — ASSESSMENT & PLAN NOTE
Platelets normal at 300K on 10/30, then declined 10/31 78K, 11/1 76K, 11/2 47K.  Possible cause of acute thrombocytopenia: drug induced immune thrombocytopenia (amiodarone given 10/30, lasix started 10/28 and currently receiving), HIT (4T score low prob), TTP (plasmic score intermediate prob), ITP, DIC.  Have sent: HIT antibodies, [id number], fibrinogen, ddimer, C3/C4 complements, pathology review for schistocytes, reticulocytes, haptoglobin, LDH.  If platelets continue to decline, consider stop Lasix, and use bumex.    11/3/2019 platelets improving 89K today.  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    11/4/19 Platelets improved to 140 K.  Most likely due to previous medication.  Continuing to trend upward daily.  Hit panel pending.  --CBC daily    11/5/19 Platelets reduced to 73 K today from 140 K yesterday.  Review of peripheral smear show no schistocytes - so not likely DIC or TTP.  Doesn't appear to be on any medications currently that are known to cause thrombocytopenia; however previously on Amiodarone - this has since been stopped.  HIT is negative.  Possible infection.  Temp of 100.6 yesterday; none over the past 24 hours.  Worsening leukocytosis.  States currently with diarrhea without abdominal pain.  Denies dysuria.   Also mid sternal incision is erythremic and well approximated.  Wound vac DC'd today.    --CBC daily  --Consider work up for infection    11/6/19 Platelets today 89 K - stable compared to yesterday. Sternal incision with less erythremia today as compared to yesterday.  Continue treatment for suspected infection.  So far blood cultures NGTD.  Chest xray negative for infectious process.  UA negative for infection.  Was with diarrhea yesterday.    --CBC daily  --Continue IV abx per primary team  --Continue Plavix as long as platelets >50 K; if less than 50 K then hold Plavix.   --Transfuse for platelets <10 K      11/7/19 Platelets today 60 K.  No active s/s of  bleeding.  With ecchymosis to right leg - graph sites however does not appear to be worsened.  To order testing to rule out DIC, TTP.  --LDH, haptoglobin; fibrinogen/d-dimer; ADAMSTS 13, path review for schistocytes   --CBC daily  --Continue IV abx per primary team  --Continue Plavix as long as platelets >50 K and with recommendations per cardiology; if less than 50 K then hold Plavix.   --Transfuse for platelets <10 K

## 2019-11-07 NOTE — PROGRESS NOTES
[facility]  Cardiology  Progress Note    Patient Name: Mateusz Bates  MRN: [id number]  Admission Date: 10/28/2019  Hospital Length of Stay: 10 days  Code Status: Full Code   Attending Physician: Keny Zaidi MD   Primary Care Physician: Serenity Kilpatrick MD  Expected Discharge Date: 11/13/2019  Principal Problem:Acute hypoxemic respiratory failure    Subjective:     Hospital Course:   Ms. Bates is a 69 year old female patient whose current medical conditions include HTN, hyperlipidemia, obesity, DM type II, cerebral microvascular disease, FAHAD, and B12 deficiency who was sent to [facility] ED today from her PCP's office due to chest pain and abnormal EKG. Patient complained of substernal chest tightness/heaviness that radiated to her back on Saturday while at rest. Associated symptoms included orthopnea and SOB. Patient denied any associated nausea, vomiting, diaphoresis, palpitations, near syncope, or syncope. Initial workup in ED revealed troponin of 3.665 and BNP of 1305 and patient subsequently admitted for further evaluation and treatment of NSTEMI. Cardiology consulted to assist with management. Patient seen and examined today while in ED. Feels well at time of exam, denies chest pain. She does report similar symptoms over the past few months but states they never lasted as long and were less intense. She reports compliance with her medications. States her father had history of CABG. Chart reviewed. Repeat troponin pending. EKG reviewed and shows SR with new LBBB. 2D echo pending.    10/29/19-Patient seen and examined today, s/p LHC yesterday that showed multivessel CAD. CVT consulted, CABG planned for Thursday. Feels ok. No chest pain or SOB. Labs stable. Being diuresed. 2D echo showed EF of 30-35%, DD.    10/30/19-Patient seen and examined today. Feels well. No complaints. Denies chest pain or SOB. Labs stable. IABP placement scheduled for today.    10/31/19- Patient is s/p CABG x3 with LIMA  to LAD reverse saphenous vein graft to the RCA and reverse saphenous vein graft to the ramus. Has been transferred to ICU. Is intubated and sedated. CT x3. IABP at 1:1 still in place. Transfused 3 units of PRBC in the OR and has received additional 3 units in ICU, has received 500cc albumin, epi and milrinone gtt.  H/H 6.9/21.0. Renal function stable. Paced rhythm on monitor     11/1/2019--patient seen and examined in ICU. Extubated o/n, remains on IABP 1:1 today, plans to possibly wean off later today per Dr. Zaidi. Remains on Epi and Milrinone gtt today. Paced on monitor. Labs stable today.     11/2/19-Patient seen and examined today, lying in bed. Feels ok. Complains of post-op pain and fatigue. Labs reviewed. Platelets 47,000. Heme/onc on board.     11/3/19-Patient seen and examined today, sitting up in chair. Feeling better. Still weak. Labs reviewed. Platelets 89,000.    11/4/19- Patient is POD 4 s/p CABG x3. OOB in chair. Feels better this morning. Pain controlled. Platelets improving since holding Plavix.Vitals stable     11/5/19- Patient is POD 5 s/p CABGx3. Has been transferred to Tele. Is clammy and generally weak this morning. Glucose and vitals stable. Noted to have bump in WBC. Primary team has been notified by nurse. Platelets 73, but H/H stable     11/6 in ICU, intubated, on epi gtt at 0.15 mcg CI 2.0, PAP 20. Sinus rhythm, H&H and Cr stable. Urine output ok. Improved lactate 1.6. const stent cardiogenic shock    11/7 INTUBATED, WEAN OFF EPI AND CONTINUE AMILIRONE, ON SINU RHYTHM AND CI 3.3, Cr STABLE AND DECENT URINE OUTPUT.       ROS  Objective:     Vital Signs (Most Recent):  Temp: 100.2 °F (37.9 °C) (11/07/19 0500)  Pulse: 97 (11/07/19 1213)  Resp: 19 (11/07/19 1213)  BP: (!) 129/42 (11/07/19 0600)  SpO2: 99 % (11/07/19 1213) Vital Signs (24h Range):  Temp:  [99.2 °F (37.3 °C)-100.8 °F (38.2 °C)] 100.2 °F (37.9 °C)  Pulse:  [] 97  Resp:  [19-29] 19  SpO2:  [99 %-100 %] 99 %  BP:  ()/(28-90) 129/42     Weight: 77.8 kg (171 lb 8.3 oz)  Body mass index is 32.41 kg/m².     SpO2: 99 %  O2 Device (Oxygen Therapy): nasal cannula      Intake/Output Summary (Last 24 hours) at 11/7/2019 1355  Last data filed at 11/7/2019 0700  Gross per 24 hour   Intake 2390.27 ml   Output 765 ml   Net 1625.27 ml       Lines/Drains/Airways     Central Venous Catheter Line                 Pulmonary Artery Catheter Assessment  11/05/19 1721 right internal jugular 1 day          Drain                 NG/OG Tube 11/05/19 1720 orogastric Center mouth 1 day         Urethral Catheter 11/05/19 1718 Straight-tip 1 day          Arterial Line                 Arterial Line 11/06/19 0945 Left Radial 1 day          Peripheral Intravenous Line                 Peripheral IV - Single Lumen 11/05/19 2300 18 G Left Other 1 day                Physical Exam   Constitutional: She is oriented to person, place, and time. She appears well-developed and well-nourished. No distress.   HENT:   Head: Normocephalic and atraumatic.   Eyes: Pupils are equal, round, and reactive to light. Right eye exhibits no discharge. Left eye exhibits no discharge.   Neck: Neck supple. No JVD present.   Cardiovascular: Normal rate, regular rhythm, S1 normal, S2 normal and normal heart sounds.   No murmur heard.  Pulmonary/Chest: Effort normal. No respiratory distress. She has no wheezes.   CABG site C/D/I; no bleeding erythema or drainage    Diminished BS at bases   Abdominal: Soft. She exhibits no distension.   Musculoskeletal: She exhibits no edema.   Neurological: She is alert and oriented to person, place, and time.   Skin: Skin is warm and dry. She is not diaphoretic. No erythema.   SVG sites C/D/I; no bleeding erythema or drainage   Psychiatric: She has a normal mood and affect. Her behavior is normal. Thought content normal.   Nursing note and vitals reviewed.      Significant Labs:   ABG:   Recent Labs   Lab 11/06/19  0352 11/07/19  3498  11/07/19  1151   PH 7.499* 7.582* 7.518*   PCO2 33.0* 30.6* 29.1*   HCO3 25.7 28.8* 23.6*   POCSATURATED 99 98 99   BE 2 7 1   , Blood Culture: No results for input(s): LABBLOO in the last 48 hours., BMP:   Recent Labs   Lab 11/06/19  0403  11/06/19  1610 11/06/19  2337 11/07/19  0342   *  358*   < > 99  99 356* 305*   *  128*   < > 127*  127* 122* 124*   K 3.6  3.6   < > 3.6  3.6 4.3 4.1   CL 92*  92*   < > 93*  93* 90* 90*   CO2 24  24   < > 24  24 22* 22*   BUN 29*  29*   < > 25*  25* 28* 28*   CREATININE 1.1  1.1   < > 0.9  0.9 1.0 1.1   CALCIUM 7.5*  7.5*   < > 7.3*  7.3* 7.3* 7.6*   MG 1.7  --   --   --  2.8*    < > = values in this interval not displayed.   , CMP   Recent Labs   Lab 11/05/19  1541  11/05/19  1919  11/06/19  1610 11/06/19  2337 11/07/19  0342   *   < > 128*   < > 127*  127* 122* 124*   K 4.1   < > 4.0   < > 3.6  3.6 4.3 4.1      < > 94*   < > 93*  93* 90* 90*   CO2 9*   < > 22*   < > 24  24 22* 22*   *   < > 495*   < > 99  99 356* 305*   BUN 15   < > 35*   < > 25*  25* 28* 28*   CREATININE 0.6   < > 1.4   < > 0.9  0.9 1.0 1.1   CALCIUM 6.5*   < > 7.6*   < > 7.3*  7.3* 7.3* 7.6*   PROT 2.1*  --  4.8*  --   --   --   --    ALBUMIN 0.9*  --  2.7*  --   --   --   --    BILITOT 0.4  --  1.4*  --   --   --   --    ALKPHOS 34*  --  56  --   --   --   --    AST 10  --  266*  --   --   --   --    ALT <5*  --  151*  --   --   --   --    ANIONGAP 19*   < > 12   < > 10  10 10 12   ESTGFRAFRICA >60   < > 44*   < > >60  >60 >60 59*   EGFRNONAA >60   < > 38*   < > >60  >60 58* 51*    < > = values in this interval not displayed.   , CBC   Recent Labs   Lab 11/06/19  1610 11/07/19  0342 11/07/19  1048   WBC 18.17* 14.46* 12.96*   HGB 9.5* 8.4* 8.3*   HCT 28.6* 24.6* 25.4*   PLT 67* 60* 44*   , INR No results for input(s): INR, PROTIME in the last 48 hours., Lipid Panel No results for input(s): CHOL, HDL, LDLCALC, TRIG, CHOLHDL in the last 48 hours. and  Troponin No results for input(s): TROPONINI in the last 48 hours.    Significant Imaging: EKG:      Assessment and Plan:     Cardiogenic shock  Post CABG  EF 30%  S/p swan catheter  CI 3.3  Repeat echo today showed EF 30%, septal hypokinesis , compared to prior echo before the cabg, no significant change  ekg reviewed    Continue milrinone gtt. Epi GTT WEAN OFF,  F/u urine output  Continue Lipitor  Continue ICU supportive care  NICARDINE AND METOPROLOL PRN FOR BP CONTROL  LASIX IV   F/U H&H AND Na            Thrombocytopenia  -Mgmt as per heme/onc  -Plavix was on hold, but resumed by primary team   -ASA has been resumed     11/5/19  -consider holding Plavix  -Hematology has been consulted     S/P CABG x 3  -Patient is s/p CABG x3 with LIMA to LAD reverse saphenous vein graft to the RCA and reverse saphenous vein graft to the ramus.  -Continue post CABG supportive therapy  -Continue ASA, Plavix, Statin, BB  -Will continue to follow along     11/1/19  -POD #1 s/p CABG x3  -Continue ASA, Statin, BB  -Remains on Epi and Milrinone gtt today  -Mgmt per CT surgery  -Will follow along    11/2/19  -Recovering s/p CABG x 3  -Continue statin, BB  -Platelets 47,000 this AM; heme/onc on board, ASA held  -Await further rec's    11/3/19  -Stable, progressing post CABG x 3  -Continue statin, BB  -ASA re-started  -Platelets 89,000, heme/onc on board    11/4/19  -Patient slowly improving post CABG x3  -Continue ASA, Statin and BB  -plavix on hold due to thrombocytopenia that is improving   -IS use encouraged and early ambulation with PT  -Will continue to follow along     11/5/19  -Will continue ASA, Statin and BB  -Consider holding Plavix again for drop in plts after resuming by primary team   -Recommend CXR to rule out post op PNA given bump in WBC and cough  -will need to monitor for temp  -ambulate as tolerated and encourage IS use     11/06  Hold ASA, Plavix due to anemia  Hold BB due to cardiogenic shock    CAD, multiple  vessel  -See plan under CABG    Acute combined systolic and diastolic CHF, NYHA class 2  -Presents with ICM/acute combined CHF  -EF 30-35% by 2D echo        Chest pain  -See plan under NSTEMI    Abnormal EKG  -LBBB noted on EKG  -See plan under NSTEMI    NSTEMI (non-ST elevated myocardial infarction)  -Patient presented with intermittent chest tightness/heaviness that radiated to her back since Saturday evening  -Associated with JONES/orthopnea  -Initial troponin 3.665  -EKG showed SR with new LBBB  -Presentation consistent with NSTEMI  -Continue ASA, statin  -Start Toprol XL  -Start heparin gtt  -Check 2D echo  -Continue to trend troponin  -Keep NPO. [facility] today for further evaluation and PCI if indicated. All risks, benefits, and treatment alternatives explained to patient in detail. All questions answered. She has agreed to proceed. Further rec's to follow.    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 All risks, benefits, and treatment alternatives explained to patient in detail. All questions answered. She has agreed to proceed.    10/31/19  -Patient is s/p CABG x3.   See plan for CABG     Hyperlipidemia associated with type 2 diabetes mellitus  -Continue statin    Obesity (BMI 30.0-34.9)  -Weight loss    Diabetes mellitus type II, uncontrolled  -Mgmt as per hospital medicine    Hypertension associated with diabetes  -Continue home meds as BP permits        VTE Risk Mitigation (From admission, onward)         Ordered     argatroban 125 mg in sodium chloride 0.9% 125 mL infusion (conc: 1 mg/mL)  Continuous      11/07/19 1357     Reason for no Mechanical VTE Prophylaxis   Once      10/28/19 1414     heparin 25,000 units in dextrose 5% 250 mL (100 units/mL) infusion LOW INTENSITY nomogram - [facility]  Continuous      10/28/19 1257                Charles Matias MD  Cardiology  [facility]

## 2019-11-07 NOTE — NURSING
PICC line placed and verified by IR, clarified with Dr. Darcie Forman to pull swan and Introducer now.

## 2019-11-07 NOTE — PROGRESS NOTES
[facility]   Critical Care Medicine  Progress Note    Patient Name: Mateusz Bates  MRN: [id number]  Admission Date: 10/28/2019  Hospital Length of Stay: 10 days  Code Status: Full Code  Attending Provider: Keny Zaidi MD  Primary Care Provider: Serenity Kilpatrick MD   Principal Problem: Acute hypoxemic respiratory failure    Subjective:     HPI:  69 year old female with PMH including DM2 on insulin; HTN; HLD; GERD; anxiety  Presented to ED on 10/28 at instruction of PCP s/t abnormal EKG with c/o chest tightness and SOB  Eval revealed LBBB on EKG along with BNP 1300 and troponin 3.66     Taken for LHC on 10/28 finding 3 vessel CAD, EF 20%, and infra renal aorta plaque 30%  CTS consulted and agree pt is candidate for urgent CABG  Today 10/30 taken to cath lab for placement of IABP to support for planned CABG in am    Hospital/ICU Course:  Transferred to ICU post IABP placement for hemodynamic monitoring until surgery  10/31 - Admitted to ICU today from OR post 3-vessel CABG.  [facility] and [facility]  Upon arrival to ICU on Vasopressin, Epi, Primacor and Lidocaine infusions.  11/1 - Extubated overnight.  Supine in bed awake and alert and responsive still with IABP in place as well as on Epi, Primacor and Insulin infusions.  No distress noted  11/05: admitted back to MICU; Code blue called, Agonal breathing, cold and clammy, Hypotensive, peripheral dopamine started and intubated, weak thready pulses, Central line Yoder and A line placed in MICU, Bicarb drip and LR bolus and albumin given, storm placed. Recent rise in WBCC  11/06: seen and examined: SVR is high CO 3.3, SP 1 PRBC, Insulin drip @ 21, Epi drip @ 0.15, right hand cyanotic and swollen,  mls overnight  11/07: seen and examined : Off precedex, Epi off , primacor @ 0.25 mcg/kg /min, right hand remain swollen dusky, good UO, Na low. CO 6.4, CI 3.8, , CVP 12, T max 100.8F: awake off sedation    No new subjective &  objective note has been filed under this hospital service since the last note was generated.      ABG  Recent Labs   Lab 11/07/19  1151   PH 7.518*   PO2 120*   PCO2 29.1*   HCO3 23.6*   BE 1     Assessment/Plan:     Psychiatric  Anxiety and depression  monitor    Pulmonary  * Acute hypoxemic respiratory failure  Vent Mode: Spont  Oxygen Concentration (%):  [40-50] 40  Resp Rate Total:  [20 br/min-43 br/min] 27 br/min  Vt Set:  [400 mL-450 mL] 400 mL  PEEP/CPAP:  [5 cmH20] 5 cmH20  Pressure Support:  [0 cmH20-10 cmH20] 10 cmH20  Mean Airway Pressure:  [9 vdI62-06 cmH20] 9.3 cmH20     Chlorohexidine  Optimize PEEP  CXR in AM daily  For SAT, SBT, extubation  Hold tube feeds    Cardiac/Vascular  Cardiogenic shock  Improved  CO 6.4  CI 3.8  Continue MILRINONE       S/P CABG x 3  Follow up echo EF 30%  Per CVT surgery  Cont hemodynamic monitoring post op: off pressors except MILNORONE  Atorvastatin      CAD, multiple vessel  Per Cards and CVT surgery  S/P 3-vessel CABG   POD# 7  Cont ASA, statin,   Lopressor IV       Acute combined systolic and diastolic CHF, NYHA class 2  Strict I/O  Daily wt  IV lasix       NSTEMI (non-ST elevated myocardial infarction)  POD # 7  Multi vessel CAD now post CABG  Continue ASA, statin  Cont cardiac monitoring    Renal/  Hyponatremia  Suspect dilutional  Salt tabs    ID  Septic shock  Blood culture NGTD  Urine culture NGTD  Sputum culture  Procal 4.4  Lactate Normal  CT imaging unremarkable  Afebrile  Abx: vancomycin Piperacillin tazobactam: descalate         Hematology  Thrombocytopenia  Monitor, plat 60K   Hematology following,   SCD for now    Oncology  Iron deficiency anemia  IV IRON infusion    Endocrine  Diabetes mellitus type II, uncontrolled  Sliding scale: episodes of low BS  Q4  -180       Other  Edema of hand  Right had swelling and edema  Wait vascular studies  No heparin: Low Plat  Added Nicardipine Drip for BP       Critical Care Daily Checklist:    A: Awake:  RASS Goal/Actual Goal: RASS Goal: -2-->light sedation(dosage recently adjusted)  Actual: Alexander Agitation Sedation Scale (RASS): Restless   B: Spontaneous Breathing Trial Performed? Spon. Breathing Trial Initiated?: Initiated(by MD at ) (11/07/19 0634)   C: SAT & SBT Coordinated?  YES                      D: Delirium: CAM-ICU Overall CAM-ICU: Positive   E: Early Mobility Performed? Yes   F: Feeding Goal: Goals: 1) PO intakes > 50% of meals at f/u  Status: Nutrition Goal Status: new   Current Diet Order   Procedures    Diet NPO Except for: Sips with Medication     Order Specific Question:   Except for     Answer:   Sips with Medication      AS: Analgesia/Sedation Assess pain, off Precedex    T: Thromboembolic Prophylaxis SCD   H: HOB > 300 Yes   U: Stress Ulcer Prophylaxis (if needed) YES   G: Glucose Control -180   B: Bowel Function Stool Occurrence: 1   I: Indwelling Catheter (Lines & Jones) Necessity YES   D: De-escalation of Antimicrobials/Pharmacotherapies Vanco and Zosyn    Plan for the day/ETD Control BP, Diuresis, improve Na    Code Status:  Family/Goals of Care: Full Code        Critical Care Time: 60 minutes  Critical secondary to Patient has a condition that poses threat to life and bodily function: CARDIOGENIC SHOCK on MILNORONE      Critical care was time spent personally by me on the following activities: development of treatment plan with patient or surrogate and bedside caregivers, discussions with consultants, evaluation of patient's response to treatment, examination of patient, ordering and performing treatments and interventions, ordering and review of laboratory studies, ordering and review of radiographic studies, pulse oximetry, re-evaluation of patient's condition. This critical care time did not overlap with that of any other provider or involve time for any procedures.     Riaz Tanner MD  Critical Care Medicine  [facility]

## 2019-11-07 NOTE — SUBJECTIVE & OBJECTIVE
ROS  Objective:     Vital Signs (Most Recent):  Temp: 100.2 °F (37.9 °C) (11/07/19 0500)  Pulse: 97 (11/07/19 1213)  Resp: 19 (11/07/19 1213)  BP: (!) 129/42 (11/07/19 0600)  SpO2: 99 % (11/07/19 1213) Vital Signs (24h Range):  Temp:  [99.2 °F (37.3 °C)-100.8 °F (38.2 °C)] 100.2 °F (37.9 °C)  Pulse:  [] 97  Resp:  [19-29] 19  SpO2:  [99 %-100 %] 99 %  BP: ()/(28-90) 129/42     Weight: 77.8 kg (171 lb 8.3 oz)  Body mass index is 32.41 kg/m².     SpO2: 99 %  O2 Device (Oxygen Therapy): nasal cannula      Intake/Output Summary (Last 24 hours) at 11/7/2019 1355  Last data filed at 11/7/2019 0700  Gross per 24 hour   Intake 2390.27 ml   Output 765 ml   Net 1625.27 ml       Lines/Drains/Airways     Central Venous Catheter Line                 Pulmonary Artery Catheter Assessment  11/05/19 1721 right internal jugular 1 day          Drain                 NG/OG Tube 11/05/19 1720 orogastric Center mouth 1 day         Urethral Catheter 11/05/19 1718 Straight-tip 1 day          Arterial Line                 Arterial Line 11/06/19 0945 Left Radial 1 day          Peripheral Intravenous Line                 Peripheral IV - Single Lumen 11/05/19 2300 18 G Left Other 1 day                Physical Exam   Constitutional: She is oriented to person, place, and time. She appears well-developed and well-nourished. No distress.   HENT:   Head: Normocephalic and atraumatic.   Eyes: Pupils are equal, round, and reactive to light. Right eye exhibits no discharge. Left eye exhibits no discharge.   Neck: Neck supple. No JVD present.   Cardiovascular: Normal rate, regular rhythm, S1 normal, S2 normal and normal heart sounds.   No murmur heard.  Pulmonary/Chest: Effort normal. No respiratory distress. She has no wheezes.   CABG site C/D/I; no bleeding erythema or drainage    Diminished BS at bases   Abdominal: Soft. She exhibits no distension.   Musculoskeletal: She exhibits no edema.   Neurological: She is alert and oriented to  person, place, and time.   Skin: Skin is warm and dry. She is not diaphoretic. No erythema.   SVG sites C/D/I; no bleeding erythema or drainage   Psychiatric: She has a normal mood and affect. Her behavior is normal. Thought content normal.   Nursing note and vitals reviewed.      Significant Labs:   ABG:   Recent Labs   Lab 11/06/19  0352 11/07/19  0452 11/07/19  1151   PH 7.499* 7.582* 7.518*   PCO2 33.0* 30.6* 29.1*   HCO3 25.7 28.8* 23.6*   POCSATURATED 99 98 99   BE 2 7 1   , Blood Culture: No results for input(s): LABBLOO in the last 48 hours., BMP:   Recent Labs   Lab 11/06/19  0403  11/06/19  1610 11/06/19  2337 11/07/19  0342   *  358*   < > 99  99 356* 305*   *  128*   < > 127*  127* 122* 124*   K 3.6  3.6   < > 3.6  3.6 4.3 4.1   CL 92*  92*   < > 93*  93* 90* 90*   CO2 24  24   < > 24  24 22* 22*   BUN 29*  29*   < > 25*  25* 28* 28*   CREATININE 1.1  1.1   < > 0.9  0.9 1.0 1.1   CALCIUM 7.5*  7.5*   < > 7.3*  7.3* 7.3* 7.6*   MG 1.7  --   --   --  2.8*    < > = values in this interval not displayed.   , CMP   Recent Labs   Lab 11/05/19  1541  11/05/19  1919  11/06/19  1610 11/06/19  2337 11/07/19  0342   *   < > 128*   < > 127*  127* 122* 124*   K 4.1   < > 4.0   < > 3.6  3.6 4.3 4.1      < > 94*   < > 93*  93* 90* 90*   CO2 9*   < > 22*   < > 24  24 22* 22*   *   < > 495*   < > 99  99 356* 305*   BUN 15   < > 35*   < > 25*  25* 28* 28*   CREATININE 0.6   < > 1.4   < > 0.9  0.9 1.0 1.1   CALCIUM 6.5*   < > 7.6*   < > 7.3*  7.3* 7.3* 7.6*   PROT 2.1*  --  4.8*  --   --   --   --    ALBUMIN 0.9*  --  2.7*  --   --   --   --    BILITOT 0.4  --  1.4*  --   --   --   --    ALKPHOS 34*  --  56  --   --   --   --    AST 10  --  266*  --   --   --   --    ALT <5*  --  151*  --   --   --   --    ANIONGAP 19*   < > 12   < > 10  10 10 12   ESTGFRAFRICA >60   < > 44*   < > >60  >60 >60 59*   EGFRNONAA >60   < > 38*   < > >60  >60 58* 51*    < > = values in  this interval not displayed.   , CBC   Recent Labs   Lab 11/06/19  1610 11/07/19  0342 11/07/19  1048   WBC 18.17* 14.46* 12.96*   HGB 9.5* 8.4* 8.3*   HCT 28.6* 24.6* 25.4*   PLT 67* 60* 44*   , INR No results for input(s): INR, PROTIME in the last 48 hours., Lipid Panel No results for input(s): CHOL, HDL, LDLCALC, TRIG, CHOLHDL in the last 48 hours. and Troponin No results for input(s): TROPONINI in the last 48 hours.    Significant Imaging: EKG:

## 2019-11-07 NOTE — ASSESSMENT & PLAN NOTE
-Stable.   -Continue Norvasc.  11/2 -  BB , CCB  11/3 - Controlled  BB  CCB  11/4  Controlled  BB  CCB    11/5  Controlled  BB  CCB    11/6  On pressors  Hold Htn meds for now    11/7  Pressors  Monitor

## 2019-11-07 NOTE — SUBJECTIVE & OBJECTIVE
Interval History: Sedation being weaned for preparation of extubation.  Tmax of 100.8 over night.  Restless.     Oncology Treatment Plan:   [No treatment plan]    Medications:  Continuous Infusions:   dexmedetomidine (PRECEDEX) infusion 0.06 mcg/kg/hr (11/07/19 0630)    epinephrine 0.06 mcg/kg/min (11/07/19 0630)    milrinone 20mg/100ml D5W (200mcg/ml) 0.25 mcg/kg/min (11/07/19 0630)    norepinephrine bitartrate-D5W Stopped (11/06/19 0030)     Scheduled Meds:   atorvastatin  80 mg Oral QHS    chlorhexidine  15 mL Mouth/Throat BID    docusate  100 mg Oral BID    famotidine (PF)  20 mg Intravenous BID    furosemide  20 mg Intravenous BID    iron sucrose (VENOFER) IVPB  500 mg Intravenous Once    lactated ringers  1,000 mL Intravenous Once    nozaseptin   Each Nostril BID    piperacillin-tazobactam (ZOSYN) IVPB  4.5 g Intravenous Q8H    polyethylene glycol  17 g Oral Daily    potassium chloride  10 mEq Oral BID    sodium chloride  2 g Oral Q6H    [START ON 11/10/2019] vancomycin (VANCOCIN) IVPB  750 mg Intravenous Q72H     PRN Meds:albumin human 5%, albuterol sulfate, Dextrose 10% Bolus, Dextrose 10% Bolus, glucagon (human recombinant), insulin aspart U-100, iohexol, lactated ringers, magnesium sulfate IVPB, magnesium sulfate IVPB, metoclopramide HCl, ondansetron, potassium chloride in water **AND** potassium chloride in water **AND** potassium chloride in water, traMADol     Review of Systems   Unable to perform ROS: Acuity of condition     Objective:     Vital Signs (Most Recent):  Temp: 100.2 °F (37.9 °C) (11/07/19 0500)  Pulse: 81 (11/07/19 0747)  Resp: (!) 29 (11/07/19 0747)  BP: (!) 129/42 (11/07/19 0600)  SpO2: 100 % (11/07/19 0747) Vital Signs (24h Range):  Temp:  [99.2 °F (37.3 °C)-100.8 °F (38.2 °C)] 100.2 °F (37.9 °C)  Pulse:  [] 81  Resp:  [19-29] 29  SpO2:  [99 %-100 %] 100 %  BP: ()/(28-90) 129/42     Weight: 77.8 kg (171 lb 8.3 oz)  Body mass index is 32.41 kg/m².  Body  surface area is 1.83 meters squared.      Intake/Output Summary (Last 24 hours) at 11/7/2019 1050  Last data filed at 11/7/2019 0700  Gross per 24 hour   Intake 2440.27 ml   Output 845 ml   Net 1595.27 ml       Physical Exam   Constitutional: She appears well-developed and well-nourished. She appears toxic. She is intubated and restrained.   HENT:   Head: Normocephalic and atraumatic.   Cardiovascular: Normal rate and regular rhythm.   Pulmonary/Chest: Breath sounds normal. She is intubated.   Abdominal: Soft. Normal appearance and bowel sounds are normal. She exhibits no distension.   Musculoskeletal: She exhibits edema (generalized).   Skin: Skin is warm and dry. Ecchymosis noted. She is not diaphoretic. There is pallor.   Nursing note and vitals reviewed.      Significant Labs:   All pertinent labs from the last 24 hours have been reviewed.    Diagnostic Results:  I have reviewed all pertinent imaging results/findings within the past 24 hours.

## 2019-11-07 NOTE — PLAN OF CARE
POC and interventions discussed with patient - no visitors this shift.  Unable to educate d/t Intubation and sedation.  Vitals stable on low dose Epi and Milrinone gtt's.  Arouses to name. Attempts to open eyes. Not tracking/focusing. Not following commands. WERNER spont. With purpose.  Oxygenating well on ventilator.  Tolerating TF's well.  Heart with RRR, occasional PVC.  R hand is purple in color to fingertips, and palm in all areas except the pinky finger.  Doppler Radial and ulnar pulses located to R wrist.  Jones with CYU.  Incision sites wnl.

## 2019-11-07 NOTE — PLAN OF CARE
11/07/19 0916   Discharge Reassessment   Assessment Type Discharge Planning Reassessment   Provided patient/caregiver education on the expected discharge date and the discharge plan No   Do you have any problems affording any of your prescribed medications? No   Discharge Plan A Skilled Nursing Facility   DME Needed Upon Discharge  none   Patient choice form signed by patient/caregiver N/A   Anticipated Discharge Disposition SNF   Can the patient answer the patient profile reliably?   (Patient intubated at this time)

## 2019-11-07 NOTE — PROGRESS NOTES
DW with Dr Louis and Dr Zaidi  US right arm reversal diastolic flow  Agreed PICC left Arm and DC Cordis /PA cath  Will later today get CT chest with contrast venogram  Will also get CT head    Vitals:    11/07/19 1213 11/07/19 1500 11/07/19 1515 11/07/19 1530   BP:  (!) 118/42 (!) 111/50 (!) 107/42   Pulse: 97 87 87 82   Resp: 19 (!) 32 (!) 24 (!) 29   Temp:       TempSrc:       SpO2: 99%   96%   Weight:       Height:         PAP: (29-39)/(12-19) 37/18  PAP (Mean):  [18 mmHg-25 mmHg] 25 mmHg     Updated family    CCT 30 mins

## 2019-11-07 NOTE — ASSESSMENT & PLAN NOTE
11/2  Extubated  Monitor  Pulmonary CC    11/7  Intubated  Weaning trial in progress  Pulmonary CC

## 2019-11-07 NOTE — PROGRESS NOTES
[facility] -   Cardiothoracic Surgery  Progress Note    Patient Name: Mateusz Bates  MRN: [id number]  Admission Date: 10/28/2019  Hospital Length of Stay: 10 days  Code Status: Full Code   Attending Physician: Keny Zaidi MD   Referring Provider: Self, Aaareferral  Principal Problem:Acute hypoxemic respiratory failure            Subjective:     Post-Op Info:  Procedure(s) (LRB):  CORONARY ARTERY BYPASS GRAFT (CABG) (N/A)  ECHOCARDIOGRAM,TRANSESOPHAGEAL (N/A)  BLOCK, NERVE, INTERCOSTAL, 2 OR MORE (N/A)   7 Days Post-Op     Interval History:  11/07/2019  The patient is post-operative day 7, status-post coronary artery bypass grafting x 3.    ROS  Medications:  Continuous Infusions:   dexmedetomidine (PRECEDEX) infusion 0.06 mcg/kg/hr (11/07/19 0630)    epinephrine 0.06 mcg/kg/min (11/07/19 0630)    milrinone 20mg/100ml D5W (200mcg/ml) 0.25 mcg/kg/min (11/07/19 0630)    norepinephrine bitartrate-D5W Stopped (11/06/19 0030)     Scheduled Meds:   atorvastatin  80 mg Oral QHS    chlorhexidine  15 mL Mouth/Throat BID    docusate sodium  100 mg Oral BID    famotidine (PF)  20 mg Intravenous BID    furosemide  20 mg Intravenous BID    lactated ringers  1,000 mL Intravenous Once    nozaseptin   Each Nostril BID    piperacillin-tazobactam (ZOSYN) IVPB  4.5 g Intravenous Q8H    polyethylene glycol  17 g Oral Daily    potassium chloride  10 mEq Oral BID    sodium chloride  2 g Oral Q6H    [START ON 11/10/2019] vancomycin (VANCOCIN) IVPB  750 mg Intravenous Q72H     PRN Meds:albumin human 5%, albuterol sulfate, Dextrose 10% Bolus, Dextrose 10% Bolus, glucagon (human recombinant), insulin aspart U-100, iohexol, lactated ringers, magnesium sulfate IVPB, magnesium sulfate IVPB, metoclopramide HCl, ondansetron, potassium chloride in water **AND** potassium chloride in water **AND** potassium chloride in water, traMADol     Objective:     Vital Signs (Most Recent):  Temp: 100.2 °F (37.9 °C) (11/07/19  0500)  Pulse: 88 (11/07/19 0600)  Resp: (!) 26 (11/07/19 0600)  BP: (!) 129/42 (11/07/19 0600)  SpO2: 100 % (11/07/19 0600) Vital Signs (24h Range):  Temp:  [99.2 °F (37.3 °C)-100.8 °F (38.2 °C)] 100.2 °F (37.9 °C)  Pulse:  [] 88  Resp:  [19-29] 26  SpO2:  [94 %-100 %] 100 %  BP: ()/(28-90) 129/42     Weight: 77.8 kg (171 lb 8.3 oz)  Body mass index is 32.41 kg/m².    SpO2: 100 %  O2 Device (Oxygen Therapy): ventilator    Intake/Output - Last 3 Shifts       11/05 0700 - 11/06 0659 11/06 0700 - 11/07 0659 11/07 0700 - 11/08 0659    P.O. 480      I.V. (mL/kg) 3065.4 (40.8) 1192.3 (15.3)     Blood 700      NG/GT  770     IV Piggyback 800 650     Total Intake(mL/kg) 5045.4 (67.1) 2612.3 (33.6)     Urine (mL/kg/hr) 825 (0.5) 955 (0.5)     Stool 0      Total Output 825 955     Net +4220.4 +1657.3            Urine Occurrence 1 x      Stool Occurrence 1 x            Lines/Drains/Airways     Central Venous Catheter Line                 Pulmonary Artery Catheter Assessment  11/05/19 1721 right internal jugular 1 day          Drain                 NG/OG Tube 11/05/19 1720 orogastric Center mouth 1 day         Urethral Catheter 11/05/19 1718 Straight-tip 1 day          Airway                 Airway - Non-Surgical 11/05/19 1452 Endotracheal Tube 1 day          Arterial Line                 Arterial Line 11/06/19 0945 Left Radial less than 1 day          Peripheral Intravenous Line                 Peripheral IV - Single Lumen 11/05/19 2300 18 G Left Other 1 day                Physical Exam   Constitutional: She is oriented to person, place, and time. She appears well-nourished. No distress.   Intubated and sedated.    HENT:   Head: Normocephalic and atraumatic.   Eyes: Pupils are equal, round, and reactive to light. EOM are normal.   Neck: Normal range of motion. Neck supple. No JVD present.   Cardiovascular: Normal rate, regular rhythm, normal heart sounds and intact distal pulses. Exam reveals no gallop and no  friction rub.   No murmur heard.  The right upper extremity has 2+ distal to the elbow. The right hand and fingertips are mottled, and dusky, right radial pulse found via doppler.    Pulmonary/Chest: No stridor. No respiratory distress. She has no wheezes. She has no rales. She exhibits no tenderness.   Sedated, ventilator dependent. Coarse breath sounds.    Abdominal: Soft. Bowel sounds are normal. She exhibits no distension and no mass. There is no tenderness. There is no rebound and no guarding. No hernia.   Genitourinary:   Genitourinary Comments: Jones in place.    Musculoskeletal: Normal range of motion. She exhibits edema. She exhibits no tenderness or deformity.   Sedated. Moves all 4. No focal deficits noted.    Neurological: She is alert and oriented to person, place, and time. She displays normal reflexes. No cranial nerve deficit or sensory deficit. She exhibits normal muscle tone. Coordination normal.   Sedated and Intubated. However, moves all 4, full ROM. No focal deficits noted.     Skin: Skin is warm and dry. Capillary refill takes less than 2 seconds. No rash noted. She is not diaphoretic. No erythema. No pallor.   Psychiatric: She has a normal mood and affect. Her behavior is normal. Judgment and thought content normal.   Nursing note and vitals reviewed.      Significant Labs:  BMP:   Recent Labs   Lab 11/07/19  0342   *   *   K 4.1   CL 90*   CO2 22*   BUN 28*   CREATININE 1.1   CALCIUM 7.6*   MG 2.8*     CBC:   Recent Labs   Lab 11/07/19  0342   WBC 14.46*   RBC 2.90*   HGB 8.4*   HCT 24.6*   PLT 60*   MCV 85   MCH 29.0   MCHC 34.1       Significant Diagnostics:  I have reviewed all pertinent imaging results/findings within the past 24 hours.    Assessment/Plan:     S/P CABG x 3       11/01/2019  The patient is post-operative day 1, status-post coronary artery bypass grafting x 3.  Overall the patient is making progress, however IABP and inotropic agents will continue today.    Neuro:  The patient is awake alert and oriented x3.  Neuro exam is nonfocal.  Pain is well controlled with IV pain medication.  Cardiac:  Patient is hemodynamically improving. Patient remains on epinephrine and milrinone gtts, which will be weaned over the next 24 hours.  Cardiac index is improving. IABP will remain in place over the next 24 hours.  Patient will be started on beta blockers once gtts are weaned off.  Respiratory: Good sats on high flow nasal cannula. Continue respiratory toilet, continue incentive spirometer. Wean to low flow nasal cannula.  GI:  Diet: sips of water with medications, will slowly advance diet as tolerated. Benign abdominal exam.  Renal:  Urine output is slowly improving. Cr 0.9. K 4.3. Mag 1.6. Replace mag. Continue IV diuresis. Add metolazone.   Heme:  Hct 34.1  Platelet 82. Continue aspirin. Discontinue Plavix for now.  Id:  WBC 12. Tmax 100.3. Continue post-operative antibiotics. Will continue to follow.   Endocrine:  Glucose is controlled with insulin gtt.  Continue insulin gtt for now, until weaned off inotropic agents.   Activities:  Patient is currently bed bound due to IABP.  Advance activities as tolerated once IABP is removed.   Lines tubes and drains:  Continue IABP. Continue Is[place] and Cordis.  Continue A-line.  Chest tubes will continue. NICOLASA x 2 will continue. Jones catheter will continue.  Continue pacer wires. Continue prevena wound vac.  Plan: Remain ICU.          11/02/2019  The patient is post-operative day 2, status-post coronary artery bypass grafting x 3.  Overall the patient is making progress, IABP removed and inotropic agents discontinued.   Neuro:  The patient is awake alert and oriented x3.  Neuro exam is nonfocal.  Pain is well controlled. Discontinue fentanyl.   Cardiac:  Patient is hemodynamically improving. Inotropic gtts discontinued. Excellent cardiac index. IABP discontinued.  Patient will be started on beta blockers today.  Respiratory: Good sats on  high flow nasal cannula. Continue respiratory toilet, continue incentive spirometer. Wean to low flow nasal cannula.  GI:  Advance diabetic/cardiac diet as tolerated. Benign abdominal exam.  Renal:  Urine output is improving. Cr 0.9. K 4.9. Mag 2.2. Continue IV diuresis. Hold AM K.   Heme:  Hct 28.3  Platelet 47. Discontinue aspirin. Discontinue Plavix. HIT panel. Consult hematology r/t thrombocytopenia.   Id:  WBC 10.4. Tmax 99.4. Will continue to follow.   Endocrine:  Discontinue insulin gtt. Start sliding scale insulin. Start long acting insulin detemir.   Activities: Advance activities as tolerated.   Lines tubes and drains:  Discontinue IABP. Discontinue [place] and Cordis.  Discontinue A-line.  Discontinue chest tubes x 3. Discontinue NICOLASA x 2. Discontinue arterial line. Jones catheter will continue for now.  Continue pacer wires. Continue prevena wound vac. Place PICC line.   Plan: Remain ICU.           11/03/2019  The patient is post-operative day 3, status-post coronary artery bypass grafting x 3.  Overall the patient is making progress.  Neuro:  The patient is awake alert and oriented x3.  Neuro exam is nonfocal.  Pain is well controlled. Continue tramadol.   Cardiac:  Patient is hemodynamically stable. Increase metoprolol dose. Start amlodipine.   Respiratory: Good sats on nasal cannula. Continue respiratory toilet, continue incentive spirometer. Wean to room air.  GI:  Advance diabetic/cardiac diet as tolerated. Benign abdominal exam. Passing gas, no BM. Add 1 bottle of mag citrate.   Renal:  Good urine output. Cr 0.9. K 3.8. Mag 2.0. Replace K. Replace Mag. Change to PO diuresis.   Heme:  Hct 34.7  Platelet 89. Restart aspirin. Continue to hold Plavix. HIT panel pending. Hematology following.  Id:  WBC 13.8. Tmax 99.4. Will continue to follow.    Endocrine:  Glucose controlled on sliding scale insulin. Increase long acting insulin detemir dose.   Activities: Advance activities as tolerated.   Lines tubes and  drains:  Storm catheter will continue for now.  Continue pacer wires. Continue prevena wound vac. Continue PICC line.   Plan: Remain ICU.          11/04/2019  The patient is post-operative day 4, status-post coronary artery bypass grafting x 3.  Overall the patient is making progress.  Neuro:  The patient is awake alert and oriented x3.  Neuro exam is nonfocal.  Pain is well controlled. Continue tramadol.   Cardiac:  Patient is hemodynamically stable. Continue metoprolol dose. Continue amlodipine dose.   Respiratory: Good sats on room air. Continue respiratory toilet, continue incentive spirometer.  GI:  Advance diabetic/cardiac diet as tolerated. Benign abdominal exam. Regular bowel movements.   Renal:  Good urine output. Cr 0.7. K 4.0. Mag 2.3. Replace K. Decrease PO diuresis.   Heme:  Hct 34.4  Platelet 140. Continue aspirin. Restart Plavix. HIT panel pending. Hematology following.  Id:  WBC down to 13.13. Tmax 98. Will continue to follow.    Endocrine:  Glucose controlled on sliding scale insulin. Increased long acting insulin detemir dose.   Activities: Advance activities as tolerated.   Lines tubes and drains:  Discontinued storm catheter. Continue pacer wires. Continue prevena wound vac. Continue PICC line.   Plan: Transfer to telemetry. Anticipate discharge to SNF in 48 hours.         11/05/2019  The patient is post-operative day 5, status-post coronary artery bypass grafting x 3.  Overall the patient is making progress.  Neuro:  The patient is awake alert and oriented x3.  Neuro exam is nonfocal. Patient slow to answer questions, however answers are appropriate.  Pain is well controlled. Continue tramadol PRN.   Cardiac:  Patient is hemodynamically stable. Continue metoprolol dose. Discontinue amlodipine. Start losartan.    Respiratory: Good sats on room air. Continue respiratory toilet, continue incentive spirometer.  GI:  Advance diabetic/cardiac diet as tolerated. Benign abdominal exam. Regular bowel  movements.   Renal:  Good urine output. Cr 0.7. K 4.8. Mag 2.1. Replace Mag. Discontinue PO diuresis.   Heme:  Hct 39.5  Platelet 73. Continue aspirin. Hold Plavix. HIT panel pending. Hematology following.  Id:  WBC is up to 16. Tmax 99. Pan-culture. DC PICC line. Chest xray. Start broad spectrum empiric antibiotics. Will continue to follow.    Endocrine:  Glucose controlled on sliding scale insulin. Decrease long acting insulin detemir dose.   Activities: Advance activities as tolerated.   Lines tubes and drains:  Discontinue pacer wires. Discontinue prevena wound vac. Continue PICC line.   Plan: Anticipate discharge to SNF in 24-48 hours.         11/06/2019  The patient is post-operative day 6, status-post coronary artery bypass grafting x 3. Patient had an episode of decompensation and unresponsiveness yesterday evening. The patient was found unresponsive, cool, and diaphoretic, with agonal breathing. Patient was intubated and transferred to the ICU. Bedside EKG and echocardiogram performed at the bedside showed no significant changes. CT head, chest, abdomen pelvis performed, showed no significant findings.     Neuro:  The patient is intubated and sedated. On Precedex gtt. Begin weaning off sedation.    Cardiac:  Patient is hemodynamically improving. Inotropic agent dependent. Currently on Epinephrine gtt. Start Milrinone gtt. Discontinue metoprolol. Discontinue losartan.    Respiratory: Ventilator dependent. tV 450, RR 20, PEEP 5. Wean down sedation. Wean off ventilator today.    GI:  Begin enteral tube feedings. Benign abdominal exam. Previously having regular bowel movements.   Renal: Adequate urine output. Cr 1.1. K 3.6. Mag 1.7. Replace Mag. Replace K. Discontinue diuresis.   Heme:  Hct 31.9  Platelet 89. Hold aspirin. Hold Plavix. HIT panel negative. Patient received 1 units PRBC last night. Hematology following.  Id:  WBC is down to 15.5. Tmax 99.9. BC NGTD. Urinalysis negative. Sputum culture negative.  Vancomycin and zosyn day 2. Will continue to follow closely.    Endocrine:  Glucose controlled on insulin gtt, continue at this time.   Activities: Intubated and sedated.   Musculoskeletal: Unable to find right radial pulse via doppler. Tips of the fingers of the right hand are mottled, dusky, appear cyanotic. Recent right radial arterial line in place, which has been relocated. US of arteries and veins of the right arm ordered. Consult to vascular surgery placed.    Lines tubes and drains:  Continue swan and cordis. Continue arterial line. Continue storm.    Plan: Remain ICU         11/07/2019  The patient is post-operative day 7, status-post coronary artery bypass grafting x 3. Patient had an episode of decompensation and unresponsiveness the evening of 11/05/2019. The patient was found unresponsive, cool, and diaphoretic, with agonal breathing. Patient was intubated and transferred to the ICU. Bedside EKG and echocardiogram performed at the bedside showed no significant changes. CT head, chest, abdomen pelvis performed, showed no significant findings.     Neuro:  The patient is intubated and sedated. On Precedex gtt. Wean off sedation.    Cardiac:  Patient is hemodynamically improving. Excellent cardiac index. Wean off Inotropic gtts.   Respiratory: Wean towards extubation. tV 400, RR 18, PEEP 5.   GI:  Continue enteral tube feedings while intubated. Benign abdominal exam.  Renal: Adequate urine output. Cr 1.1. K 4.1. Mag 2.8. Start IV diuresis.   Heme:  Hct  24.6  Platelet  60. Hold aspirin. Hold Plavix. HIT panel negative.  Id:  WBC is down to 14.46. Tmax 100.8. BC NGTD. Urinalysis negative. Sputum culture negative. Vancomycin and zosyn day 3. Midline sternal incision appears reddened, with induration at skin edges. Will continue to follow closely.    Endocrine:  Glucose controlled on sliding scale insulin.   Activities: Intubated and sedated. Moves all 4. Full ROM.    Musculoskeletal: Able to find right radial  pulse via doppler. The right hand extending to the tips of the fingers of the right hand are mottled, dusky, appear cyanotic. Recent right radial arterial line placement, which has been relocated. US of arteries and veins of the right arm pending. Vascular surgery following, suggested possible thrombotic etiology, suggested heparin gtt, however this is contraindicated r/t s/p cabg x 3.     Lines tubes and drains:  Continue swan and cordis. Continue arterial line. Continue storm.    Plan: Remain ICU           NSTEMI (non-ST elevated myocardial infarction)  The patient is a 69-year-old female with hypertension, diabetes, hyperlipidemia, obesity, anxiety and depression, iron deficiency anemia, vitamin B12 deficiency, who presented to the emergency room with an NSTEMI.  Workup included a cardiac catheterization that shows severe multivessel coronary artery disease with proximal LAD stenosis of 70% and severely depressed ejection fraction of 20%.  The patient is a candidate for urgent coronary artery bypass grafting.  The risks and benefits of surgery were explained to the patient.  The patient verbalized understanding of the issues discussed.  She a segs the risks of surgery and has agreed to proceed with urgent coronary artery bypass grafting.  She understands that the risk of neurologic complication postoperatively is increased due to the presence of cerebral microvascular disease.        Yovani Dobbins NP  Cardiothoracic Surgery  [facility] [place]

## 2019-11-07 NOTE — SUBJECTIVE & OBJECTIVE
Interval History: Improving. Weaning in progress    Review of Systems   Unable to perform ROS: Intubated     Objective:     Vital Signs (Most Recent):  Temp: 100.2 °F (37.9 °C) (11/07/19 0500)  Pulse: 81 (11/07/19 0747)  Resp: (!) 29 (11/07/19 0747)  BP: (!) 129/42 (11/07/19 0600)  SpO2: 100 % (11/07/19 0747) Vital Signs (24h Range):  Temp:  [99.2 °F (37.3 °C)-100.8 °F (38.2 °C)] 100.2 °F (37.9 °C)  Pulse:  [] 81  Resp:  [19-29] 29  SpO2:  [94 %-100 %] 100 %  BP: ()/(28-90) 129/42     Weight: 77.8 kg (171 lb 8.3 oz)  Body mass index is 32.41 kg/m².    Intake/Output Summary (Last 24 hours) at 11/7/2019 0807  Last data filed at 11/7/2019 0630  Gross per 24 hour   Intake 2612.27 ml   Output 865 ml   Net 1747.27 ml      Physical Exam   Constitutional: She is oriented to person, place, and time. She appears well-nourished. No distress.   intubated   HENT:   Head: Normocephalic and atraumatic.   Eyes: Pupils are equal, round, and reactive to light. EOM are normal.   Neck: Normal range of motion. Neck supple. No JVD present.   Cardiovascular: Normal rate, regular rhythm, normal heart sounds and intact distal pulses. Exam reveals no gallop and no friction rub.   No murmur heard.  The right upper extremity has 2+ distal to the elbow. The right hand and fingertips are mottled, and dusky, right radial pulse found via doppler.    Pulmonary/Chest: No stridor. No respiratory distress. She has no wheezes. She has rales. She exhibits no tenderness.   Coarse breath sounds.    Abdominal: Soft. Bowel sounds are normal. She exhibits no distension and no mass. There is no tenderness. There is no rebound and no guarding. No hernia.   Genitourinary:   Genitourinary Comments: Jones in place.    Musculoskeletal: Normal range of motion. She exhibits edema. She exhibits no tenderness or deformity.   Sedated. Moves all 4. No focal deficits noted.    Neurological: She is alert and oriented to person, place, and time. She displays  normal reflexes. No cranial nerve deficit or sensory deficit. She exhibits normal muscle tone. Coordination normal.   Sedated and Intubated. However, moves all 4, full ROM. No focal deficits noted.     Skin: Skin is warm and dry. Capillary refill takes 2 to 3 seconds. No rash noted. She is not diaphoretic. No erythema. No pallor.   Nursing note and vitals reviewed.      Significant Labs:   ABGs:   Recent Labs   Lab 11/07/19  0452   PH 7.582*   PCO2 30.6*   HCO3 28.8*   POCSATURATED 98   BE 7     Blood Culture:   Recent Labs   Lab 11/05/19  1325 11/05/19  1335   LABBLOO No Growth to date  No Growth to date No Growth to date  No Growth to date     BMP:   Recent Labs   Lab 11/07/19  0342   *   *   K 4.1   CL 90*   CO2 22*   BUN 28*   CREATININE 1.1   CALCIUM 7.6*   MG 2.8*     CBC:   Recent Labs   Lab 11/06/19  0403 11/06/19  1610 11/07/19  0342   WBC 15.53*  15.53* 18.17* 14.46*   HGB 10.4*  10.4* 9.5* 8.4*   HCT 31.9*  31.9* 28.6* 24.6*   PLT 89*  89* 67* 60*     CMP:   Recent Labs   Lab 11/05/19  1541  11/05/19  1919  11/06/19  1610 11/06/19  2337 11/07/19  0342   *   < > 128*   < > 127*  127* 122* 124*   K 4.1   < > 4.0   < > 3.6  3.6 4.3 4.1      < > 94*   < > 93*  93* 90* 90*   CO2 9*   < > 22*   < > 24  24 22* 22*   *   < > 495*   < > 99  99 356* 305*   BUN 15   < > 35*   < > 25*  25* 28* 28*   CREATININE 0.6   < > 1.4   < > 0.9  0.9 1.0 1.1   CALCIUM 6.5*   < > 7.6*   < > 7.3*  7.3* 7.3* 7.6*   PROT 2.1*  --  4.8*  --   --   --   --    ALBUMIN 0.9*  --  2.7*  --   --   --   --    BILITOT 0.4  --  1.4*  --   --   --   --    ALKPHOS 34*  --  56  --   --   --   --    AST 10  --  266*  --   --   --   --    ALT <5*  --  151*  --   --   --   --    ANIONGAP 19*   < > 12   < > 10  10 10 12   EGFRNONAA >60   < > 38*   < > >60  >60 58* 51*    < > = values in this interval not displayed.     Troponin: No results for input(s): TROPONINI in the last 48 hours.  Urine Studies:    Recent Labs   Lab 11/05/19  1416   COLORU Yellow   APPEARANCEUA Clear   PHUR 7.0   SPECGRAV 1.010   PROTEINUA Negative   GLUCUA Trace*   KETONESU Trace*   BILIRUBINUA Negative   OCCULTUA Negative   NITRITE Negative   UROBILINOGEN Negative   LEUKOCYTESUR Negative     All pertinent labs within the past 24 hours have been reviewed.    Significant Imaging: I have reviewed all pertinent imaging results/findings within the past 24 hours.

## 2019-11-07 NOTE — PROGRESS NOTES
VSC/Vasc follow up    Remains intubated/sedated  Remains on epi/milrinone  Right hand remains edematous  Some demarcation of cyanosis/discoloration to radial artery distribution (thumb/index finger)  Stable palmar arch doppler/ulnar artery doppler signals    Heparin contraindicated per CTS  Cont wean epi as tolerates  F/u vas US

## 2019-11-07 NOTE — ASSESSMENT & PLAN NOTE
Post CABG  EF 30%  S/p swan catheter  CI 3.3  Repeat echo today showed EF 30%, septal hypokinesis , compared to prior echo before the cabg, no significant change  ekg reviewed    Continue milrinone gtt. Epi GTT WEAN OFF,  F/u urine output  Continue Lipitor  Continue ICU supportive care  NICARDINE AND METOPROLOL PRN FOR BP CONTROL  LASIX IV   F/U H&H AND Na

## 2019-11-07 NOTE — ASSESSMENT & PLAN NOTE
Blood culture NGTD  Urine culture NGTD  Sputum culture  Procal 4.4  Lactate Normal  CT imaging unremarkable  Afebrile  Abx: vancomycin Piperacillin tazobactam: descalate

## 2019-11-07 NOTE — SUBJECTIVE & OBJECTIVE
Review of Systems   Unable to perform ROS: Intubated         Objective:     Vital Signs (Most Recent):  Temp: 100.2 °F (37.9 °C) (11/07/19 0500)  Pulse: 91 (11/07/19 1120)  Resp: (!) 27 (11/07/19 1120)  BP: (!) 129/42 (11/07/19 0600)  SpO2: 100 % (11/07/19 1120) Vital Signs (24h Range):  Temp:  [99.2 °F (37.3 °C)-100.8 °F (38.2 °C)] 100.2 °F (37.9 °C)  Pulse:  [] 91  Resp:  [19-29] 27  SpO2:  [99 %-100 %] 100 %  BP: ()/(28-90) 129/42     Weight: 77.8 kg (171 lb 8.3 oz)  Body mass index is 32.41 kg/m².      Intake/Output Summary (Last 24 hours) at 11/7/2019 1152  Last data filed at 11/7/2019 0700  Gross per 24 hour   Intake 2440.27 ml   Output 825 ml   Net 1615.27 ml       Physical Exam   Constitutional: Vital signs are normal. She appears well-developed and well-nourished. She has a sickly appearance. She is intubated.       Peripheries cold clammy, cyanotic   HENT:   Head: Normocephalic and atraumatic.   Mouth/Throat: Oropharynx is clear and moist.   Eyes: Pupils are equal, round, and reactive to light. Conjunctivae and EOM are normal.   Neck: Normal range of motion. Neck supple.   Cardiovascular: Normal rate, regular rhythm and intact distal pulses.   No murmur heard.  Pulmonary/Chest: Effort normal and breath sounds normal. No stridor. She is intubated. No respiratory distress.   Abdominal: Soft. Bowel sounds are normal.   Musculoskeletal: Normal range of motion. She exhibits no edema.   Right hand swollen and dusk at wrist below  Doppler pulses    Neurological: No cranial nerve deficit.   Skin: Capillary refill takes 2 to 3 seconds.   Psychiatric: She has a normal mood and affect.   Nursing note and vitals reviewed.      Vents:  Vent Mode: Spont (11/07/19 1120)  Ventilator Initiated: Yes (11/05/19 1516)  Set Rate: 0 bmp (11/07/19 1120)  Vt Set: 400 mL (11/07/19 1120)  Pressure Support: 10 cmH20 (11/07/19 1120)  PEEP/CPAP: 5 cmH20 (11/07/19 1120)  Oxygen Concentration (%): 40 (11/07/19 1120)  Peak  Airway Pressure: 16 cmH2O (11/07/19 1120)  Plateau Pressure: 0 cmH20 (11/07/19 1120)  Total Ve: 14 mL (11/07/19 1120)  Negative Inspiratory Force (cm H2O): 26 (11/01/19 0250)  F/VT Ratio<105 (RSBI): (!) 56.6 (11/07/19 1120)    Lines/Drains/Airways     Central Venous Catheter Line                 Pulmonary Artery Catheter Assessment  11/05/19 1721 right internal jugular 1 day          Drain                 NG/OG Tube 11/05/19 1720 orogastric Center mouth 1 day         Urethral Catheter 11/05/19 1718 Straight-tip 1 day          Airway                 Airway - Non-Surgical 11/05/19 1452 Endotracheal Tube 1 day          Arterial Line                 Arterial Line 11/06/19 0945 Left Radial 1 day          Peripheral Intravenous Line                 Peripheral IV - Single Lumen 11/05/19 2300 18 G Left Other 1 day                Significant Labs:    CBC/Anemia Profile:  Recent Labs   Lab 11/06/19  0403 11/06/19  1610 11/07/19  0342   WBC 15.53*  15.53* 18.17* 14.46*   HGB 10.4*  10.4* 9.5* 8.4*   HCT 31.9*  31.9* 28.6* 24.6*   PLT 89*  89* 67* 60*   MCV 85  85 86 85   RDW 14.7*  14.7* 14.8* 14.7*        Chemistries:  Recent Labs   Lab 11/05/19  1541  11/05/19  1919  11/06/19  0403  11/06/19  1610 11/06/19  2337 11/07/19  0342   *   < > 128*   < > 128*  128*   < > 127*  127* 122* 124*   K 4.1   < > 4.0   < > 3.6  3.6   < > 3.6  3.6 4.3 4.1      < > 94*   < > 92*  92*   < > 93*  93* 90* 90*   CO2 9*   < > 22*   < > 24  24   < > 24  24 22* 22*   BUN 15   < > 35*   < > 29*  29*   < > 25*  25* 28* 28*   CREATININE 0.6   < > 1.4   < > 1.1  1.1   < > 0.9  0.9 1.0 1.1   CALCIUM 6.5*   < > 7.6*   < > 7.5*  7.5*   < > 7.3*  7.3* 7.3* 7.6*   ALBUMIN 0.9*  --  2.7*  --   --   --   --   --   --    PROT 2.1*  --  4.8*  --   --   --   --   --   --    BILITOT 0.4  --  1.4*  --   --   --   --   --   --    ALKPHOS 34*  --  56  --   --   --   --   --   --    ALT <5*  --  151*  --   --   --   --   --   --    AST  10  --  266*  --   --   --   --   --   --    MG  --   --   --   --  1.7  --   --   --  2.8*    < > = values in this interval not displayed.        ABGs:   Recent Labs   Lab 11/07/19  0452   PH 7.582*   PCO2 30.6*   HCO3 28.8*   POCSATURATED 98   BE 7     Blood Culture:   Recent Labs   Lab 11/05/19  1325 11/05/19  1335   LABBLOO No Growth to date  No Growth to date No Growth to date  No Growth to date     Cardiac Markers: No results for input(s): CKMB, TROPONINT, MYOGLOBIN in the last 48 hours.  Coagulation: No results for input(s): PT, INR, APTT in the last 48 hours.  Lactic Acid:   Recent Labs   Lab 11/05/19  1541 11/05/19  1919 11/06/19  0732   LACTATE >12.0* 1.6 2.3*     POCT Glucose:   Recent Labs   Lab 11/07/19  0852 11/07/19  1041 11/07/19  1138   POCTGLUCOSE 152* 186* 184*     Respiratory Culture:   Recent Labs   Lab 11/05/19 1912   GSRESP <10 epithelial cells per low power field.  Rare WBC's  Rare Gram positive cocci   RESPIRATORYC Normal respiratory araceli     Urine Culture: No results for input(s): LABURIN in the last 48 hours.  All pertinent labs within the past 24 hours have been reviewed.    Significant Imaging:  I have reviewed and interpreted all pertinent imaging results/findings within the past 24 hours.     CXR  11/06 reviewed  TECHNIQUE:  AP view of the chest was performed.    COMPARISON:  11/05/2019    FINDINGS:  No significant change in the position of the supporting tubes and lines.  Stable heart size.  Aortic atherosclerosis.  Status post CABG.  No acute infiltrates.    Repeat ECHO     1 - Concentric hypertrophy.     2 - Wall motion abnormalities.     3 - Moderately depressed left ventricular systolic function (EF 30-35%).     4 - Impaired LV relaxation, normal LAP (grade 1 diastolic dysfunction).     5 - Normal right ventricular systolic function .     6 - The estimated PA systolic pressure is 33 mmHg.

## 2019-11-07 NOTE — ASSESSMENT & PLAN NOTE
11/01/2019  The patient is post-operative day 1, status-post coronary artery bypass grafting x 3.  Overall the patient is making progress, however IABP and inotropic agents will continue today.   Neuro:  The patient is awake alert and oriented x3.  Neuro exam is nonfocal.  Pain is well controlled with IV pain medication.  Cardiac:  Patient is hemodynamically improving. Patient remains on epinephrine and milrinone gtts, which will be weaned over the next 24 hours.  Cardiac index is improving. IABP will remain in place over the next 24 hours.  Patient will be started on beta blockers once gtts are weaned off.  Respiratory: Good sats on high flow nasal cannula. Continue respiratory toilet, continue incentive spirometer. Wean to low flow nasal cannula.  GI:  Diet: sips of water with medications, will slowly advance diet as tolerated. Benign abdominal exam.  Renal:  Urine output is slowly improving. Cr 0.9. K 4.3. Mag 1.6. Replace mag. Continue IV diuresis. Add metolazone.   Heme:  Hct 34.1  Platelet 82. Continue aspirin. Discontinue Plavix for now.  Id:  WBC 12. Tmax 100.3. Continue post-operative antibiotics. Will continue to follow.   Endocrine:  Glucose is controlled with insulin gtt.  Continue insulin gtt for now, until weaned off inotropic agents.   Activities:  Patient is currently bed bound due to IABP.  Advance activities as tolerated once IABP is removed.   Lines tubes and drains:  Continue IABP. Continue [place] and Cordis.  Continue A-line.  Chest tubes will continue. NICOLAAS x 2 will continue. Jones catheter will continue.  Continue pacer wires. Continue prevena wound vac.  Plan: Remain ICU.          11/02/2019  The patient is post-operative day 2, status-post coronary artery bypass grafting x 3.  Overall the patient is making progress, IABP removed and inotropic agents discontinued.   Neuro:  The patient is awake alert and oriented x3.  Neuro exam is nonfocal.  Pain is well controlled. Discontinue fentanyl.    Cardiac:  Patient is hemodynamically improving. Inotropic gtts discontinued. Excellent cardiac index. IABP discontinued.  Patient will be started on beta blockers today.  Respiratory: Good sats on high flow nasal cannula. Continue respiratory toilet, continue incentive spirometer. Wean to low flow nasal cannula.  GI:  Advance diabetic/cardiac diet as tolerated. Benign abdominal exam.  Renal:  Urine output is improving. Cr 0.9. K 4.9. Mag 2.2. Continue IV diuresis. Hold AM K.   Heme:  Hct 28.3  Platelet 47. Discontinue aspirin. Discontinue Plavix. HIT panel. Consult hematology r/t thrombocytopenia.   Id:  WBC 10.4. Tmax 99.4. Will continue to follow.   Endocrine:  Discontinue insulin gtt. Start sliding scale insulin. Start long acting insulin detemir.   Activities: Advance activities as tolerated.   Lines tubes and drains:  Discontinue IABP. Discontinue [place] and Cordis.  Discontinue A-line.  Discontinue chest tubes x 3. Discontinue NICOLASA x 2. Discontinue arterial line. Jones catheter will continue for now.  Continue pacer wires. Continue prevena wound vac. Place PICC line.   Plan: Remain ICU.           11/03/2019  The patient is post-operative day 3, status-post coronary artery bypass grafting x 3.  Overall the patient is making progress.  Neuro:  The patient is awake alert and oriented x3.  Neuro exam is nonfocal.  Pain is well controlled. Continue tramadol.   Cardiac:  Patient is hemodynamically stable. Increase metoprolol dose. Start amlodipine.   Respiratory: Good sats on nasal cannula. Continue respiratory toilet, continue incentive spirometer. Wean to room air.  GI:  Advance diabetic/cardiac diet as tolerated. Benign abdominal exam. Passing gas, no BM. Add 1 bottle of mag citrate.   Renal:  Good urine output. Cr 0.9. K 3.8. Mag 2.0. Replace K. Replace Mag. Change to PO diuresis.   Heme:  Hct 34.7  Platelet 89. Restart aspirin. Continue to hold Plavix. HIT panel pending. Hematology following.  Id:  WBC 13.8. Tmax  99.4. Will continue to follow.    Endocrine:  Glucose controlled on sliding scale insulin. Increase long acting insulin detemir dose.   Activities: Advance activities as tolerated.   Lines tubes and drains:  Storm catheter will continue for now.  Continue pacer wires. Continue prevena wound vac. Continue PICC line.   Plan: Remain ICU.          11/04/2019  The patient is post-operative day 4, status-post coronary artery bypass grafting x 3.  Overall the patient is making progress.  Neuro:  The patient is awake alert and oriented x3.  Neuro exam is nonfocal.  Pain is well controlled. Continue tramadol.   Cardiac:  Patient is hemodynamically stable. Continue metoprolol dose. Continue amlodipine dose.   Respiratory: Good sats on room air. Continue respiratory toilet, continue incentive spirometer.  GI:  Advance diabetic/cardiac diet as tolerated. Benign abdominal exam. Regular bowel movements.   Renal:  Good urine output. Cr 0.7. K 4.0. Mag 2.3. Replace K. Decrease PO diuresis.   Heme:  Hct 34.4  Platelet 140. Continue aspirin. Restart Plavix. HIT panel pending. Hematology following.  Id:  WBC down to 13.13. Tmax 98. Will continue to follow.    Endocrine:  Glucose controlled on sliding scale insulin. Increased long acting insulin detemir dose.   Activities: Advance activities as tolerated.   Lines tubes and drains:  Discontinued storm catheter. Continue pacer wires. Continue prevena wound vac. Continue PICC line.   Plan: Transfer to telemetry. Anticipate discharge to SNF in 48 hours.         11/05/2019  The patient is post-operative day 5, status-post coronary artery bypass grafting x 3.  Overall the patient is making progress.  Neuro:  The patient is awake alert and oriented x3.  Neuro exam is nonfocal. Patient slow to answer questions, however answers are appropriate.  Pain is well controlled. Continue tramadol PRN.   Cardiac:  Patient is hemodynamically stable. Continue metoprolol dose. Discontinue amlodipine. Start  losartan.    Respiratory: Good sats on room air. Continue respiratory toilet, continue incentive spirometer.  GI:  Advance diabetic/cardiac diet as tolerated. Benign abdominal exam. Regular bowel movements.   Renal:  Good urine output. Cr 0.7. K 4.8. Mag 2.1. Replace Mag. Discontinue PO diuresis.   Heme:  Hct 39.5  Platelet 73. Continue aspirin. Hold Plavix. HIT panel pending. Hematology following.  Id:  WBC is up to 16. Tmax 99. Pan-culture. DC PICC line. Chest xray. Start broad spectrum empiric antibiotics. Will continue to follow.    Endocrine:  Glucose controlled on sliding scale insulin. Decrease long acting insulin detemir dose.   Activities: Advance activities as tolerated.   Lines tubes and drains:  Discontinue pacer wires. Discontinue prevena wound vac. Continue PICC line.   Plan: Anticipate discharge to SNF in 24-48 hours.         11/06/2019  The patient is post-operative day 6, status-post coronary artery bypass grafting x 3. Patient had an episode of decompensation and unresponsiveness yesterday evening. The patient was found unresponsive, cool, and diaphoretic, with agonal breathing. Patient was intubated and transferred to the ICU. Bedside EKG and echocardiogram performed at the bedside showed no significant changes. CT head, chest, abdomen pelvis performed, showed no significant findings.     Neuro:  The patient is intubated and sedated. On Precedex gtt. Begin weaning off sedation.    Cardiac:  Patient is hemodynamically improving. Inotropic agent dependent. Currently on Epinephrine gtt. Start Milrinone gtt. Discontinue metoprolol. Discontinue losartan.    Respiratory: Ventilator dependent. tV 450, RR 20, PEEP 5. Wean down sedation. Wean off ventilator today.    GI:  Begin enteral tube feedings. Benign abdominal exam. Previously having regular bowel movements.   Renal: Adequate urine output. Cr 1.1. K 3.6. Mag 1.7. Replace Mag. Replace K. Discontinue diuresis.   Heme:  Hct 31.9  Platelet 89. Hold  aspirin. Hold Plavix. HIT panel negative. Patient received 1 units PRBC last night. Hematology following.  Id:  WBC is down to 15.5. Tmax 99.9. BC NGTD. Urinalysis negative. Sputum culture negative. Vancomycin and zosyn day 2. Will continue to follow closely.    Endocrine:  Glucose controlled on insulin gtt, continue at this time.   Activities: Intubated and sedated.   Musculoskeletal: Unable to find right radial pulse via doppler. Tips of the fingers of the right hand are mottled, dusky, appear cyanotic. Recent right radial arterial line in place, which has been relocated. US of arteries and veins of the right arm ordered. Consult to vascular surgery placed.    Lines tubes and drains:  Continue swan and cordis. Continue arterial line. Continue storm.    Plan: Remain ICU         11/07/2019  The patient is post-operative day 7, status-post coronary artery bypass grafting x 3. Patient had an episode of decompensation and unresponsiveness the evening of 11/05/2019. The patient was found unresponsive, cool, and diaphoretic, with agonal breathing. Patient was intubated and transferred to the ICU. Bedside EKG and echocardiogram performed at the bedside showed no significant changes. CT head, chest, abdomen pelvis performed, showed no significant findings.     Neuro:  The patient is intubated and sedated. On Precedex gtt. Wean off sedation.    Cardiac:  Patient is hemodynamically improving. Excellent cardiac index. Wean off Inotropic gtts.   Respiratory: Wean towards extubation. tV 400, RR 18, PEEP 5.   GI:  Continue enteral tube feedings while intubated. Benign abdominal exam.  Renal: Adequate urine output. Cr 1.1. K 4.1. Mag 2.8. Start IV diuresis.   Heme:  Hct 24.6  Platelet 60. Hold aspirin. Hold Plavix. HIT panel negative.  Id:  WBC is down to 14.46. Tmax 100.8. BC NGTD. Urinalysis negative. Sputum culture negative. Vancomycin and zosyn day 3. Midline sternal incision appears reddened, with induration at skin edges.  Will continue to follow closely.    Endocrine:  Glucose controlled on sliding scale insulin.   Activities: Intubated and sedated. Moves all 4. Full ROM.    Musculoskeletal: Able to find right radial pulse via doppler. The right hand extending to the tips of the fingers of the right hand are mottled, dusky, appear cyanotic. Recent right radial arterial line placement, which has been relocated. US of arteries and veins of the right arm pending. Vascular surgery following, suggested possible thrombotic etiology, suggested heparin gtt, however this is contraindicated r/t s/p cabg x 3.     Lines tubes and drains:  Continue swan and cordis. Continue arterial line. Continue storm.    Plan: Remain ICU

## 2019-11-08 PROBLEM — I82.621 ACUTE DEEP VEIN THROMBOSIS (DVT) OF RIGHT UPPER EXTREMITY: Status: ACTIVE | Noted: 2019-11-08

## 2019-11-08 PROBLEM — I82.90 ACUTE DEEP VEIN THROMBOSIS (DVT) OF NON-EXTREMITY VEIN: Status: ACTIVE | Noted: 2019-11-08

## 2019-11-08 PROBLEM — R65.21 SEPTIC SHOCK: Status: RESOLVED | Noted: 2019-11-05 | Resolved: 2019-11-08

## 2019-11-08 PROBLEM — G72.81 CRITICAL ILLNESS MYOPATHY: Status: ACTIVE | Noted: 2019-11-08

## 2019-11-08 PROBLEM — A41.9 SEPTIC SHOCK: Status: RESOLVED | Noted: 2019-11-05 | Resolved: 2019-11-08

## 2019-11-08 LAB
ALBUMIN SERPL BCP-MCNC: 2.2 G/DL (ref 3.5–5.2)
ALP SERPL-CCNC: 73 U/L (ref 55–135)
ALT SERPL W/O P-5'-P-CCNC: 516 U/L (ref 10–44)
AMMONIA PLAS-SCNC: 22 UMOL/L (ref 10–50)
ANION GAP SERPL CALC-SCNC: 12 MMOL/L (ref 8–16)
ANION GAP SERPL CALC-SCNC: 13 MMOL/L (ref 8–16)
ANION GAP SERPL CALC-SCNC: 13 MMOL/L (ref 8–16)
ANION GAP SERPL CALC-SCNC: 14 MMOL/L (ref 8–16)
APTT BLDCRRT: 69.2 SEC (ref 21–32)
APTT BLDCRRT: 76.8 SEC (ref 21–32)
APTT BLDCRRT: 78.8 SEC (ref 21–32)
APTT BLDCRRT: 80.6 SEC (ref 21–32)
APTT BLDCRRT: 97.8 SEC (ref 21–32)
APTT BLDCRRT: NORMAL SEC (ref 21–32)
AST SERPL-CCNC: 819 U/L (ref 10–40)
BACTERIA SPEC AEROBE CULT: NORMAL
BACTERIA SPEC AEROBE CULT: NORMAL
BASOPHILS # BLD AUTO: 0.03 K/UL (ref 0–0.2)
BASOPHILS # BLD AUTO: 0.03 K/UL (ref 0–0.2)
BASOPHILS NFR BLD: 0.2 % (ref 0–1.9)
BASOPHILS NFR BLD: 0.2 % (ref 0–1.9)
BILIRUB DIRECT SERPL-MCNC: 0.6 MG/DL (ref 0.1–0.3)
BILIRUB SERPL-MCNC: 1.2 MG/DL (ref 0.1–1)
BUN SERPL-MCNC: 19 MG/DL (ref 8–23)
BUN SERPL-MCNC: 19 MG/DL (ref 8–23)
BUN SERPL-MCNC: 20 MG/DL (ref 8–23)
BUN SERPL-MCNC: 21 MG/DL (ref 8–23)
CALCIUM SERPL-MCNC: 8.2 MG/DL (ref 8.7–10.5)
CALCIUM SERPL-MCNC: 8.3 MG/DL (ref 8.7–10.5)
CALCIUM SERPL-MCNC: 8.4 MG/DL (ref 8.7–10.5)
CALCIUM SERPL-MCNC: 8.6 MG/DL (ref 8.7–10.5)
CHLORIDE SERPL-SCNC: 96 MMOL/L (ref 95–110)
CHLORIDE SERPL-SCNC: 99 MMOL/L (ref 95–110)
CO2 SERPL-SCNC: 20 MMOL/L (ref 23–29)
CO2 SERPL-SCNC: 22 MMOL/L (ref 23–29)
CO2 SERPL-SCNC: 22 MMOL/L (ref 23–29)
CO2 SERPL-SCNC: 23 MMOL/L (ref 23–29)
CREAT SERPL-MCNC: 0.8 MG/DL (ref 0.5–1.4)
CREAT SERPL-MCNC: 0.9 MG/DL (ref 0.5–1.4)
DIFFERENTIAL METHOD: ABNORMAL
DIFFERENTIAL METHOD: ABNORMAL
EOSINOPHIL # BLD AUTO: 0.1 K/UL (ref 0–0.5)
EOSINOPHIL # BLD AUTO: 0.2 K/UL (ref 0–0.5)
EOSINOPHIL NFR BLD: 0.4 % (ref 0–8)
EOSINOPHIL NFR BLD: 1.3 % (ref 0–8)
ERYTHROCYTE [DISTWIDTH] IN BLOOD BY AUTOMATED COUNT: 15.2 % (ref 11.5–14.5)
ERYTHROCYTE [DISTWIDTH] IN BLOOD BY AUTOMATED COUNT: 15.4 % (ref 11.5–14.5)
EST. GFR  (AFRICAN AMERICAN): >60 ML/MIN/1.73 M^2
EST. GFR  (NON AFRICAN AMERICAN): >60 ML/MIN/1.73 M^2
GLUCOSE SERPL-MCNC: 131 MG/DL (ref 70–110)
GLUCOSE SERPL-MCNC: 176 MG/DL (ref 70–110)
GLUCOSE SERPL-MCNC: 242 MG/DL (ref 70–110)
GLUCOSE SERPL-MCNC: 309 MG/DL (ref 70–110)
GRAM STN SPEC: NORMAL
HCT VFR BLD AUTO: 25.8 % (ref 37–48.5)
HCT VFR BLD AUTO: 26.4 % (ref 37–48.5)
HGB BLD-MCNC: 8.3 G/DL (ref 12–16)
HGB BLD-MCNC: 8.6 G/DL (ref 12–16)
IMM GRANULOCYTES # BLD AUTO: 0.18 K/UL (ref 0–0.04)
IMM GRANULOCYTES # BLD AUTO: 0.19 K/UL (ref 0–0.04)
IMM GRANULOCYTES NFR BLD AUTO: 1.3 % (ref 0–0.5)
IMM GRANULOCYTES NFR BLD AUTO: 1.5 % (ref 0–0.5)
LYMPHOCYTES # BLD AUTO: 0.7 K/UL (ref 1–4.8)
LYMPHOCYTES # BLD AUTO: 0.8 K/UL (ref 1–4.8)
LYMPHOCYTES NFR BLD: 4.7 % (ref 18–48)
LYMPHOCYTES NFR BLD: 6.4 % (ref 18–48)
MAGNESIUM SERPL-MCNC: 2.1 MG/DL (ref 1.6–2.6)
MCH RBC QN AUTO: 28.5 PG (ref 27–31)
MCH RBC QN AUTO: 28.8 PG (ref 27–31)
MCHC RBC AUTO-ENTMCNC: 32.2 G/DL (ref 32–36)
MCHC RBC AUTO-ENTMCNC: 32.6 G/DL (ref 32–36)
MCV RBC AUTO: 88 FL (ref 82–98)
MCV RBC AUTO: 89 FL (ref 82–98)
MONOCYTES # BLD AUTO: 0.7 K/UL (ref 0.3–1)
MONOCYTES # BLD AUTO: 0.8 K/UL (ref 0.3–1)
MONOCYTES NFR BLD: 5.1 % (ref 4–15)
MONOCYTES NFR BLD: 6 % (ref 4–15)
NEUTROPHILS # BLD AUTO: 10.2 K/UL (ref 1.8–7.7)
NEUTROPHILS # BLD AUTO: 12.9 K/UL (ref 1.8–7.7)
NEUTROPHILS NFR BLD: 84.6 % (ref 38–73)
NEUTROPHILS NFR BLD: 88.3 % (ref 38–73)
NRBC BLD-RTO: 0 /100 WBC
NRBC BLD-RTO: 0 /100 WBC
PLATELET # BLD AUTO: 45 K/UL (ref 150–350)
PLATELET # BLD AUTO: 46 K/UL (ref 150–350)
PMV BLD AUTO: 11.1 FL (ref 9.2–12.9)
PMV BLD AUTO: 11.7 FL (ref 9.2–12.9)
POCT GLUCOSE: 161 MG/DL (ref 70–110)
POCT GLUCOSE: 194 MG/DL (ref 70–110)
POCT GLUCOSE: 237 MG/DL (ref 70–110)
POCT GLUCOSE: 273 MG/DL (ref 70–110)
POCT GLUCOSE: 305 MG/DL (ref 70–110)
POTASSIUM SERPL-SCNC: 3.6 MMOL/L (ref 3.5–5.1)
POTASSIUM SERPL-SCNC: 3.7 MMOL/L (ref 3.5–5.1)
POTASSIUM SERPL-SCNC: 3.8 MMOL/L (ref 3.5–5.1)
POTASSIUM SERPL-SCNC: 4.1 MMOL/L (ref 3.5–5.1)
PROT SERPL-MCNC: 5.3 G/DL (ref 6–8.4)
RBC # BLD AUTO: 2.91 M/UL (ref 4–5.4)
RBC # BLD AUTO: 2.99 M/UL (ref 4–5.4)
SODIUM SERPL-SCNC: 131 MMOL/L (ref 136–145)
SODIUM SERPL-SCNC: 133 MMOL/L (ref 136–145)
SODIUM SERPL-SCNC: 134 MMOL/L (ref 136–145)
SODIUM SERPL-SCNC: 134 MMOL/L (ref 136–145)
VANCOMYCIN SERPL-MCNC: 12.6 UG/ML
WBC # BLD AUTO: 12.08 K/UL (ref 3.9–12.7)
WBC # BLD AUTO: 14.57 K/UL (ref 3.9–12.7)

## 2019-11-08 PROCEDURE — 99233 SBSQ HOSP IP/OBS HIGH 50: CPT | Mod: ,,, | Performed by: INTERNAL MEDICINE

## 2019-11-08 PROCEDURE — 27000221 HC OXYGEN, UP TO 24 HOURS

## 2019-11-08 PROCEDURE — 99233 PR SUBSEQUENT HOSPITAL CARE,LEVL III: ICD-10-PCS | Mod: ,,, | Performed by: INTERNAL MEDICINE

## 2019-11-08 PROCEDURE — 25000003 PHARM REV CODE 250: Performed by: INTERNAL MEDICINE

## 2019-11-08 PROCEDURE — 80048 BASIC METABOLIC PNL TOTAL CA: CPT | Mod: 91

## 2019-11-08 PROCEDURE — 86022 PLATELET ANTIBODIES: CPT

## 2019-11-08 PROCEDURE — 86022 PLATELET ANTIBODIES: CPT | Mod: 91

## 2019-11-08 PROCEDURE — 97530 THERAPEUTIC ACTIVITIES: CPT

## 2019-11-08 PROCEDURE — 25000003 PHARM REV CODE 250: Performed by: NURSE PRACTITIONER

## 2019-11-08 PROCEDURE — 82140 ASSAY OF AMMONIA: CPT

## 2019-11-08 PROCEDURE — 25000003 PHARM REV CODE 250: Performed by: THORACIC SURGERY (CARDIOTHORACIC VASCULAR SURGERY)

## 2019-11-08 PROCEDURE — 83735 ASSAY OF MAGNESIUM: CPT

## 2019-11-08 PROCEDURE — 85025 COMPLETE CBC W/AUTO DIFF WBC: CPT | Mod: 91

## 2019-11-08 PROCEDURE — 85730 THROMBOPLASTIN TIME PARTIAL: CPT | Mod: 91

## 2019-11-08 PROCEDURE — 80076 HEPATIC FUNCTION PANEL: CPT

## 2019-11-08 PROCEDURE — 85730 THROMBOPLASTIN TIME PARTIAL: CPT

## 2019-11-08 PROCEDURE — S0028 INJECTION, FAMOTIDINE, 20 MG: HCPCS | Performed by: INTERNAL MEDICINE

## 2019-11-08 PROCEDURE — 63600175 PHARM REV CODE 636 W HCPCS: Performed by: INTERNAL MEDICINE

## 2019-11-08 PROCEDURE — 99291 CRITICAL CARE FIRST HOUR: CPT | Mod: ,,, | Performed by: INTERNAL MEDICINE

## 2019-11-08 PROCEDURE — 20000000 HC ICU ROOM

## 2019-11-08 PROCEDURE — 63600175 PHARM REV CODE 636 W HCPCS: Mod: JG | Performed by: NURSE PRACTITIONER

## 2019-11-08 PROCEDURE — 80202 ASSAY OF VANCOMYCIN: CPT

## 2019-11-08 PROCEDURE — 99291 PR CRITICAL CARE, E/M 30-74 MINUTES: ICD-10-PCS | Mod: ,,, | Performed by: INTERNAL MEDICINE

## 2019-11-08 PROCEDURE — 63600175 PHARM REV CODE 636 W HCPCS: Performed by: THORACIC SURGERY (CARDIOTHORACIC VASCULAR SURGERY)

## 2019-11-08 RX ORDER — VANCOMYCIN HCL IN 5 % DEXTROSE 1G/250ML
1000 PLASTIC BAG, INJECTION (ML) INTRAVENOUS ONCE
Status: COMPLETED | OUTPATIENT
Start: 2019-11-08 | End: 2019-11-08

## 2019-11-08 RX ORDER — ARGATROBAN 1 MG/ML
1 INJECTION INTRAVENOUS CONTINUOUS
Status: DISCONTINUED | OUTPATIENT
Start: 2019-11-08 | End: 2019-11-08

## 2019-11-08 RX ORDER — METOPROLOL TARTRATE 25 MG/1
12.5 TABLET ORAL 2 TIMES DAILY
Status: DISCONTINUED | OUTPATIENT
Start: 2019-11-08 | End: 2019-11-09

## 2019-11-08 RX ORDER — AMLODIPINE BESYLATE 5 MG/1
5 TABLET ORAL DAILY
Status: DISCONTINUED | OUTPATIENT
Start: 2019-11-08 | End: 2019-11-09

## 2019-11-08 RX ADMIN — POTASSIUM CHLORIDE 10 MEQ: 750 TABLET, EXTENDED RELEASE ORAL at 08:11

## 2019-11-08 RX ADMIN — INSULIN ASPART 6 UNITS: 100 INJECTION, SOLUTION INTRAVENOUS; SUBCUTANEOUS at 08:11

## 2019-11-08 RX ADMIN — Medication 2 G: at 06:11

## 2019-11-08 RX ADMIN — INSULIN ASPART 1 UNITS: 100 INJECTION, SOLUTION INTRAVENOUS; SUBCUTANEOUS at 04:11

## 2019-11-08 RX ADMIN — PIPERACILLIN AND TAZOBACTAM 4.5 G: 4; .5 INJECTION, POWDER, FOR SOLUTION INTRAVENOUS at 10:11

## 2019-11-08 RX ADMIN — PIPERACILLIN AND TAZOBACTAM 4.5 G: 4; .5 INJECTION, POWDER, FOR SOLUTION INTRAVENOUS at 02:11

## 2019-11-08 RX ADMIN — CHLORHEXIDINE GLUCONATE 0.12% ORAL RINSE 15 ML: 1.2 LIQUID ORAL at 08:11

## 2019-11-08 RX ADMIN — POTASSIUM CHLORIDE 20 MEQ: 200 INJECTION, SOLUTION INTRAVENOUS at 08:11

## 2019-11-08 RX ADMIN — FAMOTIDINE 20 MG: 10 INJECTION INTRAVENOUS at 08:11

## 2019-11-08 RX ADMIN — ARGATROBAN 1.5 MCG/KG/MIN: 125 SOLUTION INTRAVENOUS at 09:11

## 2019-11-08 RX ADMIN — INSULIN ASPART 8 UNITS: 100 INJECTION, SOLUTION INTRAVENOUS; SUBCUTANEOUS at 04:11

## 2019-11-08 RX ADMIN — HYDRALAZINE HYDROCHLORIDE 10 MG: 20 INJECTION INTRAMUSCULAR; INTRAVENOUS at 08:11

## 2019-11-08 RX ADMIN — VANCOMYCIN HYDROCHLORIDE 1000 MG: 1 INJECTION, POWDER, LYOPHILIZED, FOR SOLUTION INTRAVENOUS at 02:11

## 2019-11-08 RX ADMIN — METOPROLOL TARTRATE 12.5 MG: 25 TABLET, FILM COATED ORAL at 11:11

## 2019-11-08 RX ADMIN — AMLODIPINE BESYLATE 5 MG: 5 TABLET ORAL at 11:11

## 2019-11-08 RX ADMIN — HYDRALAZINE HYDROCHLORIDE 10 MG: 20 INJECTION INTRAMUSCULAR; INTRAVENOUS at 01:11

## 2019-11-08 RX ADMIN — FUROSEMIDE 20 MG: 10 INJECTION, SOLUTION INTRAMUSCULAR; INTRAVENOUS at 08:11

## 2019-11-08 RX ADMIN — METOPROLOL TARTRATE 12.5 MG: 25 TABLET, FILM COATED ORAL at 08:11

## 2019-11-08 RX ADMIN — INSULIN ASPART 2 UNITS: 100 INJECTION, SOLUTION INTRAVENOUS; SUBCUTANEOUS at 08:11

## 2019-11-08 RX ADMIN — PSYLLIUM HUSK 6 G: 6 GRANULE ORAL at 08:11

## 2019-11-08 RX ADMIN — INSULIN ASPART 4 UNITS: 100 INJECTION, SOLUTION INTRAVENOUS; SUBCUTANEOUS at 12:11

## 2019-11-08 RX ADMIN — POTASSIUM CHLORIDE 20 MEQ: 200 INJECTION, SOLUTION INTRAVENOUS at 07:11

## 2019-11-08 RX ADMIN — PSYLLIUM HUSK 6 G: 6 GRANULE ORAL at 11:11

## 2019-11-08 RX ADMIN — Medication 2 G: at 11:11

## 2019-11-08 RX ADMIN — Medication 2 G: at 01:11

## 2019-11-08 RX ADMIN — FUROSEMIDE 20 MG: 10 INJECTION, SOLUTION INTRAMUSCULAR; INTRAVENOUS at 06:11

## 2019-11-08 RX ADMIN — PIPERACILLIN AND TAZOBACTAM 4.5 G: 4; .5 INJECTION, POWDER, FOR SOLUTION INTRAVENOUS at 06:11

## 2019-11-08 NOTE — PROGRESS NOTES
Vasc/VSC    Pt now extubated, off epi  In chair, remains confused with some disorientation    Right hand remain very edematous, digital and plamar cyanosis    Venous/Arterial US reviewed  CT chest reviewed    Imp/Rec:  Case d/w with ICU/Pulm  Cardiogenic shock/collapse about 1 week after CABG  Pt with extensive RUE venous thrombus/DVT in deep and superficial arm veins, axillary/subclavian, IJV    Hypercoagulable/HIT workup in progress, pt started on argatroban  Regarding RUE/central venous thrombus: Unfortunately, given location of clot and her recent major cardiac surgery any attempt at thrombolysis with tPA would be absolutely contraindicated  Open thrombectomy also not option as central clot could not be adequately cleared to allow for any outflow/drainage from the arm    Recommend cont anticoagulation, arm elevation, mild/moderate compression

## 2019-11-08 NOTE — ASSESSMENT & PLAN NOTE
Low suspicion   Blood culture NGTD  Urine culture NGTD  Sputum culture  Procal 4.4  Lactate Normal  CT imaging unremarkable  Afebrile  Abx: vancomycin Piperacillin tazobactam: descalate, defer to CVT

## 2019-11-08 NOTE — ASSESSMENT & PLAN NOTE
11/8/19 Venous US of right arm done yesterday due to right hand edema and color change found occlusive hypoechoic material within the right internal jugular vein, right subclavian vein, right axillary vein.  Arm veins: Occlusive hypoechoic material filling the entire right brachial vein.  Superficial thrombophlebitis of the right basilar vein in the upper arm.  Cephalic vein not visualized.  Patient was started on Agatroban.  Due to possible HIT vs recent swan sada catheter placement - with HIT scoring probability now high.  Would recommend US of left arm and bilateral lower extremities.    --HIT panel pending  --Venous US of left arm and bilateral lower extremities  --continue Agatroban; avoid Heparin products  --Will need to discharge on coumadin or direct oral anticoagulant such as Eliquis

## 2019-11-08 NOTE — PROGRESS NOTES
[facility] Hospital Medicine  Progress Note    Patient Name: Mateusz Bates  MRN: [id number]  Patient Class: IP- Inpatient   Admission Date: 10/28/2019  Length of Stay: 11 days  Attending Physician: Keny Zaidi MD  Primary Care Provider: Serenity Kilpatrick MD        Subjective:     Principal Problem:Acute deep vein thrombosis (DVT) of right upper extremity        HPI:  Mateusz Bates is a 69 year old female with DM II, hypertension and hyperlipidemia who presented to the ED at the request of her PCP due to finding of an abnormal EKG and complaints of chest tightness with SOB. The patient reports having intermittent symptoms for the last 1-2 months. On 10/26/19, she began having a tightness in her mid chest that radiated into her back with associated SOB. Symptoms were worse with laying flat and improved with sitting up. They lasted only minutes, but this was longer than previous episodes. She denies a trend in when these episodes occur as well as associated nausea, vomiting and diaphoresis. The patient's EKG was significant for a new LBBB compared to previous in 2016. Labs were significant for a BNP of 1,305, troponin of 3.665 and potassium of 3.4. Code status was discussed with the patient and her sister. She is a full code. Her sister, Christina Hernandez, is her surrogate medical decision maker.     Overview/Hospital Course:  Admitted for evaluation and treatment of chest pain concerning for acute coronary syndrome.  Evaluation by Cardiology and urgent left heart catheterization.  Angiogram discovered severe multi-vessel coronary artery disease.  Cardiac echo showed severely reduced LV function.  Referral to Cardiothoracic Surgery who recommended CABG.  Balloon pump placed 30 October.  CABG x3 by Dr. Zaidi 31 October.  Successfully extubated morning of 01 November.  11/2 - Patient improved s/p extubation. CABG x 3. Discussed with CC Team. BS mild elevation. SA and LA insulin initiated.  11/3 -  Improved BS control. Patient + chest tenderness. Tolerating diet. Discussed with cc team  11/4 - Patient with improved strength interactivity. Patient denies n/v/d + Sx site tenderness. Patient reports she is weakened.  11/5 - Patient denies CP. Reports she is weak. Denies SOB. Patient lethargic and clammy sitting in chair at bedside. Discussed with care team. Patient to return to bed with warming blankets, Blood Sugar 88 last check.   11/6 - Patient remains intubated R Hand cyanotic. Patient discussed with CC Team / CV sx.  11/7 - Patient in weaning trial. Extubation attempt this AM. R Hand improved blood flow - warmer to touch. Discussed with CC Team  11/8 - Patient improved Alert and appropriate. R Hand with improved blood flow / warmer. .     Interval History: Improving slowly. Extubated without event.    Review of Systems   Constitutional: Positive for activity change and appetite change. Negative for unexpected weight change.   HENT: Negative.  Negative for trouble swallowing.    Eyes: Negative.    Respiratory: Negative.  Negative for cough, shortness of breath and wheezing.    Cardiovascular: Negative.  Negative for chest pain.   Gastrointestinal: Negative.    Endocrine: Negative.    Genitourinary: Negative.    Musculoskeletal: Negative.    Skin: Negative.    Allergic/Immunologic: Negative.    Neurological: Positive for weakness. Negative for dizziness.   Hematological: Negative.    Psychiatric/Behavioral: Negative.    All other systems reviewed and are negative.    Objective:     Vital Signs (Most Recent):  Temp: 98.6 °F (37 °C) (11/08/19 0300)  Pulse: 97 (11/08/19 1150)  Resp: (!) 25 (11/08/19 0727)  BP: (!) 156/76 (11/08/19 1150)  SpO2: 100 % (11/08/19 0727) Vital Signs (24h Range):  Temp:  [97.8 °F (36.6 °C)-98.6 °F (37 °C)] 98.6 °F (37 °C)  Pulse:  [] 97  Resp:  [20-31] 25  SpO2:  [87 %-100 %] 100 %  BP: ()/(26-76) 156/76     Weight: 73.6 kg (162 lb 4.1 oz)  Body mass index is 30.66  kg/m².    Intake/Output Summary (Last 24 hours) at 11/8/2019 1522  Last data filed at 11/8/2019 0611  Gross per 24 hour   Intake 733.97 ml   Output 1675 ml   Net -941.03 ml      Physical Exam   Constitutional: She is oriented to person, place, and time. She appears well-nourished. No distress.   HENT:   Head: Normocephalic and atraumatic.   Eyes: Pupils are equal, round, and reactive to light. EOM are normal.   Neck: Normal range of motion. Neck supple. No JVD present.   Cardiovascular: Normal rate, regular rhythm, normal heart sounds and intact distal pulses. Exam reveals no gallop and no friction rub.   No murmur heard.  The right upper extremity has 2+ distal to the elbow. The right hand and fingertips are mottled, and dusky, right radial pulse found via doppler.    Pulmonary/Chest: No stridor. No respiratory distress. She has no wheezes. She has rales. She exhibits no tenderness.   Coarse breath sounds.    Abdominal: Soft. Bowel sounds are normal. She exhibits no distension and no mass. There is no tenderness. There is no rebound and no guarding. No hernia.   Genitourinary:   Genitourinary Comments: Jones in place.    Musculoskeletal: Normal range of motion. She exhibits edema. She exhibits no tenderness or deformity.   Sedated. Moves all 4. No focal deficits noted.    Neurological: She is alert and oriented to person, place, and time. She displays normal reflexes. No cranial nerve deficit or sensory deficit. She exhibits normal muscle tone. Coordination normal.   Sedated and Intubated. However, moves all 4, full ROM. No focal deficits noted.     Skin: Skin is warm and dry. Capillary refill takes 2 to 3 seconds. No rash noted. She is not diaphoretic. No erythema. No pallor.   Nursing note and vitals reviewed.      Significant Labs:   Blood Culture: No results for input(s): LABBLOO in the last 48 hours.  BMP:   Recent Labs   Lab 11/08/19  0600 11/08/19  1114   * 242*   * 133*   K 3.6 4.1   CL 99 99    CO2 22* 20*   BUN 19 19   CREATININE 0.8 0.8   CALCIUM 8.3* 8.6*   MG 2.1  --      CBC:   Recent Labs   Lab 11/07/19  1513 11/07/19  1710 11/08/19  0600   WBC 14.11* 14.03* 12.08   HGB 8.8* 8.5* 8.6*   HCT 27.0* 25.8* 26.4*   PLT 47* 46* 45*     CMP:   Recent Labs   Lab 11/07/19  2343 11/08/19  0600 11/08/19  0711 11/08/19  1114   * 134*  --  133*   K 3.7 3.6  --  4.1   CL 96 99  --  99   CO2 23 22*  --  20*   * 176*  --  242*   BUN 21 19  --  19   CREATININE 0.8 0.8  --  0.8   CALCIUM 8.2* 8.3*  --  8.6*   PROT  --   --  5.3*  --    ALBUMIN  --   --  2.2*  --    BILITOT  --   --  1.2*  --    ALKPHOS  --   --  73  --    AST  --   --  819*  --    ALT  --   --  516*  --    ANIONGAP 12 13  --  14   EGFRNONAA >60 >60  --  >60     All pertinent labs within the past 24 hours have been reviewed.    Significant Imaging: I have reviewed all pertinent imaging results/findings within the past 24 hours.      Assessment/Plan:      Cardiogenic shock        Thrombocytopenia  11/2  Heme On on Consult  HIT panel  Likely drug induced  Monitor  11/3  Improved at 89 today  Monitor  11/5  Plts 73  Monitor  11/8  Argatroban  Heme Onc  Plts 45    Acute hypoxemic respiratory failure  11/2  Extubated  Monitor  Pulmonary CC    11/7  Intubated  Weaning trial in progress  Pulmonary CC    11/8  Extubated  Pulmonary  O2  Nebs      S/P CABG x 3  11/4  CABG x 3  ASA  Statin  BB  CV sx    11/6  CV sx  Statin      11/7  Per CV sx      CAD, multiple vessel        Acute combined systolic and diastolic CHF, NYHA class 2  Probable ischemic cardiomyopathy.  Anticipate CABG 31 October.  Optimize medical management.    NSTEMI (non-ST elevated myocardial infarction)  Troponin peaked at 3.66 now trending down.  Heparin, metoprolol, ASA and statin. Hold ACE inhibitor due to intolerance with cough. Hold ARB as patient's blood pressure is marginal. Will consider adding, if her blood pressure will tolerate.  Hold second antiplatelet due to pending  Cardiology procedure.  Patient was evaluated for Cardiac Rehab and is not a candidate at this time, but will be referred, when appropriate.  LHC on 28 on October showed severe multi-vessel CAD.  Referral to CVT Surgery who recommended CABG planned for Thursday 31 October.  Balloon pump placed 30 October.  CABG x3 by Dr. Zaidi on 31 October.  11/2  S/p CABG  Cards  CT Sx  Statin  BB      Iron deficiency anemia  11/8  Hgb 8.6 today  FeSO4      Hyperlipidemia associated with type 2 diabetes mellitus  -Patient was improved, but not at goal on 10/4/19.   -Continue statin.       Obesity (BMI 30.0-34.9)  Diet  Nutrition        Diabetes mellitus type II, uncontrolled  -Hemoglobin A1C was 9 on 9/19/19.   -NISS.   -ADA diet.   11/2  Levemir / NISS / Accuchecks  11/3  Levemir increased to 15 sq bid  NISS  Accuchecks  11/4  Levemir  NISS  Accuchecks  11/5  Levemir 10 units BID  NISS  Accuchecks    11/6  UnControlled  Accuchecks  Insulin Gtt  Improved control  Last BS 99    11/7  Uncontrolled  Accuchecks  NISS    11/8  Uncontrolled  NISS  Accuchecks        Anxiety and depression  -Stable.   -Continue Effexor.       Hypertension associated with diabetes  -Stable.   -Continue Norvasc.  11/2 -  BB , CCB  11/3 - Controlled  BB  CCB  11/4  Controlled  BB  CCB    11/5  Controlled  BB  CCB    11/6  On pressors  Hold Htn meds for now    11/7  Pressors  Monitor    11/8        VTE Risk Mitigation (From admission, onward)         Ordered     argatroban 250 mg in dextrose 5 % 250 mL infusion  Continuous      11/08/19 1420     Reason for no Mechanical VTE Prophylaxis  Once      10/28/19 1414     heparin 25,000 units in dextrose 5% 250 mL (100 units/mL) infusion LOW INTENSITY nomogram - [facility]  Continuous      10/28/19 1257                Critical care time spent on the evaluation and treatment of severe organ dysfunction, review of pertinent labs and imaging studies, discussions with consulting providers and discussions with patient/family:  32 minutes.      Josse Patel MD  Department of Hospital Medicine   [facility]

## 2019-11-08 NOTE — SUBJECTIVE & OBJECTIVE
Interval History:  11/08/2019  The patient is post-operative day 8, status-post coronary artery bypass grafting x 3.    ROS  Medications:  Continuous Infusions:   argatroban in 0.9 % sod chlor 1.5 mcg/kg/min (11/08/19 0901)     Scheduled Meds:   amLODIPine  5 mg Oral Daily    chlorhexidine  15 mL Mouth/Throat BID    famotidine (PF)  20 mg Intravenous BID    furosemide  20 mg Intravenous BID    lactated ringers  1,000 mL Intravenous Once    metoprolol tartrate  12.5 mg Oral BID    piperacillin-tazobactam (ZOSYN) IVPB  4.5 g Intravenous Q8H    potassium chloride  10 mEq Oral BID    psyllium husk  1 packet Oral BID    sodium chloride  2 g Oral Q6H    [START ON 11/10/2019] vancomycin (VANCOCIN) IVPB  750 mg Intravenous Q72H     PRN Meds:albumin human 5%, albuterol sulfate, Dextrose 10% Bolus, Dextrose 10% Bolus, glucagon (human recombinant), insulin aspart U-100, iohexol, lactated ringers, magnesium sulfate IVPB, magnesium sulfate IVPB, metoclopramide HCl, ondansetron, potassium chloride in water **AND** potassium chloride in water **AND** potassium chloride in water, traMADol     Objective:     Vital Signs (Most Recent):  Temp: 98.6 °F (37 °C) (11/08/19 0300)  Pulse: 95 (11/08/19 0727)  Resp: (!) 25 (11/08/19 0727)  BP: (!) 159/48 (11/08/19 0835)  SpO2: 100 % (11/08/19 0727) Vital Signs (24h Range):  Temp:  [97.8 °F (36.6 °C)-100.4 °F (38 °C)] 98.6 °F (37 °C)  Pulse:  [] 95  Resp:  [19-31] 25  SpO2:  [87 %-100 %] 100 %  BP: ()/(26-59) 159/48     Weight: 73.6 kg (162 lb 4.1 oz)  Body mass index is 30.66 kg/m².    SpO2: 100 %  O2 Device (Oxygen Therapy): nasal cannula    Intake/Output - Last 3 Shifts       11/06 0700 - 11/07 0659 11/07 0700 - 11/08 0659 11/08 0700 - 11/09 0659    P.O.       I.V. (mL/kg) 1192.3 (15.3) 316.3 (4.3)     Blood       NG/ 385     IV Piggyback 650 550     Total Intake(mL/kg) 2612.3 (33.6) 1251.3 (17)     Urine (mL/kg/hr) 955 (0.5) 3170 (1.8)     Stool  0     Total  Output 955 3170     Net +1657.3 -1918.7            Stool Occurrence  9 x           Lines/Drains/Airways     Peripherally Inserted Central Catheter Line                 PICC Double Lumen 11/07/19 1645 left brachial;left basilic less than 1 day          Drain                 Urethral Catheter 11/05/19 1718 Straight-tip 2 days         NG/OG Tube 11/07/19 1600 small diameter Right nostril less than 1 day                Physical Exam   Constitutional: She appears well-developed and well-nourished. No distress.   Patient is much more alert today.    HENT:   Head: Normocephalic and atraumatic.   Eyes: Pupils are equal, round, and reactive to light. EOM are normal.   Neck: Normal range of motion. Neck supple. No JVD present.   Cardiovascular: Normal rate, regular rhythm, normal heart sounds and intact distal pulses. Exam reveals no gallop and no friction rub.   No murmur heard.  Pulmonary/Chest: Effort normal and breath sounds normal. No stridor. No respiratory distress. She has no wheezes. She has no rales. She exhibits no tenderness.   Abdominal: Soft. Bowel sounds are normal. She exhibits no distension and no mass. There is no tenderness. There is no rebound and no guarding. No hernia.   Musculoskeletal: She exhibits edema and tenderness. She exhibits no deformity.   ROM 2/5 to the right wrist/hand/fingers, distal from the elbow there is 2+ non pitting edema, the extremity is tender and warm. There is mottling, a dusky appearance, and purple discoloration to the fingertips of the right hand.   Neurological: She is alert. She displays normal reflexes. No cranial nerve deficit or sensory deficit. She exhibits normal muscle tone. Coordination normal.   Patients neurologic state is improving. Alert, follows commands, makes eye contact, disoriented x 4. No focal deficits.    Skin: Skin is warm and dry. Capillary refill takes less than 2 seconds. No rash noted. She is not diaphoretic. No erythema. No pallor.   Nursing note and  vitals reviewed.      Significant Labs:  BMP:   Recent Labs   Lab 11/08/19  0600   *   *   K 3.6   CL 99   CO2 22*   BUN 19   CREATININE 0.8   CALCIUM 8.3*   MG 2.1     CBC:   Recent Labs   Lab 11/08/19  0600   WBC 12.08   RBC 2.99*   HGB 8.6*   HCT 26.4*   PLT 45*   MCV 88   MCH 28.8   MCHC 32.6       Significant Diagnostics:  I have reviewed all pertinent imaging results/findings within the past 24 hours.

## 2019-11-08 NOTE — PROGRESS NOTES
[facility] -   Cardiothoracic Surgery  Progress Note    Patient Name: Mateusz Bates  MRN: [id number]  Admission Date: 10/28/2019  Hospital Length of Stay: 11 days  Code Status: Full Code   Attending Physician: Keny Zaidi MD   Referring Provider: Self, Aaareferral  Principal Problem:Acute hypoxemic respiratory failure            Subjective:     Post-Op Info:  Procedure(s) (LRB):  CORONARY ARTERY BYPASS GRAFT (CABG) (N/A)  ECHOCARDIOGRAM,TRANSESOPHAGEAL (N/A)  BLOCK, NERVE, INTERCOSTAL, 2 OR MORE (N/A)   8 Days Post-Op     Interval History:  11/08/2019  The patient is post-operative day 8, status-post coronary artery bypass grafting x 3.    ROS  Medications:  Continuous Infusions:   argatroban in 0.9 % sod chlor 1.5 mcg/kg/min (11/08/19 0901)     Scheduled Meds:   amLODIPine  5 mg Oral Daily    chlorhexidine  15 mL Mouth/Throat BID    famotidine (PF)  20 mg Intravenous BID    furosemide  20 mg Intravenous BID    lactated ringers  1,000 mL Intravenous Once    metoprolol tartrate  12.5 mg Oral BID    piperacillin-tazobactam (ZOSYN) IVPB  4.5 g Intravenous Q8H    potassium chloride  10 mEq Oral BID    psyllium husk  1 packet Oral BID    sodium chloride  2 g Oral Q6H    [START ON 11/10/2019] vancomycin (VANCOCIN) IVPB  750 mg Intravenous Q72H     PRN Meds:albumin human 5%, albuterol sulfate, Dextrose 10% Bolus, Dextrose 10% Bolus, glucagon (human recombinant), insulin aspart U-100, iohexol, lactated ringers, magnesium sulfate IVPB, magnesium sulfate IVPB, metoclopramide HCl, ondansetron, potassium chloride in water **AND** potassium chloride in water **AND** potassium chloride in water, traMADol     Objective:     Vital Signs (Most Recent):  Temp: 98.6 °F (37 °C) (11/08/19 0300)  Pulse: 95 (11/08/19 0727)  Resp: (!) 25 (11/08/19 0727)  BP: (!) 159/48 (11/08/19 0835)  SpO2: 100 % (11/08/19 0727) Vital Signs (24h Range):  Temp:  [97.8 °F (36.6 °C)-100.4 °F (38 °C)] 98.6 °F (37 °C)  Pulse:   [] 95  Resp:  [19-31] 25  SpO2:  [87 %-100 %] 100 %  BP: ()/(26-59) 159/48     Weight: 73.6 kg (162 lb 4.1 oz)  Body mass index is 30.66 kg/m².    SpO2: 100 %  O2 Device (Oxygen Therapy): nasal cannula    Intake/Output - Last 3 Shifts       11/06 0700 - 11/07 0659 11/07 0700 - 11/08 0659 11/08 0700 - 11/09 0659    P.O.       I.V. (mL/kg) 1192.3 (15.3) 316.3 (4.3)     Blood       NG/ 385     IV Piggyback 650 550     Total Intake(mL/kg) 2612.3 (33.6) 1251.3 (17)     Urine (mL/kg/hr) 955 (0.5) 3170 (1.8)     Stool  0     Total Output 955 3170     Net +1657.3 -1918.7            Stool Occurrence  9 x           Lines/Drains/Airways     Peripherally Inserted Central Catheter Line                 PICC Double Lumen 11/07/19 1645 left brachial;left basilic less than 1 day          Drain                 Urethral Catheter 11/05/19 1718 Straight-tip 2 days         NG/OG Tube 11/07/19 1600 small diameter Right nostril less than 1 day                Physical Exam   Constitutional: She appears well-developed and well-nourished. No distress.   Patient is much more alert today.    HENT:   Head: Normocephalic and atraumatic.   Eyes: Pupils are equal, round, and reactive to light. EOM are normal.   Neck: Normal range of motion. Neck supple. No JVD present.   Cardiovascular: Normal rate, regular rhythm, normal heart sounds and intact distal pulses. Exam reveals no gallop and no friction rub.   No murmur heard.  Pulmonary/Chest: Effort normal and breath sounds normal. No stridor. No respiratory distress. She has no wheezes. She has no rales. She exhibits no tenderness.   Abdominal: Soft. Bowel sounds are normal. She exhibits no distension and no mass. There is no tenderness. There is no rebound and no guarding. No hernia.   Musculoskeletal: She exhibits edema and tenderness. She exhibits no deformity.   ROM 2/5 to the right wrist/hand/fingers, distal from the elbow there is 2+ non pitting edema, the extremity is tender  and warm. There is mottling, a dusky appearance, and purple discoloration to the fingertips of the right hand.   Neurological: She is alert. She displays normal reflexes. No cranial nerve deficit or sensory deficit. She exhibits normal muscle tone. Coordination normal.   Patients neurologic state is improving. Alert, follows commands, makes eye contact, disoriented x 4. No focal deficits.    Skin: Skin is warm and dry. Capillary refill takes less than 2 seconds. No rash noted. She is not diaphoretic. No erythema. No pallor.   Nursing note and vitals reviewed.      Significant Labs:  BMP:   Recent Labs   Lab 11/08/19  0600   *   *   K 3.6   CL 99   CO2 22*   BUN 19   CREATININE 0.8   CALCIUM 8.3*   MG 2.1     CBC:   Recent Labs   Lab 11/08/19  0600   WBC 12.08   RBC 2.99*   HGB 8.6*   HCT 26.4*   PLT 45*   MCV 88   MCH 28.8   MCHC 32.6       Significant Diagnostics:  I have reviewed all pertinent imaging results/findings within the past 24 hours.    Assessment/Plan:     S/P CABG x 3       11/01/2019  The patient is post-operative day 1, status-post coronary artery bypass grafting x 3.  Overall the patient is making progress, however IABP and inotropic agents will continue today.   Neuro:  The patient is awake alert and oriented x3.  Neuro exam is nonfocal.  Pain is well controlled with IV pain medication.  Cardiac:  Patient is hemodynamically improving. Patient remains on epinephrine and milrinone gtts, which will be weaned over the next 24 hours.  Cardiac index is improving. IABP will remain in place over the next 24 hours.  Patient will be started on beta blockers once gtts are weaned off.  Respiratory: Good sats on high flow nasal cannula. Continue respiratory toilet, continue incentive spirometer. Wean to low flow nasal cannula.  GI:  Diet: sips of water with medications, will slowly advance diet as tolerated. Benign abdominal exam.  Renal:  Urine output is slowly improving. Cr 0.9. K 4.3. Mag 1.6.  Replace mag. Continue IV diuresis. Add metolazone.   Heme:  Hct 34.1  Platelet 82. Continue aspirin. Discontinue Plavix for now.  Id:  WBC 12. Tmax 100.3. Continue post-operative antibiotics. Will continue to follow.   Endocrine:  Glucose is controlled with insulin gtt.  Continue insulin gtt for now, until weaned off inotropic agents.   Activities:  Patient is currently bed bound due to IABP.  Advance activities as tolerated once IABP is removed.   Lines tubes and drains:  Continue IABP. Continue Marcellus and Cordis.  Continue A-line.  Chest tubes will continue. NICOLASA x 2 will continue. Jones catheter will continue.  Continue pacer wires. Continue prevena wound vac.  Plan: Remain ICU.          11/02/2019  The patient is post-operative day 2, status-post coronary artery bypass grafting x 3.  Overall the patient is making progress, IABP removed and inotropic agents discontinued.   Neuro:  The patient is awake alert and oriented x3.  Neuro exam is nonfocal.  Pain is well controlled. Discontinue fentanyl.   Cardiac:  Patient is hemodynamically improving. Inotropic gtts discontinued. Excellent cardiac index. IABP discontinued.  Patient will be started on beta blockers today.  Respiratory: Good sats on high flow nasal cannula. Continue respiratory toilet, continue incentive spirometer. Wean to low flow nasal cannula.  GI:  Advance diabetic/cardiac diet as tolerated. Benign abdominal exam.  Renal:  Urine output is improving. Cr 0.9. K 4.9. Mag 2.2. Continue IV diuresis. Hold AM K.   Heme:  Hct 28.3  Platelet 47. Discontinue aspirin. Discontinue Plavix. HIT panel. Consult hematology r/t thrombocytopenia.   Id:  WBC 10.4. Tmax 99.4. Will continue to follow.   Endocrine:  Discontinue insulin gtt. Start sliding scale insulin. Start long acting insulin detemir.   Activities: Advance activities as tolerated.   Lines tubes and drains:  Discontinue IABP. Discontinue Marcellus and Cordis.  Discontinue A-line.  Discontinue chest tubes x 3.  Discontinue NICOLASA x 2. Discontinue arterial line. Jones catheter will continue for now.  Continue pacer wires. Continue prevena wound vac. Place PICC line.   Plan: Remain ICU.           11/03/2019  The patient is post-operative day 3, status-post coronary artery bypass grafting x 3.  Overall the patient is making progress.  Neuro:  The patient is awake alert and oriented x3.  Neuro exam is nonfocal.  Pain is well controlled. Continue tramadol.   Cardiac:  Patient is hemodynamically stable. Increase metoprolol dose. Start amlodipine.   Respiratory: Good sats on nasal cannula. Continue respiratory toilet, continue incentive spirometer. Wean to room air.  GI:  Advance diabetic/cardiac diet as tolerated. Benign abdominal exam. Passing gas, no BM. Add 1 bottle of mag citrate.   Renal:  Good urine output. Cr 0.9. K 3.8. Mag 2.0. Replace K. Replace Mag. Change to PO diuresis.   Heme:  Hct 34.7  Platelet 89. Restart aspirin. Continue to hold Plavix. HIT panel pending. Hematology following.  Id:  WBC 13.8. Tmax 99.4. Will continue to follow.    Endocrine:  Glucose controlled on sliding scale insulin. Increase long acting insulin detemir dose.   Activities: Advance activities as tolerated.   Lines tubes and drains:  Jones catheter will continue for now.  Continue pacer wires. Continue prevena wound vac. Continue PICC line.   Plan: Remain ICU.          11/04/2019  The patient is post-operative day 4, status-post coronary artery bypass grafting x 3.  Overall the patient is making progress.  Neuro:  The patient is awake alert and oriented x3.  Neuro exam is nonfocal.  Pain is well controlled. Continue tramadol.   Cardiac:  Patient is hemodynamically stable. Continue metoprolol dose. Continue amlodipine dose.   Respiratory: Good sats on room air. Continue respiratory toilet, continue incentive spirometer.  GI:  Advance diabetic/cardiac diet as tolerated. Benign abdominal exam. Regular bowel movements.   Renal:  Good urine output. Cr  0.7. K 4.0. Mag 2.3. Replace K. Decrease PO diuresis.   Heme:  Hct 34.4  Platelet 140. Continue aspirin. Restart Plavix. HIT panel pending. Hematology following.  Id:  WBC down to 13.13. Tmax 98. Will continue to follow.    Endocrine:  Glucose controlled on sliding scale insulin. Increased long acting insulin detemir dose.   Activities: Advance activities as tolerated.   Lines tubes and drains:  Discontinued storm catheter. Continue pacer wires. Continue prevena wound vac. Continue PICC line.   Plan: Transfer to telemetry. Anticipate discharge to SNF in 48 hours.         11/05/2019  The patient is post-operative day 5, status-post coronary artery bypass grafting x 3.  Overall the patient is making progress.  Neuro:  The patient is awake alert and oriented x3.  Neuro exam is nonfocal. Patient slow to answer questions, however answers are appropriate.  Pain is well controlled. Continue tramadol PRN.   Cardiac:  Patient is hemodynamically stable. Continue metoprolol dose. Discontinue amlodipine. Start losartan.    Respiratory: Good sats on room air. Continue respiratory toilet, continue incentive spirometer.  GI:  Advance diabetic/cardiac diet as tolerated. Benign abdominal exam. Regular bowel movements.   Renal:  Good urine output. Cr 0.7. K 4.8. Mag 2.1. Replace Mag. Discontinue PO diuresis.   Heme:  Hct 39.5  Platelet 73. Continue aspirin. Hold Plavix. HIT panel pending. Hematology following.  Id:  WBC is up to 16. Tmax 99. Pan-culture. DC PICC line. Chest xray. Start broad spectrum empiric antibiotics. Will continue to follow.    Endocrine:  Glucose controlled on sliding scale insulin. Decrease long acting insulin detemir dose.   Activities: Advance activities as tolerated.   Lines tubes and drains:  Discontinue pacer wires. Discontinue prevena wound vac. Continue PICC line.   Plan: Anticipate discharge to SNF in 24-48 hours.         11/06/2019  The patient is post-operative day 6, status-post coronary artery  bypass grafting x 3. Patient had an episode of decompensation and unresponsiveness yesterday evening. The patient was found unresponsive, cool, and diaphoretic, with agonal breathing. Patient was intubated and transferred to the ICU. Bedside EKG and echocardiogram performed at the bedside showed no significant changes. CT head, chest, abdomen pelvis performed, showed no significant findings.     Neuro:  The patient is intubated and sedated. On Precedex gtt. Begin weaning off sedation.    Cardiac:  Patient is hemodynamically improving. Inotropic agent dependent. Currently on Epinephrine gtt. Start Milrinone gtt. Discontinue metoprolol. Discontinue losartan.    Respiratory: Ventilator dependent. tV 450, RR 20, PEEP 5. Wean down sedation. Wean off ventilator today.    GI:  Begin enteral tube feedings. Benign abdominal exam. Previously having regular bowel movements.   Renal: Adequate urine output. Cr 1.1. K 3.6. Mag 1.7. Replace Mag. Replace K. Discontinue diuresis.   Heme:  Hct 31.9  Platelet 89. Hold aspirin. Hold Plavix. HIT panel negative. Patient received 1 units PRBC last night. Hematology following.  Id:  WBC is down to 15.5. Tmax 99.9. BC NGTD. Urinalysis negative. Sputum culture negative. Vancomycin and zosyn day 2. Will continue to follow closely.    Endocrine:  Glucose controlled on insulin gtt, continue at this time.   Activities: Intubated and sedated.   Musculoskeletal: Unable to find right radial pulse via doppler. Tips of the fingers of the right hand are mottled, dusky, appear cyanotic. Recent right radial arterial line in place, which has been relocated. US of arteries and veins of the right arm ordered. Consult to vascular surgery placed.    Lines tubes and drains:  Continue swan and cordis. Continue arterial line. Continue storm.    Plan: Remain ICU         11/07/2019  The patient is post-operative day 7, status-post coronary artery bypass grafting x 3. Patient had an episode of decompensation and  unresponsiveness the evening of 11/05/2019. The patient was found unresponsive, cool, and diaphoretic, with agonal breathing. Patient was intubated and transferred to the ICU. Bedside EKG and echocardiogram performed at the bedside showed no significant changes. CT head, chest, abdomen pelvis performed, showed no significant findings.     Neuro:  The patient is intubated and sedated. On Precedex gtt. Wean off sedation.    Cardiac:  Patient is hemodynamically improving. Excellent cardiac index. Wean off Inotropic gtts.   Respiratory: Wean towards extubation. tV 400, RR 18, PEEP 5.   GI:  Continue enteral tube feedings while intubated. Benign abdominal exam.  Renal: Adequate urine output. Cr 1.1. K 4.1. Mag 2.8. Start IV diuresis.   Heme:  Hct 24.6  Platelet 60. Hold aspirin. Hold Plavix. HIT panel negative.  Id:  WBC is down to 14.46. Tmax 100.8. BC NGTD. Urinalysis negative. Sputum culture negative. Vancomycin and zosyn day 3. Midline sternal incision appears reddened, with induration at skin edges. Will continue to follow closely.    Endocrine:  Glucose controlled on sliding scale insulin.   Activities: Intubated and sedated. Moves all 4. Full ROM.    Musculoskeletal: Able to find right radial pulse via doppler. The right hand extending to the tips of the fingers of the right hand are mottled, dusky, appear cyanotic. Recent right radial arterial line placement, which has been relocated. US of arteries and veins of the right arm pending. Vascular surgery following, suggested possible thrombotic etiology, suggested heparin gtt, however this is contraindicated r/t s/p cabg x 3.     Lines tubes and drains:  Continue swan and cordis. Continue arterial line. Continue storm.    Plan: Remain ICU         11/08/2019  The patient is post-operative day 8, status-post coronary artery bypass grafting x 3. Patient had an episode of decompensation and unresponsiveness the evening of 11/05/2019. The patient was found unresponsive,  cool, and diaphoretic, with agonal breathing. Patient was intubated and transferred to the ICU. Bedside EKG and echocardiogram performed at the bedside showed no significant changes. CT head, chest, abdomen pelvis performed, showed no significant findings. Suspect cardiogenic shock.      Neuro:  Patients neurologic state is improving. Alert, follows commands, makes eye contact, disoriented x 4. No focal deficits.    Cardiac:  Patient is hemodynamically improving. Off all inotropic gtts. Start low dose metoprolol and amlodipine.   Respiratory: Good sats on low flow nasal cannula. Continue IS. Continue pulmonary toilet. Wean to room air.    GI:  Continue enteral tube feedings at this time r/t disorientation and aspiration risk. Discontinue laxatives. Start metamucil BID, patient has had multiple loose stools.   Renal: Good urine output. Cr 0.8. K 3.6. Mag 2.1. Replace K. Replace Mag. Continue IV diuresis.   Hema:  Hct 26.4  Platelet  45. Hold aspirin. Hold Plavix. HIT panel will be resent as the patient is a high suspicion for HIT despite previous negative HIT panel. Multiple DVTs from the right IJ down to the brachiocephalic vein. Confirmed via vascular US. Patient started on argatroban yesterday, goal is APTT 2x baseline. Most recent APTT 97.8. Baseline APTT 50.8.  Hematology following. Vascular surgery following.   Id:  WBC is down to 12. Tmax 100.4. BC NGTD. Urinalysis negative. Sputum culture negative. Vancomycin and zosyn day 4. Midline sternal incision appears improved, with minimal redness and induration at skin edges. Will continue to follow closely.    Endocrine:  Glucose controlled on sliding scale insulin.   Activities: Increase activities as tolerated.  Musculoskeletal: ROM 2/5 to the right wrist/hand/fingers, distal from the elbow there is 2+ non pitting edema, the extremity is tender and warm. There is mottling, a dusky appearance, and purple discoloration to the fingertips of the right hand. See above  hematology plan.      Lines tubes and drains:  Continue PICC line. Continue storm catheter. Continue NG tube.   Plan: Remain ICU         NSTEMI (non-ST elevated myocardial infarction)  The patient is a 69-year-old female with hypertension, diabetes, hyperlipidemia, obesity, anxiety and depression, iron deficiency anemia, vitamin B12 deficiency, who presented to the emergency room with an NSTEMI.  Workup included a cardiac catheterization that shows severe multivessel coronary artery disease with proximal LAD stenosis of 70% and severely depressed ejection fraction of 20%.  The patient is a candidate for urgent coronary artery bypass grafting.  The risks and benefits of surgery were explained to the patient.  The patient verbalized understanding of the issues discussed.  She a segs the risks of surgery and has agreed to proceed with urgent coronary artery bypass grafting.  She understands that the risk of neurologic complication postoperatively is increased due to the presence of cerebral microvascular disease.        Yovani Dobbins NP  Cardiothoracic Surgery  [facility] [place]

## 2019-11-08 NOTE — PROGRESS NOTES
Pharmacokinetic Assessment Follow Up: IV Vancomycin    Vancomycin serum concentration assessment(s):    The random level was drawn correctly and can be used to guide therapy at this time. The measurement is below the desired definitive target range of 15 to 20 mcg/mL.    A 1 x dose of vancomycin 1 gm was given.    Vancomycin Regimen Plan:    Re-dose when the random level is less than 18 mcg/mL, next level to be drawn at 11/9 on 0430     Drug levels (last 3 results):  Recent Labs   Lab Result Units 11/06/19  1420 11/07/19  0342 11/08/19  0957   Vancomycin, Random ug/mL 17.1 20.5 12.6       Pharmacy will continue to follow and monitor vancomycin.    Please contact pharmacy at extension [phone] for questions regarding this assessment.    Thank you for the consult,   Chrissy Patton       Patient brief summary:  Mateusz Bates is a 69 y.o. female initiated on antimicrobial therapy with IV Vancomycin for treatment of opportunistic infection/RO mediastinitis         Drug Allergies:   Review of patient's allergies indicates:   Allergen Reactions    Ace inhibitors Other (See Comments)     Cough         Actual Body Weight:   73.6 kg    Renal Function:   Estimated Creatinine Clearance: 60.9 mL/min (based on SCr of 0.8 mg/dL).,     Dialysis Method (if applicable):  No dialysis     CBC (last 72 hours):  Recent Labs   Lab Result Units 11/05/19  1541 11/05/19  1757 11/06/19  0403 11/06/19  1610 11/07/19  0342 11/07/19  1048 11/07/19  1513 11/07/19  1710 11/08/19  0600   WBC K/uL 9.18  9.18  --  15.53*  15.53* 18.17* 14.46* 12.96* 14.11* 14.03* 12.08   Hemoglobin g/dL 6.7*  6.7* 9.1* 10.4*  10.4* 9.5* 8.4* 8.3* 8.8* 8.5* 8.6*   Hematocrit % 22.0*  22.0* 28.8* 31.9*  31.9* 28.6* 24.6* 25.4* 27.0* 25.8* 26.4*   Platelets K/uL 59*  59*  --  89*  89* 67* 60* 44* 47* 46* 45*   Gran% % 79.0*  79.0*  --  81.0*  81.0* 80.9* 82.0* 84.0* 85.2* 83.7* 84.6*   Lymph% % 13.0*  13.0*  --  11.9*  11.9* 7.5* 7.8* 6.7* 6.5* 7.1* 6.4*    Mono% % 1.9*  1.9*  --  3.5*  3.5* 6.7 6.2 6.1 5.4 6.1 6.0   Eosinophil% % 1.4  1.4  --  1.1  1.1 1.7 1.2 1.1 1.1 1.4 1.3   Basophil% % 0.1  0.1  --  0.3  0.3 0.4 0.2 0.2 0.1 0.2 0.2   Differential Method  Automated  Automated  --  Automated  Automated Automated Automated Automated Automated Automated Automated       Metabolic Panel (last 72 hours):  Recent Labs   Lab Result Units 11/05/19  1541 11/05/19  1757 11/05/19  1919 11/05/19  2243 11/06/19  0030 11/06/19  0403 11/06/19  1246 11/06/19  1610 11/06/19  2337 11/07/19  0342 11/07/19  1513 11/07/19  1710 11/07/19  2343 11/08/19  0600 11/08/19  0711 11/08/19  1114   Sodium mmol/L 129* 129* 128*  --  125* 128*  128* 129* 127*  127* 122* 124* 128* 129* 131* 134*  --  133*   Potassium mmol/L 4.1 4.2 4.0  --  3.7 3.6  3.6 3.5 3.6  3.6 4.3 4.1 3.9 4.0 3.7 3.6  --  4.1   Chloride mmol/L 101 96 94*  --  88* 92*  92* 93* 93*  93* 90* 90* 95 95 96 99  --  99   CO2 mmol/L 9* 20* 22*  --  23 24  24 25 24  24 22* 22* 22* 23 23 22*  --  20*   Glucose mg/dL 249* 442* 495*  --  615* 358*  358* 101 99  99 356* 305* 157* 140* 131* 176*  --  242*   Glucose, Fasting mg/dL  --   --   --  555*  --   --   --   --   --   --   --   --   --   --   --   --    BUN, Bld mg/dL 15 34* 35*  --  32* 29*  29* 24* 25*  25* 28* 28* 23 22 21 19  --  19   Creatinine mg/dL 0.6 1.4 1.4  --  1.3 1.1  1.1 0.9 0.9  0.9 1.0 1.1 0.8 0.8 0.8 0.8  --  0.8   Albumin g/dL 0.9*  --  2.7*  --   --   --   --   --   --   --   --   --   --   --  2.2*  --    Total Bilirubin mg/dL 0.4  --  1.4*  --   --   --   --   --   --   --   --   --   --   --  1.2*  --    Alkaline Phosphatase U/L 34*  --  56  --   --   --   --   --   --   --   --   --   --   --  73  --    AST U/L 10  --  266*  --   --   --   --   --   --   --   --   --   --   --  819*  --    ALT U/L <5*  --  151*  --   --   --   --   --   --   --   --   --   --   --  516*  --    Magnesium mg/dL  --   --   --   --   --  1.7  --   --   --   2.8*  --   --   --  2.1  --   --        Vancomycin Administrations:  vancomycin given in the last 96 hours                     vancomycin in dextrose 5 % 1 gram/250 mL IVPB 1,000 mg (mg) 1,000 mg New Bag 11/08/19 1400    vancomycin in dextrose 5 % 1 gram/250 mL IVPB 1,000 mg (mg) 1,000 mg New Bag 11/07/19 0912    vancomycin (VANCOCIN) 1,250 mg in dextrose 5 % 250 mL IVPB (mg) 1,250 mg New Bag 11/06/19 1626                      Microbiologic Results:  Microbiology Results (last 7 days)       Procedure Component Value Units Date/Time    Culture, Respiratory with Gram Stain [[id number]] Collected:  11/05/19 1912    Order Status:  Completed Specimen:  Respiratory from Sputum, Expectorated Updated:  11/08/19 0946     Respiratory Culture Normal respiratory araceli      No S aureus or Pseudomonas isolated.     Gram Stain (Respiratory) <10 epithelial cells per low power field.     Gram Stain (Respiratory) Rare WBC's     Gram Stain (Respiratory) Rare Gram positive cocci    Blood culture [[id number]] Collected:  11/05/19 1325    Order Status:  Completed Specimen:  Blood Updated:  11/07/19 2212     Blood Culture, Routine No Growth to date      No Growth to date      No Growth to date    Blood culture [[id number]] Collected:  11/05/19 1335    Order Status:  Completed Specimen:  Blood Updated:  11/07/19 2212     Blood Culture, Routine No Growth to date      No Growth to date      No Growth to date    Culture, Respiratory with Gram Stain [[id number]] Collected:  11/05/19 1912    Order Status:  Sent Specimen:  Respiratory from Sputum, Expectorated Updated:  11/05/19 1912        Chrissy Patton [facility] 11/8/2019 3:31 PM

## 2019-11-08 NOTE — ASSESSMENT & PLAN NOTE
11/2  Extubated  Monitor  Pulmonary CC    11/7  Intubated  Weaning trial in progress  Pulmonary CC    11/8  Extubated  Pulmonary  O2  Nebs

## 2019-11-08 NOTE — ASSESSMENT & PLAN NOTE
11/01/2019  The patient is post-operative day 1, status-post coronary artery bypass grafting x 3.  Overall the patient is making progress, however IABP and inotropic agents will continue today.   Neuro:  The patient is awake alert and oriented x3.  Neuro exam is nonfocal.  Pain is well controlled with IV pain medication.  Cardiac:  Patient is hemodynamically improving. Patient remains on epinephrine and milrinone gtts, which will be weaned over the next 24 hours.  Cardiac index is improving. IABP will remain in place over the next 24 hours.  Patient will be started on beta blockers once gtts are weaned off.  Respiratory: Good sats on high flow nasal cannula. Continue respiratory toilet, continue incentive spirometer. Wean to low flow nasal cannula.  GI:  Diet: sips of water with medications, will slowly advance diet as tolerated. Benign abdominal exam.  Renal:  Urine output is slowly improving. Cr 0.9. K 4.3. Mag 1.6. Replace mag. Continue IV diuresis. Add metolazone.   Heme:  Hct 34.1  Platelet 82. Continue aspirin. Discontinue Plavix for now.  Id:  WBC 12. Tmax 100.3. Continue post-operative antibiotics. Will continue to follow.   Endocrine:  Glucose is controlled with insulin gtt.  Continue insulin gtt for now, until weaned off inotropic agents.   Activities:  Patient is currently bed bound due to IABP.  Advance activities as tolerated once IABP is removed.   Lines tubes and drains:  Continue IABP. Continue Butler and Cordis.  Continue A-line.  Chest tubes will continue. NICOLASA x 2 will continue. Jones catheter will continue.  Continue pacer wires. Continue prevena wound vac.  Plan: Remain ICU.          11/02/2019  The patient is post-operative day 2, status-post coronary artery bypass grafting x 3.  Overall the patient is making progress, IABP removed and inotropic agents discontinued.   Neuro:  The patient is awake alert and oriented x3.  Neuro exam is nonfocal.  Pain is well controlled. Discontinue fentanyl.    Cardiac:  Patient is hemodynamically improving. Inotropic gtts discontinued. Excellent cardiac index. IABP discontinued.  Patient will be started on beta blockers today.  Respiratory: Good sats on high flow nasal cannula. Continue respiratory toilet, continue incentive spirometer. Wean to low flow nasal cannula.  GI:  Advance diabetic/cardiac diet as tolerated. Benign abdominal exam.  Renal:  Urine output is improving. Cr 0.9. K 4.9. Mag 2.2. Continue IV diuresis. Hold AM K.   Heme:  Hct 28.3  Platelet 47. Discontinue aspirin. Discontinue Plavix. HIT panel. Consult hematology r/t thrombocytopenia.   Id:  WBC 10.4. Tmax 99.4. Will continue to follow.   Endocrine:  Discontinue insulin gtt. Start sliding scale insulin. Start long acting insulin detemir.   Activities: Advance activities as tolerated.   Lines tubes and drains:  Discontinue IABP. Discontinue Hiram and Cordis.  Discontinue A-line.  Discontinue chest tubes x 3. Discontinue NICOLASA x 2. Discontinue arterial line. Jones catheter will continue for now.  Continue pacer wires. Continue prevena wound vac. Place PICC line.   Plan: Remain ICU.           11/03/2019  The patient is post-operative day 3, status-post coronary artery bypass grafting x 3.  Overall the patient is making progress.  Neuro:  The patient is awake alert and oriented x3.  Neuro exam is nonfocal.  Pain is well controlled. Continue tramadol.   Cardiac:  Patient is hemodynamically stable. Increase metoprolol dose. Start amlodipine.   Respiratory: Good sats on nasal cannula. Continue respiratory toilet, continue incentive spirometer. Wean to room air.  GI:  Advance diabetic/cardiac diet as tolerated. Benign abdominal exam. Passing gas, no BM. Add 1 bottle of mag citrate.   Renal:  Good urine output. Cr 0.9. K 3.8. Mag 2.0. Replace K. Replace Mag. Change to PO diuresis.   Heme:  Hct 34.7  Platelet 89. Restart aspirin. Continue to hold Plavix. HIT panel pending. Hematology following.  Id:  WBC 13.8. Tmax  99.4. Will continue to follow.    Endocrine:  Glucose controlled on sliding scale insulin. Increase long acting insulin detemir dose.   Activities: Advance activities as tolerated.   Lines tubes and drains:  Storm catheter will continue for now.  Continue pacer wires. Continue prevena wound vac. Continue PICC line.   Plan: Remain ICU.          11/04/2019  The patient is post-operative day 4, status-post coronary artery bypass grafting x 3.  Overall the patient is making progress.  Neuro:  The patient is awake alert and oriented x3.  Neuro exam is nonfocal.  Pain is well controlled. Continue tramadol.   Cardiac:  Patient is hemodynamically stable. Continue metoprolol dose. Continue amlodipine dose.   Respiratory: Good sats on room air. Continue respiratory toilet, continue incentive spirometer.  GI:  Advance diabetic/cardiac diet as tolerated. Benign abdominal exam. Regular bowel movements.   Renal:  Good urine output. Cr 0.7. K 4.0. Mag 2.3. Replace K. Decrease PO diuresis.   Heme:  Hct 34.4  Platelet 140. Continue aspirin. Restart Plavix. HIT panel pending. Hematology following.  Id:  WBC down to 13.13. Tmax 98. Will continue to follow.    Endocrine:  Glucose controlled on sliding scale insulin. Increased long acting insulin detemir dose.   Activities: Advance activities as tolerated.   Lines tubes and drains:  Discontinued storm catheter. Continue pacer wires. Continue prevena wound vac. Continue PICC line.   Plan: Transfer to telemetry. Anticipate discharge to SNF in 48 hours.         11/05/2019  The patient is post-operative day 5, status-post coronary artery bypass grafting x 3.  Overall the patient is making progress.  Neuro:  The patient is awake alert and oriented x3.  Neuro exam is nonfocal. Patient slow to answer questions, however answers are appropriate.  Pain is well controlled. Continue tramadol PRN.   Cardiac:  Patient is hemodynamically stable. Continue metoprolol dose. Discontinue amlodipine. Start  losartan.    Respiratory: Good sats on room air. Continue respiratory toilet, continue incentive spirometer.  GI:  Advance diabetic/cardiac diet as tolerated. Benign abdominal exam. Regular bowel movements.   Renal:  Good urine output. Cr 0.7. K 4.8. Mag 2.1. Replace Mag. Discontinue PO diuresis.   Heme:  Hct 39.5  Platelet 73. Continue aspirin. Hold Plavix. HIT panel pending. Hematology following.  Id:  WBC is up to 16. Tmax 99. Pan-culture. DC PICC line. Chest xray. Start broad spectrum empiric antibiotics. Will continue to follow.    Endocrine:  Glucose controlled on sliding scale insulin. Decrease long acting insulin detemir dose.   Activities: Advance activities as tolerated.   Lines tubes and drains:  Discontinue pacer wires. Discontinue prevena wound vac. Continue PICC line.   Plan: Anticipate discharge to SNF in 24-48 hours.         11/06/2019  The patient is post-operative day 6, status-post coronary artery bypass grafting x 3. Patient had an episode of decompensation and unresponsiveness yesterday evening. The patient was found unresponsive, cool, and diaphoretic, with agonal breathing. Patient was intubated and transferred to the ICU. Bedside EKG and echocardiogram performed at the bedside showed no significant changes. CT head, chest, abdomen pelvis performed, showed no significant findings.     Neuro:  The patient is intubated and sedated. On Precedex gtt. Begin weaning off sedation.    Cardiac:  Patient is hemodynamically improving. Inotropic agent dependent. Currently on Epinephrine gtt. Start Milrinone gtt. Discontinue metoprolol. Discontinue losartan.    Respiratory: Ventilator dependent. tV 450, RR 20, PEEP 5. Wean down sedation. Wean off ventilator today.    GI:  Begin enteral tube feedings. Benign abdominal exam. Previously having regular bowel movements.   Renal: Adequate urine output. Cr 1.1. K 3.6. Mag 1.7. Replace Mag. Replace K. Discontinue diuresis.   Heme:  Hct 31.9  Platelet 89. Hold  aspirin. Hold Plavix. HIT panel negative. Patient received 1 units PRBC last night. Hematology following.  Id:  WBC is down to 15.5. Tmax 99.9. BC NGTD. Urinalysis negative. Sputum culture negative. Vancomycin and zosyn day 2. Will continue to follow closely.    Endocrine:  Glucose controlled on insulin gtt, continue at this time.   Activities: Intubated and sedated.   Musculoskeletal: Unable to find right radial pulse via doppler. Tips of the fingers of the right hand are mottled, dusky, appear cyanotic. Recent right radial arterial line in place, which has been relocated. US of arteries and veins of the right arm ordered. Consult to vascular surgery placed.    Lines tubes and drains:  Continue swan and cordis. Continue arterial line. Continue storm.    Plan: Remain ICU         11/07/2019  The patient is post-operative day 7, status-post coronary artery bypass grafting x 3. Patient had an episode of decompensation and unresponsiveness the evening of 11/05/2019. The patient was found unresponsive, cool, and diaphoretic, with agonal breathing. Patient was intubated and transferred to the ICU. Bedside EKG and echocardiogram performed at the bedside showed no significant changes. CT head, chest, abdomen pelvis performed, showed no significant findings.     Neuro:  The patient is intubated and sedated. On Precedex gtt. Wean off sedation.    Cardiac:  Patient is hemodynamically improving. Excellent cardiac index. Wean off Inotropic gtts.   Respiratory: Wean towards extubation. tV 400, RR 18, PEEP 5.   GI:  Continue enteral tube feedings while intubated. Benign abdominal exam.  Renal: Adequate urine output. Cr 1.1. K 4.1. Mag 2.8. Start IV diuresis.   Heme:  Hct 24.6  Platelet 60. Hold aspirin. Hold Plavix. HIT panel negative.  Id:  WBC is down to 14.46. Tmax 100.8. BC NGTD. Urinalysis negative. Sputum culture negative. Vancomycin and zosyn day 3. Midline sternal incision appears reddened, with induration at skin edges.  Will continue to follow closely.    Endocrine:  Glucose controlled on sliding scale insulin.   Activities: Intubated and sedated. Moves all 4. Full ROM.    Musculoskeletal: Able to find right radial pulse via doppler. The right hand extending to the tips of the fingers of the right hand are mottled, dusky, appear cyanotic. Recent right radial arterial line placement, which has been relocated. US of arteries and veins of the right arm pending. Vascular surgery following, suggested possible thrombotic etiology, suggested heparin gtt, however this is contraindicated r/t s/p cabg x 3.     Lines tubes and drains:  Continue swan and cordis. Continue arterial line. Continue storm.    Plan: Remain ICU         11/08/2019  The patient is post-operative day 8, status-post coronary artery bypass grafting x 3. Patient had an episode of decompensation and unresponsiveness the evening of 11/05/2019. The patient was found unresponsive, cool, and diaphoretic, with agonal breathing. Patient was intubated and transferred to the ICU. Bedside EKG and echocardiogram performed at the bedside showed no significant changes. CT head, chest, abdomen pelvis performed, showed no significant findings. Suspect cardiogenic shock.      Neuro:  Patients neurologic state is improving. Alert, follows commands, makes eye contact, disoriented x 4. No focal deficits.    Cardiac:  Patient is hemodynamically improving. Off all inotropic gtts. Start low dose metoprolol and amlodipine.   Respiratory: Good sats on low flow nasal cannula. Continue IS. Continue pulmonary toilet. Wean to room air.    GI:  Continue enteral tube feedings at this time r/t disorientation and aspiration risk. Discontinue laxatives. Start metamucil BID, patient has had multiple loose stools.   Renal: Good urine output. Cr 0.8. K 3.6. Mag 2.1. Replace K. Replace Mag. Continue IV diuresis.   Hema:  Hct 26.4  Platelet 45. Hold aspirin. Hold Plavix. HIT panel will be resent as the patient  is a high suspicion for HIT despite previous negative HIT panel. Multiple DVTs from the right IJ down to the brachiocephalic vein. Confirmed via vascular US. Patient started on argatroban yesterday, goal is APTT 2x baseline. Most recent APTT 97.8. Baseline APTT 50.8.  Hematology following. Vascular surgery following.   Id:  WBC is down to 12. Tmax 100.4. BC NGTD. Urinalysis negative. Sputum culture negative. Vancomycin and zosyn day 4. Midline sternal incision appears improved, with minimal redness and induration at skin edges. Will continue to follow closely.    Endocrine:  Glucose controlled on sliding scale insulin.   Activities: Increase activities as tolerated.  Musculoskeletal: ROM 2/5 to the right wrist/hand/fingers, distal from the elbow there is 2+ non pitting edema, the extremity is tender and warm. There is mottling, a dusky appearance, and purple discoloration to the fingertips of the right hand. See above hematology plan.      Lines tubes and drains:  Continue PICC line. Continue storm catheter. Continue NG tube.   Plan: Remain ICU

## 2019-11-08 NOTE — ASSESSMENT & PLAN NOTE
Monitor, plat dropped to 40 K   Hematology following,   SCD for now  hapto was blaire'  HIT w/u pending

## 2019-11-08 NOTE — ASSESSMENT & PLAN NOTE
Per Cards and CVT surgery  S/P 3-vessel CABG   POD# 8  Aspirin on Hold, Atorvastatin on Hold  Lopressor: 12.5 mg

## 2019-11-08 NOTE — ASSESSMENT & PLAN NOTE
Post CABG  EF 30%  S/p swan catheter  CI 3.3  Repeat echo today showed EF 30%, septal hypokinesis , compared to prior echo before the cabg, no significant change  ekg reviewed    Extubated  Off pressors  Continue Amlodipine and BB  Hold Lipitor due to elevated LFT  Continue anticoagulation due to acute DVT  Continue ICU supportive care and ABx

## 2019-11-08 NOTE — ASSESSMENT & PLAN NOTE
Follow up echo EF 30%  Per CVT surgery  Cont hemodynamic monitoring post op: off pressors  Seen by PT and PT for ambulation

## 2019-11-08 NOTE — PROGRESS NOTES
[facility] -   Hematology/Oncology  Progress Note    Patient Name: Mateusz Bates  Admission Date: 10/28/2019  Hospital Length of Stay: 11 days  Code Status: Full Code     Subjective:     HPI:  Mrs. Bates is a 69-year-old female with hypertension, diabetes, hyperlipidemia, obesity, anxiety and depression, iron deficiency anemia, vitamin B12 deficiency, who presented to the emergency room with an NSTEMI.  Workup included a cardiac catheterization that shows severe multivessel coronary artery disease with proximal LAD stenosis of 70% and severely depressed ejection fraction of 20%.  The patient is a candidate for urgent coronary artery bypass grafting. S/p CABG today.  Hematology is consulted for thrombocytopenia.    Interval History:  Currently disoriented.  Repeats words spoken to her.  On 3 liter NC.  Tmax 100.4. Restless.     Oncology Treatment Plan:   [No treatment plan]    Medications:  Continuous Infusions:   argatroban in 0.9 % sod chlor 1.5 mcg/kg/min (11/08/19 0901)     Scheduled Meds:   amLODIPine  5 mg Oral Daily    chlorhexidine  15 mL Mouth/Throat BID    famotidine (PF)  20 mg Intravenous BID    furosemide  20 mg Intravenous BID    lactated ringers  1,000 mL Intravenous Once    metoprolol tartrate  12.5 mg Oral BID    piperacillin-tazobactam (ZOSYN) IVPB  4.5 g Intravenous Q8H    potassium chloride  10 mEq Oral BID    psyllium husk (aspartame)  1 packet Oral BID    sodium chloride  2 g Oral Q6H    [START ON 11/10/2019] vancomycin (VANCOCIN) IVPB  750 mg Intravenous Q72H     PRN Meds:albumin human 5%, albuterol sulfate, Dextrose 10% Bolus, Dextrose 10% Bolus, glucagon (human recombinant), insulin aspart U-100, iohexol, lactated ringers, magnesium sulfate IVPB, magnesium sulfate IVPB, metoclopramide HCl, ondansetron, potassium chloride in water **AND** potassium chloride in water **AND** potassium chloride in water, traMADol     Review of Systems   Unable to perform ROS: Mental  status change     Objective:     Vital Signs (Most Recent):  Temp: 98.6 °F (37 °C) (11/08/19 0300)  Pulse: 95 (11/08/19 0727)  Resp: (!) 25 (11/08/19 0727)  BP: (!) 159/48 (11/08/19 0835)  SpO2: 100 % (11/08/19 0727) Vital Signs (24h Range):  Temp:  [97.8 °F (36.6 °C)-100.4 °F (38 °C)] 98.6 °F (37 °C)  Pulse:  [] 95  Resp:  [11-31] 25  SpO2:  [87 %-100 %] 100 %  BP: ()/(26-59) 159/48     Weight: 73.6 kg (162 lb 4.1 oz)  Body mass index is 30.66 kg/m².  Body surface area is 1.78 meters squared.      Intake/Output Summary (Last 24 hours) at 11/8/2019 0956  Last data filed at 11/8/2019 0611  Gross per 24 hour   Intake 871.07 ml   Output 2745 ml   Net -1873.93 ml       Physical Exam   Constitutional: She appears well-developed and well-nourished. She appears toxic. She has a sickly appearance. She appears ill. Nasal cannula in place.   HENT:   Head: Normocephalic and atraumatic.   Cardiovascular: Regular rhythm.   Pulmonary/Chest: Breath sounds normal.   Abdominal: Soft. Normal appearance and bowel sounds are normal. She exhibits no distension.   Musculoskeletal: She exhibits edema (generalized and right arm).   Neurological: She is disoriented. GCS eye subscore is 4. GCS verbal subscore is 3.   Skin: Skin is warm and dry. Ecchymosis (worsening right posterior thigh) noted. She is not diaphoretic. There is cyanosis (right hand). There is pallor.   Nursing note and vitals reviewed.      Significant Labs:   All pertinent labs from the last 24 hours have been reviewed.    Diagnostic Results:  I have reviewed all pertinent imaging results/findings within the past 24 hours.    Assessment/Plan:     Edema of hand  11/8/19 Venous US of right arm done yesterday due to right hand edema and color change found occlusive hypoechoic material within the right internal jugular vein, right subclavian vein, right axillary vein.  Arm veins: Occlusive hypoechoic material filling the entire right brachial vein.  Superficial  thrombophlebitis of the right basilar vein in the upper arm.  Cephalic vein not visualized.  Patient was started on Agatroban.  Due to possible HIT vs recent swan sada catheter placement - with HIT scoring probability now high.    --HIT panel pending  --continue Agatroban; avoid Heparin products  --Will need to discharge on coumadin or direct oral anticoagulant such as Eliquis    Thrombocytopenia  11/8/19 Platelets decreased today to 45 K.  Bruising to right posterior leg worsened today.  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  HIT panel pending.    --Continue Agatroban due to possible HIT  --Avoid Heparin products  --CBC daily  --Transfuse for platelets <10 K          Thank you for your consult. I will follow-up with patient. Please contact us if you have any additional questions.     Anitha Skinner NP  Hematology/Oncology  [facility] [place]

## 2019-11-08 NOTE — SUBJECTIVE & OBJECTIVE
ROS  Objective:     Vital Signs (Most Recent):  Temp: 98.6 °F (37 °C) (11/08/19 0300)  Pulse: 97 (11/08/19 1150)  Resp: (!) 25 (11/08/19 0727)  BP: (!) 156/76 (11/08/19 1150)  SpO2: 100 % (11/08/19 0727) Vital Signs (24h Range):  Temp:  [97.8 °F (36.6 °C)-98.6 °F (37 °C)] 98.6 °F (37 °C)  Pulse:  [] 97  Resp:  [20-31] 25  SpO2:  [87 %-100 %] 100 %  BP: ()/(26-76) 156/76     Weight: 73.6 kg (162 lb 4.1 oz)  Body mass index is 30.66 kg/m².     SpO2: 100 %  O2 Device (Oxygen Therapy): nasal cannula      Intake/Output Summary (Last 24 hours) at 11/8/2019 1525  Last data filed at 11/8/2019 0611  Gross per 24 hour   Intake 733.97 ml   Output 1675 ml   Net -941.03 ml       Lines/Drains/Airways     Peripherally Inserted Central Catheter Line                 PICC Double Lumen 11/07/19 1645 left brachial;left basilic less than 1 day          Drain                 Urethral Catheter 11/05/19 1718 Straight-tip 2 days         NG/OG Tube 11/07/19 1600 small diameter Right nostril less than 1 day                Physical Exam   Constitutional: She is oriented to person, place, and time. She appears well-developed and well-nourished. No distress.   HENT:   Head: Normocephalic and atraumatic.   Eyes: Pupils are equal, round, and reactive to light. Right eye exhibits no discharge. Left eye exhibits no discharge.   Neck: Neck supple. No JVD present.   Cardiovascular: Normal rate, regular rhythm, S1 normal, S2 normal and normal heart sounds.   No murmur heard.  Pulmonary/Chest: Effort normal. No respiratory distress. She has no wheezes.   CABG site C/D/I; no bleeding erythema or drainage    Diminished BS at bases   Abdominal: Soft. She exhibits no distension.   Musculoskeletal: She exhibits no edema.   Neurological: She is alert and oriented to person, place, and time.   Skin: Skin is warm and dry. She is not diaphoretic. No erythema.   SVG sites C/D/I; no bleeding erythema or drainage   Psychiatric: She has a normal mood  and affect. Her behavior is normal. Thought content normal.   Nursing note and vitals reviewed.      Significant Labs:   ABG:   Recent Labs   Lab 11/07/19  0452 11/07/19  1151   PH 7.582* 7.518*   PCO2 30.6* 29.1*   HCO3 28.8* 23.6*   POCSATURATED 98 99   BE 7 1   , Blood Culture: No results for input(s): LABBLOO in the last 48 hours., BMP:   Recent Labs   Lab 11/07/19  0342  11/07/19  2343 11/08/19  0600 11/08/19  1114   *   < > 131* 176* 242*   *   < > 131* 134* 133*   K 4.1   < > 3.7 3.6 4.1   CL 90*   < > 96 99 99   CO2 22*   < > 23 22* 20*   BUN 28*   < > 21 19 19   CREATININE 1.1   < > 0.8 0.8 0.8   CALCIUM 7.6*   < > 8.2* 8.3* 8.6*   MG 2.8*  --   --  2.1  --     < > = values in this interval not displayed.   , CMP   Recent Labs   Lab 11/07/19  2343 11/08/19  0600 11/08/19  0711 11/08/19  1114   * 134*  --  133*   K 3.7 3.6  --  4.1   CL 96 99  --  99   CO2 23 22*  --  20*   * 176*  --  242*   BUN 21 19  --  19   CREATININE 0.8 0.8  --  0.8   CALCIUM 8.2* 8.3*  --  8.6*   PROT  --   --  5.3*  --    ALBUMIN  --   --  2.2*  --    BILITOT  --   --  1.2*  --    ALKPHOS  --   --  73  --    AST  --   --  819*  --    ALT  --   --  516*  --    ANIONGAP 12 13  --  14   ESTGFRAFRICA >60 >60  --  >60   EGFRNONAA >60 >60  --  >60   , CBC   Recent Labs   Lab 11/07/19  1513 11/07/19  1710 11/08/19  0600   WBC 14.11* 14.03* 12.08   HGB 8.8* 8.5* 8.6*   HCT 27.0* 25.8* 26.4*   PLT 47* 46* 45*   , INR No results for input(s): INR, PROTIME in the last 48 hours., Lipid Panel No results for input(s): CHOL, HDL, LDLCALC, TRIG, CHOLHDL in the last 48 hours. and Troponin No results for input(s): TROPONINI in the last 48 hours.    Significant Imaging: EKG:

## 2019-11-08 NOTE — ASSESSMENT & PLAN NOTE
-Hemoglobin A1C was 9 on 9/19/19.   -NISS.   -ADA diet.   11/2  Levemir / NISS / Accuchecks  11/3  Levemir increased to 15 sq bid  NISS  Accuchecks  11/4  Levemir  NISS  Accuchecks  11/5  Levemir 10 units BID  NISS  Accuchecks    11/6  UnControlled  Accuchecks  Insulin Gtt  Improved control  Last BS 99    11/7  Uncontrolled  Accuchecks  NISS    11/8  Uncontrolled  NISS  Accuchecks

## 2019-11-08 NOTE — PLAN OF CARE
POC reviewed with pt.  Disoriented x4; pt was able to stated  once. Alert; WERNER; unable to follow commands.  Keofeed in place with peptamen intense infusing at 30 ml/hr; tolerating well; goal 45 ml/hr. 3L humidified O2.  NSR on monitor.  Hydralazine administered x1.  Abdomen soft, nondistended. Jones in place draining clear yellow urine.  BM x4.  Argatroban infusing at 7 ml/hr; PTT within therapeutic range per protocal; PTT Q4H. Restraints in place. Call bell in reach.  Bed locked and in lowest position. SR upx3.

## 2019-11-08 NOTE — ASSESSMENT & PLAN NOTE
POD # 8  Multi vessel CAD now post CABG  Continue  ( Atorvastatin) await resolution LFT  Cont cardiac monitoring

## 2019-11-08 NOTE — PROGRESS NOTES
[facility] -   Critical Care Medicine  Progress Note    Patient Name: Mateusz Bates  MRN: [id number]  Admission Date: 10/28/2019  Hospital Length of Stay: 11 days  Code Status: Full Code  Attending Provider: Keny Zaidi MD  Primary Care Provider: Serenity Kilpatrick MD   Principal Problem: Acute deep vein thrombosis (DVT) of right upper extremity    Subjective:     HPI:  69 year old female with PMH including DM2 on insulin; HTN; HLD; GERD; anxiety  Presented to ED on 10/28 at instruction of PCP s/t abnormal EKG with c/o chest tightness and SOB  Eval revealed LBBB on EKG along with BNP 1300 and troponin 3.66     Taken for LHC on 10/28 finding 3 vessel CAD, EF 20%, and infra renal aorta plaque 30%  CTS consulted and agree pt is candidate for urgent CABG  Today 10/30 taken to cath lab for placement of IABP to support for planned CABG in am    Hospital/ICU Course:  Transferred to ICU post IABP placement for hemodynamic monitoring until surgery  10/31 - Admitted to ICU today from OR post 3-vessel CABG.  [facility].  Upon arrival to ICU on Vasopressin, Epi, Primacor and Lidocaine infusions.  11/1 - Extubated overnight.  Supine in bed awake and alert and responsive still with IABP in place as well as on Epi, Primacor and Insulin infusions.  No distress noted  11/05: admitted back to MICU; Code blue called, Agonal breathing, cold and clammy, Hypotensive, peripheral dopamine started and intubated, weak thready pulses, Central line Varney and A line placed in MICU, Bicarb drip and LR bolus and albumin given, storm placed. Recent rise in WBCC  11/06: seen and examined: SVR is high CO 3.3, SP 1 PRBC, Insulin drip @ 21, Epi drip @ 0.15, right hand cyanotic and swollen,  mls overnight  11/07: seen and examined : Off precedex, Epi off , primacor @ 0.25 mcg/kg /min, right hand remain swollen dusky, good UO, Na low. CO 6.4, CI 3.8, , CVP 12, T max 100.8F: awake off  sedation  11/08: seen and examined : on tube feeds, in chair, awake, converses appropriately, argatroban drip, got IV iron yesterday, cultures -ve so far, Normal WBCC, Na+ improved    Interval History: Seen and examined at bedside. Hospital chart reviewed. No acute interval detrimental events noted.Afebrile. She reports that she  is unchanged    Review of Systems   Constitutional: Positive for malaise/fatigue.   Cardiovascular: Positive for leg swelling.   Skin:        brusing right legs   All other systems reviewed and are negative.        Objective:     Vital Signs (Most Recent):  Temp: 98.6 °F (37 °C) (11/08/19 0300)  Pulse: 95 (11/08/19 0727)  Resp: (!) 25 (11/08/19 0727)  BP: (!) 159/48 (11/08/19 0835)  SpO2: 100 % (11/08/19 0727) Vital Signs (24h Range):  Temp:  [97.8 °F (36.6 °C)-100.4 °F (38 °C)] 98.6 °F (37 °C)  Pulse:  [] 95  Resp:  [19-31] 25  SpO2:  [87 %-100 %] 100 %  BP: ()/(26-59) 159/48     Weight: 73.6 kg (162 lb 4.1 oz)  Body mass index is 30.66 kg/m².      Intake/Output Summary (Last 24 hours) at 11/8/2019 1129  Last data filed at 11/8/2019 0611  Gross per 24 hour   Intake 871.07 ml   Output 2485 ml   Net -1613.93 ml       Physical Exam   Constitutional: Vital signs are normal. She appears well-developed and well-nourished. She has a sickly appearance.       Peripheries cold clammy, cyanotic   HENT:   Head: Normocephalic and atraumatic.   Mouth/Throat: Oropharynx is clear and moist.   Eyes: Pupils are equal, round, and reactive to light. Conjunctivae and EOM are normal.   Neck: Normal range of motion. Neck supple.   Cardiovascular: Normal rate, regular rhythm and intact distal pulses.   No murmur heard.  Pulses:       Carotid pulses are 2+ on the left side.       Radial pulses are 1+ on the right side, and 2+ on the left side.        Femoral pulses are 2+ on the left side.  Pulmonary/Chest: Effort normal and breath sounds normal. No stridor. No respiratory distress.   Abdominal: Soft.  Bowel sounds are normal.   Musculoskeletal: Normal range of motion. She exhibits no edema.   Neurological: No cranial nerve deficit.   Skin: Capillary refill takes 2 to 3 seconds.   Psychiatric: She has a normal mood and affect.   Nursing note and vitals reviewed.      Vents:  Vent Mode: Spont (11/07/19 1120)  Ventilator Initiated: Yes (11/05/19 1516)  Set Rate: 0 bmp (11/07/19 1120)  Vt Set: 400 mL (11/07/19 1120)  Pressure Support: 10 cmH20 (11/07/19 1120)  PEEP/CPAP: 5 cmH20 (11/07/19 1120)  Oxygen Concentration (%): 32 (11/08/19 0727)  Peak Airway Pressure: 16 cmH2O (11/07/19 1120)  Plateau Pressure: 0 cmH20 (11/07/19 1120)  Total Ve: 14 mL (11/07/19 1120)  Negative Inspiratory Force (cm H2O): 20 (11/07/19 1201)  F/VT Ratio<105 (RSBI): (!) 56.6 (11/07/19 1120)    Lines/Drains/Airways     Peripherally Inserted Central Catheter Line                 PICC Double Lumen 11/07/19 1645 left brachial;left basilic less than 1 day          Drain                 Urethral Catheter 11/05/19 1718 Straight-tip 2 days         NG/OG Tube 11/07/19 1600 small diameter Right nostril less than 1 day                Significant Labs:    CBC/Anemia Profile:  Recent Labs   Lab 11/07/19  1513 11/07/19  1710 11/08/19  0600   WBC 14.11* 14.03* 12.08   HGB 8.8* 8.5* 8.6*   HCT 27.0* 25.8* 26.4*   PLT 47* 46* 45*   MCV 87 87 88   RDW 14.9* 15.0* 15.2*        Chemistries:  Recent Labs   Lab 11/07/19  0342  11/07/19  1710 11/07/19  2343 11/08/19  0600 11/08/19  0711   *   < > 129* 131* 134*  --    K 4.1   < > 4.0 3.7 3.6  --    CL 90*   < > 95 96 99  --    CO2 22*   < > 23 23 22*  --    BUN 28*   < > 22 21 19  --    CREATININE 1.1   < > 0.8 0.8 0.8  --    CALCIUM 7.6*   < > 7.9* 8.2* 8.3*  --    ALBUMIN  --   --   --   --   --  2.2*   PROT  --   --   --   --   --  5.3*   BILITOT  --   --   --   --   --  1.2*   ALKPHOS  --   --   --   --   --  73   ALT  --   --   --   --   --  516*   AST  --   --   --   --   --  819*   MG 2.8*  --   --    --  2.1  --     < > = values in this interval not displayed.       Blood Culture: No results for input(s): LABBLOO in the last 48 hours.  POCT Glucose:   Recent Labs   Lab 11/07/19  2349 11/08/19  0412 11/08/19  0719   POCTGLUCOSE 142* 161* 194*     Respiratory Culture: No results for input(s): GSRESP, RESPIRATORYC in the last 48 hours.  Urine Culture: No results for input(s): LABURIN in the last 48 hours.  All pertinent labs within the past 24 hours have been reviewed.    Significant Imaging:  I have reviewed and interpreted all pertinent imaging results/findings within the past 24 hours.     HEAD CT  CLINICAL HISTORY:  Confusion/delirium, altered LOC, unexplained;    TECHNIQUE:  Axial CT images obtained throughout the head without intravenous contrast.    COMPARISON:  11/06/2019    FINDINGS:  Negative for acute hemorrhage, extra-axial collection, mass effect.    Again noted is diffuse ventriculomegaly as described on the comparison examination which is without significant change.  Low-density changes periventricular white matter with calcified plaque skull-base vasculature.    The paranasal sinuses and mastoids are clear.    The calvarium is unremarkable with no fractures.      Impression       Negative for acute intracranial abnormality.  Stable exam     CHEST CT  COMPARISON:  CT chest without contrast 11 6 19    FINDINGS  Heart: Mild cardiomegaly.  Postop changes mediastinum with median sternotomy.  Minimal postsurgical fluid along the incision both superficially in the chest wall and deep to the sternum with no abscess present.  Significant coronary arterial calcification    Mediastinum: No adenopathy.  Unremarkable.    Vasculature: Moderate aortic atherosclerosis.  Though the study was not tailored to evaluate for pulmonary emboli, no pulmonary emboli are visualized.    Lungs: Small bilateral pleural effusion with compressive atelectasis in the lungs similar to that seen previously.  Some additional linear  bands of atelectasis more anteriorly.    Esophagus: Unremarkable.    Upper Abdomen: Nasogastric tube in the stomach.  There are some irregularly-shaped low-density lesions noted at the dome of the liver.  One representative lesion measures 4.1 x 3.1 cm.  Difficult to determine if these were present on the comparison noncontrast examination.    Bones: No acute fracture. Low-grade degenerative changes in the spine.    There is reportedly known right upper extremity thrombus.  There is thrombus in the right internal jugular and subclavian veins which is nearly occlusive.  A very small amount of nonocclusive thrombus extends into the right brachiocephalic vein abutting the left-sided catheter.  Minimal nonocclusive thrombus extends into the SVC which is patent..      Impression       There is a large amount of nearly occlusive thrombus in the partially visualized right internal jugular vein.  There is also thrombus in the right subclavian vein which is known per history.  A very minimal degree of nonocclusive thrombus extends centrally into the right brachiocephalic vein and SVC (5 mm maximum diameter of this minimal central thrombus).    Stable small bilateral pleural effusions.  Stable postop changes in the mediastinum.    There are some clustered irregularly-shaped low-density lesions near the dome of the liver which are indeterminate.  Considerations include infection/early abscess formation, metastatic disease.       US studies  TECHNIQUE:  Duplex and color flow Doppler evaluation and dynamic compression was performed of the right upper extremity veins.    COMPARISON:  None    FINDINGS:  Central veins: Occlusive hypoechoic material within the right internal jugular vein, right subclavian vein, right axillary vein.    Arm veins: Occlusive hypoechoic material filling the entire right brachial vein.  Superficial thrombophlebitis of the right basilar vein in the upper arm.  Cephalic vein not visualized.    Other  findings: None.      Impression       Extensive occlusive deep venous thrombosis of the deep and superficial veins of the right upper extremity as described above.       Component      Latest Ref Rng & Units 11/8/2019 11/7/2019   PROTEIN TOTAL      6.0 - 8.4 g/dL 5.3 (L)    Albumin      3.5 - 5.2 g/dL 2.2 (L)    BILIRUBIN TOTAL      0.1 - 1.0 mg/dL 1.2 (H)    Bilirubin, Direct      0.1 - 0.3 mg/dL 0.6 (H)    AST      10 - 40 U/L 819 (H)    ALT      10 - 44 U/L 516 (H)    Alkaline Phosphatase      55 - 135 U/L 73    Pathologist Review        Review required   Fibrinogen      182 - 366 mg/dL  449 (H)   D-Dimer      <0.50 mg/L FEU  >33.00 (H)   Haptoglobin      30 - 250 mg/dL  190   Pathologist Review Peripheral Smear        REVIEWED       ABG  Recent Labs   Lab 11/07/19  1151   PH 7.518*   PO2 120*   PCO2 29.1*   HCO3 23.6*   BE 1     Assessment/Plan:     Psychiatric  Anxiety and depression  monitor    Pulmonary  Acute hypoxemic respiratory failure  Oxygen Concentration (%):  [32-40] 32  Resp Rate Total:  [23 br/min-26 br/min] 26 br/min    TOLERATED EXTUBATION on Nasal canulla  Incentive spirometry, deep breathing exercises   Chlorohexidine      Cardiac/Vascular  Cardiogenic shock  Improved      S/P CABG x 3  Follow up echo EF 30%  Per CVT surgery  Cont hemodynamic monitoring post op: off pressors  Seen by PT and PT for ambulation      CAD, multiple vessel  Per Cards and CVT surgery  S/P 3-vessel CABG   POD# 8  Aspirin on Hold, Atorvastatin on Hold  Lopressor: 12.5 mg       Acute combined systolic and diastolic CHF, NYHA class 2  Strict I/O  Daily wt  IV LASIX       NSTEMI (non-ST elevated myocardial infarction)  POD # 8  Multi vessel CAD now post CABG  Continue  ( Atorvastatin) await resolution LFT  Cont cardiac monitoring    Hypertension associated with diabetes  Clonidine 0.1 mg  PO Norvasc 5 mg  Metoprolol 12.5 mg BID        Renal/  Hyponatremia  Suspect dilutional  Salt tabs  Improved to 134    Hematology  *  Acute deep vein thrombosis (DVT) of right upper extremity  Elevated right hand  Has intact sensation  Seen by Vascular : Discussed options: thrombectomy vs local tPA: high risk due to graft  Aggatroban GTT      Acute deep vein thrombosis (DVT) of non-extremity vein Right IJ  Continue anticoagulation  Seen by Vascular  Discontinued CORDIS      Thrombocytopenia  Monitor, plat dropped to 40 K   Hematology following,   SCD for now  hapto was blaire'  HIT w/u pending    Oncology  Iron deficiency anemia  SP IV IRON infusion    Endocrine  Diabetes mellitus type II, uncontrolled  Sliding scale: episodes of low BS  Q4  -180       GI  TUBE feeds    Orthopedic  Critical illness myopathy  PT and OT  OOB    Other  Edema of hand  Right had swelling and edema  Wait vascular studies  No heparin: Low Plat     Critical Care Daily Checklist:    A: Awake: RASS Goal/Actual Goal: RASS Goal: -2-->light sedation  Actual: Alexander Agitation Sedation Scale (RASS): Restless   B: Spontaneous Breathing Trial Performed? Spon. Breathing Trial Initiated?: Initiated (11/07/19 1201)   C: SAT & SBT Coordinated?  N/A                      D: Delirium: CAM-ICU Overall CAM-ICU: Positive   E: Early Mobility Performed? Yes   F: Feeding Goal: Goals: 1) PO intakes > 50% of meals at f/u  Status: Nutrition Goal Status: new   Current Diet Order   Procedures    Diet NPO Except for: Sips with Medication     Order Specific Question:   Except for     Answer:   Sips with Medication      AS: Analgesia/Sedation NONE   T: Thromboembolic Prophylaxis SCD   H: HOB > 300 Yes   U: Stress Ulcer Prophylaxis (if needed) YES   G: Glucose Control Goal 110-180   B: Bowel Function Stool Occurrence: 1   I: Indwelling Catheter (Lines & Jones) Necessity Jones, left arm picc   D: De-escalation of Antimicrobials/Pharmacotherapies Afebrile, Normal Wbcc, POD #8, Vanc Zosyn, cultures NGTD: defer to CVT    Plan for the day/ETD OOB    Code Status:  Family/Goals of Care: Full  Code  Return home     Updated sister bedside    Critical Care Time: 45 minutes  Critical secondary to Patient has a condition that poses threat to life and bodily function: Acute DVT, Critical illness weakness, Post CABG      Critical care was time spent personally by me on the following activities: development of treatment plan with patient or surrogate and bedside caregivers, discussions with consultants, evaluation of patient's response to treatment, examination of patient, ordering and performing treatments and interventions, ordering and review of laboratory studies, ordering and review of radiographic studies, pulse oximetry, re-evaluation of patient's condition. This critical care time did not overlap with that of any other provider or involve time for any procedures.     Riaz Tanner MD  Critical Care Medicine  [facility] -

## 2019-11-08 NOTE — CONSULTS
Screening performed on this 69 year old female patient due to low ana. PMH of DMII, GERD, anxiety, inpatient for respiratory failure and NSTEMI. She is awake and alert. Assessment performed. She has a small blood filled blister to left side of her face near her mouth- potentially pinched from et tube securement device, but unable to verify. Left open to air, patient was extubated yesterday. Right hand is edematous and maroon and purple. Bilateral feet mottled with dark purple discoloration.  Scattered ecchymosis over entire body. Unable to assess sacrum at this time but it is intact with no redness per primary nurse. Will follow.

## 2019-11-08 NOTE — ASSESSMENT & PLAN NOTE
Elevated right hand  Has intact sensation  Seen by Vascular : Discussed options: thrombectomy vs local tPA: high risk due to graft  Aggatroban GTT

## 2019-11-08 NOTE — SUBJECTIVE & OBJECTIVE
Interval History: Improving slowly. Extubated without event.    Review of Systems   Constitutional: Positive for activity change and appetite change. Negative for unexpected weight change.   HENT: Negative.  Negative for trouble swallowing.    Eyes: Negative.    Respiratory: Negative.  Negative for cough, shortness of breath and wheezing.    Cardiovascular: Negative.  Negative for chest pain.   Gastrointestinal: Negative.    Endocrine: Negative.    Genitourinary: Negative.    Musculoskeletal: Negative.    Skin: Negative.    Allergic/Immunologic: Negative.    Neurological: Positive for weakness. Negative for dizziness.   Hematological: Negative.    Psychiatric/Behavioral: Negative.    All other systems reviewed and are negative.    Objective:     Vital Signs (Most Recent):  Temp: 98.6 °F (37 °C) (11/08/19 0300)  Pulse: 97 (11/08/19 1150)  Resp: (!) 25 (11/08/19 0727)  BP: (!) 156/76 (11/08/19 1150)  SpO2: 100 % (11/08/19 0727) Vital Signs (24h Range):  Temp:  [97.8 °F (36.6 °C)-98.6 °F (37 °C)] 98.6 °F (37 °C)  Pulse:  [] 97  Resp:  [20-31] 25  SpO2:  [87 %-100 %] 100 %  BP: ()/(26-76) 156/76     Weight: 73.6 kg (162 lb 4.1 oz)  Body mass index is 30.66 kg/m².    Intake/Output Summary (Last 24 hours) at 11/8/2019 1522  Last data filed at 11/8/2019 0611  Gross per 24 hour   Intake 733.97 ml   Output 1675 ml   Net -941.03 ml      Physical Exam   Constitutional: She is oriented to person, place, and time. She appears well-nourished. No distress.   HENT:   Head: Normocephalic and atraumatic.   Eyes: Pupils are equal, round, and reactive to light. EOM are normal.   Neck: Normal range of motion. Neck supple. No JVD present.   Cardiovascular: Normal rate, regular rhythm, normal heart sounds and intact distal pulses. Exam reveals no gallop and no friction rub.   No murmur heard.  The right upper extremity has 2+ distal to the elbow. The right hand and fingertips are mottled, and dusky, right radial pulse found  via doppler.    Pulmonary/Chest: No stridor. No respiratory distress. She has no wheezes. She has rales. She exhibits no tenderness.   Coarse breath sounds.    Abdominal: Soft. Bowel sounds are normal. She exhibits no distension and no mass. There is no tenderness. There is no rebound and no guarding. No hernia.   Genitourinary:   Genitourinary Comments: Jones in place.    Musculoskeletal: Normal range of motion. She exhibits edema. She exhibits no tenderness or deformity.   Sedated. Moves all 4. No focal deficits noted.    Neurological: She is alert and oriented to person, place, and time. She displays normal reflexes. No cranial nerve deficit or sensory deficit. She exhibits normal muscle tone. Coordination normal.   Sedated and Intubated. However, moves all 4, full ROM. No focal deficits noted.     Skin: Skin is warm and dry. Capillary refill takes 2 to 3 seconds. No rash noted. She is not diaphoretic. No erythema. No pallor.   Nursing note and vitals reviewed.      Significant Labs:   Blood Culture: No results for input(s): LABBLOO in the last 48 hours.  BMP:   Recent Labs   Lab 11/08/19  0600 11/08/19  1114   * 242*   * 133*   K 3.6 4.1   CL 99 99   CO2 22* 20*   BUN 19 19   CREATININE 0.8 0.8   CALCIUM 8.3* 8.6*   MG 2.1  --      CBC:   Recent Labs   Lab 11/07/19  1513 11/07/19  1710 11/08/19  0600   WBC 14.11* 14.03* 12.08   HGB 8.8* 8.5* 8.6*   HCT 27.0* 25.8* 26.4*   PLT 47* 46* 45*     CMP:   Recent Labs   Lab 11/07/19  2343 11/08/19  0600 11/08/19  0711 11/08/19  1114   * 134*  --  133*   K 3.7 3.6  --  4.1   CL 96 99  --  99   CO2 23 22*  --  20*   * 176*  --  242*   BUN 21 19  --  19   CREATININE 0.8 0.8  --  0.8   CALCIUM 8.2* 8.3*  --  8.6*   PROT  --   --  5.3*  --    ALBUMIN  --   --  2.2*  --    BILITOT  --   --  1.2*  --    ALKPHOS  --   --  73  --    AST  --   --  819*  --    ALT  --   --  516*  --    ANIONGAP 12 13  --  14   EGFRNONAA >60 >60  --  >60     All pertinent  labs within the past 24 hours have been reviewed.    Significant Imaging: I have reviewed all pertinent imaging results/findings within the past 24 hours.

## 2019-11-08 NOTE — SUBJECTIVE & OBJECTIVE
Interval History:  Currently disoriented.  Repeats words spoken to her.  On 3 liter NC.  Tmax 100.4. Restless.     Oncology Treatment Plan:   [No treatment plan]    Medications:  Continuous Infusions:   argatroban in 0.9 % sod chlor 1.5 mcg/kg/min (11/08/19 0901)     Scheduled Meds:   amLODIPine  5 mg Oral Daily    chlorhexidine  15 mL Mouth/Throat BID    famotidine (PF)  20 mg Intravenous BID    furosemide  20 mg Intravenous BID    lactated ringers  1,000 mL Intravenous Once    metoprolol tartrate  12.5 mg Oral BID    piperacillin-tazobactam (ZOSYN) IVPB  4.5 g Intravenous Q8H    potassium chloride  10 mEq Oral BID    psyllium husk (aspartame)  1 packet Oral BID    sodium chloride  2 g Oral Q6H    [START ON 11/10/2019] vancomycin (VANCOCIN) IVPB  750 mg Intravenous Q72H     PRN Meds:albumin human 5%, albuterol sulfate, Dextrose 10% Bolus, Dextrose 10% Bolus, glucagon (human recombinant), insulin aspart U-100, iohexol, lactated ringers, magnesium sulfate IVPB, magnesium sulfate IVPB, metoclopramide HCl, ondansetron, potassium chloride in water **AND** potassium chloride in water **AND** potassium chloride in water, traMADol     Review of Systems   Unable to perform ROS: Mental status change     Objective:     Vital Signs (Most Recent):  Temp: 98.6 °F (37 °C) (11/08/19 0300)  Pulse: 95 (11/08/19 0727)  Resp: (!) 25 (11/08/19 0727)  BP: (!) 159/48 (11/08/19 0835)  SpO2: 100 % (11/08/19 0727) Vital Signs (24h Range):  Temp:  [97.8 °F (36.6 °C)-100.4 °F (38 °C)] 98.6 °F (37 °C)  Pulse:  [] 95  Resp:  [11-31] 25  SpO2:  [87 %-100 %] 100 %  BP: ()/(26-59) 159/48     Weight: 73.6 kg (162 lb 4.1 oz)  Body mass index is 30.66 kg/m².  Body surface area is 1.78 meters squared.      Intake/Output Summary (Last 24 hours) at 11/8/2019 0956  Last data filed at 11/8/2019 0611  Gross per 24 hour   Intake 871.07 ml   Output 2745 ml   Net -1873.93 ml       Physical Exam   Constitutional: She appears  well-developed and well-nourished. She appears toxic. She has a sickly appearance. She appears ill. Nasal cannula in place.   HENT:   Head: Normocephalic and atraumatic.   Cardiovascular: Regular rhythm.   Pulmonary/Chest: Breath sounds normal.   Abdominal: Soft. Normal appearance and bowel sounds are normal. She exhibits no distension.   Musculoskeletal: She exhibits edema (generalized and right arm).   Neurological: She is disoriented. GCS eye subscore is 4. GCS verbal subscore is 3.   Skin: Skin is warm and dry. Ecchymosis (worsening right posterior thigh) noted. She is not diaphoretic. There is cyanosis (right hand). There is pallor.   Nursing note and vitals reviewed.      Significant Labs:   All pertinent labs from the last 24 hours have been reviewed.    Diagnostic Results:  I have reviewed all pertinent imaging results/findings within the past 24 hours.

## 2019-11-08 NOTE — SUBJECTIVE & OBJECTIVE
Interval History: Seen and examined at bedside. Hospital chart reviewed. No acute interval detrimental events noted.Afebrile. She reports that she  is unchanged    Review of Systems   Constitutional: Positive for malaise/fatigue.   Cardiovascular: Positive for leg swelling.   Skin:        brusing right legs   All other systems reviewed and are negative.        Objective:     Vital Signs (Most Recent):  Temp: 98.6 °F (37 °C) (11/08/19 0300)  Pulse: 95 (11/08/19 0727)  Resp: (!) 25 (11/08/19 0727)  BP: (!) 159/48 (11/08/19 0835)  SpO2: 100 % (11/08/19 0727) Vital Signs (24h Range):  Temp:  [97.8 °F (36.6 °C)-100.4 °F (38 °C)] 98.6 °F (37 °C)  Pulse:  [] 95  Resp:  [19-31] 25  SpO2:  [87 %-100 %] 100 %  BP: ()/(26-59) 159/48     Weight: 73.6 kg (162 lb 4.1 oz)  Body mass index is 30.66 kg/m².      Intake/Output Summary (Last 24 hours) at 11/8/2019 1129  Last data filed at 11/8/2019 0611  Gross per 24 hour   Intake 871.07 ml   Output 2485 ml   Net -1613.93 ml       Physical Exam   Constitutional: Vital signs are normal. She appears well-developed and well-nourished. She has a sickly appearance.       Peripheries cold clammy, cyanotic   HENT:   Head: Normocephalic and atraumatic.   Mouth/Throat: Oropharynx is clear and moist.   Eyes: Pupils are equal, round, and reactive to light. Conjunctivae and EOM are normal.   Neck: Normal range of motion. Neck supple.   Cardiovascular: Normal rate, regular rhythm and intact distal pulses.   No murmur heard.  Pulses:       Carotid pulses are 2+ on the left side.       Radial pulses are 1+ on the right side, and 2+ on the left side.        Femoral pulses are 2+ on the left side.  Pulmonary/Chest: Effort normal and breath sounds normal. No stridor. No respiratory distress.   Abdominal: Soft. Bowel sounds are normal.   Musculoskeletal: Normal range of motion. She exhibits no edema.   Neurological: No cranial nerve deficit.   Skin: Capillary refill takes 2 to 3 seconds.    Psychiatric: She has a normal mood and affect.   Nursing note and vitals reviewed.      Vents:  Vent Mode: Spont (11/07/19 1120)  Ventilator Initiated: Yes (11/05/19 1516)  Set Rate: 0 bmp (11/07/19 1120)  Vt Set: 400 mL (11/07/19 1120)  Pressure Support: 10 cmH20 (11/07/19 1120)  PEEP/CPAP: 5 cmH20 (11/07/19 1120)  Oxygen Concentration (%): 32 (11/08/19 0727)  Peak Airway Pressure: 16 cmH2O (11/07/19 1120)  Plateau Pressure: 0 cmH20 (11/07/19 1120)  Total Ve: 14 mL (11/07/19 1120)  Negative Inspiratory Force (cm H2O): 20 (11/07/19 1201)  F/VT Ratio<105 (RSBI): (!) 56.6 (11/07/19 1120)    Lines/Drains/Airways     Peripherally Inserted Central Catheter Line                 PICC Double Lumen 11/07/19 1645 left brachial;left basilic less than 1 day          Drain                 Urethral Catheter 11/05/19 1718 Straight-tip 2 days         NG/OG Tube 11/07/19 1600 small diameter Right nostril less than 1 day                Significant Labs:    CBC/Anemia Profile:  Recent Labs   Lab 11/07/19  1513 11/07/19  1710 11/08/19  0600   WBC 14.11* 14.03* 12.08   HGB 8.8* 8.5* 8.6*   HCT 27.0* 25.8* 26.4*   PLT 47* 46* 45*   MCV 87 87 88   RDW 14.9* 15.0* 15.2*        Chemistries:  Recent Labs   Lab 11/07/19  0342  11/07/19  1710 11/07/19  2343 11/08/19  0600 11/08/19  0711   *   < > 129* 131* 134*  --    K 4.1   < > 4.0 3.7 3.6  --    CL 90*   < > 95 96 99  --    CO2 22*   < > 23 23 22*  --    BUN 28*   < > 22 21 19  --    CREATININE 1.1   < > 0.8 0.8 0.8  --    CALCIUM 7.6*   < > 7.9* 8.2* 8.3*  --    ALBUMIN  --   --   --   --   --  2.2*   PROT  --   --   --   --   --  5.3*   BILITOT  --   --   --   --   --  1.2*   ALKPHOS  --   --   --   --   --  73   ALT  --   --   --   --   --  516*   AST  --   --   --   --   --  819*   MG 2.8*  --   --   --  2.1  --     < > = values in this interval not displayed.       Blood Culture: No results for input(s): LABBLOO in the last 48 hours.  POCT Glucose:   Recent Labs   Lab  11/07/19  2349 11/08/19  0412 11/08/19  0719   POCTGLUCOSE 142* 161* 194*     Respiratory Culture: No results for input(s): GSRESP, RESPIRATORYC in the last 48 hours.  Urine Culture: No results for input(s): LABURIN in the last 48 hours.  All pertinent labs within the past 24 hours have been reviewed.    Significant Imaging:  I have reviewed and interpreted all pertinent imaging results/findings within the past 24 hours.     HEAD CT  CLINICAL HISTORY:  Confusion/delirium, altered LOC, unexplained;    TECHNIQUE:  Axial CT images obtained throughout the head without intravenous contrast.    COMPARISON:  11/06/2019    FINDINGS:  Negative for acute hemorrhage, extra-axial collection, mass effect.    Again noted is diffuse ventriculomegaly as described on the comparison examination which is without significant change.  Low-density changes periventricular white matter with calcified plaque skull-base vasculature.    The paranasal sinuses and mastoids are clear.    The calvarium is unremarkable with no fractures.      Impression       Negative for acute intracranial abnormality.  Stable exam     CHEST CT  COMPARISON:  CT chest without contrast 11 6 19    FINDINGS  Heart: Mild cardiomegaly.  Postop changes mediastinum with median sternotomy.  Minimal postsurgical fluid along the incision both superficially in the chest wall and deep to the sternum with no abscess present.  Significant coronary arterial calcification    Mediastinum: No adenopathy.  Unremarkable.    Vasculature: Moderate aortic atherosclerosis.  Though the study was not tailored to evaluate for pulmonary emboli, no pulmonary emboli are visualized.    Lungs: Small bilateral pleural effusion with compressive atelectasis in the lungs similar to that seen previously.  Some additional linear bands of atelectasis more anteriorly.    Esophagus: Unremarkable.    Upper Abdomen: Nasogastric tube in the stomach.  There are some irregularly-shaped low-density lesions  noted at the dome of the liver.  One representative lesion measures 4.1 x 3.1 cm.  Difficult to determine if these were present on the comparison noncontrast examination.    Bones: No acute fracture. Low-grade degenerative changes in the spine.    There is reportedly known right upper extremity thrombus.  There is thrombus in the right internal jugular and subclavian veins which is nearly occlusive.  A very small amount of nonocclusive thrombus extends into the right brachiocephalic vein abutting the left-sided catheter.  Minimal nonocclusive thrombus extends into the SVC which is patent..      Impression       There is a large amount of nearly occlusive thrombus in the partially visualized right internal jugular vein.  There is also thrombus in the right subclavian vein which is known per history.  A very minimal degree of nonocclusive thrombus extends centrally into the right brachiocephalic vein and SVC (5 mm maximum diameter of this minimal central thrombus).    Stable small bilateral pleural effusions.  Stable postop changes in the mediastinum.    There are some clustered irregularly-shaped low-density lesions near the dome of the liver which are indeterminate.  Considerations include infection/early abscess formation, metastatic disease.       US studies  TECHNIQUE:  Duplex and color flow Doppler evaluation and dynamic compression was performed of the right upper extremity veins.    COMPARISON:  None    FINDINGS:  Central veins: Occlusive hypoechoic material within the right internal jugular vein, right subclavian vein, right axillary vein.    Arm veins: Occlusive hypoechoic material filling the entire right brachial vein.  Superficial thrombophlebitis of the right basilar vein in the upper arm.  Cephalic vein not visualized.    Other findings: None.      Impression       Extensive occlusive deep venous thrombosis of the deep and superficial veins of the right upper extremity as described above.        Component      Latest Ref Rng & Units 11/8/2019 11/7/2019   PROTEIN TOTAL      6.0 - 8.4 g/dL 5.3 (L)    Albumin      3.5 - 5.2 g/dL 2.2 (L)    BILIRUBIN TOTAL      0.1 - 1.0 mg/dL 1.2 (H)    Bilirubin, Direct      0.1 - 0.3 mg/dL 0.6 (H)    AST      10 - 40 U/L 819 (H)    ALT      10 - 44 U/L 516 (H)    Alkaline Phosphatase      55 - 135 U/L 73    Pathologist Review        Review required   Fibrinogen      182 - 366 mg/dL  449 (H)   D-Dimer      <0.50 mg/L FEU  >33.00 (H)   Haptoglobin      30 - 250 mg/dL  190   Pathologist Review Peripheral Smear        REVIEWED

## 2019-11-08 NOTE — PT/OT/SLP PROGRESS
"Occupational Therapy  Treatment    Mateusz Bates   MRN: [id number]   Admitting Diagnosis: Acute hypoxemic respiratory failure    OT Date of Treatment: 11/08/19   OT Start Time: 1000  OT Stop Time: 1025  OT Total Time (min): 25 min    Billable Minutes:  Therapeutic Activity 25 minutes    General Precautions: Standard, fall  Orthopedic Precautions: N/A  Braces: N/A         Subjective:  Communicated with nurse and epic chart review prior to session.    Pain/Comfort  Pain Rating 1: 7/10  Location - Side 1: Right  Location - Orientation 1: upper  Location 1: hand    Objective:  Patient found with: arterial line, telemetry, storm catheter     Functional Mobility:  Bed Mobility:  Max a with rolling   Max a with sit<>supine t/f's    Transfers:   ma x a with sit<>stand t/f's    Functional Ambulation: na    Activities of Daily Living:     Feeding adaptive equipment: na     UE adaptive equipment: na     LE adaptive equipment: total a                    Bathing adaptive equipment: na    Balance:   Static Sit: FAIR: Maintains without assist, but unable to take any challenges   Dynamic Sit: POOR: N/A  Static Stand: POOR: Needs MODERATE assist to maintain  Dynamic stand: POOR: Needs MOD (moderate) assist during gait    Therapeutic Activities and Exercises:  Pt req max a     AM-PAC 6 CLICK ADL   How much help from another person does this patient currently need?   1 = Unable, Total/Dependent Assistance  2 = A lot, Maximum/Moderate Assistance  3 = A little, Minimum/Contact Guard/Supervision  4 = None, Modified Mower/Independent    Putting on and taking off regular lower body clothing? : 2  Bathing (including washing, rinsing, drying)?: 2  Toileting, which includes using toilet, bedpan, or urinal? : 2  Putting on and taking off regular upper body clothing?: 2  Taking care of personal grooming such as brushing teeth?: 2  Eating meals?: 2  Daily Activity Total Score: 12     AM-PAC Raw Score CMS "G-Code Modifier Level of Impairment " Assistance   6 % Total / Unable   7 - 8 CM 80 - 100% Maximal Assist   9-13 CL 60 - 80% Moderate Assist   14 - 19 CK 40 - 60% Moderate Assist   20 - 22 CJ 20 - 40% Minimal Assist   23 CI 1-20% SBA / CGA   24 CH 0% Independent/ Mod I       Patient left up in chair with all lines intact, call button in reach, nurse Felicity notified and nurse Syeda and family present    ASSESSMENT:  Mateusz Bates is a 69 y.o. female with a medical diagnosis of Acute hypoxemic respiratory failure and presents with debility and generalized weakness. Pt will continue with benefit from skilled OT.    Rehab identified problem list/impairments: Rehab identified problem list/impairments: impaired self care skills, weakness, impaired balance, decreased safety awareness, impaired endurance, impaired functional mobilty, decreased upper extremity function, gait instability, decreased lower extremity function    Rehab potential is good.    Activity tolerance: Good    Discharge recommendations: Discharge Facility/Level of Care Needs: rehabilitation facility     Barriers to discharge: Barriers to Discharge: Decreased caregiver support    Equipment recommendations: none     GOALS:   Multidisciplinary Problems     Occupational Therapy Goals        Problem: Occupational Therapy Goal    Goal Priority Disciplines Outcome Interventions   Occupational Therapy Goal     OT, PT/OT Ongoing, Not Progressing    Description:  Goals to be met by: 11/10/19     Patient will increase functional independence with ADLs by performing:    UE Dressing with Moderate Assistance.  Grooming while seated with Supervision.  Toileting from bedside commode with Moderate Assistance for hygiene and clothing management.   Toilet transfer to bedside commode with Moderate Assistance.  Upper extremity exercise program x10 reps per handout, with assistance as needed.                      Plan:  Patient to be seen 3 x/week to address the above listed problems via self-care/home  management, therapeutic activities, therapeutic exercises  Plan of Care expires: 11/10/19  Plan of Care reviewed with: patient         Susan Joelzier, OT  11/08/2019

## 2019-11-08 NOTE — PLAN OF CARE
Pt met discontinuation criteria for removal of restraints this morning. Pt still restless in bed, but not pulling at any lines. Pt alert, but very disoriented. Could state her name but not her birthday, what year it is now, where she is. Reoriented pt throughout the day. Pt appears to have echolalia and word salad intermittently. Can follow a conversation for moments and then babbles incoherently. US done of left arm and bilateral legs today. Lab worked also indicated pt is suffering from shock liver. Argatroban and Abx gtts continued. Pt got up to chair at 8 am with PT. Will continue to increase activity as tolerated. Right arm wrapped in compression bandage and intermittently suspended from ceiling pole to help promote fluid drainage from hand/wrist edema.

## 2019-11-08 NOTE — PROGRESS NOTES
[facility]  Cardiology  Progress Note    Patient Name: Mateusz Bates  MRN: [id number]  Admission Date: 10/28/2019  Hospital Length of Stay: 11 days  Code Status: Full Code   Attending Physician: Keny Zaidi MD   Primary Care Physician: Serenity Kilpatrick MD  Expected Discharge Date: 11/13/2019  Principal Problem:Acute deep vein thrombosis (DVT) of right upper extremity    Subjective:     Hospital Course:   Ms. Bates is a 69 year old female patient whose current medical conditions include HTN, hyperlipidemia, obesity, DM type II, cerebral microvascular disease, FAHAD, and B12 deficiency who was sent to [facility] ED today from her PCP's office due to chest pain and abnormal EKG. Patient complained of substernal chest tightness/heaviness that radiated to her back on Saturday while at rest. Associated symptoms included orthopnea and SOB. Patient denied any associated nausea, vomiting, diaphoresis, palpitations, near syncope, or syncope. Initial workup in ED revealed troponin of 3.665 and BNP of 1305 and patient subsequently admitted for further evaluation and treatment of NSTEMI. Cardiology consulted to assist with management. Patient seen and examined today while in ED. Feels well at time of exam, denies chest pain. She does report similar symptoms over the past few months but states they never lasted as long and were less intense. She reports compliance with her medications. States her father had history of CABG. Chart reviewed. Repeat troponin pending. EKG reviewed and shows SR with new LBBB. 2D echo pending.    10/29/19-Patient seen and examined today, s/p LHC yesterday that showed multivessel CAD. CVT consulted, CABG planned for Thursday. Feels ok. No chest pain or SOB. Labs stable. Being diuresed. 2D echo showed EF of 30-35%, DD.    10/30/19-Patient seen and examined today. Feels well. No complaints. Denies chest pain or SOB. Labs stable. IABP placement scheduled for today.    10/31/19- Patient is  s/p CABG x3 with LIMA to LAD reverse saphenous vein graft to the RCA and reverse saphenous vein graft to the ramus. Has been transferred to ICU. Is intubated and sedated. CT x3. IABP at 1:1 still in place. Transfused 3 units of PRBC in the OR and has received additional 3 units in ICU, has received 500cc albumin, epi and milrinone gtt.  H/H 6.9/21.0. Renal function stable. Paced rhythm on monitor     11/1/2019--patient seen and examined in ICU. Extubated o/n, remains on IABP 1:1 today, plans to possibly wean off later today per Dr. Zaidi. Remains on Epi and Milrinone gtt today. Paced on monitor. Labs stable today.     11/2/19-Patient seen and examined today, lying in bed. Feels ok. Complains of post-op pain and fatigue. Labs reviewed. Platelets 47,000. Heme/onc on board.     11/3/19-Patient seen and examined today, sitting up in chair. Feeling better. Still weak. Labs reviewed. Platelets 89,000.    11/4/19- Patient is POD 4 s/p CABG x3. OOB in chair. Feels better this morning. Pain controlled. Platelets improving since holding Plavix.Vitals stable     11/5/19- Patient is POD 5 s/p CABGx3. Has been transferred to Tele. Is clammy and generally weak this morning. Glucose and vitals stable. Noted to have bump in WBC. Primary team has been notified by nurse. Platelets 73, but H/H stable     11/6 in ICU, intubated, on epi gtt at 0.15 mcg CI 2.0, PAP 20. Sinus rhythm, H&H and Cr stable. Urine output ok. Improved lactate 1.6. const stent cardiogenic shock    11/7 INTUBATED, WEAN OFF EPI AND CONTINUE AMILIRONE, ON SINU RHYTHM AND CI 3.3, Cr STABLE AND DECENT URINE OUTPUT.     11/8 extubated, off pressors, up to the chair.diffuse DVT on upper extremities, no chest pain,pt is weak, Cr normal      ROS  Objective:     Vital Signs (Most Recent):  Temp: 98.6 °F (37 °C) (11/08/19 0300)  Pulse: 97 (11/08/19 1150)  Resp: (!) 25 (11/08/19 0727)  BP: (!) 156/76 (11/08/19 1150)  SpO2: 100 % (11/08/19 0727) Vital Signs (24h  Range):  Temp:  [97.8 °F (36.6 °C)-98.6 °F (37 °C)] 98.6 °F (37 °C)  Pulse:  [] 97  Resp:  [20-31] 25  SpO2:  [87 %-100 %] 100 %  BP: ()/(26-76) 156/76     Weight: 73.6 kg (162 lb 4.1 oz)  Body mass index is 30.66 kg/m².     SpO2: 100 %  O2 Device (Oxygen Therapy): nasal cannula      Intake/Output Summary (Last 24 hours) at 11/8/2019 1525  Last data filed at 11/8/2019 0611  Gross per 24 hour   Intake 733.97 ml   Output 1675 ml   Net -941.03 ml       Lines/Drains/Airways     Peripherally Inserted Central Catheter Line                 PICC Double Lumen 11/07/19 1645 left brachial;left basilic less than 1 day          Drain                 Urethral Catheter 11/05/19 1718 Straight-tip 2 days         NG/OG Tube 11/07/19 1600 small diameter Right nostril less than 1 day                Physical Exam   Constitutional: She is oriented to person, place, and time. She appears well-developed and well-nourished. No distress.   HENT:   Head: Normocephalic and atraumatic.   Eyes: Pupils are equal, round, and reactive to light. Right eye exhibits no discharge. Left eye exhibits no discharge.   Neck: Neck supple. No JVD present.   Cardiovascular: Normal rate, regular rhythm, S1 normal, S2 normal and normal heart sounds.   No murmur heard.  Pulmonary/Chest: Effort normal. No respiratory distress. She has no wheezes.   CABG site C/D/I; no bleeding erythema or drainage    Diminished BS at bases   Abdominal: Soft. She exhibits no distension.   Musculoskeletal: She exhibits no edema.   Neurological: She is alert and oriented to person, place, and time.   Skin: Skin is warm and dry. She is not diaphoretic. No erythema.   SVG sites C/D/I; no bleeding erythema or drainage   Psychiatric: She has a normal mood and affect. Her behavior is normal. Thought content normal.   Nursing note and vitals reviewed.      Significant Labs:   ABG:   Recent Labs   Lab 11/07/19  0452 11/07/19  1151   PH 7.582* 7.518*   PCO2 30.6* 29.1*   HCO3  28.8* 23.6*   POCSATURATED 98 99   BE 7 1   , Blood Culture: No results for input(s): LABBLOO in the last 48 hours., BMP:   Recent Labs   Lab 11/07/19  0342  11/07/19  2343 11/08/19  0600 11/08/19  1114   *   < > 131* 176* 242*   *   < > 131* 134* 133*   K 4.1   < > 3.7 3.6 4.1   CL 90*   < > 96 99 99   CO2 22*   < > 23 22* 20*   BUN 28*   < > 21 19 19   CREATININE 1.1   < > 0.8 0.8 0.8   CALCIUM 7.6*   < > 8.2* 8.3* 8.6*   MG 2.8*  --   --  2.1  --     < > = values in this interval not displayed.   , CMP   Recent Labs   Lab 11/07/19  2343 11/08/19  0600 11/08/19  0711 11/08/19  1114   * 134*  --  133*   K 3.7 3.6  --  4.1   CL 96 99  --  99   CO2 23 22*  --  20*   * 176*  --  242*   BUN 21 19  --  19   CREATININE 0.8 0.8  --  0.8   CALCIUM 8.2* 8.3*  --  8.6*   PROT  --   --  5.3*  --    ALBUMIN  --   --  2.2*  --    BILITOT  --   --  1.2*  --    ALKPHOS  --   --  73  --    AST  --   --  819*  --    ALT  --   --  516*  --    ANIONGAP 12 13  --  14   ESTGFRAFRICA >60 >60  --  >60   EGFRNONAA >60 >60  --  >60   , CBC   Recent Labs   Lab 11/07/19  1513 11/07/19  1710 11/08/19  0600   WBC 14.11* 14.03* 12.08   HGB 8.8* 8.5* 8.6*   HCT 27.0* 25.8* 26.4*   PLT 47* 46* 45*   , INR No results for input(s): INR, PROTIME in the last 48 hours., Lipid Panel No results for input(s): CHOL, HDL, LDLCALC, TRIG, CHOLHDL in the last 48 hours. and Troponin No results for input(s): TROPONINI in the last 48 hours.    Significant Imaging: EKG:      Assessment and Plan:       Hyponatremia  Improved Na level    Cardiogenic shock  Post CABG  EF 30%  S/p swan catheter  CI 3.3  Repeat echo today showed EF 30%, septal hypokinesis , compared to prior echo before the cabg, no significant change  ekg reviewed    Extubated  Off pressors  Continue Amlodipine and BB  Hold Lipitor due to elevated LFT  Continue anticoagulation due to acute DVT  Continue ICU supportive care and ABx          Thrombocytopenia  -Mgmt as per  heme/onc  -Plavix was on hold, but resumed by primary team   -ASA has been resumed     11/5/19  -consider holding Plavix  -Hematology has been consulted     S/P CABG x 3  -Patient is s/p CABG x3 with LIMA to LAD reverse saphenous vein graft to the RCA and reverse saphenous vein graft to the ramus.  -Continue post CABG supportive therapy  -Continue ASA, Plavix, Statin, BB  -Will continue to follow along     11/1/19  -POD #1 s/p CABG x3  -Continue ASA, Statin, BB  -Remains on Epi and Milrinone gtt today  -Mgmt per CT surgery  -Will follow along    11/2/19  -Recovering s/p CABG x 3  -Continue statin, BB  -Platelets 47,000 this AM; heme/onc on board, ASA held  -Await further rec's    11/3/19  -Stable, progressing post CABG x 3  -Continue statin, BB  -ASA re-started  -Platelets 89,000, heme/onc on board    11/4/19  -Patient slowly improving post CABG x3  -Continue ASA, Statin and BB  -plavix on hold due to thrombocytopenia that is improving   -IS use encouraged and early ambulation with PT  -Will continue to follow along     11/5/19  -Will continue ASA, Statin and BB  -Consider holding Plavix again for drop in plts after resuming by primary team   -Recommend CXR to rule out post op PNA given bump in WBC and cough  -will need to monitor for temp  -ambulate as tolerated and encourage IS use     11/06  Hold ASA, Plavix due to anemia  Hold BB due to cardiogenic shock    CAD, multiple vessel  -See plan under CABG    Acute combined systolic and diastolic CHF, NYHA class 2  -Presents with ICM/acute combined CHF  -EF 30-35% by 2D echo        Chest pain  -See plan under NSTEMI    Abnormal EKG  -LBBB noted on EKG  -See plan under NSTEMI    NSTEMI (non-ST elevated myocardial infarction)  -Patient presented with intermittent chest tightness/heaviness that radiated to her back since Saturday evening  -Associated with JONES/orthopnea  -Initial troponin 3.665  -EKG showed SR with new LBBB  -Presentation consistent with NSTEMI  -Continue  ASA, statin  -Start Toprol XL  -Start heparin gtt  -Check 2D echo  -Continue to trend troponin  -Keep NPO. C today for further evaluation and PCI if indicated. All risks, benefits, and treatment alternatives explained to patient in detail. All questions answered. She has agreed to proceed. Further rec's to follow.    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 All risks, benefits, and treatment alternatives explained to patient in detail. All questions answered. She has agreed to proceed.    10/31/19  -Patient is s/p CABG x3.   See plan for CABG     Hyperlipidemia associated with type 2 diabetes mellitus  -Continue statin    Obesity (BMI 30.0-34.9)  -Weight loss    Diabetes mellitus type II, uncontrolled  -Mgmt as per hospital medicine    Hypertension associated with diabetes  -Continue home meds as BP permits        VTE Risk Mitigation (From admission, onward)         Ordered     argatroban 250 mg in dextrose 5 % 250 mL infusion  Continuous      11/08/19 1420     Reason for no Mechanical VTE Prophylaxis  Once      10/28/19 1414     heparin 25,000 units in dextrose 5% 250 mL (100 units/mL) infusion LOW INTENSITY nomogram - [facility]  Continuous      10/28/19 1257                Charles Matias MD  Cardiology  [facility]

## 2019-11-08 NOTE — ASSESSMENT & PLAN NOTE
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

## 2019-11-08 NOTE — PT/OT/SLP PROGRESS
Physical Therapy Treatment    Patient Name:  Mateusz Bates   MRN:  [id number]    Recommendations:     Discharge Recommendations:  rehabilitation facility   Discharge Equipment Recommendations: (defer to next level of care)   Barriers to discharge: Decreased caregiver support    Assessment:     Mateusz Bates is a 69 y.o. female admitted with a medical diagnosis of Acute hypoxemic respiratory failure.  She presents with the following impairments/functional limitations:  weakness, impaired endurance, impaired functional mobilty, gait instability, decreased safety awareness, impaired coordination, decreased lower extremity function .    Rehab Prognosis: Fair; patient would benefit from acute skilled PT services to address these deficits and reach maximum level of function.    Recent Surgery: Procedure(s) (LRB):  CORONARY ARTERY BYPASS GRAFT (CABG) (N/A)  ECHOCARDIOGRAM,TRANSESOPHAGEAL (N/A)  BLOCK, NERVE, INTERCOSTAL, 2 OR MORE (N/A) 8 Days Post-Op    Plan:     During this hospitalization, patient to be seen 5 x/week to address the identified rehab impairments via gait training, therapeutic activities, therapeutic exercises, neuromuscular re-education and progress toward the following goals:    · Plan of Care Expires:  11/10/19    Subjective     Chief Complaint:   Patient/Family Comments/goals:   Pain/Comfort:  · Pain Rating 1: 0/10      Objective:     Communicated with EPIC prior to session.  Patient found up in chair with arterial line, telemetry, storm catheter upon PT entry to room.     General Precautions: Standard, fall   Orthopedic Precautions:N/A   Braces: N/A     Functional Mobility:  · Roll with mod A  · Supine to sit with max A  · Sit to stand with mod A x2  · Bed to chair t/f with mod a x2      AM-PAC 6 CLICK MOBILITY  Turning over in bed (including adjusting bedclothes, sheets and blankets)?: 2  Sitting down on and standing up from a chair with arms (e.g., wheelchair, bedside commode, etc.): 2  Moving from  lying on back to sitting on the side of the bed?: 2  Moving to and from a bed to a chair (including a wheelchair)?: 2  Need to walk in hospital room?: 2  Climbing 3-5 steps with a railing?: 1  Basic Mobility Total Score: 11       Therapeutic Activities and Exercises:   Pt requires CGA to SBA for sitting EOB>  Sit to stand from EOB with mod A x2 x2 reps.  Pt slow to respond to commands Pt requires mod A x2 to stand and preform pivot.    Patient left up in chair with all lines intact and call button in reach..    GOALS:   Multidisciplinary Problems     Physical Therapy Goals        Problem: Physical Therapy Goal    Goal Priority Disciplines Outcome Goal Variances Interventions   Physical Therapy Goal     PT, PT/OT Ongoing, Progressing     Description:  By 11/10/19  1. Patient will perform supine to/from sit mod A x 1 per sternal prec.  2. Patient will perform sit to/from stand min a no AD per sternal prec.  3. Patient will amb 100ft no AD min A                    Time Tracking:     PT Received On: 11/08/19  PT Start Time: 1000     PT Stop Time: 1025  PT Total Time (min): 25 min     Billable Minutes: Therapeutic Activity 25    Treatment Type: Treatment  PT/PTA: PT     PTA Visit Number: 2     Darlene Sauer, PT  11/08/2019

## 2019-11-08 NOTE — ASSESSMENT & PLAN NOTE
Oxygen Concentration (%):  [32-40] 32  Resp Rate Total:  [23 br/min-26 br/min] 26 br/min    TOLERATED EXTUBATION on Nasal canulla  Incentive spirometry, deep breathing exercises   Chlorohexidine

## 2019-11-08 NOTE — ASSESSMENT & PLAN NOTE
-Stable.   -Continue Norvasc.  11/2 -  BB , CCB  11/3 - Controlled  BB  CCB  11/4  Controlled  BB  CCB    11/5  Controlled  BB  CCB    11/6  On pressors  Hold Htn meds for now    11/7  Pressors  Monitor    11/8

## 2019-11-08 NOTE — ASSESSMENT & PLAN NOTE
11/8/19 Platelets decreased today to 45 K.  Bruising to right posterior leg worsened today.  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  HIT panel pending.    --Continue Agatroban due to possible HIT  --Avoid Heparin products  --CBC daily  --Transfuse for platelets <10 K

## 2019-11-09 LAB
ALBUMIN SERPL BCP-MCNC: 2.3 G/DL (ref 3.5–5.2)
ALP SERPL-CCNC: 77 U/L (ref 55–135)
ALT SERPL W/O P-5'-P-CCNC: 408 U/L (ref 10–44)
ANION GAP SERPL CALC-SCNC: 10 MMOL/L (ref 8–16)
ANION GAP SERPL CALC-SCNC: 14 MMOL/L (ref 8–16)
APTT BLDCRRT: 69.2 SEC (ref 21–32)
APTT BLDCRRT: 73.7 SEC (ref 21–32)
APTT BLDCRRT: 74.4 SEC (ref 21–32)
APTT BLDCRRT: 76.4 SEC (ref 21–32)
APTT BLDCRRT: 77.7 SEC (ref 21–32)
APTT BLDCRRT: 78.6 SEC (ref 21–32)
APTT BLDCRRT: 79.8 SEC (ref 21–32)
AST SERPL-CCNC: 334 U/L (ref 10–40)
BASOPHILS # BLD AUTO: 0.03 K/UL (ref 0–0.2)
BASOPHILS # BLD AUTO: 0.04 K/UL (ref 0–0.2)
BASOPHILS NFR BLD: 0.2 % (ref 0–1.9)
BASOPHILS NFR BLD: 0.2 % (ref 0–1.9)
BILIRUB DIRECT SERPL-MCNC: 0.6 MG/DL (ref 0.1–0.3)
BILIRUB SERPL-MCNC: 1.1 MG/DL (ref 0.1–1)
BLD PROD TYP BPU: NORMAL
BLD PROD TYP BPU: NORMAL
BLOOD UNIT EXPIRATION DATE: NORMAL
BLOOD UNIT EXPIRATION DATE: NORMAL
BLOOD UNIT TYPE CODE: 6200
BLOOD UNIT TYPE CODE: 6200
BLOOD UNIT TYPE: NORMAL
BLOOD UNIT TYPE: NORMAL
BUN SERPL-MCNC: 20 MG/DL (ref 8–23)
BUN SERPL-MCNC: 20 MG/DL (ref 8–23)
CALCIUM SERPL-MCNC: 8.4 MG/DL (ref 8.7–10.5)
CALCIUM SERPL-MCNC: 8.7 MG/DL (ref 8.7–10.5)
CHLORIDE SERPL-SCNC: 100 MMOL/L (ref 95–110)
CHLORIDE SERPL-SCNC: 101 MMOL/L (ref 95–110)
CO2 SERPL-SCNC: 20 MMOL/L (ref 23–29)
CO2 SERPL-SCNC: 24 MMOL/L (ref 23–29)
CODING SYSTEM: NORMAL
CODING SYSTEM: NORMAL
CREAT SERPL-MCNC: 0.8 MG/DL (ref 0.5–1.4)
CREAT SERPL-MCNC: 0.9 MG/DL (ref 0.5–1.4)
DIFFERENTIAL METHOD: ABNORMAL
DIFFERENTIAL METHOD: ABNORMAL
DISPENSE STATUS: NORMAL
DISPENSE STATUS: NORMAL
EOSINOPHIL # BLD AUTO: 0.1 K/UL (ref 0–0.5)
EOSINOPHIL # BLD AUTO: 0.1 K/UL (ref 0–0.5)
EOSINOPHIL NFR BLD: 0.4 % (ref 0–8)
EOSINOPHIL NFR BLD: 0.5 % (ref 0–8)
ERYTHROCYTE [DISTWIDTH] IN BLOOD BY AUTOMATED COUNT: 15.6 % (ref 11.5–14.5)
ERYTHROCYTE [DISTWIDTH] IN BLOOD BY AUTOMATED COUNT: 15.6 % (ref 11.5–14.5)
EST. GFR  (AFRICAN AMERICAN): >60 ML/MIN/1.73 M^2
EST. GFR  (AFRICAN AMERICAN): >60 ML/MIN/1.73 M^2
EST. GFR  (NON AFRICAN AMERICAN): >60 ML/MIN/1.73 M^2
EST. GFR  (NON AFRICAN AMERICAN): >60 ML/MIN/1.73 M^2
GLUCOSE SERPL-MCNC: 245 MG/DL (ref 70–110)
GLUCOSE SERPL-MCNC: 257 MG/DL (ref 70–110)
HCT VFR BLD AUTO: 24.9 % (ref 37–48.5)
HCT VFR BLD AUTO: 26 % (ref 37–48.5)
HGB BLD-MCNC: 8 G/DL (ref 12–16)
HGB BLD-MCNC: 8.1 G/DL (ref 12–16)
IMM GRANULOCYTES # BLD AUTO: 0.21 K/UL (ref 0–0.04)
IMM GRANULOCYTES # BLD AUTO: 0.29 K/UL (ref 0–0.04)
IMM GRANULOCYTES NFR BLD AUTO: 1.2 % (ref 0–0.5)
IMM GRANULOCYTES NFR BLD AUTO: 1.5 % (ref 0–0.5)
LYMPHOCYTES # BLD AUTO: 0.8 K/UL (ref 1–4.8)
LYMPHOCYTES # BLD AUTO: 0.8 K/UL (ref 1–4.8)
LYMPHOCYTES NFR BLD: 4.2 % (ref 18–48)
LYMPHOCYTES NFR BLD: 4.8 % (ref 18–48)
MAGNESIUM SERPL-MCNC: 1.8 MG/DL (ref 1.6–2.6)
MCH RBC QN AUTO: 28.3 PG (ref 27–31)
MCH RBC QN AUTO: 28.8 PG (ref 27–31)
MCHC RBC AUTO-ENTMCNC: 31.2 G/DL (ref 32–36)
MCHC RBC AUTO-ENTMCNC: 32.1 G/DL (ref 32–36)
MCV RBC AUTO: 90 FL (ref 82–98)
MCV RBC AUTO: 91 FL (ref 82–98)
MONOCYTES # BLD AUTO: 1 K/UL (ref 0.3–1)
MONOCYTES # BLD AUTO: 1.1 K/UL (ref 0.3–1)
MONOCYTES NFR BLD: 5.3 % (ref 4–15)
MONOCYTES NFR BLD: 5.8 % (ref 4–15)
NEUTROPHILS # BLD AUTO: 15 K/UL (ref 1.8–7.7)
NEUTROPHILS # BLD AUTO: 17.7 K/UL (ref 1.8–7.7)
NEUTROPHILS NFR BLD: 87.5 % (ref 38–73)
NEUTROPHILS NFR BLD: 88.4 % (ref 38–73)
NRBC BLD-RTO: 0 /100 WBC
NRBC BLD-RTO: 0 /100 WBC
NUM UNITS TRANS PACKED RBC: NORMAL
NUM UNITS TRANS PACKED RBC: NORMAL
PLATELET # BLD AUTO: 46 K/UL (ref 150–350)
PLATELET # BLD AUTO: 63 K/UL (ref 150–350)
PMV BLD AUTO: 10.9 FL (ref 9.2–12.9)
PMV BLD AUTO: 11.5 FL (ref 9.2–12.9)
POCT GLUCOSE: 247 MG/DL (ref 70–110)
POCT GLUCOSE: 252 MG/DL (ref 70–110)
POCT GLUCOSE: 254 MG/DL (ref 70–110)
POCT GLUCOSE: 272 MG/DL (ref 70–110)
POCT GLUCOSE: 302 MG/DL (ref 70–110)
POCT GLUCOSE: 308 MG/DL (ref 70–110)
POTASSIUM SERPL-SCNC: 3.9 MMOL/L (ref 3.5–5.1)
POTASSIUM SERPL-SCNC: 4.3 MMOL/L (ref 3.5–5.1)
PROT SERPL-MCNC: 5.6 G/DL (ref 6–8.4)
RBC # BLD AUTO: 2.78 M/UL (ref 4–5.4)
RBC # BLD AUTO: 2.86 M/UL (ref 4–5.4)
SODIUM SERPL-SCNC: 134 MMOL/L (ref 136–145)
SODIUM SERPL-SCNC: 135 MMOL/L (ref 136–145)
VANCOMYCIN SERPL-MCNC: 15.7 UG/ML
WBC # BLD AUTO: 17.13 K/UL (ref 3.9–12.7)
WBC # BLD AUTO: 19.99 K/UL (ref 3.9–12.7)

## 2019-11-09 PROCEDURE — 20000000 HC ICU ROOM

## 2019-11-09 PROCEDURE — 80048 BASIC METABOLIC PNL TOTAL CA: CPT | Mod: 91

## 2019-11-09 PROCEDURE — 25000003 PHARM REV CODE 250: Performed by: THORACIC SURGERY (CARDIOTHORACIC VASCULAR SURGERY)

## 2019-11-09 PROCEDURE — 99291 PR CRITICAL CARE, E/M 30-74 MINUTES: ICD-10-PCS | Mod: ,,, | Performed by: INTERNAL MEDICINE

## 2019-11-09 PROCEDURE — 63600175 PHARM REV CODE 636 W HCPCS: Performed by: INTERNAL MEDICINE

## 2019-11-09 PROCEDURE — 25000003 PHARM REV CODE 250: Performed by: INTERNAL MEDICINE

## 2019-11-09 PROCEDURE — 83735 ASSAY OF MAGNESIUM: CPT

## 2019-11-09 PROCEDURE — 80076 HEPATIC FUNCTION PANEL: CPT

## 2019-11-09 PROCEDURE — 99291 CRITICAL CARE FIRST HOUR: CPT | Mod: ,,, | Performed by: INTERNAL MEDICINE

## 2019-11-09 PROCEDURE — C9399 UNCLASSIFIED DRUGS OR BIOLOG: HCPCS | Performed by: FAMILY MEDICINE

## 2019-11-09 PROCEDURE — 63600175 PHARM REV CODE 636 W HCPCS: Performed by: NURSE PRACTITIONER

## 2019-11-09 PROCEDURE — 94760 N-INVAS EAR/PLS OXIMETRY 1: CPT

## 2019-11-09 PROCEDURE — 80048 BASIC METABOLIC PNL TOTAL CA: CPT

## 2019-11-09 PROCEDURE — 63600175 PHARM REV CODE 636 W HCPCS: Performed by: FAMILY MEDICINE

## 2019-11-09 PROCEDURE — 85025 COMPLETE CBC W/AUTO DIFF WBC: CPT | Mod: 91

## 2019-11-09 PROCEDURE — 92610 EVALUATE SWALLOWING FUNCTION: CPT

## 2019-11-09 PROCEDURE — S0028 INJECTION, FAMOTIDINE, 20 MG: HCPCS | Performed by: INTERNAL MEDICINE

## 2019-11-09 PROCEDURE — 85730 THROMBOPLASTIN TIME PARTIAL: CPT | Mod: 91

## 2019-11-09 PROCEDURE — 99233 SBSQ HOSP IP/OBS HIGH 50: CPT | Mod: ,,, | Performed by: INTERNAL MEDICINE

## 2019-11-09 PROCEDURE — 63600175 PHARM REV CODE 636 W HCPCS: Mod: JG | Performed by: THORACIC SURGERY (CARDIOTHORACIC VASCULAR SURGERY)

## 2019-11-09 PROCEDURE — 99233 PR SUBSEQUENT HOSPITAL CARE,LEVL III: ICD-10-PCS | Mod: ,,, | Performed by: INTERNAL MEDICINE

## 2019-11-09 PROCEDURE — 25000003 PHARM REV CODE 250: Performed by: NURSE PRACTITIONER

## 2019-11-09 PROCEDURE — 97530 THERAPEUTIC ACTIVITIES: CPT

## 2019-11-09 PROCEDURE — 80202 ASSAY OF VANCOMYCIN: CPT

## 2019-11-09 RX ORDER — LOSARTAN POTASSIUM 25 MG/1
25 TABLET ORAL DAILY
Status: DISCONTINUED | OUTPATIENT
Start: 2019-11-09 | End: 2019-11-10

## 2019-11-09 RX ORDER — HYDRALAZINE HYDROCHLORIDE 20 MG/ML
10 INJECTION INTRAMUSCULAR; INTRAVENOUS EVERY 6 HOURS PRN
Status: DISCONTINUED | OUTPATIENT
Start: 2019-11-09 | End: 2019-11-12

## 2019-11-09 RX ORDER — VANCOMYCIN HCL IN 5 % DEXTROSE 1.5G/250ML
1500 PLASTIC BAG, INJECTION (ML) INTRAVENOUS
Status: DISCONTINUED | OUTPATIENT
Start: 2019-11-09 | End: 2019-11-11

## 2019-11-09 RX ORDER — AMLODIPINE BESYLATE 10 MG/1
10 TABLET ORAL DAILY
Status: DISCONTINUED | OUTPATIENT
Start: 2019-11-09 | End: 2019-11-20 | Stop reason: HOSPADM

## 2019-11-09 RX ORDER — METOPROLOL TARTRATE 25 MG/1
25 TABLET, FILM COATED ORAL 2 TIMES DAILY
Status: DISCONTINUED | OUTPATIENT
Start: 2019-11-09 | End: 2019-11-10

## 2019-11-09 RX ADMIN — Medication 2 G: at 06:11

## 2019-11-09 RX ADMIN — PIPERACILLIN AND TAZOBACTAM 4.5 G: 4; .5 INJECTION, POWDER, FOR SOLUTION INTRAVENOUS at 02:11

## 2019-11-09 RX ADMIN — FAMOTIDINE 20 MG: 10 INJECTION INTRAVENOUS at 08:11

## 2019-11-09 RX ADMIN — MAGNESIUM SULFATE HEPTAHYDRATE 2 G: 40 INJECTION, SOLUTION INTRAVENOUS at 06:11

## 2019-11-09 RX ADMIN — INSULIN ASPART 3 UNITS: 100 INJECTION, SOLUTION INTRAVENOUS; SUBCUTANEOUS at 12:11

## 2019-11-09 RX ADMIN — INSULIN ASPART 8 UNITS: 100 INJECTION, SOLUTION INTRAVENOUS; SUBCUTANEOUS at 04:11

## 2019-11-09 RX ADMIN — INSULIN DETEMIR 20 UNITS: 100 INJECTION, SOLUTION SUBCUTANEOUS at 08:11

## 2019-11-09 RX ADMIN — DEXTROSE 1 MCG/KG/MIN: 5 SOLUTION INTRAVENOUS at 06:11

## 2019-11-09 RX ADMIN — AMLODIPINE BESYLATE 10 MG: 10 TABLET ORAL at 08:11

## 2019-11-09 RX ADMIN — POTASSIUM CHLORIDE 20 MEQ: 200 INJECTION, SOLUTION INTRAVENOUS at 01:11

## 2019-11-09 RX ADMIN — INSULIN ASPART 3 UNITS: 100 INJECTION, SOLUTION INTRAVENOUS; SUBCUTANEOUS at 04:11

## 2019-11-09 RX ADMIN — FUROSEMIDE 20 MG: 10 INJECTION, SOLUTION INTRAMUSCULAR; INTRAVENOUS at 05:11

## 2019-11-09 RX ADMIN — DEXTROSE 2.5 MCG/KG/MIN: 5 SOLUTION INTRAVENOUS at 12:11

## 2019-11-09 RX ADMIN — Medication 2 G: at 12:11

## 2019-11-09 RX ADMIN — CHLORHEXIDINE GLUCONATE 0.12% ORAL RINSE 15 ML: 1.2 LIQUID ORAL at 08:11

## 2019-11-09 RX ADMIN — INSULIN ASPART 6 UNITS: 100 INJECTION, SOLUTION INTRAVENOUS; SUBCUTANEOUS at 08:11

## 2019-11-09 RX ADMIN — INSULIN ASPART 8 UNITS: 100 INJECTION, SOLUTION INTRAVENOUS; SUBCUTANEOUS at 12:11

## 2019-11-09 RX ADMIN — DEXTROSE 3.5 MCG/KG/MIN: 5 SOLUTION INTRAVENOUS at 06:11

## 2019-11-09 RX ADMIN — POTASSIUM CHLORIDE 10 MEQ: 750 TABLET, EXTENDED RELEASE ORAL at 08:11

## 2019-11-09 RX ADMIN — INSULIN ASPART 4 UNITS: 100 INJECTION, SOLUTION INTRAVENOUS; SUBCUTANEOUS at 08:11

## 2019-11-09 RX ADMIN — FUROSEMIDE 20 MG: 10 INJECTION, SOLUTION INTRAMUSCULAR; INTRAVENOUS at 08:11

## 2019-11-09 RX ADMIN — LOSARTAN POTASSIUM 25 MG: 25 TABLET ORAL at 08:11

## 2019-11-09 RX ADMIN — PIPERACILLIN AND TAZOBACTAM 4.5 G: 4; .5 INJECTION, POWDER, FOR SOLUTION INTRAVENOUS at 06:11

## 2019-11-09 RX ADMIN — METOPROLOL TARTRATE 25 MG: 25 TABLET ORAL at 08:11

## 2019-11-09 RX ADMIN — PSYLLIUM HUSK 6 G: 6 GRANULE ORAL at 08:11

## 2019-11-09 RX ADMIN — VANCOMYCIN HYDROCHLORIDE 1500 MG: 100 INJECTION, POWDER, LYOPHILIZED, FOR SOLUTION INTRAVENOUS at 08:11

## 2019-11-09 RX ADMIN — TRAMADOL HYDROCHLORIDE 50 MG: 50 TABLET, FILM COATED ORAL at 09:11

## 2019-11-09 RX ADMIN — Medication 2 G: at 05:11

## 2019-11-09 NOTE — PROGRESS NOTES
[facility] -   Vascular Surgery  Progress Note    Patient Name: Mateusz Bates  MRN: [id number]  Admission Date: 10/28/2019  Primary Care Provider: Serenity Kilpatrick MD    Subjective:     Interval History: no acute events  Switched to argatroban  OOB to chair  Right thumb, index finger and palm remain dusky    Post-Op Info:  Procedure(s) (LRB):  CORONARY ARTERY BYPASS GRAFT (CABG) (N/A)  ECHOCARDIOGRAM,TRANSESOPHAGEAL (N/A)  BLOCK, NERVE, INTERCOSTAL, 2 OR MORE (N/A)   9 Days Post-Op      Medications:  Continuous Infusions:   argatroban infusion 3.5 mcg/kg/min (11/09/19 0706)     Scheduled Meds:   amLODIPine  10 mg Oral Daily    chlorhexidine  15 mL Mouth/Throat BID    famotidine (PF)  20 mg Intravenous BID    furosemide  20 mg Intravenous BID    lactated ringers  1,000 mL Intravenous Once    losartan  25 mg Oral Daily    metoprolol tartrate  25 mg Oral BID    piperacillin-tazobactam (ZOSYN) IVPB  4.5 g Intravenous Q8H    potassium chloride  10 mEq Oral BID    psyllium husk  1 packet Oral BID    sodium chloride  2 g Oral Q6H    vancomycin (VANCOCIN) IVPB  1,500 mg Intravenous Q24H     PRN Meds:albumin human 5%, albuterol sulfate, Dextrose 10% Bolus, Dextrose 10% Bolus, glucagon (human recombinant), insulin aspart U-100, iohexol, lactated ringers, magnesium sulfate IVPB, magnesium sulfate IVPB, metoclopramide HCl, ondansetron, potassium chloride in water **AND** potassium chloride in water **AND** potassium chloride in water, traMADol     Objective:     Vital Signs (Most Recent):  Temp: 97.5 °F (36.4 °C) (11/09/19 0701)  Pulse: 93 (11/09/19 0900)  Resp: (!) 29 (11/09/19 0900)  BP: (!) 195/68(PO B/P meds just given) (11/09/19 0900)  SpO2: 97 % (11/09/19 0900) Vital Signs (24h Range):  Temp:  [97.5 °F (36.4 °C)-98.6 °F (37 °C)] 97.5 °F (36.4 °C)  Pulse:  [49-98] 93  Resp:  [21-36] 29  SpO2:  [11 %-100 %] 97 %  BP: (126-195)/() 195/68     Date 11/09/19 0700 - 11/10/19 0659   Shift 8484-2185  7953-6778 7053-3758 24 Hour Total   INTAKE   Shift Total(mL/kg)       OUTPUT   Urine(mL/kg/hr) 80   80   Shift Total(mL/kg) 80(1.1)   80(1.1)   Weight (kg) 71.1 71.1 71.1 71.1       Physical Exam     Right hand remain very edematous, digital and plamar cyanosis  2+ right ulnar pulse, dop palmar signal    Significant Labs:  BMP:   Recent Labs   Lab 11/09/19  0400   *   *   K 4.3      CO2 20*   BUN 20   CREATININE 0.9   CALCIUM 8.7   MG 1.8     CBC:   Recent Labs   Lab 11/09/19  0400   WBC 17.13*   RBC 2.86*   HGB 8.1*   HCT 26.0*   PLT 46*   MCV 91   MCH 28.3   MCHC 31.2*     Coagulation:   Recent Labs   Lab 11/09/19  0610   APTT 73.7*       Significant Diagnostics:  I have reviewed all pertinent imaging results/findings within the past 24 hours.    Assessment/Plan:     Active Diagnoses:    Diagnosis Date Noted POA    PRINCIPAL PROBLEM:  Acute deep vein thrombosis (DVT) of right upper extremity [I82.621] 11/08/2019 No    Acute deep vein thrombosis (DVT) of non-extremity vein Right IJ [I82.90] 11/08/2019 No    Critical illness myopathy [G72.81] 11/08/2019 No    Hyponatremia [E87.1] 11/07/2019 No    Edema of hand [R60.0] 11/07/2019 No    Cardiogenic shock [R57.0] 11/06/2019 No    Thrombocytopenia [D69.6] 11/01/2019 No    CAD, multiple vessel [I25.10] 10/31/2019 Yes    S/P CABG x 3 [Z95.1] 10/31/2019 Not Applicable    Acute hypoxemic respiratory failure [J96.01] 10/31/2019 No    Acute combined systolic and diastolic CHF, NYHA class 2 [I50.41] 10/29/2019 Yes    NSTEMI (non-ST elevated myocardial infarction) [I21.4] 10/28/2019 Yes    Abnormal EKG [R94.31] 10/28/2019 Yes    Chest pain [R07.9] 10/28/2019 Yes    Iron deficiency anemia [D50.9] 04/01/2018 Yes    Hyperlipidemia associated with type 2 diabetes mellitus [E11.69, E78.5] 03/28/2018 Yes    Diabetes mellitus type II, uncontrolled [E11.65]  Yes     Chronic    Obesity (BMI 30.0-34.9) [E66.9]  Yes    Anxiety and depression [F41.9,  F32.9] 09/25/2013 Yes    Hypertension associated with diabetes [E11.59, I10] 08/13/2013 Yes      Problems Resolved During this Admission:    Diagnosis Date Noted Date Resolved POA    Septic shock [A41.9, R65.21] 11/05/2019 11/08/2019 No    Nontraumatic hemorrhagic shock [R57.8] 11/05/2019 11/07/2019 No    Lactic acid acidosis [E87.2] 11/05/2019 11/07/2019 No    Metabolic acidemia [E87.2] 11/05/2019 11/07/2019 No    Oliguria [R34] 11/05/2019 11/07/2019 No    Acute blood loss anemia [D62] 10/31/2019 11/07/2019 No     Case d/w with ICU/Pulm  Cardiogenic shock/collapse about 1 week after CABG  Pt with extensive RUE venous thrombus/DVT in deep and superficial arm veins, axillary/subclavian, IJV     Hypercoagulable/HIT workup in progress, pt started on argatroban  Regarding RUE/central venous thrombus: Unfortunately, given location of clot and her recent major cardiac surgery any attempt at thrombolysis with tPA would be absolutely contraindicated  Open thrombectomy also not option as central clot could not be adequately cleared to allow for any outflow/drainage from the arm     Recommend cont anticoagulation, arm elevation, mild/moderate compression            Paul Grady IV, MD  Vascular Surgery  [facility]

## 2019-11-09 NOTE — ASSESSMENT & PLAN NOTE
-Hemoglobin A1C was 9 on 9/19/19.   -NISS.   -ADA diet.   11/2  Levemir / NISS / Accuchecks  11/3  Levemir increased to 15 sq bid  NISS  Accuchecks  11/4  Levemir  NISS  Accuchecks  11/5  Levemir 10 units BID  NISS  Accuchecks    11/6  UnControlled  Accuchecks  Insulin Gtt  Improved control  Last BS 99    11/7  Uncontrolled  Accuchecks  NISS    11/8  Uncontrolled  NISS  Accuchecks    11/9:  Glucose elevated   Takes 45 units of toujeo at home  Will start levemir 20 units qhs   Cont to monitor glucose   HSSI

## 2019-11-09 NOTE — ASSESSMENT & PLAN NOTE
11/4  CABG x 3  ASA  Statin  BB  CV sx    11/6  CV sx  Statin      11/7  Per CV sx    11/9:  Management per cardiovascular surgery

## 2019-11-09 NOTE — ASSESSMENT & PLAN NOTE
Oxygen Concentration (%):  [21] 21    ON Nasal canulla  Incentive spirometry, deep breathing exercises   Chlorohexidine

## 2019-11-09 NOTE — PROGRESS NOTES
[facility] -   Cardiothoracic Surgery  Progress Note    Patient Name: Mateusz Bates  MRN: [id number]  Admission Date: 10/28/2019  Hospital Length of Stay: 12 days  Code Status: Full Code   Attending Physician: Keny Zaidi MD   Referring Provider: Self, Aaareferral  Principal Problem:Acute deep vein thrombosis (DVT) of right upper extremity            Subjective:     Post-Op Info:  Procedure(s) (LRB):  CORONARY ARTERY BYPASS GRAFT (CABG) (N/A)  ECHOCARDIOGRAM,TRANSESOPHAGEAL (N/A)  BLOCK, NERVE, INTERCOSTAL, 2 OR MORE (N/A)   9 Days Post-Op     Interval History:  Id patient is postop day 9.  Status post coronary artery bypass grafting x3.  Patient had a hemodynamic decompensation on postop day 5.  Patient continues to make progress.  She has been hemodynamically stable.  Patient developed right upper extremity DVT.  She is currently on argatroban for anticoagulation.    ROS  Medications:  Continuous Infusions:   argatroban infusion 3.5 mcg/kg/min (11/09/19 0706)     Scheduled Meds:   amLODIPine  10 mg Oral Daily    chlorhexidine  15 mL Mouth/Throat BID    famotidine (PF)  20 mg Intravenous BID    furosemide  20 mg Intravenous BID    lactated ringers  1,000 mL Intravenous Once    losartan  25 mg Oral Daily    metoprolol tartrate  25 mg Oral BID    piperacillin-tazobactam (ZOSYN) IVPB  4.5 g Intravenous Q8H    potassium chloride  10 mEq Oral BID    psyllium husk  1 packet Oral BID    sodium chloride  2 g Oral Q6H    vancomycin (VANCOCIN) IVPB  1,500 mg Intravenous Q24H     PRN Meds:albumin human 5%, albuterol sulfate, Dextrose 10% Bolus, Dextrose 10% Bolus, glucagon (human recombinant), insulin aspart U-100, iohexol, lactated ringers, magnesium sulfate IVPB, magnesium sulfate IVPB, metoclopramide HCl, ondansetron, potassium chloride in water **AND** potassium chloride in water **AND** potassium chloride in water, traMADol     Objective:     Vital Signs (Most Recent):  Temp: 97.5 °F (36.4  °C) (11/09/19 0701)  Pulse: 93 (11/09/19 0900)  Resp: (!) 29 (11/09/19 0900)  BP: (!) 195/68(PO B/P meds just given) (11/09/19 0900)  SpO2: 97 % (11/09/19 0900) Vital Signs (24h Range):  Temp:  [97.5 °F (36.4 °C)-98.6 °F (37 °C)] 97.5 °F (36.4 °C)  Pulse:  [49-98] 93  Resp:  [21-36] 29  SpO2:  [11 %-100 %] 97 %  BP: (126-195)/() 195/68     Weight: 71.1 kg (156 lb 12 oz)  Body mass index is 29.62 kg/m².    SpO2: 97 %  O2 Device (Oxygen Therapy): room air    Intake/Output - Last 3 Shifts       11/07 0700 - 11/08 0659 11/08 0700 - 11/09 0659 11/09 0700 - 11/10 0659    I.V. (mL/kg) 316.3 (4.3) 243.1 (3.4)     NG/ 920     IV Piggyback 550 850     Total Intake(mL/kg) 1251.3 (17) 2013.1 (28.3)     Urine (mL/kg/hr) 3170 (1.8) 1725 (1) 80 (0.4)    Stool 0 0     Total Output 3170 1725 80    Net -1918.7 +288.1 -80           Stool Occurrence 9 x 5 x           Lines/Drains/Airways     Peripherally Inserted Central Catheter Line                 PICC Double Lumen 11/07/19 1645 left brachial;left basilic 1 day          Drain                 Urethral Catheter 11/05/19 1718 Straight-tip 3 days                Physical Exam   Constitutional: She appears well-developed and well-nourished. No distress.   HENT:   Head: Normocephalic and atraumatic.   Cardiovascular: Normal rate, regular rhythm and normal heart sounds.   Pulmonary/Chest: Effort normal and breath sounds normal.   Abdominal: Soft. Bowel sounds are normal.   Musculoskeletal: She exhibits edema.   Neurological: She is alert. No cranial nerve deficit.   Patient is oriented to place and person.  She is still somewhat confused   Skin: Skin is warm and dry. She is not diaphoretic.   Psychiatric: She has a normal mood and affect.       Significant Labs:  All pertinent labs from the last 24 hours have been reviewed.    Significant Diagnostics:  I have reviewed all pertinent imaging results/findings within the past 24 hours.    Assessment/Plan:     S/P CABG x 3        11/01/2019  The patient is post-operative day 1, status-post coronary artery bypass grafting x 3.  Overall the patient is making progress, however IABP and inotropic agents will continue today.   Neuro:  The patient is awake alert and oriented x3.  Neuro exam is nonfocal.  Pain is well controlled with IV pain medication.  Cardiac:  Patient is hemodynamically improving. Patient remains on epinephrine and milrinone gtts, which will be weaned over the next 24 hours.  Cardiac index is improving. IABP will remain in place over the next 24 hours.  Patient will be started on beta blockers once gtts are weaned off.  Respiratory: Good sats on high flow nasal cannula. Continue respiratory toilet, continue incentive spirometer. Wean to low flow nasal cannula.  GI:  Diet: sips of water with medications, will slowly advance diet as tolerated. Benign abdominal exam.  Renal:  Urine output is slowly improving. Cr 0.9. K 4.3. Mag 1.6. Replace mag. Continue IV diuresis. Add metolazone.   Heme:  Hct 34.1  Platelet 82. Continue aspirin. Discontinue Plavix for now.  Id:  WBC 12. Tmax 100.3. Continue post-operative antibiotics. Will continue to follow.   Endocrine:  Glucose is controlled with insulin gtt.  Continue insulin gtt for now, until weaned off inotropic agents.   Activities:  Patient is currently bed bound due to IABP.  Advance activities as tolerated once IABP is removed.   Lines tubes and drains:  Continue IABP. Continue [place] and Cordis.  Continue A-line.  Chest tubes will continue. NICOLASA x 2 will continue. Jones catheter will continue.  Continue pacer wires. Continue prevena wound vac.  Plan: Remain ICU.          11/02/2019  The patient is post-operative day 2, status-post coronary artery bypass grafting x 3.  Overall the patient is making progress, IABP removed and inotropic agents discontinued.   Neuro:  The patient is awake alert and oriented x3.  Neuro exam is nonfocal.  Pain is well controlled. Discontinue fentanyl.    Cardiac:  Patient is hemodynamically improving. Inotropic gtts discontinued. Excellent cardiac index. IABP discontinued.  Patient will be started on beta blockers today.  Respiratory: Good sats on high flow nasal cannula. Continue respiratory toilet, continue incentive spirometer. Wean to low flow nasal cannula.  GI:  Advance diabetic/cardiac diet as tolerated. Benign abdominal exam.  Renal:  Urine output is improving. Cr 0.9. K 4.9. Mag 2.2. Continue IV diuresis. Hold AM K.   Heme:  Hct 28.3  Platelet 47. Discontinue aspirin. Discontinue Plavix. HIT panel. Consult hematology r/t thrombocytopenia.   Id:  WBC 10.4. Tmax 99.4. Will continue to follow.   Endocrine:  Discontinue insulin gtt. Start sliding scale insulin. Start long acting insulin detemir.   Activities: Advance activities as tolerated.   Lines tubes and drains:  Discontinue IABP. Discontinue [place] and Cordis.  Discontinue A-line.  Discontinue chest tubes x 3. Discontinue NICOLASA x 2. Discontinue arterial line. Jones catheter will continue for now.  Continue pacer wires. Continue prevena wound vac. Place PICC line.   Plan: Remain ICU.           11/03/2019  The patient is post-operative day 3, status-post coronary artery bypass grafting x 3.  Overall the patient is making progress.  Neuro:  The patient is awake alert and oriented x3.  Neuro exam is nonfocal.  Pain is well controlled. Continue tramadol.   Cardiac:  Patient is hemodynamically stable. Increase metoprolol dose. Start amlodipine.   Respiratory: Good sats on nasal cannula. Continue respiratory toilet, continue incentive spirometer. Wean to room air.  GI:  Advance diabetic/cardiac diet as tolerated. Benign abdominal exam. Passing gas, no BM. Add 1 bottle of mag citrate.   Renal:  Good urine output. Cr 0.9. K 3.8. Mag 2.0. Replace K. Replace Mag. Change to PO diuresis.   Heme:  Hct 34.7  Platelet 89. Restart aspirin. Continue to hold Plavix. HIT panel pending. Hematology following.  Id:  WBC 13.8. Tmax  99.4. Will continue to follow.    Endocrine:  Glucose controlled on sliding scale insulin. Increase long acting insulin detemir dose.   Activities: Advance activities as tolerated.   Lines tubes and drains:  Storm catheter will continue for now.  Continue pacer wires. Continue prevena wound vac. Continue PICC line.   Plan: Remain ICU.          11/04/2019  The patient is post-operative day 4, status-post coronary artery bypass grafting x 3.  Overall the patient is making progress.  Neuro:  The patient is awake alert and oriented x3.  Neuro exam is nonfocal.  Pain is well controlled. Continue tramadol.   Cardiac:  Patient is hemodynamically stable. Continue metoprolol dose. Continue amlodipine dose.   Respiratory: Good sats on room air. Continue respiratory toilet, continue incentive spirometer.  GI:  Advance diabetic/cardiac diet as tolerated. Benign abdominal exam. Regular bowel movements.   Renal:  Good urine output. Cr 0.7. K 4.0. Mag 2.3. Replace K. Decrease PO diuresis.   Heme:  Hct 34.4  Platelet 140. Continue aspirin. Restart Plavix. HIT panel pending. Hematology following.  Id:  WBC down to 13.13. Tmax 98. Will continue to follow.    Endocrine:  Glucose controlled on sliding scale insulin. Increased long acting insulin detemir dose.   Activities: Advance activities as tolerated.   Lines tubes and drains:  Discontinued storm catheter. Continue pacer wires. Continue prevena wound vac. Continue PICC line.   Plan: Transfer to telemetry. Anticipate discharge to SNF in 48 hours.         11/05/2019  The patient is post-operative day 5, status-post coronary artery bypass grafting x 3.  Overall the patient is making progress.  Neuro:  The patient is awake alert and oriented x3.  Neuro exam is nonfocal. Patient slow to answer questions, however answers are appropriate.  Pain is well controlled. Continue tramadol PRN.   Cardiac:  Patient is hemodynamically stable. Continue metoprolol dose. Discontinue amlodipine. Start  losartan.    Respiratory: Good sats on room air. Continue respiratory toilet, continue incentive spirometer.  GI:  Advance diabetic/cardiac diet as tolerated. Benign abdominal exam. Regular bowel movements.   Renal:  Good urine output. Cr 0.7. K 4.8. Mag 2.1. Replace Mag. Discontinue PO diuresis.   Heme:  Hct 39.5  Platelet 73. Continue aspirin. Hold Plavix. HIT panel pending. Hematology following.  Id:  WBC is up to 16. Tmax 99. Pan-culture. DC PICC line. Chest xray. Start broad spectrum empiric antibiotics. Will continue to follow.    Endocrine:  Glucose controlled on sliding scale insulin. Decrease long acting insulin detemir dose.   Activities: Advance activities as tolerated.   Lines tubes and drains:  Discontinue pacer wires. Discontinue prevena wound vac. Continue PICC line.   Plan: Anticipate discharge to SNF in 24-48 hours.         11/06/2019  The patient is post-operative day 6, status-post coronary artery bypass grafting x 3. Patient had an episode of decompensation and unresponsiveness yesterday evening. The patient was found unresponsive, cool, and diaphoretic, with agonal breathing. Patient was intubated and transferred to the ICU. Bedside EKG and echocardiogram performed at the bedside showed no significant changes. CT head, chest, abdomen pelvis performed, showed no significant findings.     Neuro:  The patient is intubated and sedated. On Precedex gtt. Begin weaning off sedation.    Cardiac:  Patient is hemodynamically improving. Inotropic agent dependent. Currently on Epinephrine gtt. Start Milrinone gtt. Discontinue metoprolol. Discontinue losartan.    Respiratory: Ventilator dependent. tV 450, RR 20, PEEP 5. Wean down sedation. Wean off ventilator today.    GI:  Begin enteral tube feedings. Benign abdominal exam. Previously having regular bowel movements.   Renal: Adequate urine output. Cr 1.1. K 3.6. Mag 1.7. Replace Mag. Replace K. Discontinue diuresis.   Heme:  Hct 31.9  Platelet 89. Hold  aspirin. Hold Plavix. HIT panel negative. Patient received 1 units PRBC last night. Hematology following.  Id:  WBC is down to 15.5. Tmax 99.9. BC NGTD. Urinalysis negative. Sputum culture negative. Vancomycin and zosyn day 2. Will continue to follow closely.    Endocrine:  Glucose controlled on insulin gtt, continue at this time.   Activities: Intubated and sedated.   Musculoskeletal: Unable to find right radial pulse via doppler. Tips of the fingers of the right hand are mottled, dusky, appear cyanotic. Recent right radial arterial line in place, which has been relocated. US of arteries and veins of the right arm ordered. Consult to vascular surgery placed.    Lines tubes and drains:  Continue swan and cordis. Continue arterial line. Continue storm.    Plan: Remain ICU         11/07/2019  The patient is post-operative day 7, status-post coronary artery bypass grafting x 3. Patient had an episode of decompensation and unresponsiveness the evening of 11/05/2019. The patient was found unresponsive, cool, and diaphoretic, with agonal breathing. Patient was intubated and transferred to the ICU. Bedside EKG and echocardiogram performed at the bedside showed no significant changes. CT head, chest, abdomen pelvis performed, showed no significant findings.     Neuro:  The patient is intubated and sedated. On Precedex gtt. Wean off sedation.    Cardiac:  Patient is hemodynamically improving. Excellent cardiac index. Wean off Inotropic gtts.   Respiratory: Wean towards extubation. tV 400, RR 18, PEEP 5.   GI:  Continue enteral tube feedings while intubated. Benign abdominal exam.  Renal: Adequate urine output. Cr 1.1. K 4.1. Mag 2.8. Start IV diuresis.   Heme:  Hct 24.6  Platelet 60. Hold aspirin. Hold Plavix. HIT panel negative.  Id:  WBC is down to 14.46. Tmax 100.8. BC NGTD. Urinalysis negative. Sputum culture negative. Vancomycin and zosyn day 3. Midline sternal incision appears reddened, with induration at skin edges.  Will continue to follow closely.    Endocrine:  Glucose controlled on sliding scale insulin.   Activities: Intubated and sedated. Moves all 4. Full ROM.    Musculoskeletal: Able to find right radial pulse via doppler. The right hand extending to the tips of the fingers of the right hand are mottled, dusky, appear cyanotic. Recent right radial arterial line placement, which has been relocated. US of arteries and veins of the right arm pending. Vascular surgery following, suggested possible thrombotic etiology, suggested heparin gtt, however this is contraindicated r/t s/p cabg x 3.     Lines tubes and drains:  Continue swan and cordis. Continue arterial line. Continue storm.    Plan: Remain ICU         11/08/2019  The patient is post-operative day 8, status-post coronary artery bypass grafting x 3. Patient had an episode of decompensation and unresponsiveness the evening of 11/05/2019. The patient was found unresponsive, cool, and diaphoretic, with agonal breathing. Patient was intubated and transferred to the ICU. Bedside EKG and echocardiogram performed at the bedside showed no significant changes. CT head, chest, abdomen pelvis performed, showed no significant findings. Suspect cardiogenic shock.      Neuro:  Patients neurologic state is improving. Alert, follows commands, makes eye contact, disoriented x 4. No focal deficits.    Cardiac:  Patient is hemodynamically improving. Off all inotropic gtts. Start low dose metoprolol and amlodipine.   Respiratory: Good sats on low flow nasal cannula. Continue IS. Continue pulmonary toilet. Wean to room air.    GI:  Continue enteral tube feedings at this time r/t disorientation and aspiration risk. Discontinue laxatives. Start metamucil BID, patient has had multiple loose stools.   Renal: Good urine output. Cr 0.8. K 3.6. Mag 2.1. Replace K. Replace Mag. Continue IV diuresis.   Hema:  Hct 26.4  Platelet 45. Hold aspirin. Hold Plavix. HIT panel will be resent as the patient  is a high suspicion for HIT despite previous negative HIT panel. Multiple DVTs from the right IJ down to the brachiocephalic vein. Confirmed via vascular US. Patient started on argatroban yesterday, goal is APTT 2x baseline. Most recent APTT 97.8. Baseline APTT 50.8.  Hematology following. Vascular surgery following.   Id:  WBC is down to 12. Tmax 100.4. BC NGTD. Urinalysis negative. Sputum culture negative. Vancomycin and zosyn day 4. Midline sternal incision appears improved, with minimal redness and induration at skin edges. Will continue to follow closely.    Endocrine:  Glucose controlled on sliding scale insulin.   Activities: Increase activities as tolerated.  Musculoskeletal: ROM 2/5 to the right wrist/hand/fingers, distal from the elbow there is 2+ non pitting edema, the extremity is tender and warm. There is mottling, a dusky appearance, and purple discoloration to the fingertips of the right hand. See above hematology plan.      Lines tubes and drains:  Continue PICC line. Continue storm catheter. Continue NG tube.   Plan: Remain ICU     11/09/2019  The patient is postop day 9.  Status post coronary artery bypass grafting x3.  Postop course was complicated by an episode of hemodynamic instability and decompensation on postop day number 5.  Patient continues to make clinical progress since then.  She is off all drips.  She remains extubated.  Continue ICU care for now.    Neuro:  Patient is awake alert.  She is oriented x2.  She is still confused.  But she is making progress.  Her neuro exam is nonfocal.  She follows all commands.  Cardiac:  Patient continues to be hemodynamically stable.  She is somewhat hypertensive.  Will increase her antihypertensives.  Respiratory:  Patient is good sats on nasal cannula.  She remains extubated.  GI:  Patient was tolerating tube feeds until this morning.  She pulled heart feeding tube. Will get a swallowing evaluation.  Mode of nutrition will depend on results of  swallowing evaluation.  Renal:  Patient has good urine output %period% creatinine is 0.9.  Heme:  Hematocrit is 26 platelet is 46.  Patient is on a [place] Band for anticoagulation due to right upper extremity DVTs.  Hypercoagulability workup is in progress.  Appreciate Hematology and vascular surgery input.  Id:  Incision continues to improve.  white count is 17.  Continue broad-spectrum antibiotics.  Endocrine:  Glucose is controlled on its sliding scale insulin.  Activities:  Advance activities as tolerated.  Line tubes and drains.  Patient has a PICC line, Jones catheter.      NSTEMI (non-ST elevated myocardial infarction)  The patient is a 69-year-old female with hypertension, diabetes, hyperlipidemia, obesity, anxiety and depression, iron deficiency anemia, vitamin B12 deficiency, who presented to the emergency room with an NSTEMI.  Workup included a cardiac catheterization that shows severe multivessel coronary artery disease with proximal LAD stenosis of 70% and severely depressed ejection fraction of 20%.  The patient is a candidate for urgent coronary artery bypass grafting.  The risks and benefits of surgery were explained to the patient.  The patient verbalized understanding of the issues discussed.  She a segs the risks of surgery and has agreed to proceed with urgent coronary artery bypass grafting.  She understands that the risk of neurologic complication postoperatively is increased due to the presence of cerebral microvascular disease.        Keny Zaidi MD  Cardiothoracic Surgery  [facility] -

## 2019-11-09 NOTE — PT/OT/SLP PROGRESS
Physical Therapy  Treatment    Mateusz Bates   MRN: [id number]   Admitting Diagnosis: Acute deep vein thrombosis (DVT) of right upper extremity    PT Received On: 11/09/19  PT Start Time: 0855     PT Stop Time: 0920    PT Total Time (min): 25 min       Billable Minutes:  Therapeutic Activity 25    Treatment Type: Treatment  PT/PTA: PTA     PTA Visit Number: 1       General Precautions: Standard, fall  Orthopedic Precautions: N/A   Braces: N/A    Spiritual, Cultural Beliefs, Orthodox Practices, Values that Affect Care: no    Subjective:  Communicated with NURSE, RUBY AND Epic CHART REVIEW  prior to session.  PATIENT AGREE TO TX NOW.    Pain/Comfort  Pain Rating 1: 0/10    Objective:   Patient found with: telemetry, storm catheter, arterial line, SUPINE IN BED. ASSISTED PATIENT WITH KRISTINE LE EXERCISE INSTRUCTION, STERNAL PERCAUTION REVIEW WITH RETURN DEMONSTRATED, STAND PIVOT T/FS TO B/S CHAIR.    Functional Mobility:  Bed Mobility:    SUPINE TO SIT AT MOD ASSIST X2.    Transfers:   STAND PIVOT TO B/S CHAIR AT MOD ASSIST X2, MAX CUES TO PIVOT.    Gait:    UNABLE AT PRESENT TIME.     Stairs:  N/A    Balance:   Static Sit: FAIR: Maintains without assist, but unable to take any challenges   Dynamic Sit: FAIR: Cannot move trunk without losing balance  Static Stand: POOR: Needs MODERATE assist to maintain  Dynamic stand: MAX ASSIST FOR PIVOT T/FS.    Therapeutic Activities and Exercises:  KRISTINE LE INSTRUCTIONS, OOB T/FS, STERNAL PERCAUTIONS REVIEW.    AM-PAC 6 CLICK MOBILITY  How much help from another person does this patient currently need?   1 = Unable, Total/Dependent Assistance  2 = A lot, Maximum/Moderate Assistance  3 = A little, Minimum/Contact Guard/Supervision  4 = None, Modified [place]/Independent    Turning over in bed (including adjusting bedclothes, sheets and blankets)?: 2  Sitting down on and standing up from a chair with arms (e.g., wheelchair, bedside commode, etc.): 2  Moving from lying on back to  sitting on the side of the bed?: 2  Moving to and from a bed to a chair (including a wheelchair)?: 2  Need to walk in hospital room?: 2  Climbing 3-5 steps with a railing?: 1  Basic Mobility Total Score: 11    AM-PAC Raw Score CMS G-Code Modifier Level of Impairment Assistance   6 % Total / Unable   7 - 9 CM 80 - 100% Maximal Assist   10 - 14 CL 60 - 80% Moderate Assist   15 - 19 CK 40 - 60% Moderate Assist   20 - 22 CJ 20 - 40% Minimal Assist   23 CI 1-20% SBA / CGA   24 CH 0% Independent/ Mod I     Patient left up in chair with all lines intact, call button in reach and chair alarm on.    Assessment:  Mateusz Bates is a 69 y.o. female with a medical diagnosis of Acute deep vein thrombosis (DVT) of right upper extremity . PATIENT WITH MUCH INCREASED ALERTNESS NOTED WITH GREAT EYE CONTACT, CONVERSING. PATIENT REQUIRES MUCH ASSIST TO MAINTAIN STERNAL PERCAUTIONS DURING STAND PIVOT  T/F ACTIVITIES. PATIENT COOPERATIVE AND APPEAR TO REMAIN MOTIVATED TO INCREASE ACTIVITY AS TOLERATED.    Rehab identified problem list/impairments: Rehab identified problem list/impairments: weakness, impaired self care skills, impaired balance, impaired endurance, impaired functional mobilty, gait instability, decreased safety awareness, decreased upper extremity function, decreased lower extremity function    Rehab potential is good.    Activity tolerance: Good    Discharge recommendations: Discharge Facility/Level of Care Needs: rehabilitation facility     Barriers to discharge:      Equipment recommendations: Equipment Needed After Discharge: none     GOALS:   Multidisciplinary Problems     Physical Therapy Goals        Problem: Physical Therapy Goal    Goal Priority Disciplines Outcome Goal Variances Interventions   Physical Therapy Goal     PT, PT/OT Ongoing, Progressing     Description:  By 11/10/19  1. Patient will perform supine to/from sit mod A x 1 per sternal prec.  2. Patient will perform sit to/from stand min a no AD  per sternal prec.  3. Patient will amb 100ft no AD min A                    PLAN:    Patient to be seen 5 x/week  to address the above listed problems via gait training, therapeutic activities, therapeutic exercises  Plan of Care expires: 11/10/19  Plan of Care reviewed with: patient         Diana Yuen, PTA  11/09/2019

## 2019-11-09 NOTE — ASSESSMENT & PLAN NOTE
11/01/2019  The patient is post-operative day 1, status-post coronary artery bypass grafting x 3.  Overall the patient is making progress, however IABP and inotropic agents will continue today.   Neuro:  The patient is awake alert and oriented x3.  Neuro exam is nonfocal.  Pain is well controlled with IV pain medication.  Cardiac:  Patient is hemodynamically improving. Patient remains on epinephrine and milrinone gtts, which will be weaned over the next 24 hours.  Cardiac index is improving. IABP will remain in place over the next 24 hours.  Patient will be started on beta blockers once gtts are weaned off.  Respiratory: Good sats on high flow nasal cannula. Continue respiratory toilet, continue incentive spirometer. Wean to low flow nasal cannula.  GI:  Diet: sips of water with medications, will slowly advance diet as tolerated. Benign abdominal exam.  Renal:  Urine output is slowly improving. Cr 0.9. K 4.3. Mag 1.6. Replace mag. Continue IV diuresis. Add metolazone.   Heme:  Hct 34.1  Platelet 82. Continue aspirin. Discontinue Plavix for now.  Id:  WBC 12. Tmax 100.3. Continue post-operative antibiotics. Will continue to follow.   Endocrine:  Glucose is controlled with insulin gtt.  Continue insulin gtt for now, until weaned off inotropic agents.   Activities:  Patient is currently bed bound due to IABP.  Advance activities as tolerated once IABP is removed.   Lines tubes and drains:  Continue IABP. Continue [place] and Cordis.  Continue A-line.  Chest tubes will continue. NICOLASA x 2 will continue. Jones catheter will continue.  Continue pacer wires. Continue prevena wound vac.  Plan: Remain ICU.          11/02/2019  The patient is post-operative day 2, status-post coronary artery bypass grafting x 3.  Overall the patient is making progress, IABP removed and inotropic agents discontinued.   Neuro:  The patient is awake alert and oriented x3.  Neuro exam is nonfocal.  Pain is well controlled. Discontinue fentanyl.    Cardiac:  Patient is hemodynamically improving. Inotropic gtts discontinued. Excellent cardiac index. IABP discontinued.  Patient will be started on beta blockers today.  Respiratory: Good sats on high flow nasal cannula. Continue respiratory toilet, continue incentive spirometer. Wean to low flow nasal cannula.  GI:  Advance diabetic/cardiac diet as tolerated. Benign abdominal exam.  Renal:  Urine output is improving. Cr 0.9. K 4.9. Mag 2.2. Continue IV diuresis. Hold AM K.   Heme:  Hct 28.3  Platelet 47. Discontinue aspirin. Discontinue Plavix. HIT panel. Consult hematology r/t thrombocytopenia.   Id:  WBC 10.4. Tmax 99.4. Will continue to follow.   Endocrine:  Discontinue insulin gtt. Start sliding scale insulin. Start long acting insulin detemir.   Activities: Advance activities as tolerated.   Lines tubes and drains:  Discontinue IABP. Discontinue [place] and Cordis.  Discontinue A-line.  Discontinue chest tubes x 3. Discontinue NICOLASA x 2. Discontinue arterial line. Jones catheter will continue for now.  Continue pacer wires. Continue prevena wound vac. Place PICC line.   Plan: Remain ICU.           11/03/2019  The patient is post-operative day 3, status-post coronary artery bypass grafting x 3.  Overall the patient is making progress.  Neuro:  The patient is awake alert and oriented x3.  Neuro exam is nonfocal.  Pain is well controlled. Continue tramadol.   Cardiac:  Patient is hemodynamically stable. Increase metoprolol dose. Start amlodipine.   Respiratory: Good sats on nasal cannula. Continue respiratory toilet, continue incentive spirometer. Wean to room air.  GI:  Advance diabetic/cardiac diet as tolerated. Benign abdominal exam. Passing gas, no BM. Add 1 bottle of mag citrate.   Renal:  Good urine output. Cr 0.9. K 3.8. Mag 2.0. Replace K. Replace Mag. Change to PO diuresis.   Heme:  Hct 34.7  Platelet 89. Restart aspirin. Continue to hold Plavix. HIT panel pending. Hematology following.  Id:  WBC 13.8. Tmax  99.4. Will continue to follow.    Endocrine:  Glucose controlled on sliding scale insulin. Increase long acting insulin detemir dose.   Activities: Advance activities as tolerated.   Lines tubes and drains:  Storm catheter will continue for now.  Continue pacer wires. Continue prevena wound vac. Continue PICC line.   Plan: Remain ICU.          11/04/2019  The patient is post-operative day 4, status-post coronary artery bypass grafting x 3.  Overall the patient is making progress.  Neuro:  The patient is awake alert and oriented x3.  Neuro exam is nonfocal.  Pain is well controlled. Continue tramadol.   Cardiac:  Patient is hemodynamically stable. Continue metoprolol dose. Continue amlodipine dose.   Respiratory: Good sats on room air. Continue respiratory toilet, continue incentive spirometer.  GI:  Advance diabetic/cardiac diet as tolerated. Benign abdominal exam. Regular bowel movements.   Renal:  Good urine output. Cr 0.7. K 4.0. Mag 2.3. Replace K. Decrease PO diuresis.   Heme:  Hct 34.4  Platelet 140. Continue aspirin. Restart Plavix. HIT panel pending. Hematology following.  Id:  WBC down to 13.13. Tmax 98. Will continue to follow.    Endocrine:  Glucose controlled on sliding scale insulin. Increased long acting insulin detemir dose.   Activities: Advance activities as tolerated.   Lines tubes and drains:  Discontinued storm catheter. Continue pacer wires. Continue prevena wound vac. Continue PICC line.   Plan: Transfer to telemetry. Anticipate discharge to SNF in 48 hours.         11/05/2019  The patient is post-operative day 5, status-post coronary artery bypass grafting x 3.  Overall the patient is making progress.  Neuro:  The patient is awake alert and oriented x3.  Neuro exam is nonfocal. Patient slow to answer questions, however answers are appropriate.  Pain is well controlled. Continue tramadol PRN.   Cardiac:  Patient is hemodynamically stable. Continue metoprolol dose. Discontinue amlodipine. Start  losartan.    Respiratory: Good sats on room air. Continue respiratory toilet, continue incentive spirometer.  GI:  Advance diabetic/cardiac diet as tolerated. Benign abdominal exam. Regular bowel movements.   Renal:  Good urine output. Cr 0.7. K 4.8. Mag 2.1. Replace Mag. Discontinue PO diuresis.   Heme:  Hct 39.5  Platelet 73. Continue aspirin. Hold Plavix. HIT panel pending. Hematology following.  Id:  WBC is up to 16. Tmax 99. Pan-culture. DC PICC line. Chest xray. Start broad spectrum empiric antibiotics. Will continue to follow.    Endocrine:  Glucose controlled on sliding scale insulin. Decrease long acting insulin detemir dose.   Activities: Advance activities as tolerated.   Lines tubes and drains:  Discontinue pacer wires. Discontinue prevena wound vac. Continue PICC line.   Plan: Anticipate discharge to SNF in 24-48 hours.         11/06/2019  The patient is post-operative day 6, status-post coronary artery bypass grafting x 3. Patient had an episode of decompensation and unresponsiveness yesterday evening. The patient was found unresponsive, cool, and diaphoretic, with agonal breathing. Patient was intubated and transferred to the ICU. Bedside EKG and echocardiogram performed at the bedside showed no significant changes. CT head, chest, abdomen pelvis performed, showed no significant findings.     Neuro:  The patient is intubated and sedated. On Precedex gtt. Begin weaning off sedation.    Cardiac:  Patient is hemodynamically improving. Inotropic agent dependent. Currently on Epinephrine gtt. Start Milrinone gtt. Discontinue metoprolol. Discontinue losartan.    Respiratory: Ventilator dependent. tV 450, RR 20, PEEP 5. Wean down sedation. Wean off ventilator today.    GI:  Begin enteral tube feedings. Benign abdominal exam. Previously having regular bowel movements.   Renal: Adequate urine output. Cr 1.1. K 3.6. Mag 1.7. Replace Mag. Replace K. Discontinue diuresis.   Heme:  Hct 31.9  Platelet 89. Hold  aspirin. Hold Plavix. HIT panel negative. Patient received 1 units PRBC last night. Hematology following.  Id:  WBC is down to 15.5. Tmax 99.9. BC NGTD. Urinalysis negative. Sputum culture negative. Vancomycin and zosyn day 2. Will continue to follow closely.    Endocrine:  Glucose controlled on insulin gtt, continue at this time.   Activities: Intubated and sedated.   Musculoskeletal: Unable to find right radial pulse via doppler. Tips of the fingers of the right hand are mottled, dusky, appear cyanotic. Recent right radial arterial line in place, which has been relocated. US of arteries and veins of the right arm ordered. Consult to vascular surgery placed.    Lines tubes and drains:  Continue swan and cordis. Continue arterial line. Continue storm.    Plan: Remain ICU         11/07/2019  The patient is post-operative day 7, status-post coronary artery bypass grafting x 3. Patient had an episode of decompensation and unresponsiveness the evening of 11/05/2019. The patient was found unresponsive, cool, and diaphoretic, with agonal breathing. Patient was intubated and transferred to the ICU. Bedside EKG and echocardiogram performed at the bedside showed no significant changes. CT head, chest, abdomen pelvis performed, showed no significant findings.     Neuro:  The patient is intubated and sedated. On Precedex gtt. Wean off sedation.    Cardiac:  Patient is hemodynamically improving. Excellent cardiac index. Wean off Inotropic gtts.   Respiratory: Wean towards extubation. tV 400, RR 18, PEEP 5.   GI:  Continue enteral tube feedings while intubated. Benign abdominal exam.  Renal: Adequate urine output. Cr 1.1. K 4.1. Mag 2.8. Start IV diuresis.   Heme:  Hct 24.6  Platelet 60. Hold aspirin. Hold Plavix. HIT panel negative.  Id:  WBC is down to 14.46. Tmax 100.8. BC NGTD. Urinalysis negative. Sputum culture negative. Vancomycin and zosyn day 3. Midline sternal incision appears reddened, with induration at skin edges.  Will continue to follow closely.    Endocrine:  Glucose controlled on sliding scale insulin.   Activities: Intubated and sedated. Moves all 4. Full ROM.    Musculoskeletal: Able to find right radial pulse via doppler. The right hand extending to the tips of the fingers of the right hand are mottled, dusky, appear cyanotic. Recent right radial arterial line placement, which has been relocated. US of arteries and veins of the right arm pending. Vascular surgery following, suggested possible thrombotic etiology, suggested heparin gtt, however this is contraindicated r/t s/p cabg x 3.     Lines tubes and drains:  Continue swan and cordis. Continue arterial line. Continue storm.    Plan: Remain ICU         11/08/2019  The patient is post-operative day 8, status-post coronary artery bypass grafting x 3. Patient had an episode of decompensation and unresponsiveness the evening of 11/05/2019. The patient was found unresponsive, cool, and diaphoretic, with agonal breathing. Patient was intubated and transferred to the ICU. Bedside EKG and echocardiogram performed at the bedside showed no significant changes. CT head, chest, abdomen pelvis performed, showed no significant findings. Suspect cardiogenic shock.      Neuro:  Patients neurologic state is improving. Alert, follows commands, makes eye contact, disoriented x 4. No focal deficits.    Cardiac:  Patient is hemodynamically improving. Off all inotropic gtts. Start low dose metoprolol and amlodipine.   Respiratory: Good sats on low flow nasal cannula. Continue IS. Continue pulmonary toilet. Wean to room air.    GI:  Continue enteral tube feedings at this time r/t disorientation and aspiration risk. Discontinue laxatives. Start metamucil BID, patient has had multiple loose stools.   Renal: Good urine output. Cr 0.8. K 3.6. Mag 2.1. Replace K. Replace Mag. Continue IV diuresis.   Hema:  Hct 26.4  Platelet 45. Hold aspirin. Hold Plavix. HIT panel will be resent as the patient  is a high suspicion for HIT despite previous negative HIT panel. Multiple DVTs from the right IJ down to the brachiocephalic vein. Confirmed via vascular US. Patient started on argatroban yesterday, goal is APTT 2x baseline. Most recent APTT 97.8. Baseline APTT 50.8.  Hematology following. Vascular surgery following.   Id:  WBC is down to 12. Tmax 100.4. BC NGTD. Urinalysis negative. Sputum culture negative. Vancomycin and zosyn day 4. Midline sternal incision appears improved, with minimal redness and induration at skin edges. Will continue to follow closely.    Endocrine:  Glucose controlled on sliding scale insulin.   Activities: Increase activities as tolerated.  Musculoskeletal: ROM 2/5 to the right wrist/hand/fingers, distal from the elbow there is 2+ non pitting edema, the extremity is tender and warm. There is mottling, a dusky appearance, and purple discoloration to the fingertips of the right hand. See above hematology plan.      Lines tubes and drains:  Continue PICC line. Continue storm catheter. Continue NG tube.   Plan: Remain ICU     11/09/2019  The patient is postop day 9.  Status post coronary artery bypass grafting x3.  Postop course was complicated by an episode of hemodynamic instability and decompensation on postop day number 5.  Patient continues to make clinical progress since then.  She is off all drips.  She remains extubated.  Continue ICU care for now.    Neuro:  Patient is awake alert.  She is oriented x2.  She is still confused.  But she is making progress.  Her neuro exam is nonfocal.  She follows all commands.  Cardiac:  Patient continues to be hemodynamically stable.  She is somewhat hypertensive.  Will increase her antihypertensives.  Respiratory:  Patient is good sats on nasal cannula.  She remains extubated.  GI:  Patient was tolerating tube feeds until this morning.  She pulled heart feeding tube. Will get a swallowing evaluation.  Mode of nutrition will depend on results of  swallowing evaluation.  Renal:  Patient has good urine output %period% creatinine is 0.9.  Heme:  Hematocrit is 26 platelet is 46.  Patient is on a [place] Band for anticoagulation due to right upper extremity DVTs.  Hypercoagulability workup is in progress.  Appreciate Hematology and vascular surgery input.  Id:  Incision continues to improve.  white count is 17.  Continue broad-spectrum antibiotics.  Endocrine:  Glucose is controlled on its sliding scale insulin.  Activities:  Advance activities as tolerated.  Line tubes and drains.  Patient has a PICC line, Jones catheter.

## 2019-11-09 NOTE — PROGRESS NOTES
[facility] -   Hematology/Oncology  Progress Note    Patient Name: Mateusz Bates  Admission Date: 10/28/2019  Hospital Length of Stay: 12 days  Code Status: Full Code     Subjective:     HPI:  Mrs. Bates is a 69-year-old female with hypertension, diabetes, hyperlipidemia, obesity, anxiety and depression, iron deficiency anemia, vitamin B12 deficiency, who presented to the emergency room with an NSTEMI.  Workup included a cardiac catheterization that shows severe multivessel coronary artery disease with proximal LAD stenosis of 70% and severely depressed ejection fraction of 20%.  The patient is a candidate for urgent coronary artery bypass grafting. S/p CABG today.  Hematology is consulted for thrombocytopenia.    No new subjective & objective note has been filed under this hospital service since the last note was generated.    Interval history:  Patient was seen sitting down by her bedside.  She follows command but still confused as to place and time.  However there is an improvement compared to yesterday.  No acute event overnight per RN.  Also spoke with intensivist.    Review of Systems   Constitutional: Positive for activity change and appetite change. Negative for unexpected weight change.   HENT: Negative.  Negative for trouble swallowing.    Eyes: Negative.    Respiratory: Negative.  Negative for cough, shortness of breath and wheezing.    Cardiovascular: Negative.  Negative for chest pain.   Gastrointestinal: Negative.    Endocrine: Negative.    Genitourinary: Negative.    Musculoskeletal: Negative.    Skin: Negative.    Allergic/Immunologic: Negative.    Neurological: Positive for weakness. Negative for dizziness.   Hematological: Negative.    Psychiatric/Behavioral: Negative.    All other systems reviewed and are negative       Physical Exam   Constitutional: She appears well-developed and well-nourished. No distress.   HENT:   Head: Normocephalic and atraumatic.   Cardiovascular: Normal  rate, regular rhythm and normal heart sounds. Exam reveals no gallop and no friction rub.   No murmur heard.  Pulmonary/Chest: Effort normal and breath sounds normal. No stridor. No respiratory distress. She has no wheezes. She has no rales.   Abdominal: Soft. Bowel sounds are normal. She exhibits no distension and no mass. There is no tenderness. There is no guarding.   Musculoskeletal: She exhibits edema.   Mild edema,erythema and discoloration of right hand noted, tenderness with movement   Skin: She is not diaphoretic.    Assessment/Plan:     Severe Thrombocytopenia  -unknown etiology at this time, HIT versus TTP  -Awaiting results of Sahra HIT test as well as [id number]  for TTP  -no sign of active bleed.  Platelet count 24181.  -I agree with continuing agartroban pending Sahra results  -avoid heparin containing products.  -recommend checking B12 levels.  -transfuse for platelet count less than 10,000.  -given her level of altered mentation, there is possibility of TTP, I reviewed the peripheral smear and no evidence of schistocytes, renal function is stable.  -may consider initiating plasmapheresis if there is decline in mental status or worsening thrombocytopenia.  -recommend check an LDH and haptoglobin to rule out intravascular hemolysis.    Edema of left upper extremity:  -secondary to DVT.  -ultrasound noted DVT within the left cephalic vein.  No thrombus in central vein of the left upper extremity.  -continue Agatroban; avoid Heparin products  - discharge on coumadin or direct oral anticoagulant such as Eliquis.  Recommend at least 3 months of anticoagulation.  -schedule patient to follow up with Hematology clinic upon discharge.    Thank you for your consult.    Eddi Thompson MD  Hematology/Oncology  [facility]

## 2019-11-09 NOTE — EICU
Notified of increasing diastolic BP over the last 24 hours despite scheduled doses of amlodipine and metoprolol.  Most recent BP is 142/112 with HR 95.  Of note, Ms. Bates has bilateral upper extremity DVTs with duskiness of the right hand, so blood pressures are being measured in the lower extremity.    Given the concerns for peripheral perfusion and the possibility of less accurate BP measurement with a leg cuff, I don't think that it is necessary to treat the reported BP at present.  I will defer decision to the bedside care team later today.    Jh Brand MD  Pulmonary/Critical Care Medicine

## 2019-11-09 NOTE — SUBJECTIVE & OBJECTIVE
Interval History: Currently extubated. Denies any issues overnight.     Review of Systems   Constitutional: Positive for activity change and appetite change. Negative for unexpected weight change.   HENT: Negative.  Negative for trouble swallowing.    Eyes: Negative.    Respiratory: Negative.  Negative for cough, shortness of breath and wheezing.    Cardiovascular: Negative.  Negative for chest pain.   Gastrointestinal: Negative.    Endocrine: Negative.    Genitourinary: Negative.    Musculoskeletal: Negative.    Skin: Negative.    Allergic/Immunologic: Negative.    Neurological: Positive for weakness. Negative for dizziness.   Hematological: Negative.    Psychiatric/Behavioral: Negative.    All other systems reviewed and are negative.    Objective:     Vital Signs (Most Recent):  Temp: 97.5 °F (36.4 °C) (11/09/19 0701)  Pulse: 93 (11/09/19 0900)  Resp: (!) 29 (11/09/19 0900)  BP: (!) 195/68(PO B/P meds just given) (11/09/19 0900)  SpO2: 97 % (11/09/19 0900) Vital Signs (24h Range):  Temp:  [97.5 °F (36.4 °C)-98.6 °F (37 °C)] 97.5 °F (36.4 °C)  Pulse:  [49-98] 93  Resp:  [21-36] 29  SpO2:  [11 %-100 %] 97 %  BP: (126-195)/() 195/68     Weight: 71.1 kg (156 lb 12 oz)  Body mass index is 29.62 kg/m².    Intake/Output Summary (Last 24 hours) at 11/9/2019 0957  Last data filed at 11/9/2019 0800  Gross per 24 hour   Intake 1923.05 ml   Output 1630 ml   Net 293.05 ml      Physical Exam   Constitutional: She appears well-developed and well-nourished. No distress.   HENT:   Head: Normocephalic and atraumatic.   Cardiovascular: Normal rate, regular rhythm and normal heart sounds. Exam reveals no gallop and no friction rub.   No murmur heard.  Pulmonary/Chest: Effort normal and breath sounds normal. No stridor. No respiratory distress. She has no wheezes. She has no rales.   Abdominal: Soft. Bowel sounds are normal. She exhibits no distension and no mass. There is no tenderness. There is no guarding.   Musculoskeletal:  She exhibits no edema.   Erythema and discoloration of right hand noted, tenderness with movement   Skin: She is not diaphoretic.       Significant Labs:   Recent Lab Results       11/09/19  0824   11/09/19  0659   11/09/19  0610   11/09/19  0405   11/09/19  0400        Albumin   2.3           Alkaline Phosphatase   77           ALT   408           Anion Gap         14     aPTT     73.7  Comment:  aPTT therapeutic range = 39-69 seconds         AST   334           Baso #         0.03     Basophil%         0.2     Bilirubin, Direct   0.6           BILIRUBIN TOTAL   1.1  Comment:  For infants and newborns, interpretation of results should be based  on gestational age, weight and in agreement with clinical  observations.  Premature Infant recommended reference ranges:  Up to 24 hours.............<8.0 mg/dL  Up to 48 hours............<12.0 mg/dL  3-5 days..................<15.0 mg/dL  6-29 days.................<15.0 mg/dL             BUN, Bld         20     Calcium         8.7     Chloride         101     CO2         20     Creatinine         0.9     Differential Method         Automated     eGFR if          >60     eGFR if non          >60  Comment:  Calculation used to obtain the estimated glomerular filtration  rate (eGFR) is the CKD-EPI equation.        Eos #         0.1     Eosinophil%         0.5     Glucose         245     Gran # (ANC)         15.0     Gran%         87.5     Hematocrit         26.0     Hemoglobin         8.1     Immature Grans (Abs)         0.21  Comment:  Mild elevation in immature granulocytes is non specific and   can be seen in a variety of conditions including stress response,   acute inflammation, trauma and pregnancy. Correlation with other   laboratory and clinical findings is essential.       Immature Granulocytes         1.2     Lymph #         0.8     Lymph%         4.8     Magnesium         1.8     MCH         28.3     MCHC         31.2     MCV          91     Mono #         1.0     Mono%         5.8     MPV         10.9     nRBC         0     Platelets         46     POCT Glucose 247     254       Potassium         4.3     PROTEIN TOTAL   5.6           RBC         2.86     RDW         15.6     Sodium         135     Vancomycin, Random   15.7           WBC         17.13                      11/09/19  0239   11/09/19  0022   11/09/19  0019   11/08/19  2320   11/08/19  1952        Albumin               Alkaline Phosphatase               ALT               Anion Gap   10           aPTT 74.4  Comment:  aPTT therapeutic range = 39-69 seconds     69.2  Comment:  aPTT therapeutic range = 39-69 seconds       AST               Baso #               Basophil%               Bilirubin, Direct               BILIRUBIN TOTAL               BUN, Bld   20           Calcium   8.4           Chloride   100           CO2   24           Creatinine   0.8           Differential Method               eGFR if    >60           eGFR if non    >60  Comment:  Calculation used to obtain the estimated glomerular filtration  rate (eGFR) is the CKD-EPI equation.              Eos #               Eosinophil%               Glucose   257           Gran # (ANC)               Gran%               Hematocrit               Hemoglobin               Immature Grans (Abs)               Immature Granulocytes               Lymph #               Lymph%               Magnesium               MCH               MCHC               MCV               Mono #               Mono%               MPV               nRBC               Platelets               POCT Glucose     252   273     Potassium   3.9           PROTEIN TOTAL               RBC               RDW               Sodium   134           Vancomycin, Random               WBC                                11/08/19  1945   11/08/19  1606   11/08/19  1603   11/08/19  1542   11/08/19  1205        Albumin               Alkaline Phosphatase                ALT               Anion Gap   13           aPTT 69.2  Comment:  aPTT therapeutic range = 39-69 seconds     76.8  Comment:  aPTT therapeutic range = 39-69 seconds       AST               Baso #   0.03           Basophil%   0.2           Bilirubin, Direct               BILIRUBIN TOTAL               BUN, Bld   20           Calcium   8.4           Chloride   99           CO2   22           Creatinine   0.9           Differential Method   Automated           eGFR if    >60           eGFR if non    >60  Comment:  Calculation used to obtain the estimated glomerular filtration  rate (eGFR) is the CKD-EPI equation.              Eos #   0.1           Eosinophil%   0.4           Glucose   309           Gran # (ANC)   12.9           Gran%   88.3           Hematocrit   25.8           Hemoglobin   8.3           Immature Grans (Abs)   0.19  Comment:  Mild elevation in immature granulocytes is non specific and   can be seen in a variety of conditions including stress response,   acute inflammation, trauma and pregnancy. Correlation with other   laboratory and clinical findings is essential.             Immature Granulocytes   1.3           Lymph #   0.7           Lymph%   4.7           Magnesium               MCH   28.5           MCHC   32.2           MCV   89           Mono #   0.8           Mono%   5.1           MPV   11.1           nRBC   0           Platelets   46           POCT Glucose     305   237     Potassium   3.8           PROTEIN TOTAL               RBC   2.91           RDW   15.4           Sodium   134           Vancomycin, Random               WBC   14.57                            11/08/19  1114        Albumin       Alkaline Phosphatase       ALT       Anion Gap 14     aPTT 78.8  Comment:  aPTT therapeutic range = 39-69 seconds     AST       Baso #       Basophil%       Bilirubin, Direct       BILIRUBIN TOTAL       BUN, Bld 19     Calcium 8.6     Chloride 99     CO2 20      Creatinine 0.8     Differential Method       eGFR if  >60     eGFR if non  >60  Comment:  Calculation used to obtain the estimated glomerular filtration  rate (eGFR) is the CKD-EPI equation.        Eos #       Eosinophil%       Glucose 242     Gran # (ANC)       Gran%       Hematocrit       Hemoglobin       Immature Grans (Abs)       Immature Granulocytes       Lymph #       Lymph%       Magnesium       MCH       MCHC       MCV       Mono #       Mono%       MPV       nRBC       Platelets       POCT Glucose       Potassium 4.1     PROTEIN TOTAL       RBC       RDW       Sodium 133     Vancomycin, Random       WBC             Significant Imaging: I have reviewed all pertinent imaging results/findings within the past 24 hours.

## 2019-11-09 NOTE — PLAN OF CARE
Pt evaluated bedside and has been recommended for a pureed consistency diet and thin liquids with swallow precautions.  S.T to continue to follow with goals as appropriate.

## 2019-11-09 NOTE — PLAN OF CARE
POC reviewed with pt.  Disoriented x2; pt was able to state name and place. Alert; WERNER; unable to follow commands.  Keofeed in place with peptamen intense infusing at 30 ml/hr; tolerating well; goal 45 ml/hr; Pt removed Keofeed tube per self at 2310 last night. RA. NSR on monitor.    Abdomen soft, nondistended. Jones in place draining clear yellow urine.  BM x3.  Argatroban infusing at 13.2 ml/hr; PTT drawn Q2H.  Call bell in reach.  Bed locked and in lowest position. SR upx3.

## 2019-11-09 NOTE — PROGRESS NOTES
[facility] -   Critical Care Medicine  Progress Note    Patient Name: Mateusz Bates  MRN: [id number]  Admission Date: 10/28/2019  Hospital Length of Stay: 12 days  Code Status: Full Code  Attending Provider: Keny Zaidi MD  Primary Care Provider: Serenity Kilpatrick MD   Principal Problem: Acute deep vein thrombosis (DVT) of right upper extremity    Subjective:     HPI:  69 year old female with PMH including DM2 on insulin; HTN; HLD; GERD; anxiety  Presented to ED on 10/28 at instruction of PCP s/t abnormal EKG with c/o chest tightness and SOB  Eval revealed LBBB on EKG along with BNP 1300 and troponin 3.66     Taken for LHC on 10/28 finding 3 vessel CAD, EF 20%, and infra renal aorta plaque 30%  CTS consulted and agree pt is candidate for urgent CABG  Today 10/30 taken to cath lab for placement of IABP to support for planned CABG in am    Hospital/ICU Course:  Transferred to ICU post IABP placement for hemodynamic monitoring until surgery  10/31 - Admitted to ICU today from OR post 3-vessel CABG.  [facility]  Upon arrival to ICU on Vasopressin, Epi, Primacor and Lidocaine infusions.  11/1 - Extubated overnight.  Supine in bed awake and alert and responsive still with IABP in place as well as on Epi, Primacor and Insulin infusions.  No distress noted  11/05: admitted back to MICU; Code blue called, Agonal breathing, cold and clammy, Hypotensive, peripheral dopamine started and intubated, weak thready pulses, Central line [place] and A line placed in MICU, Bicarb drip and LR bolus and albumin given, storm placed. Recent rise in WBCC  11/06: seen and examined: SVR is high CO 3.3, SP 1 PRBC, Insulin drip @ 21, Epi drip @ 0.15, right hand cyanotic and swollen,  mls overnight  11/07: seen and examined : Off precedex, Epi off , primacor @ 0.25 mcg/kg /min, right hand remain swollen dusky, good UO, Na low. CO 6.4, CI 3.8, , CVP 12, T max 100.8F: awake off  sedation  11/08: seen and examined : on tube feeds, in chair, awake, converses appropriately, argatroban drip, got IV iron yesterday, cultures -ve so far, Normal WBCC, Na+ improved  11/09: seen and examined; pulled out KO feeding tube, argatroban @3.5mcg/kg/min. Afebrile, US left cephalic vein DVT: blistering and deep cyanosis right thumb and index. Moves with some pain    Interval History: Seen and examined at bedside. Hospital chart reviewed. No acute interval detrimental events noted.Afebrile. She reports that she  is unchanged    Review of Systems   Unable to perform ROS: Critical illness   Constitutional: Positive for malaise/fatigue.   Respiratory: Negative for cough, hemoptysis and sputum production.    Neurological:        Converses appropriately   All other systems reviewed and are negative.        Objective:     Vital Signs (Most Recent):  Temp: 97.5 °F (36.4 °C) (11/09/19 0701)  Pulse: 90 (11/09/19 0701)  Resp: (!) 30 (11/09/19 0701)  BP: (!) 155/53 (11/09/19 0701)  SpO2: 97 % (11/09/19 0701) Vital Signs (24h Range):  Temp:  [97.5 °F (36.4 °C)-98.6 °F (37 °C)] 97.5 °F (36.4 °C)  Pulse:  [49-98] 90  Resp:  [21-36] 30  SpO2:  [11 %-100 %] 97 %  BP: ()/() 155/53     Weight: 71.1 kg (156 lb 12 oz)  Body mass index is 29.62 kg/m².      Intake/Output Summary (Last 24 hours) at 11/9/2019 0752  Last data filed at 11/9/2019 0609  Gross per 24 hour   Intake 1983.05 ml   Output 1700 ml   Net 283.05 ml       Physical Exam   Constitutional: Vital signs are normal. She appears well-developed and well-nourished. She has a sickly appearance.       Peripheries cold clammy, cyanotic   HENT:   Head: Normocephalic and atraumatic.   Mouth/Throat: Oropharynx is clear and moist.   Eyes: Pupils are equal, round, and reactive to light. Conjunctivae and EOM are normal.   Neck: Normal range of motion. Neck supple.   Cardiovascular: Normal rate, regular rhythm and intact distal pulses.   No murmur heard.  Pulses:        Carotid pulses are 2+ on the left side.       Radial pulses are 1+ on the right side, and 2+ on the left side.        Femoral pulses are 2+ on the left side.  Pulmonary/Chest: Effort normal and breath sounds normal. No stridor. No respiratory distress.   Abdominal: Soft. Bowel sounds are normal.   Musculoskeletal: She exhibits no edema.        Right wrist: She exhibits decreased range of motion, tenderness and swelling.        Arms:  Neurological: No cranial nerve deficit.   Skin: Capillary refill takes 2 to 3 seconds.   Psychiatric: She has a normal mood and affect.   Nursing note and vitals reviewed.                  Vents:  Vent Mode: Spont (11/07/19 1120)  Ventilator Initiated: Yes (11/05/19 1516)  Set Rate: 0 bmp (11/07/19 1120)  Vt Set: 400 mL (11/07/19 1120)  Pressure Support: 10 cmH20 (11/07/19 1120)  PEEP/CPAP: 5 cmH20 (11/07/19 1120)  Oxygen Concentration (%): 32 (11/08/19 0727)  Peak Airway Pressure: 16 cmH2O (11/07/19 1120)  Plateau Pressure: 0 cmH20 (11/07/19 1120)  Total Ve: 14 mL (11/07/19 1120)  Negative Inspiratory Force (cm H2O): 20 (11/07/19 1201)  F/VT Ratio<105 (RSBI): (!) 56.6 (11/07/19 1120)    Lines/Drains/Airways     Peripherally Inserted Central Catheter Line                 PICC Double Lumen 11/07/19 1645 left brachial;left basilic 1 day          Drain                 Urethral Catheter 11/05/19 1718 Straight-tip 3 days                Significant Labs:    CBC/Anemia Profile:  Recent Labs   Lab 11/08/19  0600 11/08/19  1606 11/09/19  0400   WBC 12.08 14.57* 17.13*   HGB 8.6* 8.3* 8.1*   HCT 26.4* 25.8* 26.0*   PLT 45* 46* 46*   MCV 88 89 91   RDW 15.2* 15.4* 15.6*        Chemistries:  Recent Labs   Lab 11/08/19  0600 11/08/19  0711  11/08/19  1606 11/09/19  0022 11/09/19  0400   *  --    < > 134* 134* 135*   K 3.6  --    < > 3.8 3.9 4.3   CL 99  --    < > 99 100 101   CO2 22*  --    < > 22* 24 20*   BUN 19  --    < > 20 20 20   CREATININE 0.8  --    < > 0.9 0.8 0.9   CALCIUM 8.3*  --     < > 8.4* 8.4* 8.7   ALBUMIN  --  2.2*  --   --   --   --    PROT  --  5.3*  --   --   --   --    BILITOT  --  1.2*  --   --   --   --    ALKPHOS  --  73  --   --   --   --    ALT  --  516*  --   --   --   --    AST  --  819*  --   --   --   --    MG 2.1  --   --   --   --  1.8    < > = values in this interval not displayed.     Heparin Induced Thrombocytopenia 0.00 - 0.40 OD 0.10  0.10 CM         All pertinent labs within the past 24 hours have been reviewed.    Significant Imaging:  I have reviewed and interpreted all pertinent imaging results/findings within the past 24 hours.     US  left cephalic vein DVT      ABG  Recent Labs   Lab 11/07/19  1151   PH 7.518*   PO2 120*   PCO2 29.1*   HCO3 23.6*   BE 1     Assessment/Plan:     Psychiatric  Anxiety and depression  monitor    Pulmonary  Acute hypoxemic respiratory failure  Oxygen Concentration (%):  [21] 21    ON Nasal canulla  Incentive spirometry, deep breathing exercises   Chlorohexidine      Cardiac/Vascular  Cardiogenic shock  Improved      S/P CABG x 3  Follow up echo EF 30%  Per CVT surgery  Cont hemodynamic monitoring post op: off pressors  Seen by PT and PT for ambulation      CAD, multiple vessel  Per Cards and CVT surgery  S/P 3-vessel CABG   POD# 9  Aspirin on Hold, Atorvastatin on Hold  Lopressor: 12.5 mg       Acute combined systolic and diastolic CHF, NYHA class 2  Strict I/O  Daily wt  IV LASIX 20 mg BID       NSTEMI (non-ST elevated myocardial infarction)  POD # 9  Multi vessel CAD now post CABG  Continue  ( Atorvastatin) await resolution LFT ( , )  Cont cardiac monitoring    Hypertension associated with diabetes  Clonidine 0.1 mg  PO Norvasc 10 mg  Metoprolol 12.5 mg BID  Losartan 25 mg      Renal/  Hyponatremia  Suspect dilutional  Salt tabs  Improved to 134    Hematology  * Acute deep vein thrombosis (DVT) of right upper extremity  Elevated right hand  Has intact sensation  Seen by Vascular : Discussed options: thrombectomy vs  local tPA: high risk due to graft  Aggatroban GTT      Acute deep vein thrombosis (DVT) of non-extremity vein Right IJ  Continue anticoagulation  Seen by Vascular  Discontinued CORDIS in right IJ      Thrombocytopenia  Monitor, plat stable 46 K   HIT was normal 11/03  Follow MAGNOLIA T S 13  Hematology following,   SCD for now  hapto was normal  HIT  Repeat w/u pending    Oncology  Iron deficiency anemia  SP IV IRON infusion    Endocrine  Diabetes mellitus type II, uncontrolled  Sliding scale: episodes of low BS  Q4  -180       GI  TUBE feeds    Orthopedic  Critical illness myopathy  PT and OT  OOB    Other  Edema of hand  Right had swelling and edema  Wait vascular studies  No heparin: Low Plat       Critical Care Daily Checklist:    A: Awake: RASS Goal/Actual Goal: RASS Goal: -2-->light sedation  Actual: Alexander Agitation Sedation Scale (RASS): Alert and calm   B: Spontaneous Breathing Trial Performed? Spon. Breathing Trial Initiated?: Initiated (11/07/19 1201)   C: SAT & SBT Coordinated?  N/A                      D: Delirium: CAM-ICU Overall CAM-ICU: Positive   E: Early Mobility Performed? Yes   F: Feeding Goal: Goals: 1) PO intakes > 50% of meals at f/u  Status: Nutrition Goal Status: new   Current Diet Order   Procedures    Diet Dysphagia Pureed (IDDSI Level 4)      AS: Analgesia/Sedation Tramadol   T: Thromboembolic Prophylaxis SCD   H: HOB > 300 Yes   U: Stress Ulcer Prophylaxis (if needed) Famotidine   G: Glucose Control SSI, detemir   B: Bowel Function Stool Occurrence: 1   I: Indwelling Catheter (Lines & Jones) Necessity Jones, PICC   D: De-escalation of Antimicrobials/Pharmacotherapies Vanco and Zosyn    Plan for the day/ETD Advanced diet since passed swallow    Code Status:  Family/Goals of Care: Full Code  Return home     Critical Care Time: 45 minutes  Critical secondary to Patient has a condition that poses threat to life and bodily function: Acute DDVT, Critical illness myopathy,        Critical  care was time spent personally by me on the following activities: development of treatment plan with patient or surrogate and bedside caregivers, discussions with consultants, evaluation of patient's response to treatment, examination of patient, ordering and performing treatments and interventions, ordering and review of laboratory studies, ordering and review of radiographic studies, pulse oximetry, re-evaluation of patient's condition. This critical care time did not overlap with that of any other provider or involve time for any procedures.    Rex Zaidi    Stable off mechanical respirator, will sign off to primary team. Consult for any new issues     Riaz Tanner MD  Critical Care Medicine  [facility]

## 2019-11-09 NOTE — NURSING
Called [place] spoke with Dr. Brand regarding pt's BP. Diastolic elevated but pt has been hypertensive throughout dayshift; hydralazine order discontinued. BP being taken on LLE due to DVTs in each BUE. No new orders at this time. Will continue to monitor.

## 2019-11-09 NOTE — SUBJECTIVE & OBJECTIVE
Interval History:  Id patient is postop day 9.  Status post coronary artery bypass grafting x3.  Patient had a hemodynamic decompensation on postop day 5.  Patient continues to make progress.  She has been hemodynamically stable.  Patient developed right upper extremity DVT.  She is currently on argatroban for anticoagulation.    ROS  Medications:  Continuous Infusions:   argatroban infusion 3.5 mcg/kg/min (11/09/19 0706)     Scheduled Meds:   amLODIPine  10 mg Oral Daily    chlorhexidine  15 mL Mouth/Throat BID    famotidine (PF)  20 mg Intravenous BID    furosemide  20 mg Intravenous BID    lactated ringers  1,000 mL Intravenous Once    losartan  25 mg Oral Daily    metoprolol tartrate  25 mg Oral BID    piperacillin-tazobactam (ZOSYN) IVPB  4.5 g Intravenous Q8H    potassium chloride  10 mEq Oral BID    psyllium husk  1 packet Oral BID    sodium chloride  2 g Oral Q6H    vancomycin (VANCOCIN) IVPB  1,500 mg Intravenous Q24H     PRN Meds:albumin human 5%, albuterol sulfate, Dextrose 10% Bolus, Dextrose 10% Bolus, glucagon (human recombinant), insulin aspart U-100, iohexol, lactated ringers, magnesium sulfate IVPB, magnesium sulfate IVPB, metoclopramide HCl, ondansetron, potassium chloride in water **AND** potassium chloride in water **AND** potassium chloride in water, traMADol     Objective:     Vital Signs (Most Recent):  Temp: 97.5 °F (36.4 °C) (11/09/19 0701)  Pulse: 93 (11/09/19 0900)  Resp: (!) 29 (11/09/19 0900)  BP: (!) 195/68(PO B/P meds just given) (11/09/19 0900)  SpO2: 97 % (11/09/19 0900) Vital Signs (24h Range):  Temp:  [97.5 °F (36.4 °C)-98.6 °F (37 °C)] 97.5 °F (36.4 °C)  Pulse:  [49-98] 93  Resp:  [21-36] 29  SpO2:  [11 %-100 %] 97 %  BP: (126-195)/() 195/68     Weight: 71.1 kg (156 lb 12 oz)  Body mass index is 29.62 kg/m².    SpO2: 97 %  O2 Device (Oxygen Therapy): room air    Intake/Output - Last 3 Shifts       11/07 0700 - 11/08 0659 11/08 0700 - 11/09 0659 11/09 0700 -  11/10 0659    I.V. (mL/kg) 316.3 (4.3) 243.1 (3.4)     NG/ 920     IV Piggyback 550 850     Total Intake(mL/kg) 1251.3 (17) 2013.1 (28.3)     Urine (mL/kg/hr) 3170 (1.8) 1725 (1) 80 (0.4)    Stool 0 0     Total Output 3170 1725 80    Net -1918.7 +288.1 -80           Stool Occurrence 9 x 5 x           Lines/Drains/Airways     Peripherally Inserted Central Catheter Line                 PICC Double Lumen 11/07/19 1645 left brachial;left basilic 1 day          Drain                 Urethral Catheter 11/05/19 1718 Straight-tip 3 days                Physical Exam   Constitutional: She appears well-developed and well-nourished. No distress.   HENT:   Head: Normocephalic and atraumatic.   Cardiovascular: Normal rate, regular rhythm and normal heart sounds.   Pulmonary/Chest: Effort normal and breath sounds normal.   Abdominal: Soft. Bowel sounds are normal.   Musculoskeletal: She exhibits edema.   Neurological: She is alert. No cranial nerve deficit.   Patient is oriented to place and person.  She is still somewhat confused   Skin: Skin is warm and dry. She is not diaphoretic.   Psychiatric: She has a normal mood and affect.       Significant Labs:  All pertinent labs from the last 24 hours have been reviewed.    Significant Diagnostics:  I have reviewed all pertinent imaging results/findings within the past 24 hours.

## 2019-11-09 NOTE — PT/OT/SLP EVAL
Speech Language Pathology Evaluation  Bedside Swallow    Patient Name:  Mateusz Bates   MRN:  [id number]  Admitting Diagnosis: Acute deep vein thrombosis (DVT) of right upper extremity    Recommendations:                 General Recommendations:  Aspiration precautions  Diet recommendations:  Puree, Thin   Aspiration Precautions: in place  General Precautions: Standard, aspiration  Communication strategies:  verbal    History:   Pt is a 69 year old female admitted per PCP due to abnormal EKG and complaints of chest tightness with SOB.  Pt complained of these symptoms for 1-2 months. Pt had angiogram with sever multi-vessel CAD and had  CABG 10/31/19.  Pt was extubated 11/1 and reintubated 11/7 with extubation 11/8/19.  Pt pulled out NG tube with S.T. eval to assess swallowing skills.   Past Medical History:   Diagnosis Date    Chronic major depressive disorder     Diabetes mellitus type II, uncontrolled 2000    Insulin x 10 years    Eczema     Essential hypertension     Generalized anxiety disorder     GERD (gastroesophageal reflux disease)     Glaucoma     OD    Hyperlipidemia     Obesity        Past Surgical History:   Procedure Laterality Date    APPENDECTOMY      CORONARY ARTERY BYPASS GRAFT (CABG) N/A 10/31/2019    Procedure: CORONARY ARTERY BYPASS GRAFT (CABG);  Surgeon: Keny Zaidi MD;  Location: [facility];  Service: Cardiothoracic;  Laterality: N/A;  3-Vessel, Epi-Aortic Ultrasound    ECTOPIC PREGNANCY SURGERY Left     LSO    INJECTION OF ANESTHETIC AGENT AROUND MULTIPLE INTERCOSTAL NERVES N/A 10/31/2019    Procedure: BLOCK, NERVE, INTERCOSTAL, 2 OR MORE;  Surgeon: Keny Zaidi MD;  Location: [facility] OR;  Service: Cardiothoracic;  Laterality: N/A;  Parasternal    OOPHORECTOMY         Social History: Patient lives independently prior to hospitalization    Prior Intubation HX:  See above for details this admit    Modified Barium Swallow: pt has not had a MBSS    Chest X-Rays: see chart    Prior  diet: regular consistency diet with thin liquids with no reported swallowing difficulties    Occupation/hobbies/homemaking: n.a    Subjective     Pt sitting up in chair with sister present  Patient goals: pt did not report any goals.    Pain/Comfort:  · Pain Rating 1: 0/10    Objective:     Oral Musculature Evaluation  · Oral Musculature: general weakness  · Dentition: present and adequate    Bedside Swallow Eval:   Consistencies Assessed:  Thin Liquids, Thick Liquids, Pureed, Cracker    Oral Phase:   · No oral phase difficulties noted    Pharyngeal Phase:   · No coughing, wet vocal quality or swallow delays noted,    Compensatory Strategies  · Basic swallow precautions reviewed with patient and sister    Treatment: Pt confused to time and situation.   She followed simple commands with min cues.  Vocal quality was min reduced with decreased intensity of voice.    Assessment:     Mateusz Bates is a 69 y.o. female with an SLP diagnosis of Dysphagia and decreased cognition.  Pt was recommended for a pureed consistency diet and thin liquids with swallow precautions.  She is also recommended for S.T to address confusion.   Diet recommendation reviewed with M.D. And nurse    Goals:   Multidisciplinary Problems     SLP Goals        Problem: SLP Goal    Goal Priority Disciplines Outcome   SLP Goal     SLP Ongoing, Progressing   Description:  LTG:  Pt will tolerate least restrictive diet consistency safely and efficiently.    ST. BSA with meal to assess recommended diet consistency of pureed and thin liquids.             2. Oral Motor x10 with cues             3. Orientation with 90% with min cues.             4. 2 step commands with 90%             5. 2 unit yes/no with 90%  Goal to be reevaluated by:  19                    Plan:     · Patient to be seen:  5 x/week   · Plan of Care expires:     · Plan of Care reviewed with:  patient, family   · SLP Follow-Up:  Yes       Discharge recommendations:      Barriers to  Discharge:  Will need assistance    Time Tracking:     SLP Treatment Date:   11/09/19  Speech Start Time:  1120  Speech Stop Time:  1150     Speech Total Time (min):  30 min    Billable Minutes: 30  min    Jenifer Tinsley CCC-SLP  11/09/2019

## 2019-11-09 NOTE — SUBJECTIVE & OBJECTIVE
Interval History: Seen and examined at bedside. Hospital chart reviewed. No acute interval detrimental events noted.Afebrile. She reports that she  is unchanged    Review of Systems   Unable to perform ROS: Critical illness   Constitutional: Positive for malaise/fatigue.   Respiratory: Negative for cough, hemoptysis and sputum production.    Neurological:        Converses appropriately   All other systems reviewed and are negative.        Objective:     Vital Signs (Most Recent):  Temp: 97.5 °F (36.4 °C) (11/09/19 0701)  Pulse: 90 (11/09/19 0701)  Resp: (!) 30 (11/09/19 0701)  BP: (!) 155/53 (11/09/19 0701)  SpO2: 97 % (11/09/19 0701) Vital Signs (24h Range):  Temp:  [97.5 °F (36.4 °C)-98.6 °F (37 °C)] 97.5 °F (36.4 °C)  Pulse:  [49-98] 90  Resp:  [21-36] 30  SpO2:  [11 %-100 %] 97 %  BP: ()/() 155/53     Weight: 71.1 kg (156 lb 12 oz)  Body mass index is 29.62 kg/m².      Intake/Output Summary (Last 24 hours) at 11/9/2019 0752  Last data filed at 11/9/2019 0609  Gross per 24 hour   Intake 1983.05 ml   Output 1700 ml   Net 283.05 ml       Physical Exam   Constitutional: Vital signs are normal. She appears well-developed and well-nourished. She has a sickly appearance.       Peripheries cold clammy, cyanotic   HENT:   Head: Normocephalic and atraumatic.   Mouth/Throat: Oropharynx is clear and moist.   Eyes: Pupils are equal, round, and reactive to light. Conjunctivae and EOM are normal.   Neck: Normal range of motion. Neck supple.   Cardiovascular: Normal rate, regular rhythm and intact distal pulses.   No murmur heard.  Pulses:       Carotid pulses are 2+ on the left side.       Radial pulses are 1+ on the right side, and 2+ on the left side.        Femoral pulses are 2+ on the left side.  Pulmonary/Chest: Effort normal and breath sounds normal. No stridor. No respiratory distress.   Abdominal: Soft. Bowel sounds are normal.   Musculoskeletal: She exhibits no edema.        Right wrist: She exhibits  decreased range of motion, tenderness and swelling.        Arms:  Neurological: No cranial nerve deficit.   Skin: Capillary refill takes 2 to 3 seconds.   Psychiatric: She has a normal mood and affect.   Nursing note and vitals reviewed.                  Vents:  Vent Mode: Spont (11/07/19 1120)  Ventilator Initiated: Yes (11/05/19 1516)  Set Rate: 0 bmp (11/07/19 1120)  Vt Set: 400 mL (11/07/19 1120)  Pressure Support: 10 cmH20 (11/07/19 1120)  PEEP/CPAP: 5 cmH20 (11/07/19 1120)  Oxygen Concentration (%): 32 (11/08/19 0727)  Peak Airway Pressure: 16 cmH2O (11/07/19 1120)  Plateau Pressure: 0 cmH20 (11/07/19 1120)  Total Ve: 14 mL (11/07/19 1120)  Negative Inspiratory Force (cm H2O): 20 (11/07/19 1201)  F/VT Ratio<105 (RSBI): (!) 56.6 (11/07/19 1120)    Lines/Drains/Airways     Peripherally Inserted Central Catheter Line                 PICC Double Lumen 11/07/19 1645 left brachial;left basilic 1 day          Drain                 Urethral Catheter 11/05/19 1718 Straight-tip 3 days                Significant Labs:    CBC/Anemia Profile:  Recent Labs   Lab 11/08/19  0600 11/08/19  1606 11/09/19  0400   WBC 12.08 14.57* 17.13*   HGB 8.6* 8.3* 8.1*   HCT 26.4* 25.8* 26.0*   PLT 45* 46* 46*   MCV 88 89 91   RDW 15.2* 15.4* 15.6*        Chemistries:  Recent Labs   Lab 11/08/19  0600 11/08/19  0711  11/08/19  1606 11/09/19  0022 11/09/19  0400   *  --    < > 134* 134* 135*   K 3.6  --    < > 3.8 3.9 4.3   CL 99  --    < > 99 100 101   CO2 22*  --    < > 22* 24 20*   BUN 19  --    < > 20 20 20   CREATININE 0.8  --    < > 0.9 0.8 0.9   CALCIUM 8.3*  --    < > 8.4* 8.4* 8.7   ALBUMIN  --  2.2*  --   --   --   --    PROT  --  5.3*  --   --   --   --    BILITOT  --  1.2*  --   --   --   --    ALKPHOS  --  73  --   --   --   --    ALT  --  516*  --   --   --   --    AST  --  819*  --   --   --   --    MG 2.1  --   --   --   --  1.8    < > = values in this interval not displayed.     Heparin Induced Thrombocytopenia 0.00 -  0.40 OD 0.10  0.10 CM         All pertinent labs within the past 24 hours have been reviewed.    Significant Imaging:  I have reviewed and interpreted all pertinent imaging results/findings within the past 24 hours.     US  left cephalic vein DVT

## 2019-11-09 NOTE — ASSESSMENT & PLAN NOTE
-Stable.   -Continue Norvasc.  11/2 -  BB , CCB  11/3 - Controlled  BB  CCB  11/4  Controlled  BB  CCB    11/5  Controlled  BB  CCB    11/6  On pressors  Hold Htn meds for now    11/7  Pressors  Monitor    11/9:  BP elevated this am however was just given PO meds   Added hydralazine PRN

## 2019-11-09 NOTE — ASSESSMENT & PLAN NOTE
11/2  Extubated  Monitor  Pulmonary CC    11/7  Intubated  Weaning trial in progress  Pulmonary CC    11/8  Extubated  Pulmonary  O2  Nebs    11/9:  Extubated  Sputum culture normal respiratory araceli  Blood cultures negative  Will consider de-escalating abx therapy tomorrow

## 2019-11-09 NOTE — ASSESSMENT & PLAN NOTE
POD # 9  Multi vessel CAD now post CABG  Continue  ( Atorvastatin) await resolution LFT ( , )  Cont cardiac monitoring

## 2019-11-09 NOTE — PROGRESS NOTES
[facility] Hospital Medicine  Progress Note    Patient Name: Mateusz Bates  MRN: [id number]  Patient Class: IP- Inpatient   Admission Date: 10/28/2019  Length of Stay: 12 days  Attending Physician: Keny Zaidi MD  Primary Care Provider: Serenity Kilpatrick MD        Subjective:     Principal Problem:Acute deep vein thrombosis (DVT) of right upper extremity        HPI:  Mateusz Bates is a 69 year old female with DM II, hypertension and hyperlipidemia who presented to the ED at the request of her PCP due to finding of an abnormal EKG and complaints of chest tightness with SOB. The patient reports having intermittent symptoms for the last 1-2 months. On 10/26/19, she began having a tightness in her mid chest that radiated into her back with associated SOB. Symptoms were worse with laying flat and improved with sitting up. They lasted only minutes, but this was longer than previous episodes. She denies a trend in when these episodes occur as well as associated nausea, vomiting and diaphoresis. The patient's EKG was significant for a new LBBB compared to previous in 2016. Labs were significant for a BNP of 1,305, troponin of 3.665 and potassium of 3.4. Code status was discussed with the patient and her sister. She is a full code. Her sister, Christina Hernandez, is her surrogate medical decision maker.     Overview/Hospital Course:  Admitted for evaluation and treatment of chest pain concerning for acute coronary syndrome.  Evaluation by Cardiology and urgent left heart catheterization.  Angiogram discovered severe multi-vessel coronary artery disease.  Cardiac echo showed severely reduced LV function.  Referral to Cardiothoracic Surgery who recommended CABG.  Balloon pump placed 30 October.  CABG x3 by Dr. Zaidi 31 October.  Successfully extubated morning of 01 November.  11/2 - Patient improved s/p extubation. CABG x 3. Discussed with CC Team. BS mild elevation. SA and LA insulin initiated.  11/3 -  Improved BS control. Patient + chest tenderness. Tolerating diet. Discussed with cc team  11/4 - Patient with improved strength interactivity. Patient denies n/v/d + Sx site tenderness. Patient reports she is weakened.  11/5 - Patient denies CP. Reports she is weak. Denies SOB. Patient lethargic and clammy sitting in chair at bedside. Discussed with care team. Patient to return to bed with warming blankets, Blood Sugar 88 last check.   11/6 - Patient remains intubated R Hand cyanotic. Patient discussed with CC Team / CV sx.  11/7 - Patient in weaning trial. Extubation attempt this AM. R Hand improved blood flow - warmer to touch. Discussed with CC Team  11/8 - Patient improved Alert and appropriate. R Hand with improved blood flow / warmer.   11/9 - pulled NG tube out overnight. Will await swallow study.     Interval History: Currently extubated. Denies any issues overnight.     Review of Systems   Constitutional: Positive for activity change and appetite change. Negative for unexpected weight change.   HENT: Negative.  Negative for trouble swallowing.    Eyes: Negative.    Respiratory: Negative.  Negative for cough, shortness of breath and wheezing.    Cardiovascular: Negative.  Negative for chest pain.   Gastrointestinal: Negative.    Endocrine: Negative.    Genitourinary: Negative.    Musculoskeletal: Negative.    Skin: Negative.    Allergic/Immunologic: Negative.    Neurological: Positive for weakness. Negative for dizziness.   Hematological: Negative.    Psychiatric/Behavioral: Negative.    All other systems reviewed and are negative.    Objective:     Vital Signs (Most Recent):  Temp: 97.5 °F (36.4 °C) (11/09/19 0701)  Pulse: 93 (11/09/19 0900)  Resp: (!) 29 (11/09/19 0900)  BP: (!) 195/68(PO B/P meds just given) (11/09/19 0900)  SpO2: 97 % (11/09/19 0900) Vital Signs (24h Range):  Temp:  [97.5 °F (36.4 °C)-98.6 °F (37 °C)] 97.5 °F (36.4 °C)  Pulse:  [49-98] 93  Resp:  [21-36] 29  SpO2:  [11 %-100 %] 97 %  BP:  (126-195)/() 195/68     Weight: 71.1 kg (156 lb 12 oz)  Body mass index is 29.62 kg/m².    Intake/Output Summary (Last 24 hours) at 11/9/2019 0957  Last data filed at 11/9/2019 0800  Gross per 24 hour   Intake 1923.05 ml   Output 1630 ml   Net 293.05 ml      Physical Exam   Constitutional: She appears well-developed and well-nourished. No distress.   HENT:   Head: Normocephalic and atraumatic.   Cardiovascular: Normal rate, regular rhythm and normal heart sounds. Exam reveals no gallop and no friction rub.   No murmur heard.  Pulmonary/Chest: Effort normal and breath sounds normal. No stridor. No respiratory distress. She has no wheezes. She has no rales.   Abdominal: Soft. Bowel sounds are normal. She exhibits no distension and no mass. There is no tenderness. There is no guarding.   Musculoskeletal: She exhibits no edema.   Erythema and discoloration of right hand noted, tenderness with movement   Skin: She is not diaphoretic.       Significant Labs:   Recent Lab Results       11/09/19  0824   11/09/19  0659   11/09/19  0610   11/09/19  0405   11/09/19  0400        Albumin   2.3           Alkaline Phosphatase   77           ALT   408           Anion Gap         14     aPTT     73.7  Comment:  aPTT therapeutic range = 39-69 seconds         AST   334           Baso #         0.03     Basophil%         0.2     Bilirubin, Direct   0.6           BILIRUBIN TOTAL   1.1  Comment:  For infants and newborns, interpretation of results should be based  on gestational age, weight and in agreement with clinical  observations.  Premature Infant recommended reference ranges:  Up to 24 hours.............<8.0 mg/dL  Up to 48 hours............<12.0 mg/dL  3-5 days..................<15.0 mg/dL  6-29 days.................<15.0 mg/dL             BUN, Bld         20     Calcium         8.7     Chloride         101     CO2         20     Creatinine         0.9     Differential Method         Automated     eGFR if           >60     eGFR if non          >60  Comment:  Calculation used to obtain the estimated glomerular filtration  rate (eGFR) is the CKD-EPI equation.        Eos #         0.1     Eosinophil%         0.5     Glucose         245     Gran # (ANC)         15.0     Gran%         87.5     Hematocrit         26.0     Hemoglobin         8.1     Immature Grans (Abs)         0.21  Comment:  Mild elevation in immature granulocytes is non specific and   can be seen in a variety of conditions including stress response,   acute inflammation, trauma and pregnancy. Correlation with other   laboratory and clinical findings is essential.       Immature Granulocytes         1.2     Lymph #         0.8     Lymph%         4.8     Magnesium         1.8     MCH         28.3     MCHC         31.2     MCV         91     Mono #         1.0     Mono%         5.8     MPV         10.9     nRBC         0     Platelets         46     POCT Glucose 247     254       Potassium         4.3     PROTEIN TOTAL   5.6           RBC         2.86     RDW         15.6     Sodium         135     Vancomycin, Random   15.7           WBC         17.13                      11/09/19  0239   11/09/19  0022   11/09/19  0019   11/08/19  2320   11/08/19  1952        Albumin               Alkaline Phosphatase               ALT               Anion Gap   10           aPTT 74.4  Comment:  aPTT therapeutic range = 39-69 seconds     69.2  Comment:  aPTT therapeutic range = 39-69 seconds       AST               Baso #               Basophil%               Bilirubin, Direct               BILIRUBIN TOTAL               BUN, Bld   20           Calcium   8.4           Chloride   100           CO2   24           Creatinine   0.8           Differential Method               eGFR if    >60           eGFR if non    >60  Comment:  Calculation used to obtain the estimated glomerular filtration  rate (eGFR) is the CKD-EPI equation.               Eos #               Eosinophil%               Glucose   257           Gran # (ANC)               Gran%               Hematocrit               Hemoglobin               Immature Grans (Abs)               Immature Granulocytes               Lymph #               Lymph%               Magnesium               MCH               MCHC               MCV               Mono #               Mono%               MPV               nRBC               Platelets               POCT Glucose     252   273     Potassium   3.9           PROTEIN TOTAL               RBC               RDW               Sodium   134           Vancomycin, Random               WBC                                11/08/19  1945   11/08/19  1606   11/08/19  1603   11/08/19  1542   11/08/19  1205        Albumin               Alkaline Phosphatase               ALT               Anion Gap   13           aPTT 69.2  Comment:  aPTT therapeutic range = 39-69 seconds     76.8  Comment:  aPTT therapeutic range = 39-69 seconds       AST               Baso #   0.03           Basophil%   0.2           Bilirubin, Direct               BILIRUBIN TOTAL               BUN, Bld   20           Calcium   8.4           Chloride   99           CO2   22           Creatinine   0.9           Differential Method   Automated           eGFR if    >60           eGFR if non    >60  Comment:  Calculation used to obtain the estimated glomerular filtration  rate (eGFR) is the CKD-EPI equation.              Eos #   0.1           Eosinophil%   0.4           Glucose   309           Gran # (ANC)   12.9           Gran%   88.3           Hematocrit   25.8           Hemoglobin   8.3           Immature Grans (Abs)   0.19  Comment:  Mild elevation in immature granulocytes is non specific and   can be seen in a variety of conditions including stress response,   acute inflammation, trauma and pregnancy. Correlation with other   laboratory and clinical findings is  essential.             Immature Granulocytes   1.3           Lymph #   0.7           Lymph%   4.7           Magnesium               MCH   28.5           MCHC   32.2           MCV   89           Mono #   0.8           Mono%   5.1           MPV   11.1           nRBC   0           Platelets   46           POCT Glucose     305   237     Potassium   3.8           PROTEIN TOTAL               RBC   2.91           RDW   15.4           Sodium   134           Vancomycin, Random               WBC   14.57                            11/08/19  1114        Albumin       Alkaline Phosphatase       ALT       Anion Gap 14     aPTT 78.8  Comment:  aPTT therapeutic range = 39-69 seconds     AST       Baso #       Basophil%       Bilirubin, Direct       BILIRUBIN TOTAL       BUN, Bld 19     Calcium 8.6     Chloride 99     CO2 20     Creatinine 0.8     Differential Method       eGFR if  >60     eGFR if non  >60  Comment:  Calculation used to obtain the estimated glomerular filtration  rate (eGFR) is the CKD-EPI equation.        Eos #       Eosinophil%       Glucose 242     Gran # (ANC)       Gran%       Hematocrit       Hemoglobin       Immature Grans (Abs)       Immature Granulocytes       Lymph #       Lymph%       Magnesium       MCH       MCHC       MCV       Mono #       Mono%       MPV       nRBC       Platelets       POCT Glucose       Potassium 4.1     PROTEIN TOTAL       RBC       RDW       Sodium 133     Vancomycin, Random       WBC             Significant Imaging: I have reviewed all pertinent imaging results/findings within the past 24 hours.      Assessment/Plan:      * Acute deep vein thrombosis (DVT) of right upper extremity  11/9:  Cont argatroban infusion       Cardiogenic shock        Thrombocytopenia  11/2  Heme On on Consult  HIT panel  Likely drug induced  Monitor  11/3  Improved at 89 today  Monitor  11/5  Plts 73  Monitor  11/8  Argatroban  Heme Onc  Plts 45    11/9:  plt stable  today, no indication for transfusion  Cont to monitor     Acute hypoxemic respiratory failure  11/2  Extubated  Monitor  Pulmonary CC    11/7  Intubated  Weaning trial in progress  Pulmonary CC    11/8  Extubated  Pulmonary  O2  Nebs    11/9:  Extubated  Sputum culture normal respiratory araceli  Blood cultures negative  Will consider de-escalating abx therapy tomorrow       S/P CABG x 3  11/4  CABG x 3  ASA  Statin  BB  CV sx    11/6  CV sx  Statin      11/7  Per CV sx    11/9:  Management per cardiovascular surgery     CAD, multiple vessel        Acute combined systolic and diastolic CHF, NYHA class 2  Probable ischemic cardiomyopathy.  Anticipate CABG 31 October.  Optimize medical management.    NSTEMI (non-ST elevated myocardial infarction)  Troponin peaked at 3.66 now trending down.  Heparin, metoprolol, ASA and statin. Hold ACE inhibitor due to intolerance with cough. Hold ARB as patient's blood pressure is marginal. Will consider adding, if her blood pressure will tolerate.  Hold second antiplatelet due to pending Cardiology procedure.  Patient was evaluated for Cardiac Rehab and is not a candidate at this time, but will be referred, when appropriate.  LHC on 28 on October showed severe multi-vessel CAD.  Referral to CVT Surgery who recommended CABG planned for Thursday 31 October.  Balloon pump placed 30 October.  CABG x3 by Dr. Zaidi on 31 October.  11/2  S/p CABG  Cards  CT Sx  Statin  BB      Iron deficiency anemia  11/8  Hgb 8.6 today  FeSO4    11/9:  Anemia stable       Hyperlipidemia associated with type 2 diabetes mellitus  -Patient was improved, but not at goal on 10/4/19.   -Continue statin.       Obesity (BMI 30.0-34.9)  Diet  Nutrition        Diabetes mellitus type II, uncontrolled  -Hemoglobin A1C was 9 on 9/19/19.   -NISS.   -ADA diet.   11/2  Levemir / NISS / Accuchecks  11/3  Levemir increased to 15 sq bid  NISS  Accuchecks  11/4  Levemir  NISS  Accuchecks  11/5  Levemir 10 units  BID  NISS  Accuchecks    11/6  UnControlled  Accuchecks  Insulin Gtt  Improved control  Last BS 99    11/7  Uncontrolled  Accuchecks  NISS    11/8  Uncontrolled  NISS  Accuchecks    11/9:  Glucose elevated   Takes 45 units of toujeo at home  Will start levemir 20 units qhs   Cont to monitor glucose   HSSI        Anxiety and depression  -Stable.   -Continue Effexor.       Hypertension associated with diabetes  -Stable.   -Continue Norvasc.  11/2 -  BB , CCB  11/3 - Controlled  BB  CCB  11/4  Controlled  BB  CCB    11/5  Controlled  BB  CCB    11/6  On pressors  Hold Htn meds for now    11/7  Pressors  Monitor    11/9:  BP elevated this am however was just given PO meds   Added hydralazine PRN         VTE Risk Mitigation (From admission, onward)         Ordered     argatroban 250 mg in dextrose 5 % 250 mL infusion  Continuous      11/08/19 1420     Reason for no Mechanical VTE Prophylaxis  Once      10/28/19 1414     heparin 25,000 units in dextrose 5% 250 mL (100 units/mL) infusion LOW INTENSITY nomogram - [facility]  Continuous      10/28/19 1257                Critical care time spent on the evaluation and treatment of severe organ dysfunction, review of pertinent labs and imaging studies, discussions with consulting providers and discussions with patient/family: 35 minutes.      King Concepcion MD  Department of Hospital Medicine   [facility]

## 2019-11-09 NOTE — PROGRESS NOTES
Pharmacokinetic Assessment Follow Up: IV Vancomycin    Vancomycin serum concentration assessment(s):  Random level @ 0659 this AM = 15.7 mcg/ml (therapeutic)  This level was collected 17 hours after a 1 gm dose was given yesterday    Vancomycin Regimen Plan:  We will switch from pulse dosing to scheduled dosing of 1500 mg IV every 24 hours  Trough due 11/11 @ 0830 before 3rd dose  Goal trough: 10-20 mcg/ml (empiric dosing)    Drug levels (last 3 results):  Recent Labs   Lab Result Units 11/07/19  0342 11/08/19  0957 11/09/19  0659   Vancomycin, Random ug/mL 20.5 12.6 15.7       Pharmacy will continue to follow and monitor vancomycin.    Please contact pharmacy at extension [phone] for questions regarding this assessment.    Thank you for the consult,   Katherine McArdle, Pharm.D. 11/9/2019 3:24 PM      Patient brief summary:  Mateusz Bates is a 69 y.o. female initiated on antimicrobial therapy with IV Vancomycin for treatment of skin & soft tissue infection    The patient's current regimen is 1500 mg IV every 24 hours     Drug Allergies:   Review of patient's allergies indicates:   Allergen Reactions    Ace inhibitors Other (See Comments)     Cough         Actual Body Weight:   71.1 kg    Renal Function:   Estimated Creatinine Clearance: 53.2 mL/min (based on SCr of 0.9 mg/dL).    CBC (last 72 hours):  Recent Labs   Lab Result Units 11/06/19  1610 11/07/19  0342 11/07/19  1048 11/07/19  1513 11/07/19  1710 11/08/19  0600 11/08/19  1606 11/09/19  0400   WBC K/uL 18.17* 14.46* 12.96* 14.11* 14.03* 12.08 14.57* 17.13*   Hemoglobin g/dL 9.5* 8.4* 8.3* 8.8* 8.5* 8.6* 8.3* 8.1*   Hematocrit % 28.6* 24.6* 25.4* 27.0* 25.8* 26.4* 25.8* 26.0*   Platelets K/uL 67* 60* 44* 47* 46* 45* 46* 46*   Gran% % 80.9* 82.0* 84.0* 85.2* 83.7* 84.6* 88.3* 87.5*   Lymph% % 7.5* 7.8* 6.7* 6.5* 7.1* 6.4* 4.7* 4.8*   Mono% % 6.7 6.2 6.1 5.4 6.1 6.0 5.1 5.8   Eosinophil% % 1.7 1.2 1.1 1.1 1.4 1.3 0.4 0.5   Basophil% % 0.4 0.2 0.2 0.1 0.2  0.2 0.2 0.2   Differential Method  Automated Automated Automated Automated Automated Automated Automated Automated       Metabolic Panel (last 72 hours):  Recent Labs   Lab Result Units 11/06/19  1610 11/06/19  2337 11/07/19  0342 11/07/19  1513 11/07/19  1710 11/07/19  2343 11/08/19  0600 11/08/19  0711 11/08/19  1114 11/08/19  1606 11/09/19  0022 11/09/19  0400 11/09/19  0659   Sodium mmol/L 127*  127* 122* 124* 128* 129* 131* 134*  --  133* 134* 134* 135*  --    Potassium mmol/L 3.6  3.6 4.3 4.1 3.9 4.0 3.7 3.6  --  4.1 3.8 3.9 4.3  --    Chloride mmol/L 93*  93* 90* 90* 95 95 96 99  --  99 99 100 101  --    CO2 mmol/L 24  24 22* 22* 22* 23 23 22*  --  20* 22* 24 20*  --    Glucose mg/dL 99  99 356* 305* 157* 140* 131* 176*  --  242* 309* 257* 245*  --    BUN, Bld mg/dL 25*  25* 28* 28* 23 22 21 19  --  19 20 20 20  --    Creatinine mg/dL 0.9  0.9 1.0 1.1 0.8 0.8 0.8 0.8  --  0.8 0.9 0.8 0.9  --    Albumin g/dL  --   --   --   --   --   --   --  2.2*  --   --   --   --  2.3*   Total Bilirubin mg/dL  --   --   --   --   --   --   --  1.2*  --   --   --   --  1.1*   Alkaline Phosphatase U/L  --   --   --   --   --   --   --  73  --   --   --   --  77   AST U/L  --   --   --   --   --   --   --  819*  --   --   --   --  334*   ALT U/L  --   --   --   --   --   --   --  516*  --   --   --   --  408*   Magnesium mg/dL  --   --  2.8*  --   --   --  2.1  --   --   --   --  1.8  --        Vancomycin Administrations:  vancomycin given in the last 96 hours                   vancomycin 1.5 g in 5 % dextrose 250 mL IVPB (mg) 1,500 mg New Bag 11/09/19 0838    vancomycin in dextrose 5 % 1 gram/250 mL IVPB 1,000 mg (mg) 1,000 mg New Bag 11/08/19 1400                Microbiologic Results:  Microbiology Results (last 7 days)     Procedure Component Value Units Date/Time    Blood culture [[id number]] Collected:  11/05/19 1325    Order Status:  Completed Specimen:  Blood Updated:  11/08/19 4252     Blood Culture, Routine No  Growth to date      No Growth to date      No Growth to date      No Growth to date    Blood culture [[id number]] Collected:  11/05/19 1335    Order Status:  Completed Specimen:  Blood Updated:  11/08/19 2212     Blood Culture, Routine No Growth to date      No Growth to date      No Growth to date      No Growth to date    Culture, Respiratory with Gram Stain [[id number]] Collected:  11/05/19 1912    Order Status:  Completed Specimen:  Respiratory from Sputum, Expectorated Updated:  11/08/19 0946     Respiratory Culture Normal respiratory araceli      No S aureus or Pseudomonas isolated.     Gram Stain (Respiratory) <10 epithelial cells per low power field.     Gram Stain (Respiratory) Rare WBC's     Gram Stain (Respiratory) Rare Gram positive cocci    Culture, Respiratory with Gram Stain [[id number]] Collected:  11/05/19 1912    Order Status:  Sent Specimen:  Respiratory from Sputum, Expectorated Updated:  11/05/19 1912

## 2019-11-09 NOTE — ASSESSMENT & PLAN NOTE
11/2  Heme On on Consult  HIT panel  Likely drug induced  Monitor  11/3  Improved at 89 today  Monitor  11/5  Plts 73  Monitor  11/8  Argatroban  Heme Onc  Plts 45    11/9:  plt stable today, no indication for transfusion  Cont to monitor

## 2019-11-10 LAB
ANION GAP SERPL CALC-SCNC: 10 MMOL/L (ref 8–16)
APTT BLDCRRT: 75.7 SEC (ref 21–32)
APTT BLDCRRT: 77.5 SEC (ref 21–32)
APTT BLDCRRT: 79.5 SEC (ref 21–32)
APTT BLDCRRT: 82.1 SEC (ref 21–32)
APTT BLDCRRT: 85.7 SEC (ref 21–32)
BACTERIA BLD CULT: NORMAL
BACTERIA BLD CULT: NORMAL
BASOPHILS # BLD AUTO: 0.05 K/UL (ref 0–0.2)
BASOPHILS # BLD AUTO: 0.05 K/UL (ref 0–0.2)
BASOPHILS NFR BLD: 0.2 % (ref 0–1.9)
BASOPHILS NFR BLD: 0.2 % (ref 0–1.9)
BUN SERPL-MCNC: 17 MG/DL (ref 8–23)
CALCIUM SERPL-MCNC: 8.6 MG/DL (ref 8.7–10.5)
CHLORIDE SERPL-SCNC: 103 MMOL/L (ref 95–110)
CO2 SERPL-SCNC: 24 MMOL/L (ref 23–29)
CREAT SERPL-MCNC: 0.7 MG/DL (ref 0.5–1.4)
DIFFERENTIAL METHOD: ABNORMAL
DIFFERENTIAL METHOD: ABNORMAL
EOSINOPHIL # BLD AUTO: 0.3 K/UL (ref 0–0.5)
EOSINOPHIL # BLD AUTO: 0.3 K/UL (ref 0–0.5)
EOSINOPHIL NFR BLD: 1.3 % (ref 0–8)
EOSINOPHIL NFR BLD: 1.5 % (ref 0–8)
ERYTHROCYTE [DISTWIDTH] IN BLOOD BY AUTOMATED COUNT: 15.6 % (ref 11.5–14.5)
ERYTHROCYTE [DISTWIDTH] IN BLOOD BY AUTOMATED COUNT: 15.9 % (ref 11.5–14.5)
EST. GFR  (AFRICAN AMERICAN): >60 ML/MIN/1.73 M^2
EST. GFR  (NON AFRICAN AMERICAN): >60 ML/MIN/1.73 M^2
GLUCOSE SERPL-MCNC: 97 MG/DL (ref 70–110)
HCT VFR BLD AUTO: 24 % (ref 37–48.5)
HCT VFR BLD AUTO: 24.4 % (ref 37–48.5)
HGB BLD-MCNC: 7.6 G/DL (ref 12–16)
HGB BLD-MCNC: 7.7 G/DL (ref 12–16)
IMM GRANULOCYTES # BLD AUTO: 0.31 K/UL (ref 0–0.04)
IMM GRANULOCYTES # BLD AUTO: 0.34 K/UL (ref 0–0.04)
IMM GRANULOCYTES NFR BLD AUTO: 1.5 % (ref 0–0.5)
IMM GRANULOCYTES NFR BLD AUTO: 1.7 % (ref 0–0.5)
LACTATE SERPL-SCNC: 1.1 MMOL/L (ref 0.5–2.2)
LYMPHOCYTES # BLD AUTO: 1.2 K/UL (ref 1–4.8)
LYMPHOCYTES # BLD AUTO: 1.5 K/UL (ref 1–4.8)
LYMPHOCYTES NFR BLD: 5.9 % (ref 18–48)
LYMPHOCYTES NFR BLD: 7.5 % (ref 18–48)
MAGNESIUM SERPL-MCNC: 1.9 MG/DL (ref 1.6–2.6)
MCH RBC QN AUTO: 28.7 PG (ref 27–31)
MCH RBC QN AUTO: 28.7 PG (ref 27–31)
MCHC RBC AUTO-ENTMCNC: 31.6 G/DL (ref 32–36)
MCHC RBC AUTO-ENTMCNC: 31.7 G/DL (ref 32–36)
MCV RBC AUTO: 91 FL (ref 82–98)
MCV RBC AUTO: 91 FL (ref 82–98)
MONOCYTES # BLD AUTO: 1.3 K/UL (ref 0.3–1)
MONOCYTES # BLD AUTO: 1.4 K/UL (ref 0.3–1)
MONOCYTES NFR BLD: 6.1 % (ref 4–15)
MONOCYTES NFR BLD: 6.6 % (ref 4–15)
NEUTROPHILS # BLD AUTO: 17 K/UL (ref 1.8–7.7)
NEUTROPHILS # BLD AUTO: 17.5 K/UL (ref 1.8–7.7)
NEUTROPHILS NFR BLD: 82.7 % (ref 38–73)
NEUTROPHILS NFR BLD: 84.8 % (ref 38–73)
NRBC BLD-RTO: 0 /100 WBC
NRBC BLD-RTO: 0 /100 WBC
PLATELET # BLD AUTO: 60 K/UL (ref 150–350)
PLATELET # BLD AUTO: 67 K/UL (ref 150–350)
PMV BLD AUTO: 10.4 FL (ref 9.2–12.9)
PMV BLD AUTO: 11.8 FL (ref 9.2–12.9)
POCT GLUCOSE: 122 MG/DL (ref 70–110)
POCT GLUCOSE: 162 MG/DL (ref 70–110)
POCT GLUCOSE: 248 MG/DL (ref 70–110)
POCT GLUCOSE: 265 MG/DL (ref 70–110)
POCT GLUCOSE: 83 MG/DL (ref 70–110)
POCT GLUCOSE: 87 MG/DL (ref 70–110)
POTASSIUM SERPL-SCNC: 3.6 MMOL/L (ref 3.5–5.1)
PROCALCITONIN SERPL IA-MCNC: 1.25 NG/ML
RBC # BLD AUTO: 2.65 M/UL (ref 4–5.4)
RBC # BLD AUTO: 2.68 M/UL (ref 4–5.4)
SODIUM SERPL-SCNC: 137 MMOL/L (ref 136–145)
WBC # BLD AUTO: 20.48 K/UL (ref 3.9–12.7)
WBC # BLD AUTO: 20.5 K/UL (ref 3.9–12.7)

## 2019-11-10 PROCEDURE — 63600175 PHARM REV CODE 636 W HCPCS: Performed by: NURSE PRACTITIONER

## 2019-11-10 PROCEDURE — 80048 BASIC METABOLIC PNL TOTAL CA: CPT

## 2019-11-10 PROCEDURE — 63600175 PHARM REV CODE 636 W HCPCS: Performed by: INTERNAL MEDICINE

## 2019-11-10 PROCEDURE — 84145 PROCALCITONIN (PCT): CPT

## 2019-11-10 PROCEDURE — 99232 PR SUBSEQUENT HOSPITAL CARE,LEVL II: ICD-10-PCS | Mod: ,,, | Performed by: INTERNAL MEDICINE

## 2019-11-10 PROCEDURE — 25000003 PHARM REV CODE 250: Performed by: THORACIC SURGERY (CARDIOTHORACIC VASCULAR SURGERY)

## 2019-11-10 PROCEDURE — 25000003 PHARM REV CODE 250: Performed by: NURSE PRACTITIONER

## 2019-11-10 PROCEDURE — S0028 INJECTION, FAMOTIDINE, 20 MG: HCPCS | Performed by: INTERNAL MEDICINE

## 2019-11-10 PROCEDURE — 99233 PR SUBSEQUENT HOSPITAL CARE,LEVL III: ICD-10-PCS | Mod: ,,, | Performed by: INTERNAL MEDICINE

## 2019-11-10 PROCEDURE — 20000000 HC ICU ROOM

## 2019-11-10 PROCEDURE — 85730 THROMBOPLASTIN TIME PARTIAL: CPT | Mod: 91

## 2019-11-10 PROCEDURE — 25000003 PHARM REV CODE 250: Performed by: INTERNAL MEDICINE

## 2019-11-10 PROCEDURE — 92526 ORAL FUNCTION THERAPY: CPT

## 2019-11-10 PROCEDURE — 25000003 PHARM REV CODE 250: Performed by: FAMILY MEDICINE

## 2019-11-10 PROCEDURE — 83735 ASSAY OF MAGNESIUM: CPT

## 2019-11-10 PROCEDURE — 94760 N-INVAS EAR/PLS OXIMETRY 1: CPT

## 2019-11-10 PROCEDURE — 99232 SBSQ HOSP IP/OBS MODERATE 35: CPT | Mod: ,,, | Performed by: INTERNAL MEDICINE

## 2019-11-10 PROCEDURE — 97530 THERAPEUTIC ACTIVITIES: CPT

## 2019-11-10 PROCEDURE — 85025 COMPLETE CBC W/AUTO DIFF WBC: CPT | Mod: 91

## 2019-11-10 PROCEDURE — 99233 SBSQ HOSP IP/OBS HIGH 50: CPT | Mod: ,,, | Performed by: INTERNAL MEDICINE

## 2019-11-10 PROCEDURE — 63600175 PHARM REV CODE 636 W HCPCS: Performed by: THORACIC SURGERY (CARDIOTHORACIC VASCULAR SURGERY)

## 2019-11-10 PROCEDURE — 83605 ASSAY OF LACTIC ACID: CPT

## 2019-11-10 RX ORDER — METOPROLOL TARTRATE 50 MG/1
50 TABLET ORAL 2 TIMES DAILY
Status: DISCONTINUED | OUTPATIENT
Start: 2019-11-10 | End: 2019-11-20 | Stop reason: HOSPADM

## 2019-11-10 RX ORDER — FERROUS SULFATE 325(65) MG
325 TABLET, DELAYED RELEASE (ENTERIC COATED) ORAL 2 TIMES DAILY
Status: DISCONTINUED | OUTPATIENT
Start: 2019-11-10 | End: 2019-11-20 | Stop reason: HOSPADM

## 2019-11-10 RX ORDER — LOSARTAN POTASSIUM 50 MG/1
50 TABLET ORAL DAILY
Status: DISCONTINUED | OUTPATIENT
Start: 2019-11-10 | End: 2019-11-16

## 2019-11-10 RX ADMIN — Medication 2 G: at 05:11

## 2019-11-10 RX ADMIN — MAGNESIUM SULFATE HEPTAHYDRATE 2 G: 40 INJECTION, SOLUTION INTRAVENOUS at 04:11

## 2019-11-10 RX ADMIN — PIPERACILLIN AND TAZOBACTAM 4.5 G: 4; .5 INJECTION, POWDER, FOR SOLUTION INTRAVENOUS at 10:11

## 2019-11-10 RX ADMIN — ARGATROBAN 3.5 MCG/KG/MIN: 125 SOLUTION INTRAVENOUS at 07:11

## 2019-11-10 RX ADMIN — POTASSIUM CHLORIDE 20 MEQ: 200 INJECTION, SOLUTION INTRAVENOUS at 05:11

## 2019-11-10 RX ADMIN — AMLODIPINE BESYLATE 10 MG: 10 TABLET ORAL at 08:11

## 2019-11-10 RX ADMIN — CHLORHEXIDINE GLUCONATE 0.12% ORAL RINSE 15 ML: 1.2 LIQUID ORAL at 08:11

## 2019-11-10 RX ADMIN — POTASSIUM CHLORIDE 10 MEQ: 750 TABLET, EXTENDED RELEASE ORAL at 08:11

## 2019-11-10 RX ADMIN — FERROUS SULFATE TAB EC 325 MG (65 MG FE EQUIVALENT) 325 MG: 325 (65 FE) TABLET DELAYED RESPONSE at 08:11

## 2019-11-10 RX ADMIN — PSYLLIUM HUSK 6 G: 6 GRANULE ORAL at 08:11

## 2019-11-10 RX ADMIN — INSULIN ASPART 4 UNITS: 100 INJECTION, SOLUTION INTRAVENOUS; SUBCUTANEOUS at 11:11

## 2019-11-10 RX ADMIN — FERROUS SULFATE TAB EC 325 MG (65 MG FE EQUIVALENT) 325 MG: 325 (65 FE) TABLET DELAYED RESPONSE at 12:11

## 2019-11-10 RX ADMIN — FUROSEMIDE 20 MG: 10 INJECTION, SOLUTION INTRAMUSCULAR; INTRAVENOUS at 05:11

## 2019-11-10 RX ADMIN — FAMOTIDINE 20 MG: 10 INJECTION INTRAVENOUS at 08:11

## 2019-11-10 RX ADMIN — Medication 2 G: at 12:11

## 2019-11-10 RX ADMIN — PIPERACILLIN AND TAZOBACTAM 4.5 G: 4; .5 INJECTION, POWDER, FOR SOLUTION INTRAVENOUS at 12:11

## 2019-11-10 RX ADMIN — METOPROLOL TARTRATE 50 MG: 50 TABLET, FILM COATED ORAL at 08:11

## 2019-11-10 RX ADMIN — VANCOMYCIN HYDROCHLORIDE 1500 MG: 100 INJECTION, POWDER, LYOPHILIZED, FOR SOLUTION INTRAVENOUS at 08:11

## 2019-11-10 RX ADMIN — INSULIN ASPART 3 UNITS: 100 INJECTION, SOLUTION INTRAVENOUS; SUBCUTANEOUS at 08:11

## 2019-11-10 RX ADMIN — ARGATROBAN 3.5 MCG/KG/MIN: 125 SOLUTION INTRAVENOUS at 05:11

## 2019-11-10 RX ADMIN — PIPERACILLIN AND TAZOBACTAM 4.5 G: 4; .5 INJECTION, POWDER, FOR SOLUTION INTRAVENOUS at 05:11

## 2019-11-10 RX ADMIN — PIPERACILLIN AND TAZOBACTAM 4.5 G: 4; .5 INJECTION, POWDER, FOR SOLUTION INTRAVENOUS at 02:11

## 2019-11-10 RX ADMIN — LOSARTAN POTASSIUM 50 MG: 50 TABLET ORAL at 08:11

## 2019-11-10 RX ADMIN — INSULIN ASPART 2 UNITS: 100 INJECTION, SOLUTION INTRAVENOUS; SUBCUTANEOUS at 04:11

## 2019-11-10 RX ADMIN — FUROSEMIDE 20 MG: 10 INJECTION, SOLUTION INTRAMUSCULAR; INTRAVENOUS at 08:11

## 2019-11-10 NOTE — PROGRESS NOTES
[facility]  Cardiology  Progress Note    Patient Name: Mateusz Bates  MRN: [id number]  Admission Date: 10/28/2019  Hospital Length of Stay: 12 days  Code Status: Full Code   Attending Physician: Keny Zaidi MD   Primary Care Physician: Serenity Kilpatrick MD  Expected Discharge Date: 11/13/2019  Principal Problem:Acute deep vein thrombosis (DVT) of right upper extremity    Subjective:     Hospital Course:   Ms. Bates is a 69 year old female patient whose current medical conditions include HTN, hyperlipidemia, obesity, DM type II, cerebral microvascular disease, FAHAD, and B12 deficiency who was sent to [facility] ED today from her PCP's office due to chest pain and abnormal EKG. Patient complained of substernal chest tightness/heaviness that radiated to her back on Saturday while at rest. Associated symptoms included orthopnea and SOB. Patient denied any associated nausea, vomiting, diaphoresis, palpitations, near syncope, or syncope. Initial workup in ED revealed troponin of 3.665 and BNP of 1305 and patient subsequently admitted for further evaluation and treatment of NSTEMI. Cardiology consulted to assist with management. Patient seen and examined today while in ED. Feels well at time of exam, denies chest pain. She does report similar symptoms over the past few months but states they never lasted as long and were less intense. She reports compliance with her medications. States her father had history of CABG. Chart reviewed. Repeat troponin pending. EKG reviewed and shows SR with new LBBB. 2D echo pending.    10/29/19-Patient seen and examined today, s/p LHC yesterday that showed multivessel CAD. CVT consulted, CABG planned for Thursday. Feels ok. No chest pain or SOB. Labs stable. Being diuresed. 2D echo showed EF of 30-35%, DD.    10/30/19-Patient seen and examined today. Feels well. No complaints. Denies chest pain or SOB. Labs stable. IABP placement scheduled for today.    10/31/19- Patient is  s/p CABG x3 with LIMA to LAD reverse saphenous vein graft to the RCA and reverse saphenous vein graft to the ramus. Has been transferred to ICU. Is intubated and sedated. CT x3. IABP at 1:1 still in place. Transfused 3 units of PRBC in the OR and has received additional 3 units in ICU, has received 500cc albumin, epi and milrinone gtt.  H/H 6.9/21.0. Renal function stable. Paced rhythm on monitor     11/1/2019--patient seen and examined in ICU. Extubated o/n, remains on IABP 1:1 today, plans to possibly wean off later today per Dr. Zaidi. Remains on Epi and Milrinone gtt today. Paced on monitor. Labs stable today.     11/2/19-Patient seen and examined today, lying in bed. Feels ok. Complains of post-op pain and fatigue. Labs reviewed. Platelets 47,000. Heme/onc on board.     11/3/19-Patient seen and examined today, sitting up in chair. Feeling better. Still weak. Labs reviewed. Platelets 89,000.    11/4/19- Patient is POD 4 s/p CABG x3. OOB in chair. Feels better this morning. Pain controlled. Platelets improving since holding Plavix.Vitals stable     11/5/19- Patient is POD 5 s/p CABGx3. Has been transferred to Tele. Is clammy and generally weak this morning. Glucose and vitals stable. Noted to have bump in WBC. Primary team has been notified by nurse. Platelets 73, but H/H stable     11/6 in ICU, intubated, on epi gtt at 0.15 mcg CI 2.0, PAP 20. Sinus rhythm, H&H and Cr stable. Urine output ok. Improved lactate 1.6. const stent cardiogenic shock    11/7 INTUBATED, WEAN OFF EPI AND CONTINUE AMILIRONE, ON SINU RHYTHM AND CI 3.3, Cr STABLE AND DECENT URINE OUTPUT.     11/8 extubated, off pressors, up to the chair.diffuse DVT on upper extremities, no chest pain,pt is weak, Cr normal    11/9/weak, mild reps. Distress, good urine output. BP high.  RUE venous thrombus/DVT in deep and superficial arm veins. H&H stable Cr normal.        ROS  Objective:     Vital Signs (Most Recent):  Temp: 97.8 °F (36.6 °C) (11/09/19  1501)  Pulse: 92 (11/09/19 1700)  Resp: (!) 24 (11/09/19 1700)  BP: (!) 149/46 (11/09/19 1700)  SpO2: 95 % (11/09/19 1700) Vital Signs (24h Range):  Temp:  [97.5 °F (36.4 °C)-98.6 °F (37 °C)] 97.8 °F (36.6 °C)  Pulse:  [49-98] 92  Resp:  [18-36] 24  SpO2:  [87 %-100 %] 95 %  BP: (126-195)/() 149/46     Weight: 71.1 kg (156 lb 12 oz)  Body mass index is 29.62 kg/m².     SpO2: 95 %  O2 Device (Oxygen Therapy): room air      Intake/Output Summary (Last 24 hours) at 11/9/2019 1806  Last data filed at 11/9/2019 1600  Gross per 24 hour   Intake 1628.05 ml   Output 1870 ml   Net -241.95 ml       Lines/Drains/Airways     Peripherally Inserted Central Catheter Line                 PICC Double Lumen 11/07/19 1645 left brachial;left basilic 2 days          Drain                 Urethral Catheter 11/05/19 1718 Straight-tip 4 days                Physical Exam   Constitutional: She is oriented to person, place, and time. She appears well-developed and well-nourished. No distress.   HENT:   Head: Normocephalic and atraumatic.   Eyes: Pupils are equal, round, and reactive to light. Right eye exhibits no discharge. Left eye exhibits no discharge.   Neck: Neck supple. No JVD present.   Cardiovascular: Normal rate, regular rhythm, S1 normal, S2 normal and normal heart sounds.   No murmur heard.  Pulmonary/Chest: Effort normal. No respiratory distress. She has no wheezes.   CABG site C/D/I; no bleeding erythema or drainage    Diminished BS at bases   Abdominal: Soft. She exhibits no distension.   Musculoskeletal: She exhibits no edema.   Neurological: She is alert and oriented to person, place, and time.   Skin: Skin is warm and dry. She is not diaphoretic. No erythema.   SVG sites C/D/I; no bleeding erythema or drainage   Psychiatric: She has a normal mood and affect. Her behavior is normal. Thought content normal.   Nursing note and vitals reviewed.      Significant Labs:   ABG: No results for input(s): PH, PCO2, HCO3,  POCSATURATED, BE in the last 48 hours., Blood Culture: No results for input(s): LABBLOO in the last 48 hours., BMP:   Recent Labs   Lab 11/08/19  0600  11/08/19  1606 11/09/19 0022 11/09/19  0400   *   < > 309* 257* 245*   *   < > 134* 134* 135*   K 3.6   < > 3.8 3.9 4.3   CL 99   < > 99 100 101   CO2 22*   < > 22* 24 20*   BUN 19   < > 20 20 20   CREATININE 0.8   < > 0.9 0.8 0.9   CALCIUM 8.3*   < > 8.4* 8.4* 8.7   MG 2.1  --   --   --  1.8    < > = values in this interval not displayed.   , CMP   Recent Labs   Lab 11/08/19  0711  11/08/19  1606 11/09/19 0022 11/09/19 0400 11/09/19  0659   NA  --    < > 134* 134* 135*  --    K  --    < > 3.8 3.9 4.3  --    CL  --    < > 99 100 101  --    CO2  --    < > 22* 24 20*  --    GLU  --    < > 309* 257* 245*  --    BUN  --    < > 20 20 20  --    CREATININE  --    < > 0.9 0.8 0.9  --    CALCIUM  --    < > 8.4* 8.4* 8.7  --    PROT 5.3*  --   --   --   --  5.6*   ALBUMIN 2.2*  --   --   --   --  2.3*   BILITOT 1.2*  --   --   --   --  1.1*   ALKPHOS 73  --   --   --   --  77   *  --   --   --   --  334*   *  --   --   --   --  408*   ANIONGAP  --    < > 13 10 14  --    ESTGFRAFRICA  --    < > >60 >60 >60  --    EGFRNONAA  --    < > >60 >60 >60  --     < > = values in this interval not displayed.   , CBC   Recent Labs   Lab 11/08/19  1606 11/09/19 0400 11/09/19  1645   WBC 14.57* 17.13* 19.99*   HGB 8.3* 8.1* 8.0*   HCT 25.8* 26.0* 24.9*   PLT 46* 46* 63*   , INR No results for input(s): INR, PROTIME in the last 48 hours., Lipid Panel No results for input(s): CHOL, HDL, LDLCALC, TRIG, CHOLHDL in the last 48 hours. and Troponin No results for input(s): TROPONINI in the last 48 hours.    Significant Imaging: EKG:      Assessment and Plan:       Hyponatremia  Improved Na level    Cardiogenic shock  Post CABG  EF 30%  S/p swan catheter  CI 3.3  Repeat echo today showed EF 30%, septal hypokinesis , compared to prior echo before the cabg, no  significant change  ekg reviewed    Extubated  Off pressors  Continue Amlodipine and BB/Losartan  Hold Lipitor due to elevated LFT  Continue anticoagulation due to acute DVT  Continue ICU supportive care and ABx          Thrombocytopenia  -Mgmt as per heme/onc  -Plavix was on hold, but resumed by primary team   -ASA has been resumed     11/5/19  -consider holding Plavix  -Hematology has been consulted     S/P CABG x 3  -Patient is s/p CABG x3 with LIMA to LAD reverse saphenous vein graft to the RCA and reverse saphenous vein graft to the ramus.  -Continue post CABG supportive therapy  -Continue ASA, Plavix, Statin, BB  -Will continue to follow along     11/1/19  -POD #1 s/p CABG x3  -Continue ASA, Statin, BB  -Remains on Epi and Milrinone gtt today  -Mgmt per CT surgery  -Will follow along    11/2/19  -Recovering s/p CABG x 3  -Continue statin, BB  -Platelets 47,000 this AM; heme/onc on board, ASA held  -Await further rec's    11/3/19  -Stable, progressing post CABG x 3  -Continue statin, BB  -ASA re-started  -Platelets 89,000, heme/onc on board    11/4/19  -Patient slowly improving post CABG x3  -Continue ASA, Statin and BB  -plavix on hold due to thrombocytopenia that is improving   -IS use encouraged and early ambulation with PT  -Will continue to follow along     11/5/19  -Will continue ASA, Statin and BB  -Consider holding Plavix again for drop in plts after resuming by primary team   -Recommend CXR to rule out post op PNA given bump in WBC and cough  -will need to monitor for temp  -ambulate as tolerated and encourage IS use     11/06  Hold ASA, Plavix due to anemia  Hold BB due to cardiogenic shock    CAD, multiple vessel  -See plan under CABG    Acute combined systolic and diastolic CHF, NYHA class 2  -Presents with ICM/acute combined CHF  -EF 30-35% by 2D echo        Chest pain  -See plan under NSTEMI    Abnormal EKG  -LBBB noted on EKG  -See plan under NSTEMI    NSTEMI (non-ST elevated myocardial  infarction)  -Patient presented with intermittent chest tightness/heaviness that radiated to her back since Saturday evening  -Associated with JONES/orthopnea  -Initial troponin 3.665  -EKG showed SR with new LBBB  -Presentation consistent with NSTEMI  -Continue ASA, statin  -Start Toprol XL  -Start heparin gtt  -Check 2D echo  -Continue to trend troponin  -Keep NPO. [facility] today for further evaluation and PCI if indicated. All risks, benefits, and treatment alternatives explained to patient in detail. All questions answered. She has agreed to proceed. Further rec's to follow.    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 All risks, benefits, and treatment alternatives explained to patient in detail. All questions answered. She has agreed to proceed.    10/31/19  -Patient is s/p CABG x3.   See plan for CABG     Hyperlipidemia associated with type 2 diabetes mellitus  -Continue statin    Obesity (BMI 30.0-34.9)  -Weight loss    Diabetes mellitus type II, uncontrolled  -Mgmt as per hospital medicine    Hypertension associated with diabetes  -Continue home meds as BP permits        VTE Risk Mitigation (From admission, onward)         Ordered     argatroban 250 mg in dextrose 5 % 250 mL infusion  Continuous      11/08/19 1420     Reason for no Mechanical VTE Prophylaxis  Once      10/28/19 1414     heparin 25,000 units in dextrose 5% 250 mL (100 units/mL) infusion LOW INTENSITY nomogram - [facility]  Continuous      10/28/19 0155                Charles Matais MD  Cardiology  [facility]

## 2019-11-10 NOTE — SUBJECTIVE & OBJECTIVE
ROS  Objective:     Vital Signs (Most Recent):  Temp: 97.8 °F (36.6 °C) (11/09/19 1501)  Pulse: 92 (11/09/19 1700)  Resp: (!) 24 (11/09/19 1700)  BP: (!) 149/46 (11/09/19 1700)  SpO2: 95 % (11/09/19 1700) Vital Signs (24h Range):  Temp:  [97.5 °F (36.4 °C)-98.6 °F (37 °C)] 97.8 °F (36.6 °C)  Pulse:  [49-98] 92  Resp:  [18-36] 24  SpO2:  [87 %-100 %] 95 %  BP: (126-195)/() 149/46     Weight: 71.1 kg (156 lb 12 oz)  Body mass index is 29.62 kg/m².     SpO2: 95 %  O2 Device (Oxygen Therapy): room air      Intake/Output Summary (Last 24 hours) at 11/9/2019 1806  Last data filed at 11/9/2019 1600  Gross per 24 hour   Intake 1628.05 ml   Output 1870 ml   Net -241.95 ml       Lines/Drains/Airways     Peripherally Inserted Central Catheter Line                 PICC Double Lumen 11/07/19 1645 left brachial;left basilic 2 days          Drain                 Urethral Catheter 11/05/19 1718 Straight-tip 4 days                Physical Exam   Constitutional: She is oriented to person, place, and time. She appears well-developed and well-nourished. No distress.   HENT:   Head: Normocephalic and atraumatic.   Eyes: Pupils are equal, round, and reactive to light. Right eye exhibits no discharge. Left eye exhibits no discharge.   Neck: Neck supple. No JVD present.   Cardiovascular: Normal rate, regular rhythm, S1 normal, S2 normal and normal heart sounds.   No murmur heard.  Pulmonary/Chest: Effort normal. No respiratory distress. She has no wheezes.   CABG site C/D/I; no bleeding erythema or drainage    Diminished BS at bases   Abdominal: Soft. She exhibits no distension.   Musculoskeletal: She exhibits no edema.   Neurological: She is alert and oriented to person, place, and time.   Skin: Skin is warm and dry. She is not diaphoretic. No erythema.   SVG sites C/D/I; no bleeding erythema or drainage   Psychiatric: She has a normal mood and affect. Her behavior is normal. Thought content normal.   Nursing note and vitals  reviewed.      Significant Labs:   ABG: No results for input(s): PH, PCO2, HCO3, POCSATURATED, BE in the last 48 hours., Blood Culture: No results for input(s): LABBLOO in the last 48 hours., BMP:   Recent Labs   Lab 11/08/19  0600  11/08/19  1606 11/09/19  0022 11/09/19  0400   *   < > 309* 257* 245*   *   < > 134* 134* 135*   K 3.6   < > 3.8 3.9 4.3   CL 99   < > 99 100 101   CO2 22*   < > 22* 24 20*   BUN 19   < > 20 20 20   CREATININE 0.8   < > 0.9 0.8 0.9   CALCIUM 8.3*   < > 8.4* 8.4* 8.7   MG 2.1  --   --   --  1.8    < > = values in this interval not displayed.   , CMP   Recent Labs   Lab 11/08/19  0711  11/08/19  1606 11/09/19  0022 11/09/19  0400 11/09/19  0659   NA  --    < > 134* 134* 135*  --    K  --    < > 3.8 3.9 4.3  --    CL  --    < > 99 100 101  --    CO2  --    < > 22* 24 20*  --    GLU  --    < > 309* 257* 245*  --    BUN  --    < > 20 20 20  --    CREATININE  --    < > 0.9 0.8 0.9  --    CALCIUM  --    < > 8.4* 8.4* 8.7  --    PROT 5.3*  --   --   --   --  5.6*   ALBUMIN 2.2*  --   --   --   --  2.3*   BILITOT 1.2*  --   --   --   --  1.1*   ALKPHOS 73  --   --   --   --  77   *  --   --   --   --  334*   *  --   --   --   --  408*   ANIONGAP  --    < > 13 10 14  --    ESTGFRAFRICA  --    < > >60 >60 >60  --    EGFRNONAA  --    < > >60 >60 >60  --     < > = values in this interval not displayed.   , CBC   Recent Labs   Lab 11/08/19  1606 11/09/19  0400 11/09/19  1645   WBC 14.57* 17.13* 19.99*   HGB 8.3* 8.1* 8.0*   HCT 25.8* 26.0* 24.9*   PLT 46* 46* 63*   , INR No results for input(s): INR, PROTIME in the last 48 hours., Lipid Panel No results for input(s): CHOL, HDL, LDLCALC, TRIG, CHOLHDL in the last 48 hours. and Troponin No results for input(s): TROPONINI in the last 48 hours.    Significant Imaging: EKG:

## 2019-11-10 NOTE — PROGRESS NOTES
[facility] Hospital Medicine  Progress Note    Patient Name: Mateusz Bates  MRN: [id number]  Patient Class: IP- Inpatient   Admission Date: 10/28/2019  Length of Stay: 13 days  Attending Physician: Keny Zaidi MD  Primary Care Provider: Serenity Kilpatrick MD        Subjective:     Principal Problem:Acute deep vein thrombosis (DVT) of right upper extremity        HPI:  Mateusz Bates is a 69 year old female with DM II, hypertension and hyperlipidemia who presented to the ED at the request of her PCP due to finding of an abnormal EKG and complaints of chest tightness with SOB. The patient reports having intermittent symptoms for the last 1-2 months. On 10/26/19, she began having a tightness in her mid chest that radiated into her back with associated SOB. Symptoms were worse with laying flat and improved with sitting up. They lasted only minutes, but this was longer than previous episodes. She denies a trend in when these episodes occur as well as associated nausea, vomiting and diaphoresis. The patient's EKG was significant for a new LBBB compared to previous in 2016. Labs were significant for a BNP of 1,305, troponin of 3.665 and potassium of 3.4. Code status was discussed with the patient and her sister. She is a full code. Her sister, Christina Hernandez, is her surrogate medical decision maker.     Overview/Hospital Course:  Admitted for evaluation and treatment of chest pain concerning for acute coronary syndrome.  Evaluation by Cardiology and urgent left heart catheterization.  Angiogram discovered severe multi-vessel coronary artery disease.  Cardiac echo showed severely reduced LV function.  Referral to Cardiothoracic Surgery who recommended CABG.  Balloon pump placed 30 October.  CABG x3 by Dr. Zaidi 31 October.  Successfully extubated morning of 01 November.  11/2 - Patient improved s/p extubation. CABG x 3. Discussed with CC Team. BS mild elevation. SA and LA insulin initiated.  11/3 -  Improved BS control. Patient + chest tenderness. Tolerating diet. Discussed with cc team  11/4 - Patient with improved strength interactivity. Patient denies n/v/d + Sx site tenderness. Patient reports she is weakened.  11/5 - Patient denies CP. Reports she is weak. Denies SOB. Patient lethargic and clammy sitting in chair at bedside. Discussed with care team. Patient to return to bed with warming blankets, Blood Sugar 88 last check.   11/6 - Patient remains intubated R Hand cyanotic. Patient discussed with CC Team / CV sx.  11/7 - Patient in weaning trial. Extubation attempt this AM. R Hand improved blood flow - warmer to touch. Discussed with CC Team  11/8 - Patient improved Alert and appropriate. R Hand with improved blood flow / warmer.   11/9 - pulled NG tube out overnight. Will await swallow study.   11/10 - ST recommended pureed with thin liquids    Interval History: Denies any issues overnight. States she is tolerating her diet.     Review of Systems   Constitutional: Positive for activity change and appetite change. Negative for unexpected weight change.   HENT: Negative.  Negative for trouble swallowing.    Eyes: Negative.    Respiratory: Negative.  Negative for cough, shortness of breath and wheezing.    Cardiovascular: Negative.  Negative for chest pain.   Gastrointestinal: Negative for abdominal distention, abdominal pain, nausea and vomiting.   Skin: Negative.    Neurological: Positive for weakness. Negative for dizziness.   All other systems reviewed and are negative.    Objective:     Vital Signs (Most Recent):  Temp: 98.3 °F (36.8 °C) (11/10/19 0700)  Pulse: 71 (11/10/19 1000)  Resp: (!) 22 (11/10/19 1000)  BP: (!) 138/51 (11/10/19 1000)  SpO2: 100 % (11/10/19 1000) Vital Signs (24h Range):  Temp:  [97.6 °F (36.4 °C)-98.3 °F (36.8 °C)] 98.3 °F (36.8 °C)  Pulse:  [] 71  Resp:  [19-34] 22  SpO2:  [87 %-100 %] 100 %  BP: (115-168)/() 138/51     Weight: 73.7 kg (162 lb 7.7 oz)  Body mass  index is 30.7 kg/m².    Intake/Output Summary (Last 24 hours) at 11/10/2019 1056  Last data filed at 11/10/2019 1000  Gross per 24 hour   Intake 1743.78 ml   Output 2000 ml   Net -256.22 ml      Physical Exam   Constitutional: She is oriented to person, place, and time. She appears well-developed and well-nourished. No distress.   HENT:   Head: Normocephalic and atraumatic.   Cardiovascular: Normal rate, regular rhythm and normal heart sounds. Exam reveals no gallop and no friction rub.   No murmur heard.  Pulmonary/Chest: Effort normal and breath sounds normal. No stridor. No respiratory distress. She has no wheezes. She has no rales.   Abdominal: Soft. Bowel sounds are normal. She exhibits no distension and no mass. There is no tenderness. There is no guarding.   Musculoskeletal: She exhibits no edema.   Erythema and discoloration of right hand noted, tenderness with movement   Neurological: She is alert and oriented to person, place, and time.   Skin: She is not diaphoretic.       Significant Labs:   Recent Lab Results       11/10/19  0755   11/10/19  0706   11/10/19  0406   11/10/19  0305   11/10/19  0009        Anion Gap       10       aPTT   85.7  Comment:  aPTT therapeutic range = 39-69 seconds   82.1  Comment:  aPTT therapeutic range = 39-69 seconds       Baso #       0.05       Basophil%       0.2       BUN, Bld       17       Calcium       8.6       Chloride       103       CO2       24       Creatinine       0.7       Differential Method       Automated       eGFR if        >60       eGFR if non        >60  Comment:  Calculation used to obtain the estimated glomerular filtration  rate (eGFR) is the CKD-EPI equation.          Eos #       0.3       Eosinophil%       1.5       Glucose       97       Gran # (ANC)       17.0       Gran%       82.7       Hematocrit       24.0       Hemoglobin       7.6       Immature Grans (Abs)       0.31  Comment:  Mild elevation in immature  granulocytes is non specific and   can be seen in a variety of conditions including stress response,   acute inflammation, trauma and pregnancy. Correlation with other   laboratory and clinical findings is essential.         Immature Granulocytes       1.5       Lymph #       1.5       Lymph%       7.5       Magnesium       1.9       MCH       28.7       MCHC       31.7       MCV       91       Mono #       1.4       Mono%       6.6       MPV       10.4       nRBC       0       Platelets       67       POCT Glucose 87   83   122     Potassium       3.6       RBC       2.65       RDW       15.6       Sodium       137       WBC       20.50                        11/09/19  2103   11/09/19  2036   11/09/19  1645   11/09/19  1634   11/09/19  1303        Anion Gap               aPTT 77.7  Comment:  aPTT therapeutic range = 39-69 seconds   78.6  Comment:  aPTT therapeutic range = 39-69 seconds   76.4  Comment:  aPTT therapeutic range = 39-69 seconds     Baso #     0.04         Basophil%     0.2         BUN, Bld               Calcium               Chloride               CO2               Creatinine               Differential Method     Automated         eGFR if                eGFR if non                Eos #     0.1         Eosinophil%     0.4         Glucose               Gran # (ANC)     17.7         Gran%     88.4         Hematocrit     24.9         Hemoglobin     8.0         Immature Grans (Abs)     0.29  Comment:  Mild elevation in immature granulocytes is non specific and   can be seen in a variety of conditions including stress response,   acute inflammation, trauma and pregnancy. Correlation with other   laboratory and clinical findings is essential.           Immature Granulocytes     1.5         Lymph #     0.8         Lymph%     4.2         Magnesium               MCH     28.8         MCHC     32.1         MCV     90         Mono #     1.1         Mono%     5.3         MPV     11.5          nRBC     0         Platelets     63         POCT Glucose   272   308       Potassium               RBC     2.78         RDW     15.6         Sodium               WBC     19.99                          11/09/19  1223        Anion Gap       aPTT       Baso #       Basophil%       BUN, Bld       Calcium       Chloride       CO2       Creatinine       Differential Method       eGFR if        eGFR if non        Eos #       Eosinophil%       Glucose       Gran # (ANC)       Gran%       Hematocrit       Hemoglobin       Immature Grans (Abs)       Immature Granulocytes       Lymph #       Lymph%       Magnesium       MCH       MCHC       MCV       Mono #       Mono%       MPV       nRBC       Platelets       POCT Glucose 302     Potassium       RBC       RDW       Sodium       WBC             Significant Imaging: I have reviewed all pertinent imaging results/findings within the past 24 hours.      Assessment/Plan:      * Acute deep vein thrombosis (DVT) of right upper extremity  11/9:  Cont argatroban infusion       Cardiogenic shock        Thrombocytopenia  11/2  Heme On on Consult  HIT panel  Likely drug induced  Monitor  11/3  Improved at 89 today  Monitor  11/5  Plts 73  Monitor  11/8  Argatroban  Heme Onc  Plts 45    11/9:  plt stable today, no indication for transfusion  Cont to monitor     11/10:  improving    Acute hypoxemic respiratory failure  11/2  Extubated  Monitor  Pulmonary CC    11/7  Intubated  Weaning trial in progress  Pulmonary CC    11/8  Extubated  Pulmonary  O2  Nebs    11/9:  Extubated  Sputum culture normal respiratory araceli  Blood cultures negative  Will consider de-escalating abx therapy tomorrow       S/P CABG x 3  11/4  CABG x 3  ASA  Statin  BB  CV sx    11/6  CV sx  Statin      11/7  Per CV sx    11/9-11/10  Management per cardiovascular surgery     CAD, multiple vessel        Acute combined systolic and diastolic CHF, NYHA class 2  Probable ischemic  cardiomyopathy.  Anticipate CABG 31 October.  Optimize medical management.    NSTEMI (non-ST elevated myocardial infarction)  Troponin peaked at 3.66 now trending down.  Heparin, metoprolol, ASA and statin. Hold ACE inhibitor due to intolerance with cough. Hold ARB as patient's blood pressure is marginal. Will consider adding, if her blood pressure will tolerate.  Hold second antiplatelet due to pending Cardiology procedure.  Patient was evaluated for Cardiac Rehab and is not a candidate at this time, but will be referred, when appropriate.  LHC on 28 on October showed severe multi-vessel CAD.  Referral to CVT Surgery who recommended CABG planned for Thursday 31 October.  Balloon pump placed 30 October.  CABG x3 by Dr. Zaidi on 31 October.  11/2  S/p CABG  Cards  CT Sx  Statin  BB      Iron deficiency anemia  11/8  Hgb 8.6 today  FeSO4    11/9:  Anemia stable     11/10:  Will start po ferrous sulfate  Iron sat 10%   Continue to monitor  Transfuse if primary team deems appropriate       Hyperlipidemia associated with type 2 diabetes mellitus  -Patient was improved, but not at goal on 10/4/19.   -Continue statin.       Obesity (BMI 30.0-34.9)  Diet  Nutrition        Diabetes mellitus type II, uncontrolled  -Hemoglobin A1C was 9 on 9/19/19.   -NISS.   -ADA diet.   11/2  Levemir / NISS / Accuchecks  11/3  Levemir increased to 15 sq bid  NISS  Accuchecks  11/4  Levemir  NISS  Accuchecks  11/5  Levemir 10 units BID  NISS  Accuchecks    11/6  UnControlled  Accuchecks  Insulin Gtt  Improved control  Last BS 99    11/7  Uncontrolled  Accuchecks  NISS    11/8  Uncontrolled  NISS  Accuchecks    11/9:  Glucose elevated   Takes 45 units of toujeo at home  Will start levemir 20 units qhs   Cont to monitor glucose   HSSI        Anxiety and depression  -Stable.   -Continue Effexor.       Hypertension associated with diabetes  -Stable.   -Continue Norvasc.  11/2 -  BB , CCB  11/3 -  Controlled  BB  CCB  11/4  Controlled  BB  CCB    11/5  Controlled  BB  CCB    11/6  On pressors  Hold Htn meds for now    11/7  Pressors  Monitor    11/9:  BP elevated this am however was just given PO meds   Added hydralazine PRN     11/10:  BP controlled         VTE Risk Mitigation (From admission, onward)         Ordered     argatroban in 0.9 % sod chlor 1 mg/mL 125 mg in 125 mL infusion  Continuous      11/10/19 0643     Reason for no Mechanical VTE Prophylaxis  Once      10/28/19 1414     heparin 25,000 units in dextrose 5% 250 mL (100 units/mL) infusion LOW INTENSITY nomogram - [facility]  Continuous      10/28/19 1257                Critical care time spent on the evaluation and treatment of severe organ dysfunction, review of pertinent labs and imaging studies, discussions with consulting providers and discussions with patient/family: 35 minutes.      King Concepcion MD  Department of Hospital Medicine   [facility]

## 2019-11-10 NOTE — PLAN OF CARE
POC reviewed with pt.  Disoriented x2; pt was able to state name and place. Alert; WERNER.  R arm swollen; R hand purple/ blue with blisters. Abdomen soft, nondistended. Jones in place draining clear yellow urine.  BM x1.  Argatroban infusing at 13.2 ml/hr; PTT drawn Q4H.  Call bell in reach.  Bed locked and in lowest position. SR upx3.

## 2019-11-10 NOTE — PT/OT/SLP PROGRESS
Physical Therapy  Treatment    Mateusz Bates   MRN: [id number]   Admitting Diagnosis: Acute deep vein thrombosis (DVT) of right upper extremity    PT Received On: 11/10/19  PT Start Time: 0820     PT Stop Time: 0835    PT Total Time (min): 15 min       Billable Minutes:  Therapeutic Activity 15 minutes    Treatment Type: Treatment  PT/PTA: PTA     PTA Visit Number: 2       General Precautions: Standard, fall  Orthopedic Precautions: N/A   Braces:      Spiritual, Cultural Beliefs, Zoroastrian Practices, Values that Affect Care: no    Subjective:  Communicated with ramses and nurse Peñaloza prior to session.    Pain/Comfort  Pain Rating 1: 0/10    Objective:   Patient found with: telemetry, storm catheter, arterial line    Functional Mobility:  Bed Mobility: min assist       Transfers:min assist x 2       Gait: min assist x 2       Stairs:n/a          Balance:   Static Sit: GOOD-: Takes MODERATE challenges from all directions but inconsistently  Dynamic Sit: GOOD-: Incosistently Maintains balance through MODERATE excursions of active trunk movement,     Static Stand: FAIR: Maintains without assist but unable to take challenges  Dynamic stand: POOR: Needs MOD (moderate) assist during gait     Therapeutic Activities and Exercises:  With min assist came supine to sitting, with min assist x 2 came sit to stand and min assist x 2 completed several steps forward and stand-pivot transfer to chair. Educated on completed B LE therex with instruction and return demo of LAQ, MIP and APs.     AM-PAC 6 CLICK MOBILITY  How much help from another person does this patient currently need?   1 = Unable, Total/Dependent Assistance  2 = A lot, Maximum/Moderate Assistance  3 = A little, Minimum/Contact Guard/Supervision  4 = None, Modified [place]/Independent    Turning over in bed (including adjusting bedclothes, sheets and blankets)?: 3  Sitting down on and standing up from a chair with arms (e.g., wheelchair, bedside commode, etc.):  3  Moving from lying on back to sitting on the side of the bed?: 3  Moving to and from a bed to a chair (including a wheelchair)?: 3  Need to walk in hospital room?: 2  Climbing 3-5 steps with a railing?: 1  Basic Mobility Total Score: 15    AM-PAC Raw Score CMS G-Code Modifier Level of Impairment Assistance   6 % Total / Unable   7 - 9 CM 80 - 100% Maximal Assist   10 - 14 CL 60 - 80% Moderate Assist   15 - 19 CK 40 - 60% Moderate Assist   20 - 22 CJ 20 - 40% Minimal Assist   23 CI 1-20% SBA / CGA   24 CH 0% Independent/ Mod I     Patient left up in chair with all lines intact, call button in reach and nursing notified.    Assessment:  Mateusz Bates is a 69 y.o. female with a medical diagnosis of Acute deep vein thrombosis (DVT) of right upper extremity.    Rehab identified problem list/impairments: Rehab identified problem list/impairments: weakness, impaired endurance, decreased safety awareness, impaired self care skills, decreased coordination    Rehab potential is fair.    Activity tolerance: Fair    Discharge recommendations: Discharge Facility/Level of Care Needs: rehabilitation facility     Barriers to discharge:      Equipment recommendations:       GOALS:   Multidisciplinary Problems     Physical Therapy Goals        Problem: Physical Therapy Goal    Goal Priority Disciplines Outcome Goal Variances Interventions   Physical Therapy Goal     PT, PT/OT Ongoing, Progressing     Description:  By 11/10/19  1. Patient will perform supine to/from sit mod A x 1 per sternal prec.  2. Patient will perform sit to/from stand min a no AD per sternal prec.  3. Patient will amb 100ft no AD min A                    PLAN:    Patient to be seen 5 x/week  to address the above listed problems via gait training, therapeutic activities, therapeutic exercises  Plan of Care expires: 11/10/19  Plan of Care reviewed with: patient         Tim Bradley, PTA  11/10/2019

## 2019-11-10 NOTE — ASSESSMENT & PLAN NOTE
11/8  Hgb 8.6 today  FeSO4    11/9:  Anemia stable     11/10:  Will start po ferrous sulfate  Iron sat 10%   Continue to monitor  Transfuse if primary team deems appropriate

## 2019-11-10 NOTE — ASSESSMENT & PLAN NOTE
11/4  CABG x 3  ASA  Statin  BB  CV sx    11/6  CV sx  Statin      11/7  Per CV sx    11/9-11/10  Management per cardiovascular surgery

## 2019-11-10 NOTE — PROGRESS NOTES
[facility]  Cardiology  Progress Note    Patient Name: Mateusz Bates  MRN: [id number]  Admission Date: 10/28/2019  Hospital Length of Stay: 13 days  Code Status: Full Code   Attending Physician: Keny Zaidi MD   Primary Care Physician: Serenity Kilpatrick MD  Expected Discharge Date: 11/13/2019  Principal Problem:Acute deep vein thrombosis (DVT) of right upper extremity    Subjective:     Hospital Course:   Ms. Bates is a 69 year old female patient whose current medical conditions include HTN, hyperlipidemia, obesity, DM type II, cerebral microvascular disease, FAHAD, and B12 deficiency who was sent to [facility] ED today from her PCP's office due to chest pain and abnormal EKG. Patient complained of substernal chest tightness/heaviness that radiated to her back on Saturday while at rest. Associated symptoms included orthopnea and SOB. Patient denied any associated nausea, vomiting, diaphoresis, palpitations, near syncope, or syncope. Initial workup in ED revealed troponin of 3.665 and BNP of 1305 and patient subsequently admitted for further evaluation and treatment of NSTEMI. Cardiology consulted to assist with management. Patient seen and examined today while in ED. Feels well at time of exam, denies chest pain. She does report similar symptoms over the past few months but states they never lasted as long and were less intense. She reports compliance with her medications. States her father had history of CABG. Chart reviewed. Repeat troponin pending. EKG reviewed and shows SR with new LBBB. 2D echo pending.    10/29/19-Patient seen and examined today, s/p LHC yesterday that showed multivessel CAD. CVT consulted, CABG planned for Thursday. Feels ok. No chest pain or SOB. Labs stable. Being diuresed. 2D echo showed EF of 30-35%, DD.    10/30/19-Patient seen and examined today. Feels well. No complaints. Denies chest pain or SOB. Labs stable. IABP placement scheduled for today.    10/31/19- Patient is  s/p CABG x3 with LIMA to LAD reverse saphenous vein graft to the RCA and reverse saphenous vein graft to the ramus. Has been transferred to ICU. Is intubated and sedated. CT x3. IABP at 1:1 still in place. Transfused 3 units of PRBC in the OR and has received additional 3 units in ICU, has received 500cc albumin, epi and milrinone gtt.  H/H 6.9/21.0. Renal function stable. Paced rhythm on monitor     11/1/2019--patient seen and examined in ICU. Extubated o/n, remains on IABP 1:1 today, plans to possibly wean off later today per Dr. Zaidi. Remains on Epi and Milrinone gtt today. Paced on monitor. Labs stable today.     11/2/19-Patient seen and examined today, lying in bed. Feels ok. Complains of post-op pain and fatigue. Labs reviewed. Platelets 47,000. Heme/onc on board.     11/3/19-Patient seen and examined today, sitting up in chair. Feeling better. Still weak. Labs reviewed. Platelets 89,000.    11/4/19- Patient is POD 4 s/p CABG x3. OOB in chair. Feels better this morning. Pain controlled. Platelets improving since holding Plavix.Vitals stable     11/5/19- Patient is POD 5 s/p CABGx3. Has been transferred to Tele. Is clammy and generally weak this morning. Glucose and vitals stable. Noted to have bump in WBC. Primary team has been notified by nurse. Platelets 73, but H/H stable     11/6 in ICU, intubated, on epi gtt at 0.15 mcg CI 2.0, PAP 20. Sinus rhythm, H&H and Cr stable. Urine output ok. Improved lactate 1.6. const stent cardiogenic shock    11/7 INTUBATED, WEAN OFF EPI AND CONTINUE AMILIRONE, ON SINU RHYTHM AND CI 3.3, Cr STABLE AND DECENT URINE OUTPUT.     11/8 extubated, off pressors, up to the chair.diffuse DVT on upper extremities, no chest pain,pt is weak, Cr normal    11/9/weak, mild reps. Distress, good urine output. BP high.  RUE venous thrombus/DVT in deep and superficial arm veins. H&H stable Cr normal.      11/10 weakness H&H 7.6/24, BP and HR wnl., good urine output. No chest pain. Right arm  swelling and ecchymosis. Pulses ok      ROS  Objective:     Vital Signs (Most Recent):  Temp: 98.3 °F (36.8 °C) (11/10/19 0700)  Pulse: 71 (11/10/19 1000)  Resp: (!) 22 (11/10/19 1000)  BP: (!) 138/51 (11/10/19 1000)  SpO2: 100 % (11/10/19 1000) Vital Signs (24h Range):  Temp:  [97.6 °F (36.4 °C)-98.3 °F (36.8 °C)] 98.3 °F (36.8 °C)  Pulse:  [] 71  Resp:  [19-34] 22  SpO2:  [87 %-100 %] 100 %  BP: (115-168)/() 138/51     Weight: 73.7 kg (162 lb 7.7 oz)  Body mass index is 30.7 kg/m².     SpO2: 100 %  O2 Device (Oxygen Therapy): room air      Intake/Output Summary (Last 24 hours) at 11/10/2019 1053  Last data filed at 11/10/2019 1000  Gross per 24 hour   Intake 1743.78 ml   Output 2000 ml   Net -256.22 ml       Lines/Drains/Airways     Peripherally Inserted Central Catheter Line                 PICC Double Lumen 11/07/19 1645 left brachial;left basilic 2 days          Drain                 Urethral Catheter 11/05/19 1718 Straight-tip 4 days                Physical Exam   Constitutional: She is oriented to person, place, and time. She appears well-developed and well-nourished. No distress.   HENT:   Head: Normocephalic and atraumatic.   Eyes: Pupils are equal, round, and reactive to light. Right eye exhibits no discharge. Left eye exhibits no discharge.   Neck: Neck supple. No JVD present.   Cardiovascular: Normal rate, regular rhythm, S1 normal, S2 normal and normal heart sounds.   No murmur heard.  Pulmonary/Chest: Effort normal. No respiratory distress. She has no wheezes.   CABG site C/D/I; no bleeding erythema or drainage    Diminished BS at bases   Abdominal: Soft. She exhibits no distension.   Musculoskeletal: She exhibits no edema.   Neurological: She is alert and oriented to person, place, and time.   Skin: Skin is warm and dry. Ecchymosis (RUE and RLE) noted. She is not diaphoretic. No erythema.   SVG sites C/D/I; no bleeding erythema or drainage   Psychiatric: She has a normal mood and affect.  Her behavior is normal. Thought content normal.   Nursing note and vitals reviewed.      Significant Labs:   ABG: No results for input(s): PH, PCO2, HCO3, POCSATURATED, BE in the last 48 hours., Blood Culture: No results for input(s): LABBLOO in the last 48 hours., BMP:   Recent Labs   Lab 11/09/19  0022 11/09/19  0400 11/10/19  0305   * 245* 97   * 135* 137   K 3.9 4.3 3.6    101 103   CO2 24 20* 24   BUN 20 20 17   CREATININE 0.8 0.9 0.7   CALCIUM 8.4* 8.7 8.6*   MG  --  1.8 1.9   , CMP   Recent Labs   Lab 11/09/19  0022 11/09/19 0400 11/09/19  0659 11/10/19  0305   * 135*  --  137   K 3.9 4.3  --  3.6    101  --  103   CO2 24 20*  --  24   * 245*  --  97   BUN 20 20  --  17   CREATININE 0.8 0.9  --  0.7   CALCIUM 8.4* 8.7  --  8.6*   PROT  --   --  5.6*  --    ALBUMIN  --   --  2.3*  --    BILITOT  --   --  1.1*  --    ALKPHOS  --   --  77  --    AST  --   --  334*  --    ALT  --   --  408*  --    ANIONGAP 10 14  --  10   ESTGFRAFRICA >60 >60  --  >60   EGFRNONAA >60 >60  --  >60   , CBC   Recent Labs   Lab 11/09/19  0400 11/09/19  1645 11/10/19  0305   WBC 17.13* 19.99* 20.50*   HGB 8.1* 8.0* 7.6*   HCT 26.0* 24.9* 24.0*   PLT 46* 63* 67*   , INR No results for input(s): INR, PROTIME in the last 48 hours., Lipid Panel No results for input(s): CHOL, HDL, LDLCALC, TRIG, CHOLHDL in the last 48 hours. and Troponin No results for input(s): TROPONINI in the last 48 hours.    Significant Imaging: EKG:      Assessment and Plan:       Hyponatremia  Improved Na level    Cardiogenic shock  Post CABG  EF 30%  S/p swan catheter  CI 3.3  Repeat echo today showed EF 30%, septal hypokinesis , compared to prior echo before the cabg, no significant change  ekg reviewed    11/10/2019  Extubated  Off pressors  Continue Amlodipine and BB/Losartan  Hold Lipitor due to elevated LFT  Continue anticoagulation due to acute DVT  Continue ICU supportive care and ABx          Thrombocytopenia  -Mgmt as  per heme/onc  -Plavix was on hold, but resumed by primary team   -ASA has been resumed     11/5/19  -consider holding Plavix  -Hematology has been consulted     S/P CABG x 3  -Patient is s/p CABG x3 with LIMA to LAD reverse saphenous vein graft to the RCA and reverse saphenous vein graft to the ramus.  -Continue post CABG supportive therapy  -Continue ASA, Plavix, Statin, BB  -Will continue to follow along     11/1/19  -POD #1 s/p CABG x3  -Continue ASA, Statin, BB  -Remains on Epi and Milrinone gtt today  -Mgmt per CT surgery  -Will follow along    11/2/19  -Recovering s/p CABG x 3  -Continue statin, BB  -Platelets 47,000 this AM; heme/onc on board, ASA held  -Await further rec's    11/3/19  -Stable, progressing post CABG x 3  -Continue statin, BB  -ASA re-started  -Platelets 89,000, heme/onc on board    11/4/19  -Patient slowly improving post CABG x3  -Continue ASA, Statin and BB  -plavix on hold due to thrombocytopenia that is improving   -IS use encouraged and early ambulation with PT  -Will continue to follow along     11/5/19  -Will continue ASA, Statin and BB  -Consider holding Plavix again for drop in plts after resuming by primary team   -Recommend CXR to rule out post op PNA given bump in WBC and cough  -will need to monitor for temp  -ambulate as tolerated and encourage IS use     11/06  Hold ASA, Plavix due to anemia  Hold BB due to cardiogenic shock    CAD, multiple vessel  -See plan under CABG    Acute combined systolic and diastolic CHF, NYHA class 2  -Presents with ICM/acute combined CHF  -EF 30-35% by 2D echo        Chest pain  -See plan under NSTEMI    Abnormal EKG  -LBBB noted on EKG  -See plan under NSTEMI    NSTEMI (non-ST elevated myocardial infarction)  -Patient presented with intermittent chest tightness/heaviness that radiated to her back since Saturday evening  -Associated with JONES/orthopnea  -Initial troponin 3.665  -EKG showed SR with new LBBB  -Presentation consistent with  NSTEMI  -Continue ASA, statin  -Start Toprol XL  -Start heparin gtt  -Check 2D echo  -Continue to trend troponin  -Keep NPO. LHC today for further evaluation and PCI if indicated. All risks, benefits, and treatment alternatives explained to patient in detail. All questions answered. She has agreed to proceed. Further rec's to follow.    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 All risks, benefits, and treatment alternatives explained to patient in detail. All questions answered. She has agreed to proceed.    10/31/19  -Patient is s/p CABG x3.   See plan for CABG     Hyperlipidemia associated with type 2 diabetes mellitus  -Continue statin    Obesity (BMI 30.0-34.9)  -Weight loss    Diabetes mellitus type II, uncontrolled  -Mgmt as per hospital medicine    Hypertension associated with diabetes  -Continue home meds as BP permits        VTE Risk Mitigation (From admission, onward)         Ordered     argatroban in 0.9 % sod chlor 1 mg/mL 125 mg in 125 mL infusion  Continuous      11/10/19 0643     Reason for no Mechanical VTE Prophylaxis  Once      10/28/19 1414     heparin 25,000 units in dextrose 5% 250 mL (100 units/mL) infusion LOW INTENSITY nomogram - [facility]  Continuous      10/28/19 1257                Charles Matias MD  Cardiology  [facility]

## 2019-11-10 NOTE — PLAN OF CARE
Patient doing well today, VSS, still confused, speech clear. OOB chair today x3 hours, able to stand and shuffle feet with assist of 2, no balance, fatigues quickly. Sister visited today, update given, POC discussed.

## 2019-11-10 NOTE — ASSESSMENT & PLAN NOTE
Post CABG  EF 30%  S/p swan catheter  CI 3.3  Repeat echo today showed EF 30%, septal hypokinesis , compared to prior echo before the cabg, no significant change  ekg reviewed    11/10/2019  Extubated  Off pressors  Continue Amlodipine and BB/Losartan  Hold Lipitor due to elevated LFT  Continue anticoagulation due to acute DVT  Continue ICU supportive care and ABx

## 2019-11-10 NOTE — ASSESSMENT & PLAN NOTE
Post CABG  EF 30%  S/p swan catheter  CI 3.3  Repeat echo today showed EF 30%, septal hypokinesis , compared to prior echo before the cabg, no significant change  ekg reviewed    Extubated  Off pressors  Continue Amlodipine and BB/Losartan  Hold Lipitor due to elevated LFT  Continue anticoagulation due to acute DVT  Continue ICU supportive care and ABx

## 2019-11-10 NOTE — PROGRESS NOTES
[facility]   Hematology/Oncology  Progress Note    Patient Name: Mateusz Bates  Admission Date: 10/28/2019  Hospital Length of Stay: 13 days  Code Status: Full Code     Subjective:     HPI:  Mrs. Bates is a 69-year-old female with hypertension, diabetes, hyperlipidemia, obesity, anxiety and depression, iron deficiency anemia, vitamin B12 deficiency, who presented to the emergency room with an NSTEMI.  Workup included a cardiac catheterization that shows severe multivessel coronary artery disease with proximal LAD stenosis of 70% and severely depressed ejection fraction of 20%.  The patient is a candidate for urgent coronary artery bypass grafting. S/p CABG today.  Hematology is consulted for thrombocytopenia.    No new subjective & objective note has been filed under this hospital service since the last note was generated.    Interval history:  Patient was seen sitting down by her bedside. No acute event overnight per RN.  Still confused.  Right upper extremity vascular insufficiency noted, not worsening.    Review of Systems   Constitutional: Positive for activity change and appetite change. Negative for unexpected weight change.   HENT: Negative.  Negative for trouble swallowing.    Eyes: Negative.    Respiratory: Negative.  Negative for cough, shortness of breath and wheezing.    Cardiovascular: Negative.  Negative for chest pain.   Gastrointestinal: Negative.    Endocrine: Negative.    Genitourinary: Negative.    Musculoskeletal: Negative.    Skin: Negative.    Allergic/Immunologic: Negative.    Neurological: Positive for weakness. Negative for dizziness.   Hematological: Negative.    Psychiatric/Behavioral: Negative.    All other systems reviewed and are negative       Physical Exam   Constitutional: She appears well-developed and well-nourished. No distress.   HENT:   Head: Normocephalic and atraumatic.   Cardiovascular: Normal rate, regular rhythm and normal heart sounds. Exam reveals no  gallop and no friction rub.   No murmur heard.  Pulmonary/Chest: Effort normal and breath sounds normal. No stridor. No respiratory distress. She has no wheezes. She has no rales.   Abdominal: Soft. Bowel sounds are normal. She exhibits no distension and no mass. There is no tenderness. There is no guarding.   Musculoskeletal: She exhibits edema.   Mild edema,erythema and discoloration of right hand noted, tenderness with movement   Skin: She is not diaphoretic.    Assessment/Plan:     Severe Thrombocytopenia  -unknown etiology at this time, HIT versus TTP  -Awaiting results of Sahra HIT test as well as [id number]  for TTP  -no sign of active bleed.  Platelet count 67 from 46.  -avoid heparin containing products.  -recommend checking B12 levels.  -transfuse for platelet count less than 10,000.  -given her level of altered mentation, there is possibility of TTP, I reviewed the peripheral smear and no evidence of schistocytes, renal function is stable.  -may consider initiating plasmapheresis if there is decline in mental status or worsening thrombocytopenia.  -recommend check an LDH and haptoglobin to rule out intravascular hemolysis.    Edema of left upper extremity:  -secondary to DVT.  -ultrasound noted DVT within the left cephalic vein.  No thrombus in central vein of the left upper extremity.  -continue Agatroban; avoid Heparin products  -discharge on coumadin or direct oral anticoagulant such as Eliquis.  Recommend at least 3 months of anticoagulation.    Upper extremity vascular insufficiency:  - Vascular surgery on board. Recommend cont anticoagulation, arm elevation, mild/moderate compression    Thank you for your consult.    Eddi Thompson MD  Hematology/Oncology  [facility]

## 2019-11-10 NOTE — SUBJECTIVE & OBJECTIVE
Interval History:  The patient is postop day 10.  Status post coronary artery bypass grafting x3.  Patient remains hemodynamically stable neurologic exam remains about the same patient is nonfocal by neuro exam.  The right thumb and index finger continued to look dusky  ROS  Medications:  Continuous Infusions:   argatroban infusion 3.5 mcg/kg/min (11/10/19 0800)     Scheduled Meds:   amLODIPine  10 mg Oral Daily    chlorhexidine  15 mL Mouth/Throat BID    famotidine (PF)  20 mg Intravenous BID    furosemide  20 mg Intravenous BID    insulin detemir U-100  10 Units Subcutaneous QHS    lactated ringers  1,000 mL Intravenous Once    losartan  50 mg Oral Daily    metoprolol tartrate  50 mg Oral BID    piperacillin-tazobactam (ZOSYN) IVPB  4.5 g Intravenous Q8H    potassium chloride  10 mEq Oral BID    psyllium husk  1 packet Oral BID    sodium chloride  2 g Oral Q6H    vancomycin (VANCOCIN) IVPB  1,500 mg Intravenous Q24H     PRN Meds:albumin human 5%, albuterol sulfate, Dextrose 10% Bolus, Dextrose 10% Bolus, glucagon (human recombinant), hydrALAZINE, insulin aspart U-100, iohexol, lactated ringers, magnesium sulfate IVPB, magnesium sulfate IVPB, metoclopramide HCl, ondansetron, potassium chloride in water **AND** potassium chloride in water **AND** potassium chloride in water, traMADol     Objective:     Vital Signs (Most Recent):  Temp: 98.3 °F (36.8 °C) (11/10/19 0700)  Pulse: 79 (11/10/19 0800)  Resp: (!) 29 (11/10/19 0800)  BP: (!) 164/59 (11/10/19 0800)  SpO2: 96 % (11/10/19 0800) Vital Signs (24h Range):  Temp:  [97.6 °F (36.4 °C)-98.3 °F (36.8 °C)] 98.3 °F (36.8 °C)  Pulse:  [] 79  Resp:  [18-34] 29  SpO2:  [87 %-100 %] 96 %  BP: (115-179)/() 164/59     Weight: 73.7 kg (162 lb 7.7 oz)  Body mass index is 30.7 kg/m².    SpO2: 96 %  O2 Device (Oxygen Therapy): room air    Intake/Output - Last 3 Shifts       11/08 0700 - 11/09 0659 11/09 0700 - 11/10 0659 11/10 0700 - 11/11 0659    P.O.   580     I.V. (mL/kg) 243.1 (3.4) 403.8 (5.5)     NG/      IV Piggyback 850 650     Total Intake(mL/kg) 2013.1 (28.3) 1633.8 (22.2)     Urine (mL/kg/hr) 1725 (1) 2085 (1.2) 95 (0.6)    Stool 0 0     Total Output 1725 2085 95    Net +288.1 -451.2 -95           Stool Occurrence 5 x 1 x           Lines/Drains/Airways     Peripherally Inserted Central Catheter Line                 PICC Double Lumen 11/07/19 1645 left brachial;left basilic 2 days          Drain                 Urethral Catheter 11/05/19 1718 Straight-tip 4 days                Physical Exam   Constitutional: She appears well-developed and well-nourished. No distress.   HENT:   Head: Normocephalic and atraumatic.   Neck: Normal range of motion. Neck supple.   Cardiovascular: Normal rate, regular rhythm and normal heart sounds.   Pulmonary/Chest: Effort normal. She has rales.   Abdominal: Soft. Bowel sounds are normal.   Musculoskeletal: She exhibits edema.   Neurological: She is alert.   Patient is alert to person only   Skin: She is not diaphoretic.       Significant Labs:  All pertinent labs from the last 24 hours have been reviewed.    Significant Diagnostics:  I have reviewed all pertinent imaging results/findings within the past 24 hours.

## 2019-11-10 NOTE — ASSESSMENT & PLAN NOTE
11/2  Heme On on Consult  HIT panel  Likely drug induced  Monitor  11/3  Improved at 89 today  Monitor  11/5  Plts 73  Monitor  11/8  Argatroban  Heme Onc  Plts 45    11/9:  plt stable today, no indication for transfusion  Cont to monitor     11/10:  improving

## 2019-11-10 NOTE — NURSING
Skin assessment: rt arm swollen from rt shoulder to finger tips, skin tight. Rt arm and hand highly sensitive to touch. Arm bright red in areas. Inner wrist area with fluid filled blisters. Rt wrist and hand reddened and deep purple in color. midsternal incision WNL. Rt inner leg incision sites ecchymotic/yellow. Rt inner knee site with scant serosanguinous oozing, foam dressing in place. bilat groins and rt posterior thigh bruised. Heels slightly reddened, blanchable. Waffle mattress in place, patient kicks off heel boots. Patient has abrasion to left upper lip, red edges, yellow center. Scabs to rt neck from cordis site.

## 2019-11-10 NOTE — PROGRESS NOTES
[facility] -   Cardiothoracic Surgery  Progress Note    Patient Name: Mateusz Bates  MRN: [id number]  Admission Date: 10/28/2019  Hospital Length of Stay: 13 days  Code Status: Full Code   Attending Physician: Keny Zaidi MD   Referring Provider: Self, Aaareferral  Principal Problem:Acute deep vein thrombosis (DVT) of right upper extremity            Subjective:     Post-Op Info:  Procedure(s) (LRB):  CORONARY ARTERY BYPASS GRAFT (CABG) (N/A)  ECHOCARDIOGRAM,TRANSESOPHAGEAL (N/A)  BLOCK, NERVE, INTERCOSTAL, 2 OR MORE (N/A)   10 Days Post-Op     Interval History:  The patient is postop day 10.  Status post coronary artery bypass grafting x3.  Patient remains hemodynamically stable neurologic exam remains about the same patient is nonfocal by neuro exam.  The right thumb and index finger continued to look dusky  ROS  Medications:  Continuous Infusions:   argatroban infusion 3.5 mcg/kg/min (11/10/19 0800)     Scheduled Meds:   amLODIPine  10 mg Oral Daily    chlorhexidine  15 mL Mouth/Throat BID    famotidine (PF)  20 mg Intravenous BID    furosemide  20 mg Intravenous BID    insulin detemir U-100  10 Units Subcutaneous QHS    lactated ringers  1,000 mL Intravenous Once    losartan  50 mg Oral Daily    metoprolol tartrate  50 mg Oral BID    piperacillin-tazobactam (ZOSYN) IVPB  4.5 g Intravenous Q8H    potassium chloride  10 mEq Oral BID    psyllium husk  1 packet Oral BID    sodium chloride  2 g Oral Q6H    vancomycin (VANCOCIN) IVPB  1,500 mg Intravenous Q24H     PRN Meds:albumin human 5%, albuterol sulfate, Dextrose 10% Bolus, Dextrose 10% Bolus, glucagon (human recombinant), hydrALAZINE, insulin aspart U-100, iohexol, lactated ringers, magnesium sulfate IVPB, magnesium sulfate IVPB, metoclopramide HCl, ondansetron, potassium chloride in water **AND** potassium chloride in water **AND** potassium chloride in water, traMADol     Objective:     Vital Signs (Most Recent):  Temp: 98.3 °F  (36.8 °C) (11/10/19 0700)  Pulse: 79 (11/10/19 0800)  Resp: (!) 29 (11/10/19 0800)  BP: (!) 164/59 (11/10/19 0800)  SpO2: 96 % (11/10/19 0800) Vital Signs (24h Range):  Temp:  [97.6 °F (36.4 °C)-98.3 °F (36.8 °C)] 98.3 °F (36.8 °C)  Pulse:  [] 79  Resp:  [18-34] 29  SpO2:  [87 %-100 %] 96 %  BP: (115-179)/() 164/59     Weight: 73.7 kg (162 lb 7.7 oz)  Body mass index is 30.7 kg/m².    SpO2: 96 %  O2 Device (Oxygen Therapy): room air    Intake/Output - Last 3 Shifts       11/08 0700 - 11/09 0659 11/09 0700 - 11/10 0659 11/10 0700 - 11/11 0659    P.O.  580     I.V. (mL/kg) 243.1 (3.4) 403.8 (5.5)     NG/      IV Piggyback 850 650     Total Intake(mL/kg) 2013.1 (28.3) 1633.8 (22.2)     Urine (mL/kg/hr) 1725 (1) 2085 (1.2) 95 (0.6)    Stool 0 0     Total Output 1725 2085 95    Net +288.1 -451.2 -95           Stool Occurrence 5 x 1 x           Lines/Drains/Airways     Peripherally Inserted Central Catheter Line                 PICC Double Lumen 11/07/19 1645 left brachial;left basilic 2 days          Drain                 Urethral Catheter 11/05/19 1718 Straight-tip 4 days                Physical Exam   Constitutional: She appears well-developed and well-nourished. No distress.   HENT:   Head: Normocephalic and atraumatic.   Neck: Normal range of motion. Neck supple.   Cardiovascular: Normal rate, regular rhythm and normal heart sounds.   Pulmonary/Chest: Effort normal. She has rales.   Abdominal: Soft. Bowel sounds are normal.   Musculoskeletal: She exhibits edema.   Neurological: She is alert.   Patient is alert to person only   Skin: She is not diaphoretic.       Significant Labs:  All pertinent labs from the last 24 hours have been reviewed.    Significant Diagnostics:  I have reviewed all pertinent imaging results/findings within the past 24 hours.    Assessment/Plan:     S/P CABG x 3       11/01/2019  The patient is post-operative day 1, status-post coronary artery bypass grafting x 3.  Overall  the patient is making progress, however IABP and inotropic agents will continue today.   Neuro:  The patient is awake alert and oriented x3.  Neuro exam is nonfocal.  Pain is well controlled with IV pain medication.  Cardiac:  Patient is hemodynamically improving. Patient remains on epinephrine and milrinone gtts, which will be weaned over the next 24 hours.  Cardiac index is improving. IABP will remain in place over the next 24 hours.  Patient will be started on beta blockers once gtts are weaned off.  Respiratory: Good sats on high flow nasal cannula. Continue respiratory toilet, continue incentive spirometer. Wean to low flow nasal cannula.  GI:  Diet: sips of water with medications, will slowly advance diet as tolerated. Benign abdominal exam.  Renal:  Urine output is slowly improving. Cr 0.9. K 4.3. Mag 1.6. Replace mag. Continue IV diuresis. Add metolazone.   Heme:  Hct 34.1  Platelet 82. Continue aspirin. Discontinue Plavix for now.  Id:  WBC 12. Tmax 100.3. Continue post-operative antibiotics. Will continue to follow.   Endocrine:  Glucose is controlled with insulin gtt.  Continue insulin gtt for now, until weaned off inotropic agents.   Activities:  Patient is currently bed bound due to IABP.  Advance activities as tolerated once IABP is removed.   Lines tubes and drains:  Continue IABP. Continue [place] and Cordis.  Continue A-line.  Chest tubes will continue. NICOLASA x 2 will continue. Jones catheter will continue.  Continue pacer wires. Continue prevena wound vac.  Plan: Remain ICU.          11/02/2019  The patient is post-operative day 2, status-post coronary artery bypass grafting x 3.  Overall the patient is making progress, IABP removed and inotropic agents discontinued.   Neuro:  The patient is awake alert and oriented x3.  Neuro exam is nonfocal.  Pain is well controlled. Discontinue fentanyl.   Cardiac:  Patient is hemodynamically improving. Inotropic gtts discontinued. Excellent cardiac index. IABP  discontinued.  Patient will be started on beta blockers today.  Respiratory: Good sats on high flow nasal cannula. Continue respiratory toilet, continue incentive spirometer. Wean to low flow nasal cannula.  GI:  Advance diabetic/cardiac diet as tolerated. Benign abdominal exam.  Renal:  Urine output is improving. Cr 0.9. K 4.9. Mag 2.2. Continue IV diuresis. Hold AM K.   Heme:  Hct 28.3  Platelet 47. Discontinue aspirin. Discontinue Plavix. HIT panel. Consult hematology r/t thrombocytopenia.   Id:  WBC 10.4. Tmax 99.4. Will continue to follow.   Endocrine:  Discontinue insulin gtt. Start sliding scale insulin. Start long acting insulin detemir.   Activities: Advance activities as tolerated.   Lines tubes and drains:  Discontinue IABP. Discontinue [place] and Cordis.  Discontinue A-line.  Discontinue chest tubes x 3. Discontinue NICOLASA x 2. Discontinue arterial line. Jones catheter will continue for now.  Continue pacer wires. Continue prevena wound vac. Place PICC line.   Plan: Remain ICU.           11/03/2019  The patient is post-operative day 3, status-post coronary artery bypass grafting x 3.  Overall the patient is making progress.  Neuro:  The patient is awake alert and oriented x3.  Neuro exam is nonfocal.  Pain is well controlled. Continue tramadol.   Cardiac:  Patient is hemodynamically stable. Increase metoprolol dose. Start amlodipine.   Respiratory: Good sats on nasal cannula. Continue respiratory toilet, continue incentive spirometer. Wean to room air.  GI:  Advance diabetic/cardiac diet as tolerated. Benign abdominal exam. Passing gas, no BM. Add 1 bottle of mag citrate.   Renal:  Good urine output. Cr 0.9. K 3.8. Mag 2.0. Replace K. Replace Mag. Change to PO diuresis.   Heme:  Hct 34.7  Platelet 89. Restart aspirin. Continue to hold Plavix. HIT panel pending. Hematology following.  Id:  WBC 13.8. Tmax 99.4. Will continue to follow.    Endocrine:  Glucose controlled on sliding scale insulin. Increase long  acting insulin detemir dose.   Activities: Advance activities as tolerated.   Lines tubes and drains:  Storm catheter will continue for now.  Continue pacer wires. Continue prevena wound vac. Continue PICC line.   Plan: Remain ICU.          11/04/2019  The patient is post-operative day 4, status-post coronary artery bypass grafting x 3.  Overall the patient is making progress.  Neuro:  The patient is awake alert and oriented x3.  Neuro exam is nonfocal.  Pain is well controlled. Continue tramadol.   Cardiac:  Patient is hemodynamically stable. Continue metoprolol dose. Continue amlodipine dose.   Respiratory: Good sats on room air. Continue respiratory toilet, continue incentive spirometer.  GI:  Advance diabetic/cardiac diet as tolerated. Benign abdominal exam. Regular bowel movements.   Renal:  Good urine output. Cr 0.7. K 4.0. Mag 2.3. Replace K. Decrease PO diuresis.   Heme:  Hct 34.4  Platelet 140. Continue aspirin. Restart Plavix. HIT panel pending. Hematology following.  Id:  WBC down to 13.13. Tmax 98. Will continue to follow.    Endocrine:  Glucose controlled on sliding scale insulin. Increased long acting insulin detemir dose.   Activities: Advance activities as tolerated.   Lines tubes and drains:  Discontinued storm catheter. Continue pacer wires. Continue prevena wound vac. Continue PICC line.   Plan: Transfer to telemetry. Anticipate discharge to SNF in 48 hours.         11/05/2019  The patient is post-operative day 5, status-post coronary artery bypass grafting x 3.  Overall the patient is making progress.  Neuro:  The patient is awake alert and oriented x3.  Neuro exam is nonfocal. Patient slow to answer questions, however answers are appropriate.  Pain is well controlled. Continue tramadol PRN.   Cardiac:  Patient is hemodynamically stable. Continue metoprolol dose. Discontinue amlodipine. Start losartan.    Respiratory: Good sats on room air. Continue respiratory toilet, continue incentive  spirometer.  GI:  Advance diabetic/cardiac diet as tolerated. Benign abdominal exam. Regular bowel movements.   Renal:  Good urine output. Cr 0.7. K 4.8. Mag 2.1. Replace Mag. Discontinue PO diuresis.   Heme:  Hct 39.5  Platelet 73. Continue aspirin. Hold Plavix. HIT panel pending. Hematology following.  Id:  WBC is up to 16. Tmax 99. Pan-culture. DC PICC line. Chest xray. Start broad spectrum empiric antibiotics. Will continue to follow.    Endocrine:  Glucose controlled on sliding scale insulin. Decrease long acting insulin detemir dose.   Activities: Advance activities as tolerated.   Lines tubes and drains:  Discontinue pacer wires. Discontinue prevena wound vac. Continue PICC line.   Plan: Anticipate discharge to SNF in 24-48 hours.         11/06/2019  The patient is post-operative day 6, status-post coronary artery bypass grafting x 3. Patient had an episode of decompensation and unresponsiveness yesterday evening. The patient was found unresponsive, cool, and diaphoretic, with agonal breathing. Patient was intubated and transferred to the ICU. Bedside EKG and echocardiogram performed at the bedside showed no significant changes. CT head, chest, abdomen pelvis performed, showed no significant findings.     Neuro:  The patient is intubated and sedated. On Precedex gtt. Begin weaning off sedation.    Cardiac:  Patient is hemodynamically improving. Inotropic agent dependent. Currently on Epinephrine gtt. Start Milrinone gtt. Discontinue metoprolol. Discontinue losartan.    Respiratory: Ventilator dependent. tV 450, RR 20, PEEP 5. Wean down sedation. Wean off ventilator today.    GI:  Begin enteral tube feedings. Benign abdominal exam. Previously having regular bowel movements.   Renal: Adequate urine output. Cr 1.1. K 3.6. Mag 1.7. Replace Mag. Replace K. Discontinue diuresis.   Heme:  Hct 31.9  Platelet 89. Hold aspirin. Hold Plavix. HIT panel negative. Patient received 1 units PRBC last night. Hematology  following.  Id:  WBC is down to 15.5. Tmax 99.9. BC NGTD. Urinalysis negative. Sputum culture negative. Vancomycin and zosyn day 2. Will continue to follow closely.    Endocrine:  Glucose controlled on insulin gtt, continue at this time.   Activities: Intubated and sedated.   Musculoskeletal: Unable to find right radial pulse via doppler. Tips of the fingers of the right hand are mottled, dusky, appear cyanotic. Recent right radial arterial line in place, which has been relocated. US of arteries and veins of the right arm ordered. Consult to vascular surgery placed.    Lines tubes and drains:  Continue swan and cordis. Continue arterial line. Continue storm.    Plan: Remain ICU         11/07/2019  The patient is post-operative day 7, status-post coronary artery bypass grafting x 3. Patient had an episode of decompensation and unresponsiveness the evening of 11/05/2019. The patient was found unresponsive, cool, and diaphoretic, with agonal breathing. Patient was intubated and transferred to the ICU. Bedside EKG and echocardiogram performed at the bedside showed no significant changes. CT head, chest, abdomen pelvis performed, showed no significant findings.     Neuro:  The patient is intubated and sedated. On Precedex gtt. Wean off sedation.    Cardiac:  Patient is hemodynamically improving. Excellent cardiac index. Wean off Inotropic gtts.   Respiratory: Wean towards extubation. tV 400, RR 18, PEEP 5.   GI:  Continue enteral tube feedings while intubated. Benign abdominal exam.  Renal: Adequate urine output. Cr 1.1. K 4.1. Mag 2.8. Start IV diuresis.   Heme:  Hct 24.6  Platelet 60. Hold aspirin. Hold Plavix. HIT panel negative.  Id:  WBC is down to 14.46. Tmax 100.8. BC NGTD. Urinalysis negative. Sputum culture negative. Vancomycin and zosyn day 3. Midline sternal incision appears reddened, with induration at skin edges. Will continue to follow closely.    Endocrine:  Glucose controlled on sliding scale insulin.    Activities: Intubated and sedated. Moves all 4. Full ROM.    Musculoskeletal: Able to find right radial pulse via doppler. The right hand extending to the tips of the fingers of the right hand are mottled, dusky, appear cyanotic. Recent right radial arterial line placement, which has been relocated. US of arteries and veins of the right arm pending. Vascular surgery following, suggested possible thrombotic etiology, suggested heparin gtt, however this is contraindicated r/t s/p cabg x 3.     Lines tubes and drains:  Continue swan and cordis. Continue arterial line. Continue storm.    Plan: Remain ICU         11/08/2019  The patient is post-operative day 8, status-post coronary artery bypass grafting x 3. Patient had an episode of decompensation and unresponsiveness the evening of 11/05/2019. The patient was found unresponsive, cool, and diaphoretic, with agonal breathing. Patient was intubated and transferred to the ICU. Bedside EKG and echocardiogram performed at the bedside showed no significant changes. CT head, chest, abdomen pelvis performed, showed no significant findings. Suspect cardiogenic shock.      Neuro:  Patients neurologic state is improving. Alert, follows commands, makes eye contact, disoriented x 4. No focal deficits.    Cardiac:  Patient is hemodynamically improving. Off all inotropic gtts. Start low dose metoprolol and amlodipine.   Respiratory: Good sats on low flow nasal cannula. Continue IS. Continue pulmonary toilet. Wean to room air.    GI:  Continue enteral tube feedings at this time r/t disorientation and aspiration risk. Discontinue laxatives. Start metamucil BID, patient has had multiple loose stools.   Renal: Good urine output. Cr 0.8. K 3.6. Mag 2.1. Replace K. Replace Mag. Continue IV diuresis.   Hema:  Hct 26.4  Platelet 45. Hold aspirin. Hold Plavix. HIT panel will be resent as the patient is a high suspicion for HIT despite previous negative HIT panel. Multiple DVTs from the  right IJ down to the brachiocephalic vein. Confirmed via vascular US. Patient started on argatroban yesterday, goal is APTT 2x baseline. Most recent APTT 97.8. Baseline APTT 50.8.  Hematology following. Vascular surgery following.   Id:  WBC is down to 12. Tmax 100.4. BC NGTD. Urinalysis negative. Sputum culture negative. Vancomycin and zosyn day 4. Midline sternal incision appears improved, with minimal redness and induration at skin edges. Will continue to follow closely.    Endocrine:  Glucose controlled on sliding scale insulin.   Activities: Increase activities as tolerated.  Musculoskeletal: ROM 2/5 to the right wrist/hand/fingers, distal from the elbow there is 2+ non pitting edema, the extremity is tender and warm. There is mottling, a dusky appearance, and purple discoloration to the fingertips of the right hand. See above hematology plan.      Lines tubes and drains:  Continue PICC line. Continue storm catheter. Continue NG tube.   Plan: Remain ICU     11/09/2019  The patient is postop day 9.  Status post coronary artery bypass grafting x3.  Postop course was complicated by an episode of hemodynamic instability and decompensation on postop day number 5.  Patient continues to make clinical progress since then.  She is off all drips.  She remains extubated.  Continue ICU care for now.    Neuro:  Patient is awake alert.  She is oriented x2.  She is still confused.  But she is making progress.  Her neuro exam is nonfocal.  She follows all commands.  Cardiac:  Patient continues to be hemodynamically stable.  She is somewhat hypertensive.  Will increase her antihypertensives.  Respiratory:  Patient is good sats on nasal cannula.  She remains extubated.  GI:  Patient was tolerating tube feeds until this morning.  She pulled heart feeding tube. Will get a swallowing evaluation.  Mode of nutrition will depend on results of swallowing evaluation.  Renal:  Patient has good urine output %period% creatinine is  0.9.  Heme:  Hematocrit is 26 platelet is 46.  Patient is on argatroban  for anticoagulation due to right upper extremity DVTs.  Hypercoagulability workup is in progress.  Appreciate Hematology and vascular surgery input.  Id:  Incision continues to improve.  white count is 17.  Continue broad-spectrum antibiotics.  Endocrine:  Glucose is controlled on its sliding scale insulin.  Activities:  Advance activities as tolerated.  Line tubes and drains.  Patient has a PICC line, Jones catheter.    11/10/2019  The patient is postop day 10.  Status post coronary bypass grafting x3.  Postop course was complicated by an episode of hemodynamic instability and decompensation on postop day 5.  Patient remains hemodynamically stable.    Neuro:  The patient is awake and alert.  She is oriented x1.  She continues to be confused.  Her neuro exam is nonfocal.  Cardiac:  Patient continues to be hemodynamically stable.  His she is hypertensive.  Hypertensive medications will be increased.  Respiratory:  Patient has good sats on nasal cannula.  Continue pulmonary toilet.  GI:  Patient is tolerating a regular diet.  Renal:  Patient has good urine output. Creatinine is 0.7.  Heme: hematocrit is 24 and platelet is 67.  Patient continues on argatroban for right upper extremity DVTs.  Hypercoagulable workup is in progress.  Id:  Patient's white count is up to 20.  Patient is on broad prepped from antibiotics.  Endocrine:  The patient is on long-acting as well as sliding scale insulin.  Blood sugars have been well controlled.  Will decrease dose of long-acting.  Activities: patient has been out of bed to chair.  Advance as tolerated.  Musculoskeletal:  Patient's right upper extremity continues to be swollen  The index finger and thumb continues to be dusky with a bleb near the base of the thumb.  Continue argatroban for anticoagulation.  Continue elevation of right upper extremity.  Lines tubes and drains: patient has a PICC line.  Will  discontinue Jones catheter.      NSTEMI (non-ST elevated myocardial infarction)  The patient is a 69-year-old female with hypertension, diabetes, hyperlipidemia, obesity, anxiety and depression, iron deficiency anemia, vitamin B12 deficiency, who presented to the emergency room with an NSTEMI.  Workup included a cardiac catheterization that shows severe multivessel coronary artery disease with proximal LAD stenosis of 70% and severely depressed ejection fraction of 20%.  The patient is a candidate for urgent coronary artery bypass grafting.  The risks and benefits of surgery were explained to the patient.  The patient verbalized understanding of the issues discussed.  She a segs the risks of surgery and has agreed to proceed with urgent coronary artery bypass grafting.  She understands that the risk of neurologic complication postoperatively is increased due to the presence of cerebral microvascular disease.        Keny Zaidi MD  Cardiothoracic Surgery  [facility] -

## 2019-11-10 NOTE — ASSESSMENT & PLAN NOTE
11/01/2019  The patient is post-operative day 1, status-post coronary artery bypass grafting x 3.  Overall the patient is making progress, however IABP and inotropic agents will continue today.   Neuro:  The patient is awake alert and oriented x3.  Neuro exam is nonfocal.  Pain is well controlled with IV pain medication.  Cardiac:  Patient is hemodynamically improving. Patient remains on epinephrine and milrinone gtts, which will be weaned over the next 24 hours.  Cardiac index is improving. IABP will remain in place over the next 24 hours.  Patient will be started on beta blockers once gtts are weaned off.  Respiratory: Good sats on high flow nasal cannula. Continue respiratory toilet, continue incentive spirometer. Wean to low flow nasal cannula.  GI:  Diet: sips of water with medications, will slowly advance diet as tolerated. Benign abdominal exam.  Renal:  Urine output is slowly improving. Cr 0.9. K 4.3. Mag 1.6. Replace mag. Continue IV diuresis. Add metolazone.   Heme:  Hct 34.1  Platelet 82. Continue aspirin. Discontinue Plavix for now.  Id:  WBC 12. Tmax 100.3. Continue post-operative antibiotics. Will continue to follow.   Endocrine:  Glucose is controlled with insulin gtt.  Continue insulin gtt for now, until weaned off inotropic agents.   Activities:  Patient is currently bed bound due to IABP.  Advance activities as tolerated once IABP is removed.   Lines tubes and drains:  Continue IABP. Continue [place] and Cordis.  Continue A-line.  Chest tubes will continue. NICOLASA x 2 will continue. Jones catheter will continue.  Continue pacer wires. Continue prevena wound vac.  Plan: Remain ICU.          11/02/2019  The patient is post-operative day 2, status-post coronary artery bypass grafting x 3.  Overall the patient is making progress, IABP removed and inotropic agents discontinued.   Neuro:  The patient is awake alert and oriented x3.  Neuro exam is nonfocal.  Pain is well controlled. Discontinue fentanyl.    Cardiac:  Patient is hemodynamically improving. Inotropic gtts discontinued. Excellent cardiac index. IABP discontinued.  Patient will be started on beta blockers today.  Respiratory: Good sats on high flow nasal cannula. Continue respiratory toilet, continue incentive spirometer. Wean to low flow nasal cannula.  GI:  Advance diabetic/cardiac diet as tolerated. Benign abdominal exam.  Renal:  Urine output is improving. Cr 0.9. K 4.9. Mag 2.2. Continue IV diuresis. Hold AM K.   Heme:  Hct 28.3  Platelet 47. Discontinue aspirin. Discontinue Plavix. HIT panel. Consult hematology r/t thrombocytopenia.   Id:  WBC 10.4. Tmax 99.4. Will continue to follow.   Endocrine:  Discontinue insulin gtt. Start sliding scale insulin. Start long acting insulin detemir.   Activities: Advance activities as tolerated.   Lines tubes and drains:  Discontinue IABP. Discontinue [place] and Cordis.  Discontinue A-line.  Discontinue chest tubes x 3. Discontinue NICOLASA x 2. Discontinue arterial line. Jones catheter will continue for now.  Continue pacer wires. Continue prevena wound vac. Place PICC line.   Plan: Remain ICU.           11/03/2019  The patient is post-operative day 3, status-post coronary artery bypass grafting x 3.  Overall the patient is making progress.  Neuro:  The patient is awake alert and oriented x3.  Neuro exam is nonfocal.  Pain is well controlled. Continue tramadol.   Cardiac:  Patient is hemodynamically stable. Increase metoprolol dose. Start amlodipine.   Respiratory: Good sats on nasal cannula. Continue respiratory toilet, continue incentive spirometer. Wean to room air.  GI:  Advance diabetic/cardiac diet as tolerated. Benign abdominal exam. Passing gas, no BM. Add 1 bottle of mag citrate.   Renal:  Good urine output. Cr 0.9. K 3.8. Mag 2.0. Replace K. Replace Mag. Change to PO diuresis.   Heme:  Hct 34.7  Platelet 89. Restart aspirin. Continue to hold Plavix. HIT panel pending. Hematology following.  Id:  WBC 13.8. Tmax  99.4. Will continue to follow.    Endocrine:  Glucose controlled on sliding scale insulin. Increase long acting insulin detemir dose.   Activities: Advance activities as tolerated.   Lines tubes and drains:  Storm catheter will continue for now.  Continue pacer wires. Continue prevena wound vac. Continue PICC line.   Plan: Remain ICU.          11/04/2019  The patient is post-operative day 4, status-post coronary artery bypass grafting x 3.  Overall the patient is making progress.  Neuro:  The patient is awake alert and oriented x3.  Neuro exam is nonfocal.  Pain is well controlled. Continue tramadol.   Cardiac:  Patient is hemodynamically stable. Continue metoprolol dose. Continue amlodipine dose.   Respiratory: Good sats on room air. Continue respiratory toilet, continue incentive spirometer.  GI:  Advance diabetic/cardiac diet as tolerated. Benign abdominal exam. Regular bowel movements.   Renal:  Good urine output. Cr 0.7. K 4.0. Mag 2.3. Replace K. Decrease PO diuresis.   Heme:  Hct 34.4  Platelet 140. Continue aspirin. Restart Plavix. HIT panel pending. Hematology following.  Id:  WBC down to 13.13. Tmax 98. Will continue to follow.    Endocrine:  Glucose controlled on sliding scale insulin. Increased long acting insulin detemir dose.   Activities: Advance activities as tolerated.   Lines tubes and drains:  Discontinued storm catheter. Continue pacer wires. Continue prevena wound vac. Continue PICC line.   Plan: Transfer to telemetry. Anticipate discharge to SNF in 48 hours.         11/05/2019  The patient is post-operative day 5, status-post coronary artery bypass grafting x 3.  Overall the patient is making progress.  Neuro:  The patient is awake alert and oriented x3.  Neuro exam is nonfocal. Patient slow to answer questions, however answers are appropriate.  Pain is well controlled. Continue tramadol PRN.   Cardiac:  Patient is hemodynamically stable. Continue metoprolol dose. Discontinue amlodipine. Start  losartan.    Respiratory: Good sats on room air. Continue respiratory toilet, continue incentive spirometer.  GI:  Advance diabetic/cardiac diet as tolerated. Benign abdominal exam. Regular bowel movements.   Renal:  Good urine output. Cr 0.7. K 4.8. Mag 2.1. Replace Mag. Discontinue PO diuresis.   Heme:  Hct 39.5  Platelet 73. Continue aspirin. Hold Plavix. HIT panel pending. Hematology following.  Id:  WBC is up to 16. Tmax 99. Pan-culture. DC PICC line. Chest xray. Start broad spectrum empiric antibiotics. Will continue to follow.    Endocrine:  Glucose controlled on sliding scale insulin. Decrease long acting insulin detemir dose.   Activities: Advance activities as tolerated.   Lines tubes and drains:  Discontinue pacer wires. Discontinue prevena wound vac. Continue PICC line.   Plan: Anticipate discharge to SNF in 24-48 hours.         11/06/2019  The patient is post-operative day 6, status-post coronary artery bypass grafting x 3. Patient had an episode of decompensation and unresponsiveness yesterday evening. The patient was found unresponsive, cool, and diaphoretic, with agonal breathing. Patient was intubated and transferred to the ICU. Bedside EKG and echocardiogram performed at the bedside showed no significant changes. CT head, chest, abdomen pelvis performed, showed no significant findings.     Neuro:  The patient is intubated and sedated. On Precedex gtt. Begin weaning off sedation.    Cardiac:  Patient is hemodynamically improving. Inotropic agent dependent. Currently on Epinephrine gtt. Start Milrinone gtt. Discontinue metoprolol. Discontinue losartan.    Respiratory: Ventilator dependent. tV 450, RR 20, PEEP 5. Wean down sedation. Wean off ventilator today.    GI:  Begin enteral tube feedings. Benign abdominal exam. Previously having regular bowel movements.   Renal: Adequate urine output. Cr 1.1. K 3.6. Mag 1.7. Replace Mag. Replace K. Discontinue diuresis.   Heme:  Hct 31.9  Platelet 89. Hold  aspirin. Hold Plavix. HIT panel negative. Patient received 1 units PRBC last night. Hematology following.  Id:  WBC is down to 15.5. Tmax 99.9. BC NGTD. Urinalysis negative. Sputum culture negative. Vancomycin and zosyn day 2. Will continue to follow closely.    Endocrine:  Glucose controlled on insulin gtt, continue at this time.   Activities: Intubated and sedated.   Musculoskeletal: Unable to find right radial pulse via doppler. Tips of the fingers of the right hand are mottled, dusky, appear cyanotic. Recent right radial arterial line in place, which has been relocated. US of arteries and veins of the right arm ordered. Consult to vascular surgery placed.    Lines tubes and drains:  Continue swan and cordis. Continue arterial line. Continue storm.    Plan: Remain ICU         11/07/2019  The patient is post-operative day 7, status-post coronary artery bypass grafting x 3. Patient had an episode of decompensation and unresponsiveness the evening of 11/05/2019. The patient was found unresponsive, cool, and diaphoretic, with agonal breathing. Patient was intubated and transferred to the ICU. Bedside EKG and echocardiogram performed at the bedside showed no significant changes. CT head, chest, abdomen pelvis performed, showed no significant findings.     Neuro:  The patient is intubated and sedated. On Precedex gtt. Wean off sedation.    Cardiac:  Patient is hemodynamically improving. Excellent cardiac index. Wean off Inotropic gtts.   Respiratory: Wean towards extubation. tV 400, RR 18, PEEP 5.   GI:  Continue enteral tube feedings while intubated. Benign abdominal exam.  Renal: Adequate urine output. Cr 1.1. K 4.1. Mag 2.8. Start IV diuresis.   Heme:  Hct 24.6  Platelet 60. Hold aspirin. Hold Plavix. HIT panel negative.  Id:  WBC is down to 14.46. Tmax 100.8. BC NGTD. Urinalysis negative. Sputum culture negative. Vancomycin and zosyn day 3. Midline sternal incision appears reddened, with induration at skin edges.  Will continue to follow closely.    Endocrine:  Glucose controlled on sliding scale insulin.   Activities: Intubated and sedated. Moves all 4. Full ROM.    Musculoskeletal: Able to find right radial pulse via doppler. The right hand extending to the tips of the fingers of the right hand are mottled, dusky, appear cyanotic. Recent right radial arterial line placement, which has been relocated. US of arteries and veins of the right arm pending. Vascular surgery following, suggested possible thrombotic etiology, suggested heparin gtt, however this is contraindicated r/t s/p cabg x 3.     Lines tubes and drains:  Continue swan and cordis. Continue arterial line. Continue storm.    Plan: Remain ICU         11/08/2019  The patient is post-operative day 8, status-post coronary artery bypass grafting x 3. Patient had an episode of decompensation and unresponsiveness the evening of 11/05/2019. The patient was found unresponsive, cool, and diaphoretic, with agonal breathing. Patient was intubated and transferred to the ICU. Bedside EKG and echocardiogram performed at the bedside showed no significant changes. CT head, chest, abdomen pelvis performed, showed no significant findings. Suspect cardiogenic shock.      Neuro:  Patients neurologic state is improving. Alert, follows commands, makes eye contact, disoriented x 4. No focal deficits.    Cardiac:  Patient is hemodynamically improving. Off all inotropic gtts. Start low dose metoprolol and amlodipine.   Respiratory: Good sats on low flow nasal cannula. Continue IS. Continue pulmonary toilet. Wean to room air.    GI:  Continue enteral tube feedings at this time r/t disorientation and aspiration risk. Discontinue laxatives. Start metamucil BID, patient has had multiple loose stools.   Renal: Good urine output. Cr 0.8. K 3.6. Mag 2.1. Replace K. Replace Mag. Continue IV diuresis.   Hema:  Hct 26.4  Platelet 45. Hold aspirin. Hold Plavix. HIT panel will be resent as the patient  is a high suspicion for HIT despite previous negative HIT panel. Multiple DVTs from the right IJ down to the brachiocephalic vein. Confirmed via vascular US. Patient started on argatroban yesterday, goal is APTT 2x baseline. Most recent APTT 97.8. Baseline APTT 50.8.  Hematology following. Vascular surgery following.   Id:  WBC is down to 12. Tmax 100.4. BC NGTD. Urinalysis negative. Sputum culture negative. Vancomycin and zosyn day 4. Midline sternal incision appears improved, with minimal redness and induration at skin edges. Will continue to follow closely.    Endocrine:  Glucose controlled on sliding scale insulin.   Activities: Increase activities as tolerated.  Musculoskeletal: ROM 2/5 to the right wrist/hand/fingers, distal from the elbow there is 2+ non pitting edema, the extremity is tender and warm. There is mottling, a dusky appearance, and purple discoloration to the fingertips of the right hand. See above hematology plan.      Lines tubes and drains:  Continue PICC line. Continue storm catheter. Continue NG tube.   Plan: Remain ICU     11/09/2019  The patient is postop day 9.  Status post coronary artery bypass grafting x3.  Postop course was complicated by an episode of hemodynamic instability and decompensation on postop day number 5.  Patient continues to make clinical progress since then.  She is off all drips.  She remains extubated.  Continue ICU care for now.    Neuro:  Patient is awake alert.  She is oriented x2.  She is still confused.  But she is making progress.  Her neuro exam is nonfocal.  She follows all commands.  Cardiac:  Patient continues to be hemodynamically stable.  She is somewhat hypertensive.  Will increase her antihypertensives.  Respiratory:  Patient is good sats on nasal cannula.  She remains extubated.  GI:  Patient was tolerating tube feeds until this morning.  She pulled heart feeding tube. Will get a swallowing evaluation.  Mode of nutrition will depend on results of  swallowing evaluation.  Renal:  Patient has good urine output %period% creatinine is 0.9.  Heme:  Hematocrit is 26 platelet is 46.  Patient is on argatroban  for anticoagulation due to right upper extremity DVTs.  Hypercoagulability workup is in progress.  Appreciate Hematology and vascular surgery input.  Id:  Incision continues to improve.  white count is 17.  Continue broad-spectrum antibiotics.  Endocrine:  Glucose is controlled on its sliding scale insulin.  Activities:  Advance activities as tolerated.  Line tubes and drains.  Patient has a PICC line, Jones catheter.    11/10/2019  The patient is postop day 10.  Status post coronary bypass grafting x3.  Postop course was complicated by an episode of hemodynamic instability and decompensation on postop day 5.  Patient remains hemodynamically stable.    Neuro:  The patient is awake and alert.  She is oriented x1.  She continues to be confused.  Her neuro exam is nonfocal.  Cardiac:  Patient continues to be hemodynamically stable.  His she is hypertensive.  Hypertensive medications will be increased.  Respiratory:  Patient has good sats on nasal cannula.  Continue pulmonary toilet.  GI:  Patient is tolerating a regular diet.  Renal:  Patient has good urine output. Creatinine is 0.7.  Heme: hematocrit is 24 and platelet is 67.  Patient continues on argatroban for right upper extremity DVTs.  Hypercoagulable workup is in progress.  Id:  Patient's white count is up to 20.  Patient is on broad prepped from antibiotics.  Endocrine:  The patient is on long-acting as well as sliding scale insulin.  Blood sugars have been well controlled.  Will decrease dose of long-acting.  Activities: patient has been out of bed to chair.  Advance as tolerated.  Musculoskeletal:  Patient's right upper extremity continues to be swollen  The index finger and thumb continues to be dusky with a bleb near the base of the thumb.  Continue argatroban for anticoagulation.  Continue elevation  of right upper extremity.  Lines tubes and drains: patient has a PICC line.  Will discontinue Jones catheter.

## 2019-11-10 NOTE — SUBJECTIVE & OBJECTIVE
ROS  Objective:     Vital Signs (Most Recent):  Temp: 98.3 °F (36.8 °C) (11/10/19 0700)  Pulse: 71 (11/10/19 1000)  Resp: (!) 22 (11/10/19 1000)  BP: (!) 138/51 (11/10/19 1000)  SpO2: 100 % (11/10/19 1000) Vital Signs (24h Range):  Temp:  [97.6 °F (36.4 °C)-98.3 °F (36.8 °C)] 98.3 °F (36.8 °C)  Pulse:  [] 71  Resp:  [19-34] 22  SpO2:  [87 %-100 %] 100 %  BP: (115-168)/() 138/51     Weight: 73.7 kg (162 lb 7.7 oz)  Body mass index is 30.7 kg/m².     SpO2: 100 %  O2 Device (Oxygen Therapy): room air      Intake/Output Summary (Last 24 hours) at 11/10/2019 1053  Last data filed at 11/10/2019 1000  Gross per 24 hour   Intake 1743.78 ml   Output 2000 ml   Net -256.22 ml       Lines/Drains/Airways     Peripherally Inserted Central Catheter Line                 PICC Double Lumen 11/07/19 1645 left brachial;left basilic 2 days          Drain                 Urethral Catheter 11/05/19 1718 Straight-tip 4 days                Physical Exam   Constitutional: She is oriented to person, place, and time. She appears well-developed and well-nourished. No distress.   HENT:   Head: Normocephalic and atraumatic.   Eyes: Pupils are equal, round, and reactive to light. Right eye exhibits no discharge. Left eye exhibits no discharge.   Neck: Neck supple. No JVD present.   Cardiovascular: Normal rate, regular rhythm, S1 normal, S2 normal and normal heart sounds.   No murmur heard.  Pulmonary/Chest: Effort normal. No respiratory distress. She has no wheezes.   CABG site C/D/I; no bleeding erythema or drainage    Diminished BS at bases   Abdominal: Soft. She exhibits no distension.   Musculoskeletal: She exhibits no edema.   Neurological: She is alert and oriented to person, place, and time.   Skin: Skin is warm and dry. Ecchymosis (RUE and RLE) noted. She is not diaphoretic. No erythema.   SVG sites C/D/I; no bleeding erythema or drainage   Psychiatric: She has a normal mood and affect. Her behavior is normal. Thought content  normal.   Nursing note and vitals reviewed.      Significant Labs:   ABG: No results for input(s): PH, PCO2, HCO3, POCSATURATED, BE in the last 48 hours., Blood Culture: No results for input(s): LABBLOO in the last 48 hours., BMP:   Recent Labs   Lab 11/09/19  0022 11/09/19  0400 11/10/19  0305   * 245* 97   * 135* 137   K 3.9 4.3 3.6    101 103   CO2 24 20* 24   BUN 20 20 17   CREATININE 0.8 0.9 0.7   CALCIUM 8.4* 8.7 8.6*   MG  --  1.8 1.9   , CMP   Recent Labs   Lab 11/09/19  0022 11/09/19  0400 11/09/19  0659 11/10/19  0305   * 135*  --  137   K 3.9 4.3  --  3.6    101  --  103   CO2 24 20*  --  24   * 245*  --  97   BUN 20 20  --  17   CREATININE 0.8 0.9  --  0.7   CALCIUM 8.4* 8.7  --  8.6*   PROT  --   --  5.6*  --    ALBUMIN  --   --  2.3*  --    BILITOT  --   --  1.1*  --    ALKPHOS  --   --  77  --    AST  --   --  334*  --    ALT  --   --  408*  --    ANIONGAP 10 14  --  10   ESTGFRAFRICA >60 >60  --  >60   EGFRNONAA >60 >60  --  >60   , CBC   Recent Labs   Lab 11/09/19  0400 11/09/19  1645 11/10/19  0305   WBC 17.13* 19.99* 20.50*   HGB 8.1* 8.0* 7.6*   HCT 26.0* 24.9* 24.0*   PLT 46* 63* 67*   , INR No results for input(s): INR, PROTIME in the last 48 hours., Lipid Panel No results for input(s): CHOL, HDL, LDLCALC, TRIG, CHOLHDL in the last 48 hours. and Troponin No results for input(s): TROPONINI in the last 48 hours.    Significant Imaging: EKG:

## 2019-11-10 NOTE — PLAN OF CARE
Patient with min assist for bed mobility and min assist x 2 for sit to stand and stand-pivot transfer to chair.

## 2019-11-10 NOTE — PT/OT/SLP PROGRESS
Speech Language Pathology Treatment    Patient Name:  Mateusz Bates   MRN:  [id number]  Admitting Diagnosis: Acute deep vein thrombosis (DVT) of right upper extremity    Recommendations:                 General Recommendations:  Dysphagia  Tx.  Diet recommendations:  Puree, Liquid Diet Level: Nectar Thick   Aspiration Precautions: In place  General Precautions: Standard, aspiration  Communication strategies:  Verbal with cues    Subjective     Pt little more drowsy per sister  Patient goals: Pt did not state goals     Pain/Comfort:  · Pain Rating 1: 0/10    Objective:     Has the patient been evaluated by SLP for swallowing?   Yes  Keep patient NPO? No   Current Respiratory Status: nasal cannula      Pt was given small sips of thin water, thick water and applesauce.  Pt did exhibit delayed throat clearing x2 with thin water.  She was then given several small sips of thickened water(nectar) and applesauce with no difficulties noted(no throat clearing, coughing or changes in vocal quality).  Pt had eyes open but did need some cueing to attend to task at times.  Swallow precautions reviewed with pt and her sister.      Assessment:     Mateusz Bates is a 69 y.o. female with an SLP diagnosis of Dysphagia.  Pt has been recommended for a Pureed consistency diet and Nectar Thick Liquids with swallow precautions which were reviewed/sign placed in room.   S.T. To continue to follow with Modified Barium Swallow study recommended if indicated and as pt is appropriate.       Goals:   Multidisciplinary Problems     SLP Goals        Problem: SLP Goal    Goal Priority Disciplines Outcome   SLP Goal     SLP Ongoing, Progressing   Description:  LTG:  Pt will tolerate least restrictive diet consistency safely and efficiently.    ST. BSA with meal to assess recommended diet consistency of pureed and thin liquids.- Completed 11/10/19 and pt has been recommended for a Pureed consistency diet and Nectar Thick Liquids.  Goal to continue  with further recs as appropriate.   Modified Barium Swallow Study recommended if indicated,             2. Oral Motor x10 with cues             3. Orientation with 90% with min cues.             4. 2 step commands with 90%             5. 2 unit yes/no with 90%  Goal to be reevaluated by:  11/14/19                     Plan:     · Patient to be seen:  5 x/week   · Plan of Care expires:     · Plan of Care reviewed with:  patient, family   · SLP Follow-Up:  Yes       Discharge recommendations:    continued S.T. At discharge  Barriers to Discharge:  n/a    Time Tracking:     SLP Treatment Date:   11/10/19  Speech Start Time:  1143  Speech Stop Time:  1210     Speech Total Time (min):  27 min    Billable Minutes: 27 minutes    Jenifer Tinsley CCC-SLP  11/10/2019

## 2019-11-10 NOTE — ASSESSMENT & PLAN NOTE
-Stable.   -Continue Norvasc.  11/2 -  BB , CCB  11/3 - Controlled  BB  CCB  11/4  Controlled  BB  CCB    11/5  Controlled  BB  CCB    11/6  On pressors  Hold Htn meds for now    11/7  Pressors  Monitor    11/9:  BP elevated this am however was just given PO meds   Added hydralazine PRN     11/10:  BP controlled

## 2019-11-10 NOTE — SUBJECTIVE & OBJECTIVE
Interval History: Denies any issues overnight. States she is tolerating her diet.     Review of Systems   Constitutional: Positive for activity change and appetite change. Negative for unexpected weight change.   HENT: Negative.  Negative for trouble swallowing.    Eyes: Negative.    Respiratory: Negative.  Negative for cough, shortness of breath and wheezing.    Cardiovascular: Negative.  Negative for chest pain.   Gastrointestinal: Negative for abdominal distention, abdominal pain, nausea and vomiting.   Skin: Negative.    Neurological: Positive for weakness. Negative for dizziness.   All other systems reviewed and are negative.    Objective:     Vital Signs (Most Recent):  Temp: 98.3 °F (36.8 °C) (11/10/19 0700)  Pulse: 71 (11/10/19 1000)  Resp: (!) 22 (11/10/19 1000)  BP: (!) 138/51 (11/10/19 1000)  SpO2: 100 % (11/10/19 1000) Vital Signs (24h Range):  Temp:  [97.6 °F (36.4 °C)-98.3 °F (36.8 °C)] 98.3 °F (36.8 °C)  Pulse:  [] 71  Resp:  [19-34] 22  SpO2:  [87 %-100 %] 100 %  BP: (115-168)/() 138/51     Weight: 73.7 kg (162 lb 7.7 oz)  Body mass index is 30.7 kg/m².    Intake/Output Summary (Last 24 hours) at 11/10/2019 1056  Last data filed at 11/10/2019 1000  Gross per 24 hour   Intake 1743.78 ml   Output 2000 ml   Net -256.22 ml      Physical Exam   Constitutional: She is oriented to person, place, and time. She appears well-developed and well-nourished. No distress.   HENT:   Head: Normocephalic and atraumatic.   Cardiovascular: Normal rate, regular rhythm and normal heart sounds. Exam reveals no gallop and no friction rub.   No murmur heard.  Pulmonary/Chest: Effort normal and breath sounds normal. No stridor. No respiratory distress. She has no wheezes. She has no rales.   Abdominal: Soft. Bowel sounds are normal. She exhibits no distension and no mass. There is no tenderness. There is no guarding.   Musculoskeletal: She exhibits no edema.   Erythema and discoloration of right hand noted,  tenderness with movement   Neurological: She is alert and oriented to person, place, and time.   Skin: She is not diaphoretic.       Significant Labs:   Recent Lab Results       11/10/19  0755   11/10/19  0706   11/10/19  0406   11/10/19  0305   11/10/19  0009        Anion Gap       10       aPTT   85.7  Comment:  aPTT therapeutic range = 39-69 seconds   82.1  Comment:  aPTT therapeutic range = 39-69 seconds       Baso #       0.05       Basophil%       0.2       BUN, Bld       17       Calcium       8.6       Chloride       103       CO2       24       Creatinine       0.7       Differential Method       Automated       eGFR if        >60       eGFR if non        >60  Comment:  Calculation used to obtain the estimated glomerular filtration  rate (eGFR) is the CKD-EPI equation.          Eos #       0.3       Eosinophil%       1.5       Glucose       97       Gran # (ANC)       17.0       Gran%       82.7       Hematocrit       24.0       Hemoglobin       7.6       Immature Grans (Abs)       0.31  Comment:  Mild elevation in immature granulocytes is non specific and   can be seen in a variety of conditions including stress response,   acute inflammation, trauma and pregnancy. Correlation with other   laboratory and clinical findings is essential.         Immature Granulocytes       1.5       Lymph #       1.5       Lymph%       7.5       Magnesium       1.9       MCH       28.7       MCHC       31.7       MCV       91       Mono #       1.4       Mono%       6.6       MPV       10.4       nRBC       0       Platelets       67       POCT Glucose 87   83   122     Potassium       3.6       RBC       2.65       RDW       15.6       Sodium       137       WBC       20.50                        11/09/19  2103   11/09/19  2036   11/09/19  1645   11/09/19  1634   11/09/19  1303        Anion Gap               aPTT 77.7  Comment:  aPTT therapeutic range = 39-69 seconds   78.6  Comment:  aPTT  therapeutic range = 39-69 seconds   76.4  Comment:  aPTT therapeutic range = 39-69 seconds     Baso #     0.04         Basophil%     0.2         BUN, Bld               Calcium               Chloride               CO2               Creatinine               Differential Method     Automated         eGFR if                eGFR if non                Eos #     0.1         Eosinophil%     0.4         Glucose               Gran # (ANC)     17.7         Gran%     88.4         Hematocrit     24.9         Hemoglobin     8.0         Immature Grans (Abs)     0.29  Comment:  Mild elevation in immature granulocytes is non specific and   can be seen in a variety of conditions including stress response,   acute inflammation, trauma and pregnancy. Correlation with other   laboratory and clinical findings is essential.           Immature Granulocytes     1.5         Lymph #     0.8         Lymph%     4.2         Magnesium               MCH     28.8         MCHC     32.1         MCV     90         Mono #     1.1         Mono%     5.3         MPV     11.5         nRBC     0         Platelets     63         POCT Glucose   272   308       Potassium               RBC     2.78         RDW     15.6         Sodium               WBC     19.99                          11/09/19  1223        Anion Gap       aPTT       Baso #       Basophil%       BUN, Bld       Calcium       Chloride       CO2       Creatinine       Differential Method       eGFR if        eGFR if non        Eos #       Eosinophil%       Glucose       Gran # (ANC)       Gran%       Hematocrit       Hemoglobin       Immature Grans (Abs)       Immature Granulocytes       Lymph #       Lymph%       Magnesium       MCH       MCHC       MCV       Mono #       Mono%       MPV       nRBC       Platelets       POCT Glucose 302     Potassium       RBC       RDW       Sodium       WBC             Significant Imaging: I have  reviewed all pertinent imaging results/findings within the past 24 hours.

## 2019-11-11 LAB
ALLENS TEST: ABNORMAL
ANION GAP SERPL CALC-SCNC: 11 MMOL/L (ref 8–16)
ANION GAP SERPL CALC-SCNC: 9 MMOL/L (ref 8–16)
ANION GAP SERPL CALC-SCNC: 9 MMOL/L (ref 8–16)
APTT BLDCRRT: 68.3 SEC (ref 21–32)
APTT BLDCRRT: 78.5 SEC (ref 21–32)
APTT BLDCRRT: 78.5 SEC (ref 21–32)
APTT BLDCRRT: 79.3 SEC (ref 21–32)
APTT BLDCRRT: 83.1 SEC (ref 21–32)
APTT BLDCRRT: 83.3 SEC (ref 21–32)
BASOPHILS # BLD AUTO: 0.05 K/UL (ref 0–0.2)
BASOPHILS # BLD AUTO: 0.05 K/UL (ref 0–0.2)
BASOPHILS NFR BLD: 0.3 % (ref 0–1.9)
BASOPHILS NFR BLD: 0.3 % (ref 0–1.9)
BUN SERPL-MCNC: 16 MG/DL (ref 8–23)
BUN SERPL-MCNC: 16 MG/DL (ref 8–23)
BUN SERPL-MCNC: 17 MG/DL (ref 8–23)
CALCIUM SERPL-MCNC: 8.4 MG/DL (ref 8.7–10.5)
CALCIUM SERPL-MCNC: 8.5 MG/DL (ref 8.7–10.5)
CALCIUM SERPL-MCNC: 8.8 MG/DL (ref 8.7–10.5)
CHLORIDE SERPL-SCNC: 100 MMOL/L (ref 95–110)
CHLORIDE SERPL-SCNC: 101 MMOL/L (ref 95–110)
CHLORIDE SERPL-SCNC: 105 MMOL/L (ref 95–110)
CO2 SERPL-SCNC: 20 MMOL/L (ref 23–29)
CO2 SERPL-SCNC: 22 MMOL/L (ref 23–29)
CO2 SERPL-SCNC: 23 MMOL/L (ref 23–29)
CREAT SERPL-MCNC: 0.8 MG/DL (ref 0.5–1.4)
CREAT SERPL-MCNC: 0.8 MG/DL (ref 0.5–1.4)
CREAT SERPL-MCNC: 0.9 MG/DL (ref 0.5–1.4)
DELSYS: ABNORMAL
DIFFERENTIAL METHOD: ABNORMAL
DIFFERENTIAL METHOD: ABNORMAL
EOSINOPHIL # BLD AUTO: 0.3 K/UL (ref 0–0.5)
EOSINOPHIL # BLD AUTO: 0.3 K/UL (ref 0–0.5)
EOSINOPHIL NFR BLD: 1.5 % (ref 0–8)
EOSINOPHIL NFR BLD: 1.6 % (ref 0–8)
ERYTHROCYTE [DISTWIDTH] IN BLOOD BY AUTOMATED COUNT: 16 % (ref 11.5–14.5)
ERYTHROCYTE [DISTWIDTH] IN BLOOD BY AUTOMATED COUNT: 16.1 % (ref 11.5–14.5)
EST. GFR  (AFRICAN AMERICAN): >60 ML/MIN/1.73 M^2
EST. GFR  (NON AFRICAN AMERICAN): >60 ML/MIN/1.73 M^2
FIO2: 28
FLOW: 2
GLUCOSE SERPL-MCNC: 185 MG/DL (ref 70–110)
GLUCOSE SERPL-MCNC: 322 MG/DL (ref 70–110)
GLUCOSE SERPL-MCNC: 430 MG/DL (ref 70–110)
HCO3 UR-SCNC: 20.4 MMOL/L (ref 24–28)
HCT VFR BLD AUTO: 23.1 % (ref 37–48.5)
HCT VFR BLD AUTO: 23.8 % (ref 37–48.5)
HEPARIN INDUCED THROMBOCYTOPENIA: 3.53 OD (ref 0–0.4)
HGB BLD-MCNC: 7.2 G/DL (ref 12–16)
HGB BLD-MCNC: 7.5 G/DL (ref 12–16)
IMM GRANULOCYTES # BLD AUTO: 0.33 K/UL (ref 0–0.04)
IMM GRANULOCYTES # BLD AUTO: 0.42 K/UL (ref 0–0.04)
IMM GRANULOCYTES NFR BLD AUTO: 1.7 % (ref 0–0.5)
IMM GRANULOCYTES NFR BLD AUTO: 2.3 % (ref 0–0.5)
LDH SERPL L TO P-CCNC: 427 U/L (ref 110–260)
LYMPHOCYTES # BLD AUTO: 1.2 K/UL (ref 1–4.8)
LYMPHOCYTES # BLD AUTO: 1.2 K/UL (ref 1–4.8)
LYMPHOCYTES NFR BLD: 6 % (ref 18–48)
LYMPHOCYTES NFR BLD: 6.6 % (ref 18–48)
MAGNESIUM SERPL-MCNC: 1.9 MG/DL (ref 1.6–2.6)
MCH RBC QN AUTO: 29 PG (ref 27–31)
MCH RBC QN AUTO: 29 PG (ref 27–31)
MCHC RBC AUTO-ENTMCNC: 31.2 G/DL (ref 32–36)
MCHC RBC AUTO-ENTMCNC: 31.5 G/DL (ref 32–36)
MCV RBC AUTO: 92 FL (ref 82–98)
MCV RBC AUTO: 93 FL (ref 82–98)
MODE: ABNORMAL
MONOCYTES # BLD AUTO: 1 K/UL (ref 0.3–1)
MONOCYTES # BLD AUTO: 1.3 K/UL (ref 0.3–1)
MONOCYTES NFR BLD: 5.3 % (ref 4–15)
MONOCYTES NFR BLD: 6.6 % (ref 4–15)
NEUTROPHILS # BLD AUTO: 15.3 K/UL (ref 1.8–7.7)
NEUTROPHILS # BLD AUTO: 16 K/UL (ref 1.8–7.7)
NEUTROPHILS NFR BLD: 83.8 % (ref 38–73)
NEUTROPHILS NFR BLD: 84 % (ref 38–73)
NRBC BLD-RTO: 0 /100 WBC
NRBC BLD-RTO: 0 /100 WBC
PCO2 BLDA: 29.5 MMHG (ref 35–45)
PH SMN: 7.45 [PH] (ref 7.35–7.45)
PLATELET # BLD AUTO: 75 K/UL (ref 150–350)
PLATELET # BLD AUTO: 76 K/UL (ref 150–350)
PMV BLD AUTO: 11.4 FL (ref 9.2–12.9)
PMV BLD AUTO: 11.5 FL (ref 9.2–12.9)
PO2 BLDA: 83 MMHG (ref 80–100)
POC BE: -4 MMOL/L
POC SATURATED O2: 97 % (ref 95–100)
POCT GLUCOSE: 173 MG/DL (ref 70–110)
POCT GLUCOSE: 192 MG/DL (ref 70–110)
POCT GLUCOSE: 200 MG/DL (ref 70–110)
POCT GLUCOSE: 249 MG/DL (ref 70–110)
POCT GLUCOSE: 331 MG/DL (ref 70–110)
POCT GLUCOSE: 382 MG/DL (ref 70–110)
POCT GLUCOSE: 386 MG/DL (ref 70–110)
POTASSIUM SERPL-SCNC: 3.8 MMOL/L (ref 3.5–5.1)
POTASSIUM SERPL-SCNC: 4.5 MMOL/L (ref 3.5–5.1)
POTASSIUM SERPL-SCNC: 4.8 MMOL/L (ref 3.5–5.1)
RBC # BLD AUTO: 2.48 M/UL (ref 4–5.4)
RBC # BLD AUTO: 2.59 M/UL (ref 4–5.4)
SAMPLE: ABNORMAL
SITE: ABNORMAL
SODIUM SERPL-SCNC: 131 MMOL/L (ref 136–145)
SODIUM SERPL-SCNC: 132 MMOL/L (ref 136–145)
SODIUM SERPL-SCNC: 137 MMOL/L (ref 136–145)
VANCOMYCIN TROUGH SERPL-MCNC: 14.4 UG/ML (ref 10–22)
WBC # BLD AUTO: 18.2 K/UL (ref 3.9–12.7)
WBC # BLD AUTO: 19.05 K/UL (ref 3.9–12.7)

## 2019-11-11 PROCEDURE — 99900035 HC TECH TIME PER 15 MIN (STAT)

## 2019-11-11 PROCEDURE — 83615 LACTATE (LD) (LDH) ENZYME: CPT

## 2019-11-11 PROCEDURE — 85025 COMPLETE CBC W/AUTO DIFF WBC: CPT | Mod: 91

## 2019-11-11 PROCEDURE — 20000000 HC ICU ROOM

## 2019-11-11 PROCEDURE — 85730 THROMBOPLASTIN TIME PARTIAL: CPT

## 2019-11-11 PROCEDURE — 85730 THROMBOPLASTIN TIME PARTIAL: CPT | Mod: 91

## 2019-11-11 PROCEDURE — 80202 ASSAY OF VANCOMYCIN: CPT

## 2019-11-11 PROCEDURE — 92526 ORAL FUNCTION THERAPY: CPT

## 2019-11-11 PROCEDURE — 27000221 HC OXYGEN, UP TO 24 HOURS

## 2019-11-11 PROCEDURE — S0028 INJECTION, FAMOTIDINE, 20 MG: HCPCS | Performed by: INTERNAL MEDICINE

## 2019-11-11 PROCEDURE — 25000003 PHARM REV CODE 250: Performed by: THORACIC SURGERY (CARDIOTHORACIC VASCULAR SURGERY)

## 2019-11-11 PROCEDURE — 25000242 PHARM REV CODE 250 ALT 637 W/ HCPCS: Performed by: INTERNAL MEDICINE

## 2019-11-11 PROCEDURE — 99233 SBSQ HOSP IP/OBS HIGH 50: CPT | Mod: ,,, | Performed by: INTERNAL MEDICINE

## 2019-11-11 PROCEDURE — 99232 SBSQ HOSP IP/OBS MODERATE 35: CPT | Mod: ,,, | Performed by: INTERNAL MEDICINE

## 2019-11-11 PROCEDURE — 94640 AIRWAY INHALATION TREATMENT: CPT

## 2019-11-11 PROCEDURE — 82803 BLOOD GASES ANY COMBINATION: CPT

## 2019-11-11 PROCEDURE — 25000003 PHARM REV CODE 250: Performed by: FAMILY MEDICINE

## 2019-11-11 PROCEDURE — 83735 ASSAY OF MAGNESIUM: CPT

## 2019-11-11 PROCEDURE — 83010 ASSAY OF HAPTOGLOBIN QUANT: CPT

## 2019-11-11 PROCEDURE — 80048 BASIC METABOLIC PNL TOTAL CA: CPT | Mod: 91

## 2019-11-11 PROCEDURE — 63600175 PHARM REV CODE 636 W HCPCS: Performed by: NURSE PRACTITIONER

## 2019-11-11 PROCEDURE — 99232 PR SUBSEQUENT HOSPITAL CARE,LEVL II: ICD-10-PCS | Mod: ,,, | Performed by: INTERNAL MEDICINE

## 2019-11-11 PROCEDURE — 63600175 PHARM REV CODE 636 W HCPCS: Mod: JG | Performed by: THORACIC SURGERY (CARDIOTHORACIC VASCULAR SURGERY)

## 2019-11-11 PROCEDURE — 82607 VITAMIN B-12: CPT

## 2019-11-11 PROCEDURE — 97530 THERAPEUTIC ACTIVITIES: CPT

## 2019-11-11 PROCEDURE — 97803 MED NUTRITION INDIV SUBSEQ: CPT

## 2019-11-11 PROCEDURE — 63600175 PHARM REV CODE 636 W HCPCS: Performed by: INTERNAL MEDICINE

## 2019-11-11 PROCEDURE — 25000003 PHARM REV CODE 250: Performed by: INTERNAL MEDICINE

## 2019-11-11 PROCEDURE — 36600 WITHDRAWAL OF ARTERIAL BLOOD: CPT

## 2019-11-11 PROCEDURE — 80048 BASIC METABOLIC PNL TOTAL CA: CPT

## 2019-11-11 PROCEDURE — 97110 THERAPEUTIC EXERCISES: CPT

## 2019-11-11 PROCEDURE — 97535 SELF CARE MNGMENT TRAINING: CPT

## 2019-11-11 PROCEDURE — 99233 PR SUBSEQUENT HOSPITAL CARE,LEVL III: ICD-10-PCS | Mod: ,,, | Performed by: INTERNAL MEDICINE

## 2019-11-11 PROCEDURE — 25000003 PHARM REV CODE 250: Performed by: NURSE PRACTITIONER

## 2019-11-11 RX ORDER — CHLORTHALIDONE 25 MG/1
25 TABLET ORAL DAILY
Status: DISCONTINUED | OUTPATIENT
Start: 2019-11-11 | End: 2019-11-20 | Stop reason: HOSPADM

## 2019-11-11 RX ORDER — ARGATROBAN 1 MG/ML
1 INJECTION INTRAVENOUS CONTINUOUS
Status: DISPENSED | OUTPATIENT
Start: 2019-11-11 | End: 2019-11-13

## 2019-11-11 RX ADMIN — NITROGLYCERIN 0.5 INCH: 20 OINTMENT TOPICAL at 11:11

## 2019-11-11 RX ADMIN — CHLORHEXIDINE GLUCONATE 0.12% ORAL RINSE 15 ML: 1.2 LIQUID ORAL at 08:11

## 2019-11-11 RX ADMIN — Medication 2 G: at 11:11

## 2019-11-11 RX ADMIN — POTASSIUM CHLORIDE 20 MEQ: 200 INJECTION, SOLUTION INTRAVENOUS at 05:11

## 2019-11-11 RX ADMIN — POTASSIUM CHLORIDE 10 MEQ: 750 TABLET, EXTENDED RELEASE ORAL at 08:11

## 2019-11-11 RX ADMIN — CHLORTHALIDONE 25 MG: 25 TABLET ORAL at 11:11

## 2019-11-11 RX ADMIN — Medication 2 G: at 06:11

## 2019-11-11 RX ADMIN — PSYLLIUM HUSK 6 G: 6 GRANULE ORAL at 10:11

## 2019-11-11 RX ADMIN — AMLODIPINE BESYLATE 10 MG: 10 TABLET ORAL at 09:11

## 2019-11-11 RX ADMIN — ARGATROBAN 3.5 MCG/KG/MIN: 125 SOLUTION INTRAVENOUS at 04:11

## 2019-11-11 RX ADMIN — Medication 2 G: at 05:11

## 2019-11-11 RX ADMIN — FUROSEMIDE 20 MG: 10 INJECTION, SOLUTION INTRAMUSCULAR; INTRAVENOUS at 09:11

## 2019-11-11 RX ADMIN — Medication 2 G: at 12:11

## 2019-11-11 RX ADMIN — PSYLLIUM HUSK 6 G: 6 GRANULE ORAL at 08:11

## 2019-11-11 RX ADMIN — FERROUS SULFATE TAB EC 325 MG (65 MG FE EQUIVALENT) 325 MG: 325 (65 FE) TABLET DELAYED RESPONSE at 09:11

## 2019-11-11 RX ADMIN — ARGATROBAN 3.5 MCG/KG/MIN: 125 SOLUTION INTRAVENOUS at 01:11

## 2019-11-11 RX ADMIN — POTASSIUM CHLORIDE 10 MEQ: 750 TABLET, EXTENDED RELEASE ORAL at 09:11

## 2019-11-11 RX ADMIN — METOPROLOL TARTRATE 50 MG: 50 TABLET, FILM COATED ORAL at 08:11

## 2019-11-11 RX ADMIN — LOSARTAN POTASSIUM 50 MG: 50 TABLET ORAL at 09:11

## 2019-11-11 RX ADMIN — INSULIN ASPART 2 UNITS: 100 INJECTION, SOLUTION INTRAVENOUS; SUBCUTANEOUS at 09:11

## 2019-11-11 RX ADMIN — FAMOTIDINE 20 MG: 10 INJECTION INTRAVENOUS at 09:11

## 2019-11-11 RX ADMIN — ARGATROBAN 3.5 MCG/KG/MIN: 125 SOLUTION INTRAVENOUS at 02:11

## 2019-11-11 RX ADMIN — METOPROLOL TARTRATE 50 MG: 50 TABLET, FILM COATED ORAL at 09:11

## 2019-11-11 RX ADMIN — ALBUTEROL SULFATE 2.5 MG: 2.5 SOLUTION RESPIRATORY (INHALATION) at 03:11

## 2019-11-11 RX ADMIN — INSULIN ASPART 10 UNITS: 100 INJECTION, SOLUTION INTRAVENOUS; SUBCUTANEOUS at 12:11

## 2019-11-11 RX ADMIN — MAGNESIUM SULFATE HEPTAHYDRATE 2 G: 40 INJECTION, SOLUTION INTRAVENOUS at 02:11

## 2019-11-11 RX ADMIN — FAMOTIDINE 20 MG: 10 INJECTION INTRAVENOUS at 08:11

## 2019-11-11 RX ADMIN — ALBUTEROL SULFATE 2.5 MG: 2.5 SOLUTION RESPIRATORY (INHALATION) at 02:11

## 2019-11-11 RX ADMIN — INSULIN ASPART 8 UNITS: 100 INJECTION, SOLUTION INTRAVENOUS; SUBCUTANEOUS at 04:11

## 2019-11-11 RX ADMIN — NITROGLYCERIN 0.5 INCH: 20 OINTMENT TOPICAL at 08:11

## 2019-11-11 RX ADMIN — INSULIN ASPART 2 UNITS: 100 INJECTION, SOLUTION INTRAVENOUS; SUBCUTANEOUS at 12:11

## 2019-11-11 RX ADMIN — ARGATROBAN 3.5 MCG/KG/MIN: 125 SOLUTION INTRAVENOUS at 09:11

## 2019-11-11 RX ADMIN — PIPERACILLIN AND TAZOBACTAM 4.5 G: 4; .5 INJECTION, POWDER, FOR SOLUTION INTRAVENOUS at 05:11

## 2019-11-11 RX ADMIN — FERROUS SULFATE TAB EC 325 MG (65 MG FE EQUIVALENT) 325 MG: 325 (65 FE) TABLET DELAYED RESPONSE at 08:11

## 2019-11-11 RX ADMIN — INSULIN ASPART 1 UNITS: 100 INJECTION, SOLUTION INTRAVENOUS; SUBCUTANEOUS at 04:11

## 2019-11-11 RX ADMIN — FUROSEMIDE 20 MG: 10 INJECTION, SOLUTION INTRAMUSCULAR; INTRAVENOUS at 06:11

## 2019-11-11 RX ADMIN — CHLORHEXIDINE GLUCONATE 0.12% ORAL RINSE 15 ML: 1.2 LIQUID ORAL at 09:11

## 2019-11-11 NOTE — ASSESSMENT & PLAN NOTE
November 11, 2019  --Platelet count 75, improving. Transfuse if < 10   --No S&S active bleed. Hemoglobin remains stable. Hg 7.5. No urgent need for blood transfusion. Monitor  --suspect HIT versus TTP  -Awaiting results of Sahra HIT test and [id number]  --Continue Argatroban gtt  --avoid heparin containing products.  --Check Vitamin B 12 level to r/o B 12 deficiency as etiology of decreased mental status  --Check LDH/Haptologlobin  to rule out intravascular hemolysis  --Supportive care

## 2019-11-11 NOTE — SUBJECTIVE & OBJECTIVE
Interval History: NO ACUTE ISSUES NOTED O/N.     Review of Systems   Constitution: Positive for malaise/fatigue.   HENT: Negative for hearing loss and hoarse voice.    Eyes: Negative for blurred vision and visual disturbance.   Cardiovascular: Negative for chest pain, claudication, dyspnea on exertion, irregular heartbeat, leg swelling, near-syncope, orthopnea, palpitations, paroxysmal nocturnal dyspnea and syncope.   Respiratory: Negative for cough, hemoptysis, shortness of breath, sleep disturbances due to breathing, snoring and wheezing.    Endocrine: Negative for cold intolerance and heat intolerance.   Hematologic/Lymphatic: Does not bruise/bleed easily.   Skin: Negative for color change, dry skin and nail changes.   Musculoskeletal: Positive for arthritis and back pain. Negative for joint pain and myalgias.        RUE SWELLING   Gastrointestinal: Negative for bloating, abdominal pain, constipation, nausea and vomiting.   Genitourinary: Negative for dysuria, flank pain, hematuria and hesitancy.   Neurological: Negative for headaches, light-headedness, loss of balance, numbness, paresthesias and weakness.   Psychiatric/Behavioral: Negative for altered mental status.   Allergic/Immunologic: Negative for environmental allergies.     Objective:     Vital Signs (Most Recent):  Temp: 98.2 °F (36.8 °C) (11/11/19 1115)  Pulse: 68 (11/11/19 1200)  Resp: (!) 22 (11/11/19 1200)  BP: (!) 127/58 (11/11/19 1200)  SpO2: (!) 86 % (11/11/19 1200) Vital Signs (24h Range):  Temp:  [97.4 °F (36.3 °C)-98.2 °F (36.8 °C)] 98.2 °F (36.8 °C)  Pulse:  [67-91] 68  Resp:  [18-31] 22  SpO2:  [86 %-100 %] 86 %  BP: (106-167)/(39-90) 127/58     Weight: 74.5 kg (164 lb 3.9 oz)  Body mass index is 31.03 kg/m².     SpO2: (!) 86 %  O2 Device (Oxygen Therapy): room air      Intake/Output Summary (Last 24 hours) at 11/11/2019 1344  Last data filed at 11/11/2019 0600  Gross per 24 hour   Intake 1027.51 ml   Output 1190 ml   Net -162.49 ml        Lines/Drains/Airways     Peripherally Inserted Central Catheter Line                 PICC Double Lumen 11/07/19 1645 left brachial;left basilic 3 days                Physical Exam   Constitutional: She is oriented to person, place, and time. She appears well-developed and well-nourished. No distress.   HENT:   Head: Normocephalic and atraumatic.   Eyes: Pupils are equal, round, and reactive to light.   Neck: Normal range of motion and full passive range of motion without pain. Neck supple. No JVD present.   Cardiovascular: Normal rate, regular rhythm, S1 normal, S2 normal and intact distal pulses. PMI is not displaced. Exam reveals no distant heart sounds.   No murmur heard.  Pulses:       Radial pulses are 2+ on the right side, and 2+ on the left side.        Dorsalis pedis pulses are 2+ on the right side, and 2+ on the left side.   CABG INCISION HEALING WELL   Pulmonary/Chest: Effort normal and breath sounds normal. No accessory muscle usage. No respiratory distress. She has no decreased breath sounds. She has no wheezes. She has no rales.   Abdominal: Soft. Bowel sounds are normal. She exhibits no distension. There is no tenderness.   Musculoskeletal: Normal range of motion. She exhibits no edema.        Right ankle: She exhibits no swelling.        Left ankle: She exhibits no swelling.   Neurological: She is alert and oriented to person, place, and time.   Skin: Skin is warm and dry. She is not diaphoretic. No cyanosis. Nails show no clubbing.   Psychiatric: She has a normal mood and affect. Her speech is normal and behavior is normal. Judgment and thought content normal. Cognition and memory are normal.   Nursing note and vitals reviewed.      Significant Labs:   BMP:   Recent Labs   Lab 11/10/19  0305 11/11/19  0400 11/11/19  0759 11/11/19  1144   GLU 97 185*  --  430*    137  --  132*   K 3.6 3.8  --  4.8    105  --  101   CO2 24 23  --  20*   BUN 17 16  --  17   CREATININE 0.7 0.8  --  0.9    CALCIUM 8.6* 8.5*  --  8.8   MG 1.9  --  1.9  --    , CBC   Recent Labs   Lab 11/10/19  0305 11/10/19  1631 11/11/19  0400   WBC 20.50* 20.48* 19.05*   HGB 7.6* 7.7* 7.5*   HCT 24.0* 24.4* 23.8*   PLT 67* 60* 75*    and All pertinent lab results from the last 24 hours have been reviewed.    Significant Imaging: Echocardiogram:   2D echo with color flow doppler:   Results for orders placed or performed during the hospital encounter of 10/28/19   2D echo with color flow doppler   Result Value Ref Range    QEF 30 (A) 55 - 65    Diastolic Dysfunction Yes (A)     Est. PA Systolic Pressure 33.03     Narrative    Date of Procedure: 11/06/2019        TEST DESCRIPTION   Technical Quality: This is a technically challenging study.     General: A catheter is present in the right-sided cardiac chambers.     Aorta: The aortic root is normal in size, measuring 3.7 cm at sinotubular junction and 3.8 cm at Sinuses of Valsalva. The proximal ascending aorta is normal in size, measuring 3.7 cm across.     Left Atrium: The left atrial volume index is normal, measuring 19.22 cc/m2.     Left Ventricle: The left ventricle is normal in size, with an end-diastolic diameter of 3.0 cm, and an end-systolic diameter of 2.5 cm. LV wall thickness is normal, with the septum measuring 2.1 cm and the posterior wall measuring 1.4 cm across. Relative   wall thickness was increased at 0.93, and the LV mass index was increased at 140.2 g/m2 consistent with concentric left ventricular hypertrophy. The following segments were mildly hypokinetic: apical lateral wall.  The following segments were severely hypokinetic: mid anteroseptum, basal anteroseptum, apical septum, mid inferoseptum, basal inferoseptum, basal inferior wall.  Left ventricular systolic function appears moderately depressed. Visually estimated ejection fraction is 30-35%. The LV Doppler derived stroke volume equals 54.0 ccs.     Diastolic indices: E wave velocity 0.9 m/s, E/A ratio 0.8,   msec., E/e' ratio(avg) 14. There is diastolic dysfunction secondary to relaxation abnormality.     Right Atrium: The right atrium is normal in size, measuring 3.5 cm in length and 2.1 cm in width in the apical view.     Right Ventricle: The right ventricle is normal in size. Global right ventricular systolic function appears normal. Tricuspid annular plane systolic excursion (TAPSE) is 1.3 cm. The estimated PA systolic pressure is 33 mmHg.     Aortic Valve:  The aortic valve is normal in structure. The aortic valve is tri-leaflet in structure. The mean gradient obtained across the aortic valve is 9 mmHg.     Mitral Valve:  The mitral valve is normal in structure. The pressure half time is 53 msec. The calculated mitral valve area is 4.15 cm2.     Tricuspid Valve:  The tricuspid valve is normal in structure.     Pulmonary Valve:  The pulmonic valve is not well seen.     IVC: IVC is normal in size and collapses > 50% with a sniff, suggesting normal right atrial pressure of 3 mmHg.     Intracavitary: There is no evidence of pericardial effusion, intracavity mass, thrombi, or vegetation.         CONCLUSIONS     1 - Concentric hypertrophy.     2 - Wall motion abnormalities.     3 - Moderately depressed left ventricular systolic function (EF 30-35%).     4 - Impaired LV relaxation, normal LAP (grade 1 diastolic dysfunction).     5 - Normal right ventricular systolic function .     6 - The estimated PA systolic pressure is 33 mmHg.             This document has been electronically    SIGNED BY: Charles Matias MD On: 11/06/2019 18:28

## 2019-11-11 NOTE — PT/OT/SLP PROGRESS
Speech Language Pathology Treatment    Patient Name:  Mateusz Bates   MRN:  [id number]  Admitting Diagnosis: Acute deep vein thrombosis (DVT) of right upper extremity    Recommendations:                 General Recommendations:  Dysphagia therapy  Diet recommendations:  Puree, Liquid Diet Level: Nectar Thick   Aspiration Precautions: 1 bite/sip at a time, Alternating bites/sips, HOB to 90 degrees and Small bites/sips   General Precautions: Standard, aspiration, fall  Communication strategies:  none    Subjective     Pt alert but confused.  Patient goals: to eat whole foods    Pain/Comfort:  · Pain Rating 1: 0/10  · Pain Rating Post-Intervention 1: 0/10    Objective:     Has the patient been evaluated by SLP for swallowing?   Yes  Keep patient NPO?     Current Respiratory Status: nasal cannula      Pt oriented to person and place but not situation or time. She took sips of thin liquids with delayed swallow noted and and difficulty coordination respiration and swallow. PO trials of cracker were followed by cough x1/2. Recommend she remain on puree with nectar at this time with ongoing assessment. She completed lingual ex x 10 reps with max cues due to inattention to task.    Assessment:     Mateusz Bates is a 69 y.o. female with an SLP diagnosis of Dysphagia.  She presents with impaired swallow and risk of aspiration with thin liquids and solids.    Goals:   Multidisciplinary Problems     SLP Goals        Problem: SLP Goal    Goal Priority Disciplines Outcome   SLP Goal     SLP Ongoing, Progressing   Description:  LTG:  Pt will tolerate least restrictive diet consistency safely and efficiently.    ST. BSA with meal to assess recommended diet consistency of pureed and thin liquids.- Completed 11/10/19 and pt has been recommended for a Pureed consistency diet and Nectar Thick Liquids.  Goal to continue with further recs as appropriate.   Modified Barium Swallow Study recommended if indicated,             2. Oral Motor  x10 with cues             3. Orientation with 90% with min cues.             4. 2 step commands with 90%             5. 2 unit yes/no with 90%  Goal to be reevaluated by:  11/14/19                     Plan:     · Patient to be seen:  3 x/week   · Plan of Care expires:     · Plan of Care reviewed with:  patient   · SLP Follow-Up:  Yes       Discharge recommendations:      Barriers to Discharge:  None    Time Tracking:     SLP Treatment Date:   11/11/19  Speech Start Time:  1028  Speech Stop Time:  1043     Speech Total Time (min):  15 min    Billable Minutes: swallow treatment 15      Anitha Casas CCC-SLP  11/11/2019

## 2019-11-11 NOTE — ASSESSMENT & PLAN NOTE
Post CABG  EF 30%  S/p swan catheter  CI 3.3  Repeat echo today showed EF 30%, septal hypokinesis , compared to prior echo before the cabg, no significant change  ekg reviewed    11/10/2019  Extubated  Off pressors  Continue Amlodipine and BB/Losartan  Hold Lipitor due to elevated LFT  Continue anticoagulation due to acute DVT  Continue ICU supportive care and ABx    11/11  -Continue BB, ARB, Norvasc  -BP much improved   -Continue supportive care per CT surgery

## 2019-11-11 NOTE — PROGRESS NOTES
[facility]  Cardiology  Progress Note    Patient Name: Mateusz Bates  MRN: [id number]  Admission Date: 10/28/2019  Hospital Length of Stay: 14 days  Code Status: Full Code   Attending Physician: Keny Zaidi MD   Primary Care Physician: Serenity Kilpatrick MD  Expected Discharge Date: 11/13/2019  Principal Problem:Acute deep vein thrombosis (DVT) of right upper extremity    Subjective:   HPI    Ms. Bates is a 69 year old female patient whose current medical conditions include HTN, hyperlipidemia, obesity, DM type II, cerebral microvascular disease, FAHAD, and B12 deficiency who was sent to [facility] ED today from her PCP's office due to chest pain and abnormal EKG. Patient complained of substernal chest tightness/heaviness that radiated to her back on Saturday while at rest. Associated symptoms included orthopnea and SOB. Patient denied any associated nausea, vomiting, diaphoresis, palpitations, near syncope, or syncope. Initial workup in ED revealed troponin of 3.665 and BNP of 1305 and patient subsequently admitted for further evaluation and treatment of NSTEMI. Cardiology consulted to assist with management. Patient seen and examined today while in ED. Feels well at time of exam, denies chest pain. She does report similar symptoms over the past few months but states they never lasted as long and were less intense. She reports compliance with her medications. States her father had history of CABG. Chart reviewed. Repeat troponin pending. EKG reviewed and shows SR with new LBBB. 2D echo pending.      Hospital Course:   Ms. Bates is a 69 year old female patient whose current medical conditions include HTN, hyperlipidemia, obesity, DM type II, cerebral microvascular disease, FAHAD, and B12 deficiency who was sent to [facility] ED today from her PCP's office due to chest pain and abnormal EKG. Patient complained of substernal chest tightness/heaviness that radiated to her back on Saturday while at rest.  Associated symptoms included orthopnea and SOB. Patient denied any associated nausea, vomiting, diaphoresis, palpitations, near syncope, or syncope. Initial workup in ED revealed troponin of 3.665 and BNP of 1305 and patient subsequently admitted for further evaluation and treatment of NSTEMI. Cardiology consulted to assist with management. Patient seen and examined today while in ED. Feels well at time of exam, denies chest pain. She does report similar symptoms over the past few months but states they never lasted as long and were less intense. She reports compliance with her medications. States her father had history of CABG. Chart reviewed. Repeat troponin pending. EKG reviewed and shows SR with new LBBB. 2D echo pending.    10/29/19-Patient seen and examined today, s/p LHC yesterday that showed multivessel CAD. CVT consulted, CABG planned for Thursday. Feels ok. No chest pain or SOB. Labs stable. Being diuresed. 2D echo showed EF of 30-35%, DD.    10/30/19-Patient seen and examined today. Feels well. No complaints. Denies chest pain or SOB. Labs stable. IABP placement scheduled for today.    10/31/19- Patient is s/p CABG x3 with LIMA to LAD reverse saphenous vein graft to the RCA and reverse saphenous vein graft to the ramus. Has been transferred to ICU. Is intubated and sedated. CT x3. IABP at 1:1 still in place. Transfused 3 units of PRBC in the OR and has received additional 3 units in ICU, has received 500cc albumin, epi and milrinone gtt.  H/H 6.9/21.0. Renal function stable. Paced rhythm on monitor     11/1/2019--patient seen and examined in ICU. Extubated o/n, remains on IABP 1:1 today, plans to possibly wean off later today per Dr. Zaidi. Remains on Epi and Milrinone gtt today. Paced on monitor. Labs stable today.     11/2/19-Patient seen and examined today, lying in bed. Feels ok. Complains of post-op pain and fatigue. Labs reviewed. Platelets 47,000. Heme/onc on board.     11/3/19-Patient seen and  examined today, sitting up in chair. Feeling better. Still weak. Labs reviewed. Platelets 89,000.    11/4/19- Patient is POD 4 s/p CABG x3. OOB in chair. Feels better this morning. Pain controlled. Platelets improving since holding Plavix.Vitals stable     11/5/19- Patient is POD 5 s/p CABGx3. Has been transferred to [facility]. Is clammy and generally weak this morning. Glucose and vitals stable. Noted to have bump in WBC. Primary team has been notified by nurse. Platelets 73, but H/H stable     11/6 in ICU, intubated, on epi gtt at 0.15 mcg CI 2.0, PAP 20. Sinus rhythm, H&H and Cr stable. Urine output ok. Improved lactate 1.6. const stent cardiogenic shock    11/7 INTUBATED, WEAN OFF EPI AND CONTINUE AMILIRONE, ON SINU RHYTHM AND CI 3.3, Cr STABLE AND DECENT URINE OUTPUT.     11/8 extubated, off pressors, up to the chair.diffuse DVT on upper extremities, no chest pain,pt is weak, Cr normal    11/9/weak, mild reps. Distress, good urine output. BP high.  RUE venous thrombus/DVT in deep and superficial arm veins. H&H stable Cr normal.      11/10 weakness H&H 7.6/24, BP and HR wnl., good urine output. No chest pain. Right arm swelling and ecchymosis. Pulses ok    11/11/19--Patient seen and examined in ICU, sitting on side of bed. Feels ok today. Continued right arm swelling and ecchymosis.     Interval History: NO ACUTE ISSUES NOTED O/N.     Review of Systems   Constitution: Positive for malaise/fatigue.   HENT: Negative for hearing loss and hoarse voice.    Eyes: Negative for blurred vision and visual disturbance.   Cardiovascular: Negative for chest pain, claudication, dyspnea on exertion, irregular heartbeat, leg swelling, near-syncope, orthopnea, palpitations, paroxysmal nocturnal dyspnea and syncope.   Respiratory: Negative for cough, hemoptysis, shortness of breath, sleep disturbances due to breathing, snoring and wheezing.    Endocrine: Negative for cold intolerance and heat intolerance.   Hematologic/Lymphatic: Does  not bruise/bleed easily.   Skin: Negative for color change, dry skin and nail changes.   Musculoskeletal: Positive for arthritis and back pain. Negative for joint pain and myalgias.        RUE SWELLING   Gastrointestinal: Negative for bloating, abdominal pain, constipation, nausea and vomiting.   Genitourinary: Negative for dysuria, flank pain, hematuria and hesitancy.   Neurological: Negative for headaches, light-headedness, loss of balance, numbness, paresthesias and weakness.   Psychiatric/Behavioral: Negative for altered mental status.   Allergic/Immunologic: Negative for environmental allergies.     Objective:     Vital Signs (Most Recent):  Temp: 98.2 °F (36.8 °C) (11/11/19 1115)  Pulse: 68 (11/11/19 1200)  Resp: (!) 22 (11/11/19 1200)  BP: (!) 127/58 (11/11/19 1200)  SpO2: (!) 86 % (11/11/19 1200) Vital Signs (24h Range):  Temp:  [97.4 °F (36.3 °C)-98.2 °F (36.8 °C)] 98.2 °F (36.8 °C)  Pulse:  [67-91] 68  Resp:  [18-31] 22  SpO2:  [86 %-100 %] 86 %  BP: (106-167)/(39-90) 127/58     Weight: 74.5 kg (164 lb 3.9 oz)  Body mass index is 31.03 kg/m².     SpO2: (!) 86 %  O2 Device (Oxygen Therapy): room air      Intake/Output Summary (Last 24 hours) at 11/11/2019 1344  Last data filed at 11/11/2019 0600  Gross per 24 hour   Intake 1027.51 ml   Output 1190 ml   Net -162.49 ml       Lines/Drains/Airways     Peripherally Inserted Central Catheter Line                 PICC Double Lumen 11/07/19 1645 left brachial;left basilic 3 days                Physical Exam   Constitutional: She is oriented to person, place, and time. She appears well-developed and well-nourished. No distress.   HENT:   Head: Normocephalic and atraumatic.   Eyes: Pupils are equal, round, and reactive to light.   Neck: Normal range of motion and full passive range of motion without pain. Neck supple. No JVD present.   Cardiovascular: Normal rate, regular rhythm, S1 normal, S2 normal and intact distal pulses. PMI is not displaced. Exam reveals no  distant heart sounds.   No murmur heard.  Pulses:       Radial pulses are 2+ on the right side, and 2+ on the left side.        Dorsalis pedis pulses are 2+ on the right side, and 2+ on the left side.   CABG INCISION HEALING WELL   Pulmonary/Chest: Effort normal and breath sounds normal. No accessory muscle usage. No respiratory distress. She has no decreased breath sounds. She has no wheezes. She has no rales.   Abdominal: Soft. Bowel sounds are normal. She exhibits no distension. There is no tenderness.   Musculoskeletal: Normal range of motion. She exhibits no edema.        Right ankle: She exhibits no swelling.        Left ankle: She exhibits no swelling.   Neurological: She is alert and oriented to person, place, and time.   Skin: Skin is warm and dry. She is not diaphoretic. No cyanosis. Nails show no clubbing.   Psychiatric: She has a normal mood and affect. Her speech is normal and behavior is normal. Judgment and thought content normal. Cognition and memory are normal.   Nursing note and vitals reviewed.      Significant Labs:   BMP:   Recent Labs   Lab 11/10/19  0305 11/11/19  0400 11/11/19  0759 11/11/19  1144   GLU 97 185*  --  430*    137  --  132*   K 3.6 3.8  --  4.8    105  --  101   CO2 24 23  --  20*   BUN 17 16  --  17   CREATININE 0.7 0.8  --  0.9   CALCIUM 8.6* 8.5*  --  8.8   MG 1.9  --  1.9  --    , CBC   Recent Labs   Lab 11/10/19  0305 11/10/19  1631 11/11/19  0400   WBC 20.50* 20.48* 19.05*   HGB 7.6* 7.7* 7.5*   HCT 24.0* 24.4* 23.8*   PLT 67* 60* 75*    and All pertinent lab results from the last 24 hours have been reviewed.    Significant Imaging: Echocardiogram:   2D echo with color flow doppler:   Results for orders placed or performed during the hospital encounter of 10/28/19   2D echo with color flow doppler   Result Value Ref Range    QEF 30 (A) 55 - 65    Diastolic Dysfunction Yes (A)     Est. PA Systolic Pressure 33.03     Narrative    Date of Procedure:  11/06/2019        TEST DESCRIPTION   Technical Quality: This is a technically challenging study.     General: A catheter is present in the right-sided cardiac chambers.     Aorta: The aortic root is normal in size, measuring 3.7 cm at sinotubular junction and 3.8 cm at Sinuses of Valsalva. The proximal ascending aorta is normal in size, measuring 3.7 cm across.     Left Atrium: The left atrial volume index is normal, measuring 19.22 cc/m2.     Left Ventricle: The left ventricle is normal in size, with an end-diastolic diameter of 3.0 cm, and an end-systolic diameter of 2.5 cm. LV wall thickness is normal, with the septum measuring 2.1 cm and the posterior wall measuring 1.4 cm across. Relative   wall thickness was increased at 0.93, and the LV mass index was increased at 140.2 g/m2 consistent with concentric left ventricular hypertrophy. The following segments were mildly hypokinetic: apical lateral wall.  The following segments were severely hypokinetic: mid anteroseptum, basal anteroseptum, apical septum, mid inferoseptum, basal inferoseptum, basal inferior wall.  Left ventricular systolic function appears moderately depressed. Visually estimated ejection fraction is 30-35%. The LV Doppler derived stroke volume equals 54.0 ccs.     Diastolic indices: E wave velocity 0.9 m/s, E/A ratio 0.8,  msec., E/e' ratio(avg) 14. There is diastolic dysfunction secondary to relaxation abnormality.     Right Atrium: The right atrium is normal in size, measuring 3.5 cm in length and 2.1 cm in width in the apical view.     Right Ventricle: The right ventricle is normal in size. Global right ventricular systolic function appears normal. Tricuspid annular plane systolic excursion (TAPSE) is 1.3 cm. The estimated PA systolic pressure is 33 mmHg.     Aortic Valve:  The aortic valve is normal in structure. The aortic valve is tri-leaflet in structure. The mean gradient obtained across the aortic valve is 9 mmHg.     Mitral Valve:  " The mitral valve is normal in structure. The pressure half time is 53 msec. The calculated mitral valve area is 4.15 cm2.     Tricuspid Valve:  The tricuspid valve is normal in structure.     Pulmonary Valve:  The pulmonic valve is not well seen.     IVC: IVC is normal in size and collapses > 50% with a sniff, suggesting normal right atrial pressure of 3 mmHg.     Intracavitary: There is no evidence of pericardial effusion, intracavity mass, thrombi, or vegetation.         CONCLUSIONS     1 - Concentric hypertrophy.     2 - Wall motion abnormalities.     3 - Moderately depressed left ventricular systolic function (EF 30-35%).     4 - Impaired LV relaxation, normal LAP (grade 1 diastolic dysfunction).     5 - Normal right ventricular systolic function .     6 - The estimated PA systolic pressure is 33 mmHg.             This document has been electronically    SIGNED BY: Charles Matias MD On: 11/06/2019 18:28     Assessment and Plan:       Acute deep vein thrombosis (DVT) of non-extremity vein Right IJ  -Continue Argatroban infusion  -RUE continues to be swollen   -Right hand remains ecchymotic   -Mgmt per CT surgery  Per Vascular Surgery:  "Hypercoagulable/HIT workup in progress, pt started on argatroban  Regarding RUE/central venous thrombus: Unfortunately, given location of clot and her recent major cardiac surgery any attempt at thrombolysis with tPA would be absolutely contraindicated  Open thrombectomy also not option as central clot could not be adequately cleared to allow for any outflow/drainage from the arm     Recommend cont anticoagulation, arm elevation, mild/moderate compression"    Hyponatremia  Improved Na level    Cardiogenic shock  Post CABG  EF 30%  S/p swan catheter  CI 3.3  Repeat echo today showed EF 30%, septal hypokinesis , compared to prior echo before the cabg, no significant change  ekg reviewed    11/10/2019  Extubated  Off pressors  Continue Amlodipine and BB/Losartan  Hold Lipitor due to " elevated LFT  Continue anticoagulation due to acute DVT  Continue ICU supportive care and ABx    11/11  -Continue BB, ARB, Norvasc  -BP much improved   -Continue supportive care per CT surgery          Thrombocytopenia  -Mgmt as per heme/onc  -Plavix was on hold, but resumed by primary team   -ASA has been resumed     11/5/19  -consider holding Plavix  -Hematology has been consulted     S/P CABG x 3  -Patient is s/p CABG x3 with LIMA to LAD reverse saphenous vein graft to the RCA and reverse saphenous vein graft to the ramus.  -Continue post CABG supportive therapy  -Continue ASA, Plavix, Statin, BB  -Will continue to follow along     11/1/19  -POD #1 s/p CABG x3  -Continue ASA, Statin, BB  -Remains on Epi and Milrinone gtt today  -Mgmt per CT surgery  -Will follow along    11/2/19  -Recovering s/p CABG x 3  -Continue statin, BB  -Platelets 47,000 this AM; heme/onc on board, ASA held  -Await further rec's    11/3/19  -Stable, progressing post CABG x 3  -Continue statin, BB  -ASA re-started  -Platelets 89,000, heme/onc on board    11/4/19  -Patient slowly improving post CABG x3  -Continue ASA, Statin and BB  -plavix on hold due to thrombocytopenia that is improving   -IS use encouraged and early ambulation with PT  -Will continue to follow along     11/5/19  -Will continue ASA, Statin and BB  -Consider holding Plavix again for drop in plts after resuming by primary team   -Recommend CXR to rule out post op PNA given bump in WBC and cough  -will need to monitor for temp  -ambulate as tolerated and encourage IS use     11/06  Hold ASA, Plavix due to anemia  Hold BB due to cardiogenic shock    11/11/19  -Continue Argatroban infusion  -Continue BB, ARB, IV lasix, Chlorthalidone, Norvasc, NTG paste  -Hold ASA, Plavix due to anemia  -Further mgmt per CT surgery    CAD, multiple vessel  -See plan under CABG    Acute combined systolic and diastolic CHF, NYHA class 2  -Presents with ICM/acute combined CHF  -EF 30-35% by 2D  echo        Chest pain  -See plan under NSTEMI    Abnormal EKG  -LBBB noted on EKG  -See plan under NSTEMI    NSTEMI (non-ST elevated myocardial infarction)  -Patient presented with intermittent chest tightness/heaviness that radiated to her back since Saturday evening  -Associated with JONES/orthopnea  -Initial troponin 3.665  -EKG showed SR with new LBBB  -Presentation consistent with NSTEMI  -Continue ASA, statin  -Start Toprol XL  -Start heparin gtt  -Check 2D echo  -Continue to trend troponin  -Keep NPO. [facility] today for further evaluation and PCI if indicated. All risks, benefits, and treatment alternatives explained to patient in detail. All questions answered. She has agreed to proceed. Further rec's to follow.    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 All risks, benefits, and treatment alternatives explained to patient in detail. All questions answered. She has agreed to proceed.    10/31/19  -Patient is s/p CABG x3.   See plan for CABG     Hyperlipidemia associated with type 2 diabetes mellitus  -Continue statin    Obesity (BMI 30.0-34.9)  -Weight loss    Diabetes mellitus type II, uncontrolled  -Mgmt as per hospital medicine    Hypertension associated with diabetes  -Continue home meds as BP permits        VTE Risk Mitigation (From admission, onward)         Ordered     argatroban 125 mg in sodium chloride 0.9% 125 mL infusion (conc: 1 mg/mL)  Continuous      11/11/19 0128     Reason for no Mechanical VTE Prophylaxis  Once      10/28/19 1414     heparin 25,000 units in dextrose 5% 250 mL (100 units/mL) infusion LOW INTENSITY nomogram - [facility]  Continuous       10/28/19 1257                LIT Faulkner-C  Cardiology  [facility]

## 2019-11-11 NOTE — PLAN OF CARE
POC reviewed with pt.  Disoriented x2; pt was able to state name and place. Alert; WERNER.  R arm swollen; R hand purple/ blue with blisters; black at thumb and index finger.  Abdomen soft, nondistended. Jones in place draining clear yellow urine.  BM x2.  Argatroban infusing at 15.5 ml/hr; PTT drawn Q4H.  Call bell in reach.  Bed locked and in lowest position. SR upx3.

## 2019-11-11 NOTE — PROGRESS NOTES
"  [facility] -   Adult Nutrition  Progress Note    SUMMARY     Recommendations    Recommendation: 1. Add cardiac restriction to current diet order. 2. Continue ONS. 3. RD to f/u.    Goals: Initiate nutrition within 48-72 hrs   Nutrition Goal Status: goal met   Communication of RD Recs: (POC, sticky note)    Reason for Assessment    Reason For Assessment: RD f/u   Diagnosis: (CAD)  Relevant Medical History: DM2, HTN, HLD, GERD, anxiety  Interdisciplinary Rounds: attended   General Information Comments:  11.4.19. S/p CABG x 3. Remains in ICU. Currently on diabetic cardiac diet + ONS.  Pt reports fair appetite today, PO intake 75 %. Pt reports good intake PTA & no wt loss. Per epic records, pt weighed 153 lbs on 9/26/19, current wt = 166 lbs (no recent wt loss). NFPE not performed d/t pt w/ no signs of malnutrition. RD provided cardiac and diabetic diet education w/ handouts from the Nutrition Care Manual. RD contact info provided.   11.6.19. RD received consult for "respiratory failure". Pt in ICU. Now intubated & sedated w/ pressor support.   11.11.19. Now extubated. Remains in ICU. Confused. Currently on dysphagia pureed consistent carbohydrate diet + ONS w/ good intake.   Nutrition Discharge Planning: diabetic cardiac diet w/ pureed consistency per ST    Nutrition Risk Screen    Nutrition Risk Screen: no indicators present    Nutrition/Diet History    Spiritual, Cultural Beliefs, Congregation Practices, Values that Affect Care: no  Food Allergies: NKFA    Anthropometrics    Temp: 97.1 °F (36.2 °C)  Height Method: Measured(offcie 9/26/19)  Height: 5' 1" (154.9 cm)  Height (inches): 61 in  Weight Method: Standard Scale(10/29/19)  Weight: 75.3 kg (166 lb 0.1 oz)  Weight (lb): 166.01 lb  Ideal Body Weight (IBW), Female: 105 lb  % Ideal Body Weight, Female (lb): 158.1 lb  BMI (Calculated): 31.4  BMI Grade: 30 - 34.9- obesity - grade I         Lab/Procedures/Meds    Pertinent Labs Reviewed: reviewed  BMP  Lab " Results   Component Value Date     11/11/2019    K 3.8 11/11/2019     11/11/2019    CO2 23 11/11/2019    BUN 16 11/11/2019    CREATININE 0.8 11/11/2019    CALCIUM 8.5 (L) 11/11/2019    ANIONGAP 9 11/11/2019    ESTGFRAFRICA >60 11/11/2019    EGFRNONAA >60 11/11/2019       Lab Results   Component Value Date    ALBUMIN 2.3 (L) 11/09/2019       Lab Results   Component Value Date    HGBA1C 8.3 (H) 10/30/2019       Pertinent Medications Reviewed: reviewed      Estimated/Assessed Needs    Weight Used For Calorie Calculations: 75.2 kg (165 lb 12.6 oz)  Energy Calorie Requirements (kcal):  1207  Energy Need Method: [place] St. Jeor  (no AF)   Protein Requirements: 95 -119 g  Weight Used For Protein Calculations: 47.6 kg (105 lb)(IBW - Obese ICU)  Estimated Fluid Requirement Method: RDA Method or per MD  RDA Method (mL): 1112  CHO Requirement: 150 g      Nutrition Prescription Ordered    Current Diet Order: dysphagia pureed consistent carbohydrate diet w/ nectar thick liquids + boost glucose control all meals     Evaluation of Received Nutrient/Fluid Intake      Intake/Output Summary (Last 24 hours) at 11/11/2019 1143  Last data filed at 11/11/2019 0600  Gross per 24 hour   Intake 1127.51 ml   Output 1305 ml   Net -177.49 ml     % Intake of Estimated Energy Needs: 75 - 100 %  % Meal Intake: 75 - 100 %    Nutrition Risk    1xweekly    Assessment and Plan    Nutrition Problem  Inadequate energy intake     Related to (etiology):   NPO status, no alternative means of nutrition    Signs and Symptoms (as evidenced by):   Meeting < 85 % EEN/EPN     Interventions:  collaboration with other providers    Nutrition Diagnosis Status:   Resolved           Monitor and Evaluation    Food and Nutrient Intake: energy intake  Food and Nutrient Adminstration: diet order  Anthropometric Measurements: weight  Biochemical Data, Medical Tests and Procedures: electrolyte and renal panel, glucose/endocrine profile, lipid  profile  Nutrition-Focused Physical Findings: overall appearance       Nutrition Follow-Up    RD Follow-up?: Yes

## 2019-11-11 NOTE — PT/OT/SLP PROGRESS
Physical Therapy Treatment    Patient Name:  Mateusz Bates   MRN:  [id number]    Recommendations:     Discharge Recommendations:  rehabilitation facility   Discharge Equipment Recommendations: (defer to next level of care)   Barriers to discharge: None    Assessment:     Mateusz Bates is a 69 y.o. female admitted with a medical diagnosis of Acute deep vein thrombosis (DVT) of right upper extremity.  She presents with the following impairments/functional limitations:  weakness, impaired endurance, gait instability, impaired sensation, impaired functional mobilty, decreased lower extremity function .    Rehab Prognosis: Good; patient would benefit from acute skilled PT services to address these deficits and reach maximum level of function.    Recent Surgery: Procedure(s) (LRB):  CORONARY ARTERY BYPASS GRAFT (CABG) (N/A)  ECHOCARDIOGRAM,TRANSESOPHAGEAL (N/A)  BLOCK, NERVE, INTERCOSTAL, 2 OR MORE (N/A) 11 Days Post-Op    Plan:     During this hospitalization, patient to be seen 5 x/week to address the identified rehab impairments via gait training, therapeutic activities, therapeutic exercises, neuromuscular re-education and progress toward the following goals:    · Plan of Care Expires:  11/18/19    Subjective     Chief Complaint:   Patient/Family Comments/goals: Pt asking for diet coke  Pain/Comfort:  ·        Objective:     Communicated with Epic and nurse prior to session.  Patient found supine with telemetry upon PT entry to room.     General Precautions: Standard, aspiration, fall   Orthopedic Precautions:N/A   Braces: N/A     Functional Mobility:  · Supine to sit with Cesar  · Sit to stand with Cesar  · SPT with min/mod A      AM-PAC 6 CLICK MOBILITY  Turning over in bed (including adjusting bedclothes, sheets and blankets)?: 3  Sitting down on and standing up from a chair with arms (e.g., wheelchair, bedside commode, etc.): 3  Moving from lying on back to sitting on the side of the bed?: 3  Moving to and from a bed  to a chair (including a wheelchair)?: 3  Need to walk in hospital room?: 2  Climbing 3-5 steps with a railing?: 1  Basic Mobility Total Score: 15       Therapeutic Activities and Exercises:   Pt able to take several steps (2 ft) over to chair.  Pt with decreased stability.  Sit to stand from cahir with Cesar 2 trials.  Pt able to stand beu c/o dizziness, un able to take steps. Kaylee stand x 30 secs with CGA.    Patient left up in chair with all lines intact and call button in reach..    GOALS:   Multidisciplinary Problems     Physical Therapy Goals        Problem: Physical Therapy Goal    Goal Priority Disciplines Outcome Goal Variances Interventions   Physical Therapy Goal     PT, PT/OT Ongoing, Progressing     Description:  By 11/10/19  1. Patient will perform supine to/from sit CGA x 1 per sternal prec.  2. Patient will perform sit to/from stand CGA a no AD per sternal prec.  3. Patient will amb 100ft no AD min A                     Time Tracking:     PT Received On: 11/11/19  PT Start Time: 0815     PT Stop Time: 0845  PT Total Time (min): 30 min     Billable Minutes: Therapeutic Activity 30    Treatment Type: Treatment  PT/PTA: PT     PTA Visit Number: 2     Darlene Sauer, PT  11/11/2019

## 2019-11-11 NOTE — SUBJECTIVE & OBJECTIVE
Interval History: Patient sitting in chair at time of visit. Oriented to person/place. No acute events overnight.     Oncology Treatment Plan:   [No treatment plan]    Medications:  Continuous Infusions:   argatroban in 0.9 % sod chlor 3.5 mcg/kg/min (11/11/19 0600)     Scheduled Meds:   amLODIPine  10 mg Oral Daily    chlorhexidine  15 mL Mouth/Throat BID    chlorthalidone  25 mg Oral Daily    famotidine (PF)  20 mg Intravenous BID    ferrous sulfate  325 mg Oral BID    furosemide  20 mg Intravenous BID    insulin detemir U-100  15 Units Subcutaneous Daily    lactated ringers  1,000 mL Intravenous Once    losartan  50 mg Oral Daily    metoprolol tartrate  50 mg Oral BID    nitroGLYCERIN 2% TD oint  0.5 inch Topical (Top) Q12H    potassium chloride  10 mEq Oral BID    psyllium husk  1 packet Oral BID    sodium chloride  2 g Oral Q6H     PRN Meds:albumin human 5%, albuterol sulfate, Dextrose 10% Bolus, Dextrose 10% Bolus, glucagon (human recombinant), hydrALAZINE, insulin aspart U-100, iohexol, lactated ringers, magnesium sulfate IVPB, magnesium sulfate IVPB, metoclopramide HCl, ondansetron, potassium chloride in water **AND** potassium chloride in water **AND** potassium chloride in water, traMADol     Review of Systems   Constitutional: Positive for activity change and fatigue. Negative for appetite change, chills, diaphoresis, fever and unexpected weight change.   HENT: Negative for congestion, mouth sores, nosebleeds, sore throat, trouble swallowing and voice change.    Eyes: Negative for photophobia and visual disturbance.   Respiratory: Positive for shortness of breath. Negative for cough, chest tightness and wheezing.    Cardiovascular: Negative for chest pain, palpitations and leg swelling.   Gastrointestinal: Negative for abdominal distention, abdominal pain, blood in stool, constipation, diarrhea, nausea and vomiting.   Genitourinary: Negative for difficulty urinating, dysuria and hematuria.    Musculoskeletal: Negative for arthralgias, back pain and myalgias.   Skin: Positive for color change. Negative for pallor, rash and wound.   Neurological: Positive for weakness. Negative for dizziness, syncope and headaches.   Hematological: Negative for adenopathy. Bruises/bleeds easily.   Psychiatric/Behavioral: The patient is nervous/anxious.      Objective:     Vital Signs (Most Recent):  Temp: 97.6 °F (36.4 °C) (11/11/19 0800)  Pulse: 87 (11/11/19 0942)  Resp: 20 (11/11/19 0927)  BP: (!) 167/90 (11/11/19 0942)  SpO2: 100 % (11/11/19 0927) Vital Signs (24h Range):  Temp:  [97.4 °F (36.3 °C)-98.2 °F (36.8 °C)] 97.6 °F (36.4 °C)  Pulse:  [70-91] 87  Resp:  [18-31] 20  SpO2:  [94 %-100 %] 100 %  BP: (106-167)/(39-90) 167/90     Weight: 74.5 kg (164 lb 3.9 oz)  Body mass index is 31.03 kg/m².  Body surface area is 1.79 meters squared.      Intake/Output Summary (Last 24 hours) at 11/11/2019 1244  Last data filed at 11/11/2019 0600  Gross per 24 hour   Intake 1127.51 ml   Output 1290 ml   Net -162.49 ml       Physical Exam   Constitutional: She is oriented to person, place, and time. She appears well-developed. She is cooperative. She has a sickly appearance. She appears ill. Nasal cannula in place.   HENT:   Head: Normocephalic.   Right Ear: External ear normal.   Left Ear: External ear normal.   Nose: Nose normal.   Mouth/Throat: Oropharynx is clear and moist.   Eyes: Conjunctivae, EOM and lids are normal. Right eye exhibits no discharge. Left eye exhibits no discharge. No scleral icterus.   Neck: Normal range of motion. No thyroid mass present.   Cardiovascular: Normal rate, regular rhythm and normal heart sounds.   No murmur heard.  Pulmonary/Chest: Effort normal. No respiratory distress. She has decreased breath sounds. She has no wheezes. She has no rales.   Abdominal: Soft. She exhibits no distension. Bowel sounds are decreased. There is no tenderness.   Genitourinary:   Genitourinary Comments: deferred    Musculoskeletal: Normal range of motion. She exhibits edema and tenderness.   Lymphadenopathy:        Head (right side): No submandibular, no preauricular and no posterior auricular adenopathy present.        Head (left side): No submandibular, no preauricular and no posterior auricular adenopathy present.        Right cervical: No superficial cervical adenopathy present.       Left cervical: No superficial cervical adenopathy present.   Neurological: She is alert and oriented to person, place, and time.   Skin: Skin is warm and dry.        Psychiatric: She has a normal mood and affect. Her speech is normal and behavior is normal. Thought content normal.   Vitals reviewed.      Significant Labs:   BMP:   Recent Labs   Lab 11/10/19  0305 11/11/19  0400 11/11/19  0759 11/11/19  1144   GLU 97 185*  --  430*    137  --  132*   K 3.6 3.8  --  4.8    105  --  101   CO2 24 23  --  20*   BUN 17 16  --  17   CREATININE 0.7 0.8  --  0.9   CALCIUM 8.6* 8.5*  --  8.8   MG 1.9  --  1.9  --    , CBC:   Recent Labs   Lab 11/10/19  0305 11/10/19  1631 11/11/19  0400   WBC 20.50* 20.48* 19.05*   HGB 7.6* 7.7* 7.5*   HCT 24.0* 24.4* 23.8*   PLT 67* 60* 75*   , LFTs: No results for input(s): ALT, AST, ALKPHOS, BILITOT, PROT, ALBUMIN in the last 48 hours. and All pertinent labs from the last 24 hours have been reviewed.    Diagnostic Results:  I have reviewed all pertinent imaging results/findings within the past 24 hours.

## 2019-11-11 NOTE — PLAN OF CARE
Patient performed sit to stand from bedside chair with mod A x 1-2 people, bed mobility with max A x 2 people and pivot steps mod A x 1-2 people. Improving

## 2019-11-11 NOTE — PROGRESS NOTES
Pharmacy  Vancomycin Consult Note    Therapy with Vancomycin complete and/or consult discontinued by provider.  Pharmacy will sign off, please re-consult as needed.    Thank you for allowing us to participate in this patient's care.  Ree Evans, Pharm D 11/11/2019 8:42 AM

## 2019-11-11 NOTE — ASSESSMENT & PLAN NOTE
-Hemoglobin A1C was 9 on 9/19/19.   -NISS.   -ADA diet.   11/2  Levemir / NISS / Accuchecks  11/3  Levemir increased to 15 sq bid  NISS  Accuchecks  11/4  Levemir  NISS  Accuchecks  11/5  Levemir 10 units BID  NISS  Accuchecks    11/6  UnControlled  Accuchecks  Insulin Gtt  Improved control  Last BS 99    11/7  Uncontrolled  Accuchecks  NISS    11/8  Uncontrolled  NISS  Accuchecks    11/9:  Glucose elevated   Takes 45 units of toujeo at home  Will start levemir 20 units qhs   Cont to monitor glucose   HSSI    11/11:  levemir 15 units daily  If need to increase can go to levemir 10 units bid

## 2019-11-11 NOTE — PROGRESS NOTES
[facility] -   Cardiothoracic Surgery  Progress Note    Patient Name: Mateusz Bates  MRN: [id number]  Admission Date: 10/28/2019  Hospital Length of Stay: 14 days  Code Status: Full Code   Attending Physician: Keny Zaidi MD   Referring Provider: Self, Aaareferral  Principal Problem:Acute deep vein thrombosis (DVT) of right upper extremity            Subjective:     Post-Op Info:  Procedure(s) (LRB):  CORONARY ARTERY BYPASS GRAFT (CABG) (N/A)  ECHOCARDIOGRAM,TRANSESOPHAGEAL (N/A)  BLOCK, NERVE, INTERCOSTAL, 2 OR MORE (N/A)   11 Days Post-Op     Interval History:  11/11/2019  The patient is post-operative day 11.  Status post coronary artery bypass grafting x3.    ROS  Medications:  Continuous Infusions:   argatroban in 0.9 % sod chlor 3.5 mcg/kg/min (11/11/19 0600)     Scheduled Meds:   amLODIPine  10 mg Oral Daily    chlorhexidine  15 mL Mouth/Throat BID    chlorthalidone  25 mg Oral Daily    famotidine (PF)  20 mg Intravenous BID    ferrous sulfate  325 mg Oral BID    furosemide  20 mg Intravenous BID    insulin detemir U-100  15 Units Subcutaneous QHS    lactated ringers  1,000 mL Intravenous Once    losartan  50 mg Oral Daily    metoprolol tartrate  50 mg Oral BID    potassium chloride  10 mEq Oral BID    psyllium husk  1 packet Oral BID    sodium chloride  2 g Oral Q6H     PRN Meds:albumin human 5%, albuterol sulfate, Dextrose 10% Bolus, Dextrose 10% Bolus, glucagon (human recombinant), hydrALAZINE, insulin aspart U-100, iohexol, lactated ringers, magnesium sulfate IVPB, magnesium sulfate IVPB, metoclopramide HCl, ondansetron, potassium chloride in water **AND** potassium chloride in water **AND** potassium chloride in water, traMADol     Objective:     Vital Signs (Most Recent):  Temp: 97.6 °F (36.4 °C) (11/11/19 0800)  Pulse: 86 (11/11/19 0927)  Resp: 20 (11/11/19 0927)  BP: (!) 142/77 (11/11/19 0600)  SpO2: 100 % (11/11/19 0927) Vital Signs (24h Range):  Temp:  [97.4 °F (36.3  °C)-98.2 °F (36.8 °C)] 97.6 °F (36.4 °C)  Pulse:  [67-91] 86  Resp:  [18-31] 20  SpO2:  [94 %-100 %] 100 %  BP: (103-161)/(39-77) 142/77     Weight: 74.5 kg (164 lb 3.9 oz)  Body mass index is 31.03 kg/m².    SpO2: 100 %  O2 Device (Oxygen Therapy): room air    Intake/Output - Last 3 Shifts       11/09 0700 - 11/10 0659 11/10 0700 - 11/11 0659 11/11 0700 - 11/12 0659    P.O. 580 540     I.V. (mL/kg) 403.8 (5.5) 387.5 (5.2)     NG/GT       IV Piggyback 650 650     Total Intake(mL/kg) 1633.8 (22.2) 1577.5 (21.2)     Urine (mL/kg/hr) 2085 (1.2) 1850 (1)     Stool 0 0     Total Output 2085 1850     Net -451.2 -272.5            Stool Occurrence 1 x 4 x           Lines/Drains/Airways     Peripherally Inserted Central Catheter Line                 PICC Double Lumen 11/07/19 1645 left brachial;left basilic 3 days                Physical Exam   Constitutional: She appears well-developed and well-nourished. No distress.   HENT:   Head: Normocephalic and atraumatic.   Eyes: Pupils are equal, round, and reactive to light. EOM are normal.   Neck: Normal range of motion. Neck supple. No JVD present.   Cardiovascular: Normal rate, regular rhythm, normal heart sounds and intact distal pulses. Exam reveals no gallop and no friction rub.   No murmur heard.  Pulmonary/Chest: Effort normal and breath sounds normal. No stridor. No respiratory distress. She has no wheezes. She has no rales. She exhibits no tenderness.   Abdominal: Soft. Bowel sounds are normal. She exhibits no distension and no mass. There is no tenderness. There is no rebound and no guarding. No hernia.   Musculoskeletal: Normal range of motion. She exhibits edema and tenderness.   The right upper extremity exhibits swelling distal from the elbow. The RUE is tender to palpation and ROM. ROM is diminished to the right hand 2/5. The hand his reddened, swollen, with dusky, mottled, purple discoloration to the right thumb and tip of the right index finger. Blisters have  formed on the proximal portion of the thumb and wrist. Severely diminished capillary refill to the tip of the right thumb and tip of the right index finger.   Neurological: She is alert. She displays normal reflexes. No cranial nerve deficit or sensory deficit. She exhibits normal muscle tone. Coordination normal.   Awake, Alert, Oriented to person, place, and situation disoriented to time. No focal deficits.   Skin: Skin is warm and dry. Capillary refill takes less than 2 seconds. Cap refill >3 seconds to the right thumb and right index finger. . No rash noted. She is not diaphoretic. There is erythema. No pallor.   Psychiatric: She has a normal mood and affect. Her behavior is normal. Judgment and thought content normal.   Nursing note and vitals reviewed.      Significant Labs:  BMP:   Recent Labs   Lab 11/11/19  0400 11/11/19  0759   *  --      --    K 3.8  --      --    CO2 23  --    BUN 16  --    CREATININE 0.8  --    CALCIUM 8.5*  --    MG  --  1.9     CBC:   Recent Labs   Lab 11/11/19  0400   WBC 19.05*   RBC 2.59*   HGB 7.5*   HCT 23.8*   PLT 75*   MCV 92   MCH 29.0   MCHC 31.5*       Significant Diagnostics:  I have reviewed all pertinent imaging results/findings within the past 24 hours.    Assessment/Plan:     S/P CABG x 3       11/01/2019  The patient is post-operative day 1, status-post coronary artery bypass grafting x 3.  Overall the patient is making progress, however IABP and inotropic agents will continue today.   Neuro:  The patient is awake alert and oriented x3.  Neuro exam is nonfocal.  Pain is well controlled with IV pain medication.  Cardiac:  Patient is hemodynamically improving. Patient remains on epinephrine and milrinone gtts, which will be weaned over the next 24 hours.  Cardiac index is improving. IABP will remain in place over the next 24 hours.  Patient will be started on beta blockers once gtts are weaned off.  Respiratory: Good sats on high flow nasal cannula.  Continue respiratory toilet, continue incentive spirometer. Wean to low flow nasal cannula.  GI:  Diet: sips of water with medications, will slowly advance diet as tolerated. Benign abdominal exam.  Renal:  Urine output is slowly improving. Cr 0.9. K 4.3. Mag 1.6. Replace mag. Continue IV diuresis. Add metolazone.   Heme:  Hct 34.1  Platelet 82. Continue aspirin. Discontinue Plavix for now.  Id:  WBC 12. Tmax 100.3. Continue post-operative antibiotics. Will continue to follow.   Endocrine:  Glucose is controlled with insulin gtt.  Continue insulin gtt for now, until weaned off inotropic agents.   Activities:  Patient is currently bed bound due to IABP.  Advance activities as tolerated once IABP is removed.   Lines tubes and drains:  Continue IABP. Continue [place] and Cordis.  Continue A-line.  Chest tubes will continue. NICOLASA x 2 will continue. Jones catheter will continue.  Continue pacer wires. Continue prevena wound vac.  Plan: Remain ICU.          11/02/2019  The patient is post-operative day 2, status-post coronary artery bypass grafting x 3.  Overall the patient is making progress, IABP removed and inotropic agents discontinued.   Neuro:  The patient is awake alert and oriented x3.  Neuro exam is nonfocal.  Pain is well controlled. Discontinue fentanyl.   Cardiac:  Patient is hemodynamically improving. Inotropic gtts discontinued. Excellent cardiac index. IABP discontinued.  Patient will be started on beta blockers today.  Respiratory: Good sats on high flow nasal cannula. Continue respiratory toilet, continue incentive spirometer. Wean to low flow nasal cannula.  GI:  Advance diabetic/cardiac diet as tolerated. Benign abdominal exam.  Renal:  Urine output is improving. Cr 0.9. K 4.9. Mag 2.2. Continue IV diuresis. Hold AM K.   Heme:  Hct 28.3  Platelet 47. Discontinue aspirin. Discontinue Plavix. HIT panel. Consult hematology r/t thrombocytopenia.   Id:  WBC 10.4. Tmax 99.4. Will continue to follow.   Endocrine:   Discontinue insulin gtt. Start sliding scale insulin. Start long acting insulin detemir.   Activities: Advance activities as tolerated.   Lines tubes and drains:  Discontinue IABP. Discontinue [place] and Cordis.  Discontinue A-line.  Discontinue chest tubes x 3. Discontinue NICOLASA x 2. Discontinue arterial line. Jones catheter will continue for now.  Continue pacer wires. Continue prevena wound vac. Place PICC line.   Plan: Remain ICU.           11/03/2019  The patient is post-operative day 3, status-post coronary artery bypass grafting x 3.  Overall the patient is making progress.  Neuro:  The patient is awake alert and oriented x3.  Neuro exam is nonfocal.  Pain is well controlled. Continue tramadol.   Cardiac:  Patient is hemodynamically stable. Increase metoprolol dose. Start amlodipine.   Respiratory: Good sats on nasal cannula. Continue respiratory toilet, continue incentive spirometer. Wean to room air.  GI:  Advance diabetic/cardiac diet as tolerated. Benign abdominal exam. Passing gas, no BM. Add 1 bottle of mag citrate.   Renal:  Good urine output. Cr 0.9. K 3.8. Mag 2.0. Replace K. Replace Mag. Change to PO diuresis.   Heme:  Hct 34.7  Platelet 89. Restart aspirin. Continue to hold Plavix. HIT panel pending. Hematology following.  Id:  WBC 13.8. Tmax 99.4. Will continue to follow.    Endocrine:  Glucose controlled on sliding scale insulin. Increase long acting insulin detemir dose.   Activities: Advance activities as tolerated.   Lines tubes and drains:  Jones catheter will continue for now.  Continue pacer wires. Continue prevena wound vac. Continue PICC line.   Plan: Remain ICU.          11/04/2019  The patient is post-operative day 4, status-post coronary artery bypass grafting x 3.  Overall the patient is making progress.  Neuro:  The patient is awake alert and oriented x3.  Neuro exam is nonfocal.  Pain is well controlled. Continue tramadol.   Cardiac:  Patient is hemodynamically stable. Continue  metoprolol dose. Continue amlodipine dose.   Respiratory: Good sats on room air. Continue respiratory toilet, continue incentive spirometer.  GI:  Advance diabetic/cardiac diet as tolerated. Benign abdominal exam. Regular bowel movements.   Renal:  Good urine output. Cr 0.7. K 4.0. Mag 2.3. Replace K. Decrease PO diuresis.   Heme:  Hct 34.4  Platelet 140. Continue aspirin. Restart Plavix. HIT panel pending. Hematology following.  Id:  WBC down to 13.13. Tmax 98. Will continue to follow.    Endocrine:  Glucose controlled on sliding scale insulin. Increased long acting insulin detemir dose.   Activities: Advance activities as tolerated.   Lines tubes and drains:  Discontinued storm catheter. Continue pacer wires. Continue prevena wound vac. Continue PICC line.   Plan: Transfer to telemetry. Anticipate discharge to SNF in 48 hours.         11/05/2019  The patient is post-operative day 5, status-post coronary artery bypass grafting x 3.  Overall the patient is making progress.  Neuro:  The patient is awake alert and oriented x3.  Neuro exam is nonfocal. Patient slow to answer questions, however answers are appropriate.  Pain is well controlled. Continue tramadol PRN.   Cardiac:  Patient is hemodynamically stable. Continue metoprolol dose. Discontinue amlodipine. Start losartan.    Respiratory: Good sats on room air. Continue respiratory toilet, continue incentive spirometer.  GI:  Advance diabetic/cardiac diet as tolerated. Benign abdominal exam. Regular bowel movements.   Renal:  Good urine output. Cr 0.7. K 4.8. Mag 2.1. Replace Mag. Discontinue PO diuresis.   Heme:  Hct 39.5  Platelet 73. Continue aspirin. Hold Plavix. HIT panel pending. Hematology following.  Id:  WBC is up to 16. Tmax 99. Pan-culture. DC PICC line. Chest xray. Start broad spectrum empiric antibiotics. Will continue to follow.    Endocrine:  Glucose controlled on sliding scale insulin. Decrease long acting insulin detemir dose.   Activities: Advance  activities as tolerated.   Lines tubes and drains:  Discontinue pacer wires. Discontinue prevena wound vac. Continue PICC line.   Plan: Anticipate discharge to SNF in 24-48 hours.         11/06/2019  The patient is post-operative day 6, status-post coronary artery bypass grafting x 3. Patient had an episode of decompensation and unresponsiveness yesterday evening. The patient was found unresponsive, cool, and diaphoretic, with agonal breathing. Patient was intubated and transferred to the ICU. Bedside EKG and echocardiogram performed at the bedside showed no significant changes. CT head, chest, abdomen pelvis performed, showed no significant findings.     Neuro:  The patient is intubated and sedated. On Precedex gtt. Begin weaning off sedation.    Cardiac:  Patient is hemodynamically improving. Inotropic agent dependent. Currently on Epinephrine gtt. Start Milrinone gtt. Discontinue metoprolol. Discontinue losartan.    Respiratory: Ventilator dependent. tV 450, RR 20, PEEP 5. Wean down sedation. Wean off ventilator today.    GI:  Begin enteral tube feedings. Benign abdominal exam. Previously having regular bowel movements.   Renal: Adequate urine output. Cr 1.1. K 3.6. Mag 1.7. Replace Mag. Replace K. Discontinue diuresis.   Heme:  Hct 31.9  Platelet 89. Hold aspirin. Hold Plavix. HIT panel negative. Patient received 1 units PRBC last night. Hematology following.  Id:  WBC is down to 15.5. Tmax 99.9. BC NGTD. Urinalysis negative. Sputum culture negative. Vancomycin and zosyn day 2. Will continue to follow closely.    Endocrine:  Glucose controlled on insulin gtt, continue at this time.   Activities: Intubated and sedated.   Musculoskeletal: Unable to find right radial pulse via doppler. Tips of the fingers of the right hand are mottled, dusky, appear cyanotic. Recent right radial arterial line in place, which has been relocated. US of arteries and veins of the right arm ordered. Consult to vascular surgery placed.     Lines tubes and drains:  Continue swan and cordis. Continue arterial line. Continue storm.    Plan: Remain ICU         11/07/2019  The patient is post-operative day 7, status-post coronary artery bypass grafting x 3. Patient had an episode of decompensation and unresponsiveness the evening of 11/05/2019. The patient was found unresponsive, cool, and diaphoretic, with agonal breathing. Patient was intubated and transferred to the ICU. Bedside EKG and echocardiogram performed at the bedside showed no significant changes. CT head, chest, abdomen pelvis performed, showed no significant findings.     Neuro:  The patient is intubated and sedated. On Precedex gtt. Wean off sedation.    Cardiac:  Patient is hemodynamically improving. Excellent cardiac index. Wean off Inotropic gtts.   Respiratory: Wean towards extubation. tV 400, RR 18, PEEP 5.   GI:  Continue enteral tube feedings while intubated. Benign abdominal exam.  Renal: Adequate urine output. Cr 1.1. K 4.1. Mag 2.8. Start IV diuresis.   Heme:  Hct 24.6  Platelet 60. Hold aspirin. Hold Plavix. HIT panel negative.  Id:  WBC is down to 14.46. Tmax 100.8. BC NGTD. Urinalysis negative. Sputum culture negative. Vancomycin and zosyn day 3. Midline sternal incision appears reddened, with induration at skin edges. Will continue to follow closely.    Endocrine:  Glucose controlled on sliding scale insulin.   Activities: Intubated and sedated. Moves all 4. Full ROM.    Musculoskeletal: Able to find right radial pulse via doppler. The right hand extending to the tips of the fingers of the right hand are mottled, dusky, appear cyanotic. Recent right radial arterial line placement, which has been relocated. US of arteries and veins of the right arm pending. Vascular surgery following, suggested possible thrombotic etiology, suggested heparin gtt, however this is contraindicated r/t s/p cabg x 3.     Lines tubes and drains:  Continue swan and cordis. Continue arterial line.  Continue storm.    Plan: Remain ICU         11/08/2019  The patient is post-operative day 8, status-post coronary artery bypass grafting x 3. Patient had an episode of decompensation and unresponsiveness the evening of 11/05/2019. The patient was found unresponsive, cool, and diaphoretic, with agonal breathing. Patient was intubated and transferred to the ICU. Bedside EKG and echocardiogram performed at the bedside showed no significant changes. CT head, chest, abdomen pelvis performed, showed no significant findings. Suspect cardiogenic shock.      Neuro:  Patients neurologic state is improving. Alert, follows commands, makes eye contact, disoriented x 4. No focal deficits.    Cardiac:  Patient is hemodynamically improving. Off all inotropic gtts. Start low dose metoprolol and amlodipine.   Respiratory: Good sats on low flow nasal cannula. Continue IS. Continue pulmonary toilet. Wean to room air.    GI:  Continue enteral tube feedings at this time r/t disorientation and aspiration risk. Discontinue laxatives. Start metamucil BID, patient has had multiple loose stools.   Renal: Good urine output. Cr 0.8. K 3.6. Mag 2.1. Replace K. Replace Mag. Continue IV diuresis.   Hema:  Hct 26.4  Platelet 45. Hold aspirin. Hold Plavix. HIT panel will be resent as the patient is a high suspicion for HIT despite previous negative HIT panel. Multiple DVTs from the right IJ down to the brachiocephalic vein. Confirmed via vascular US. Patient started on argatroban yesterday, goal is APTT 2x baseline. Most recent APTT 97.8. Baseline APTT 50.8.  Hematology following. Vascular surgery following.   Id:  WBC is down to 12. Tmax 100.4. BC NGTD. Urinalysis negative. Sputum culture negative. Vancomycin and zosyn day 4. Midline sternal incision appears improved, with minimal redness and induration at skin edges. Will continue to follow closely.    Endocrine:  Glucose controlled on sliding scale insulin.   Activities: Increase activities as  tolerated.  Musculoskeletal: ROM 2/5 to the right wrist/hand/fingers, distal from the elbow there is 2+ non pitting edema, the extremity is tender and warm. There is mottling, a dusky appearance, and purple discoloration to the fingertips of the right hand. See above hematology plan.      Lines tubes and drains:  Continue PICC line. Continue storm catheter. Continue NG tube.   Plan: Remain ICU     11/09/2019  The patient is postop day 9.  Status post coronary artery bypass grafting x3.  Postop course was complicated by an episode of hemodynamic instability and decompensation on postop day number 5.  Patient continues to make clinical progress since then.  She is off all drips.  She remains extubated.  Continue ICU care for now.    Neuro:  Patient is awake alert.  She is oriented x2.  She is still confused.  But she is making progress.  Her neuro exam is nonfocal.  She follows all commands.  Cardiac:  Patient continues to be hemodynamically stable.  She is somewhat hypertensive.  Will increase her antihypertensives.  Respiratory:  Patient is good sats on nasal cannula.  She remains extubated.  GI:  Patient was tolerating tube feeds until this morning.  She pulled heart feeding tube. Will get a swallowing evaluation.  Mode of nutrition will depend on results of swallowing evaluation.  Renal:  Patient has good urine output %period% creatinine is 0.9.  Heme:  Hematocrit is 26 platelet is 46.  Patient is on argatroban  for anticoagulation due to right upper extremity DVTs.  Hypercoagulability workup is in progress.  Appreciate Hematology and vascular surgery input.  Id:  Incision continues to improve.  white count is 17.  Continue broad-spectrum antibiotics.  Endocrine:  Glucose is controlled on its sliding scale insulin.  Activities:  Advance activities as tolerated.  Line tubes and drains.  Patient has a PICC line, Storm catheter.    11/10/2019  The patient is postop day 10.  Status post coronary bypass grafting x3.   Postop course was complicated by an episode of hemodynamic instability and decompensation on postop day 5.  Patient remains hemodynamically stable.    Neuro:  The patient is awake and alert.  She is oriented x1.  She continues to be confused.  Her neuro exam is nonfocal.  Cardiac:  Patient continues to be hemodynamically stable.  His she is hypertensive.  Hypertensive medications will be increased.  Respiratory:  Patient has good sats on nasal cannula.  Continue pulmonary toilet.  GI:  Patient is tolerating a regular diet.  Renal:  Patient has good urine output. Creatinine is 0.7.  Heme: hematocrit is 24 and platelet is 67.  Patient continues on argatroban for right upper extremity DVTs.  Hypercoagulable workup is in progress.  Id:  Patient's white count is up to 20.  Patient is on broad prepped from antibiotics.  Endocrine:  The patient is on long-acting as well as sliding scale insulin.  Blood sugars have been well controlled.  Will decrease dose of long-acting.  Activities: patient has been out of bed to chair.  Advance as tolerated.  Musculoskeletal:  Patient's right upper extremity continues to be swollen  The index finger and thumb continues to be dusky with a bleb near the base of the thumb.  Continue argatroban for anticoagulation.  Continue elevation of right upper extremity.  Lines tubes and drains: patient has a PICC line.  Will discontinue Jones catheter.        11/11/2019  The patient is post-operative day 11, status-post coronary artery bypass grafting x 3. Post-operative course was complicated by an episode of hemodynamic instability and decompensation on postop day 5.  Patient remains hemodynamically stable.    Neuro:  Patients neurologic state is improving. Oriented x 2. Alert, follows commands, makes eye contact. No focal deficits.    Cardiac:  Patient is hemodynamically improving. Hypertensive. Continue metoprolol, amlodipine, and losartan. Add chlorthalidone.   Respiratory: Good sats on room air.  Continue IS. Continue pulmonary toilet.  GI:  Continue  cardiac/diabetic diet, modified by puree and thick liquids with swallow precautions. Advance as tolerated. Continue metamucil.  Renal: Good urine output. Cr 0.8. K 3.8. Replace K. Continue IV diuresis. Add chlorthalidone. BMP at noon.   Hema:  Hct 23.8  Platelet  75. Hold aspirin. Hold Plavix. Repeat HIT panel pending. Multiple DVTs from the right IJ down to the brachiocephalic vein. Confirmed via vascular US. Patient on argatroban gtt, goal is APTT 2x baseline. Most recent APTT 83.3. Baseline APTT 50.8.  Hematology following. Vascular surgery following.   Id:  WBC is down to 19. Tmax 98.4. BC NGTD. Vancomycin and zosyn day 7. Discontinue broad spectrum antibiotics. Will continue to follow closely.    Endocrine:  Glucose controlled on sliding scale insulin and long acting insulin detemir.    Activities: Increase activities as tolerated.  Musculoskeletal: The right upper extremity exhibits swelling distal from the elbow. The RUE is tender to palpation and ROM. ROM is diminished to the right hand 2/5. The hand his reddened, swollen, with dusky, mottled, purple discoloration to the right thumb and tip of the right index finger. Blisters have formed on the proximal portion of the thumb and wrist. Severely diminished capillary refill to the tip of the right thumb and tip of the right index finger. Wound care order placed.      Lines tubes and drains:  Continue PICC line.  Plan: Remain ICU       NSTEMI (non-ST elevated myocardial infarction)  The patient is a 69-year-old female with hypertension, diabetes, hyperlipidemia, obesity, anxiety and depression, iron deficiency anemia, vitamin B12 deficiency, who presented to the emergency room with an NSTEMI.  Workup included a cardiac catheterization that shows severe multivessel coronary artery disease with proximal LAD stenosis of 70% and severely depressed ejection fraction of 20%.  The patient is a candidate for urgent  coronary artery bypass grafting.  The risks and benefits of surgery were explained to the patient.  The patient verbalized understanding of the issues discussed.  She a segs the risks of surgery and has agreed to proceed with urgent coronary artery bypass grafting.  She understands that the risk of neurologic complication postoperatively is increased due to the presence of cerebral microvascular disease.        Yovani Dobbins NP  Cardiothoracic Surgery  [facility] [place]

## 2019-11-11 NOTE — ASSESSMENT & PLAN NOTE
November 11, 2019     Upper extremity vascular insufficiency:  - Vascular surgery following. No surgical intervention recommended. Recommend continue anticoagulation, arm elevation, mild/moderate compression

## 2019-11-11 NOTE — ASSESSMENT & PLAN NOTE
11/01/2019  The patient is post-operative day 1, status-post coronary artery bypass grafting x 3.  Overall the patient is making progress, however IABP and inotropic agents will continue today.   Neuro:  The patient is awake alert and oriented x3.  Neuro exam is nonfocal.  Pain is well controlled with IV pain medication.  Cardiac:  Patient is hemodynamically improving. Patient remains on epinephrine and milrinone gtts, which will be weaned over the next 24 hours.  Cardiac index is improving. IABP will remain in place over the next 24 hours.  Patient will be started on beta blockers once gtts are weaned off.  Respiratory: Good sats on high flow nasal cannula. Continue respiratory toilet, continue incentive spirometer. Wean to low flow nasal cannula.  GI:  Diet: sips of water with medications, will slowly advance diet as tolerated. Benign abdominal exam.  Renal:  Urine output is slowly improving. Cr 0.9. K 4.3. Mag 1.6. Replace mag. Continue IV diuresis. Add metolazone.   Heme:  Hct 34.1  Platelet 82. Continue aspirin. Discontinue Plavix for now.  Id:  WBC 12. Tmax 100.3. Continue post-operative antibiotics. Will continue to follow.   Endocrine:  Glucose is controlled with insulin gtt.  Continue insulin gtt for now, until weaned off inotropic agents.   Activities:  Patient is currently bed bound due to IABP.  Advance activities as tolerated once IABP is removed.   Lines tubes and drains:  Continue IABP. Continue [place] and [place].  Continue A-line.  Chest tubes will continue. NICOLASA x 2 will continue. Jones catheter will continue.  Continue pacer wires. Continue prevena wound vac.  Plan: Remain ICU.          11/02/2019  The patient is post-operative day 2, status-post coronary artery bypass grafting x 3.  Overall the patient is making progress, IABP removed and inotropic agents discontinued.   Neuro:  The patient is awake alert and oriented x3.  Neuro exam is nonfocal.  Pain is well controlled. Discontinue fentanyl.    Cardiac:  Patient is hemodynamically improving. Inotropic gtts discontinued. Excellent cardiac index. IABP discontinued.  Patient will be started on beta blockers today.  Respiratory: Good sats on high flow nasal cannula. Continue respiratory toilet, continue incentive spirometer. Wean to low flow nasal cannula.  GI:  Advance diabetic/cardiac diet as tolerated. Benign abdominal exam.  Renal:  Urine output is improving. Cr 0.9. K 4.9. Mag 2.2. Continue IV diuresis. Hold AM K.   Heme:  Hct 28.3  Platelet 47. Discontinue aspirin. Discontinue Plavix. HIT panel. Consult hematology r/t thrombocytopenia.   Id:  WBC 10.4. Tmax 99.4. Will continue to follow.   Endocrine:  Discontinue insulin gtt. Start sliding scale insulin. Start long acting insulin detemir.   Activities: Advance activities as tolerated.   Lines tubes and drains:  Discontinue IABP. Discontinue [place] and Cordis.  Discontinue A-line.  Discontinue chest tubes x 3. Discontinue NICOLASA x 2. Discontinue arterial line. Jones catheter will continue for now.  Continue pacer wires. Continue prevena wound vac. Place PICC line.   Plan: Remain ICU.           11/03/2019  The patient is post-operative day 3, status-post coronary artery bypass grafting x 3.  Overall the patient is making progress.  Neuro:  The patient is awake alert and oriented x3.  Neuro exam is nonfocal.  Pain is well controlled. Continue tramadol.   Cardiac:  Patient is hemodynamically stable. Increase metoprolol dose. Start amlodipine.   Respiratory: Good sats on nasal cannula. Continue respiratory toilet, continue incentive spirometer. Wean to room air.  GI:  Advance diabetic/cardiac diet as tolerated. Benign abdominal exam. Passing gas, no BM. Add 1 bottle of mag citrate.   Renal:  Good urine output. Cr 0.9. K 3.8. Mag 2.0. Replace K. Replace Mag. Change to PO diuresis.   Heme:  Hct 34.7  Platelet 89. Restart aspirin. Continue to hold Plavix. HIT panel pending. Hematology following.  Id:  WBC 13.8. Tmax  99.4. Will continue to follow.    Endocrine:  Glucose controlled on sliding scale insulin. Increase long acting insulin detemir dose.   Activities: Advance activities as tolerated.   Lines tubes and drains:  Storm catheter will continue for now.  Continue pacer wires. Continue prevena wound vac. Continue PICC line.   Plan: Remain ICU.          11/04/2019  The patient is post-operative day 4, status-post coronary artery bypass grafting x 3.  Overall the patient is making progress.  Neuro:  The patient is awake alert and oriented x3.  Neuro exam is nonfocal.  Pain is well controlled. Continue tramadol.   Cardiac:  Patient is hemodynamically stable. Continue metoprolol dose. Continue amlodipine dose.   Respiratory: Good sats on room air. Continue respiratory toilet, continue incentive spirometer.  GI:  Advance diabetic/cardiac diet as tolerated. Benign abdominal exam. Regular bowel movements.   Renal:  Good urine output. Cr 0.7. K 4.0. Mag 2.3. Replace K. Decrease PO diuresis.   Heme:  Hct 34.4  Platelet 140. Continue aspirin. Restart Plavix. HIT panel pending. Hematology following.  Id:  WBC down to 13.13. Tmax 98. Will continue to follow.    Endocrine:  Glucose controlled on sliding scale insulin. Increased long acting insulin detemir dose.   Activities: Advance activities as tolerated.   Lines tubes and drains:  Discontinued storm catheter. Continue pacer wires. Continue prevena wound vac. Continue PICC line.   Plan: Transfer to telemetry. Anticipate discharge to SNF in 48 hours.         11/05/2019  The patient is post-operative day 5, status-post coronary artery bypass grafting x 3.  Overall the patient is making progress.  Neuro:  The patient is awake alert and oriented x3.  Neuro exam is nonfocal. Patient slow to answer questions, however answers are appropriate.  Pain is well controlled. Continue tramadol PRN.   Cardiac:  Patient is hemodynamically stable. Continue metoprolol dose. Discontinue amlodipine. Start  losartan.    Respiratory: Good sats on room air. Continue respiratory toilet, continue incentive spirometer.  GI:  Advance diabetic/cardiac diet as tolerated. Benign abdominal exam. Regular bowel movements.   Renal:  Good urine output. Cr 0.7. K 4.8. Mag 2.1. Replace Mag. Discontinue PO diuresis.   Heme:  Hct 39.5  Platelet 73. Continue aspirin. Hold Plavix. HIT panel pending. Hematology following.  Id:  WBC is up to 16. Tmax 99. Pan-culture. DC PICC line. Chest xray. Start broad spectrum empiric antibiotics. Will continue to follow.    Endocrine:  Glucose controlled on sliding scale insulin. Decrease long acting insulin detemir dose.   Activities: Advance activities as tolerated.   Lines tubes and drains:  Discontinue pacer wires. Discontinue prevena wound vac. Continue PICC line.   Plan: Anticipate discharge to SNF in 24-48 hours.         11/06/2019  The patient is post-operative day 6, status-post coronary artery bypass grafting x 3. Patient had an episode of decompensation and unresponsiveness yesterday evening. The patient was found unresponsive, cool, and diaphoretic, with agonal breathing. Patient was intubated and transferred to the ICU. Bedside EKG and echocardiogram performed at the bedside showed no significant changes. CT head, chest, abdomen pelvis performed, showed no significant findings.     Neuro:  The patient is intubated and sedated. On Precedex gtt. Begin weaning off sedation.    Cardiac:  Patient is hemodynamically improving. Inotropic agent dependent. Currently on Epinephrine gtt. Start Milrinone gtt. Discontinue metoprolol. Discontinue losartan.    Respiratory: Ventilator dependent. tV 450, RR 20, PEEP 5. Wean down sedation. Wean off ventilator today.    GI:  Begin enteral tube feedings. Benign abdominal exam. Previously having regular bowel movements.   Renal: Adequate urine output. Cr 1.1. K 3.6. Mag 1.7. Replace Mag. Replace K. Discontinue diuresis.   Heme:  Hct 31.9  Platelet 89. Hold  aspirin. Hold Plavix. HIT panel negative. Patient received 1 units PRBC last night. Hematology following.  Id:  WBC is down to 15.5. Tmax 99.9. BC NGTD. Urinalysis negative. Sputum culture negative. Vancomycin and zosyn day 2. Will continue to follow closely.    Endocrine:  Glucose controlled on insulin gtt, continue at this time.   Activities: Intubated and sedated.   Musculoskeletal: Unable to find right radial pulse via doppler. Tips of the fingers of the right hand are mottled, dusky, appear cyanotic. Recent right radial arterial line in place, which has been relocated. US of arteries and veins of the right arm ordered. Consult to vascular surgery placed.    Lines tubes and drains:  Continue swan and cordis. Continue arterial line. Continue storm.    Plan: Remain ICU         11/07/2019  The patient is post-operative day 7, status-post coronary artery bypass grafting x 3. Patient had an episode of decompensation and unresponsiveness the evening of 11/05/2019. The patient was found unresponsive, cool, and diaphoretic, with agonal breathing. Patient was intubated and transferred to the ICU. Bedside EKG and echocardiogram performed at the bedside showed no significant changes. CT head, chest, abdomen pelvis performed, showed no significant findings.     Neuro:  The patient is intubated and sedated. On Precedex gtt. Wean off sedation.    Cardiac:  Patient is hemodynamically improving. Excellent cardiac index. Wean off Inotropic gtts.   Respiratory: Wean towards extubation. tV 400, RR 18, PEEP 5.   GI:  Continue enteral tube feedings while intubated. Benign abdominal exam.  Renal: Adequate urine output. Cr 1.1. K 4.1. Mag 2.8. Start IV diuresis.   Heme:  Hct 24.6  Platelet 60. Hold aspirin. Hold Plavix. HIT panel negative.  Id:  WBC is down to 14.46. Tmax 100.8. BC NGTD. Urinalysis negative. Sputum culture negative. Vancomycin and zosyn day 3. Midline sternal incision appears reddened, with induration at skin edges.  Will continue to follow closely.    Endocrine:  Glucose controlled on sliding scale insulin.   Activities: Intubated and sedated. Moves all 4. Full ROM.    Musculoskeletal: Able to find right radial pulse via doppler. The right hand extending to the tips of the fingers of the right hand are mottled, dusky, appear cyanotic. Recent right radial arterial line placement, which has been relocated. US of arteries and veins of the right arm pending. Vascular surgery following, suggested possible thrombotic etiology, suggested heparin gtt, however this is contraindicated r/t s/p cabg x 3.     Lines tubes and drains:  Continue swan and cordis. Continue arterial line. Continue storm.    Plan: Remain ICU         11/08/2019  The patient is post-operative day 8, status-post coronary artery bypass grafting x 3. Patient had an episode of decompensation and unresponsiveness the evening of 11/05/2019. The patient was found unresponsive, cool, and diaphoretic, with agonal breathing. Patient was intubated and transferred to the ICU. Bedside EKG and echocardiogram performed at the bedside showed no significant changes. CT head, chest, abdomen pelvis performed, showed no significant findings. Suspect cardiogenic shock.      Neuro:  Patients neurologic state is improving. Alert, follows commands, makes eye contact, disoriented x 4. No focal deficits.    Cardiac:  Patient is hemodynamically improving. Off all inotropic gtts. Start low dose metoprolol and amlodipine.   Respiratory: Good sats on low flow nasal cannula. Continue IS. Continue pulmonary toilet. Wean to room air.    GI:  Continue enteral tube feedings at this time r/t disorientation and aspiration risk. Discontinue laxatives. Start metamucil BID, patient has had multiple loose stools.   Renal: Good urine output. Cr 0.8. K 3.6. Mag 2.1. Replace K. Replace Mag. Continue IV diuresis.   Hema:  Hct 26.4  Platelet 45. Hold aspirin. Hold Plavix. HIT panel will be resent as the patient  is a high suspicion for HIT despite previous negative HIT panel. Multiple DVTs from the right IJ down to the brachiocephalic vein. Confirmed via vascular US. Patient started on argatroban yesterday, goal is APTT 2x baseline. Most recent APTT 97.8. Baseline APTT 50.8.  Hematology following. Vascular surgery following.   Id:  WBC is down to 12. Tmax 100.4. BC NGTD. Urinalysis negative. Sputum culture negative. Vancomycin and zosyn day 4. Midline sternal incision appears improved, with minimal redness and induration at skin edges. Will continue to follow closely.    Endocrine:  Glucose controlled on sliding scale insulin.   Activities: Increase activities as tolerated.  Musculoskeletal: ROM 2/5 to the right wrist/hand/fingers, distal from the elbow there is 2+ non pitting edema, the extremity is tender and warm. There is mottling, a dusky appearance, and purple discoloration to the fingertips of the right hand. See above hematology plan.      Lines tubes and drains:  Continue PICC line. Continue storm catheter. Continue NG tube.   Plan: Remain ICU     11/09/2019  The patient is postop day 9.  Status post coronary artery bypass grafting x3.  Postop course was complicated by an episode of hemodynamic instability and decompensation on postop day number 5.  Patient continues to make clinical progress since then.  She is off all drips.  She remains extubated.  Continue ICU care for now.    Neuro:  Patient is awake alert.  She is oriented x2.  She is still confused.  But she is making progress.  Her neuro exam is nonfocal.  She follows all commands.  Cardiac:  Patient continues to be hemodynamically stable.  She is somewhat hypertensive.  Will increase her antihypertensives.  Respiratory:  Patient is good sats on nasal cannula.  She remains extubated.  GI:  Patient was tolerating tube feeds until this morning.  She pulled heart feeding tube. Will get a swallowing evaluation.  Mode of nutrition will depend on results of  swallowing evaluation.  Renal:  Patient has good urine output %period% creatinine is 0.9.  Heme:  Hematocrit is 26 platelet is 46.  Patient is on argatroban  for anticoagulation due to right upper extremity DVTs.  Hypercoagulability workup is in progress.  Appreciate Hematology and vascular surgery input.  Id:  Incision continues to improve.  white count is 17.  Continue broad-spectrum antibiotics.  Endocrine:  Glucose is controlled on its sliding scale insulin.  Activities:  Advance activities as tolerated.  Line tubes and drains.  Patient has a PICC line, Jones catheter.    11/10/2019  The patient is postop day 10.  Status post coronary bypass grafting x3.  Postop course was complicated by an episode of hemodynamic instability and decompensation on postop day 5.  Patient remains hemodynamically stable.    Neuro:  The patient is awake and alert.  She is oriented x1.  She continues to be confused.  Her neuro exam is nonfocal.  Cardiac:  Patient continues to be hemodynamically stable.  His she is hypertensive.  Hypertensive medications will be increased.  Respiratory:  Patient has good sats on nasal cannula.  Continue pulmonary toilet.  GI:  Patient is tolerating a regular diet.  Renal:  Patient has good urine output. Creatinine is 0.7.  Heme: hematocrit is 24 and platelet is 67.  Patient continues on argatroban for right upper extremity DVTs.  Hypercoagulable workup is in progress.  Id:  Patient's white count is up to 20.  Patient is on broad prepped from antibiotics.  Endocrine:  The patient is on long-acting as well as sliding scale insulin.  Blood sugars have been well controlled.  Will decrease dose of long-acting.  Activities: patient has been out of bed to chair.  Advance as tolerated.  Musculoskeletal:  Patient's right upper extremity continues to be swollen  The index finger and thumb continues to be dusky with a bleb near the base of the thumb.  Continue argatroban for anticoagulation.  Continue elevation  of right upper extremity.  Lines tubes and drains: patient has a PICC line.  Will discontinue Jones catheter.        11/11/2019  The patient is post-operative day 11, status-post coronary artery bypass grafting x 3. Post-operative course was complicated by an episode of hemodynamic instability and decompensation on postop day 5.  Patient remains hemodynamically stable.    Neuro:  Patients neurologic state is improving. Oriented x 2. Alert, follows commands, makes eye contact. No focal deficits.    Cardiac:  Patient is hemodynamically improving. Hypertensive. Continue metoprolol, amlodipine, and losartan. Add chlorthalidone.   Respiratory: Good sats on room air. Continue IS. Continue pulmonary toilet.  GI:  Continue cardiac/diabetic diet, modified by puree and thick liquids with swallow precautions. Advance as tolerated. Continue metamucil.  Renal: Good urine output. Cr 0.8. K 3.8. Replace K. Continue IV diuresis. Add chlorthalidone. BMP at noon.   Hema:  Hct 23.8  Platelet 75. Hold aspirin. Hold Plavix. Repeat HIT panel pending. Multiple DVTs from the right IJ down to the brachiocephalic vein. Confirmed via vascular US. Patient on argatroban gtt, goal is APTT 2x baseline. Most recent APTT 83.3. Baseline APTT 50.8.  Hematology following. Vascular surgery following.   Id:  WBC is down to 19. Tmax 98.4. BC NGTD. Vancomycin and zosyn day 7. Discontinue broad spectrum antibiotics. Will continue to follow closely.    Endocrine:  Glucose controlled on sliding scale insulin and long acting insulin detemir.    Activities: Increase activities as tolerated.  Musculoskeletal: The right upper extremity exhibits swelling distal from the elbow. The RUE is tender to palpation and ROM. ROM is diminished to the right hand 2/5. The hand his reddened, swollen, with dusky, mottled, purple discoloration to the right thumb and tip of the right index finger. Blisters have formed on the proximal portion of the thumb and wrist. Severely  diminished capillary refill to the tip of the right thumb and tip of the right index finger. Wound care order placed.      Lines tubes and drains:  Continue PICC line.  Plan: Remain ICU

## 2019-11-11 NOTE — PLAN OF CARE
Pt exhibited delayed swallow with thin liquids and difficulty managing respiration and swallow. Recommend she remain on puree with nectar thick liquids.

## 2019-11-11 NOTE — PROGRESS NOTES
[facility] Hospital Medicine  Progress Note    Patient Name: Mateusz Bates  MRN: [id number]  Patient Class: IP- Inpatient   Admission Date: 10/28/2019  Length of Stay: 14 days  Attending Physician: Keny Zaidi MD  Primary Care Provider: Serenity Kilpatrick MD        Subjective:     Principal Problem:Acute deep vein thrombosis (DVT) of right upper extremity        HPI:  Mateusz Bates is a 69 year old female with DM II, hypertension and hyperlipidemia who presented to the ED at the request of her PCP due to finding of an abnormal EKG and complaints of chest tightness with SOB. The patient reports having intermittent symptoms for the last 1-2 months. On 10/26/19, she began having a tightness in her mid chest that radiated into her back with associated SOB. Symptoms were worse with laying flat and improved with sitting up. They lasted only minutes, but this was longer than previous episodes. She denies a trend in when these episodes occur as well as associated nausea, vomiting and diaphoresis. The patient's EKG was significant for a new LBBB compared to previous in 2016. Labs were significant for a BNP of 1,305, troponin of 3.665 and potassium of 3.4. Code status was discussed with the patient and her sister. She is a full code. Her sister, Christina Hernandez, is her surrogate medical decision maker.     Overview/Hospital Course:  Admitted for evaluation and treatment of chest pain concerning for acute coronary syndrome.  Evaluation by Cardiology and urgent left heart catheterization.  Angiogram discovered severe multi-vessel coronary artery disease.  Cardiac echo showed severely reduced LV function.  Referral to Cardiothoracic Surgery who recommended CABG.  Balloon pump placed 30 October.  CABG x3 by Dr. Zaidi 31 October.  Successfully extubated morning of 01 November.  11/2 - Patient improved s/p extubation. CABG x 3. Discussed with CC Team. BS mild elevation. SA and LA insulin initiated.  11/3 -  Improved BS control. Patient + chest tenderness. Tolerating diet. Discussed with cc team  11/4 - Patient with improved strength interactivity. Patient denies n/v/d + Sx site tenderness. Patient reports she is weakened.  11/5 - Patient denies CP. Reports she is weak. Denies SOB. Patient lethargic and clammy sitting in chair at bedside. Discussed with care team. Patient to return to bed with warming blankets, Blood Sugar 88 last check.   11/6 - Patient remains intubated R Hand cyanotic. Patient discussed with CC Team / CV sx.  11/7 - Patient in weaning trial. Extubation attempt this AM. R Hand improved blood flow - warmer to touch. Discussed with CC Team  11/8 - Patient improved Alert and appropriate. R Hand with improved blood flow / warmer.   11/9 - pulled NG tube out overnight. Will await swallow study.   11/10 - ST recommended pureed with thin liquids    Interval History: Patient denies any issues overnight. States that she is eating well.     Review of Systems   Constitutional: Positive for activity change and appetite change. Negative for unexpected weight change.   HENT: Negative.  Negative for trouble swallowing.    Eyes: Negative.    Respiratory: Negative.  Negative for cough, shortness of breath and wheezing.    Cardiovascular: Negative.  Negative for chest pain.   Gastrointestinal: Negative for abdominal distention, abdominal pain, nausea and vomiting.   Skin: Negative.    Neurological: Negative for dizziness and weakness.   All other systems reviewed and are negative.    Objective:     Vital Signs (Most Recent):  Temp: 97.6 °F (36.4 °C) (11/11/19 0800)  Pulse: 87 (11/11/19 0942)  Resp: 20 (11/11/19 0927)  BP: (!) 167/90 (11/11/19 0942)  SpO2: 100 % (11/11/19 0927) Vital Signs (24h Range):  Temp:  [97.4 °F (36.3 °C)-98.2 °F (36.8 °C)] 97.6 °F (36.4 °C)  Pulse:  [67-91] 87  Resp:  [18-31] 20  SpO2:  [94 %-100 %] 100 %  BP: (103-167)/(39-90) 167/90     Weight: 74.5 kg (164 lb 3.9 oz)  Body mass index is 31.03  kg/m².    Intake/Output Summary (Last 24 hours) at 11/11/2019 1041  Last data filed at 11/11/2019 0600  Gross per 24 hour   Intake 1127.51 ml   Output 1405 ml   Net -277.49 ml      Physical Exam   Constitutional: She is oriented to person, place, and time. She appears well-developed and well-nourished. No distress.   HENT:   Head: Normocephalic and atraumatic.   Cardiovascular: Normal rate, regular rhythm and normal heart sounds. Exam reveals no gallop and no friction rub.   No murmur heard.  Pulmonary/Chest: Effort normal and breath sounds normal. No stridor. No respiratory distress. She has no wheezes. She has no rales.   Abdominal: Soft. Bowel sounds are normal. She exhibits no distension and no mass. There is no tenderness. There is no guarding.   Musculoskeletal: She exhibits no edema.   Erythema and discoloration of right hand noted, tenderness with movement   Neurological: She is alert and oriented to person, place, and time.   Skin: She is not diaphoretic.       Significant Labs:   Recent Lab Results       11/11/19  0806   11/11/19  0800   11/11/19  0759   11/11/19  0410   11/11/19  0400        Anion Gap         9     aPTT     83.1  Comment:  aPTT therapeutic range = 39-69 seconds  Results confirmed, test repeated     83.3  Comment:  aPTT therapeutic range = 39-69 seconds     Baso #         0.05     Basophil%         0.3     BUN, Bld         16     Calcium         8.5     Chloride         105     CO2         23     Creatinine         0.8     Differential Method         Automated     eGFR if          >60     eGFR if non          >60  Comment:  Calculation used to obtain the estimated glomerular filtration  rate (eGFR) is the CKD-EPI equation.        Eos #         0.3     Eosinophil%         1.6     Glucose         185     Gran # (ANC)         16.0     Gran%         83.8     Hematocrit         23.8     Hemoglobin         7.5     Immature Grans (Abs)         0.33  Comment:  Mild  elevation in immature granulocytes is non specific and   can be seen in a variety of conditions including stress response,   acute inflammation, trauma and pregnancy. Correlation with other   laboratory and clinical findings is essential.       Immature Granulocytes         1.7     Lactate, Tray               Lymph #         1.2     Lymph%         6.0     Magnesium     1.9         MCH         29.0     MCHC         31.5     MCV         92     Mono #         1.3     Mono%         6.6     MPV         11.4     nRBC         0     Platelets         75     POCT Glucose 192     200       Potassium         3.8     RBC         2.59     RDW         16.0     Sodium         137     Vancomycin-Trough   14.4           WBC         19.05                      11/11/19  0042   11/11/19  0025   11/10/19  2027   11/10/19  2027   11/10/19  1639        Anion Gap               aPTT 79.3  Comment:  aPTT therapeutic range = 39-69 seconds     77.5  Comment:  aPTT therapeutic range = 39-69 seconds       Baso #               Basophil%               BUN, Bld               Calcium               Chloride               CO2               Creatinine               Differential Method               eGFR if                eGFR if non                Eos #               Eosinophil%               Glucose               Gran # (ANC)               Gran%               Hematocrit               Hemoglobin               Immature Grans (Abs)               Immature Granulocytes               Lactate, Tray               Lymph #               Lymph%               Magnesium               MCH               MCHC               MCV               Mono #               Mono%               MPV               nRBC               Platelets               POCT Glucose   249 265   162     Potassium               RBC               RDW               Sodium               Vancomycin-Trough               WBC                                11/10/19  1631    11/10/19  1156   11/10/19  1153        Anion Gap           aPTT 75.7  Comment:  aPTT therapeutic range = 39-69 seconds   79.5  Comment:  aPTT therapeutic range = 39-69 seconds     Baso # 0.05         Basophil% 0.2         BUN, Bld           Calcium           Chloride           CO2           Creatinine           Differential Method Automated         eGFR if            eGFR if non            Eos # 0.3         Eosinophil% 1.3         Glucose           Gran # (ANC) 17.5         Gran% 84.8         Hematocrit 24.4         Hemoglobin 7.7         Immature Grans (Abs) 0.34  Comment:  Mild elevation in immature granulocytes is non specific and   can be seen in a variety of conditions including stress response,   acute inflammation, trauma and pregnancy. Correlation with other   laboratory and clinical findings is essential.           Immature Granulocytes 1.7         Lactate, Tray     1.1  Comment:  Falsely low lactic acid results can be found in samples   containing >=13.0 mg/dL total bilirubin and/or >=3.5 mg/dL   direct bilirubin.       Lymph # 1.2         Lymph% 5.9         Magnesium           MCH 28.7         MCHC 31.6         MCV 91         Mono # 1.3         Mono% 6.1         MPV 11.8         nRBC 0         Platelets 60         POCT Glucose   248       Potassium           RBC 2.68         RDW 15.9         Sodium           Vancomycin-Trough           WBC 20.48             All pertinent labs within the past 24 hours have been reviewed.    Significant Imaging: I have reviewed all pertinent imaging results/findings within the past 24 hours.      Assessment/Plan:      * Acute deep vein thrombosis (DVT) of right upper extremity  11/9:  Cont argatroban infusion       Cardiogenic shock        Thrombocytopenia  11/2  Heme On on Consult  HIT panel  Likely drug induced  Monitor  11/3  Improved at 89 today  Monitor  11/5  Plts 73  Monitor  11/8  Argatroban  Heme Onc  Plts 45    11/9:  plt stable  today, no indication for transfusion  Cont to monitor     11/10:  Improving    11/11:  Stable, 75k today     Acute hypoxemic respiratory failure  11/2  Extubated  Monitor  Pulmonary CC    11/7  Intubated  Weaning trial in progress  Pulmonary CC    11/8  Extubated  Pulmonary  O2  Nebs    11/9:  Extubated  Sputum culture normal respiratory araceli  Blood cultures negative  Will consider de-escalating abx therapy tomorrow       S/P CABG x 3  11/4  CABG x 3  ASA  Statin  BB  CV sx    11/6  CV sx  Statin      11/7  Per CV sx    11/9-11/10  Management per cardiovascular surgery     CAD, multiple vessel        Acute combined systolic and diastolic CHF, NYHA class 2  Probable ischemic cardiomyopathy.  Anticipate CABG 31 October.  Optimize medical management.    NSTEMI (non-ST elevated myocardial infarction)  Troponin peaked at 3.66 now trending down.  Heparin, metoprolol, ASA and statin. Hold ACE inhibitor due to intolerance with cough. Hold ARB as patient's blood pressure is marginal. Will consider adding, if her blood pressure will tolerate.  Hold second antiplatelet due to pending Cardiology procedure.  Patient was evaluated for Cardiac Rehab and is not a candidate at this time, but will be referred, when appropriate.  LHC on 28 on October showed severe multi-vessel CAD.  Referral to CVT Surgery who recommended CABG planned for Thursday 31 October.  Balloon pump placed 30 October.  CABG x3 by Dr. Zaidi on 31 October.  11/2  S/p CABG  Cards  CT Sx  Statin  BB      Iron deficiency anemia  11/8  Hgb 8.6 today  FeSO4    11/9:  Anemia stable     11/10:  Will start po ferrous sulfate  Iron sat 10%   Continue to monitor  Transfuse if primary team deems appropriate     11/11:  H/H stable  Cont iron  Continue to monitor H/H       Hyperlipidemia associated with type 2 diabetes mellitus  -Patient was improved, but not at goal on 10/4/19.   -Continue statin.       Obesity (BMI 30.0-34.9)  Diet  Nutrition        Diabetes mellitus  type II, uncontrolled  -Hemoglobin A1C was 9 on 9/19/19.   -NISS.   -ADA diet.   11/2  Levemir / NISS / Accuchecks  11/3  Levemir increased to 15 sq bid  NISS  Accuchecks  11/4  Levemir  NISS  Accuchecks  11/5  Levemir 10 units BID  NISS  Accuchecks    11/6  UnControlled  Accuchecks  Insulin Gtt  Improved control  Last BS 99    11/7  Uncontrolled  Accuchecks  NISS    11/8  Uncontrolled  NISS  Accuchecks    11/9:  Glucose elevated   Takes 45 units of toujeo at home  Will start levemir 20 units qhs   Cont to monitor glucose   HSSI    11/11:  levemir 15 units daily  If need to increase can go to levemir 10 units bid     Anxiety and depression  -Stable.   -Continue Effexor.       Hypertension associated with diabetes  -Stable.   -Continue Norvasc.  11/2 -  BB , CCB  11/3 - Controlled  BB  CCB  11/4  Controlled  BB  CCB    11/5  Controlled  BB  CCB    11/6  On pressors  Hold Htn meds for now    11/7  Pressors  Monitor    11/9:  BP elevated this am however was just given PO meds   Added hydralazine PRN     11/10:  BP controlled         VTE Risk Mitigation (From admission, onward)         Ordered     argatroban 125 mg in sodium chloride 0.9% 125 mL infusion (conc: 1 mg/mL)  Continuous      11/11/19 0128     Reason for no Mechanical VTE Prophylaxis  Once      10/28/19 1414     heparin 25,000 units in dextrose 5% 250 mL (100 units/mL) infusion LOW INTENSITY nomogram - [facility]  Continuous      10/28/19 1257                Critical care time spent on the evaluation and treatment of severe organ dysfunction, review of pertinent labs and imaging studies, discussions with consulting providers and discussions with patient/family: 35 minutes.      King Concepcion MD  Department of Hospital Medicine   [facility]

## 2019-11-11 NOTE — ASSESSMENT & PLAN NOTE
November 11, 2019  --continue Agatroban; avoid Heparin products  --Will need to discharge on coumadin or direct oral anticoagulant such as Eliquis.  Recommend at least 3 months of anticoagulation  --continued Hematology follow up in outpatient setting

## 2019-11-11 NOTE — ASSESSMENT & PLAN NOTE
-Patient presented with intermittent chest tightness/heaviness that radiated to her back since Saturday evening  -Associated with JONES/orthopnea  -Initial troponin 3.665  -EKG showed SR with new LBBB  -Presentation consistent with NSTEMI  -Continue ASA, statin  -Start Toprol XL  -Start heparin gtt  -Check 2D echo  -Continue to trend troponin  -Keep NPO. [facility] today for further evaluation and PCI if indicated. All risks, benefits, and treatment alternatives explained to patient in detail. All questions answered. She has agreed to proceed. Further rec's to follow.    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 All risks, benefits, and treatment alternatives explained to patient in detail. All questions answered. She has agreed to proceed.    10/31/19  -Patient is s/p CABG x3.   See plan for CABG

## 2019-11-11 NOTE — PLAN OF CARE
Recommendations     Recommendation: 1. Add cardiac restriction to current diet order. 2. Continue ONS. 3. RD to f/u.    Goals: Initiate nutrition within 48-72 hrs   Nutrition Goal Status: goal met   Communication of RD Recs: (POC, sticky note)

## 2019-11-11 NOTE — ASSESSMENT & PLAN NOTE
-Patient is s/p CABG x3 with LIMA to LAD reverse saphenous vein graft to the RCA and reverse saphenous vein graft to the ramus.  -Continue post CABG supportive therapy  -Continue ASA, Plavix, Statin, BB  -Will continue to follow along     11/1/19  -POD #1 s/p CABG x3  -Continue ASA, Statin, BB  -Remains on Epi and Milrinone gtt today  -Mgmt per CT surgery  -Will follow along    11/2/19  -Recovering s/p CABG x 3  -Continue statin, BB  -Platelets 47,000 this AM; heme/onc on board, ASA held  -Await further rec's    11/3/19  -Stable, progressing post CABG x 3  -Continue statin, BB  -ASA re-started  -Platelets 89,000, heme/onc on board    11/4/19  -Patient slowly improving post CABG x3  -Continue ASA, Statin and BB  -plavix on hold due to thrombocytopenia that is improving   -IS use encouraged and early ambulation with PT  -Will continue to follow along     11/5/19  -Will continue ASA, Statin and BB  -Consider holding Plavix again for drop in plts after resuming by primary team   -Recommend CXR to rule out post op PNA given bump in WBC and cough  -will need to monitor for temp  -ambulate as tolerated and encourage IS use     11/06  Hold ASA, Plavix due to anemia  Hold BB due to cardiogenic shock    11/11/19  -Continue Argatroban infusion  -Continue BB, ARB, IV lasix, Chlorthalidone, Norvasc, NTG paste  -Hold ASA, Plavix due to anemia  -Further mgmt per CT surgery

## 2019-11-11 NOTE — ASSESSMENT & PLAN NOTE
11/2  Heme On on Consult  HIT panel  Likely drug induced  Monitor  11/3  Improved at 89 today  Monitor  11/5  Plts 73  Monitor  11/8  Argatroban  Heme Onc  Plts 45    11/9:  plt stable today, no indication for transfusion  Cont to monitor     11/10:  Improving    11/11:  Stable, 75k today

## 2019-11-11 NOTE — CONSULTS
11/11/19 0945   Handoff Report   Given To NIGHAT Blevins   Peripheral Neurovascular   Peripheral Neurovascular WDL ex   RUE Neurovascular Assessment   Color black;cyanotic;red  (purple, blistered, worse to thumb and index finger)   Temperature cool   Edema   Hand, Right Edema 4+ (Severe)   Skin   Skin WDL ex   Jackson Risk Assessment   Sensory Perception 4-->no impairment   Moisture 3-->occasionally moist   Activity 2-->chairfast   Mobility 3-->slightly limited   Nutrition 2-->probably inadequate   Friction and Shear 2-->potential problem   Jackson Score 16        Wound 11/11/19 Other (comment) Hand   Date First Assessed: 11/11/19   Primary Wound Type: (c) Other (comment)  Side: Right  Location: Hand   Wound Image     Wound WDL ex   Dressing Appearance Open to air   Drainage Amount Small   Drainage Characteristics/Odor Serous   Appearance Black;Purple;Maroon;Red;Blistered   Tissue loss description Not applicable   Black (%), Wound Tissue Color 25 %   Red (%), Wound Tissue Color 75 %   Periwound Area Swelling;Redness   Care Cleansed with:;Sterile normal saline;Applied:;Skin Barrier   Dressing Abd pad;Rolled gauze  (loosely)   Skin Interventions   Pressure Reduction Devices pressure-redistributing mattress utilized;positioning supports utilized;heel offloading device utilized;foam padding utilized   Pressure Reduction Techniques frequent weight shift encouraged;heels elevated off bed;positioned off wounds   Skin Protection adhesive use limited;incontinence pads utilized;skin sealant/moisture barrier applied;tubing/devices free from skin contact     Consulted on this 68 y/o F patient due to right hand ischemia and blistering. Patient was seen by wound care team last week due to blister to left cheek/lip region. This blister has reabsorbed and is now intact maroon discoloration, resolving. Bilateral feet assessed. Heels intact with no breakdown. Feet cool to touch and mottled. Patient no longer on IV pressors. She does  have an extensive DVT to RUE and currently on argatroban gtt (HIT panel pending) Right arm 4+ edema from fingers to elbow. Right thumb, palm, and index finger are black with overlying blisters, surrounding this, tissue fades to purple, maroon, then red with overlying blisters. Small amount serous weeping noted from small open blister over thumb. abd pad applied and secured loosely with kerlix due to weeping. RUE elevated on 2 pillows. Discussed with primary nurse NIGHAT Blevins, and secure chat message to Dr. Zaidi regarding hand. Recommend Nitroglycerin paste to Right hand BID to promote vasodilation, and protect from blisters opening. Appreciate vascular input. Patient will need to f/u with hand specialist after ischemia fully demarcates. Will follow closely.

## 2019-11-11 NOTE — PT/OT/SLP PROGRESS
"Occupational Therapy  Treatment    Mateusz Bates   MRN: [id number]   Admitting Diagnosis: Acute deep vein thrombosis (DVT) of right upper extremity    OT Date of Treatment: 11/11/19   OT Start Time: 0815  OT Stop Time: 0840  OT Total Time (min): 25 min    Billable Minutes:  Self Care/Home Management 10 minutes and Therapeutic Activity 15 minutes    General Precautions: Standard, fall  Orthopedic Precautions: N/A  Braces:           Subjective:  Communicated with nurse and epic chart review prior to session.    Pain/Comfort  Pain Rating 1: 3/10  Location - Side 1: Right  Location - Orientation 1: upper    Objective:        Functional Mobility:  Bed Mobility:       Transfers:        Functional Ambulation: pt req mod a x a few steps     Activities of Daily Living:     Feeding adaptive equipment: na   Grooming: mod a   UE adaptive equipment: max a     LE adaptive equipment: total a      Bathing adaptive equipment: na  Balance:   Static Sit: FAIR: Maintains without assist, but unable to take any challenges   Dynamic Sit: FAIR: Cannot move trunk without losing balance  Static Stand: POOR: Needs MODERATE assist to maintain  Dynamic stand: POOR: Needs MOD (moderate) assist during gaitx 2    AM-PAC 6 CLICK ADL   How much help from another person does this patient currently need?   1 = Unable, Total/Dependent Assistance  2 = A lot, Maximum/Moderate Assistance  3 = A little, Minimum/Contact Guard/Supervision  4 = None, Modified [place]/Independent    Putting on and taking off regular lower body clothing? : 2  Bathing (including washing, rinsing, drying)?: 2  Toileting, which includes using toilet, bedpan, or urinal? : 2  Putting on and taking off regular upper body clothing?: 2  Taking care of personal grooming such as brushing teeth?: 2  Eating meals?: 2  Daily Activity Total Score: 12     AM-PAC Raw Score CMS "G-Code Modifier Level of Impairment Assistance   6 % Total / Unable   7 - 8 CM 80 - 100% Maximal Assist "   9-13 CL 60 - 80% Moderate Assist   14 - 19 CK 40 - 60% Moderate Assist   20 - 22 CJ 20 - 40% Minimal Assist   23 CI 1-20% SBA / CGA   24 CH 0% Independent/ Mod I       Patient left up in chair with all lines intact, call button in reach and nurse king notified    ASSESSMENT:  Mateusz Bates is a 69 y.o. female with a medical diagnosis of Acute deep vein thrombosis (DVT) of right upper extremity and presents with debility and generalized weakness. Pt will continue to benefit from skilled OT.    Rehab identified problem list/impairments: Rehab identified problem list/impairments: weakness, impaired self care skills, impaired balance, decreased safety awareness, decreased coordination, impaired endurance, impaired functional mobilty, decreased upper extremity function, impaired sensation, gait instability    Rehab potential is good.    Activity tolerance: Good    Discharge recommendations: Discharge Facility/Level of Care Needs: rehabilitation facility     Barriers to discharge: Barriers to Discharge: Decreased caregiver support    Equipment recommendations: (tbd)     GOALS:   Multidisciplinary Problems     Occupational Therapy Goals        Problem: Occupational Therapy Goal    Goal Priority Disciplines Outcome Interventions   Occupational Therapy Goal     OT, PT/OT Ongoing, Not Progressing    Description:  Goals to be met by: 11/10/19     Patient will increase functional independence with ADLs by performing:    UE Dressing with Moderate Assistance.  Grooming while seated with Supervision.  Toileting from bedside commode with Moderate Assistance for hygiene and clothing management.   Toilet transfer to bedside commode with Moderate Assistance.  Upper extremity exercise program x10 reps per handout, with assistance as needed.                      Plan:  Patient to be seen 3 x/week to address the above listed problems via self-care/home management, therapeutic activities, therapeutic exercises  Plan of Care expires:  11/10/19  Plan of Care reviewed with: patient         Susan Joelzier, OT  11/11/2019

## 2019-11-11 NOTE — ASSESSMENT & PLAN NOTE
"-Continue Argatroban infusion  -RUE continues to be swollen   -Right hand remains ecchymotic   -Mgmt per CT surgery  Per Vascular Surgery:  "Hypercoagulable/HIT workup in progress, pt started on argatroban  Regarding RUE/central venous thrombus: Unfortunately, given location of clot and her recent major cardiac surgery any attempt at thrombolysis with tPA would be absolutely contraindicated  Open thrombectomy also not option as central clot could not be adequately cleared to allow for any outflow/drainage from the arm     Recommend cont anticoagulation, arm elevation, mild/moderate compression"  "

## 2019-11-11 NOTE — PLAN OF CARE
CM met with patient at the bedside.  Patient in chair at the bedside.  Patient agrees that plan remains SNF when medically stable     11/11/19 0908   Discharge Reassessment   Assessment Type Discharge Planning Reassessment   Provided patient/caregiver education on the expected discharge date and the discharge plan Yes   Do you have any problems affording any of your prescribed medications? No   Discharge Plan A Skilled Nursing Facility   DME Needed Upon Discharge  none   Patient choice form signed by patient/caregiver N/A   Anticipated Discharge Disposition SNF   Can the patient answer the patient profile reliably? Yes, cognitively intact

## 2019-11-11 NOTE — SUBJECTIVE & OBJECTIVE
Interval History:  11/11/2019  The patient is post-operative day 11.  Status post coronary artery bypass grafting x3.    ROS  Medications:  Continuous Infusions:   argatroban in 0.9 % sod chlor 3.5 mcg/kg/min (11/11/19 0600)     Scheduled Meds:   amLODIPine  10 mg Oral Daily    chlorhexidine  15 mL Mouth/Throat BID    chlorthalidone  25 mg Oral Daily    famotidine (PF)  20 mg Intravenous BID    ferrous sulfate  325 mg Oral BID    furosemide  20 mg Intravenous BID    insulin detemir U-100  15 Units Subcutaneous QHS    lactated ringers  1,000 mL Intravenous Once    losartan  50 mg Oral Daily    metoprolol tartrate  50 mg Oral BID    potassium chloride  10 mEq Oral BID    psyllium husk  1 packet Oral BID    sodium chloride  2 g Oral Q6H     PRN Meds:albumin human 5%, albuterol sulfate, Dextrose 10% Bolus, Dextrose 10% Bolus, glucagon (human recombinant), hydrALAZINE, insulin aspart U-100, iohexol, lactated ringers, magnesium sulfate IVPB, magnesium sulfate IVPB, metoclopramide HCl, ondansetron, potassium chloride in water **AND** potassium chloride in water **AND** potassium chloride in water, traMADol     Objective:     Vital Signs (Most Recent):  Temp: 97.6 °F (36.4 °C) (11/11/19 0800)  Pulse: 86 (11/11/19 0927)  Resp: 20 (11/11/19 0927)  BP: (!) 142/77 (11/11/19 0600)  SpO2: 100 % (11/11/19 0927) Vital Signs (24h Range):  Temp:  [97.4 °F (36.3 °C)-98.2 °F (36.8 °C)] 97.6 °F (36.4 °C)  Pulse:  [67-91] 86  Resp:  [18-31] 20  SpO2:  [94 %-100 %] 100 %  BP: (103-161)/(39-77) 142/77     Weight: 74.5 kg (164 lb 3.9 oz)  Body mass index is 31.03 kg/m².    SpO2: 100 %  O2 Device (Oxygen Therapy): room air    Intake/Output - Last 3 Shifts       11/09 0700 - 11/10 0659 11/10 0700 - 11/11 0659 11/11 0700 - 11/12 0659    P.O. 580 540     I.V. (mL/kg) 403.8 (5.5) 387.5 (5.2)     NG/GT       IV Piggyback 650 650     Total Intake(mL/kg) 1633.8 (22.2) 1577.5 (21.2)     Urine (mL/kg/hr) 2085 (1.2) 1850 (1)     Stool 0  0     Total Output 2085 1850     Net -451.2 -272.5            Stool Occurrence 1 x 4 x           Lines/Drains/Airways     Peripherally Inserted Central Catheter Line                 PICC Double Lumen 11/07/19 1645 left brachial;left basilic 3 days                Physical Exam   Constitutional: She appears well-developed and well-nourished. No distress.   HENT:   Head: Normocephalic and atraumatic.   Eyes: Pupils are equal, round, and reactive to light. EOM are normal.   Neck: Normal range of motion. Neck supple. No JVD present.   Cardiovascular: Normal rate, regular rhythm, normal heart sounds and intact distal pulses. Exam reveals no gallop and no friction rub.   No murmur heard.  Pulmonary/Chest: Effort normal and breath sounds normal. No stridor. No respiratory distress. She has no wheezes. She has no rales. She exhibits no tenderness.   Abdominal: Soft. Bowel sounds are normal. She exhibits no distension and no mass. There is no tenderness. There is no rebound and no guarding. No hernia.   Musculoskeletal: Normal range of motion. She exhibits edema and tenderness.   The right upper extremity exhibits swelling distal from the elbow. The RUE is tender to palpation and ROM. ROM is diminished to the right hand 2/5. The hand his reddened, swollen, with dusky, mottled, purple discoloration to the right thumb and tip of the right index finger. Blisters have formed on the proximal portion of the thumb and wrist. Severely diminished capillary refill to the tip of the right thumb and tip of the right index finger.   Neurological: She is alert. She displays normal reflexes. No cranial nerve deficit or sensory deficit. She exhibits normal muscle tone. Coordination normal.   Awake, Alert, Oriented to person, place, and situation disoriented to time. No focal deficits.   Skin: Skin is warm and dry. Capillary refill takes less than 2 seconds. Cap refill >3 seconds to the right thumb and right index finger. . No rash noted. She  is not diaphoretic. There is erythema. No pallor.   Psychiatric: She has a normal mood and affect. Her behavior is normal. Judgment and thought content normal.   Nursing note and vitals reviewed.      Significant Labs:  BMP:   Recent Labs   Lab 11/11/19 0400 11/11/19  0759   *  --      --    K 3.8  --      --    CO2 23  --    BUN 16  --    CREATININE 0.8  --    CALCIUM 8.5*  --    MG  --  1.9     CBC:   Recent Labs   Lab 11/11/19  0400   WBC 19.05*   RBC 2.59*   HGB 7.5*   HCT 23.8*   PLT 75*   MCV 92   MCH 29.0   MCHC 31.5*       Significant Diagnostics:  I have reviewed all pertinent imaging results/findings within the past 24 hours.

## 2019-11-11 NOTE — ASSESSMENT & PLAN NOTE
11/8  Hgb 8.6 today  FeSO4    11/9:  Anemia stable     11/10:  Will start po ferrous sulfate  Iron sat 10%   Continue to monitor  Transfuse if primary team deems appropriate     11/11:  H/H stable  Cont iron  Continue to monitor H/H

## 2019-11-11 NOTE — PROGRESS NOTES
Vasc/VSC    S: In chair, pillo, diet.  Complains of some soreness in hand.  No acute events    O: right hand cool, remains edematous with dusky discoloration thunmb/index/radial aspect of palm  2+ doppler ulnar and palmar arch  + doppler signals to the 3rd/4th/5th digits  Sensation appears grossly intact  Decreased motor secondary to edema    A/P:  No new recs  Cont argatroban  Limb elevation  Wound care per wound care team  Consider ortho-hand surgery evaluation/transfer once ischemic tissue fully demarcates and pt medically improved

## 2019-11-11 NOTE — PT/OT/SLP PROGRESS
Physical Therapy  Treatment    Mateusz Bates   MRN: [id number]   Admitting Diagnosis: Acute deep vein thrombosis (DVT) of right upper extremity    PT Received On: 10/31/19  PT Start Time: 1305     PT Stop Time: 1330    PT Total Time (min): 25 min       Billable Minutes:  Therapeutic Activity 15 and Therapeutic Exercise 10    Treatment Type: Treatment  PT/PTA: PT     PTA Visit Number: 2       General Precautions: Standard, aspiration, fall, sternal  Orthopedic Precautions: N/A   Braces: N/A    Spiritual, Cultural Beliefs, Confucianist Practices, Values that Affect Care: no    Subjective:  Communicated with NIGHAT Blevins prior to session. Patient slow to respond to commands/slow processing.      Pain/Comfort  Pain Rating 1: 3/10  Location - Side 1: Right  Location - Orientation 1: upper  Location 1: hand  Pain Addressed 1: Reposition, Cessation of Activity, Distraction  Pain Rating Post-Intervention 1: 0/10(at rest)    Objective:   Patient found with: blood pressure cuff, telemetry, peripheral IV, pulse ox (continuous), oxygen    Functional Mobility:  Bed Mobility: sit to supine max A x 2 and scooted up in bed max A x 2       Transfers: sit to stand from bedside chair mod A x 1-2 people cues for sternal prec.       Gait: 4 pivot steps mod A x 1-2 people for balance/small steps       Stairs:  n/a    Balance:   Static Sit: min A  Dynamic Sit: min/mod A  Static Stand: mod A  Dynamic stand: mod A x 1-2 people     Therapeutic Activities and Exercises:  PT educated patient on POC, safety/fall/sternal precautions with mobility and assisted patient on B LE supine TE x 10-20 reps with tc/vc's and PT demo for correct technique.     AM-PAC 6 CLICK MOBILITY  How much help from another person does this patient currently need?   1 = Unable, Total/Dependent Assistance  2 = A lot, Maximum/Moderate Assistance  3 = A little, Minimum/Contact Guard/Supervision  4 = None, Modified [place]/Independent    Turning over in bed (including  adjusting bedclothes, sheets and blankets)?: 3  Sitting down on and standing up from a chair with arms (e.g., wheelchair, bedside commode, etc.): 2  Moving from lying on back to sitting on the side of the bed?: 1  Moving to and from a bed to a chair (including a wheelchair)?: 2  Need to walk in hospital room?: 2  Climbing 3-5 steps with a railing?: 1  Basic Mobility Total Score: 11    AM-PAC Raw Score CMS G-Code Modifier Level of Impairment Assistance   6 % Total / Unable   7 - 9 CM 80 - 100% Maximal Assist   10 - 14 CL 60 - 80% Moderate Assist   15 - 19 CK 40 - 60% Moderate Assist   20 - 22 CJ 20 - 40% Minimal Assist   23 CI 1-20% SBA / CGA   24 CH 0% Independent/ Mod I     Patient left HOB elevated with all lines intact, call button in reach and RN notified.    Assessment:  Mateusz Bates is a 69 y.o. female with a medical diagnosis of Acute deep vein thrombosis (DVT) of right upper extremity and presents with impaired mobility.    Rehab identified problem list/impairments: Rehab identified problem list/impairments: weakness, impaired functional mobilty, impaired balance, decreased safety awareness, decreased upper extremity function, impaired coordination, impaired cognition, impaired endurance, edema, pain, decreased ROM, impaired fine motor, other (comment), impaired cardiopulmonary response to activity, decreased lower extremity function, gait instability, impaired self care skills(sternal)    Rehab potential is good.    Activity tolerance: Fair    Discharge recommendations: Discharge Facility/Level of Care Needs: rehabilitation facility     Barriers to discharge:      Equipment recommendations: Equipment Needed After Discharge: (tbd by next level of care)     GOALS:   Multidisciplinary Problems     Physical Therapy Goals        Problem: Physical Therapy Goal    Goal Priority Disciplines Outcome Goal Variances Interventions   Physical Therapy Goal     PT, PT/OT Ongoing, Progressing     Description:  By  11/10/19  1. Patient will perform supine to/from sit CGA x 1 per sternal prec.  2. Patient will perform sit to/from stand CGA a no AD per sternal prec.  3. Patient will amb 100ft no AD min A                     PLAN:    Patient to be seen 5 x/week  to address the above listed problems via gait training, therapeutic activities, therapeutic exercises  Plan of Care expires: 11/18/19  Plan of Care reviewed with: patient         Emigdio Bean, PT  11/11/2019

## 2019-11-11 NOTE — SUBJECTIVE & OBJECTIVE
Interval History: Patient denies any issues overnight. States that she is eating well.     Review of Systems   Constitutional: Positive for activity change and appetite change. Negative for unexpected weight change.   HENT: Negative.  Negative for trouble swallowing.    Eyes: Negative.    Respiratory: Negative.  Negative for cough, shortness of breath and wheezing.    Cardiovascular: Negative.  Negative for chest pain.   Gastrointestinal: Negative for abdominal distention, abdominal pain, nausea and vomiting.   Skin: Negative.    Neurological: Negative for dizziness and weakness.   All other systems reviewed and are negative.    Objective:     Vital Signs (Most Recent):  Temp: 97.6 °F (36.4 °C) (11/11/19 0800)  Pulse: 87 (11/11/19 0942)  Resp: 20 (11/11/19 0927)  BP: (!) 167/90 (11/11/19 0942)  SpO2: 100 % (11/11/19 0927) Vital Signs (24h Range):  Temp:  [97.4 °F (36.3 °C)-98.2 °F (36.8 °C)] 97.6 °F (36.4 °C)  Pulse:  [67-91] 87  Resp:  [18-31] 20  SpO2:  [94 %-100 %] 100 %  BP: (103-167)/(39-90) 167/90     Weight: 74.5 kg (164 lb 3.9 oz)  Body mass index is 31.03 kg/m².    Intake/Output Summary (Last 24 hours) at 11/11/2019 1041  Last data filed at 11/11/2019 0600  Gross per 24 hour   Intake 1127.51 ml   Output 1405 ml   Net -277.49 ml      Physical Exam   Constitutional: She is oriented to person, place, and time. She appears well-developed and well-nourished. No distress.   HENT:   Head: Normocephalic and atraumatic.   Cardiovascular: Normal rate, regular rhythm and normal heart sounds. Exam reveals no gallop and no friction rub.   No murmur heard.  Pulmonary/Chest: Effort normal and breath sounds normal. No stridor. No respiratory distress. She has no wheezes. She has no rales.   Abdominal: Soft. Bowel sounds are normal. She exhibits no distension and no mass. There is no tenderness. There is no guarding.   Musculoskeletal: She exhibits no edema.   Erythema and discoloration of right hand noted, tenderness with  movement   Neurological: She is alert and oriented to person, place, and time.   Skin: She is not diaphoretic.       Significant Labs:   Recent Lab Results       11/11/19  0806   11/11/19  0800   11/11/19  0759   11/11/19  0410   11/11/19  0400        Anion Gap         9     aPTT     83.1  Comment:  aPTT therapeutic range = 39-69 seconds  Results confirmed, test repeated     83.3  Comment:  aPTT therapeutic range = 39-69 seconds     Baso #         0.05     Basophil%         0.3     BUN, Bld         16     Calcium         8.5     Chloride         105     CO2         23     Creatinine         0.8     Differential Method         Automated     eGFR if          >60     eGFR if non          >60  Comment:  Calculation used to obtain the estimated glomerular filtration  rate (eGFR) is the CKD-EPI equation.        Eos #         0.3     Eosinophil%         1.6     Glucose         185     Gran # (ANC)         16.0     Gran%         83.8     Hematocrit         23.8     Hemoglobin         7.5     Immature Grans (Abs)         0.33  Comment:  Mild elevation in immature granulocytes is non specific and   can be seen in a variety of conditions including stress response,   acute inflammation, trauma and pregnancy. Correlation with other   laboratory and clinical findings is essential.       Immature Granulocytes         1.7     Lactate, Tray               Lymph #         1.2     Lymph%         6.0     Magnesium     1.9         MCH         29.0     MCHC         31.5     MCV         92     Mono #         1.3     Mono%         6.6     MPV         11.4     nRBC         0     Platelets         75     POCT Glucose 192     200       Potassium         3.8     RBC         2.59     RDW         16.0     Sodium         137     Vancomycin-Trough   14.4           WBC         19.05                      11/11/19  0042   11/11/19  0025   11/10/19  2027   11/10/19  2027   11/10/19  1639        Anion Gap                aPTT 79.3  Comment:  aPTT therapeutic range = 39-69 seconds     77.5  Comment:  aPTT therapeutic range = 39-69 seconds       Baso #               Basophil%               BUN, Bld               Calcium               Chloride               CO2               Creatinine               Differential Method               eGFR if                eGFR if non                Eos #               Eosinophil%               Glucose               Gran # (ANC)               Gran%               Hematocrit               Hemoglobin               Immature Grans (Abs)               Immature Granulocytes               Lactate, Tray               Lymph #               Lymph%               Magnesium               MCH               MCHC               MCV               Mono #               Mono%               MPV               nRBC               Platelets               POCT Glucose   249 265   162     Potassium               RBC               RDW               Sodium               Vancomycin-Trough               WBC                                11/10/19  1631   11/10/19  1156   11/10/19  1153        Anion Gap           aPTT 75.7  Comment:  aPTT therapeutic range = 39-69 seconds   79.5  Comment:  aPTT therapeutic range = 39-69 seconds     Baso # 0.05         Basophil% 0.2         BUN, Bld           Calcium           Chloride           CO2           Creatinine           Differential Method Automated         eGFR if            eGFR if non            Eos # 0.3         Eosinophil% 1.3         Glucose           Gran # (ANC) 17.5         Gran% 84.8         Hematocrit 24.4         Hemoglobin 7.7         Immature Grans (Abs) 0.34  Comment:  Mild elevation in immature granulocytes is non specific and   can be seen in a variety of conditions including stress response,   acute inflammation, trauma and pregnancy. Correlation with other   laboratory and clinical findings is essential.            Immature Granulocytes 1.7         Lactate, Tray     1.1  Comment:  Falsely low lactic acid results can be found in samples   containing >=13.0 mg/dL total bilirubin and/or >=3.5 mg/dL   direct bilirubin.       Lymph # 1.2         Lymph% 5.9         Magnesium           MCH 28.7         MCHC 31.6         MCV 91         Mono # 1.3         Mono% 6.1         MPV 11.8         nRBC 0         Platelets 60         POCT Glucose   248       Potassium           RBC 2.68         RDW 15.9         Sodium           Vancomycin-Trough           WBC 20.48             All pertinent labs within the past 24 hours have been reviewed.    Significant Imaging: I have reviewed all pertinent imaging results/findings within the past 24 hours.

## 2019-11-12 PROBLEM — E87.1 HYPONATREMIA: Status: RESOLVED | Noted: 2019-11-07 | Resolved: 2019-11-12

## 2019-11-12 LAB
ANION GAP SERPL CALC-SCNC: 11 MMOL/L (ref 8–16)
ANION GAP SERPL CALC-SCNC: 7 MMOL/L (ref 8–16)
APTT BLDCRRT: 63.5 SEC (ref 21–32)
APTT BLDCRRT: 71.4 SEC (ref 21–32)
APTT BLDCRRT: 78.1 SEC (ref 21–32)
APTT BLDCRRT: 78.1 SEC (ref 21–32)
APTT BLDCRRT: 83.4 SEC (ref 21–32)
APTT BLDCRRT: 84.6 SEC (ref 21–32)
BASOPHILS # BLD AUTO: 0.03 K/UL (ref 0–0.2)
BASOPHILS # BLD AUTO: 0.03 K/UL (ref 0–0.2)
BASOPHILS NFR BLD: 0.2 % (ref 0–1.9)
BASOPHILS NFR BLD: 0.2 % (ref 0–1.9)
BUN SERPL-MCNC: 14 MG/DL (ref 8–23)
BUN SERPL-MCNC: 18 MG/DL (ref 8–23)
CALCIUM SERPL-MCNC: 8.4 MG/DL (ref 8.7–10.5)
CALCIUM SERPL-MCNC: 8.8 MG/DL (ref 8.7–10.5)
CHLORIDE SERPL-SCNC: 103 MMOL/L (ref 95–110)
CHLORIDE SERPL-SCNC: 105 MMOL/L (ref 95–110)
CO2 SERPL-SCNC: 20 MMOL/L (ref 23–29)
CO2 SERPL-SCNC: 26 MMOL/L (ref 23–29)
CREAT SERPL-MCNC: 0.8 MG/DL (ref 0.5–1.4)
CREAT SERPL-MCNC: 0.8 MG/DL (ref 0.5–1.4)
DEPRECATED SRA 0.1U/ML PORCINE HEPARIN S: 100 %
DEPRECATED SRA 100U/ML PORCINE HEPARIN S: 8 %
DIFFERENTIAL METHOD: ABNORMAL
DIFFERENTIAL METHOD: ABNORMAL
EOSINOPHIL # BLD AUTO: 0.2 K/UL (ref 0–0.5)
EOSINOPHIL # BLD AUTO: 0.2 K/UL (ref 0–0.5)
EOSINOPHIL NFR BLD: 1.1 % (ref 0–8)
EOSINOPHIL NFR BLD: 1.4 % (ref 0–8)
ERYTHROCYTE [DISTWIDTH] IN BLOOD BY AUTOMATED COUNT: 16.2 % (ref 11.5–14.5)
ERYTHROCYTE [DISTWIDTH] IN BLOOD BY AUTOMATED COUNT: 16.6 % (ref 11.5–14.5)
EST. GFR  (AFRICAN AMERICAN): >60 ML/MIN/1.73 M^2
EST. GFR  (AFRICAN AMERICAN): >60 ML/MIN/1.73 M^2
EST. GFR  (NON AFRICAN AMERICAN): >60 ML/MIN/1.73 M^2
EST. GFR  (NON AFRICAN AMERICAN): >60 ML/MIN/1.73 M^2
GLUCOSE SERPL-MCNC: 143 MG/DL (ref 70–110)
GLUCOSE SERPL-MCNC: 333 MG/DL (ref 70–110)
HAPTOGLOB SERPL-MCNC: 187 MG/DL (ref 30–250)
HCT VFR BLD AUTO: 23.1 % (ref 37–48.5)
HCT VFR BLD AUTO: 23.2 % (ref 37–48.5)
HGB BLD-MCNC: 7.3 G/DL (ref 12–16)
HGB BLD-MCNC: 7.3 G/DL (ref 12–16)
IMM GRANULOCYTES # BLD AUTO: 0.36 K/UL (ref 0–0.04)
IMM GRANULOCYTES # BLD AUTO: 0.41 K/UL (ref 0–0.04)
IMM GRANULOCYTES NFR BLD AUTO: 2.4 % (ref 0–0.5)
IMM GRANULOCYTES NFR BLD AUTO: 2.4 % (ref 0–0.5)
INR PPP: 4.3 (ref 0.8–1.2)
LYMPHOCYTES # BLD AUTO: 1.1 K/UL (ref 1–4.8)
LYMPHOCYTES # BLD AUTO: 1.2 K/UL (ref 1–4.8)
LYMPHOCYTES NFR BLD: 6.7 % (ref 18–48)
LYMPHOCYTES NFR BLD: 7.4 % (ref 18–48)
MAGNESIUM SERPL-MCNC: 2.1 MG/DL (ref 1.6–2.6)
MCH RBC QN AUTO: 29.1 PG (ref 27–31)
MCH RBC QN AUTO: 29.3 PG (ref 27–31)
MCHC RBC AUTO-ENTMCNC: 31.5 G/DL (ref 32–36)
MCHC RBC AUTO-ENTMCNC: 31.6 G/DL (ref 32–36)
MCV RBC AUTO: 92 FL (ref 82–98)
MCV RBC AUTO: 93 FL (ref 82–98)
MONOCYTES # BLD AUTO: 0.9 K/UL (ref 0.3–1)
MONOCYTES # BLD AUTO: 1.1 K/UL (ref 0.3–1)
MONOCYTES NFR BLD: 6.1 % (ref 4–15)
MONOCYTES NFR BLD: 6.1 % (ref 4–15)
NEUTROPHILS # BLD AUTO: 12.5 K/UL (ref 1.8–7.7)
NEUTROPHILS # BLD AUTO: 14.5 K/UL (ref 1.8–7.7)
NEUTROPHILS NFR BLD: 82.5 % (ref 38–73)
NEUTROPHILS NFR BLD: 83.5 % (ref 38–73)
NRBC BLD-RTO: 0 /100 WBC
NRBC BLD-RTO: 0 /100 WBC
PLATELET # BLD AUTO: 75 K/UL (ref 150–350)
PLATELET # BLD AUTO: 89 K/UL (ref 150–350)
PMV BLD AUTO: 11.3 FL (ref 9.2–12.9)
PMV BLD AUTO: 11.3 FL (ref 9.2–12.9)
POCT GLUCOSE: 120 MG/DL (ref 70–110)
POCT GLUCOSE: 132 MG/DL (ref 70–110)
POCT GLUCOSE: 134 MG/DL (ref 70–110)
POCT GLUCOSE: 141 MG/DL (ref 70–110)
POCT GLUCOSE: 143 MG/DL (ref 70–110)
POCT GLUCOSE: 196 MG/DL (ref 70–110)
POCT GLUCOSE: 20 MG/DL (ref 70–110)
POCT GLUCOSE: 217 MG/DL (ref 70–110)
POCT GLUCOSE: 298 MG/DL (ref 70–110)
POCT GLUCOSE: 312 MG/DL (ref 70–110)
POCT GLUCOSE: 46 MG/DL (ref 70–110)
POCT GLUCOSE: 83 MG/DL (ref 70–110)
POTASSIUM SERPL-SCNC: 3.9 MMOL/L (ref 3.5–5.1)
POTASSIUM SERPL-SCNC: 4.5 MMOL/L (ref 3.5–5.1)
PROCALCITONIN SERPL IA-MCNC: 0.75 NG/ML
PROTHROMBIN TIME: 44.4 SEC (ref 9–12.5)
RBC # BLD AUTO: 2.49 M/UL (ref 4–5.4)
RBC # BLD AUTO: 2.51 M/UL (ref 4–5.4)
SODIUM SERPL-SCNC: 134 MMOL/L (ref 136–145)
SODIUM SERPL-SCNC: 138 MMOL/L (ref 136–145)
SRA PORCINE INTERP SER-IMP: POSITIVE
VIT B12 SERPL-MCNC: 883 PG/ML (ref 210–950)
WBC # BLD AUTO: 15.16 K/UL (ref 3.9–12.7)
WBC # BLD AUTO: 17.4 K/UL (ref 3.9–12.7)

## 2019-11-12 PROCEDURE — 27000221 HC OXYGEN, UP TO 24 HOURS

## 2019-11-12 PROCEDURE — 97535 SELF CARE MNGMENT TRAINING: CPT

## 2019-11-12 PROCEDURE — 94761 N-INVAS EAR/PLS OXIMETRY MLT: CPT

## 2019-11-12 PROCEDURE — 85025 COMPLETE CBC W/AUTO DIFF WBC: CPT

## 2019-11-12 PROCEDURE — 99232 PR SUBSEQUENT HOSPITAL CARE,LEVL II: ICD-10-PCS | Mod: ,,, | Performed by: INTERNAL MEDICINE

## 2019-11-12 PROCEDURE — 25000003 PHARM REV CODE 250: Performed by: THORACIC SURGERY (CARDIOTHORACIC VASCULAR SURGERY)

## 2019-11-12 PROCEDURE — 25000003 PHARM REV CODE 250: Performed by: INTERNAL MEDICINE

## 2019-11-12 PROCEDURE — 63600175 PHARM REV CODE 636 W HCPCS: Performed by: NURSE PRACTITIONER

## 2019-11-12 PROCEDURE — 99233 SBSQ HOSP IP/OBS HIGH 50: CPT | Mod: ,,, | Performed by: INTERNAL MEDICINE

## 2019-11-12 PROCEDURE — 21400001 HC TELEMETRY ROOM

## 2019-11-12 PROCEDURE — 97110 THERAPEUTIC EXERCISES: CPT

## 2019-11-12 PROCEDURE — 25000003 PHARM REV CODE 250: Performed by: FAMILY MEDICINE

## 2019-11-12 PROCEDURE — 92526 ORAL FUNCTION THERAPY: CPT

## 2019-11-12 PROCEDURE — 94799 UNLISTED PULMONARY SVC/PX: CPT

## 2019-11-12 PROCEDURE — 25000003 PHARM REV CODE 250: Performed by: NURSE PRACTITIONER

## 2019-11-12 PROCEDURE — 84145 PROCALCITONIN (PCT): CPT

## 2019-11-12 PROCEDURE — 85730 THROMBOPLASTIN TIME PARTIAL: CPT

## 2019-11-12 PROCEDURE — 99900035 HC TECH TIME PER 15 MIN (STAT)

## 2019-11-12 PROCEDURE — 99233 PR SUBSEQUENT HOSPITAL CARE,LEVL III: ICD-10-PCS | Mod: ,,, | Performed by: INTERNAL MEDICINE

## 2019-11-12 PROCEDURE — 99232 SBSQ HOSP IP/OBS MODERATE 35: CPT | Mod: ,,, | Performed by: INTERNAL MEDICINE

## 2019-11-12 PROCEDURE — 85730 THROMBOPLASTIN TIME PARTIAL: CPT | Mod: 91

## 2019-11-12 PROCEDURE — 83735 ASSAY OF MAGNESIUM: CPT

## 2019-11-12 PROCEDURE — 97116 GAIT TRAINING THERAPY: CPT

## 2019-11-12 PROCEDURE — 85610 PROTHROMBIN TIME: CPT

## 2019-11-12 PROCEDURE — 97530 THERAPEUTIC ACTIVITIES: CPT

## 2019-11-12 PROCEDURE — 80048 BASIC METABOLIC PNL TOTAL CA: CPT

## 2019-11-12 PROCEDURE — 92507 TX SP LANG VOICE COMM INDIV: CPT

## 2019-11-12 PROCEDURE — 63600175 PHARM REV CODE 636 W HCPCS: Mod: JG | Performed by: THORACIC SURGERY (CARDIOTHORACIC VASCULAR SURGERY)

## 2019-11-12 RX ORDER — PANTOPRAZOLE SODIUM 40 MG/1
40 TABLET, DELAYED RELEASE ORAL DAILY
Status: DISCONTINUED | OUTPATIENT
Start: 2019-11-12 | End: 2019-11-20 | Stop reason: HOSPADM

## 2019-11-12 RX ORDER — FUROSEMIDE 10 MG/ML
40 INJECTION INTRAMUSCULAR; INTRAVENOUS ONCE
Status: COMPLETED | OUTPATIENT
Start: 2019-11-12 | End: 2019-11-12

## 2019-11-12 RX ADMIN — Medication 2 G: at 12:11

## 2019-11-12 RX ADMIN — PANTOPRAZOLE SODIUM 40 MG: 40 TABLET, DELAYED RELEASE ORAL at 10:11

## 2019-11-12 RX ADMIN — CHLORTHALIDONE 25 MG: 25 TABLET ORAL at 09:11

## 2019-11-12 RX ADMIN — METOPROLOL TARTRATE 50 MG: 50 TABLET, FILM COATED ORAL at 09:11

## 2019-11-12 RX ADMIN — INSULIN ASPART 6 UNITS: 100 INJECTION, SOLUTION INTRAVENOUS; SUBCUTANEOUS at 05:11

## 2019-11-12 RX ADMIN — DEXTROSE 250 ML: 10 SOLUTION INTRAVENOUS at 01:11

## 2019-11-12 RX ADMIN — LOSARTAN POTASSIUM 50 MG: 50 TABLET ORAL at 09:11

## 2019-11-12 RX ADMIN — Medication 2 G: at 11:11

## 2019-11-12 RX ADMIN — ARGATROBAN 4 MCG/KG/MIN: 125 SOLUTION INTRAVENOUS at 10:11

## 2019-11-12 RX ADMIN — PSYLLIUM HUSK 6 G: 6 GRANULE ORAL at 09:11

## 2019-11-12 RX ADMIN — FERROUS SULFATE TAB EC 325 MG (65 MG FE EQUIVALENT) 325 MG: 325 (65 FE) TABLET DELAYED RESPONSE at 08:11

## 2019-11-12 RX ADMIN — DEXTROSE 250 ML: 10 SOLUTION INTRAVENOUS at 12:11

## 2019-11-12 RX ADMIN — METOPROLOL TARTRATE 50 MG: 50 TABLET, FILM COATED ORAL at 08:11

## 2019-11-12 RX ADMIN — AMLODIPINE BESYLATE 10 MG: 10 TABLET ORAL at 09:11

## 2019-11-12 RX ADMIN — Medication 2 G: at 05:11

## 2019-11-12 RX ADMIN — PSYLLIUM HUSK 6 G: 6 GRANULE ORAL at 08:11

## 2019-11-12 RX ADMIN — INSULIN ASPART 4 UNITS: 100 INJECTION, SOLUTION INTRAVENOUS; SUBCUTANEOUS at 08:11

## 2019-11-12 RX ADMIN — CHLORHEXIDINE GLUCONATE 0.12% ORAL RINSE 15 ML: 1.2 LIQUID ORAL at 09:11

## 2019-11-12 RX ADMIN — ARGATROBAN 4.5 MCG/KG/MIN: 125 SOLUTION INTRAVENOUS at 08:11

## 2019-11-12 RX ADMIN — NITROGLYCERIN 0.5 INCH: 20 OINTMENT TOPICAL at 08:11

## 2019-11-12 RX ADMIN — NITROGLYCERIN 0.5 INCH: 20 OINTMENT TOPICAL at 09:11

## 2019-11-12 RX ADMIN — ARGATROBAN 4.5 MCG/KG/MIN: 125 SOLUTION INTRAVENOUS at 05:11

## 2019-11-12 RX ADMIN — ARGATROBAN 4 MCG/KG/MIN: 125 SOLUTION INTRAVENOUS at 05:11

## 2019-11-12 RX ADMIN — CHLORHEXIDINE GLUCONATE 0.12% ORAL RINSE 15 ML: 1.2 LIQUID ORAL at 08:11

## 2019-11-12 RX ADMIN — FERROUS SULFATE TAB EC 325 MG (65 MG FE EQUIVALENT) 325 MG: 325 (65 FE) TABLET DELAYED RESPONSE at 09:11

## 2019-11-12 RX ADMIN — FUROSEMIDE 40 MG: 10 INJECTION, SOLUTION INTRAMUSCULAR; INTRAVENOUS at 08:11

## 2019-11-12 NOTE — NURSING
Pt placed on remote tele monitor, which signal is being received, and transferred via bed to room 216; bedside report given to NIGHAT Holt; REJI; MAVERICK.

## 2019-11-12 NOTE — CONSULTS
CM sent a referral to [facility] for review and to find out if they can still accept patient.  Per RADHA Pina, patient should be ready to discharge in 24-48 hours.

## 2019-11-12 NOTE — ASSESSMENT & PLAN NOTE
11/8  Hgb 8.6 today  FeSO4    11/9:  Anemia stable     11/10:  Will start po ferrous sulfate  Iron sat 10%   Continue to monitor  Transfuse if primary team deems appropriate     11/11:  H/H stable  Cont iron  Continue to monitor H/H     11/12:  H/H 7.3/23, cont to monitor  Cont PO iron

## 2019-11-12 NOTE — PLAN OF CARE
PATIENT DID WELL WITH SIT TO STAND, STAND TO SIT AT MIN ASSIST X2 , AT X4 REPS,  MAX CUES FOR MAINTAINING STERNAL PERCAUTIONS DURING T/FS.

## 2019-11-12 NOTE — PROGRESS NOTES
After CBG increased to 134 after initial dose of Dextrose 25gm, it has now decreased back down to 46.  Patient arouses to name, but is lethargic and only following simple commands.  2nd dose of Dextrose 25gm in process of infusing.

## 2019-11-12 NOTE — ASSESSMENT & PLAN NOTE
November 11, 2019  --Platelet count 75, improving. Transfuse if < 10   --No S&S active bleed. Hemoglobin remains stable. Hg 7.5. No urgent need for blood transfusion. Monitor  --suspect HIT versus TTP  -Awaiting results of Sahra HIT test and [id number]  --Continue Argatroban gtt  --avoid heparin containing products.  --Check Vitamin B 12 level to r/o B 12 deficiency as etiology of decreased mental status  --Check LDH/Haptologlobin  to rule out intravascular hemolysis  --Supportive care    November 12, 2019  --ROSANNA positive  --Avoid heparin containing products  --Continue Argatroban gtt for DVT. Add Coumadin once platelet count > 100K. Would need to achieve target INR 4 prior to discontinuing Argatroban gtt. Once Argatroban gtt discontinued goal INR will be 2.5-3.5 on Coumadin. Once INR therapeutic she may continue to follow up in the outpatient Heme-Onc clinic and Coumadin clinic for continued monitoring of her Coumadin levels. Will need anticoagulation for at least 3 months  --B 12 deficiency ruled out. Mentation improving  --Haptoglobin normal r/o intravascular hemolysis   --CBC stable. No urgent indication for any blood product transfusion at present time  --Await recovery of platelet function  --Supportive care    November 13, 2019  --Argatroban supply available for 24 more hours. Will continue Argatroban x 24 hours. Check CBC in am. If platelet count < 100K will start Xarelto. If platelet count >100K will start Coumadin. Await am CBC results.  Will plan for anticoagulation for at least 3 months.

## 2019-11-12 NOTE — ASSESSMENT & PLAN NOTE
11/01/2019  The patient is post-operative day 1, status-post coronary artery bypass grafting x 3.  Overall the patient is making progress, however IABP and inotropic agents will continue today.   Neuro:  The patient is awake alert and oriented x3.  Neuro exam is nonfocal.  Pain is well controlled with IV pain medication.  Cardiac:  Patient is hemodynamically improving. Patient remains on epinephrine and milrinone gtts, which will be weaned over the next 24 hours.  Cardiac index is improving. IABP will remain in place over the next 24 hours.  Patient will be started on beta blockers once gtts are weaned off.  Respiratory: Good sats on high flow nasal cannula. Continue respiratory toilet, continue incentive spirometer. Wean to low flow nasal cannula.  GI:  Diet: sips of water with medications, will slowly advance diet as tolerated. Benign abdominal exam.  Renal:  Urine output is slowly improving. Cr 0.9. K 4.3. Mag 1.6. Replace mag. Continue IV diuresis. Add metolazone.   Heme:  Hct 34.1  Platelet 82. Continue aspirin. Discontinue Plavix for now.  Id:  WBC 12. Tmax 100.3. Continue post-operative antibiotics. Will continue to follow.   Endocrine:  Glucose is controlled with insulin gtt.  Continue insulin gtt for now, until weaned off inotropic agents.   Activities:  Patient is currently bed bound due to IABP.  Advance activities as tolerated once IABP is removed.   Lines tubes and drains:  Continue IABP. Continue [place] and Cord[place].  Continue A-line.  Chest tubes will continue. NICOLASA x 2 will continue. Jones catheter will continue.  Continue pacer wires. Continue prevena wound vac.  Plan: Remain ICU.          11/02/2019  The patient is post-operative day 2, status-post coronary artery bypass grafting x 3.  Overall the patient is making progress, IABP removed and inotropic agents discontinued.   Neuro:  The patient is awake alert and oriented x3.  Neuro exam is nonfocal.  Pain is well controlled. Discontinue fentanyl.    Cardiac:  Patient is hemodynamically improving. Inotropic gtts discontinued. Excellent cardiac index. IABP discontinued.  Patient will be started on beta blockers today.  Respiratory: Good sats on high flow nasal cannula. Continue respiratory toilet, continue incentive spirometer. Wean to low flow nasal cannula.  GI:  Advance diabetic/cardiac diet as tolerated. Benign abdominal exam.  Renal:  Urine output is improving. Cr 0.9. K 4.9. Mag 2.2. Continue IV diuresis. Hold AM K.   Heme:  Hct 28.3  Platelet 47. Discontinue aspirin. Discontinue Plavix. HIT panel. Consult hematology r/t thrombocytopenia.   Id:  WBC 10.4. Tmax 99.4. Will continue to follow.   Endocrine:  Discontinue insulin gtt. Start sliding scale insulin. Start long acting insulin detemir.   Activities: Advance activities as tolerated.   Lines tubes and drains:  Discontinue IABP. Discontinue [place] and Cordis.  Discontinue A-line.  Discontinue chest tubes x 3. Discontinue NICOLASA x 2. Discontinue arterial line. Jones catheter will continue for now.  Continue pacer wires. Continue prevena wound vac. Place PICC line.   Plan: Remain ICU.           11/03/2019  The patient is post-operative day 3, status-post coronary artery bypass grafting x 3.  Overall the patient is making progress.  Neuro:  The patient is awake alert and oriented x3.  Neuro exam is nonfocal.  Pain is well controlled. Continue tramadol.   Cardiac:  Patient is hemodynamically stable. Increase metoprolol dose. Start amlodipine.   Respiratory: Good sats on nasal cannula. Continue respiratory toilet, continue incentive spirometer. Wean to room air.  GI:  Advance diabetic/cardiac diet as tolerated. Benign abdominal exam. Passing gas, no BM. Add 1 bottle of mag citrate.   Renal:  Good urine output. Cr 0.9. K 3.8. Mag 2.0. Replace K. Replace Mag. Change to PO diuresis.   Heme:  Hct 34.7  Platelet 89. Restart aspirin. Continue to hold Plavix. HIT panel pending. Hematology following.  Id:  WBC 13.8. Tmax  99.4. Will continue to follow.    Endocrine:  Glucose controlled on sliding scale insulin. Increase long acting insulin detemir dose.   Activities: Advance activities as tolerated.   Lines tubes and drains:  Storm catheter will continue for now.  Continue pacer wires. Continue prevena wound vac. Continue PICC line.   Plan: Remain ICU.          11/04/2019  The patient is post-operative day 4, status-post coronary artery bypass grafting x 3.  Overall the patient is making progress.  Neuro:  The patient is awake alert and oriented x3.  Neuro exam is nonfocal.  Pain is well controlled. Continue tramadol.   Cardiac:  Patient is hemodynamically stable. Continue metoprolol dose. Continue amlodipine dose.   Respiratory: Good sats on room air. Continue respiratory toilet, continue incentive spirometer.  GI:  Advance diabetic/cardiac diet as tolerated. Benign abdominal exam. Regular bowel movements.   Renal:  Good urine output. Cr 0.7. K 4.0. Mag 2.3. Replace K. Decrease PO diuresis.   Heme:  Hct 34.4  Platelet 140. Continue aspirin. Restart Plavix. HIT panel pending. Hematology following.  Id:  WBC down to 13.13. Tmax 98. Will continue to follow.    Endocrine:  Glucose controlled on sliding scale insulin. Increased long acting insulin detemir dose.   Activities: Advance activities as tolerated.   Lines tubes and drains:  Discontinued storm catheter. Continue pacer wires. Continue prevena wound vac. Continue PICC line.   Plan: Transfer to telemetry. Anticipate discharge to SNF in 48 hours.         11/05/2019  The patient is post-operative day 5, status-post coronary artery bypass grafting x 3.  Overall the patient is making progress.  Neuro:  The patient is awake alert and oriented x3.  Neuro exam is nonfocal. Patient slow to answer questions, however answers are appropriate.  Pain is well controlled. Continue tramadol PRN.   Cardiac:  Patient is hemodynamically stable. Continue metoprolol dose. Discontinue amlodipine. Start  losartan.    Respiratory: Good sats on room air. Continue respiratory toilet, continue incentive spirometer.  GI:  Advance diabetic/cardiac diet as tolerated. Benign abdominal exam. Regular bowel movements.   Renal:  Good urine output. Cr 0.7. K 4.8. Mag 2.1. Replace Mag. Discontinue PO diuresis.   Heme:  Hct 39.5  Platelet 73. Continue aspirin. Hold Plavix. HIT panel pending. Hematology following.  Id:  WBC is up to 16. Tmax 99. Pan-culture. DC PICC line. Chest xray. Start broad spectrum empiric antibiotics. Will continue to follow.    Endocrine:  Glucose controlled on sliding scale insulin. Decrease long acting insulin detemir dose.   Activities: Advance activities as tolerated.   Lines tubes and drains:  Discontinue pacer wires. Discontinue prevena wound vac. Continue PICC line.   Plan: Anticipate discharge to SNF in 24-48 hours.         11/06/2019  The patient is post-operative day 6, status-post coronary artery bypass grafting x 3. Patient had an episode of decompensation and unresponsiveness yesterday evening. The patient was found unresponsive, cool, and diaphoretic, with agonal breathing. Patient was intubated and transferred to the ICU. Bedside EKG and echocardiogram performed at the bedside showed no significant changes. CT head, chest, abdomen pelvis performed, showed no significant findings.     Neuro:  The patient is intubated and sedated. On Precedex gtt. Begin weaning off sedation.    Cardiac:  Patient is hemodynamically improving. Inotropic agent dependent. Currently on Epinephrine gtt. Start Milrinone gtt. Discontinue metoprolol. Discontinue losartan.    Respiratory: Ventilator dependent. tV 450, RR 20, PEEP 5. Wean down sedation. Wean off ventilator today.    GI:  Begin enteral tube feedings. Benign abdominal exam. Previously having regular bowel movements.   Renal: Adequate urine output. Cr 1.1. K 3.6. Mag 1.7. Replace Mag. Replace K. Discontinue diuresis.   Heme:  Hct 31.9  Platelet 89. Hold  aspirin. Hold Plavix. HIT panel negative. Patient received 1 units PRBC last night. Hematology following.  Id:  WBC is down to 15.5. Tmax 99.9. BC NGTD. Urinalysis negative. Sputum culture negative. Vancomycin and zosyn day 2. Will continue to follow closely.    Endocrine:  Glucose controlled on insulin gtt, continue at this time.   Activities: Intubated and sedated.   Musculoskeletal: Unable to find right radial pulse via doppler. Tips of the fingers of the right hand are mottled, dusky, appear cyanotic. Recent right radial arterial line in place, which has been relocated. US of arteries and veins of the right arm ordered. Consult to vascular surgery placed.    Lines tubes and drains:  Continue swan and cordis. Continue arterial line. Continue storm.    Plan: Remain ICU         11/07/2019  The patient is post-operative day 7, status-post coronary artery bypass grafting x 3. Patient had an episode of decompensation and unresponsiveness the evening of 11/05/2019. The patient was found unresponsive, cool, and diaphoretic, with agonal breathing. Patient was intubated and transferred to the ICU. Bedside EKG and echocardiogram performed at the bedside showed no significant changes. CT head, chest, abdomen pelvis performed, showed no significant findings.     Neuro:  The patient is intubated and sedated. On Precedex gtt. Wean off sedation.    Cardiac:  Patient is hemodynamically improving. Excellent cardiac index. Wean off Inotropic gtts.   Respiratory: Wean towards extubation. tV 400, RR 18, PEEP 5.   GI:  Continue enteral tube feedings while intubated. Benign abdominal exam.  Renal: Adequate urine output. Cr 1.1. K 4.1. Mag 2.8. Start IV diuresis.   Heme:  Hct 24.6  Platelet 60. Hold aspirin. Hold Plavix. HIT panel negative.  Id:  WBC is down to 14.46. Tmax 100.8. BC NGTD. Urinalysis negative. Sputum culture negative. Vancomycin and zosyn day 3. Midline sternal incision appears reddened, with induration at skin edges.  Will continue to follow closely.    Endocrine:  Glucose controlled on sliding scale insulin.   Activities: Intubated and sedated. Moves all 4. Full ROM.    Musculoskeletal: Able to find right radial pulse via doppler. The right hand extending to the tips of the fingers of the right hand are mottled, dusky, appear cyanotic. Recent right radial arterial line placement, which has been relocated. US of arteries and veins of the right arm pending. Vascular surgery following, suggested possible thrombotic etiology, suggested heparin gtt, however this is contraindicated r/t s/p cabg x 3.     Lines tubes and drains:  Continue swan and cordis. Continue arterial line. Continue storm.    Plan: Remain ICU         11/08/2019  The patient is post-operative day 8, status-post coronary artery bypass grafting x 3. Patient had an episode of decompensation and unresponsiveness the evening of 11/05/2019. The patient was found unresponsive, cool, and diaphoretic, with agonal breathing. Patient was intubated and transferred to the ICU. Bedside EKG and echocardiogram performed at the bedside showed no significant changes. CT head, chest, abdomen pelvis performed, showed no significant findings. Suspect cardiogenic shock.      Neuro:  Patients neurologic state is improving. Alert, follows commands, makes eye contact, disoriented x 4. No focal deficits.    Cardiac:  Patient is hemodynamically improving. Off all inotropic gtts. Start low dose metoprolol and amlodipine.   Respiratory: Good sats on low flow nasal cannula. Continue IS. Continue pulmonary toilet. Wean to room air.    GI:  Continue enteral tube feedings at this time r/t disorientation and aspiration risk. Discontinue laxatives. Start metamucil BID, patient has had multiple loose stools.   Renal: Good urine output. Cr 0.8. K 3.6. Mag 2.1. Replace K. Replace Mag. Continue IV diuresis.   Hema:  Hct 26.4  Platelet 45. Hold aspirin. Hold Plavix. HIT panel will be resent as the patient  is a high suspicion for HIT despite previous negative HIT panel. Multiple DVTs from the right IJ down to the brachiocephalic vein. Confirmed via vascular US. Patient started on argatroban yesterday, goal is APTT 2x baseline. Most recent APTT 97.8. Baseline APTT 50.8.  Hematology following. Vascular surgery following.   Id:  WBC is down to 12. Tmax 100.4. BC NGTD. Urinalysis negative. Sputum culture negative. Vancomycin and zosyn day 4. Midline sternal incision appears improved, with minimal redness and induration at skin edges. Will continue to follow closely.    Endocrine:  Glucose controlled on sliding scale insulin.   Activities: Increase activities as tolerated.  Musculoskeletal: ROM 2/5 to the right wrist/hand/fingers, distal from the elbow there is 2+ non pitting edema, the extremity is tender and warm. There is mottling, a dusky appearance, and purple discoloration to the fingertips of the right hand. See above hematology plan.      Lines tubes and drains:  Continue PICC line. Continue storm catheter. Continue NG tube.   Plan: Remain ICU     11/09/2019  The patient is postop day 9.  Status post coronary artery bypass grafting x3.  Postop course was complicated by an episode of hemodynamic instability and decompensation on postop day number 5.  Patient continues to make clinical progress since then.  She is off all drips.  She remains extubated.  Continue ICU care for now.    Neuro:  Patient is awake alert.  She is oriented x2.  She is still confused.  But she is making progress.  Her neuro exam is nonfocal.  She follows all commands.  Cardiac:  Patient continues to be hemodynamically stable.  She is somewhat hypertensive.  Will increase her antihypertensives.  Respiratory:  Patient is good sats on nasal cannula.  She remains extubated.  GI:  Patient was tolerating tube feeds until this morning.  She pulled heart feeding tube. Will get a swallowing evaluation.  Mode of nutrition will depend on results of  swallowing evaluation.  Renal:  Patient has good urine output %period% creatinine is 0.9.  Heme:  Hematocrit is 26 platelet is 46.  Patient is on argatroban  for anticoagulation due to right upper extremity DVTs.  Hypercoagulability workup is in progress.  Appreciate Hematology and vascular surgery input.  Id:  Incision continues to improve.  white count is 17.  Continue broad-spectrum antibiotics.  Endocrine:  Glucose is controlled on its sliding scale insulin.  Activities:  Advance activities as tolerated.  Line tubes and drains.  Patient has a PICC line, Jones catheter.    11/10/2019  The patient is postop day 10.  Status post coronary bypass grafting x3.  Postop course was complicated by an episode of hemodynamic instability and decompensation on postop day 5.  Patient remains hemodynamically stable.    Neuro:  The patient is awake and alert.  She is oriented x1.  She continues to be confused.  Her neuro exam is nonfocal.  Cardiac:  Patient continues to be hemodynamically stable.  His she is hypertensive.  Hypertensive medications will be increased.  Respiratory:  Patient has good sats on nasal cannula.  Continue pulmonary toilet.  GI:  Patient is tolerating a regular diet.  Renal:  Patient has good urine output. Creatinine is 0.7.  Heme: hematocrit is 24 and platelet is 67.  Patient continues on argatroban for right upper extremity DVTs.  Hypercoagulable workup is in progress.  Id:  Patient's white count is up to 20.  Patient is on broad prepped from antibiotics.  Endocrine:  The patient is on long-acting as well as sliding scale insulin.  Blood sugars have been well controlled.  Will decrease dose of long-acting.  Activities: patient has been out of bed to chair.  Advance as tolerated.  Musculoskeletal:  Patient's right upper extremity continues to be swollen  The index finger and thumb continues to be dusky with a bleb near the base of the thumb.  Continue argatroban for anticoagulation.  Continue elevation  of right upper extremity.  Lines tubes and drains: patient has a PICC line.  Will discontinue Jones catheter.        11/11/2019  The patient is post-operative day 11, status-post coronary artery bypass grafting x 3. Post-operative course was complicated by an episode of hemodynamic instability and decompensation on postop day 5.  Patient remains hemodynamically stable.    Neuro:  Patients neurologic state is improving. Oriented x 2. Alert, follows commands, makes eye contact. No focal deficits.    Cardiac:  Patient is hemodynamically improving. Hypertensive. Continue metoprolol, amlodipine, and losartan. Add chlorthalidone.   Respiratory: Good sats on room air. Continue IS. Continue pulmonary toilet.  GI:  Continue cardiac/diabetic diet, modified by puree and thick liquids with swallow precautions. Advance as tolerated. Continue metamucil.  Renal: Good urine output. Cr 0.8. K 3.8. Replace K. Continue IV diuresis. Add chlorthalidone. BMP at noon.   Hema:  Hct 23.8  Platelet 75. Hold aspirin. Hold Plavix. Repeat HIT panel pending. Multiple DVTs from the right IJ down to the brachiocephalic vein. Confirmed via vascular US. Patient on argatroban gtt, goal is APTT 2x baseline. Most recent APTT 83.3. Baseline APTT 50.8.  Hematology following. Vascular surgery following.   Id:  WBC is down to 19. Tmax 98.4. BC NGTD. Vancomycin and zosyn day 7. Discontinue broad spectrum antibiotics. Will continue to follow closely.    Endocrine:  Glucose controlled on sliding scale insulin and long acting insulin detemir.    Activities: Increase activities as tolerated.  Musculoskeletal: The right upper extremity exhibits swelling distal from the elbow. The RUE is tender to palpation and ROM. ROM is diminished to the right hand 2/5. The hand his reddened, swollen, with dusky, mottled, purple discoloration to the right thumb and tip of the right index finger. Blisters have formed on the proximal portion of the thumb and wrist. Severely  diminished capillary refill to the tip of the right thumb and tip of the right index finger. Wound care order placed.      Lines tubes and drains:  Continue PICC line.  Plan: Remain ICU         11/12/2019  The patient is post-operative day 12, status-post coronary artery bypass grafting x 3. Post-operative course was complicated by an episode of hemodynamic instability and decompensation on postop day 5.  Patient remains hemodynamically stable.    Neuro:  Patients neurologic state is improving. Oriented to person, place, situation. Disoriented to time. Alert, follows commands, makes eye contact. No focal deficits.    Cardiac:  Patient is hemodynamically improving. Continue metoprolol, amlodipine, losartan, and chlorthalidone.   Respiratory: Good sats on room air. Continue IS.  GI:  Continue cardiac/diabetic diet, modified by puree and thick liquids with swallow precautions. Advance as tolerated. Continue metamucil.  Renal: Good urine output. Cr 0.8. K 3.9. Replace K. Discontinue lasix. Continue chlorthalidone.  Hema:  Hct 23.1  Platelet 75. Hold aspirin. Hold Plavix. 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 Multiple DVTs from the right IJ down to the brachiocephalic vein. Confirmed via vascular US. Patient on argatroban gtt, goal is APTT 2x baseline. Most recent APTT 84.6. Baseline APTT 50.8.  Hematology following. Vascular surgery following.   Id:  WBC is down to 17.4. A-febrile. Will continue to follow closely.    Endocrine:  Patient had an episode of hypoglycemia last night. Will continue sliding scale insulin. Discontinue long acting insulin. Begin strict calorie count.   Activities: Increase activities as tolerated.  Musculoskeletal: The right upper extremity exhibits swelling distal from the elbow. The RUE is tender to palpation and ROM. ROM is diminished to the right hand 3/5. The hand is reddened, swollen, with dusky, mottled, purple discoloration to the right thumb tip and tip of the right index finger.   Severely diminished capillary refill to the tip of the right thumb and tip of the right index finger. Wound care following. Gauze dressing in place.       Lines tubes and drains:  Continue PICC line.  Plan: Remain ICU. Begin discharge planning for IP rehab.

## 2019-11-12 NOTE — ASSESSMENT & PLAN NOTE
11/2  Heme On on Consult  HIT panel  Likely drug induced  Monitor  11/3  Improved at 89 today  Monitor  11/5  Plts 73  Monitor  11/8  Argatroban  Heme Onc  Plts 45    11/9:  plt stable today, no indication for transfusion  Cont to monitor     11/10:  Improving    11/11:  Stable, 75k today     11/12:  Stable

## 2019-11-12 NOTE — PROGRESS NOTES
[facility]  Cardiology  Progress Note    Patient Name: Mateusz Bates  MRN: [id number]  Admission Date: 10/28/2019  Hospital Length of Stay: 15 days  Code Status: Full Code   Attending Physician: Keny Zaidi MD   Primary Care Physician: Serenity Kilpatrick MD  Expected Discharge Date: 11/13/2019  Principal Problem:Acute deep vein thrombosis (DVT) of right upper extremity    Subjective:   HPI:  Ms. Bates is a 69 year old female patient whose current medical conditions include HTN, hyperlipidemia, obesity, DM type II, cerebral microvascular disease, FAHAD, and B12 deficiency who was sent to [facility] ED today from her PCP's office due to chest pain and abnormal EKG. Patient complained of substernal chest tightness/heaviness that radiated to her back on Saturday while at rest. Associated symptoms included orthopnea and SOB. Patient denied any associated nausea, vomiting, diaphoresis, palpitations, near syncope, or syncope. Initial workup in ED revealed troponin of 3.665 and BNP of 1305 and patient subsequently admitted for further evaluation and treatment of NSTEMI. Cardiology consulted to assist with management. Patient seen and examined today while in ED. Feels well at time of exam, denies chest pain. She does report similar symptoms over the past few months but states they never lasted as long and were less intense. She reports compliance with her medications. States her father had history of CABG. Chart reviewed. Repeat troponin pending. EKG reviewed and shows SR with new LBBB. 2D echo pending.    Hospital Course:   Ms. Bates is a 69 year old female patient whose current medical conditions include HTN, hyperlipidemia, obesity, DM type II, cerebral microvascular disease, FAHAD, and B12 deficiency who was sent to [facility] ED today from her PCP's office due to chest pain and abnormal EKG. Patient complained of substernal chest tightness/heaviness that radiated to her back on Saturday while at rest.  Associated symptoms included orthopnea and SOB. Patient denied any associated nausea, vomiting, diaphoresis, palpitations, near syncope, or syncope. Initial workup in ED revealed troponin of 3.665 and BNP of 1305 and patient subsequently admitted for further evaluation and treatment of NSTEMI. Cardiology consulted to assist with management. Patient seen and examined today while in ED. Feels well at time of exam, denies chest pain. She does report similar symptoms over the past few months but states they never lasted as long and were less intense. She reports compliance with her medications. States her father had history of CABG. Chart reviewed. Repeat troponin pending. EKG reviewed and shows SR with new LBBB. 2D echo pending.    10/29/19-Patient seen and examined today, s/p LHC yesterday that showed multivessel CAD. CVT consulted, CABG planned for Thursday. Feels ok. No chest pain or SOB. Labs stable. Being diuresed. 2D echo showed EF of 30-35%, DD.    10/30/19-Patient seen and examined today. Feels well. No complaints. Denies chest pain or SOB. Labs stable. IABP placement scheduled for today.    10/31/19- Patient is s/p CABG x3 with LIMA to LAD reverse saphenous vein graft to the RCA and reverse saphenous vein graft to the ramus. Has been transferred to ICU. Is intubated and sedated. CT x3. IABP at 1:1 still in place. Transfused 3 units of PRBC in the OR and has received additional 3 units in ICU, has received 500cc albumin, epi and milrinone gtt.  H/H 6.9/21.0. Renal function stable. Paced rhythm on monitor     11/1/2019--patient seen and examined in ICU. Extubated o/n, remains on IABP 1:1 today, plans to possibly wean off later today per Dr. Zaidi. Remains on Epi and Milrinone gtt today. Paced on monitor. Labs stable today.     11/2/19-Patient seen and examined today, lying in bed. Feels ok. Complains of post-op pain and fatigue. Labs reviewed. Platelets 47,000. Heme/onc on board.     11/3/19-Patient seen and  examined today, sitting up in chair. Feeling better. Still weak. Labs reviewed. Platelets 89,000.    11/4/19- Patient is POD 4 s/p CABG x3. OOB in chair. Feels better this morning. Pain controlled. Platelets improving since holding Plavix.Vitals stable     11/5/19- Patient is POD 5 s/p CABGx3. Has been transferred to [facility]. Is clammy and generally weak this morning. Glucose and vitals stable. Noted to have bump in WBC. Primary team has been notified by nurse. Platelets 73, but H/H stable     11/6 in ICU, intubated, on epi gtt at 0.15 mcg CI 2.0, PAP 20. Sinus rhythm, H&H and Cr stable. Urine output ok. Improved lactate 1.6. const stent cardiogenic shock    11/7 INTUBATED, WEAN OFF EPI AND CONTINUE AMILIRONE, ON SINU RHYTHM AND CI 3.3, Cr STABLE AND DECENT URINE OUTPUT.     11/8 extubated, off pressors, up to the chair.diffuse DVT on upper extremities, no chest pain,pt is weak, Cr normal    11/9/weak, mild reps. Distress, good urine output. BP high.  RUE venous thrombus/DVT in deep and superficial arm veins. H&H stable Cr normal.      11/10 weakness H&H 7.6/24, BP and HR wnl., good urine output. No chest pain. Right arm swelling and ecchymosis. Pulses ok    11/11/19--Patient seen and examined in ICU, sitting on side of bed. Feels ok today. Continued right arm swelling and ecchymosis.     11/12/19-Patient seen and examined today, lying in bed. Feels ok. Denies chest pain. Admits to some SOB. CXR, showed no effusion or infiltrate. Labs stable, platelets 75,000 H/H 7.3/23.1. Hematology noted reviewed, ROSANNA positive.         Review of Systems   Constitution: Positive for malaise/fatigue.   HENT: Negative.    Eyes: Negative.    Cardiovascular: Positive for dyspnea on exertion.   Respiratory: Negative.    Endocrine: Negative.    Hematologic/Lymphatic: Bruises/bleeds easily.   Skin: Positive for color change (right hand).   Musculoskeletal: Negative.    Gastrointestinal: Negative.    Genitourinary: Negative.    Neurological:  Positive for weakness.   Psychiatric/Behavioral: Negative.    Allergic/Immunologic: Negative.      Objective:     Vital Signs (Most Recent):  Temp: 97.5 °F (36.4 °C) (11/12/19 0530)  Pulse: 76 (11/12/19 0600)  Resp: (!) 22 (11/12/19 0600)  BP: (!) 127/48 (11/12/19 0600)  SpO2: 100 % (11/12/19 0600) Vital Signs (24h Range):  Temp:  [95 °F (35 °C)-99 °F (37.2 °C)] 97.5 °F (36.4 °C)  Pulse:  [63-90] 76  Resp:  [19-30] 22  SpO2:  [92 %-100 %] 100 %  BP: ()/(44-87) 127/48     Weight: 71.8 kg (158 lb 4.6 oz)  Body mass index is 29.91 kg/m².     SpO2: 100 %  O2 Device (Oxygen Therapy): nasal cannula      Intake/Output Summary (Last 24 hours) at 11/12/2019 1209  Last data filed at 11/12/2019 0400  Gross per 24 hour   Intake 830 ml   Output --   Net 830 ml       Lines/Drains/Airways     Peripherally Inserted Central Catheter Line                 PICC Double Lumen 11/07/19 1645 left brachial;left basilic 4 days                Physical Exam   Constitutional: She is oriented to person, place, and time. She appears well-developed and well-nourished. No distress.   Appears ill, slightly tachypneic during exam   HENT:   Head: Normocephalic and atraumatic.   Eyes: Pupils are equal, round, and reactive to light. Right eye exhibits no discharge. Left eye exhibits no discharge.   Neck: Neck supple. No JVD present.   Cardiovascular: Normal rate, regular rhythm, S1 normal, S2 normal and normal heart sounds.   No murmur heard.  Pulmonary/Chest: Effort normal and breath sounds normal. No respiratory distress. She has no wheezes. She has no rales.   CABG site C/D/I; no bleeding erythema or drainage   Abdominal: Soft. She exhibits no distension.   Musculoskeletal: She exhibits edema (RUE and hand) and tenderness.   Neurological: She is alert and oriented to person, place, and time.   Skin: Skin is warm and dry. She is not diaphoretic. No erythema.   Mottled right thumb and index finger with surrounding edema   Psychiatric: She has a  normal mood and affect. Her behavior is normal. Thought content normal.   Nursing note and vitals reviewed.      Significant Labs:   CMP   Recent Labs   Lab 11/11/19  1144 11/11/19  1609 11/12/19  0409   * 131* 138   K 4.8 4.5 3.9    100 105   CO2 20* 22* 26   * 322* 143*   BUN 17 16 14   CREATININE 0.9 0.8 0.8   CALCIUM 8.8 8.4* 8.8   ANIONGAP 11 9 7*   ESTGFRAFRICA >60 >60 >60   EGFRNONAA >60 >60 >60   , CBC   Recent Labs   Lab 11/11/19  0400 11/11/19  1609 11/12/19  0409   WBC 19.05* 18.20* 17.40*   HGB 7.5* 7.2* 7.3*   HCT 23.8* 23.1* 23.1*   PLT 75* 76* 75*   , Troponin No results for input(s): TROPONINI in the last 48 hours. and All pertinent lab results from the last 24 hours have been reviewed.    Significant Imaging: Echocardiogram:   2D echo with color flow doppler:   Results for orders placed or performed during the hospital encounter of 10/28/19   2D echo with color flow doppler   Result Value Ref Range    QEF 30 (A) 55 - 65    Diastolic Dysfunction Yes (A)     Est. PA Systolic Pressure 33.03     Narrative    Date of Procedure: 11/06/2019        TEST DESCRIPTION   Technical Quality: This is a technically challenging study.     General: A catheter is present in the right-sided cardiac chambers.     Aorta: The aortic root is normal in size, measuring 3.7 cm at sinotubular junction and 3.8 cm at Sinuses of Valsalva. The proximal ascending aorta is normal in size, measuring 3.7 cm across.     Left Atrium: The left atrial volume index is normal, measuring 19.22 cc/m2.     Left Ventricle: The left ventricle is normal in size, with an end-diastolic diameter of 3.0 cm, and an end-systolic diameter of 2.5 cm. LV wall thickness is normal, with the septum measuring 2.1 cm and the posterior wall measuring 1.4 cm across. Relative   wall thickness was increased at 0.93, and the LV mass index was increased at 140.2 g/m2 consistent with concentric left ventricular hypertrophy. The following segments  were mildly hypokinetic: apical lateral wall.  The following segments were severely hypokinetic: mid anteroseptum, basal anteroseptum, apical septum, mid inferoseptum, basal inferoseptum, basal inferior wall.  Left ventricular systolic function appears moderately depressed. Visually estimated ejection fraction is 30-35%. The LV Doppler derived stroke volume equals 54.0 ccs.     Diastolic indices: E wave velocity 0.9 m/s, E/A ratio 0.8,  msec., E/e' ratio(avg) 14. There is diastolic dysfunction secondary to relaxation abnormality.     Right Atrium: The right atrium is normal in size, measuring 3.5 cm in length and 2.1 cm in width in the apical view.     Right Ventricle: The right ventricle is normal in size. Global right ventricular systolic function appears normal. Tricuspid annular plane systolic excursion (TAPSE) is 1.3 cm. The estimated PA systolic pressure is 33 mmHg.     Aortic Valve:  The aortic valve is normal in structure. The aortic valve is tri-leaflet in structure. The mean gradient obtained across the aortic valve is 9 mmHg.     Mitral Valve:  The mitral valve is normal in structure. The pressure half time is 53 msec. The calculated mitral valve area is 4.15 cm2.     Tricuspid Valve:  The tricuspid valve is normal in structure.     Pulmonary Valve:  The pulmonic valve is not well seen.     IVC: IVC is normal in size and collapses > 50% with a sniff, suggesting normal right atrial pressure of 3 mmHg.     Intracavitary: There is no evidence of pericardial effusion, intracavity mass, thrombi, or vegetation.         CONCLUSIONS     1 - Concentric hypertrophy.     2 - Wall motion abnormalities.     3 - Moderately depressed left ventricular systolic function (EF 30-35%).     4 - Impaired LV relaxation, normal LAP (grade 1 diastolic dysfunction).     5 - Normal right ventricular systolic function .     6 - The estimated PA systolic pressure is 33 mmHg.             This document has been electronically     "SIGNED BY: Charles Matias MD On: 11/06/2019 18:28   , EKG: Reviewed and X-Ray: CXR: X-Ray Chest 1 View (CXR):   Results for orders placed or performed during the hospital encounter of 10/28/19   X-Ray Chest 1 View    Narrative    EXAMINATION:  XR CHEST 1 VIEW    CLINICAL HISTORY:  follow up;    TECHNIQUE:  Single frontal view of the chest was performed.    COMPARISON:  11/07/2019    FINDINGS:  Tubes and lines are stable.  [place] catheter is been removed.  Mild atelectasis within the left midlung zone.  No consolidation.  Trace pleural effusions not entirely excluded.  No pneumothorax.  Bones demonstrate mild degenerative changes.  Sternotomy wires are intact.  In comparison to the prior study, there is no adverse interval changes      Impression    In comparison to the prior study, there is no adverse interval changes      Electronically signed by: Jh Anne MD  Date:    11/08/2019  Time:    08:14    and X-Ray Chest PA and Lateral (CXR):   Results for orders placed or performed during the hospital encounter of 10/28/19   X-Ray Chest PA And Lateral    Narrative    EXAMINATION:  XR CHEST PA AND LATERAL    CLINICAL HISTORY:  Pre Op;    COMPARISON:  10/28/2019    FINDINGS:  Cardiac silhouette is normal. Aorta demonstrates atherosclerotic disease. The lungs demonstrate no evidence of active disease.  No evidence of pleural effusion or pneumothorax.  Bones appear intact.      Impression    No acute process seen.      Electronically signed by: Jh Anne MD  Date:    10/28/2019  Time:    21:38     Assessment and Plan:     Acute deep vein thrombosis (DVT) of non-extremity vein Right IJ  -Continue Argatroban infusion  -RUE continues to be swollen   -Right hand remains ecchymotic   -Mgmt per CT surgery  Per Vascular Surgery:  "Hypercoagulable/HIT workup in progress, pt started on argatroban  Regarding RUE/central venous thrombus: Unfortunately, given location of clot and her recent major cardiac surgery any attempt at " "thrombolysis with tPA would be absolutely contraindicated  Open thrombectomy also not option as central clot could not be adequately cleared to allow for any outflow/drainage from the arm     Recommend cont anticoagulation, arm elevation, mild/moderate compression"    11/12/19  -Mgmt as per vascular/heme onc/CVT    Hyponatremia  Improved Na level    Cardiogenic shock  Post CABG  EF 30%  S/p swan catheter  CI 3.3  Repeat echo today showed EF 30%, septal hypokinesis , compared to prior echo before the cabg, no significant change  ekg reviewed    11/10/2019  Extubated  Off pressors  Continue Amlodipine and BB/Losartan  Hold Lipitor due to elevated LFT  Continue anticoagulation due to acute DVT  Continue ICU supportive care and ABx    11/11  -Continue BB, ARB, Norvasc  -BP much improved   -Continue supportive care per CT surgery      11/12/19  -Stable  -Continue BB, ARB, Norvasc        Thrombocytopenia  -Mgmt as per heme/onc  -Plavix was on hold, but resumed by primary team   -ASA has been resumed     11/5/19  -consider holding Plavix  -Hematology has been consulted     S/P CABG x 3  -Patient is s/p CABG x3 with LIMA to LAD reverse saphenous vein graft to the RCA and reverse saphenous vein graft to the ramus.  -Continue post CABG supportive therapy  -Continue ASA, Plavix, Statin, BB  -Will continue to follow along     11/1/19  -POD #1 s/p CABG x3  -Continue ASA, Statin, BB  -Remains on Epi and Milrinone gtt today  -Mgmt per CT surgery  -Will follow along    11/2/19  -Recovering s/p CABG x 3  -Continue statin, BB  -Platelets 47,000 this AM; heme/onc on board, ASA held  -Await further rec's    11/3/19  -Stable, progressing post CABG x 3  -Continue statin, BB  -ASA re-started  -Platelets 89,000, heme/onc on board    11/4/19  -Patient slowly improving post CABG x3  -Continue ASA, Statin and BB  -plavix on hold due to thrombocytopenia that is improving   -IS use encouraged and early ambulation with PT  -Will continue to " follow along     11/5/19  -Will continue ASA, Statin and BB  -Consider holding Plavix again for drop in plts after resuming by primary team   -Recommend CXR to rule out post op PNA given bump in WBC and cough  -will need to monitor for temp  -ambulate as tolerated and encourage IS use     11/06  Hold ASA, Plavix due to anemia  Hold BB due to cardiogenic shock    11/11/19  -Continue Argatroban infusion  -Continue BB, ARB, IV lasix, Chlorthalidone, Norvasc, NTG paste  -Hold ASA, Plavix due to anemia  -Further mgmt per CT surgery    11/12/19  -Stable  -Continue BB, ARB, Norvasc, Chlorthalidone, IV Lasix  -Recommend re-starting ASA  -Plavix held due to anemia/thrombocytopenia    CAD, multiple vessel  -See plan under CABG    Acute combined systolic and diastolic CHF, NYHA class 2  -Presents with ICM/acute combined CHF  -EF 30-35% by 2D echo        Chest pain  -See plan under NSTEMI    Abnormal EKG  -LBBB noted on EKG  -See plan under NSTEMI    NSTEMI (non-ST elevated myocardial infarction)  -Patient presented with intermittent chest tightness/heaviness that radiated to her back since Saturday evening  -Associated with JONES/orthopnea  -Initial troponin 3.665  -EKG showed SR with new LBBB  -Presentation consistent with NSTEMI  -Continue ASA, statin  -Start Toprol XL  -Start heparin gtt  -Check 2D echo  -Continue to trend troponin  -Keep NPO. [facility] today for further evaluation and PCI if indicated. All risks, benefits, and treatment alternatives explained to patient in detail. All questions answered. She has agreed to proceed. Further rec's to follow.    10/29/19  -s/p LHC yesterday that showed multivessel CAD  -CVT consulted, CABG planned for 10/31/19  -Stable overnight, no chest pain  -Continue ASA, statin, BB  -Continue heparin gtt  -Will add ARB if BP permits (ACEi caused cough)    10/30/19  -Stable overnight  -No chest pain or SOB  -Continue ASA, statin, BB, heparin gtt  -Will add ARB if BP permits  -IABP placement  scheduled for today. All risks, benefits, and treatment alternatives explained to patient in detail. All questions answered. She has agreed to proceed.    10/31/19  -Patient is s/p CABG x3.   See plan for CABG     Hyperlipidemia associated with type 2 diabetes mellitus  -Continue statin    Obesity (BMI 30.0-34.9)  -Weight loss    Diabetes mellitus type II, uncontrolled  -Mgmt as per hospital medicine    Hypertension associated with diabetes  -Continue home meds as BP permits        VTE Risk Mitigation (From admission, onward)         Ordered     argatroban 125 mg in sodium chloride 0.9% 125 mL infusion (conc: 1 mg/mL)  Continuous      11/11/19 0128     Reason for no Mechanical VTE Prophylaxis  Once      10/28/19 1414     heparin 25,000 units in dextrose 5% 250 mL (100 units/mL) infusion LOW INTENSITY nomogram - [facility]  Continuous      10/28/19 1257                Josselyn Vann PA-C  Cardiology  [facility]

## 2019-11-12 NOTE — ASSESSMENT & PLAN NOTE
November 12, 2019  --continue Agatroban; avoid Heparin products  --Plan to add Coumadin once platelet count 100K. Would continue Argatroban gtt with coumadin until target INR achieved   --continued Hematology follow up in outpatient setting

## 2019-11-12 NOTE — ASSESSMENT & PLAN NOTE
-Patient is s/p CABG x3 with LIMA to LAD reverse saphenous vein graft to the RCA and reverse saphenous vein graft to the ramus.  -Continue post CABG supportive therapy  -Continue ASA, Plavix, Statin, BB  -Will continue to follow along     11/1/19  -POD #1 s/p CABG x3  -Continue ASA, Statin, BB  -Remains on Epi and Milrinone gtt today  -Mgmt per CT surgery  -Will follow along    11/2/19  -Recovering s/p CABG x 3  -Continue statin, BB  -Platelets 47,000 this AM; heme/onc on board, ASA held  -Await further rec's    11/3/19  -Stable, progressing post CABG x 3  -Continue statin, BB  -ASA re-started  -Platelets 89,000, heme/onc on board    11/4/19  -Patient slowly improving post CABG x3  -Continue ASA, Statin and BB  -plavix on hold due to thrombocytopenia that is improving   -IS use encouraged and early ambulation with PT  -Will continue to follow along     11/5/19  -Will continue ASA, Statin and BB  -Consider holding Plavix again for drop in plts after resuming by primary team   -Recommend CXR to rule out post op PNA given bump in WBC and cough  -will need to monitor for temp  -ambulate as tolerated and encourage IS use     11/06  Hold ASA, Plavix due to anemia  Hold BB due to cardiogenic shock    11/11/19  -Continue Argatroban infusion  -Continue BB, ARB, IV lasix, Chlorthalidone, Norvasc, NTG paste  -Hold ASA, Plavix due to anemia  -Further mgmt per CT surgery    11/12/19  -Stable  -Continue BB, ARB, Norvasc, Chlorthalidone, IV Lasix  -Recommend re-starting ASA  -Plavix held due to anemia/thrombocytopenia

## 2019-11-12 NOTE — PLAN OF CARE
Pt up to chair for several hours today; sister in to visit; remains on argatroban gtt; blood glucose within normal limits; downgraded to tele status; VSS; NADN.

## 2019-11-12 NOTE — ASSESSMENT & PLAN NOTE
November 11, 2019  --Platelet count 75, improving. Transfuse if < 10   --No S&S active bleed. Hemoglobin remains stable. Hg 7.5. No urgent need for blood transfusion. Monitor  --suspect HIT versus TTP  -Awaiting results of Sahra HIT test and [id number]  --Continue Argatroban gtt  --avoid heparin containing products.  --Check Vitamin B 12 level to r/o B 12 deficiency as etiology of decreased mental status  --Check LDH/Haptologlobin  to rule out intravascular hemolysis  --Supportive care    November 12, 2019  --ROSANNA positive  --Check PT/INR today  --Avoid heparin containing products  --Continue Argatroban gtt for DVT. Add Coumadin once platelet count > 100K. Would need to achieve target INR prior to discontinuing Argatroban gtt. Once INR therapeutic she may continue to follow up in the outpatient Heme-Onc clinic and Coumadin clinic for continued monitoring of her Coumadin levels. Will need anticoagulation for at least 3 months  --B 12 deficiency ruled out. Mentation improving  --Haptoglobin normal r/o intravascular hemolysis   --CBC stable. No urgent indication for any blood product transfusion at present time  --Await recovery of platelet function  --Supportive care

## 2019-11-12 NOTE — PT/OT/SLP PROGRESS
Speech Language Pathology Treatment    Patient Name:  Mateusz Bates   MRN:  [id number]  Admitting Diagnosis: Acute deep vein thrombosis (DVT) of right upper extremity    Recommendations:                 General Recommendations:  Dysphagia therapy and Cognitive-linguistic therapy  Diet recommendations:  Mechanical soft, No Rice, No Bread, Liquid Diet Level: Thin   Aspiration Precautions: 1 bite/sip at a time, Alternating bites/sips, Assistance with meals, Avoid talking while eating, Eliminate distractions, HOB to 90 degrees, Remain upright 30 minutes post meal and Small bites/sips   General Precautions: Standard, aspiration, fall, sternal  Communication strategies:  provide increased time to answer    Subjective     Pt was seen sitting up in a chair at bedside with no family present.    Patient goals: none stated     Pain/Comfort:  · Pain Rating 1: 0/10    Objective:     Has the patient been evaluated by SLP for swallowing?   Yes  Keep patient NPO? No   Current Respiratory Status: room air      Swallowing assessed with thin liquids and soft solids with pt seated upright in a chair.  No overt sings of swallow difficulty observed with either consistency.  She remains confused and impulsive taking multiple large bites/sips at a time which places her at increased risk for aspiration.   ST recommends to change diet to soft mechanical with no rice and no bread.  Thin liquids with no straws.  She will need direct supervision with all po intake.     She completed orientation tasks, problem solving tasks, and memory tasks with mod-max cueing.  She required max cueing for attention to tasks and increased time for verbal responses.        Assessment:     Mateusz Bates is a 69 y.o. female with an SLP diagnosis of Dysphagia and Cognitive-Linguistic Impairment.  She presents with increased aspiration risk secondary to cognitive deficits.  She will benefit from continued ST for stated deficits.    Goals:   Multidisciplinary Problems      SLP Goals        Problem: SLP Goal    Goal Priority Disciplines Outcome   SLP Goal     SLP Ongoing, Progressing   Description:  LTG:  Pt will tolerate least restrictive diet consistency safely and efficiently.    ST. BSA with meal to assess recommended diet consistency of pureed and thin liquids.- Completed 11/10/19 and pt has been recommended for a Pureed consistency diet and Nectar Thick Liquids.  Goal to continue with further recs as appropriate.   Modified Barium Swallow Study recommended if indicated,             2. Oral Motor x10 with cues             3. Orientation with 90% with min cues.             4. 2 step commands with 90%             5. 2 unit yes/no with 90%  Goal to be reevaluated by:  19                     Plan:     · Patient to be seen:  3 x/week   · Plan of Care expires:   2019  · Plan of Care reviewed with:  patient   · SLP Follow-Up:  Yes       Discharge recommendations:  rehabilitation facility   Barriers to Discharge:  None    Time Tracking:     SLP Treatment Date:   19  Speech Start Time:  1100  Speech Stop Time:  1130     Speech Total Time (min):  30 min    Billable Minutes: Speech Therapy Individual 15 and Treatment Swallowing Dysfunction 15    Minoo Atwood CCC-SLP  2019

## 2019-11-12 NOTE — SUBJECTIVE & OBJECTIVE
Interval History: ROSANNA positive. Thrombocytopenia stable. Tolerating Argatroban gtt without any evidence of bleeding. Patient clinically improving overall    Oncology Treatment Plan:   [No treatment plan]    Medications:  Continuous Infusions:   argatroban in 0.9 % sod chlor 4.003 mcg/kg/min (11/12/19 1047)     Scheduled Meds:   amLODIPine  10 mg Oral Daily    chlorhexidine  15 mL Mouth/Throat BID    chlorthalidone  25 mg Oral Daily    ferrous sulfate  325 mg Oral BID    lactated ringers  1,000 mL Intravenous Once    losartan  50 mg Oral Daily    metoprolol tartrate  50 mg Oral BID    nitroGLYCERIN 2% TD oint  0.5 inch Topical (Top) Q12H    pantoprazole  40 mg Oral Daily    psyllium husk  1 packet Oral BID    sodium chloride  2 g Oral Q6H     PRN Meds:albumin human 5%, albuterol sulfate, Dextrose 10% Bolus, Dextrose 10% Bolus, glucagon (human recombinant), hydrALAZINE, insulin aspart U-100, iohexol, lactated ringers, magnesium sulfate IVPB, magnesium sulfate IVPB, metoclopramide HCl, ondansetron, potassium chloride in water **AND** potassium chloride in water **AND** potassium chloride in water, traMADol     Review of Systems   Constitutional: Positive for activity change and fatigue. Negative for appetite change, chills, diaphoresis, fever and unexpected weight change.   HENT: Negative for congestion, mouth sores, nosebleeds, sore throat, trouble swallowing and voice change.    Eyes: Negative for photophobia and visual disturbance.   Respiratory: Negative for cough, chest tightness, shortness of breath and wheezing.    Cardiovascular: Negative for chest pain, palpitations and leg swelling.   Gastrointestinal: Negative for abdominal distention, abdominal pain, anal bleeding, blood in stool, constipation, diarrhea, nausea and vomiting.   Genitourinary: Negative for difficulty urinating, dysuria and hematuria.   Musculoskeletal: Negative for arthralgias, back pain and myalgias.   Skin: Positive for color  change. Negative for pallor, rash and wound.   Neurological: Positive for weakness. Negative for dizziness, syncope and headaches.   Hematological: Negative for adenopathy. Bruises/bleeds easily.   Psychiatric/Behavioral: The patient is nervous/anxious.      Objective:     Vital Signs (Most Recent):  Temp: 97.5 °F (36.4 °C) (11/12/19 0530)  Pulse: 76 (11/12/19 0600)  Resp: (!) 22 (11/12/19 0600)  BP: (!) 127/48 (11/12/19 0600)  SpO2: 100 % (11/12/19 0600) Vital Signs (24h Range):  Temp:  [95 °F (35 °C)-99 °F (37.2 °C)] 97.5 °F (36.4 °C)  Pulse:  [63-90] 76  Resp:  [19-30] 22  SpO2:  [86 %-100 %] 100 %  BP: ()/(44-87) 127/48     Weight: 71.8 kg (158 lb 4.6 oz)  Body mass index is 29.91 kg/m².  Body surface area is 1.76 meters squared.      Intake/Output Summary (Last 24 hours) at 11/12/2019 1131  Last data filed at 11/12/2019 0400  Gross per 24 hour   Intake 830 ml   Output --   Net 830 ml       Physical Exam   Constitutional: She is oriented to person, place, and time. She appears well-developed. She is cooperative. She has a sickly appearance. She appears ill. Nasal cannula in place.   HENT:   Head: Normocephalic.   Right Ear: External ear normal.   Left Ear: External ear normal.   Nose: Nose normal.   Mouth/Throat: Oropharynx is clear and moist.   Eyes: Conjunctivae, EOM and lids are normal. Right eye exhibits no discharge. Left eye exhibits no discharge. No scleral icterus.   Neck: Normal range of motion. No thyroid mass present.   Cardiovascular: Normal rate, regular rhythm and normal heart sounds.   No murmur heard.  Pulmonary/Chest: Effort normal. No respiratory distress. She has decreased breath sounds. She has no wheezes. She has no rales.   Abdominal: Soft. She exhibits no distension. Bowel sounds are decreased. There is no tenderness.   Genitourinary:   Genitourinary Comments: deferred   Musculoskeletal: Normal range of motion. She exhibits edema and tenderness.   Lymphadenopathy:        Head (right  side): No submandibular, no preauricular and no posterior auricular adenopathy present.        Head (left side): No submandibular, no preauricular and no posterior auricular adenopathy present.        Right cervical: No superficial cervical adenopathy present.       Left cervical: No superficial cervical adenopathy present.   Neurological: She is alert and oriented to person, place, and time.   Skin: Skin is warm and dry.        Psychiatric: She has a normal mood and affect. Her speech is normal and behavior is normal. Thought content normal.   Vitals reviewed.      Significant Labs:   BMP:   Recent Labs   Lab 11/11/19  0759 11/11/19  1144 11/11/19  1609 11/12/19  0409   GLU  --  430* 322* 143*   NA  --  132* 131* 138   K  --  4.8 4.5 3.9   CL  --  101 100 105   CO2  --  20* 22* 26   BUN  --  17 16 14   CREATININE  --  0.9 0.8 0.8   CALCIUM  --  8.8 8.4* 8.8   MG 1.9  --   --  2.1   , CBC:   Recent Labs   Lab 11/11/19  0400 11/11/19  1609 11/12/19  0409   WBC 19.05* 18.20* 17.40*   HGB 7.5* 7.2* 7.3*   HCT 23.8* 23.1* 23.1*   PLT 75* 76* 75*   , LFTs: No results for input(s): ALT, AST, ALKPHOS, BILITOT, PROT, ALBUMIN in the last 48 hours. and All pertinent labs from the last 24 hours have been reviewed.    Diagnostic Results:  I have reviewed all pertinent imaging results/findings within the past 24 hours.

## 2019-11-12 NOTE — NURSING
Pt becoming increasingly restless, noted to have audible wheezes and increased work of breathing.  Notified Dr. Zaidi.  ABG ordered and PRN breathing treatment given.

## 2019-11-12 NOTE — NURSING
PTT= 71.4, which is below pt's baseline PTT.  Increased argatroban gtt 0.5 mcg/kg/min to 4.5 mcg/kg/min; next PTT to be drawn in 2 hours.

## 2019-11-12 NOTE — ASSESSMENT & PLAN NOTE
"-Continue Argatroban infusion  -RUE continues to be swollen   -Right hand remains ecchymotic   -Mgmt per CT surgery  Per Vascular Surgery:  "Hypercoagulable/HIT workup in progress, pt started on argatroban  Regarding RUE/central venous thrombus: Unfortunately, given location of clot and her recent major cardiac surgery any attempt at thrombolysis with tPA would be absolutely contraindicated  Open thrombectomy also not option as central clot could not be adequately cleared to allow for any outflow/drainage from the arm     Recommend cont anticoagulation, arm elevation, mild/moderate compression"    11/12/19  -Mgmt as per vascular/heme onc/CVT  "

## 2019-11-12 NOTE — PLAN OF CARE
POC and interventions discussed with patient - will need reinfocement of teaching. Vitals stable.  AAO x4 at beginning of shift.  Hypoglycemia needed correcting with Dextrose infusion.  Awake, orients to person only at this time. Restless throughout night, tossing and turning in bed.  Heart NSR.  PICC dressing changed.  Bath given.  Ate 50% of supper - tolerated well.  Incontinent of urine and stool - Brief changed frequently.  R arm +4 edema.  Known DVT's - see US results.  R hand with Cyanotic fingers, thumb and index effected > others fingers.  Black with blisters.  Doppler Radial and Ulnar pulses to R hand.

## 2019-11-12 NOTE — PT/OT/SLP PROGRESS
"Occupational Therapy  Treatment    Mateusz Bates   MRN: [id number]   Admitting Diagnosis: Acute deep vein thrombosis (DVT) of right upper extremity    OT Date of Treatment: 11/11/19   OT Start Time: 0815  OT Stop Time: 0840  OT Total Time (min): 25 min    Billable Minutes:  Self Care/Home Management 15 minutes and Therapeutic Activity 10 minutes    General Precautions: Standard, fall  Orthopedic Precautions: N/A  Braces:           Subjective:  Communicated with nurse andre and epic chart review prior to session       Objective:        Functional Mobility:  Bed Mobility: min a with rolling and mod a with supine>sit t/f's      Transfers:   min/ mod a with sit<>stand and mod a with step t/f's from bed> bedside chair      Functional Ambulation: na    Activities of Daily Living:     Feeding adaptive equipment: min a   Pt educated ion taking smaller bites. Pt with excessive spillage today due to large bites      UE adaptive equipment: mod a     LE adaptive equipment:  max a   Bathing adaptive equipment: na  Balance:   Static Sit: FAIR+: Able to take MINIMAL challenges from all directions  Dynamic Sit: FAIR: Cannot move trunk without losing balance  Static Stand: POOR: Needs MODERATE assist to maintain  Dynamic stand: POOR: Needs MOD (moderate) assist during gait    AM-PAC 6 CLICK ADL   How much help from another person does this patient currently need?   1 = Unable, Total/Dependent Assistance  2 = A lot, Maximum/Moderate Assistance  3 = A little, Minimum/Contact Guard/Supervision  4 = None, Modified [place]/Independent          AM-PAC Raw Score CMS "G-Code Modifier Level of Impairment Assistance   6 % Total / Unable   7 - 8 CM 80 - 100% Maximal Assist   9-13 CL 60 - 80% Moderate Assist   14 - 19 CK 40 - 60% Moderate Assist   20 - 22 CJ 20 - 40% Minimal Assist   23 CI 1-20% SBA / CGA   24 CH 0% Independent/ Mod I       Patient left up in chair with all lines intact, call button in reach, nurse  notified and cna " bobbi present    ASSESSMENT:  Mateusz Bates is a 69 y.o. female with a medical diagnosis of Acute deep vein thrombosis (DVT) of right upper extremity and presents with debility and generalized weakness. Pt will continue with poc.    Rehab identified problem list/impairments: Rehab identified problem list/impairments: weakness, impaired self care skills, impaired balance, decreased safety awareness, decreased coordination, impaired endurance, impaired functional mobilty, decreased upper extremity function, impaired sensation, gait instability    Rehab potential is good.    Activity tolerance: Good    Discharge recommendations: Discharge Facility/Level of Care Needs: rehabilitation facility     Barriers to discharge: Barriers to Discharge: Decreased caregiver support    Equipment recommendations: (tbd)     GOALS:   Multidisciplinary Problems     Occupational Therapy Goals        Problem: Occupational Therapy Goal    Goal Priority Disciplines Outcome Interventions   Occupational Therapy Goal     OT, PT/OT Ongoing, Progressing    Description:  Goals to be met by: 11/10/19     Patient will increase functional independence with ADLs by performing:    UE Dressing with Moderate Assistance.  Grooming while seated with Supervision.  Toileting from bedside commode with Moderate Assistance for hygiene and clothing management.   Toilet transfer to bedside commode with Moderate Assistance.  Upper extremity exercise program x10 reps per handout, with assistance as needed.                      Plan:  Patient to be seen 3 x/week to address the above listed problems via self-care/home management, therapeutic activities, therapeutic exercises  Plan of Care expires: 11/10/19  Plan of Care reviewed with: patient         Susan Jame, OT  11/12/2019

## 2019-11-12 NOTE — ASSESSMENT & PLAN NOTE
Post CABG  EF 30%  S/p swan catheter  CI 3.3  Repeat echo today showed EF 30%, septal hypokinesis , compared to prior echo before the cabg, no significant change  ekg reviewed    11/10/2019  Extubated  Off pressors  Continue Amlodipine and BB/Losartan  Hold Lipitor due to elevated LFT  Continue anticoagulation due to acute DVT  Continue ICU supportive care and ABx    11/11  -Continue BB, ARB, Norvasc  -BP much improved   -Continue supportive care per CT surgery      11/12/19  -Stable  -Continue BB, ARB, Norvasc

## 2019-11-12 NOTE — PT/OT/SLP PROGRESS
Physical Therapy  Treatment    Mateusz Bates   MRN: [id number]   Admitting Diagnosis: Acute deep vein thrombosis (DVT) of right upper extremity    PT Received On: 11/12/19  PT Start Time: 1320     PT Stop Time: 1345    PT Total Time (min): 25 min       Billable Minutes:  Gait Training 15 and Therapeutic Exercise 10    Treatment Type: Treatment  PT/PTA: PTA     PTA Visit Number: 1       General Precautions: Standard, aspiration, fall, sternal  Orthopedic Precautions: N/A   Braces: N/A    Spiritual, Cultural Beliefs, Cheondoism Practices, Values that Affect Care: no    Subjective:  Communicated with PATIENT NURSE, NELSY AND Epic CHART REVIEW  prior to session.  PATIIENT AGREE TO TX NOW .    Pain/Comfort  Pain Rating 1: 4/10  Location - Side 1: Right  Location - Orientation 1: lower  Location 1: foot  Pain Addressed 1: Pre-medicate for activity, Reposition, Distraction, Cessation of Activity  Pain Rating Post-Intervention 1: 4/10    Objective:   Patient found with: blood pressure cuff, telemetry, peripheral IV, pulse ox (continuous), SHORTSEATED IN B/S CHAIR. PATIENT ASSISTED WITH KRISTINE LE EXERCISES, STERNAL PERCAUTIONS REVIEWED WITH RETURN DEMONSTRATE, SIT TO STAND, STAND TO SIT, WEIGHTSHIFTING OVER BILATERAL HIPS . PATIENT GIVEN HEART PILLOW TO HELP WITH STERNAL PERCAUTION RECALL LEARNING.    Functional Mobility:  Bed Mobility:    NOT ATTEMPTED THIS TX .    Transfers:   SIT TO STAND, STAND TO SIT AT MIN ASSIST X2 , X4 REPS , MAX CUES FOR MAINTAINING STERNAL PERCAUTIONS THROUGHOUT ALL ACTIVITY.     Gait:    KRISTINE STATIC/ DYNAMIC STANDING BALANCE ACTIVITY AT MIN ASSIST X2 AT HHAX2, EMPHASIS ON WEIGHTSHIFTING OVER KRISTINE HIPS.    Stairs:  N/A    Balance:   Static Sit: FAIR: Maintains without assist, but unable to take any challenges   Dynamic Sit: FAIR: Cannot move trunk without losing balance  Static Stand: POOR: Needs MODERATE assist to maintain  Dynamic stand: POOR: Needs MOD (moderate) assist during gait     Therapeutic  Activities and Exercises:  KRISTINE LE EXERCISES, STERNAL PERCAUTIONS, T/F TO STATIC , DYNAMIC BALANCE ACTIVITY.     AM-PAC 6 CLICK MOBILITY  How much help from another person does this patient currently need?   1 = Unable, Total/Dependent Assistance  2 = A lot, Maximum/Moderate Assistance  3 = A little, Minimum/Contact Guard/Supervision  4 = None, Modified [place]/Independent    Turning over in bed (including adjusting bedclothes, sheets and blankets)?: 3  Sitting down on and standing up from a chair with arms (e.g., wheelchair, bedside commode, etc.): 2  Moving from lying on back to sitting on the side of the bed?: 2  Moving to and from a bed to a chair (including a wheelchair)?: 2  Need to walk in hospital room?: 2  Climbing 3-5 steps with a railing?: 1  Basic Mobility Total Score: 12    AM-PAC Raw Score CMS G-Code Modifier Level of Impairment Assistance   6 % Total / Unable   7 - 9 CM 80 - 100% Maximal Assist   10 - 14 CL 60 - 80% Moderate Assist   15 - 19 CK 40 - 60% Moderate Assist   20 - 22 CJ 20 - 40% Minimal Assist   23 CI 1-20% SBA / CGA   24 CH 0% Independent/ Mod I     Patient left up in chair with all lines intact, call button in reach and chair alarm on.    Assessment:  Mateusz Bates is a 69 y.o. female with a medical diagnosis of Acute deep vein thrombosis (DVT) of right upper extremity . PATIENT PARTICIPATING VERY WELL WITH ALL THERAPUETIC ACTIVITIES. PATIENT EASILY DISTRACTED, FOLLOWS ONE STEP COMMANDS . PATIENT REMAIN MOTIVATED TO INCREASE ACTIVITY AS TOLERATED. PATIENT REQUIRES MAX ASSIST TO MAINTAIN STERNAL PERCAUTIONS .     Rehab identified problem list/impairments: Rehab identified problem list/impairments: weakness, gait instability, decreased upper extremity function, decreased lower extremity function, decreased ROM, pain, impaired self care skills, impaired functional mobilty, impaired sensation, impaired cognition, impaired endurance, impaired balance, edema    Rehab potential is  good.    Activity tolerance: Good    Discharge recommendations: Discharge Facility/Level of Care Needs: rehabilitation facility     Barriers to discharge:      Equipment recommendations: Equipment Needed After Discharge: none     GOALS:   Multidisciplinary Problems     Physical Therapy Goals        Problem: Physical Therapy Goal    Goal Priority Disciplines Outcome Goal Variances Interventions   Physical Therapy Goal     PT, PT/OT Ongoing, Progressing     Description:  By 11/10/19  1. Patient will perform supine to/from sit CGA x 1 per sternal prec.  2. Patient will perform sit to/from stand CGA a no AD per sternal prec.  3. Patient will amb 100ft no AD min A                     PLAN:    Patient to be seen 5 x/week  to address the above listed problems via gait training, therapeutic activities, therapeutic exercises  Plan of Care expires: 11/18/19  Plan of Care reviewed with: patient         Diana Yuen, PTA  11/12/2019

## 2019-11-12 NOTE — PROGRESS NOTES
[facility] -   Hematology/Oncology  Progress Note    Patient Name: Mateusz Bates  Admission Date: 10/28/2019  Hospital Length of Stay: 15 days  Code Status: Full Code     Subjective:     HPI:  Mrs. Bates is a 69-year-old female with hypertension, diabetes, hyperlipidemia, obesity, anxiety and depression, iron deficiency anemia, vitamin B12 deficiency, who presented to the emergency room with an NSTEMI.  Workup included a cardiac catheterization that shows severe multivessel coronary artery disease with proximal LAD stenosis of 70% and severely depressed ejection fraction of 20%.  The patient is a candidate for urgent coronary artery bypass grafting. S/p CABG today.  Hematology is consulted for thrombocytopenia.    No new subjective & objective note has been filed under this hospital service since the last note was generated.    Assessment/Plan:     * Acute deep vein thrombosis (DVT) of right upper extremity  November 12, 2019  --continue Agatroban; avoid Heparin products  --Plan to add Coumadin once platelet count 100K. Would continue Argatroban gtt with coumadin until target INR achieved   --continued Hematology follow up in outpatient setting    Edema of hand  November 12, 2019     Upper extremity vascular insufficiency:  --Improved apperance  --Vascular surgery following. No surgical intervention recommended. Recommend continue anticoagulation, arm elevation, mild/moderate compression    Thrombocytopenia  November 11, 2019  --Platelet count 75, improving. Transfuse if < 10   --No S&S active bleed. Hemoglobin remains stable. Hg 7.5. No urgent need for blood transfusion. Monitor  --suspect HIT versus TTP  -Awaiting results of Sahra HIT test and [id number]  --Continue Argatroban gtt  --avoid heparin containing products.  --Check Vitamin B 12 level to r/o B 12 deficiency as etiology of decreased mental status  --Check LDH/Haptologlobin  to rule out intravascular hemolysis  --Supportive care    November  12, 2019  --ROSANNA positive  --Check PT/INR today  --Avoid heparin containing products  --Continue Argatroban gtt for DVT. Add Coumadin once platelet count > 100K. Would need to achieve target INR 4 prior to discontinuing Argatroban gtt. Once Argatroban gtt discontinued goal INR will be 2.5-3.5 on Coumadin. Once INR therapeutic she may continue to follow up in the outpatient Heme-Onc clinic and Coumadin clinic for continued monitoring of her Coumadin levels. Will need anticoagulation for at least 3 months  --B 12 deficiency ruled out. Mentation improving  --Haptoglobin normal r/o intravascular hemolysis   --CBC stable. No urgent indication for any blood product transfusion at present time  --Await recovery of platelet function  --Supportive care          Thank you for your consult. I will follow-up with patient. Please contact us if you have any additional questions.     Carrol Brand NP  Hematology/Oncology  [facility]

## 2019-11-12 NOTE — SUBJECTIVE & OBJECTIVE
Interval History:  11/12/2019  The patient is post-operative day 12.  Status post coronary artery bypass grafting x3.    ROS  Medications:  Continuous Infusions:   argatroban in 0.9 % sod chlor 4 mcg/kg/min (11/12/19 0600)     Scheduled Meds:   amLODIPine  10 mg Oral Daily    chlorhexidine  15 mL Mouth/Throat BID    chlorthalidone  25 mg Oral Daily    ferrous sulfate  325 mg Oral BID    lactated ringers  1,000 mL Intravenous Once    losartan  50 mg Oral Daily    metoprolol tartrate  50 mg Oral BID    nitroGLYCERIN 2% TD oint  0.5 inch Topical (Top) Q12H    pantoprazole  40 mg Oral Daily    psyllium husk  1 packet Oral BID    sodium chloride  2 g Oral Q6H     PRN Meds:albumin human 5%, albuterol sulfate, Dextrose 10% Bolus, Dextrose 10% Bolus, glucagon (human recombinant), hydrALAZINE, insulin aspart U-100, iohexol, lactated ringers, magnesium sulfate IVPB, magnesium sulfate IVPB, metoclopramide HCl, ondansetron, potassium chloride in water **AND** potassium chloride in water **AND** potassium chloride in water, traMADol     Objective:     Vital Signs (Most Recent):  Temp: 97.5 °F (36.4 °C) (11/12/19 0530)  Pulse: 76 (11/12/19 0600)  Resp: (!) 22 (11/12/19 0600)  BP: (!) 127/48 (11/12/19 0600)  SpO2: 100 % (11/12/19 0600) Vital Signs (24h Range):  Temp:  [95 °F (35 °C)-99 °F (37.2 °C)] 97.5 °F (36.4 °C)  Pulse:  [63-90] 76  Resp:  [19-30] 22  SpO2:  [86 %-100 %] 100 %  BP: ()/(44-90) 127/48     Weight: 71.8 kg (158 lb 4.6 oz)  Body mass index is 29.91 kg/m².    SpO2: 100 %  O2 Device (Oxygen Therapy): nasal cannula    Intake/Output - Last 3 Shifts       11/10 0700 - 11/11 0659 11/11 0700 - 11/12 0659 11/12 0700 - 11/13 0659    P.O. 540 330     I.V. (mL/kg) 387.5 (5.2)      IV Piggyback 650 500     Total Intake(mL/kg) 1577.5 (21.2) 830 (11.6)     Urine (mL/kg/hr) 1850 (1)      Stool 0      Total Output 1850      Net -272.5 +830            Urine Occurrence  6 x     Stool Occurrence 4 x 1 x            Lines/Drains/Airways     Peripherally Inserted Central Catheter Line                 PICC Double Lumen 11/07/19 1645 left brachial;left basilic 4 days                Physical Exam   Constitutional: She is oriented to person, place, and time. She appears well-developed and well-nourished. No distress.   HENT:   Head: Normocephalic and atraumatic.   Eyes: EOM are normal.   Neck: Normal range of motion. Neck supple. No JVD present.   Cardiovascular: Normal rate, regular rhythm, normal heart sounds and intact distal pulses. Exam reveals no gallop and no friction rub.   No murmur heard.  Pulmonary/Chest: Effort normal and breath sounds normal. No stridor. No respiratory distress. She has no wheezes. She has no rales. She exhibits no tenderness.   Abdominal: Soft. Bowel sounds are normal. She exhibits no distension and no mass. There is no tenderness. There is no rebound and no guarding. No hernia.   Musculoskeletal: She exhibits edema and tenderness. She exhibits no deformity.   ROM to the right hand 3/5. Mottled, purple discoloration isolated to the tip of the right thumb and tip of the right index finger. Swelling persists to the RUE distal to the elbow. Doppler pulse found via radial and ulnar artery. Overall the RUE is improving.    Neurological: She is alert and oriented to person, place, and time. She displays normal reflexes. No cranial nerve deficit or sensory deficit. She exhibits normal muscle tone. Coordination normal.   Skin: Skin is warm and dry. Capillary refill takes less than 2 seconds. Cap refill to the right thumb tip and right index finger tip un blanchable. . No rash noted. She is not diaphoretic. There is erythema. No pallor.   Redness and warmth to the right hand. Surgical incisions CDI.    Psychiatric: She has a normal mood and affect. Her behavior is normal. Judgment and thought content normal.   Nursing note and vitals reviewed.      Significant Labs:  BMP:   Recent Labs   Lab  11/12/19  0409   *      K 3.9      CO2 26   BUN 14   CREATININE 0.8   CALCIUM 8.8   MG 2.1     CBC:   Recent Labs   Lab 11/12/19  0409   WBC 17.40*   RBC 2.49*   HGB 7.3*   HCT 23.1*   PLT 75*   MCV 93   MCH 29.3   MCHC 31.6*       Significant Diagnostics:  I have reviewed all pertinent imaging results/findings within the past 24 hours.

## 2019-11-12 NOTE — NURSING
PTT 78, which is within normal limits, therefore, no change is needed to argatroban gtt; next PTT due in 4 hours, 2030.

## 2019-11-12 NOTE — PROGRESS NOTES
Patient Diaphoretic and difficult to arouse. Slurred speech.  CBG checked = <20.  Dextrose given as ordered STAT with immediate change in LOC.  Arouses to name.  Orients to person. Follows simple commands.    Complete bath given and linens changed.  Vitals stable. Will monitor closely.

## 2019-11-12 NOTE — PLAN OF CARE
Swallowing assessed with thin liquids and soft solids with pt seated upright in a chair.  No overt sings of swallow difficulty observed with either consistency.  She remains confused and impulsive taking multiple large bites/sips at a time which places her at increased risk for aspiration.   ST recommends to change diet to soft mechanical with no rice and no bread.  Thin liquids with no straws.  She will need direct supervision with all po intake.     She completed orientation tasks, problem solving tasks, and memory tasks with mod-max cueing.  She required max cueing for attention to tasks and increased time for verbal responses.

## 2019-11-12 NOTE — ASSESSMENT & PLAN NOTE
November 12, 2019     Upper extremity vascular insufficiency:  --Improved apperance  --Vascular surgery following. No surgical intervention recommended. Recommend continue anticoagulation, arm elevation, mild/moderate compression

## 2019-11-12 NOTE — ASSESSMENT & PLAN NOTE
-Patient presented with intermittent chest tightness/heaviness that radiated to her back since Saturday evening  -Associated with JONES/orthopnea  -Initial troponin 3.665  -EKG showed SR with new LBBB  -Presentation consistent with NSTEMI  -Continue ASA, statin  -Start Toprol XL  -Start heparin gtt  -Check 2D echo  -Continue to trend troponin  -Keep NPO. [facility] today for further evaluation and PCI if indicated. All risks, benefits, and treatment alternatives explained to patient in detail. All questions answered. She has agreed to proceed. Further rec's to follow.    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 All risks, benefits, and treatment alternatives explained to patient in detail. All questions answered. She has agreed to proceed.    10/31/19  -Patient is s/p CABG x3.   See plan for CABG

## 2019-11-12 NOTE — SUBJECTIVE & OBJECTIVE
Interval History: No issues reported overnight.     Review of Systems   Constitutional: Positive for activity change and appetite change. Negative for unexpected weight change.   HENT: Negative.  Negative for trouble swallowing.    Eyes: Negative.    Respiratory: Negative.  Negative for cough, shortness of breath and wheezing.    Cardiovascular: Negative.  Negative for chest pain.   Gastrointestinal: Negative for abdominal distention, abdominal pain, nausea and vomiting.   Skin: Negative.    Neurological: Negative for dizziness and weakness.   All other systems reviewed and are negative.    Objective:     Vital Signs (Most Recent):  Temp: 97.5 °F (36.4 °C) (11/12/19 0530)  Pulse: 76 (11/12/19 0600)  Resp: (!) 22 (11/12/19 0600)  BP: (!) 127/48 (11/12/19 0600)  SpO2: 100 % (11/12/19 0600) Vital Signs (24h Range):  Temp:  [95 °F (35 °C)-99 °F (37.2 °C)] 97.5 °F (36.4 °C)  Pulse:  [63-90] 76  Resp:  [19-30] 22  SpO2:  [86 %-100 %] 100 %  BP: ()/(44-87) 127/48     Weight: 71.8 kg (158 lb 4.6 oz)  Body mass index is 29.91 kg/m².    Intake/Output Summary (Last 24 hours) at 11/12/2019 1113  Last data filed at 11/12/2019 0400  Gross per 24 hour   Intake 830 ml   Output --   Net 830 ml      Physical Exam   Constitutional: She is oriented to person, place, and time. She appears well-developed and well-nourished. No distress.   HENT:   Head: Normocephalic and atraumatic.   Cardiovascular: Normal rate, regular rhythm and normal heart sounds. Exam reveals no gallop and no friction rub.   No murmur heard.  Pulmonary/Chest: Effort normal and breath sounds normal. No stridor. No respiratory distress. She has no wheezes. She has no rales.   Abdominal: Soft. Bowel sounds are normal. She exhibits no distension and no mass. There is no tenderness. There is no guarding.   Musculoskeletal: She exhibits no edema.   Erythema and discoloration of right hand noted, tenderness with movement   Neurological: She is alert and oriented to  person, place, and time.   Skin: She is not diaphoretic.       Significant Labs:   Recent Lab Results       11/12/19  1158   11/12/19  0929   11/12/19  0847   11/12/19  0741   11/12/19  0416        Procalcitonin   0.75  Comment:  A concentration < 0.25 ng/mL represents a low risk bacterial   infection.  Procalcitonin may not be accurate among patients with localized   infection, recent trauma or major surgery, immunosuppressed state,   invasive fungal infection, renal dysfunction. Decisions regarding   initiation or continuation of antibiotic therapy should not be based   solely on procalcitonin levels.             Allens Test               Anion Gap               aPTT     71.4  Comment:  aPTT therapeutic range = 39-69 seconds         Baso #               Basophil%               Site               BUN, Bld               Calcium               Chloride               CO2               Creatinine               DelSys               Differential Method               eGFR if                eGFR if non                Eos #               Eosinophil%               FiO2               Flow               Glucose               Gran # (ANC)               Gran%               Hematocrit               Hemoglobin               Immature Grans (Abs)               Immature Granulocytes               Lymph #               Lymph%               Magnesium               MCH               MCHC               MCV               Mode               Mono #               Mono%               MPV               nRBC               Platelets               POC BE               POC HCO3               POC PCO2               POC PH               POC PO2               POC SATURATED O2               POCT Glucose 217     83 143     Potassium               RBC               RDW               Sample               Sodium               WBC                                11/12/19  0409   11/12/19  0300   11/12/19  0217   11/12/19  0148    11/12/19  0130        Procalcitonin               Allens Test               Anion Gap 7             aPTT 84.6  Comment:  aPTT therapeutic range = 39-69 seconds             Baso # 0.03             Basophil% 0.2             Site               BUN, Bld 14             Calcium 8.8             Chloride 105             CO2 26             Creatinine 0.8             DelSys               Differential Method Automated             eGFR if  >60             eGFR if non  >60  Comment:  Calculation used to obtain the estimated glomerular filtration  rate (eGFR) is the CKD-EPI equation.                Eos # 0.2             Eosinophil% 1.1             FiO2               Flow               Glucose 143             Gran # (ANC) 14.5             Gran% 83.5             Hematocrit 23.1             Hemoglobin 7.3             Immature Grans (Abs) 0.41  Comment:  Mild elevation in immature granulocytes is non specific and   can be seen in a variety of conditions including stress response,   acute inflammation, trauma and pregnancy. Correlation with other   laboratory and clinical findings is essential.               Immature Granulocytes 2.4             Lymph # 1.2             Lymph% 6.7             Magnesium 2.1             MCH 29.3             MCHC 31.6             MCV 93             Mode               Mono # 1.1             Mono% 6.1             MPV 11.3             nRBC 0             Platelets 75             POC BE               POC HCO3               POC PCO2               POC PH               POC PO2               POC SATURATED O2               POCT Glucose   132 120 141 46     Potassium 3.9             RBC 2.49             RDW 16.2             Sample               Sodium 138             WBC 17.40                              11/12/19  0110   11/12/19  0038   11/12/19  0033   11/11/19 2035 11/11/19 2023        Procalcitonin               Allens Test               Anion Gap               aPTT      83.4  Comment:  aPTT therapeutic range = 39-69 seconds   68.3  Comment:  aPTT therapeutic range = 39-69 seconds     Baso #               Basophil%               Site               BUN, Bld               Calcium               Chloride               CO2               Creatinine               DelSys               Differential Method               eGFR if                eGFR if non                Eos #               Eosinophil%               FiO2               Flow               Glucose               Gran # (ANC)               Gran%               Hematocrit               Hemoglobin               Immature Grans (Abs)               Immature Granulocytes               Lymph #               Lymph%               Magnesium               MCH               MCHC               MCV               Mode               Mono #               Mono%               MPV               nRBC               Platelets               POC BE               POC HCO3               POC PCO2               POC PH               POC PO2               POC SATURATED O2               POCT Glucose 134 20   173       Potassium               RBC               RDW               Sample               Sodium               WBC                                11/11/19  1611   11/11/19  1609   11/11/19  1603   11/11/19  1447   11/11/19  1409        Procalcitonin               Allens Test       Pass       Anion Gap   9           aPTT     78.5  Comment:  aPTT therapeutic range = 39-69 seconds         Baso #   0.05           Basophil%   0.3           Site       LR       BUN, Bld   16           Calcium   8.4           Chloride   100           CO2   22           Creatinine   0.8           DelSys       Nasal Can       Differential Method   Automated           eGFR if    >60           eGFR if non    >60  Comment:  Calculation used to obtain the estimated glomerular filtration  rate (eGFR) is the CKD-EPI equation.               Eos #   0.3           Eosinophil%   1.5           FiO2       28       Flow       2       Glucose   322           Gran # (ANC)   15.3           Gran%   84.0           Hematocrit   23.1           Hemoglobin   7.2           Immature Grans (Abs)   0.42  Comment:  Mild elevation in immature granulocytes is non specific and   can be seen in a variety of conditions including stress response,   acute inflammation, trauma and pregnancy. Correlation with other   laboratory and clinical findings is essential.             Immature Granulocytes   2.3           Lymph #   1.2           Lymph%   6.6           Magnesium               MCH   29.0           MCHC   31.2           MCV   93           Mode       SPONT       Mono #   1.0           Mono%   5.3           MPV   11.5           nRBC   0           Platelets   76           POC BE       -4       POC HCO3       20.4       POC PCO2       29.5       POC PH       7.447       POC PO2       83       POC SATURATED O2       97       POCT Glucose 331       382     Potassium   4.5           RBC   2.48           RDW   16.1           Sample       ARTERIAL       Sodium   131           WBC   18.20                              All pertinent labs within the past 24 hours have been reviewed.    Significant Imaging: I have reviewed all pertinent imaging results/findings within the past 24 hours.

## 2019-11-12 NOTE — SUBJECTIVE & OBJECTIVE
Review of Systems   Constitution: Positive for malaise/fatigue.   HENT: Negative.    Eyes: Negative.    Cardiovascular: Positive for dyspnea on exertion.   Respiratory: Negative.    Endocrine: Negative.    Hematologic/Lymphatic: Bruises/bleeds easily.   Skin: Positive for color change (right hand).   Musculoskeletal: Negative.    Gastrointestinal: Negative.    Genitourinary: Negative.    Neurological: Positive for weakness.   Psychiatric/Behavioral: Negative.    Allergic/Immunologic: Negative.      Objective:     Vital Signs (Most Recent):  Temp: 97.5 °F (36.4 °C) (11/12/19 0530)  Pulse: 76 (11/12/19 0600)  Resp: (!) 22 (11/12/19 0600)  BP: (!) 127/48 (11/12/19 0600)  SpO2: 100 % (11/12/19 0600) Vital Signs (24h Range):  Temp:  [95 °F (35 °C)-99 °F (37.2 °C)] 97.5 °F (36.4 °C)  Pulse:  [63-90] 76  Resp:  [19-30] 22  SpO2:  [92 %-100 %] 100 %  BP: ()/(44-87) 127/48     Weight: 71.8 kg (158 lb 4.6 oz)  Body mass index is 29.91 kg/m².     SpO2: 100 %  O2 Device (Oxygen Therapy): nasal cannula      Intake/Output Summary (Last 24 hours) at 11/12/2019 1209  Last data filed at 11/12/2019 0400  Gross per 24 hour   Intake 830 ml   Output --   Net 830 ml       Lines/Drains/Airways     Peripherally Inserted Central Catheter Line                 PICC Double Lumen 11/07/19 1645 left brachial;left basilic 4 days                Physical Exam   Constitutional: She is oriented to person, place, and time. She appears well-developed and well-nourished. No distress.   Appears ill, slightly tachypneic during exam   HENT:   Head: Normocephalic and atraumatic.   Eyes: Pupils are equal, round, and reactive to light. Right eye exhibits no discharge. Left eye exhibits no discharge.   Neck: Neck supple. No JVD present.   Cardiovascular: Normal rate, regular rhythm, S1 normal, S2 normal and normal heart sounds.   No murmur heard.  Pulmonary/Chest: Effort normal and breath sounds normal. No respiratory distress. She has no wheezes.  She has no rales.   CABG site C/D/I; no bleeding erythema or drainage   Abdominal: Soft. She exhibits no distension.   Musculoskeletal: She exhibits edema (RUE and hand) and tenderness.   Neurological: She is alert and oriented to person, place, and time.   Skin: Skin is warm and dry. She is not diaphoretic. No erythema.   Mottled right thumb and index finger with surrounding edema   Psychiatric: She has a normal mood and affect. Her behavior is normal. Thought content normal.   Nursing note and vitals reviewed.      Significant Labs:   CMP   Recent Labs   Lab 11/11/19  1144 11/11/19  1609 11/12/19  0409   * 131* 138   K 4.8 4.5 3.9    100 105   CO2 20* 22* 26   * 322* 143*   BUN 17 16 14   CREATININE 0.9 0.8 0.8   CALCIUM 8.8 8.4* 8.8   ANIONGAP 11 9 7*   ESTGFRAFRICA >60 >60 >60   EGFRNONAA >60 >60 >60   , CBC   Recent Labs   Lab 11/11/19  0400 11/11/19  1609 11/12/19  0409   WBC 19.05* 18.20* 17.40*   HGB 7.5* 7.2* 7.3*   HCT 23.8* 23.1* 23.1*   PLT 75* 76* 75*   , Troponin No results for input(s): TROPONINI in the last 48 hours. and All pertinent lab results from the last 24 hours have been reviewed.    Significant Imaging: Echocardiogram:   2D echo with color flow doppler:   Results for orders placed or performed during the hospital encounter of 10/28/19   2D echo with color flow doppler   Result Value Ref Range    QEF 30 (A) 55 - 65    Diastolic Dysfunction Yes (A)     Est. PA Systolic Pressure 33.03     Narrative    Date of Procedure: 11/06/2019        TEST DESCRIPTION   Technical Quality: This is a technically challenging study.     General: A catheter is present in the right-sided cardiac chambers.     Aorta: The aortic root is normal in size, measuring 3.7 cm at sinotubular junction and 3.8 cm at Sinuses of Valsalva. The proximal ascending aorta is normal in size, measuring 3.7 cm across.     Left Atrium: The left atrial volume index is normal, measuring 19.22 cc/m2.     Left Ventricle:  The left ventricle is normal in size, with an end-diastolic diameter of 3.0 cm, and an end-systolic diameter of 2.5 cm. LV wall thickness is normal, with the septum measuring 2.1 cm and the posterior wall measuring 1.4 cm across. Relative   wall thickness was increased at 0.93, and the LV mass index was increased at 140.2 g/m2 consistent with concentric left ventricular hypertrophy. The following segments were mildly hypokinetic: apical lateral wall.  The following segments were severely hypokinetic: mid anteroseptum, basal anteroseptum, apical septum, mid inferoseptum, basal inferoseptum, basal inferior wall.  Left ventricular systolic function appears moderately depressed. Visually estimated ejection fraction is 30-35%. The LV Doppler derived stroke volume equals 54.0 ccs.     Diastolic indices: E wave velocity 0.9 m/s, E/A ratio 0.8,  msec., E/e' ratio(avg) 14. There is diastolic dysfunction secondary to relaxation abnormality.     Right Atrium: The right atrium is normal in size, measuring 3.5 cm in length and 2.1 cm in width in the apical view.     Right Ventricle: The right ventricle is normal in size. Global right ventricular systolic function appears normal. Tricuspid annular plane systolic excursion (TAPSE) is 1.3 cm. The estimated PA systolic pressure is 33 mmHg.     Aortic Valve:  The aortic valve is normal in structure. The aortic valve is tri-leaflet in structure. The mean gradient obtained across the aortic valve is 9 mmHg.     Mitral Valve:  The mitral valve is normal in structure. The pressure half time is 53 msec. The calculated mitral valve area is 4.15 cm2.     Tricuspid Valve:  The tricuspid valve is normal in structure.     Pulmonary Valve:  The pulmonic valve is not well seen.     IVC: IVC is normal in size and collapses > 50% with a sniff, suggesting normal right atrial pressure of 3 mmHg.     Intracavitary: There is no evidence of pericardial effusion, intracavity mass, thrombi, or  vegetation.         CONCLUSIONS     1 - Concentric hypertrophy.     2 - Wall motion abnormalities.     3 - Moderately depressed left ventricular systolic function (EF 30-35%).     4 - Impaired LV relaxation, normal LAP (grade 1 diastolic dysfunction).     5 - Normal right ventricular systolic function .     6 - The estimated PA systolic pressure is 33 mmHg.             This document has been electronically    SIGNED BY: Charles Matias MD On: 11/06/2019 18:28   , EKG: Reviewed and X-Ray: CXR: X-Ray Chest 1 View (CXR):   Results for orders placed or performed during the hospital encounter of 10/28/19   X-Ray Chest 1 View    Narrative    EXAMINATION:  XR CHEST 1 VIEW    CLINICAL HISTORY:  follow up;    TECHNIQUE:  Single frontal view of the chest was performed.    COMPARISON:  11/07/2019    FINDINGS:  Tubes and lines are stable.  Willis-Waldo catheter is been removed.  Mild atelectasis within the left midlung zone.  No consolidation.  Trace pleural effusions not entirely excluded.  No pneumothorax.  Bones demonstrate mild degenerative changes.  Sternotomy wires are intact.  In comparison to the prior study, there is no adverse interval changes      Impression    In comparison to the prior study, there is no adverse interval changes      Electronically signed by: Jh Anne MD  Date:    11/08/2019  Time:    08:14    and X-Ray Chest PA and Lateral (CXR):   Results for orders placed or performed during the hospital encounter of 10/28/19   X-Ray Chest PA And Lateral    Narrative    EXAMINATION:  XR CHEST PA AND LATERAL    CLINICAL HISTORY:  Pre Op;    COMPARISON:  10/28/2019    FINDINGS:  Cardiac silhouette is normal. Aorta demonstrates atherosclerotic disease. The lungs demonstrate no evidence of active disease.  No evidence of pleural effusion or pneumothorax.  Bones appear intact.      Impression    No acute process seen.      Electronically signed by: Jh Anne MD  Date:    10/28/2019  Time:    21:38

## 2019-11-12 NOTE — PROGRESS NOTES
[facility] Hospital Medicine  Progress Note    Patient Name: Mateusz Bates  MRN: [id number]  Patient Class: IP- Inpatient   Admission Date: 10/28/2019  Length of Stay: 15 days  Attending Physician: Keny Zaidi MD  Primary Care Provider: Serenity Kilpatrick MD        Subjective:     Principal Problem:Acute deep vein thrombosis (DVT) of right upper extremity        HPI:  Mateusz Bates is a 69 year old female with DM II, hypertension and hyperlipidemia who presented to the ED at the request of her PCP due to finding of an abnormal EKG and complaints of chest tightness with SOB. The patient reports having intermittent symptoms for the last 1-2 months. On 10/26/19, she began having a tightness in her mid chest that radiated into her back with associated SOB. Symptoms were worse with laying flat and improved with sitting up. They lasted only minutes, but this was longer than previous episodes. She denies a trend in when these episodes occur as well as associated nausea, vomiting and diaphoresis. The patient's EKG was significant for a new LBBB compared to previous in 2016. Labs were significant for a BNP of 1,305, troponin of 3.665 and potassium of 3.4. Code status was discussed with the patient and her sister. She is a full code. Her sister, Christina Hernandez, is her surrogate medical decision maker.     Overview/Hospital Course:  Admitted for evaluation and treatment of chest pain concerning for acute coronary syndrome.  Evaluation by Cardiology and urgent left heart catheterization.  Angiogram discovered severe multi-vessel coronary artery disease.  Cardiac echo showed severely reduced LV function.  Referral to Cardiothoracic Surgery who recommended CABG.  Balloon pump placed 30 October.  CABG x3 by Dr. Zaidi 31 October.  Successfully extubated morning of 01 November.  11/2 - Patient improved s/p extubation. CABG x 3. Discussed with CC Team. BS mild elevation. SA and LA insulin initiated.  11/3 -  Improved BS control. Patient + chest tenderness. Tolerating diet. Discussed with cc team  11/4 - Patient with improved strength interactivity. Patient denies n/v/d + Sx site tenderness. Patient reports she is weakened.  11/5 - Patient denies CP. Reports she is weak. Denies SOB. Patient lethargic and clammy sitting in chair at bedside. Discussed with care team. Patient to return to bed with warming blankets, Blood Sugar 88 last check.   11/6 - Patient remains intubated R Hand cyanotic. Patient discussed with CC Team / CV sx.  11/7 - Patient in weaning trial. Extubation attempt this AM. R Hand improved blood flow - warmer to touch. Discussed with CC Team  11/8 - Patient improved Alert and appropriate. R Hand with improved blood flow / warmer.   11/9 - pulled NG tube out overnight. Will await swallow study.   11/10 - ST recommended pureed with thin liquids  11/12 - stepped down from icu       Interval History: No issues reported overnight.     Review of Systems   Constitutional: Positive for activity change and appetite change. Negative for unexpected weight change.   HENT: Negative.  Negative for trouble swallowing.    Eyes: Negative.    Respiratory: Negative.  Negative for cough, shortness of breath and wheezing.    Cardiovascular: Negative.  Negative for chest pain.   Gastrointestinal: Negative for abdominal distention, abdominal pain, nausea and vomiting.   Skin: Negative.    Neurological: Negative for dizziness and weakness.   All other systems reviewed and are negative.    Objective:     Vital Signs (Most Recent):  Temp: 97.5 °F (36.4 °C) (11/12/19 0530)  Pulse: 76 (11/12/19 0600)  Resp: (!) 22 (11/12/19 0600)  BP: (!) 127/48 (11/12/19 0600)  SpO2: 100 % (11/12/19 0600) Vital Signs (24h Range):  Temp:  [95 °F (35 °C)-99 °F (37.2 °C)] 97.5 °F (36.4 °C)  Pulse:  [63-90] 76  Resp:  [19-30] 22  SpO2:  [86 %-100 %] 100 %  BP: ()/(44-87) 127/48     Weight: 71.8 kg (158 lb 4.6 oz)  Body mass index is 29.91  kg/m².    Intake/Output Summary (Last 24 hours) at 11/12/2019 1113  Last data filed at 11/12/2019 0400  Gross per 24 hour   Intake 830 ml   Output --   Net 830 ml      Physical Exam   Constitutional: She is oriented to person, place, and time. She appears well-developed and well-nourished. No distress.   HENT:   Head: Normocephalic and atraumatic.   Cardiovascular: Normal rate, regular rhythm and normal heart sounds. Exam reveals no gallop and no friction rub.   No murmur heard.  Pulmonary/Chest: Effort normal and breath sounds normal. No stridor. No respiratory distress. She has no wheezes. She has no rales.   Abdominal: Soft. Bowel sounds are normal. She exhibits no distension and no mass. There is no tenderness. There is no guarding.   Musculoskeletal: She exhibits no edema.   Erythema and discoloration of right hand noted, tenderness with movement   Neurological: She is alert and oriented to person, place, and time.   Skin: She is not diaphoretic.       Significant Labs:   Recent Lab Results       11/12/19  1158   11/12/19  0929   11/12/19  0847   11/12/19  0741   11/12/19  0416        Procalcitonin   0.75  Comment:  A concentration < 0.25 ng/mL represents a low risk bacterial   infection.  Procalcitonin may not be accurate among patients with localized   infection, recent trauma or major surgery, immunosuppressed state,   invasive fungal infection, renal dysfunction. Decisions regarding   initiation or continuation of antibiotic therapy should not be based   solely on procalcitonin levels.             Allens Test               Anion Gap               aPTT     71.4  Comment:  aPTT therapeutic range = 39-69 seconds         Baso #               Basophil%               Site               BUN, Bld               Calcium               Chloride               CO2               Creatinine               DelSys               Differential Method               eGFR if                eGFR if non   American               Eos #               Eosinophil%               FiO2               Flow               Glucose               Gran # (ANC)               Gran%               Hematocrit               Hemoglobin               Immature Grans (Abs)               Immature Granulocytes               Lymph #               Lymph%               Magnesium               MCH               MCHC               MCV               Mode               Mono #               Mono%               MPV               nRBC               Platelets               POC BE               POC HCO3               POC PCO2               POC PH               POC PO2               POC SATURATED O2               POCT Glucose 217     83 143     Potassium               RBC               RDW               Sample               Sodium               WBC                                11/12/19  0409   11/12/19  0300   11/12/19  0217   11/12/19  0148   11/12/19  0130        Procalcitonin               Allens Test               Anion Gap 7             aPTT 84.6  Comment:  aPTT therapeutic range = 39-69 seconds             Baso # 0.03             Basophil% 0.2             Site               BUN, Bld 14             Calcium 8.8             Chloride 105             CO2 26             Creatinine 0.8             DelSys               Differential Method Automated             eGFR if  >60             eGFR if non  >60  Comment:  Calculation used to obtain the estimated glomerular filtration  rate (eGFR) is the CKD-EPI equation.                Eos # 0.2             Eosinophil% 1.1             FiO2               Flow               Glucose 143             Gran # (ANC) 14.5             Gran% 83.5             Hematocrit 23.1             Hemoglobin 7.3             Immature Grans (Abs) 0.41  Comment:  Mild elevation in immature granulocytes is non specific and   can be seen in a variety of conditions including stress response,   acute inflammation,  trauma and pregnancy. Correlation with other   laboratory and clinical findings is essential.               Immature Granulocytes 2.4             Lymph # 1.2             Lymph% 6.7             Magnesium 2.1             MCH 29.3             MCHC 31.6             MCV 93             Mode               Mono # 1.1             Mono% 6.1             MPV 11.3             nRBC 0             Platelets 75             POC BE               POC HCO3               POC PCO2               POC PH               POC PO2               POC SATURATED O2               POCT Glucose   132 120 141 46     Potassium 3.9             RBC 2.49             RDW 16.2             Sample               Sodium 138             WBC 17.40                              11/12/19  0110   11/12/19  0038   11/12/19  0033   11/11/19  2035   11/11/19 2023        Procalcitonin               Allens Test               Anion Gap               aPTT     83.4  Comment:  aPTT therapeutic range = 39-69 seconds   68.3  Comment:  aPTT therapeutic range = 39-69 seconds     Baso #               Basophil%               Site               BUN, Bld               Calcium               Chloride               CO2               Creatinine               DelSys               Differential Method               eGFR if                eGFR if non                Eos #               Eosinophil%               FiO2               Flow               Glucose               Gran # (ANC)               Gran%               Hematocrit               Hemoglobin               Immature Grans (Abs)               Immature Granulocytes               Lymph #               Lymph%               Magnesium               MCH               MCHC               MCV               Mode               Mono #               Mono%               MPV               nRBC               Platelets               POC BE               POC HCO3               POC PCO2               POC PH               POC  PO2               POC SATURATED O2               POCT Glucose 134 20   173       Potassium               RBC               RDW               Sample               Sodium               WBC                                11/11/19  1611   11/11/19  1609   11/11/19  1603   11/11/19  1447   11/11/19  1409        Procalcitonin               Allens Test       Pass       Anion Gap   9           aPTT     78.5  Comment:  aPTT therapeutic range = 39-69 seconds         Baso #   0.05           Basophil%   0.3           Site       LR       BUN, Bld   16           Calcium   8.4           Chloride   100           CO2   22           Creatinine   0.8           DelSys       Nasal Can       Differential Method   Automated           eGFR if    >60           eGFR if non    >60  Comment:  Calculation used to obtain the estimated glomerular filtration  rate (eGFR) is the CKD-EPI equation.              Eos #   0.3           Eosinophil%   1.5           FiO2       28       Flow       2       Glucose   322           Gran # (ANC)   15.3           Gran%   84.0           Hematocrit   23.1           Hemoglobin   7.2           Immature Grans (Abs)   0.42  Comment:  Mild elevation in immature granulocytes is non specific and   can be seen in a variety of conditions including stress response,   acute inflammation, trauma and pregnancy. Correlation with other   laboratory and clinical findings is essential.             Immature Granulocytes   2.3           Lymph #   1.2           Lymph%   6.6           Magnesium               MCH   29.0           MCHC   31.2           MCV   93           Mode       SPONT       Mono #   1.0           Mono%   5.3           MPV   11.5           nRBC   0           Platelets   76           POC BE       -4       POC HCO3       20.4       POC PCO2       29.5       POC PH       7.447       POC PO2       83       POC SATURATED O2       97       POCT Glucose 331       382     Potassium   4.5            RBC   2.48           RDW   16.1           Sample       ARTERIAL       Sodium   131           WBC   18.20                              All pertinent labs within the past 24 hours have been reviewed.    Significant Imaging: I have reviewed all pertinent imaging results/findings within the past 24 hours.      Assessment/Plan:      * Acute deep vein thrombosis (DVT) of right upper extremity  11/9:  Cont argatroban infusion       11/12:  Cont argatroban infusion      Cardiogenic shock        Thrombocytopenia  11/2  Heme On on Consult  HIT panel  Likely drug induced  Monitor  11/3  Improved at 89 today  Monitor  11/5  Plts 73  Monitor  11/8  Argatroban  Heme Onc  Plts 45    11/9:  plt stable today, no indication for transfusion  Cont to monitor     11/10:  Improving    11/11:  Stable, 75k today     11/12:  Stable     Acute hypoxemic respiratory failure  11/2  Extubated  Monitor  Pulmonary CC    11/7  Intubated  Weaning trial in progress  Pulmonary CC    11/8  Extubated  Pulmonary  O2  Nebs    11/9:  Extubated  Sputum culture normal respiratory araceli  Blood cultures negative  Will consider de-escalating abx therapy tomorrow       S/P CABG x 3  11/4  CABG x 3  ASA  Statin  BB  CV sx    11/6  CV sx  Statin      11/7  Per CV sx    11/9-11/10  Management per cardiovascular surgery     CAD, multiple vessel        Acute combined systolic and diastolic CHF, NYHA class 2  Probable ischemic cardiomyopathy.  Anticipate CABG 31 October.  Optimize medical management.    NSTEMI (non-ST elevated myocardial infarction)  Troponin peaked at 3.66 now trending down.  Heparin, metoprolol, ASA and statin. Hold ACE inhibitor due to intolerance with cough. Hold ARB as patient's blood pressure is marginal. Will consider adding, if her blood pressure will tolerate.  Hold second antiplatelet due to pending Cardiology procedure.  Patient was evaluated for Cardiac Rehab and is not a candidate at this time, but will be referred, when appropriate.   C on 28 on October showed severe multi-vessel CAD.  Referral to CVT Surgery who recommended CABG planned for Thursday 31 October.  Balloon pump placed 30 October.  CABG x3 by Dr. Zaidi on 31 October.  11/2  S/p CABG  Cards  CT Sx  Statin  BB      Iron deficiency anemia  11/8  Hgb 8.6 today  FeSO4    11/9:  Anemia stable     11/10:  Will start po ferrous sulfate  Iron sat 10%   Continue to monitor  Transfuse if primary team deems appropriate     11/11:  H/H stable  Cont iron  Continue to monitor H/H     11/12:  H/H 7.3/23, cont to monitor  Cont PO iron      Hyperlipidemia associated with type 2 diabetes mellitus  -Patient was improved, but not at goal on 10/4/19.   -Continue statin.       Obesity (BMI 30.0-34.9)  Diet  Nutrition        Diabetes mellitus type II, uncontrolled  -Hemoglobin A1C was 9 on 9/19/19.   -NISS.   -ADA diet.   11/2  Levemir / NISS / Accuchecks  11/3  Levemir increased to 15 sq bid  NISS  Accuchecks  11/4  Levemir  NISS  Accuchecks  11/5  Levemir 10 units BID  NISS  Accuchecks    11/6  UnControlled  Accuchecks  Insulin Gtt  Improved control  Last BS 99    11/7  Uncontrolled  Accuchecks  NISS    11/8  Uncontrolled  NISS  Accuchecks    11/9:  Glucose elevated   Takes 45 units of toujeo at home  Will start levemir 20 units qhs   Cont to monitor glucose   HSSI    11/11:  levemir 15 units daily  If need to increase can go to levemir 10 units bid     11/12:  Glucose better controlled  Cont levemir 15 units daily   HSSI    Anxiety and depression  -Stable.   -Continue Effexor.         VTE Risk Mitigation (From admission, onward)         Ordered     argatroban 125 mg in sodium chloride 0.9% 125 mL infusion (conc: 1 mg/mL)  Continuous      11/11/19 0128     Reason for no Mechanical VTE Prophylaxis  Once      10/28/19 1414     heparin 25,000 units in dextrose 5% 250 mL (100 units/mL) infusion LOW INTENSITY nomogram - [facility]  Continuous      10/28/19 1257                Critical care time spent on the  evaluation and treatment of severe organ dysfunction, review of pertinent labs and imaging studies, discussions with consulting providers and discussions with patient/family: 35 minutes.      King Concepcion MD  Department of Hospital Medicine   [facility]

## 2019-11-12 NOTE — PROGRESS NOTES
[facility] -   Cardiothoracic Surgery  Progress Note    Patient Name: Mateusz Bates  MRN: [id number]  Admission Date: 10/28/2019  Hospital Length of Stay: 15 days  Code Status: Full Code   Attending Physician: Keny Zaidi MD   Referring Provider: Self, Aaareferral  Principal Problem:Acute deep vein thrombosis (DVT) of right upper extremity            Subjective:     Post-Op Info:  Procedure(s) (LRB):  CORONARY ARTERY BYPASS GRAFT (CABG) (N/A)  ECHOCARDIOGRAM,TRANSESOPHAGEAL (N/A)  BLOCK, NERVE, INTERCOSTAL, 2 OR MORE (N/A)   12 Days Post-Op     Interval History:  11/12/2019  The patient is post-operative day 12.  Status post coronary artery bypass grafting x3.    ROS  Medications:  Continuous Infusions:   argatroban in 0.9 % sod chlor 4 mcg/kg/min (11/12/19 0600)     Scheduled Meds:   amLODIPine  10 mg Oral Daily    chlorhexidine  15 mL Mouth/Throat BID    chlorthalidone  25 mg Oral Daily    ferrous sulfate  325 mg Oral BID    lactated ringers  1,000 mL Intravenous Once    losartan  50 mg Oral Daily    metoprolol tartrate  50 mg Oral BID    nitroGLYCERIN 2% TD oint  0.5 inch Topical (Top) Q12H    pantoprazole  40 mg Oral Daily    psyllium husk  1 packet Oral BID    sodium chloride  2 g Oral Q6H     PRN Meds:albumin human 5%, albuterol sulfate, Dextrose 10% Bolus, Dextrose 10% Bolus, glucagon (human recombinant), hydrALAZINE, insulin aspart U-100, iohexol, lactated ringers, magnesium sulfate IVPB, magnesium sulfate IVPB, metoclopramide HCl, ondansetron, potassium chloride in water **AND** potassium chloride in water **AND** potassium chloride in water, traMADol     Objective:     Vital Signs (Most Recent):  Temp: 97.5 °F (36.4 °C) (11/12/19 0530)  Pulse: 76 (11/12/19 0600)  Resp: (!) 22 (11/12/19 0600)  BP: (!) 127/48 (11/12/19 0600)  SpO2: 100 % (11/12/19 0600) Vital Signs (24h Range):  Temp:  [95 °F (35 °C)-99 °F (37.2 °C)] 97.5 °F (36.4 °C)  Pulse:  [63-90] 76  Resp:  [19-30] 22  SpO2:   [86 %-100 %] 100 %  BP: ()/(44-90) 127/48     Weight: 71.8 kg (158 lb 4.6 oz)  Body mass index is 29.91 kg/m².    SpO2: 100 %  O2 Device (Oxygen Therapy): nasal cannula    Intake/Output - Last 3 Shifts       11/10 0700 - 11/11 0659 11/11 0700 - 11/12 0659 11/12 0700 - 11/13 0659    P.O. 540 330     I.V. (mL/kg) 387.5 (5.2)      IV Piggyback 650 500     Total Intake(mL/kg) 1577.5 (21.2) 830 (11.6)     Urine (mL/kg/hr) 1850 (1)      Stool 0      Total Output 1850      Net -272.5 +830            Urine Occurrence  6 x     Stool Occurrence 4 x 1 x           Lines/Drains/Airways     Peripherally Inserted Central Catheter Line                 PICC Double Lumen 11/07/19 1645 left brachial;left basilic 4 days                Physical Exam   Constitutional: She is oriented to person, place, and time. She appears well-developed and well-nourished. No distress.   HENT:   Head: Normocephalic and atraumatic.   Eyes: EOM are normal.   Neck: Normal range of motion. Neck supple. No JVD present.   Cardiovascular: Normal rate, regular rhythm, normal heart sounds and intact distal pulses. Exam reveals no gallop and no friction rub.   No murmur heard.  Pulmonary/Chest: Effort normal and breath sounds normal. No stridor. No respiratory distress. She has no wheezes. She has no rales. She exhibits no tenderness.   Abdominal: Soft. Bowel sounds are normal. She exhibits no distension and no mass. There is no tenderness. There is no rebound and no guarding. No hernia.   Musculoskeletal: She exhibits edema and tenderness. She exhibits no deformity.   ROM to the right hand 3/5. Mottled, purple discoloration isolated to the tip of the right thumb and tip of the right index finger. Swelling persists to the RUE distal to the elbow. Doppler pulse found via radial and ulnar artery. Overall the RUE is improving.    Neurological: She is alert and oriented to person, place, and time. She displays normal reflexes. No cranial nerve deficit or  sensory deficit. She exhibits normal muscle tone. Coordination normal.   Skin: Skin is warm and dry. Capillary refill takes less than 2 seconds. Cap refill to the right thumb tip and right index finger tip un blanchable. . No rash noted. She is not diaphoretic. There is erythema. No pallor.   Redness and warmth to the right hand. Surgical incisions CDI.    Psychiatric: She has a normal mood and affect. Her behavior is normal. Judgment and thought content normal.   Nursing note and vitals reviewed.      Significant Labs:  BMP:   Recent Labs   Lab 11/12/19  0409   *      K 3.9      CO2 26   BUN 14   CREATININE 0.8   CALCIUM 8.8   MG 2.1     CBC:   Recent Labs   Lab 11/12/19  0409   WBC 17.40*   RBC 2.49*   HGB 7.3*   HCT 23.1*   PLT 75*   MCV 93   MCH 29.3   MCHC 31.6*       Significant Diagnostics:  I have reviewed all pertinent imaging results/findings within the past 24 hours.    Assessment/Plan:     S/P CABG x 3       11/01/2019  The patient is post-operative day 1, status-post coronary artery bypass grafting x 3.  Overall the patient is making progress, however IABP and inotropic agents will continue today.   Neuro:  The patient is awake alert and oriented x3.  Neuro exam is nonfocal.  Pain is well controlled with IV pain medication.  Cardiac:  Patient is hemodynamically improving. Patient remains on epinephrine and milrinone gtts, which will be weaned over the next 24 hours.  Cardiac index is improving. IABP will remain in place over the next 24 hours.  Patient will be started on beta blockers once gtts are weaned off.  Respiratory: Good sats on high flow nasal cannula. Continue respiratory toilet, continue incentive spirometer. Wean to low flow nasal cannula.  GI:  Diet: sips of water with medications, will slowly advance diet as tolerated. Benign abdominal exam.  Renal:  Urine output is slowly improving. Cr 0.9. K 4.3. Mag 1.6. Replace mag. Continue IV diuresis. Add metolazone.   Heme:  Hct  34.1  Platelet 82. Continue aspirin. Discontinue Plavix for now.  Id:  WBC 12. Tmax 100.3. Continue post-operative antibiotics. Will continue to follow.   Endocrine:  Glucose is controlled with insulin gtt.  Continue insulin gtt for now, until weaned off inotropic agents.   Activities:  Patient is currently bed bound due to IABP.  Advance activities as tolerated once IABP is removed.   Lines tubes and drains:  Continue IABP. Continue [place] and [place].  Continue A-line.  Chest tubes will continue. NICOLASA x 2 will continue. Jones catheter will continue.  Continue pacer wires. Continue prevena wound vac.  Plan: Remain ICU.          11/02/2019  The patient is post-operative day 2, status-post coronary artery bypass grafting x 3.  Overall the patient is making progress, IABP removed and inotropic agents discontinued.   Neuro:  The patient is awake alert and oriented x3.  Neuro exam is nonfocal.  Pain is well controlled. Discontinue fentanyl.   Cardiac:  Patient is hemodynamically improving. Inotropic gtts discontinued. Excellent cardiac index. IABP discontinued.  Patient will be started on beta blockers today.  Respiratory: Good sats on high flow nasal cannula. Continue respiratory toilet, continue incentive spirometer. Wean to low flow nasal cannula.  GI:  Advance diabetic/cardiac diet as tolerated. Benign abdominal exam.  Renal:  Urine output is improving. Cr 0.9. K 4.9. Mag 2.2. Continue IV diuresis. Hold AM K.   Heme:  Hct 28.3  Platelet 47. Discontinue aspirin. Discontinue Plavix. HIT panel. Consult hematology r/t thrombocytopenia.   Id:  WBC 10.4. Tmax 99.4. Will continue to follow.   Endocrine:  Discontinue insulin gtt. Start sliding scale insulin. Start long acting insulin detemir.   Activities: Advance activities as tolerated.   Lines tubes and drains:  Discontinue IABP. Discontinue [place] and [place]is.  Discontinue A-line.  Discontinue chest tubes x 3. Discontinue NICOLASA x 2. Discontinue arterial line. Jones catheter will  continue for now.  Continue pacer wires. Continue prevena wound vac. Place PICC line.   Plan: Remain ICU.           11/03/2019  The patient is post-operative day 3, status-post coronary artery bypass grafting x 3.  Overall the patient is making progress.  Neuro:  The patient is awake alert and oriented x3.  Neuro exam is nonfocal.  Pain is well controlled. Continue tramadol.   Cardiac:  Patient is hemodynamically stable. Increase metoprolol dose. Start amlodipine.   Respiratory: Good sats on nasal cannula. Continue respiratory toilet, continue incentive spirometer. Wean to room air.  GI:  Advance diabetic/cardiac diet as tolerated. Benign abdominal exam. Passing gas, no BM. Add 1 bottle of mag citrate.   Renal:  Good urine output. Cr 0.9. K 3.8. Mag 2.0. Replace K. Replace Mag. Change to PO diuresis.   Heme:  Hct 34.7  Platelet 89. Restart aspirin. Continue to hold Plavix. HIT panel pending. Hematology following.  Id:  WBC 13.8. Tmax 99.4. Will continue to follow.    Endocrine:  Glucose controlled on sliding scale insulin. Increase long acting insulin detemir dose.   Activities: Advance activities as tolerated.   Lines tubes and drains:  Jones catheter will continue for now.  Continue pacer wires. Continue prevena wound vac. Continue PICC line.   Plan: Remain ICU.          11/04/2019  The patient is post-operative day 4, status-post coronary artery bypass grafting x 3.  Overall the patient is making progress.  Neuro:  The patient is awake alert and oriented x3.  Neuro exam is nonfocal.  Pain is well controlled. Continue tramadol.   Cardiac:  Patient is hemodynamically stable. Continue metoprolol dose. Continue amlodipine dose.   Respiratory: Good sats on room air. Continue respiratory toilet, continue incentive spirometer.  GI:  Advance diabetic/cardiac diet as tolerated. Benign abdominal exam. Regular bowel movements.   Renal:  Good urine output. Cr 0.7. K 4.0. Mag 2.3. Replace K. Decrease PO diuresis.   Heme:  Hct  34.4  Platelet 140. Continue aspirin. Restart Plavix. HIT panel pending. Hematology following.  Id:  WBC down to 13.13. Tmax 98. Will continue to follow.    Endocrine:  Glucose controlled on sliding scale insulin. Increased long acting insulin detemir dose.   Activities: Advance activities as tolerated.   Lines tubes and drains:  Discontinued storm catheter. Continue pacer wires. Continue prevena wound vac. Continue PICC line.   Plan: Transfer to telemetry. Anticipate discharge to SNF in 48 hours.         11/05/2019  The patient is post-operative day 5, status-post coronary artery bypass grafting x 3.  Overall the patient is making progress.  Neuro:  The patient is awake alert and oriented x3.  Neuro exam is nonfocal. Patient slow to answer questions, however answers are appropriate.  Pain is well controlled. Continue tramadol PRN.   Cardiac:  Patient is hemodynamically stable. Continue metoprolol dose. Discontinue amlodipine. Start losartan.    Respiratory: Good sats on room air. Continue respiratory toilet, continue incentive spirometer.  GI:  Advance diabetic/cardiac diet as tolerated. Benign abdominal exam. Regular bowel movements.   Renal:  Good urine output. Cr 0.7. K 4.8. Mag 2.1. Replace Mag. Discontinue PO diuresis.   Heme:  Hct 39.5  Platelet 73. Continue aspirin. Hold Plavix. HIT panel pending. Hematology following.  Id:  WBC is up to 16. Tmax 99. Pan-culture. DC PICC line. Chest xray. Start broad spectrum empiric antibiotics. Will continue to follow.    Endocrine:  Glucose controlled on sliding scale insulin. Decrease long acting insulin detemir dose.   Activities: Advance activities as tolerated.   Lines tubes and drains:  Discontinue pacer wires. Discontinue prevena wound vac. Continue PICC line.   Plan: Anticipate discharge to SNF in 24-48 hours.         11/06/2019  The patient is post-operative day 6, status-post coronary artery bypass grafting x 3. Patient had an episode of decompensation and  unresponsiveness yesterday evening. The patient was found unresponsive, cool, and diaphoretic, with agonal breathing. Patient was intubated and transferred to the ICU. Bedside EKG and echocardiogram performed at the bedside showed no significant changes. CT head, chest, abdomen pelvis performed, showed no significant findings.     Neuro:  The patient is intubated and sedated. On Precedex gtt. Begin weaning off sedation.    Cardiac:  Patient is hemodynamically improving. Inotropic agent dependent. Currently on Epinephrine gtt. Start Milrinone gtt. Discontinue metoprolol. Discontinue losartan.    Respiratory: Ventilator dependent. tV 450, RR 20, PEEP 5. Wean down sedation. Wean off ventilator today.    GI:  Begin enteral tube feedings. Benign abdominal exam. Previously having regular bowel movements.   Renal: Adequate urine output. Cr 1.1. K 3.6. Mag 1.7. Replace Mag. Replace K. Discontinue diuresis.   Heme:  Hct 31.9  Platelet 89. Hold aspirin. Hold Plavix. HIT panel negative. Patient received 1 units PRBC last night. Hematology following.  Id:  WBC is down to 15.5. Tmax 99.9. BC NGTD. Urinalysis negative. Sputum culture negative. Vancomycin and zosyn day 2. Will continue to follow closely.    Endocrine:  Glucose controlled on insulin gtt, continue at this time.   Activities: Intubated and sedated.   Musculoskeletal: Unable to find right radial pulse via doppler. Tips of the fingers of the right hand are mottled, dusky, appear cyanotic. Recent right radial arterial line in place, which has been relocated. US of arteries and veins of the right arm ordered. Consult to vascular surgery placed.    Lines tubes and drains:  Continue swan and cordis. Continue arterial line. Continue storm.    Plan: Remain ICU         11/07/2019  The patient is post-operative day 7, status-post coronary artery bypass grafting x 3. Patient had an episode of decompensation and unresponsiveness the evening of 11/05/2019. The patient was found  unresponsive, cool, and diaphoretic, with agonal breathing. Patient was intubated and transferred to the ICU. Bedside EKG and echocardiogram performed at the bedside showed no significant changes. CT head, chest, abdomen pelvis performed, showed no significant findings.     Neuro:  The patient is intubated and sedated. On Precedex gtt. Wean off sedation.    Cardiac:  Patient is hemodynamically improving. Excellent cardiac index. Wean off Inotropic gtts.   Respiratory: Wean towards extubation. tV 400, RR 18, PEEP 5.   GI:  Continue enteral tube feedings while intubated. Benign abdominal exam.  Renal: Adequate urine output. Cr 1.1. K 4.1. Mag 2.8. Start IV diuresis.   Heme:  Hct 24.6  Platelet 60. Hold aspirin. Hold Plavix. HIT panel negative.  Id:  WBC is down to 14.46. Tmax 100.8. BC NGTD. Urinalysis negative. Sputum culture negative. Vancomycin and zosyn day 3. Midline sternal incision appears reddened, with induration at skin edges. Will continue to follow closely.    Endocrine:  Glucose controlled on sliding scale insulin.   Activities: Intubated and sedated. Moves all 4. Full ROM.    Musculoskeletal: Able to find right radial pulse via doppler. The right hand extending to the tips of the fingers of the right hand are mottled, dusky, appear cyanotic. Recent right radial arterial line placement, which has been relocated. US of arteries and veins of the right arm pending. Vascular surgery following, suggested possible thrombotic etiology, suggested heparin gtt, however this is contraindicated r/t s/p cabg x 3.     Lines tubes and drains:  Continue swan and cordis. Continue arterial line. Continue storm.    Plan: Remain ICU         11/08/2019  The patient is post-operative day 8, status-post coronary artery bypass grafting x 3. Patient had an episode of decompensation and unresponsiveness the evening of 11/05/2019. The patient was found unresponsive, cool, and diaphoretic, with agonal breathing. Patient was  intubated and transferred to the ICU. Bedside EKG and echocardiogram performed at the bedside showed no significant changes. CT head, chest, abdomen pelvis performed, showed no significant findings. Suspect cardiogenic shock.      Neuro:  Patients neurologic state is improving. Alert, follows commands, makes eye contact, disoriented x 4. No focal deficits.    Cardiac:  Patient is hemodynamically improving. Off all inotropic gtts. Start low dose metoprolol and amlodipine.   Respiratory: Good sats on low flow nasal cannula. Continue IS. Continue pulmonary toilet. Wean to room air.    GI:  Continue enteral tube feedings at this time r/t disorientation and aspiration risk. Discontinue laxatives. Start metamucil BID, patient has had multiple loose stools.   Renal: Good urine output. Cr 0.8. K 3.6. Mag 2.1. Replace K. Replace Mag. Continue IV diuresis.   Hema:  Hct 26.4  Platelet 45. Hold aspirin. Hold Plavix. HIT panel will be resent as the patient is a high suspicion for HIT despite previous negative HIT panel. Multiple DVTs from the right IJ down to the brachiocephalic vein. Confirmed via vascular US. Patient started on argatroban yesterday, goal is APTT 2x baseline. Most recent APTT 97.8. Baseline APTT 50.8.  Hematology following. Vascular surgery following.   Id:  WBC is down to 12. Tmax 100.4. BC NGTD. Urinalysis negative. Sputum culture negative. Vancomycin and zosyn day 4. Midline sternal incision appears improved, with minimal redness and induration at skin edges. Will continue to follow closely.    Endocrine:  Glucose controlled on sliding scale insulin.   Activities: Increase activities as tolerated.  Musculoskeletal: ROM 2/5 to the right wrist/hand/fingers, distal from the elbow there is 2+ non pitting edema, the extremity is tender and warm. There is mottling, a dusky appearance, and purple discoloration to the fingertips of the right hand. See above hematology plan.      Lines tubes and drains:  Continue  PICC line. Continue storm catheter. Continue NG tube.   Plan: Remain ICU     11/09/2019  The patient is postop day 9.  Status post coronary artery bypass grafting x3.  Postop course was complicated by an episode of hemodynamic instability and decompensation on postop day number 5.  Patient continues to make clinical progress since then.  She is off all drips.  She remains extubated.  Continue ICU care for now.    Neuro:  Patient is awake alert.  She is oriented x2.  She is still confused.  But she is making progress.  Her neuro exam is nonfocal.  She follows all commands.  Cardiac:  Patient continues to be hemodynamically stable.  She is somewhat hypertensive.  Will increase her antihypertensives.  Respiratory:  Patient is good sats on nasal cannula.  She remains extubated.  GI:  Patient was tolerating tube feeds until this morning.  She pulled heart feeding tube. Will get a swallowing evaluation.  Mode of nutrition will depend on results of swallowing evaluation.  Renal:  Patient has good urine output %period% creatinine is 0.9.  Heme:  Hematocrit is 26 platelet is 46.  Patient is on argatroban  for anticoagulation due to right upper extremity DVTs.  Hypercoagulability workup is in progress.  Appreciate Hematology and vascular surgery input.  Id:  Incision continues to improve.  white count is 17.  Continue broad-spectrum antibiotics.  Endocrine:  Glucose is controlled on its sliding scale insulin.  Activities:  Advance activities as tolerated.  Line tubes and drains.  Patient has a PICC line, Storm catheter.    11/10/2019  The patient is postop day 10.  Status post coronary bypass grafting x3.  Postop course was complicated by an episode of hemodynamic instability and decompensation on postop day 5.  Patient remains hemodynamically stable.    Neuro:  The patient is awake and alert.  She is oriented x1.  She continues to be confused.  Her neuro exam is nonfocal.  Cardiac:  Patient continues to be hemodynamically  stable.  His she is hypertensive.  Hypertensive medications will be increased.  Respiratory:  Patient has good sats on nasal cannula.  Continue pulmonary toilet.  GI:  Patient is tolerating a regular diet.  Renal:  Patient has good urine output. Creatinine is 0.7.  Heme: hematocrit is 24 and platelet is 67.  Patient continues on argatroban for right upper extremity DVTs.  Hypercoagulable workup is in progress.  Id:  Patient's white count is up to 20.  Patient is on broad prepped from antibiotics.  Endocrine:  The patient is on long-acting as well as sliding scale insulin.  Blood sugars have been well controlled.  Will decrease dose of long-acting.  Activities: patient has been out of bed to chair.  Advance as tolerated.  Musculoskeletal:  Patient's right upper extremity continues to be swollen  The index finger and thumb continues to be dusky with a bleb near the base of the thumb.  Continue argatroban for anticoagulation.  Continue elevation of right upper extremity.  Lines tubes and drains: patient has a PICC line.  Will discontinue Jones catheter.        11/11/2019  The patient is post-operative day 11, status-post coronary artery bypass grafting x 3. Post-operative course was complicated by an episode of hemodynamic instability and decompensation on postop day 5.  Patient remains hemodynamically stable.    Neuro:  Patients neurologic state is improving. Oriented x 2. Alert, follows commands, makes eye contact. No focal deficits.    Cardiac:  Patient is hemodynamically improving. Hypertensive. Continue metoprolol, amlodipine, and losartan. Add chlorthalidone.   Respiratory: Good sats on room air. Continue IS. Continue pulmonary toilet.  GI:  Continue cardiac/diabetic diet, modified by puree and thick liquids with swallow precautions. Advance as tolerated. Continue metamucil.  Renal: Good urine output. Cr 0.8. K 3.8. Replace K. Continue IV diuresis. Add chlorthalidone. BMP at noon.   Hema:  Hct 23.8  Platelet 75.  Hold aspirin. Hold Plavix. Repeat HIT panel pending. Multiple DVTs from the right IJ down to the brachiocephalic vein. Confirmed via vascular US. Patient on argatroban gtt, goal is APTT 2x baseline. Most recent APTT 83.3. Baseline APTT 50.8.  Hematology following. Vascular surgery following.   Id:  WBC is down to 19. Tmax 98.4. BC NGTD. Vancomycin and zosyn day 7. Discontinue broad spectrum antibiotics. Will continue to follow closely.    Endocrine:  Glucose controlled on sliding scale insulin and long acting insulin detemir.    Activities: Increase activities as tolerated.  Musculoskeletal: The right upper extremity exhibits swelling distal from the elbow. The RUE is tender to palpation and ROM. ROM is diminished to the right hand 2/5. The hand his reddened, swollen, with dusky, mottled, purple discoloration to the right thumb and tip of the right index finger. Blisters have formed on the proximal portion of the thumb and wrist. Severely diminished capillary refill to the tip of the right thumb and tip of the right index finger. Wound care order placed.      Lines tubes and drains:  Continue PICC line.  Plan: Remain ICU         11/12/2019  The patient is post-operative day 12, status-post coronary artery bypass grafting x 3. Post-operative course was complicated by an episode of hemodynamic instability and decompensation on postop day 5.  Patient remains hemodynamically stable.    Neuro:  Patients neurologic state is improving. Oriented to person, place, situation. Disoriented to time. Alert, follows commands, makes eye contact. No focal deficits.    Cardiac:  Patient is hemodynamically improving. Continue metoprolol, amlodipine, losartan, and chlorthalidone.   Respiratory: Good sats on room air. Continue IS.  GI:  Continue cardiac/diabetic diet, modified by puree and thick liquids with swallow precautions. Advance as tolerated. Continue metamucil.  Renal: Good urine output. Cr 0.8. K 3.9. Replace K. Discontinue  lasix. Continue chlorthalidone.  Hema:  Hct 23.1  Platelet 75. Hold aspirin. Hold Plavix. 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 Multiple DVTs from the right IJ down to the brachiocephalic vein. Confirmed via vascular US. Patient on argatroban gtt, goal is APTT 2x baseline. Most recent APTT 84.6. Baseline APTT 50.8.  Hematology following. Vascular surgery following.   Id:  WBC is down to 17.4. A-febrile. Will continue to follow closely.    Endocrine:  Patient had an episode of hypoglycemia last night. Will continue sliding scale insulin. Discontinue long acting insulin. Begin strict calorie count.   Activities: Increase activities as tolerated.  Musculoskeletal: The right upper extremity exhibits swelling distal from the elbow. The RUE is tender to palpation and ROM. ROM is diminished to the right hand 3/5. The hand is reddened, swollen, with dusky, mottled, purple discoloration to the right thumb tip and tip of the right index finger.  Severely diminished capillary refill to the tip of the right thumb and tip of the right index finger. Wound care following. Gauze dressing in place.       Lines tubes and drains:  Continue PICC line.  Plan: Remain ICU. Begin discharge planning for IP rehab.        NSTEMI (non-ST elevated myocardial infarction)  The patient is a 69-year-old female with hypertension, diabetes, hyperlipidemia, obesity, anxiety and depression, iron deficiency anemia, vitamin B12 deficiency, who presented to the emergency room with an NSTEMI.  Workup included a cardiac catheterization that shows severe multivessel coronary artery disease with proximal LAD stenosis of 70% and severely depressed ejection fraction of 20%.  The patient is a candidate for urgent coronary artery bypass grafting.  The risks and benefits of surgery were explained to the patient.  The patient verbalized understanding of the issues discussed.  She a segs the risks of surgery and has agreed to proceed with urgent coronary artery  bypass grafting.  She understands that the risk of neurologic complication postoperatively is increased due to the presence of cerebral microvascular disease.        Yovani Dobbins NP  Cardiothoracic Surgery  [facility]

## 2019-11-12 NOTE — ASSESSMENT & PLAN NOTE
-Hemoglobin A1C was 9 on 9/19/19.   -NISS.   -ADA diet.   11/2  Levemir / NISS / Accuchecks  11/3  Levemir increased to 15 sq bid  NISS  Accuchecks  11/4  Levemir  NISS  Accuchecks  11/5  Levemir 10 units BID  NISS  Accuchecks    11/6  UnControlled  Accuchecks  Insulin Gtt  Improved control  Last BS 99    11/7  Uncontrolled  Accuchecks  NISS    11/8  Uncontrolled  NISS  Accuchecks    11/9:  Glucose elevated   Takes 45 units of toujeo at home  Will start levemir 20 units qhs   Cont to monitor glucose   HSSI    11/11:  levemir 15 units daily  If need to increase can go to levemir 10 units bid     11/12:  Glucose better controlled  Cont levemir 15 units daily   HSSI

## 2019-11-13 ENCOUNTER — TELEPHONE (OUTPATIENT)
Dept: NEUROLOGY | Facility: CLINIC | Age: 69
End: 2019-11-13

## 2019-11-13 PROBLEM — J96.01 ACUTE HYPOXEMIC RESPIRATORY FAILURE: Status: RESOLVED | Noted: 2019-10-31 | Resolved: 2019-11-13

## 2019-11-13 LAB
ADAMTS13 ACTIVITY: NORMAL %
ADAMTS13 ANTIBODY: NORMAL
ALLENS TEST: ABNORMAL
ANION GAP SERPL CALC-SCNC: 10 MMOL/L (ref 8–16)
ANION GAP SERPL CALC-SCNC: 11 MMOL/L (ref 8–16)
APTT BLDCRRT: 128.4 SEC (ref 21–32)
APTT BLDCRRT: 63.8 SEC (ref 21–32)
APTT BLDCRRT: 65.1 SEC (ref 21–32)
APTT BLDCRRT: 65.7 SEC (ref 21–32)
APTT BLDCRRT: 66.3 SEC (ref 21–32)
APTT BLDCRRT: 75.6 SEC (ref 21–32)
BASOPHILS # BLD AUTO: 0.02 K/UL (ref 0–0.2)
BASOPHILS # BLD AUTO: 0.03 K/UL (ref 0–0.2)
BASOPHILS # BLD AUTO: 0.03 K/UL (ref 0–0.2)
BASOPHILS NFR BLD: 0.2 % (ref 0–1.9)
BASOPHILS NFR BLD: 0.2 % (ref 0–1.9)
BASOPHILS NFR BLD: 0.3 % (ref 0–1.9)
BUN SERPL-MCNC: 16 MG/DL (ref 8–23)
BUN SERPL-MCNC: 19 MG/DL (ref 8–23)
CALCIUM SERPL-MCNC: 8.6 MG/DL (ref 8.7–10.5)
CALCIUM SERPL-MCNC: 8.9 MG/DL (ref 8.7–10.5)
CHLORIDE SERPL-SCNC: 103 MMOL/L (ref 95–110)
CHLORIDE SERPL-SCNC: 104 MMOL/L (ref 95–110)
CO2 SERPL-SCNC: 22 MMOL/L (ref 23–29)
CO2 SERPL-SCNC: 22 MMOL/L (ref 23–29)
CREAT SERPL-MCNC: 0.8 MG/DL (ref 0.5–1.4)
CREAT SERPL-MCNC: 1.1 MG/DL (ref 0.5–1.4)
DELSYS: ABNORMAL
DIFFERENTIAL METHOD: ABNORMAL
EOSINOPHIL # BLD AUTO: 0.1 K/UL (ref 0–0.5)
EOSINOPHIL # BLD AUTO: 0.2 K/UL (ref 0–0.5)
EOSINOPHIL # BLD AUTO: 0.2 K/UL (ref 0–0.5)
EOSINOPHIL NFR BLD: 0.7 % (ref 0–8)
EOSINOPHIL NFR BLD: 1.7 % (ref 0–8)
EOSINOPHIL NFR BLD: 1.8 % (ref 0–8)
ERYTHROCYTE [DISTWIDTH] IN BLOOD BY AUTOMATED COUNT: 16.6 % (ref 11.5–14.5)
ERYTHROCYTE [DISTWIDTH] IN BLOOD BY AUTOMATED COUNT: 16.7 % (ref 11.5–14.5)
ERYTHROCYTE [DISTWIDTH] IN BLOOD BY AUTOMATED COUNT: 16.8 % (ref 11.5–14.5)
EST. GFR  (AFRICAN AMERICAN): 59 ML/MIN/1.73 M^2
EST. GFR  (AFRICAN AMERICAN): >60 ML/MIN/1.73 M^2
EST. GFR  (NON AFRICAN AMERICAN): 51 ML/MIN/1.73 M^2
EST. GFR  (NON AFRICAN AMERICAN): >60 ML/MIN/1.73 M^2
FIO2: 24
FLOW: 1
GLUCOSE SERPL-MCNC: 221 MG/DL (ref 70–110)
GLUCOSE SERPL-MCNC: 456 MG/DL (ref 70–110)
HCO3 UR-SCNC: 20 MMOL/L (ref 24–28)
HCT VFR BLD AUTO: 21.9 % (ref 37–48.5)
HCT VFR BLD AUTO: 22.4 % (ref 37–48.5)
HCT VFR BLD AUTO: 23 % (ref 37–48.5)
HGB BLD-MCNC: 6.8 G/DL (ref 12–16)
HGB BLD-MCNC: 7 G/DL (ref 12–16)
HGB BLD-MCNC: 7 G/DL (ref 12–16)
IMM GRANULOCYTES # BLD AUTO: 0.34 K/UL (ref 0–0.04)
IMM GRANULOCYTES # BLD AUTO: 0.35 K/UL (ref 0–0.04)
IMM GRANULOCYTES # BLD AUTO: 0.46 K/UL (ref 0–0.04)
IMM GRANULOCYTES NFR BLD AUTO: 2.8 % (ref 0–0.5)
IMM GRANULOCYTES NFR BLD AUTO: 3 % (ref 0–0.5)
IMM GRANULOCYTES NFR BLD AUTO: 3.6 % (ref 0–0.5)
LYMPHOCYTES # BLD AUTO: 0.8 K/UL (ref 1–4.8)
LYMPHOCYTES # BLD AUTO: 1.2 K/UL (ref 1–4.8)
LYMPHOCYTES # BLD AUTO: 1.3 K/UL (ref 1–4.8)
LYMPHOCYTES NFR BLD: 10.4 % (ref 18–48)
LYMPHOCYTES NFR BLD: 6.7 % (ref 18–48)
LYMPHOCYTES NFR BLD: 9.8 % (ref 18–48)
MAGNESIUM SERPL-MCNC: 1.8 MG/DL (ref 1.6–2.6)
MAGNESIUM SERPL-MCNC: 2 MG/DL (ref 1.6–2.6)
MCH RBC QN AUTO: 28.6 PG (ref 27–31)
MCH RBC QN AUTO: 28.7 PG (ref 27–31)
MCH RBC QN AUTO: 29 PG (ref 27–31)
MCHC RBC AUTO-ENTMCNC: 30.4 G/DL (ref 32–36)
MCHC RBC AUTO-ENTMCNC: 31.1 G/DL (ref 32–36)
MCHC RBC AUTO-ENTMCNC: 31.3 G/DL (ref 32–36)
MCV RBC AUTO: 92 FL (ref 82–98)
MCV RBC AUTO: 93 FL (ref 82–98)
MCV RBC AUTO: 94 FL (ref 82–98)
MODE: ABNORMAL
MONOCYTES # BLD AUTO: 0.8 K/UL (ref 0.3–1)
MONOCYTES # BLD AUTO: 1 K/UL (ref 0.3–1)
MONOCYTES # BLD AUTO: 1 K/UL (ref 0.3–1)
MONOCYTES NFR BLD: 6.7 % (ref 4–15)
MONOCYTES NFR BLD: 7.8 % (ref 4–15)
MONOCYTES NFR BLD: 8.1 % (ref 4–15)
NEUTROPHILS # BLD AUTO: 9.5 K/UL (ref 1.8–7.7)
NEUTROPHILS # BLD AUTO: 9.6 K/UL (ref 1.8–7.7)
NEUTROPHILS # BLD AUTO: 9.8 K/UL (ref 1.8–7.7)
NEUTROPHILS NFR BLD: 75.9 % (ref 38–73)
NEUTROPHILS NFR BLD: 77.7 % (ref 38–73)
NEUTROPHILS NFR BLD: 82.6 % (ref 38–73)
NRBC BLD-RTO: 0 /100 WBC
PCO2 BLDA: 27.1 MMHG (ref 35–45)
PH SMN: 7.48 [PH] (ref 7.35–7.45)
PLATELET # BLD AUTO: 79 K/UL (ref 150–350)
PLATELET # BLD AUTO: 89 K/UL (ref 150–350)
PLATELET # BLD AUTO: 93 K/UL (ref 150–350)
PMV BLD AUTO: 10.5 FL (ref 9.2–12.9)
PMV BLD AUTO: 10.8 FL (ref 9.2–12.9)
PMV BLD AUTO: 10.8 FL (ref 9.2–12.9)
PO2 BLDA: 91 MMHG (ref 80–100)
POC BE: -4 MMOL/L
POC SATURATED O2: 98 % (ref 95–100)
POCT GLUCOSE: 221 MG/DL (ref 70–110)
POCT GLUCOSE: 353 MG/DL (ref 70–110)
POCT GLUCOSE: 361 MG/DL (ref 70–110)
POTASSIUM SERPL-SCNC: 4 MMOL/L (ref 3.5–5.1)
POTASSIUM SERPL-SCNC: 4.7 MMOL/L (ref 3.5–5.1)
RBC # BLD AUTO: 2.38 M/UL (ref 4–5.4)
RBC # BLD AUTO: 2.41 M/UL (ref 4–5.4)
RBC # BLD AUTO: 2.44 M/UL (ref 4–5.4)
SAMPLE: ABNORMAL
SITE: ABNORMAL
SODIUM SERPL-SCNC: 135 MMOL/L (ref 136–145)
SODIUM SERPL-SCNC: 137 MMOL/L (ref 136–145)
WBC # BLD AUTO: 11.66 K/UL (ref 3.9–12.7)
WBC # BLD AUTO: 12.22 K/UL (ref 3.9–12.7)
WBC # BLD AUTO: 12.88 K/UL (ref 3.9–12.7)

## 2019-11-13 PROCEDURE — 25000003 PHARM REV CODE 250: Performed by: NURSE PRACTITIONER

## 2019-11-13 PROCEDURE — 27000221 HC OXYGEN, UP TO 24 HOURS

## 2019-11-13 PROCEDURE — 85730 THROMBOPLASTIN TIME PARTIAL: CPT | Mod: 91

## 2019-11-13 PROCEDURE — 63600175 PHARM REV CODE 636 W HCPCS: Mod: JG | Performed by: THORACIC SURGERY (CARDIOTHORACIC VASCULAR SURGERY)

## 2019-11-13 PROCEDURE — 25000003 PHARM REV CODE 250: Performed by: FAMILY MEDICINE

## 2019-11-13 PROCEDURE — 21400001 HC TELEMETRY ROOM

## 2019-11-13 PROCEDURE — 25000003 PHARM REV CODE 250: Performed by: INTERNAL MEDICINE

## 2019-11-13 PROCEDURE — 97530 THERAPEUTIC ACTIVITIES: CPT

## 2019-11-13 PROCEDURE — 25000242 PHARM REV CODE 250 ALT 637 W/ HCPCS: Performed by: FAMILY MEDICINE

## 2019-11-13 PROCEDURE — 83735 ASSAY OF MAGNESIUM: CPT

## 2019-11-13 PROCEDURE — 94640 AIRWAY INHALATION TREATMENT: CPT

## 2019-11-13 PROCEDURE — 63600175 PHARM REV CODE 636 W HCPCS: Performed by: NURSE PRACTITIONER

## 2019-11-13 PROCEDURE — 99232 SBSQ HOSP IP/OBS MODERATE 35: CPT | Mod: ,,, | Performed by: INTERNAL MEDICINE

## 2019-11-13 PROCEDURE — 25000003 PHARM REV CODE 250: Performed by: THORACIC SURGERY (CARDIOTHORACIC VASCULAR SURGERY)

## 2019-11-13 PROCEDURE — 99232 PR SUBSEQUENT HOSPITAL CARE,LEVL II: ICD-10-PCS | Mod: ,,, | Performed by: INTERNAL MEDICINE

## 2019-11-13 PROCEDURE — 94799 UNLISTED PULMONARY SVC/PX: CPT

## 2019-11-13 PROCEDURE — 94761 N-INVAS EAR/PLS OXIMETRY MLT: CPT

## 2019-11-13 PROCEDURE — C9399 UNCLASSIFIED DRUGS OR BIOLOG: HCPCS | Performed by: NURSE PRACTITIONER

## 2019-11-13 PROCEDURE — 63600175 PHARM REV CODE 636 W HCPCS: Performed by: INTERNAL MEDICINE

## 2019-11-13 PROCEDURE — 82803 BLOOD GASES ANY COMBINATION: CPT

## 2019-11-13 PROCEDURE — 25000242 PHARM REV CODE 250 ALT 637 W/ HCPCS: Performed by: INTERNAL MEDICINE

## 2019-11-13 PROCEDURE — 36600 WITHDRAWAL OF ARTERIAL BLOOD: CPT

## 2019-11-13 PROCEDURE — 99233 PR SUBSEQUENT HOSPITAL CARE,LEVL III: ICD-10-PCS | Mod: ,,, | Performed by: INTERNAL MEDICINE

## 2019-11-13 PROCEDURE — 83735 ASSAY OF MAGNESIUM: CPT | Mod: 91

## 2019-11-13 PROCEDURE — 85025 COMPLETE CBC W/AUTO DIFF WBC: CPT | Mod: 91

## 2019-11-13 PROCEDURE — 99900035 HC TECH TIME PER 15 MIN (STAT)

## 2019-11-13 PROCEDURE — 80048 BASIC METABOLIC PNL TOTAL CA: CPT | Mod: 91

## 2019-11-13 PROCEDURE — 99233 SBSQ HOSP IP/OBS HIGH 50: CPT | Mod: ,,, | Performed by: INTERNAL MEDICINE

## 2019-11-13 RX ORDER — POTASSIUM CHLORIDE 20 MEQ/1
20 TABLET, EXTENDED RELEASE ORAL 2 TIMES DAILY
Status: DISCONTINUED | OUTPATIENT
Start: 2019-11-13 | End: 2019-11-14

## 2019-11-13 RX ORDER — IPRATROPIUM BROMIDE AND ALBUTEROL SULFATE 2.5; .5 MG/3ML; MG/3ML
3 SOLUTION RESPIRATORY (INHALATION)
Status: DISCONTINUED | OUTPATIENT
Start: 2019-11-13 | End: 2019-11-18

## 2019-11-13 RX ORDER — FUROSEMIDE 10 MG/ML
40 INJECTION INTRAMUSCULAR; INTRAVENOUS 2 TIMES DAILY
Status: DISCONTINUED | OUTPATIENT
Start: 2019-11-13 | End: 2019-11-14

## 2019-11-13 RX ORDER — ARGATROBAN 1 MG/ML
1 INJECTION INTRAVENOUS CONTINUOUS
Status: DISPENSED | OUTPATIENT
Start: 2019-11-13 | End: 2019-11-14

## 2019-11-13 RX ORDER — IPRATROPIUM BROMIDE AND ALBUTEROL SULFATE 2.5; .5 MG/3ML; MG/3ML
3 SOLUTION RESPIRATORY (INHALATION)
Status: DISCONTINUED | OUTPATIENT
Start: 2019-11-13 | End: 2019-11-13

## 2019-11-13 RX ADMIN — Medication 2 G: at 05:11

## 2019-11-13 RX ADMIN — LOSARTAN POTASSIUM 50 MG: 50 TABLET ORAL at 08:11

## 2019-11-13 RX ADMIN — ARGATROBAN 5 MCG/KG/MIN: 125 SOLUTION INTRAVENOUS at 03:11

## 2019-11-13 RX ADMIN — INSULIN ASPART 10 UNITS: 100 INJECTION, SOLUTION INTRAVENOUS; SUBCUTANEOUS at 12:11

## 2019-11-13 RX ADMIN — ARGATROBAN 5.5 MCG/KG/MIN: 125 SOLUTION INTRAVENOUS at 09:11

## 2019-11-13 RX ADMIN — FERROUS SULFATE TAB EC 325 MG (65 MG FE EQUIVALENT) 325 MG: 325 (65 FE) TABLET DELAYED RESPONSE at 08:11

## 2019-11-13 RX ADMIN — INSULIN DETEMIR 5 UNITS: 100 INJECTION, SOLUTION SUBCUTANEOUS at 10:11

## 2019-11-13 RX ADMIN — POTASSIUM CHLORIDE 20 MEQ: 20 TABLET, EXTENDED RELEASE ORAL at 10:11

## 2019-11-13 RX ADMIN — FUROSEMIDE 40 MG: 10 INJECTION, SOLUTION INTRAMUSCULAR; INTRAVENOUS at 10:11

## 2019-11-13 RX ADMIN — FUROSEMIDE 40 MG: 10 INJECTION, SOLUTION INTRAMUSCULAR; INTRAVENOUS at 05:11

## 2019-11-13 RX ADMIN — NITROGLYCERIN 0.5 INCH: 20 OINTMENT TOPICAL at 08:11

## 2019-11-13 RX ADMIN — AMLODIPINE BESYLATE 10 MG: 10 TABLET ORAL at 08:11

## 2019-11-13 RX ADMIN — PSYLLIUM HUSK 6 G: 6 GRANULE ORAL at 08:11

## 2019-11-13 RX ADMIN — PSYLLIUM HUSK 6 G: 6 GRANULE ORAL at 10:11

## 2019-11-13 RX ADMIN — CHLORTHALIDONE 25 MG: 25 TABLET ORAL at 08:11

## 2019-11-13 RX ADMIN — ALBUTEROL SULFATE 2.5 MG: 2.5 SOLUTION RESPIRATORY (INHALATION) at 10:11

## 2019-11-13 RX ADMIN — IPRATROPIUM BROMIDE AND ALBUTEROL SULFATE 3 ML: .5; 3 SOLUTION RESPIRATORY (INHALATION) at 07:11

## 2019-11-13 RX ADMIN — PANTOPRAZOLE SODIUM 40 MG: 40 TABLET, DELAYED RELEASE ORAL at 08:11

## 2019-11-13 RX ADMIN — CHLORHEXIDINE GLUCONATE 0.12% ORAL RINSE 15 ML: 1.2 LIQUID ORAL at 10:11

## 2019-11-13 RX ADMIN — METOPROLOL TARTRATE 50 MG: 50 TABLET, FILM COATED ORAL at 10:11

## 2019-11-13 RX ADMIN — INSULIN ASPART 4 UNITS: 100 INJECTION, SOLUTION INTRAVENOUS; SUBCUTANEOUS at 07:11

## 2019-11-13 RX ADMIN — ARGATROBAN 6 MCG/KG/MIN: 125 SOLUTION INTRAVENOUS at 10:11

## 2019-11-13 RX ADMIN — METOPROLOL TARTRATE 50 MG: 50 TABLET, FILM COATED ORAL at 08:11

## 2019-11-13 RX ADMIN — CHLORHEXIDINE GLUCONATE 0.12% ORAL RINSE 15 ML: 1.2 LIQUID ORAL at 08:11

## 2019-11-13 RX ADMIN — NITROGLYCERIN 0.5 INCH: 20 OINTMENT TOPICAL at 10:11

## 2019-11-13 RX ADMIN — MAGNESIUM SULFATE HEPTAHYDRATE 2 G: 40 INJECTION, SOLUTION INTRAVENOUS at 08:11

## 2019-11-13 RX ADMIN — INSULIN ASPART 10 UNITS: 100 INJECTION, SOLUTION INTRAVENOUS; SUBCUTANEOUS at 04:11

## 2019-11-13 RX ADMIN — Medication 2 G: at 12:11

## 2019-11-13 RX ADMIN — FERROUS SULFATE TAB EC 325 MG (65 MG FE EQUIVALENT) 325 MG: 325 (65 FE) TABLET DELAYED RESPONSE at 10:11

## 2019-11-13 NOTE — ASSESSMENT & PLAN NOTE
-Patient is s/p CABG x3 with LIMA to LAD reverse saphenous vein graft to the RCA and reverse saphenous vein graft to the ramus.  -Continue post CABG supportive therapy  -Continue ASA, Plavix, Statin, BB  -Will continue to follow along     11/1/19  -POD #1 s/p CABG x3  -Continue ASA, Statin, BB  -Remains on Epi and Milrinone gtt today  -Mgmt per CT surgery  -Will follow along    11/2/19  -Recovering s/p CABG x 3  -Continue statin, BB  -Platelets 47,000 this AM; heme/onc on board, ASA held  -Await further rec's    11/3/19  -Stable, progressing post CABG x 3  -Continue statin, BB  -ASA re-started  -Platelets 89,000, heme/onc on board    11/4/19  -Patient slowly improving post CABG x3  -Continue ASA, Statin and BB  -plavix on hold due to thrombocytopenia that is improving   -IS use encouraged and early ambulation with PT  -Will continue to follow along     11/5/19  -Will continue ASA, Statin and BB  -Consider holding Plavix again for drop in plts after resuming by primary team   -Recommend CXR to rule out post op PNA given bump in WBC and cough  -will need to monitor for temp  -ambulate as tolerated and encourage IS use     11/06  Hold ASA, Plavix due to anemia  Hold BB due to cardiogenic shock    11/11/19  -Continue Argatroban infusion  -Continue BB, ARB, IV lasix, Chlorthalidone, Norvasc, NTG paste  -Hold ASA, Plavix due to anemia  -Further mgmt per CT surgery    11/12/19  -Stable  -Continue BB, ARB, Norvasc, Chlorthalidone, IV Lasix  -Recommend re-starting ASA  -Plavix held due to anemia/thrombocytopenia    11/13/19  -Recommend ASA daily  -Plavix held due to anemia/thrombocytopenia  -Recommend PRBC transfusion, per CT surgery  -Continue BB, ARB, Norvasc, IV lasix, Chlorthalidone

## 2019-11-13 NOTE — ASSESSMENT & PLAN NOTE
11/01/2019  The patient is post-operative day 1, status-post coronary artery bypass grafting x 3.  Overall the patient is making progress, however IABP and inotropic agents will continue today.   Neuro:  The patient is awake alert and oriented x3.  Neuro exam is nonfocal.  Pain is well controlled with IV pain medication.  Cardiac:  Patient is hemodynamically improving. Patient remains on epinephrine and milrinone gtts, which will be weaned over the next 24 hours.  Cardiac index is improving. IABP will remain in place over the next 24 hours.  Patient will be started on beta blockers once gtts are weaned off.  Respiratory: Good sats on high flow nasal cannula. Continue respiratory toilet, continue incentive spirometer. Wean to low flow nasal cannula.  GI:  Diet: sips of water with medications, will slowly advance diet as tolerated. Benign abdominal exam.  Renal:  Urine output is slowly improving. Cr 0.9. K 4.3. Mag 1.6. Replace mag. Continue IV diuresis. Add metolazone.   Heme:  Hct 34.1  Platelet 82. Continue aspirin. Discontinue Plavix for now.  Id:  WBC 12. Tmax 100.3. Continue post-operative antibiotics. Will continue to follow.   Endocrine:  Glucose is controlled with insulin gtt.  Continue insulin gtt for now, until weaned off inotropic agents.   Activities:  Patient is currently bed bound due to IABP.  Advance activities as tolerated once IABP is removed.   Lines tubes and drains:  Continue IABP. Continue [place] and Cordis.  Continue A-line.  Chest tubes will continue. NICOLASA x 2 will continue. Jones catheter will continue.  Continue pacer wires. Continue prevena wound vac.  Plan: Remain ICU.          11/02/2019  The patient is post-operative day 2, status-post coronary artery bypass grafting x 3.  Overall the patient is making progress, IABP removed and inotropic agents discontinued.   Neuro:  The patient is awake alert and oriented x3.  Neuro exam is nonfocal.  Pain is well controlled. Discontinue fentanyl.    Cardiac:  Patient is hemodynamically improving. Inotropic gtts discontinued. Excellent cardiac index. IABP discontinued.  Patient will be started on beta blockers today.  Respiratory: Good sats on high flow nasal cannula. Continue respiratory toilet, continue incentive spirometer. Wean to low flow nasal cannula.  GI:  Advance diabetic/cardiac diet as tolerated. Benign abdominal exam.  Renal:  Urine output is improving. Cr 0.9. K 4.9. Mag 2.2. Continue IV diuresis. Hold AM K.   Heme:  Hct 28.3  Platelet 47. Discontinue aspirin. Discontinue Plavix. HIT panel. Consult hematology r/t thrombocytopenia.   Id:  WBC 10.4. Tmax 99.4. Will continue to follow.   Endocrine:  Discontinue insulin gtt. Start sliding scale insulin. Start long acting insulin detemir.   Activities: Advance activities as tolerated.   Lines tubes and drains:  Discontinue IABP. Discontinue [place] and Cordis.  Discontinue A-line.  Discontinue chest tubes x 3. Discontinue NICOLASA x 2. Discontinue arterial line. Jones catheter will continue for now.  Continue pacer wires. Continue prevena wound vac. Place PICC line.   Plan: Remain ICU.           11/03/2019  The patient is post-operative day 3, status-post coronary artery bypass grafting x 3.  Overall the patient is making progress.  Neuro:  The patient is awake alert and oriented x3.  Neuro exam is nonfocal.  Pain is well controlled. Continue tramadol.   Cardiac:  Patient is hemodynamically stable. Increase metoprolol dose. Start amlodipine.   Respiratory: Good sats on nasal cannula. Continue respiratory toilet, continue incentive spirometer. Wean to room air.  GI:  Advance diabetic/cardiac diet as tolerated. Benign abdominal exam. Passing gas, no BM. Add 1 bottle of mag citrate.   Renal:  Good urine output. Cr 0.9. K 3.8. Mag 2.0. Replace K. Replace Mag. Change to PO diuresis.   Heme:  Hct 34.7  Platelet 89. Restart aspirin. Continue to hold Plavix. HIT panel pending. Hematology following.  Id:  WBC 13.8. Tmax  99.4. Will continue to follow.    Endocrine:  Glucose controlled on sliding scale insulin. Increase long acting insulin detemir dose.   Activities: Advance activities as tolerated.   Lines tubes and drains:  Storm catheter will continue for now.  Continue pacer wires. Continue prevena wound vac. Continue PICC line.   Plan: Remain ICU.          11/04/2019  The patient is post-operative day 4, status-post coronary artery bypass grafting x 3.  Overall the patient is making progress.  Neuro:  The patient is awake alert and oriented x3.  Neuro exam is nonfocal.  Pain is well controlled. Continue tramadol.   Cardiac:  Patient is hemodynamically stable. Continue metoprolol dose. Continue amlodipine dose.   Respiratory: Good sats on room air. Continue respiratory toilet, continue incentive spirometer.  GI:  Advance diabetic/cardiac diet as tolerated. Benign abdominal exam. Regular bowel movements.   Renal:  Good urine output. Cr 0.7. K 4.0. Mag 2.3. Replace K. Decrease PO diuresis.   Heme:  Hct 34.4  Platelet 140. Continue aspirin. Restart Plavix. HIT panel pending. Hematology following.  Id:  WBC down to 13.13. Tmax 98. Will continue to follow.    Endocrine:  Glucose controlled on sliding scale insulin. Increased long acting insulin detemir dose.   Activities: Advance activities as tolerated.   Lines tubes and drains:  Discontinued storm catheter. Continue pacer wires. Continue prevena wound vac. Continue PICC line.   Plan: Transfer to telemetry. Anticipate discharge to SNF in 48 hours.         11/05/2019  The patient is post-operative day 5, status-post coronary artery bypass grafting x 3.  Overall the patient is making progress.  Neuro:  The patient is awake alert and oriented x3.  Neuro exam is nonfocal. Patient slow to answer questions, however answers are appropriate.  Pain is well controlled. Continue tramadol PRN.   Cardiac:  Patient is hemodynamically stable. Continue metoprolol dose. Discontinue amlodipine. Start  losartan.    Respiratory: Good sats on room air. Continue respiratory toilet, continue incentive spirometer.  GI:  Advance diabetic/cardiac diet as tolerated. Benign abdominal exam. Regular bowel movements.   Renal:  Good urine output. Cr 0.7. K 4.8. Mag 2.1. Replace Mag. Discontinue PO diuresis.   Heme:  Hct 39.5  Platelet 73. Continue aspirin. Hold Plavix. HIT panel pending. Hematology following.  Id:  WBC is up to 16. Tmax 99. Pan-culture. DC PICC line. Chest xray. Start broad spectrum empiric antibiotics. Will continue to follow.    Endocrine:  Glucose controlled on sliding scale insulin. Decrease long acting insulin detemir dose.   Activities: Advance activities as tolerated.   Lines tubes and drains:  Discontinue pacer wires. Discontinue prevena wound vac. Continue PICC line.   Plan: Anticipate discharge to SNF in 24-48 hours.         11/06/2019  The patient is post-operative day 6, status-post coronary artery bypass grafting x 3. Patient had an episode of decompensation and unresponsiveness yesterday evening. The patient was found unresponsive, cool, and diaphoretic, with agonal breathing. Patient was intubated and transferred to the ICU. Bedside EKG and echocardiogram performed at the bedside showed no significant changes. CT head, chest, abdomen pelvis performed, showed no significant findings.     Neuro:  The patient is intubated and sedated. On Precedex gtt. Begin weaning off sedation.    Cardiac:  Patient is hemodynamically improving. Inotropic agent dependent. Currently on Epinephrine gtt. Start Milrinone gtt. Discontinue metoprolol. Discontinue losartan.    Respiratory: Ventilator dependent. tV 450, RR 20, PEEP 5. Wean down sedation. Wean off ventilator today.    GI:  Begin enteral tube feedings. Benign abdominal exam. Previously having regular bowel movements.   Renal: Adequate urine output. Cr 1.1. K 3.6. Mag 1.7. Replace Mag. Replace K. Discontinue diuresis.   Heme:  Hct 31.9  Platelet 89. Hold  aspirin. Hold Plavix. HIT panel negative. Patient received 1 units PRBC last night. Hematology following.  Id:  WBC is down to 15.5. Tmax 99.9. BC NGTD. Urinalysis negative. Sputum culture negative. Vancomycin and zosyn day 2. Will continue to follow closely.    Endocrine:  Glucose controlled on insulin gtt, continue at this time.   Activities: Intubated and sedated.   Musculoskeletal: Unable to find right radial pulse via doppler. Tips of the fingers of the right hand are mottled, dusky, appear cyanotic. Recent right radial arterial line in place, which has been relocated. US of arteries and veins of the right arm ordered. Consult to vascular surgery placed.    Lines tubes and drains:  Continue swan and cordis. Continue arterial line. Continue storm.    Plan: Remain ICU         11/07/2019  The patient is post-operative day 7, status-post coronary artery bypass grafting x 3. Patient had an episode of decompensation and unresponsiveness the evening of 11/05/2019. The patient was found unresponsive, cool, and diaphoretic, with agonal breathing. Patient was intubated and transferred to the ICU. Bedside EKG and echocardiogram performed at the bedside showed no significant changes. CT head, chest, abdomen pelvis performed, showed no significant findings.     Neuro:  The patient is intubated and sedated. On Precedex gtt. Wean off sedation.    Cardiac:  Patient is hemodynamically improving. Excellent cardiac index. Wean off Inotropic gtts.   Respiratory: Wean towards extubation. tV 400, RR 18, PEEP 5.   GI:  Continue enteral tube feedings while intubated. Benign abdominal exam.  Renal: Adequate urine output. Cr 1.1. K 4.1. Mag 2.8. Start IV diuresis.   Heme:  Hct 24.6  Platelet 60. Hold aspirin. Hold Plavix. HIT panel negative.  Id:  WBC is down to 14.46. Tmax 100.8. BC NGTD. Urinalysis negative. Sputum culture negative. Vancomycin and zosyn day 3. Midline sternal incision appears reddened, with induration at skin edges.  Will continue to follow closely.    Endocrine:  Glucose controlled on sliding scale insulin.   Activities: Intubated and sedated. Moves all 4. Full ROM.    Musculoskeletal: Able to find right radial pulse via doppler. The right hand extending to the tips of the fingers of the right hand are mottled, dusky, appear cyanotic. Recent right radial arterial line placement, which has been relocated. US of arteries and veins of the right arm pending. Vascular surgery following, suggested possible thrombotic etiology, suggested heparin gtt, however this is contraindicated r/t s/p cabg x 3.     Lines tubes and drains:  Continue swan and cordis. Continue arterial line. Continue storm.    Plan: Remain ICU         11/08/2019  The patient is post-operative day 8, status-post coronary artery bypass grafting x 3. Patient had an episode of decompensation and unresponsiveness the evening of 11/05/2019. The patient was found unresponsive, cool, and diaphoretic, with agonal breathing. Patient was intubated and transferred to the ICU. Bedside EKG and echocardiogram performed at the bedside showed no significant changes. CT head, chest, abdomen pelvis performed, showed no significant findings. Suspect cardiogenic shock.      Neuro:  Patients neurologic state is improving. Alert, follows commands, makes eye contact, disoriented x 4. No focal deficits.    Cardiac:  Patient is hemodynamically improving. Off all inotropic gtts. Start low dose metoprolol and amlodipine.   Respiratory: Good sats on low flow nasal cannula. Continue IS. Continue pulmonary toilet. Wean to room air.    GI:  Continue enteral tube feedings at this time r/t disorientation and aspiration risk. Discontinue laxatives. Start metamucil BID, patient has had multiple loose stools.   Renal: Good urine output. Cr 0.8. K 3.6. Mag 2.1. Replace K. Replace Mag. Continue IV diuresis.   Hema:  Hct 26.4  Platelet 45. Hold aspirin. Hold Plavix. HIT panel will be resent as the patient  is a high suspicion for HIT despite previous negative HIT panel. Multiple DVTs from the right IJ down to the brachiocephalic vein. Confirmed via vascular US. Patient started on argatroban yesterday, goal is APTT 2x baseline. Most recent APTT 97.8. Baseline APTT 50.8.  Hematology following. Vascular surgery following.   Id:  WBC is down to 12. Tmax 100.4. BC NGTD. Urinalysis negative. Sputum culture negative. Vancomycin and zosyn day 4. Midline sternal incision appears improved, with minimal redness and induration at skin edges. Will continue to follow closely.    Endocrine:  Glucose controlled on sliding scale insulin.   Activities: Increase activities as tolerated.  Musculoskeletal: ROM 2/5 to the right wrist/hand/fingers, distal from the elbow there is 2+ non pitting edema, the extremity is tender and warm. There is mottling, a dusky appearance, and purple discoloration to the fingertips of the right hand. See above hematology plan.      Lines tubes and drains:  Continue PICC line. Continue storm catheter. Continue NG tube.   Plan: Remain ICU     11/09/2019  The patient is postop day 9.  Status post coronary artery bypass grafting x3.  Postop course was complicated by an episode of hemodynamic instability and decompensation on postop day number 5.  Patient continues to make clinical progress since then.  She is off all drips.  She remains extubated.  Continue ICU care for now.    Neuro:  Patient is awake alert.  She is oriented x2.  She is still confused.  But she is making progress.  Her neuro exam is nonfocal.  She follows all commands.  Cardiac:  Patient continues to be hemodynamically stable.  She is somewhat hypertensive.  Will increase her antihypertensives.  Respiratory:  Patient is good sats on nasal cannula.  She remains extubated.  GI:  Patient was tolerating tube feeds until this morning.  She pulled heart feeding tube. Will get a swallowing evaluation.  Mode of nutrition will depend on results of  swallowing evaluation.  Renal:  Patient has good urine output %period% creatinine is 0.9.  Heme:  Hematocrit is 26 platelet is 46.  Patient is on argatroban  for anticoagulation due to right upper extremity DVTs.  Hypercoagulability workup is in progress.  Appreciate Hematology and vascular surgery input.  Id:  Incision continues to improve.  white count is 17.  Continue broad-spectrum antibiotics.  Endocrine:  Glucose is controlled on its sliding scale insulin.  Activities:  Advance activities as tolerated.  Line tubes and drains.  Patient has a PICC line, Jones catheter.    11/10/2019  The patient is postop day 10.  Status post coronary bypass grafting x3.  Postop course was complicated by an episode of hemodynamic instability and decompensation on postop day 5.  Patient remains hemodynamically stable.    Neuro:  The patient is awake and alert.  She is oriented x1.  She continues to be confused.  Her neuro exam is nonfocal.  Cardiac:  Patient continues to be hemodynamically stable.  His she is hypertensive.  Hypertensive medications will be increased.  Respiratory:  Patient has good sats on nasal cannula.  Continue pulmonary toilet.  GI:  Patient is tolerating a regular diet.  Renal:  Patient has good urine output. Creatinine is 0.7.  Heme: hematocrit is 24 and platelet is 67.  Patient continues on argatroban for right upper extremity DVTs.  Hypercoagulable workup is in progress.  Id:  Patient's white count is up to 20.  Patient is on broad prepped from antibiotics.  Endocrine:  The patient is on long-acting as well as sliding scale insulin.  Blood sugars have been well controlled.  Will decrease dose of long-acting.  Activities: patient has been out of bed to chair.  Advance as tolerated.  Musculoskeletal:  Patient's right upper extremity continues to be swollen  The index finger and thumb continues to be dusky with a bleb near the base of the thumb.  Continue argatroban for anticoagulation.  Continue elevation  of right upper extremity.  Lines tubes and drains: patient has a PICC line.  Will discontinue Jones catheter.        11/11/2019  The patient is post-operative day 11, status-post coronary artery bypass grafting x 3. Post-operative course was complicated by an episode of hemodynamic instability and decompensation on postop day 5.  Patient remains hemodynamically stable.    Neuro:  Patients neurologic state is improving. Oriented x 2. Alert, follows commands, makes eye contact. No focal deficits.    Cardiac:  Patient is hemodynamically improving. Hypertensive. Continue metoprolol, amlodipine, and losartan. Add chlorthalidone.   Respiratory: Good sats on room air. Continue IS. Continue pulmonary toilet.  GI:  Continue cardiac/diabetic diet, modified by puree and thick liquids with swallow precautions. Advance as tolerated. Continue metamucil.  Renal: Good urine output. Cr 0.8. K 3.8. Replace K. Continue IV diuresis. Add chlorthalidone. BMP at noon.   Hema:  Hct 23.8  Platelet 75. Hold aspirin. Hold Plavix. Repeat HIT panel pending. Multiple DVTs from the right IJ down to the brachiocephalic vein. Confirmed via vascular US. Patient on argatroban gtt, goal is APTT 2x baseline. Most recent APTT 83.3. Baseline APTT 50.8.  Hematology following. Vascular surgery following.   Id:  WBC is down to 19. Tmax 98.4. BC NGTD. Vancomycin and zosyn day 7. Discontinue broad spectrum antibiotics. Will continue to follow closely.    Endocrine:  Glucose controlled on sliding scale insulin and long acting insulin detemir.    Activities: Increase activities as tolerated.  Musculoskeletal: The right upper extremity exhibits swelling distal from the elbow. The RUE is tender to palpation and ROM. ROM is diminished to the right hand 2/5. The hand his reddened, swollen, with dusky, mottled, purple discoloration to the right thumb and tip of the right index finger. Blisters have formed on the proximal portion of the thumb and wrist. Severely  diminished capillary refill to the tip of the right thumb and tip of the right index finger. Wound care order placed.      Lines tubes and drains:  Continue PICC line.  Plan: Remain ICU         11/12/2019  The patient is post-operative day 12, status-post coronary artery bypass grafting x 3. Post-operative course was complicated by an episode of hemodynamic instability and decompensation on postop day 5.  Patient remains hemodynamically stable.    Neuro:  Patients neurologic state is improving. Oriented to person, place, situation. Disoriented to time. Alert, follows commands, makes eye contact. No focal deficits.    Cardiac:  Patient is hemodynamically improving. Continue metoprolol, amlodipine, losartan, and chlorthalidone.   Respiratory: Good sats on room air. Continue IS.  GI:  Continue cardiac/diabetic diet, modified by puree and thick liquids with swallow precautions. Advance as tolerated. Continue metamucil.  Renal: Good urine output. Cr 0.8. K 3.9. Replace K. Discontinue lasix. Continue chlorthalidone.  Hema:  Hct 23.1  Platelet 75. Hold aspirin. Hold Plavix. 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 Multiple DVTs from the right IJ down to the brachiocephalic vein. Confirmed via vascular US. Patient on argatroban gtt, goal is APTT 2x baseline. Most recent APTT 84.6. Baseline APTT 50.8.  Hematology following. Vascular surgery following.   Id:  WBC is down to 17.4. A-febrile. Will continue to follow closely.    Endocrine:  Patient had an episode of hypoglycemia last night. Will continue sliding scale insulin. Discontinue long acting insulin. Begin strict calorie count.   Activities: Increase activities as tolerated.  Musculoskeletal: The right upper extremity exhibits swelling distal from the elbow. The RUE is tender to palpation and ROM. ROM is diminished to the right hand 3/5. The hand is reddened, swollen, with dusky, mottled, purple discoloration to the right thumb tip and tip of the right index finger.   Severely diminished capillary refill to the tip of the right thumb and tip of the right index finger. Wound care following. Gauze dressing in place.       Lines tubes and drains:  Continue PICC line.  Plan: Remain ICU. Begin discharge planning for IP rehab.          11/13/2019  The patient is post-operative day 13, status-post coronary artery bypass grafting x 3. Post-operative course was complicated by an episode of hemodynamic instability and decompensation on postop day 5.  Patient remains hemodynamically stable.    Neuro:  Patient is oriented to person, place, situation. Disoriented to time. Alert, follows commands, makes eye contact. No focal deficits.    Cardiac:  Patient is hemodynamically stable. Continue metoprolol, amlodipine, losartan, and chlorthalidone.   Respiratory: Good sats on room air. Continue IS. Use of accessory muscles noted during inspiration. Will increase diuresis.   GI:  Continue cardiac/diabetic diet, modified by puree and thick liquids with swallow precautions. Advance as tolerated. Continue metamucil.  Renal: Good urine output. Cr 0.8. K 4.0. Mag 1.8. Replace Mag. Start lasix IV. Continue chlorthalidone.  Hema:  Hct 23.  Platelet 79. Hold aspirin. Hold Plavix. 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 Multiple DVTs from the right IJ down to the brachiocephalic vein. Confirmed via vascular US. Patient on argatroban gtt, goal is APTT 2x baseline. Most recent APTT 75.6. Baseline APTT 50.8.  Hematology following. Vascular surgery following.   Id:  WBC is down to 12.2. A-febrile. Will continue to follow closely.    Endocrine:  Glucose controlled. Continue sliding scale insulin. Start long acting insulin detemir 5 units daily.     Activities: Increase activities as tolerated.  Musculoskeletal: The right upper extremity exhibits swelling distal from the elbow. The RUE is tender to palpation and ROM. ROM is diminished to the right hand 2/5. The right hand is improving. The dusky, mottled, purple  discoloration is localized to the right thumb tip and tip of the right index finger.  Severely diminished capillary refill to the tip of the right thumb and tip of the right index finger. Wound care following. Gauze dressing in place.    Lines tubes and drains:  Continue PICC line.  Plan: Remain telemetry. Continue discharge planning for IP rehab.

## 2019-11-13 NOTE — PROGRESS NOTES
11-    [facility] Mild NCD.    Psychostimulants recommended. Will discuss Wellbutrin

## 2019-11-13 NOTE — PLAN OF CARE
Pt in bed AAOx4. Intermittent confusion. Plan of Care reviewed, Verbalized understanding. No reorientation needed this shift.    Patient remained free from falls, fall precautions in place. Sitter at the bedside.   Patient is NSR on monitor.VSS.  Medications administered as ordered. Argatroban infusion continued and titrated as ordered. APTT labs drawn every 2-4 hours as ordered.   Skin assessed with swelling and redness to RUE noted due to present DVT. No c/o pain from patient. Only c/o discomfort reported.   Call bell and personal belongings within reach. Hourly rounding complete. Reminded to call for assistance. No complaints at this time. Will continue to monitor.

## 2019-11-13 NOTE — PLAN OF CARE
Pt with intermittent confusion this shift.  Pt unable to participate with therapy due to decreased alertness at time of visit.  VSS. Supplemental O2 @ 1LPM via NC. Pt maintains adequate O2 saturation when on room air but appears to have labored and shallow breathing when no oxygen is applied.   Pt remained afebrile throughout this shift.   IV Argatroban gtt administered continuously and titrated per aPTT lab draws according to protocol.  LUE PICC in place CDI, dressing change due 11/14/19.  Pt remained free of falls this shift but remains a high fall risk. Avasys monitoring in use. Bed & chair alarms maintained for safety.   Pt has had no complaints or nonverbal signs of pain this shift.  Blood glucose monitored q4hr as ordered, corrected via SSI.  Electrolytes replaced as needed per orders.  Plan of care reviewed. Patient needs reinforcement.  Pt moving/turning somewhat independently, assistance provided at appropriate times. RUE elevated for duration of shift.  Up in chair for most of afternoon with blinds up to stimulate pt. Very drowsy at beginning of shift after being up most of the night as reported by night shift RN.  Patient NSR on monitor in 90s.   Dressing soiled on RUE due to weeping blisters, dressing changed.  Bed low, side rails up x 2, wheels locked, call light in reach.   Incontinence care provided.  Patient instructed to call for assistance.   Hourly rounding completed.   Will continue to monitor.

## 2019-11-13 NOTE — SIGNIFICANT EVENT
Deterioration alert received.  Patient seen and examined with no acute distress appreciated.  Pt sitting up to side of bed attempting to feed herself.  Pt answers questions appropriately when questioned and denies any additional complaints.  Repeat vital signs requested.  No acute change in patient status.  Current treatment plan continued.

## 2019-11-13 NOTE — NURSING
Assessed patient with an O2 of 24 with accessory breathing noted. Patient VSS. Patient not in any distress. Oxygen nasal cannula @2L. Respiratiory assessed patient's respirations. Patient only complaint is mild headache. Notified Yovani Dobbins NP of finding instructing to administer Lasix as ordered previously in shift and monitor patient's HR and RR throughout shift. No new orders for headache and instructed not to give anything for headache at this time per Yovani GARCIA. Also notified Yovani GARCIA of patient high blood sugar result of 312 and administration of Insulin PRN as ordered sliding scale. Yovani GARCIA instructed to recheck patient blood sugar @midnight.

## 2019-11-13 NOTE — PT/OT/SLP PROGRESS
Physical Therapy Treatment    Patient Name:  Mateusz Bates   MRN:  [id number]    Recommendations:     Discharge Recommendations:  rehabilitation facility   Discharge Equipment Recommendations: (tbd by next level of care)   Barriers to discharge: None    Assessment:     Mateusz Bates is a 69 y.o. female admitted with a medical diagnosis of Acute deep vein thrombosis (DVT) of right upper extremity.  She presents with the following impairments/functional limitations:  weakness, impaired endurance, gait instability, impaired sensation, impaired functional mobilty, decreased lower extremity function .    Rehab Prognosis: Fair; patient would benefit from acute skilled PT services to address these deficits and reach maximum level of function.    Recent Surgery: Procedure(s) (LRB):  CORONARY ARTERY BYPASS GRAFT (CABG) (N/A)  ECHOCARDIOGRAM,TRANSESOPHAGEAL (N/A)  BLOCK, NERVE, INTERCOSTAL, 2 OR MORE (N/A) 13 Days Post-Op    Plan:     During this hospitalization, patient to be seen 5 x/week to address the identified rehab impairments via gait training, therapeutic activities, therapeutic exercises and progress toward the following goals:    · Plan of Care Expires:  11/18/19    Subjective     Chief Complaint:   Patient/Family Comments/goals:   Pain/Comfort:  ·        Objective:     Communicated with EPIC prior to session.  Patient found supine with telemetry upon PT entry to room.     General Precautions: Standard, aspiration, fall, sternal   Orthopedic Precautions:N/A   Braces:       Functional Mobility:  ·       AM-PAC 6 CLICK MOBILITY          Therapeutic Activities and Exercises:   Pt with abdominal breathing.  Decreased level of alertness. Pt remained alert for appr 15 seconds p arrousal.  o2 sat 94% HR 98.  Pt reposioened in bed max of 2. Nurse notified of pts condition.    Patient left supine with all lines intact, call button in reach and sitter present..    GOALS:   Multidisciplinary Problems     Physical Therapy Goals         Problem: Physical Therapy Goal    Goal Priority Disciplines Outcome Goal Variances Interventions   Physical Therapy Goal     PT, PT/OT Ongoing, Progressing     Description:  By 11/10/19  1. Patient will perform supine to/from sit CGA x 1 per sternal prec.  2. Patient will perform sit to/from stand CGA a no AD per sternal prec.  3. Patient will amb 100ft no AD min A                     Time Tracking:     PT Received On: 11/11/19  PT Start Time: 8:30   PT Stop Time: 8:45  PT Total Time (min): 15     Billable Minutes: Therapeutic Activity 15    Treatment Type: Treatment  PT/PTA: PT     PTA Visit Number: 1     Darlene Sauer, PT  11/13/2019

## 2019-11-13 NOTE — PT/OT/SLP PROGRESS
Occupational Therapy      Patient Name:  Mateusz Bates   MRN:  [id number]    S: Pt decrease alertness.  O:  Pt seen in room and noticeable abdominal breathing.  Attempted to arouse pt and remained alertness for approximately  15 seconds. o2 sat taken and pt 94%saturationHR 98. Pt repositioned in bed max of 2. Nurse notified of pts condition.  A: pt limited with therapy session due to decrease awareness  P: continue with POC      Susan Kilgore, NEMO  11/13/2019   [id number]  1ta

## 2019-11-13 NOTE — SUBJECTIVE & OBJECTIVE
Interval History: no acute issues noted o/n. Would benefit from PRBC transfusion per CT surgery.     Review of Systems   Constitution: Positive for malaise/fatigue.   HENT: Negative for hearing loss and hoarse voice.    Eyes: Negative for blurred vision and visual disturbance.   Cardiovascular: Negative for chest pain, claudication, dyspnea on exertion, irregular heartbeat, leg swelling, near-syncope, orthopnea, palpitations, paroxysmal nocturnal dyspnea and syncope.   Respiratory: Negative for cough, hemoptysis, shortness of breath, sleep disturbances due to breathing, snoring and wheezing.    Endocrine: Negative for cold intolerance and heat intolerance.   Hematologic/Lymphatic: Does not bruise/bleed easily.   Skin: Positive for color change, nail changes and poor wound healing. Negative for dry skin.   Musculoskeletal: Positive for arthritis and back pain. Negative for joint pain and myalgias.        RUE SWELLING   Gastrointestinal: Negative for bloating, abdominal pain, constipation, nausea and vomiting.   Genitourinary: Negative for dysuria, flank pain, hematuria and hesitancy.   Neurological: Negative for headaches, light-headedness, loss of balance, numbness, paresthesias and weakness.   Psychiatric/Behavioral: Negative for altered mental status.   Allergic/Immunologic: Negative for environmental allergies.     Objective:     Vital Signs (Most Recent):  Temp: 97.6 °F (36.4 °C) (11/13/19 1427)  Pulse: 93 (11/13/19 1427)  Resp: 20 (11/13/19 1427)  BP: 130/69 (11/13/19 1427)  SpO2: 97 % (11/13/19 1427) Vital Signs (24h Range):  Temp:  [97.4 °F (36.3 °C)-98.2 °F (36.8 °C)] 97.6 °F (36.4 °C)  Pulse:  [] 93  Resp:  [18-26] 20  SpO2:  [92 %-99 %] 97 %  BP: ()/(54-70) 130/69     Weight: 76.2 kg (167 lb 15.9 oz)  Body mass index is 31.74 kg/m².     SpO2: 97 %  O2 Device (Oxygen Therapy): nasal cannula      Intake/Output Summary (Last 24 hours) at 11/13/2019 9886  Last data filed at 11/13/2019 1000  Gross  per 24 hour   Intake 929.54 ml   Output --   Net 929.54 ml       Lines/Drains/Airways     Peripherally Inserted Central Catheter Line                 PICC Double Lumen 11/07/19 1645 left brachial;left basilic 5 days                Physical Exam   Constitutional: She is oriented to person, place, and time. She appears well-developed and well-nourished. No distress.   HENT:   Head: Normocephalic and atraumatic.   Eyes: Pupils are equal, round, and reactive to light.   Neck: Normal range of motion and full passive range of motion without pain. Neck supple. No JVD present.   Cardiovascular: Normal rate, regular rhythm, S1 normal, S2 normal and intact distal pulses. PMI is not displaced. Exam reveals no distant heart sounds.   No murmur heard.  Pulses:       Radial pulses are 2+ on the right side, and 2+ on the left side.        Dorsalis pedis pulses are 2+ on the right side, and 2+ on the left side.   CABG INCISION HEALING WELL   Pulmonary/Chest: Effort normal and breath sounds normal. No accessory muscle usage. No respiratory distress. She has no decreased breath sounds. She has no wheezes. She has no rales.   Abdominal: Soft. Bowel sounds are normal. She exhibits no distension. There is no tenderness.   Musculoskeletal: Normal range of motion. She exhibits no edema.        Right ankle: She exhibits no swelling.        Left ankle: She exhibits no swelling.   Neurological: She is alert and oriented to person, place, and time.   Skin: Skin is warm and dry. She is not diaphoretic. No cyanosis. Nails show no clubbing.   RUE swelling, right hand ecchymotic with dusky fingernails   Psychiatric: She has a normal mood and affect. Her speech is normal and behavior is normal. Judgment and thought content normal. Cognition and memory are normal.   Nursing note and vitals reviewed.      Significant Labs:   BMP:   Recent Labs   Lab 11/12/19  0409 11/12/19  1743 11/13/19  0401   * 333* 221*    134* 137   K 3.9 4.5 4.0     103 104   CO2 26 20* 22*   BUN 14 18 16   CREATININE 0.8 0.8 0.8   CALCIUM 8.8 8.4* 8.6*   MG 2.1  --  1.8   , CBC   Recent Labs   Lab 11/12/19  1742 11/13/19  0401 11/13/19  0653   WBC 15.16* 12.88* 12.22   HGB 7.3* 6.8* 7.0*   HCT 23.2* 21.9* 23.0*   PLT 89* 93* 79*    and All pertinent lab results from the last 24 hours have been reviewed.    Significant Imaging: Echocardiogram:   2D echo with color flow doppler:   Results for orders placed or performed during the hospital encounter of 10/28/19   2D echo with color flow doppler   Result Value Ref Range    QEF 30 (A) 55 - 65    Diastolic Dysfunction Yes (A)     Est. PA Systolic Pressure 33.03     Narrative    Date of Procedure: 11/06/2019        TEST DESCRIPTION   Technical Quality: This is a technically challenging study.     General: A catheter is present in the right-sided cardiac chambers.     Aorta: The aortic root is normal in size, measuring 3.7 cm at sinotubular junction and 3.8 cm at Sinuses of Valsalva. The proximal ascending aorta is normal in size, measuring 3.7 cm across.     Left Atrium: The left atrial volume index is normal, measuring 19.22 cc/m2.     Left Ventricle: The left ventricle is normal in size, with an end-diastolic diameter of 3.0 cm, and an end-systolic diameter of 2.5 cm. LV wall thickness is normal, with the septum measuring 2.1 cm and the posterior wall measuring 1.4 cm across. Relative   wall thickness was increased at 0.93, and the LV mass index was increased at 140.2 g/m2 consistent with concentric left ventricular hypertrophy. The following segments were mildly hypokinetic: apical lateral wall.  The following segments were severely hypokinetic: mid anteroseptum, basal anteroseptum, apical septum, mid inferoseptum, basal inferoseptum, basal inferior wall.  Left ventricular systolic function appears moderately depressed. Visually estimated ejection fraction is 30-35%. The LV Doppler derived stroke volume equals 54.0 ccs.      Diastolic indices: E wave velocity 0.9 m/s, E/A ratio 0.8,  msec., E/e' ratio(avg) 14. There is diastolic dysfunction secondary to relaxation abnormality.     Right Atrium: The right atrium is normal in size, measuring 3.5 cm in length and 2.1 cm in width in the apical view.     Right Ventricle: The right ventricle is normal in size. Global right ventricular systolic function appears normal. Tricuspid annular plane systolic excursion (TAPSE) is 1.3 cm. The estimated PA systolic pressure is 33 mmHg.     Aortic Valve:  The aortic valve is normal in structure. The aortic valve is tri-leaflet in structure. The mean gradient obtained across the aortic valve is 9 mmHg.     Mitral Valve:  The mitral valve is normal in structure. The pressure half time is 53 msec. The calculated mitral valve area is 4.15 cm2.     Tricuspid Valve:  The tricuspid valve is normal in structure.     Pulmonary Valve:  The pulmonic valve is not well seen.     IVC: IVC is normal in size and collapses > 50% with a sniff, suggesting normal right atrial pressure of 3 mmHg.     Intracavitary: There is no evidence of pericardial effusion, intracavity mass, thrombi, or vegetation.         CONCLUSIONS     1 - Concentric hypertrophy.     2 - Wall motion abnormalities.     3 - Moderately depressed left ventricular systolic function (EF 30-35%).     4 - Impaired LV relaxation, normal LAP (grade 1 diastolic dysfunction).     5 - Normal right ventricular systolic function .     6 - The estimated PA systolic pressure is 33 mmHg.             This document has been electronically    SIGNED BY: Charles Matias MD On: 11/06/2019 18:28    and X-Ray: CXR: X-Ray Chest 1 View (CXR):   Results for orders placed or performed during the hospital encounter of 10/28/19   X-Ray Chest 1 View    Narrative    EXAMINATION:  XR CHEST 1 VIEW    CLINICAL HISTORY:  follow up;    TECHNIQUE:  Single frontal view of the chest was  performed.    COMPARISON:  11/07/2019    FINDINGS:  Tubes and lines are stable.  Kodak-Waldo catheter is been removed.  Mild atelectasis within the left midlung zone.  No consolidation.  Trace pleural effusions not entirely excluded.  No pneumothorax.  Bones demonstrate mild degenerative changes.  Sternotomy wires are intact.  In comparison to the prior study, there is no adverse interval changes      Impression    In comparison to the prior study, there is no adverse interval changes      Electronically signed by: Jh Anne MD  Date:    11/08/2019  Time:    08:14

## 2019-11-13 NOTE — NURSING
Transfer: ICU    Transfer via  Bed    Transfer with Medication    Transported by Regina DISLA    Medicines sent: Yes    Chart send with patient: Yes     Notified: Yanet DISLA of report    Patient reassessed at: 11/12/19 @ 1753     Upon arrival to floor: cardiac monitor applied, patient oriented to room, call bell in reach and bed in lowest position Pt arrived to unit, assumed care, agree with previous assessment VSS, denies any discomfort and appears to be in no distress at this time. 90 Sinus Rhythm on monitor, blood glucose 298, covered with 6 units of insulin as per sliding scale, placed on Argatroban gtt at 4.5 mcg/kg 19.9mL/hr as per protocol. Sitter at bedside, bed locked, side rails up x2 on waffle mattress. Will cont to monitor throughout the day,

## 2019-11-13 NOTE — ASSESSMENT & PLAN NOTE
-Patient presented with intermittent chest tightness/heaviness that radiated to her back since Saturday evening  -Associated with JONES/orthopnea  -Initial troponin 3.665  -EKG showed SR with new LBBB  -Presentation consistent with NSTEMI  -Continue ASA, statin  -Start Toprol XL  -Start heparin gtt  -Check 2D echo  -Continue to trend troponin  -Keep NPO. [facility] today for further evaluation and PCI if indicated. All risks, benefits, and treatment alternatives explained to patient in detail. All questions answered. She has agreed to proceed. Further rec's to follow.    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 All risks, benefits, and treatment alternatives explained to patient in detail. All questions answered. She has agreed to proceed.    10/31/19  -Patient is s/p CABG x3.   See plan for CABG

## 2019-11-13 NOTE — NURSING
Pt much more alert than this AM. Assisted  up to recliner (max assist x2) to eat lunch with the assistance of NABIL Garcia. Pt currently tolerating mechanical soft diet with thickened liquids. Mild increase in pain level to affected RUE and RLE upon movement, resolved once situated into chair. RUE elevated using pillows. Gown changed. Chair alarm armed. Wheels of recliner locked. Call light within reach. Orientation fluctuates throughout the day. Currently oriented x3 (not time). VSS. Spplemental O2 @1LPM via NC applied. Avasys in use. Pt instructed to call for assistance.

## 2019-11-13 NOTE — TELEPHONE ENCOUNTER
----- Message from Lucien Keller MD sent at 11/13/2019  9:07 AM CST -----    Memory test showed mild memory abnormalities. Will discuss in details when we see her.

## 2019-11-13 NOTE — SIGNIFICANT EVENT
Patient Deterioration Alert     Deterioration alert received.  Patient seen and examined, in NAD.  Denies any complaints at present.  VS remain stable.  Labs reviewed.  No acute change in patient status.  Continue current treatment plan.        Ruth Mckeon, NP

## 2019-11-13 NOTE — NURSING
Received critical blood glucose value from lab of 456. Alerted Dr. Zaidi via secure chat who gave an order to recheck fingerstick glucose in 2 hours. Night shift NIGHAT Milian who will be assuming care of pt at 1900 notified of result and new order to recheck.

## 2019-11-13 NOTE — PROGRESS NOTES
[facility] -   Cardiothoracic Surgery  Progress Note    Patient Name: Mateusz Bates  MRN: [id number]  Admission Date: 10/28/2019  Hospital Length of Stay: 16 days  Code Status: Full Code   Attending Physician: Keny Zaidi MD   Referring Provider: Self, Aaareferral  Principal Problem:Acute deep vein thrombosis (DVT) of right upper extremity            Subjective:     Post-Op Info:  Procedure(s) (LRB):  CORONARY ARTERY BYPASS GRAFT (CABG) (N/A)  ECHOCARDIOGRAM,TRANSESOPHAGEAL (N/A)  BLOCK, NERVE, INTERCOSTAL, 2 OR MORE (N/A)   13 Days Post-Op     Interval History:  11/13/2019  The patient is post-operative day 13.  Status post coronary artery bypass grafting x3.    ROS  Medications:  Continuous Infusions:   argatroban in 0.9 % sod chlor 5 mcg/kg/min (11/13/19 1216)     Scheduled Meds:   albuterol-ipratropium  3 mL Nebulization Q6H WAKE    amLODIPine  10 mg Oral Daily    apixaban  5 mg Oral BID    chlorhexidine  15 mL Mouth/Throat BID    chlorthalidone  25 mg Oral Daily    ferrous sulfate  325 mg Oral BID    furosemide  40 mg Intravenous BID    insulin detemir U-100  5 Units Subcutaneous Daily    losartan  50 mg Oral Daily    metoprolol tartrate  50 mg Oral BID    nitroGLYCERIN 2% TD oint  0.5 inch Topical (Top) Q12H    pantoprazole  40 mg Oral Daily    potassium chloride  20 mEq Oral BID    psyllium husk  1 packet Oral BID    sodium chloride  2 g Oral Q6H     PRN Meds:albumin human 5%, albuterol sulfate, Dextrose 10% Bolus, Dextrose 10% Bolus, glucagon (human recombinant), insulin aspart U-100, iohexol, lactated ringers, magnesium sulfate IVPB, magnesium sulfate IVPB, metoclopramide HCl, ondansetron, potassium chloride in water **AND** potassium chloride in water **AND** potassium chloride in water, traMADol     Objective:     Vital Signs (Most Recent):  Temp: 98.1 °F (36.7 °C) (11/13/19 1211)  Pulse: 89 (11/13/19 1232)  Resp: (!) 24 (11/13/19 1211)  BP: (!) 97/58 (11/13/19 1211)  SpO2:  (!) 94 % (11/13/19 1211) Vital Signs (24h Range):  Temp:  [97.4 °F (36.3 °C)-98.2 °F (36.8 °C)] 98.1 °F (36.7 °C)  Pulse:  [] 89  Resp:  [18-26] 24  SpO2:  [92 %-100 %] 94 %  BP: ()/(54-73) 97/58     Weight: 76.2 kg (167 lb 15.9 oz)  Body mass index is 31.74 kg/m².    SpO2: (!) 94 %  O2 Device (Oxygen Therapy): nasal cannula    Intake/Output - Last 3 Shifts       11/11 0700 - 11/12 0659 11/12 0700 - 11/13 0659 11/13 0700 - 11/14 0659    P.O. 330 480 460    I.V. (mL/kg)  763.9 (10.4)     IV Piggyback 500      Total Intake(mL/kg) 830 (11.6) 1243.9 (16.9) 460 (6)    Urine (mL/kg/hr)       Stool       Total Output       Net +830 +1243.9 +460           Urine Occurrence 6 x 8 x 2 x    Stool Occurrence 1 x 4 x           Lines/Drains/Airways     Peripherally Inserted Central Catheter Line                 PICC Double Lumen 11/07/19 1645 left brachial;left basilic 5 days                Physical Exam   Constitutional: She appears well-developed and well-nourished. No distress.   HENT:   Head: Normocephalic and atraumatic.   Eyes: Pupils are equal, round, and reactive to light. EOM are normal.   Neck: Normal range of motion. Neck supple. No JVD present.   Cardiovascular: Normal rate, regular rhythm, normal heart sounds and intact distal pulses. Exam reveals no gallop and no friction rub.   No murmur heard.  Pulmonary/Chest: Effort normal and breath sounds normal. No stridor. No respiratory distress. She has no wheezes. She has no rales. She exhibits no tenderness.   Accessory muscles utilized during inspiration. Good sats on room air. Denies SOB.    Abdominal: Soft. Bowel sounds are normal. She exhibits no distension and no mass. There is no tenderness. There is no rebound and no guarding. No hernia.   Musculoskeletal: Normal range of motion. She exhibits no edema, tenderness or deformity.   RUE continues to improve. Dusky, mottled, discoloration is localized to the tip of the right thumb and the tip of the right  index finger. ROM limited to the right hand 2/5.    Neurological: She is alert. She displays normal reflexes. No cranial nerve deficit or sensory deficit. She exhibits normal muscle tone. Coordination normal.   Oriented to person, place. Disoriented to time and situation.    Skin: Skin is warm and dry. Capillary refill takes less than 2 seconds. No rash noted. She is not diaphoretic. There is erythema. No pallor.   Psychiatric: She has a normal mood and affect. Her behavior is normal. Judgment normal.   Slow to respond to  Questions.    Nursing note and vitals reviewed.      Significant Labs:  BMP:   Recent Labs   Lab 11/13/19  0401   *      K 4.0      CO2 22*   BUN 16   CREATININE 0.8   CALCIUM 8.6*   MG 1.8     CBC:   Recent Labs   Lab 11/13/19  0653   WBC 12.22   RBC 2.44*   HGB 7.0*   HCT 23.0*   PLT 79*   MCV 94   MCH 28.7   MCHC 30.4*       Significant Diagnostics:  I have reviewed all pertinent imaging results/findings within the past 24 hours.    Assessment/Plan:     S/P CABG x 3       11/01/2019  The patient is post-operative day 1, status-post coronary artery bypass grafting x 3.  Overall the patient is making progress, however IABP and inotropic agents will continue today.   Neuro:  The patient is awake alert and oriented x3.  Neuro exam is nonfocal.  Pain is well controlled with IV pain medication.  Cardiac:  Patient is hemodynamically improving. Patient remains on epinephrine and milrinone gtts, which will be weaned over the next 24 hours.  Cardiac index is improving. IABP will remain in place over the next 24 hours.  Patient will be started on beta blockers once gtts are weaned off.  Respiratory: Good sats on high flow nasal cannula. Continue respiratory toilet, continue incentive spirometer. Wean to low flow nasal cannula.  GI:  Diet: sips of water with medications, will slowly advance diet as tolerated. Benign abdominal exam.  Renal:  Urine output is slowly improving. Cr 0.9. K  4.3. Mag 1.6. Replace mag. Continue IV diuresis. Add metolazone.   Heme:  Hct 34.1  Platelet 82. Continue aspirin. Discontinue Plavix for now.  Id:  WBC 12. Tmax 100.3. Continue post-operative antibiotics. Will continue to follow.   Endocrine:  Glucose is controlled with insulin gtt.  Continue insulin gtt for now, until weaned off inotropic agents.   Activities:  Patient is currently bed bound due to IABP.  Advance activities as tolerated once IABP is removed.   Lines tubes and drains:  Continue IABP. Continue Mansura and Cordis.  Continue A-line.  Chest tubes will continue. NICOLASA x 2 will continue. Jones catheter will continue.  Continue pacer wires. Continue prevena wound vac.  Plan: Remain ICU.          11/02/2019  The patient is post-operative day 2, status-post coronary artery bypass grafting x 3.  Overall the patient is making progress, IABP removed and inotropic agents discontinued.   Neuro:  The patient is awake alert and oriented x3.  Neuro exam is nonfocal.  Pain is well controlled. Discontinue fentanyl.   Cardiac:  Patient is hemodynamically improving. Inotropic gtts discontinued. Excellent cardiac index. IABP discontinued.  Patient will be started on beta blockers today.  Respiratory: Good sats on high flow nasal cannula. Continue respiratory toilet, continue incentive spirometer. Wean to low flow nasal cannula.  GI:  Advance diabetic/cardiac diet as tolerated. Benign abdominal exam.  Renal:  Urine output is improving. Cr 0.9. K 4.9. Mag 2.2. Continue IV diuresis. Hold AM K.   Heme:  Hct 28.3  Platelet 47. Discontinue aspirin. Discontinue Plavix. HIT panel. Consult hematology r/t thrombocytopenia.   Id:  WBC 10.4. Tmax 99.4. Will continue to follow.   Endocrine:  Discontinue insulin gtt. Start sliding scale insulin. Start long acting insulin detemir.   Activities: Advance activities as tolerated.   Lines tubes and drains:  Discontinue IABP. Discontinue Mansura and Cordis.  Discontinue A-line.  Discontinue chest  tubes x 3. Discontinue NICOLASA x 2. Discontinue arterial line. Jones catheter will continue for now.  Continue pacer wires. Continue prevena wound vac. Place PICC line.   Plan: Remain ICU.           11/03/2019  The patient is post-operative day 3, status-post coronary artery bypass grafting x 3.  Overall the patient is making progress.  Neuro:  The patient is awake alert and oriented x3.  Neuro exam is nonfocal.  Pain is well controlled. Continue tramadol.   Cardiac:  Patient is hemodynamically stable. Increase metoprolol dose. Start amlodipine.   Respiratory: Good sats on nasal cannula. Continue respiratory toilet, continue incentive spirometer. Wean to room air.  GI:  Advance diabetic/cardiac diet as tolerated. Benign abdominal exam. Passing gas, no BM. Add 1 bottle of mag citrate.   Renal:  Good urine output. Cr 0.9. K 3.8. Mag 2.0. Replace K. Replace Mag. Change to PO diuresis.   Heme:  Hct 34.7  Platelet 89. Restart aspirin. Continue to hold Plavix. HIT panel pending. Hematology following.  Id:  WBC 13.8. Tmax 99.4. Will continue to follow.    Endocrine:  Glucose controlled on sliding scale insulin. Increase long acting insulin detemir dose.   Activities: Advance activities as tolerated.   Lines tubes and drains:  Jones catheter will continue for now.  Continue pacer wires. Continue prevena wound vac. Continue PICC line.   Plan: Remain ICU.          11/04/2019  The patient is post-operative day 4, status-post coronary artery bypass grafting x 3.  Overall the patient is making progress.  Neuro:  The patient is awake alert and oriented x3.  Neuro exam is nonfocal.  Pain is well controlled. Continue tramadol.   Cardiac:  Patient is hemodynamically stable. Continue metoprolol dose. Continue amlodipine dose.   Respiratory: Good sats on room air. Continue respiratory toilet, continue incentive spirometer.  GI:  Advance diabetic/cardiac diet as tolerated. Benign abdominal exam. Regular bowel movements.   Renal:  Good urine  output. Cr 0.7. K 4.0. Mag 2.3. Replace K. Decrease PO diuresis.   Heme:  Hct 34.4  Platelet 140. Continue aspirin. Restart Plavix. HIT panel pending. Hematology following.  Id:  WBC down to 13.13. Tmax 98. Will continue to follow.    Endocrine:  Glucose controlled on sliding scale insulin. Increased long acting insulin detemir dose.   Activities: Advance activities as tolerated.   Lines tubes and drains:  Discontinued storm catheter. Continue pacer wires. Continue prevena wound vac. Continue PICC line.   Plan: Transfer to telemetry. Anticipate discharge to SNF in 48 hours.         11/05/2019  The patient is post-operative day 5, status-post coronary artery bypass grafting x 3.  Overall the patient is making progress.  Neuro:  The patient is awake alert and oriented x3.  Neuro exam is nonfocal. Patient slow to answer questions, however answers are appropriate.  Pain is well controlled. Continue tramadol PRN.   Cardiac:  Patient is hemodynamically stable. Continue metoprolol dose. Discontinue amlodipine. Start losartan.    Respiratory: Good sats on room air. Continue respiratory toilet, continue incentive spirometer.  GI:  Advance diabetic/cardiac diet as tolerated. Benign abdominal exam. Regular bowel movements.   Renal:  Good urine output. Cr 0.7. K 4.8. Mag 2.1. Replace Mag. Discontinue PO diuresis.   Heme:  Hct 39.5  Platelet 73. Continue aspirin. Hold Plavix. HIT panel pending. Hematology following.  Id:  WBC is up to 16. Tmax 99. Pan-culture. DC PICC line. Chest xray. Start broad spectrum empiric antibiotics. Will continue to follow.    Endocrine:  Glucose controlled on sliding scale insulin. Decrease long acting insulin detemir dose.   Activities: Advance activities as tolerated.   Lines tubes and drains:  Discontinue pacer wires. Discontinue prevena wound vac. Continue PICC line.   Plan: Anticipate discharge to SNF in 24-48 hours.         11/06/2019  The patient is post-operative day 6, status-post coronary  artery bypass grafting x 3. Patient had an episode of decompensation and unresponsiveness yesterday evening. The patient was found unresponsive, cool, and diaphoretic, with agonal breathing. Patient was intubated and transferred to the ICU. Bedside EKG and echocardiogram performed at the bedside showed no significant changes. CT head, chest, abdomen pelvis performed, showed no significant findings.     Neuro:  The patient is intubated and sedated. On Precedex gtt. Begin weaning off sedation.    Cardiac:  Patient is hemodynamically improving. Inotropic agent dependent. Currently on Epinephrine gtt. Start Milrinone gtt. Discontinue metoprolol. Discontinue losartan.    Respiratory: Ventilator dependent. tV 450, RR 20, PEEP 5. Wean down sedation. Wean off ventilator today.    GI:  Begin enteral tube feedings. Benign abdominal exam. Previously having regular bowel movements.   Renal: Adequate urine output. Cr 1.1. K 3.6. Mag 1.7. Replace Mag. Replace K. Discontinue diuresis.   Heme:  Hct 31.9  Platelet 89. Hold aspirin. Hold Plavix. HIT panel negative. Patient received 1 units PRBC last night. Hematology following.  Id:  WBC is down to 15.5. Tmax 99.9. BC NGTD. Urinalysis negative. Sputum culture negative. Vancomycin and zosyn day 2. Will continue to follow closely.    Endocrine:  Glucose controlled on insulin gtt, continue at this time.   Activities: Intubated and sedated.   Musculoskeletal: Unable to find right radial pulse via doppler. Tips of the fingers of the right hand are mottled, dusky, appear cyanotic. Recent right radial arterial line in place, which has been relocated. US of arteries and veins of the right arm ordered. Consult to vascular surgery placed.    Lines tubes and drains:  Continue swan and cordis. Continue arterial line. Continue storm.    Plan: Remain ICU         11/07/2019  The patient is post-operative day 7, status-post coronary artery bypass grafting x 3. Patient had an episode of  decompensation and unresponsiveness the evening of 11/05/2019. The patient was found unresponsive, cool, and diaphoretic, with agonal breathing. Patient was intubated and transferred to the ICU. Bedside EKG and echocardiogram performed at the bedside showed no significant changes. CT head, chest, abdomen pelvis performed, showed no significant findings.     Neuro:  The patient is intubated and sedated. On Precedex gtt. Wean off sedation.    Cardiac:  Patient is hemodynamically improving. Excellent cardiac index. Wean off Inotropic gtts.   Respiratory: Wean towards extubation. tV 400, RR 18, PEEP 5.   GI:  Continue enteral tube feedings while intubated. Benign abdominal exam.  Renal: Adequate urine output. Cr 1.1. K 4.1. Mag 2.8. Start IV diuresis.   Heme:  Hct 24.6  Platelet 60. Hold aspirin. Hold Plavix. HIT panel negative.  Id:  WBC is down to 14.46. Tmax 100.8. BC NGTD. Urinalysis negative. Sputum culture negative. Vancomycin and zosyn day 3. Midline sternal incision appears reddened, with induration at skin edges. Will continue to follow closely.    Endocrine:  Glucose controlled on sliding scale insulin.   Activities: Intubated and sedated. Moves all 4. Full ROM.    Musculoskeletal: Able to find right radial pulse via doppler. The right hand extending to the tips of the fingers of the right hand are mottled, dusky, appear cyanotic. Recent right radial arterial line placement, which has been relocated. US of arteries and veins of the right arm pending. Vascular surgery following, suggested possible thrombotic etiology, suggested heparin gtt, however this is contraindicated r/t s/p cabg x 3.     Lines tubes and drains:  Continue swan and cordis. Continue arterial line. Continue storm.    Plan: Remain ICU         11/08/2019  The patient is post-operative day 8, status-post coronary artery bypass grafting x 3. Patient had an episode of decompensation and unresponsiveness the evening of 11/05/2019. The patient was  found unresponsive, cool, and diaphoretic, with agonal breathing. Patient was intubated and transferred to the ICU. Bedside EKG and echocardiogram performed at the bedside showed no significant changes. CT head, chest, abdomen pelvis performed, showed no significant findings. Suspect cardiogenic shock.      Neuro:  Patients neurologic state is improving. Alert, follows commands, makes eye contact, disoriented x 4. No focal deficits.    Cardiac:  Patient is hemodynamically improving. Off all inotropic gtts. Start low dose metoprolol and amlodipine.   Respiratory: Good sats on low flow nasal cannula. Continue IS. Continue pulmonary toilet. Wean to room air.    GI:  Continue enteral tube feedings at this time r/t disorientation and aspiration risk. Discontinue laxatives. Start metamucil BID, patient has had multiple loose stools.   Renal: Good urine output. Cr 0.8. K 3.6. Mag 2.1. Replace K. Replace Mag. Continue IV diuresis.   Hema:  Hct 26.4  Platelet 45. Hold aspirin. Hold Plavix. HIT panel will be resent as the patient is a high suspicion for HIT despite previous negative HIT panel. Multiple DVTs from the right IJ down to the brachiocephalic vein. Confirmed via vascular US. Patient started on argatroban yesterday, goal is APTT 2x baseline. Most recent APTT 97.8. Baseline APTT 50.8.  Hematology following. Vascular surgery following.   Id:  WBC is down to 12. Tmax 100.4. BC NGTD. Urinalysis negative. Sputum culture negative. Vancomycin and zosyn day 4. Midline sternal incision appears improved, with minimal redness and induration at skin edges. Will continue to follow closely.    Endocrine:  Glucose controlled on sliding scale insulin.   Activities: Increase activities as tolerated.  Musculoskeletal: ROM 2/5 to the right wrist/hand/fingers, distal from the elbow there is 2+ non pitting edema, the extremity is tender and warm. There is mottling, a dusky appearance, and purple discoloration to the fingertips of the  right hand. See above hematology plan.      Lines tubes and drains:  Continue PICC line. Continue storm catheter. Continue NG tube.   Plan: Remain ICU     11/09/2019  The patient is postop day 9.  Status post coronary artery bypass grafting x3.  Postop course was complicated by an episode of hemodynamic instability and decompensation on postop day number 5.  Patient continues to make clinical progress since then.  She is off all drips.  She remains extubated.  Continue ICU care for now.    Neuro:  Patient is awake alert.  She is oriented x2.  She is still confused.  But she is making progress.  Her neuro exam is nonfocal.  She follows all commands.  Cardiac:  Patient continues to be hemodynamically stable.  She is somewhat hypertensive.  Will increase her antihypertensives.  Respiratory:  Patient is good sats on nasal cannula.  She remains extubated.  GI:  Patient was tolerating tube feeds until this morning.  She pulled heart feeding tube. Will get a swallowing evaluation.  Mode of nutrition will depend on results of swallowing evaluation.  Renal:  Patient has good urine output %period% creatinine is 0.9.  Heme:  Hematocrit is 26 platelet is 46.  Patient is on argatroban  for anticoagulation due to right upper extremity DVTs.  Hypercoagulability workup is in progress.  Appreciate Hematology and vascular surgery input.  Id:  Incision continues to improve.  white count is 17.  Continue broad-spectrum antibiotics.  Endocrine:  Glucose is controlled on its sliding scale insulin.  Activities:  Advance activities as tolerated.  Line tubes and drains.  Patient has a PICC line, Storm catheter.    11/10/2019  The patient is postop day 10.  Status post coronary bypass grafting x3.  Postop course was complicated by an episode of hemodynamic instability and decompensation on postop day 5.  Patient remains hemodynamically stable.    Neuro:  The patient is awake and alert.  She is oriented x1.  She continues to be confused.  Her  neuro exam is nonfocal.  Cardiac:  Patient continues to be hemodynamically stable.  His she is hypertensive.  Hypertensive medications will be increased.  Respiratory:  Patient has good sats on nasal cannula.  Continue pulmonary toilet.  GI:  Patient is tolerating a regular diet.  Renal:  Patient has good urine output. Creatinine is 0.7.  Heme: hematocrit is 24 and platelet is 67.  Patient continues on argatroban for right upper extremity DVTs.  Hypercoagulable workup is in progress.  Id:  Patient's white count is up to 20.  Patient is on broad prepped from antibiotics.  Endocrine:  The patient is on long-acting as well as sliding scale insulin.  Blood sugars have been well controlled.  Will decrease dose of long-acting.  Activities: patient has been out of bed to chair.  Advance as tolerated.  Musculoskeletal:  Patient's right upper extremity continues to be swollen  The index finger and thumb continues to be dusky with a bleb near the base of the thumb.  Continue argatroban for anticoagulation.  Continue elevation of right upper extremity.  Lines tubes and drains: patient has a PICC line.  Will discontinue Jones catheter.        11/11/2019  The patient is post-operative day 11, status-post coronary artery bypass grafting x 3. Post-operative course was complicated by an episode of hemodynamic instability and decompensation on postop day 5.  Patient remains hemodynamically stable.    Neuro:  Patients neurologic state is improving. Oriented x 2. Alert, follows commands, makes eye contact. No focal deficits.    Cardiac:  Patient is hemodynamically improving. Hypertensive. Continue metoprolol, amlodipine, and losartan. Add chlorthalidone.   Respiratory: Good sats on room air. Continue IS. Continue pulmonary toilet.  GI:  Continue cardiac/diabetic diet, modified by puree and thick liquids with swallow precautions. Advance as tolerated. Continue metamucil.  Renal: Good urine output. Cr 0.8. K 3.8. Replace K. Continue IV  diuresis. Add chlorthalidone. BMP at noon.   Hema:  Hct 23.8  Platelet 75. Hold aspirin. Hold Plavix. Repeat HIT panel pending. Multiple DVTs from the right IJ down to the brachiocephalic vein. Confirmed via vascular US. Patient on argatroban gtt, goal is APTT 2x baseline. Most recent APTT 83.3. Baseline APTT 50.8.  Hematology following. Vascular surgery following.   Id:  WBC is down to 19. Tmax 98.4. BC NGTD. Vancomycin and zosyn day 7. Discontinue broad spectrum antibiotics. Will continue to follow closely.    Endocrine:  Glucose controlled on sliding scale insulin and long acting insulin detemir.    Activities: Increase activities as tolerated.  Musculoskeletal: The right upper extremity exhibits swelling distal from the elbow. The RUE is tender to palpation and ROM. ROM is diminished to the right hand 2/5. The hand his reddened, swollen, with dusky, mottled, purple discoloration to the right thumb and tip of the right index finger. Blisters have formed on the proximal portion of the thumb and wrist. Severely diminished capillary refill to the tip of the right thumb and tip of the right index finger. Wound care order placed.      Lines tubes and drains:  Continue PICC line.  Plan: Remain ICU         11/12/2019  The patient is post-operative day 12, status-post coronary artery bypass grafting x 3. Post-operative course was complicated by an episode of hemodynamic instability and decompensation on postop day 5.  Patient remains hemodynamically stable.    Neuro:  Patients neurologic state is improving. Oriented to person, place, situation. Disoriented to time. Alert, follows commands, makes eye contact. No focal deficits.    Cardiac:  Patient is hemodynamically improving. Continue metoprolol, amlodipine, losartan, and chlorthalidone.   Respiratory: Good sats on room air. Continue IS.  GI:  Continue cardiac/diabetic diet, modified by puree and thick liquids with swallow precautions. Advance as tolerated. Continue  metamucil.  Renal: Good urine output. Cr 0.8. K 3.9. Replace K. Discontinue lasix. Continue chlorthalidone.  Hema:  Hct 23.1  Platelet 75. Hold aspirin. Hold Plavix. 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 Multiple DVTs from the right IJ down to the brachiocephalic vein. Confirmed via vascular US. Patient on argatroban gtt, goal is APTT 2x baseline. Most recent APTT 84.6. Baseline APTT 50.8.  Hematology following. Vascular surgery following.   Id:  WBC is down to 17.4. A-febrile. Will continue to follow closely.    Endocrine:  Patient had an episode of hypoglycemia last night. Will continue sliding scale insulin. Discontinue long acting insulin. Begin strict calorie count.   Activities: Increase activities as tolerated.  Musculoskeletal: The right upper extremity exhibits swelling distal from the elbow. The RUE is tender to palpation and ROM. ROM is diminished to the right hand 3/5. The hand is reddened, swollen, with dusky, mottled, purple discoloration to the right thumb tip and tip of the right index finger.  Severely diminished capillary refill to the tip of the right thumb and tip of the right index finger. Wound care following. Gauze dressing in place.       Lines tubes and drains:  Continue PICC line.  Plan: Remain ICU. Begin discharge planning for IP rehab.          11/13/2019  The patient is post-operative day 13, status-post coronary artery bypass grafting x 3. Post-operative course was complicated by an episode of hemodynamic instability and decompensation on postop day 5.  Patient remains hemodynamically stable.    Neuro:  Patient  is oriented to person, place, situation. Disoriented to time. Alert, follows commands, makes eye contact. No focal deficits.    Cardiac:  Patient is hemodynamically stable. Continue metoprolol, amlodipine, losartan, and chlorthalidone.   Respiratory: Good sats on room air. Continue IS. Use of accessory muscles noted during inspiration. Will increase diuresis.   GI:  Continue  cardiac/diabetic diet, modified by puree and thick liquids with swallow precautions. Advance as tolerated. Continue metamucil.  Renal: Good urine output. Cr 0.8. K 4.0. Mag 1.8. Replace Mag. Start lasix IV. Continue chlorthalidone.  Hema:  Hct 23.  Platelet 79. Hold aspirin. Hold Plavix. 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 Multiple DVTs from the right IJ down to the brachiocephalic vein. Confirmed via vascular US. Patient on argatroban gtt, goal is APTT 2x baseline. Most recent APTT 75.6. Baseline APTT 50.8.  Hematology following. Vascular surgery following.   Id:  WBC is down to 12.2. A-febrile. Will continue to follow closely.    Endocrine:  Glucose controlled. Continue sliding scale insulin. Start long acting insulin detemir 5 units daily.     Activities: Increase activities as tolerated.  Musculoskeletal: The right upper extremity exhibits swelling distal from the elbow. The RUE is tender to palpation and ROM. ROM is diminished to the right hand 2/5. The right hand is improving. The dusky, mottled, purple discoloration is localized to the right thumb tip and tip of the right index finger.  Severely diminished capillary refill to the tip of the right thumb and tip of the right index finger. Wound care following. Gauze dressing in place.    Lines tubes and drains:  Continue PICC line.  Plan: Remain telemetry. Continue discharge planning for IP rehab.        NSTEMI (non-ST elevated myocardial infarction)  The patient is a 69-year-old female with hypertension, diabetes, hyperlipidemia, obesity, anxiety and depression, iron deficiency anemia, vitamin B12 deficiency, who presented to the emergency room with an NSTEMI.  Workup included a cardiac catheterization that shows severe multivessel coronary artery disease with proximal LAD stenosis of 70% and severely depressed ejection fraction of 20%.  The patient is a candidate for urgent coronary artery bypass grafting.  The risks and benefits of surgery were  explained to the patient.  The patient verbalized understanding of the issues discussed.  She a segs the risks of surgery and has agreed to proceed with urgent coronary artery bypass grafting.  She understands that the risk of neurologic complication postoperatively is increased due to the presence of cerebral microvascular disease.        Yovani Dobbins NP  Cardiothoracic Surgery  [facility]

## 2019-11-13 NOTE — PT/OT/SLP PROGRESS
Speech Language Pathology      Mateusz Bates  MRN: [id number]    Patient not seen today secondary to unable to arouse. Nsg reported that she was awake all night.  . Will follow-up tomorrow.    Anitha Casas, DAVID-SLP

## 2019-11-13 NOTE — ASSESSMENT & PLAN NOTE
"-Continue Argatroban infusion  -RUE continues to be swollen   -Right hand remains ecchymotic   -Mgmt per CT surgery  Per Vascular Surgery:  "Hypercoagulable/HIT workup in progress, pt started on argatroban  Regarding RUE/central venous thrombus: Unfortunately, given location of clot and her recent major cardiac surgery any attempt at thrombolysis with tPA would be absolutely contraindicated  Open thrombectomy also not option as central clot could not be adequately cleared to allow for any outflow/drainage from the arm     Recommend cont anticoagulation, arm elevation, mild/moderate compression"    11/12/19  -Mgmt as per vascular/heme onc/CVT    11/13  -Mgmt per Vascular/Heme onc/CT surgery  "

## 2019-11-13 NOTE — SUBJECTIVE & OBJECTIVE
Interval History:  11/13/2019  The patient is post-operative day 13.  Status post coronary artery bypass grafting x3.    ROS  Medications:  Continuous Infusions:   argatroban in 0.9 % sod chlor 5 mcg/kg/min (11/13/19 1216)     Scheduled Meds:   albuterol-ipratropium  3 mL Nebulization Q6H WAKE    amLODIPine  10 mg Oral Daily    apixaban  5 mg Oral BID    chlorhexidine  15 mL Mouth/Throat BID    chlorthalidone  25 mg Oral Daily    ferrous sulfate  325 mg Oral BID    furosemide  40 mg Intravenous BID    insulin detemir U-100  5 Units Subcutaneous Daily    losartan  50 mg Oral Daily    metoprolol tartrate  50 mg Oral BID    nitroGLYCERIN 2% TD oint  0.5 inch Topical (Top) Q12H    pantoprazole  40 mg Oral Daily    potassium chloride  20 mEq Oral BID    psyllium husk  1 packet Oral BID    sodium chloride  2 g Oral Q6H     PRN Meds:albumin human 5%, albuterol sulfate, Dextrose 10% Bolus, Dextrose 10% Bolus, glucagon (human recombinant), insulin aspart U-100, iohexol, lactated ringers, magnesium sulfate IVPB, magnesium sulfate IVPB, metoclopramide HCl, ondansetron, potassium chloride in water **AND** potassium chloride in water **AND** potassium chloride in water, traMADol     Objective:     Vital Signs (Most Recent):  Temp: 98.1 °F (36.7 °C) (11/13/19 1211)  Pulse: 89 (11/13/19 1232)  Resp: (!) 24 (11/13/19 1211)  BP: (!) 97/58 (11/13/19 1211)  SpO2: (!) 94 % (11/13/19 1211) Vital Signs (24h Range):  Temp:  [97.4 °F (36.3 °C)-98.2 °F (36.8 °C)] 98.1 °F (36.7 °C)  Pulse:  [] 89  Resp:  [18-26] 24  SpO2:  [92 %-100 %] 94 %  BP: ()/(54-73) 97/58     Weight: 76.2 kg (167 lb 15.9 oz)  Body mass index is 31.74 kg/m².    SpO2: (!) 94 %  O2 Device (Oxygen Therapy): nasal cannula    Intake/Output - Last 3 Shifts       11/11 0700 - 11/12 0659 11/12 0700 - 11/13 0659 11/13 0700 - 11/14 0659    P.O. 330 480 460    I.V. (mL/kg)  763.9 (10.4)     IV Piggyback 500      Total Intake(mL/kg) 830 (11.6) 1243.9  (16.9) 460 (6)    Urine (mL/kg/hr)       Stool       Total Output       Net +830 +1243.9 +460           Urine Occurrence 6 x 8 x 2 x    Stool Occurrence 1 x 4 x           Lines/Drains/Airways     Peripherally Inserted Central Catheter Line                 PICC Double Lumen 11/07/19 1645 left brachial;left basilic 5 days                Physical Exam   Constitutional: She appears well-developed and well-nourished. No distress.   HENT:   Head: Normocephalic and atraumatic.   Eyes: Pupils are equal, round, and reactive to light. EOM are normal.   Neck: Normal range of motion. Neck supple. No JVD present.   Cardiovascular: Normal rate, regular rhythm, normal heart sounds and intact distal pulses. Exam reveals no gallop and no friction rub.   No murmur heard.  Pulmonary/Chest: Effort normal and breath sounds normal. No stridor. No respiratory distress. She has no wheezes. She has no rales. She exhibits no tenderness.   Accessory muscles utilized during inspiration. Good sats on room air. Denies SOB.    Abdominal: Soft. Bowel sounds are normal. She exhibits no distension and no mass. There is no tenderness. There is no rebound and no guarding. No hernia.   Musculoskeletal: Normal range of motion. She exhibits no edema, tenderness or deformity.   RUE continues to improve. Dusky, mottled, discoloration is localized to the tip of the right thumb and the tip of the right index finger. ROM limited to the right hand 2/5.    Neurological: She is alert. She displays normal reflexes. No cranial nerve deficit or sensory deficit. She exhibits normal muscle tone. Coordination normal.   Oriented to person, place. Disoriented to time and situation.    Skin: Skin is warm and dry. Capillary refill takes less than 2 seconds. No rash noted. She is not diaphoretic. There is erythema. No pallor.   Psychiatric: She has a normal mood and affect. Her behavior is normal. Judgment normal.   Slow to respond to  Questions.    Nursing note and vitals  reviewed.      Significant Labs:  BMP:   Recent Labs   Lab 11/13/19  0401   *      K 4.0      CO2 22*   BUN 16   CREATININE 0.8   CALCIUM 8.6*   MG 1.8     CBC:   Recent Labs   Lab 11/13/19  0653   WBC 12.22   RBC 2.44*   HGB 7.0*   HCT 23.0*   PLT 79*   MCV 94   MCH 28.7   MCHC 30.4*       Significant Diagnostics:  I have reviewed all pertinent imaging results/findings within the past 24 hours.

## 2019-11-13 NOTE — NURSING
Reassessed patient blood sugar at 196. Notified Yovani Dobbins NP. No new orders at this time.Will continue to monitor patient.

## 2019-11-13 NOTE — PROGRESS NOTES
[facility] -   Hematology/Oncology  Progress Note    Patient Name: Mateusz Bates  Admission Date: 10/28/2019  Hospital Length of Stay: 16 days  Code Status: Full Code     Subjective:     HPI:  Mrs. Bates is a 69-year-old female with hypertension, diabetes, hyperlipidemia, obesity, anxiety and depression, iron deficiency anemia, vitamin B12 deficiency, who presented to the emergency room with an NSTEMI.  Workup included a cardiac catheterization that shows severe multivessel coronary artery disease with proximal LAD stenosis of 70% and severely depressed ejection fraction of 20%.  The patient is a candidate for urgent coronary artery bypass grafting. S/p CABG today.  Hematology is consulted for thrombocytopenia.    Interval History: Patient lethargic today at time of visit. Arouses to voice/touch but drifts back to sleep quickly. Discussed with hospital medicine team who reports plans to check ABGs. Respiratory at bedside for breathing treatment previously ordered.    Oncology Treatment Plan:   [No treatment plan]    Medications:  Continuous Infusions:   argatroban in 0.9 % sod chlor 5 mcg/kg/min (11/13/19 1216)     Scheduled Meds:   amLODIPine  10 mg Oral Daily    apixaban  5 mg Oral BID    chlorhexidine  15 mL Mouth/Throat BID    chlorthalidone  25 mg Oral Daily    ferrous sulfate  325 mg Oral BID    furosemide  40 mg Intravenous BID    insulin detemir U-100  5 Units Subcutaneous Daily    losartan  50 mg Oral Daily    metoprolol tartrate  50 mg Oral BID    nitroGLYCERIN 2% TD oint  0.5 inch Topical (Top) Q12H    pantoprazole  40 mg Oral Daily    potassium chloride  20 mEq Oral BID    psyllium husk  1 packet Oral BID    sodium chloride  2 g Oral Q6H     PRN Meds:albumin human 5%, albuterol sulfate, Dextrose 10% Bolus, Dextrose 10% Bolus, glucagon (human recombinant), insulin aspart U-100, iohexol, lactated ringers, magnesium sulfate IVPB, magnesium sulfate IVPB, metoclopramide HCl,  ondansetron, potassium chloride in water **AND** potassium chloride in water **AND** potassium chloride in water, traMADol     Review of Systems   Unable to perform ROS: Other (patient lethargic. falls alseep during conversation)     Objective:     Vital Signs (Most Recent):  Temp: 98.1 °F (36.7 °C) (11/13/19 1211)  Pulse: 88 (11/13/19 1211)  Resp: (!) 24 (11/13/19 1211)  BP: (!) 97/58 (11/13/19 1211)  SpO2: (!) 94 % (11/13/19 1211) Vital Signs (24h Range):  Temp:  [97.4 °F (36.3 °C)-98.2 °F (36.8 °C)] 98.1 °F (36.7 °C)  Pulse:  [] 88  Resp:  [18-27] 24  SpO2:  [92 %-100 %] 94 %  BP: ()/(54-73) 97/58     Weight: 76.2 kg (167 lb 15.9 oz)  Body mass index is 31.74 kg/m².  Body surface area is 1.81 meters squared.      Intake/Output Summary (Last 24 hours) at 11/13/2019 1230  Last data filed at 11/13/2019 1000  Gross per 24 hour   Intake 1022.26 ml   Output --   Net 1022.26 ml       Physical Exam   Constitutional: She is oriented to person, place, and time. She appears well-developed. She is cooperative. She has a sickly appearance. She appears ill. Nasal cannula in place.   HENT:   Head: Normocephalic.   Right Ear: External ear normal.   Left Ear: External ear normal.   Nose: Nose normal.   Mouth/Throat: Oropharynx is clear and moist.   Eyes: Conjunctivae, EOM and lids are normal. Right eye exhibits no discharge. Left eye exhibits no discharge. No scleral icterus.   Neck: Normal range of motion. No thyroid mass present.   Cardiovascular: Normal rate, regular rhythm and normal heart sounds.   No murmur heard.  Pulmonary/Chest: Accessory muscle usage present. Tachypnea noted. She is in respiratory distress. She has decreased breath sounds. She has no wheezes. She has no rhonchi. She has no rales.   Abdominal: Soft. She exhibits no distension. Bowel sounds are decreased. There is no tenderness.   Genitourinary:   Genitourinary Comments: deferred   Musculoskeletal: Normal range of motion. She exhibits edema  and tenderness.   Lymphadenopathy:        Head (right side): No submandibular, no preauricular and no posterior auricular adenopathy present.        Head (left side): No submandibular, no preauricular and no posterior auricular adenopathy present.        Right cervical: No superficial cervical adenopathy present.       Left cervical: No superficial cervical adenopathy present.   Neurological: She is alert and oriented to person, place, and time.   Skin: Skin is warm and dry.        Psychiatric: Her speech is delayed. Cognition and memory are impaired.   Vitals reviewed.      Significant Labs:   BMP:   Recent Labs   Lab 11/12/19 0409 11/12/19  1743 11/13/19  0401   * 333* 221*    134* 137   K 3.9 4.5 4.0    103 104   CO2 26 20* 22*   BUN 14 18 16   CREATININE 0.8 0.8 0.8   CALCIUM 8.8 8.4* 8.6*   MG 2.1  --  1.8   , CBC:   Recent Labs   Lab 11/12/19 1742 11/13/19 0401 11/13/19  0653   WBC 15.16* 12.88* 12.22   HGB 7.3* 6.8* 7.0*   HCT 23.2* 21.9* 23.0*   PLT 89* 93* 79*   , LFTs: No results for input(s): ALT, AST, ALKPHOS, BILITOT, PROT, ALBUMIN in the last 48 hours. and All pertinent labs from the last 24 hours have been reviewed.    Diagnostic Results:  I have reviewed all pertinent imaging results/findings within the past 24 hours.    Assessment/Plan:     * Acute deep vein thrombosis (DVT) of right upper extremity  November 13, 2019  --continue Argatroban   --AM CBC  --Start Xarelto tomorrow if Platelet count <100K  --Start Coumadin tomorrow if Platelet count >100K  --Bleeding precautions     Edema of hand  November 12, 2019     Upper extremity vascular insufficiency:  --Improved apperance  --Vascular surgery following. No surgical intervention recommended. Recommend continue anticoagulation, arm elevation, mild/moderate compression    Thrombocytopenia  November 11, 2019  --Platelet count 75, improving. Transfuse if < 10   --No S&S active bleed. Hemoglobin remains stable. Hg 7.5. No urgent  need for blood transfusion. Monitor  --suspect HIT versus TTP  -Awaiting results of Sahra HIT test and [id number]  --Continue Argatroban gtt  --avoid heparin containing products.  --Check Vitamin B 12 level to r/o B 12 deficiency as etiology of decreased mental status  --Check LDH/Haptologlobin  to rule out intravascular hemolysis  --Supportive care    November 12, 2019  --ROSANNA positive  --Avoid heparin containing products  --Continue Argatroban gtt for DVT. Add Coumadin once platelet count > 100K. Would need to achieve target INR 4 prior to discontinuing Argatroban gtt. Once Argatroban gtt discontinued goal INR will be 2.5-3.5 on Coumadin. Once INR therapeutic she may continue to follow up in the outpatient Heme-Onc clinic and Coumadin clinic for continued monitoring of her Coumadin levels. Will need anticoagulation for at least 3 months  --B 12 deficiency ruled out. Mentation improving  --Haptoglobin normal r/o intravascular hemolysis   --CBC stable. No urgent indication for any blood product transfusion at present time  --Await recovery of platelet function  --Supportive care    November 13, 2019  --Argatroban supply available for 24 more hours. Will continue Argatroban x 24 hours. Check CBC in am. If platelet count < 100K will start Xarelto. If platelet count >100K will start Coumadin. Await am CBC results.  Will plan for anticoagulation for at least 3 months.           Thank you for your consult. I will follow-up with patient. Please contact us if you have any additional questions.     Carrol Brand NP  Hematology/Oncology  [facility]

## 2019-11-13 NOTE — ASSESSMENT & PLAN NOTE
November 13, 2019  --continue Argatroban   --AM CBC  --Start Xarelto tomorrow if Platelet count <100K  --Start Coumadin tomorrow if Platelet count >100K  --Bleeding precautions

## 2019-11-13 NOTE — PROGRESS NOTES
[facility]  Cardiology  Progress Note    Patient Name: Mateusz Bates  MRN: [id number]  Admission Date: 10/28/2019  Hospital Length of Stay: 16 days  Code Status: Full Code   Attending Physician: Keny Zaidi MD   Primary Care Physician: Serenity Kilpatrick MD  Expected Discharge Date: 11/13/2019  Principal Problem:Acute deep vein thrombosis (DVT) of right upper extremity    Subjective:   HPI    Ms. Bates is a 69 year old female patient whose current medical conditions include HTN, hyperlipidemia, obesity, DM type II, cerebral microvascular disease, FAHAD, and B12 deficiency who was sent to [facility] ED today from her PCP's office due to chest pain and abnormal EKG. Patient complained of substernal chest tightness/heaviness that radiated to her back on Saturday while at rest. Associated symptoms included orthopnea and SOB. Patient denied any associated nausea, vomiting, diaphoresis, palpitations, near syncope, or syncope. Initial workup in ED revealed troponin of 3.665 and BNP of 1305 and patient subsequently admitted for further evaluation and treatment of NSTEMI. Cardiology consulted to assist with management. Patient seen and examined today while in ED. Feels well at time of exam, denies chest pain. She does report similar symptoms over the past few months but states they never lasted as long and were less intense. She reports compliance with her medications. States her father had history of CABG. Chart reviewed. Repeat troponin pending. EKG reviewed and shows SR with new LBBB. 2D echo pending.    Hospital Course:   Ms. Bates is a 69 year old female patient whose current medical conditions include HTN, hyperlipidemia, obesity, DM type II, cerebral microvascular disease, FAHAD, and B12 deficiency who was sent to [facility] ED today from her PCP's office due to chest pain and abnormal EKG. Patient complained of substernal chest tightness/heaviness that radiated to her back on Saturday while at rest.  Associated symptoms included orthopnea and SOB. Patient denied any associated nausea, vomiting, diaphoresis, palpitations, near syncope, or syncope. Initial workup in ED revealed troponin of 3.665 and BNP of 1305 and patient subsequently admitted for further evaluation and treatment of NSTEMI. Cardiology consulted to assist with management. Patient seen and examined today while in ED. Feels well at time of exam, denies chest pain. She does report similar symptoms over the past few months but states they never lasted as long and were less intense. She reports compliance with her medications. States her father had history of CABG. Chart reviewed. Repeat troponin pending. EKG reviewed and shows SR with new LBBB. 2D echo pending.    10/29/19-Patient seen and examined today, s/p LHC yesterday that showed multivessel CAD. CVT consulted, CABG planned for Thursday. Feels ok. No chest pain or SOB. Labs stable. Being diuresed. 2D echo showed EF of 30-35%, DD.    10/30/19-Patient seen and examined today. Feels well. No complaints. Denies chest pain or SOB. Labs stable. IABP placement scheduled for today.    10/31/19- Patient is s/p CABG x3 with LIMA to LAD reverse saphenous vein graft to the RCA and reverse saphenous vein graft to the ramus. Has been transferred to ICU. Is intubated and sedated. CT x3. IABP at 1:1 still in place. Transfused 3 units of PRBC in the OR and has received additional 3 units in ICU, has received 500cc albumin, epi and milrinone gtt.  H/H 6.9/21.0. Renal function stable. Paced rhythm on monitor     11/1/2019--patient seen and examined in ICU. Extubated o/n, remains on IABP 1:1 today, plans to possibly wean off later today per Dr. Zaidi. Remains on Epi and Milrinone gtt today. Paced on monitor. Labs stable today.     11/2/19-Patient seen and examined today, lying in bed. Feels ok. Complains of post-op pain and fatigue. Labs reviewed. Platelets 47,000. Heme/onc on board.     11/3/19-Patient seen and  examined today, sitting up in chair. Feeling better. Still weak. Labs reviewed. Platelets 89,000.    11/4/19- Patient is POD 4 s/p CABG x3. OOB in chair. Feels better this morning. Pain controlled. Platelets improving since holding Plavix.Vitals stable     11/5/19- Patient is POD 5 s/p CABGx3. Has been transferred to [facility]. Is clammy and generally weak this morning. Glucose and vitals stable. Noted to have bump in WBC. Primary team has been notified by nurse. Platelets 73, but H/H stable     11/6 in ICU, intubated, on epi gtt at 0.15 mcg CI 2.0, PAP 20. Sinus rhythm, H&H and Cr stable. Urine output ok. Improved lactate 1.6. const stent cardiogenic shock    11/7 INTUBATED, WEAN OFF EPI AND CONTINUE AMILIRONE, ON SINU RHYTHM AND CI 3.3, Cr STABLE AND DECENT URINE OUTPUT.     11/8 extubated, off pressors, up to the chair.diffuse DVT on upper extremities, no chest pain,pt is weak, Cr normal    11/9/weak, mild reps. Distress, good urine output. BP high.  RUE venous thrombus/DVT in deep and superficial arm veins. H&H stable Cr normal.      11/10 weakness H&H 7.6/24, BP and HR wnl., good urine output. No chest pain. Right arm swelling and ecchymosis. Pulses ok    11/11/19--Patient seen and examined in ICU, sitting on side of bed. Feels ok today. Continued right arm swelling and ecchymosis.     11/12/19-Patient seen and examined today, lying in bed. Feels ok. Denies chest pain. Admits to some SOB. CXR, showed no effusion or infiltrate. Labs stable, platelets 75,000 H/H 7.3/23.1. Hematology noted reviewed, ROSANNA positive.     11/13/19--Patient seen and examined today, lying in bed. Sleepy on exam today. No chest pain today. Labs reviewed, H/H 7/23, plts 79, INR 4.3, Mag 1.8.     Interval History: no acute issues noted o/n. Would benefit from PRBC transfusion per CT surgery.     Review of Systems   Constitution: Positive for malaise/fatigue.   HENT: Negative for hearing loss and hoarse voice.    Eyes: Negative for blurred  vision and visual disturbance.   Cardiovascular: Negative for chest pain, claudication, dyspnea on exertion, irregular heartbeat, leg swelling, near-syncope, orthopnea, palpitations, paroxysmal nocturnal dyspnea and syncope.   Respiratory: Negative for cough, hemoptysis, shortness of breath, sleep disturbances due to breathing, snoring and wheezing.    Endocrine: Negative for cold intolerance and heat intolerance.   Hematologic/Lymphatic: Does not bruise/bleed easily.   Skin: Positive for color change, nail changes and poor wound healing. Negative for dry skin.   Musculoskeletal: Positive for arthritis and back pain. Negative for joint pain and myalgias.        RUE SWELLING   Gastrointestinal: Negative for bloating, abdominal pain, constipation, nausea and vomiting.   Genitourinary: Negative for dysuria, flank pain, hematuria and hesitancy.   Neurological: Negative for headaches, light-headedness, loss of balance, numbness, paresthesias and weakness.   Psychiatric/Behavioral: Negative for altered mental status.   Allergic/Immunologic: Negative for environmental allergies.     Objective:     Vital Signs (Most Recent):  Temp: 97.6 °F (36.4 °C) (11/13/19 1427)  Pulse: 93 (11/13/19 1427)  Resp: 20 (11/13/19 1427)  BP: 130/69 (11/13/19 1427)  SpO2: 97 % (11/13/19 1427) Vital Signs (24h Range):  Temp:  [97.4 °F (36.3 °C)-98.2 °F (36.8 °C)] 97.6 °F (36.4 °C)  Pulse:  [] 93  Resp:  [18-26] 20  SpO2:  [92 %-99 %] 97 %  BP: ()/(54-70) 130/69     Weight: 76.2 kg (167 lb 15.9 oz)  Body mass index is 31.74 kg/m².     SpO2: 97 %  O2 Device (Oxygen Therapy): nasal cannula      Intake/Output Summary (Last 24 hours) at 11/13/2019 1458  Last data filed at 11/13/2019 1000  Gross per 24 hour   Intake 929.54 ml   Output --   Net 929.54 ml       Lines/Drains/Airways     Peripherally Inserted Central Catheter Line                 PICC Double Lumen 11/07/19 1645 left brachial;left basilic 5 days                Physical Exam    Constitutional: She is oriented to person, place, and time. She appears well-developed and well-nourished. No distress.   HENT:   Head: Normocephalic and atraumatic.   Eyes: Pupils are equal, round, and reactive to light.   Neck: Normal range of motion and full passive range of motion without pain. Neck supple. No JVD present.   Cardiovascular: Normal rate, regular rhythm, S1 normal, S2 normal and intact distal pulses. PMI is not displaced. Exam reveals no distant heart sounds.   No murmur heard.  Pulses:       Radial pulses are 2+ on the right side, and 2+ on the left side.        Dorsalis pedis pulses are 2+ on the right side, and 2+ on the left side.   CABG INCISION HEALING WELL   Pulmonary/Chest: Effort normal and breath sounds normal. No accessory muscle usage. No respiratory distress. She has no decreased breath sounds. She has no wheezes. She has no rales.   Abdominal: Soft. Bowel sounds are normal. She exhibits no distension. There is no tenderness.   Musculoskeletal: Normal range of motion. She exhibits no edema.        Right ankle: She exhibits no swelling.        Left ankle: She exhibits no swelling.   Neurological: She is alert and oriented to person, place, and time.   Skin: Skin is warm and dry. She is not diaphoretic. No cyanosis. Nails show no clubbing.   RUE swelling, right hand ecchymotic with dusky fingernails   Psychiatric: She has a normal mood and affect. Her speech is normal and behavior is normal. Judgment and thought content normal. Cognition and memory are normal.   Nursing note and vitals reviewed.      Significant Labs:   BMP:   Recent Labs   Lab 11/12/19  0409 11/12/19 1743 11/13/19  0401   * 333* 221*    134* 137   K 3.9 4.5 4.0    103 104   CO2 26 20* 22*   BUN 14 18 16   CREATININE 0.8 0.8 0.8   CALCIUM 8.8 8.4* 8.6*   MG 2.1  --  1.8   , CBC   Recent Labs   Lab 11/12/19  1742 11/13/19  0401 11/13/19  0653   WBC 15.16* 12.88* 12.22   HGB 7.3* 6.8* 7.0*   HCT  23.2* 21.9* 23.0*   PLT 89* 93* 79*    and All pertinent lab results from the last 24 hours have been reviewed.    Significant Imaging: Echocardiogram:   2D echo with color flow doppler:   Results for orders placed or performed during the hospital encounter of 10/28/19   2D echo with color flow doppler   Result Value Ref Range    QEF 30 (A) 55 - 65    Diastolic Dysfunction Yes (A)     Est. PA Systolic Pressure 33.03     Narrative    Date of Procedure: 11/06/2019        TEST DESCRIPTION   Technical Quality: This is a technically challenging study.     General: A catheter is present in the right-sided cardiac chambers.     Aorta: The aortic root is normal in size, measuring 3.7 cm at sinotubular junction and 3.8 cm at Sinuses of Valsalva. The proximal ascending aorta is normal in size, measuring 3.7 cm across.     Left Atrium: The left atrial volume index is normal, measuring 19.22 cc/m2.     Left Ventricle: The left ventricle is normal in size, with an end-diastolic diameter of 3.0 cm, and an end-systolic diameter of 2.5 cm. LV wall thickness is normal, with the septum measuring 2.1 cm and the posterior wall measuring 1.4 cm across. Relative   wall thickness was increased at 0.93, and the LV mass index was increased at 140.2 g/m2 consistent with concentric left ventricular hypertrophy. The following segments were mildly hypokinetic: apical lateral wall.  The following segments were severely hypokinetic: mid anteroseptum, basal anteroseptum, apical septum, mid inferoseptum, basal inferoseptum, basal inferior wall.  Left ventricular systolic function appears moderately depressed. Visually estimated ejection fraction is 30-35%. The LV Doppler derived stroke volume equals 54.0 ccs.     Diastolic indices: E wave velocity 0.9 m/s, E/A ratio 0.8,  msec., E/e' ratio(avg) 14. There is diastolic dysfunction secondary to relaxation abnormality.     Right Atrium: The right atrium is normal in size, measuring 3.5 cm in  length and 2.1 cm in width in the apical view.     Right Ventricle: The right ventricle is normal in size. Global right ventricular systolic function appears normal. Tricuspid annular plane systolic excursion (TAPSE) is 1.3 cm. The estimated PA systolic pressure is 33 mmHg.     Aortic Valve:  The aortic valve is normal in structure. The aortic valve is tri-leaflet in structure. The mean gradient obtained across the aortic valve is 9 mmHg.     Mitral Valve:  The mitral valve is normal in structure. The pressure half time is 53 msec. The calculated mitral valve area is 4.15 cm2.     Tricuspid Valve:  The tricuspid valve is normal in structure.     Pulmonary Valve:  The pulmonic valve is not well seen.     IVC: IVC is normal in size and collapses > 50% with a sniff, suggesting normal right atrial pressure of 3 mmHg.     Intracavitary: There is no evidence of pericardial effusion, intracavity mass, thrombi, or vegetation.         CONCLUSIONS     1 - Concentric hypertrophy.     2 - Wall motion abnormalities.     3 - Moderately depressed left ventricular systolic function (EF 30-35%).     4 - Impaired LV relaxation, normal LAP (grade 1 diastolic dysfunction).     5 - Normal right ventricular systolic function .     6 - The estimated PA systolic pressure is 33 mmHg.             This document has been electronically    SIGNED BY: Charles Matias MD On: 11/06/2019 18:28    and X-Ray: CXR: X-Ray Chest 1 View (CXR):   Results for orders placed or performed during the hospital encounter of 10/28/19   X-Ray Chest 1 View    Narrative    EXAMINATION:  XR CHEST 1 VIEW    CLINICAL HISTORY:  follow up;    TECHNIQUE:  Single frontal view of the chest was performed.    COMPARISON:  11/07/2019    FINDINGS:  Tubes and lines are stable.  [place] catheter is been removed.  Mild atelectasis within the left midlung zone.  No consolidation.  Trace pleural effusions not entirely excluded.  No pneumothorax.  Bones demonstrate mild degenerative  "changes.  Sternotomy wires are intact.  In comparison to the prior study, there is no adverse interval changes      Impression    In comparison to the prior study, there is no adverse interval changes      Electronically signed by: Jh Anne MD  Date:    11/08/2019  Time:    08:14     Assessment and Plan:     Acute deep vein thrombosis (DVT) of non-extremity vein Right IJ  -Continue Argatroban infusion  -RUE continues to be swollen   -Right hand remains ecchymotic   -Mgmt per CT surgery  Per Vascular Surgery:  "Hypercoagulable/HIT workup in progress, pt started on argatroban  Regarding RUE/central venous thrombus: Unfortunately, given location of clot and her recent major cardiac surgery any attempt at thrombolysis with tPA would be absolutely contraindicated  Open thrombectomy also not option as central clot could not be adequately cleared to allow for any outflow/drainage from the arm     Recommend cont anticoagulation, arm elevation, mild/moderate compression"    11/12/19  -Mgmt as per vascular/heme onc/CVT    11/13  -Mgmt per Vascular/Heme onc/CT surgery    Cardiogenic shock  Post CABG  EF 30%  S/p swan catheter  CI 3.3  Repeat echo today showed EF 30%, septal hypokinesis , compared to prior echo before the cabg, no significant change  ekg reviewed    11/10/2019  Extubated  Off pressors  Continue Amlodipine and BB/Losartan  Hold Lipitor due to elevated LFT  Continue anticoagulation due to acute DVT  Continue ICU supportive care and ABx    11/11  -Continue BB, ARB, Norvasc  -BP much improved   -Continue supportive care per CT surgery      11/12/19  -Stable  -Continue BB, ARB, Norvasc        Thrombocytopenia  -Mgmt as per heme/onc  -Plavix was on hold, but resumed by primary team   -ASA has been resumed     11/5/19  -consider holding Plavix  -Hematology has been consulted     S/P CABG x 3  -Patient is s/p CABG x3 with LIMA to LAD reverse saphenous vein graft to the RCA and reverse saphenous vein graft to the " gilmar.  -Continue post CABG supportive therapy  -Continue ASA, Plavix, Statin, BB  -Will continue to follow along     11/1/19  -POD #1 s/p CABG x3  -Continue ASA, Statin, BB  -Remains on Epi and Milrinone gtt today  -Mgmt per CT surgery  -Will follow along    11/2/19  -Recovering s/p CABG x 3  -Continue statin, BB  -Platelets 47,000 this AM; heme/onc on board, ASA held  -Await further rec's    11/3/19  -Stable, progressing post CABG x 3  -Continue statin, BB  -ASA re-started  -Platelets 89,000, heme/onc on board    11/4/19  -Patient slowly improving post CABG x3  -Continue ASA, Statin and BB  -plavix on hold due to thrombocytopenia that is improving   -IS use encouraged and early ambulation with PT  -Will continue to follow along     11/5/19  -Will continue ASA, Statin and BB  -Consider holding Plavix again for drop in plts after resuming by primary team   -Recommend CXR to rule out post op PNA given bump in WBC and cough  -will need to monitor for temp  -ambulate as tolerated and encourage IS use     11/06  Hold ASA, Plavix due to anemia  Hold BB due to cardiogenic shock    11/11/19  -Continue Argatroban infusion  -Continue BB, ARB, IV lasix, Chlorthalidone, Norvasc, NTG paste  -Hold ASA, Plavix due to anemia  -Further mgmt per CT surgery    11/12/19  -Stable  -Continue BB, ARB, Norvasc, Chlorthalidone, IV Lasix  -Recommend re-starting ASA  -Plavix held due to anemia/thrombocytopenia    11/13/19  -Recommend ASA daily  -Plavix held due to anemia/thrombocytopenia  -Recommend PRBC transfusion, per CT surgery  -Continue BB, ARB, Norvasc, IV lasix, Chlorthalidone    CAD, multiple vessel  -See plan under CABG    Acute combined systolic and diastolic CHF, NYHA class 2  -Presents with ICM/acute combined CHF  -EF 30-35% by 2D echo        Chest pain  -See plan under NSTEMI    Abnormal EKG  -LBBB noted on EKG  -See plan under NSTEMI    NSTEMI (non-ST elevated myocardial infarction)  -Patient presented with intermittent chest  tightness/heaviness that radiated to her back since Saturday evening  -Associated with JONES/orthopnea  -Initial troponin 3.665  -EKG showed SR with new LBBB  -Presentation consistent with NSTEMI  -Continue ASA, statin  -Start Toprol XL  -Start heparin gtt  -Check 2D echo  -Continue to trend troponin  -Keep NPO. [facility] today for further evaluation and PCI if indicated. All risks, benefits, and treatment alternatives explained to patient in detail. All questions answered. She has agreed to proceed. Further rec's to follow.    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 All risks, benefits, and treatment alternatives explained to patient in detail. All questions answered. She has agreed to proceed.    10/31/19  -Patient is s/p CABG x3.   See plan for CABG     Hyperlipidemia associated with type 2 diabetes mellitus  -Continue statin    Obesity (BMI 30.0-34.9)  -Weight loss    Diabetes mellitus type II, uncontrolled  -Mgmt as per hospital medicine        VTE Risk Mitigation (From admission, onward)         Ordered     argatroban 125 mg in sodium chloride 0.9% 125 mL infusion (conc: 1 mg/mL)  Continuous      11/13/19 1501     argatroban 125 mg in sodium chloride 0.9% 125 mL infusion (conc: 1 mg/mL)  Continuous      11/11/19 0128     Reason for no Mechanical VTE Prophylaxis  Once      10/28/19 1414     heparin 25,000 units in dextrose 5% 250 mL (100 units/mL) infusion LOW INTENSITY nomogram - [facility]  Continuous      10/28/19 1257                KOLE Faulkner  Cardiology  [facility]

## 2019-11-13 NOTE — SUBJECTIVE & OBJECTIVE
Interval History: Patient lethargic today at time of visit. Arouses to voice/touch but drifts back to sleep quickly. Discussed with hospital medicine team who reports plans to check ABGs. Respiratory at bedside for breathing treatment previously ordered.    Oncology Treatment Plan:   [No treatment plan]    Medications:  Continuous Infusions:   argatroban in 0.9 % sod chlor 5 mcg/kg/min (11/13/19 1216)     Scheduled Meds:   amLODIPine  10 mg Oral Daily    apixaban  5 mg Oral BID    chlorhexidine  15 mL Mouth/Throat BID    chlorthalidone  25 mg Oral Daily    ferrous sulfate  325 mg Oral BID    furosemide  40 mg Intravenous BID    insulin detemir U-100  5 Units Subcutaneous Daily    losartan  50 mg Oral Daily    metoprolol tartrate  50 mg Oral BID    nitroGLYCERIN 2% TD oint  0.5 inch Topical (Top) Q12H    pantoprazole  40 mg Oral Daily    potassium chloride  20 mEq Oral BID    psyllium husk  1 packet Oral BID    sodium chloride  2 g Oral Q6H     PRN Meds:albumin human 5%, albuterol sulfate, Dextrose 10% Bolus, Dextrose 10% Bolus, glucagon (human recombinant), insulin aspart U-100, iohexol, lactated ringers, magnesium sulfate IVPB, magnesium sulfate IVPB, metoclopramide HCl, ondansetron, potassium chloride in water **AND** potassium chloride in water **AND** potassium chloride in water, traMADol     Review of Systems   Unable to perform ROS: Other (patient lethargic. falls alseep during conversation)     Objective:     Vital Signs (Most Recent):  Temp: 98.1 °F (36.7 °C) (11/13/19 1211)  Pulse: 88 (11/13/19 1211)  Resp: (!) 24 (11/13/19 1211)  BP: (!) 97/58 (11/13/19 1211)  SpO2: (!) 94 % (11/13/19 1211) Vital Signs (24h Range):  Temp:  [97.4 °F (36.3 °C)-98.2 °F (36.8 °C)] 98.1 °F (36.7 °C)  Pulse:  [] 88  Resp:  [18-27] 24  SpO2:  [92 %-100 %] 94 %  BP: ()/(54-73) 97/58     Weight: 76.2 kg (167 lb 15.9 oz)  Body mass index is 31.74 kg/m².  Body surface area is 1.81 meters  squared.      Intake/Output Summary (Last 24 hours) at 11/13/2019 1230  Last data filed at 11/13/2019 1000  Gross per 24 hour   Intake 1022.26 ml   Output --   Net 1022.26 ml       Physical Exam   Constitutional: She is oriented to person, place, and time. She appears well-developed. She is cooperative. She has a sickly appearance. She appears ill. Nasal cannula in place.   HENT:   Head: Normocephalic.   Right Ear: External ear normal.   Left Ear: External ear normal.   Nose: Nose normal.   Mouth/Throat: Oropharynx is clear and moist.   Eyes: Conjunctivae, EOM and lids are normal. Right eye exhibits no discharge. Left eye exhibits no discharge. No scleral icterus.   Neck: Normal range of motion. No thyroid mass present.   Cardiovascular: Normal rate, regular rhythm and normal heart sounds.   No murmur heard.  Pulmonary/Chest: Accessory muscle usage present. Tachypnea noted. She is in respiratory distress. She has decreased breath sounds. She has no wheezes. She has no rhonchi. She has no rales.   Abdominal: Soft. She exhibits no distension. Bowel sounds are decreased. There is no tenderness.   Genitourinary:   Genitourinary Comments: deferred   Musculoskeletal: Normal range of motion. She exhibits edema and tenderness.   Lymphadenopathy:        Head (right side): No submandibular, no preauricular and no posterior auricular adenopathy present.        Head (left side): No submandibular, no preauricular and no posterior auricular adenopathy present.        Right cervical: No superficial cervical adenopathy present.       Left cervical: No superficial cervical adenopathy present.   Neurological: She is alert and oriented to person, place, and time.   Skin: Skin is warm and dry.        Psychiatric: Her speech is delayed. Cognition and memory are impaired.   Vitals reviewed.      Significant Labs:   BMP:   Recent Labs   Lab 11/12/19  0409 11/12/19  1743 11/13/19  0401   * 333* 221*    134* 137   K 3.9 4.5 4.0     103 104   CO2 26 20* 22*   BUN 14 18 16   CREATININE 0.8 0.8 0.8   CALCIUM 8.8 8.4* 8.6*   MG 2.1  --  1.8   , CBC:   Recent Labs   Lab 11/12/19  1742 11/13/19  0401 11/13/19  0653   WBC 15.16* 12.88* 12.22   HGB 7.3* 6.8* 7.0*   HCT 23.2* 21.9* 23.0*   PLT 89* 93* 79*   , LFTs: No results for input(s): ALT, AST, ALKPHOS, BILITOT, PROT, ALBUMIN in the last 48 hours. and All pertinent labs from the last 24 hours have been reviewed.    Diagnostic Results:  I have reviewed all pertinent imaging results/findings within the past 24 hours.

## 2019-11-13 NOTE — NURSING
Still awaiting 1415 aPTT lab draw to result. Mag test that was drawn at same time and already resulted shortly after being sent to lab. Called to lab to ensure the blood for aPTT had been received and inquire about delay.  stated the tube had not been run yet but will be completed asap.

## 2019-11-14 ENCOUNTER — TELEPHONE (OUTPATIENT)
Dept: NEUROLOGY | Facility: CLINIC | Age: 69
End: 2019-11-14

## 2019-11-14 LAB
ABO + RH BLD: NORMAL
ANION GAP SERPL CALC-SCNC: 10 MMOL/L (ref 8–16)
ANION GAP SERPL CALC-SCNC: 14 MMOL/L (ref 8–16)
APTT BLDCRRT: 64.5 SEC (ref 21–32)
APTT BLDCRRT: 66.3 SEC (ref 21–32)
APTT BLDCRRT: NORMAL SEC (ref 21–32)
BASOPHILS # BLD AUTO: 0.01 K/UL (ref 0–0.2)
BASOPHILS # BLD AUTO: 0.03 K/UL (ref 0–0.2)
BASOPHILS NFR BLD: 0.1 % (ref 0–1.9)
BASOPHILS NFR BLD: 0.2 % (ref 0–1.9)
BLD GP AB SCN CELLS X3 SERPL QL: NORMAL
BLD PROD TYP BPU: NORMAL
BLOOD UNIT EXPIRATION DATE: NORMAL
BLOOD UNIT TYPE CODE: 6200
BLOOD UNIT TYPE: NORMAL
BUN SERPL-MCNC: 19 MG/DL (ref 8–23)
BUN SERPL-MCNC: 26 MG/DL (ref 8–23)
CALCIUM SERPL-MCNC: 8.9 MG/DL (ref 8.7–10.5)
CALCIUM SERPL-MCNC: 9 MG/DL (ref 8.7–10.5)
CHLORIDE SERPL-SCNC: 104 MMOL/L (ref 95–110)
CHLORIDE SERPL-SCNC: 106 MMOL/L (ref 95–110)
CO2 SERPL-SCNC: 22 MMOL/L (ref 23–29)
CO2 SERPL-SCNC: 24 MMOL/L (ref 23–29)
CODING SYSTEM: NORMAL
CREAT SERPL-MCNC: 0.9 MG/DL (ref 0.5–1.4)
CREAT SERPL-MCNC: 1 MG/DL (ref 0.5–1.4)
DIFFERENTIAL METHOD: ABNORMAL
DIFFERENTIAL METHOD: ABNORMAL
DISPENSE STATUS: NORMAL
EOSINOPHIL # BLD AUTO: 0 K/UL (ref 0–0.5)
EOSINOPHIL # BLD AUTO: 0.1 K/UL (ref 0–0.5)
EOSINOPHIL NFR BLD: 0.3 % (ref 0–8)
EOSINOPHIL NFR BLD: 0.6 % (ref 0–8)
ERYTHROCYTE [DISTWIDTH] IN BLOOD BY AUTOMATED COUNT: 16.3 % (ref 11.5–14.5)
ERYTHROCYTE [DISTWIDTH] IN BLOOD BY AUTOMATED COUNT: 17.2 % (ref 11.5–14.5)
EST. GFR  (AFRICAN AMERICAN): >60 ML/MIN/1.73 M^2
EST. GFR  (AFRICAN AMERICAN): >60 ML/MIN/1.73 M^2
EST. GFR  (NON AFRICAN AMERICAN): 58 ML/MIN/1.73 M^2
EST. GFR  (NON AFRICAN AMERICAN): >60 ML/MIN/1.73 M^2
GLUCOSE SERPL-MCNC: 329 MG/DL (ref 70–110)
GLUCOSE SERPL-MCNC: 425 MG/DL (ref 70–110)
HCT VFR BLD AUTO: 21.9 % (ref 37–48.5)
HCT VFR BLD AUTO: 26.4 % (ref 37–48.5)
HGB BLD-MCNC: 6.7 G/DL (ref 12–16)
HGB BLD-MCNC: 8.1 G/DL (ref 12–16)
IMM GRANULOCYTES # BLD AUTO: 0.32 K/UL (ref 0–0.04)
IMM GRANULOCYTES # BLD AUTO: 0.39 K/UL (ref 0–0.04)
IMM GRANULOCYTES NFR BLD AUTO: 2.4 % (ref 0–0.5)
IMM GRANULOCYTES NFR BLD AUTO: 3.5 % (ref 0–0.5)
LYMPHOCYTES # BLD AUTO: 0.9 K/UL (ref 1–4.8)
LYMPHOCYTES # BLD AUTO: 1.1 K/UL (ref 1–4.8)
LYMPHOCYTES NFR BLD: 7.9 % (ref 18–48)
LYMPHOCYTES NFR BLD: 8.2 % (ref 18–48)
MAGNESIUM SERPL-MCNC: 1.9 MG/DL (ref 1.6–2.6)
MCH RBC QN AUTO: 29 PG (ref 27–31)
MCH RBC QN AUTO: 29 PG (ref 27–31)
MCHC RBC AUTO-ENTMCNC: 30.6 G/DL (ref 32–36)
MCHC RBC AUTO-ENTMCNC: 30.7 G/DL (ref 32–36)
MCV RBC AUTO: 95 FL (ref 82–98)
MCV RBC AUTO: 95 FL (ref 82–98)
MONOCYTES # BLD AUTO: 0.9 K/UL (ref 0.3–1)
MONOCYTES # BLD AUTO: 1 K/UL (ref 0.3–1)
MONOCYTES NFR BLD: 7.8 % (ref 4–15)
MONOCYTES NFR BLD: 8.5 % (ref 4–15)
NEUTROPHILS # BLD AUTO: 10.8 K/UL (ref 1.8–7.7)
NEUTROPHILS # BLD AUTO: 8.8 K/UL (ref 1.8–7.7)
NEUTROPHILS NFR BLD: 79.4 % (ref 38–73)
NEUTROPHILS NFR BLD: 81.1 % (ref 38–73)
NRBC BLD-RTO: 0 /100 WBC
NRBC BLD-RTO: 0 /100 WBC
NUM UNITS TRANS PACKED RBC: NORMAL
PLATELET # BLD AUTO: 104 K/UL (ref 150–350)
PLATELET # BLD AUTO: 87 K/UL (ref 150–350)
PMV BLD AUTO: 10.4 FL (ref 9.2–12.9)
PMV BLD AUTO: 10.5 FL (ref 9.2–12.9)
POCT GLUCOSE: 330 MG/DL (ref 70–110)
POCT GLUCOSE: 342 MG/DL (ref 70–110)
POCT GLUCOSE: 377 MG/DL (ref 70–110)
POCT GLUCOSE: 406 MG/DL (ref 70–110)
POTASSIUM SERPL-SCNC: 4.2 MMOL/L (ref 3.5–5.1)
POTASSIUM SERPL-SCNC: 4.2 MMOL/L (ref 3.5–5.1)
RBC # BLD AUTO: 2.31 M/UL (ref 4–5.4)
RBC # BLD AUTO: 2.79 M/UL (ref 4–5.4)
SODIUM SERPL-SCNC: 138 MMOL/L (ref 136–145)
SODIUM SERPL-SCNC: 142 MMOL/L (ref 136–145)
WBC # BLD AUTO: 11.05 K/UL (ref 3.9–12.7)
WBC # BLD AUTO: 13.32 K/UL (ref 3.9–12.7)

## 2019-11-14 PROCEDURE — C9399 UNCLASSIFIED DRUGS OR BIOLOG: HCPCS | Performed by: NURSE PRACTITIONER

## 2019-11-14 PROCEDURE — 36430 TRANSFUSION BLD/BLD COMPNT: CPT

## 2019-11-14 PROCEDURE — 97110 THERAPEUTIC EXERCISES: CPT

## 2019-11-14 PROCEDURE — 97530 THERAPEUTIC ACTIVITIES: CPT | Performed by: PHYSICAL THERAPIST

## 2019-11-14 PROCEDURE — 85730 THROMBOPLASTIN TIME PARTIAL: CPT

## 2019-11-14 PROCEDURE — 63600175 PHARM REV CODE 636 W HCPCS: Performed by: INTERNAL MEDICINE

## 2019-11-14 PROCEDURE — 94640 AIRWAY INHALATION TREATMENT: CPT

## 2019-11-14 PROCEDURE — 99233 SBSQ HOSP IP/OBS HIGH 50: CPT | Mod: ,,, | Performed by: INTERNAL MEDICINE

## 2019-11-14 PROCEDURE — 86920 COMPATIBILITY TEST SPIN: CPT

## 2019-11-14 PROCEDURE — 94799 UNLISTED PULMONARY SVC/PX: CPT

## 2019-11-14 PROCEDURE — 99233 PR SUBSEQUENT HOSPITAL CARE,LEVL III: ICD-10-PCS | Mod: ,,, | Performed by: INTERNAL MEDICINE

## 2019-11-14 PROCEDURE — 86901 BLOOD TYPING SEROLOGIC RH(D): CPT

## 2019-11-14 PROCEDURE — 85025 COMPLETE CBC W/AUTO DIFF WBC: CPT | Mod: 91

## 2019-11-14 PROCEDURE — P9016 RBC LEUKOCYTES REDUCED: HCPCS

## 2019-11-14 PROCEDURE — 83735 ASSAY OF MAGNESIUM: CPT

## 2019-11-14 PROCEDURE — 63600175 PHARM REV CODE 636 W HCPCS: Performed by: THORACIC SURGERY (CARDIOTHORACIC VASCULAR SURGERY)

## 2019-11-14 PROCEDURE — 92526 ORAL FUNCTION THERAPY: CPT

## 2019-11-14 PROCEDURE — 25000003 PHARM REV CODE 250: Performed by: NURSE PRACTITIONER

## 2019-11-14 PROCEDURE — 25000003 PHARM REV CODE 250: Performed by: THORACIC SURGERY (CARDIOTHORACIC VASCULAR SURGERY)

## 2019-11-14 PROCEDURE — 25000242 PHARM REV CODE 250 ALT 637 W/ HCPCS: Performed by: FAMILY MEDICINE

## 2019-11-14 PROCEDURE — 97535 SELF CARE MNGMENT TRAINING: CPT | Performed by: PHYSICAL THERAPIST

## 2019-11-14 PROCEDURE — 27000221 HC OXYGEN, UP TO 24 HOURS

## 2019-11-14 PROCEDURE — 99232 SBSQ HOSP IP/OBS MODERATE 35: CPT | Mod: ,,, | Performed by: INTERNAL MEDICINE

## 2019-11-14 PROCEDURE — 21400001 HC TELEMETRY ROOM

## 2019-11-14 PROCEDURE — 25000003 PHARM REV CODE 250: Performed by: FAMILY MEDICINE

## 2019-11-14 PROCEDURE — 85730 THROMBOPLASTIN TIME PARTIAL: CPT | Mod: 91

## 2019-11-14 PROCEDURE — 80048 BASIC METABOLIC PNL TOTAL CA: CPT

## 2019-11-14 PROCEDURE — 25000003 PHARM REV CODE 250: Performed by: INTERNAL MEDICINE

## 2019-11-14 PROCEDURE — 63600175 PHARM REV CODE 636 W HCPCS: Performed by: NURSE PRACTITIONER

## 2019-11-14 PROCEDURE — 94761 N-INVAS EAR/PLS OXIMETRY MLT: CPT

## 2019-11-14 PROCEDURE — 99232 PR SUBSEQUENT HOSPITAL CARE,LEVL II: ICD-10-PCS | Mod: ,,, | Performed by: INTERNAL MEDICINE

## 2019-11-14 RX ORDER — INSULIN ASPART 100 [IU]/ML
1-10 INJECTION, SOLUTION INTRAVENOUS; SUBCUTANEOUS
Status: DISCONTINUED | OUTPATIENT
Start: 2019-11-14 | End: 2019-11-20 | Stop reason: HOSPADM

## 2019-11-14 RX ORDER — HYDROCODONE BITARTRATE AND ACETAMINOPHEN 500; 5 MG/1; MG/1
TABLET ORAL
Status: DISCONTINUED | OUTPATIENT
Start: 2019-11-14 | End: 2019-11-17

## 2019-11-14 RX ORDER — IPRATROPIUM BROMIDE AND ALBUTEROL SULFATE 2.5; .5 MG/3ML; MG/3ML
3 SOLUTION RESPIRATORY (INHALATION) ONCE
Status: DISCONTINUED | OUTPATIENT
Start: 2019-11-14 | End: 2019-11-15

## 2019-11-14 RX ADMIN — CHLORTHALIDONE 25 MG: 25 TABLET ORAL at 09:11

## 2019-11-14 RX ADMIN — METOPROLOL TARTRATE 50 MG: 50 TABLET, FILM COATED ORAL at 10:11

## 2019-11-14 RX ADMIN — ARGATROBAN 6 MCG/KG/MIN: 125 SOLUTION INTRAVENOUS at 02:11

## 2019-11-14 RX ADMIN — Medication 2 G: at 05:11

## 2019-11-14 RX ADMIN — IPRATROPIUM BROMIDE AND ALBUTEROL SULFATE 3 ML: .5; 3 SOLUTION RESPIRATORY (INHALATION) at 07:11

## 2019-11-14 RX ADMIN — NITROGLYCERIN 0.5 INCH: 20 OINTMENT TOPICAL at 09:11

## 2019-11-14 RX ADMIN — INSULIN DETEMIR 10 UNITS: 100 INJECTION, SOLUTION SUBCUTANEOUS at 10:11

## 2019-11-14 RX ADMIN — INSULIN ASPART 10 UNITS: 100 INJECTION, SOLUTION INTRAVENOUS; SUBCUTANEOUS at 11:11

## 2019-11-14 RX ADMIN — IPRATROPIUM BROMIDE AND ALBUTEROL SULFATE 3 ML: .5; 3 SOLUTION RESPIRATORY (INHALATION) at 01:11

## 2019-11-14 RX ADMIN — INSULIN ASPART 10 UNITS: 100 INJECTION, SOLUTION INTRAVENOUS; SUBCUTANEOUS at 04:11

## 2019-11-14 RX ADMIN — LOSARTAN POTASSIUM 50 MG: 50 TABLET ORAL at 09:11

## 2019-11-14 RX ADMIN — PANTOPRAZOLE SODIUM 40 MG: 40 TABLET, DELAYED RELEASE ORAL at 09:11

## 2019-11-14 RX ADMIN — MAGNESIUM SULFATE HEPTAHYDRATE 2 G: 40 INJECTION, SOLUTION INTRAVENOUS at 11:11

## 2019-11-14 RX ADMIN — INSULIN DETEMIR 5 UNITS: 100 INJECTION, SOLUTION SUBCUTANEOUS at 09:11

## 2019-11-14 RX ADMIN — CHLORHEXIDINE GLUCONATE 0.12% ORAL RINSE 15 ML: 1.2 LIQUID ORAL at 10:11

## 2019-11-14 RX ADMIN — INSULIN ASPART 4 UNITS: 100 INJECTION, SOLUTION INTRAVENOUS; SUBCUTANEOUS at 12:11

## 2019-11-14 RX ADMIN — INSULIN ASPART 8 UNITS: 100 INJECTION, SOLUTION INTRAVENOUS; SUBCUTANEOUS at 05:11

## 2019-11-14 RX ADMIN — FERROUS SULFATE TAB EC 325 MG (65 MG FE EQUIVALENT) 325 MG: 325 (65 FE) TABLET DELAYED RESPONSE at 10:11

## 2019-11-14 RX ADMIN — AMLODIPINE BESYLATE 10 MG: 10 TABLET ORAL at 09:11

## 2019-11-14 RX ADMIN — FERROUS SULFATE TAB EC 325 MG (65 MG FE EQUIVALENT) 325 MG: 325 (65 FE) TABLET DELAYED RESPONSE at 09:11

## 2019-11-14 RX ADMIN — APIXABAN 5 MG: 2.5 TABLET, FILM COATED ORAL at 09:11

## 2019-11-14 RX ADMIN — APIXABAN 5 MG: 2.5 TABLET, FILM COATED ORAL at 10:11

## 2019-11-14 RX ADMIN — NITROGLYCERIN 0.5 INCH: 20 OINTMENT TOPICAL at 10:11

## 2019-11-14 RX ADMIN — METOPROLOL TARTRATE 50 MG: 50 TABLET, FILM COATED ORAL at 09:11

## 2019-11-14 RX ADMIN — Medication 2 G: at 12:11

## 2019-11-14 NOTE — PROGRESS NOTES
[facility] -   Hematology/Oncology  Progress Note    Patient Name: Mateusz Bates  Admission Date: 10/28/2019  Hospital Length of Stay: 17 days  Code Status: Full Code     Subjective:     HPI:  Mrs. Bates is a 69-year-old female with hypertension, diabetes, hyperlipidemia, obesity, anxiety and depression, iron deficiency anemia, vitamin B12 deficiency, who presented to the emergency room with an NSTEMI.  Workup included a cardiac catheterization that shows severe multivessel coronary artery disease with proximal LAD stenosis of 70% and severely depressed ejection fraction of 20%.  The patient is a candidate for urgent coronary artery bypass grafting. S/p CABG today.  Hematology is consulted for thrombocytopenia.    Interval History: No acute events overnight. Transitioning to PO anticoagulation today. Awaiting 1 unit PRBCs transfusion    Oncology Treatment Plan:   [No treatment plan]    Medications:  Continuous Infusions:    Scheduled Meds:   albuterol-ipratropium  3 mL Nebulization Q6H WAKE    amLODIPine  10 mg Oral Daily    apixaban  5 mg Oral BID    chlorhexidine  15 mL Mouth/Throat BID    chlorthalidone  25 mg Oral Daily    ferrous sulfate  325 mg Oral BID    insulin detemir U-100  5 Units Subcutaneous BID    losartan  50 mg Oral Daily    metoprolol tartrate  50 mg Oral BID    nitroGLYCERIN 2% TD oint  0.5 inch Topical (Top) Q12H    pantoprazole  40 mg Oral Daily    psyllium husk  1 packet Oral BID    sodium chloride  2 g Oral Q6H     PRN Meds:sodium chloride, albumin human 5%, albuterol sulfate, Dextrose 10% Bolus, Dextrose 10% Bolus, insulin aspart U-100, iohexol, lactated ringers, magnesium sulfate IVPB, magnesium sulfate IVPB, metoclopramide HCl, ondansetron, potassium chloride in water **AND** potassium chloride in water **AND** potassium chloride in water, traMADol     Review of Systems   Constitutional: Positive for activity change and appetite change. Negative for fatigue and  fever.   HENT: Negative for congestion, mouth sores, nosebleeds, sore throat, trouble swallowing and voice change.    Eyes: Negative for photophobia and visual disturbance.   Respiratory: Positive for shortness of breath. Negative for cough, chest tightness and wheezing.    Cardiovascular: Negative for chest pain, palpitations and leg swelling.   Gastrointestinal: Negative for abdominal distention, abdominal pain, constipation, diarrhea, nausea and vomiting.   Genitourinary: Negative for difficulty urinating, dysuria and hematuria.   Musculoskeletal: Negative for arthralgias, back pain and myalgias.   Skin: Positive for color change. Negative for pallor, rash and wound.   Neurological: Positive for weakness. Negative for dizziness, syncope and headaches.   Hematological: Negative for adenopathy. Does not bruise/bleed easily.   Psychiatric/Behavioral: Positive for confusion. The patient is not nervous/anxious.      Objective:     Vital Signs (Most Recent):  Temp: 98.9 °F (37.2 °C) (11/14/19 0821)  Pulse: 95 (11/14/19 0821)  Resp: 18 (11/14/19 0821)  BP: (!) 143/68 (11/14/19 0821)  SpO2: 99 % (11/14/19 0821) Vital Signs (24h Range):  Temp:  [97.6 °F (36.4 °C)-98.9 °F (37.2 °C)] 98.9 °F (37.2 °C)  Pulse:  [] 95  Resp:  [18-26] 18  SpO2:  [91 %-99 %] 99 %  BP: ()/(58-86) 143/68     Weight: 76.2 kg (167 lb 15.9 oz)  Body mass index is 31.74 kg/m².  Body surface area is 1.81 meters squared.      Intake/Output Summary (Last 24 hours) at 11/14/2019 1110  Last data filed at 11/13/2019 1300  Gross per 24 hour   Intake 436 ml   Output --   Net 436 ml       Physical Exam   Constitutional: She is oriented to person, place, and time. She appears well-developed. She is cooperative. She has a sickly appearance. She appears ill. Nasal cannula in place.   HENT:   Head: Normocephalic.   Right Ear: External ear normal.   Left Ear: External ear normal.   Nose: Nose normal.   Mouth/Throat: Oropharynx is clear and moist.    Eyes: Conjunctivae, EOM and lids are normal. Right eye exhibits no discharge. Left eye exhibits no discharge. No scleral icterus.   Neck: Normal range of motion. No thyroid mass present.   Cardiovascular: Normal rate, regular rhythm and normal heart sounds.   No murmur heard.  Pulmonary/Chest: Tachypnea noted. She has decreased breath sounds. She has wheezes. She has no rhonchi. She has no rales.   Abdominal: Soft. She exhibits no distension. Bowel sounds are decreased. There is no tenderness.   Genitourinary:   Genitourinary Comments: deferred   Musculoskeletal: Normal range of motion. She exhibits edema and tenderness.   Lymphadenopathy:        Head (right side): No submandibular, no preauricular and no posterior auricular adenopathy present.        Head (left side): No submandibular, no preauricular and no posterior auricular adenopathy present.        Right cervical: No superficial cervical adenopathy present.       Left cervical: No superficial cervical adenopathy present.   Neurological: She is alert and oriented to person, place, and time.   Skin: Skin is warm and dry.        Psychiatric: She has a normal mood and affect. Her speech is normal and behavior is normal. Thought content normal. Cognition and memory are impaired.   Vitals reviewed.      Significant Labs:   BMP:   Recent Labs   Lab 11/13/19  0401 11/13/19  1415 11/13/19  1627 11/14/19  0528   *  --  456* 329*     --  135* 142   K 4.0  --  4.7 4.2     --  103 106   CO2 22*  --  22* 22*   BUN 16  --  19 19   CREATININE 0.8  --  1.1 0.9   CALCIUM 8.6*  --  8.9 8.9   MG 1.8 2.0  --  1.9   , CBC:   Recent Labs   Lab 11/13/19  0653 11/13/19  1627 11/14/19  0528   WBC 12.22 11.66 11.05   HGB 7.0* 7.0* 6.7*   HCT 23.0* 22.4* 21.9*   PLT 79* 89* 87*   , LFTs: No results for input(s): ALT, AST, ALKPHOS, BILITOT, PROT, ALBUMIN in the last 48 hours. and All pertinent labs from the last 24 hours have been reviewed.    Diagnostic Results:  I  have reviewed all pertinent imaging results/findings within the past 24 hours.    Assessment/Plan:     * Acute deep vein thrombosis (DVT) of right upper extremity  November 13, 2019  --D/c Argatroban   --Avoid heparin  --Eliquis 5 mg PO BID  --Daily CBC  --Check BLE US today. Would like to r/o DVT in the setting of ROSANNA diagnosis  --Continue PO anticoagulation for at least 3 months. She will need to f/u in the Heme-Onc clinic following discharge      Edema of hand  November 12, 2019     Upper extremity vascular insufficiency:  --Improved apperance  --Vascular surgery following. No surgical intervention recommended. Recommend continue anticoagulation, arm elevation, mild/moderate compression    Thrombocytopenia  November 11, 2019  --Platelet count 75, improving. Transfuse if < 10   --No S&S active bleed. Hemoglobin remains stable. Hg 7.5. No urgent need for blood transfusion. Monitor  --suspect HIT versus TTP  -Awaiting results of Sahra HIT test and [id number]  --Continue Argatroban gtt  --avoid heparin containing products.  --Check Vitamin B 12 level to r/o B 12 deficiency as etiology of decreased mental status  --Check LDH/Haptologlobin  to rule out intravascular hemolysis  --Supportive care    November 12, 2019  --ROSANNA positive  --Avoid heparin containing products  --Continue Argatroban gtt for DVT. Add Coumadin once platelet count > 100K. Would need to achieve target INR 4 prior to discontinuing Argatroban gtt. Once Argatroban gtt discontinued goal INR will be 2.5-3.5 on Coumadin. Once INR therapeutic she may continue to follow up in the outpatient Heme-Onc clinic and Coumadin clinic for continued monitoring of her Coumadin levels. Will need anticoagulation for at least 3 months  --B 12 deficiency ruled out. Mentation improving  --Haptoglobin normal r/o intravascular hemolysis   --CBC stable. No urgent indication for any blood product transfusion at present time  --Await recovery of platelet function  --Supportive  care    November 13, 2019  --Argatroban supply available for 24 more hours. Will continue Argatroban x 24 hours. Check CBC in am. If platelet count < 100K will start Xarelto. If platelet count >100K will start Coumadin. Await am CBC results.  Will plan for anticoagulation for at least 3 months.     November 14, 2019  --Platelet count 87  --D/c Argatroban due to unavailability   --Transition to Eliquis 5 mg PO BID  --Bleeding precautions  --Hg 6.7. Would transfuse 1 unit PRBCs  --Daily CBC        Thank you for your consult. I will follow-up with patient. Please contact us if you have any additional questions.     Carrol Brand NP  Hematology/Oncology  [facility]

## 2019-11-14 NOTE — ASSESSMENT & PLAN NOTE
November 11, 2019  --Platelet count 75, improving. Transfuse if < 10   --No S&S active bleed. Hemoglobin remains stable. Hg 7.5. No urgent need for blood transfusion. Monitor  --suspect HIT versus TTP  -Awaiting results of Sahra HIT test and [id number]  --Continue Argatroban gtt  --avoid heparin containing products.  --Check Vitamin B 12 level to r/o B 12 deficiency as etiology of decreased mental status  --Check LDH/Haptologlobin  to rule out intravascular hemolysis  --Supportive care    November 12, 2019  --ROSANNA positive  --Avoid heparin containing products  --Continue Argatroban gtt for DVT. Add Coumadin once platelet count > 100K. Would need to achieve target INR 4 prior to discontinuing Argatroban gtt. Once Argatroban gtt discontinued goal INR will be 2.5-3.5 on Coumadin. Once INR therapeutic she may continue to follow up in the outpatient Heme-Onc clinic and Coumadin clinic for continued monitoring of her Coumadin levels. Will need anticoagulation for at least 3 months  --B 12 deficiency ruled out. Mentation improving  --Haptoglobin normal r/o intravascular hemolysis   --CBC stable. No urgent indication for any blood product transfusion at present time  --Await recovery of platelet function  --Supportive care    November 13, 2019  --Argatroban supply available for 24 more hours. Will continue Argatroban x 24 hours. Check CBC in am. If platelet count < 100K will start Xarelto. If platelet count >100K will start Coumadin. Await am CBC results.  Will plan for anticoagulation for at least 3 months.     November 14, 2019  --Platelet count 87  --D/c Argatroban due to unavailability   --Transition to Eliquis 5 mg PO BID  --Bleeding precautions  --Hg 6.7. Would transfuse 1 unit PRBCs  --Daily CBC

## 2019-11-14 NOTE — SUBJECTIVE & OBJECTIVE
Interval History: no acute issues noted o/n. Plans for PRBC transfusion today.     Review of Systems   Constitution: Positive for malaise/fatigue.   HENT: Negative for hearing loss and hoarse voice.    Eyes: Negative for blurred vision and visual disturbance.   Cardiovascular: Negative for chest pain, claudication, dyspnea on exertion, irregular heartbeat, leg swelling, near-syncope, orthopnea, palpitations, paroxysmal nocturnal dyspnea and syncope.   Respiratory: Negative for cough, hemoptysis, shortness of breath, sleep disturbances due to breathing, snoring and wheezing.    Endocrine: Negative for cold intolerance and heat intolerance.   Hematologic/Lymphatic: Does not bruise/bleed easily.   Skin: Positive for color change, nail changes and poor wound healing. Negative for dry skin.   Musculoskeletal: Positive for arthritis and back pain. Negative for joint pain and myalgias.        RUE SWELLING   Gastrointestinal: Negative for bloating, abdominal pain, constipation, nausea and vomiting.   Genitourinary: Negative for dysuria, flank pain, hematuria and hesitancy.   Neurological: Negative for headaches, light-headedness, loss of balance, numbness, paresthesias and weakness.   Psychiatric/Behavioral: Negative for altered mental status.   Allergic/Immunologic: Negative for environmental allergies.     Objective:     Vital Signs (Most Recent):  Temp: 97.7 °F (36.5 °C) (11/14/19 1300)  Pulse: 93 (11/14/19 1322)  Resp: 20 (11/14/19 1322)  BP: 139/70 (11/14/19 1300)  SpO2: 97 % (11/14/19 1322) Vital Signs (24h Range):  Temp:  [97.6 °F (36.4 °C)-98.9 °F (37.2 °C)] 97.7 °F (36.5 °C)  Pulse:  [] 93  Resp:  [18-26] 20  SpO2:  [91 %-99 %] 97 %  BP: (112-164)/(61-86) 139/70     Weight: 76.2 kg (167 lb 15.9 oz)  Body mass index is 31.74 kg/m².     SpO2: 97 %  O2 Device (Oxygen Therapy): nasal cannula    No intake or output data in the 24 hours ending 11/14/19 1456    Lines/Drains/Airways     Peripherally Inserted Central  Catheter Line                 PICC Double Lumen 11/07/19 1645 left brachial;left basilic 6 days                Physical Exam   Constitutional: She is oriented to person, place, and time. She appears well-developed and well-nourished. No distress.   HENT:   Head: Normocephalic and atraumatic.   Eyes: Pupils are equal, round, and reactive to light.   Neck: Normal range of motion and full passive range of motion without pain. Neck supple. No JVD present.   Cardiovascular: Normal rate, regular rhythm, S1 normal, S2 normal and intact distal pulses. PMI is not displaced. Exam reveals no distant heart sounds.   No murmur heard.  Pulses:       Radial pulses are 2+ on the right side, and 2+ on the left side.        Dorsalis pedis pulses are 2+ on the right side, and 2+ on the left side.   CABG INCISION HEALING WELL   Pulmonary/Chest: Effort normal and breath sounds normal. No accessory muscle usage. No respiratory distress. She has no decreased breath sounds. She has no wheezes. She has no rales.   Abdominal: Soft. Bowel sounds are normal. She exhibits no distension. There is no tenderness.   Musculoskeletal: Normal range of motion. She exhibits no edema.        Right ankle: She exhibits no swelling.        Left ankle: She exhibits no swelling.   Neurological: She is alert and oriented to person, place, and time.   Skin: Skin is warm and dry. She is not diaphoretic. No cyanosis. Nails show no clubbing.   RUE swelling, right hand ecchymotic with dusky fingernails   Psychiatric: She has a normal mood and affect. Her speech is normal and behavior is normal. Judgment and thought content normal. Cognition and memory are normal.   Nursing note and vitals reviewed.      Significant Labs:   BMP:   Recent Labs   Lab 11/13/19  0401 11/13/19  1415 11/13/19  1627 11/14/19  0528   *  --  456* 329*     --  135* 142   K 4.0  --  4.7 4.2     --  103 106   CO2 22*  --  22* 22*   BUN 16 -- 19 19   CREATININE 0.8  --  1.1  0.9   CALCIUM 8.6*  --  8.9 8.9   MG 1.8 2.0  --  1.9   , CBC   Recent Labs   Lab 11/13/19  0653 11/13/19  1627 11/14/19  0528   WBC 12.22 11.66 11.05   HGB 7.0* 7.0* 6.7*   HCT 23.0* 22.4* 21.9*   PLT 79* 89* 87*    and All pertinent lab results from the last 24 hours have been reviewed.    Significant Imaging: Echocardiogram:   2D echo with color flow doppler:   Results for orders placed or performed during the hospital encounter of 10/28/19   2D echo with color flow doppler   Result Value Ref Range    QEF 30 (A) 55 - 65    Diastolic Dysfunction Yes (A)     Est. PA Systolic Pressure 33.03     Narrative    Date of Procedure: 11/06/2019        TEST DESCRIPTION   Technical Quality: This is a technically challenging study.     General: A catheter is present in the right-sided cardiac chambers.     Aorta: The aortic root is normal in size, measuring 3.7 cm at sinotubular junction and 3.8 cm at Sinuses of Valsalva. The proximal ascending aorta is normal in size, measuring 3.7 cm across.     Left Atrium: The left atrial volume index is normal, measuring 19.22 cc/m2.     Left Ventricle: The left ventricle is normal in size, with an end-diastolic diameter of 3.0 cm, and an end-systolic diameter of 2.5 cm. LV wall thickness is normal, with the septum measuring 2.1 cm and the posterior wall measuring 1.4 cm across. Relative   wall thickness was increased at 0.93, and the LV mass index was increased at 140.2 g/m2 consistent with concentric left ventricular hypertrophy. The following segments were mildly hypokinetic: apical lateral wall.  The following segments were severely hypokinetic: mid anteroseptum, basal anteroseptum, apical septum, mid inferoseptum, basal inferoseptum, basal inferior wall.  Left ventricular systolic function appears moderately depressed. Visually estimated ejection fraction is 30-35%. The LV Doppler derived stroke volume equals 54.0 ccs.     Diastolic indices: E wave velocity 0.9 m/s, E/A ratio 0.8, DT  181 msec., E/e' ratio(avg) 14. There is diastolic dysfunction secondary to relaxation abnormality.     Right Atrium: The right atrium is normal in size, measuring 3.5 cm in length and 2.1 cm in width in the apical view.     Right Ventricle: The right ventricle is normal in size. Global right ventricular systolic function appears normal. Tricuspid annular plane systolic excursion (TAPSE) is 1.3 cm. The estimated PA systolic pressure is 33 mmHg.     Aortic Valve:  The aortic valve is normal in structure. The aortic valve is tri-leaflet in structure. The mean gradient obtained across the aortic valve is 9 mmHg.     Mitral Valve:  The mitral valve is normal in structure. The pressure half time is 53 msec. The calculated mitral valve area is 4.15 cm2.     Tricuspid Valve:  The tricuspid valve is normal in structure.     Pulmonary Valve:  The pulmonic valve is not well seen.     IVC: IVC is normal in size and collapses > 50% with a sniff, suggesting normal right atrial pressure of 3 mmHg.     Intracavitary: There is no evidence of pericardial effusion, intracavity mass, thrombi, or vegetation.         CONCLUSIONS     1 - Concentric hypertrophy.     2 - Wall motion abnormalities.     3 - Moderately depressed left ventricular systolic function (EF 30-35%).     4 - Impaired LV relaxation, normal LAP (grade 1 diastolic dysfunction).     5 - Normal right ventricular systolic function .     6 - The estimated PA systolic pressure is 33 mmHg.             This document has been electronically    SIGNED BY: Charles Matias MD On: 11/06/2019 18:28    and X-Ray: CXR: X-Ray Chest 1 View (CXR):   Results for orders placed or performed during the hospital encounter of 10/28/19   X-Ray Chest 1 View    Narrative    EXAMINATION:  XR CHEST 1 VIEW    CLINICAL HISTORY:  follow up;    TECHNIQUE:  Single frontal view of the chest was performed.    COMPARISON:  11/07/2019    FINDINGS:  Tubes and lines are stable.  [place] catheter is been removed.   Mild atelectasis within the left midlung zone.  No consolidation.  Trace pleural effusions not entirely excluded.  No pneumothorax.  Bones demonstrate mild degenerative changes.  Sternotomy wires are intact.  In comparison to the prior study, there is no adverse interval changes      Impression    In comparison to the prior study, there is no adverse interval changes      Electronically signed by: Jh Anne MD  Date:    11/08/2019  Time:    08:14    and X-Ray Chest PA and Lateral (CXR):   Results for orders placed or performed during the hospital encounter of 10/28/19   X-Ray Chest PA And Lateral    Narrative    EXAMINATION:  XR CHEST PA AND LATERAL    CLINICAL HISTORY:  Pre Op;    COMPARISON:  10/28/2019    FINDINGS:  Cardiac silhouette is normal. Aorta demonstrates atherosclerotic disease. The lungs demonstrate no evidence of active disease.  No evidence of pleural effusion or pneumothorax.  Bones appear intact.      Impression    No acute process seen.      Electronically signed by: Jh Anne MD  Date:    10/28/2019  Time:    21:38

## 2019-11-14 NOTE — PT/OT/SLP PROGRESS
Speech Language Pathology Treatment    Patient Name:  Mateusz Bates   MRN:  [id number]  Admitting Diagnosis: Acute deep vein thrombosis (DVT) of right upper extremity    Recommendations:                 General Recommendations:  Dysphagia therapy and Cognitive-linguistic therapy  Diet recommendations:  Puree, Liquid Diet Level: Thin   Aspiration Precautions: 1 bite/sip at a time, Alternating bites/sips, Feed only when awake/alert, HOB to 90 degrees and Small bites/sips   General Precautions: Standard, aspiration, fall, sternal  Communication strategies:  none    Subjective     Pt alert but confused. She was receiving blood during noon meal.  Patient goals: none stated    Pain/Comfort:  ·      Objective:     Has the patient been evaluated by SLP for swallowing?   Yes  Keep patient NPO? No   Current Respiratory Status: room air      Pt appeared SOB and was having difficulty coordinating respiration and swallow. She tolerated tray of puree and thin liquids via straw without s/s of aspiration. Recommend she remain on this diet with ongoing assessment. She was mostly unresponsive to questions and followed 1 step commands with mod A.     Assessment:     Mateusz Bates is a 69 y.o. female with an SLP diagnosis of Dysphagia.  She presents with impaired swallow and cognitive deficits.    Goals:   Multidisciplinary Problems     SLP Goals        Problem: SLP Goal    Goal Priority Disciplines Outcome   SLP Goal     SLP Ongoing, Progressing   Description:  LTG:  Pt will tolerate least restrictive diet consistency safely and efficiently.    ST. BSA with meal to assess recommended diet consistency of pureed and thin liquids.- Completed 11/10/19 and pt has been recommended for a Pureed consistency diet and Nectar Thick Liquids.  Goal to continue with further recs as appropriate.   Modified Barium Swallow Study recommended if indicated,             2. Oral Motor x10 with cues             3. Orientation with 90% with min cues.              4. 2 step commands with 90%             5. 2 unit yes/no with 90%  Goal to be reevaluated by:  11/14/19                     Plan:     · Patient to be seen:  3 x/week   · Plan of Care expires:     · Plan of Care reviewed with:  patient   · SLP Follow-Up:  Yes       Discharge recommendations:  rehabilitation facility   Barriers to Discharge:  None    Time Tracking:     SLP Treatment Date:   11/14/19  Speech Start Time:  1302  Speech Stop Time:  1318     Speech Total Time (min):  16 min    Billable Minutes: Treatment Swallowing Dysfunction 16    Anitha Casas CCC-SLP  11/14/2019

## 2019-11-14 NOTE — ASSESSMENT & PLAN NOTE
11/01/2019  The patient is post-operative day 1, status-post coronary artery bypass grafting x 3.  Overall the patient is making progress, however IABP and inotropic agents will continue today.   Neuro:  The patient is awake alert and oriented x3.  Neuro exam is nonfocal.  Pain is well controlled with IV pain medication.  Cardiac:  Patient is hemodynamically improving. Patient remains on epinephrine and milrinone gtts, which will be weaned over the next 24 hours.  Cardiac index is improving. IABP will remain in place over the next 24 hours.  Patient will be started on beta blockers once gtts are weaned off.  Respiratory: Good sats on high flow nasal cannula. Continue respiratory toilet, continue incentive spirometer. Wean to low flow nasal cannula.  GI:  Diet: sips of water with medications, will slowly advance diet as tolerated. Benign abdominal exam.  Renal:  Urine output is slowly improving. Cr 0.9. K 4.3. Mag 1.6. Replace mag. Continue IV diuresis. Add metolazone.   Heme:  Hct 34.1  Platelet 82. Continue aspirin. Discontinue Plavix for now.  Id:  WBC 12. Tmax 100.3. Continue post-operative antibiotics. Will continue to follow.   Endocrine:  Glucose is controlled with insulin gtt.  Continue insulin gtt for now, until weaned off inotropic agents.   Activities:  Patient is currently bed bound due to IABP.  Advance activities as tolerated once IABP is removed.   Lines tubes and drains:  Continue IABP. Continue [place] and Cordis.  Continue A-line.  Chest tubes will continue. NICOLASA x 2 will continue. Jones catheter will continue.  Continue pacer wires. Continue prevena wound vac.  Plan: Remain ICU.          11/02/2019  The patient is post-operative day 2, status-post coronary artery bypass grafting x 3.  Overall the patient is making progress, IABP removed and inotropic agents discontinued.   Neuro:  The patient is awake alert and oriented x3.  Neuro exam is nonfocal.  Pain is well controlled. Discontinue fentanyl.    Cardiac:  Patient is hemodynamically improving. Inotropic gtts discontinued. Excellent cardiac index. IABP discontinued.  Patient will be started on beta blockers today.  Respiratory: Good sats on high flow nasal cannula. Continue respiratory toilet, continue incentive spirometer. Wean to low flow nasal cannula.  GI:  Advance diabetic/cardiac diet as tolerated. Benign abdominal exam.  Renal:  Urine output is improving. Cr 0.9. K 4.9. Mag 2.2. Continue IV diuresis. Hold AM K.   Heme:  Hct 28.3  Platelet 47. Discontinue aspirin. Discontinue Plavix. HIT panel. Consult hematology r/t thrombocytopenia.   Id:  WBC 10.4. Tmax 99.4. Will continue to follow.   Endocrine:  Discontinue insulin gtt. Start sliding scale insulin. Start long acting insulin detemir.   Activities: Advance activities as tolerated.   Lines tubes and drains:  Discontinue IABP. Discontinue [place] and Cordis.  Discontinue A-line.  Discontinue chest tubes x 3. Discontinue NICOLASA x 2. Discontinue arterial line. Jones catheter will continue for now.  Continue pacer wires. Continue prevena wound vac. Place PICC line.   Plan: Remain ICU.           11/03/2019  The patient is post-operative day 3, status-post coronary artery bypass grafting x 3.  Overall the patient is making progress.  Neuro:  The patient is awake alert and oriented x3.  Neuro exam is nonfocal.  Pain is well controlled. Continue tramadol.   Cardiac:  Patient is hemodynamically stable. Increase metoprolol dose. Start amlodipine.   Respiratory: Good sats on nasal cannula. Continue respiratory toilet, continue incentive spirometer. Wean to room air.  GI:  Advance diabetic/cardiac diet as tolerated. Benign abdominal exam. Passing gas, no BM. Add 1 bottle of mag citrate.   Renal:  Good urine output. Cr 0.9. K 3.8. Mag 2.0. Replace K. Replace Mag. Change to PO diuresis.   Heme:  Hct 34.7  Platelet 89. Restart aspirin. Continue to hold Plavix. HIT panel pending. Hematology following.  Id:  WBC 13.8. Tmax  99.4. Will continue to follow.    Endocrine:  Glucose controlled on sliding scale insulin. Increase long acting insulin detemir dose.   Activities: Advance activities as tolerated.   Lines tubes and drains:  Storm catheter will continue for now.  Continue pacer wires. Continue prevena wound vac. Continue PICC line.   Plan: Remain ICU.          11/04/2019  The patient is post-operative day 4, status-post coronary artery bypass grafting x 3.  Overall the patient is making progress.  Neuro:  The patient is awake alert and oriented x3.  Neuro exam is nonfocal.  Pain is well controlled. Continue tramadol.   Cardiac:  Patient is hemodynamically stable. Continue metoprolol dose. Continue amlodipine dose.   Respiratory: Good sats on room air. Continue respiratory toilet, continue incentive spirometer.  GI:  Advance diabetic/cardiac diet as tolerated. Benign abdominal exam. Regular bowel movements.   Renal:  Good urine output. Cr 0.7. K 4.0. Mag 2.3. Replace K. Decrease PO diuresis.   Heme:  Hct 34.4  Platelet 140. Continue aspirin. Restart Plavix. HIT panel pending. Hematology following.  Id:  WBC down to 13.13. Tmax 98. Will continue to follow.    Endocrine:  Glucose controlled on sliding scale insulin. Increased long acting insulin detemir dose.   Activities: Advance activities as tolerated.   Lines tubes and drains:  Discontinued storm catheter. Continue pacer wires. Continue prevena wound vac. Continue PICC line.   Plan: Transfer to telemetry. Anticipate discharge to SNF in 48 hours.         11/05/2019  The patient is post-operative day 5, status-post coronary artery bypass grafting x 3.  Overall the patient is making progress.  Neuro:  The patient is awake alert and oriented x3.  Neuro exam is nonfocal. Patient slow to answer questions, however answers are appropriate.  Pain is well controlled. Continue tramadol PRN.   Cardiac:  Patient is hemodynamically stable. Continue metoprolol dose. Discontinue amlodipine. Start  losartan.    Respiratory: Good sats on room air. Continue respiratory toilet, continue incentive spirometer.  GI:  Advance diabetic/cardiac diet as tolerated. Benign abdominal exam. Regular bowel movements.   Renal:  Good urine output. Cr 0.7. K 4.8. Mag 2.1. Replace Mag. Discontinue PO diuresis.   Heme:  Hct 39.5  Platelet 73. Continue aspirin. Hold Plavix. HIT panel pending. Hematology following.  Id:  WBC is up to 16. Tmax 99. Pan-culture. DC PICC line. Chest xray. Start broad spectrum empiric antibiotics. Will continue to follow.    Endocrine:  Glucose controlled on sliding scale insulin. Decrease long acting insulin detemir dose.   Activities: Advance activities as tolerated.   Lines tubes and drains:  Discontinue pacer wires. Discontinue prevena wound vac. Continue PICC line.   Plan: Anticipate discharge to SNF in 24-48 hours.         11/06/2019  The patient is post-operative day 6, status-post coronary artery bypass grafting x 3. Patient had an episode of decompensation and unresponsiveness yesterday evening. The patient was found unresponsive, cool, and diaphoretic, with agonal breathing. Patient was intubated and transferred to the ICU. Bedside EKG and echocardiogram performed at the bedside showed no significant changes. CT head, chest, abdomen pelvis performed, showed no significant findings.     Neuro:  The patient is intubated and sedated. On Precedex gtt. Begin weaning off sedation.    Cardiac:  Patient is hemodynamically improving. Inotropic agent dependent. Currently on Epinephrine gtt. Start Milrinone gtt. Discontinue metoprolol. Discontinue losartan.    Respiratory: Ventilator dependent. tV 450, RR 20, PEEP 5. Wean down sedation. Wean off ventilator today.    GI:  Begin enteral tube feedings. Benign abdominal exam. Previously having regular bowel movements.   Renal: Adequate urine output. Cr 1.1. K 3.6. Mag 1.7. Replace Mag. Replace K. Discontinue diuresis.   Heme:  Hct 31.9  Platelet 89. Hold  aspirin. Hold Plavix. HIT panel negative. Patient received 1 units PRBC last night. Hematology following.  Id:  WBC is down to 15.5. Tmax 99.9. BC NGTD. Urinalysis negative. Sputum culture negative. Vancomycin and zosyn day 2. Will continue to follow closely.    Endocrine:  Glucose controlled on insulin gtt, continue at this time.   Activities: Intubated and sedated.   Musculoskeletal: Unable to find right radial pulse via doppler. Tips of the fingers of the right hand are mottled, dusky, appear cyanotic. Recent right radial arterial line in place, which has been relocated. US of arteries and veins of the right arm ordered. Consult to vascular surgery placed.    Lines tubes and drains:  Continue swan and cordis. Continue arterial line. Continue storm.    Plan: Remain ICU         11/07/2019  The patient is post-operative day 7, status-post coronary artery bypass grafting x 3. Patient had an episode of decompensation and unresponsiveness the evening of 11/05/2019. The patient was found unresponsive, cool, and diaphoretic, with agonal breathing. Patient was intubated and transferred to the ICU. Bedside EKG and echocardiogram performed at the bedside showed no significant changes. CT head, chest, abdomen pelvis performed, showed no significant findings.     Neuro:  The patient is intubated and sedated. On Precedex gtt. Wean off sedation.    Cardiac:  Patient is hemodynamically improving. Excellent cardiac index. Wean off Inotropic gtts.   Respiratory: Wean towards extubation. tV 400, RR 18, PEEP 5.   GI:  Continue enteral tube feedings while intubated. Benign abdominal exam.  Renal: Adequate urine output. Cr 1.1. K 4.1. Mag 2.8. Start IV diuresis.   Heme:  Hct 24.6  Platelet 60. Hold aspirin. Hold Plavix. HIT panel negative.  Id:  WBC is down to 14.46. Tmax 100.8. BC NGTD. Urinalysis negative. Sputum culture negative. Vancomycin and zosyn day 3. Midline sternal incision appears reddened, with induration at skin edges.  Will continue to follow closely.    Endocrine:  Glucose controlled on sliding scale insulin.   Activities: Intubated and sedated. Moves all 4. Full ROM.    Musculoskeletal: Able to find right radial pulse via doppler. The right hand extending to the tips of the fingers of the right hand are mottled, dusky, appear cyanotic. Recent right radial arterial line placement, which has been relocated. US of arteries and veins of the right arm pending. Vascular surgery following, suggested possible thrombotic etiology, suggested heparin gtt, however this is contraindicated r/t s/p cabg x 3.     Lines tubes and drains:  Continue swan and cordis. Continue arterial line. Continue storm.    Plan: Remain ICU         11/08/2019  The patient is post-operative day 8, status-post coronary artery bypass grafting x 3. Patient had an episode of decompensation and unresponsiveness the evening of 11/05/2019. The patient was found unresponsive, cool, and diaphoretic, with agonal breathing. Patient was intubated and transferred to the ICU. Bedside EKG and echocardiogram performed at the bedside showed no significant changes. CT head, chest, abdomen pelvis performed, showed no significant findings. Suspect cardiogenic shock.      Neuro:  Patients neurologic state is improving. Alert, follows commands, makes eye contact, disoriented x 4. No focal deficits.    Cardiac:  Patient is hemodynamically improving. Off all inotropic gtts. Start low dose metoprolol and amlodipine.   Respiratory: Good sats on low flow nasal cannula. Continue IS. Continue pulmonary toilet. Wean to room air.    GI:  Continue enteral tube feedings at this time r/t disorientation and aspiration risk. Discontinue laxatives. Start metamucil BID, patient has had multiple loose stools.   Renal: Good urine output. Cr 0.8. K 3.6. Mag 2.1. Replace K. Replace Mag. Continue IV diuresis.   Hema:  Hct 26.4  Platelet 45. Hold aspirin. Hold Plavix. HIT panel will be resent as the patient  is a high suspicion for HIT despite previous negative HIT panel. Multiple DVTs from the right IJ down to the brachiocephalic vein. Confirmed via vascular US. Patient started on argatroban yesterday, goal is APTT 2x baseline. Most recent APTT 97.8. Baseline APTT 50.8.  Hematology following. Vascular surgery following.   Id:  WBC is down to 12. Tmax 100.4. BC NGTD. Urinalysis negative. Sputum culture negative. Vancomycin and zosyn day 4. Midline sternal incision appears improved, with minimal redness and induration at skin edges. Will continue to follow closely.    Endocrine:  Glucose controlled on sliding scale insulin.   Activities: Increase activities as tolerated.  Musculoskeletal: ROM 2/5 to the right wrist/hand/fingers, distal from the elbow there is 2+ non pitting edema, the extremity is tender and warm. There is mottling, a dusky appearance, and purple discoloration to the fingertips of the right hand. See above hematology plan.      Lines tubes and drains:  Continue PICC line. Continue storm catheter. Continue NG tube.   Plan: Remain ICU     11/09/2019  The patient is postop day 9.  Status post coronary artery bypass grafting x3.  Postop course was complicated by an episode of hemodynamic instability and decompensation on postop day number 5.  Patient continues to make clinical progress since then.  She is off all drips.  She remains extubated.  Continue ICU care for now.    Neuro:  Patient is awake alert.  She is oriented x2.  She is still confused.  But she is making progress.  Her neuro exam is nonfocal.  She follows all commands.  Cardiac:  Patient continues to be hemodynamically stable.  She is somewhat hypertensive.  Will increase her antihypertensives.  Respiratory:  Patient is good sats on nasal cannula.  She remains extubated.  GI:  Patient was tolerating tube feeds until this morning.  She pulled heart feeding tube. Will get a swallowing evaluation.  Mode of nutrition will depend on results of  swallowing evaluation.  Renal:  Patient has good urine output %period% creatinine is 0.9.  Heme:  Hematocrit is 26 platelet is 46.  Patient is on argatroban  for anticoagulation due to right upper extremity DVTs.  Hypercoagulability workup is in progress.  Appreciate Hematology and vascular surgery input.  Id:  Incision continues to improve.  white count is 17.  Continue broad-spectrum antibiotics.  Endocrine:  Glucose is controlled on its sliding scale insulin.  Activities:  Advance activities as tolerated.  Line tubes and drains.  Patient has a PICC line, Jones catheter.    11/10/2019  The patient is postop day 10.  Status post coronary bypass grafting x3.  Postop course was complicated by an episode of hemodynamic instability and decompensation on postop day 5.  Patient remains hemodynamically stable.    Neuro:  The patient is awake and alert.  She is oriented x1.  She continues to be confused.  Her neuro exam is nonfocal.  Cardiac:  Patient continues to be hemodynamically stable.  His she is hypertensive.  Hypertensive medications will be increased.  Respiratory:  Patient has good sats on nasal cannula.  Continue pulmonary toilet.  GI:  Patient is tolerating a regular diet.  Renal:  Patient has good urine output. Creatinine is 0.7.  Heme: hematocrit is 24 and platelet is 67.  Patient continues on argatroban for right upper extremity DVTs.  Hypercoagulable workup is in progress.  Id:  Patient's white count is up to 20.  Patient is on broad prepped from antibiotics.  Endocrine:  The patient is on long-acting as well as sliding scale insulin.  Blood sugars have been well controlled.  Will decrease dose of long-acting.  Activities: patient has been out of bed to chair.  Advance as tolerated.  Musculoskeletal:  Patient's right upper extremity continues to be swollen  The index finger and thumb continues to be dusky with a bleb near the base of the thumb.  Continue argatroban for anticoagulation.  Continue elevation  of right upper extremity.  Lines tubes and drains: patient has a PICC line.  Will discontinue Jones catheter.        11/11/2019  The patient is post-operative day 11, status-post coronary artery bypass grafting x 3. Post-operative course was complicated by an episode of hemodynamic instability and decompensation on postop day 5.  Patient remains hemodynamically stable.    Neuro:  Patients neurologic state is improving. Oriented x 2. Alert, follows commands, makes eye contact. No focal deficits.    Cardiac:  Patient is hemodynamically improving. Hypertensive. Continue metoprolol, amlodipine, and losartan. Add chlorthalidone.   Respiratory: Good sats on room air. Continue IS. Continue pulmonary toilet.  GI:  Continue cardiac/diabetic diet, modified by puree and thick liquids with swallow precautions. Advance as tolerated. Continue metamucil.  Renal: Good urine output. Cr 0.8. K 3.8. Replace K. Continue IV diuresis. Add chlorthalidone. BMP at noon.   Hema:  Hct 23.8  Platelet 75. Hold aspirin. Hold Plavix. Repeat HIT panel pending. Multiple DVTs from the right IJ down to the brachiocephalic vein. Confirmed via vascular US. Patient on argatroban gtt, goal is APTT 2x baseline. Most recent APTT 83.3. Baseline APTT 50.8.  Hematology following. Vascular surgery following.   Id:  WBC is down to 19. Tmax 98.4. BC NGTD. Vancomycin and zosyn day 7. Discontinue broad spectrum antibiotics. Will continue to follow closely.    Endocrine:  Glucose controlled on sliding scale insulin and long acting insulin detemir.    Activities: Increase activities as tolerated.  Musculoskeletal: The right upper extremity exhibits swelling distal from the elbow. The RUE is tender to palpation and ROM. ROM is diminished to the right hand 2/5. The hand his reddened, swollen, with dusky, mottled, purple discoloration to the right thumb and tip of the right index finger. Blisters have formed on the proximal portion of the thumb and wrist. Severely  diminished capillary refill to the tip of the right thumb and tip of the right index finger. Wound care order placed.      Lines tubes and drains:  Continue PICC line.  Plan: Remain ICU         11/12/2019  The patient is post-operative day 12, status-post coronary artery bypass grafting x 3. Post-operative course was complicated by an episode of hemodynamic instability and decompensation on postop day 5.  Patient remains hemodynamically stable.    Neuro:  Patients neurologic state is improving. Oriented to person, place, situation. Disoriented to time. Alert, follows commands, makes eye contact. No focal deficits.    Cardiac:  Patient is hemodynamically improving. Continue metoprolol, amlodipine, losartan, and chlorthalidone.   Respiratory: Good sats on room air. Continue IS.  GI:  Continue cardiac/diabetic diet, modified by puree and thick liquids with swallow precautions. Advance as tolerated. Continue metamucil.  Renal: Good urine output. Cr 0.8. K 3.9. Replace K. Discontinue lasix. Continue chlorthalidone.  Hema:  Hct 23.1  Platelet 75. Hold aspirin. Hold Plavix. 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 Multiple DVTs from the right IJ down to the brachiocephalic vein. Confirmed via vascular US. Patient on argatroban gtt, goal is APTT 2x baseline. Most recent APTT 84.6. Baseline APTT 50.8.  Hematology following. Vascular surgery following.   Id:  WBC is down to 17.4. A-febrile. Will continue to follow closely.    Endocrine:  Patient had an episode of hypoglycemia last night. Will continue sliding scale insulin. Discontinue long acting insulin. Begin strict calorie count.   Activities: Increase activities as tolerated.  Musculoskeletal: The right upper extremity exhibits swelling distal from the elbow. The RUE is tender to palpation and ROM. ROM is diminished to the right hand 3/5. The hand is reddened, swollen, with dusky, mottled, purple discoloration to the right thumb tip and tip of the right index finger.   Severely diminished capillary refill to the tip of the right thumb and tip of the right index finger. Wound care following. Gauze dressing in place.       Lines tubes and drains:  Continue PICC line.  Plan: Remain ICU. Begin discharge planning for IP rehab.          11/13/2019  The patient is post-operative day 13, status-post coronary artery bypass grafting x 3. Post-operative course was complicated by an episode of hemodynamic instability and decompensation on postop day 5.  Patient remains hemodynamically stable.    Neuro:  Patient is oriented to person, place, situation. Disoriented to time. Alert, follows commands, makes eye contact. No focal deficits.    Cardiac:  Patient is hemodynamically stable. Continue metoprolol, amlodipine, losartan, and chlorthalidone.   Respiratory: Good sats on room air. Continue IS. Use of accessory muscles noted during inspiration. Will increase diuresis.   GI:  Continue cardiac/diabetic diet, modified by puree and thick liquids with swallow precautions. Advance as tolerated. Continue metamucil.  Renal: Good urine output. Cr 0.8. K 4.0. Mag 1.8. Replace Mag. Start lasix IV. Continue chlorthalidone.  Hema:  Hct 23.  Platelet 79. Hold aspirin. Hold Plavix. 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 Multiple DVTs from the right IJ down to the brachiocephalic vein. Confirmed via vascular US. Patient on argatroban gtt, goal is APTT 2x baseline. Most recent APTT 75.6. Baseline APTT 50.8.  Hematology following. Vascular surgery following.   Id:  WBC is down to 12.2. A-febrile. Will continue to follow closely.    Endocrine:  Glucose controlled. Continue sliding scale insulin. Start long acting insulin detemir 5 units daily.     Activities: Increase activities as tolerated.  Musculoskeletal: The right upper extremity exhibits swelling distal from the elbow. The RUE is tender to palpation and ROM. ROM is diminished to the right hand 2/5. The right hand is improving. The dusky, mottled, purple  discoloration is localized to the right thumb tip and tip of the right index finger.  Severely diminished capillary refill to the tip of the right thumb and tip of the right index finger. Wound care following. Gauze dressing in place.    Lines tubes and drains:  Continue PICC line.  Plan: Remain telemetry. Continue discharge planning for IP rehab.          11/14/2019  The patient is post-operative day 14, status-post coronary artery bypass grafting x 3. Post-operative course was complicated by an episode of hemodynamic instability and decompensation on postop day 5.  Patient remains hemodynamically stable.    Neuro:  Patient is oriented to person, place, situation. Disoriented to time. Alert, follows commands, makes eye contact. No focal deficits.    Cardiac:  Patient is hemodynamically stable. Continue metoprolol, amlodipine, losartan, and chlorthalidone.   Respiratory: Good sats on room air. Continue IS. Decreased use of accessory muscles noted during inspiration.   GI:  Continue cardiac/diabetic diet, modified by puree and thick liquids with swallow precautions. Advance as tolerated. Continue metamucil.  Renal: Good urine output. Cr 0.9. K 4.2. Mag 1.9. Replace Mag. Discontinue lasix. Continue chlorthalidone.  Hema:  Hct 21.9.  Platelet 87. Hold aspirin. Hold Plavix. 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 Multiple DVTs from the right IJ down to the brachiocephalic vein. Confirmed via vascular US. As per discussion with hematology and pharmacy, will discontinue argatroban and start apixaban. Transfuse 1 unit PRBC.  Hematology following. Vascular surgery following.   Id:  WBC is down to 11. A-febrile. Will continue to follow closely.    Endocrine:  Continue sliding scale insulin. Increase insulin detemir to 5 units BID.     Activities: Increase activities as tolerated.  Musculoskeletal: The right upper extremity exhibits swelling distal from the elbow. The RUE is tender to palpation and ROM. ROM is diminished to  the right hand 2/5. The right hand is slowly improving. The dusky, mottled, purple discoloration is localized to the right thumb tip and tip of the right index finger.  Severely diminished capillary refill to the tip of the right thumb and tip of the right index finger. Wound care following. Gauze dressing in place.    Lines tubes and drains:  Continue PICC line.  Plan: Remain telemetry. Continue discharge planning for IP rehab.

## 2019-11-14 NOTE — PROGRESS NOTES
[facility]  Cardiology  Progress Note    Patient Name: Mateusz Bates  MRN: [id number]  Admission Date: 10/28/2019  Hospital Length of Stay: 17 days  Code Status: Full Code   Attending Physician: Keny Zaidi MD   Primary Care Physician: Serenity Kilpatrick MD  Expected Discharge Date: 11/13/2019  Principal Problem:Acute deep vein thrombosis (DVT) of right upper extremity    Subjective:   HPI    Ms. Bates is a 69 year old female patient whose current medical conditions include HTN, hyperlipidemia, obesity, DM type II, cerebral microvascular disease, FAHAD, and B12 deficiency who was sent to [facility] ED today from her PCP's office due to chest pain and abnormal EKG. Patient complained of substernal chest tightness/heaviness that radiated to her back on Saturday while at rest. Associated symptoms included orthopnea and SOB. Patient denied any associated nausea, vomiting, diaphoresis, palpitations, near syncope, or syncope. Initial workup in ED revealed troponin of 3.665 and BNP of 1305 and patient subsequently admitted for further evaluation and treatment of NSTEMI. Cardiology consulted to assist with management. Patient seen and examined today while in ED. Feels well at time of exam, denies chest pain. She does report similar symptoms over the past few months but states they never lasted as long and were less intense. She reports compliance with her medications. States her father had history of CABG. Chart reviewed. Repeat troponin pending. EKG reviewed and shows SR with new LBBB. 2D echo pending.      Hospital Course:   Ms. Bates is a 69 year old female patient whose current medical conditions include HTN, hyperlipidemia, obesity, DM type II, cerebral microvascular disease, FAHAD, and B12 deficiency who was sent to [facility] ED today from her PCP's office due to chest pain and abnormal EKG. Patient complained of substernal chest tightness/heaviness that radiated to her back on Saturday while at rest.  Associated symptoms included orthopnea and SOB. Patient denied any associated nausea, vomiting, diaphoresis, palpitations, near syncope, or syncope. Initial workup in ED revealed troponin of 3.665 and BNP of 1305 and patient subsequently admitted for further evaluation and treatment of NSTEMI. Cardiology consulted to assist with management. Patient seen and examined today while in ED. Feels well at time of exam, denies chest pain. She does report similar symptoms over the past few months but states they never lasted as long and were less intense. She reports compliance with her medications. States her father had history of CABG. Chart reviewed. Repeat troponin pending. EKG reviewed and shows SR with new LBBB. 2D echo pending.    10/29/19-Patient seen and examined today, s/p LHC yesterday that showed multivessel CAD. CVT consulted, CABG planned for Thursday. Feels ok. No chest pain or SOB. Labs stable. Being diuresed. 2D echo showed EF of 30-35%, DD.    10/30/19-Patient seen and examined today. Feels well. No complaints. Denies chest pain or SOB. Labs stable. IABP placement scheduled for today.    10/31/19- Patient is s/p CABG x3 with LIMA to LAD reverse saphenous vein graft to the RCA and reverse saphenous vein graft to the ramus. Has been transferred to ICU. Is intubated and sedated. CT x3. IABP at 1:1 still in place. Transfused 3 units of PRBC in the OR and has received additional 3 units in ICU, has received 500cc albumin, epi and milrinone gtt.  H/H 6.9/21.0. Renal function stable. Paced rhythm on monitor     11/1/2019--patient seen and examined in ICU. Extubated o/n, remains on IABP 1:1 today, plans to possibly wean off later today per Dr. Zaidi. Remains on Epi and Milrinone gtt today. Paced on monitor. Labs stable today.     11/2/19-Patient seen and examined today, lying in bed. Feels ok. Complains of post-op pain and fatigue. Labs reviewed. Platelets 47,000. Heme/onc on board.     11/3/19-Patient seen and  examined today, sitting up in chair. Feeling better. Still weak. Labs reviewed. Platelets 89,000.    11/4/19- Patient is POD 4 s/p CABG x3. OOB in chair. Feels better this morning. Pain controlled. Platelets improving since holding Plavix.Vitals stable     11/5/19- Patient is POD 5 s/p CABGx3. Has been transferred to [facility]. Is clammy and generally weak this morning. Glucose and vitals stable. Noted to have bump in WBC. Primary team has been notified by nurse. Platelets 73, but H/H stable     11/6 in ICU, intubated, on epi gtt at 0.15 mcg CI 2.0, PAP 20. Sinus rhythm, H&H and Cr stable. Urine output ok. Improved lactate 1.6. const stent cardiogenic shock    11/7 INTUBATED, WEAN OFF EPI AND CONTINUE AMILIRONE, ON SINU RHYTHM AND CI 3.3, Cr STABLE AND DECENT URINE OUTPUT.     11/8 extubated, off pressors, up to the chair.diffuse DVT on upper extremities, no chest pain,pt is weak, Cr normal    11/9/weak, mild reps. Distress, good urine output. BP high.  RUE venous thrombus/DVT in deep and superficial arm veins. H&H stable Cr normal.      11/10 weakness H&H 7.6/24, BP and HR wnl., good urine output. No chest pain. Right arm swelling and ecchymosis. Pulses ok    11/11/19--Patient seen and examined in ICU, sitting on side of bed. Feels ok today. Continued right arm swelling and ecchymosis.     11/12/19-Patient seen and examined today, lying in bed. Feels ok. Denies chest pain. Admits to some SOB. CXR, showed no effusion or infiltrate. Labs stable, platelets 75,000 H/H 7.3/23.1. Hematology noted reviewed, ROSANNA positive.     11/13/19--Patient seen and examined today, lying in bed. Sleepy on exam today. No chest pain today. Labs reviewed, H/H 7/23, plts 79, INR 4.3, Mag 1.8.     11/14/19--Patient seen and examined in room, lying in bed. Plans for PRBC transfusion today. Labs reviewed, H/H 6.7/21.9.     Interval History: no acute issues noted o/n. Plans for PRBC transfusion today.     Review of Systems   Constitution: Positive  for malaise/fatigue.   HENT: Negative for hearing loss and hoarse voice.    Eyes: Negative for blurred vision and visual disturbance.   Cardiovascular: Negative for chest pain, claudication, dyspnea on exertion, irregular heartbeat, leg swelling, near-syncope, orthopnea, palpitations, paroxysmal nocturnal dyspnea and syncope.   Respiratory: Negative for cough, hemoptysis, shortness of breath, sleep disturbances due to breathing, snoring and wheezing.    Endocrine: Negative for cold intolerance and heat intolerance.   Hematologic/Lymphatic: Does not bruise/bleed easily.   Skin: Positive for color change, nail changes and poor wound healing. Negative for dry skin.   Musculoskeletal: Positive for arthritis and back pain. Negative for joint pain and myalgias.        RUE SWELLING   Gastrointestinal: Negative for bloating, abdominal pain, constipation, nausea and vomiting.   Genitourinary: Negative for dysuria, flank pain, hematuria and hesitancy.   Neurological: Negative for headaches, light-headedness, loss of balance, numbness, paresthesias and weakness.   Psychiatric/Behavioral: Negative for altered mental status.   Allergic/Immunologic: Negative for environmental allergies.     Objective:     Vital Signs (Most Recent):  Temp: 97.7 °F (36.5 °C) (11/14/19 1300)  Pulse: 93 (11/14/19 1322)  Resp: 20 (11/14/19 1322)  BP: 139/70 (11/14/19 1300)  SpO2: 97 % (11/14/19 1322) Vital Signs (24h Range):  Temp:  [97.6 °F (36.4 °C)-98.9 °F (37.2 °C)] 97.7 °F (36.5 °C)  Pulse:  [] 93  Resp:  [18-26] 20  SpO2:  [91 %-99 %] 97 %  BP: (112-164)/(61-86) 139/70     Weight: 76.2 kg (167 lb 15.9 oz)  Body mass index is 31.74 kg/m².     SpO2: 97 %  O2 Device (Oxygen Therapy): nasal cannula    No intake or output data in the 24 hours ending 11/14/19 1456    Lines/Drains/Airways     Peripherally Inserted Central Catheter Line                 PICC Double Lumen 11/07/19 1645 left brachial;left basilic 6 days                Physical Exam    Constitutional: She is oriented to person, place, and time. She appears well-developed and well-nourished. No distress.   HENT:   Head: Normocephalic and atraumatic.   Eyes: Pupils are equal, round, and reactive to light.   Neck: Normal range of motion and full passive range of motion without pain. Neck supple. No JVD present.   Cardiovascular: Normal rate, regular rhythm, S1 normal, S2 normal and intact distal pulses. PMI is not displaced. Exam reveals no distant heart sounds.   No murmur heard.  Pulses:       Radial pulses are 2+ on the right side, and 2+ on the left side.        Dorsalis pedis pulses are 2+ on the right side, and 2+ on the left side.   CABG INCISION HEALING WELL   Pulmonary/Chest: Effort normal and breath sounds normal. No accessory muscle usage. No respiratory distress. She has no decreased breath sounds. She has no wheezes. She has no rales.   Abdominal: Soft. Bowel sounds are normal. She exhibits no distension. There is no tenderness.   Musculoskeletal: Normal range of motion. She exhibits no edema.        Right ankle: She exhibits no swelling.        Left ankle: She exhibits no swelling.   Neurological: She is alert and oriented to person, place, and time.   Skin: Skin is warm and dry. She is not diaphoretic. No cyanosis. Nails show no clubbing.   RUE swelling, right hand ecchymotic with dusky fingernails   Psychiatric: She has a normal mood and affect. Her speech is normal and behavior is normal. Judgment and thought content normal. Cognition and memory are normal.   Nursing note and vitals reviewed.      Significant Labs:   BMP:   Recent Labs   Lab 11/13/19  0401 11/13/19  1415 11/13/19  1627 11/14/19  0528   *  --  456* 329*     --  135* 142   K 4.0  --  4.7 4.2     --  103 106   CO2 22*  --  22* 22*   BUN 16 -- 19 19   CREATININE 0.8  --  1.1 0.9   CALCIUM 8.6*  --  8.9 8.9   MG 1.8 2.0  --  1.9   , CBC   Recent Labs   Lab 11/13/19  0653 11/13/19  1627  11/14/19  0528   WBC 12.22 11.66 11.05   HGB 7.0* 7.0* 6.7*   HCT 23.0* 22.4* 21.9*   PLT 79* 89* 87*    and All pertinent lab results from the last 24 hours have been reviewed.    Significant Imaging: Echocardiogram:   2D echo with color flow doppler:   Results for orders placed or performed during the hospital encounter of 10/28/19   2D echo with color flow doppler   Result Value Ref Range    QEF 30 (A) 55 - 65    Diastolic Dysfunction Yes (A)     Est. PA Systolic Pressure 33.03     Narrative    Date of Procedure: 11/06/2019        TEST DESCRIPTION   Technical Quality: This is a technically challenging study.     General: A catheter is present in the right-sided cardiac chambers.     Aorta: The aortic root is normal in size, measuring 3.7 cm at sinotubular junction and 3.8 cm at Sinuses of Valsalva. The proximal ascending aorta is normal in size, measuring 3.7 cm across.     Left Atrium: The left atrial volume index is normal, measuring 19.22 cc/m2.     Left Ventricle: The left ventricle is normal in size, with an end-diastolic diameter of 3.0 cm, and an end-systolic diameter of 2.5 cm. LV wall thickness is normal, with the septum measuring 2.1 cm and the posterior wall measuring 1.4 cm across. Relative   wall thickness was increased at 0.93, and the LV mass index was increased at 140.2 g/m2 consistent with concentric left ventricular hypertrophy. The following segments were mildly hypokinetic: apical lateral wall.  The following segments were severely hypokinetic: mid anteroseptum, basal anteroseptum, apical septum, mid inferoseptum, basal inferoseptum, basal inferior wall.  Left ventricular systolic function appears moderately depressed. Visually estimated ejection fraction is 30-35%. The LV Doppler derived stroke volume equals 54.0 ccs.     Diastolic indices: E wave velocity 0.9 m/s, E/A ratio 0.8,  msec., E/e' ratio(avg) 14. There is diastolic dysfunction secondary to relaxation abnormality.     Right  Atrium: The right atrium is normal in size, measuring 3.5 cm in length and 2.1 cm in width in the apical view.     Right Ventricle: The right ventricle is normal in size. Global right ventricular systolic function appears normal. Tricuspid annular plane systolic excursion (TAPSE) is 1.3 cm. The estimated PA systolic pressure is 33 mmHg.     Aortic Valve:  The aortic valve is normal in structure. The aortic valve is tri-leaflet in structure. The mean gradient obtained across the aortic valve is 9 mmHg.     Mitral Valve:  The mitral valve is normal in structure. The pressure half time is 53 msec. The calculated mitral valve area is 4.15 cm2.     Tricuspid Valve:  The tricuspid valve is normal in structure.     Pulmonary Valve:  The pulmonic valve is not well seen.     IVC: IVC is normal in size and collapses > 50% with a sniff, suggesting normal right atrial pressure of 3 mmHg.     Intracavitary: There is no evidence of pericardial effusion, intracavity mass, thrombi, or vegetation.         CONCLUSIONS     1 - Concentric hypertrophy.     2 - Wall motion abnormalities.     3 - Moderately depressed left ventricular systolic function (EF 30-35%).     4 - Impaired LV relaxation, normal LAP (grade 1 diastolic dysfunction).     5 - Normal right ventricular systolic function .     6 - The estimated PA systolic pressure is 33 mmHg.             This document has been electronically    SIGNED BY: Charles Matias MD On: 11/06/2019 18:28    and X-Ray: CXR: X-Ray Chest 1 View (CXR):   Results for orders placed or performed during the hospital encounter of 10/28/19   X-Ray Chest 1 View    Narrative    EXAMINATION:  XR CHEST 1 VIEW    CLINICAL HISTORY:  follow up;    TECHNIQUE:  Single frontal view of the chest was performed.    COMPARISON:  11/07/2019    FINDINGS:  Tubes and lines are stable.  [place] catheter is been removed.  Mild atelectasis within the left midlung zone.  No consolidation.  Trace pleural effusions not entirely  "excluded.  No pneumothorax.  Bones demonstrate mild degenerative changes.  Sternotomy wires are intact.  In comparison to the prior study, there is no adverse interval changes      Impression    In comparison to the prior study, there is no adverse interval changes      Electronically signed by: Jh Anne MD  Date:    11/08/2019  Time:    08:14    and X-Ray Chest PA and Lateral (CXR):   Results for orders placed or performed during the hospital encounter of 10/28/19   X-Ray Chest PA And Lateral    Narrative    EXAMINATION:  XR CHEST PA AND LATERAL    CLINICAL HISTORY:  Pre Op;    COMPARISON:  10/28/2019    FINDINGS:  Cardiac silhouette is normal. Aorta demonstrates atherosclerotic disease. The lungs demonstrate no evidence of active disease.  No evidence of pleural effusion or pneumothorax.  Bones appear intact.      Impression    No acute process seen.      Electronically signed by: Jh Anne MD  Date:    10/28/2019  Time:    21:38     Assessment and Plan:       Acute deep vein thrombosis (DVT) of non-extremity vein Right IJ  -Continue Argatroban infusion  -RUE continues to be swollen   -Right hand remains ecchymotic   -Mgmt per CT surgery  Per Vascular Surgery:  "Hypercoagulable/HIT workup in progress, pt started on argatroban  Regarding RUE/central venous thrombus: Unfortunately, given location of clot and her recent major cardiac surgery any attempt at thrombolysis with tPA would be absolutely contraindicated  Open thrombectomy also not option as central clot could not be adequately cleared to allow for any outflow/drainage from the arm     Recommend cont anticoagulation, arm elevation, mild/moderate compression"    11/12/19  -Mgmt as per vascular/heme onc/CVT    11/13  -Mgmt per Vascular/Heme onc/CT surgery    Cardiogenic shock  Post CABG  EF 30%  S/p swan catheter  CI 3.3  Repeat echo today showed EF 30%, septal hypokinesis , compared to prior echo before the cabg, no significant change  ekg " reviewed    11/10/2019  Extubated  Off pressors  Continue Amlodipine and BB/Losartan  Hold Lipitor due to elevated LFT  Continue anticoagulation due to acute DVT  Continue ICU supportive care and ABx    11/11  -Continue BB, ARB, Norvasc  -BP much improved   -Continue supportive care per CT surgery      11/12/19  -Stable  -Continue BB, ARB, Norvasc        Thrombocytopenia  -Mgmt as per heme/onc  -Plavix was on hold, but resumed by primary team   -ASA has been resumed     11/5/19  -consider holding Plavix  -Hematology has been consulted     S/P CABG x 3  -Patient is s/p CABG x3 with LIMA to LAD reverse saphenous vein graft to the RCA and reverse saphenous vein graft to the ramus.  -Continue post CABG supportive therapy  -Continue ASA, Plavix, Statin, BB  -Will continue to follow along     11/1/19  -POD #1 s/p CABG x3  -Continue ASA, Statin, BB  -Remains on Epi and Milrinone gtt today  -Mgmt per CT surgery  -Will follow along    11/2/19  -Recovering s/p CABG x 3  -Continue statin, BB  -Platelets 47,000 this AM; heme/onc on board, ASA held  -Await further rec's    11/3/19  -Stable, progressing post CABG x 3  -Continue statin, BB  -ASA re-started  -Platelets 89,000, heme/onc on board    11/4/19  -Patient slowly improving post CABG x3  -Continue ASA, Statin and BB  -plavix on hold due to thrombocytopenia that is improving   -IS use encouraged and early ambulation with PT  -Will continue to follow along     11/5/19  -Will continue ASA, Statin and BB  -Consider holding Plavix again for drop in plts after resuming by primary team   -Recommend CXR to rule out post op PNA given bump in WBC and cough  -will need to monitor for temp  -ambulate as tolerated and encourage IS use     11/06  Hold ASA, Plavix due to anemia  Hold BB due to cardiogenic shock    11/11/19  -Continue Argatroban infusion  -Continue BB, ARB, IV lasix, Chlorthalidone, Norvasc, NTG paste  -Hold ASA, Plavix due to anemia  -Further mgmt per CT  surgery    11/12/19  -Stable  -Continue BB, ARB, Norvasc, Chlorthalidone, IV Lasix  -Recommend re-starting ASA  -Plavix held due to anemia/thrombocytopenia    11/13/19  -Recommend ASA daily  -Plavix held due to anemia/thrombocytopenia  -Recommend PRBC transfusion, per CT surgery  -Continue BB, ARB, Norvasc, IV lasix, Chlorthalidone    11/14  -Continue current medical mgmt per CT surgery  -Recommend ASA daily  -Agree with PRBC transfusion today    CAD, multiple vessel  -See plan under CABG    Acute combined systolic and diastolic CHF, NYHA class 2  -Presents with ICM/acute combined CHF  -EF 30-35% by 2D echo        Chest pain  -See plan under NSTEMI    Abnormal EKG  -LBBB noted on EKG  -See plan under NSTEMI    NSTEMI (non-ST elevated myocardial infarction)  -Patient presented with intermittent chest tightness/heaviness that radiated to her back since Saturday evening  -Associated with JONES/orthopnea  -Initial troponin 3.665  -EKG showed SR with new LBBB  -Presentation consistent with NSTEMI  -Continue ASA, statin  -Start Toprol XL  -Start heparin gtt  -Check 2D echo  -Continue to trend troponin  -Keep NPO. [facility] today for further evaluation and PCI if indicated. All risks, benefits, and treatment alternatives explained to patient in detail. All questions answered. She has agreed to proceed. Further rec's to follow.    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 All risks, benefits, and treatment alternatives explained to patient in detail. All questions answered. She has agreed to proceed.    10/31/19  -Patient is s/p CABG x3.   See plan for CABG     Hyperlipidemia associated  with type 2 diabetes mellitus  -Continue statin    Obesity (BMI 30.0-34.9)  -Weight loss    Diabetes mellitus type II, uncontrolled  -Mgmt as per hospital medicine        VTE Risk Mitigation (From admission, onward)         Ordered     apixaban tablet 5 mg  2 times daily      11/14/19 0903     argatroban 125 mg in sodium chloride 0.9% 125 mL infusion (conc: 1 mg/mL)  Continuous     Question:  Argatroban Infusion Adjustment (DO NOT MODIFY ANSWER)  Answer:  [email or web address]\epic\Images\Pharmacy\HeparinInfusions\ArgatrobanOMC [id number]    11/13/19 1501     argatroban 125 mg in sodium chloride 0.9% 125 mL infusion (conc: 1 mg/mL)  Continuous     Question:  Argatroban Infusion Adjustment (DO NOT MODIFY ANSWER)  Answer:  [email or web address]\epic\Images\Pharmacy\HeparinInfusions\ArgatrobanOMC [id number]    11/11/19 0128     Reason for no Mechanical VTE Prophylaxis  Once     Question:  Reasons:  Answer:  IV Heparin within 24 hrs. Pre or Post-Op    10/28/19 1414     heparin 25,000 units in dextrose 5% 250 mL (100 units/mL) infusion LOW INTENSITY nomogram - [facility]  Continuous     Question:  Heparin Infusion Adjustment (DO NOT MODIFY ANSWER)  Answer:  \\[email or web address]\epic\Images\Pharmacy\HeparinInfusions\heparin LOW INTENSITY nomogram for [facility] [id number]    10/28/19 1257                KOLE Faulkner  Cardiology  [facility]

## 2019-11-14 NOTE — SUBJECTIVE & OBJECTIVE
Interval History: No acute events overnight.     Review of Systems   Constitutional: Positive for activity change and appetite change. Negative for unexpected weight change.   HENT: Negative.  Negative for trouble swallowing.    Eyes: Negative.    Respiratory: Negative.  Negative for cough, shortness of breath and wheezing.    Cardiovascular: Negative.  Negative for chest pain.   Gastrointestinal: Negative for abdominal distention, abdominal pain, nausea and vomiting.   Skin: Negative.    Neurological: Negative for dizziness and weakness.   All other systems reviewed and are negative.    Objective:     Vital Signs (Most Recent):  Temp: 98.1 °F (36.7 °C) (11/13/19 1553)  Pulse: 97 (11/13/19 1645)  Resp: (!) 26 (11/13/19 1553)  BP: (!) 164/86 (11/13/19 1553)  SpO2: 97 % (11/13/19 1553) Vital Signs (24h Range):  Temp:  [97.4 °F (36.3 °C)-98.2 °F (36.8 °C)] 98.1 °F (36.7 °C)  Pulse:  [] 97  Resp:  [18-26] 26  SpO2:  [92 %-99 %] 97 %  BP: ()/(54-86) 164/86     Weight: 76.2 kg (167 lb 15.9 oz)  Body mass index is 31.74 kg/m².    Intake/Output Summary (Last 24 hours) at 11/13/2019 1913  Last data filed at 11/13/2019 1300  Gross per 24 hour   Intake 1365.54 ml   Output --   Net 1365.54 ml      Physical Exam   Constitutional: She is oriented to person, place, and time. She appears well-developed and well-nourished. No distress.   HENT:   Head: Normocephalic and atraumatic.   Cardiovascular: Normal rate, regular rhythm and normal heart sounds. Exam reveals no gallop and no friction rub.   No murmur heard.  Pulmonary/Chest: Effort normal and breath sounds normal. No stridor. No respiratory distress. She has no wheezes. She has no rales.   Abdominal: Soft. Bowel sounds are normal. She exhibits no distension and no mass. There is no tenderness. There is no guarding.   Musculoskeletal: She exhibits no edema.   Erythema and discoloration of right hand noted, tenderness with movement   Neurological: She is alert and  oriented to person, place, and time.   Skin: She is not diaphoretic.       Significant Labs:   Recent Lab Results       11/13/19  1635   11/13/19  1627   11/13/19  1415   11/13/19  1332   11/13/19  1221        Allens Test       Pass       Anion Gap   10           aPTT     66.3  Comment:  aPTT therapeutic range = 39-69 seconds         Baso #   0.03           Basophil%   0.3           Site       LR       BUN, Bld   19           Calcium   8.9           Chloride   103           CO2   22           Creatinine   1.1           DelSys       Nasal Can       Differential Method   Automated           eGFR if    59           eGFR if non    51  Comment:  Calculation used to obtain the estimated glomerular filtration  rate (eGFR) is the CKD-EPI equation.              Eos #   0.1           Eosinophil%   0.7           FiO2       24       Flow       1       Glucose   456  Comment:  Glucose critical result(s) called and verbal readback obtained from   Madison Guerra RN by TRC 11/13/2019 17:39             Gran # (ANC)   9.6           Gran%   82.6           Hematocrit   22.4           Hemoglobin   7.0           Immature Grans (Abs)   0.35  Comment:  Mild elevation in immature granulocytes is non specific and   can be seen in a variety of conditions including stress response,   acute inflammation, trauma and pregnancy. Correlation with other   laboratory and clinical findings is essential.             Immature Granulocytes   3.0           Lymph #   0.8           Lymph%   6.7           Magnesium     2.0         MCH   29.0           MCHC   31.3           MCV   93           Mode       SPONT       Mono #   0.8           Mono%   6.7           MPV   10.8           nRBC   0           Platelets   89           POC BE       -4       POC HCO3       20.0       POC PCO2       27.1       POC PH       7.476       POC PO2       91       POC SATURATED O2       98       POCT Glucose 361       353     Potassium   4.7            RBC   2.41           RDW   16.8           Sample       ARTERIAL       Sodium   135           WBC   11.66                            11/13/19  1007   11/13/19  0659   11/13/19  0653   11/13/19  0401   11/13/19  0051        Allens Test               Anion Gap       11       aPTT 128.4  Comment:  aPTT therapeutic range = 39-69 seconds   65.7  Comment:  aPTT therapeutic range = 39-69 seconds   75.6  Comment:  aPTT therapeutic range = 39-69 seconds     Baso #     0.02 0.03       Basophil%     0.2 0.2       Site               BUN, Bld       16       Calcium       8.6       Chloride       104       CO2       22       Creatinine       0.8       DelSys               Differential Method     Automated Automated       eGFR if        >60       eGFR if non        >60  Comment:  Calculation used to obtain the estimated glomerular filtration  rate (eGFR) is the CKD-EPI equation.          Eos #     0.2 0.2       Eosinophil%     1.7 1.8       FiO2               Flow               Glucose       221       Gran # (ANC)     9.5 9.8       Gran%     77.7 75.9       Hematocrit     23.0 21.9       Hemoglobin     7.0 6.8       Immature Grans (Abs)     0.34  Comment:  Mild elevation in immature granulocytes is non specific and   can be seen in a variety of conditions including stress response,   acute inflammation, trauma and pregnancy. Correlation with other   laboratory and clinical findings is essential.   0.46  Comment:  Mild elevation in immature granulocytes is non specific and   can be seen in a variety of conditions including stress response,   acute inflammation, trauma and pregnancy. Correlation with other   laboratory and clinical findings is essential.         Immature Granulocytes     2.8 3.6       Lymph #     1.2 1.3       Lymph%     9.8 10.4       Magnesium       1.8       MCH     28.7 28.6       MCHC     30.4 31.1       MCV     94 92       Mode               Mono #     1.0 1.0       Mono%      7.8 8.1       MPV     10.8 10.5       nRBC     0 0       Platelets     79 93       POC BE               POC HCO3               POC PCO2               POC PH               POC PO2               POC SATURATED O2               POCT Glucose   221           Potassium       4.0       RBC     2.44 2.38       RDW     16.7 16.6       Sample               Sodium       137       WBC     12.22 12.88                        11/12/19  2338   11/12/19  2047   11/12/19  1956        Allens Test           Anion Gap           aPTT     63.5  Comment:  aPTT therapeutic range = 39-69 seconds     Baso #           Basophil%           Site           BUN, Bld           Calcium           Chloride           CO2           Creatinine           DelSys           Differential Method           eGFR if            eGFR if non            Eos #           Eosinophil%           FiO2           Flow           Glucose           Gran # (ANC)           Gran%           Hematocrit           Hemoglobin           Immature Grans (Abs)           Immature Granulocytes           Lymph #           Lymph%           Magnesium           MCH           MCHC           MCV           Mode           Mono #           Mono%           MPV           nRBC           Platelets           POC BE           POC HCO3           POC PCO2           POC PH           POC PO2           POC SATURATED O2           POCT Glucose 196 312       Potassium           RBC           RDW           Sample           Sodium           WBC               All pertinent labs within the past 24 hours have been reviewed.    Significant Imaging: I have reviewed all pertinent imaging results/findings within the past 24 hours.

## 2019-11-14 NOTE — ASSESSMENT & PLAN NOTE
-Patient presented with intermittent chest tightness/heaviness that radiated to her back since Saturday evening  -Associated with JONES/orthopnea  -Initial troponin 3.665  -EKG showed SR with new LBBB  -Presentation consistent with NSTEMI  -Continue ASA, statin  -Start Toprol XL  -Start heparin gtt  -Check 2D echo  -Continue to trend troponin  -Keep NPO. [facility] today for further evaluation and PCI if indicated. All risks, benefits, and treatment alternatives explained to patient in detail. All questions answered. She has agreed to proceed. Further rec's to follow.    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 All risks, benefits, and treatment alternatives explained to patient in detail. All questions answered. She has agreed to proceed.    10/31/19  -Patient is s/p CABG x3.   See plan for CABG

## 2019-11-14 NOTE — PT/OT/SLP PROGRESS
Occupational Therapy  Treatment    Mateusz Bates   MRN: [id number]   Admitting Diagnosis: Acute deep vein thrombosis (DVT) of right upper extremity    OT Date of Treatment: 11/14/19   OT Start Time: 1130  OT Stop Time: 1155  OT Total Time (min): 25 min    Billable Minutes:  Self Care/Home Management 12 min and Therapeutic Activity 13 min    General Precautions: Standard, aspiration, fall, sternal  Orthopedic Precautions: N/A  Braces: N/A         Subjective:  Communicated with Nurse Montague and epic chart review prior to session.  Pt found supine in bed and agreeable to tx at this time.  Pain/Comfort  Pain Rating 1: 0/10  Pain Rating Post-Intervention 1: 0/10    Objective:  Patient found with: bed alarm, telemetry, peripheral IV, oxygen     Functional Mobility:  Therapeutic Activities and Exercises:  Pt unable to recall any sternal precautions at this time. Pt performed (R)>(L) Rolling with Min A, supine> sit with Min A, scooted to EOB with Mod A, sit>stand with Mod A, attempted to clean and change brief in standing with Total A, returned to sitting EOB with Mod A. Pt unable to follow commands and maintain sternal precautions, therefore returned to supine with Mod A. Pt performed (R)<>(L) Rolling with Min A in order to be cleaned and changed with Total A, scooted up in bed with Total A.     AM-PAC 6 CLICK ADL   How much help from another person does this patient currently need?   1 = Unable, Total/Dependent Assistance  2 = A lot, Maximum/Moderate Assistance  3 = A little, Minimum/Contact Guard/Supervision  4 = None, Modified [facility]/Independent    Putting on and taking off regular lower body clothing? : 2  Bathing (including washing, rinsing, drying)?: 2  Toileting, which includes using toilet, bedpan, or urinal? : 2  Putting on and taking off regular upper body clothing?: 2  Taking care of personal grooming such as brushing teeth?: 2  Eating meals?: 2  Daily Activity Total Score: 12     AM-PAC Raw Score CMS  ""G-Code Modifier Level of Impairment Assistance   6 % Total / Unable   7 - 8 CM 80 - 100% Maximal Assist   9-13 CL 60 - 80% Moderate Assist   14 - 19 CK 40 - 60% Moderate Assist   20 - 22 CJ 20 - 40% Minimal Assist   23 CI 1-20% SBA / CGA   24 CH 0% Independent/ Mod I       Patient left supine with all lines intact, call button in reach, Nurse Eddi notified and PCT Moni present    ASSESSMENT:  Mateusz Bates is a 69 y.o. female with a medical diagnosis of Acute deep vein thrombosis (DVT) of right upper extremity and presents with  impaired functional mobility and ADLs. Pt will benefit from continued skilled OT in order to address impairments.     Rehab identified problem list/impairments: Rehab identified problem list/impairments: weakness, impaired endurance, gait instability, impaired cognition, decreased lower extremity function, decreased upper extremity function, visual deficits, impaired functional mobilty, impaired self care skills, impaired balance, decreased coordination, decreased safety awareness, pain, edema    Rehab potential is fair.    Activity tolerance: Fair    Discharge recommendations: Discharge Facility/Level of Care Needs: rehabilitation facility, nursing facility, skilled     Barriers to discharge:      Equipment recommendations:       GOALS:   Multidisciplinary Problems     Occupational Therapy Goals        Problem: Occupational Therapy Goal    Goal Priority Disciplines Outcome Interventions   Occupational Therapy Goal     OT, PT/OT Ongoing, Progressing    Description:  Goals to be met by: 11/20/19     Patient will increase functional independence with ADLs by performing:    UE Dressing with Moderate Assistance.  Grooming while seated with Supervision.  Toileting from bedside commode with Moderate Assistance for hygiene and clothing management.   Toilet transfer to bedside commode with Moderate Assistance.  Upper extremity exercise program x10 reps per handout, with assistance as " needed.                       Plan:  Patient to be seen 3 x/week to address the above listed problems via self-care/home management, therapeutic activities, therapeutic exercises  Plan of Care expires: 11/20/19  Plan of Care reviewed with: patient    OT G-codes  Functional Assessment Tool Used: [facility]  Score: 12    Iza Vieyar, PT/OT  11/14/2019

## 2019-11-14 NOTE — SUBJECTIVE & OBJECTIVE
Interval History: No acute events overnight. Transitioning to PO anticoagulation today. Awaiting 1 unit PRBCs transfusion    Oncology Treatment Plan:   [No treatment plan]    Medications:  Continuous Infusions:    Scheduled Meds:   albuterol-ipratropium  3 mL Nebulization Q6H WAKE    amLODIPine  10 mg Oral Daily    apixaban  5 mg Oral BID    chlorhexidine  15 mL Mouth/Throat BID    chlorthalidone  25 mg Oral Daily    ferrous sulfate  325 mg Oral BID    insulin detemir U-100  5 Units Subcutaneous BID    losartan  50 mg Oral Daily    metoprolol tartrate  50 mg Oral BID    nitroGLYCERIN 2% TD oint  0.5 inch Topical (Top) Q12H    pantoprazole  40 mg Oral Daily    psyllium husk  1 packet Oral BID    sodium chloride  2 g Oral Q6H     PRN Meds:sodium chloride, albumin human 5%, albuterol sulfate, Dextrose 10% Bolus, Dextrose 10% Bolus, insulin aspart U-100, iohexol, lactated ringers, magnesium sulfate IVPB, magnesium sulfate IVPB, metoclopramide HCl, ondansetron, potassium chloride in water **AND** potassium chloride in water **AND** potassium chloride in water, traMADol     Review of Systems   Constitutional: Positive for activity change and appetite change. Negative for fatigue and fever.   HENT: Negative for congestion, mouth sores, nosebleeds, sore throat, trouble swallowing and voice change.    Eyes: Negative for photophobia and visual disturbance.   Respiratory: Positive for shortness of breath. Negative for cough, chest tightness and wheezing.    Cardiovascular: Negative for chest pain, palpitations and leg swelling.   Gastrointestinal: Negative for abdominal distention, abdominal pain, constipation, diarrhea, nausea and vomiting.   Genitourinary: Negative for difficulty urinating, dysuria and hematuria.   Musculoskeletal: Negative for arthralgias, back pain and myalgias.   Skin: Positive for color change. Negative for pallor, rash and wound.   Neurological: Positive for weakness. Negative for  dizziness, syncope and headaches.   Hematological: Negative for adenopathy. Does not bruise/bleed easily.   Psychiatric/Behavioral: Positive for confusion. The patient is not nervous/anxious.      Objective:     Vital Signs (Most Recent):  Temp: 98.9 °F (37.2 °C) (11/14/19 0821)  Pulse: 95 (11/14/19 0821)  Resp: 18 (11/14/19 0821)  BP: (!) 143/68 (11/14/19 0821)  SpO2: 99 % (11/14/19 0821) Vital Signs (24h Range):  Temp:  [97.6 °F (36.4 °C)-98.9 °F (37.2 °C)] 98.9 °F (37.2 °C)  Pulse:  [] 95  Resp:  [18-26] 18  SpO2:  [91 %-99 %] 99 %  BP: ()/(58-86) 143/68     Weight: 76.2 kg (167 lb 15.9 oz)  Body mass index is 31.74 kg/m².  Body surface area is 1.81 meters squared.      Intake/Output Summary (Last 24 hours) at 11/14/2019 1110  Last data filed at 11/13/2019 1300  Gross per 24 hour   Intake 436 ml   Output --   Net 436 ml       Physical Exam   Constitutional: She is oriented to person, place, and time. She appears well-developed. She is cooperative. She has a sickly appearance. She appears ill. Nasal cannula in place.   HENT:   Head: Normocephalic.   Right Ear: External ear normal.   Left Ear: External ear normal.   Nose: Nose normal.   Mouth/Throat: Oropharynx is clear and moist.   Eyes: Conjunctivae, EOM and lids are normal. Right eye exhibits no discharge. Left eye exhibits no discharge. No scleral icterus.   Neck: Normal range of motion. No thyroid mass present.   Cardiovascular: Normal rate, regular rhythm and normal heart sounds.   No murmur heard.  Pulmonary/Chest: Tachypnea noted. She has decreased breath sounds. She has wheezes. She has no rhonchi. She has no rales.   Abdominal: Soft. She exhibits no distension. Bowel sounds are decreased. There is no tenderness.   Genitourinary:   Genitourinary Comments: deferred   Musculoskeletal: Normal range of motion. She exhibits edema and tenderness.   Lymphadenopathy:        Head (right side): No submandibular, no preauricular and no posterior auricular  adenopathy present.        Head (left side): No submandibular, no preauricular and no posterior auricular adenopathy present.        Right cervical: No superficial cervical adenopathy present.       Left cervical: No superficial cervical adenopathy present.   Neurological: She is alert and oriented to person, place, and time.   Skin: Skin is warm and dry.        Psychiatric: She has a normal mood and affect. Her speech is normal and behavior is normal. Thought content normal. Cognition and memory are impaired.   Vitals reviewed.      Significant Labs:   BMP:   Recent Labs   Lab 11/13/19  0401 11/13/19  1415 11/13/19  1627 11/14/19  0528   *  --  456* 329*     --  135* 142   K 4.0  --  4.7 4.2     --  103 106   CO2 22*  --  22* 22*   BUN 16  --  19 19   CREATININE 0.8  --  1.1 0.9   CALCIUM 8.6*  --  8.9 8.9   MG 1.8 2.0  --  1.9   , CBC:   Recent Labs   Lab 11/13/19  0653 11/13/19  1627 11/14/19  0528   WBC 12.22 11.66 11.05   HGB 7.0* 7.0* 6.7*   HCT 23.0* 22.4* 21.9*   PLT 79* 89* 87*   , LFTs: No results for input(s): ALT, AST, ALKPHOS, BILITOT, PROT, ALBUMIN in the last 48 hours. and All pertinent labs from the last 24 hours have been reviewed.    Diagnostic Results:  I have reviewed all pertinent imaging results/findings within the past 24 hours.

## 2019-11-14 NOTE — PLAN OF CARE
Pt oriented to self only.  VSS  Pt unable to make needs known.  Pt remained afebrile throughout this shift.   Pt remained free of falls this shift.   Pt denies pain this shift.  Plan of care reviewed. Patient verbalizes understanding.   Pt moving/turing independent. Frequent weight shifting encouraged.  Patient sinus rhythm on monitor.   Bed low, side rails up x 2, wheels locked, call light in reach.   Hourly rounding completed.   Will continue to monitor.

## 2019-11-14 NOTE — PROGRESS NOTES
Attempted to meet with pt to discuss d/c planning, but pt too lethargic to converse.      Followed up with [facility].  Facility originally accepted for SNF, but do to change in medical condition pt is currently back in review with the facility and they may not accept due to inability to meet pt needs.  Pt may need a higher level of care.  Discussed with attending team and NP recommended rehab level.  [facility] may be more appropriate with pt current condition.  Will continue to follow and assess.

## 2019-11-14 NOTE — SUBJECTIVE & OBJECTIVE
Interval History:  11/14/2019  The patient is post-operative day 14.  Status post coronary artery bypass grafting x3.    ROS  Medications:  Continuous Infusions:  Scheduled Meds:   albuterol-ipratropium  3 mL Nebulization Q6H WAKE    amLODIPine  10 mg Oral Daily    apixaban  5 mg Oral BID    chlorhexidine  15 mL Mouth/Throat BID    chlorthalidone  25 mg Oral Daily    ferrous sulfate  325 mg Oral BID    insulin detemir U-100  5 Units Subcutaneous BID    losartan  50 mg Oral Daily    metoprolol tartrate  50 mg Oral BID    nitroGLYCERIN 2% TD oint  0.5 inch Topical (Top) Q12H    pantoprazole  40 mg Oral Daily    psyllium husk  1 packet Oral BID    sodium chloride  2 g Oral Q6H     PRN Meds:sodium chloride, albumin human 5%, albuterol sulfate, Dextrose 10% Bolus, Dextrose 10% Bolus, insulin aspart U-100, iohexol, lactated ringers, magnesium sulfate IVPB, magnesium sulfate IVPB, metoclopramide HCl, ondansetron, potassium chloride in water **AND** potassium chloride in water **AND** potassium chloride in water, traMADol     Objective:     Vital Signs (Most Recent):  Temp: 98.9 °F (37.2 °C) (11/14/19 0821)  Pulse: 95 (11/14/19 0821)  Resp: 18 (11/14/19 0821)  BP: (!) 143/68 (11/14/19 0821)  SpO2: 99 % (11/14/19 0821) Vital Signs (24h Range):  Temp:  [97.6 °F (36.4 °C)-98.9 °F (37.2 °C)] 98.9 °F (37.2 °C)  Pulse:  [] 95  Resp:  [18-26] 18  SpO2:  [91 %-99 %] 99 %  BP: ()/(58-86) 143/68     Weight: 76.2 kg (167 lb 15.9 oz)  Body mass index is 31.74 kg/m².    SpO2: 99 %  O2 Device (Oxygen Therapy): nasal cannula    Intake/Output - Last 3 Shifts       11/12 0700 - 11/13 0659 11/13 0700 - 11/14 0659 11/14 0700 - 11/15 0659    P.O. 480 896     I.V. (mL/kg) 763.9 (10.4)      IV Piggyback       Total Intake(mL/kg) 1243.9 (16.9) 896 (11.8)     Net +1243.9 +896            Urine Occurrence 8 x 4 x     Stool Occurrence 4 x 1 x           Lines/Drains/Airways     Peripherally Inserted Central Catheter Line                  PICC Double Lumen 11/07/19 1645 left brachial;left basilic 6 days                Physical Exam   Constitutional: She appears well-developed and well-nourished. No distress.   HENT:   Head: Normocephalic and atraumatic.   Eyes: Pupils are equal, round, and reactive to light. EOM are normal.   Neck: Normal range of motion. Neck supple. No JVD present.   Cardiovascular: Normal rate, regular rhythm, normal heart sounds and intact distal pulses. Exam reveals no gallop and no friction rub.   No murmur heard.  Pulmonary/Chest: Effort normal and breath sounds normal. No stridor. No respiratory distress. She has no wheezes. She has no rales. She exhibits no tenderness.   Abdominal: Soft. Bowel sounds are normal. She exhibits no distension and no mass. There is no tenderness. There is no rebound and no guarding. No hernia.   Musculoskeletal: She exhibits no edema, tenderness or deformity.   RUE exam unchanged from yesterday. Dusky, mottled, discoloration is localized to the tip of the right thumb and the tip of the right index finger. Redness and warmth with +edema and blistering to the right hand. ROM limited to the right hand 2/5.   Neurological: She is alert. She displays normal reflexes. No cranial nerve deficit or sensory deficit. She exhibits normal muscle tone. Coordination normal.   Oriented to person, place, situation. Disoriented to time.    Skin: Skin is warm and dry. Capillary refill takes less than 2 seconds. Right thumb tip and right index finger tip are unblanchable. . No rash noted. She is not diaphoretic. There is erythema. No pallor.   See musculoskeletal exam. Surgical incisions CDI.    Psychiatric: She has a normal mood and affect. Her behavior is normal. Judgment normal.   Slow to respond to questions.    Nursing note and vitals reviewed.      Significant Labs:  BMP:   Recent Labs   Lab 11/14/19  0528   *      K 4.2      CO2 22*   BUN 19   CREATININE 0.9   CALCIUM 8.9   MG 1.9      CBC:   Recent Labs   Lab 11/14/19  0528   WBC 11.05   RBC 2.31*   HGB 6.7*   HCT 21.9*   PLT 87*   MCV 95   MCH 29.0   MCHC 30.6*       Significant Diagnostics:  I have reviewed all pertinent imaging results/findings within the past 24 hours.

## 2019-11-14 NOTE — ASSESSMENT & PLAN NOTE
-Hemoglobin A1C was 9 on 9/19/19.   -NISS.   -ADA diet.   11/2  Levemir / NISS / Accuchecks  11/3  Levemir increased to 15 sq bid  NISS  Accuchecks  11/4  Levemir  NISS  Accuchecks  11/5  Levemir 10 units BID  NISS  Accuchecks    11/6  UnControlled  Accuchecks  Insulin Gtt  Improved control  Last BS 99    11/7  Uncontrolled  Accuchecks  NISS    11/8  Uncontrolled  NISS  Accuchecks    11/9:  Glucose elevated   Takes 45 units of toujeo at home  Will start levemir 20 units qhs   Cont to monitor glucose   HSSI    11/11:  levemir 15 units daily  If need to increase can go to levemir 10 units bid     11/12:  Glucose better controlled  Cont levemir 15 units daily   HSSI    11/13:  Glucose elevated today  Insulin regimen changed by primary  Will not interfere with primary team

## 2019-11-14 NOTE — PROGRESS NOTES
[facility] Hospital Medicine  Progress Note    Patient Name: Mateusz Bates  MRN: [id number]  Patient Class: IP- Inpatient   Admission Date: 10/28/2019  Length of Stay: 16 days  Attending Physician: Keny Zaidi MD  Primary Care Provider: Serenity Kilpatrick MD        Subjective:     Principal Problem:Acute deep vein thrombosis (DVT) of right upper extremity        HPI:  Mateusz Bates is a 69 year old female with DM II, hypertension and hyperlipidemia who presented to the ED at the request of her PCP due to finding of an abnormal EKG and complaints of chest tightness with SOB. The patient reports having intermittent symptoms for the last 1-2 months. On 10/26/19, she began having a tightness in her mid chest that radiated into her back with associated SOB. Symptoms were worse with laying flat and improved with sitting up. They lasted only minutes, but this was longer than previous episodes. She denies a trend in when these episodes occur as well as associated nausea, vomiting and diaphoresis. The patient's EKG was significant for a new LBBB compared to previous in 2016. Labs were significant for a BNP of 1,305, troponin of 3.665 and potassium of 3.4. Code status was discussed with the patient and her sister. She is a full code. Her sister, Christina Hernandez, is her surrogate medical decision maker.     Overview/Hospital Course:  Admitted for evaluation and treatment of chest pain concerning for acute coronary syndrome.  Evaluation by Cardiology and urgent left heart catheterization.  Angiogram discovered severe multi-vessel coronary artery disease.  Cardiac echo showed severely reduced LV function.  Referral to Cardiothoracic Surgery who recommended CABG.  Balloon pump placed 30 October.  CABG x3 by Dr. Zaidi 31 October.  Successfully extubated morning of 01 November.  11/2 - Patient improved s/p extubation. CABG x 3. Discussed with CC Team. BS mild elevation. SA and LA insulin initiated.  11/3 -  Improved BS control. Patient + chest tenderness. Tolerating diet. Discussed with cc team  11/4 - Patient with improved strength interactivity. Patient denies n/v/d + Sx site tenderness. Patient reports she is weakened.  11/5 - Patient denies CP. Reports she is weak. Denies SOB. Patient lethargic and clammy sitting in chair at bedside. Discussed with care team. Patient to return to bed with warming blankets, Blood Sugar 88 last check.   11/6 - Patient remains intubated R Hand cyanotic. Patient discussed with CC Team / CV sx.  11/7 - Patient in weaning trial. Extubation attempt this AM. R Hand improved blood flow - warmer to touch. Discussed with CC Team  11/8 - Patient improved Alert and appropriate. R Hand with improved blood flow / warmer.   11/9 - pulled NG tube out overnight. Will await swallow study.   11/10 - ST recommended pureed with thin liquids  11/12 - stepped down from icu       Interval History: No acute events overnight.     Review of Systems   Constitutional: Positive for activity change and appetite change. Negative for unexpected weight change.   HENT: Negative.  Negative for trouble swallowing.    Eyes: Negative.    Respiratory: Negative.  Negative for cough, shortness of breath and wheezing.    Cardiovascular: Negative.  Negative for chest pain.   Gastrointestinal: Negative for abdominal distention, abdominal pain, nausea and vomiting.   Skin: Negative.    Neurological: Negative for dizziness and weakness.   All other systems reviewed and are negative.    Objective:     Vital Signs (Most Recent):  Temp: 98.1 °F (36.7 °C) (11/13/19 1553)  Pulse: 97 (11/13/19 1645)  Resp: (!) 26 (11/13/19 1553)  BP: (!) 164/86 (11/13/19 1553)  SpO2: 97 % (11/13/19 1553) Vital Signs (24h Range):  Temp:  [97.4 °F (36.3 °C)-98.2 °F (36.8 °C)] 98.1 °F (36.7 °C)  Pulse:  [] 97  Resp:  [18-26] 26  SpO2:  [92 %-99 %] 97 %  BP: ()/(54-86) 164/86     Weight: 76.2 kg (167 lb 15.9 oz)  Body mass index is 31.74  kg/m².    Intake/Output Summary (Last 24 hours) at 11/13/2019 1913  Last data filed at 11/13/2019 1300  Gross per 24 hour   Intake 1365.54 ml   Output --   Net 1365.54 ml      Physical Exam   Constitutional: She is oriented to person, place, and time. She appears well-developed and well-nourished. No distress.   HENT:   Head: Normocephalic and atraumatic.   Cardiovascular: Normal rate, regular rhythm and normal heart sounds. Exam reveals no gallop and no friction rub.   No murmur heard.  Pulmonary/Chest: Effort normal and breath sounds normal. No stridor. No respiratory distress. She has no wheezes. She has no rales.   Abdominal: Soft. Bowel sounds are normal. She exhibits no distension and no mass. There is no tenderness. There is no guarding.   Musculoskeletal: She exhibits no edema.   Erythema and discoloration of right hand noted, tenderness with movement   Neurological: She is alert and oriented to person, place, and time.   Skin: She is not diaphoretic.       Significant Labs:   Recent Lab Results       11/13/19  1635   11/13/19  1627   11/13/19  1415   11/13/19  1332   11/13/19  1221        Allens Test       Pass       Anion Gap   10           aPTT     66.3  Comment:  aPTT therapeutic range = 39-69 seconds         Baso #   0.03           Basophil%   0.3           Site       LR       BUN, Bld   19           Calcium   8.9           Chloride   103           CO2   22           Creatinine   1.1           DelSys       Nasal Can       Differential Method   Automated           eGFR if    59           eGFR if non    51  Comment:  Calculation used to obtain the estimated glomerular filtration  rate (eGFR) is the CKD-EPI equation.              Eos #   0.1           Eosinophil%   0.7           FiO2       24       Flow       1       Glucose   456  Comment:  Glucose critical result(s) called and verbal readback obtained from   Madison Guerra RN by TRC 11/13/2019 17:39             Gran #  (ANC)   9.6           Gran%   82.6           Hematocrit   22.4           Hemoglobin   7.0           Immature Grans (Abs)   0.35  Comment:  Mild elevation in immature granulocytes is non specific and   can be seen in a variety of conditions including stress response,   acute inflammation, trauma and pregnancy. Correlation with other   laboratory and clinical findings is essential.             Immature Granulocytes   3.0           Lymph #   0.8           Lymph%   6.7           Magnesium     2.0         MCH   29.0           MCHC   31.3           MCV   93           Mode       SPONT       Mono #   0.8           Mono%   6.7           MPV   10.8           nRBC   0           Platelets   89           POC BE       -4       POC HCO3       20.0       POC PCO2       27.1       POC PH       7.476       POC PO2       91       POC SATURATED O2       98       POCT Glucose 361       353     Potassium   4.7           RBC   2.41           RDW   16.8           Sample       ARTERIAL       Sodium   135           WBC   11.66                            11/13/19  1007   11/13/19  0659   11/13/19  0653   11/13/19  0401   11/13/19  0051        Allens Test               Anion Gap       11       aPTT 128.4  Comment:  aPTT therapeutic range = 39-69 seconds   65.7  Comment:  aPTT therapeutic range = 39-69 seconds   75.6  Comment:  aPTT therapeutic range = 39-69 seconds     Baso #     0.02 0.03       Basophil%     0.2 0.2       Site               BUN, Bld       16       Calcium       8.6       Chloride       104       CO2       22       Creatinine       0.8       DelSys               Differential Method     Automated Automated       eGFR if        >60       eGFR if non        >60  Comment:  Calculation used to obtain the estimated glomerular filtration  rate (eGFR) is the CKD-EPI equation.          Eos #     0.2 0.2       Eosinophil%     1.7 1.8       FiO2               Flow               Glucose       221       Gran  # (ANC)     9.5 9.8       Gran%     77.7 75.9       Hematocrit     23.0 21.9       Hemoglobin     7.0 6.8       Immature Grans (Abs)     0.34  Comment:  Mild elevation in immature granulocytes is non specific and   can be seen in a variety of conditions including stress response,   acute inflammation, trauma and pregnancy. Correlation with other   laboratory and clinical findings is essential.   0.46  Comment:  Mild elevation in immature granulocytes is non specific and   can be seen in a variety of conditions including stress response,   acute inflammation, trauma and pregnancy. Correlation with other   laboratory and clinical findings is essential.         Immature Granulocytes     2.8 3.6       Lymph #     1.2 1.3       Lymph%     9.8 10.4       Magnesium       1.8       MCH     28.7 28.6       MCHC     30.4 31.1       MCV     94 92       Mode               Mono #     1.0 1.0       Mono%     7.8 8.1       MPV     10.8 10.5       nRBC     0 0       Platelets     79 93       POC BE               POC HCO3               POC PCO2               POC PH               POC PO2               POC SATURATED O2               POCT Glucose   221           Potassium       4.0       RBC     2.44 2.38       RDW     16.7 16.6       Sample               Sodium       137       WBC     12.22 12.88                        11/12/19  2338   11/12/19  2047   11/12/19  1956        Allens Test           Anion Gap           aPTT     63.5  Comment:  aPTT therapeutic range = 39-69 seconds     Baso #           Basophil%           Site           BUN, Bld           Calcium           Chloride           CO2           Creatinine           DelSys           Differential Method           eGFR if            eGFR if non            Eos #           Eosinophil%           FiO2           Flow           Glucose           Gran # (ANC)           Gran%           Hematocrit           Hemoglobin           Immature Grans (Abs)            Immature Granulocytes           Lymph #           Lymph%           Magnesium           MCH           MCHC           MCV           Mode           Mono #           Mono%           MPV           nRBC           Platelets           POC BE           POC HCO3           POC PCO2           POC PH           POC PO2           POC SATURATED O2           POCT Glucose 196 312       Potassium           RBC           RDW           Sample           Sodium           WBC               All pertinent labs within the past 24 hours have been reviewed.    Significant Imaging: I have reviewed all pertinent imaging results/findings within the past 24 hours.      Assessment/Plan:      * Acute deep vein thrombosis (DVT) of right upper extremity  11/9:  Cont argatroban infusion       11/12:  Cont argatroban infusion      Cardiogenic shock        Thrombocytopenia  11/2  Heme On on Consult  HIT panel  Likely drug induced  Monitor  11/3  Improved at 89 today  Monitor  11/5  Plts 73  Monitor  11/8  Argatroban  Heme Onc  Plts 45    11/9:  plt stable today, no indication for transfusion  Cont to monitor     11/10:  Improving    11/11:  Stable, 75k today     11/12:  Stable     S/P CABG x 3  11/4  CABG x 3  ASA  Statin  BB  CV sx    11/6  CV sx  Statin      11/7  Per CV sx    11/9-11/10  Management per cardiovascular surgery     CAD, multiple vessel        Acute combined systolic and diastolic CHF, NYHA class 2  Probable ischemic cardiomyopathy.  Anticipate CABG 31 October.  Optimize medical management.    NSTEMI (non-ST elevated myocardial infarction)  Troponin peaked at 3.66 now trending down.  Heparin, metoprolol, ASA and statin. Hold ACE inhibitor due to intolerance with cough. Hold ARB as patient's blood pressure is marginal. Will consider adding, if her blood pressure will tolerate.  Hold second antiplatelet due to pending Cardiology procedure.  Patient was evaluated for Cardiac Rehab and is not a candidate at this time, but will be referred,  when appropriate.  LHC on 28 on October showed severe multi-vessel CAD.  Referral to CVT Surgery who recommended CABG planned for Thursday 31 October.  Balloon pump placed 30 October.  CABG x3 by Dr. Zaidi on 31 October.  11/2  S/p CABG  Cards  CT Sx  Statin  BB      Iron deficiency anemia  11/8  Hgb 8.6 today  FeSO4    11/9:  Anemia stable     11/10:  Will start po ferrous sulfate  Iron sat 10%   Continue to monitor  Transfuse if primary team deems appropriate     11/11:  H/H stable  Cont iron  Continue to monitor H/H     11/12:  H/H 7.3/23, cont to monitor  Cont PO iron      Hyperlipidemia associated with type 2 diabetes mellitus  -Patient was improved, but not at goal on 10/4/19.   -Continue statin.       Obesity (BMI 30.0-34.9)  Diet  Nutrition        Diabetes mellitus type II, uncontrolled  -Hemoglobin A1C was 9 on 9/19/19.   -NISS.   -ADA diet.   11/2  Levemir / NISS / Accuchecks  11/3  Levemir increased to 15 sq bid  NISS  Accuchecks  11/4  Levemir  NISS  Accuchecks  11/5  Levemir 10 units BID  NISS  Accuchecks    11/6  UnControlled  Accuchecks  Insulin Gtt  Improved control  Last BS 99    11/7  Uncontrolled  Accuchecks  NISS    11/8  Uncontrolled  NISS  Accuchecks    11/9:  Glucose elevated   Takes 45 units of toujeo at home  Will start levemir 20 units qhs   Cont to monitor glucose   HSSI    11/11:  levemir 15 units daily  If need to increase can go to levemir 10 units bid     11/12:  Glucose better controlled  Cont levemir 15 units daily   HSSI    11/13:  Glucose elevated today  Insulin regimen changed by primary  Will not interfere with primary team     Anxiety and depression  -Stable.   -Continue Effexor.         VTE Risk Mitigation (From admission, onward)         Ordered     apixaban tablet 5 mg  2 times daily      11/13/19 1508     argatroban 125 mg in sodium chloride 0.9% 125 mL infusion (conc: 1 mg/mL)  Continuous      11/13/19 1501     argatroban 125 mg in sodium chloride 0.9% 125 mL infusion  (conc: 1 mg/mL)  Continuous      11/11/19 0128     Reason for no Mechanical VTE Prophylaxis  Once      10/28/19 1414     heparin 25,000 units in dextrose 5% 250 mL (100 units/mL) infusion LOW INTENSITY nomogram - [facility]  Continuous      10/28/19 1257              Medical conditions stable. Will sign off as to not adjust primary plan on chronic medical conditions. Please re-consult if necessary. Thank you for the consult.         King Concepcion MD  Department of Hospital Medicine   [facility]

## 2019-11-14 NOTE — ASSESSMENT & PLAN NOTE
-Patient is s/p CABG x3 with LIMA to LAD reverse saphenous vein graft to the RCA and reverse saphenous vein graft to the ramus.  -Continue post CABG supportive therapy  -Continue ASA, Plavix, Statin, BB  -Will continue to follow along     11/1/19  -POD #1 s/p CABG x3  -Continue ASA, Statin, BB  -Remains on Epi and Milrinone gtt today  -Mgmt per CT surgery  -Will follow along    11/2/19  -Recovering s/p CABG x 3  -Continue statin, BB  -Platelets 47,000 this AM; heme/onc on board, ASA held  -Await further rec's    11/3/19  -Stable, progressing post CABG x 3  -Continue statin, BB  -ASA re-started  -Platelets 89,000, heme/onc on board    11/4/19  -Patient slowly improving post CABG x3  -Continue ASA, Statin and BB  -plavix on hold due to thrombocytopenia that is improving   -IS use encouraged and early ambulation with PT  -Will continue to follow along     11/5/19  -Will continue ASA, Statin and BB  -Consider holding Plavix again for drop in plts after resuming by primary team   -Recommend CXR to rule out post op PNA given bump in WBC and cough  -will need to monitor for temp  -ambulate as tolerated and encourage IS use     11/06  Hold ASA, Plavix due to anemia  Hold BB due to cardiogenic shock    11/11/19  -Continue Argatroban infusion  -Continue BB, ARB, IV lasix, Chlorthalidone, Norvasc, NTG paste  -Hold ASA, Plavix due to anemia  -Further mgmt per CT surgery    11/12/19  -Stable  -Continue BB, ARB, Norvasc, Chlorthalidone, IV Lasix  -Recommend re-starting ASA  -Plavix held due to anemia/thrombocytopenia    11/13/19  -Recommend ASA daily  -Plavix held due to anemia/thrombocytopenia  -Recommend PRBC transfusion, per CT surgery  -Continue BB, ARB, Norvasc, IV lasix, Chlorthalidone    11/14  -Continue current medical mgmt per CT surgery  -Recommend ASA daily  -Agree with PRBC transfusion today

## 2019-11-14 NOTE — ASSESSMENT & PLAN NOTE
November 13, 2019  --D/c Argatroban   --Avoid heparin  --Eliquis 5 mg PO BID  --Daily CBC  --Check BLE US today. Would like to r/o DVT in the setting of ROSANNA diagnosis  --Continue PO anticoagulation for at least 3 months. She will need to f/u in the Heme-Onc clinic following discharge

## 2019-11-14 NOTE — PT/OT/SLP PROGRESS
Physical Therapy  Treatment    Mateusz Bates   MRN: [id number]   Admitting Diagnosis: Acute deep vein thrombosis (DVT) of right upper extremity    PT Received On: 10/31/19  PT Start Time: 1505     PT Stop Time: 1515    PT Total Time (min): 10 min       Billable Minutes:  Therapeutic Exercise 10    Treatment Type: Treatment  PT/PTA: PT     PTA Visit Number: 1       General Precautions: Standard, fall, aspiration, sternal  Orthopedic Precautions: N/A   Braces: N/A    Spiritual, Cultural Beliefs, Congregation Practices, Values that Affect Care: no    Subjective:  Communicated with NIGHAT Montague prior to session.      Pain/Comfort  Pain Rating 1: 5/10  Location - Side 1: Left  Location 1: foot  Pain Addressed 1: Cessation of Activity, Distraction, Reposition, Other (see comments)(gentle handling)    Objective:   Patient found with: bed alarm, peripheral IV, telemetry, oxygen    Functional Mobility:  Bed Mobility: in bed getting blood       Transfers: n/a       Gait: n/a       Stairs: nt    Balance:   nt today    Therapeutic Activities and Exercises:  PT educated patient on POC, b le aarom x 10 reps and safety/fall precautions with mobility    AM-PAC 6 CLICK MOBILITY  How much help from another person does this patient currently need?   1 = Unable, Total/Dependent Assistance  2 = A lot, Maximum/Moderate Assistance  3 = A little, Minimum/Contact Guard/Supervision  4 = None, Modified [place]/Independent    Turning over in bed (including adjusting bedclothes, sheets and blankets)?: 1  Sitting down on and standing up from a chair with arms (e.g., wheelchair, bedside commode, etc.): 1  Moving from lying on back to sitting on the side of the bed?: 1  Moving to and from a bed to a chair (including a wheelchair)?: 1  Need to walk in hospital room?: 1  Climbing 3-5 steps with a railing?: 1  Basic Mobility Total Score: 6    AM-PAC Raw Score CMS G-Code Modifier Level of Impairment Assistance   6 % Total / Unable   7 - 9 CM 80 -  100% Maximal Assist   10 - 14 CL 60 - 80% Moderate Assist   15 - 19 CK 40 - 60% Moderate Assist   20 - 22 CJ 20 - 40% Minimal Assist   23 CI 1-20% SBA / CGA   24 CH 0% Independent/ Mod I     Patient left HOB elevated with all lines intact, call button in reach, bed alarm on and RN notified.    Assessment:  Mateusz Bates is a 69 y.o. female with a medical diagnosis of Acute deep vein thrombosis (DVT) of right upper extremity and presents with impaired mobility.    Rehab identified problem list/impairments: Rehab identified problem list/impairments: weakness, impaired endurance, impaired self care skills, impaired functional mobilty, gait instability, impaired balance, impaired cognition, decreased lower extremity function, decreased safety awareness, pain, impaired coordination, decreased ROM, impaired cardiopulmonary response to activity, edema, other (comment)(sternal prec)    Rehab potential is fair.    Activity tolerance: Fair    Discharge recommendations: Discharge Facility/Level of Care Needs: rehabilitation facility     Barriers to discharge:      Equipment recommendations: Equipment Needed After Discharge: (tbd)     GOALS:   Multidisciplinary Problems     Physical Therapy Goals        Problem: Physical Therapy Goal    Goal Priority Disciplines Outcome Goal Variances Interventions   Physical Therapy Goal     PT, PT/OT Ongoing, Progressing     Description:  By 11/10/19  1. Patient will perform supine to/from sit CGA x 1 per sternal prec.  2. Patient will perform sit to/from stand CGA a no AD per sternal prec.  3. Patient will amb 100ft no AD min A                     PLAN:    Patient to be seen 5 x/week  to address the above listed problems via gait training, therapeutic activities, therapeutic exercises  Plan of Care expires: 11/18/19  Plan of Care reviewed with: patient         Emigdio Bean, PT  11/14/2019

## 2019-11-14 NOTE — PROGRESS NOTES
[facility] -   Cardiothoracic Surgery  Progress Note    Patient Name: Mateusz Bates  MRN: [id number]  Admission Date: 10/28/2019  Hospital Length of Stay: 17 days  Code Status: Full Code   Attending Physician: Keny Zaidi MD   Referring Provider: Self, Aaareferral  Principal Problem:Acute deep vein thrombosis (DVT) of right upper extremity            Subjective:     Post-Op Info:  Procedure(s) (LRB):  CORONARY ARTERY BYPASS GRAFT (CABG) (N/A)  ECHOCARDIOGRAM,TRANSESOPHAGEAL (N/A)  BLOCK, NERVE, INTERCOSTAL, 2 OR MORE (N/A)   14 Days Post-Op     Interval History:  11/14/2019  The patient is post-operative day 14.  Status post coronary artery bypass grafting x3.    ROS  Medications:  Continuous Infusions:  Scheduled Meds:   albuterol-ipratropium  3 mL Nebulization Q6H WAKE    amLODIPine  10 mg Oral Daily    apixaban  5 mg Oral BID    chlorhexidine  15 mL Mouth/Throat BID    chlorthalidone  25 mg Oral Daily    ferrous sulfate  325 mg Oral BID    insulin detemir U-100  5 Units Subcutaneous BID    losartan  50 mg Oral Daily    metoprolol tartrate  50 mg Oral BID    nitroGLYCERIN 2% TD oint  0.5 inch Topical (Top) Q12H    pantoprazole  40 mg Oral Daily    psyllium husk  1 packet Oral BID    sodium chloride  2 g Oral Q6H     PRN Meds:sodium chloride, albumin human 5%, albuterol sulfate, Dextrose 10% Bolus, Dextrose 10% Bolus, insulin aspart U-100, iohexol, lactated ringers, magnesium sulfate IVPB, magnesium sulfate IVPB, metoclopramide HCl, ondansetron, potassium chloride in water **AND** potassium chloride in water **AND** potassium chloride in water, traMADol     Objective:     Vital Signs (Most Recent):  Temp: 98.9 °F (37.2 °C) (11/14/19 0821)  Pulse: 95 (11/14/19 0821)  Resp: 18 (11/14/19 0821)  BP: (!) 143/68 (11/14/19 0821)  SpO2: 99 % (11/14/19 0821) Vital Signs (24h Range):  Temp:  [97.6 °F (36.4 °C)-98.9 °F (37.2 °C)] 98.9 °F (37.2 °C)  Pulse:  [] 95  Resp:  [18-26] 18  SpO2:   [91 %-99 %] 99 %  BP: ()/(58-86) 143/68     Weight: 76.2 kg (167 lb 15.9 oz)  Body mass index is 31.74 kg/m².    SpO2: 99 %  O2 Device (Oxygen Therapy): nasal cannula    Intake/Output - Last 3 Shifts       11/12 0700 - 11/13 0659 11/13 0700 - 11/14 0659 11/14 0700 - 11/15 0659    P.O. 480 896     I.V. (mL/kg) 763.9 (10.4)      IV Piggyback       Total Intake(mL/kg) 1243.9 (16.9) 896 (11.8)     Net +1243.9 +896            Urine Occurrence 8 x 4 x     Stool Occurrence 4 x 1 x           Lines/Drains/Airways     Peripherally Inserted Central Catheter Line                 PICC Double Lumen 11/07/19 1645 left brachial;left basilic 6 days                Physical Exam   Constitutional: She appears well-developed and well-nourished. No distress.   HENT:   Head: Normocephalic and atraumatic.   Eyes: Pupils are equal, round, and reactive to light. EOM are normal.   Neck: Normal range of motion. Neck supple. No JVD present.   Cardiovascular: Normal rate, regular rhythm, normal heart sounds and intact distal pulses. Exam reveals no gallop and no friction rub.   No murmur heard.  Pulmonary/Chest: Effort normal and breath sounds normal. No stridor. No respiratory distress. She has no wheezes. She has no rales. She exhibits no tenderness.   Abdominal: Soft. Bowel sounds are normal. She exhibits no distension and no mass. There is no tenderness. There is no rebound and no guarding. No hernia.   Musculoskeletal: She exhibits no edema, tenderness or deformity.   RUE exam unchanged from yesterday. Dusky, mottled, discoloration is localized to the tip of the right thumb and the tip of the right index finger. Redness and warmth with +edema and blistering to the right hand. ROM limited to the right hand 2/5.   Neurological: She is alert. She displays normal reflexes. No cranial nerve deficit or sensory deficit. She exhibits normal muscle tone. Coordination normal.   Oriented to person, place, situation. Disoriented to time.    Skin:  Skin is warm and dry. Capillary refill takes less than 2 seconds. Right thumb tip and right index finger tip are unblanchable. . No rash noted. She is not diaphoretic. There is erythema. No pallor.   See musculoskeletal exam. Surgical incisions CDI.    Psychiatric: She has a normal mood and affect. Her behavior is normal. Judgment normal.   Slow to respond to questions.    Nursing note and vitals reviewed.      Significant Labs:  BMP:   Recent Labs   Lab 11/14/19  0528   *      K 4.2      CO2 22*   BUN 19   CREATININE 0.9   CALCIUM 8.9   MG 1.9     CBC:   Recent Labs   Lab 11/14/19  0528   WBC 11.05   RBC 2.31*   HGB 6.7*   HCT 21.9*   PLT 87*   MCV 95   MCH 29.0   MCHC 30.6*       Significant Diagnostics:  I have reviewed all pertinent imaging results/findings within the past 24 hours.    Assessment/Plan:     S/P CABG x 3       11/01/2019  The patient is post-operative day 1, status-post coronary artery bypass grafting x 3.  Overall the patient is making progress, however IABP and inotropic agents will continue today.   Neuro:  The patient is awake alert and oriented x3.  Neuro exam is nonfocal.  Pain is well controlled with IV pain medication.  Cardiac:  Patient is hemodynamically improving. Patient remains on epinephrine and milrinone gtts, which will be weaned over the next 24 hours.  Cardiac index is improving. IABP will remain in place over the next 24 hours.  Patient will be started on beta blockers once gtts are weaned off.  Respiratory: Good sats on high flow nasal cannula. Continue respiratory toilet, continue incentive spirometer. Wean to low flow nasal cannula.  GI:  Diet: sips of water with medications, will slowly advance diet as tolerated. Benign abdominal exam.  Renal:  Urine output is slowly improving. Cr 0.9. K 4.3. Mag 1.6. Replace mag. Continue IV diuresis. Add metolazone.   Heme:  Hct 34.1  Platelet 82. Continue aspirin. Discontinue Plavix for now.  Id:  WBC 12. Tmax 100.3.  Continue post-operative antibiotics. Will continue to follow.   Endocrine:  Glucose is controlled with insulin gtt.  Continue insulin gtt for now, until weaned off inotropic agents.   Activities:  Patient is currently bed bound due to IABP.  Advance activities as tolerated once IABP is removed.   Lines tubes and drains:  Continue IABP. Continue Bruce and Cordis.  Continue A-line.  Chest tubes will continue. NICOLASA x 2 will continue. Jones catheter will continue.  Continue pacer wires. Continue prevena wound vac.  Plan: Remain ICU.          11/02/2019  The patient is post-operative day 2, status-post coronary artery bypass grafting x 3.  Overall the patient is making progress, IABP removed and inotropic agents discontinued.   Neuro:  The patient is awake alert and oriented x3.  Neuro exam is nonfocal.  Pain is well controlled. Discontinue fentanyl.   Cardiac:  Patient is hemodynamically improving. Inotropic gtts discontinued. Excellent cardiac index. IABP discontinued.  Patient will be started on beta blockers today.  Respiratory: Good sats on high flow nasal cannula. Continue respiratory toilet, continue incentive spirometer. Wean to low flow nasal cannula.  GI:  Advance diabetic/cardiac diet as tolerated. Benign abdominal exam.  Renal:  Urine output is improving. Cr 0.9. K 4.9. Mag 2.2. Continue IV diuresis. Hold AM K.   Heme:  Hct 28.3  Platelet 47. Discontinue aspirin. Discontinue Plavix. HIT panel. Consult hematology r/t thrombocytopenia.   Id:  WBC 10.4. Tmax 99.4. Will continue to follow.   Endocrine:  Discontinue insulin gtt. Start sliding scale insulin. Start long acting insulin detemir.   Activities: Advance activities as tolerated.   Lines tubes and drains:  Discontinue IABP. Discontinue Bruce and Cordis.  Discontinue A-line.  Discontinue chest tubes x 3. Discontinue NICOLASA x 2. Discontinue arterial line. Jones catheter will continue for now.  Continue pacer wires. Continue prevena wound vac. Place PICC line.    Plan: Remain ICU.           11/03/2019  The patient is post-operative day 3, status-post coronary artery bypass grafting x 3.  Overall the patient is making progress.  Neuro:  The patient is awake alert and oriented x3.  Neuro exam is nonfocal.  Pain is well controlled. Continue tramadol.   Cardiac:  Patient is hemodynamically stable. Increase metoprolol dose. Start amlodipine.   Respiratory: Good sats on nasal cannula. Continue respiratory toilet, continue incentive spirometer. Wean to room air.  GI:  Advance diabetic/cardiac diet as tolerated. Benign abdominal exam. Passing gas, no BM. Add 1 bottle of mag citrate.   Renal:  Good urine output. Cr 0.9. K 3.8. Mag 2.0. Replace K. Replace Mag. Change to PO diuresis.   Heme:  Hct 34.7  Platelet 89. Restart aspirin. Continue to hold Plavix. HIT panel pending. Hematology following.  Id:  WBC 13.8. Tmax 99.4. Will continue to follow.    Endocrine:  Glucose controlled on sliding scale insulin. Increase long acting insulin detemir dose.   Activities: Advance activities as tolerated.   Lines tubes and drains:  Jones catheter will continue for now.  Continue pacer wires. Continue prevena wound vac. Continue PICC line.   Plan: Remain ICU.          11/04/2019  The patient is post-operative day 4, status-post coronary artery bypass grafting x 3.  Overall the patient is making progress.  Neuro:  The patient is awake alert and oriented x3.  Neuro exam is nonfocal.  Pain is well controlled. Continue tramadol.   Cardiac:  Patient is hemodynamically stable. Continue metoprolol dose. Continue amlodipine dose.   Respiratory: Good sats on room air. Continue respiratory toilet, continue incentive spirometer.  GI:  Advance diabetic/cardiac diet as tolerated. Benign abdominal exam. Regular bowel movements.   Renal:  Good urine output. Cr 0.7. K 4.0. Mag 2.3. Replace K. Decrease PO diuresis.   Heme:  Hct 34.4  Platelet 140. Continue aspirin. Restart Plavix. HIT panel pending. Hematology  following.  Id:  WBC down to 13.13. Tmax 98. Will continue to follow.    Endocrine:  Glucose controlled on sliding scale insulin. Increased long acting insulin detemir dose.   Activities: Advance activities as tolerated.   Lines tubes and drains:  Discontinued storm catheter. Continue pacer wires. Continue prevena wound vac. Continue PICC line.   Plan: Transfer to telemetry. Anticipate discharge to SNF in 48 hours.         11/05/2019  The patient is post-operative day 5, status-post coronary artery bypass grafting x 3.  Overall the patient is making progress.  Neuro:  The patient is awake alert and oriented x3.  Neuro exam is nonfocal. Patient slow to answer questions, however answers are appropriate.  Pain is well controlled. Continue tramadol PRN.   Cardiac:  Patient is hemodynamically stable. Continue metoprolol dose. Discontinue amlodipine. Start losartan.    Respiratory: Good sats on room air. Continue respiratory toilet, continue incentive spirometer.  GI:  Advance diabetic/cardiac diet as tolerated. Benign abdominal exam. Regular bowel movements.   Renal:  Good urine output. Cr 0.7. K 4.8. Mag 2.1. Replace Mag. Discontinue PO diuresis.   Heme:  Hct 39.5  Platelet 73. Continue aspirin. Hold Plavix. HIT panel pending. Hematology following.  Id:  WBC is up to 16. Tmax 99. Pan-culture. DC PICC line. Chest xray. Start broad spectrum empiric antibiotics. Will continue to follow.    Endocrine:  Glucose controlled on sliding scale insulin. Decrease long acting insulin detemir dose.   Activities: Advance activities as tolerated.   Lines tubes and drains:  Discontinue pacer wires. Discontinue prevena wound vac. Continue PICC line.   Plan: Anticipate discharge to SNF in 24-48 hours.         11/06/2019  The patient is post-operative day 6, status-post coronary artery bypass grafting x 3. Patient had an episode of decompensation and unresponsiveness yesterday evening. The patient was found unresponsive, cool, and  diaphoretic, with agonal breathing. Patient was intubated and transferred to the ICU. Bedside EKG and echocardiogram performed at the bedside showed no significant changes. CT head, chest, abdomen pelvis performed, showed no significant findings.     Neuro:  The patient is intubated and sedated. On Precedex gtt. Begin weaning off sedation.    Cardiac:  Patient is hemodynamically improving. Inotropic agent dependent. Currently on Epinephrine gtt. Start Milrinone gtt. Discontinue metoprolol. Discontinue losartan.    Respiratory: Ventilator dependent. tV 450, RR 20, PEEP 5. Wean down sedation. Wean off ventilator today.    GI:  Begin enteral tube feedings. Benign abdominal exam. Previously having regular bowel movements.   Renal: Adequate urine output. Cr 1.1. K 3.6. Mag 1.7. Replace Mag. Replace K. Discontinue diuresis.   Heme:  Hct 31.9  Platelet 89. Hold aspirin. Hold Plavix. HIT panel negative. Patient received 1 units PRBC last night. Hematology following.  Id:  WBC is down to 15.5. Tmax 99.9. BC NGTD. Urinalysis negative. Sputum culture negative. Vancomycin and zosyn day 2. Will continue to follow closely.    Endocrine:  Glucose controlled on insulin gtt, continue at this time.   Activities: Intubated and sedated.   Musculoskeletal: Unable to find right radial pulse via doppler. Tips of the fingers of the right hand are mottled, dusky, appear cyanotic. Recent right radial arterial line in place, which has been relocated. US of arteries and veins of the right arm ordered. Consult to vascular surgery placed.    Lines tubes and drains:  Continue swan and cordis. Continue arterial line. Continue storm.    Plan: Remain ICU         11/07/2019  The patient is post-operative day 7, status-post coronary artery bypass grafting x 3. Patient had an episode of decompensation and unresponsiveness the evening of 11/05/2019. The patient was found unresponsive, cool, and diaphoretic, with agonal breathing. Patient was intubated  and transferred to the ICU. Bedside EKG and echocardiogram performed at the bedside showed no significant changes. CT head, chest, abdomen pelvis performed, showed no significant findings.     Neuro:  The patient is intubated and sedated. On Precedex gtt. Wean off sedation.    Cardiac:  Patient is hemodynamically improving. Excellent cardiac index. Wean off Inotropic gtts.   Respiratory: Wean towards extubation. tV 400, RR 18, PEEP 5.   GI:  Continue enteral tube feedings while intubated. Benign abdominal exam.  Renal: Adequate urine output. Cr 1.1. K 4.1. Mag 2.8. Start IV diuresis.   Heme:  Hct 24.6  Platelet 60. Hold aspirin. Hold Plavix. HIT panel negative.  Id:  WBC is down to 14.46. Tmax 100.8. BC NGTD. Urinalysis negative. Sputum culture negative. Vancomycin and zosyn day 3. Midline sternal incision appears reddened, with induration at skin edges. Will continue to follow closely.    Endocrine:  Glucose controlled on sliding scale insulin.   Activities: Intubated and sedated. Moves all 4. Full ROM.    Musculoskeletal: Able to find right radial pulse via doppler. The right hand extending to the tips of the fingers of the right hand are mottled, dusky, appear cyanotic. Recent right radial arterial line placement, which has been relocated. US of arteries and veins of the right arm pending. Vascular surgery following, suggested possible thrombotic etiology, suggested heparin gtt, however this is contraindicated r/t s/p cabg x 3.     Lines tubes and drains:  Continue swan and cordis. Continue arterial line. Continue storm.    Plan: Remain ICU         11/08/2019  The patient is post-operative day 8, status-post coronary artery bypass grafting x 3. Patient had an episode of decompensation and unresponsiveness the evening of 11/05/2019. The patient was found unresponsive, cool, and diaphoretic, with agonal breathing. Patient was intubated and transferred to the ICU. Bedside EKG and echocardiogram performed at the  bedside showed no significant changes. CT head, chest, abdomen pelvis performed, showed no significant findings. Suspect cardiogenic shock.      Neuro:  Patients neurologic state is improving. Alert, follows commands, makes eye contact, disoriented x 4. No focal deficits.    Cardiac:  Patient is hemodynamically improving. Off all inotropic gtts. Start low dose metoprolol and amlodipine.   Respiratory: Good sats on low flow nasal cannula. Continue IS. Continue pulmonary toilet. Wean to room air.    GI:  Continue enteral tube feedings at this time r/t disorientation and aspiration risk. Discontinue laxatives. Start metamucil BID, patient has had multiple loose stools.   Renal: Good urine output. Cr 0.8. K 3.6. Mag 2.1. Replace K. Replace Mag. Continue IV diuresis.   Hema:  Hct 26.4  Platelet 45. Hold aspirin. Hold Plavix. HIT panel will be resent as the patient is a high suspicion for HIT despite previous negative HIT panel. Multiple DVTs from the right IJ down to the brachiocephalic vein. Confirmed via vascular US. Patient started on argatroban yesterday, goal is APTT 2x baseline. Most recent APTT 97.8. Baseline APTT 50.8.  Hematology following. Vascular surgery following.   Id:  WBC is down to 12. Tmax 100.4. BC NGTD. Urinalysis negative. Sputum culture negative. Vancomycin and zosyn day 4. Midline sternal incision appears improved, with minimal redness and induration at skin edges. Will continue to follow closely.    Endocrine:  Glucose controlled on sliding scale insulin.   Activities: Increase activities as tolerated.  Musculoskeletal: ROM 2/5 to the right wrist/hand/fingers, distal from the elbow there is 2+ non pitting edema, the extremity is tender and warm. There is mottling, a dusky appearance, and purple discoloration to the fingertips of the right hand. See above hematology plan.      Lines tubes and drains:  Continue PICC line. Continue storm catheter. Continue NG tube.   Plan: Remain ICU      11/09/2019  The patient is postop day 9.  Status post coronary artery bypass grafting x3.  Postop course was complicated by an episode of hemodynamic instability and decompensation on postop day number 5.  Patient continues to make clinical progress since then.  She is off all drips.  She remains extubated.  Continue ICU care for now.    Neuro:  Patient is awake alert.  She is oriented x2.  She is still confused.  But she is making progress.  Her neuro exam is nonfocal.  She follows all commands.  Cardiac:  Patient continues to be hemodynamically stable.  She is somewhat hypertensive.  Will increase her antihypertensives.  Respiratory:  Patient is good sats on nasal cannula.  She remains extubated.  GI:  Patient was tolerating tube feeds until this morning.  She pulled heart feeding tube. Will get a swallowing evaluation.  Mode of nutrition will depend on results of swallowing evaluation.  Renal:  Patient has good urine output %period% creatinine is 0.9.  Heme:  Hematocrit is 26 platelet is 46.  Patient is on argatroban  for anticoagulation due to right upper extremity DVTs.  Hypercoagulability workup is in progress.  Appreciate Hematology and vascular surgery input.  Id:  Incision continues to improve.  white count is 17.  Continue broad-spectrum antibiotics.  Endocrine:  Glucose is controlled on its sliding scale insulin.  Activities:  Advance activities as tolerated.  Line tubes and drains.  Patient has a PICC line, Jones catheter.    11/10/2019  The patient is postop day 10.  Status post coronary bypass grafting x3.  Postop course was complicated by an episode of hemodynamic instability and decompensation on postop day 5.  Patient remains hemodynamically stable.    Neuro:  The patient is awake and alert.  She is oriented x1.  She continues to be confused.  Her neuro exam is nonfocal.  Cardiac:  Patient continues to be hemodynamically stable.  His she is hypertensive.  Hypertensive medications will be  increased.  Respiratory:  Patient has good sats on nasal cannula.  Continue pulmonary toilet.  GI:  Patient is tolerating a regular diet.  Renal:  Patient has good urine output. Creatinine is 0.7.  Heme: hematocrit is 24 and platelet is 67.  Patient continues on argatroban for right upper extremity DVTs.  Hypercoagulable workup is in progress.  Id:  Patient's white count is up to 20.  Patient is on broad prepped from antibiotics.  Endocrine:  The patient is on long-acting as well as sliding scale insulin.  Blood sugars have been well controlled.  Will decrease dose of long-acting.  Activities: patient has been out of bed to chair.  Advance as tolerated.  Musculoskeletal:  Patient's right upper extremity continues to be swollen  The index finger and thumb continues to be dusky with a bleb near the base of the thumb.  Continue argatroban for anticoagulation.  Continue elevation of right upper extremity.  Lines tubes and drains: patient has a PICC line.  Will discontinue Jones catheter.        11/11/2019  The patient is post-operative day 11, status-post coronary artery bypass grafting x 3. Post-operative course was complicated by an episode of hemodynamic instability and decompensation on postop day 5.  Patient remains hemodynamically stable.    Neuro:  Patients neurologic state is improving. Oriented x 2. Alert, follows commands, makes eye contact. No focal deficits.    Cardiac:  Patient is hemodynamically improving. Hypertensive. Continue metoprolol, amlodipine, and losartan. Add chlorthalidone.   Respiratory: Good sats on room air. Continue IS. Continue pulmonary toilet.  GI:  Continue cardiac/diabetic diet, modified by puree and thick liquids with swallow precautions. Advance as tolerated. Continue metamucil.  Renal: Good urine output. Cr 0.8. K 3.8. Replace K. Continue IV diuresis. Add chlorthalidone. BMP at noon.   Hema:  Hct 23.8  Platelet 75. Hold aspirin. Hold Plavix. Repeat HIT panel pending. Multiple DVTs  from the right IJ down to the brachiocephalic vein. Confirmed via vascular US. Patient on argatroban gtt, goal is APTT 2x baseline. Most recent APTT 83.3. Baseline APTT 50.8.  Hematology following. Vascular surgery following.   Id:  WBC is down to 19. Tmax 98.4. BC NGTD. Vancomycin and zosyn day 7. Discontinue broad spectrum antibiotics. Will continue to follow closely.    Endocrine:  Glucose controlled on sliding scale insulin and long acting insulin detemir.    Activities: Increase activities as tolerated.  Musculoskeletal: The right upper extremity exhibits swelling distal from the elbow. The RUE is tender to palpation and ROM. ROM is diminished to the right hand 2/5. The hand his reddened, swollen, with dusky, mottled, purple discoloration to the right thumb and tip of the right index finger. Blisters have formed on the proximal portion of the thumb and wrist. Severely diminished capillary refill to the tip of the right thumb and tip of the right index finger. Wound care order placed.      Lines tubes and drains:  Continue PICC line.  Plan: Remain ICU         11/12/2019  The patient is post-operative day 12, status-post coronary artery bypass grafting x 3. Post-operative course was complicated by an episode of hemodynamic instability and decompensation on postop day 5.  Patient remains hemodynamically stable.    Neuro:  Patients neurologic state is improving. Oriented to person, place, situation. Disoriented to time. Alert, follows commands, makes eye contact. No focal deficits.    Cardiac:  Patient is hemodynamically improving. Continue metoprolol, amlodipine, losartan, and chlorthalidone.   Respiratory: Good sats on room air. Continue IS.  GI:  Continue cardiac/diabetic diet, modified by puree and thick liquids with swallow precautions. Advance as tolerated. Continue metamucil.  Renal: Good urine output. Cr 0.8. K 3.9. Replace K. Discontinue lasix. Continue chlorthalidone.  Hema:  Hct 23.1  Platelet 75. Hold  aspirin. Hold Plavix. 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 Multiple DVTs from the right IJ down to the brachiocephalic vein. Confirmed via vascular US. Patient on argatroban gtt, goal is APTT 2x baseline. Most recent APTT 84.6. Baseline APTT 50.8.  Hematology following. Vascular surgery following.   Id:  WBC is down to 17.4. A-febrile. Will continue to follow closely.    Endocrine:  Patient had an episode of hypoglycemia last night. Will continue sliding scale insulin. Discontinue long acting insulin. Begin strict calorie count.   Activities: Increase activities as tolerated.  Musculoskeletal: The right upper extremity exhibits swelling distal from the elbow. The RUE is tender to palpation and ROM. ROM is diminished to the right hand 3/5. The hand is reddened, swollen, with dusky, mottled, purple discoloration to the right thumb tip and tip of the right index finger.  Severely diminished capillary refill to the tip of the right thumb and tip of the right index finger. Wound care following. Gauze dressing in place.       Lines tubes and drains:  Continue PICC line.  Plan: Remain ICU. Begin discharge planning for IP rehab.          11/13/2019  The patient is post-operative day 13, status-post coronary artery bypass grafting x 3. Post-operative course was complicated by an episode of hemodynamic instability and decompensation on postop day 5.  Patient remains hemodynamically stable.    Neuro:  Patient is oriented to person, place, situation. Disoriented to time. Alert, follows commands, makes eye contact. No focal deficits.    Cardiac:  Patient is hemodynamically stable. Continue metoprolol, amlodipine, losartan, and chlorthalidone.   Respiratory: Good sats on room air. Continue IS. Use of accessory muscles noted during inspiration. Will increase diuresis.   GI:  Continue cardiac/diabetic diet, modified by puree and thick liquids with swallow precautions. Advance as tolerated. Continue metamucil.  Renal: Good  urine output. Cr 0.8. K 4.0. Mag 1.8. Replace Mag. Start lasix IV. Continue chlorthalidone.  Hema:  Hct 23.  Platelet 79. Hold aspirin. Hold Plavix. 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 Multiple DVTs from the right IJ down to the brachiocephalic vein. Confirmed via vascular US. Patient on argatroban gtt, goal is APTT 2x baseline. Most recent APTT 75.6. Baseline APTT 50.8.  Hematology following. Vascular surgery following.   Id:  WBC is down to 12.2. A-febrile. Will continue to follow closely.    Endocrine:  Glucose controlled. Continue sliding scale insulin. Start long acting insulin detemir 5 units daily.     Activities: Increase activities as tolerated.  Musculoskeletal: The right upper extremity exhibits swelling distal from the elbow. The RUE is tender to palpation and ROM. ROM is diminished to the right hand 2/5. The right hand is improving. The dusky, mottled, purple discoloration is localized to the right thumb tip and tip of the right index finger.  Severely diminished capillary refill to the tip of the right thumb and tip of the right index finger. Wound care following. Gauze dressing in place.    Lines tubes and drains:  Continue PICC line.  Plan: Remain telemetry. Continue discharge planning for IP rehab.          11/14/2019  The patient is post-operative day 14, status-post coronary artery bypass grafting x 3. Post-operative course was complicated by an episode of hemodynamic instability and decompensation on postop day 5.  Patient remains hemodynamically stable.    Neuro:  Patient is oriented to person, place, situation. Disoriented to time. Alert, follows commands, makes eye contact. No focal deficits.    Cardiac:  Patient is hemodynamically stable. Continue metoprolol, amlodipine, losartan, and chlorthalidone.   Respiratory: Good sats on room air. Continue IS. Decreased use of accessory muscles noted during inspiration.   GI:  Continue cardiac/diabetic diet, modified by puree and thick liquids  with swallow precautions. Advance as tolerated. Continue metamucil.  Renal: Good urine output. Cr 0.9. K 4.2. Mag 1.9. Replace Mag. Discontinue lasix. Continue chlorthalidone.  Hema:  Hct 21.9.  Platelet  87. Hold aspirin. Hold Plavix. 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 Multiple DVTs from the right IJ down to the brachiocephalic vein. Confirmed via vascular US. As per discussion with hematology and pharmacy, will discontinue argatroban and start apixaban. Transfuse 1 unit PRBC.  Hematology following. Vascular surgery following.   Id:  WBC is down to 11. A-febrile. Will continue to follow closely.    Endocrine:  Continue sliding scale insulin. Increase insulin detemir to 5 units BID.     Activities: Increase activities as tolerated.  Musculoskeletal: The right upper extremity exhibits swelling distal from the elbow. The RUE is tender to palpation and ROM. ROM is diminished to the right hand 2/5. The right hand is slowly improving. The dusky, mottled, purple discoloration is localized to the right thumb tip and tip of the right index finger.  Severely diminished capillary refill to the tip of the right thumb and tip of the right index finger. Wound care following. Gauze dressing in place.    Lines tubes and drains:  Continue PICC line.  Plan: Remain telemetry. Continue discharge planning for IP rehab.        NSTEMI (non-ST elevated myocardial infarction)  The patient is a 69-year-old female with hypertension, diabetes, hyperlipidemia, obesity, anxiety and depression, iron deficiency anemia, vitamin B12 deficiency, who presented to the emergency room with an NSTEMI.  Workup included a cardiac catheterization that shows severe multivessel coronary artery disease with proximal LAD stenosis of 70% and severely depressed ejection fraction of 20%.  The patient is a candidate for urgent coronary artery bypass grafting.  The risks and benefits of surgery were explained to the patient.  The patient verbalized  understanding of the issues discussed.  She a segs the risks of surgery and has agreed to proceed with urgent coronary artery bypass grafting.  She understands that the risk of neurologic complication postoperatively is increased due to the presence of cerebral microvascular disease.        Yovani Dobbins NP  Cardiothoracic Surgery  [facility] [place]

## 2019-11-14 NOTE — PROGRESS NOTES
F/U visit with Ms. Bates. Resolving blood blister again noted to left cheek/lip region, left LIONEL. Again noted Right arm 4+ edema from fingers to elbow. Right thumb, palm, and index finger are black with overlying blisters, surrounding this, tissue fades to maroon and red with overlying blisters. Moderate amount serous weeping noted.NTG paste per MAR applied. Formerly small eschar was noted to dorsal wrist, now is moist yellow slough. mesalt applied to this site. abd pad applied and secured loosely with kerlix due to weeping. RUE elevated on 2 pillows. Discussed with primary nurse NIGHAT Montague. Patient will need to f/u with hand specialist after ischemia fully demarcates. Will follow closely.

## 2019-11-14 NOTE — SIGNIFICANT EVENT
The patient was seen in response to a deterioration alert. She was found to be tachypneic with minimal accessory muscle use and anemia with plans to transfuse blood today. Case was discussed with Dequan Dobbins NP with [facility] who reports that her breathing is improved from yesterday. No indication to transfer at this time.

## 2019-11-15 LAB
ANION GAP SERPL CALC-SCNC: 8 MMOL/L (ref 8–16)
ANION GAP SERPL CALC-SCNC: 9 MMOL/L (ref 8–16)
BASOPHILS # BLD AUTO: 0.03 K/UL (ref 0–0.2)
BASOPHILS NFR BLD: 0.3 % (ref 0–1.9)
BUN SERPL-MCNC: 23 MG/DL (ref 8–23)
BUN SERPL-MCNC: 24 MG/DL (ref 8–23)
CALCIUM SERPL-MCNC: 8.3 MG/DL (ref 8.7–10.5)
CALCIUM SERPL-MCNC: 8.6 MG/DL (ref 8.7–10.5)
CHLORIDE SERPL-SCNC: 103 MMOL/L (ref 95–110)
CHLORIDE SERPL-SCNC: 106 MMOL/L (ref 95–110)
CO2 SERPL-SCNC: 23 MMOL/L (ref 23–29)
CO2 SERPL-SCNC: 26 MMOL/L (ref 23–29)
CREAT SERPL-MCNC: 0.8 MG/DL (ref 0.5–1.4)
CREAT SERPL-MCNC: 0.8 MG/DL (ref 0.5–1.4)
DIFFERENTIAL METHOD: ABNORMAL
EOSINOPHIL # BLD AUTO: 0.2 K/UL (ref 0–0.5)
EOSINOPHIL NFR BLD: 1.6 % (ref 0–8)
ERYTHROCYTE [DISTWIDTH] IN BLOOD BY AUTOMATED COUNT: 16.5 % (ref 11.5–14.5)
EST. GFR  (AFRICAN AMERICAN): >60 ML/MIN/1.73 M^2
EST. GFR  (AFRICAN AMERICAN): >60 ML/MIN/1.73 M^2
EST. GFR  (NON AFRICAN AMERICAN): >60 ML/MIN/1.73 M^2
EST. GFR  (NON AFRICAN AMERICAN): >60 ML/MIN/1.73 M^2
GLUCOSE SERPL-MCNC: 194 MG/DL (ref 70–110)
GLUCOSE SERPL-MCNC: 258 MG/DL (ref 70–110)
HCT VFR BLD AUTO: 26.1 % (ref 37–48.5)
HGB BLD-MCNC: 8.2 G/DL (ref 12–16)
IMM GRANULOCYTES # BLD AUTO: 0.29 K/UL (ref 0–0.04)
IMM GRANULOCYTES NFR BLD AUTO: 2.5 % (ref 0–0.5)
LYMPHOCYTES # BLD AUTO: 1.1 K/UL (ref 1–4.8)
LYMPHOCYTES NFR BLD: 9.6 % (ref 18–48)
MCH RBC QN AUTO: 29.2 PG (ref 27–31)
MCHC RBC AUTO-ENTMCNC: 31.4 G/DL (ref 32–36)
MCV RBC AUTO: 93 FL (ref 82–98)
MONOCYTES # BLD AUTO: 0.9 K/UL (ref 0.3–1)
MONOCYTES NFR BLD: 8.1 % (ref 4–15)
NEUTROPHILS # BLD AUTO: 9 K/UL (ref 1.8–7.7)
NEUTROPHILS NFR BLD: 77.9 % (ref 38–73)
NRBC BLD-RTO: 0 /100 WBC
PLATELET # BLD AUTO: 99 K/UL (ref 150–350)
PMV BLD AUTO: 10.7 FL (ref 9.2–12.9)
POCT GLUCOSE: 198 MG/DL (ref 70–110)
POCT GLUCOSE: 240 MG/DL (ref 70–110)
POCT GLUCOSE: 290 MG/DL (ref 70–110)
POTASSIUM SERPL-SCNC: 3.6 MMOL/L (ref 3.5–5.1)
POTASSIUM SERPL-SCNC: 4 MMOL/L (ref 3.5–5.1)
RBC # BLD AUTO: 2.81 M/UL (ref 4–5.4)
SODIUM SERPL-SCNC: 135 MMOL/L (ref 136–145)
SODIUM SERPL-SCNC: 140 MMOL/L (ref 136–145)
WBC # BLD AUTO: 11.48 K/UL (ref 3.9–12.7)

## 2019-11-15 PROCEDURE — 92526 ORAL FUNCTION THERAPY: CPT

## 2019-11-15 PROCEDURE — 25000242 PHARM REV CODE 250 ALT 637 W/ HCPCS: Performed by: FAMILY MEDICINE

## 2019-11-15 PROCEDURE — 94761 N-INVAS EAR/PLS OXIMETRY MLT: CPT

## 2019-11-15 PROCEDURE — 27000221 HC OXYGEN, UP TO 24 HOURS

## 2019-11-15 PROCEDURE — 97530 THERAPEUTIC ACTIVITIES: CPT

## 2019-11-15 PROCEDURE — 85025 COMPLETE CBC W/AUTO DIFF WBC: CPT

## 2019-11-15 PROCEDURE — 99900035 HC TECH TIME PER 15 MIN (STAT)

## 2019-11-15 PROCEDURE — 25000003 PHARM REV CODE 250: Performed by: NURSE PRACTITIONER

## 2019-11-15 PROCEDURE — 63600175 PHARM REV CODE 636 W HCPCS: Performed by: THORACIC SURGERY (CARDIOTHORACIC VASCULAR SURGERY)

## 2019-11-15 PROCEDURE — 99232 PR SUBSEQUENT HOSPITAL CARE,LEVL II: ICD-10-PCS | Mod: ,,, | Performed by: INTERNAL MEDICINE

## 2019-11-15 PROCEDURE — 94640 AIRWAY INHALATION TREATMENT: CPT

## 2019-11-15 PROCEDURE — 25000242 PHARM REV CODE 250 ALT 637 W/ HCPCS: Performed by: INTERNAL MEDICINE

## 2019-11-15 PROCEDURE — 80048 BASIC METABOLIC PNL TOTAL CA: CPT

## 2019-11-15 PROCEDURE — 25000003 PHARM REV CODE 250: Performed by: THORACIC SURGERY (CARDIOTHORACIC VASCULAR SURGERY)

## 2019-11-15 PROCEDURE — 97535 SELF CARE MNGMENT TRAINING: CPT

## 2019-11-15 PROCEDURE — 94799 UNLISTED PULMONARY SVC/PX: CPT

## 2019-11-15 PROCEDURE — 99233 SBSQ HOSP IP/OBS HIGH 50: CPT | Mod: ,,, | Performed by: INTERNAL MEDICINE

## 2019-11-15 PROCEDURE — 21400001 HC TELEMETRY ROOM

## 2019-11-15 PROCEDURE — C9399 UNCLASSIFIED DRUGS OR BIOLOG: HCPCS | Performed by: NURSE PRACTITIONER

## 2019-11-15 PROCEDURE — 63600175 PHARM REV CODE 636 W HCPCS: Performed by: NURSE PRACTITIONER

## 2019-11-15 PROCEDURE — 99233 PR SUBSEQUENT HOSPITAL CARE,LEVL III: ICD-10-PCS | Mod: ,,, | Performed by: INTERNAL MEDICINE

## 2019-11-15 PROCEDURE — 25000003 PHARM REV CODE 250: Performed by: INTERNAL MEDICINE

## 2019-11-15 PROCEDURE — 99232 SBSQ HOSP IP/OBS MODERATE 35: CPT | Mod: ,,, | Performed by: INTERNAL MEDICINE

## 2019-11-15 PROCEDURE — 25000003 PHARM REV CODE 250: Performed by: FAMILY MEDICINE

## 2019-11-15 RX ORDER — POTASSIUM CHLORIDE 14.9 MG/ML
20 INJECTION INTRAVENOUS
Status: DISCONTINUED | OUTPATIENT
Start: 2019-11-15 | End: 2019-11-20 | Stop reason: HOSPADM

## 2019-11-15 RX ORDER — POTASSIUM CHLORIDE 14.9 MG/ML
60 INJECTION INTRAVENOUS
Status: DISCONTINUED | OUTPATIENT
Start: 2019-11-15 | End: 2019-11-20 | Stop reason: HOSPADM

## 2019-11-15 RX ORDER — ASPIRIN 81 MG/1
81 TABLET ORAL DAILY
Status: DISCONTINUED | OUTPATIENT
Start: 2019-11-15 | End: 2019-11-20 | Stop reason: HOSPADM

## 2019-11-15 RX ORDER — POTASSIUM CHLORIDE 29.8 MG/ML
40 INJECTION INTRAVENOUS
Status: DISCONTINUED | OUTPATIENT
Start: 2019-11-15 | End: 2019-11-20 | Stop reason: HOSPADM

## 2019-11-15 RX ADMIN — CHLORHEXIDINE GLUCONATE 0.12% ORAL RINSE 15 ML: 1.2 LIQUID ORAL at 09:11

## 2019-11-15 RX ADMIN — PSYLLIUM HUSK 6 G: 6 GRANULE ORAL at 09:11

## 2019-11-15 RX ADMIN — ALBUTEROL SULFATE 2.5 MG: 2.5 SOLUTION RESPIRATORY (INHALATION) at 12:11

## 2019-11-15 RX ADMIN — METOPROLOL TARTRATE 50 MG: 50 TABLET, FILM COATED ORAL at 09:11

## 2019-11-15 RX ADMIN — INSULIN ASPART 4 UNITS: 100 INJECTION, SOLUTION INTRAVENOUS; SUBCUTANEOUS at 11:11

## 2019-11-15 RX ADMIN — INSULIN DETEMIR 10 UNITS: 100 INJECTION, SOLUTION SUBCUTANEOUS at 09:11

## 2019-11-15 RX ADMIN — FERROUS SULFATE TAB EC 325 MG (65 MG FE EQUIVALENT) 325 MG: 325 (65 FE) TABLET DELAYED RESPONSE at 09:11

## 2019-11-15 RX ADMIN — APIXABAN 5 MG: 2.5 TABLET, FILM COATED ORAL at 09:11

## 2019-11-15 RX ADMIN — PANTOPRAZOLE SODIUM 40 MG: 40 TABLET, DELAYED RELEASE ORAL at 09:11

## 2019-11-15 RX ADMIN — LOSARTAN POTASSIUM 50 MG: 50 TABLET ORAL at 09:11

## 2019-11-15 RX ADMIN — NITROGLYCERIN 0.5 INCH: 20 OINTMENT TOPICAL at 09:11

## 2019-11-15 RX ADMIN — IPRATROPIUM BROMIDE AND ALBUTEROL SULFATE 3 ML: .5; 3 SOLUTION RESPIRATORY (INHALATION) at 02:11

## 2019-11-15 RX ADMIN — INSULIN ASPART 2 UNITS: 100 INJECTION, SOLUTION INTRAVENOUS; SUBCUTANEOUS at 04:11

## 2019-11-15 RX ADMIN — ASPIRIN 81 MG: 81 TABLET ORAL at 11:11

## 2019-11-15 RX ADMIN — IPRATROPIUM BROMIDE AND ALBUTEROL SULFATE 3 ML: .5; 3 SOLUTION RESPIRATORY (INHALATION) at 07:11

## 2019-11-15 RX ADMIN — AMLODIPINE BESYLATE 10 MG: 10 TABLET ORAL at 09:11

## 2019-11-15 RX ADMIN — INSULIN ASPART 2 UNITS: 100 INJECTION, SOLUTION INTRAVENOUS; SUBCUTANEOUS at 09:11

## 2019-11-15 RX ADMIN — CHLORTHALIDONE 25 MG: 25 TABLET ORAL at 09:11

## 2019-11-15 RX ADMIN — POTASSIUM CHLORIDE 20 MEQ: 200 INJECTION, SOLUTION INTRAVENOUS at 11:11

## 2019-11-15 RX ADMIN — INSULIN DETEMIR 5 UNITS: 100 INJECTION, SOLUTION SUBCUTANEOUS at 09:11

## 2019-11-15 NOTE — SIGNIFICANT EVENT
The patient was seen and examined in response to a deterioration alert. At the time of exam, she was sitting in a chair eating. Her respiratory status has improved from previous exams and she denies complaint. Primary team notified.

## 2019-11-15 NOTE — ASSESSMENT & PLAN NOTE
11/01/2019  The patient is post-operative day 1, status-post coronary artery bypass grafting x 3.  Overall the patient is making progress, however IABP and inotropic agents will continue today.   Neuro:  The patient is awake alert and oriented x3.  Neuro exam is nonfocal.  Pain is well controlled with IV pain medication.  Cardiac:  Patient is hemodynamically improving. Patient remains on epinephrine and milrinone gtts, which will be weaned over the next 24 hours.  Cardiac index is improving. IABP will remain in place over the next 24 hours.  Patient will be started on beta blockers once gtts are weaned off.  Respiratory: Good sats on high flow nasal cannula. Continue respiratory toilet, continue incentive spirometer. Wean to low flow nasal cannula.  GI:  Diet: sips of water with medications, will slowly advance diet as tolerated. Benign abdominal exam.  Renal:  Urine output is slowly improving. Cr 0.9. K 4.3. Mag 1.6. Replace mag. Continue IV diuresis. Add metolazone.   Heme:  Hct 34.1  Platelet 82. Continue aspirin. Discontinue Plavix for now.  Id:  WBC 12. Tmax 100.3. Continue post-operative antibiotics. Will continue to follow.   Endocrine:  Glucose is controlled with insulin gtt.  Continue insulin gtt for now, until weaned off inotropic agents.   Activities:  Patient is currently bed bound due to IABP.  Advance activities as tolerated once IABP is removed.   Lines tubes and drains:  Continue IABP. Continue [place] and [place].  Continue A-line.  Chest tubes will continue. NICOLASA x 2 will continue. Jones catheter will continue.  Continue pacer wires. Continue prevena wound vac.  Plan: Remain ICU.          11/02/2019  The patient is post-operative day 2, status-post coronary artery bypass grafting x 3.  Overall the patient is making progress, IABP removed and inotropic agents discontinued.   Neuro:  The patient is awake alert and oriented x3.  Neuro exam is nonfocal.  Pain is well controlled. Discontinue fentanyl.    Cardiac:  Patient is hemodynamically improving. Inotropic gtts discontinued. Excellent cardiac index. IABP discontinued.  Patient will be started on beta blockers today.  Respiratory: Good sats on high flow nasal cannula. Continue respiratory toilet, continue incentive spirometer. Wean to low flow nasal cannula.  GI:  Advance diabetic/cardiac diet as tolerated. Benign abdominal exam.  Renal:  Urine output is improving. Cr 0.9. K 4.9. Mag 2.2. Continue IV diuresis. Hold AM K.   Heme:  Hct 28.3  Platelet 47. Discontinue aspirin. Discontinue Plavix. HIT panel. Consult hematology r/t thrombocytopenia.   Id:  WBC 10.4. Tmax 99.4. Will continue to follow.   Endocrine:  Discontinue insulin gtt. Start sliding scale insulin. Start long acting insulin detemir.   Activities: Advance activities as tolerated.   Lines tubes and drains:  Discontinue IABP. Discontinue [place] and Cord[place].  Discontinue A-line.  Discontinue chest tubes x 3. Discontinue NICOLASA x 2. Discontinue arterial line. Jones catheter will continue for now.  Continue pacer wires. Continue prevena wound vac. Place PICC line.   Plan: Remain ICU.           11/03/2019  The patient is post-operative day 3, status-post coronary artery bypass grafting x 3.  Overall the patient is making progress.  Neuro:  The patient is awake alert and oriented x3.  Neuro exam is nonfocal.  Pain is well controlled. Continue tramadol.   Cardiac:  Patient is hemodynamically stable. Increase metoprolol dose. Start amlodipine.   Respiratory: Good sats on nasal cannula. Continue respiratory toilet, continue incentive spirometer. Wean to room air.  GI:  Advance diabetic/cardiac diet as tolerated. Benign abdominal exam. Passing gas, no BM. Add 1 bottle of mag citrate.   Renal:  Good urine output. Cr 0.9. K 3.8. Mag 2.0. Replace K. Replace Mag. Change to PO diuresis.   Heme:  Hct 34.7  Platelet 89. Restart aspirin. Continue to hold Plavix. HIT panel pending. Hematology following.  Id:  WBC 13.8. Tmax  99.4. Will continue to follow.    Endocrine:  Glucose controlled on sliding scale insulin. Increase long acting insulin detemir dose.   Activities: Advance activities as tolerated.   Lines tubes and drains:  Storm catheter will continue for now.  Continue pacer wires. Continue prevena wound vac. Continue PICC line.   Plan: Remain ICU.          11/04/2019  The patient is post-operative day 4, status-post coronary artery bypass grafting x 3.  Overall the patient is making progress.  Neuro:  The patient is awake alert and oriented x3.  Neuro exam is nonfocal.  Pain is well controlled. Continue tramadol.   Cardiac:  Patient is hemodynamically stable. Continue metoprolol dose. Continue amlodipine dose.   Respiratory: Good sats on room air. Continue respiratory toilet, continue incentive spirometer.  GI:  Advance diabetic/cardiac diet as tolerated. Benign abdominal exam. Regular bowel movements.   Renal:  Good urine output. Cr 0.7. K 4.0. Mag 2.3. Replace K. Decrease PO diuresis.   Heme:  Hct 34.4  Platelet 140. Continue aspirin. Restart Plavix. HIT panel pending. Hematology following.  Id:  WBC down to 13.13. Tmax 98. Will continue to follow.    Endocrine:  Glucose controlled on sliding scale insulin. Increased long acting insulin detemir dose.   Activities: Advance activities as tolerated.   Lines tubes and drains:  Discontinued storm catheter. Continue pacer wires. Continue prevena wound vac. Continue PICC line.   Plan: Transfer to telemetry. Anticipate discharge to SNF in 48 hours.         11/05/2019  The patient is post-operative day 5, status-post coronary artery bypass grafting x 3.  Overall the patient is making progress.  Neuro:  The patient is awake alert and oriented x3.  Neuro exam is nonfocal. Patient slow to answer questions, however answers are appropriate.  Pain is well controlled. Continue tramadol PRN.   Cardiac:  Patient is hemodynamically stable. Continue metoprolol dose. Discontinue amlodipine. Start  losartan.    Respiratory: Good sats on room air. Continue respiratory toilet, continue incentive spirometer.  GI:  Advance diabetic/cardiac diet as tolerated. Benign abdominal exam. Regular bowel movements.   Renal:  Good urine output. Cr 0.7. K 4.8. Mag 2.1. Replace Mag. Discontinue PO diuresis.   Heme:  Hct 39.5  Platelet 73. Continue aspirin. Hold Plavix. HIT panel pending. Hematology following.  Id:  WBC is up to 16. Tmax 99. Pan-culture. DC PICC line. Chest xray. Start broad spectrum empiric antibiotics. Will continue to follow.    Endocrine:  Glucose controlled on sliding scale insulin. Decrease long acting insulin detemir dose.   Activities: Advance activities as tolerated.   Lines tubes and drains:  Discontinue pacer wires. Discontinue prevena wound vac. Continue PICC line.   Plan: Anticipate discharge to SNF in 24-48 hours.         11/06/2019  The patient is post-operative day 6, status-post coronary artery bypass grafting x 3. Patient had an episode of decompensation and unresponsiveness yesterday evening. The patient was found unresponsive, cool, and diaphoretic, with agonal breathing. Patient was intubated and transferred to the ICU. Bedside EKG and echocardiogram performed at the bedside showed no significant changes. CT head, chest, abdomen pelvis performed, showed no significant findings.     Neuro:  The patient is intubated and sedated. On Precedex gtt. Begin weaning off sedation.    Cardiac:  Patient is hemodynamically improving. Inotropic agent dependent. Currently on Epinephrine gtt. Start Milrinone gtt. Discontinue metoprolol. Discontinue losartan.    Respiratory: Ventilator dependent. tV 450, RR 20, PEEP 5. Wean down sedation. Wean off ventilator today.    GI:  Begin enteral tube feedings. Benign abdominal exam. Previously having regular bowel movements.   Renal: Adequate urine output. Cr 1.1. K 3.6. Mag 1.7. Replace Mag. Replace K. Discontinue diuresis.   Heme:  Hct 31.9  Platelet 89. Hold  aspirin. Hold Plavix. HIT panel negative. Patient received 1 units PRBC last night. Hematology following.  Id:  WBC is down to 15.5. Tmax 99.9. BC NGTD. Urinalysis negative. Sputum culture negative. Vancomycin and zosyn day 2. Will continue to follow closely.    Endocrine:  Glucose controlled on insulin gtt, continue at this time.   Activities: Intubated and sedated.   Musculoskeletal: Unable to find right radial pulse via doppler. Tips of the fingers of the right hand are mottled, dusky, appear cyanotic. Recent right radial arterial line in place, which has been relocated. US of arteries and veins of the right arm ordered. Consult to vascular surgery placed.    Lines tubes and drains:  Continue sw[place] and cordis. Continue arterial line. Continue storm.    Plan: Remain ICU         11/07/2019  The patient is post-operative day 7, status-post coronary artery bypass grafting x 3. Patient had an episode of decompensation and unresponsiveness the evening of 11/05/2019. The patient was found unresponsive, cool, and diaphoretic, with agonal breathing. Patient was intubated and transferred to the ICU. Bedside EKG and echocardiogram performed at the bedside showed no significant changes. CT head, chest, abdomen pelvis performed, showed no significant findings.     Neuro:  The patient is intubated and sedated. On Precedex gtt. Wean off sedation.    Cardiac:  Patient is hemodynamically improving. Excellent cardiac index. Wean off Inotropic gtts.   Respiratory: Wean towards extubation. tV 400, RR 18, PEEP 5.   GI:  Continue enteral tube feedings while intubated. Benign abdominal exam.  Renal: Adequate urine output. Cr 1.1. K 4.1. Mag 2.8. Start IV diuresis.   Heme:  Hct 24.6  Platelet 60. Hold aspirin. Hold Plavix. HIT panel negative.  Id:  WBC is down to 14.46. Tmax 100.8. BC NGTD. Urinalysis negative. Sputum culture negative. Vancomycin and zosyn day 3. Midline sternal incision appears reddened, with induration at skin edges.  Will continue to follow closely.    Endocrine:  Glucose controlled on sliding scale insulin.   Activities: Intubated and sedated. Moves all 4. Full ROM.    Musculoskeletal: Able to find right radial pulse via doppler. The right hand extending to the tips of the fingers of the right hand are mottled, dusky, appear cyanotic. Recent right radial arterial line placement, which has been relocated. US of arteries and veins of the right arm pending. Vascular surgery following, suggested possible thrombotic etiology, suggested heparin gtt, however this is contraindicated r/t s/p cabg x 3.     Lines tubes and drains:  Continue swan and cordis. Continue arterial line. Continue storm.    Plan: Remain ICU         11/08/2019  The patient is post-operative day 8, status-post coronary artery bypass grafting x 3. Patient had an episode of decompensation and unresponsiveness the evening of 11/05/2019. The patient was found unresponsive, cool, and diaphoretic, with agonal breathing. Patient was intubated and transferred to the ICU. Bedside EKG and echocardiogram performed at the bedside showed no significant changes. CT head, chest, abdomen pelvis performed, showed no significant findings. Suspect cardiogenic shock.      Neuro:  Patients neurologic state is improving. Alert, follows commands, makes eye contact, disoriented x 4. No focal deficits.    Cardiac:  Patient is hemodynamically improving. Off all inotropic gtts. Start low dose metoprolol and amlodipine.   Respiratory: Good sats on low flow nasal cannula. Continue IS. Continue pulmonary toilet. Wean to room air.    GI:  Continue enteral tube feedings at this time r/t disorientation and aspiration risk. Discontinue laxatives. Start metamucil BID, patient has had multiple loose stools.   Renal: Good urine output. Cr 0.8. K 3.6. Mag 2.1. Replace K. Replace Mag. Continue IV diuresis.   Hema:  Hct 26.4  Platelet 45. Hold aspirin. Hold Plavix. HIT panel will be resent as the patient  is a high suspicion for HIT despite previous negative HIT panel. Multiple DVTs from the right IJ down to the brachiocephalic vein. Confirmed via vascular US. Patient started on argatroban yesterday, goal is APTT 2x baseline. Most recent APTT 97.8. Baseline APTT 50.8.  Hematology following. Vascular surgery following.   Id:  WBC is down to 12. Tmax 100.4. BC NGTD. Urinalysis negative. Sputum culture negative. Vancomycin and zosyn day 4. Midline sternal incision appears improved, with minimal redness and induration at skin edges. Will continue to follow closely.    Endocrine:  Glucose controlled on sliding scale insulin.   Activities: Increase activities as tolerated.  Musculoskeletal: ROM 2/5 to the right wrist/hand/fingers, distal from the elbow there is 2+ non pitting edema, the extremity is tender and warm. There is mottling, a dusky appearance, and purple discoloration to the fingertips of the right hand. See above hematology plan.      Lines tubes and drains:  Continue PICC line. Continue storm catheter. Continue NG tube.   Plan: Remain ICU     11/09/2019  The patient is postop day 9.  Status post coronary artery bypass grafting x3.  Postop course was complicated by an episode of hemodynamic instability and decompensation on postop day number 5.  Patient continues to make clinical progress since then.  She is off all drips.  She remains extubated.  Continue ICU care for now.    Neuro:  Patient is awake alert.  She is oriented x2.  She is still confused.  But she is making progress.  Her neuro exam is nonfocal.  She follows all commands.  Cardiac:  Patient continues to be hemodynamically stable.  She is somewhat hypertensive.  Will increase her antihypertensives.  Respiratory:  Patient is good sats on nasal cannula.  She remains extubated.  GI:  Patient was tolerating tube feeds until this morning.  She pulled heart feeding tube. Will get a swallowing evaluation.  Mode of nutrition will depend on results of  swallowing evaluation.  Renal:  Patient has good urine output %period% creatinine is 0.9.  Heme:  Hematocrit is 26 platelet is 46.  Patient is on argatroban  for anticoagulation due to right upper extremity DVTs.  Hypercoagulability workup is in progress.  Appreciate Hematology and vascular surgery input.  Id:  Incision continues to improve.  white count is 17.  Continue broad-spectrum antibiotics.  Endocrine:  Glucose is controlled on its sliding scale insulin.  Activities:  Advance activities as tolerated.  Line tubes and drains.  Patient has a PICC line, Jones catheter.    11/10/2019  The patient is postop day 10.  Status post coronary bypass grafting x3.  Postop course was complicated by an episode of hemodynamic instability and decompensation on postop day 5.  Patient remains hemodynamically stable.    Neuro:  The patient is awake and alert.  She is oriented x1.  She continues to be confused.  Her neuro exam is nonfocal.  Cardiac:  Patient continues to be hemodynamically stable.  His she is hypertensive.  Hypertensive medications will be increased.  Respiratory:  Patient has good sats on nasal cannula.  Continue pulmonary toilet.  GI:  Patient is tolerating a regular diet.  Renal:  Patient has good urine output. Creatinine is 0.7.  Heme: hematocrit is 24 and platelet is 67.  Patient continues on argatroban for right upper extremity DVTs.  Hypercoagulable workup is in progress.  Id:  Patient's white count is up to 20.  Patient is on broad prepped from antibiotics.  Endocrine:  The patient is on long-acting as well as sliding scale insulin.  Blood sugars have been well controlled.  Will decrease dose of long-acting.  Activities: patient has been out of bed to chair.  Advance as tolerated.  Musculoskeletal:  Patient's right upper extremity continues to be swollen  The index finger and thumb continues to be dusky with a bleb near the base of the thumb.  Continue argatroban for anticoagulation.  Continue elevation  of right upper extremity.  Lines tubes and drains: patient has a PICC line.  Will discontinue Jones catheter.        11/11/2019  The patient is post-operative day 11, status-post coronary artery bypass grafting x 3. Post-operative course was complicated by an episode of hemodynamic instability and decompensation on postop day 5.  Patient remains hemodynamically stable.    Neuro:  Patients neurologic state is improving. Oriented x 2. Alert, follows commands, makes eye contact. No focal deficits.    Cardiac:  Patient is hemodynamically improving. Hypertensive. Continue metoprolol, amlodipine, and losartan. Add chlorthalidone.   Respiratory: Good sats on room air. Continue IS. Continue pulmonary toilet.  GI:  Continue cardiac/diabetic diet, modified by puree and thick liquids with swallow precautions. Advance as tolerated. Continue metamucil.  Renal: Good urine output. Cr 0.8. K 3.8. Replace K. Continue IV diuresis. Add chlorthalidone. BMP at noon.   Hema:  Hct 23.8  Platelet 75. Hold aspirin. Hold Plavix. Repeat HIT panel pending. Multiple DVTs from the right IJ down to the brachiocephalic vein. Confirmed via vascular US. Patient on argatroban gtt, goal is APTT 2x baseline. Most recent APTT 83.3. Baseline APTT 50.8.  Hematology following. Vascular surgery following.   Id:  WBC is down to 19. Tmax 98.4. BC NGTD. Vancomycin and zosyn day 7. Discontinue broad spectrum antibiotics. Will continue to follow closely.    Endocrine:  Glucose controlled on sliding scale insulin and long acting insulin detemir.    Activities: Increase activities as tolerated.  Musculoskeletal: The right upper extremity exhibits swelling distal from the elbow. The RUE is tender to palpation and ROM. ROM is diminished to the right hand 2/5. The hand his reddened, swollen, with dusky, mottled, purple discoloration to the right thumb and tip of the right index finger. Blisters have formed on the proximal portion of the thumb and wrist. Severely  diminished capillary refill to the tip of the right thumb and tip of the right index finger. Wound care order placed.      Lines tubes and drains:  Continue PICC line.  Plan: Remain ICU         11/12/2019  The patient is post-operative day 12, status-post coronary artery bypass grafting x 3. Post-operative course was complicated by an episode of hemodynamic instability and decompensation on postop day 5.  Patient remains hemodynamically stable.    Neuro:  Patients neurologic state is improving. Oriented to person, place, situation. Disoriented to time. Alert, follows commands, makes eye contact. No focal deficits.    Cardiac:  Patient is hemodynamically improving. Continue metoprolol, amlodipine, losartan, and chlorthalidone.   Respiratory: Good sats on room air. Continue IS.  GI:  Continue cardiac/diabetic diet, modified by puree and thick liquids with swallow precautions. Advance as tolerated. Continue metamucil.  Renal: Good urine output. Cr 0.8. K 3.9. Replace K. Discontinue lasix. Continue chlorthalidone.  Hema:  Hct 23.1  Platelet 75. Hold aspirin. Hold Plavix. 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 Multiple DVTs from the right IJ down to the brachiocephalic vein. Confirmed via vascular US. Patient on argatroban gtt, goal is APTT 2x baseline. Most recent APTT 84.6. Baseline APTT 50.8.  Hematology following. Vascular surgery following.   Id:  WBC is down to 17.4. A-febrile. Will continue to follow closely.    Endocrine:  Patient had an episode of hypoglycemia last night. Will continue sliding scale insulin. Discontinue long acting insulin. Begin strict calorie count.   Activities: Increase activities as tolerated.  Musculoskeletal: The right upper extremity exhibits swelling distal from the elbow. The RUE is tender to palpation and ROM. ROM is diminished to the right hand 3/5. The hand is reddened, swollen, with dusky, mottled, purple discoloration to the right thumb tip and tip of the right index finger.   Severely diminished capillary refill to the tip of the right thumb and tip of the right index finger. Wound care following. Gauze dressing in place.       Lines tubes and drains:  Continue PICC line.  Plan: Remain ICU. Begin discharge planning for IP rehab.          11/13/2019  The patient is post-operative day 13, status-post coronary artery bypass grafting x 3. Post-operative course was complicated by an episode of hemodynamic instability and decompensation on postop day 5.  Patient remains hemodynamically stable.    Neuro:  Patient is oriented to person, place, situation. Disoriented to time. Alert, follows commands, makes eye contact. No focal deficits.    Cardiac:  Patient is hemodynamically stable. Continue metoprolol, amlodipine, losartan, and chlorthalidone.   Respiratory: Good sats on room air. Continue IS. Use of accessory muscles noted during inspiration. Will increase diuresis.   GI:  Continue cardiac/diabetic diet, modified by puree and thick liquids with swallow precautions. Advance as tolerated. Continue metamucil.  Renal: Good urine output. Cr 0.8. K 4.0. Mag 1.8. Replace Mag. Start lasix IV. Continue chlorthalidone.  Hema:  Hct 23.  Platelet 79. Hold aspirin. Hold Plavix. 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 Multiple DVTs from the right IJ down to the brachiocephalic vein. Confirmed via vascular US. Patient on argatroban gtt, goal is APTT 2x baseline. Most recent APTT 75.6. Baseline APTT 50.8.  Hematology following. Vascular surgery following.   Id:  WBC is down to 12.2. A-febrile. Will continue to follow closely.    Endocrine:  Glucose controlled. Continue sliding scale insulin. Start long acting insulin detemir 5 units daily.     Activities: Increase activities as tolerated.  Musculoskeletal: The right upper extremity exhibits swelling distal from the elbow. The RUE is tender to palpation and ROM. ROM is diminished to the right hand 2/5. The right hand is improving. The dusky, mottled, purple  discoloration is localized to the right thumb tip and tip of the right index finger.  Severely diminished capillary refill to the tip of the right thumb and tip of the right index finger. Wound care following. Gauze dressing in place.    Lines tubes and drains:  Continue PICC line.  Plan: Remain telemetry. Continue discharge planning for IP rehab.          11/14/2019  The patient is post-operative day 14, status-post coronary artery bypass grafting x 3. Post-operative course was complicated by an episode of hemodynamic instability and decompensation on postop day 5.  Patient remains hemodynamically stable.    Neuro:  Patient is oriented to person, place, situation. Disoriented to time. Alert, follows commands, makes eye contact. No focal deficits.    Cardiac:  Patient is hemodynamically stable. Continue metoprolol, amlodipine, losartan, and chlorthalidone.   Respiratory: Good sats on room air. Continue IS. Decreased use of accessory muscles noted during inspiration.   GI:  Continue cardiac/diabetic diet, modified by puree and thick liquids with swallow precautions. Advance as tolerated. Continue metamucil.  Renal: Good urine output. Cr 0.9. K 4.2. Mag 1.9. Replace Mag. Discontinue lasix. Continue chlorthalidone.  Hema:  Hct 21.9.  Platelet 87. Hold aspirin. Hold Plavix. 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 Multiple DVTs from the right IJ down to the brachiocephalic vein. Confirmed via vascular US. As per discussion with hematology and pharmacy, will discontinue argatroban and start apixaban. Transfuse 1 unit PRBC.  Hematology following. Vascular surgery following.   Id:  WBC is down to 11. A-febrile. Will continue to follow closely.    Endocrine:  Continue sliding scale insulin. Increase insulin detemir to 5 units BID.     Activities: Increase activities as tolerated.  Musculoskeletal: The right upper extremity exhibits swelling distal from the elbow. The RUE is tender to palpation and ROM. ROM is diminished to  the right hand 2/5. The right hand is slowly improving. The dusky, mottled, purple discoloration is localized to the right thumb tip and tip of the right index finger.  Severely diminished capillary refill to the tip of the right thumb and tip of the right index finger. Wound care following. Gauze dressing in place.    Lines tubes and drains:  Continue PICC line.  Plan: Remain telemetry. Continue discharge planning for IP rehab.          11/15/2019  The patient is post-operative day 15, status-post coronary artery bypass grafting x 3. Post-operative course was complicated by an episode of hemodynamic instability and decompensation on postop day 5.  Patient remains hemodynamically stable.    Neuro:  Patient is oriented to person, place, situation. Disoriented to time. Alert, follows commands, makes eye contact. No focal deficits.    Cardiac:  Patient is hemodynamically stable. Continue metoprolol, amlodipine, losartan, and chlorthalidone.   Respiratory: Good sats on low flow nasal cannula. Wean to room air. Continue IS.   GI:  Continue cardiac/diabetic diet, modified by puree and thick liquids with swallow precautions. Advance as tolerated. Continue metamucil.  Renal: Good urine output. Cr 0.8. K 3.6. Replace K. Continue chlorthalidone.  Hema:  Hct 26.1. Platelet 99. Start Aspirin. Continue Apixaban 5mg BID.  Hematology following. Vascular surgery following.   Id:  WBC 11. A-febrile. Will continue to follow closely.    Endocrine:  Continue sliding scale insulin. Increase insulin detemir to 5 units AM dose, 10 units PM dose.   Activities: Increase activities as tolerated.  Musculoskeletal: The right upper extremity exhibits swelling distal from the elbow. The RUE is tender to palpation and ROM. ROM is diminished to the right hand 2/5. The right hand is slowly improving. The dusky, mottled, purple discoloration is localized to the right thumb tip and tip of the right index finger.  Severely diminished capillary refill  to the tip of the right thumb and tip of the right index finger. Wound care following. Gauze dressing in place.    Lines tubes and drains:  Continue PICC line.  Plan: Remain telemetry. Begin discharge planning to LTAC, anticipate discharge in 48-72 hours.

## 2019-11-15 NOTE — PT/OT/SLP PROGRESS
"Speech Language Pathology Treatment    Patient Name:  Mateusz Bates   MRN:  [id number]  Admitting Diagnosis: Acute deep vein thrombosis (DVT) of right upper extremity    Recommendations:                 General Recommendations:  Dysphagia therapy  Diet recommendations:  Mechanical soft, Finely chopped meat, Liquid Diet Level: Thin   Aspiration Precautions: 1 bite/sip at a time, Assistance with meals, Double swallow with each bite/sip, Eliminate distractions, HOB to 90 degrees, Remain upright 30 minutes post meal and Small bites/sips   General Precautions: Standard, fall, aspiration, sternal  Communication strategies:  provide increased time to answer    Subjective     Pt was seen sitting in a cardiac recliner at bedside with no family present.  She is awake, alert, and pleasantly confused.    Patient goals: pt constantly requesting water "I'm thirsty"     Pain/Comfort:  · Pain Rating 1: 0/10    Objective:     Has the patient been evaluated by SLP for swallowing?   Yes  Keep patient NPO? No   Current Respiratory Status: room air      Swallowing assessed with pt eating lunch (mechanical soft with thin liquids).  Mod cueing required for pt to follow safe swallow strategies (small bites/sips, slow rate, and clear oral cavity prior to next bite) as she remains impulsive with poor safety insight.  Pt had drank approximately 12 ounces of thin liquids prior to eating where she had another 8 ounces.  No overt signs of aspiration was evident with any consistency, however pt developed an expiratory wheeze with abdominal breathing.  ST removed food and liquids and alerted nursing who called RT.      Assessment:     Mateusz Bates is a 69 y.o. female with an SLP diagnosis of Dysphagia and Cognitive-Linguistic Impairment.  She requires direct supervision with all po intake to provide cueing for swallow strategies.  She will benefit from continued ST for stated deficits per POC    Goals:   Multidisciplinary Problems     SLP Goals     "    Problem: SLP Goal    Goal Priority Disciplines Outcome   SLP Goal     SLP Ongoing, Progressing   Description:  LTG:  Pt will tolerate least restrictive diet consistency safely and efficiently.    ST. BSA with meal to assess recommended diet consistency of pureed and thin liquids.- Completed 11/10/19 and pt has been recommended for a Pureed consistency diet and Nectar Thick Liquids.  Goal to continue with further recs as appropriate.   Modified Barium Swallow Study recommended if indicated,             2. Oral Motor x10 with cues             3. Orientation with 90% with min cues.             4. 2 step commands with 90%             5. 2 unit yes/no with 90%  Goal to be reevaluated by:  19                     Plan:     · Patient to be seen:  3 x/week   · Plan of Care expires:   2019  · Plan of Care reviewed with:  patient   · SLP Follow-Up:  Yes       Time Tracking:     SLP Treatment Date:   11/15/19  Speech Start Time:  1145  Speech Stop Time:  1215     Speech Total Time (min):  30 min    Billable Minutes: Treatment Swallowing Dysfunction 30    Minoo Atwood CCC-SLP  11/15/2019

## 2019-11-15 NOTE — SUBJECTIVE & OBJECTIVE
Interval History:  11/15/2019  The patient is post-operative day 15.  Status post coronary artery bypass grafting x3.    ROS  Medications:  Continuous Infusions:  Scheduled Meds:   albuterol-ipratropium  3 mL Nebulization Q6H WAKE    albuterol-ipratropium  3 mL Nebulization Once    amLODIPine  10 mg Oral Daily    apixaban  5 mg Oral BID    aspirin  81 mg Oral Daily    chlorhexidine  15 mL Mouth/Throat BID    chlorthalidone  25 mg Oral Daily    ferrous sulfate  325 mg Oral BID    insulin detemir U-100  10 Units Subcutaneous QHS    insulin detemir U-100  5 Units Subcutaneous Daily    losartan  50 mg Oral Daily    metoprolol tartrate  50 mg Oral BID    nitroGLYCERIN 2% TD oint  0.5 inch Topical (Top) Q12H    pantoprazole  40 mg Oral Daily    psyllium husk  1 packet Oral BID     PRN Meds:sodium chloride, albumin human 5%, albuterol sulfate, Dextrose 10% Bolus, Dextrose 10% Bolus, insulin aspart U-100, iohexol, lactated ringers, magnesium sulfate IVPB, magnesium sulfate IVPB, metoclopramide HCl, ondansetron, potassium chloride in water **AND** potassium chloride in water **AND** potassium chloride in water, traMADol     Objective:     Vital Signs (Most Recent):  Temp: 97.4 °F (36.3 °C) (11/15/19 0802)  Pulse: 87 (11/15/19 0802)  Resp: 20 (11/15/19 0802)  BP: (!) 151/70 (11/15/19 0802)  SpO2: 95 % (11/15/19 0802) Vital Signs (24h Range):  Temp:  [97.4 °F (36.3 °C)-99.2 °F (37.3 °C)] 97.4 °F (36.3 °C)  Pulse:  [] 87  Resp:  [18-22] 20  SpO2:  [93 %-97 %] 95 %  BP: (126-151)/(58-70) 151/70     Weight: 76.8 kg (169 lb 5 oz)  Body mass index is 31.99 kg/m².    SpO2: 95 %  O2 Device (Oxygen Therapy): nasal cannula w/ humidification    Intake/Output - Last 3 Shifts       11/13 0700 - 11/14 0659 11/14 0700 - 11/15 0659 11/15 0700 - 11/16 0659    P.O. 896      I.V. (mL/kg)       Blood  325     Total Intake(mL/kg) 896 (11.8) 325 (4.2)     Net +896 +325            Urine Occurrence 4 x 1 x     Stool Occurrence 1  x 5 x           Lines/Drains/Airways     Peripherally Inserted Central Catheter Line                 PICC Double Lumen 11/07/19 1645 left brachial;left basilic 7 days                Physical Exam   Constitutional: She appears well-developed and well-nourished. No distress.   HENT:   Head: Normocephalic and atraumatic.   Eyes: Pupils are equal, round, and reactive to light. EOM are normal.   Neck: Normal range of motion. Neck supple. No JVD present.   Cardiovascular: Normal rate, regular rhythm, normal heart sounds and intact distal pulses. Exam reveals no gallop and no friction rub.   No murmur heard.  Pulmonary/Chest: Effort normal and breath sounds normal. No stridor. No respiratory distress. She has no wheezes. She has no rales. She exhibits no tenderness.   Abdominal: Soft. Bowel sounds are normal. She exhibits no distension and no mass. There is no tenderness. There is no rebound and no guarding. No hernia.   Musculoskeletal: She exhibits edema and tenderness.   RUE exam slightly improved from yesterday. Dusky, mottled, discoloration is localized to the tip of the right thumb and the tip of the right index finger. Redness and warmth with +edema and blistering to the right hand. ROM limited to the right hand 2/5.    Neurological: She is alert. She displays normal reflexes. No cranial nerve deficit or sensory deficit. She exhibits normal muscle tone. Coordination normal.   Oriented to person, place, situation. Disoriented to time. No focal deficits.    Skin: Skin is warm and dry. Capillary refill takes less than 2 seconds. Right thumb tip and right index finger tip are unblanchable.. She is not diaphoretic. There is erythema.   Surgical incisions CDI.    Psychiatric: She has a normal mood and affect. Her behavior is normal. Judgment normal.   Slow to respond to questions.    Nursing note and vitals reviewed.      Significant Labs:  BMP:   Recent Labs   Lab 11/14/19  0528  11/15/19  0454   *   < > 258*   NA  142   < > 140   K 4.2   < > 3.6      < > 106   CO2 22*   < > 26   BUN 19   < > 23   CREATININE 0.9   < > 0.8   CALCIUM 8.9   < > 8.6*   MG 1.9  --   --     < > = values in this interval not displayed.     CBC:   Recent Labs   Lab 11/15/19  0454   WBC 11.48   RBC 2.81*   HGB 8.2*   HCT 26.1*   PLT 99*   MCV 93   MCH 29.2   MCHC 31.4*       Significant Diagnostics:  I have reviewed all pertinent imaging results/findings within the past 24 hours.

## 2019-11-15 NOTE — PROGRESS NOTES
[facility] -   Hematology/Oncology  Progress Note    Patient Name: Mateusz Bates  Admission Date: 10/28/2019  Hospital Length of Stay: 18 days  Code Status: Full Code     Subjective:     HPI:  Mrs. Bates is a 69-year-old female with hypertension, diabetes, hyperlipidemia, obesity, anxiety and depression, iron deficiency anemia, vitamin B12 deficiency, who presented to the emergency room with an NSTEMI.  Workup included a cardiac catheterization that shows severe multivessel coronary artery disease with proximal LAD stenosis of 70% and severely depressed ejection fraction of 20%.  The patient is a candidate for urgent coronary artery bypass grafting. S/p CABG today.  Hematology is consulted for thrombocytopenia.    Interval history: Patient seen and examined in room, lying in bed. Feels ok today. Has been started on Eliquis    Review of Systems   Constitution: Positive for malaise/fatigue.   HENT: Negative for hearing loss and hoarse voice.    Eyes: Negative for blurred vision and visual disturbance.   Cardiovascular: Negative for chest pain, claudication, dyspnea on exertion, irregular heartbeat, leg swelling, near-syncope, orthopnea, palpitations, paroxysmal nocturnal dyspnea and syncope.   Respiratory: Negative for cough, hemoptysis, shortness of breath, sleep disturbances due to breathing, snoring and wheezing.    Endocrine: Negative for cold intolerance and heat intolerance.   Hematologic/Lymphatic: Does not bruise/bleed easily.   Skin: Positive for color change, nail changes and poor wound healing. Negative for dry skin.   Musculoskeletal: Positive for arthritis and back pain. Negative for joint pain and myalgias.        RUE SWELLING   Gastrointestinal: Negative for bloating, abdominal pain, constipation, nausea and vomiting.   Genitourinary: Negative for dysuria, flank pain, hematuria and hesitancy.   Neurological: Negative for headaches, light-headedness, loss of balance, numbness, paresthesias  and weakness.   Psychiatric/Behavioral: Negative for altered mental status.      Physical Exam   Constitutional: She is oriented to person, place, and time. She appears well-developed and well-nourished. No distress.   HENT:   Head: Normocephalic and atraumatic.   Eyes: Pupils are equal, round, and reactive to light.   Neck: Normal range of motion and full passive range of motion without pain. Neck supple. No JVD present.   Cardiovascular: Normal rate, regular rhythm, S1 normal, S2 normal and intact distal pulses. PMI is not displaced. Exam reveals no distant heart sounds.   No murmur heard.  Pulses:       Radial pulses are 2+ on the right side, and 2+ on the left side.        Dorsalis pedis pulses are 2+ on the right side, and 2+ on the left side.   CABG INCISION HEALING WELL   Pulmonary/Chest: Effort normal and breath sounds normal. No accessory muscle usage. No respiratory distress. She has no decreased breath sounds. She has no wheezes. She has no rales.   Abdominal: Soft. Bowel sounds are normal. She exhibits no distension. There is no tenderness.   Musculoskeletal: Normal range of motion. She exhibits no edema.        Right ankle: She exhibits no swelling.        Left ankle: She exhibits no swelling.   Neurological: She is alert and oriented to person, place, and time.   Skin: Skin is warm and dry. She is not diaphoretic. No cyanosis. Nails show no clubbing.   RUE swelling, right hand ecchymotic with dusky fingernails   Psychiatric: She has a normal mood and affect. Her speech is normal and behavior is normal. Judgment and thought content normal. Cognition and memory are normal.    Assessment/Plan:     Acute deep vein thrombosis (DVT) of right upper extremity  November 13, 2019  --D/c Argatroban   --Avoid heparin  --Eliquis 5 mg PO BID  --Daily CBC  --bilateral lower extremity ultrasound was negative for DVT.  --Continue PO anticoagulation for at least 3 months. She will need to f/u in the Heme-Onc clinic  following discharge    Thrombocytopenia  November 11, 2019  --secondary to H IT.  Status post argatroban, currently on apixaban.  --the should be okay as DOACs are known not to activate platelet in vitro.  --Platelet count 99, improving. Transfuse if < 10   --improving.  --Supportive care, Monitor for any bleeding.  --schedule outpatient follow-up with Hematology Clinic.      Thank you for your consult.     Eddi Thompson MD  Hematology/Oncology  [facility]

## 2019-11-15 NOTE — PLAN OF CARE
Pt oriented to self only.  VSS  Pt able to make needs known.  Pt remained afebrile throughout this shift.   Pt remained free of falls this shift.   Pt denies pain this shift.  Plan of care reviewed.   Pt moving/turing independent. Frequent weight shifting encouraged.  Patient sinus rhythm on monitor.   Bed low, side rails up x 2, wheels locked, call light in reach.   Hourly rounding completed.   Will continue to monitor.

## 2019-11-15 NOTE — PLAN OF CARE
Pt on 2L NC (humidified) maintaining saturation. Treatment taken with no issues. IS performed with RT assist.

## 2019-11-15 NOTE — PLAN OF CARE
Swallowing assessed with pt eating lunch (mechanical soft with thin liquids).  Mod cueing required for pt to follow safe swallow strategies (small bites/sips, slow rate, and clear oral cavity prior to next bite) as she remains impulsive with poor safety insight.  Pt had drank approximately 12 ounces of thin liquids prior to eating where she had another 8 ounces.  No overt signs of aspiration was evident with any consistency, however pt developed an expiratory wheeze with abdominal breathing.  ST removed food and liquids and alerted nursing who called RT.

## 2019-11-15 NOTE — ASSESSMENT & PLAN NOTE
-Patient presented with intermittent chest tightness/heaviness that radiated to her back since Saturday evening  -Associated with JONES/orthopnea  -Initial troponin 3.665  -EKG showed SR with new LBBB  -Presentation consistent with NSTEMI  -Continue ASA, statin  -Start Toprol XL  -Start heparin gtt  -Check 2D echo  -Continue to trend troponin  -Keep NPO. [facility] today for further evaluation and PCI if indicated. All risks, benefits, and treatment alternatives explained to patient in detail. All questions answered. She has agreed to proceed. Further rec's to follow.    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 All risks, benefits, and treatment alternatives explained to patient in detail. All questions answered. She has agreed to proceed.    10/31/19  -Patient is s/p CABG x3.   See plan for CABG

## 2019-11-15 NOTE — PROGRESS NOTES
Discussed case with attending explained pt not likely to be accepted at SNF or Rehab level of care per feedback from SNF and Rehab providers due to change in medical condition.  Pt more appropriate for LTAC to meet needs.  Referral made to [facility].      Preference form signed by pt's sister.

## 2019-11-15 NOTE — PROGRESS NOTES
[facility] -   Cardiothoracic Surgery  Progress Note    Patient Name: Mateusz Bates  MRN: [id number]  Admission Date: 10/28/2019  Hospital Length of Stay: 18 days  Code Status: Full Code   Attending Physician: Keny Zaidi MD   Referring Provider: Self, Aaareferral  Principal Problem:Acute deep vein thrombosis (DVT) of right upper extremity            Subjective:     Post-Op Info:  Procedure(s) (LRB):  CORONARY ARTERY BYPASS GRAFT (CABG) (N/A)  ECHOCARDIOGRAM,TRANSESOPHAGEAL (N/A)  BLOCK, NERVE, INTERCOSTAL, 2 OR MORE (N/A)   15 Days Post-Op     Interval History:  11/15/2019  The patient is post-operative day 15.  Status post coronary artery bypass grafting x3.    ROS  Medications:  Continuous Infusions:  Scheduled Meds:   albuterol-ipratropium  3 mL Nebulization Q6H WAKE    albuterol-ipratropium  3 mL Nebulization Once    amLODIPine  10 mg Oral Daily    apixaban  5 mg Oral BID    aspirin  81 mg Oral Daily    chlorhexidine  15 mL Mouth/Throat BID    chlorthalidone  25 mg Oral Daily    ferrous sulfate  325 mg Oral BID    insulin detemir U-100  10 Units Subcutaneous QHS    insulin detemir U-100  5 Units Subcutaneous Daily    losartan  50 mg Oral Daily    metoprolol tartrate  50 mg Oral BID    nitroGLYCERIN 2% TD oint  0.5 inch Topical (Top) Q12H    pantoprazole  40 mg Oral Daily    psyllium husk  1 packet Oral BID     PRN Meds:sodium chloride, albumin human 5%, albuterol sulfate, Dextrose 10% Bolus, Dextrose 10% Bolus, insulin aspart U-100, iohexol, lactated ringers, magnesium sulfate IVPB, magnesium sulfate IVPB, metoclopramide HCl, ondansetron, potassium chloride in water **AND** potassium chloride in water **AND** potassium chloride in water, traMADol     Objective:     Vital Signs (Most Recent):  Temp: 97.4 °F (36.3 °C) (11/15/19 0802)  Pulse: 87 (11/15/19 0802)  Resp: 20 (11/15/19 0802)  BP: (!) 151/70 (11/15/19 0802)  SpO2: 95 % (11/15/19 0802) Vital Signs (24h Range):  Temp:  [97.4  °F (36.3 °C)-99.2 °F (37.3 °C)] 97.4 °F (36.3 °C)  Pulse:  [] 87  Resp:  [18-22] 20  SpO2:  [93 %-97 %] 95 %  BP: (126-151)/(58-70) 151/70     Weight: 76.8 kg (169 lb 5 oz)  Body mass index is 31.99 kg/m².    SpO2: 95 %  O2 Device (Oxygen Therapy): nasal cannula w/ humidification    Intake/Output - Last 3 Shifts       11/13 0700 - 11/14 0659 11/14 0700 - 11/15 0659 11/15 0700 - 11/16 0659    P.O. 896      I.V. (mL/kg)       Blood  325     Total Intake(mL/kg) 896 (11.8) 325 (4.2)     Net +896 +325            Urine Occurrence 4 x 1 x     Stool Occurrence 1 x 5 x           Lines/Drains/Airways     Peripherally Inserted Central Catheter Line                 PICC Double Lumen 11/07/19 1645 left brachial;left basilic 7 days                Physical Exam   Constitutional: She appears well-developed and well-nourished. No distress.   HENT:   Head: Normocephalic and atraumatic.   Eyes: Pupils are equal, round, and reactive to light. EOM are normal.   Neck: Normal range of motion. Neck supple. No JVD present.   Cardiovascular: Normal rate, regular rhythm, normal heart sounds and intact distal pulses. Exam reveals no gallop and no friction rub.   No murmur heard.  Pulmonary/Chest: Effort normal and breath sounds normal. No stridor. No respiratory distress. She has no wheezes. She has no rales. She exhibits no tenderness.   Abdominal: Soft. Bowel sounds are normal. She exhibits no distension and no mass. There is no tenderness. There is no rebound and no guarding. No hernia.   Musculoskeletal: She exhibits edema and tenderness.   RUE exam slightly improved from yesterday. Dusky, mottled, discoloration is localized to the tip of the right thumb and the tip of the right index finger. Redness and warmth with +edema and blistering to the right hand. ROM limited to the right hand 2/5.    Neurological: She is alert. She displays normal reflexes. No cranial nerve deficit or sensory deficit. She exhibits normal muscle tone.  Coordination normal.   Oriented to person, place, situation. Disoriented to time. No focal deficits.    Skin: Skin is warm and dry. Capillary refill takes less than 2 seconds. Right thumb tip and right index finger tip are unblanchable.. She is not diaphoretic. There is erythema.   Surgical incisions CDI.    Psychiatric: She has a normal mood and affect. Her behavior is normal. Judgment normal.   Slow to respond to questions.    Nursing note and vitals reviewed.      Significant Labs:  BMP:   Recent Labs   Lab 11/14/19  0528  11/15/19  0454   *   < > 258*      < > 140   K 4.2   < > 3.6      < > 106   CO2 22*   < > 26   BUN 19   < > 23   CREATININE 0.9   < > 0.8   CALCIUM 8.9   < > 8.6*   MG 1.9  --   --     < > = values in this interval not displayed.     CBC:   Recent Labs   Lab 11/15/19  0454   WBC 11.48   RBC 2.81*   HGB 8.2*   HCT 26.1*   PLT 99*   MCV 93   MCH 29.2   MCHC 31.4*       Significant Diagnostics:  I have reviewed all pertinent imaging results/findings within the past 24 hours.    Assessment/Plan:     S/P CABG x 3       11/01/2019  The patient is post-operative day 1, status-post coronary artery bypass grafting x 3.  Overall the patient is making progress, however IABP and inotropic agents will continue today.   Neuro:  The patient is awake alert and oriented x3.  Neuro exam is nonfocal.  Pain is well controlled with IV pain medication.  Cardiac:  Patient is hemodynamically improving. Patient remains on epinephrine and milrinone gtts, which will be weaned over the next 24 hours.  Cardiac index is improving. IABP will remain in place over the next 24 hours.  Patient will be started on beta blockers once gtts are weaned off.  Respiratory: Good sats on high flow nasal cannula. Continue respiratory toilet, continue incentive spirometer. Wean to low flow nasal cannula.  GI:  Diet: sips of water with medications, will slowly advance diet as tolerated. Benign abdominal exam.  Renal:   Urine output is slowly improving. Cr 0.9. K 4.3. Mag 1.6. Replace mag. Continue IV diuresis. Add metolazone.   Heme:  Hct 34.1  Platelet 82. Continue aspirin. Discontinue Plavix for now.  Id:  WBC 12. Tmax 100.3. Continue post-operative antibiotics. Will continue to follow.   Endocrine:  Glucose is controlled with insulin gtt.  Continue insulin gtt for now, until weaned off inotropic agents.   Activities:  Patient is currently bed bound due to IABP.  Advance activities as tolerated once IABP is removed.   Lines tubes and drains:  Continue IABP. Continue Lantry and Cordis.  Continue A-line.  Chest tubes will continue. NICOLASA x 2 will continue. Jones catheter will continue.  Continue pacer wires. Continue prevena wound vac.  Plan: Remain ICU.          11/02/2019  The patient is post-operative day 2, status-post coronary artery bypass grafting x 3.  Overall the patient is making progress, IABP removed and inotropic agents discontinued.   Neuro:  The patient is awake alert and oriented x3.  Neuro exam is nonfocal.  Pain is well controlled. Discontinue fentanyl.   Cardiac:  Patient is hemodynamically improving. Inotropic gtts discontinued. Excellent cardiac index. IABP discontinued.  Patient will be started on beta blockers today.  Respiratory: Good sats on high flow nasal cannula. Continue respiratory toilet, continue incentive spirometer. Wean to low flow nasal cannula.  GI:  Advance diabetic/cardiac diet as tolerated. Benign abdominal exam.  Renal:  Urine output is improving. Cr 0.9. K 4.9. Mag 2.2. Continue IV diuresis. Hold AM K.   Heme:  Hct 28.3  Platelet 47. Discontinue aspirin. Discontinue Plavix. HIT panel. Consult hematology r/t thrombocytopenia.   Id:  WBC 10.4. Tmax 99.4. Will continue to follow.   Endocrine:  Discontinue insulin gtt. Start sliding scale insulin. Start long acting insulin detemir.   Activities: Advance activities as tolerated.   Lines tubes and drains:  Discontinue IABP. Discontinue Lantry and  Cordis.  Discontinue A-line.  Discontinue chest tubes x 3. Discontinue NICOLASA x 2. Discontinue arterial line. Jones catheter will continue for now.  Continue pacer wires. Continue prevena wound vac. Place PICC line.   Plan: Remain ICU.           11/03/2019  The patient is post-operative day 3, status-post coronary artery bypass grafting x 3.  Overall the patient is making progress.  Neuro:  The patient is awake alert and oriented x3.  Neuro exam is nonfocal.  Pain is well controlled. Continue tramadol.   Cardiac:  Patient is hemodynamically stable. Increase metoprolol dose. Start amlodipine.   Respiratory: Good sats on nasal cannula. Continue respiratory toilet, continue incentive spirometer. Wean to room air.  GI:  Advance diabetic/cardiac diet as tolerated. Benign abdominal exam. Passing gas, no BM. Add 1 bottle of mag citrate.   Renal:  Good urine output. Cr 0.9. K 3.8. Mag 2.0. Replace K. Replace Mag. Change to PO diuresis.   Heme:  Hct 34.7  Platelet 89. Restart aspirin. Continue to hold Plavix. HIT panel pending. Hematology following.  Id:  WBC 13.8. Tmax 99.4. Will continue to follow.    Endocrine:  Glucose controlled on sliding scale insulin. Increase long acting insulin detemir dose.   Activities: Advance activities as tolerated.   Lines tubes and drains:  Jones catheter will continue for now.  Continue pacer wires. Continue prevena wound vac. Continue PICC line.   Plan: Remain ICU.          11/04/2019  The patient is post-operative day 4, status-post coronary artery bypass grafting x 3.  Overall the patient is making progress.  Neuro:  The patient is awake alert and oriented x3.  Neuro exam is nonfocal.  Pain is well controlled. Continue tramadol.   Cardiac:  Patient is hemodynamically stable. Continue metoprolol dose. Continue amlodipine dose.   Respiratory: Good sats on room air. Continue respiratory toilet, continue incentive spirometer.  GI:  Advance diabetic/cardiac diet as tolerated. Benign abdominal  exam. Regular bowel movements.   Renal:  Good urine output. Cr 0.7. K 4.0. Mag 2.3. Replace K. Decrease PO diuresis.   Heme:  Hct 34.4  Platelet 140. Continue aspirin. Restart Plavix. HIT panel pending. Hematology following.  Id:  WBC down to 13.13. Tmax 98. Will continue to follow.    Endocrine:  Glucose controlled on sliding scale insulin. Increased long acting insulin detemir dose.   Activities: Advance activities as tolerated.   Lines tubes and drains:  Discontinued storm catheter. Continue pacer wires. Continue prevena wound vac. Continue PICC line.   Plan: Transfer to telemetry. Anticipate discharge to SNF in 48 hours.         11/05/2019  The patient is post-operative day 5, status-post coronary artery bypass grafting x 3.  Overall the patient is making progress.  Neuro:  The patient is awake alert and oriented x3.  Neuro exam is nonfocal. Patient slow to answer questions, however answers are appropriate.  Pain is well controlled. Continue tramadol PRN.   Cardiac:  Patient is hemodynamically stable. Continue metoprolol dose. Discontinue amlodipine. Start losartan.    Respiratory: Good sats on room air. Continue respiratory toilet, continue incentive spirometer.  GI:  Advance diabetic/cardiac diet as tolerated. Benign abdominal exam. Regular bowel movements.   Renal:  Good urine output. Cr 0.7. K 4.8. Mag 2.1. Replace Mag. Discontinue PO diuresis.   Heme:  Hct 39.5  Platelet 73. Continue aspirin. Hold Plavix. HIT panel pending. Hematology following.  Id:  WBC is up to 16. Tmax 99. Pan-culture. DC PICC line. Chest xray. Start broad spectrum empiric antibiotics. Will continue to follow.    Endocrine:  Glucose controlled on sliding scale insulin. Decrease long acting insulin detemir dose.   Activities: Advance activities as tolerated.   Lines tubes and drains:  Discontinue pacer wires. Discontinue prevena wound vac. Continue PICC line.   Plan: Anticipate discharge to SNF in 24-48 hours.         11/06/2019  The  patient is post-operative day 6, status-post coronary artery bypass grafting x 3. Patient had an episode of decompensation and unresponsiveness yesterday evening. The patient was found unresponsive, cool, and diaphoretic, with agonal breathing. Patient was intubated and transferred to the ICU. Bedside EKG and echocardiogram performed at the bedside showed no significant changes. CT head, chest, abdomen pelvis performed, showed no significant findings.     Neuro:  The patient is intubated and sedated. On Precedex gtt. Begin weaning off sedation.    Cardiac:  Patient is hemodynamically improving. Inotropic agent dependent. Currently on Epinephrine gtt. Start Milrinone gtt. Discontinue metoprolol. Discontinue losartan.    Respiratory: Ventilator dependent. tV 450, RR 20, PEEP 5. Wean down sedation. Wean off ventilator today.    GI:  Begin enteral tube feedings. Benign abdominal exam. Previously having regular bowel movements.   Renal: Adequate urine output. Cr 1.1. K 3.6. Mag 1.7. Replace Mag. Replace K. Discontinue diuresis.   Heme:  Hct 31.9  Platelet 89. Hold aspirin. Hold Plavix. HIT panel negative. Patient received 1 units PRBC last night. Hematology following.  Id:  WBC is down to 15.5. Tmax 99.9. BC NGTD. Urinalysis negative. Sputum culture negative. Vancomycin and zosyn day 2. Will continue to follow closely.    Endocrine:  Glucose controlled on insulin gtt, continue at this time.   Activities: Intubated and sedated.   Musculoskeletal: Unable to find right radial pulse via doppler. Tips of the fingers of the right hand are mottled, dusky, appear cyanotic. Recent right radial arterial line in place, which has been relocated. US of arteries and veins of the right arm ordered. Consult to vascular surgery placed.    Lines tubes and drains:  Continue swan and cordis. Continue arterial line. Continue storm.    Plan: Remain ICU         11/07/2019  The patient is post-operative day 7, status-post coronary artery bypass  grafting x 3. Patient had an episode of decompensation and unresponsiveness the evening of 11/05/2019. The patient was found unresponsive, cool, and diaphoretic, with agonal breathing. Patient was intubated and transferred to the ICU. Bedside EKG and echocardiogram performed at the bedside showed no significant changes. CT head, chest, abdomen pelvis performed, showed no significant findings.     Neuro:  The patient is intubated and sedated. On Precedex gtt. Wean off sedation.    Cardiac:  Patient is hemodynamically improving. Excellent cardiac index. Wean off Inotropic gtts.   Respiratory: Wean towards extubation. tV 400, RR 18, PEEP 5.   GI:  Continue enteral tube feedings while intubated. Benign abdominal exam.  Renal: Adequate urine output. Cr 1.1. K 4.1. Mag 2.8. Start IV diuresis.   Heme:  Hct 24.6  Platelet 60. Hold aspirin. Hold Plavix. HIT panel negative.  Id:  WBC is down to 14.46. Tmax 100.8. BC NGTD. Urinalysis negative. Sputum culture negative. Vancomycin and zosyn day 3. Midline sternal incision appears reddened, with induration at skin edges. Will continue to follow closely.    Endocrine:  Glucose controlled on sliding scale insulin.   Activities: Intubated and sedated. Moves all 4. Full ROM.    Musculoskeletal: Able to find right radial pulse via doppler. The right hand extending to the tips of the fingers of the right hand are mottled, dusky, appear cyanotic. Recent right radial arterial line placement, which has been relocated. US of arteries and veins of the right arm pending. Vascular surgery following, suggested possible thrombotic etiology, suggested heparin gtt, however this is contraindicated r/t s/p cabg x 3.     Lines tubes and drains:  Continue swan and cordis. Continue arterial line. Continue stomr.    Plan: Remain ICU         11/08/2019  The patient is post-operative day 8, status-post coronary artery bypass grafting x 3. Patient had an episode of decompensation and unresponsiveness the  evening of 11/05/2019. The patient was found unresponsive, cool, and diaphoretic, with agonal breathing. Patient was intubated and transferred to the ICU. Bedside EKG and echocardiogram performed at the bedside showed no significant changes. CT head, chest, abdomen pelvis performed, showed no significant findings. Suspect cardiogenic shock.      Neuro:  Patients neurologic state is improving. Alert, follows commands, makes eye contact, disoriented x 4. No focal deficits.    Cardiac:  Patient is hemodynamically improving. Off all inotropic gtts. Start low dose metoprolol and amlodipine.   Respiratory: Good sats on low flow nasal cannula. Continue IS. Continue pulmonary toilet. Wean to room air.    GI:  Continue enteral tube feedings at this time r/t disorientation and aspiration risk. Discontinue laxatives. Start metamucil BID, patient has had multiple loose stools.   Renal: Good urine output. Cr 0.8. K 3.6. Mag 2.1. Replace K. Replace Mag. Continue IV diuresis.   Hema:  Hct 26.4  Platelet 45. Hold aspirin. Hold Plavix. HIT panel will be resent as the patient is a high suspicion for HIT despite previous negative HIT panel. Multiple DVTs from the right IJ down to the brachiocephalic vein. Confirmed via vascular US. Patient started on argatroban yesterday, goal is APTT 2x baseline. Most recent APTT 97.8. Baseline APTT 50.8.  Hematology following. Vascular surgery following.   Id:  WBC is down to 12. Tmax 100.4. BC NGTD. Urinalysis negative. Sputum culture negative. Vancomycin and zosyn day 4. Midline sternal incision appears improved, with minimal redness and induration at skin edges. Will continue to follow closely.    Endocrine:  Glucose controlled on sliding scale insulin.   Activities: Increase activities as tolerated.  Musculoskeletal: ROM 2/5 to the right wrist/hand/fingers, distal from the elbow there is 2+ non pitting edema, the extremity is tender and warm. There is mottling, a dusky appearance, and purple  discoloration to the fingertips of the right hand. See above hematology plan.      Lines tubes and drains:  Continue PICC line. Continue storm catheter. Continue NG tube.   Plan: Remain ICU     11/09/2019  The patient is postop day 9.  Status post coronary artery bypass grafting x3.  Postop course was complicated by an episode of hemodynamic instability and decompensation on postop day number 5.  Patient continues to make clinical progress since then.  She is off all drips.  She remains extubated.  Continue ICU care for now.    Neuro:  Patient is awake alert.  She is oriented x2.  She is still confused.  But she is making progress.  Her neuro exam is nonfocal.  She follows all commands.  Cardiac:  Patient continues to be hemodynamically stable.  She is somewhat hypertensive.  Will increase her antihypertensives.  Respiratory:  Patient is good sats on nasal cannula.  She remains extubated.  GI:  Patient was tolerating tube feeds until this morning.  She pulled heart feeding tube. Will get a swallowing evaluation.  Mode of nutrition will depend on results of swallowing evaluation.  Renal:  Patient has good urine output %period% creatinine is 0.9.  Heme:  Hematocrit is 26 platelet is 46.  Patient is on argatroban  for anticoagulation due to right upper extremity DVTs.  Hypercoagulability workup is in progress.  Appreciate Hematology and vascular surgery input.  Id:  Incision continues to improve.  white count is 17.  Continue broad-spectrum antibiotics.  Endocrine:  Glucose is controlled on its sliding scale insulin.  Activities:  Advance activities as tolerated.  Line tubes and drains.  Patient has a PICC line, Storm catheter.    11/10/2019  The patient is postop day 10.  Status post coronary bypass grafting x3.  Postop course was complicated by an episode of hemodynamic instability and decompensation on postop day 5.  Patient remains hemodynamically stable.    Neuro:  The patient is awake and alert.  She is oriented  x1.  She continues to be confused.  Her neuro exam is nonfocal.  Cardiac:  Patient continues to be hemodynamically stable.  His she is hypertensive.  Hypertensive medications will be increased.  Respiratory:  Patient has good sats on nasal cannula.  Continue pulmonary toilet.  GI:  Patient is tolerating a regular diet.  Renal:  Patient has good urine output. Creatinine is 0.7.  Heme: hematocrit is 24 and platelet is 67.  Patient continues on argatroban for right upper extremity DVTs.  Hypercoagulable workup is in progress.  Id:  Patient's white count is up to 20.  Patient is on broad prepped from antibiotics.  Endocrine:  The patient is on long-acting as well as sliding scale insulin.  Blood sugars have been well controlled.  Will decrease dose of long-acting.  Activities: patient has been out of bed to chair.  Advance as tolerated.  Musculoskeletal:  Patient's right upper extremity continues to be swollen  The index finger and thumb continues to be dusky with a bleb near the base of the thumb.  Continue argatroban for anticoagulation.  Continue elevation of right upper extremity.  Lines tubes and drains: patient has a PICC line.  Will discontinue Jones catheter.        11/11/2019  The patient is post-operative day 11, status-post coronary artery bypass grafting x 3. Post-operative course was complicated by an episode of hemodynamic instability and decompensation on postop day 5.  Patient remains hemodynamically stable.    Neuro:  Patients neurologic state is improving. Oriented x 2. Alert, follows commands, makes eye contact. No focal deficits.    Cardiac:  Patient is hemodynamically improving. Hypertensive. Continue metoprolol, amlodipine, and losartan. Add chlorthalidone.   Respiratory: Good sats on room air. Continue IS. Continue pulmonary toilet.  GI:  Continue cardiac/diabetic diet, modified by puree and thick liquids with swallow precautions. Advance as tolerated. Continue metamucil.  Renal: Good urine  output. Cr 0.8. K 3.8. Replace K. Continue IV diuresis. Add chlorthalidone. BMP at noon.   Hema:  Hct 23.8  Platelet 75. Hold aspirin. Hold Plavix. Repeat HIT panel pending. Multiple DVTs from the right IJ down to the brachiocephalic vein. Confirmed via vascular US. Patient on argatroban gtt, goal is APTT 2x baseline. Most recent APTT 83.3. Baseline APTT 50.8.  Hematology following. Vascular surgery following.   Id:  WBC is down to 19. Tmax 98.4. BC NGTD. Vancomycin and zosyn day 7. Discontinue broad spectrum antibiotics. Will continue to follow closely.    Endocrine:  Glucose controlled on sliding scale insulin and long acting insulin detemir.    Activities: Increase activities as tolerated.  Musculoskeletal: The right upper extremity exhibits swelling distal from the elbow. The RUE is tender to palpation and ROM. ROM is diminished to the right hand 2/5. The hand his reddened, swollen, with dusky, mottled, purple discoloration to the right thumb and tip of the right index finger. Blisters have formed on the proximal portion of the thumb and wrist. Severely diminished capillary refill to the tip of the right thumb and tip of the right index finger. Wound care order placed.      Lines tubes and drains:  Continue PICC line.  Plan: Remain ICU         11/12/2019  The patient is post-operative day 12, status-post coronary artery bypass grafting x 3. Post-operative course was complicated by an episode of hemodynamic instability and decompensation on postop day 5.  Patient remains hemodynamically stable.    Neuro:  Patients neurologic state is improving. Oriented to person, place, situation. Disoriented to time. Alert, follows commands, makes eye contact. No focal deficits.    Cardiac:  Patient is hemodynamically improving. Continue metoprolol, amlodipine, losartan, and chlorthalidone.   Respiratory: Good sats on room air. Continue IS.  GI:  Continue cardiac/diabetic diet, modified by puree and thick liquids with swallow  precautions. Advance as tolerated. Continue metamucil.  Renal: Good urine output. Cr 0.8. K 3.9. Replace K. Discontinue lasix. Continue chlorthalidone.  Hema:  Hct 23.1  Platelet 75. Hold aspirin. Hold Plavix. 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 Multiple DVTs from the right IJ down to the brachiocephalic vein. Confirmed via vascular US. Patient on argatroban gtt, goal is APTT 2x baseline. Most recent APTT 84.6. Baseline APTT 50.8.  Hematology following. Vascular surgery following.   Id:  WBC is down to 17.4. A-febrile. Will continue to follow closely.    Endocrine:  Patient had an episode of hypoglycemia last night. Will continue sliding scale insulin. Discontinue long acting insulin. Begin strict calorie count.   Activities: Increase activities as tolerated.  Musculoskeletal: The right upper extremity exhibits swelling distal from the elbow. The RUE is tender to palpation and ROM. ROM is diminished to the right hand 3/5. The hand is reddened, swollen, with dusky, mottled, purple discoloration to the right thumb tip and tip of the right index finger.  Severely diminished capillary refill to the tip of the right thumb and tip of the right index finger. Wound care following. Gauze dressing in place.       Lines tubes and drains:  Continue PICC line.  Plan: Remain ICU. Begin discharge planning for IP rehab.          11/13/2019  The patient is post-operative day 13, status-post coronary artery bypass grafting x 3. Post-operative course was complicated by an episode of hemodynamic instability and decompensation on postop day 5.  Patient remains hemodynamically stable.    Neuro:  Patient is oriented to person, place, situation. Disoriented to time. Alert, follows commands, makes eye contact. No focal deficits.    Cardiac:  Patient is hemodynamically stable. Continue metoprolol, amlodipine, losartan, and chlorthalidone.   Respiratory: Good sats on room air. Continue IS. Use of accessory muscles noted during  inspiration. Will increase diuresis.   GI:  Continue cardiac/diabetic diet, modified by puree and thick liquids with swallow precautions. Advance as tolerated. Continue metamucil.  Renal: Good urine output. Cr 0.8. K 4.0. Mag 1.8. Replace Mag. Start lasix IV. Continue chlorthalidone.  Hema:  Hct 23.  Platelet 79. Hold aspirin. Hold Plavix. 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 Multiple DVTs from the right IJ down to the brachiocephalic vein. Confirmed via vascular US. Patient on argatroban gtt, goal is APTT 2x baseline. Most recent APTT 75.6. Baseline APTT 50.8.  Hematology following. Vascular surgery following.   Id:  WBC is down to 12.2. A-febrile. Will continue to follow closely.    Endocrine:  Glucose controlled. Continue sliding scale insulin. Start long acting insulin detemir 5 units daily.     Activities: Increase activities as tolerated.  Musculoskeletal: The right upper extremity exhibits swelling distal from the elbow. The RUE is tender to palpation and ROM. ROM is diminished to the right hand 2/5. The right hand is improving. The dusky, mottled, purple discoloration is localized to the right thumb tip and tip of the right index finger.  Severely diminished capillary refill to the tip of the right thumb and tip of the right index finger. Wound care following. Gauze dressing in place.    Lines tubes and drains:  Continue PICC line.  Plan: Remain telemetry. Continue discharge planning for IP rehab.          11/14/2019  The patient is post-operative day 14, status-post coronary artery bypass grafting x 3. Post-operative course was complicated by an episode of hemodynamic instability and decompensation on postop day 5.  Patient remains hemodynamically stable.    Neuro:  Patient is oriented to person, place, situation. Disoriented to time. Alert, follows commands, makes eye contact. No focal deficits.    Cardiac:  Patient is hemodynamically stable. Continue metoprolol, amlodipine, losartan, and  chlorthalidone.   Respiratory: Good sats on room air. Continue IS. Decreased use of accessory muscles noted during inspiration.   GI:  Continue cardiac/diabetic diet, modified by puree and thick liquids with swallow precautions. Advance as tolerated. Continue metamucil.  Renal: Good urine output. Cr 0.9. K 4.2. Mag 1.9. Replace Mag. Discontinue lasix. Continue chlorthalidone.  Hema:  Hct 21.9.  Platelet 87. Hold aspirin. Hold Plavix. 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 Multiple DVTs from the right IJ down to the brachiocephalic vein. Confirmed via vascular US. As per discussion with hematology and pharmacy, will discontinue argatroban and start apixaban. Transfuse 1 unit PRBC.  Hematology following. Vascular surgery following.   Id:  WBC is down to 11. A-febrile. Will continue to follow closely.    Endocrine:  Continue sliding scale insulin. Increase insulin detemir to 5 units BID.     Activities: Increase activities as tolerated.  Musculoskeletal: The right upper extremity exhibits swelling distal from the elbow. The RUE is tender to palpation and ROM. ROM is diminished to the right hand 2/5. The right hand is slowly improving. The dusky, mottled, purple discoloration is localized to the right thumb tip and tip of the right index finger.  Severely diminished capillary refill to the tip of the right thumb and tip of the right index finger. Wound care following. Gauze dressing in place.    Lines tubes and drains:  Continue PICC line.  Plan: Remain telemetry. Continue discharge planning for IP rehab.          11/15/2019  The patient is post-operative day 15, status-post coronary artery bypass grafting x 3. Post-operative course was complicated by an episode of hemodynamic instability and decompensation on postop day 5.  Patient remains hemodynamically stable.    Neuro:  Patient is oriented to person, place, situation. Disoriented to time. Alert, follows commands, makes eye contact. No focal deficits.    Cardiac:   Patient is hemodynamically stable. Continue metoprolol, amlodipine, losartan, and chlorthalidone.   Respiratory: Good sats on low flow nasal cannula. Wean to room air. Continue IS.   GI:  Continue cardiac/diabetic diet, modified by puree and thick liquids with swallow precautions. Advance as tolerated. Continue metamucil.  Renal: Good urine output. Cr 0.8. K 3.6. Replace K. Continue chlorthalidone.  Hema:  Hct 26.1. Platelet 99. Start Aspirin. Continue Apixaban 5mg BID.  Hematology following. Vascular surgery following.   Id:  WBC 11. A-febrile. Will continue to follow closely.    Endocrine:  Continue sliding scale insulin. Increase insulin detemir to 5 units AM dose, 10 units PM dose.   Activities: Increase activities as tolerated.  Musculoskeletal: The right upper extremity exhibits swelling distal from the elbow. The RUE is tender to palpation and ROM. ROM is diminished to the right hand 2/5. The right hand is slowly improving. The dusky, mottled, purple discoloration is localized to the right thumb tip and tip of the right index finger.  Severely diminished capillary refill to the tip of the right thumb and tip of the right index finger. Wound care following. Gauze dressing in place.    Lines tubes and drains:  Continue PICC line.  Plan: Remain telemetry. Begin discharge planning to LTAC, anticipate discharge in 48-72 hours.        NSTEMI (non-ST elevated myocardial infarction)  The patient is a 69-year-old female with hypertension, diabetes, hyperlipidemia, obesity, anxiety and depression, iron deficiency anemia, vitamin B12 deficiency, who presented to the emergency room with an NSTEMI.  Workup included a cardiac catheterization that shows severe multivessel coronary artery disease with proximal LAD stenosis of 70% and severely depressed ejection fraction of 20%.  The patient is a candidate for urgent coronary artery bypass grafting.  The risks and benefits of surgery were explained to the patient.  The  patient verbalized understanding of the issues discussed.  She a segs the risks of surgery and has agreed to proceed with urgent coronary artery bypass grafting.  She understands that the risk of neurologic complication postoperatively is increased due to the presence of cerebral microvascular disease.        Yovani Dobbins NP  Cardiothoracic Surgery  [facility] [place]

## 2019-11-15 NOTE — PT/OT/SLP PROGRESS
Occupational Therapy  Treatment    Mateusz Bates   MRN: [id number]   Admitting Diagnosis: Acute deep vein thrombosis (DVT) of right upper extremity    OT Date of Treatment: 11/15/19   OT Start Time: 0917  OT Stop Time: 0940  OT Total Time (min): 23 min    Billable Minutes:  Self Care/Home Management 13 MINUTES and Therapeutic Activity 10 MINUTES    General Precautions: Standard, aspiration, fall, sternal  Orthopedic Precautions: N/A  Braces: N/A         Subjective:  Communicated with NURSE AND Epic CHART REVIEW prior to session.    Pain/Comfort  Pain Rating 1: 0/10    Objective:  Patient found with: telemetry, oxygen     Functional Mobility:  Transfers:   MOD A X 1 SIT<>STAND AND MOD A X 2 WITH STAND PIVOT T/F'S FOR SAFETY  TOILET T/F'S MOD A    Activities of Daily Living:     Feeding adaptive equipment: NA      UE adaptive equipment: NA  LE adaptive equipment:NA  GROOMING/HYGIENE: HAIR GROOMING SIMLPLE FACE WASHING WITH SBA FOR SET UP WITH L UE  Bathing adaptive equipment: NA  TOILETING : MIN/ MOD A   Balance:   Static Sit: FAIR-: Maintains without assist but inconsistent   Dynamic Sit: FAIR: Cannot move trunk without losing balance  Static Stand: POOR+: Needs MINIMAL assist to maintain  Dynamic stand: POOR: Needs MOD (moderate) assist during gait    Therapeutic Activities and Exercises:  PT PLACED AT END OF [address] FOR [place]. PER ORDERS, NURSE PRIYANKA PRESENT    AM-PAC 6 CLICK ADL   How much help from another person does this patient currently need?   1 = Unable, Total/Dependent Assistance  2 = A lot, Maximum/Moderate Assistance  3 = A little, Minimum/Contact Guard/Supervision  4 = None, Modified [place]/Independent    Putting on and taking off regular lower body clothing? : 2  Bathing (including washing, rinsing, drying)?: 2  Toileting, which includes using toilet, bedpan, or urinal? : 2  Putting on and taking off regular upper body clothing?: 2  Taking care of personal grooming such as brushing teeth?:  "2  Eating meals?: 2  Daily Activity Total Score: 12     AM-PAC Raw Score CMS "G-Code Modifier Level of Impairment Assistance   6 % Total / Unable   7 - 8 CM 80 - 100% Maximal Assist   9-13 CL 60 - 80% Moderate Assist   14 - 19 CK 40 - 60% Moderate Assist   20 - 22 CJ 20 - 40% Minimal Assist   23 CI 1-20% SBA / CGA   24 CH 0% Independent/ Mod I       Patient left up in chair with all lines intact, NURSE TWAN notified and NURSE TWAN present    ASSESSMENT:  Mateusz Bates is a 69 y.o. female with a medical diagnosis of Acute deep vein thrombosis (DVT) of right upper extremity and presents with DEBILITY AND GENERALIZED WEAKNESS. PT WILL CONTINUE TO BENEFIT FROM SKILLED OT    Rehab identified problem list/impairments: Rehab identified problem list/impairments: weakness, impaired self care skills, impaired balance, decreased coordination, decreased safety awareness, impaired endurance, impaired functional mobilty, gait instability, decreased lower extremity function    Rehab potential is good.    Activity tolerance: Good    Discharge recommendations: Discharge Facility/Level of Care Needs: rehabilitation facility     Barriers to discharge: Barriers to Discharge: Decreased caregiver support    Equipment recommendations: (DEFER TO NEXT LEVEL OF CARE)     GOALS:   Multidisciplinary Problems     Occupational Therapy Goals        Problem: Occupational Therapy Goal    Goal Priority Disciplines Outcome Interventions   Occupational Therapy Goal     OT, PT/OT Ongoing, Progressing    Description:  Goals to be met by: 11/20/19     Patient will increase functional independence with ADLs by performing:    UE Dressing with Moderate Assistance.  Grooming while seated with Supervision.  Toileting from bedside commode with Moderate Assistance for hygiene and clothing management.   Toilet transfer to bedside commode with Moderate Assistance.  Upper extremity exercise program x10 reps per handout, with assistance as needed.        "                Plan:  Patient to be seen 3 x/week to address the above listed problems via self-care/home management, therapeutic activities, therapeutic exercises  Plan of Care expires: 11/10/19  Plan of Care reviewed with: patient         Susan Kilgore, OT  11/15/2019

## 2019-11-15 NOTE — ASSESSMENT & PLAN NOTE
-Patient is s/p CABG x3 with LIMA to LAD reverse saphenous vein graft to the RCA and reverse saphenous vein graft to the ramus.  -Continue post CABG supportive therapy  -Continue ASA, Plavix, Statin, BB  -Will continue to follow along     11/1/19  -POD #1 s/p CABG x3  -Continue ASA, Statin, BB  -Remains on Epi and Milrinone gtt today  -Mgmt per CT surgery  -Will follow along    11/2/19  -Recovering s/p CABG x 3  -Continue statin, BB  -Platelets 47,000 this AM; heme/onc on board, ASA held  -Await further rec's    11/3/19  -Stable, progressing post CABG x 3  -Continue statin, BB  -ASA re-started  -Platelets 89,000, heme/onc on board    11/4/19  -Patient slowly improving post CABG x3  -Continue ASA, Statin and BB  -plavix on hold due to thrombocytopenia that is improving   -IS use encouraged and early ambulation with PT  -Will continue to follow along     11/5/19  -Will continue ASA, Statin and BB  -Consider holding Plavix again for drop in plts after resuming by primary team   -Recommend CXR to rule out post op PNA given bump in WBC and cough  -will need to monitor for temp  -ambulate as tolerated and encourage IS use     11/06  Hold ASA, Plavix due to anemia  Hold BB due to cardiogenic shock    11/11/19  -Continue Argatroban infusion  -Continue BB, ARB, IV lasix, Chlorthalidone, Norvasc, NTG paste  -Hold ASA, Plavix due to anemia  -Further mgmt per CT surgery    11/12/19  -Stable  -Continue BB, ARB, Norvasc, Chlorthalidone, IV Lasix  -Recommend re-starting ASA  -Plavix held due to anemia/thrombocytopenia    11/13/19  -Recommend ASA daily  -Plavix held due to anemia/thrombocytopenia  -Recommend PRBC transfusion, per CT surgery  -Continue BB, ARB, Norvasc, IV lasix, Chlorthalidone    11/14  -Continue current medical mgmt per CT surgery  -Recommend ASA daily  -Agree with PRBC transfusion today    11/15  -Continue Eliquis, Norvasc, ASA, ARB, BB  -Continue mgmt per CT surgery  -Will continue to follow along  -Encourage IS  and aggressive pulmonary toilet  -OOB to chair for meals

## 2019-11-15 NOTE — SUBJECTIVE & OBJECTIVE
Interval History: no acute issues noted o/n.     Review of Systems   Constitution: Positive for malaise/fatigue.   HENT: Negative for hearing loss and hoarse voice.    Eyes: Negative for blurred vision and visual disturbance.   Cardiovascular: Negative for chest pain, claudication, dyspnea on exertion, irregular heartbeat, leg swelling, near-syncope, orthopnea, palpitations, paroxysmal nocturnal dyspnea and syncope.   Respiratory: Negative for cough, hemoptysis, shortness of breath, sleep disturbances due to breathing, snoring and wheezing.    Endocrine: Negative for cold intolerance and heat intolerance.   Hematologic/Lymphatic: Does not bruise/bleed easily.   Skin: Positive for color change, nail changes and poor wound healing. Negative for dry skin.   Musculoskeletal: Positive for arthritis and back pain. Negative for joint pain and myalgias.        RUE SWELLING   Gastrointestinal: Negative for bloating, abdominal pain, constipation, nausea and vomiting.   Genitourinary: Negative for dysuria, flank pain, hematuria and hesitancy.   Neurological: Negative for headaches, light-headedness, loss of balance, numbness, paresthesias and weakness.   Psychiatric/Behavioral: Negative for altered mental status.   Allergic/Immunologic: Negative for environmental allergies.     Objective:     Vital Signs (Most Recent):  Temp: 96.8 °F (36 °C) (11/15/19 1127)  Pulse: 76 (11/15/19 1221)  Resp: 18 (11/15/19 1221)  BP: 132/87 (11/15/19 1127)  SpO2: 100 % (11/15/19 1221) Vital Signs (24h Range):  Temp:  [96.8 °F (36 °C)-99.2 °F (37.3 °C)] 96.8 °F (36 °C)  Pulse:  [] 76  Resp:  [18-22] 18  SpO2:  [93 %-100 %] 100 %  BP: (126-151)/(58-87) 132/87     Weight: 76.8 kg (169 lb 5 oz)  Body mass index is 31.99 kg/m².     SpO2: 100 %  O2 Device (Oxygen Therapy): nasal cannula      Intake/Output Summary (Last 24 hours) at 11/15/2019 1309  Last data filed at 11/14/2019 1600  Gross per 24 hour   Intake 325 ml   Output --   Net 325 ml        Lines/Drains/Airways     Peripherally Inserted Central Catheter Line                 PICC Double Lumen 11/07/19 1645 left brachial;left basilic 7 days                Physical Exam   Constitutional: She is oriented to person, place, and time. She appears well-developed and well-nourished. No distress.   HENT:   Head: Normocephalic and atraumatic.   Eyes: Pupils are equal, round, and reactive to light.   Neck: Normal range of motion and full passive range of motion without pain. Neck supple. No JVD present.   Cardiovascular: Normal rate, regular rhythm, S1 normal, S2 normal and intact distal pulses. PMI is not displaced. Exam reveals no distant heart sounds.   No murmur heard.  Pulses:       Radial pulses are 2+ on the right side, and 2+ on the left side.        Dorsalis pedis pulses are 2+ on the right side, and 2+ on the left side.   CABG INCISION HEALING WELL   Pulmonary/Chest: Effort normal and breath sounds normal. No accessory muscle usage. No respiratory distress. She has no decreased breath sounds. She has no wheezes. She has no rales.   Abdominal: Soft. Bowel sounds are normal. She exhibits no distension. There is no tenderness.   Musculoskeletal: Normal range of motion. She exhibits no edema.        Right ankle: She exhibits no swelling.        Left ankle: She exhibits no swelling.   Neurological: She is alert and oriented to person, place, and time.   Skin: Skin is warm and dry. She is not diaphoretic. No cyanosis. Nails show no clubbing.   RUE swelling, right hand ecchymotic with dusky fingernails   Psychiatric: She has a normal mood and affect. Her speech is normal and behavior is normal. Judgment and thought content normal. Cognition and memory are normal.   Nursing note and vitals reviewed.      Significant Labs:   BMP:   Recent Labs   Lab 11/13/19  1415  11/14/19  0528 11/14/19  1655 11/15/19  0454   GLU  --    < > 329* 425* 258*   NA  --    < > 142 138 140   K  --    < > 4.2 4.2 3.6   CL  --     < > 106 104 106   CO2  --    < > 22* 24 26   BUN  --    < > 19 26* 23   CREATININE  --    < > 0.9 1.0 0.8   CALCIUM  --    < > 8.9 9.0 8.6*   MG 2.0  --  1.9  --   --     < > = values in this interval not displayed.   , CBC   Recent Labs   Lab 11/14/19  0528 11/14/19  1655 11/15/19  0454   WBC 11.05 13.32* 11.48   HGB 6.7* 8.1* 8.2*   HCT 21.9* 26.4* 26.1*   PLT 87* 104* 99*    and All pertinent lab results from the last 24 hours have been reviewed.    Significant Imaging: Echocardiogram:   2D echo with color flow doppler:   Results for orders placed or performed during the hospital encounter of 10/28/19   2D echo with color flow doppler   Result Value Ref Range    QEF 30 (A) 55 - 65    Diastolic Dysfunction Yes (A)     Est. PA Systolic Pressure 33.03     Narrative    Date of Procedure: 11/06/2019        TEST DESCRIPTION   Technical Quality: This is a technically challenging study.     General: A catheter is present in the right-sided cardiac chambers.     Aorta: The aortic root is normal in size, measuring 3.7 cm at sinotubular junction and 3.8 cm at Sinuses of Valsalva. The proximal ascending aorta is normal in size, measuring 3.7 cm across.     Left Atrium: The left atrial volume index is normal, measuring 19.22 cc/m2.     Left Ventricle: The left ventricle is normal in size, with an end-diastolic diameter of 3.0 cm, and an end-systolic diameter of 2.5 cm. LV wall thickness is normal, with the septum measuring 2.1 cm and the posterior wall measuring 1.4 cm across. Relative   wall thickness was increased at 0.93, and the LV mass index was increased at 140.2 g/m2 consistent with concentric left ventricular hypertrophy. The following segments were mildly hypokinetic: apical lateral wall.  The following segments were severely hypokinetic: mid anteroseptum, basal anteroseptum, apical septum, mid inferoseptum, basal inferoseptum, basal inferior wall.  Left ventricular systolic function appears moderately depressed.  Visually estimated ejection fraction is 30-35%. The LV Doppler derived stroke volume equals 54.0 ccs.     Diastolic indices: E wave velocity 0.9 m/s, E/A ratio 0.8,  msec., E/e' ratio(avg) 14. There is diastolic dysfunction secondary to relaxation abnormality.     Right Atrium: The right atrium is normal in size, measuring 3.5 cm in length and 2.1 cm in width in the apical view.     Right Ventricle: The right ventricle is normal in size. Global right ventricular systolic function appears normal. Tricuspid annular plane systolic excursion (TAPSE) is 1.3 cm. The estimated PA systolic pressure is 33 mmHg.     Aortic Valve:  The aortic valve is normal in structure. The aortic valve is tri-leaflet in structure. The mean gradient obtained across the aortic valve is 9 mmHg.     Mitral Valve:  The mitral valve is normal in structure. The pressure half time is 53 msec. The calculated mitral valve area is 4.15 cm2.     Tricuspid Valve:  The tricuspid valve is normal in structure.     Pulmonary Valve:  The pulmonic valve is not well seen.     IVC: IVC is normal in size and collapses > 50% with a sniff, suggesting normal right atrial pressure of 3 mmHg.     Intracavitary: There is no evidence of pericardial effusion, intracavity mass, thrombi, or vegetation.         CONCLUSIONS     1 - Concentric hypertrophy.     2 - Wall motion abnormalities.     3 - Moderately depressed left ventricular systolic function (EF 30-35%).     4 - Impaired LV relaxation, normal LAP (grade 1 diastolic dysfunction).     5 - Normal right ventricular systolic function .     6 - The estimated PA systolic pressure is 33 mmHg.             This document has been electronically    SIGNED BY: Charles Matias MD On: 11/06/2019 18:28

## 2019-11-15 NOTE — PT/OT/SLP PROGRESS
Physical Therapy Treatment    Patient Name:  Mateusz Bates   MRN:  [id number]    Recommendations:     Discharge Recommendations:  nursing facility, skilled   Discharge Equipment Recommendations: (defer to next level of care)   Barriers to discharge: Decreased caregiver support    Assessment:     Mateusz Bates is a 69 y.o. female admitted with a medical diagnosis of Acute deep vein thrombosis (DVT) of right upper extremity.  She presents with the following impairments/functional limitations:  weakness, gait instability, impaired endurance, impaired balance, impaired functional mobilty, decreased safety awareness .    Rehab Prognosis: Fair; patient would benefit from acute skilled PT services to address these deficits and reach maximum level of function.    Recent Surgery: Procedure(s) (LRB):  CORONARY ARTERY BYPASS GRAFT (CABG) (N/A)  ECHOCARDIOGRAM,TRANSESOPHAGEAL (N/A)  BLOCK, NERVE, INTERCOSTAL, 2 OR MORE (N/A) 15 Days Post-Op    Plan:     During this hospitalization, patient to be seen 5 x/week to address the identified rehab impairments via gait training, therapeutic activities, therapeutic exercises, neuromuscular re-education and progress toward the following goals:    · Plan of Care Expires:  11/18/19    Subjective     Chief Complaint: Pt asks to use restroom  Patient/Family Comments/goals: unable to state goals  Pain/Comfort:  · Pain Rating 1: (unable to give pain a number, pain in r u/e with use)      Objective:     Communicated with Epic and nurse prior to session.  Patient found supine with oxygen, telemetry upon PT entry to room.     General Precautions: Standard, fall, sternal   Orthopedic Precautions:N/A   Braces: N/A     Functional Mobility:  · Supine to sit with min A  · Sit to stand with mod A  · GAit with HHA 2 ft with mod A      AM-PAC 6 CLICK MOBILITY  Turning over in bed (including adjusting bedclothes, sheets and blankets)?: 3  Sitting down on and standing up from a chair with arms (e.g.,  wheelchair, bedside commode, etc.): 2  Moving from lying on back to sitting on the side of the bed?: 3  Moving to and from a bed to a chair (including a wheelchair)?: 2  Need to walk in hospital room?: 2  Climbing 3-5 steps with a railing?: 1  Basic Mobility Total Score: 13       Therapeutic Activities and Exercises:   Pt able to take steps to cahir and complete pivot with mod A x1 and SBA of a second person for safety.  toilte t/f with mod A.  toileting with mod A no clothing management.  Pt up in recliner chair.  Pt placed at end of hallway in [place].  Feet elveated, nursse Eddi present.    Patient left up in chair with all lines intact, nurse present and 3 L O2 via nc..    GOALS:   Multidisciplinary Problems     Physical Therapy Goals        Problem: Physical Therapy Goal    Goal Priority Disciplines Outcome Goal Variances Interventions   Physical Therapy Goal     PT, PT/OT Ongoing, Progressing     Description:  By 11/22/19  1. Patient will perform supine to/from sit CGA x 1 per sternal prec.  2. Patient will perform sit to/from stand CGA a no AD per sternal prec.  3. Patient will amb 100ft no AD min A                      Time Tracking:     PT Received On: 11/15/19  PT Start Time: 0900     PT Stop Time: 0945  PT Total Time (min): 45 min     Billable Minutes: Therapeutic Activity 45    Treatment Type: Treatment  PT/PTA: PT     PTA Visit Number: 1     Darlene Sauer, PT  11/15/2019

## 2019-11-15 NOTE — PROGRESS NOTES
[facility]  Cardiology  Progress Note    Patient Name: Mateusz Bates  MRN: [id number]  Admission Date: 10/28/2019  Hospital Length of Stay: 18 days  Code Status: Full Code   Attending Physician: Keny Zaidi MD   Primary Care Physician: Serenity Kilpatrick MD  Expected Discharge Date: 11/13/2019  Principal Problem:Acute deep vein thrombosis (DVT) of right upper extremity    Subjective:   HPI    Ms. Bates is a 69 year old female patient whose current medical conditions include HTN, hyperlipidemia, obesity, DM type II, cerebral microvascular disease, FAHAD, and B12 deficiency who was sent to [facility] ED today from her PCP's office due to chest pain and abnormal EKG. Patient complained of substernal chest tightness/heaviness that radiated to her back on Saturday while at rest. Associated symptoms included orthopnea and SOB. Patient denied any associated nausea, vomiting, diaphoresis, palpitations, near syncope, or syncope. Initial workup in ED revealed troponin of 3.665 and BNP of 1305 and patient subsequently admitted for further evaluation and treatment of NSTEMI. Cardiology consulted to assist with management. Patient seen and examined today while in ED. Feels well at time of exam, denies chest pain. She does report similar symptoms over the past few months but states they never lasted as long and were less intense. She reports compliance with her medications. States her father had history of CABG. Chart reviewed. Repeat troponin pending. EKG reviewed and shows SR with new LBBB. 2D echo pending.    Hospital Course:   Ms. Bates is a 69 year old female patient whose current medical conditions include HTN, hyperlipidemia, obesity, DM type II, cerebral microvascular disease, FAHAD, and B12 deficiency who was sent to [facility] ED today from her PCP's office due to chest pain and abnormal EKG. Patient complained of substernal chest tightness/heaviness that radiated to her back on Saturday while at rest.  Associated symptoms included orthopnea and SOB. Patient denied any associated nausea, vomiting, diaphoresis, palpitations, near syncope, or syncope. Initial workup in ED revealed troponin of 3.665 and BNP of 1305 and patient subsequently admitted for further evaluation and treatment of NSTEMI. Cardiology consulted to assist with management. Patient seen and examined today while in ED. Feels well at time of exam, denies chest pain. She does report similar symptoms over the past few months but states they never lasted as long and were less intense. She reports compliance with her medications. States her father had history of CABG. Chart reviewed. Repeat troponin pending. EKG reviewed and shows SR with new LBBB. 2D echo pending.    10/29/19-Patient seen and examined today, s/p LHC yesterday that showed multivessel CAD. CVT consulted, CABG planned for Thursday. Feels ok. No chest pain or SOB. Labs stable. Being diuresed. 2D echo showed EF of 30-35%, DD.    10/30/19-Patient seen and examined today. Feels well. No complaints. Denies chest pain or SOB. Labs stable. IABP placement scheduled for today.    10/31/19- Patient is s/p CABG x3 with LIMA to LAD reverse saphenous vein graft to the RCA and reverse saphenous vein graft to the ramus. Has been transferred to ICU. Is intubated and sedated. CT x3. IABP at 1:1 still in place. Transfused 3 units of PRBC in the OR and has received additional 3 units in ICU, has received 500cc albumin, epi and milrinone gtt.  H/H 6.9/21.0. Renal function stable. Paced rhythm on monitor     11/1/2019--patient seen and examined in ICU. Extubated o/n, remains on IABP 1:1 today, plans to possibly wean off later today per Dr. Zaidi. Remains on Epi and Milrinone gtt today. Paced on monitor. Labs stable today.     11/2/19-Patient seen and examined today, lying in bed. Feels ok. Complains of post-op pain and fatigue. Labs reviewed. Platelets 47,000. Heme/onc on board.     11/3/19-Patient seen and  examined today, sitting up in chair. Feeling better. Still weak. Labs reviewed. Platelets 89,000.    11/4/19- Patient is POD 4 s/p CABG x3. OOB in chair. Feels better this morning. Pain controlled. Platelets improving since holding Plavix.Vitals stable     11/5/19- Patient is POD 5 s/p CABGx3. Has been transferred to [place]. Is clammy and generally weak this morning. Glucose and vitals stable. Noted to have bump in WBC. Primary team has been notified by nurse. Platelets 73, but H/H stable     11/6 in ICU, intubated, on epi gtt at 0.15 mcg CI 2.0, PAP 20. Sinus rhythm, H&H and Cr stable. Urine output ok. Improved lactate 1.6. const stent cardiogenic shock    11/7 INTUBATED, WEAN OFF EPI AND CONTINUE AMILIRONE, ON SINU RHYTHM AND CI 3.3, Cr STABLE AND DECENT URINE OUTPUT.     11/8 extubated, off pressors, up to the chair.diffuse DVT on upper extremities, no chest pain,pt is weak, Cr normal    11/9/weak, mild reps. Distress, good urine output. BP high.  RUE venous thrombus/DVT in deep and superficial arm veins. H&H stable Cr normal.      11/10 weakness H&H 7.6/24, BP and HR wnl., good urine output. No chest pain. Right arm swelling and ecchymosis. Pulses ok    11/11/19--Patient seen and examined in ICU, sitting on side of bed. Feels ok today. Continued right arm swelling and ecchymosis.     11/12/19-Patient seen and examined today, lying in bed. Feels ok. Denies chest pain. Admits to some SOB. CXR, showed no effusion or infiltrate. Labs stable, platelets 75,000 H/H 7.3/23.1. Hematology noted reviewed, ROSANNA positive.     11/13/19--Patient seen and examined today, lying in bed. Sleepy on exam today. No chest pain today. Labs reviewed, H/H 7/23, plts 79, INR 4.3, Mag 1.8.     11/14/19--Patient seen and examined in room, lying in bed. Plans for PRBC transfusion today. Labs reviewed, H/H 6.7/21.9.     11/15/19--Patient seen and examined in room, lying in bed. Feels ok today. Has been started on Eliquis. Labs reviewed, H/H  8.2/26.1, K+ 3.6, Cr 0.8.     Interval History: no acute issues noted o/n.     Review of Systems   Constitution: Positive for malaise/fatigue.   HENT: Negative for hearing loss and hoarse voice.    Eyes: Negative for blurred vision and visual disturbance.   Cardiovascular: Negative for chest pain, claudication, dyspnea on exertion, irregular heartbeat, leg swelling, near-syncope, orthopnea, palpitations, paroxysmal nocturnal dyspnea and syncope.   Respiratory: Negative for cough, hemoptysis, shortness of breath, sleep disturbances due to breathing, snoring and wheezing.    Endocrine: Negative for cold intolerance and heat intolerance.   Hematologic/Lymphatic: Does not bruise/bleed easily.   Skin: Positive for color change, nail changes and poor wound healing. Negative for dry skin.   Musculoskeletal: Positive for arthritis and back pain. Negative for joint pain and myalgias.        RUE SWELLING   Gastrointestinal: Negative for bloating, abdominal pain, constipation, nausea and vomiting.   Genitourinary: Negative for dysuria, flank pain, hematuria and hesitancy.   Neurological: Negative for headaches, light-headedness, loss of balance, numbness, paresthesias and weakness.   Psychiatric/Behavioral: Negative for altered mental status.   Allergic/Immunologic: Negative for environmental allergies.     Objective:     Vital Signs (Most Recent):  Temp: 96.8 °F (36 °C) (11/15/19 1127)  Pulse: 76 (11/15/19 1221)  Resp: 18 (11/15/19 1221)  BP: 132/87 (11/15/19 1127)  SpO2: 100 % (11/15/19 1221) Vital Signs (24h Range):  Temp:  [96.8 °F (36 °C)-99.2 °F (37.3 °C)] 96.8 °F (36 °C)  Pulse:  [] 76  Resp:  [18-22] 18  SpO2:  [93 %-100 %] 100 %  BP: (126-151)/(58-87) 132/87     Weight: 76.8 kg (169 lb 5 oz)  Body mass index is 31.99 kg/m².     SpO2: 100 %  O2 Device (Oxygen Therapy): nasal cannula      Intake/Output Summary (Last 24 hours) at 11/15/2019 1309  Last data filed at 11/14/2019 1600  Gross per 24 hour   Intake 325 ml    Output --   Net 325 ml       Lines/Drains/Airways     Peripherally Inserted Central Catheter Line                 PICC Double Lumen 11/07/19 1645 left brachial;left basilic 7 days                Physical Exam   Constitutional: She is oriented to person, place, and time. She appears well-developed and well-nourished. No distress.   HENT:   Head: Normocephalic and atraumatic.   Eyes: Pupils are equal, round, and reactive to light.   Neck: Normal range of motion and full passive range of motion without pain. Neck supple. No JVD present.   Cardiovascular: Normal rate, regular rhythm, S1 normal, S2 normal and intact distal pulses. PMI is not displaced. Exam reveals no distant heart sounds.   No murmur heard.  Pulses:       Radial pulses are 2+ on the right side, and 2+ on the left side.        Dorsalis pedis pulses are 2+ on the right side, and 2+ on the left side.   CABG INCISION HEALING WELL   Pulmonary/Chest: Effort normal and breath sounds normal. No accessory muscle usage. No respiratory distress. She has no decreased breath sounds. She has no wheezes. She has no rales.   Abdominal: Soft. Bowel sounds are normal. She exhibits no distension. There is no tenderness.   Musculoskeletal: Normal range of motion. She exhibits no edema.        Right ankle: She exhibits no swelling.        Left ankle: She exhibits no swelling.   Neurological: She is alert and oriented to person, place, and time.   Skin: Skin is warm and dry. She is not diaphoretic. No cyanosis. Nails show no clubbing.   RUE swelling, right hand ecchymotic with dusky fingernails   Psychiatric: She has a normal mood and affect. Her speech is normal and behavior is normal. Judgment and thought content normal. Cognition and memory are normal.   Nursing note and vitals reviewed.      Significant Labs:   BMP:   Recent Labs   Lab 11/13/19  1415  11/14/19  0528 11/14/19  1655 11/15/19  0454   GLU  --    < > 329* 425* 258*   NA  --    < > 142 138 140   K  --    < >  4.2 4.2 3.6   CL  --    < > 106 104 106   CO2  --    < > 22* 24 26   BUN  --    < > 19 26* 23   CREATININE  --    < > 0.9 1.0 0.8   CALCIUM  --    < > 8.9 9.0 8.6*   MG 2.0  --  1.9  --   --     < > = values in this interval not displayed.   , CBC   Recent Labs   Lab 11/14/19  0528 11/14/19  1655 11/15/19  0454   WBC 11.05 13.32* 11.48   HGB 6.7* 8.1* 8.2*   HCT 21.9* 26.4* 26.1*   PLT 87* 104* 99*    and All pertinent lab results from the last 24 hours have been reviewed.    Significant Imaging: Echocardiogram:   2D echo with color flow doppler:   Results for orders placed or performed during the hospital encounter of 10/28/19   2D echo with color flow doppler   Result Value Ref Range    QEF 30 (A) 55 - 65    Diastolic Dysfunction Yes (A)     Est. PA Systolic Pressure 33.03     Narrative    Date of Procedure: 11/06/2019        TEST DESCRIPTION   Technical Quality: This is a technically challenging study.     General: A catheter is present in the right-sided cardiac chambers.     Aorta: The aortic root is normal in size, measuring 3.7 cm at sinotubular junction and 3.8 cm at Sinuses of Valsalva. The proximal ascending aorta is normal in size, measuring 3.7 cm across.     Left Atrium: The left atrial volume index is normal, measuring 19.22 cc/m2.     Left Ventricle: The left ventricle is normal in size, with an end-diastolic diameter of 3.0 cm, and an end-systolic diameter of 2.5 cm. LV wall thickness is normal, with the septum measuring 2.1 cm and the posterior wall measuring 1.4 cm across. Relative   wall thickness was increased at 0.93, and the LV mass index was increased at 140.2 g/m2 consistent with concentric left ventricular hypertrophy. The following segments were mildly hypokinetic: apical lateral wall.  The following segments were severely hypokinetic: mid anteroseptum, basal anteroseptum, apical septum, mid inferoseptum, basal inferoseptum, basal inferior wall.  Left ventricular systolic function appears  moderately depressed. Visually estimated ejection fraction is 30-35%. The LV Doppler derived stroke volume equals 54.0 ccs.     Diastolic indices: E wave velocity 0.9 m/s, E/A ratio 0.8,  msec., E/e' ratio(avg) 14. There is diastolic dysfunction secondary to relaxation abnormality.     Right Atrium: The right atrium is normal in size, measuring 3.5 cm in length and 2.1 cm in width in the apical view.     Right Ventricle: The right ventricle is normal in size. Global right ventricular systolic function appears normal. Tricuspid annular plane systolic excursion (TAPSE) is 1.3 cm. The estimated PA systolic pressure is 33 mmHg.     Aortic Valve:  The aortic valve is normal in structure. The aortic valve is tri-leaflet in structure. The mean gradient obtained across the aortic valve is 9 mmHg.     Mitral Valve:  The mitral valve is normal in structure. The pressure half time is 53 msec. The calculated mitral valve area is 4.15 cm2.     Tricuspid Valve:  The tricuspid valve is normal in structure.     Pulmonary Valve:  The pulmonic valve is not well seen.     IVC: IVC is normal in size and collapses > 50% with a sniff, suggesting normal right atrial pressure of 3 mmHg.     Intracavitary: There is no evidence of pericardial effusion, intracavity mass, thrombi, or vegetation.         CONCLUSIONS     1 - Concentric hypertrophy.     2 - Wall motion abnormalities.     3 - Moderately depressed left ventricular systolic function (EF 30-35%).     4 - Impaired LV relaxation, normal LAP (grade 1 diastolic dysfunction).     5 - Normal right ventricular systolic function .     6 - The estimated PA systolic pressure is 33 mmHg.             This document has been electronically    SIGNED BY: Charles Matias MD On: 11/06/2019 18:28     Assessment and Plan:     Acute deep vein thrombosis (DVT) of non-extremity vein Right IJ  -Continue Argatroban infusion  -RUE continues to be swollen   -Right hand remains ecchymotic   -Mgmt per CT  "surgery  Per Vascular Surgery:  "Hypercoagulable/HIT workup in progress, pt started on argatroban  Regarding RUE/central venous thrombus: Unfortunately, given location of clot and her recent major cardiac surgery any attempt at thrombolysis with tPA would be absolutely contraindicated  Open thrombectomy also not option as central clot could not be adequately cleared to allow for any outflow/drainage from the arm     Recommend cont anticoagulation, arm elevation, mild/moderate compression"    11/12/19  -Mgmt as per vascular/heme onc/CVT    11/13  -Mgmt per Vascular/Heme onc/CT surgery    Cardiogenic shock  Post CABG  EF 30%  S/p swan catheter  CI 3.3  Repeat echo today showed EF 30%, septal hypokinesis , compared to prior echo before the cabg, no significant change  ekg reviewed    11/10/2019  Extubated  Off pressors  Continue Amlodipine and BB/Losartan  Hold Lipitor due to elevated LFT  Continue anticoagulation due to acute DVT  Continue ICU supportive care and ABx    11/11  -Continue BB, ARB, Norvasc  -BP much improved   -Continue supportive care per CT surgery      11/12/19  -Stable  -Continue BB, ARB, Norvasc        Thrombocytopenia  -Mgmt as per heme/onc  -Plavix was on hold, but resumed by primary team   -ASA has been resumed     11/5/19  -consider holding Plavix  -Hematology has been consulted     S/P CABG x 3  -Patient is s/p CABG x3 with LIMA to LAD reverse saphenous vein graft to the RCA and reverse saphenous vein graft to the ramus.  -Continue post CABG supportive therapy  -Continue ASA, Plavix, Statin, BB  -Will continue to follow along     11/1/19  -POD #1 s/p CABG x3  -Continue ASA, Statin, BB  -Remains on Epi and Milrinone gtt today  -Mgmt per CT surgery  -Will follow along    11/2/19  -Recovering s/p CABG x 3  -Continue statin, BB  -Platelets 47,000 this AM; heme/onc on board, ASA held  -Await further rec's    11/3/19  -Stable, progressing post CABG x 3  -Continue statin, BB  -ASA " re-started  -Platelets 89,000, heme/onc on board    11/4/19  -Patient slowly improving post CABG x3  -Continue ASA, Statin and BB  -plavix on hold due to thrombocytopenia that is improving   -IS use encouraged and early ambulation with PT  -Will continue to follow along     11/5/19  -Will continue ASA, Statin and BB  -Consider holding Plavix again for drop in plts after resuming by primary team   -Recommend CXR to rule out post op PNA given bump in WBC and cough  -will need to monitor for temp  -ambulate as tolerated and encourage IS use     11/06  Hold ASA, Plavix due to anemia  Hold BB due to cardiogenic shock    11/11/19  -Continue Argatroban infusion  -Continue BB, ARB, IV lasix, Chlorthalidone, Norvasc, NTG paste  -Hold ASA, Plavix due to anemia  -Further mgmt per CT surgery    11/12/19  -Stable  -Continue BB, ARB, Norvasc, Chlorthalidone, IV Lasix  -Recommend re-starting ASA  -Plavix held due to anemia/thrombocytopenia    11/13/19  -Recommend ASA daily  -Plavix held due to anemia/thrombocytopenia  -Recommend PRBC transfusion, per CT surgery  -Continue BB, ARB, Norvasc, IV lasix, Chlorthalidone    11/14  -Continue current medical mgmt per CT surgery  -Recommend ASA daily  -Agree with PRBC transfusion today    11/15  -Continue Eliquis, Norvasc, ASA, ARB, BB  -Continue mgmt per CT surgery  -Will continue to follow along  -Encourage IS and aggressive pulmonary toilet  -OOB to chair for meals    CAD, multiple vessel  -See plan under CABG    Acute combined systolic and diastolic CHF, NYHA class 2  -Presents with ICM/acute combined CHF  -EF 30-35% by 2D echo        Chest pain  -See plan under NSTEMI    Abnormal EKG  -LBBB noted on EKG  -See plan under NSTEMI    NSTEMI (non-ST elevated myocardial infarction)  -Patient presented with intermittent chest tightness/heaviness that radiated to her back since Saturday evening  -Associated with JONES/orthopnea  -Initial troponin 3.665  -EKG showed SR with new  LBBB  -Presentation consistent with NSTEMI  -Continue ASA, statin  -Start Toprol XL  -Start heparin gtt  -Check 2D echo  -Continue to trend troponin  -Keep NPO. C today for further evaluation and PCI if indicated. All risks, benefits, and treatment alternatives explained to patient in detail. All questions answered. She has agreed to proceed. Further rec's to follow.    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 All risks, benefits, and treatment alternatives explained to patient in detail. All questions answered. She has agreed to proceed.    10/31/19  -Patient is s/p CABG x3.   See plan for CABG     Hyperlipidemia associated with type 2 diabetes mellitus  -Continue statin    Obesity (BMI 30.0-34.9)  -Weight loss    Diabetes mellitus type II, uncontrolled  -Mgmt as per hospital medicine        VTE Risk Mitigation (From admission, onward)         Ordered     apixaban tablet 5 mg  2 times daily      11/14/19 0903     argatroban 125 mg in sodium chloride 0.9% 125 mL infusion (conc: 1 mg/mL)  Continuous     Question:  Argatroban Infusion Adjustment (DO NOT MODIFY ANSWER)  Answer:  [email or web address]\Images\Pharmacy\HeparinInfusions\ArgatrobanOMC [id number]    11/13/19 1501     argatroban 125 mg in sodium chloride 0.9% 125 mL infusion (conc: 1 mg/mL)  Continuous     Question:  Argatroban Infusion Adjustment (DO NOT MODIFY ANSWER)  Answer:  [email or web address]\Images\Pharmacy\HeparinInfusions\ArgatrobanOM[facility] [id number]    11/11/19 0128     Reason for no Mechanical VTE Prophylaxis  Once     Question:  Reasons:  Answer:  IV Heparin within 24 hrs. Pre or Post-Op    10/28/19 1414      heparin 25,000 units in dextrose 5% 250 mL (100 units/mL) infusion LOW INTENSITY nomogram - [facility]  Continuous     Question:  Heparin Infusion Adjustment (DO NOT MODIFY ANSWER)  Answer:  [email or web address]\Images\Pharmacy\HeparinInfusions\heparin LOW INTENSITY nomogram for [facility] [id number]    10/28/19 1257                LIT Faulkner-C  Cardiology  [facility]

## 2019-11-16 LAB
ANION GAP SERPL CALC-SCNC: 11 MMOL/L (ref 8–16)
ANION GAP SERPL CALC-SCNC: 12 MMOL/L (ref 8–16)
BASOPHILS # BLD AUTO: 0.05 K/UL (ref 0–0.2)
BASOPHILS NFR BLD: 0.5 % (ref 0–1.9)
BUN SERPL-MCNC: 24 MG/DL (ref 8–23)
BUN SERPL-MCNC: 28 MG/DL (ref 8–23)
CALCIUM SERPL-MCNC: 9 MG/DL (ref 8.7–10.5)
CALCIUM SERPL-MCNC: 9 MG/DL (ref 8.7–10.5)
CHLORIDE SERPL-SCNC: 102 MMOL/L (ref 95–110)
CHLORIDE SERPL-SCNC: 103 MMOL/L (ref 95–110)
CO2 SERPL-SCNC: 18 MMOL/L (ref 23–29)
CO2 SERPL-SCNC: 22 MMOL/L (ref 23–29)
CREAT SERPL-MCNC: 0.7 MG/DL (ref 0.5–1.4)
CREAT SERPL-MCNC: 0.8 MG/DL (ref 0.5–1.4)
DIFFERENTIAL METHOD: ABNORMAL
EOSINOPHIL # BLD AUTO: 0.3 K/UL (ref 0–0.5)
EOSINOPHIL NFR BLD: 2.5 % (ref 0–8)
ERYTHROCYTE [DISTWIDTH] IN BLOOD BY AUTOMATED COUNT: 16.9 % (ref 11.5–14.5)
EST. GFR  (AFRICAN AMERICAN): >60 ML/MIN/1.73 M^2
EST. GFR  (AFRICAN AMERICAN): >60 ML/MIN/1.73 M^2
EST. GFR  (NON AFRICAN AMERICAN): >60 ML/MIN/1.73 M^2
EST. GFR  (NON AFRICAN AMERICAN): >60 ML/MIN/1.73 M^2
GLUCOSE SERPL-MCNC: 183 MG/DL (ref 70–110)
GLUCOSE SERPL-MCNC: 78 MG/DL (ref 70–110)
HCT VFR BLD AUTO: 30.7 % (ref 37–48.5)
HGB BLD-MCNC: 9.1 G/DL (ref 12–16)
IMM GRANULOCYTES # BLD AUTO: 0.26 K/UL (ref 0–0.04)
IMM GRANULOCYTES NFR BLD AUTO: 2.4 % (ref 0–0.5)
LYMPHOCYTES # BLD AUTO: 1 K/UL (ref 1–4.8)
LYMPHOCYTES NFR BLD: 9.6 % (ref 18–48)
MAGNESIUM SERPL-MCNC: 2.1 MG/DL (ref 1.6–2.6)
MCH RBC QN AUTO: 28.8 PG (ref 27–31)
MCHC RBC AUTO-ENTMCNC: 29.6 G/DL (ref 32–36)
MCV RBC AUTO: 97 FL (ref 82–98)
MONOCYTES # BLD AUTO: 0.9 K/UL (ref 0.3–1)
MONOCYTES NFR BLD: 8.7 % (ref 4–15)
NEUTROPHILS # BLD AUTO: 8.1 K/UL (ref 1.8–7.7)
NEUTROPHILS NFR BLD: 76.3 % (ref 38–73)
NRBC BLD-RTO: 0 /100 WBC
PLATELET # BLD AUTO: 129 K/UL (ref 150–350)
PMV BLD AUTO: 10.6 FL (ref 9.2–12.9)
POCT GLUCOSE: 166 MG/DL (ref 70–110)
POCT GLUCOSE: 245 MG/DL (ref 70–110)
POCT GLUCOSE: 263 MG/DL (ref 70–110)
POCT GLUCOSE: 71 MG/DL (ref 70–110)
POTASSIUM SERPL-SCNC: 4 MMOL/L (ref 3.5–5.1)
POTASSIUM SERPL-SCNC: 4.3 MMOL/L (ref 3.5–5.1)
RBC # BLD AUTO: 3.16 M/UL (ref 4–5.4)
SODIUM SERPL-SCNC: 132 MMOL/L (ref 136–145)
SODIUM SERPL-SCNC: 136 MMOL/L (ref 136–145)
WBC # BLD AUTO: 10.68 K/UL (ref 3.9–12.7)

## 2019-11-16 PROCEDURE — 94761 N-INVAS EAR/PLS OXIMETRY MLT: CPT

## 2019-11-16 PROCEDURE — 94640 AIRWAY INHALATION TREATMENT: CPT

## 2019-11-16 PROCEDURE — 99232 PR SUBSEQUENT HOSPITAL CARE,LEVL II: ICD-10-PCS | Mod: ,,, | Performed by: INTERNAL MEDICINE

## 2019-11-16 PROCEDURE — 83735 ASSAY OF MAGNESIUM: CPT

## 2019-11-16 PROCEDURE — 99232 SBSQ HOSP IP/OBS MODERATE 35: CPT | Mod: ,,, | Performed by: INTERNAL MEDICINE

## 2019-11-16 PROCEDURE — 80048 BASIC METABOLIC PNL TOTAL CA: CPT

## 2019-11-16 PROCEDURE — 25000003 PHARM REV CODE 250: Performed by: FAMILY MEDICINE

## 2019-11-16 PROCEDURE — 25000003 PHARM REV CODE 250: Performed by: NURSE PRACTITIONER

## 2019-11-16 PROCEDURE — 25000242 PHARM REV CODE 250 ALT 637 W/ HCPCS: Performed by: FAMILY MEDICINE

## 2019-11-16 PROCEDURE — 25000003 PHARM REV CODE 250: Performed by: INTERNAL MEDICINE

## 2019-11-16 PROCEDURE — 21400001 HC TELEMETRY ROOM

## 2019-11-16 PROCEDURE — 80048 BASIC METABOLIC PNL TOTAL CA: CPT | Mod: 91

## 2019-11-16 PROCEDURE — 63600175 PHARM REV CODE 636 W HCPCS: Mod: JG | Performed by: NURSE PRACTITIONER

## 2019-11-16 PROCEDURE — 85025 COMPLETE CBC W/AUTO DIFF WBC: CPT

## 2019-11-16 PROCEDURE — 25000003 PHARM REV CODE 250: Performed by: THORACIC SURGERY (CARDIOTHORACIC VASCULAR SURGERY)

## 2019-11-16 PROCEDURE — 97530 THERAPEUTIC ACTIVITIES: CPT

## 2019-11-16 PROCEDURE — 36415 COLL VENOUS BLD VENIPUNCTURE: CPT

## 2019-11-16 RX ORDER — ATORVASTATIN CALCIUM 40 MG/1
80 TABLET, FILM COATED ORAL NIGHTLY
Status: DISCONTINUED | OUTPATIENT
Start: 2019-11-16 | End: 2019-11-20 | Stop reason: HOSPADM

## 2019-11-16 RX ORDER — LOSARTAN POTASSIUM 50 MG/1
100 TABLET ORAL DAILY
Status: DISCONTINUED | OUTPATIENT
Start: 2019-11-17 | End: 2019-11-20 | Stop reason: HOSPADM

## 2019-11-16 RX ADMIN — FERROUS SULFATE TAB EC 325 MG (65 MG FE EQUIVALENT) 325 MG: 325 (65 FE) TABLET DELAYED RESPONSE at 08:11

## 2019-11-16 RX ADMIN — METOPROLOL TARTRATE 50 MG: 50 TABLET, FILM COATED ORAL at 08:11

## 2019-11-16 RX ADMIN — ASPIRIN 81 MG: 81 TABLET ORAL at 08:11

## 2019-11-16 RX ADMIN — INSULIN ASPART 2 UNITS: 100 INJECTION, SOLUTION INTRAVENOUS; SUBCUTANEOUS at 09:11

## 2019-11-16 RX ADMIN — INSULIN ASPART 2 UNITS: 100 INJECTION, SOLUTION INTRAVENOUS; SUBCUTANEOUS at 05:11

## 2019-11-16 RX ADMIN — APIXABAN 5 MG: 2.5 TABLET, FILM COATED ORAL at 08:11

## 2019-11-16 RX ADMIN — CHLORTHALIDONE 25 MG: 25 TABLET ORAL at 08:11

## 2019-11-16 RX ADMIN — INSULIN ASPART 6 UNITS: 100 INJECTION, SOLUTION INTRAVENOUS; SUBCUTANEOUS at 11:11

## 2019-11-16 RX ADMIN — PANTOPRAZOLE SODIUM 40 MG: 40 TABLET, DELAYED RELEASE ORAL at 08:11

## 2019-11-16 RX ADMIN — LOSARTAN POTASSIUM 50 MG: 50 TABLET ORAL at 08:11

## 2019-11-16 RX ADMIN — IPRATROPIUM BROMIDE AND ALBUTEROL SULFATE 3 ML: .5; 3 SOLUTION RESPIRATORY (INHALATION) at 08:11

## 2019-11-16 RX ADMIN — ATORVASTATIN CALCIUM 80 MG: 40 TABLET, FILM COATED ORAL at 08:11

## 2019-11-16 RX ADMIN — INSULIN DETEMIR 5 UNITS: 100 INJECTION, SOLUTION SUBCUTANEOUS at 08:11

## 2019-11-16 RX ADMIN — NITROGLYCERIN 0.5 INCH: 20 OINTMENT TOPICAL at 08:11

## 2019-11-16 RX ADMIN — IPRATROPIUM BROMIDE AND ALBUTEROL SULFATE 3 ML: .5; 3 SOLUTION RESPIRATORY (INHALATION) at 02:11

## 2019-11-16 RX ADMIN — INSULIN DETEMIR 10 UNITS: 100 INJECTION, SOLUTION SUBCUTANEOUS at 09:11

## 2019-11-16 RX ADMIN — CHLORHEXIDINE GLUCONATE 0.12% ORAL RINSE 15 ML: 1.2 LIQUID ORAL at 08:11

## 2019-11-16 RX ADMIN — IPRATROPIUM BROMIDE AND ALBUTEROL SULFATE 3 ML: .5; 3 SOLUTION RESPIRATORY (INHALATION) at 07:11

## 2019-11-16 RX ADMIN — PSYLLIUM HUSK 6 G: 6 GRANULE ORAL at 08:11

## 2019-11-16 RX ADMIN — AMLODIPINE BESYLATE 10 MG: 10 TABLET ORAL at 08:11

## 2019-11-16 RX ADMIN — ALTEPLASE 2 MG: 2.2 INJECTION, POWDER, LYOPHILIZED, FOR SOLUTION INTRAVENOUS at 05:11

## 2019-11-16 NOTE — ASSESSMENT & PLAN NOTE
11/01/2019  The patient is post-operative day 1, status-post coronary artery bypass grafting x 3.  Overall the patient is making progress, however IABP and inotropic agents will continue today.   Neuro:  The patient is awake alert and oriented x3.  Neuro exam is nonfocal.  Pain is well controlled with IV pain medication.  Cardiac:  Patient is hemodynamically improving. Patient remains on epinephrine and milrinone gtts, which will be weaned over the next 24 hours.  Cardiac index is improving. IABP will remain in place over the next 24 hours.  Patient will be started on beta blockers once gtts are weaned off.  Respiratory: Good sats on high flow nasal cannula. Continue respiratory toilet, continue incentive spirometer. Wean to low flow nasal cannula.  GI:  Diet: sips of water with medications, will slowly advance diet as tolerated. Benign abdominal exam.  Renal:  Urine output is slowly improving. Cr 0.9. K 4.3. Mag 1.6. Replace mag. Continue IV diuresis. Add metolazone.   Heme:  Hct 34.1  Platelet 82. Continue aspirin. Discontinue Plavix for now.  Id:  WBC 12. Tmax 100.3. Continue post-operative antibiotics. Will continue to follow.   Endocrine:  Glucose is controlled with insulin gtt.  Continue insulin gtt for now, until weaned off inotropic agents.   Activities:  Patient is currently bed bound due to IABP.  Advance activities as tolerated once IABP is removed.   Lines tubes and drains:  Continue IABP. Continue Auberry and Cordis.  Continue A-line.  Chest tubes will continue. NICOLASA x 2 will continue. Jones catheter will continue.  Continue pacer wires. Continue prevena wound vac.  Plan: Remain ICU.          11/02/2019  The patient is post-operative day 2, status-post coronary artery bypass grafting x 3.  Overall the patient is making progress, IABP removed and inotropic agents discontinued.   Neuro:  The patient is awake alert and oriented x3.  Neuro exam is nonfocal.  Pain is well controlled. Discontinue fentanyl.    Cardiac:  Patient is hemodynamically improving. Inotropic gtts discontinued. Excellent cardiac index. IABP discontinued.  Patient will be started on beta blockers today.  Respiratory: Good sats on high flow nasal cannula. Continue respiratory toilet, continue incentive spirometer. Wean to low flow nasal cannula.  GI:  Advance diabetic/cardiac diet as tolerated. Benign abdominal exam.  Renal:  Urine output is improving. Cr 0.9. K 4.9. Mag 2.2. Continue IV diuresis. Hold AM K.   Heme:  Hct 28.3  Platelet 47. Discontinue aspirin. Discontinue Plavix. HIT panel. Consult hematology r/t thrombocytopenia.   Id:  WBC 10.4. Tmax 99.4. Will continue to follow.   Endocrine:  Discontinue insulin gtt. Start sliding scale insulin. Start long acting insulin detemir.   Activities: Advance activities as tolerated.   Lines tubes and drains:  Discontinue IABP. Discontinue Griffin and Cordis.  Discontinue A-line.  Discontinue chest tubes x 3. Discontinue NICOLASA x 2. Discontinue arterial line. Jones catheter will continue for now.  Continue pacer wires. Continue prevena wound vac. Place PICC line.   Plan: Remain ICU.           11/03/2019  The patient is post-operative day 3, status-post coronary artery bypass grafting x 3.  Overall the patient is making progress.  Neuro:  The patient is awake alert and oriented x3.  Neuro exam is nonfocal.  Pain is well controlled. Continue tramadol.   Cardiac:  Patient is hemodynamically stable. Increase metoprolol dose. Start amlodipine.   Respiratory: Good sats on nasal cannula. Continue respiratory toilet, continue incentive spirometer. Wean to room air.  GI:  Advance diabetic/cardiac diet as tolerated. Benign abdominal exam. Passing gas, no BM. Add 1 bottle of mag citrate.   Renal:  Good urine output. Cr 0.9. K 3.8. Mag 2.0. Replace K. Replace Mag. Change to PO diuresis.   Heme:  Hct 34.7  Platelet 89. Restart aspirin. Continue to hold Plavix. HIT panel pending. Hematology following.  Id:  WBC 13.8. Tmax  99.4. Will continue to follow.    Endocrine:  Glucose controlled on sliding scale insulin. Increase long acting insulin detemir dose.   Activities: Advance activities as tolerated.   Lines tubes and drains:  Storm catheter will continue for now.  Continue pacer wires. Continue prevena wound vac. Continue PICC line.   Plan: Remain ICU.          11/04/2019  The patient is post-operative day 4, status-post coronary artery bypass grafting x 3.  Overall the patient is making progress.  Neuro:  The patient is awake alert and oriented x3.  Neuro exam is nonfocal.  Pain is well controlled. Continue tramadol.   Cardiac:  Patient is hemodynamically stable. Continue metoprolol dose. Continue amlodipine dose.   Respiratory: Good sats on room air. Continue respiratory toilet, continue incentive spirometer.  GI:  Advance diabetic/cardiac diet as tolerated. Benign abdominal exam. Regular bowel movements.   Renal:  Good urine output. Cr 0.7. K 4.0. Mag 2.3. Replace K. Decrease PO diuresis.   Heme:  Hct 34.4  Platelet 140. Continue aspirin. Restart Plavix. HIT panel pending. Hematology following.  Id:  WBC down to 13.13. Tmax 98. Will continue to follow.    Endocrine:  Glucose controlled on sliding scale insulin. Increased long acting insulin detemir dose.   Activities: Advance activities as tolerated.   Lines tubes and drains:  Discontinued storm catheter. Continue pacer wires. Continue prevena wound vac. Continue PICC line.   Plan: Transfer to telemetry. Anticipate discharge to SNF in 48 hours.         11/05/2019  The patient is post-operative day 5, status-post coronary artery bypass grafting x 3.  Overall the patient is making progress.  Neuro:  The patient is awake alert and oriented x3.  Neuro exam is nonfocal. Patient slow to answer questions, however answers are appropriate.  Pain is well controlled. Continue tramadol PRN.   Cardiac:  Patient is hemodynamically stable. Continue metoprolol dose. Discontinue amlodipine. Start  losartan.    Respiratory: Good sats on room air. Continue respiratory toilet, continue incentive spirometer.  GI:  Advance diabetic/cardiac diet as tolerated. Benign abdominal exam. Regular bowel movements.   Renal:  Good urine output. Cr 0.7. K 4.8. Mag 2.1. Replace Mag. Discontinue PO diuresis.   Heme:  Hct 39.5  Platelet 73. Continue aspirin. Hold Plavix. HIT panel pending. Hematology following.  Id:  WBC is up to 16. Tmax 99. Pan-culture. DC PICC line. Chest xray. Start broad spectrum empiric antibiotics. Will continue to follow.    Endocrine:  Glucose controlled on sliding scale insulin. Decrease long acting insulin detemir dose.   Activities: Advance activities as tolerated.   Lines tubes and drains:  Discontinue pacer wires. Discontinue prevena wound vac. Continue PICC line.   Plan: Anticipate discharge to SNF in 24-48 hours.         11/06/2019  The patient is post-operative day 6, status-post coronary artery bypass grafting x 3. Patient had an episode of decompensation and unresponsiveness yesterday evening. The patient was found unresponsive, cool, and diaphoretic, with agonal breathing. Patient was intubated and transferred to the ICU. Bedside EKG and echocardiogram performed at the bedside showed no significant changes. CT head, chest, abdomen pelvis performed, showed no significant findings.     Neuro:  The patient is intubated and sedated. On Precedex gtt. Begin weaning off sedation.    Cardiac:  Patient is hemodynamically improving. Inotropic agent dependent. Currently on Epinephrine gtt. Start Milrinone gtt. Discontinue metoprolol. Discontinue losartan.    Respiratory: Ventilator dependent. tV 450, RR 20, PEEP 5. Wean down sedation. Wean off ventilator today.    GI:  Begin enteral tube feedings. Benign abdominal exam. Previously having regular bowel movements.   Renal: Adequate urine output. Cr 1.1. K 3.6. Mag 1.7. Replace Mag. Replace K. Discontinue diuresis.   Heme:  Hct 31.9  Platelet 89. Hold  aspirin. Hold Plavix. HIT panel negative. Patient received 1 units PRBC last night. Hematology following.  Id:  WBC is down to 15.5. Tmax 99.9. BC NGTD. Urinalysis negative. Sputum culture negative. Vancomycin and zosyn day 2. Will continue to follow closely.    Endocrine:  Glucose controlled on insulin gtt, continue at this time.   Activities: Intubated and sedated.   Musculoskeletal: Unable to find right radial pulse via doppler. Tips of the fingers of the right hand are mottled, dusky, appear cyanotic. Recent right radial arterial line in place, which has been relocated. US of arteries and veins of the right arm ordered. Consult to vascular surgery placed.    Lines tubes and drains:  Continue swan and cordis. Continue arterial line. Continue storm.    Plan: Remain ICU         11/07/2019  The patient is post-operative day 7, status-post coronary artery bypass grafting x 3. Patient had an episode of decompensation and unresponsiveness the evening of 11/05/2019. The patient was found unresponsive, cool, and diaphoretic, with agonal breathing. Patient was intubated and transferred to the ICU. Bedside EKG and echocardiogram performed at the bedside showed no significant changes. CT head, chest, abdomen pelvis performed, showed no significant findings.     Neuro:  The patient is intubated and sedated. On Precedex gtt. Wean off sedation.    Cardiac:  Patient is hemodynamically improving. Excellent cardiac index. Wean off Inotropic gtts.   Respiratory: Wean towards extubation. tV 400, RR 18, PEEP 5.   GI:  Continue enteral tube feedings while intubated. Benign abdominal exam.  Renal: Adequate urine output. Cr 1.1. K 4.1. Mag 2.8. Start IV diuresis.   Heme:  Hct 24.6  Platelet 60. Hold aspirin. Hold Plavix. HIT panel negative.  Id:  WBC is down to 14.46. Tmax 100.8. BC NGTD. Urinalysis negative. Sputum culture negative. Vancomycin and zosyn day 3. Midline sternal incision appears reddened, with induration at skin edges.  Will continue to follow closely.    Endocrine:  Glucose controlled on sliding scale insulin.   Activities: Intubated and sedated. Moves all 4. Full ROM.    Musculoskeletal: Able to find right radial pulse via doppler. The right hand extending to the tips of the fingers of the right hand are mottled, dusky, appear cyanotic. Recent right radial arterial line placement, which has been relocated. US of arteries and veins of the right arm pending. Vascular surgery following, suggested possible thrombotic etiology, suggested heparin gtt, however this is contraindicated r/t s/p cabg x 3.     Lines tubes and drains:  Continue swan and cordis. Continue arterial line. Continue storm.    Plan: Remain ICU         11/08/2019  The patient is post-operative day 8, status-post coronary artery bypass grafting x 3. Patient had an episode of decompensation and unresponsiveness the evening of 11/05/2019. The patient was found unresponsive, cool, and diaphoretic, with agonal breathing. Patient was intubated and transferred to the ICU. Bedside EKG and echocardiogram performed at the bedside showed no significant changes. CT head, chest, abdomen pelvis performed, showed no significant findings. Suspect cardiogenic shock.      Neuro:  Patients neurologic state is improving. Alert, follows commands, makes eye contact, disoriented x 4. No focal deficits.    Cardiac:  Patient is hemodynamically improving. Off all inotropic gtts. Start low dose metoprolol and amlodipine.   Respiratory: Good sats on low flow nasal cannula. Continue IS. Continue pulmonary toilet. Wean to room air.    GI:  Continue enteral tube feedings at this time r/t disorientation and aspiration risk. Discontinue laxatives. Start metamucil BID, patient has had multiple loose stools.   Renal: Good urine output. Cr 0.8. K 3.6. Mag 2.1. Replace K. Replace Mag. Continue IV diuresis.   Hema:  Hct 26.4  Platelet 45. Hold aspirin. Hold Plavix. HIT panel will be resent as the patient  is a high suspicion for HIT despite previous negative HIT panel. Multiple DVTs from the right IJ down to the brachiocephalic vein. Confirmed via vascular US. Patient started on argatroban yesterday, goal is APTT 2x baseline. Most recent APTT 97.8. Baseline APTT 50.8.  Hematology following. Vascular surgery following.   Id:  WBC is down to 12. Tmax 100.4. BC NGTD. Urinalysis negative. Sputum culture negative. Vancomycin and zosyn day 4. Midline sternal incision appears improved, with minimal redness and induration at skin edges. Will continue to follow closely.    Endocrine:  Glucose controlled on sliding scale insulin.   Activities: Increase activities as tolerated.  Musculoskeletal: ROM 2/5 to the right wrist/hand/fingers, distal from the elbow there is 2+ non pitting edema, the extremity is tender and warm. There is mottling, a dusky appearance, and purple discoloration to the fingertips of the right hand. See above hematology plan.      Lines tubes and drains:  Continue PICC line. Continue storm catheter. Continue NG tube.   Plan: Remain ICU     11/09/2019  The patient is postop day 9.  Status post coronary artery bypass grafting x3.  Postop course was complicated by an episode of hemodynamic instability and decompensation on postop day number 5.  Patient continues to make clinical progress since then.  She is off all drips.  She remains extubated.  Continue ICU care for now.    Neuro:  Patient is awake alert.  She is oriented x2.  She is still confused.  But she is making progress.  Her neuro exam is nonfocal.  She follows all commands.  Cardiac:  Patient continues to be hemodynamically stable.  She is somewhat hypertensive.  Will increase her antihypertensives.  Respiratory:  Patient is good sats on nasal cannula.  She remains extubated.  GI:  Patient was tolerating tube feeds until this morning.  She pulled heart feeding tube. Will get a swallowing evaluation.  Mode of nutrition will depend on results of  swallowing evaluation.  Renal:  Patient has good urine output %period% creatinine is 0.9.  Heme:  Hematocrit is 26 platelet is 46.  Patient is on argatroban  for anticoagulation due to right upper extremity DVTs.  Hypercoagulability workup is in progress.  Appreciate Hematology and vascular surgery input.  Id:  Incision continues to improve.  white count is 17.  Continue broad-spectrum antibiotics.  Endocrine:  Glucose is controlled on its sliding scale insulin.  Activities:  Advance activities as tolerated.  Line tubes and drains.  Patient has a PICC line, Jones catheter.    11/10/2019  The patient is postop day 10.  Status post coronary bypass grafting x3.  Postop course was complicated by an episode of hemodynamic instability and decompensation on postop day 5.  Patient remains hemodynamically stable.    Neuro:  The patient is awake and alert.  She is oriented x1.  She continues to be confused.  Her neuro exam is nonfocal.  Cardiac:  Patient continues to be hemodynamically stable.  His she is hypertensive.  Hypertensive medications will be increased.  Respiratory:  Patient has good sats on nasal cannula.  Continue pulmonary toilet.  GI:  Patient is tolerating a regular diet.  Renal:  Patient has good urine output. Creatinine is 0.7.  Heme: hematocrit is 24 and platelet is 67.  Patient continues on argatroban for right upper extremity DVTs.  Hypercoagulable workup is in progress.  Id:  Patient's white count is up to 20.  Patient is on broad prepped from antibiotics.  Endocrine:  The patient is on long-acting as well as sliding scale insulin.  Blood sugars have been well controlled.  Will decrease dose of long-acting.  Activities: patient has been out of bed to chair.  Advance as tolerated.  Musculoskeletal:  Patient's right upper extremity continues to be swollen  The index finger and thumb continues to be dusky with a bleb near the base of the thumb.  Continue argatroban for anticoagulation.  Continue elevation  of right upper extremity.  Lines tubes and drains: patient has a PICC line.  Will discontinue Jones catheter.        11/11/2019  The patient is post-operative day 11, status-post coronary artery bypass grafting x 3. Post-operative course was complicated by an episode of hemodynamic instability and decompensation on postop day 5.  Patient remains hemodynamically stable.    Neuro:  Patients neurologic state is improving. Oriented x 2. Alert, follows commands, makes eye contact. No focal deficits.    Cardiac:  Patient is hemodynamically improving. Hypertensive. Continue metoprolol, amlodipine, and losartan. Add chlorthalidone.   Respiratory: Good sats on room air. Continue IS. Continue pulmonary toilet.  GI:  Continue cardiac/diabetic diet, modified by puree and thick liquids with swallow precautions. Advance as tolerated. Continue metamucil.  Renal: Good urine output. Cr 0.8. K 3.8. Replace K. Continue IV diuresis. Add chlorthalidone. BMP at noon.   Hema:  Hct 23.8  Platelet 75. Hold aspirin. Hold Plavix. Repeat HIT panel pending. Multiple DVTs from the right IJ down to the brachiocephalic vein. Confirmed via vascular US. Patient on argatroban gtt, goal is APTT 2x baseline. Most recent APTT 83.3. Baseline APTT 50.8.  Hematology following. Vascular surgery following.   Id:  WBC is down to 19. Tmax 98.4. BC NGTD. Vancomycin and zosyn day 7. Discontinue broad spectrum antibiotics. Will continue to follow closely.    Endocrine:  Glucose controlled on sliding scale insulin and long acting insulin detemir.    Activities: Increase activities as tolerated.  Musculoskeletal: The right upper extremity exhibits swelling distal from the elbow. The RUE is tender to palpation and ROM. ROM is diminished to the right hand 2/5. The hand his reddened, swollen, with dusky, mottled, purple discoloration to the right thumb and tip of the right index finger. Blisters have formed on the proximal portion of the thumb and wrist. Severely  diminished capillary refill to the tip of the right thumb and tip of the right index finger. Wound care order placed.      Lines tubes and drains:  Continue PICC line.  Plan: Remain ICU         11/12/2019  The patient is post-operative day 12, status-post coronary artery bypass grafting x 3. Post-operative course was complicated by an episode of hemodynamic instability and decompensation on postop day 5.  Patient remains hemodynamically stable.    Neuro:  Patients neurologic state is improving. Oriented to person, place, situation. Disoriented to time. Alert, follows commands, makes eye contact. No focal deficits.    Cardiac:  Patient is hemodynamically improving. Continue metoprolol, amlodipine, losartan, and chlorthalidone.   Respiratory: Good sats on room air. Continue IS.  GI:  Continue cardiac/diabetic diet, modified by puree and thick liquids with swallow precautions. Advance as tolerated. Continue metamucil.  Renal: Good urine output. Cr 0.8. K 3.9. Replace K. Discontinue lasix. Continue chlorthalidone.  Hema:  Hct 23.1  Platelet 75. Hold aspirin. Hold Plavix. 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 Multiple DVTs from the right IJ down to the brachiocephalic vein. Confirmed via vascular US. Patient on argatroban gtt, goal is APTT 2x baseline. Most recent APTT 84.6. Baseline APTT 50.8.  Hematology following. Vascular surgery following.   Id:  WBC is down to 17.4. A-febrile. Will continue to follow closely.    Endocrine:  Patient had an episode of hypoglycemia last night. Will continue sliding scale insulin. Discontinue long acting insulin. Begin strict calorie count.   Activities: Increase activities as tolerated.  Musculoskeletal: The right upper extremity exhibits swelling distal from the elbow. The RUE is tender to palpation and ROM. ROM is diminished to the right hand 3/5. The hand is reddened, swollen, with dusky, mottled, purple discoloration to the right thumb tip and tip of the right index finger.   Severely diminished capillary refill to the tip of the right thumb and tip of the right index finger. Wound care following. Gauze dressing in place.       Lines tubes and drains:  Continue PICC line.  Plan: Remain ICU. Begin discharge planning for IP rehab.          11/13/2019  The patient is post-operative day 13, status-post coronary artery bypass grafting x 3. Post-operative course was complicated by an episode of hemodynamic instability and decompensation on postop day 5.  Patient remains hemodynamically stable.    Neuro:  Patient is oriented to person, place, situation. Disoriented to time. Alert, follows commands, makes eye contact. No focal deficits.    Cardiac:  Patient is hemodynamically stable. Continue metoprolol, amlodipine, losartan, and chlorthalidone.   Respiratory: Good sats on room air. Continue IS. Use of accessory muscles noted during inspiration. Will increase diuresis.   GI:  Continue cardiac/diabetic diet, modified by puree and thick liquids with swallow precautions. Advance as tolerated. Continue metamucil.  Renal: Good urine output. Cr 0.8. K 4.0. Mag 1.8. Replace Mag. Start lasix IV. Continue chlorthalidone.  Hema:  Hct 23.  Platelet 79. Hold aspirin. Hold Plavix. 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 Multiple DVTs from the right IJ down to the brachiocephalic vein. Confirmed via vascular US. Patient on argatroban gtt, goal is APTT 2x baseline. Most recent APTT 75.6. Baseline APTT 50.8.  Hematology following. Vascular surgery following.   Id:  WBC is down to 12.2. A-febrile. Will continue to follow closely.    Endocrine:  Glucose controlled. Continue sliding scale insulin. Start long acting insulin detemir 5 units daily.     Activities: Increase activities as tolerated.  Musculoskeletal: The right upper extremity exhibits swelling distal from the elbow. The RUE is tender to palpation and ROM. ROM is diminished to the right hand 2/5. The right hand is improving. The dusky, mottled, purple  discoloration is localized to the right thumb tip and tip of the right index finger.  Severely diminished capillary refill to the tip of the right thumb and tip of the right index finger. Wound care following. Gauze dressing in place.    Lines tubes and drains:  Continue PICC line.  Plan: Remain telemetry. Continue discharge planning for IP rehab.          11/14/2019  The patient is post-operative day 14, status-post coronary artery bypass grafting x 3. Post-operative course was complicated by an episode of hemodynamic instability and decompensation on postop day 5.  Patient remains hemodynamically stable.    Neuro:  Patient is oriented to person, place, situation. Disoriented to time. Alert, follows commands, makes eye contact. No focal deficits.    Cardiac:  Patient is hemodynamically stable. Continue metoprolol, amlodipine, losartan, and chlorthalidone.   Respiratory: Good sats on room air. Continue IS. Decreased use of accessory muscles noted during inspiration.   GI:  Continue cardiac/diabetic diet, modified by puree and thick liquids with swallow precautions. Advance as tolerated. Continue metamucil.  Renal: Good urine output. Cr 0.9. K 4.2. Mag 1.9. Replace Mag. Discontinue lasix. Continue chlorthalidone.  Hema:  Hct 21.9.  Platelet 87. Hold aspirin. Hold Plavix. 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 Multiple DVTs from the right IJ down to the brachiocephalic vein. Confirmed via vascular US. As per discussion with hematology and pharmacy, will discontinue argatroban and start apixaban. Transfuse 1 unit PRBC.  Hematology following. Vascular surgery following.   Id:  WBC is down to 11. A-febrile. Will continue to follow closely.    Endocrine:  Continue sliding scale insulin. Increase insulin detemir to 5 units BID.     Activities: Increase activities as tolerated.  Musculoskeletal: The right upper extremity exhibits swelling distal from the elbow. The RUE is tender to palpation and ROM. ROM is diminished to  the right hand 2/5. The right hand is slowly improving. The dusky, mottled, purple discoloration is localized to the right thumb tip and tip of the right index finger.  Severely diminished capillary refill to the tip of the right thumb and tip of the right index finger. Wound care following. Gauze dressing in place.    Lines tubes and drains:  Continue PICC line.  Plan: Remain telemetry. Continue discharge planning for IP rehab.          11/15/2019  The patient is post-operative day 15, status-post coronary artery bypass grafting x 3. Post-operative course was complicated by an episode of hemodynamic instability and decompensation on postop day 5.  Patient remains hemodynamically stable.    Neuro:  Patient is oriented to person, place, situation. Disoriented to time. Alert, follows commands, makes eye contact. No focal deficits.    Cardiac:  Patient is hemodynamically stable. Continue metoprolol, amlodipine, losartan, and chlorthalidone.   Respiratory: Good sats on low flow nasal cannula. Wean to room air. Continue IS.   GI:  Continue cardiac/diabetic diet, modified by puree and thick liquids with swallow precautions. Advance as tolerated. Continue metamucil.  Renal: Good urine output. Cr 0.8. K 3.6. Replace K. Continue chlorthalidone.  Hema:  Hct 26.1. Platelet 99. Start Aspirin. Continue Apixaban 5mg BID.  Hematology following. Vascular surgery following.   Id:  WBC 11. A-febrile. Will continue to follow closely.    Endocrine:  Continue sliding scale insulin. Increase insulin detemir to 5 units AM dose, 10 units PM dose.   Activities: Increase activities as tolerated.  Musculoskeletal: The right upper extremity exhibits swelling distal from the elbow. The RUE is tender to palpation and ROM. ROM is diminished to the right hand 2/5. The right hand is slowly improving. The dusky, mottled, purple discoloration is localized to the right thumb tip and tip of the right index finger.  Severely diminished capillary refill  to the tip of the right thumb and tip of the right index finger. Wound care following. Gauze dressing in place.    Lines tubes and drains:  Continue PICC line.  Plan: Remain telemetry. Begin discharge planning to LTAC, anticipate discharge in 48-72 hours.        11/16/2019  The patient is postop day 16.  Status post coronary artery bypass grafting x3.  Patient's postop course was complicated by an episode of hemodynamically instability and decompensation on postop day 5.  Since then the patient has made much clinical progress.  The patient remains hemodynamically stable.  Anticipate discharge to rehab or LTAC in the next 48-72 hours.    Neuro:  The patient is oriented x3.  She is much less confused.  Her responses to questioning is appropriate.  She moves all 4 she has no focal deficits.  Cardiac:  Patient remains hemodynamically stable.  Systolic blood pressure trending higher.  Will increase losartan.  Continue amlodipine metoprolol and chlorthalidone.  Respiratory:  The patient has good sats on room air.  Continue pulmonary toilet.  GI:  Patient is tolerating a regular the diet.  Renal:  Patient has good urine output.  creatinine is 0.7.  Heme:  Hematocrit is 30.  Platelet count is up to 129.  Continue aspirin and apixaban.  Id:  White count is 10.  The patient is afebrile.  Endocrine:  Glucose is better controlled on long-acting insulin.  Activities patient is out of bed to chair continue increasing activities.  Line tubes and drains:  Patient has a PICC line continue PICC line.  Musculoskeletal:  Perfusion to the patient's right arm is much improved.  Swelling is somewhat decreased.  The area of dusky tissue remained isolated to the tip of the patient's right thumb and index finger.

## 2019-11-16 NOTE — PROGRESS NOTES
[facility]  Cardiology  Progress Note    Patient Name: Mateusz Bates  MRN: [id number]  Admission Date: 10/28/2019  Hospital Length of Stay: 19 days  Code Status: Full Code   Attending Physician: Keny Zaidi MD   Primary Care Physician: Serenity Kilpatrick MD  Expected Discharge Date: 11/13/2019  Principal Problem:Acute deep vein thrombosis (DVT) of right upper extremity    Subjective:   HPI    Ms. Bates is a 69 year old female patient whose current medical conditions include HTN, hyperlipidemia, obesity, DM type II, cerebral microvascular disease, FAHAD, and B12 deficiency who was sent to [facility] ED today from her PCP's office due to chest pain and abnormal EKG. Patient complained of substernal chest tightness/heaviness that radiated to her back on Saturday while at rest. Associated symptoms included orthopnea and SOB. Patient denied any associated nausea, vomiting, diaphoresis, palpitations, near syncope, or syncope. Initial workup in ED revealed troponin of 3.665 and BNP of 1305 and patient subsequently admitted for further evaluation and treatment of NSTEMI. Cardiology consulted to assist with management. Patient seen and examined today while in ED. Feels well at time of exam, denies chest pain. She does report similar symptoms over the past few months but states they never lasted as long and were less intense. She reports compliance with her medications. States her father had history of CABG. Chart reviewed. Repeat troponin pending. EKG reviewed and shows SR with new LBBB. 2D echo pending.      Hospital Course:   Ms. Bates is a 69 year old female patient whose current medical conditions include HTN, hyperlipidemia, obesity, DM type II, cerebral microvascular disease, FAHAD, and B12 deficiency who was sent to [facility] ED today from her PCP's office due to chest pain and abnormal EKG. Patient complained of substernal chest tightness/heaviness that radiated to her back on Saturday while at rest.  Associated symptoms included orthopnea and SOB. Patient denied any associated nausea, vomiting, diaphoresis, palpitations, near syncope, or syncope. Initial workup in ED revealed troponin of 3.665 and BNP of 1305 and patient subsequently admitted for further evaluation and treatment of NSTEMI. Cardiology consulted to assist with management. Patient seen and examined today while in ED. Feels well at time of exam, denies chest pain. She does report similar symptoms over the past few months but states they never lasted as long and were less intense. She reports compliance with her medications. States her father had history of CABG. Chart reviewed. Repeat troponin pending. EKG reviewed and shows SR with new LBBB. 2D echo pending.    10/29/19-Patient seen and examined today, s/p LHC yesterday that showed multivessel CAD. CVT consulted, CABG planned for Thursday. Feels ok. No chest pain or SOB. Labs stable. Being diuresed. 2D echo showed EF of 30-35%, DD.    10/30/19-Patient seen and examined today. Feels well. No complaints. Denies chest pain or SOB. Labs stable. IABP placement scheduled for today.    10/31/19- Patient is s/p CABG x3 with LIMA to LAD reverse saphenous vein graft to the RCA and reverse saphenous vein graft to the ramus. Has been transferred to ICU. Is intubated and sedated. CT x3. IABP at 1:1 still in place. Transfused 3 units of PRBC in the OR and has received additional 3 units in ICU, has received 500cc albumin, epi and milrinone gtt.  H/H 6.9/21.0. Renal function stable. Paced rhythm on monitor     11/1/2019--patient seen and examined in ICU. Extubated o/n, remains on IABP 1:1 today, plans to possibly wean off later today per Dr. Zaidi. Remains on Epi and Milrinone gtt today. Paced on monitor. Labs stable today.     11/2/19-Patient seen and examined today, lying in bed. Feels ok. Complains of post-op pain and fatigue. Labs reviewed. Platelets 47,000. Heme/onc on board.     11/3/19-Patient seen and  examined today, sitting up in chair. Feeling better. Still weak. Labs reviewed. Platelets 89,000.    11/4/19- Patient is POD 4 s/p CABG x3. OOB in chair. Feels better this morning. Pain controlled. Platelets improving since holding Plavix.Vitals stable     11/5/19- Patient is POD 5 s/p CABGx3. Has been transferred to [facility]. Is clammy and generally weak this morning. Glucose and vitals stable. Noted to have bump in WBC. Primary team has been notified by nurse. Platelets 73, but H/H stable     11/6 in ICU, intubated, on epi gtt at 0.15 mcg CI 2.0, PAP 20. Sinus rhythm, H&H and Cr stable. Urine output ok. Improved lactate 1.6. const stent cardiogenic shock    11/7 INTUBATED, WEAN OFF EPI AND CONTINUE AMILIRONE, ON SINU RHYTHM AND CI 3.3, Cr STABLE AND DECENT URINE OUTPUT.     11/8 extubated, off pressors, up to the chair.diffuse DVT on upper extremities, no chest pain,pt is weak, Cr normal    11/9/weak, mild reps. Distress, good urine output. BP high.  RUE venous thrombus/DVT in deep and superficial arm veins. H&H stable Cr normal.      11/10 weakness H&H 7.6/24, BP and HR wnl., good urine output. No chest pain. Right arm swelling and ecchymosis. Pulses ok    11/11/19--Patient seen and examined in ICU, sitting on side of bed. Feels ok today. Continued right arm swelling and ecchymosis.     11/12/19-Patient seen and examined today, lying in bed. Feels ok. Denies chest pain. Admits to some SOB. CXR, showed no effusion or infiltrate. Labs stable, platelets 75,000 H/H 7.3/23.1. Hematology noted reviewed, ROSANAN positive.     11/13/19--Patient seen and examined today, lying in bed. Sleepy on exam today. No chest pain today. Labs reviewed, H/H 7/23, plts 79, INR 4.3, Mag 1.8.     11/14/19--Patient seen and examined in room, lying in bed. Plans for PRBC transfusion today. Labs reviewed, H/H 6.7/21.9.     11/15/19--Patient seen and examined in room, lying in bed. Feels ok today. Has been started on Eliquis. Labs reviewed, H/H  8.2/26.1, K+ 3.6, Cr 0.8.     11/16/19--Patient seen and examined in room, sitting in bedside chair. Feels ok today. Labs reviewed, stable.     Interval History: no acute issues noted o/n.     Review of Systems   Constitution: Positive for malaise/fatigue.   HENT: Negative for hearing loss and hoarse voice.    Eyes: Negative for blurred vision and visual disturbance.   Cardiovascular: Negative for chest pain, claudication, dyspnea on exertion, irregular heartbeat, leg swelling, near-syncope, orthopnea, palpitations, paroxysmal nocturnal dyspnea and syncope.   Respiratory: Negative for cough, hemoptysis, shortness of breath, sleep disturbances due to breathing, snoring and wheezing.    Endocrine: Negative for cold intolerance and heat intolerance.   Hematologic/Lymphatic: Does not bruise/bleed easily.   Skin: Positive for color change, nail changes and poor wound healing. Negative for dry skin.   Musculoskeletal: Positive for arthritis and back pain. Negative for joint pain and myalgias.        RUE SWELLING   Gastrointestinal: Negative for bloating, abdominal pain, constipation, nausea and vomiting.   Genitourinary: Negative for dysuria, flank pain, hematuria and hesitancy.   Neurological: Negative for headaches, light-headedness, loss of balance, numbness, paresthesias and weakness.   Psychiatric/Behavioral: Negative for altered mental status.   Allergic/Immunologic: Negative for environmental allergies.     Objective:     Vital Signs (Most Recent):  Temp: 98.1 °F (36.7 °C) (11/16/19 0724)  Pulse: 94 (11/16/19 0835)  Resp: 20 (11/16/19 0835)  BP: (!) 149/79 (11/16/19 0724)  SpO2: (!) 94 % (11/16/19 0835) Vital Signs (24h Range):  Temp:  [96.8 °F (36 °C)-98.6 °F (37 °C)] 98.1 °F (36.7 °C)  Pulse:  [75-98] 94  Resp:  [18-22] 20  SpO2:  [94 %-100 %] 94 %  BP: (118-149)/(59-87) 149/79     Weight: 77.9 kg (171 lb 11.8 oz)  Body mass index is 32.45 kg/m².     SpO2: (!) 94 %  O2 Device (Oxygen Therapy): room  air      Intake/Output Summary (Last 24 hours) at 11/16/2019 1106  Last data filed at 11/16/2019 0500  Gross per 24 hour   Intake 1060 ml   Output --   Net 1060 ml       Lines/Drains/Airways     Peripherally Inserted Central Catheter Line                 PICC Double Lumen 11/07/19 1645 left brachial;left basilic 8 days                Physical Exam   Constitutional: She is oriented to person, place, and time. She appears well-developed and well-nourished. No distress.   HENT:   Head: Normocephalic and atraumatic.   Eyes: Pupils are equal, round, and reactive to light.   Neck: Normal range of motion and full passive range of motion without pain. Neck supple. No JVD present.   Cardiovascular: Normal rate, regular rhythm, S1 normal, S2 normal and intact distal pulses. PMI is not displaced. Exam reveals no distant heart sounds.   No murmur heard.  Pulses:       Radial pulses are 2+ on the right side, and 2+ on the left side.        Dorsalis pedis pulses are 2+ on the right side, and 2+ on the left side.   CABG INCISION HEALING WELL   Pulmonary/Chest: Effort normal and breath sounds normal. No accessory muscle usage. No respiratory distress. She has no decreased breath sounds. She has no wheezes. She has no rales.   Abdominal: Soft. Bowel sounds are normal. She exhibits no distension. There is no tenderness.   Musculoskeletal: Normal range of motion. She exhibits no edema.        Right ankle: She exhibits no swelling.        Left ankle: She exhibits no swelling.   Neurological: She is alert and oriented to person, place, and time.   Skin: Skin is warm and dry. Bruising and ecchymosis noted. She is not diaphoretic. No cyanosis. There is pallor. Nails show no clubbing.   RUE swelling, right hand ecchymotic with dusky fingernails   Psychiatric: She has a normal mood and affect. Her speech is normal and behavior is normal. Judgment and thought content normal. Cognition and memory are normal.   Nursing note and vitals  reviewed.      Significant Labs:   BMP:   Recent Labs   Lab 11/15/19  0454 11/15/19  1650 11/16/19  0537   * 194* 78    135* 136   K 3.6 4.0 4.0    103 103   CO2 26 23 22*   BUN 23 24* 24*   CREATININE 0.8 0.8 0.7   CALCIUM 8.6* 8.3* 9.0   MG  --   --  2.1   , CBC   Recent Labs   Lab 11/14/19  1655 11/15/19  0454 11/16/19  0537   WBC 13.32* 11.48 10.68   HGB 8.1* 8.2* 9.1*   HCT 26.4* 26.1* 30.7*   * 99* 129*    and All pertinent lab results from the last 24 hours have been reviewed.    Significant Imaging: Echocardiogram:   2D echo with color flow doppler:   Results for orders placed or performed during the hospital encounter of 10/28/19   2D echo with color flow doppler   Result Value Ref Range    QEF 30 (A) 55 - 65    Diastolic Dysfunction Yes (A)     Est. PA Systolic Pressure 33.03     Narrative    Date of Procedure: 11/06/2019        TEST DESCRIPTION   Technical Quality: This is a technically challenging study.     General: A catheter is present in the right-sided cardiac chambers.     Aorta: The aortic root is normal in size, measuring 3.7 cm at sinotubular junction and 3.8 cm at Sinuses of Valsalva. The proximal ascending aorta is normal in size, measuring 3.7 cm across.     Left Atrium: The left atrial volume index is normal, measuring 19.22 cc/m2.     Left Ventricle: The left ventricle is normal in size, with an end-diastolic diameter of 3.0 cm, and an end-systolic diameter of 2.5 cm. LV wall thickness is normal, with the septum measuring 2.1 cm and the posterior wall measuring 1.4 cm across. Relative   wall thickness was increased at 0.93, and the LV mass index was increased at 140.2 g/m2 consistent with concentric left ventricular hypertrophy. The following segments were mildly hypokinetic: apical lateral wall.  The following segments were severely hypokinetic: mid anteroseptum, basal anteroseptum, apical septum, mid inferoseptum, basal inferoseptum, basal inferior wall.  Left  ventricular systolic function appears moderately depressed. Visually estimated ejection fraction is 30-35%. The LV Doppler derived stroke volume equals 54.0 ccs.     Diastolic indices: E wave velocity 0.9 m/s, E/A ratio 0.8,  msec., E/e' ratio(avg) 14. There is diastolic dysfunction secondary to relaxation abnormality.     Right Atrium: The right atrium is normal in size, measuring 3.5 cm in length and 2.1 cm in width in the apical view.     Right Ventricle: The right ventricle is normal in size. Global right ventricular systolic function appears normal. Tricuspid annular plane systolic excursion (TAPSE) is 1.3 cm. The estimated PA systolic pressure is 33 mmHg.     Aortic Valve:  The aortic valve is normal in structure. The aortic valve is tri-leaflet in structure. The mean gradient obtained across the aortic valve is 9 mmHg.     Mitral Valve:  The mitral valve is normal in structure. The pressure half time is 53 msec. The calculated mitral valve area is 4.15 cm2.     Tricuspid Valve:  The tricuspid valve is normal in structure.     Pulmonary Valve:  The pulmonic valve is not well seen.     IVC: IVC is normal in size and collapses > 50% with a sniff, suggesting normal right atrial pressure of 3 mmHg.     Intracavitary: There is no evidence of pericardial effusion, intracavity mass, thrombi, or vegetation.         CONCLUSIONS     1 - Concentric hypertrophy.     2 - Wall motion abnormalities.     3 - Moderately depressed left ventricular systolic function (EF 30-35%).     4 - Impaired LV relaxation, normal LAP (grade 1 diastolic dysfunction).     5 - Normal right ventricular systolic function .     6 - The estimated PA systolic pressure is 33 mmHg.             This document has been electronically    SIGNED BY: Charles Matias MD On: 11/06/2019 18:28    and X-Ray: CXR: X-Ray Chest 1 View (CXR):   Results for orders placed or performed during the hospital encounter of 10/28/19   X-Ray Chest 1 View    Narrative     "EXAMINATION:  XR CHEST 1 VIEW    CLINICAL HISTORY:  follow up;    TECHNIQUE:  Single frontal view of the chest was performed.    COMPARISON:  11/07/2019    FINDINGS:  Tubes and lines are stable.  Sprakers-Waldo catheter is been removed.  Mild atelectasis within the left midlung zone.  No consolidation.  Trace pleural effusions not entirely excluded.  No pneumothorax.  Bones demonstrate mild degenerative changes.  Sternotomy wires are intact.  In comparison to the prior study, there is no adverse interval changes      Impression    In comparison to the prior study, there is no adverse interval changes      Electronically signed by: Jh Anne MD  Date:    11/08/2019  Time:    08:14     Assessment an d Plan:       HIT (heparin-induced thrombocytopenia)  -per Heme/Onc    Acute deep vein thrombosis (DVT) of non-extremity vein Right IJ  -Continue Argatroban infusion  -RUE continues to be swollen   -Right hand remains ecchymotic   -Mgmt per CT surgery  Per Vascular Surgery:  "Hypercoagulable/HIT workup in progress, pt started on argatroban  Regarding RUE/central venous thrombus: Unfortunately, given location of clot and her recent major cardiac surgery any attempt at thrombolysis with tPA would be absolutely contraindicated  Open thrombectomy also not option as central clot could not be adequately cleared to allow for any outflow/drainage from the arm     Recommend cont anticoagulation, arm elevation, mild/moderate compression"    11/12/19  -Mgmt as per vascular/heme onc/CVT    11/13  -Mgmt per Vascular/Heme onc/CT surgery    Cardiogenic shock  Post CABG  EF 30%  S/p swan catheter  CI 3.3  Repeat echo today showed EF 30%, septal hypokinesis , compared to prior echo before the cabg, no significant change  ekg reviewed    11/10/2019  Extubated  Off pressors  Continue Amlodipine and BB/Losartan  Hold Lipitor due to elevated LFT  Continue anticoagulation due to acute DVT  Continue ICU supportive care and ABx    11/11  -Continue " BB, ARB, Norvasc  -BP much improved   -Continue supportive care per CT surgery      11/12/19  -Stable  -Continue BB, ARB, Norvasc        Thrombocytopenia  -Mgmt as per heme/onc  -Plavix was on hold, but resumed by primary team   -ASA has been resumed     11/5/19  -consider holding Plavix  -Hematology has been consulted     S/P CABG x 3  -Patient is s/p CABG x3 with LIMA to LAD reverse saphenous vein graft to the RCA and reverse saphenous vein graft to the ramus.  -Continue post CABG supportive therapy  -Continue ASA, Plavix, Statin, BB  -Will continue to follow along     11/1/19  -POD #1 s/p CABG x3  -Continue ASA, Statin, BB  -Remains on Epi and Milrinone gtt today  -Mgmt per CT surgery  -Will follow along    11/2/19  -Recovering s/p CABG x 3  -Continue statin, BB  -Platelets 47,000 this AM; heme/onc on board, ASA held  -Await further rec's    11/3/19  -Stable, progressing post CABG x 3  -Continue statin, BB  -ASA re-started  -Platelets 89,000, heme/onc on board    11/4/19  -Patient slowly improving post CABG x3  -Continue ASA, Statin and BB  -plavix on hold due to thrombocytopenia that is improving   -IS use encouraged and early ambulation with PT  -Will continue to follow along     11/5/19  -Will continue ASA, Statin and BB  -Consider holding Plavix again for drop in plts after resuming by primary team   -Recommend CXR to rule out post op PNA given bump in WBC and cough  -will need to monitor for temp  -ambulate as tolerated and encourage IS use     11/06  Hold ASA, Plavix due to anemia  Hold BB due to cardiogenic shock    11/11/19  -Continue Argatroban infusion  -Continue BB, ARB, IV lasix, Chlorthalidone, Norvasc, NTG paste  -Hold ASA, Plavix due to anemia  -Further mgmt per CT surgery    11/12/19  -Stable  -Continue BB, ARB, Norvasc, Chlorthalidone, IV Lasix  -Recommend re-starting ASA  -Plavix held due to anemia/thrombocytopenia    11/13/19  -Recommend ASA daily  -Plavix held due to  anemia/thrombocytopenia  -Recommend PRBC transfusion, per CT surgery  -Continue BB, ARB, Norvasc, IV lasix, Chlorthalidone    11/14  -Continue current medical mgmt per CT surgery  -Recommend ASA daily  -Agree with PRBC transfusion today    11/15  -Continue Eliquis, Norvasc, ASA, ARB, BB  -Continue mgmt per CT surgery  -Will continue to follow along  -Encourage IS and aggressive pulmonary toilet  -OOB to chair for meals    11/16  -Continue ASA, Eliquis, BB, ARB, Norvasc, Statin  -Encourage aggressive pulmonary toilet, IS, out of bed for all meals  -Needs to ambulate in hallway as able  -Will follow along with CT surgery    CAD, multiple vessel  -See plan under CABG    Acute combined systolic and diastolic CHF, NYHA class 2  -Presents with ICM/acute combined CHF  -EF 30-35% by 2D echo        Chest pain  -See plan under NSTEMI    Abnormal EKG  -LBBB noted on EKG  -See plan under NSTEMI    NSTEMI (non-ST elevated myocardial infarction)  -Patient presented with intermittent chest tightness/heaviness that radiated to her back since Saturday evening  -Associated with JONES/orthopnea  -Initial troponin 3.665  -EKG showed SR with new LBBB  -Presentation consistent with NSTEMI  -Continue ASA, statin  -Start Toprol XL  -Start heparin gtt  -Check 2D echo  -Continue to trend troponin  -Keep NPO. [facility] today for further evaluation and PCI if indicated. All risks, benefits, and treatment alternatives explained to patient in detail. All questions answered. She has agreed to proceed. Further rec's to follow.    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 All risks, benefits, and treatment  alternatives explained to patient in detail. All questions answered. She has agreed to proceed.    10/31/19  -Patient is s/p CABG x3.   See plan for CABG     Hyperlipidemia associated with type 2 diabetes mellitus  -Continue statin    Obesity (BMI 30.0-34.9)  -Weight loss    Diabetes mellitus type II, uncontrolled  -Mgmt as per hospital medicine        VTE Risk Mitigation (From admission, onward)         Ordered     apixaban tablet 5 mg  2 times daily      11/14/19 0903     argatroban 125 mg in sodium chloride 0.9% 125 mL infusion (conc: 1 mg/mL)  Continuous     Question:  Argatroban Infusion Adjustment (DO NOT MODIFY ANSWER)  Answer:  [email or web address]\Images\Pharmacy\HeparinInfusions\ArgatrobanOMC [id number]    11/13/19 1501     argatroban 125 mg in sodium chloride 0.9% 125 mL infusion (conc: 1 mg/mL)  Continuous     Question:  Argatroban Infusion Adjustment (DO NOT MODIFY ANSWER)  Answer:  [email or web address]\Images\Pharmacy\HeparinInfusions\ArgatrobanOMC [id number]    11/11/19 0128     Reason for no Mechanical VTE Prophylaxis  Once     Question:  Reasons:  Answer:  IV Heparin within 24 hrs. Pre or Post-Op    10/28/19 1414     heparin 25,000 units in dextrose 5% 250 mL (100 units/mL) infusion LOW INTENSITY nomogram - [facility]  Continuous     Question:  Heparin Infusion Adjustment (DO NOT MODIFY ANSWER)  Answer:  [email or web address]\Images\Pharmacy\HeparinInfusions\heparin LOW INTENSITY nomogram for [facility] [id number]    10/28/19 1257                KOLE Faulkner  Cardiology  [facility]

## 2019-11-16 NOTE — ASSESSMENT & PLAN NOTE
--Platelet count 104  --D/c Argatroban due to unavailability   --please continue on Eliquis 5 mg PO BID.  Plan for anticoagulation for at least 3 months.  --Bleeding precautions  --Hg 8.1.  Transfuse for hemoglobin less than 7.    --Daily CBC

## 2019-11-16 NOTE — PT/OT/SLP PROGRESS
Physical Therapy      Patient Name:  Mateusz Bates   MRN:  [id number]    Patient not seen today secondary to  . Will follow-up TOMORROW, PATIENT WAS SLEEPING .    Lee Pandya, PTA

## 2019-11-16 NOTE — PROGRESS NOTES
[facility] -   Cardiothoracic Surgery  Progress Note    Patient Name: Mateusz Bates  MRN: [id number]  Admission Date: 10/28/2019  Hospital Length of Stay: 19 days  Code Status: Full Code   Attending Physician: Keny Zaidi MD   Referring Provider: Self, Aaareferral  Principal Problem:Acute deep vein thrombosis (DVT) of right upper extremity            Subjective:     Post-Op Info:  Procedure(s) (LRB):  CORONARY ARTERY BYPASS GRAFT (CABG) (N/A)  ECHOCARDIOGRAM,TRANSESOPHAGEAL (N/A)  BLOCK, NERVE, INTERCOSTAL, 2 OR MORE (N/A)   16 Days Post-Op     Interval History:  The patient is postop day 16 status post coronary artery bypass grafting x3.  The patient is making clinical progress.  Platelet count is 129 today.  Neurologically patient is improving.    ROS  Medications:  Continuous Infusions:  Scheduled Meds:   albuterol-ipratropium  3 mL Nebulization Q6H WAKE    amLODIPine  10 mg Oral Daily    apixaban  5 mg Oral BID    aspirin  81 mg Oral Daily    chlorhexidine  15 mL Mouth/Throat BID    chlorthalidone  25 mg Oral Daily    ferrous sulfate  325 mg Oral BID    insulin detemir U-100  10 Units Subcutaneous QHS    insulin detemir U-100  5 Units Subcutaneous Daily    [START ON 11/17/2019] losartan  100 mg Oral Daily    metoprolol tartrate  50 mg Oral BID    nitroGLYCERIN 2% TD oint  0.5 inch Topical (Top) Q12H    pantoprazole  40 mg Oral Daily    psyllium husk  1 packet Oral BID     PRN Meds:sodium chloride, albumin human 5%, albuterol sulfate, Dextrose 10% Bolus, Dextrose 10% Bolus, insulin aspart U-100, iohexol, lactated ringers, magnesium sulfate IVPB, magnesium sulfate IVPB, metoclopramide HCl, ondansetron, potassium chloride in water **AND** potassium chloride in water **AND** potassium chloride in water, traMADol     Objective:     Vital Signs (Most Recent):  Temp: 98.1 °F (36.7 °C) (11/16/19 0724)  Pulse: 94 (11/16/19 0835)  Resp: 20 (11/16/19 0835)  BP: (!) 149/79 (11/16/19  0724)  SpO2: (!) 94 % (11/16/19 0835) Vital Signs (24h Range):  Temp:  [96.8 °F (36 °C)-98.6 °F (37 °C)] 98.1 °F (36.7 °C)  Pulse:  [75-98] 94  Resp:  [18-22] 20  SpO2:  [94 %-100 %] 94 %  BP: (118-149)/(59-87) 149/79     Weight: 77.9 kg (171 lb 11.8 oz)  Body mass index is 32.45 kg/m².    SpO2: (!) 94 %  O2 Device (Oxygen Therapy): room air    Intake/Output - Last 3 Shifts       11/14 0700 - 11/15 0659 11/15 0700 - 11/16 0659 11/16 0700 - 11/17 0659    P.O.  1560     I.V. (mL/kg)  100 (1.3)     Blood 325      Total Intake(mL/kg) 325 (4.2) 1660 (21.3)     Net +325 +1660            Urine Occurrence 1 x 4 x     Stool Occurrence 5 x 1 x           Lines/Drains/Airways     Peripherally Inserted Central Catheter Line                 PICC Double Lumen 11/07/19 1645 left brachial;left basilic 8 days                Physical Exam   Constitutional: She is oriented to person, place, and time. She appears well-developed and well-nourished. No distress.   HENT:   Head: Normocephalic and atraumatic.   Cardiovascular: Normal rate, regular rhythm and normal heart sounds.   Pulmonary/Chest: Effort normal and breath sounds normal.   Abdominal: Soft. Bowel sounds are normal.   Musculoskeletal: She exhibits no edema.   Neurological: She is alert and oriented to person, place, and time.   Skin: Skin is warm and dry. She is not diaphoretic.   Psychiatric: She has a normal mood and affect.    Patient's right arm continues to improve.  Most of patient's hand is perfused with good capillary refill.  Except for the tip of an index finger and the distal half of her thumb which remained dusky.    Significant Labs:  All pertinent labs from the last 24 hours have been reviewed.    Significant Diagnostics:  I have reviewed all pertinent imaging results/findings within the past 24 hours.    Assessment/Plan:     S/P CABG x 3       11/01/2019  The patient is post-operative day 1, status-post coronary artery bypass grafting x 3.  Overall the patient  is making progress, however IABP and inotropic agents will continue today.   Neuro:  The patient is awake alert and oriented x3.  Neuro exam is nonfocal.  Pain is well controlled with IV pain medication.  Cardiac:  Patient is hemodynamically improving. Patient remains on epinephrine and milrinone gtts, which will be weaned over the next 24 hours.  Cardiac index is improving. IABP will remain in place over the next 24 hours.  Patient will be started on beta blockers once gtts are weaned off.  Respiratory: Good sats on high flow nasal cannula. Continue respiratory toilet, continue incentive spirometer. Wean to low flow nasal cannula.  GI:  Diet: sips of water with medications, will slowly advance diet as tolerated. Benign abdominal exam.  Renal:  Urine output is slowly improving. Cr 0.9. K 4.3. Mag 1.6. Replace mag. Continue IV diuresis. Add metolazone.   Heme:  Hct 34.1  Platelet 82. Continue aspirin. Discontinue Plavix for now.  Id:  WBC 12. Tmax 100.3. Continue post-operative antibiotics. Will continue to follow.   Endocrine:  Glucose is controlled with insulin gtt.  Continue insulin gtt for now, until weaned off inotropic agents.   Activities:  Patient is currently bed bound due to IABP.  Advance activities as tolerated once IABP is removed.   Lines tubes and drains:  Continue IABP. Continue [place] and Cordis.  Continue A-line.  Chest tubes will continue. NICOLASA x 2 will continue. Jones catheter will continue.  Continue pacer wires. Continue prevena wound vac.  Plan: Remain ICU.          11/02/2019  The patient is post-operative day 2, status-post coronary artery bypass grafting x 3.  Overall the patient is making progress, IABP removed and inotropic agents discontinued.   Neuro:  The patient is awake alert and oriented x3.  Neuro exam is nonfocal.  Pain is well controlled. Discontinue fentanyl.   Cardiac:  Patient is hemodynamically improving. Inotropic gtts discontinued. Excellent cardiac index. IABP discontinued.   Patient will be started on beta blockers today.  Respiratory: Good sats on high flow nasal cannula. Continue respiratory toilet, continue incentive spirometer. Wean to low flow nasal cannula.  GI:  Advance diabetic/cardiac diet as tolerated. Benign abdominal exam.  Renal:  Urine output is improving. Cr 0.9. K 4.9. Mag 2.2. Continue IV diuresis. Hold AM K.   Heme:  Hct 28.3  Platelet 47. Discontinue aspirin. Discontinue Plavix. HIT panel. Consult hematology r/t thrombocytopenia.   Id:  WBC 10.4. Tmax 99.4. Will continue to follow.   Endocrine:  Discontinue insulin gtt. Start sliding scale insulin. Start long acting insulin detemir.   Activities: Advance activities as tolerated.   Lines tubes and drains:  Discontinue IABP. Discontinue [place] and Cordis.  Discontinue A-line.  Discontinue chest tubes x 3. Discontinue NICOLASA x 2. Discontinue arterial line. Jones catheter will continue for now.  Continue pacer wires. Continue prevena wound vac. Place PICC line.   Plan: Remain ICU.           11/03/2019  The patient is post-operative day 3, status-post coronary artery bypass grafting x 3.  Overall the patient is making progress.  Neuro:  The patient is awake alert and oriented x3.  Neuro exam is nonfocal.  Pain is well controlled. Continue tramadol.   Cardiac:  Patient is hemodynamically stable. Increase metoprolol dose. Start amlodipine.   Respiratory: Good sats on nasal cannula. Continue respiratory toilet, continue incentive spirometer. Wean to room air.  GI:  Advance diabetic/cardiac diet as tolerated. Benign abdominal exam. Passing gas, no BM. Add 1 bottle of mag citrate.   Renal:  Good urine output. Cr 0.9. K 3.8. Mag 2.0. Replace K. Replace Mag. Change to PO diuresis.   Heme:  Hct 34.7  Platelet 89. Restart aspirin. Continue to hold Plavix. HIT panel pending. Hematology following.  Id:  WBC 13.8. Tmax 99.4. Will continue to follow.    Endocrine:  Glucose controlled on sliding scale insulin. Increase long acting insulin  detemir dose.   Activities: Advance activities as tolerated.   Lines tubes and drains:  Storm catheter will continue for now.  Continue pacer wires. Continue prevena wound vac. Continue PICC line.   Plan: Remain ICU.          11/04/2019  The patient is post-operative day 4, status-post coronary artery bypass grafting x 3.  Overall the patient is making progress.  Neuro:  The patient is awake alert and oriented x3.  Neuro exam is nonfocal.  Pain is well controlled. Continue tramadol.   Cardiac:  Patient is hemodynamically stable. Continue metoprolol dose. Continue amlodipine dose.   Respiratory: Good sats on room air. Continue respiratory toilet, continue incentive spirometer.  GI:  Advance diabetic/cardiac diet as tolerated. Benign abdominal exam. Regular bowel movements.   Renal:  Good urine output. Cr 0.7. K 4.0. Mag 2.3. Replace K. Decrease PO diuresis.   Heme:  Hct 34.4  Platelet 140. Continue aspirin. Restart Plavix. HIT panel pending. Hematology following.  Id:  WBC down to 13.13. Tmax 98. Will continue to follow.    Endocrine:  Glucose controlled on sliding scale insulin. Increased long acting insulin detemir dose.   Activities: Advance activities as tolerated.   Lines tubes and drains:  Discontinued storm catheter. Continue pacer wires. Continue prevena wound vac. Continue PICC line.   Plan: Transfer to telemetry. Anticipate discharge to SNF in 48 hours.         11/05/2019  The patient is post-operative day 5, status-post coronary artery bypass grafting x 3.  Overall the patient is making progress.  Neuro:  The patient is awake alert and oriented x3.  Neuro exam is nonfocal. Patient slow to answer questions, however answers are appropriate.  Pain is well controlled. Continue tramadol PRN.   Cardiac:  Patient is hemodynamically stable. Continue metoprolol dose. Discontinue amlodipine. Start losartan.    Respiratory: Good sats on room air. Continue respiratory toilet, continue incentive spirometer.  GI:   Advance diabetic/cardiac diet as tolerated. Benign abdominal exam. Regular bowel movements.   Renal:  Good urine output. Cr 0.7. K 4.8. Mag 2.1. Replace Mag. Discontinue PO diuresis.   Heme:  Hct 39.5  Platelet 73. Continue aspirin. Hold Plavix. HIT panel pending. Hematology following.  Id:  WBC is up to 16. Tmax 99. Pan-culture. DC PICC line. Chest xray. Start broad spectrum empiric antibiotics. Will continue to follow.    Endocrine:  Glucose controlled on sliding scale insulin. Decrease long acting insulin detemir dose.   Activities: Advance activities as tolerated.   Lines tubes and drains:  Discontinue pacer wires. Discontinue prevena wound vac. Continue PICC line.   Plan: Anticipate discharge to SNF in 24-48 hours.         11/06/2019  The patient is post-operative day 6, status-post coronary artery bypass grafting x 3. Patient had an episode of decompensation and unresponsiveness yesterday evening. The patient was found unresponsive, cool, and diaphoretic, with agonal breathing. Patient was intubated and transferred to the ICU. Bedside EKG and echocardiogram performed at the bedside showed no significant changes. CT head, chest, abdomen pelvis performed, showed no significant findings.     Neuro:  The patient is intubated and sedated. On Precedex gtt. Begin weaning off sedation.    Cardiac:  Patient is hemodynamically improving. Inotropic agent dependent. Currently on Epinephrine gtt. Start Milrinone gtt. Discontinue metoprolol. Discontinue losartan.    Respiratory: Ventilator dependent. tV 450, RR 20, PEEP 5. Wean down sedation. Wean off ventilator today.    GI:  Begin enteral tube feedings. Benign abdominal exam. Previously having regular bowel movements.   Renal: Adequate urine output. Cr 1.1. K 3.6. Mag 1.7. Replace Mag. Replace K. Discontinue diuresis.   Heme:  Hct 31.9  Platelet 89. Hold aspirin. Hold Plavix. HIT panel negative. Patient received 1 units PRBC last night. Hematology following.  Id:  WBC is  down to 15.5. Tmax 99.9. BC NGTD. Urinalysis negative. Sputum culture negative. Vancomycin and zosyn day 2. Will continue to follow closely.    Endocrine:  Glucose controlled on insulin gtt, continue at this time.   Activities: Intubated and sedated.   Musculoskeletal: Unable to find right radial pulse via doppler. Tips of the fingers of the right hand are mottled, dusky, appear cyanotic. Recent right radial arterial line in place, which has been relocated. US of arteries and veins of the right arm ordered. Consult to vascular surgery placed.    Lines tubes and drains:  Continue swan and cordis. Continue arterial line. Continue storm.    Plan: Remain ICU         11/07/2019  The patient is post-operative day 7, status-post coronary artery bypass grafting x 3. Patient had an episode of decompensation and unresponsiveness the evening of 11/05/2019. The patient was found unresponsive, cool, and diaphoretic, with agonal breathing. Patient was intubated and transferred to the ICU. Bedside EKG and echocardiogram performed at the bedside showed no significant changes. CT head, chest, abdomen pelvis performed, showed no significant findings.     Neuro:  The patient is intubated and sedated. On Precedex gtt. Wean off sedation.    Cardiac:  Patient is hemodynamically improving. Excellent cardiac index. Wean off Inotropic gtts.   Respiratory: Wean towards extubation. tV 400, RR 18, PEEP 5.   GI:  Continue enteral tube feedings while intubated. Benign abdominal exam.  Renal: Adequate urine output. Cr 1.1. K 4.1. Mag 2.8. Start IV diuresis.   Heme:  Hct 24.6  Platelet 60. Hold aspirin. Hold Plavix. HIT panel negative.  Id:  WBC is down to 14.46. Tmax 100.8. BC NGTD. Urinalysis negative. Sputum culture negative. Vancomycin and zosyn day 3. Midline sternal incision appears reddened, with induration at skin edges. Will continue to follow closely.    Endocrine:  Glucose controlled on sliding scale insulin.   Activities: Intubated and  sedated. Moves all 4. Full ROM.    Musculoskeletal: Able to find right radial pulse via doppler. The right hand extending to the tips of the fingers of the right hand are mottled, dusky, appear cyanotic. Recent right radial arterial line placement, which has been relocated. US of arteries and veins of the right arm pending. Vascular surgery following, suggested possible thrombotic etiology, suggested heparin gtt, however this is contraindicated r/t s/p cabg x 3.     Lines tubes and drains:  Continue swan and cordis. Continue arterial line. Continue storm.    Plan: Remain ICU         11/08/2019  The patient is post-operative day 8, status-post coronary artery bypass grafting x 3. Patient had an episode of decompensation and unresponsiveness the evening of 11/05/2019. The patient was found unresponsive, cool, and diaphoretic, with agonal breathing. Patient was intubated and transferred to the ICU. Bedside EKG and echocardiogram performed at the bedside showed no significant changes. CT head, chest, abdomen pelvis performed, showed no significant findings. Suspect cardiogenic shock.      Neuro:  Patients neurologic state is improving. Alert, follows commands, makes eye contact, disoriented x 4. No focal deficits.    Cardiac:  Patient is hemodynamically improving. Off all inotropic gtts. Start low dose metoprolol and amlodipine.   Respiratory: Good sats on low flow nasal cannula. Continue IS. Continue pulmonary toilet. Wean to room air.    GI:  Continue enteral tube feedings at this time r/t disorientation and aspiration risk. Discontinue laxatives. Start metamucil BID, patient has had multiple loose stools.   Renal: Good urine output. Cr 0.8. K 3.6. Mag 2.1. Replace K. Replace Mag. Continue IV diuresis.   Hema:  Hct 26.4  Platelet 45. Hold aspirin. Hold Plavix. HIT panel will be resent as the patient is a high suspicion for HIT despite previous negative HIT panel. Multiple DVTs from the right IJ down to the  brachiocephalic vein. Confirmed via vascular US. Patient started on argatroban yesterday, goal is APTT 2x baseline. Most recent APTT 97.8. Baseline APTT 50.8.  Hematology following. Vascular surgery following.   Id:  WBC is down to 12. Tmax 100.4. BC NGTD. Urinalysis negative. Sputum culture negative. Vancomycin and zosyn day 4. Midline sternal incision appears improved, with minimal redness and induration at skin edges. Will continue to follow closely.    Endocrine:  Glucose controlled on sliding scale insulin.   Activities: Increase activities as tolerated.  Musculoskeletal: ROM 2/5 to the right wrist/hand/fingers, distal from the elbow there is 2+ non pitting edema, the extremity is tender and warm. There is mottling, a dusky appearance, and purple discoloration to the fingertips of the right hand. See above hematology plan.      Lines tubes and drains:  Continue PICC line. Continue storm catheter. Continue NG tube.   Plan: Remain ICU     11/09/2019  The patient is postop day 9.  Status post coronary artery bypass grafting x3.  Postop course was complicated by an episode of hemodynamic instability and decompensation on postop day number 5.  Patient continues to make clinical progress since then.  She is off all drips.  She remains extubated.  Continue ICU care for now.    Neuro:  Patient is awake alert.  She is oriented x2.  She is still confused.  But she is making progress.  Her neuro exam is nonfocal.  She follows all commands.  Cardiac:  Patient continues to be hemodynamically stable.  She is somewhat hypertensive.  Will increase her antihypertensives.  Respiratory:  Patient is good sats on nasal cannula.  She remains extubated.  GI:  Patient was tolerating tube feeds until this morning.  She pulled heart feeding tube. Will get a swallowing evaluation.  Mode of nutrition will depend on results of swallowing evaluation.  Renal:  Patient has good urine output %period% creatinine is 0.9.  Heme:  Hematocrit is 26  platelet is 46.  Patient is on argatroban  for anticoagulation due to right upper extremity DVTs.  Hypercoagulability workup is in progress.  Appreciate Hematology and vascular surgery input.  Id:  Incision continues to improve.  white count is 17.  Continue broad-spectrum antibiotics.  Endocrine:  Glucose is controlled on its sliding scale insulin.  Activities:  Advance activities as tolerated.  Line tubes and drains.  Patient has a PICC line, Jones catheter.    11/10/2019  The patient is postop day 10.  Status post coronary bypass grafting x3.  Postop course was complicated by an episode of hemodynamic instability and decompensation on postop day 5.  Patient remains hemodynamically stable.    Neuro:  The patient is awake and alert.  She is oriented x1.  She continues to be confused.  Her neuro exam is nonfocal.  Cardiac:  Patient continues to be hemodynamically stable.  His she is hypertensive.  Hypertensive medications will be increased.  Respiratory:  Patient has good sats on nasal cannula.  Continue pulmonary toilet.  GI:  Patient is tolerating a regular diet.  Renal:  Patient has good urine output. Creatinine is 0.7.  Heme: hematocrit is 24 and platelet is 67.  Patient continues on argatroban for right upper extremity DVTs.  Hypercoagulable workup is in progress.  Id:  Patient's white count is up to 20.  Patient is on broad prepped from antibiotics.  Endocrine:  The patient is on long-acting as well as sliding scale insulin.  Blood sugars have been well controlled.  Will decrease dose of long-acting.  Activities: patient has been out of bed to chair.  Advance as tolerated.  Musculoskeletal:  Patient's right upper extremity continues to be swollen  The index finger and thumb continues to be dusky with a bleb near the base of the thumb.  Continue argatroban for anticoagulation.  Continue elevation of right upper extremity.  Lines tubes and drains: patient has a PICC line.  Will discontinue Jones catheter.         11/11/2019  The patient is post-operative day 11, status-post coronary artery bypass grafting x 3. Post-operative course was complicated by an episode of hemodynamic instability and decompensation on postop day 5.  Patient remains hemodynamically stable.    Neuro:  Patients neurologic state is improving. Oriented x 2. Alert, follows commands, makes eye contact. No focal deficits.    Cardiac:  Patient is hemodynamically improving. Hypertensive. Continue metoprolol, amlodipine, and losartan. Add chlorthalidone.   Respiratory: Good sats on room air. Continue IS. Continue pulmonary toilet.  GI:  Continue cardiac/diabetic diet, modified by puree and thick liquids with swallow precautions. Advance as tolerated. Continue metamucil.  Renal: Good urine output. Cr 0.8. K 3.8. Replace K. Continue IV diuresis. Add chlorthalidone. BMP at noon.   Hema:  Hct 23.8  Platelet 75. Hold aspirin. Hold Plavix. Repeat HIT panel pending. Multiple DVTs from the right IJ down to the brachiocephalic vein. Confirmed via vascular US. Patient on argatroban gtt, goal is APTT 2x baseline. Most recent APTT 83.3. Baseline APTT 50.8.  Hematology following. Vascular surgery following.   Id:  WBC is down to 19. Tmax 98.4. BC NGTD. Vancomycin and zosyn day 7. Discontinue broad spectrum antibiotics. Will continue to follow closely.    Endocrine:  Glucose controlled on sliding scale insulin and long acting insulin detemir.    Activities: Increase activities as tolerated.  Musculoskeletal: The right upper extremity exhibits swelling distal from the elbow. The RUE is tender to palpation and ROM. ROM is diminished to the right hand 2/5. The hand his reddened, swollen, with dusky, mottled, purple discoloration to the right thumb and tip of the right index finger. Blisters have formed on the proximal portion of the thumb and wrist. Severely diminished capillary refill to the tip of the right thumb and tip of the right index finger. Wound care order placed.       Lines tubes and drains:  Continue PICC line.  Plan: Remain ICU         11/12/2019  The patient is post-operative day 12, status-post coronary artery bypass grafting x 3. Post-operative course was complicated by an episode of hemodynamic instability and decompensation on postop day 5.  Patient remains hemodynamically stable.    Neuro:  Patients neurologic state is improving. Oriented to person, place, situation. Disoriented to time. Alert, follows commands, makes eye contact. No focal deficits.    Cardiac:  Patient is hemodynamically improving. Continue metoprolol, amlodipine, losartan, and chlorthalidone.   Respiratory: Good sats on room air. Continue IS.  GI:  Continue cardiac/diabetic diet, modified by puree and thick liquids with swallow precautions. Advance as tolerated. Continue metamucil.  Renal: Good urine output. Cr 0.8. K 3.9. Replace K. Discontinue lasix. Continue chlorthalidone.  Hema:  Hct 23.1  Platelet 75. Hold aspirin. Hold Plavix. 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 Multiple DVTs from the right IJ down to the brachiocephalic vein. Confirmed via vascular US. Patient on argatroban gtt, goal is APTT 2x baseline. Most recent APTT 84.6. Baseline APTT 50.8.  Hematology following. Vascular surgery following.   Id:  WBC is down to 17.4. A-febrile. Will continue to follow closely.    Endocrine:  Patient had an episode of hypoglycemia last night. Will continue sliding scale insulin. Discontinue long acting insulin. Begin strict calorie count.   Activities: Increase activities as tolerated.  Musculoskeletal: The right upper extremity exhibits swelling distal from the elbow. The RUE is tender to palpation and ROM. ROM is diminished to the right hand 3/5. The hand is reddened, swollen, with dusky, mottled, purple discoloration to the right thumb tip and tip of the right index finger.  Severely diminished capillary refill to the tip of the right thumb and tip of the right index finger. Wound care  following. Gauze dressing in place.       Lines tubes and drains:  Continue PICC line.  Plan: Remain ICU. Begin discharge planning for IP rehab.          11/13/2019  The patient is post-operative day 13, status-post coronary artery bypass grafting x 3. Post-operative course was complicated by an episode of hemodynamic instability and decompensation on postop day 5.  Patient remains hemodynamically stable.    Neuro:  Patient is oriented to person, place, situation. Disoriented to time. Alert, follows commands, makes eye contact. No focal deficits.    Cardiac:  Patient is hemodynamically stable. Continue metoprolol, amlodipine, losartan, and chlorthalidone.   Respiratory: Good sats on room air. Continue IS. Use of accessory muscles noted during inspiration. Will increase diuresis.   GI:  Continue cardiac/diabetic diet, modified by puree and thick liquids with swallow precautions. Advance as tolerated. Continue metamucil.  Renal: Good urine output. Cr 0.8. K 4.0. Mag 1.8. Replace Mag. Start lasix IV. Continue chlorthalidone.  Hema:  Hct 23.  Platelet 79. Hold aspirin. Hold Plavix. 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 Multiple DVTs from the right IJ down to the brachiocephalic vein. Confirmed via vascular US. Patient on argatroban gtt, goal is APTT 2x baseline. Most recent APTT 75.6. Baseline APTT 50.8.  Hematology following. Vascular surgery following.   Id:  WBC is down to 12.2. A-febrile. Will continue to follow closely.    Endocrine:  Glucose controlled. Continue sliding scale insulin. Start long acting insulin detemir 5 units daily.     Activities: Increase activities as tolerated.  Musculoskeletal: The right upper extremity exhibits swelling distal from the elbow. The RUE is tender to palpation and ROM. ROM is diminished to the right hand 2/5. The right hand is improving. The dusky, mottled, purple discoloration is localized to the right thumb tip and tip of the right index finger.  Severely diminished  capillary refill to the tip of the right thumb and tip of the right index finger. Wound care following. Gauze dressing in place.    Lines tubes and drains:  Continue PICC line.  Plan: Remain telemetry. Continue discharge planning for IP rehab.          11/14/2019  The patient is post-operative day 14, status-post coronary artery bypass grafting x 3. Post-operative course was complicated by an episode of hemodynamic instability and decompensation on postop day 5.  Patient remains hemodynamically stable.    Neuro:  Patient is oriented to person, place, situation. Disoriented to time. Alert, follows commands, makes eye contact. No focal deficits.    Cardiac:  Patient is hemodynamically stable. Continue metoprolol, amlodipine, losartan, and chlorthalidone.   Respiratory: Good sats on room air. Continue IS. Decreased use of accessory muscles noted during inspiration.   GI:  Continue cardiac/diabetic diet, modified by puree and thick liquids with swallow precautions. Advance as tolerated. Continue metamucil.  Renal: Good urine output. Cr 0.9. K 4.2. Mag 1.9. Replace Mag. Discontinue lasix. Continue chlorthalidone.  Hema:  Hct 21.9.  Platelet 87. Hold aspirin. Hold Plavix. 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 Multiple DVTs from the right IJ down to the brachiocephalic vein. Confirmed via vascular US. As per discussion with hematology and pharmacy, will discontinue argatroban and start apixaban. Transfuse 1 unit PRBC.  Hematology following. Vascular surgery following.   Id:  WBC is down to 11. A-febrile. Will continue to follow closely.    Endocrine:  Continue sliding scale insulin. Increase insulin detemir to 5 units BID.     Activities: Increase activities as tolerated.  Musculoskeletal: The right upper extremity exhibits swelling distal from the elbow. The RUE is tender to palpation and ROM. ROM is diminished to the right hand 2/5. The right hand is slowly improving. The dusky, mottled, purple discoloration is  localized to the right thumb tip and tip of the right index finger.  Severely diminished capillary refill to the tip of the right thumb and tip of the right index finger. Wound care following. Gauze dressing in place.    Lines tubes and drains:  Continue PICC line.  Plan: Remain telemetry. Continue discharge planning for IP rehab.          11/15/2019  The patient is post-operative day 15, status-post coronary artery bypass grafting x 3. Post-operative course was complicated by an episode of hemodynamic instability and decompensation on postop day 5.  Patient remains hemodynamically stable.    Neuro:  Patient is oriented to person, place, situation. Disoriented to time. Alert, follows commands, makes eye contact. No focal deficits.    Cardiac:  Patient is hemodynamically stable. Continue metoprolol, amlodipine, losartan, and chlorthalidone.   Respiratory: Good sats on low flow nasal cannula. Wean to room air. Continue IS.   GI:  Continue cardiac/diabetic diet, modified by puree and thick liquids with swallow precautions. Advance as tolerated. Continue metamucil.  Renal: Good urine output. Cr 0.8. K 3.6. Replace K. Continue chlorthalidone.  Hema:  Hct 26.1. Platelet 99. Start Aspirin. Continue Apixaban 5mg BID.  Hematology following. Vascular surgery following.   Id:  WBC 11. A-febrile. Will continue to follow closely.    Endocrine:  Continue sliding scale insulin. Increase insulin detemir to 5 units AM dose, 10 units PM dose.   Activities: Increase activities as tolerated.  Musculoskeletal: The right upper extremity exhibits swelling distal from the elbow. The RUE is tender to palpation and ROM. ROM is diminished to the right hand 2/5. The right hand is slowly improving. The dusky, mottled, purple discoloration is localized to the right thumb tip and tip of the right index finger.  Severely diminished capillary refill to the tip of the right thumb and tip of the right index finger. Wound care following. Gauze  dressing in place.    Lines tubes and drains:  Continue PICC line.  Plan: Remain telemetry. Begin discharge planning to LTAC, anticipate discharge in 48-72 hours.        11/16/2019  The patient is postop day 16.  Status post coronary artery bypass grafting x3.  Patient's postop course was complicated by an episode of hemodynamically instability and decompensation on postop day 5.  Since then the patient has made much clinical progress.  The patient remains hemodynamically stable.  Anticipate discharge to rehab or LTAC in the next 48-72 hours.    Neuro:  The patient is oriented x3.  She is much less confused.  Her responses to questioning is appropriate.  She moves all 4 she has no focal deficits.  Cardiac:  Patient remains hemodynamically stable.  Systolic blood pressure trending higher.  Will increase losartan.  Continue amlodipine metoprolol and chlorthalidone.  Respiratory:  The patient has good sats on room air.  Continue pulmonary toilet.  GI:  Patient is tolerating a regular the diet.  Renal:  Patient has good urine output.  creatinine is 0.7.  Heme:  Hematocrit is 30.  Platelet count is up to 129.  Continue aspirin and apixaban.  Id:  White count is 10.  The patient is afebrile.  Endocrine:  Glucose is better controlled on long-acting insulin.  Activities patient is out of bed to chair continue increasing activities.  Line tubes and drains:  Patient has a PICC line continue PICC line.  Musculoskeletal:  Perfusion to the patient's right arm is much improved.  Swelling is somewhat decreased.  The area of dusky tissue remained isolated to the tip of the patient's right thumb and index finger.    NSTEMI (non-ST elevated myocardial infarction)  The patient is a 69-year-old female with hypertension, diabetes, hyperlipidemia, obesity, anxiety and depression, iron deficiency anemia, vitamin B12 deficiency, who presented to the emergency room with an NSTEMI.  Workup included a cardiac catheterization that shows severe  multivessel coronary artery disease with proximal LAD stenosis of 70% and severely depressed ejection fraction of 20%.  The patient is a candidate for urgent coronary artery bypass grafting.  The risks and benefits of surgery were explained to the patient.  The patient verbalized understanding of the issues discussed.  She a segs the risks of surgery and has agreed to proceed with urgent coronary artery bypass grafting.  She understands that the risk of neurologic complication postoperatively is increased due to the presence of cerebral microvascular disease.        Keny Zaidi MD  Cardiothoracic Surgery  [facility] -

## 2019-11-16 NOTE — PT/OT/SLP PROGRESS
"Physical Therapy  Treatment    Mateusz Bates   MRN: [id number]   Admitting Diagnosis: Acute deep vein thrombosis (DVT) of right upper extremity       PT Start Time: 0850     PT Stop Time: 0915    PT Total Time (min): 25 min       Billable Minutes:  Therapeutic Activity 25    Treatment Type: Treatment  PT/PTA: PTA     PTA Visit Number: 1       General Precautions: Standard, sternal  Orthopedic Precautions: N/A   Braces:      Spiritual, Cultural Beliefs, Restorationism Practices, Values that Affect Care: no    Subjective:  Communicated with NSG prior to session.      Pain/Comfort  Pain Rating 1: 0/10  Pain Rating Post-Intervention 1: 0/10    Objective:        Functional Mobility:  Bed Mobility:        Transfers:       Gait:        Stairs:      Transfers:    Balance:   Static Sit: GOOD-: Takes MODERATE challenges from all directions but inconsistently  Dynamic Sit: FAIR+: Maintains balance through MINIMAL excursions of active trunk motion  Static Stand: POOR: Needs MODERATE assist to maintain  Dynamic stand: 0: N/A     Therapeutic Activities and Exercises:  MAX ASSIST WITH SIT<-->STAND  BUT AS THERAPY CONTINUED, PATIENT REQUIRED LESS ASSISTANCE. MAX VERBAL CUES FOR PATIENT TO HOLD ONTO HEART. EACH STATIC STAND EPISODE WAS 10-30".      AM-PAC 6 CLICK MOBILITY  How much help from another person does this patient currently need?   1 = Unable, Total/Dependent Assistance  2 = A lot, Maximum/Moderate Assistance  3 = A little, Minimum/Contact Guard/Supervision  4 = None, Modified [place]/Independent         AM-PAC Raw Score CMS G-Code Modifier Level of Impairment Assistance   6 % Total / Unable   7 - 9 CM 80 - 100% Maximal Assist   10 - 14 CL 60 - 80% Moderate Assist   15 - 19 CK 40 - 60% Moderate Assist   20 - 22 CJ 20 - 40% Minimal Assist   23 CI 1-20% SBA / CGA   24 CH 0% Independent/ Mod I     Patient left up in chair with all lines intact, call button in reach and 1:1 SITTER present.    Assessment:  Mateusz Bates is " a 69 y.o. female with a medical diagnosis of Acute deep vein thrombosis (DVT) of right upper extremity and presents with .    Rehab identified problem list/impairments: Rehab identified problem list/impairments: weakness, impaired balance, impaired functional mobilty, impaired endurance, gait instability, decreased lower extremity function    Rehab potential is fair.    Activity tolerance: Fair    Discharge recommendations: Discharge Facility/Level of Care Needs: nursing facility, skilled     Barriers to discharge:      Equipment recommendations: Equipment Needed After Discharge: walker, rolling, bedside commode     GOALS:   Multidisciplinary Problems     Physical Therapy Goals        Problem: Physical Therapy Goal    Goal Priority Disciplines Outcome Goal Variances Interventions   Physical Therapy Goal     PT, PT/OT Ongoing, Progressing     Description:  By 11/22/19  1. Patient will perform supine to/from sit CGA x 1 per sternal prec.  2. Patient will perform sit to/from stand CGA a no AD per sternal prec.  3. Patient will amb 100ft no AD min A                      PLAN:    Patient to be seen 5 x/week  to address the above listed problems via gait training, therapeutic activities, therapeutic exercises  Plan of Care expires: 11/18/19  Plan of Care reviewed with: patient         Lee Ya, PTA  11/16/2019

## 2019-11-16 NOTE — SUBJECTIVE & OBJECTIVE
Interval History:  The patient is postop day 16 status post coronary artery bypass grafting x3.  The patient is making clinical progress.  Platelet count is 129 today.  Neurologically patient is improving.    ROS  Medications:  Continuous Infusions:  Scheduled Meds:   albuterol-ipratropium  3 mL Nebulization Q6H WAKE    amLODIPine  10 mg Oral Daily    apixaban  5 mg Oral BID    aspirin  81 mg Oral Daily    chlorhexidine  15 mL Mouth/Throat BID    chlorthalidone  25 mg Oral Daily    ferrous sulfate  325 mg Oral BID    insulin detemir U-100  10 Units Subcutaneous QHS    insulin detemir U-100  5 Units Subcutaneous Daily    [START ON 11/17/2019] losartan  100 mg Oral Daily    metoprolol tartrate  50 mg Oral BID    nitroGLYCERIN 2% TD oint  0.5 inch Topical (Top) Q12H    pantoprazole  40 mg Oral Daily    psyllium husk  1 packet Oral BID     PRN Meds:sodium chloride, albumin human 5%, albuterol sulfate, Dextrose 10% Bolus, Dextrose 10% Bolus, insulin aspart U-100, iohexol, lactated ringers, magnesium sulfate IVPB, magnesium sulfate IVPB, metoclopramide HCl, ondansetron, potassium chloride in water **AND** potassium chloride in water **AND** potassium chloride in water, traMADol     Objective:     Vital Signs (Most Recent):  Temp: 98.1 °F (36.7 °C) (11/16/19 0724)  Pulse: 94 (11/16/19 0835)  Resp: 20 (11/16/19 0835)  BP: (!) 149/79 (11/16/19 0724)  SpO2: (!) 94 % (11/16/19 0835) Vital Signs (24h Range):  Temp:  [96.8 °F (36 °C)-98.6 °F (37 °C)] 98.1 °F (36.7 °C)  Pulse:  [75-98] 94  Resp:  [18-22] 20  SpO2:  [94 %-100 %] 94 %  BP: (118-149)/(59-87) 149/79     Weight: 77.9 kg (171 lb 11.8 oz)  Body mass index is 32.45 kg/m².    SpO2: (!) 94 %  O2 Device (Oxygen Therapy): room air    Intake/Output - Last 3 Shifts       11/14 0700 - 11/15 0659 11/15 0700 - 11/16 0659 11/16 0700 - 11/17 0659    P.O.  1560     I.V. (mL/kg)  100 (1.3)     Blood 325      Total Intake(mL/kg) 325 (4.2) 1660 (21.3)     Net +325 +1660             Urine Occurrence 1 x 4 x     Stool Occurrence 5 x 1 x           Lines/Drains/Airways     Peripherally Inserted Central Catheter Line                 PICC Double Lumen 11/07/19 1645 left brachial;left basilic 8 days                Physical Exam   Constitutional: She is oriented to person, place, and time. She appears well-developed and well-nourished. No distress.   HENT:   Head: Normocephalic and atraumatic.   Cardiovascular: Normal rate, regular rhythm and normal heart sounds.   Pulmonary/Chest: Effort normal and breath sounds normal.   Abdominal: Soft. Bowel sounds are normal.   Musculoskeletal: She exhibits no edema.   Neurological: She is alert and oriented to person, place, and time.   Skin: Skin is warm and dry. She is not diaphoretic.   Psychiatric: She has a normal mood and affect.    Patient's right arm continues to improve.  Most of patient's hand is perfused with good capillary refill.  Except for the tip of an index finger and the distal half of her thumb which remained dusky.    Significant Labs:  All pertinent labs from the last 24 hours have been reviewed.    Significant Diagnostics:  I have reviewed all pertinent imaging results/findings within the past 24 hours.

## 2019-11-16 NOTE — SUBJECTIVE & OBJECTIVE
Interval History:  She is in stable condition denies any bleeding or ecchymoses.  She continues have discoloration of right 1st 2 fingers without notable change.  She denies pain.    Oncology Treatment Plan:   [No treatment plan]    Medications:  Continuous Infusions:  Scheduled Meds:   albuterol-ipratropium  3 mL Nebulization Q6H WAKE    amLODIPine  10 mg Oral Daily    apixaban  5 mg Oral BID    aspirin  81 mg Oral Daily    atorvastatin  80 mg Oral QHS    chlorhexidine  15 mL Mouth/Throat BID    chlorthalidone  25 mg Oral Daily    ferrous sulfate  325 mg Oral BID    insulin detemir U-100  10 Units Subcutaneous QHS    insulin detemir U-100  5 Units Subcutaneous Daily    [START ON 11/17/2019] losartan  100 mg Oral Daily    metoprolol tartrate  50 mg Oral BID    nitroGLYCERIN 2% TD oint  0.5 inch Topical (Top) Q12H    pantoprazole  40 mg Oral Daily    psyllium husk  1 packet Oral BID     PRN Meds:sodium chloride, albumin human 5%, albuterol sulfate, Dextrose 10% Bolus, Dextrose 10% Bolus, insulin aspart U-100, iohexol, lactated ringers, magnesium sulfate IVPB, magnesium sulfate IVPB, metoclopramide HCl, ondansetron, potassium chloride in water **AND** potassium chloride in water **AND** potassium chloride in water, traMADol     Review of Systems   Constitutional: Positive for fatigue.   HENT: Negative.    Respiratory: Negative.    All other systems reviewed and are negative.    Objective:     Vital Signs (Most Recent):  Temp: 97 °F (36.1 °C) (11/16/19 1133)  Pulse: 78 (11/16/19 1410)  Resp: 18 (11/16/19 1410)  BP: (!) 114/57 (11/16/19 1133)  SpO2: 96 % (11/16/19 1410) Vital Signs (24h Range):  Temp:  [97 °F (36.1 °C)-98.6 °F (37 °C)] 97 °F (36.1 °C)  Pulse:  [78-98] 78  Resp:  [18-22] 18  SpO2:  [94 %-98 %] 96 %  BP: (114-149)/(57-79) 114/57     Weight: 77.9 kg (171 lb 11.8 oz)  Body mass index is 32.45 kg/m².  Body surface area is 1.83 meters squared.      Intake/Output Summary (Last 24 hours) at  11/16/2019 1510  Last data filed at 11/16/2019 0500  Gross per 24 hour   Intake 1060 ml   Output --   Net 1060 ml       Physical Exam   HENT:   Head: Normocephalic and atraumatic.   Eyes: Conjunctivae and EOM are normal.   Cardiovascular: Normal rate and regular rhythm.   Pulmonary/Chest: Effort normal and breath sounds normal.   Abdominal: Bowel sounds are normal.   Skin: Skin is warm.   Psychiatric: She has a normal mood and affect. Her behavior is normal.       Significant Labs:   BMP:   Recent Labs   Lab 11/15/19  0454 11/15/19  1650 11/16/19  0537   * 194* 78    135* 136   K 3.6 4.0 4.0    103 103   CO2 26 23 22*   BUN 23 24* 24*   CREATININE 0.8 0.8 0.7   CALCIUM 8.6* 8.3* 9.0   MG  --   --  2.1    and CBC:   Recent Labs   Lab 11/14/19  1655 11/15/19  0454 11/16/19  0537   WBC 13.32* 11.48 10.68   HGB 8.1* 8.2* 9.1*   HCT 26.4* 26.1* 30.7*   * 99* 129*       Diagnostic Results:  None

## 2019-11-16 NOTE — PROGRESS NOTES
[facility] -   Hematology/Oncology  Progress Note    Patient Name: Mateusz Bates  Admission Date: 10/28/2019  Hospital Length of Stay: 19 days  Code Status: Full Code     Subjective:     HPI:  Mrs. Bates is a 69-year-old female with hypertension, diabetes, hyperlipidemia, obesity, anxiety and depression, iron deficiency anemia, vitamin B12 deficiency, who presented to the emergency room with an NSTEMI.  Workup included a cardiac catheterization that shows severe multivessel coronary artery disease with proximal LAD stenosis of 70% and severely depressed ejection fraction of 20%.  The patient is a candidate for urgent coronary artery bypass grafting. S/p CABG today.  Hematology is consulted for thrombocytopenia.    Interval History:  She is in stable condition denies any bleeding or ecchymoses.  She continues have discoloration of right 1st 2 fingers without notable change.  She denies pain.    Oncology Treatment Plan:   [No treatment plan]    Medications:  Continuous Infusions:  Scheduled Meds:   albuterol-ipratropium  3 mL Nebulization Q6H WAKE    amLODIPine  10 mg Oral Daily    apixaban  5 mg Oral BID    aspirin  81 mg Oral Daily    atorvastatin  80 mg Oral QHS    chlorhexidine  15 mL Mouth/Throat BID    chlorthalidone  25 mg Oral Daily    ferrous sulfate  325 mg Oral BID    insulin detemir U-100  10 Units Subcutaneous QHS    insulin detemir U-100  5 Units Subcutaneous Daily    [START ON 11/17/2019] losartan  100 mg Oral Daily    metoprolol tartrate  50 mg Oral BID    nitroGLYCERIN 2% TD oint  0.5 inch Topical (Top) Q12H    pantoprazole  40 mg Oral Daily    psyllium husk  1 packet Oral BID     PRN Meds:sodium chloride, albumin human 5%, albuterol sulfate, Dextrose 10% Bolus, Dextrose 10% Bolus, insulin aspart U-100, iohexol, lactated ringers, magnesium sulfate IVPB, magnesium sulfate IVPB, metoclopramide HCl, ondansetron, potassium chloride in water **AND** potassium chloride in water  **AND** potassium chloride in water, traMADol     Review of Systems   Constitutional: Positive for fatigue.   HENT: Negative.    Respiratory: Negative.    All other systems reviewed and are negative.    Objective:     Vital Signs (Most Recent):  Temp: 97 °F (36.1 °C) (11/16/19 1133)  Pulse: 78 (11/16/19 1410)  Resp: 18 (11/16/19 1410)  BP: (!) 114/57 (11/16/19 1133)  SpO2: 96 % (11/16/19 1410) Vital Signs (24h Range):  Temp:  [97 °F (36.1 °C)-98.6 °F (37 °C)] 97 °F (36.1 °C)  Pulse:  [78-98] 78  Resp:  [18-22] 18  SpO2:  [94 %-98 %] 96 %  BP: (114-149)/(57-79) 114/57     Weight: 77.9 kg (171 lb 11.8 oz)  Body mass index is 32.45 kg/m².  Body surface area is 1.83 meters squared.      Intake/Output Summary (Last 24 hours) at 11/16/2019 1510  Last data filed at 11/16/2019 0500  Gross per 24 hour   Intake 1060 ml   Output --   Net 1060 ml       Physical Exam   HENT:   Head: Normocephalic and atraumatic.   Eyes: Conjunctivae and EOM are normal.   Cardiovascular: Normal rate and regular rhythm.   Pulmonary/Chest: Effort normal and breath sounds normal.   Abdominal: Bowel sounds are normal.   Skin: Skin is warm.   Psychiatric: She has a normal mood and affect. Her behavior is normal.       Significant Labs:   BMP:   Recent Labs   Lab 11/15/19  0454 11/15/19  1650 11/16/19  0537   * 194* 78    135* 136   K 3.6 4.0 4.0    103 103   CO2 26 23 22*   BUN 23 24* 24*   CREATININE 0.8 0.8 0.7   CALCIUM 8.6* 8.3* 9.0   MG  --   --  2.1    and CBC:   Recent Labs   Lab 11/14/19  1655 11/15/19  0454 11/16/19  0537   WBC 13.32* 11.48 10.68   HGB 8.1* 8.2* 9.1*   HCT 26.4* 26.1* 30.7*   * 99* 129*       Diagnostic Results:  None    Assessment/Plan:     * Acute deep vein thrombosis (DVT) of right upper extremity  November 13, 2019  --D/c Argatroban   --Avoid heparin  --Eliquis 5 mg PO BID  --Daily CBC  --Check BLE US today. Would like to r/o DVT in the setting of ROSANNA diagnosis  --Continue PO anticoagulation for  at least 3 months. She will need to f/u in the Heme-Onc clinic following discharge      Edema of hand  November 12, 2019     Upper extremity vascular insufficiency:  --Improved apperance  --Vascular surgery following. No surgical intervention recommended. Recommend continue anticoagulation, arm elevation, mild/moderate compression    Thrombocytopenia    --Platelet count 104  --D/c Argatroban due to unavailability   --please continue on Eliquis 5 mg PO BID.  Plan for anticoagulation for at least 3 months.  --Bleeding precautions  --Hg 8.1.  Transfuse for hemoglobin less than 7.    --Daily CBC        Thank you for your consult. I will follow-up with patient. Please contact us if you have any additional questions.     Shandra Reed MD  Hematology/Oncology  [facility] [place]

## 2019-11-16 NOTE — SUBJECTIVE & OBJECTIVE
Interval History: no acute issues noted o/n.     Review of Systems   Constitution: Positive for malaise/fatigue.   HENT: Negative for hearing loss and hoarse voice.    Eyes: Negative for blurred vision and visual disturbance.   Cardiovascular: Negative for chest pain, claudication, dyspnea on exertion, irregular heartbeat, leg swelling, near-syncope, orthopnea, palpitations, paroxysmal nocturnal dyspnea and syncope.   Respiratory: Negative for cough, hemoptysis, shortness of breath, sleep disturbances due to breathing, snoring and wheezing.    Endocrine: Negative for cold intolerance and heat intolerance.   Hematologic/Lymphatic: Does not bruise/bleed easily.   Skin: Positive for color change, nail changes and poor wound healing. Negative for dry skin.   Musculoskeletal: Positive for arthritis and back pain. Negative for joint pain and myalgias.        RUE SWELLING   Gastrointestinal: Negative for bloating, abdominal pain, constipation, nausea and vomiting.   Genitourinary: Negative for dysuria, flank pain, hematuria and hesitancy.   Neurological: Negative for headaches, light-headedness, loss of balance, numbness, paresthesias and weakness.   Psychiatric/Behavioral: Negative for altered mental status.   Allergic/Immunologic: Negative for environmental allergies.     Objective:     Vital Signs (Most Recent):  Temp: 98.1 °F (36.7 °C) (11/16/19 0724)  Pulse: 94 (11/16/19 0835)  Resp: 20 (11/16/19 0835)  BP: (!) 149/79 (11/16/19 0724)  SpO2: (!) 94 % (11/16/19 0835) Vital Signs (24h Range):  Temp:  [96.8 °F (36 °C)-98.6 °F (37 °C)] 98.1 °F (36.7 °C)  Pulse:  [75-98] 94  Resp:  [18-22] 20  SpO2:  [94 %-100 %] 94 %  BP: (118-149)/(59-87) 149/79     Weight: 77.9 kg (171 lb 11.8 oz)  Body mass index is 32.45 kg/m².     SpO2: (!) 94 %  O2 Device (Oxygen Therapy): room air      Intake/Output Summary (Last 24 hours) at 11/16/2019 1106  Last data filed at 11/16/2019 0500  Gross per 24 hour   Intake 1060 ml   Output --   Net  1060 ml       Lines/Drains/Airways     Peripherally Inserted Central Catheter Line                 PICC Double Lumen 11/07/19 1645 left brachial;left basilic 8 days                Physical Exam   Constitutional: She is oriented to person, place, and time. She appears well-developed and well-nourished. No distress.   HENT:   Head: Normocephalic and atraumatic.   Eyes: Pupils are equal, round, and reactive to light.   Neck: Normal range of motion and full passive range of motion without pain. Neck supple. No JVD present.   Cardiovascular: Normal rate, regular rhythm, S1 normal, S2 normal and intact distal pulses. PMI is not displaced. Exam reveals no distant heart sounds.   No murmur heard.  Pulses:       Radial pulses are 2+ on the right side, and 2+ on the left side.        Dorsalis pedis pulses are 2+ on the right side, and 2+ on the left side.   CABG INCISION HEALING WELL   Pulmonary/Chest: Effort normal and breath sounds normal. No accessory muscle usage. No respiratory distress. She has no decreased breath sounds. She has no wheezes. She has no rales.   Abdominal: Soft. Bowel sounds are normal. She exhibits no distension. There is no tenderness.   Musculoskeletal: Normal range of motion. She exhibits no edema.        Right ankle: She exhibits no swelling.        Left ankle: She exhibits no swelling.   Neurological: She is alert and oriented to person, place, and time.   Skin: Skin is warm and dry. Bruising and ecchymosis noted. She is not diaphoretic. No cyanosis. There is pallor. Nails show no clubbing.   RUE swelling, right hand ecchymotic with dusky fingernails   Psychiatric: She has a normal mood and affect. Her speech is normal and behavior is normal. Judgment and thought content normal. Cognition and memory are normal.   Nursing note and vitals reviewed.      Significant Labs:   BMP:   Recent Labs   Lab 11/15/19  0454 11/15/19  1650 11/16/19  0537   * 194* 78    135* 136   K 3.6 4.0 4.0   CL  106 103 103   CO2 26 23 22*   BUN 23 24* 24*   CREATININE 0.8 0.8 0.7   CALCIUM 8.6* 8.3* 9.0   MG  --   --  2.1   , CBC   Recent Labs   Lab 11/14/19  1655 11/15/19  0454 11/16/19  0537   WBC 13.32* 11.48 10.68   HGB 8.1* 8.2* 9.1*   HCT 26.4* 26.1* 30.7*   * 99* 129*    and All pertinent lab results from the last 24 hours have been reviewed.    Significant Imaging: Echocardiogram:   2D echo with color flow doppler:   Results for orders placed or performed during the hospital encounter of 10/28/19   2D echo with color flow doppler   Result Value Ref Range    QEF 30 (A) 55 - 65    Diastolic Dysfunction Yes (A)     Est. PA Systolic Pressure 33.03     Narrative    Date of Procedure: 11/06/2019        TEST DESCRIPTION   Technical Quality: This is a technically challenging study.     General: A catheter is present in the right-sided cardiac chambers.     Aorta: The aortic root is normal in size, measuring 3.7 cm at sinotubular junction and 3.8 cm at Sinuses of Valsalva. The proximal ascending aorta is normal in size, measuring 3.7 cm across.     Left Atrium: The left atrial volume index is normal, measuring 19.22 cc/m2.     Left Ventricle: The left ventricle is normal in size, with an end-diastolic diameter of 3.0 cm, and an end-systolic diameter of 2.5 cm. LV wall thickness is normal, with the septum measuring 2.1 cm and the posterior wall measuring 1.4 cm across. Relative   wall thickness was increased at 0.93, and the LV mass index was increased at 140.2 g/m2 consistent with concentric left ventricular hypertrophy. The following segments were mildly hypokinetic: apical lateral wall.  The following segments were severely hypokinetic: mid anteroseptum, basal anteroseptum, apical septum, mid inferoseptum, basal inferoseptum, basal inferior wall.  Left ventricular systolic function appears moderately depressed. Visually estimated ejection fraction is 30-35%. The LV Doppler derived stroke volume equals 54.0 ccs.      Diastolic indices: E wave velocity 0.9 m/s, E/A ratio 0.8,  msec., E/e' ratio(avg) 14. There is diastolic dysfunction secondary to relaxation abnormality.     Right Atrium: The right atrium is normal in size, measuring 3.5 cm in length and 2.1 cm in width in the apical view.     Right Ventricle: The right ventricle is normal in size. Global right ventricular systolic function appears normal. Tricuspid annular plane systolic excursion (TAPSE) is 1.3 cm. The estimated PA systolic pressure is 33 mmHg.     Aortic Valve:  The aortic valve is normal in structure. The aortic valve is tri-leaflet in structure. The mean gradient obtained across the aortic valve is 9 mmHg.     Mitral Valve:  The mitral valve is normal in structure. The pressure half time is 53 msec. The calculated mitral valve area is 4.15 cm2.     Tricuspid Valve:  The tricuspid valve is normal in structure.     Pulmonary Valve:  The pulmonic valve is not well seen.     IVC: IVC is normal in size and collapses > 50% with a sniff, suggesting normal right atrial pressure of 3 mmHg.     Intracavitary: There is no evidence of pericardial effusion, intracavity mass, thrombi, or vegetation.         CONCLUSIONS     1 - Concentric hypertrophy.     2 - Wall motion abnormalities.     3 - Moderately depressed left ventricular systolic function (EF 30-35%).     4 - Impaired LV relaxation, normal LAP (grade 1 diastolic dysfunction).     5 - Normal right ventricular systolic function .     6 - The estimated PA systolic pressure is 33 mmHg.             This document has been electronically    SIGNED BY: Charles Matias MD On: 11/06/2019 18:28    and X-Ray: CXR: X-Ray Chest 1 View (CXR):   Results for orders placed or performed during the hospital encounter of 10/28/19   X-Ray Chest 1 View    Narrative    EXAMINATION:  XR CHEST 1 VIEW    CLINICAL HISTORY:  follow up;    TECHNIQUE:  Single frontal view of the chest was  performed.    COMPARISON:  11/07/2019    FINDINGS:  Tubes and lines are stable.  Dale-Waldo catheter is been removed.  Mild atelectasis within the left midlung zone.  No consolidation.  Trace pleural effusions not entirely excluded.  No pneumothorax.  Bones demonstrate mild degenerative changes.  Sternotomy wires are intact.  In comparison to the prior study, there is no adverse interval changes      Impression    In comparison to the prior study, there is no adverse interval changes      Electronically signed by: Jh Anne MD  Date:    11/08/2019  Time:    08:14

## 2019-11-16 NOTE — ASSESSMENT & PLAN NOTE
-Patient is s/p CABG x3 with LIMA to LAD reverse saphenous vein graft to the RCA and reverse saphenous vein graft to the ramus.  -Continue post CABG supportive therapy  -Continue ASA, Plavix, Statin, BB  -Will continue to follow along     11/1/19  -POD #1 s/p CABG x3  -Continue ASA, Statin, BB  -Remains on Epi and Milrinone gtt today  -Mgmt per CT surgery  -Will follow along    11/2/19  -Recovering s/p CABG x 3  -Continue statin, BB  -Platelets 47,000 this AM; heme/onc on board, ASA held  -Await further rec's    11/3/19  -Stable, progressing post CABG x 3  -Continue statin, BB  -ASA re-started  -Platelets 89,000, heme/onc on board    11/4/19  -Patient slowly improving post CABG x3  -Continue ASA, Statin and BB  -plavix on hold due to thrombocytopenia that is improving   -IS use encouraged and early ambulation with PT  -Will continue to follow along     11/5/19  -Will continue ASA, Statin and BB  -Consider holding Plavix again for drop in plts after resuming by primary team   -Recommend CXR to rule out post op PNA given bump in WBC and cough  -will need to monitor for temp  -ambulate as tolerated and encourage IS use     11/06  Hold ASA, Plavix due to anemia  Hold BB due to cardiogenic shock    11/11/19  -Continue Argatroban infusion  -Continue BB, ARB, IV lasix, Chlorthalidone, Norvasc, NTG paste  -Hold ASA, Plavix due to anemia  -Further mgmt per CT surgery    11/12/19  -Stable  -Continue BB, ARB, Norvasc, Chlorthalidone, IV Lasix  -Recommend re-starting ASA  -Plavix held due to anemia/thrombocytopenia    11/13/19  -Recommend ASA daily  -Plavix held due to anemia/thrombocytopenia  -Recommend PRBC transfusion, per CT surgery  -Continue BB, ARB, Norvasc, IV lasix, Chlorthalidone    11/14  -Continue current medical mgmt per CT surgery  -Recommend ASA daily  -Agree with PRBC transfusion today    11/15  -Continue Eliquis, Norvasc, ASA, ARB, BB  -Continue mgmt per CT surgery  -Will continue to follow along  -Encourage IS  and aggressive pulmonary toilet  -OOB to chair for meals    11/16  -Continue ASA, Eliquis, BB, ARB, Norvasc, Statin  -Encourage aggressive pulmonary toilet, IS, out of bed for all meals  -Needs to ambulate in hallway as able  -Will follow along with CT surgery

## 2019-11-16 NOTE — ASSESSMENT & PLAN NOTE
-Patient presented with intermittent chest tightness/heaviness that radiated to her back since Saturday evening  -Associated with JONES/orthopnea  -Initial troponin 3.665  -EKG showed SR with new LBBB  -Presentation consistent with NSTEMI  -Continue ASA, statin  -Start Toprol XL  -Start heparin gtt  -Check 2D echo  -Continue to trend troponin  -Keep NPO. [facility] today for further evaluation and PCI if indicated. All risks, benefits, and treatment alternatives explained to patient in detail. All questions answered. She has agreed to proceed. Further rec's to follow.    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 All risks, benefits, and treatment alternatives explained to patient in detail. All questions answered. She has agreed to proceed.    10/31/19  -Patient is s/p CABG x3.   See plan for CABG

## 2019-11-17 LAB
ANION GAP SERPL CALC-SCNC: 8 MMOL/L (ref 8–16)
BASOPHILS # BLD AUTO: 0.03 K/UL (ref 0–0.2)
BASOPHILS NFR BLD: 0.4 % (ref 0–1.9)
BUN SERPL-MCNC: 23 MG/DL (ref 8–23)
CALCIUM SERPL-MCNC: 8.8 MG/DL (ref 8.7–10.5)
CHLORIDE SERPL-SCNC: 101 MMOL/L (ref 95–110)
CO2 SERPL-SCNC: 25 MMOL/L (ref 23–29)
CREAT SERPL-MCNC: 0.7 MG/DL (ref 0.5–1.4)
DIFFERENTIAL METHOD: ABNORMAL
EOSINOPHIL # BLD AUTO: 0.2 K/UL (ref 0–0.5)
EOSINOPHIL NFR BLD: 2 % (ref 0–8)
ERYTHROCYTE [DISTWIDTH] IN BLOOD BY AUTOMATED COUNT: 16.5 % (ref 11.5–14.5)
EST. GFR  (AFRICAN AMERICAN): >60 ML/MIN/1.73 M^2
EST. GFR  (NON AFRICAN AMERICAN): >60 ML/MIN/1.73 M^2
GLUCOSE SERPL-MCNC: 140 MG/DL (ref 70–110)
HCT VFR BLD AUTO: 27.1 % (ref 37–48.5)
HGB BLD-MCNC: 8.4 G/DL (ref 12–16)
IMM GRANULOCYTES # BLD AUTO: 0.21 K/UL (ref 0–0.04)
IMM GRANULOCYTES NFR BLD AUTO: 2.5 % (ref 0–0.5)
LYMPHOCYTES # BLD AUTO: 0.9 K/UL (ref 1–4.8)
LYMPHOCYTES NFR BLD: 10.5 % (ref 18–48)
MAGNESIUM SERPL-MCNC: 1.9 MG/DL (ref 1.6–2.6)
MCH RBC QN AUTO: 28.8 PG (ref 27–31)
MCHC RBC AUTO-ENTMCNC: 31 G/DL (ref 32–36)
MCV RBC AUTO: 93 FL (ref 82–98)
MONOCYTES # BLD AUTO: 0.8 K/UL (ref 0.3–1)
MONOCYTES NFR BLD: 9 % (ref 4–15)
NEUTROPHILS # BLD AUTO: 6.3 K/UL (ref 1.8–7.7)
NEUTROPHILS NFR BLD: 75.6 % (ref 38–73)
NRBC BLD-RTO: 0 /100 WBC
PLATELET # BLD AUTO: 155 K/UL (ref 150–350)
PMV BLD AUTO: 10.7 FL (ref 9.2–12.9)
POCT GLUCOSE: 142 MG/DL (ref 70–110)
POCT GLUCOSE: 203 MG/DL (ref 70–110)
POCT GLUCOSE: 217 MG/DL (ref 70–110)
POCT GLUCOSE: 283 MG/DL (ref 70–110)
POTASSIUM SERPL-SCNC: 3.7 MMOL/L (ref 3.5–5.1)
RBC # BLD AUTO: 2.92 M/UL (ref 4–5.4)
SODIUM SERPL-SCNC: 134 MMOL/L (ref 136–145)
WBC # BLD AUTO: 8.37 K/UL (ref 3.9–12.7)

## 2019-11-17 PROCEDURE — 97116 GAIT TRAINING THERAPY: CPT

## 2019-11-17 PROCEDURE — 80048 BASIC METABOLIC PNL TOTAL CA: CPT

## 2019-11-17 PROCEDURE — 25000242 PHARM REV CODE 250 ALT 637 W/ HCPCS: Performed by: FAMILY MEDICINE

## 2019-11-17 PROCEDURE — 94640 AIRWAY INHALATION TREATMENT: CPT

## 2019-11-17 PROCEDURE — 63600175 PHARM REV CODE 636 W HCPCS: Performed by: THORACIC SURGERY (CARDIOTHORACIC VASCULAR SURGERY)

## 2019-11-17 PROCEDURE — 83735 ASSAY OF MAGNESIUM: CPT

## 2019-11-17 PROCEDURE — 99232 PR SUBSEQUENT HOSPITAL CARE,LEVL II: ICD-10-PCS | Mod: ,,, | Performed by: INTERNAL MEDICINE

## 2019-11-17 PROCEDURE — 21400001 HC TELEMETRY ROOM

## 2019-11-17 PROCEDURE — 99232 SBSQ HOSP IP/OBS MODERATE 35: CPT | Mod: ,,, | Performed by: INTERNAL MEDICINE

## 2019-11-17 PROCEDURE — 25000003 PHARM REV CODE 250: Performed by: NURSE PRACTITIONER

## 2019-11-17 PROCEDURE — 25000003 PHARM REV CODE 250: Performed by: FAMILY MEDICINE

## 2019-11-17 PROCEDURE — 85025 COMPLETE CBC W/AUTO DIFF WBC: CPT

## 2019-11-17 PROCEDURE — 25000003 PHARM REV CODE 250: Performed by: INTERNAL MEDICINE

## 2019-11-17 PROCEDURE — 94761 N-INVAS EAR/PLS OXIMETRY MLT: CPT

## 2019-11-17 PROCEDURE — 25000003 PHARM REV CODE 250: Performed by: THORACIC SURGERY (CARDIOTHORACIC VASCULAR SURGERY)

## 2019-11-17 RX ORDER — MAGNESIUM SULFATE HEPTAHYDRATE 40 MG/ML
2 INJECTION, SOLUTION INTRAVENOUS ONCE
Status: COMPLETED | OUTPATIENT
Start: 2019-11-17 | End: 2019-11-17

## 2019-11-17 RX ADMIN — INSULIN DETEMIR 10 UNITS: 100 INJECTION, SOLUTION SUBCUTANEOUS at 10:11

## 2019-11-17 RX ADMIN — FERROUS SULFATE TAB EC 325 MG (65 MG FE EQUIVALENT) 325 MG: 325 (65 FE) TABLET DELAYED RESPONSE at 08:11

## 2019-11-17 RX ADMIN — INSULIN ASPART 2 UNITS: 100 INJECTION, SOLUTION INTRAVENOUS; SUBCUTANEOUS at 10:11

## 2019-11-17 RX ADMIN — LOSARTAN POTASSIUM 100 MG: 50 TABLET ORAL at 08:11

## 2019-11-17 RX ADMIN — ASPIRIN 81 MG: 81 TABLET ORAL at 08:11

## 2019-11-17 RX ADMIN — IPRATROPIUM BROMIDE AND ALBUTEROL SULFATE 3 ML: .5; 3 SOLUTION RESPIRATORY (INHALATION) at 01:11

## 2019-11-17 RX ADMIN — FERROUS SULFATE TAB EC 325 MG (65 MG FE EQUIVALENT) 325 MG: 325 (65 FE) TABLET DELAYED RESPONSE at 10:11

## 2019-11-17 RX ADMIN — IPRATROPIUM BROMIDE AND ALBUTEROL SULFATE 3 ML: .5; 3 SOLUTION RESPIRATORY (INHALATION) at 08:11

## 2019-11-17 RX ADMIN — NITROGLYCERIN 0.5 INCH: 20 OINTMENT TOPICAL at 10:11

## 2019-11-17 RX ADMIN — APIXABAN 5 MG: 2.5 TABLET, FILM COATED ORAL at 10:11

## 2019-11-17 RX ADMIN — AMLODIPINE BESYLATE 10 MG: 10 TABLET ORAL at 08:11

## 2019-11-17 RX ADMIN — INSULIN ASPART 4 UNITS: 100 INJECTION, SOLUTION INTRAVENOUS; SUBCUTANEOUS at 05:11

## 2019-11-17 RX ADMIN — APIXABAN 5 MG: 2.5 TABLET, FILM COATED ORAL at 08:11

## 2019-11-17 RX ADMIN — PANTOPRAZOLE SODIUM 40 MG: 40 TABLET, DELAYED RELEASE ORAL at 08:11

## 2019-11-17 RX ADMIN — METOPROLOL TARTRATE 50 MG: 50 TABLET, FILM COATED ORAL at 08:11

## 2019-11-17 RX ADMIN — INSULIN DETEMIR 5 UNITS: 100 INJECTION, SOLUTION SUBCUTANEOUS at 08:11

## 2019-11-17 RX ADMIN — CHLORHEXIDINE GLUCONATE 0.12% ORAL RINSE 15 ML: 1.2 LIQUID ORAL at 08:11

## 2019-11-17 RX ADMIN — IPRATROPIUM BROMIDE AND ALBUTEROL SULFATE 3 ML: .5; 3 SOLUTION RESPIRATORY (INHALATION) at 07:11

## 2019-11-17 RX ADMIN — INSULIN ASPART 6 UNITS: 100 INJECTION, SOLUTION INTRAVENOUS; SUBCUTANEOUS at 10:11

## 2019-11-17 RX ADMIN — ATORVASTATIN CALCIUM 80 MG: 40 TABLET, FILM COATED ORAL at 10:11

## 2019-11-17 RX ADMIN — NITROGLYCERIN 0.5 INCH: 20 OINTMENT TOPICAL at 08:11

## 2019-11-17 RX ADMIN — POTASSIUM CHLORIDE 20 MEQ: 200 INJECTION, SOLUTION INTRAVENOUS at 10:11

## 2019-11-17 RX ADMIN — METOPROLOL TARTRATE 50 MG: 50 TABLET, FILM COATED ORAL at 10:11

## 2019-11-17 RX ADMIN — CHLORTHALIDONE 25 MG: 25 TABLET ORAL at 08:11

## 2019-11-17 RX ADMIN — MAGNESIUM SULFATE IN WATER 2 G: 40 INJECTION, SOLUTION INTRAVENOUS at 09:11

## 2019-11-17 NOTE — ASSESSMENT & PLAN NOTE
11/01/2019  The patient is post-operative day 1, status-post coronary artery bypass grafting x 3.  Overall the patient is making progress, however IABP and inotropic agents will continue today.   Neuro:  The patient is awake alert and oriented x3.  Neuro exam is nonfocal.  Pain is well controlled with IV pain medication.  Cardiac:  Patient is hemodynamically improving. Patient remains on epinephrine and milrinone gtts, which will be weaned over the next 24 hours.  Cardiac index is improving. IABP will remain in place over the next 24 hours.  Patient will be started on beta blockers once gtts are weaned off.  Respiratory: Good sats on high flow nasal cannula. Continue respiratory toilet, continue incentive spirometer. Wean to low flow nasal cannula.  GI:  Diet: sips of water with medications, will slowly advance diet as tolerated. Benign abdominal exam.  Renal:  Urine output is slowly improving. Cr 0.9. K 4.3. Mag 1.6. Replace mag. Continue IV diuresis. Add metolazone.   Heme:  Hct 34.1  Platelet 82. Continue aspirin. Discontinue Plavix for now.  Id:  WBC 12. Tmax 100.3. Continue post-operative antibiotics. Will continue to follow.   Endocrine:  Glucose is controlled with insulin gtt.  Continue insulin gtt for now, until weaned off inotropic agents.   Activities:  Patient is currently bed bound due to IABP.  Advance activities as tolerated once IABP is removed.   Lines tubes and drains:  Continue IABP. Continue [place] and [place].  Continue A-line.  Chest tubes will continue. NICOLASA x 2 will continue. Jones catheter will continue.  Continue pacer wires. Continue prevena wound vac.  Plan: Remain ICU.          11/02/2019  The patient is post-operative day 2, status-post coronary artery bypass grafting x 3.  Overall the patient is making progress, IABP removed and inotropic agents discontinued.   Neuro:  The patient is awake alert and oriented x3.  Neuro exam is nonfocal.  Pain is well controlled. Discontinue fentanyl.    Cardiac:  Patient is hemodynamically improving. Inotropic gtts discontinued. Excellent cardiac index. IABP discontinued.  Patient will be started on beta blockers today.  Respiratory: Good sats on high flow nasal cannula. Continue respiratory toilet, continue incentive spirometer. Wean to low flow nasal cannula.  GI:  Advance diabetic/cardiac diet as tolerated. Benign abdominal exam.  Renal:  Urine output is improving. Cr 0.9. K 4.9. Mag 2.2. Continue IV diuresis. Hold AM K.   Heme:  Hct 28.3  Platelet 47. Discontinue aspirin. Discontinue Plavix. HIT panel. Consult hematology r/t thrombocytopenia.   Id:  WBC 10.4. Tmax 99.4. Will continue to follow.   Endocrine:  Discontinue insulin gtt. Start sliding scale insulin. Start long acting insulin detemir.   Activities: Advance activities as tolerated.   Lines tubes and drains:  Discontinue IABP. Discontinue [place] and Cord[place].  Discontinue A-line.  Discontinue chest tubes x 3. Discontinue NICOLASA x 2. Discontinue arterial line. Jones catheter will continue for now.  Continue pacer wires. Continue prevena wound vac. Place PICC line.   Plan: Remain ICU.           11/03/2019  The patient is post-operative day 3, status-post coronary artery bypass grafting x 3.  Overall the patient is making progress.  Neuro:  The patient is awake alert and oriented x3.  Neuro exam is nonfocal.  Pain is well controlled. Continue tramadol.   Cardiac:  Patient is hemodynamically stable. Increase metoprolol dose. Start amlodipine.   Respiratory: Good sats on nasal cannula. Continue respiratory toilet, continue incentive spirometer. Wean to room air.  GI:  Advance diabetic/cardiac diet as tolerated. Benign abdominal exam. Passing gas, no BM. Add 1 bottle of mag citrate.   Renal:  Good urine output. Cr 0.9. K 3.8. Mag 2.0. Replace K. Replace Mag. Change to PO diuresis.   Heme:  Hct 34.7  Platelet 89. Restart aspirin. Continue to hold Plavix. HIT panel pending. Hematology following.  Id:  WBC 13.8. Tmax  99.4. Will continue to follow.    Endocrine:  Glucose controlled on sliding scale insulin. Increase long acting insulin detemir dose.   Activities: Advance activities as tolerated.   Lines tubes and drains:  Storm catheter will continue for now.  Continue pacer wires. Continue prevena wound vac. Continue PICC line.   Plan: Remain ICU.          11/04/2019  The patient is post-operative day 4, status-post coronary artery bypass grafting x 3.  Overall the patient is making progress.  Neuro:  The patient is awake alert and oriented x3.  Neuro exam is nonfocal.  Pain is well controlled. Continue tramadol.   Cardiac:  Patient is hemodynamically stable. Continue metoprolol dose. Continue amlodipine dose.   Respiratory: Good sats on room air. Continue respiratory toilet, continue incentive spirometer.  GI:  Advance diabetic/cardiac diet as tolerated. Benign abdominal exam. Regular bowel movements.   Renal:  Good urine output. Cr 0.7. K 4.0. Mag 2.3. Replace K. Decrease PO diuresis.   Heme:  Hct 34.4  Platelet 140. Continue aspirin. Restart Plavix. HIT panel pending. Hematology following.  Id:  WBC down to 13.13. Tmax 98. Will continue to follow.    Endocrine:  Glucose controlled on sliding scale insulin. Increased long acting insulin detemir dose.   Activities: Advance activities as tolerated.   Lines tubes and drains:  Discontinued storm catheter. Continue pacer wires. Continue prevena wound vac. Continue PICC line.   Plan: Transfer to telemetry. Anticipate discharge to SNF in 48 hours.         11/05/2019  The patient is post-operative day 5, status-post coronary artery bypass grafting x 3.  Overall the patient is making progress.  Neuro:  The patient is awake alert and oriented x3.  Neuro exam is nonfocal. Patient slow to answer questions, however answers are appropriate.  Pain is well controlled. Continue tramadol PRN.   Cardiac:  Patient is hemodynamically stable. Continue metoprolol dose. Discontinue amlodipine. Start  losartan.    Respiratory: Good sats on room air. Continue respiratory toilet, continue incentive spirometer.  GI:  Advance diabetic/cardiac diet as tolerated. Benign abdominal exam. Regular bowel movements.   Renal:  Good urine output. Cr 0.7. K 4.8. Mag 2.1. Replace Mag. Discontinue PO diuresis.   Heme:  Hct 39.5  Platelet 73. Continue aspirin. Hold Plavix. HIT panel pending. Hematology following.  Id:  WBC is up to 16. Tmax 99. Pan-culture. DC PICC line. Chest xray. Start broad spectrum empiric antibiotics. Will continue to follow.    Endocrine:  Glucose controlled on sliding scale insulin. Decrease long acting insulin detemir dose.   Activities: Advance activities as tolerated.   Lines tubes and drains:  Discontinue pacer wires. Discontinue prevena wound vac. Continue PICC line.   Plan: Anticipate discharge to SNF in 24-48 hours.         11/06/2019  The patient is post-operative day 6, status-post coronary artery bypass grafting x 3. Patient had an episode of decompensation and unresponsiveness yesterday evening. The patient was found unresponsive, cool, and diaphoretic, with agonal breathing. Patient was intubated and transferred to the ICU. Bedside EKG and echocardiogram performed at the bedside showed no significant changes. CT head, chest, abdomen pelvis performed, showed no significant findings.     Neuro:  The patient is intubated and sedated. On Precedex gtt. Begin weaning off sedation.    Cardiac:  Patient is hemodynamically improving. Inotropic agent dependent. Currently on Epinephrine gtt. Start Milrinone gtt. Discontinue metoprolol. Discontinue losartan.    Respiratory: Ventilator dependent. tV 450, RR 20, PEEP 5. Wean down sedation. Wean off ventilator today.    GI:  Begin enteral tube feedings. Benign abdominal exam. Previously having regular bowel movements.   Renal: Adequate urine output. Cr 1.1. K 3.6. Mag 1.7. Replace Mag. Replace K. Discontinue diuresis.   Heme:  Hct 31.9  Platelet 89. Hold  aspirin. Hold Plavix. HIT panel negative. Patient received 1 units PRBC last night. Hematology following.  Id:  WBC is down to 15.5. Tmax 99.9. BC NGTD. Urinalysis negative. Sputum culture negative. Vancomycin and zosyn day 2. Will continue to follow closely.    Endocrine:  Glucose controlled on insulin gtt, continue at this time.   Activities: Intubated and sedated.   Musculoskeletal: Unable to find right radial pulse via doppler. Tips of the fingers of the right hand are mottled, dusky, appear cyanotic. Recent right radial arterial line in place, which has been relocated. US of arteries and veins of the right arm ordered. Consult to vascular surgery placed.    Lines tubes and drains:  Continue swan and cordis. Continue arterial line. Continue storm.    Plan: Remain ICU         11/07/2019  The patient is post-operative day 7, status-post coronary artery bypass grafting x 3. Patient had an episode of decompensation and unresponsiveness the evening of 11/05/2019. The patient was found unresponsive, cool, and diaphoretic, with agonal breathing. Patient was intubated and transferred to the ICU. Bedside EKG and echocardiogram performed at the bedside showed no significant changes. CT head, chest, abdomen pelvis performed, showed no significant findings.     Neuro:  The patient is intubated and sedated. On Precedex gtt. Wean off sedation.    Cardiac:  Patient is hemodynamically improving. Excellent cardiac index. Wean off Inotropic gtts.   Respiratory: Wean towards extubation. tV 400, RR 18, PEEP 5.   GI:  Continue enteral tube feedings while intubated. Benign abdominal exam.  Renal: Adequate urine output. Cr 1.1. K 4.1. Mag 2.8. Start IV diuresis.   Heme:  Hct 24.6  Platelet 60. Hold aspirin. Hold Plavix. HIT panel negative.  Id:  WBC is down to 14.46. Tmax 100.8. BC NGTD. Urinalysis negative. Sputum culture negative. Vancomycin and zosyn day 3. Midline sternal incision appears reddened, with induration at skin edges.  Will continue to follow closely.    Endocrine:  Glucose controlled on sliding scale insulin.   Activities: Intubated and sedated. Moves all 4. Full ROM.    Musculoskeletal: Able to find right radial pulse via doppler. The right hand extending to the tips of the fingers of the right hand are mottled, dusky, appear cyanotic. Recent right radial arterial line placement, which has been relocated. US of arteries and veins of the right arm pending. Vascular surgery following, suggested possible thrombotic etiology, suggested heparin gtt, however this is contraindicated r/t s/p cabg x 3.     Lines tubes and drains:  Continue swan and cordis. Continue arterial line. Continue storm.    Plan: Remain ICU         11/08/2019  The patient is post-operative day 8, status-post coronary artery bypass grafting x 3. Patient had an episode of decompensation and unresponsiveness the evening of 11/05/2019. The patient was found unresponsive, cool, and diaphoretic, with agonal breathing. Patient was intubated and transferred to the ICU. Bedside EKG and echocardiogram performed at the bedside showed no significant changes. CT head, chest, abdomen pelvis performed, showed no significant findings. Suspect cardiogenic shock.      Neuro:  Patients neurologic state is improving. Alert, follows commands, makes eye contact, disoriented x 4. No focal deficits.    Cardiac:  Patient is hemodynamically improving. Off all inotropic gtts. Start low dose metoprolol and amlodipine.   Respiratory: Good sats on low flow nasal cannula. Continue IS. Continue pulmonary toilet. Wean to room air.    GI:  Continue enteral tube feedings at this time r/t disorientation and aspiration risk. Discontinue laxatives. Start metamucil BID, patient has had multiple loose stools.   Renal: Good urine output. Cr 0.8. K 3.6. Mag 2.1. Replace K. Replace Mag. Continue IV diuresis.   Hema:  Hct 26.4  Platelet 45. Hold aspirin. Hold Plavix. HIT panel will be resent as the patient  is a high suspicion for HIT despite previous negative HIT panel. Multiple DVTs from the right IJ down to the brachiocephalic vein. Confirmed via vascular US. Patient started on argatroban yesterday, goal is APTT 2x baseline. Most recent APTT 97.8. Baseline APTT 50.8.  Hematology following. Vascular surgery following.   Id:  WBC is down to 12. Tmax 100.4. BC NGTD. Urinalysis negative. Sputum culture negative. Vancomycin and zosyn day 4. Midline sternal incision appears improved, with minimal redness and induration at skin edges. Will continue to follow closely.    Endocrine:  Glucose controlled on sliding scale insulin.   Activities: Increase activities as tolerated.  Musculoskeletal: ROM 2/5 to the right wrist/hand/fingers, distal from the elbow there is 2+ non pitting edema, the extremity is tender and warm. There is mottling, a dusky appearance, and purple discoloration to the fingertips of the right hand. See above hematology plan.      Lines tubes and drains:  Continue PICC line. Continue storm catheter. Continue NG tube.   Plan: Remain ICU     11/09/2019  The patient is postop day 9.  Status post coronary artery bypass grafting x3.  Postop course was complicated by an episode of hemodynamic instability and decompensation on postop day number 5.  Patient continues to make clinical progress since then.  She is off all drips.  She remains extubated.  Continue ICU care for now.    Neuro:  Patient is awake alert.  She is oriented x2.  She is still confused.  But she is making progress.  Her neuro exam is nonfocal.  She follows all commands.  Cardiac:  Patient continues to be hemodynamically stable.  She is somewhat hypertensive.  Will increase her antihypertensives.  Respiratory:  Patient is good sats on nasal cannula.  She remains extubated.  GI:  Patient was tolerating tube feeds until this morning.  She pulled heart feeding tube. Will get a swallowing evaluation.  Mode of nutrition will depend on results of  swallowing evaluation.  Renal:  Patient has good urine output %period% creatinine is 0.9.  Heme:  Hematocrit is 26 platelet is 46.  Patient is on argatroban  for anticoagulation due to right upper extremity DVTs.  Hypercoagulability workup is in progress.  Appreciate Hematology and vascular surgery input.  Id:  Incision continues to improve.  white count is 17.  Continue broad-spectrum antibiotics.  Endocrine:  Glucose is controlled on its sliding scale insulin.  Activities:  Advance activities as tolerated.  Line tubes and drains.  Patient has a PICC line, Jones catheter.    11/10/2019  The patient is postop day 10.  Status post coronary bypass grafting x3.  Postop course was complicated by an episode of hemodynamic instability and decompensation on postop day 5.  Patient remains hemodynamically stable.    Neuro:  The patient is awake and alert.  She is oriented x1.  She continues to be confused.  Her neuro exam is nonfocal.  Cardiac:  Patient continues to be hemodynamically stable.  His she is hypertensive.  Hypertensive medications will be increased.  Respiratory:  Patient has good sats on nasal cannula.  Continue pulmonary toilet.  GI:  Patient is tolerating a regular diet.  Renal:  Patient has good urine output. Creatinine is 0.7.  Heme: hematocrit is 24 and platelet is 67.  Patient continues on argatroban for right upper extremity DVTs.  Hypercoagulable workup is in progress.  Id:  Patient's white count is up to 20.  Patient is on broad prepped from antibiotics.  Endocrine:  The patient is on long-acting as well as sliding scale insulin.  Blood sugars have been well controlled.  Will decrease dose of long-acting.  Activities: patient has been out of bed to chair.  Advance as tolerated.  Musculoskeletal:  Patient's right upper extremity continues to be swollen  The index finger and thumb continues to be dusky with a bleb near the base of the thumb.  Continue argatroban for anticoagulation.  Continue elevation  of right upper extremity.  Lines tubes and drains: patient has a PICC line.  Will discontinue Jones catheter.        11/11/2019  The patient is post-operative day 11, status-post coronary artery bypass grafting x 3. Post-operative course was complicated by an episode of hemodynamic instability and decompensation on postop day 5.  Patient remains hemodynamically stable.    Neuro:  Patients neurologic state is improving. Oriented x 2. Alert, follows commands, makes eye contact. No focal deficits.    Cardiac:  Patient is hemodynamically improving. Hypertensive. Continue metoprolol, amlodipine, and losartan. Add chlorthalidone.   Respiratory: Good sats on room air. Continue IS. Continue pulmonary toilet.  GI:  Continue cardiac/diabetic diet, modified by puree and thick liquids with swallow precautions. Advance as tolerated. Continue metamucil.  Renal: Good urine output. Cr 0.8. K 3.8. Replace K. Continue IV diuresis. Add chlorthalidone. BMP at noon.   Hema:  Hct 23.8  Platelet 75. Hold aspirin. Hold Plavix. Repeat HIT panel pending. Multiple DVTs from the right IJ down to the brachiocephalic vein. Confirmed via vascular US. Patient on argatroban gtt, goal is APTT 2x baseline. Most recent APTT 83.3. Baseline APTT 50.8.  Hematology following. Vascular surgery following.   Id:  WBC is down to 19. Tmax 98.4. BC NGTD. Vancomycin and zosyn day 7. Discontinue broad spectrum antibiotics. Will continue to follow closely.    Endocrine:  Glucose controlled on sliding scale insulin and long acting insulin detemir.    Activities: Increase activities as tolerated.  Musculoskeletal: The right upper extremity exhibits swelling distal from the elbow. The RUE is tender to palpation and ROM. ROM is diminished to the right hand 2/5. The hand his reddened, swollen, with dusky, mottled, purple discoloration to the right thumb and tip of the right index finger. Blisters have formed on the proximal portion of the thumb and wrist. Severely  diminished capillary refill to the tip of the right thumb and tip of the right index finger. Wound care order placed.      Lines tubes and drains:  Continue PICC line.  Plan: Remain ICU         11/12/2019  The patient is post-operative day 12, status-post coronary artery bypass grafting x 3. Post-operative course was complicated by an episode of hemodynamic instability and decompensation on postop day 5.  Patient remains hemodynamically stable.    Neuro:  Patients neurologic state is improving. Oriented to person, place, situation. Disoriented to time. Alert, follows commands, makes eye contact. No focal deficits.    Cardiac:  Patient is hemodynamically improving. Continue metoprolol, amlodipine, losartan, and chlorthalidone.   Respiratory: Good sats on room air. Continue IS.  GI:  Continue cardiac/diabetic diet, modified by puree and thick liquids with swallow precautions. Advance as tolerated. Continue metamucil.  Renal: Good urine output. Cr 0.8. K 3.9. Replace K. Discontinue lasix. Continue chlorthalidone.  Hema:  Hct 23.1  Platelet 75. Hold aspirin. Hold Plavix. 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 Multiple DVTs from the right IJ down to the brachiocephalic vein. Confirmed via vascular US. Patient on argatroban gtt, goal is APTT 2x baseline. Most recent APTT 84.6. Baseline APTT 50.8.  Hematology following. Vascular surgery following.   Id:  WBC is down to 17.4. A-febrile. Will continue to follow closely.    Endocrine:  Patient had an episode of hypoglycemia last night. Will continue sliding scale insulin. Discontinue long acting insulin. Begin strict calorie count.   Activities: Increase activities as tolerated.  Musculoskeletal: The right upper extremity exhibits swelling distal from the elbow. The RUE is tender to palpation and ROM. ROM is diminished to the right hand 3/5. The hand is reddened, swollen, with dusky, mottled, purple discoloration to the right thumb tip and tip of the right index finger.   Severely diminished capillary refill to the tip of the right thumb and tip of the right index finger. Wound care following. Gauze dressing in place.       Lines tubes and drains:  Continue PICC line.  Plan: Remain ICU. Begin discharge planning for IP rehab.          11/13/2019  The patient is post-operative day 13, status-post coronary artery bypass grafting x 3. Post-operative course was complicated by an episode of hemodynamic instability and decompensation on postop day 5.  Patient remains hemodynamically stable.    Neuro:  Patient is oriented to person, place, situation. Disoriented to time. Alert, follows commands, makes eye contact. No focal deficits.    Cardiac:  Patient is hemodynamically stable. Continue metoprolol, amlodipine, losartan, and chlorthalidone.   Respiratory: Good sats on room air. Continue IS. Use of accessory muscles noted during inspiration. Will increase diuresis.   GI:  Continue cardiac/diabetic diet, modified by puree and thick liquids with swallow precautions. Advance as tolerated. Continue metamucil.  Renal: Good urine output. Cr 0.8. K 4.0. Mag 1.8. Replace Mag. Start lasix IV. Continue chlorthalidone.  Hema:  Hct 23.  Platelet 79. Hold aspirin. Hold Plavix. 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 Multiple DVTs from the right IJ down to the brachiocephalic vein. Confirmed via vascular US. Patient on argatroban gtt, goal is APTT 2x baseline. Most recent APTT 75.6. Baseline APTT 50.8.  Hematology following. Vascular surgery following.   Id:  WBC is down to 12.2. A-febrile. Will continue to follow closely.    Endocrine:  Glucose controlled. Continue sliding scale insulin. Start long acting insulin detemir 5 units daily.     Activities: Increase activities as tolerated.  Musculoskeletal: The right upper extremity exhibits swelling distal from the elbow. The RUE is tender to palpation and ROM. ROM is diminished to the right hand 2/5. The right hand is improving. The dusky, mottled, purple  discoloration is localized to the right thumb tip and tip of the right index finger.  Severely diminished capillary refill to the tip of the right thumb and tip of the right index finger. Wound care following. Gauze dressing in place.    Lines tubes and drains:  Continue PICC line.  Plan: Remain telemetry. Continue discharge planning for IP rehab.          11/14/2019  The patient is post-operative day 14, status-post coronary artery bypass grafting x 3. Post-operative course was complicated by an episode of hemodynamic instability and decompensation on postop day 5.  Patient remains hemodynamically stable.    Neuro:  Patient is oriented to person, place, situation. Disoriented to time. Alert, follows commands, makes eye contact. No focal deficits.    Cardiac:  Patient is hemodynamically stable. Continue metoprolol, amlodipine, losartan, and chlorthalidone.   Respiratory: Good sats on room air. Continue IS. Decreased use of accessory muscles noted during inspiration.   GI:  Continue cardiac/diabetic diet, modified by puree and thick liquids with swallow precautions. Advance as tolerated. Continue metamucil.  Renal: Good urine output. Cr 0.9. K 4.2. Mag 1.9. Replace Mag. Discontinue lasix. Continue chlorthalidone.  Hema:  Hct 21.9.  Platelet 87. Hold aspirin. Hold Plavix. 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 Multiple DVTs from the right IJ down to the brachiocephalic vein. Confirmed via vascular US. As per discussion with hematology and pharmacy, will discontinue argatroban and start apixaban. Transfuse 1 unit PRBC.  Hematology following. Vascular surgery following.   Id:  WBC is down to 11. A-febrile. Will continue to follow closely.    Endocrine:  Continue sliding scale insulin. Increase insulin detemir to 5 units BID.     Activities: Increase activities as tolerated.  Musculoskeletal: The right upper extremity exhibits swelling distal from the elbow. The RUE is tender to palpation and ROM. ROM is diminished to  the right hand 2/5. The right hand is slowly improving. The dusky, mottled, purple discoloration is localized to the right thumb tip and tip of the right index finger.  Severely diminished capillary refill to the tip of the right thumb and tip of the right index finger. Wound care following. Gauze dressing in place.    Lines tubes and drains:  Continue PICC line.  Plan: Remain telemetry. Continue discharge planning for IP rehab.          11/15/2019  The patient is post-operative day 15, status-post coronary artery bypass grafting x 3. Post-operative course was complicated by an episode of hemodynamic instability and decompensation on postop day 5.  Patient remains hemodynamically stable.    Neuro:  Patient is oriented to person, place, situation. Disoriented to time. Alert, follows commands, makes eye contact. No focal deficits.    Cardiac:  Patient is hemodynamically stable. Continue metoprolol, amlodipine, losartan, and chlorthalidone.   Respiratory: Good sats on low flow nasal cannula. Wean to room air. Continue IS.   GI:  Continue cardiac/diabetic diet, modified by puree and thick liquids with swallow precautions. Advance as tolerated. Continue metamucil.  Renal: Good urine output. Cr 0.8. K 3.6. Replace K. Continue chlorthalidone.  Hema:  Hct 26.1. Platelet 99. Start Aspirin. Continue Apixaban 5mg BID.  Hematology following. Vascular surgery following.   Id:  WBC 11. A-febrile. Will continue to follow closely.    Endocrine:  Continue sliding scale insulin. Increase insulin detemir to 5 units AM dose, 10 units PM dose.   Activities: Increase activities as tolerated.  Musculoskeletal: The right upper extremity exhibits swelling distal from the elbow. The RUE is tender to palpation and ROM. ROM is diminished to the right hand 2/5. The right hand is slowly improving. The dusky, mottled, purple discoloration is localized to the right thumb tip and tip of the right index finger.  Severely diminished capillary refill  to the tip of the right thumb and tip of the right index finger. Wound care following. Gauze dressing in place.    Lines tubes and drains:  Continue PICC line.  Plan: Remain telemetry. Begin discharge planning to LTAC, anticipate discharge in 48-72 hours.        11/16/2019  The patient is postop day 16.  Status post coronary artery bypass grafting x3.  Patient's postop course was complicated by an episode of hemodynamically instability and decompensation on postop day 5.  Since then the patient has made much clinical progress.  The patient remains hemodynamically stable.  Anticipate discharge to rehab or LTAC in the next 48-72 hours.    Neuro:  The patient is oriented x3.  She is much less confused.  Her responses to questioning is appropriate.  She moves all 4 she has no focal deficits.  Cardiac:  Patient remains hemodynamically stable.  Systolic blood pressure trending higher.  Will increase losartan.  Continue amlodipine metoprolol and chlorthalidone.  Respiratory:  The patient has good sats on room air.  Continue pulmonary toilet.  GI:  Patient is tolerating a regular the diet.  Renal:  Patient has good urine output.  creatinine is 0.7.  Heme:  Hematocrit is 30.  Platelet count is up to 129.  Continue aspirin and apixaban.  Id:  White count is 10.  The patient is afebrile.  Endocrine:  Glucose is better controlled on long-acting insulin.  Activities patient is out of bed to chair continue increasing activities.  Line tubes and drains:  Patient has a PICC line continue PICC line.  Musculoskeletal:  Perfusion to the patient's right arm is much improved.  Swelling is somewhat decreased.  The area of dusky tissue remained isolated to the tip of the patient's right thumb and index finger.    11/17/2019  The patient is postop day 17.  Status post coronary artery bypass grafting x3.  The patient's postop course was complicated by an episode of hemodynamic instability ankle decompensation on postop day 5.  The patient  is making clinical progress.  She has remained hemodynamically stable.  Anticipate discharge to an LTAC or rehab center in the next 48-72 hours.    Neuro:  The patient is alert and oriented x3.  Her confusion is much improved.  Her responses to all questions appropriately.  She moves all 4 and has no focal deficits.  Cardiac:  The patient remains hemodynamically stable.  She is tolerating losartan amlodipine metoprolol and chlorthalidone.  Respiratory:  The patient has good sats on room air.  Continue pulmonary toilet.  GI:  The patient is tolerating a regular diet.  Renal:  The patient has good urine output and had creatinine 0.7.  Heme:  The patient's hematocrit is 27 and her platelet count is increased to 155.  Continue aspirin and apixaban.  Id:  Patient is afebrile and her white count is 8.  Endocrine high glucose is better controlled on long-acting insulin plus sliding scale.  Activities:  The patient is out of bed to chair.  Continue advancing activities.  Line tubes and drains:  The patient has a PICC line.  Musculoskeletal:  Perfusion to the patient's right and continues to be improved.  However high right am is still swollen.  The area of dusky tissue remained stable.

## 2019-11-17 NOTE — PLAN OF CARE
Patient AAOx3.   No falls this shift.   PICC line clean dry and intact. Lumens flushed and blood return noted.   Telesitter in place.  Will continue to monitor.

## 2019-11-17 NOTE — ASSESSMENT & PLAN NOTE
-Patient presented with intermittent chest tightness/heaviness that radiated to her back since Saturday evening  -Associated with JONES/orthopnea  -Initial troponin 3.665  -EKG showed SR with new LBBB  -Presentation consistent with NSTEMI  -Continue ASA, statin  -Start Toprol XL  -Start heparin gtt  -Check 2D echo  -Continue to trend troponin  -Keep NPO. [facility] today for further evaluation and PCI if indicated. All risks, benefits, and treatment alternatives explained to patient in detail. All questions answered. She has agreed to proceed. Further rec's to follow.    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 All risks, benefits, and treatment alternatives explained to patient in detail. All questions answered. She has agreed to proceed.    10/31/19  -Patient is s/p CABG x3.   See plan for CABG

## 2019-11-17 NOTE — PT/OT/SLP PROGRESS
Physical Therapy  Treatment    Mateusz Bates   MRN: [id number]   Admitting Diagnosis: Acute deep vein thrombosis (DVT) of right upper extremity       PT Start Time: 0940     PT Stop Time: 1000    PT Total Time (min): 20 min       Billable Minutes:  Gait Training 20    Treatment Type: Treatment  PT/PTA: PTA     PTA Visit Number: 1       General Precautions: Standard, fall, sternal  Orthopedic Precautions: N/A   Braces:      Spiritual, Cultural Beliefs, [employer] Practices, Values that Affect Care: no    Subjective:  Communicated with NSG prior to session.      Pain/Comfort  Pain Rating 1: 0/10  Pain Rating Post-Intervention 1: 0/10    Objective:        Functional Mobility:  Bed Mobility:        Transfers:       Gait:        Stairs:      Gait:     Balance:   Static Sit: FAIR: Maintains without assist, but unable to take any challenges   Dynamic Sit: FAIR+: Maintains balance through MINIMAL excursions of active trunk motion  Static Stand: POOR: Needs MODERATE assist to maintain  Dynamic stand: POOR: Needs MOD (moderate) assist during gait     Therapeutic Activities and Exercises:  SIT<-->STAND MAX ASSIST X 2 VERBAL CUES FOR PATIENT TO STAND ERECT  AMBULATED WITH MAX A X 2. 2X10' CUES FOR PATENT TO AVOID CROSSING LE'S DURING GAIT.      AM-PAC 6 CLICK MOBILITY  How much help from another person does this patient currently need?   1 = Unable, Total/Dependent Assistance  2 = A lot, Maximum/Moderate Assistance  3 = A little, Minimum/Contact Guard/Supervision  4 = None, Modified [place]/Independent         AM-PAC Raw Score CMS G-Code Modifier Level of Impairment Assistance   6 % Total / Unable   7 - 9 CM 80 - 100% Maximal Assist   10 - 14 CL 60 - 80% Moderate Assist   15 - 19 CK 40 - 60% Moderate Assist   20 - 22 CJ 20 - 40% Minimal Assist   23 CI 1-20% SBA / CGA   24 CH 0% Independent/ Mod I     Patient left up in chair with NSG notified, NSG present and PATEINT WAS LEFT AT THE END OF DOLL (WHERE SHE WAS FOUND)  WITH NSG.    Assessment:  Mateusz Bates is a 69 y.o. female with a medical diagnosis of Acute deep vein thrombosis (DVT) of right upper extremity and presents with .    Rehab identified problem list/impairments: Rehab identified problem list/impairments: weakness, impaired self care skills, impaired balance, decreased safety awareness, impaired functional mobilty, impaired endurance, gait instability, decreased lower extremity function    Rehab potential is fair.    Activity tolerance: Fair    Discharge recommendations: Discharge Facility/Level of Care Needs: nursing facility, skilled     Barriers to discharge:      Equipment recommendations: Equipment Needed After Discharge: bedside commode, hospital bed, walker, rolling     GOALS:   Multidisciplinary Problems     Physical Therapy Goals        Problem: Physical Therapy Goal    Goal Priority Disciplines Outcome Goal Variances Interventions   Physical Therapy Goal     PT, PT/OT Ongoing, Progressing     Description:  By 11/22/19  1. Patient will perform supine to/from sit CGA x 1 per sternal prec.  2. Patient will perform sit to/from stand CGA a no AD per sternal prec.  3. Patient will amb 100ft no AD min A                      PLAN:    Patient to be seen 5 x/week  to address the above listed problems via gait training, therapeutic activities, therapeutic exercises  Plan of Care expires: 11/18/19  Plan of Care reviewed with: patient         Lee Pandya, PTA  11/17/2019

## 2019-11-17 NOTE — PLAN OF CARE
INCREASED AMBULATION DISTANCE TODAY. WEAKNESS NOTED THROUGH OUT LE'S AND HIPS. NEED MAX CUES FOR AVOIDING USING UE'S TO ASSIST WITH TRANSFERS.

## 2019-11-17 NOTE — PT/OT/SLP PROGRESS
Physical Therapy      Patient Name:  Mateusz Bates   MRN:  [id number]    Patient not seen today secondary to  . Will follow-up tomorrow. Not seen 2x today due to patient was eating lunch upon arriving.     Lee Pandya, PTA

## 2019-11-17 NOTE — SUBJECTIVE & OBJECTIVE
Interval History: no acute issues noted o/n. Feels much improved today on exam.     Review of Systems   Constitution: Positive for malaise/fatigue.   HENT: Negative for hearing loss and hoarse voice.    Eyes: Negative for blurred vision and visual disturbance.   Cardiovascular: Negative for chest pain, claudication, dyspnea on exertion, irregular heartbeat, leg swelling, near-syncope, orthopnea, palpitations, paroxysmal nocturnal dyspnea and syncope.   Respiratory: Negative for cough, hemoptysis, shortness of breath, sleep disturbances due to breathing, snoring and wheezing.    Endocrine: Negative for cold intolerance and heat intolerance.   Hematologic/Lymphatic: Does not bruise/bleed easily.   Skin: Positive for color change, nail changes and poor wound healing. Negative for dry skin.   Musculoskeletal: Positive for arthritis and back pain. Negative for joint pain and myalgias.        RUE SWELLING   Gastrointestinal: Negative for bloating, abdominal pain, constipation, nausea and vomiting.   Genitourinary: Negative for dysuria, flank pain, hematuria and hesitancy.   Neurological: Negative for headaches, light-headedness, loss of balance, numbness, paresthesias and weakness.   Psychiatric/Behavioral: Negative for altered mental status.   Allergic/Immunologic: Negative for environmental allergies.     Objective:     Vital Signs (Most Recent):  Temp: 97.5 °F (36.4 °C) (11/17/19 0717)  Pulse: 89 (11/17/19 0812)  Resp: 18 (11/17/19 0812)  BP: 134/69 (11/17/19 0717)  SpO2: 98 % (11/17/19 0812) Vital Signs (24h Range):  Temp:  [97 °F (36.1 °C)-100.3 °F (37.9 °C)] 97.5 °F (36.4 °C)  Pulse:  [] 89  Resp:  [18-20] 18  SpO2:  [93 %-98 %] 98 %  BP: (114-138)/(53-81) 134/69     Weight: 76.2 kg (167 lb 15.9 oz)  Body mass index is 31.74 kg/m².     SpO2: 98 %  O2 Device (Oxygen Therapy): room air      Intake/Output Summary (Last 24 hours) at 11/17/2019 1053  Last data filed at 11/17/2019 0500  Gross per 24 hour   Intake  838 ml   Output --   Net 838 ml       Lines/Drains/Airways     Peripherally Inserted Central Catheter Line                 PICC Double Lumen 11/07/19 1645 left brachial;left basilic 9 days                Physical Exam   Constitutional: She is oriented to person, place, and time. She appears well-developed and well-nourished. No distress.   HENT:   Head: Normocephalic and atraumatic.   Eyes: Pupils are equal, round, and reactive to light.   Neck: Normal range of motion and full passive range of motion without pain. Neck supple. No JVD present.   Cardiovascular: Normal rate, regular rhythm, S1 normal, S2 normal and intact distal pulses. PMI is not displaced. Exam reveals no distant heart sounds.   No murmur heard.  Pulses:       Radial pulses are 2+ on the right side, and 2+ on the left side.        Dorsalis pedis pulses are 2+ on the right side, and 2+ on the left side.   CABG INCISION HEALING WELL   Pulmonary/Chest: Effort normal and breath sounds normal. No accessory muscle usage. No respiratory distress. She has no decreased breath sounds. She has no wheezes. She has no rales.   Abdominal: Soft. Bowel sounds are normal. She exhibits no distension. There is no tenderness.   Musculoskeletal: Normal range of motion. She exhibits no edema.        Right ankle: She exhibits no swelling.        Left ankle: She exhibits no swelling.   Neurological: She is alert and oriented to person, place, and time.   Skin: Skin is warm and dry. Bruising and ecchymosis noted. She is not diaphoretic. No cyanosis. There is pallor. Nails show no clubbing.   RUE swelling, right hand ecchymotic with dusky fingernails   Psychiatric: She has a normal mood and affect. Her speech is normal and behavior is normal. Judgment and thought content normal. Cognition and memory are normal.   Nursing note and vitals reviewed.      Significant Labs:   BMP:   Recent Labs   Lab 11/16/19  0537 11/16/19  1645 11/17/19  0459   GLU 78 183* 140*    132*  134*   K 4.0 4.3 3.7    102 101   CO2 22* 18* 25   BUN 24* 28* 23   CREATININE 0.7 0.8 0.7   CALCIUM 9.0 9.0 8.8   MG 2.1  --  1.9   , CBC   Recent Labs   Lab 11/16/19  0537 11/17/19  0459   WBC 10.68 8.37   HGB 9.1* 8.4*   HCT 30.7* 27.1*   * 155    and All pertinent lab results from the last 24 hours have been reviewed.    Significant Imaging: Echocardiogram:   2D echo with color flow doppler:   Results for orders placed or performed during the hospital encounter of 10/28/19   2D echo with color flow doppler   Result Value Ref Range    QEF 30 (A) 55 - 65    Diastolic Dysfunction Yes (A)     Est. PA Systolic Pressure 33.03     Narrative    Date of Procedure: 11/06/2019        TEST DESCRIPTION   Technical Quality: This is a technically challenging study.     General: A catheter is present in the right-sided cardiac chambers.     Aorta: The aortic root is normal in size, measuring 3.7 cm at sinotubular junction and 3.8 cm at Sinuses of Valsalva. The proximal ascending aorta is normal in size, measuring 3.7 cm across.     Left Atrium: The left atrial volume index is normal, measuring 19.22 cc/m2.     Left Ventricle: The left ventricle is normal in size, with an end-diastolic diameter of 3.0 cm, and an end-systolic diameter of 2.5 cm. LV wall thickness is normal, with the septum measuring 2.1 cm and the posterior wall measuring 1.4 cm across. Relative   wall thickness was increased at 0.93, and the LV mass index was increased at 140.2 g/m2 consistent with concentric left ventricular hypertrophy. The following segments were mildly hypokinetic: apical lateral wall.  The following segments were severely hypokinetic: mid anteroseptum, basal anteroseptum, apical septum, mid inferoseptum, basal inferoseptum, basal inferior wall.  Left ventricular systolic function appears moderately depressed. Visually estimated ejection fraction is 30-35%. The LV Doppler derived stroke volume equals 54.0 ccs.     Diastolic  indices: E wave velocity 0.9 m/s, E/A ratio 0.8,  msec., E/e' ratio(avg) 14. There is diastolic dysfunction secondary to relaxation abnormality.     Right Atrium: The right atrium is normal in size, measuring 3.5 cm in length and 2.1 cm in width in the apical view.     Right Ventricle: The right ventricle is normal in size. Global right ventricular systolic function appears normal. Tricuspid annular plane systolic excursion (TAPSE) is 1.3 cm. The estimated PA systolic pressure is 33 mmHg.     Aortic Valve:  The aortic valve is normal in structure. The aortic valve is tri-leaflet in structure. The mean gradient obtained across the aortic valve is 9 mmHg.     Mitral Valve:  The mitral valve is normal in structure. The pressure half time is 53 msec. The calculated mitral valve area is 4.15 cm2.     Tricuspid Valve:  The tricuspid valve is normal in structure.     Pulmonary Valve:  The pulmonic valve is not well seen.     IVC: IVC is normal in size and collapses > 50% with a sniff, suggesting normal right atrial pressure of 3 mmHg.     Intracavitary: There is no evidence of pericardial effusion, intracavity mass, thrombi, or vegetation.         CONCLUSIONS     1 - Concentric hypertrophy.     2 - Wall motion abnormalities.     3 - Moderately depressed left ventricular systolic function (EF 30-35%).     4 - Impaired LV relaxation, normal LAP (grade 1 diastolic dysfunction).     5 - Normal right ventricular systolic function .     6 - The estimated PA systolic pressure is 33 mmHg.             This document has been electronically    SIGNED BY: Charles Matias MD On: 11/06/2019 18:28    and X-Ray: CXR: X-Ray Chest 1 View (CXR):   Results for orders placed or performed during the hospital encounter of 10/28/19   X-Ray Chest 1 View    Narrative    EXAMINATION:  XR CHEST 1 VIEW    CLINICAL HISTORY:  follow up;    TECHNIQUE:  Single frontal view of the chest was performed.    COMPARISON:  11/07/2019    FINDINGS:  Tubes and  lines are stable.  Ripley-Waldo catheter is been removed.  Mild atelectasis within the left midlung zone.  No consolidation.  Trace pleural effusions not entirely excluded.  No pneumothorax.  Bones demonstrate mild degenerative changes.  Sternotomy wires are intact.  In comparison to the prior study, there is no adverse interval changes      Impression    In comparison to the prior study, there is no adverse interval changes      Electronically signed by: Jh Anne MD  Date:    11/08/2019  Time:    08:14

## 2019-11-17 NOTE — PROGRESS NOTES
[facility] -   Hematology/Oncology  Progress Note    Patient Name: Mateusz Bates  Admission Date: 10/28/2019  Hospital Length of Stay: 20 days  Code Status: Full Code     Subjective:     HPI:  Mrs. Bates is a 69-year-old female with hypertension, diabetes, hyperlipidemia, obesity, anxiety and depression, iron deficiency anemia, vitamin B12 deficiency, who presented to the emergency room with an NSTEMI.  Workup included a cardiac catheterization that shows severe multivessel coronary artery disease with proximal LAD stenosis of 70% and severely depressed ejection fraction of 20%.  The patient is a candidate for urgent coronary artery bypass grafting. S/p CABG today.  Hematology is consulted for thrombocytopenia.    Interval History:  In stable condition this morning.  She not any issues of bleeding skin changes    Oncology Treatment Plan:   [No treatment plan]    Medications:  Continuous Infusions:  Scheduled Meds:   albuterol-ipratropium  3 mL Nebulization Q6H WAKE    amLODIPine  10 mg Oral Daily    apixaban  5 mg Oral BID    aspirin  81 mg Oral Daily    atorvastatin  80 mg Oral QHS    chlorthalidone  25 mg Oral Daily    ferrous sulfate  325 mg Oral BID    insulin detemir U-100  10 Units Subcutaneous QHS    insulin detemir U-100  5 Units Subcutaneous Daily    losartan  100 mg Oral Daily    metoprolol tartrate  50 mg Oral BID    nitroGLYCERIN 2% TD oint  0.5 inch Topical (Top) Q12H    pantoprazole  40 mg Oral Daily     PRN Meds:albumin human 5%, albuterol sulfate, Dextrose 10% Bolus, Dextrose 10% Bolus, insulin aspart U-100, lactated ringers, magnesium sulfate IVPB, magnesium sulfate IVPB, metoclopramide HCl, ondansetron, potassium chloride in water **AND** potassium chloride in water **AND** potassium chloride in water, traMADol     Review of Systems   Constitutional: Negative for appetite change, chills, diaphoresis, fatigue and fever.   Respiratory: Negative.    Cardiovascular:  Negative.    Gastrointestinal: Negative.    Hematological: Does not bruise/bleed easily.     Objective:     Vital Signs (Most Recent):  Temp: 99.2 °F (37.3 °C) (11/17/19 1221)  Pulse: 90 (11/17/19 1303)  Resp: 18 (11/17/19 1303)  BP: 130/79 (11/17/19 1221)  SpO2: 97 % (11/17/19 1303) Vital Signs (24h Range):  Temp:  [97.5 °F (36.4 °C)-100.3 °F (37.9 °C)] 99.2 °F (37.3 °C)  Pulse:  [] 90  Resp:  [18-20] 18  SpO2:  [93 %-98 %] 97 %  BP: (122-138)/(53-81) 130/79     Weight: 76.2 kg (167 lb 15.9 oz)  Body mass index is 31.74 kg/m².  Body surface area is 1.81 meters squared.      Intake/Output Summary (Last 24 hours) at 11/17/2019 1502  Last data filed at 11/17/2019 0500  Gross per 24 hour   Intake 838 ml   Output --   Net 838 ml       Physical Exam   Constitutional: She is oriented to person, place, and time. She appears well-developed and well-nourished.   HENT:   Head: Normocephalic and atraumatic.   Eyes: EOM are normal.   Neck: Neck supple.   Cardiovascular: Normal rate and regular rhythm.   Pulmonary/Chest: Effort normal and breath sounds normal.   Abdominal: Soft.   Neurological: She is alert and oriented to person, place, and time.       Significant Labs:   BMP:   Recent Labs   Lab 11/16/19  0537 11/16/19  1645 11/17/19  0459   GLU 78 183* 140*    132* 134*   K 4.0 4.3 3.7    102 101   CO2 22* 18* 25   BUN 24* 28* 23   CREATININE 0.7 0.8 0.7   CALCIUM 9.0 9.0 8.8   MG 2.1  --  1.9    and CBC:   Recent Labs   Lab 11/16/19  0537 11/17/19  0459   WBC 10.68 8.37   HGB 9.1* 8.4*   HCT 30.7* 27.1*   * 155       Diagnostic Results:  I have reviewed all pertinent imaging results/findings within the past 24 hours.    Assessment/Plan:     * Acute deep vein thrombosis (DVT) of right upper extremity  November 13, 2019  --D/c Argatroban   --Avoid heparin  --Eliquis 5 mg PO BID  --Daily CBC  --Check BLE US today. Would like to r/o DVT in the setting of ROSANNA diagnosis  --Continue PO anticoagulation for  at least 3 months. She will need to f/u in the Heme-Onc clinic following discharge      HIT (heparin-induced thrombocytopenia)  On apixaban for least 3 months and follow up in outpatient hematology clinic.    Edema of hand  November 12, 2019     Upper extremity vascular insufficiency:  --Improved apperance  --Vascular surgery following. No surgical intervention recommended. Recommend continue anticoagulation, arm elevation, mild/moderate compression    Thrombocytopenia    --Platelet count 104  --D/c Argatroban due to unavailability   --please continue on Eliquis 5 mg PO BID.  Plan for anticoagulation for at least 3 months.  --Bleeding precautions  --Hg 8.1.  Transfuse for hemoglobin less than 7.    --Daily CBC        Thank you for your consult. I will follow-up with patient. Please contact us if you have any additional questions.     Shandra Reed MD  Hematology/Oncology  [facility]

## 2019-11-17 NOTE — PROGRESS NOTES
[facility]  Cardiology  Progress Note    Patient Name: Mateusz Bates  MRN: [id number]  Admission Date: 10/28/2019  Hospital Length of Stay: 20 days  Code Status: Full Code   Attending Physician: Keny Zaidi MD   Primary Care Physician: Serenity Kilpatrick MD  Expected Discharge Date: 11/13/2019  Principal Problem:Acute deep vein thrombosis (DVT) of right upper extremity    Subjective:   HPI    Ms. Bates is a 69 year old female patient whose current medical conditions include HTN, hyperlipidemia, obesity, DM type II, cerebral microvascular disease, FAHAD, and B12 deficiency who was sent to [facility] ED today from her PCP's office due to chest pain and abnormal EKG. Patient complained of substernal chest tightness/heaviness that radiated to her back on Saturday while at rest. Associated symptoms included orthopnea and SOB. Patient denied any associated nausea, vomiting, diaphoresis, palpitations, near syncope, or syncope. Initial workup in ED revealed troponin of 3.665 and BNP of 1305 and patient subsequently admitted for further evaluation and treatment of NSTEMI. Cardiology consulted to assist with management. Patient seen and examined today while in ED. Feels well at time of exam, denies chest pain. She does report similar symptoms over the past few months but states they never lasted as long and were less intense. She reports compliance with her medications. States her father had history of CABG. Chart reviewed. Repeat troponin pending. EKG reviewed and shows SR with new LBBB. 2D echo pending.        Hospital Course:   Ms. Bates is a 69 year old female patient whose current medical conditions include HTN, hyperlipidemia, obesity, DM type II, cerebral microvascular disease, FAHAD, and B12 deficiency who was sent to [facility] ED today from her PCP's office due to chest pain and abnormal EKG. Patient complained of substernal chest tightness/heaviness that radiated to her back on Saturday while at rest.  Associated symptoms included orthopnea and SOB. Patient denied any associated nausea, vomiting, diaphoresis, palpitations, near syncope, or syncope. Initial workup in ED revealed troponin of 3.665 and BNP of 1305 and patient subsequently admitted for further evaluation and treatment of NSTEMI. Cardiology consulted to assist with management. Patient seen and examined today while in ED. Feels well at time of exam, denies chest pain. She does report similar symptoms over the past few months but states they never lasted as long and were less intense. She reports compliance with her medications. States her father had history of CABG. Chart reviewed. Repeat troponin pending. EKG reviewed and shows SR with new LBBB. 2D echo pending.    10/29/19-Patient seen and examined today, s/p LHC yesterday that showed multivessel CAD. CVT consulted, CABG planned for Thursday. Feels ok. No chest pain or SOB. Labs stable. Being diuresed. 2D echo showed EF of 30-35%, DD.    10/30/19-Patient seen and examined today. Feels well. No complaints. Denies chest pain or SOB. Labs stable. IABP placement scheduled for today.    10/31/19- Patient is s/p CABG x3 with LIMA to LAD reverse saphenous vein graft to the RCA and reverse saphenous vein graft to the ramus. Has been transferred to ICU. Is intubated and sedated. CT x3. IABP at 1:1 still in place. Transfused 3 units of PRBC in the OR and has received additional 3 units in ICU, has received 500cc albumin, epi and milrinone gtt.  H/H 6.9/21.0. Renal function stable. Paced rhythm on monitor     11/1/2019--patient seen and examined in ICU. Extubated o/n, remains on IABP 1:1 today, plans to possibly wean off later today per Dr. Zaidi. Remains on Epi and Milrinone gtt today. Paced on monitor. Labs stable today.     11/2/19-Patient seen and examined today, lying in bed. Feels ok. Complains of post-op pain and fatigue. Labs reviewed. Platelets 47,000. Heme/onc on board.     11/3/19-Patient seen and  examined today, sitting up in chair. Feeling better. Still weak. Labs reviewed. Platelets 89,000.    11/4/19- Patient is POD 4 s/p CABG x3. OOB in chair. Feels better this morning. Pain controlled. Platelets improving since holding Plavix.Vitals stable     11/5/19- Patient is POD 5 s/p CABGx3. Has been transferred to [facility]. Is clammy and generally weak this morning. Glucose and vitals stable. Noted to have bump in WBC. Primary team has been notified by nurse. Platelets 73, but H/H stable     11/6 in ICU, intubated, on epi gtt at 0.15 mcg CI 2.0, PAP 20. Sinus rhythm, H&H and Cr stable. Urine output ok. Improved lactate 1.6. const stent cardiogenic shock    11/7 INTUBATED, WEAN OFF EPI AND CONTINUE AMILIRONE, ON SINU RHYTHM AND CI 3.3, Cr STABLE AND DECENT URINE OUTPUT.     11/8 extubated, off pressors, up to the chair.diffuse DVT on upper extremities, no chest pain,pt is weak, Cr normal    11/9/weak, mild reps. Distress, good urine output. BP high.  RUE venous thrombus/DVT in deep and superficial arm veins. H&H stable Cr normal.      11/10 weakness H&H 7.6/24, BP and HR wnl., good urine output. No chest pain. Right arm swelling and ecchymosis. Pulses ok    11/11/19--Patient seen and examined in ICU, sitting on side of bed. Feels ok today. Continued right arm swelling and ecchymosis.     11/12/19-Patient seen and examined today, lying in bed. Feels ok. Denies chest pain. Admits to some SOB. CXR, showed no effusion or infiltrate. Labs stable, platelets 75,000 H/H 7.3/23.1. Hematology noted reviewed, ROSANNA positive.     11/13/19--Patient seen and examined today, lying in bed. Sleepy on exam today. No chest pain today. Labs reviewed, H/H 7/23, plts 79, INR 4.3, Mag 1.8.     11/14/19--Patient seen and examined in room, lying in bed. Plans for PRBC transfusion today. Labs reviewed, H/H 6.7/21.9.     11/15/19--Patient seen and examined in room, lying in bed. Feels ok today. Has been started on Eliquis. Labs reviewed, H/H  8.2/26.1, K+ 3.6, Cr 0.8.     11/16/19--Patient seen and examined in room, sitting in bedside chair. Feels ok today. Labs reviewed, stable.     11/17/19--Patient seen and examined in room, sitting in chair eating breakfast. Feels good today. Needs to ambulate in hallway. Labs reviewed, stable.     Interval History: no acute issues noted o/n. Feels much improved today on exam.     Review of Systems   Constitution: Positive for malaise/fatigue.   HENT: Negative for hearing loss and hoarse voice.    Eyes: Negative for blurred vision and visual disturbance.   Cardiovascular: Negative for chest pain, claudication, dyspnea on exertion, irregular heartbeat, leg swelling, near-syncope, orthopnea, palpitations, paroxysmal nocturnal dyspnea and syncope.   Respiratory: Negative for cough, hemoptysis, shortness of breath, sleep disturbances due to breathing, snoring and wheezing.    Endocrine: Negative for cold intolerance and heat intolerance.   Hematologic/Lymphatic: Does not bruise/bleed easily.   Skin: Positive for color change, nail changes and poor wound healing. Negative for dry skin.   Musculoskeletal: Positive for arthritis and back pain. Negative for joint pain and myalgias.        RUE SWELLING   Gastrointestinal: Negative for bloating, abdominal pain, constipation, nausea and vomiting.   Genitourinary: Negative for dysuria, flank pain, hematuria and hesitancy.   Neurological: Negative for headaches, light-headedness, loss of balance, numbness, paresthesias and weakness.   Psychiatric/Behavioral: Negative for altered mental status.   Allergic/Immunologic: Negative for environmental allergies.     Objective:     Vital Signs (Most Recent):  Temp: 97.5 °F (36.4 °C) (11/17/19 0717)  Pulse: 89 (11/17/19 0812)  Resp: 18 (11/17/19 0812)  BP: 134/69 (11/17/19 0717)  SpO2: 98 % (11/17/19 0812) Vital Signs (24h Range):  Temp:  [97 °F (36.1 °C)-100.3 °F (37.9 °C)] 97.5 °F (36.4 °C)  Pulse:  [] 89  Resp:  [18-20] 18  SpO2:   [93 %-98 %] 98 %  BP: (114-138)/(53-81) 134/69     Weight: 76.2 kg (167 lb 15.9 oz)  Body mass index is 31.74 kg/m².     SpO2: 98 %  O2 Device (Oxygen Therapy): room air      Intake/Output Summary (Last 24 hours) at 11/17/2019 1053  Last data filed at 11/17/2019 0500  Gross per 24 hour   Intake 838 ml   Output --   Net 838 ml       Lines/Drains/Airways     Peripherally Inserted Central Catheter Line                 PICC Double Lumen 11/07/19 1645 left brachial;left basilic 9 days                Physical Exam   Constitutional: She is oriented to person, place, and time. She appears well-developed and well-nourished. No distress.   HENT:   Head: Normocephalic and atraumatic.   Eyes: Pupils are equal, round, and reactive to light.   Neck: Normal range of motion and full passive range of motion without pain. Neck supple. No JVD present.   Cardiovascular: Normal rate, regular rhythm, S1 normal, S2 normal and intact distal pulses. PMI is not displaced. Exam reveals no distant heart sounds.   No murmur heard.  Pulses:       Radial pulses are 2+ on the right side, and 2+ on the left side.        Dorsalis pedis pulses are 2+ on the right side, and 2+ on the left side.   CABG INCISION HEALING WELL   Pulmonary/Chest: Effort normal and breath sounds normal. No accessory muscle usage. No respiratory distress. She has no decreased breath sounds. She has no wheezes. She has no rales.   Abdominal: Soft. Bowel sounds are normal. She exhibits no distension. There is no tenderness.   Musculoskeletal: Normal range of motion. She exhibits no edema.        Right ankle: She exhibits no swelling.        Left ankle: She exhibits no swelling.   Neurological: She is alert and oriented to person, place, and time.   Skin: Skin is warm and dry. Bruising and ecchymosis noted. She is not diaphoretic. No cyanosis. There is pallor. Nails show no clubbing.   RUE swelling, right hand ecchymotic with dusky fingernails   Psychiatric: She has a normal  mood and affect. Her speech is normal and behavior is normal. Judgment and thought content normal. Cognition and memory are normal.   Nursing note and vitals reviewed.      Significant Labs:   BMP:   Recent Labs   Lab 11/16/19  0537 11/16/19  1645 11/17/19  0459   GLU 78 183* 140*    132* 134*   K 4.0 4.3 3.7    102 101   CO2 22* 18* 25   BUN 24* 28* 23   CREATININE 0.7 0.8 0.7   CALCIUM 9.0 9.0 8.8   MG 2.1  --  1.9   , CBC   Recent Labs   Lab 11/16/19  0537 11/17/19  0459   WBC 10.68 8.37   HGB 9.1* 8.4*   HCT 30.7* 27.1*   * 155    and All pertinent lab results from the last 24 hours have been reviewed.    Significant Imaging: Echocardiogram:   2D echo with color flow doppler:   Results for orders placed or performed during the hospital encounter of 10/28/19   2D echo with color flow doppler   Result Value Ref Range    QEF 30 (A) 55 - 65    Diastolic Dysfunction Yes (A)     Est. PA Systolic Pressure 33.03     Narrative    Date of Procedure: 11/06/2019        TEST DESCRIPTION   Technical Quality: This is a technically challenging study.     General: A catheter is present in the right-sided cardiac chambers.     Aorta: The aortic root is normal in size, measuring 3.7 cm at sinotubular junction and 3.8 cm at Sinuses of Valsalva. The proximal ascending aorta is normal in size, measuring 3.7 cm across.     Left Atrium: The left atrial volume index is normal, measuring 19.22 cc/m2.     Left Ventricle: The left ventricle is normal in size, with an end-diastolic diameter of 3.0 cm, and an end-systolic diameter of 2.5 cm. LV wall thickness is normal, with the septum measuring 2.1 cm and the posterior wall measuring 1.4 cm across. Relative   wall thickness was increased at 0.93, and the LV mass index was increased at 140.2 g/m2 consistent with concentric left ventricular hypertrophy. The following segments were mildly hypokinetic: apical lateral wall.  The following segments were severely hypokinetic:  mid anteroseptum, basal anteroseptum, apical septum, mid inferoseptum, basal inferoseptum, basal inferior wall.  Left ventricular systolic function appears moderately depressed. Visually estimated ejection fraction is 30-35%. The LV Doppler derived stroke volume equals 54.0 ccs.     Diastolic indices: E wave velocity 0.9 m/s, E/A ratio 0.8,  msec., E/e' ratio(avg) 14. There is diastolic dysfunction secondary to relaxation abnormality.     Right Atrium: The right atrium is normal in size, measuring 3.5 cm in length and 2.1 cm in width in the apical view.     Right Ventricle: The right ventricle is normal in size. Global right ventricular systolic function appears normal. Tricuspid annular plane systolic excursion (TAPSE) is 1.3 cm. The estimated PA systolic pressure is 33 mmHg.     Aortic Valve:  The aortic valve is normal in structure. The aortic valve is tri-leaflet in structure. The mean gradient obtained across the aortic valve is 9 mmHg.     Mitral Valve:  The mitral valve is normal in structure. The pressure half time is 53 msec. The calculated mitral valve area is 4.15 cm2.     Tricuspid Valve:  The tricuspid valve is normal in structure.     Pulmonary Valve:  The pulmonic valve is not well seen.     IVC: IVC is normal in size and collapses > 50% with a sniff, suggesting normal right atrial pressure of 3 mmHg.     Intracavitary: There is no evidence of pericardial effusion, intracavity mass, thrombi, or vegetation.         CONCLUSIONS     1 - Concentric hypertrophy.     2 - Wall motion abnormalities.     3 - Moderately depressed left ventricular systolic function (EF 30-35%).     4 - Impaired LV relaxation, normal LAP (grade 1 diastolic dysfunction).     5 - Normal right ventricular systolic function .     6 - The estimated PA systolic pressure is 33 mmHg.             This document has been electronically    SIGNED BY: Charles Matias MD On: 11/06/2019 18:28    and X-Ray: CXR: X-Ray Chest 1 View (CXR):  "  Results for orders placed or performed during the hospital encounter of 10/28/19   X-Ray Chest 1 View    Narrative    EXAMINATION:  XR CHEST 1 VIEW    CLINICAL HISTORY:  follow up;    TECHNIQUE:  Single frontal view of the chest was performed.    COMPARISON:  11/07/2019    FINDINGS:  Tubes and lines are stable.  Foster-Waldo catheter is been removed.  Mild atelectasis within the left midlung zone.  No consolidation.  Trace pleural effusions not entirely excluded.  No pneumothorax.  Bones demonstrate mild degenerative changes.  Sternotomy wires are intact.  In comparison to the prior study, there is no adverse interval changes      Impression    In comparison to the prior study, there is no adverse interval changes      Electronically signed by: Jh Anne MD  Date:    11/08/2019  Time:    08:14     Assessment and Plan:       HIT (heparin-induced thrombocytopenia)  -per Heme/Onc    Acute deep vein thrombosis (DVT) of non-extremity vein Right IJ  -Continue Argatroban infusion  -RUE continues to be swollen   -Right hand remains ecchymotic   -Mgmt per CT surgery  Per Vascular Surgery:  "Hypercoagulable/HIT workup in progress, pt started on argatroban  Regarding RUE/central venous thrombus: Unfortunately, given location of clot and her recent major cardiac surgery any attempt at thrombolysis with tPA would be absolutely contraindicated  Open thrombectomy also not option as central clot could not be adequately cleared to allow for any outflow/drainage from the arm     Recommend cont anticoagulation, arm elevation, mild/moderate compression"    11/12/19  -Mgmt as per vascular/heme onc/CVT    11/13  -Mgmt per Vascular/Heme onc/CT surgery    Cardiogenic shock  Post CABG  EF 30%  S/p swan catheter  CI 3.3  Repeat echo today showed EF 30%, septal hypokinesis , compared to prior echo before the cabg, no significant change  ekg reviewed    11/10/2019  Extubated  Off pressors  Continue Amlodipine and BB/Losartan  Hold Lipitor " due to elevated LFT  Continue anticoagulation due to acute DVT  Continue ICU supportive care and ABx    11/11  -Continue BB, ARB, Norvasc  -BP much improved   -Continue supportive care per CT surgery      11/12/19  -Stable  -Continue BB, ARB, Norvasc        Thrombocytopenia  -Mgmt as per heme/onc  -Plavix was on hold, but resumed by primary team   -ASA has been resumed     11/5/19  -consider holding Plavix  -Hematology has been consulted     S/P CABG x 3  -Patient is s/p CABG x3 with LIMA to LAD reverse saphenous vein graft to the RCA and reverse saphenous vein graft to the ramus.  -Continue post CABG supportive therapy  -Continue ASA, Plavix, Statin, BB  -Will continue to follow along     11/1/19  -POD #1 s/p CABG x3  -Continue ASA, Statin, BB  -Remains on Epi and Milrinone gtt today  -Mgmt per CT surgery  -Will follow along    11/2/19  -Recovering s/p CABG x 3  -Continue statin, BB  -Platelets 47,000 this AM; heme/onc on board, ASA held  -Await further rec's    11/3/19  -Stable, progressing post CABG x 3  -Continue statin, BB  -ASA re-started  -Platelets 89,000, heme/onc on board    11/4/19  -Patient slowly improving post CABG x3  -Continue ASA, Statin and BB  -plavix on hold due to thrombocytopenia that is improving   -IS use encouraged and early ambulation with PT  -Will continue to follow along     11/5/19  -Will continue ASA, Statin and BB  -Consider holding Plavix again for drop in plts after resuming by primary team   -Recommend CXR to rule out post op PNA given bump in WBC and cough  -will need to monitor for temp  -ambulate as tolerated and encourage IS use     11/06  Hold ASA, Plavix due to anemia  Hold BB due to cardiogenic shock    11/11/19  -Continue Argatroban infusion  -Continue BB, ARB, IV lasix, Chlorthalidone, Norvasc, NTG paste  -Hold ASA, Plavix due to anemia  -Further mgmt per CT surgery    11/12/19  -Stable  -Continue BB, ARB, Norvasc, Chlorthalidone, IV Lasix  -Recommend re-starting  ASA  -Plavix held due to anemia/thrombocytopenia    11/13/19  -Recommend ASA daily  -Plavix held due to anemia/thrombocytopenia  -Recommend PRBC transfusion, per CT surgery  -Continue BB, ARB, Norvasc, IV lasix, Chlorthalidone    11/14  -Continue current medical mgmt per CT surgery  -Recommend ASA daily  -Agree with PRBC transfusion today    11/15  -Continue Eliquis, Norvasc, ASA, ARB, BB  -Continue mgmt per CT surgery  -Will continue to follow along  -Encourage IS and aggressive pulmonary toilet  -OOB to chair for meals    11/16  -Continue ASA, Eliquis, BB, ARB, Norvasc, Statin  -Encourage aggressive pulmonary toilet, IS, out of bed for all meals  -Needs to ambulate in hallway as able  -Will follow along with CT surgery    11/17  -Continue current medical regimen  -Needs to ambulate in hallway  -Will continue to follow along    CAD, multiple vessel  -See plan under CABG    Acute combined systolic and diastolic CHF, NYHA class 2  -Presents with ICM/acute combined CHF  -EF 30-35% by 2D echo        Chest pain  -See plan under NSTEMI    Abnormal EKG  -LBBB noted on EKG  -See plan under NSTEMI    NSTEMI (non-ST elevated myocardial infarction)  -Patient presented with intermittent chest tightness/heaviness that radiated to her back since Saturday evening  -Associated with JONES/orthopnea  -Initial troponin 3.665  -EKG showed SR with new LBBB  -Presentation consistent with NSTEMI  -Continue ASA, statin  -Start Toprol XL  -Start heparin gtt  -Check 2D echo  -Continue to trend troponin  -Keep NPO. C today for further evaluation and PCI if indicated. All risks, benefits, and treatment alternatives explained to patient in detail. All questions answered. She has agreed to proceed. Further rec's to follow.    10/29/19  -s/p LHC yesterday that showed multivessel CAD  -CVT consulted, CABG planned for 10/31/19  -Stable overnight, no chest pain  -Continue ASA, statin, BB  -Continue heparin gtt  -Will add ARB if BP permits (ACEi  caused cough)    10/30/19  -Stable overnight  -No chest pain or SOB  -Continue ASA, statin, BB, heparin gtt  -Will add ARB if BP permits  -IABP placement scheduled for today. All risks, benefits, and treatment alternatives explained to patient in detail. All questions answered. She has agreed to proceed.    10/31/19  -Patient is s/p CABG x3.   See plan for CABG     Hyperlipidemia associated with type 2 diabetes mellitus  -Continue statin    Obesity (BMI 30.0-34.9)  -Weight loss    Diabetes mellitus type II, uncontrolled  -Mgmt as per hospital medicine        VTE Risk Mitigation (From admission, onward)         Ordered     apixaban tablet 5 mg  2 times daily      11/14/19 0903     argatroban 125 mg in sodium chloride 0.9% 125 mL infusion (conc: 1 mg/mL)  Continuous     Question:  Argatroban Infusion Adjustment (DO NOT MODIFY ANSWER)  Answer:  [email or web address] [id number]    11/13/19 1501     argatroban 125 mg in sodium chloride 0.9% 125 mL infusion (conc: 1 mg/mL)  Continuous     Question:  Argatroban Infusion Adjustment (DO NOT MODIFY ANSWER)  Answer:  [email or web address] [id number]    11/11/19 0128     Reason for no Mechanical VTE Prophylaxis  Once     Question:  Reasons:  Answer:  IV Heparin within 24 hrs. Pre or Post-Op    10/28/19 1414     heparin 25,000 units in dextrose 5% 250 mL (100 units/mL) infusion LOW INTENSITY nomogram - [facility]  Continuous     Question:  Heparin Infusion Adjustment (DO NOT MODIFY ANSWER)  Answer:  [email or web address]\[email or web address]\[email or web address] LOW INTENSITY nomogram for [facility] [id number]    10/28/19 1257                KOLE Faulkner  Cardiology  [facility]

## 2019-11-17 NOTE — SUBJECTIVE & OBJECTIVE
Interval History:  In stable condition this morning.  She not any issues of bleeding skin changes    Oncology Treatment Plan:   [No treatment plan]    Medications:  Continuous Infusions:  Scheduled Meds:   albuterol-ipratropium  3 mL Nebulization Q6H WAKE    amLODIPine  10 mg Oral Daily    apixaban  5 mg Oral BID    aspirin  81 mg Oral Daily    atorvastatin  80 mg Oral QHS    chlorthalidone  25 mg Oral Daily    ferrous sulfate  325 mg Oral BID    insulin detemir U-100  10 Units Subcutaneous QHS    insulin detemir U-100  5 Units Subcutaneous Daily    losartan  100 mg Oral Daily    metoprolol tartrate  50 mg Oral BID    nitroGLYCERIN 2% TD oint  0.5 inch Topical (Top) Q12H    pantoprazole  40 mg Oral Daily     PRN Meds:albumin human 5%, albuterol sulfate, Dextrose 10% Bolus, Dextrose 10% Bolus, insulin aspart U-100, lactated ringers, magnesium sulfate IVPB, magnesium sulfate IVPB, metoclopramide HCl, ondansetron, potassium chloride in water **AND** potassium chloride in water **AND** potassium chloride in water, traMADol     Review of Systems   Constitutional: Negative for appetite change, chills, diaphoresis, fatigue and fever.   Respiratory: Negative.    Cardiovascular: Negative.    Gastrointestinal: Negative.    Hematological: Does not bruise/bleed easily.     Objective:     Vital Signs (Most Recent):  Temp: 99.2 °F (37.3 °C) (11/17/19 1221)  Pulse: 90 (11/17/19 1303)  Resp: 18 (11/17/19 1303)  BP: 130/79 (11/17/19 1221)  SpO2: 97 % (11/17/19 1303) Vital Signs (24h Range):  Temp:  [97.5 °F (36.4 °C)-100.3 °F (37.9 °C)] 99.2 °F (37.3 °C)  Pulse:  [] 90  Resp:  [18-20] 18  SpO2:  [93 %-98 %] 97 %  BP: (122-138)/(53-81) 130/79     Weight: 76.2 kg (167 lb 15.9 oz)  Body mass index is 31.74 kg/m².  Body surface area is 1.81 meters squared.      Intake/Output Summary (Last 24 hours) at 11/17/2019 1502  Last data filed at 11/17/2019 0500  Gross per 24 hour   Intake 838 ml   Output --   Net 838 ml        Physical Exam   Constitutional: She is oriented to person, place, and time. She appears well-developed and well-nourished.   HENT:   Head: Normocephalic and atraumatic.   Eyes: EOM are normal.   Neck: Neck supple.   Cardiovascular: Normal rate and regular rhythm.   Pulmonary/Chest: Effort normal and breath sounds normal.   Abdominal: Soft.   Neurological: She is alert and oriented to person, place, and time.       Significant Labs:   BMP:   Recent Labs   Lab 11/16/19  0537 11/16/19  1645 11/17/19  0459   GLU 78 183* 140*    132* 134*   K 4.0 4.3 3.7    102 101   CO2 22* 18* 25   BUN 24* 28* 23   CREATININE 0.7 0.8 0.7   CALCIUM 9.0 9.0 8.8   MG 2.1  --  1.9    and CBC:   Recent Labs   Lab 11/16/19  0537 11/17/19  0459   WBC 10.68 8.37   HGB 9.1* 8.4*   HCT 30.7* 27.1*   * 155       Diagnostic Results:  I have reviewed all pertinent imaging results/findings within the past 24 hours.

## 2019-11-17 NOTE — SUBJECTIVE & OBJECTIVE
Interval History:  The patient is postop day 17 status post coronary artery bypass grafting x3 patient continues to be hemodynamically stable.  Her right arm is still swollen.  With dusky thumb and index finger.    ROS  Medications:  Continuous Infusions:  Scheduled Meds:   albuterol-ipratropium  3 mL Nebulization Q6H WAKE    amLODIPine  10 mg Oral Daily    apixaban  5 mg Oral BID    aspirin  81 mg Oral Daily    atorvastatin  80 mg Oral QHS    chlorhexidine  15 mL Mouth/Throat BID    chlorthalidone  25 mg Oral Daily    ferrous sulfate  325 mg Oral BID    insulin detemir U-100  10 Units Subcutaneous QHS    insulin detemir U-100  5 Units Subcutaneous Daily    losartan  100 mg Oral Daily    metoprolol tartrate  50 mg Oral BID    nitroGLYCERIN 2% TD oint  0.5 inch Topical (Top) Q12H    pantoprazole  40 mg Oral Daily    psyllium husk  1 packet Oral BID     PRN Meds:sodium chloride, albumin human 5%, albuterol sulfate, Dextrose 10% Bolus, Dextrose 10% Bolus, insulin aspart U-100, iohexol, lactated ringers, magnesium sulfate IVPB, magnesium sulfate IVPB, metoclopramide HCl, ondansetron, potassium chloride in water **AND** potassium chloride in water **AND** potassium chloride in water, traMADol     Objective:     Vital Signs (Most Recent):  Temp: 97.5 °F (36.4 °C) (11/17/19 0717)  Pulse: 89 (11/17/19 0812)  Resp: 18 (11/17/19 0812)  BP: 134/69 (11/17/19 0717)  SpO2: 98 % (11/17/19 0812) Vital Signs (24h Range):  Temp:  [97 °F (36.1 °C)-100.3 °F (37.9 °C)] 97.5 °F (36.4 °C)  Pulse:  [] 89  Resp:  [18-20] 18  SpO2:  [93 %-98 %] 98 %  BP: (114-138)/(53-81) 134/69     Weight: 76.2 kg (167 lb 15.9 oz)  Body mass index is 31.74 kg/m².    SpO2: 98 %  O2 Device (Oxygen Therapy): room air    Intake/Output - Last 3 Shifts       11/15 0700 - 11/16 0659 11/16 0700 - 11/17 0659 11/17 0700 - 11/18 0659    P.O. 1560 1318     I.V. (mL/kg) 100 (1.3)      Blood       Total Intake(mL/kg) 1660 (21.3) 1318 (17.3)     Net  +1660 +1318            Urine Occurrence 4 x 5 x     Stool Occurrence 1 x 2 x           Lines/Drains/Airways     Peripherally Inserted Central Catheter Line                 PICC Double Lumen 11/07/19 1645 left brachial;left basilic 9 days                Physical Exam   Constitutional: She is oriented to person, place, and time. She appears well-developed and well-nourished. No distress.   HENT:   Head: Normocephalic and atraumatic.   Cardiovascular: Normal rate, regular rhythm and normal heart sounds.   Pulmonary/Chest: Effort normal and breath sounds normal.   Abdominal: Soft. Bowel sounds are normal.   Musculoskeletal: She exhibits edema.   Neurological: She is alert and oriented to person, place, and time.   Skin: Skin is warm and dry. She is not diaphoretic.   Psychiatric: She has a normal mood and affect.       Significant Labs:  ABGs: No results for input(s): PH, PCO2, PO2, HCO3, POCSATURATED, BE in the last 48 hours.  All pertinent labs from the last 24 hours have been reviewed.    Significant Diagnostics:  I have reviewed all pertinent imaging results/findings within the past 24 hours.

## 2019-11-17 NOTE — PROGRESS NOTES
[facility] -   Cardiothoracic Surgery  Progress Note    Patient Name: Mateusz Bates  MRN: [id number]  Admission Date: 10/28/2019  Hospital Length of Stay: 20 days  Code Status: Full Code   Attending Physician: Keny Zaidi MD   Referring Provider: Self, Aaareferral  Principal Problem:Acute deep vein thrombosis (DVT) of right upper extremity            Subjective:     Post-Op Info:  Procedure(s) (LRB):  CORONARY ARTERY BYPASS GRAFT (CABG) (N/A)  ECHOCARDIOGRAM,TRANSESOPHAGEAL (N/A)  BLOCK, NERVE, INTERCOSTAL, 2 OR MORE (N/A)   17 Days Post-Op     Interval History:  The patient is postop day 17 status post coronary artery bypass grafting x3 patient continues to be hemodynamically stable.  Her right arm is still swollen.  With dusky thumb and index finger.    ROS  Medications:  Continuous Infusions:  Scheduled Meds:   albuterol-ipratropium  3 mL Nebulization Q6H WAKE    amLODIPine  10 mg Oral Daily    apixaban  5 mg Oral BID    aspirin  81 mg Oral Daily    atorvastatin  80 mg Oral QHS    chlorhexidine  15 mL Mouth/Throat BID    chlorthalidone  25 mg Oral Daily    ferrous sulfate  325 mg Oral BID    insulin detemir U-100  10 Units Subcutaneous QHS    insulin detemir U-100  5 Units Subcutaneous Daily    losartan  100 mg Oral Daily    metoprolol tartrate  50 mg Oral BID    nitroGLYCERIN 2% TD oint  0.5 inch Topical (Top) Q12H    pantoprazole  40 mg Oral Daily    psyllium husk  1 packet Oral BID     PRN Meds:sodium chloride, albumin human 5%, albuterol sulfate, Dextrose 10% Bolus, Dextrose 10% Bolus, insulin aspart U-100, iohexol, lactated ringers, magnesium sulfate IVPB, magnesium sulfate IVPB, metoclopramide HCl, ondansetron, potassium chloride in water **AND** potassium chloride in water **AND** potassium chloride in water, traMADol     Objective:     Vital Signs (Most Recent):  Temp: 97.5 °F (36.4 °C) (11/17/19 0717)  Pulse: 89 (11/17/19 0812)  Resp: 18 (11/17/19 0812)  BP: 134/69  (11/17/19 0717)  SpO2: 98 % (11/17/19 0812) Vital Signs (24h Range):  Temp:  [97 °F (36.1 °C)-100.3 °F (37.9 °C)] 97.5 °F (36.4 °C)  Pulse:  [] 89  Resp:  [18-20] 18  SpO2:  [93 %-98 %] 98 %  BP: (114-138)/(53-81) 134/69     Weight: 76.2 kg (167 lb 15.9 oz)  Body mass index is 31.74 kg/m².    SpO2: 98 %  O2 Device (Oxygen Therapy): room air    Intake/Output - Last 3 Shifts       11/15 0700 - 11/16 0659 11/16 0700 - 11/17 0659 11/17 0700 - 11/18 0659    P.O. 1560 1318     I.V. (mL/kg) 100 (1.3)      Blood       Total Intake(mL/kg) 1660 (21.3) 1318 (17.3)     Net +1660 +1318            Urine Occurrence 4 x 5 x     Stool Occurrence 1 x 2 x           Lines/Drains/Airways     Peripherally Inserted Central Catheter Line                 PICC Double Lumen 11/07/19 1645 left brachial;left basilic 9 days                Physical Exam   Constitutional: She is oriented to person, place, and time. She appears well-developed and well-nourished. No distress.   HENT:   Head: Normocephalic and atraumatic.   Cardiovascular: Normal rate, regular rhythm and normal heart sounds.   Pulmonary/Chest: Effort normal and breath sounds normal.   Abdominal: Soft. Bowel sounds are normal.   Musculoskeletal: She exhibits edema.   Neurological: She is alert and oriented to person, place, and time.   Skin: Skin is warm and dry. She is not diaphoretic.   Psychiatric: She has a normal mood and affect.       Significant Labs:  ABGs: No results for input(s): PH, PCO2, PO2, HCO3, POCSATURATED, BE in the last 48 hours.  All pertinent labs from the last 24 hours have been reviewed.    Significant Diagnostics:  I have reviewed all pertinent imaging results/findings within the past 24 hours.    Assessment/Plan:     S/P CABG x 3       11/01/2019  The patient is post-operative day 1, status-post coronary artery bypass grafting x 3.  Overall the patient is making progress, however IABP and inotropic agents will continue today.   Neuro:  The patient is  awake alert and oriented x3.  Neuro exam is nonfocal.  Pain is well controlled with IV pain medication.  Cardiac:  Patient is hemodynamically improving. Patient remains on epinephrine and milrinone gtts, which will be weaned over the next 24 hours.  Cardiac index is improving. IABP will remain in place over the next 24 hours.  Patient will be started on beta blockers once gtts are weaned off.  Respiratory: Good sats on high flow nasal cannula. Continue respiratory toilet, continue incentive spirometer. Wean to low flow nasal cannula.  GI:  Diet: sips of water with medications, will slowly advance diet as tolerated. Benign abdominal exam.  Renal:  Urine output is slowly improving. Cr 0.9. K 4.3. Mag 1.6. Replace mag. Continue IV diuresis. Add metolazone.   Heme:  Hct 34.1  Platelet 82. Continue aspirin. Discontinue Plavix for now.  Id:  WBC 12. Tmax 100.3. Continue post-operative antibiotics. Will continue to follow.   Endocrine:  Glucose is controlled with insulin gtt.  Continue insulin gtt for now, until weaned off inotropic agents.   Activities:  Patient is currently bed bound due to IABP.  Advance activities as tolerated once IABP is removed.   Lines tubes and drains:  Continue IABP. Continue [place] and Cordis.  Continue A-line.  Chest tubes will continue. NICOLASA x 2 will continue. Jones catheter will continue.  Continue pacer wires. Continue prevena wound vac.  Plan: Remain ICU.          11/02/2019  The patient is post-operative day 2, status-post coronary artery bypass grafting x 3.  Overall the patient is making progress, IABP removed and inotropic agents discontinued.   Neuro:  The patient is awake alert and oriented x3.  Neuro exam is nonfocal.  Pain is well controlled. Discontinue fentanyl.   Cardiac:  Patient is hemodynamically improving. Inotropic gtts discontinued. Excellent cardiac index. IABP discontinued.  Patient will be started on beta blockers today.  Respiratory: Good sats on high flow nasal cannula.  Continue respiratory toilet, continue incentive spirometer. Wean to low flow nasal cannula.  GI:  Advance diabetic/cardiac diet as tolerated. Benign abdominal exam.  Renal:  Urine output is improving. Cr 0.9. K 4.9. Mag 2.2. Continue IV diuresis. Hold AM K.   Heme:  Hct 28.3  Platelet 47. Discontinue aspirin. Discontinue Plavix. HIT panel. Consult hematology r/t thrombocytopenia.   Id:  WBC 10.4. Tmax 99.4. Will continue to follow.   Endocrine:  Discontinue insulin gtt. Start sliding scale insulin. Start long acting insulin detemir.   Activities: Advance activities as tolerated.   Lines tubes and drains:  Discontinue IABP. Discontinue [place] and Cordis.  Discontinue A-line.  Discontinue chest tubes x 3. Discontinue NICOLASA x 2. Discontinue arterial line. Jones catheter will continue for now.  Continue pacer wires. Continue prevena wound vac. Place PICC line.   Plan: Remain ICU.           11/03/2019  The patient is post-operative day 3, status-post coronary artery bypass grafting x 3.  Overall the patient is making progress.  Neuro:  The patient is awake alert and oriented x3.  Neuro exam is nonfocal.  Pain is well controlled. Continue tramadol.   Cardiac:  Patient is hemodynamically stable. Increase metoprolol dose. Start amlodipine.   Respiratory: Good sats on nasal cannula. Continue respiratory toilet, continue incentive spirometer. Wean to room air.  GI:  Advance diabetic/cardiac diet as tolerated. Benign abdominal exam. Passing gas, no BM. Add 1 bottle of mag citrate.   Renal:  Good urine output. Cr 0.9. K 3.8. Mag 2.0. Replace K. Replace Mag. Change to PO diuresis.   Heme:  Hct 34.7  Platelet 89. Restart aspirin. Continue to hold Plavix. HIT panel pending. Hematology following.  Id:  WBC 13.8. Tmax 99.4. Will continue to follow.    Endocrine:  Glucose controlled on sliding scale insulin. Increase long acting insulin detemir dose.   Activities: Advance activities as tolerated.   Lines tubes and drains:  Jones catheter  will continue for now.  Continue pacer wires. Continue prevena wound vac. Continue PICC line.   Plan: Remain ICU.          11/04/2019  The patient is post-operative day 4, status-post coronary artery bypass grafting x 3.  Overall the patient is making progress.  Neuro:  The patient is awake alert and oriented x3.  Neuro exam is nonfocal.  Pain is well controlled. Continue tramadol.   Cardiac:  Patient is hemodynamically stable. Continue metoprolol dose. Continue amlodipine dose.   Respiratory: Good sats on room air. Continue respiratory toilet, continue incentive spirometer.  GI:  Advance diabetic/cardiac diet as tolerated. Benign abdominal exam. Regular bowel movements.   Renal:  Good urine output. Cr 0.7. K 4.0. Mag 2.3. Replace K. Decrease PO diuresis.   Heme:  Hct 34.4  Platelet 140. Continue aspirin. Restart Plavix. HIT panel pending. Hematology following.  Id:  WBC down to 13.13. Tmax 98. Will continue to follow.    Endocrine:  Glucose controlled on sliding scale insulin. Increased long acting insulin detemir dose.   Activities: Advance activities as tolerated.   Lines tubes and drains:  Discontinued storm catheter. Continue pacer wires. Continue prevena wound vac. Continue PICC line.   Plan: Transfer to telemetry. Anticipate discharge to SNF in 48 hours.         11/05/2019  The patient is post-operative day 5, status-post coronary artery bypass grafting x 3.  Overall the patient is making progress.  Neuro:  The patient is awake alert and oriented x3.  Neuro exam is nonfocal. Patient slow to answer questions, however answers are appropriate.  Pain is well controlled. Continue tramadol PRN.   Cardiac:  Patient is hemodynamically stable. Continue metoprolol dose. Discontinue amlodipine. Start losartan.    Respiratory: Good sats on room air. Continue respiratory toilet, continue incentive spirometer.  GI:  Advance diabetic/cardiac diet as tolerated. Benign abdominal exam. Regular bowel movements.   Renal:  Good  urine output. Cr 0.7. K 4.8. Mag 2.1. Replace Mag. Discontinue PO diuresis.   Heme:  Hct 39.5  Platelet 73. Continue aspirin. Hold Plavix. HIT panel pending. Hematology following.  Id:  WBC is up to 16. Tmax 99. Pan-culture. DC PICC line. Chest xray. Start broad spectrum empiric antibiotics. Will continue to follow.    Endocrine:  Glucose controlled on sliding scale insulin. Decrease long acting insulin detemir dose.   Activities: Advance activities as tolerated.   Lines tubes and drains:  Discontinue pacer wires. Discontinue prevena wound vac. Continue PICC line.   Plan: Anticipate discharge to SNF in 24-48 hours.         11/06/2019  The patient is post-operative day 6, status-post coronary artery bypass grafting x 3. Patient had an episode of decompensation and unresponsiveness yesterday evening. The patient was found unresponsive, cool, and diaphoretic, with agonal breathing. Patient was intubated and transferred to the ICU. Bedside EKG and echocardiogram performed at the bedside showed no significant changes. CT head, chest, abdomen pelvis performed, showed no significant findings.     Neuro:  The patient is intubated and sedated. On Precedex gtt. Begin weaning off sedation.    Cardiac:  Patient is hemodynamically improving. Inotropic agent dependent. Currently on Epinephrine gtt. Start Milrinone gtt. Discontinue metoprolol. Discontinue losartan.    Respiratory: Ventilator dependent. tV 450, RR 20, PEEP 5. Wean down sedation. Wean off ventilator today.    GI:  Begin enteral tube feedings. Benign abdominal exam. Previously having regular bowel movements.   Renal: Adequate urine output. Cr 1.1. K 3.6. Mag 1.7. Replace Mag. Replace K. Discontinue diuresis.   Heme:  Hct 31.9  Platelet 89. Hold aspirin. Hold Plavix. HIT panel negative. Patient received 1 units PRBC last night. Hematology following.  Id:  WBC is down to 15.5. Tmax 99.9. BC NGTD. Urinalysis negative. Sputum culture negative. Vancomycin and zosyn day 2.  Will continue to follow closely.    Endocrine:  Glucose controlled on insulin gtt, continue at this time.   Activities: Intubated and sedated.   Musculoskeletal: Unable to find right radial pulse via doppler. Tips of the fingers of the right hand are mottled, dusky, appear cyanotic. Recent right radial arterial line in place, which has been relocated. US of arteries and veins of the right arm ordered. Consult to vascular surgery placed.    Lines tubes and drains:  Continue swan and cordis. Continue arterial line. Continue storm.    Plan: Remain ICU         11/07/2019  The patient is post-operative day 7, status-post coronary artery bypass grafting x 3. Patient had an episode of decompensation and unresponsiveness the evening of 11/05/2019. The patient was found unresponsive, cool, and diaphoretic, with agonal breathing. Patient was intubated and transferred to the ICU. Bedside EKG and echocardiogram performed at the bedside showed no significant changes. CT head, chest, abdomen pelvis performed, showed no significant findings.     Neuro:  The patient is intubated and sedated. On Precedex gtt. Wean off sedation.    Cardiac:  Patient is hemodynamically improving. Excellent cardiac index. Wean off Inotropic gtts.   Respiratory: Wean towards extubation. tV 400, RR 18, PEEP 5.   GI:  Continue enteral tube feedings while intubated. Benign abdominal exam.  Renal: Adequate urine output. Cr 1.1. K 4.1. Mag 2.8. Start IV diuresis.   Heme:  Hct 24.6  Platelet 60. Hold aspirin. Hold Plavix. HIT panel negative.  Id:  WBC is down to 14.46. Tmax 100.8. BC NGTD. Urinalysis negative. Sputum culture negative. Vancomycin and zosyn day 3. Midline sternal incision appears reddened, with induration at skin edges. Will continue to follow closely.    Endocrine:  Glucose controlled on sliding scale insulin.   Activities: Intubated and sedated. Moves all 4. Full ROM.    Musculoskeletal: Able to find right radial pulse via doppler. The right  hand extending to the tips of the fingers of the right hand are mottled, dusky, appear cyanotic. Recent right radial arterial line placement, which has been relocated. US of arteries and veins of the right arm pending. Vascular surgery following, suggested possible thrombotic etiology, suggested heparin gtt, however this is contraindicated r/t s/p cabg x 3.     Lines tubes and drains:  Continue swan and cordis. Continue arterial line. Continue storm.    Plan: Remain ICU         11/08/2019  The patient is post-operative day 8, status-post coronary artery bypass grafting x 3. Patient had an episode of decompensation and unresponsiveness the evening of 11/05/2019. The patient was found unresponsive, cool, and diaphoretic, with agonal breathing. Patient was intubated and transferred to the ICU. Bedside EKG and echocardiogram performed at the bedside showed no significant changes. CT head, chest, abdomen pelvis performed, showed no significant findings. Suspect cardiogenic shock.      Neuro:  Patients neurologic state is improving. Alert, follows commands, makes eye contact, disoriented x 4. No focal deficits.    Cardiac:  Patient is hemodynamically improving. Off all inotropic gtts. Start low dose metoprolol and amlodipine.   Respiratory: Good sats on low flow nasal cannula. Continue IS. Continue pulmonary toilet. Wean to room air.    GI:  Continue enteral tube feedings at this time r/t disorientation and aspiration risk. Discontinue laxatives. Start metamucil BID, patient has had multiple loose stools.   Renal: Good urine output. Cr 0.8. K 3.6. Mag 2.1. Replace K. Replace Mag. Continue IV diuresis.   Hema:  Hct 26.4  Platelet 45. Hold aspirin. Hold Plavix. HIT panel will be resent as the patient is a high suspicion for HIT despite previous negative HIT panel. Multiple DVTs from the right IJ down to the brachiocephalic vein. Confirmed via vascular US. Patient started on argatroban yesterday, goal is APTT 2x baseline.  Most recent APTT 97.8. Baseline APTT 50.8.  Hematology following. Vascular surgery following.   Id:  WBC is down to 12. Tmax 100.4. BC NGTD. Urinalysis negative. Sputum culture negative. Vancomycin and zosyn day 4. Midline sternal incision appears improved, with minimal redness and induration at skin edges. Will continue to follow closely.    Endocrine:  Glucose controlled on sliding scale insulin.   Activities: Increase activities as tolerated.  Musculoskeletal: ROM 2/5 to the right wrist/hand/fingers, distal from the elbow there is 2+ non pitting edema, the extremity is tender and warm. There is mottling, a dusky appearance, and purple discoloration to the fingertips of the right hand. See above hematology plan.      Lines tubes and drains:  Continue PICC line. Continue storm catheter. Continue NG tube.   Plan: Remain ICU     11/09/2019  The patient is postop day 9.  Status post coronary artery bypass grafting x3.  Postop course was complicated by an episode of hemodynamic instability and decompensation on postop day number 5.  Patient continues to make clinical progress since then.  She is off all drips.  She remains extubated.  Continue ICU care for now.    Neuro:  Patient is awake alert.  She is oriented x2.  She is still confused.  But she is making progress.  Her neuro exam is nonfocal.  She follows all commands.  Cardiac:  Patient continues to be hemodynamically stable.  She is somewhat hypertensive.  Will increase her antihypertensives.  Respiratory:  Patient is good sats on nasal cannula.  She remains extubated.  GI:  Patient was tolerating tube feeds until this morning.  She pulled heart feeding tube. Will get a swallowing evaluation.  Mode of nutrition will depend on results of swallowing evaluation.  Renal:  Patient has good urine output %period% creatinine is 0.9.  Heme:  Hematocrit is 26 platelet is 46.  Patient is on argatroban  for anticoagulation due to right upper extremity DVTs.   Hypercoagulability workup is in progress.  Appreciate Hematology and vascular surgery input.  Id:  Incision continues to improve.  white count is 17.  Continue broad-spectrum antibiotics.  Endocrine:  Glucose is controlled on its sliding scale insulin.  Activities:  Advance activities as tolerated.  Line tubes and drains.  Patient has a PICC line, Jones catheter.    11/10/2019  The patient is postop day 10.  Status post coronary bypass grafting x3.  Postop course was complicated by an episode of hemodynamic instability and decompensation on postop day 5.  Patient remains hemodynamically stable.    Neuro:  The patient is awake and alert.  She is oriented x1.  She continues to be confused.  Her neuro exam is nonfocal.  Cardiac:  Patient continues to be hemodynamically stable.  His she is hypertensive.  Hypertensive medications will be increased.  Respiratory:  Patient has good sats on nasal cannula.  Continue pulmonary toilet.  GI:  Patient is tolerating a regular diet.  Renal:  Patient has good urine output. Creatinine is 0.7.  Heme: hematocrit is 24 and platelet is 67.  Patient continues on argatroban for right upper extremity DVTs.  Hypercoagulable workup is in progress.  Id:  Patient's white count is up to 20.  Patient is on broad prepped from antibiotics.  Endocrine:  The patient is on long-acting as well as sliding scale insulin.  Blood sugars have been well controlled.  Will decrease dose of long-acting.  Activities: patient has been out of bed to chair.  Advance as tolerated.  Musculoskeletal:  Patient's right upper extremity continues to be swollen  The index finger and thumb continues to be dusky with a bleb near the base of the thumb.  Continue argatroban for anticoagulation.  Continue elevation of right upper extremity.  Lines tubes and drains: patient has a PICC line.  Will discontinue Jones catheter.        11/11/2019  The patient is post-operative day 11, status-post coronary artery bypass grafting x 3.  Post-operative course was complicated by an episode of hemodynamic instability and decompensation on postop day 5.  Patient remains hemodynamically stable.    Neuro:  Patients neurologic state is improving. Oriented x 2. Alert, follows commands, makes eye contact. No focal deficits.    Cardiac:  Patient is hemodynamically improving. Hypertensive. Continue metoprolol, amlodipine, and losartan. Add chlorthalidone.   Respiratory: Good sats on room air. Continue IS. Continue pulmonary toilet.  GI:  Continue cardiac/diabetic diet, modified by puree and thick liquids with swallow precautions. Advance as tolerated. Continue metamucil.  Renal: Good urine output. Cr 0.8. K 3.8. Replace K. Continue IV diuresis. Add chlorthalidone. BMP at noon.   Hema:  Hct 23.8  Platelet 75. Hold aspirin. Hold Plavix. Repeat HIT panel pending. Multiple DVTs from the right IJ down to the brachiocephalic vein. Confirmed via vascular US. Patient on argatroban gtt, goal is APTT 2x baseline. Most recent APTT 83.3. Baseline APTT 50.8.  Hematology following. Vascular surgery following.   Id:  WBC is down to 19. Tmax 98.4. BC NGTD. Vancomycin and zosyn day 7. Discontinue broad spectrum antibiotics. Will continue to follow closely.    Endocrine:  Glucose controlled on sliding scale insulin and long acting insulin detemir.    Activities: Increase activities as tolerated.  Musculoskeletal: The right upper extremity exhibits swelling distal from the elbow. The RUE is tender to palpation and ROM. ROM is diminished to the right hand 2/5. The hand his reddened, swollen, with dusky, mottled, purple discoloration to the right thumb and tip of the right index finger. Blisters have formed on the proximal portion of the thumb and wrist. Severely diminished capillary refill to the tip of the right thumb and tip of the right index finger. Wound care order placed.      Lines tubes and drains:  Continue PICC line.  Plan: Remain ICU         11/12/2019  The patient is  post-operative day 12, status-post coronary artery bypass grafting x 3. Post-operative course was complicated by an episode of hemodynamic instability and decompensation on postop day 5.  Patient remains hemodynamically stable.    Neuro:  Patients neurologic state is improving. Oriented to person, place, situation. Disoriented to time. Alert, follows commands, makes eye contact. No focal deficits.    Cardiac:  Patient is hemodynamically improving. Continue metoprolol, amlodipine, losartan, and chlorthalidone.   Respiratory: Good sats on room air. Continue IS.  GI:  Continue cardiac/diabetic diet, modified by puree and thick liquids with swallow precautions. Advance as tolerated. Continue metamucil.  Renal: Good urine output. Cr 0.8. K 3.9. Replace K. Discontinue lasix. Continue chlorthalidone.  Hema:  Hct 23.1  Platelet 75. Hold aspirin. Hold Plavix. 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 Multiple DVTs from the right IJ down to the brachiocephalic vein. Confirmed via vascular US. Patient on argatroban gtt, goal is APTT 2x baseline. Most recent APTT 84.6. Baseline APTT 50.8.  Hematology following. Vascular surgery following.   Id:  WBC is down to 17.4. A-febrile. Will continue to follow closely.    Endocrine:  Patient had an episode of hypoglycemia last night. Will continue sliding scale insulin. Discontinue long acting insulin. Begin strict calorie count.   Activities: Increase activities as tolerated.  Musculoskeletal: The right upper extremity exhibits swelling distal from the elbow. The RUE is tender to palpation and ROM. ROM is diminished to the right hand 3/5. The hand is reddened, swollen, with dusky, mottled, purple discoloration to the right thumb tip and tip of the right index finger.  Severely diminished capillary refill to the tip of the right thumb and tip of the right index finger. Wound care following. Gauze dressing in place.       Lines tubes and drains:  Continue PICC line.  Plan: Remain ICU.  Begin discharge planning for IP rehab.          11/13/2019  The patient is post-operative day 13, status-post coronary artery bypass grafting x 3. Post-operative course was complicated by an episode of hemodynamic instability and decompensation on postop day 5.  Patient remains hemodynamically stable.    Neuro:  Patient is oriented to person, place, situation. Disoriented to time. Alert, follows commands, makes eye contact. No focal deficits.    Cardiac:  Patient is hemodynamically stable. Continue metoprolol, amlodipine, losartan, and chlorthalidone.   Respiratory: Good sats on room air. Continue IS. Use of accessory muscles noted during inspiration. Will increase diuresis.   GI:  Continue cardiac/diabetic diet, modified by puree and thick liquids with swallow precautions. Advance as tolerated. Continue metamucil.  Renal: Good urine output. Cr 0.8. K 4.0. Mag 1.8. Replace Mag. Start lasix IV. Continue chlorthalidone.  Hema:  Hct 23.  Platelet 79. Hold aspirin. Hold Plavix. 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 Multiple DVTs from the right IJ down to the brachiocephalic vein. Confirmed via vascular US. Patient on argatroban gtt, goal is APTT 2x baseline. Most recent APTT 75.6. Baseline APTT 50.8.  Hematology following. Vascular surgery following.   Id:  WBC is down to 12.2. A-febrile. Will continue to follow closely.    Endocrine:  Glucose controlled. Continue sliding scale insulin. Start long acting insulin detemir 5 units daily.     Activities: Increase activities as tolerated.  Musculoskeletal: The right upper extremity exhibits swelling distal from the elbow. The RUE is tender to palpation and ROM. ROM is diminished to the right hand 2/5. The right hand is improving. The dusky, mottled, purple discoloration is localized to the right thumb tip and tip of the right index finger.  Severely diminished capillary refill to the tip of the right thumb and tip of the right index finger. Wound care following. Gauze  dressing in place.    Lines tubes and drains:  Continue PICC line.  Plan: Remain telemetry. Continue discharge planning for IP rehab.          11/14/2019  The patient is post-operative day 14, status-post coronary artery bypass grafting x 3. Post-operative course was complicated by an episode of hemodynamic instability and decompensation on postop day 5.  Patient remains hemodynamically stable.    Neuro:  Patient is oriented to person, place, situation. Disoriented to time. Alert, follows commands, makes eye contact. No focal deficits.    Cardiac:  Patient is hemodynamically stable. Continue metoprolol, amlodipine, losartan, and chlorthalidone.   Respiratory: Good sats on room air. Continue IS. Decreased use of accessory muscles noted during inspiration.   GI:  Continue cardiac/diabetic diet, modified by puree and thick liquids with swallow precautions. Advance as tolerated. Continue metamucil.  Renal: Good urine output. Cr 0.9. K 4.2. Mag 1.9. Replace Mag. Discontinue lasix. Continue chlorthalidone.  Hema:  Hct 21.9.  Platelet 87. Hold aspirin. Hold Plavix. 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 Multiple DVTs from the right IJ down to the brachiocephalic vein. Confirmed via vascular US. As per discussion with hematology and pharmacy, will discontinue argatroban and start apixaban. Transfuse 1 unit PRBC.  Hematology following. Vascular surgery following.   Id:  WBC is down to 11. A-febrile. Will continue to follow closely.    Endocrine:  Continue sliding scale insulin. Increase insulin detemir to 5 units BID.     Activities: Increase activities as tolerated.  Musculoskeletal: The right upper extremity exhibits swelling distal from the elbow. The RUE is tender to palpation and ROM. ROM is diminished to the right hand 2/5. The right hand is slowly improving. The dusky, mottled, purple discoloration is localized to the right thumb tip and tip of the right index finger.  Severely diminished capillary refill to the  tip of the right thumb and tip of the right index finger. Wound care following. Gauze dressing in place.    Lines tubes and drains:  Continue PICC line.  Plan: Remain telemetry. Continue discharge planning for IP rehab.          11/15/2019  The patient is post-operative day 15, status-post coronary artery bypass grafting x 3. Post-operative course was complicated by an episode of hemodynamic instability and decompensation on postop day 5.  Patient remains hemodynamically stable.    Neuro:  Patient is oriented to person, place, situation. Disoriented to time. Alert, follows commands, makes eye contact. No focal deficits.    Cardiac:  Patient is hemodynamically stable. Continue metoprolol, amlodipine, losartan, and chlorthalidone.   Respiratory: Good sats on low flow nasal cannula. Wean to room air. Continue IS.   GI:  Continue cardiac/diabetic diet, modified by puree and thick liquids with swallow precautions. Advance as tolerated. Continue metamucil.  Renal: Good urine output. Cr 0.8. K 3.6. Replace K. Continue chlorthalidone.  Hema:  Hct 26.1. Platelet 99. Start Aspirin. Continue Apixaban 5mg BID.  Hematology following. Vascular surgery following.   Id:  WBC 11. A-febrile. Will continue to follow closely.    Endocrine:  Continue sliding scale insulin. Increase insulin detemir to 5 units AM dose, 10 units PM dose.   Activities: Increase activities as tolerated.  Musculoskeletal: The right upper extremity exhibits swelling distal from the elbow. The RUE is tender to palpation and ROM. ROM is diminished to the right hand 2/5. The right hand is slowly improving. The dusky, mottled, purple discoloration is localized to the right thumb tip and tip of the right index finger.  Severely diminished capillary refill to the tip of the right thumb and tip of the right index finger. Wound care following. Gauze dressing in place.    Lines tubes and drains:  Continue PICC line.  Plan: Remain telemetry. Begin discharge planning to  LTAC, anticipate discharge in 48-72 hours.        11/16/2019  The patient is postop day 16.  Status post coronary artery bypass grafting x3.  Patient's postop course was complicated by an episode of hemodynamically instability and decompensation on postop day 5.  Since then the patient has made much clinical progress.  The patient remains hemodynamically stable.  Anticipate discharge to rehab or LTAC in the next 48-72 hours.    Neuro:  The patient is oriented x3.  She is much less confused.  Her responses to questioning is appropriate.  She moves all 4 she has no focal deficits.  Cardiac:  Patient remains hemodynamically stable.  Systolic blood pressure trending higher.  Will increase losartan.  Continue amlodipine metoprolol and chlorthalidone.  Respiratory:  The patient has good sats on room air.  Continue pulmonary toilet.  GI:  Patient is tolerating a regular the diet.  Renal:  Patient has good urine output.  creatinine is 0.7.  Heme:  Hematocrit is 30.  Platelet count is up to 129.  Continue aspirin and apixaban.  Id:  White count is 10.  The patient is afebrile.  Endocrine:  Glucose is better controlled on long-acting insulin.  Activities patient is out of bed to chair continue increasing activities.  Line tubes and drains:  Patient has a PICC line continue PICC line.  Musculoskeletal:  Perfusion to the patient's right arm is much improved.  Swelling is somewhat decreased.  The area of dusky tissue remained isolated to the tip of the patient's right thumb and index finger.    11/17/2019  The patient is postop day 17.  Status post coronary artery bypass grafting x3.  The patient's postop course was complicated by an episode of hemodynamic instability ankle decompensation on postop day 5.  The patient is making clinical progress.  She has remained hemodynamically stable.  Anticipate discharge to an LTAC or rehab center in the next 48-72 hours.    Neuro:  The patient is alert and oriented x3.  Her confusion is  much improved.  Her responses to all questions appropriately.  She moves all 4 and has no focal deficits.  Cardiac:  The patient remains hemodynamically stable.  She is tolerating losartan amlodipine metoprolol and chlorthalidone.  Respiratory:  The patient has good sats on room air.  Continue pulmonary toilet.  GI:  The patient is tolerating a regular diet.  Renal:  The patient has good urine output and had creatinine 0.7.  Heme:  The patient's hematocrit is 27 and her platelet count is increased to 155.  Continue aspirin and apixaban.  Id:  Patient is afebrile and her white count is 8.  Endocrine high glucose is better controlled on long-acting insulin plus sliding scale.  Activities:  The patient is out of bed to chair.  Continue advancing activities.  Line tubes and drains:  The patient has a PICC line.  Musculoskeletal:  Perfusion to the patient's right and continues to be improved.  However high right am is still swollen.  The area of dusky tissue remained stable.    NSTEMI (non-ST elevated myocardial infarction)  The patient is a 69-year-old female with hypertension, diabetes, hyperlipidemia, obesity, anxiety and depression, iron deficiency anemia, vitamin B12 deficiency, who presented to the emergency room with an NSTEMI.  Workup included a cardiac catheterization that shows severe multivessel coronary artery disease with proximal LAD stenosis of 70% and severely depressed ejection fraction of 20%.  The patient is a candidate for urgent coronary artery bypass grafting.  The risks and benefits of surgery were explained to the patient.  The patient verbalized understanding of the issues discussed.  She a segs the risks of surgery and has agreed to proceed with urgent coronary artery bypass grafting.  She understands that the risk of neurologic complication postoperatively is increased due to the presence of cerebral microvascular disease.        Keny Zaidi MD  Cardiothoracic Surgery  [facility] [place]

## 2019-11-18 LAB
ANION GAP SERPL CALC-SCNC: 11 MMOL/L (ref 8–16)
BASOPHILS # BLD AUTO: 0.03 K/UL (ref 0–0.2)
BASOPHILS NFR BLD: 0.3 % (ref 0–1.9)
BUN SERPL-MCNC: 21 MG/DL (ref 8–23)
CALCIUM SERPL-MCNC: 8.8 MG/DL (ref 8.7–10.5)
CHLORIDE SERPL-SCNC: 99 MMOL/L (ref 95–110)
CO2 SERPL-SCNC: 23 MMOL/L (ref 23–29)
CREAT SERPL-MCNC: 0.8 MG/DL (ref 0.5–1.4)
DIFFERENTIAL METHOD: ABNORMAL
EOSINOPHIL # BLD AUTO: 0.1 K/UL (ref 0–0.5)
EOSINOPHIL NFR BLD: 1 % (ref 0–8)
ERYTHROCYTE [DISTWIDTH] IN BLOOD BY AUTOMATED COUNT: 16.4 % (ref 11.5–14.5)
EST. GFR  (AFRICAN AMERICAN): >60 ML/MIN/1.73 M^2
EST. GFR  (NON AFRICAN AMERICAN): >60 ML/MIN/1.73 M^2
GLUCOSE SERPL-MCNC: 154 MG/DL (ref 70–110)
HCT VFR BLD AUTO: 27.9 % (ref 37–48.5)
HGB BLD-MCNC: 8.6 G/DL (ref 12–16)
IMM GRANULOCYTES # BLD AUTO: 0.16 K/UL (ref 0–0.04)
IMM GRANULOCYTES NFR BLD AUTO: 1.5 % (ref 0–0.5)
LYMPHOCYTES # BLD AUTO: 0.9 K/UL (ref 1–4.8)
LYMPHOCYTES NFR BLD: 8.8 % (ref 18–48)
MAGNESIUM SERPL-MCNC: 2 MG/DL (ref 1.6–2.6)
MCH RBC QN AUTO: 28.9 PG (ref 27–31)
MCHC RBC AUTO-ENTMCNC: 30.8 G/DL (ref 32–36)
MCV RBC AUTO: 94 FL (ref 82–98)
MONOCYTES # BLD AUTO: 1 K/UL (ref 0.3–1)
MONOCYTES NFR BLD: 9.4 % (ref 4–15)
NEUTROPHILS # BLD AUTO: 8.3 K/UL (ref 1.8–7.7)
NEUTROPHILS NFR BLD: 79 % (ref 38–73)
NRBC BLD-RTO: 0 /100 WBC
PLATELET # BLD AUTO: 185 K/UL (ref 150–350)
PMV BLD AUTO: 10.6 FL (ref 9.2–12.9)
POCT GLUCOSE: 160 MG/DL (ref 70–110)
POCT GLUCOSE: 167 MG/DL (ref 70–110)
POCT GLUCOSE: 170 MG/DL (ref 70–110)
POCT GLUCOSE: 355 MG/DL (ref 70–110)
POTASSIUM SERPL-SCNC: 4 MMOL/L (ref 3.5–5.1)
RBC # BLD AUTO: 2.98 M/UL (ref 4–5.4)
SODIUM SERPL-SCNC: 133 MMOL/L (ref 136–145)
WBC # BLD AUTO: 10.5 K/UL (ref 3.9–12.7)

## 2019-11-18 PROCEDURE — 92526 ORAL FUNCTION THERAPY: CPT

## 2019-11-18 PROCEDURE — 63600175 PHARM REV CODE 636 W HCPCS: Performed by: NURSE PRACTITIONER

## 2019-11-18 PROCEDURE — 99232 SBSQ HOSP IP/OBS MODERATE 35: CPT | Mod: ,,, | Performed by: INTERNAL MEDICINE

## 2019-11-18 PROCEDURE — 21400001 HC TELEMETRY ROOM

## 2019-11-18 PROCEDURE — 94640 AIRWAY INHALATION TREATMENT: CPT

## 2019-11-18 PROCEDURE — 25000003 PHARM REV CODE 250: Performed by: NURSE PRACTITIONER

## 2019-11-18 PROCEDURE — 85025 COMPLETE CBC W/AUTO DIFF WBC: CPT

## 2019-11-18 PROCEDURE — 25000242 PHARM REV CODE 250 ALT 637 W/ HCPCS: Performed by: FAMILY MEDICINE

## 2019-11-18 PROCEDURE — 97530 THERAPEUTIC ACTIVITIES: CPT

## 2019-11-18 PROCEDURE — 25000003 PHARM REV CODE 250: Performed by: FAMILY MEDICINE

## 2019-11-18 PROCEDURE — 83735 ASSAY OF MAGNESIUM: CPT

## 2019-11-18 PROCEDURE — 99232 PR SUBSEQUENT HOSPITAL CARE,LEVL II: ICD-10-PCS | Mod: ,,, | Performed by: INTERNAL MEDICINE

## 2019-11-18 PROCEDURE — 25000003 PHARM REV CODE 250: Performed by: INTERNAL MEDICINE

## 2019-11-18 PROCEDURE — 94761 N-INVAS EAR/PLS OXIMETRY MLT: CPT

## 2019-11-18 PROCEDURE — 25000003 PHARM REV CODE 250: Performed by: THORACIC SURGERY (CARDIOTHORACIC VASCULAR SURGERY)

## 2019-11-18 PROCEDURE — 80048 BASIC METABOLIC PNL TOTAL CA: CPT

## 2019-11-18 RX ORDER — FERROUS SULFATE 325(65) MG
325 TABLET, DELAYED RELEASE (ENTERIC COATED) ORAL 2 TIMES DAILY
Refills: 0
Start: 2019-11-18 | End: 2020-02-11 | Stop reason: ALTCHOICE

## 2019-11-18 RX ORDER — MAGNESIUM SULFATE HEPTAHYDRATE 40 MG/ML
2 INJECTION, SOLUTION INTRAVENOUS ONCE
Status: COMPLETED | OUTPATIENT
Start: 2019-11-18 | End: 2019-11-18

## 2019-11-18 RX ORDER — ATORVASTATIN CALCIUM 80 MG/1
80 TABLET, FILM COATED ORAL NIGHTLY
Qty: 90 TABLET | Refills: 3
Start: 2019-11-18 | End: 2021-04-20

## 2019-11-18 RX ORDER — METOPROLOL TARTRATE 50 MG/1
50 TABLET ORAL 2 TIMES DAILY
Qty: 180 TABLET | Refills: 3
Start: 2019-11-18 | End: 2021-04-20

## 2019-11-18 RX ORDER — CHLORTHALIDONE 25 MG/1
25 TABLET ORAL DAILY
Qty: 14 TABLET | Refills: 0
Start: 2019-11-19 | End: 2019-11-20

## 2019-11-18 RX ORDER — LOSARTAN POTASSIUM 100 MG/1
100 TABLET ORAL DAILY
Qty: 90 TABLET | Refills: 3
Start: 2019-11-19 | End: 2021-04-20

## 2019-11-18 RX ORDER — PANTOPRAZOLE SODIUM 40 MG/1
40 TABLET, DELAYED RELEASE ORAL DAILY
Qty: 60 TABLET | Refills: 0
Start: 2019-11-19 | End: 2021-04-20

## 2019-11-18 RX ORDER — AMLODIPINE BESYLATE 10 MG/1
10 TABLET ORAL DAILY
Qty: 90 TABLET | Refills: 3
Start: 2019-11-19 | End: 2019-12-18

## 2019-11-18 RX ORDER — ASPIRIN 81 MG/1
81 TABLET ORAL DAILY
Refills: 0
Start: 2019-11-19 | End: 2021-04-20

## 2019-11-18 RX ADMIN — APIXABAN 5 MG: 2.5 TABLET, FILM COATED ORAL at 09:11

## 2019-11-18 RX ADMIN — TRAMADOL HYDROCHLORIDE 50 MG: 50 TABLET, FILM COATED ORAL at 09:11

## 2019-11-18 RX ADMIN — AMLODIPINE BESYLATE 10 MG: 10 TABLET ORAL at 09:11

## 2019-11-18 RX ADMIN — NITROGLYCERIN 0.5 INCH: 20 OINTMENT TOPICAL at 08:11

## 2019-11-18 RX ADMIN — ATORVASTATIN CALCIUM 80 MG: 40 TABLET, FILM COATED ORAL at 08:11

## 2019-11-18 RX ADMIN — INSULIN ASPART 10 UNITS: 100 INJECTION, SOLUTION INTRAVENOUS; SUBCUTANEOUS at 11:11

## 2019-11-18 RX ADMIN — LOSARTAN POTASSIUM 100 MG: 50 TABLET ORAL at 09:11

## 2019-11-18 RX ADMIN — IPRATROPIUM BROMIDE AND ALBUTEROL SULFATE 3 ML: .5; 3 SOLUTION RESPIRATORY (INHALATION) at 07:11

## 2019-11-18 RX ADMIN — ASPIRIN 81 MG: 81 TABLET ORAL at 09:11

## 2019-11-18 RX ADMIN — PANTOPRAZOLE SODIUM 40 MG: 40 TABLET, DELAYED RELEASE ORAL at 09:11

## 2019-11-18 RX ADMIN — FERROUS SULFATE TAB EC 325 MG (65 MG FE EQUIVALENT) 325 MG: 325 (65 FE) TABLET DELAYED RESPONSE at 09:11

## 2019-11-18 RX ADMIN — FERROUS SULFATE TAB EC 325 MG (65 MG FE EQUIVALENT) 325 MG: 325 (65 FE) TABLET DELAYED RESPONSE at 08:11

## 2019-11-18 RX ADMIN — APIXABAN 5 MG: 2.5 TABLET, FILM COATED ORAL at 08:11

## 2019-11-18 RX ADMIN — INSULIN DETEMIR 5 UNITS: 100 INJECTION, SOLUTION SUBCUTANEOUS at 09:11

## 2019-11-18 RX ADMIN — CHLORTHALIDONE 25 MG: 25 TABLET ORAL at 09:11

## 2019-11-18 RX ADMIN — INSULIN ASPART 1 UNITS: 100 INJECTION, SOLUTION INTRAVENOUS; SUBCUTANEOUS at 08:11

## 2019-11-18 RX ADMIN — METOPROLOL TARTRATE 50 MG: 50 TABLET, FILM COATED ORAL at 09:11

## 2019-11-18 RX ADMIN — INSULIN ASPART 2 UNITS: 100 INJECTION, SOLUTION INTRAVENOUS; SUBCUTANEOUS at 04:11

## 2019-11-18 RX ADMIN — INSULIN DETEMIR 10 UNITS: 100 INJECTION, SOLUTION SUBCUTANEOUS at 08:11

## 2019-11-18 RX ADMIN — METOPROLOL TARTRATE 50 MG: 50 TABLET, FILM COATED ORAL at 08:11

## 2019-11-18 RX ADMIN — NITROGLYCERIN 0.5 INCH: 20 OINTMENT TOPICAL at 09:11

## 2019-11-18 RX ADMIN — MAGNESIUM SULFATE IN WATER 2 G: 40 INJECTION, SOLUTION INTRAVENOUS at 09:11

## 2019-11-18 NOTE — PROGRESS NOTES
[facility]  Cardiology  Progress Note    Patient Name: Mateusz Bates  MRN: [id number]  Admission Date: 10/28/2019  Hospital Length of Stay: 21 days  Code Status: Full Code   Attending Physician: Keny Zadii MD   Primary Care Physician: Serenity Kilpatrick MD  Expected Discharge Date: 11/13/2019  Principal Problem:Acute deep vein thrombosis (DVT) of right upper extremity    Subjective:   HPI:  Ms. Bates is a 69 year old female patient whose current medical conditions include HTN, hyperlipidemia, obesity, DM type II, cerebral microvascular disease, FAHAD, and B12 deficiency who was sent to [facility] ED today from her PCP's office due to chest pain and abnormal EKG. Patient complained of substernal chest tightness/heaviness that radiated to her back on Saturday while at rest. Associated symptoms included orthopnea and SOB. Patient denied any associated nausea, vomiting, diaphoresis, palpitations, near syncope, or syncope. Initial workup in ED revealed troponin of 3.665 and BNP of 1305 and patient subsequently admitted for further evaluation and treatment of NSTEMI. Cardiology consulted to assist with management. Patient seen and examined today while in ED. Feels well at time of exam, denies chest pain. She does report similar symptoms over the past few months but states they never lasted as long and were less intense. She reports compliance with her medications. States her father had history of CABG. Chart reviewed. Repeat troponin pending. EKG reviewed and shows SR with new LBBB. 2D echo pending.    Hospital Course:   Ms. Bates is a 69 year old female patient whose current medical conditions include HTN, hyperlipidemia, obesity, DM type II, cerebral microvascular disease, FAHAD, and B12 deficiency who was sent to [facility] ED today from her PCP's office due to chest pain and abnormal EKG. Patient complained of substernal chest tightness/heaviness that radiated to her back on Saturday while at rest.  Associated symptoms included orthopnea and SOB. Patient denied any associated nausea, vomiting, diaphoresis, palpitations, near syncope, or syncope. Initial workup in ED revealed troponin of 3.665 and BNP of 1305 and patient subsequently admitted for further evaluation and treatment of NSTEMI. Cardiology consulted to assist with management. Patient seen and examined today while in ED. Feels well at time of exam, denies chest pain. She does report similar symptoms over the past few months but states they never lasted as long and were less intense. She reports compliance with her medications. States her father had history of CABG. Chart reviewed. Repeat troponin pending. EKG reviewed and shows SR with new LBBB. 2D echo pending.    10/29/19-Patient seen and examined today, s/p LHC yesterday that showed multivessel CAD. CVT consulted, CABG planned for Thursday. Feels ok. No chest pain or SOB. Labs stable. Being diuresed. 2D echo showed EF of 30-35%, DD.    10/30/19-Patient seen and examined today. Feels well. No complaints. Denies chest pain or SOB. Labs stable. IABP placement scheduled for today.    10/31/19- Patient is s/p CABG x3 with LIMA to LAD reverse saphenous vein graft to the RCA and reverse saphenous vein graft to the ramus. Has been transferred to ICU. Is intubated and sedated. CT x3. IABP at 1:1 still in place. Transfused 3 units of PRBC in the OR and has received additional 3 units in ICU, has received 500cc albumin, epi and milrinone gtt.  H/H 6.9/21.0. Renal function stable. Paced rhythm on monitor     11/1/2019--patient seen and examined in ICU. Extubated o/n, remains on IABP 1:1 today, plans to possibly wean off later today per Dr. Zaidi. Remains on Epi and Milrinone gtt today. Paced on monitor. Labs stable today.     11/2/19-Patient seen and examined today, lying in bed. Feels ok. Complains of post-op pain and fatigue. Labs reviewed. Platelets 47,000. Heme/onc on board.     11/3/19-Patient seen and  examined today, sitting up in chair. Feeling better. Still weak. Labs reviewed. Platelets 89,000.    11/4/19- Patient is POD 4 s/p CABG x3. OOB in chair. Feels better this morning. Pain controlled. Platelets improving since holding Plavix.Vitals stable     11/5/19- Patient is POD 5 s/p CABGx3. Has been transferred to [facility]. Is clammy and generally weak this morning. Glucose and vitals stable. Noted to have bump in WBC. Primary team has been notified by nurse. Platelets 73, but H/H stable     11/6 in ICU, intubated, on epi gtt at 0.15 mcg CI 2.0, PAP 20. Sinus rhythm, H&H and Cr stable. Urine output ok. Improved lactate 1.6. const stent cardiogenic shock    11/7 INTUBATED, WEAN OFF EPI AND CONTINUE AMILIRONE, ON SINU RHYTHM AND CI 3.3, Cr STABLE AND DECENT URINE OUTPUT.     11/8 extubated, off pressors, up to the chair.diffuse DVT on upper extremities, no chest pain,pt is weak, Cr normal    11/9/weak, mild reps. Distress, good urine output. BP high.  RUE venous thrombus/DVT in deep and superficial arm veins. H&H stable Cr normal.      11/10 weakness H&H 7.6/24, BP and HR wnl., good urine output. No chest pain. Right arm swelling and ecchymosis. Pulses ok    11/11/19--Patient seen and examined in ICU, sitting on side of bed. Feels ok today. Continued right arm swelling and ecchymosis.     11/12/19-Patient seen and examined today, lying in bed. Feels ok. Denies chest pain. Admits to some SOB. CXR, showed no effusion or infiltrate. Labs stable, platelets 75,000 H/H 7.3/23.1. Hematology noted reviewed, ROSANNA positive.     11/13/19--Patient seen and examined today, lying in bed. Sleepy on exam today. No chest pain today. Labs reviewed, H/H 7/23, plts 79, INR 4.3, Mag 1.8.     11/14/19--Patient seen and examined in room, lying in bed. Plans for PRBC transfusion today. Labs reviewed, H/H 6.7/21.9.     11/15/19--Patient seen and examined in room, lying in bed. Feels ok today. Has been started on Eliquis. Labs reviewed, H/H  8.2/26.1, K+ 3.6, Cr 0.8.     11/16/19--Patient seen and examined in room, sitting in bedside chair. Feels ok today. Labs reviewed, stable.     11/17/19--Patient seen and examined in room, sitting in chair eating breakfast. Feels good today. Needs to ambulate in hallway. Labs reviewed, stable.     11/18/19-Patient seen and examined today, sitting up in bedside chair. No acute events overnight. Feels ok, does admit to fatigue. Labs reviewed and are stable.         Review of Systems   Constitution: Positive for malaise/fatigue.   HENT: Negative.    Eyes: Negative.    Cardiovascular: Negative.    Respiratory: Negative.    Endocrine: Negative.    Hematologic/Lymphatic: Bruises/bleeds easily.   Skin: Positive for color change and poor wound healing.   Musculoskeletal: Positive for arthritis and joint pain.        RUE swelling     Gastrointestinal: Negative.    Genitourinary: Negative.    Neurological: Positive for weakness.   Psychiatric/Behavioral: Negative.    Allergic/Immunologic: Negative.      Objective:     Vital Signs (Most Recent):  Temp: 98.2 °F (36.8 °C) (11/18/19 0800)  Pulse: 98 (11/18/19 0800)  Resp: 16 (11/18/19 0800)  BP: (!) 152/72 (11/18/19 0800)  SpO2: 97 % (11/18/19 0800) Vital Signs (24h Range):  Temp:  [98 °F (36.7 °C)-100.8 °F (38.2 °C)] 98.2 °F (36.8 °C)  Pulse:  [] 98  Resp:  [16-22] 16  SpO2:  [93 %-98 %] 97 %  BP: (130-152)/(62-79) 152/72     Weight: 76.2 kg (167 lb 15.9 oz)  Body mass index is 31.74 kg/m².     SpO2: 97 %  O2 Device (Oxygen Therapy): room air      Intake/Output Summary (Last 24 hours) at 11/18/2019 0853  Last data filed at 11/17/2019 1600  Gross per 24 hour   Intake 990 ml   Output --   Net 990 ml       Lines/Drains/Airways     Peripherally Inserted Central Catheter Line                 PICC Double Lumen 11/07/19 1645 left brachial;left basilic 10 days                Physical Exam   Constitutional: She is oriented to person, place, and time. She appears well-developed and  well-nourished. No distress.   HENT:   Head: Normocephalic and atraumatic.   Eyes: Pupils are equal, round, and reactive to light. Right eye exhibits no discharge. Left eye exhibits no discharge.   Neck: Neck supple. No JVD present.   Cardiovascular: Normal rate, regular rhythm, S1 normal and S2 normal.   No murmur heard.  Pulmonary/Chest: Effort normal and breath sounds normal. No respiratory distress. She has no wheezes. She has no rales.   CABG site C/D/I; no bleeding erythema or drainage   Abdominal: Soft. She exhibits no distension.   Musculoskeletal: She exhibits no edema.   RUE +swelling and ecchymosis with dusky appearing fingernails.    Neurological: She is alert and oriented to person, place, and time.   Skin: Skin is warm and dry. She is not diaphoretic. No erythema.   Psychiatric: She has a normal mood and affect. Her behavior is normal. Thought content normal.   Nursing note and vitals reviewed.      Significant Labs:   CMP   Recent Labs   Lab 11/16/19  1645 11/17/19  0459 11/18/19  0523   * 134* 133*   K 4.3 3.7 4.0    101 99   CO2 18* 25 23   * 140* 154*   BUN 28* 23 21   CREATININE 0.8 0.7 0.8   CALCIUM 9.0 8.8 8.8   ANIONGAP 12 8 11   ESTGFRAFRICA >60 >60 >60   EGFRNONAA >60 >60 >60   , CBC   Recent Labs   Lab 11/17/19  0459 11/18/19  0523   WBC 8.37 10.50   HGB 8.4* 8.6*   HCT 27.1* 27.9*    185   , Troponin No results for input(s): TROPONINI in the last 48 hours. and All pertinent lab results from the last 24 hours have been reviewed.    Significant Imaging: Echocardiogram:   2D echo with color flow doppler:   Results for orders placed or performed during the hospital encounter of 10/28/19   2D echo with color flow doppler   Result Value Ref Range    QEF 30 (A) 55 - 65    Diastolic Dysfunction Yes (A)     Est. PA Systolic Pressure 33.03     Narrative    Date of Procedure: 11/06/2019        TEST DESCRIPTION   Technical Quality: This is a technically challenging study.      General: A catheter is present in the right-sided cardiac chambers.     Aorta: The aortic root is normal in size, measuring 3.7 cm at sinotubular junction and 3.8 cm at Sinuses of Valsalva. The proximal ascending aorta is normal in size, measuring 3.7 cm across.     Left Atrium: The left atrial volume index is normal, measuring 19.22 cc/m2.     Left Ventricle: The left ventricle is normal in size, with an end-diastolic diameter of 3.0 cm, and an end-systolic diameter of 2.5 cm. LV wall thickness is normal, with the septum measuring 2.1 cm and the posterior wall measuring 1.4 cm across. Relative   wall thickness was increased at 0.93, and the LV mass index was increased at 140.2 g/m2 consistent with concentric left ventricular hypertrophy. The following segments were mildly hypokinetic: apical lateral wall.  The following segments were severely hypokinetic: mid anteroseptum, basal anteroseptum, apical septum, mid inferoseptum, basal inferoseptum, basal inferior wall.  Left ventricular systolic function appears moderately depressed. Visually estimated ejection fraction is 30-35%. The LV Doppler derived stroke volume equals 54.0 ccs.     Diastolic indices: E wave velocity 0.9 m/s, E/A ratio 0.8,  msec., E/e' ratio(avg) 14. There is diastolic dysfunction secondary to relaxation abnormality.     Right Atrium: The right atrium is normal in size, measuring 3.5 cm in length and 2.1 cm in width in the apical view.     Right Ventricle: The right ventricle is normal in size. Global right ventricular systolic function appears normal. Tricuspid annular plane systolic excursion (TAPSE) is 1.3 cm. The estimated PA systolic pressure is 33 mmHg.     Aortic Valve:  The aortic valve is normal in structure. The aortic valve is tri-leaflet in structure. The mean gradient obtained across the aortic valve is 9 mmHg.     Mitral Valve:  The mitral valve is normal in structure. The pressure half time is 53 msec. The calculated  mitral valve area is 4.15 cm2.     Tricuspid Valve:  The tricuspid valve is normal in structure.     Pulmonary Valve:  The pulmonic valve is not well seen.     IVC: IVC is normal in size and collapses > 50% with a sniff, suggesting normal right atrial pressure of 3 mmHg.     Intracavitary: There is no evidence of pericardial effusion, intracavity mass, thrombi, or vegetation.         CONCLUSIONS     1 - Concentric hypertrophy.     2 - Wall motion abnormalities.     3 - Moderately depressed left ventricular systolic function (EF 30-35%).     4 - Impaired LV relaxation, normal LAP (grade 1 diastolic dysfunction).     5 - Normal right ventricular systolic function .     6 - The estimated PA systolic pressure is 33 mmHg.             This document has been electronically    SIGNED BY: Charles Matias MD On: 11/06/2019 18:28   , EKG: Reviewed and X-Ray: CXR: X-Ray Chest 1 View (CXR):   Results for orders placed or performed during the hospital encounter of 10/28/19   X-Ray Chest 1 View    Narrative    EXAMINATION:  XR CHEST 1 VIEW    CLINICAL HISTORY:  follow up;    TECHNIQUE:  Single frontal view of the chest was performed.    COMPARISON:  11/07/2019    FINDINGS:  Tubes and lines are stable.  [place] catheter is been removed.  Mild atelectasis within the left midlung zone.  No consolidation.  Trace pleural effusions not entirely excluded.  No pneumothorax.  Bones demonstrate mild degenerative changes.  Sternotomy wires are intact.  In comparison to the prior study, there is no adverse interval changes      Impression    In comparison to the prior study, there is no adverse interval changes      Electronically signed by: Jh Anne MD  Date:    11/08/2019  Time:    08:14    and X-Ray Chest PA and Lateral (CXR):   Results for orders placed or performed during the hospital encounter of 10/28/19   X-Ray Chest PA And Lateral    Narrative    EXAMINATION:  XR CHEST PA AND LATERAL    CLINICAL HISTORY:  Pre  "Op;    COMPARISON:  10/28/2019    FINDINGS:  Cardiac silhouette is normal. Aorta demonstrates atherosclerotic disease. The lungs demonstrate no evidence of active disease.  No evidence of pleural effusion or pneumothorax.  Bones appear intact.      Impression    No acute process seen.      Electronically signed by: Jh Anne MD  Date:    10/28/2019  Time:    21:38     Assessment and Plan:   Patient with prolonged recovery. Stable CV wise. Continue same meds/mgmt. Needs to ambulate.     HIT (heparin-induced thrombocytopenia)  -per Heme/Onc    Acute deep vein thrombosis (DVT) of non-extremity vein Right IJ  -Continue Argatroban infusion  -RUE continues to be swollen   -Right hand remains ecchymotic   -Mgmt per CT surgery  Per Vascular Surgery:  "Hypercoagulable/HIT workup in progress, pt started on argatroban  Regarding RUE/central venous thrombus: Unfortunately, given location of clot and her recent major cardiac surgery any attempt at thrombolysis with tPA would be absolutely contraindicated  Open thrombectomy also not option as central clot could not be adequately cleared to allow for any outflow/drainage from the arm     Recommend cont anticoagulation, arm elevation, mild/moderate compression"    11/12/19  -Mgmt as per vascular/heme onc/CVT    11/13  -Mgmt per Vascular/Heme onc/CT surgery    Cardiogenic shock  Post CABG  EF 30%  S/p swan catheter  CI 3.3  Repeat echo today showed EF 30%, septal hypokinesis , compared to prior echo before the cabg, no significant change  ekg reviewed    11/10/2019  Extubated  Off pressors  Continue Amlodipine and BB/Losartan  Hold Lipitor due to elevated LFT  Continue anticoagulation due to acute DVT  Continue ICU supportive care and ABx    11/11  -Continue BB, ARB, Norvasc  -BP much improved   -Continue supportive care per CT surgery      11/12/19  -Stable  -Continue BB, ARB, Norvasc        Thrombocytopenia  -Mgmt as per heme/onc  -Plavix was on hold, but resumed by primary " team   -ASA has been resumed     11/5/19  -consider holding Plavix  -Hematology has been consulted     S/P CABG x 3  -Patient is s/p CABG x3 with LIMA to LAD reverse saphenous vein graft to the RCA and reverse saphenous vein graft to the ramus.  -Continue post CABG supportive therapy  -Continue ASA, Plavix, Statin, BB  -Will continue to follow along     11/1/19  -POD #1 s/p CABG x3  -Continue ASA, Statin, BB  -Remains on Epi and Milrinone gtt today  -Mgmt per CT surgery  -Will follow along    11/2/19  -Recovering s/p CABG x 3  -Continue statin, BB  -Platelets 47,000 this AM; heme/onc on board, ASA held  -Await further rec's    11/3/19  -Stable, progressing post CABG x 3  -Continue statin, BB  -ASA re-started  -Platelets 89,000, heme/onc on board    11/4/19  -Patient slowly improving post CABG x3  -Continue ASA, Statin and BB  -plavix on hold due to thrombocytopenia that is improving   -IS use encouraged and early ambulation with PT  -Will continue to follow along     11/5/19  -Will continue ASA, Statin and BB  -Consider holding Plavix again for drop in plts after resuming by primary team   -Recommend CXR to rule out post op PNA given bump in WBC and cough  -will need to monitor for temp  -ambulate as tolerated and encourage IS use     11/06  Hold ASA, Plavix due to anemia  Hold BB due to cardiogenic shock    11/11/19  -Continue Argatroban infusion  -Continue BB, ARB, IV lasix, Chlorthalidone, Norvasc, NTG paste  -Hold ASA, Plavix due to anemia  -Further mgmt per CT surgery    11/12/19  -Stable  -Continue BB, ARB, Norvasc, Chlorthalidone, IV Lasix  -Recommend re-starting ASA  -Plavix held due to anemia/thrombocytopenia    11/13/19  -Recommend ASA daily  -Plavix held due to anemia/thrombocytopenia  -Recommend PRBC transfusion, per CT surgery  -Continue BB, ARB, Norvasc, IV lasix, Chlorthalidone    11/14  -Continue current medical mgmt per CT surgery  -Recommend ASA daily  -Agree with PRBC transfusion  today    11/15  -Continue Eliquis, Norvasc, ASA, ARB, BB  -Continue mgmt per CT surgery  -Will continue to follow along  -Encourage IS and aggressive pulmonary toilet  -OOB to chair for meals    11/16  -Continue ASA, Eliquis, BB, ARB, Norvasc, Statin  -Encourage aggressive pulmonary toilet, IS, out of bed for all meals  -Needs to ambulate in hallway as able  -Will follow along with CT surgery    11/18/19  -Stable CV wise  -Continue ASA, Eliquis, BB, ARB, norvasc statin  -IS usage and ambulation    CAD, multiple vessel  -See plan under CABG    Acute combined systolic and diastolic CHF, NYHA class 2  -Presents with ICM/acute combined CHF  -EF 30-35% by 2D echo        Chest pain  -See plan under NSTEMI    Abnormal EKG  -LBBB noted on EKG  -See plan under NSTEMI    NSTEMI (non-ST elevated myocardial infarction)  -Patient presented with intermittent chest tightness/heaviness that radiated to her back since Saturday evening  -Associated with JONES/orthopnea  -Initial troponin 3.665  -EKG showed SR with new LBBB  -Presentation consistent with NSTEMI  -Continue ASA, statin  -Start Toprol XL  -Start heparin gtt  -Check 2D echo  -Continue to trend troponin  -Keep NPO. [facility] today for further evaluation and PCI if indicated. All risks, benefits, and treatment alternatives explained to patient in detail. All questions answered. She has agreed to proceed. Further rec's to follow.    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 All risks, benefits, and treatment alternatives explained to patient in detail. All questions answered. She has agreed to proceed.    10/31/19  -Patient is s/p CABG  x3.   See plan for CABG     Hyperlipidemia associated with type 2 diabetes mellitus  -Continue statin    Obesity (BMI 30.0-34.9)  -Weight loss    Diabetes mellitus type II, uncontrolled  -Mgmt as per hospital medicine        VTE Risk Mitigation (From admission, onward)         Ordered     apixaban tablet 5 mg  2 times daily      11/14/19 0903     argatroban 125 mg in sodium chloride 0.9% 125 mL infusion (conc: 1 mg/mL)  Continuous     Question:  Argatroban Infusion Adjustment (DO NOT MODIFY ANSWER)  Answer:  [email or web address]\epic\Images\Pharmacy\HeparinInfusions\ArgatrobanOM[facility] [id number]    11/13/19 1501     argatroban 125 mg in sodium chloride 0.9% 125 mL infusion (conc: 1 mg/mL)  Continuous     Question:  Argatroban Infusion Adjustment (DO NOT MODIFY ANSWER)  Answer:  [email or web address]\epic\Images\Pharmacy\HeparinInfusions\ArgatrobanOM[facility] [id number]    11/11/19 0128     Reason for no Mechanical VTE Prophylaxis  Once     Question:  Reasons:  Answer:  IV Heparin within 24 hrs. Pre or Post-Op    10/28/19 1414     heparin 25,000 units in dextrose 5% 250 mL (100 units/mL) infusion LOW INTENSITY nomogram - [facility]  Continuous     Question:  Heparin Infusion Adjustment (DO NOT MODIFY ANSWER)  Answer:  [email or web address]\epic\Images\Pharmacy\HeparinInfusions\heparin LOW INTENSITY nomogram for [facility] [id number]    10/28/19 1257                Josselyn Vann PA-C  Cardiology  [facility]

## 2019-11-18 NOTE — PT/OT/SLP PROGRESS
"Speech Language Pathology Treatment    Patient Name:  Mateusz Bates   MRN:  [id number]  Admitting Diagnosis: Acute deep vein thrombosis (DVT) of right upper extremity    Recommendations:                 General Recommendations:  Dysphagia therapy  Diet recommendations:  Mechanical soft, Chopped meat, Liquid Diet Level: Thin   Aspiration Precautions: 1 bite/sip at a time, Alternating bites/sips, HOB to 90 degrees and Small bites/sips   General Precautions: Standard, fall, sternal, aspiration  Communication strategies:  none    Subjective     Pt alert and less confused  Patient goals: to go home    Pain/Comfort:  · Pain Rating 1: 0/10  · Pain Rating Post-Intervention 1: 0/10    Objective:     Has the patient been evaluated by SLP for swallowing?   Yes  Keep patient NPO? No   Current Respiratory Status: room air      Pt alert and oriented to poerson and place but not time. Orientation was provided. She answered intermediate y/n questions with min A and complex y/n questions with mod A. Her accuracy decreased as task progressed and fatigue increased. She answered "wh" questions with delays. Pt ate chopped meats and eggs without difficulty.     Assessment:     Mateusz Bates is a 69 y.o. female with an SLP diagnosis of Dysphagia and Cognitive-Linguistic Impairment.  She presents with impaired comprehension, expression and reasoning. .    Goals:   Multidisciplinary Problems     SLP Goals        Problem: SLP Goal    Goal Priority Disciplines Outcome   SLP Goal     SLP Ongoing, Progressing   Description:  LTG:  Pt will tolerate least restrictive diet consistency safely and efficiently.    ST. BSA with meal to assess recommended diet consistency of pureed and thin liquids.- Completed 11/10/19 and pt has been recommended for a Pureed consistency diet and Nectar Thick Liquids.  Goal to continue with further recs as appropriate.   Modified Barium Swallow Study recommended if indicated,             2. Oral Motor x10 with cues   "           3. Orientation with 90% with min cues.             4. 2 step commands with 90%             5. 2 unit yes/no with 90%  Goal to be reevaluated by:  11/14/19                     Plan:     · Patient to be seen:  3 x/week   · Plan of Care expires:     · Plan of Care reviewed with:  patient   · SLP Follow-Up:  Yes       Discharge recommendations:  nursing facility, skilled   Barriers to Discharge:  None    Time Tracking:     SLP Treatment Date:   11/18/19  Speech Start Time:  0905  Speech Stop Time:  0930     Speech Total Time (min):  25 min    Billable Minutes: Speech Therapy Individual 15 and Treatment Swallowing Dysfunction 10    Anitha Casas CCC-SLP  11/18/2019

## 2019-11-18 NOTE — PROGRESS NOTES
[facility] -   Hematology/Oncology  Progress Note    Patient Name: Mateusz Bates  Admission Date: 10/28/2019  Hospital Length of Stay: 21 days  Code Status: Full Code     Subjective:     HPI:  Mrs. Bates is a 69-year-old female with hypertension, diabetes, hyperlipidemia, obesity, anxiety and depression, iron deficiency anemia, vitamin B12 deficiency, who presented to the emergency room with an NSTEMI.  Workup included a cardiac catheterization that shows severe multivessel coronary artery disease with proximal LAD stenosis of 70% and severely depressed ejection fraction of 20%.  The patient is a candidate for urgent coronary artery bypass grafting. S/p CABG today.  Hematology is consulted for thrombocytopenia.    Interval History: Denies pain or discomfort, out of bed in chair eating with speech therapy at time of visit.     Oncology Treatment Plan:   [No treatment plan]    Medications:  Continuous Infusions:  Scheduled Meds:   amLODIPine  10 mg Oral Daily    apixaban  5 mg Oral BID    aspirin  81 mg Oral Daily    atorvastatin  80 mg Oral QHS    chlorthalidone  25 mg Oral Daily    ferrous sulfate  325 mg Oral BID    insulin detemir U-100  10 Units Subcutaneous QHS    insulin detemir U-100  5 Units Subcutaneous Daily    losartan  100 mg Oral Daily    metoprolol tartrate  50 mg Oral BID    nitroGLYCERIN 2% TD oint  0.5 inch Topical (Top) Q12H    pantoprazole  40 mg Oral Daily     PRN Meds:albumin human 5%, albuterol sulfate, Dextrose 10% Bolus, Dextrose 10% Bolus, insulin aspart U-100, lactated ringers, magnesium sulfate IVPB, magnesium sulfate IVPB, metoclopramide HCl, ondansetron, potassium chloride in water **AND** potassium chloride in water **AND** potassium chloride in water, traMADol     Review of Systems   Constitutional: Positive for activity change, appetite change and fatigue. Negative for chills, diaphoresis, fever and unexpected weight change.   HENT: Negative for congestion,  hearing loss, nosebleeds, postnasal drip and trouble swallowing.    Eyes: Negative for discharge and visual disturbance.   Respiratory: Negative for shortness of breath.    Cardiovascular: Positive for leg swelling. Negative for chest pain and palpitations.   Gastrointestinal: Positive for abdominal distention and constipation. Negative for abdominal pain, blood in stool, diarrhea, nausea and vomiting.   Endocrine: Negative for cold intolerance and heat intolerance.   Genitourinary: Negative for difficulty urinating, dyspareunia, flank pain, frequency and hematuria.   Musculoskeletal: Positive for arthralgias and back pain. Negative for myalgias.   Skin: Negative.    Neurological: Negative for dizziness, weakness, light-headedness and headaches.   Hematological: Negative for adenopathy. Does not bruise/bleed easily.   Psychiatric/Behavioral: Negative for agitation, behavioral problems and confusion. The patient is nervous/anxious.      Objective:     Vital Signs (Most Recent):  Temp: 98.1 °F (36.7 °C) (11/18/19 1116)  Pulse: 76 (11/18/19 1116)  Resp: 18 (11/18/19 1116)  BP: 134/61 (11/18/19 1116)  SpO2: 97 % (11/18/19 1116) Vital Signs (24h Range):  Temp:  [98 °F (36.7 °C)-100.8 °F (38.2 °C)] 98.1 °F (36.7 °C)  Pulse:  [] 76  Resp:  [16-22] 18  SpO2:  [93 %-98 %] 97 %  BP: (134-152)/(61-72) 134/61     Weight: 76.2 kg (167 lb 15.9 oz)  Body mass index is 31.74 kg/m².  Body surface area is 1.81 meters squared.      Intake/Output Summary (Last 24 hours) at 11/18/2019 1305  Last data filed at 11/17/2019 1600  Gross per 24 hour   Intake 480 ml   Output --   Net 480 ml       Physical Exam   Constitutional: She is oriented to person, place, and time. She appears well-developed and well-nourished. She appears ill. No distress.   HENT:   Head: Normocephalic and atraumatic.   Right Ear: Hearing and external ear normal.   Left Ear: Hearing and external ear normal.   Nose: No rhinorrhea or sinus tenderness. Right sinus  exhibits no maxillary sinus tenderness and no frontal sinus tenderness. Left sinus exhibits no maxillary sinus tenderness and no frontal sinus tenderness.   Mouth/Throat: Uvula is midline, oropharynx is clear and moist and mucous membranes are normal. No oral lesions.   Eyes: Pupils are equal, round, and reactive to light. Conjunctivae are normal. Right eye exhibits no discharge. Left eye exhibits no discharge.   Neck: Normal range of motion. Carotid bruit is not present. No tracheal deviation present. No thyromegaly present.   Cardiovascular: Normal rate, regular rhythm, S1 normal, S2 normal, normal heart sounds and intact distal pulses.   No murmur heard.  Pulses:       Dorsalis pedis pulses are 2+ on the right side, and 2+ on the left side.   Pulmonary/Chest: Effort normal. No respiratory distress. She has decreased breath sounds in the right lower field and the left lower field.   Abdominal: Soft. Bowel sounds are normal. She exhibits distension. She exhibits no mass. There is no tenderness.   Musculoskeletal: Normal range of motion.   Lymphadenopathy:     She has no cervical adenopathy.        Right: No supraclavicular adenopathy present.        Left: No supraclavicular adenopathy present.   Neurological: She is alert and oriented to person, place, and time. She has normal strength. No sensory deficit. Coordination and gait normal.   Skin: Skin is warm and dry. Capillary refill takes less than 2 seconds. Bruising noted. No rash noted.        Psychiatric: Her speech is normal and behavior is normal. Judgment and thought content normal. Her mood appears anxious. Her affect is labile. Cognition and memory are impaired. She exhibits a depressed mood.   Nursing note and vitals reviewed.      Significant Labs:   CBC:   Recent Labs   Lab 11/17/19 0459 11/18/19  0523   WBC 8.37 10.50   HGB 8.4* 8.6*   HCT 27.1* 27.9*    185    and CMP:   Recent Labs   Lab 11/16/19  1645 11/17/19 0459 11/18/19  0523   *  134* 133*   K 4.3 3.7 4.0    101 99   CO2 18* 25 23   * 140* 154*   BUN 28* 23 21   CREATININE 0.8 0.7 0.8   CALCIUM 9.0 8.8 8.8   ANIONGAP 12 8 11   EGFRNONAA >60 >60 >60       Diagnostic Results:  I have reviewed and interpreted all pertinent imaging results/findings within the past 24 hours.    Assessment/Plan:     * Acute deep vein thrombosis (DVT) of right upper extremity    --D/c Argatroban   --Avoid heparin  --Eliquis 5 mg PO BID  --Daily CBC  --Continue PO anticoagulation for at least 3 months. She will need to f/u in the Heme-Onc clinic following discharge      HIT (heparin-induced thrombocytopenia)  On apixaban plan is for least 3 months of therapy and follow up in outpatient hematology clinic.    Edema of hand       Upper extremity vascular insufficiency:  --Improved apperance  --Vascular surgery following. No surgical intervention recommended. Recommend continue anticoagulation, arm elevation, mild/moderate compression    Thrombocytopenia    --Platelet count 185K  --D/c Argatroban due to unavailability   --please continue on Eliquis 5 mg PO BID.  Plan for anticoagulation for at least 3 months.  --Bleeding precautions  --Hg 8.6.  Transfuse for hemoglobin less than 7.    --Daily CBC        Thank you for your consult. I will follow-up with patient. Please contact us if you have any additional questions.     Janet Metcalf NP  Hematology/Oncology  [facility]

## 2019-11-18 NOTE — PLAN OF CARE
CM met with patient/family at the bedside to discuss the discharge plan.  Patient understands that the plan is [facility] and that we are awaiting authorization.     11/18/19 105   Discharge Reassessment   Assessment Type Discharge Planning Reassessment   Provided patient/caregiver education on the expected discharge date and the discharge plan Yes   Do you have any problems affording any of your prescribed medications? No   Discharge Plan A Long-term acute care facility (LTAC)   DME Needed Upon Discharge  none   Patient choice form signed by patient/caregiver N/A   Anticipated Discharge Disposition LTAC   Can the patient answer the patient profile reliably? Yes, cognitively intact

## 2019-11-18 NOTE — PROGRESS NOTES
[facility] -   Cardiothoracic Surgery  Progress Note    Patient Name: Mateusz Bates  MRN: [id number]  Admission Date: 10/28/2019  Hospital Length of Stay: 21 days  Code Status: Full Code   Attending Physician: Keny Zaidi MD   Referring Provider: Self, Aaareferral  Principal Problem:Acute deep vein thrombosis (DVT) of right upper extremity            Subjective:     Post-Op Info:  Procedure(s) (LRB):  CORONARY ARTERY BYPASS GRAFT (CABG) (N/A)  ECHOCARDIOGRAM,TRANSESOPHAGEAL (N/A)  BLOCK, NERVE, INTERCOSTAL, 2 OR MORE (N/A)   18 Days Post-Op     Interval History:  11/18/2019  The patient is post-operative day 18.  Status post coronary artery bypass grafting x3.    ROS  Medications:  Continuous Infusions:  Scheduled Meds:   amLODIPine  10 mg Oral Daily    apixaban  5 mg Oral BID    aspirin  81 mg Oral Daily    atorvastatin  80 mg Oral QHS    chlorthalidone  25 mg Oral Daily    ferrous sulfate  325 mg Oral BID    insulin detemir U-100  10 Units Subcutaneous QHS    insulin detemir U-100  5 Units Subcutaneous Daily    losartan  100 mg Oral Daily    magnesium sulfate IVPB  2 g Intravenous Once    metoprolol tartrate  50 mg Oral BID    nitroGLYCERIN 2% TD oint  0.5 inch Topical (Top) Q12H    pantoprazole  40 mg Oral Daily     PRN Meds:albumin human 5%, albuterol sulfate, Dextrose 10% Bolus, Dextrose 10% Bolus, insulin aspart U-100, lactated ringers, magnesium sulfate IVPB, magnesium sulfate IVPB, metoclopramide HCl, ondansetron, potassium chloride in water **AND** potassium chloride in water **AND** potassium chloride in water, traMADol     Objective:     Vital Signs (Most Recent):  Temp: 98.2 °F (36.8 °C) (11/18/19 0800)  Pulse: 98 (11/18/19 0800)  Resp: 16 (11/18/19 0800)  BP: (!) 152/72 (11/18/19 0800)  SpO2: 97 % (11/18/19 0800) Vital Signs (24h Range):  Temp:  [98 °F (36.7 °C)-100.8 °F (38.2 °C)] 98.2 °F (36.8 °C)  Pulse:  [] 98  Resp:  [16-22] 16  SpO2:  [93 %-98 %] 97 %  BP:  (130-152)/(62-79) 152/72     Weight: 76.2 kg (167 lb 15.9 oz)  Body mass index is 31.74 kg/m².    SpO2: 97 %  O2 Device (Oxygen Therapy): room air    Intake/Output - Last 3 Shifts       11/16 0700 - 11/17 0659 11/17 0700 - 11/18 0659 11/18 0700 - 11/19 0659    P.O. 1318 840     I.V. (mL/kg)  50 (0.7)     IV Piggyback  100     Total Intake(mL/kg) 1318 (17.3) 990 (13)     Net +1318 +990            Urine Occurrence 5 x 3 x     Stool Occurrence 2 x 1 x           Lines/Drains/Airways     Peripherally Inserted Central Catheter Line                 PICC Double Lumen 11/07/19 1645 left brachial;left basilic 10 days                Physical Exam   Constitutional: She is oriented to person, place, and time. She appears well-developed and well-nourished. No distress.   HENT:   Head: Normocephalic and atraumatic.   Eyes: Pupils are equal, round, and reactive to light. EOM are normal.   Neck: Normal range of motion. Neck supple. No JVD present.   Cardiovascular: Normal rate, regular rhythm, normal heart sounds and intact distal pulses. Exam reveals no gallop and no friction rub.   No murmur heard.  Pulmonary/Chest: Effort normal and breath sounds normal. No stridor. No respiratory distress. She has no wheezes. She has no rales. She exhibits no tenderness.   Abdominal: Soft. Bowel sounds are normal. She exhibits no distension and no mass. There is no tenderness. There is no rebound and no guarding. No hernia.   Musculoskeletal: She exhibits edema and tenderness. She exhibits no deformity.   ROM right hand 3/5. Edema to the right upper extremity distal to the elbow. The RUE continues to improve.    Neurological: She is alert and oriented to person, place, and time. She displays normal reflexes. No cranial nerve deficit or sensory deficit. She exhibits normal muscle tone. Coordination normal.   Skin: Skin is warm and dry. Capillary refill takes less than 2 seconds. The tip of the right index finger and the tip of the right thumb are  unblanchable. . No rash noted. She is not diaphoretic. No erythema. No pallor.   Psychiatric: She has a normal mood and affect. Her behavior is normal. Judgment and thought content normal.   Nursing note and vitals reviewed.      Significant Labs:  BMP:   Recent Labs   Lab 11/18/19  0523   *   *   K 4.0   CL 99   CO2 23   BUN 21   CREATININE 0.8   CALCIUM 8.8   MG 2.0     CBC:   Recent Labs   Lab 11/18/19  0523   WBC 10.50   RBC 2.98*   HGB 8.6*   HCT 27.9*      MCV 94   MCH 28.9   MCHC 30.8*       Significant Diagnostics:  I have reviewed all pertinent imaging results/findings within the past 24 hours.    Assessment/Plan:     S/P CABG x 3       11/01/2019  The patient is post-operative day 1, status-post coronary artery bypass grafting x 3.  Overall the patient is making progress, however IABP and inotropic agents will continue today.   Neuro:  The patient is awake alert and oriented x3.  Neuro exam is nonfocal.  Pain is well controlled with IV pain medication.  Cardiac:  Patient is hemodynamically improving. Patient remains on epinephrine and milrinone gtts, which will be weaned over the next 24 hours.  Cardiac index is improving. IABP will remain in place over the next 24 hours.  Patient will be started on beta blockers once gtts are weaned off.  Respiratory: Good sats on high flow nasal cannula. Continue respiratory toilet, continue incentive spirometer. Wean to low flow nasal cannula.  GI:  Diet: sips of water with medications, will slowly advance diet as tolerated. Benign abdominal exam.  Renal:  Urine output is slowly improving. Cr 0.9. K 4.3. Mag 1.6. Replace mag. Continue IV diuresis. Add metolazone.   Heme:  Hct 34.1  Platelet 82. Continue aspirin. Discontinue Plavix for now.  Id:  WBC 12. Tmax 100.3. Continue post-operative antibiotics. Will continue to follow.   Endocrine:  Glucose is controlled with insulin gtt.  Continue insulin gtt for now, until weaned off inotropic agents.    Activities:  Patient is currently bed bound due to IABP.  Advance activities as tolerated once IABP is removed.   Lines tubes and drains:  Continue IABP. Continue Frederick and Cordis.  Continue A-line.  Chest tubes will continue. NICOLASA x 2 will continue. Jones catheter will continue.  Continue pacer wires. Continue prevena wound vac.  Plan: Remain ICU.          11/02/2019  The patient is post-operative day 2, status-post coronary artery bypass grafting x 3.  Overall the patient is making progress, IABP removed and inotropic agents discontinued.   Neuro:  The patient is awake alert and oriented x3.  Neuro exam is nonfocal.  Pain is well controlled. Discontinue fentanyl.   Cardiac:  Patient is hemodynamically improving. Inotropic gtts discontinued. Excellent cardiac index. IABP discontinued.  Patient will be started on beta blockers today.  Respiratory: Good sats on high flow nasal cannula. Continue respiratory toilet, continue incentive spirometer. Wean to low flow nasal cannula.  GI:  Advance diabetic/cardiac diet as tolerated. Benign abdominal exam.  Renal:  Urine output is improving. Cr 0.9. K 4.9. Mag 2.2. Continue IV diuresis. Hold AM K.   Heme:  Hct 28.3  Platelet 47. Discontinue aspirin. Discontinue Plavix. HIT panel. Consult hematology r/t thrombocytopenia.   Id:  WBC 10.4. Tmax 99.4. Will continue to follow.   Endocrine:  Discontinue insulin gtt. Start sliding scale insulin. Start long acting insulin detemir.   Activities: Advance activities as tolerated.   Lines tubes and drains:  Discontinue IABP. Discontinue Frederick and Cordis.  Discontinue A-line.  Discontinue chest tubes x 3. Discontinue NICOLASA x 2. Discontinue arterial line. Jones catheter will continue for now.  Continue pacer wires. Continue prevena wound vac. Place PICC line.   Plan: Remain ICU.           11/03/2019  The patient is post-operative day 3, status-post coronary artery bypass grafting x 3.  Overall the patient is making progress.  Neuro:  The  patient is awake alert and oriented x3.  Neuro exam is nonfocal.  Pain is well controlled. Continue tramadol.   Cardiac:  Patient is hemodynamically stable. Increase metoprolol dose. Start amlodipine.   Respiratory: Good sats on nasal cannula. Continue respiratory toilet, continue incentive spirometer. Wean to room air.  GI:  Advance diabetic/cardiac diet as tolerated. Benign abdominal exam. Passing gas, no BM. Add 1 bottle of mag citrate.   Renal:  Good urine output. Cr 0.9. K 3.8. Mag 2.0. Replace K. Replace Mag. Change to PO diuresis.   Heme:  Hct 34.7  Platelet 89. Restart aspirin. Continue to hold Plavix. HIT panel pending. Hematology following.  Id:  WBC 13.8. Tmax 99.4. Will continue to follow.    Endocrine:  Glucose controlled on sliding scale insulin. Increase long acting insulin detemir dose.   Activities: Advance activities as tolerated.   Lines tubes and drains:  Jones catheter will continue for now.  Continue pacer wires. Continue prevena wound vac. Continue PICC line.   Plan: Remain ICU.          11/04/2019  The patient is post-operative day 4, status-post coronary artery bypass grafting x 3.  Overall the patient is making progress.  Neuro:  The patient is awake alert and oriented x3.  Neuro exam is nonfocal.  Pain is well controlled. Continue tramadol.   Cardiac:  Patient is hemodynamically stable. Continue metoprolol dose. Continue amlodipine dose.   Respiratory: Good sats on room air. Continue respiratory toilet, continue incentive spirometer.  GI:  Advance diabetic/cardiac diet as tolerated. Benign abdominal exam. Regular bowel movements.   Renal:  Good urine output. Cr 0.7. K 4.0. Mag 2.3. Replace K. Decrease PO diuresis.   Heme:  Hct 34.4  Platelet 140. Continue aspirin. Restart Plavix. HIT panel pending. Hematology following.  Id:  WBC down to 13.13. Tmax 98. Will continue to follow.    Endocrine:  Glucose controlled on sliding scale insulin. Increased long acting insulin detemir dose.    Activities: Advance activities as tolerated.   Lines tubes and drains:  Discontinued storm catheter. Continue pacer wires. Continue prevena wound vac. Continue PICC line.   Plan: Transfer to telemetry. Anticipate discharge to SNF in 48 hours.         11/05/2019  The patient is post-operative day 5, status-post coronary artery bypass grafting x 3.  Overall the patient is making progress.  Neuro:  The patient is awake alert and oriented x3.  Neuro exam is nonfocal. Patient slow to answer questions, however answers are appropriate.  Pain is well controlled. Continue tramadol PRN.   Cardiac:  Patient is hemodynamically stable. Continue metoprolol dose. Discontinue amlodipine. Start losartan.    Respiratory: Good sats on room air. Continue respiratory toilet, continue incentive spirometer.  GI:  Advance diabetic/cardiac diet as tolerated. Benign abdominal exam. Regular bowel movements.   Renal:  Good urine output. Cr 0.7. K 4.8. Mag 2.1. Replace Mag. Discontinue PO diuresis.   Heme:  Hct 39.5  Platelet 73. Continue aspirin. Hold Plavix. HIT panel pending. Hematology following.  Id:  WBC is up to 16. Tmax 99. Pan-culture. DC PICC line. Chest xray. Start broad spectrum empiric antibiotics. Will continue to follow.    Endocrine:  Glucose controlled on sliding scale insulin. Decrease long acting insulin detemir dose.   Activities: Advance activities as tolerated.   Lines tubes and drains:  Discontinue pacer wires. Discontinue prevena wound vac. Continue PICC line.   Plan: Anticipate discharge to SNF in 24-48 hours.         11/06/2019  The patient is post-operative day 6, status-post coronary artery bypass grafting x 3. Patient had an episode of decompensation and unresponsiveness yesterday evening. The patient was found unresponsive, cool, and diaphoretic, with agonal breathing. Patient was intubated and transferred to the ICU. Bedside EKG and echocardiogram performed at the bedside showed no significant changes. CT head,  chest, abdomen pelvis performed, showed no significant findings.     Neuro:  The patient is intubated and sedated. On Precedex gtt. Begin weaning off sedation.    Cardiac:  Patient is hemodynamically improving. Inotropic agent dependent. Currently on Epinephrine gtt. Start Milrinone gtt. Discontinue metoprolol. Discontinue losartan.    Respiratory: Ventilator dependent. tV 450, RR 20, PEEP 5. Wean down sedation. Wean off ventilator today.    GI:  Begin enteral tube feedings. Benign abdominal exam. Previously having regular bowel movements.   Renal: Adequate urine output. Cr 1.1. K 3.6. Mag 1.7. Replace Mag. Replace K. Discontinue diuresis.   Heme:  Hct 31.9  Platelet 89. Hold aspirin. Hold Plavix. HIT panel negative. Patient received 1 units PRBC last night. Hematology following.  Id:  WBC is down to 15.5. Tmax 99.9. BC NGTD. Urinalysis negative. Sputum culture negative. Vancomycin and zosyn day 2. Will continue to follow closely.    Endocrine:  Glucose controlled on insulin gtt, continue at this time.   Activities: Intubated and sedated.   Musculoskeletal: Unable to find right radial pulse via doppler. Tips of the fingers of the right hand are mottled, dusky, appear cyanotic. Recent right radial arterial line in place, which has been relocated. US of arteries and veins of the right arm ordered. Consult to vascular surgery placed.    Lines tubes and drains:  Continue swan and cordis. Continue arterial line. Continue storm.    Plan: Remain ICU         11/07/2019  The patient is post-operative day 7, status-post coronary artery bypass grafting x 3. Patient had an episode of decompensation and unresponsiveness the evening of 11/05/2019. The patient was found unresponsive, cool, and diaphoretic, with agonal breathing. Patient was intubated and transferred to the ICU. Bedside EKG and echocardiogram performed at the bedside showed no significant changes. CT head, chest, abdomen pelvis performed, showed no significant  findings.     Neuro:  The patient is intubated and sedated. On Precedex gtt. Wean off sedation.    Cardiac:  Patient is hemodynamically improving. Excellent cardiac index. Wean off Inotropic gtts.   Respiratory: Wean towards extubation. tV 400, RR 18, PEEP 5.   GI:  Continue enteral tube feedings while intubated. Benign abdominal exam.  Renal: Adequate urine output. Cr 1.1. K 4.1. Mag 2.8. Start IV diuresis.   Heme:  Hct 24.6  Platelet 60. Hold aspirin. Hold Plavix. HIT panel negative.  Id:  WBC is down to 14.46. Tmax 100.8. BC NGTD. Urinalysis negative. Sputum culture negative. Vancomycin and zosyn day 3. Midline sternal incision appears reddened, with induration at skin edges. Will continue to follow closely.    Endocrine:  Glucose controlled on sliding scale insulin.   Activities: Intubated and sedated. Moves all 4. Full ROM.    Musculoskeletal: Able to find right radial pulse via doppler. The right hand extending to the tips of the fingers of the right hand are mottled, dusky, appear cyanotic. Recent right radial arterial line placement, which has been relocated. US of arteries and veins of the right arm pending. Vascular surgery following, suggested possible thrombotic etiology, suggested heparin gtt, however this is contraindicated r/t s/p cabg x 3.     Lines tubes and drains:  Continue swan and cordis. Continue arterial line. Continue storm.    Plan: Remain ICU         11/08/2019  The patient is post-operative day 8, status-post coronary artery bypass grafting x 3. Patient had an episode of decompensation and unresponsiveness the evening of 11/05/2019. The patient was found unresponsive, cool, and diaphoretic, with agonal breathing. Patient was intubated and transferred to the ICU. Bedside EKG and echocardiogram performed at the bedside showed no significant changes. CT head, chest, abdomen pelvis performed, showed no significant findings. Suspect cardiogenic shock.      Neuro:  Patients neurologic state is  improving. Alert, follows commands, makes eye contact, disoriented x 4. No focal deficits.    Cardiac:  Patient is hemodynamically improving. Off all inotropic gtts. Start low dose metoprolol and amlodipine.   Respiratory: Good sats on low flow nasal cannula. Continue IS. Continue pulmonary toilet. Wean to room air.    GI:  Continue enteral tube feedings at this time r/t disorientation and aspiration risk. Discontinue laxatives. Start metamucil BID, patient has had multiple loose stools.   Renal: Good urine output. Cr 0.8. K 3.6. Mag 2.1. Replace K. Replace Mag. Continue IV diuresis.   Hema:  Hct 26.4  Platelet 45. Hold aspirin. Hold Plavix. HIT panel will be resent as the patient is a high suspicion for HIT despite previous negative HIT panel. Multiple DVTs from the right IJ down to the brachiocephalic vein. Confirmed via vascular US. Patient started on argatroban yesterday, goal is APTT 2x baseline. Most recent APTT 97.8. Baseline APTT 50.8.  Hematology following. Vascular surgery following.   Id:  WBC is down to 12. Tmax 100.4. BC NGTD. Urinalysis negative. Sputum culture negative. Vancomycin and zosyn day 4. Midline sternal incision appears improved, with minimal redness and induration at skin edges. Will continue to follow closely.    Endocrine:  Glucose controlled on sliding scale insulin.   Activities: Increase activities as tolerated.  Musculoskeletal: ROM 2/5 to the right wrist/hand/fingers, distal from the elbow there is 2+ non pitting edema, the extremity is tender and warm. There is mottling, a dusky appearance, and purple discoloration to the fingertips of the right hand. See above hematology plan.      Lines tubes and drains:  Continue PICC line. Continue storm catheter. Continue NG tube.   Plan: Remain ICU     11/09/2019  The patient is postop day 9.  Status post coronary artery bypass grafting x3.  Postop course was complicated by an episode of hemodynamic instability and decompensation on postop day  number 5.  Patient continues to make clinical progress since then.  She is off all drips.  She remains extubated.  Continue ICU care for now.    Neuro:  Patient is awake alert.  She is oriented x2.  She is still confused.  But she is making progress.  Her neuro exam is nonfocal.  She follows all commands.  Cardiac:  Patient continues to be hemodynamically stable.  She is somewhat hypertensive.  Will increase her antihypertensives.  Respiratory:  Patient is good sats on nasal cannula.  She remains extubated.  GI:  Patient was tolerating tube feeds until this morning.  She pulled heart feeding tube. Will get a swallowing evaluation.  Mode of nutrition will depend on results of swallowing evaluation.  Renal:  Patient has good urine output %period% creatinine is 0.9.  Heme:  Hematocrit is 26 platelet is 46.  Patient is on argatroban  for anticoagulation due to right upper extremity DVTs.  Hypercoagulability workup is in progress.  Appreciate Hematology and vascular surgery input.  Id:  Incision continues to improve.  white count is 17.  Continue broad-spectrum antibiotics.  Endocrine:  Glucose is controlled on its sliding scale insulin.  Activities:  Advance activities as tolerated.  Line tubes and drains.  Patient has a PICC line, Jones catheter.    11/10/2019  The patient is postop day 10.  Status post coronary bypass grafting x3.  Postop course was complicated by an episode of hemodynamic instability and decompensation on postop day 5.  Patient remains hemodynamically stable.    Neuro:  The patient is awake and alert.  She is oriented x1.  She continues to be confused.  Her neuro exam is nonfocal.  Cardiac:  Patient continues to be hemodynamically stable.  His she is hypertensive.  Hypertensive medications will be increased.  Respiratory:  Patient has good sats on nasal cannula.  Continue pulmonary toilet.  GI:  Patient is tolerating a regular diet.  Renal:  Patient has good urine output. Creatinine is 0.7.  Heme:  hematocrit is 24 and platelet is 67.  Patient continues on argatroban for right upper extremity DVTs.  Hypercoagulable workup is in progress.  Id:  Patient's white count is up to 20.  Patient is on broad prepped from antibiotics.  Endocrine:  The patient is on long-acting as well as sliding scale insulin.  Blood sugars have been well controlled.  Will decrease dose of long-acting.  Activities: patient has been out of bed to chair.  Advance as tolerated.  Musculoskeletal:  Patient's right upper extremity continues to be swollen  The index finger and thumb continues to be dusky with a bleb near the base of the thumb.  Continue argatroban for anticoagulation.  Continue elevation of right upper extremity.  Lines tubes and drains: patient has a PICC line.  Will discontinue Jones catheter.        11/11/2019  The patient is post-operative day 11, status-post coronary artery bypass grafting x 3. Post-operative course was complicated by an episode of hemodynamic instability and decompensation on postop day 5.  Patient remains hemodynamically stable.    Neuro:  Patients neurologic state is improving. Oriented x 2. Alert, follows commands, makes eye contact. No focal deficits.    Cardiac:  Patient is hemodynamically improving. Hypertensive. Continue metoprolol, amlodipine, and losartan. Add chlorthalidone.   Respiratory: Good sats on room air. Continue IS. Continue pulmonary toilet.  GI:  Continue cardiac/diabetic diet, modified by puree and thick liquids with swallow precautions. Advance as tolerated. Continue metamucil.  Renal: Good urine output. Cr 0.8. K 3.8. Replace K. Continue IV diuresis. Add chlorthalidone. BMP at noon.   Hema:  Hct 23.8  Platelet 75. Hold aspirin. Hold Plavix. Repeat HIT panel pending. Multiple DVTs from the right IJ down to the brachiocephalic vein. Confirmed via vascular US. Patient on argatroban gtt, goal is APTT 2x baseline. Most recent APTT 83.3. Baseline APTT 50.8.  Hematology following.  Vascular surgery following.   Id:  WBC is down to 19. Tmax 98.4. BC NGTD. Vancomycin and zosyn day 7. Discontinue broad spectrum antibiotics. Will continue to follow closely.    Endocrine:  Glucose controlled on sliding scale insulin and long acting insulin detemir.    Activities: Increase activities as tolerated.  Musculoskeletal: The right upper extremity exhibits swelling distal from the elbow. The RUE is tender to palpation and ROM. ROM is diminished to the right hand 2/5. The hand his reddened, swollen, with dusky, mottled, purple discoloration to the right thumb and tip of the right index finger. Blisters have formed on the proximal portion of the thumb and wrist. Severely diminished capillary refill to the tip of the right thumb and tip of the right index finger. Wound care order placed.      Lines tubes and drains:  Continue PICC line.  Plan: Remain ICU         11/12/2019  The patient is post-operative day 12, status-post coronary artery bypass grafting x 3. Post-operative course was complicated by an episode of hemodynamic instability and decompensation on postop day 5.  Patient remains hemodynamically stable.    Neuro:  Patients neurologic state is improving. Oriented to person, place, situation. Disoriented to time. Alert, follows commands, makes eye contact. No focal deficits.    Cardiac:  Patient is hemodynamically improving. Continue metoprolol, amlodipine, losartan, and chlorthalidone.   Respiratory: Good sats on room air. Continue IS.  GI:  Continue cardiac/diabetic diet, modified by puree and thick liquids with swallow precautions. Advance as tolerated. Continue metamucil.  Renal: Good urine output. Cr 0.8. K 3.9. Replace K. Discontinue lasix. Continue chlorthalidone.  Hema:  Hct 23.1  Platelet 75. Hold aspirin. Hold Plavix. 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 Multiple DVTs from the right IJ down to the brachiocephalic vein. Confirmed via vascular US. Patient on argatroban gtt, goal is APTT 2x  baseline. Most recent APTT 84.6. Baseline APTT 50.8.  Hematology following. Vascular surgery following.   Id:  WBC is down to 17.4. A-febrile. Will continue to follow closely.    Endocrine:  Patient had an episode of hypoglycemia last night. Will continue sliding scale insulin. Discontinue long acting insulin. Begin strict calorie count.   Activities: Increase activities as tolerated.  Musculoskeletal: The right upper extremity exhibits swelling distal from the elbow. The RUE is tender to palpation and ROM. ROM is diminished to the right hand 3/5. The hand is reddened, swollen, with dusky, mottled, purple discoloration to the right thumb tip and tip of the right index finger.  Severely diminished capillary refill to the tip of the right thumb and tip of the right index finger. Wound care following. Gauze dressing in place.       Lines tubes and drains:  Continue PICC line.  Plan: Remain ICU. Begin discharge planning for IP rehab.          11/13/2019  The patient is post-operative day 13, status-post coronary artery bypass grafting x 3. Post-operative course was complicated by an episode of hemodynamic instability and decompensation on postop day 5.  Patient remains hemodynamically stable.    Neuro:  Patient is oriented to person, place, situation. Disoriented to time. Alert, follows commands, makes eye contact. No focal deficits.    Cardiac:  Patient is hemodynamically stable. Continue metoprolol, amlodipine, losartan, and chlorthalidone.   Respiratory: Good sats on room air. Continue IS. Use of accessory muscles noted during inspiration. Will increase diuresis.   GI:  Continue cardiac/diabetic diet, modified by puree and thick liquids with swallow precautions. Advance as tolerated. Continue metamucil.  Renal: Good urine output. Cr 0.8. K 4.0. Mag 1.8. Replace Mag. Start lasix IV. Continue chlorthalidone.  Hema:  Hct 23.  Platelet 79. Hold aspirin. Hold Plavix. 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 Multiple DVTs  from the right IJ down to the brachiocephalic vein. Confirmed via vascular US. Patient on argatroban gtt, goal is APTT 2x baseline. Most recent APTT 75.6. Baseline APTT 50.8.  Hematology following. Vascular surgery following.   Id:  WBC is down to 12.2. A-febrile. Will continue to follow closely.    Endocrine:  Glucose controlled. Continue sliding scale insulin. Start long acting insulin detemir 5 units daily.     Activities: Increase activities as tolerated.  Musculoskeletal: The right upper extremity exhibits swelling distal from the elbow. The RUE is tender to palpation and ROM. ROM is diminished to the right hand 2/5. The right hand is improving. The dusky, mottled, purple discoloration is localized to the right thumb tip and tip of the right index finger.  Severely diminished capillary refill to the tip of the right thumb and tip of the right index finger. Wound care following. Gauze dressing in place.    Lines tubes and drains:  Continue PICC line.  Plan: Remain telemetry. Continue discharge planning for IP rehab.          11/14/2019  The patient is post-operative day 14, status-post coronary artery bypass grafting x 3. Post-operative course was complicated by an episode of hemodynamic instability and decompensation on postop day 5.  Patient remains hemodynamically stable.    Neuro:  Patient is oriented to person, place, situation. Disoriented to time. Alert, follows commands, makes eye contact. No focal deficits.    Cardiac:  Patient is hemodynamically stable. Continue metoprolol, amlodipine, losartan, and chlorthalidone.   Respiratory: Good sats on room air. Continue IS. Decreased use of accessory muscles noted during inspiration.   GI:  Continue cardiac/diabetic diet, modified by puree and thick liquids with swallow precautions. Advance as tolerated. Continue metamucil.  Renal: Good urine output. Cr 0.9. K 4.2. Mag 1.9. Replace Mag. Discontinue lasix. Continue chlorthalidone.  Hema:  Hct 21.9.  Platelet 87.  Hold aspirin. Hold Plavix. 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 Multiple DVTs from the right IJ down to the brachiocephalic vein. Confirmed via vascular US. As per discussion with hematology and pharmacy, will discontinue argatroban and start apixaban. Transfuse 1 unit PRBC.  Hematology following. Vascular surgery following.   Id:  WBC is down to 11. A-febrile. Will continue to follow closely.    Endocrine:  Continue sliding scale insulin. Increase insulin detemir to 5 units BID.     Activities: Increase activities as tolerated.  Musculoskeletal: The right upper extremity exhibits swelling distal from the elbow. The RUE is tender to palpation and ROM. ROM is diminished to the right hand 2/5. The right hand is slowly improving. The dusky, mottled, purple discoloration is localized to the right thumb tip and tip of the right index finger.  Severely diminished capillary refill to the tip of the right thumb and tip of the right index finger. Wound care following. Gauze dressing in place.    Lines tubes and drains:  Continue PICC line.  Plan: Remain telemetry. Continue discharge planning for IP rehab.          11/15/2019  The patient is post-operative day 15, status-post coronary artery bypass grafting x 3. Post-operative course was complicated by an episode of hemodynamic instability and decompensation on postop day 5.  Patient remains hemodynamically stable.    Neuro:  Patient is oriented to person, place, situation. Disoriented to time. Alert, follows commands, makes eye contact. No focal deficits.    Cardiac:  Patient is hemodynamically stable. Continue metoprolol, amlodipine, losartan, and chlorthalidone.   Respiratory: Good sats on low flow nasal cannula. Wean to room air. Continue IS.   GI:  Continue cardiac/diabetic diet, modified by puree and thick liquids with swallow precautions. Advance as tolerated. Continue metamucil.  Renal: Good urine output. Cr 0.8. K 3.6. Replace K. Continue chlorthalidone.  Kareem:   Hct 26.1. Platelet 99. Start Aspirin. Continue Apixaban 5mg BID.  Hematology following. Vascular surgery following.   Id:  WBC 11. A-febrile. Will continue to follow closely.    Endocrine:  Continue sliding scale insulin. Increase insulin detemir to 5 units AM dose, 10 units PM dose.   Activities: Increase activities as tolerated.  Musculoskeletal: The right upper extremity exhibits swelling distal from the elbow. The RUE is tender to palpation and ROM. ROM is diminished to the right hand 2/5. The right hand is slowly improving. The dusky, mottled, purple discoloration is localized to the right thumb tip and tip of the right index finger.  Severely diminished capillary refill to the tip of the right thumb and tip of the right index finger. Wound care following. Gauze dressing in place.    Lines tubes and drains:  Continue PICC line.  Plan: Remain telemetry. Begin discharge planning to LTAC, anticipate discharge in 48-72 hours.        11/16/2019  The patient is postop day 16.  Status post coronary artery bypass grafting x3.  Patient's postop course was complicated by an episode of hemodynamically instability and decompensation on postop day 5.  Since then the patient has made much clinical progress.  The patient remains hemodynamically stable.  Anticipate discharge to rehab or LTAC in the next 48-72 hours.    Neuro:  The patient is oriented x3.  She is much less confused.  Her responses to questioning is appropriate.  She moves all 4 she has no focal deficits.  Cardiac:  Patient remains hemodynamically stable.  Systolic blood pressure trending higher.  Will increase losartan.  Continue amlodipine metoprolol and chlorthalidone.  Respiratory:  The patient has good sats on room air.  Continue pulmonary toilet.  GI:  Patient is tolerating a regular the diet.  Renal:  Patient has good urine output.  creatinine is 0.7.  Heme:  Hematocrit is 30.  Platelet count is up to 129.  Continue aspirin and apixaban.  Id:  White count  is 10.  The patient is afebrile.  Endocrine:  Glucose is better controlled on long-acting insulin.  Activities patient is out of bed to chair continue increasing activities.  Line tubes and drains:  Patient has a PICC line continue PICC line.  Musculoskeletal:  Perfusion to the patient's right arm is much improved.  Swelling is somewhat decreased.  The area of dusky tissue remained isolated to the tip of the patient's right thumb and index finger.    11/17/2019  The patient is postop day 17.  Status post coronary artery bypass grafting x3.  The patient's postop course was complicated by an episode of hemodynamic instability ankle decompensation on postop day 5.  The patient is making clinical progress.  She has remained hemodynamically stable.  Anticipate discharge to an LTAC or rehab center in the next 48-72 hours.    Neuro:  The patient is alert and oriented x3.  Her confusion is much improved.  Her responses to all questions appropriately.  She moves all 4 and has no focal deficits.  Cardiac:  The patient remains hemodynamically stable.  She is tolerating losartan amlodipine metoprolol and chlorthalidone.  Respiratory:  The patient has good sats on room air.  Continue pulmonary toilet.  GI:  The patient is tolerating a regular diet.  Renal:  The patient has good urine output and had creatinine 0.7.  Heme:  The patient's hematocrit is 27 and her platelet count is increased to 155.  Continue aspirin and apixaban.  Id:  Patient is afebrile and her white count is 8.  Endocrine high glucose is better controlled on long-acting insulin plus sliding scale.  Activities:  The patient is out of bed to chair.  Continue advancing activities.  Line tubes and drains:  The patient has a PICC line.  Musculoskeletal:  Perfusion to the patient's right and continues to be improved.  However high right am is still swollen.  The area of dusky tissue remained stable.        11/18/2019  The patient is post-operative day 18, status-post  coronary artery bypass grafting x 3. Post-operative course was complicated by an episode of hemodynamic instability and decompensation on postop day 5.  Patient remains hemodynamically stable. Patient is ready for discharge to an LTAC facility pending acceptance.     Neuro:  Patient is AAOX3, follows commands, makes eye contact. No focal deficits.    Cardiac:  Patient is hemodynamically stable. Continue metoprolol, amlodipine, losartan, and chlorthalidone.   Respiratory: Good sats on room air Continue IS.   GI:  Continue cardiac/diabetic diet.  Renal: Good urine output. Cr 0.8. K 4.0. Mag 2.0. Replace Mag. Continue chlorthalidone.  Hema:  Hct 27.9. Platelet 185. Continue Aspirin. Continue Apixaban 5mg BID.   Id:  WBC  10.5. A-febrile. Will continue to follow closely.    Endocrine: Glucose well controlled.  Continue sliding scale insulin. Continue insulin detemir to 5 units AM dose, 10 units PM dose.   Activities: Increase activities as tolerated.  Musculoskeletal: The right upper extremity exhibits swelling distal from the elbow. The RUE continues to improve, coloration of the right hand is improving. Tender to palpation and ROM. ROM is improved but diminished to the right hand 3/5. The dusky, mottled, purple discoloration is localized to the right thumb tip and tip of the right index finger.  Severely diminished capillary refill to the tip of the right thumb and tip of the right index finger. Wound care following. Gauze dressing in place.    Lines tubes and drains:  Continue PICC line.  Plan: Remain telemetry. Patient is ready for discharge to an LTAC facility pending acceptance.        NSTEMI (non-ST elevated myocardial infarction)  The patient is a 69-year-old female with hypertension, diabetes, hyperlipidemia, obesity, anxiety and depression, iron deficiency anemia, vitamin B12 deficiency, who presented to the emergency room with an NSTEMI.  Workup included a cardiac catheterization that shows severe multivessel  coronary artery disease with proximal LAD stenosis of 70% and severely depressed ejection fraction of 20%.  The patient is a candidate for urgent coronary artery bypass grafting.  The risks and benefits of surgery were explained to the patient.  The patient verbalized understanding of the issues discussed.  She a segs the risks of surgery and has agreed to proceed with urgent coronary artery bypass grafting.  She understands that the risk of neurologic complication postoperatively is increased due to the presence of cerebral microvascular disease.        Yovani Dobbins NP  Cardiothoracic Surgery  [facility]

## 2019-11-18 NOTE — PT/OT/SLP PROGRESS
Occupational Therapy  Treatment    Mateusz Bates   MRN: [id number]   Admitting Diagnosis: Acute deep vein thrombosis (DVT) of right upper extremity    OT Date of Treatment: 11/18/19   OT Start Time: 1005  OT Stop Time: 1030  OT Total Time (min): 25 min    Billable Minutes:  Therapeutic Activity 25 minutes    General Precautions: Standard, fall, sternal  Orthopedic Precautions: N/A  Braces: N/A         Subjective:  Communicated with nurse and epic chart review prior to session.         Objective:  Patient found with: telemetry, oxygen     Functional Mobility:  Transfers:  Mod a x 2 with sit<>stand t/f's x 4 attempts    Functional Ambulation: pt ambulated a few feet x max a x 2 with chair in tow     Activities of Daily Living:     Feeding adaptive equipment: na     UE adaptive equipment: na       LE adaptive equipment: na       Bathing adaptive equipment:na  Balance:   Static Sit: FAIR+: Able to take MINIMAL challenges from all directions  Dynamic Sit: FAIR: Cannot move trunk without losing balance  Static Stand: POOR+: Needs MINIMAL assist to maintain  Dynamic stand: POOR+: Needs MIN (minimal ) assist during gait    Therapeutic Activities and Exercises:  Pt educated on sternal precautions and unable to verbalized 0/5 sternal precautions. Pt need max vc's to adhere to sternal precautions during t/f's training and forward/backward training.  Pt educated on posture and techniques with functional mobility task. Pt displayed delayed response or appropriate response intermittently during tx session.  AM-PAC 6 CLICK ADL   How much help from another person does this patient currently need?   1 = Unable, Total/Dependent Assistance  2 = A lot, Maximum/Moderate Assistance  3 = A little, Minimum/Contact Guard/Supervision  4 = None, Modified [place]/Independent    Putting on and taking off regular lower body clothing? : 2  Bathing (including washing, rinsing, drying)?: 2  Toileting, which includes using toilet, bedpan, or  "urinal? : 2  Putting on and taking off regular upper body clothing?: 2  Taking care of personal grooming such as brushing teeth?: 3  Eating meals?: 4  Daily Activity Total Score: 15     AM-PAC Raw Score CMS "G-Code Modifier Level of Impairment Assistance   6 % Total / Unable   7 - 8 CM 80 - 100% Maximal Assist   9-13 CL 60 - 80% Moderate Assist   14 - 19 CK 40 - 60% Moderate Assist   20 - 22 CJ 20 - 40% Minimal Assist   23 CI 1-20% SBA / CGA   24 CH 0% Independent/ Mod I       Patient left up in chair with all lines intact, call button in reach, chair alarm on, nurse  notified and sister present    ASSESSMENT:  Mateusz Bates is a 69 y.o. female with a medical diagnosis of Acute deep vein thrombosis (DVT) of right upper extremity and presents with debility and generalized weakness. Pt will continue to benefit from skilled OT.  .    Rehab identified problem list/impairments: Rehab identified problem list/impairments: weakness, impaired self care skills, impaired balance, decreased safety awareness, impaired endurance, impaired functional mobilty, decreased upper extremity function, gait instability    Rehab potential is good.    Activity tolerance: Good    Discharge recommendations: Discharge Facility/Level of Care Needs: nursing facility, skilled     Barriers to discharge: Barriers to Discharge: Decreased caregiver support    Equipment recommendations: bedside commode, walker, rolling, hospital bed     GOALS:   Multidisciplinary Problems     Occupational Therapy Goals        Problem: Occupational Therapy Goal    Goal Priority Disciplines Outcome Interventions   Occupational Therapy Goal     OT, PT/OT Ongoing, Progressing    Description:  Goals to be met by: 11/20/19     Patient will increase functional independence with ADLs by performing:    UE Dressing with Moderate Assistance.  Grooming while seated with Supervision.  Toileting from bedside commode with Moderate Assistance for hygiene and clothing " management.   Toilet transfer to bedside commode with Moderate Assistance.  Upper extremity exercise program x10 reps per handout, with assistance as needed.                       Plan:  Patient to be seen 3 x/week to address the above listed problems via self-care/home management, therapeutic activities, therapeutic exercises  Plan of Care expires: 11/10/19  Plan of Care reviewed with: patient         Susanniels Joelzier, OT  11/18/2019

## 2019-11-18 NOTE — PT/OT/SLP PROGRESS
Physical Therapy Treatment    Patient Name:  Mateusz Bates   MRN:  [id number]    Recommendations:     Discharge Recommendations:  nursing facility, skilled   Discharge Equipment Recommendations: (TBD by next level of care)   Barriers to discharge: Decreased caregiver support    Assessment:     Mateusz Bates is a 69 y.o. female admitted with a medical diagnosis of Acute deep vein thrombosis (DVT) of right upper extremity.  She presents with the following impairments/functional limitations:  weakness, impaired endurance, gait instability, impaired functional mobilty, decreased safety awareness, impaired balance .    Rehab Prognosis: Fair; patient would benefit from acute skilled PT services to address these deficits and reach maximum level of function.    Recent Surgery: Procedure(s) (LRB):  CORONARY ARTERY BYPASS GRAFT (CABG) (N/A)  ECHOCARDIOGRAM,TRANSESOPHAGEAL (N/A)  BLOCK, NERVE, INTERCOSTAL, 2 OR MORE (N/A) 18 Days Post-Op    Plan:     During this hospitalization, patient to be seen 5 x/week to address the identified rehab impairments via gait training, therapeutic activities, therapeutic exercises, neuromuscular re-education and progress toward the following goals:    · Plan of Care Expires:  11/25/19    Subjective     Chief Complaint:   Patient/Family Comments/goals:   Pain/Comfort:  · Pain Rating 1: 0/10      Objective:     Communicated with Epic and nurse prior to session.  Patient found up in chair with telemetry upon PT entry to room.     General Precautions: Standard, sternal, fall   Orthopedic Precautions:N/A   Braces: N/A     Functional Mobility:  ·       AM-PAC 6 CLICK MOBILITY  Turning over in bed (including adjusting bedclothes, sheets and blankets)?: 3  Sitting down on and standing up from a chair with arms (e.g., wheelchair, bedside commode, etc.): 2  Moving from lying on back to sitting on the side of the bed?: 3  Moving to and from a bed to a chair (including a wheelchair)?: 2  Need to walk in  hospital room?: 2  Climbing 3-5 steps with a railing?: 1  Basic Mobility Total Score: 13       Therapeutic Activities and Exercises:   PT able to go sit to stand x 3 reps with mod a x2.  pillo stand 60 secs, 45 secs x2.  Pt requires Cesar x2 to maintain stand.  Max vcs to maintain upright posrure.    Patient left up in chair with all lines intact and call button in reach..    GOALS:   Multidisciplinary Problems     Physical Therapy Goals        Problem: Physical Therapy Goal    Goal Priority Disciplines Outcome Goal Variances Interventions   Physical Therapy Goal     PT, PT/OT Ongoing, Progressing     Description:  By 11/22/19  1. Patient will perform supine to/from sit CGA x 1 per sternal prec.  2. Patient will perform sit to/from stand CGA a no AD per sternal prec.  3. Patient will amb 100ft no AD min A                      Time Tracking:     PT Received On: 11/18/19  PT Start Time: 1020     PT Stop Time: 1043  PT Total Time (min): 23 min     Billable Minutes: Therapeutic Activity 23    Treatment Type: Treatment  PT/PTA: PT     PTA Visit Number: 1     Darlene Sauer, PT  11/18/2019

## 2019-11-18 NOTE — PLAN OF CARE
[facility] provided ESTHER Blevins with number for report ([phone]).      Discharge orders sent to [facility] via [facility].    [facility] is arranging for transportation.

## 2019-11-18 NOTE — ASSESSMENT & PLAN NOTE
-Patient presented with intermittent chest tightness/heaviness that radiated to her back since Saturday evening  -Associated with JONES/orthopnea  -Initial troponin 3.665  -EKG showed SR with new LBBB  -Presentation consistent with NSTEMI  -Continue ASA, statin  -Start Toprol XL  -Start heparin gtt  -Check 2D echo  -Continue to trend troponin  -Keep NPO. [facility] today for further evaluation and PCI if indicated. All risks, benefits, and treatment alternatives explained to patient in detail. All questions answered. She has agreed to proceed. Further rec's to follow.    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 All risks, benefits, and treatment alternatives explained to patient in detail. All questions answered. She has agreed to proceed.    10/31/19  -Patient is s/p CABG x3.   See plan for CABG

## 2019-11-18 NOTE — PLAN OF CARE
Oumou Pina notified [facility] that Dr Zaidi has decided not to discharge patient today.  CM notified Tracy with [facility] and patient's sister, Christina.

## 2019-11-18 NOTE — ASSESSMENT & PLAN NOTE
--D/c Argatroban   --Avoid heparin  --Eliquis 5 mg PO BID  --Daily CBC  --Continue PO anticoagulation for at least 3 months. She will need to f/u in the Heme-Onc clinic following discharge

## 2019-11-18 NOTE — SUBJECTIVE & OBJECTIVE
Interval History: Denies pain or discomfort, out of bed in chair eating with speech therapy at time of visit.     Oncology Treatment Plan:   [No treatment plan]    Medications:  Continuous Infusions:  Scheduled Meds:   amLODIPine  10 mg Oral Daily    apixaban  5 mg Oral BID    aspirin  81 mg Oral Daily    atorvastatin  80 mg Oral QHS    chlorthalidone  25 mg Oral Daily    ferrous sulfate  325 mg Oral BID    insulin detemir U-100  10 Units Subcutaneous QHS    insulin detemir U-100  5 Units Subcutaneous Daily    losartan  100 mg Oral Daily    metoprolol tartrate  50 mg Oral BID    nitroGLYCERIN 2% TD oint  0.5 inch Topical (Top) Q12H    pantoprazole  40 mg Oral Daily     PRN Meds:albumin human 5%, albuterol sulfate, Dextrose 10% Bolus, Dextrose 10% Bolus, insulin aspart U-100, lactated ringers, magnesium sulfate IVPB, magnesium sulfate IVPB, metoclopramide HCl, ondansetron, potassium chloride in water **AND** potassium chloride in water **AND** potassium chloride in water, traMADol     Review of Systems   Constitutional: Positive for activity change, appetite change and fatigue. Negative for chills, diaphoresis, fever and unexpected weight change.   HENT: Negative for congestion, hearing loss, nosebleeds, postnasal drip and trouble swallowing.    Eyes: Negative for discharge and visual disturbance.   Respiratory: Negative for shortness of breath.    Cardiovascular: Positive for leg swelling. Negative for chest pain and palpitations.   Gastrointestinal: Positive for abdominal distention and constipation. Negative for abdominal pain, blood in stool, diarrhea, nausea and vomiting.   Endocrine: Negative for cold intolerance and heat intolerance.   Genitourinary: Negative for difficulty urinating, dyspareunia, flank pain, frequency and hematuria.   Musculoskeletal: Positive for arthralgias and back pain. Negative for myalgias.   Skin: Negative.    Neurological: Negative for dizziness, weakness, light-headedness  and headaches.   Hematological: Negative for adenopathy. Does not bruise/bleed easily.   Psychiatric/Behavioral: Negative for agitation, behavioral problems and confusion. The patient is nervous/anxious.      Objective:     Vital Signs (Most Recent):  Temp: 98.1 °F (36.7 °C) (11/18/19 1116)  Pulse: 76 (11/18/19 1116)  Resp: 18 (11/18/19 1116)  BP: 134/61 (11/18/19 1116)  SpO2: 97 % (11/18/19 1116) Vital Signs (24h Range):  Temp:  [98 °F (36.7 °C)-100.8 °F (38.2 °C)] 98.1 °F (36.7 °C)  Pulse:  [] 76  Resp:  [16-22] 18  SpO2:  [93 %-98 %] 97 %  BP: (134-152)/(61-72) 134/61     Weight: 76.2 kg (167 lb 15.9 oz)  Body mass index is 31.74 kg/m².  Body surface area is 1.81 meters squared.      Intake/Output Summary (Last 24 hours) at 11/18/2019 1305  Last data filed at 11/17/2019 1600  Gross per 24 hour   Intake 480 ml   Output --   Net 480 ml       Physical Exam   Constitutional: She is oriented to person, place, and time. She appears well-developed and well-nourished. She appears ill. No distress.   HENT:   Head: Normocephalic and atraumatic.   Right Ear: Hearing and external ear normal.   Left Ear: Hearing and external ear normal.   Nose: No rhinorrhea or sinus tenderness. Right sinus exhibits no maxillary sinus tenderness and no frontal sinus tenderness. Left sinus exhibits no maxillary sinus tenderness and no frontal sinus tenderness.   Mouth/Throat: Uvula is midline, oropharynx is clear and moist and mucous membranes are normal. No oral lesions.   Eyes: Pupils are equal, round, and reactive to light. Conjunctivae are normal. Right eye exhibits no discharge. Left eye exhibits no discharge.   Neck: Normal range of motion. Carotid bruit is not present. No tracheal deviation present. No thyromegaly present.   Cardiovascular: Normal rate, regular rhythm, S1 normal, S2 normal, normal heart sounds and intact distal pulses.   No murmur heard.  Pulses:       Dorsalis pedis pulses are 2+ on the right side, and 2+ on the  left side.   Pulmonary/Chest: Effort normal. No respiratory distress. She has decreased breath sounds in the right lower field and the left lower field.   Abdominal: Soft. Bowel sounds are normal. She exhibits distension. She exhibits no mass. There is no tenderness.   Musculoskeletal: Normal range of motion.   Lymphadenopathy:     She has no cervical adenopathy.        Right: No supraclavicular adenopathy present.        Left: No supraclavicular adenopathy present.   Neurological: She is alert and oriented to person, place, and time. She has normal strength. No sensory deficit. Coordination and gait normal.   Skin: Skin is warm and dry. Capillary refill takes less than 2 seconds. Bruising noted. No rash noted.        Psychiatric: Her speech is normal and behavior is normal. Judgment and thought content normal. Her mood appears anxious. Her affect is labile. Cognition and memory are impaired. She exhibits a depressed mood.   Nursing note and vitals reviewed.      Significant Labs:   CBC:   Recent Labs   Lab 11/17/19  0459 11/18/19  0523   WBC 8.37 10.50   HGB 8.4* 8.6*   HCT 27.1* 27.9*    185    and CMP:   Recent Labs   Lab 11/16/19  1645 11/17/19  0459 11/18/19  0523   * 134* 133*   K 4.3 3.7 4.0    101 99   CO2 18* 25 23   * 140* 154*   BUN 28* 23 21   CREATININE 0.8 0.7 0.8   CALCIUM 9.0 8.8 8.8   ANIONGAP 12 8 11   EGFRNONAA >60 >60 >60       Diagnostic Results:  I have reviewed and interpreted all pertinent imaging results/findings within the past 24 hours.

## 2019-11-18 NOTE — ASSESSMENT & PLAN NOTE
-Patient is s/p CABG x3 with LIMA to LAD reverse saphenous vein graft to the RCA and reverse saphenous vein graft to the ramus.  -Continue post CABG supportive therapy  -Continue ASA, Plavix, Statin, BB  -Will continue to follow along     11/1/19  -POD #1 s/p CABG x3  -Continue ASA, Statin, BB  -Remains on Epi and Milrinone gtt today  -Mgmt per CT surgery  -Will follow along    11/2/19  -Recovering s/p CABG x 3  -Continue statin, BB  -Platelets 47,000 this AM; heme/onc on board, ASA held  -Await further rec's    11/3/19  -Stable, progressing post CABG x 3  -Continue statin, BB  -ASA re-started  -Platelets 89,000, heme/onc on board    11/4/19  -Patient slowly improving post CABG x3  -Continue ASA, Statin and BB  -plavix on hold due to thrombocytopenia that is improving   -IS use encouraged and early ambulation with PT  -Will continue to follow along     11/5/19  -Will continue ASA, Statin and BB  -Consider holding Plavix again for drop in plts after resuming by primary team   -Recommend CXR to rule out post op PNA given bump in WBC and cough  -will need to monitor for temp  -ambulate as tolerated and encourage IS use     11/06  Hold ASA, Plavix due to anemia  Hold BB due to cardiogenic shock    11/11/19  -Continue Argatroban infusion  -Continue BB, ARB, IV lasix, Chlorthalidone, Norvasc, NTG paste  -Hold ASA, Plavix due to anemia  -Further mgmt per CT surgery    11/12/19  -Stable  -Continue BB, ARB, Norvasc, Chlorthalidone, IV Lasix  -Recommend re-starting ASA  -Plavix held due to anemia/thrombocytopenia    11/13/19  -Recommend ASA daily  -Plavix held due to anemia/thrombocytopenia  -Recommend PRBC transfusion, per CT surgery  -Continue BB, ARB, Norvasc, IV lasix, Chlorthalidone    11/14  -Continue current medical mgmt per CT surgery  -Recommend ASA daily  -Agree with PRBC transfusion today    11/15  -Continue Eliquis, Norvasc, ASA, ARB, BB  -Continue mgmt per CT surgery  -Will continue to follow along  -Encourage IS  and aggressive pulmonary toilet  -OOB to chair for meals    11/16  -Continue ASA, Eliquis, BB, ARB, Norvasc, Statin  -Encourage aggressive pulmonary toilet, IS, out of bed for all meals  -Needs to ambulate in hallway as able  -Will follow along with CT surgery    11/18/19  -Stable CV wise  -Continue ASA, Eliquis, BB, ARB, norvasc statin  -IS usage and ambulation

## 2019-11-18 NOTE — SUBJECTIVE & OBJECTIVE
Interval History:  11/18/2019  The patient is post-operative day 18.  Status post coronary artery bypass grafting x3.    ROS  Medications:  Continuous Infusions:  Scheduled Meds:   amLODIPine  10 mg Oral Daily    apixaban  5 mg Oral BID    aspirin  81 mg Oral Daily    atorvastatin  80 mg Oral QHS    chlorthalidone  25 mg Oral Daily    ferrous sulfate  325 mg Oral BID    insulin detemir U-100  10 Units Subcutaneous QHS    insulin detemir U-100  5 Units Subcutaneous Daily    losartan  100 mg Oral Daily    magnesium sulfate IVPB  2 g Intravenous Once    metoprolol tartrate  50 mg Oral BID    nitroGLYCERIN 2% TD oint  0.5 inch Topical (Top) Q12H    pantoprazole  40 mg Oral Daily     PRN Meds:albumin human 5%, albuterol sulfate, Dextrose 10% Bolus, Dextrose 10% Bolus, insulin aspart U-100, lactated ringers, magnesium sulfate IVPB, magnesium sulfate IVPB, metoclopramide HCl, ondansetron, potassium chloride in water **AND** potassium chloride in water **AND** potassium chloride in water, traMADol     Objective:     Vital Signs (Most Recent):  Temp: 98.2 °F (36.8 °C) (11/18/19 0800)  Pulse: 98 (11/18/19 0800)  Resp: 16 (11/18/19 0800)  BP: (!) 152/72 (11/18/19 0800)  SpO2: 97 % (11/18/19 0800) Vital Signs (24h Range):  Temp:  [98 °F (36.7 °C)-100.8 °F (38.2 °C)] 98.2 °F (36.8 °C)  Pulse:  [] 98  Resp:  [16-22] 16  SpO2:  [93 %-98 %] 97 %  BP: (130-152)/(62-79) 152/72     Weight: 76.2 kg (167 lb 15.9 oz)  Body mass index is 31.74 kg/m².    SpO2: 97 %  O2 Device (Oxygen Therapy): room air    Intake/Output - Last 3 Shifts       11/16 0700 - 11/17 0659 11/17 0700 - 11/18 0659 11/18 0700 - 11/19 0659    P.O. 1318 840     I.V. (mL/kg)  50 (0.7)     IV Piggyback  100     Total Intake(mL/kg) 1318 (17.3) 990 (13)     Net +1318 +990            Urine Occurrence 5 x 3 x     Stool Occurrence 2 x 1 x           Lines/Drains/Airways     Peripherally Inserted Central Catheter Line                 PICC Double Lumen  11/07/19 1645 left brachial;left basilic 10 days                Physical Exam   Constitutional: She is oriented to person, place, and time. She appears well-developed and well-nourished. No distress.   HENT:   Head: Normocephalic and atraumatic.   Eyes: Pupils are equal, round, and reactive to light. EOM are normal.   Neck: Normal range of motion. Neck supple. No JVD present.   Cardiovascular: Normal rate, regular rhythm, normal heart sounds and intact distal pulses. Exam reveals no gallop and no friction rub.   No murmur heard.  Pulmonary/Chest: Effort normal and breath sounds normal. No stridor. No respiratory distress. She has no wheezes. She has no rales. She exhibits no tenderness.   Abdominal: Soft. Bowel sounds are normal. She exhibits no distension and no mass. There is no tenderness. There is no rebound and no guarding. No hernia.   Musculoskeletal: She exhibits edema and tenderness. She exhibits no deformity.   ROM right hand 3/5. Edema to the right upper extremity distal to the elbow. The RUE continues to improve.    Neurological: She is alert and oriented to person, place, and time. She displays normal reflexes. No cranial nerve deficit or sensory deficit. She exhibits normal muscle tone. Coordination normal.   Skin: Skin is warm and dry. Capillary refill takes less than 2 seconds. The tip of the right index finger and the tip of the right thumb are unblanchable. . No rash noted. She is not diaphoretic. No erythema. No pallor.   Psychiatric: She has a normal mood and affect. Her behavior is normal. Judgment and thought content normal.   Nursing note and vitals reviewed.      Significant Labs:  BMP:   Recent Labs   Lab 11/18/19  0523   *   *   K 4.0   CL 99   CO2 23   BUN 21   CREATININE 0.8   CALCIUM 8.8   MG 2.0     CBC:   Recent Labs   Lab 11/18/19  0523   WBC 10.50   RBC 2.98*   HGB 8.6*   HCT 27.9*      MCV 94   MCH 28.9   MCHC 30.8*       Significant Diagnostics:  I have reviewed  all pertinent imaging results/findings within the past 24 hours.

## 2019-11-18 NOTE — ASSESSMENT & PLAN NOTE
Upper extremity vascular insufficiency:  --Improved apperance  --Vascular surgery following. No surgical intervention recommended. Recommend continue anticoagulation, arm elevation, mild/moderate compression

## 2019-11-18 NOTE — SUBJECTIVE & OBJECTIVE
Review of Systems   Constitution: Positive for malaise/fatigue.   HENT: Negative.    Eyes: Negative.    Cardiovascular: Negative.    Respiratory: Negative.    Endocrine: Negative.    Hematologic/Lymphatic: Bruises/bleeds easily.   Skin: Positive for color change and poor wound healing.   Musculoskeletal: Positive for arthritis and joint pain.        RUE swelling     Gastrointestinal: Negative.    Genitourinary: Negative.    Neurological: Positive for weakness.   Psychiatric/Behavioral: Negative.    Allergic/Immunologic: Negative.      Objective:     Vital Signs (Most Recent):  Temp: 98.2 °F (36.8 °C) (11/18/19 0800)  Pulse: 98 (11/18/19 0800)  Resp: 16 (11/18/19 0800)  BP: (!) 152/72 (11/18/19 0800)  SpO2: 97 % (11/18/19 0800) Vital Signs (24h Range):  Temp:  [98 °F (36.7 °C)-100.8 °F (38.2 °C)] 98.2 °F (36.8 °C)  Pulse:  [] 98  Resp:  [16-22] 16  SpO2:  [93 %-98 %] 97 %  BP: (130-152)/(62-79) 152/72     Weight: 76.2 kg (167 lb 15.9 oz)  Body mass index is 31.74 kg/m².     SpO2: 97 %  O2 Device (Oxygen Therapy): room air      Intake/Output Summary (Last 24 hours) at 11/18/2019 0853  Last data filed at 11/17/2019 1600  Gross per 24 hour   Intake 990 ml   Output --   Net 990 ml       Lines/Drains/Airways     Peripherally Inserted Central Catheter Line                 PICC Double Lumen 11/07/19 1645 left brachial;left basilic 10 days                Physical Exam   Constitutional: She is oriented to person, place, and time. She appears well-developed and well-nourished. No distress.   HENT:   Head: Normocephalic and atraumatic.   Eyes: Pupils are equal, round, and reactive to light. Right eye exhibits no discharge. Left eye exhibits no discharge.   Neck: Neck supple. No JVD present.   Cardiovascular: Normal rate, regular rhythm, S1 normal and S2 normal.   No murmur heard.  Pulmonary/Chest: Effort normal and breath sounds normal. No respiratory distress. She has no wheezes. She has no rales.   CABG site C/D/I;  no bleeding erythema or drainage   Abdominal: Soft. She exhibits no distension.   Musculoskeletal: She exhibits no edema.   RUE +swelling and ecchymosis with dusky appearing fingernails.    Neurological: She is alert and oriented to person, place, and time.   Skin: Skin is warm and dry. She is not diaphoretic. No erythema.   Psychiatric: She has a normal mood and affect. Her behavior is normal. Thought content normal.   Nursing note and vitals reviewed.      Significant Labs:   CMP   Recent Labs   Lab 11/16/19  1645 11/17/19  0459 11/18/19  0523   * 134* 133*   K 4.3 3.7 4.0    101 99   CO2 18* 25 23   * 140* 154*   BUN 28* 23 21   CREATININE 0.8 0.7 0.8   CALCIUM 9.0 8.8 8.8   ANIONGAP 12 8 11   ESTGFRAFRICA >60 >60 >60   EGFRNONAA >60 >60 >60   , CBC   Recent Labs   Lab 11/17/19 0459 11/18/19  0523   WBC 8.37 10.50   HGB 8.4* 8.6*   HCT 27.1* 27.9*    185   , Troponin No results for input(s): TROPONINI in the last 48 hours. and All pertinent lab results from the last 24 hours have been reviewed.    Significant Imaging: Echocardiogram:   2D echo with color flow doppler:   Results for orders placed or performed during the hospital encounter of 10/28/19   2D echo with color flow doppler   Result Value Ref Range    QEF 30 (A) 55 - 65    Diastolic Dysfunction Yes (A)     Est. PA Systolic Pressure 33.03     Narrative    Date of Procedure: 11/06/2019        TEST DESCRIPTION   Technical Quality: This is a technically challenging study.     General: A catheter is present in the right-sided cardiac chambers.     Aorta: The aortic root is normal in size, measuring 3.7 cm at sinotubular junction and 3.8 cm at Sinuses of Valsalva. The proximal ascending aorta is normal in size, measuring 3.7 cm across.     Left Atrium: The left atrial volume index is normal, measuring 19.22 cc/m2.     Left Ventricle: The left ventricle is normal in size, with an end-diastolic diameter of 3.0 cm, and an end-systolic  diameter of 2.5 cm. LV wall thickness is normal, with the septum measuring 2.1 cm and the posterior wall measuring 1.4 cm across. Relative   wall thickness was increased at 0.93, and the LV mass index was increased at 140.2 g/m2 consistent with concentric left ventricular hypertrophy. The following segments were mildly hypokinetic: apical lateral wall.  The following segments were severely hypokinetic: mid anteroseptum, basal anteroseptum, apical septum, mid inferoseptum, basal inferoseptum, basal inferior wall.  Left ventricular systolic function appears moderately depressed. Visually estimated ejection fraction is 30-35%. The LV Doppler derived stroke volume equals 54.0 ccs.     Diastolic indices: E wave velocity 0.9 m/s, E/A ratio 0.8,  msec., E/e' ratio(avg) 14. There is diastolic dysfunction secondary to relaxation abnormality.     Right Atrium: The right atrium is normal in size, measuring 3.5 cm in length and 2.1 cm in width in the apical view.     Right Ventricle: The right ventricle is normal in size. Global right ventricular systolic function appears normal. Tricuspid annular plane systolic excursion (TAPSE) is 1.3 cm. The estimated PA systolic pressure is 33 mmHg.     Aortic Valve:  The aortic valve is normal in structure. The aortic valve is tri-leaflet in structure. The mean gradient obtained across the aortic valve is 9 mmHg.     Mitral Valve:  The mitral valve is normal in structure. The pressure half time is 53 msec. The calculated mitral valve area is 4.15 cm2.     Tricuspid Valve:  The tricuspid valve is normal in structure.     Pulmonary Valve:  The pulmonic valve is not well seen.     IVC: IVC is normal in size and collapses > 50% with a sniff, suggesting normal right atrial pressure of 3 mmHg.     Intracavitary: There is no evidence of pericardial effusion, intracavity mass, thrombi, or vegetation.         CONCLUSIONS     1 - Concentric hypertrophy.     2 - Wall motion abnormalities.     3  - Moderately depressed left ventricular systolic function (EF 30-35%).     4 - Impaired LV relaxation, normal LAP (grade 1 diastolic dysfunction).     5 - Normal right ventricular systolic function .     6 - The estimated PA systolic pressure is 33 mmHg.             This document has been electronically    SIGNED BY: Charles Matias MD On: 11/06/2019 18:28   , EKG: Reviewed and X-Ray: CXR: X-Ray Chest 1 View (CXR):   Results for orders placed or performed during the hospital encounter of 10/28/19   X-Ray Chest 1 View    Narrative    EXAMINATION:  XR CHEST 1 VIEW    CLINICAL HISTORY:  follow up;    TECHNIQUE:  Single frontal view of the chest was performed.    COMPARISON:  11/07/2019    FINDINGS:  Tubes and lines are stable.  [place] catheter is been removed.  Mild atelectasis within the left midlung zone.  No consolidation.  Trace pleural effusions not entirely excluded.  No pneumothorax.  Bones demonstrate mild degenerative changes.  Sternotomy wires are intact.  In comparison to the prior study, there is no adverse interval changes      Impression    In comparison to the prior study, there is no adverse interval changes      Electronically signed by: Jh Anne MD  Date:    11/08/2019  Time:    08:14    and X-Ray Chest PA and Lateral (CXR):   Results for orders placed or performed during the hospital encounter of 10/28/19   X-Ray Chest PA And Lateral    Narrative    EXAMINATION:  XR CHEST PA AND LATERAL    CLINICAL HISTORY:  Pre Op;    COMPARISON:  10/28/2019    FINDINGS:  Cardiac silhouette is normal. Aorta demonstrates atherosclerotic disease. The lungs demonstrate no evidence of active disease.  No evidence of pleural effusion or pneumothorax.  Bones appear intact.      Impression    No acute process seen.      Electronically signed by: Jh Anne MD  Date:    10/28/2019  Time:    21:38

## 2019-11-18 NOTE — ASSESSMENT & PLAN NOTE
11/01/2019  The patient is post-operative day 1, status-post coronary artery bypass grafting x 3.  Overall the patient is making progress, however IABP and inotropic agents will continue today.   Neuro:  The patient is awake alert and oriented x3.  Neuro exam is nonfocal.  Pain is well controlled with IV pain medication.  Cardiac:  Patient is hemodynamically improving. Patient remains on epinephrine and milrinone gtts, which will be weaned over the next 24 hours.  Cardiac index is improving. IABP will remain in place over the next 24 hours.  Patient will be started on beta blockers once gtts are weaned off.  Respiratory: Good sats on high flow nasal cannula. Continue respiratory toilet, continue incentive spirometer. Wean to low flow nasal cannula.  GI:  Diet: sips of water with medications, will slowly advance diet as tolerated. Benign abdominal exam.  Renal:  Urine output is slowly improving. Cr 0.9. K 4.3. Mag 1.6. Replace mag. Continue IV diuresis. Add metolazone.   Heme:  Hct 34.1  Platelet 82. Continue aspirin. Discontinue Plavix for now.  Id:  WBC 12. Tmax 100.3. Continue post-operative antibiotics. Will continue to follow.   Endocrine:  Glucose is controlled with insulin gtt.  Continue insulin gtt for now, until weaned off inotropic agents.   Activities:  Patient is currently bed bound due to IABP.  Advance activities as tolerated once IABP is removed.   Lines tubes and drains:  Continue IABP. Continue [place] and Cordis.  Continue A-line.  Chest tubes will continue. NICOLASA x 2 will continue. Jones catheter will continue.  Continue pacer wires. Continue prevena wound vac.  Plan: Remain ICU.          11/02/2019  The patient is post-operative day 2, status-post coronary artery bypass grafting x 3.  Overall the patient is making progress, IABP removed and inotropic agents discontinued.   Neuro:  The patient is awake alert and oriented x3.  Neuro exam is nonfocal.  Pain is well controlled. Discontinue fentanyl.    Cardiac:  Patient is hemodynamically improving. Inotropic gtts discontinued. Excellent cardiac index. IABP discontinued.  Patient will be started on beta blockers today.  Respiratory: Good sats on high flow nasal cannula. Continue respiratory toilet, continue incentive spirometer. Wean to low flow nasal cannula.  GI:  Advance diabetic/cardiac diet as tolerated. Benign abdominal exam.  Renal:  Urine output is improving. Cr 0.9. K 4.9. Mag 2.2. Continue IV diuresis. Hold AM K.   Heme:  Hct 28.3  Platelet 47. Discontinue aspirin. Discontinue Plavix. HIT panel. Consult hematology r/t thrombocytopenia.   Id:  WBC 10.4. Tmax 99.4. Will continue to follow.   Endocrine:  Discontinue insulin gtt. Start sliding scale insulin. Start long acting insulin detemir.   Activities: Advance activities as tolerated.   Lines tubes and drains:  Discontinue IABP. Discontinue [place] and Cordis.  Discontinue A-line.  Discontinue chest tubes x 3. Discontinue NICOLASA x 2. Discontinue arterial line. Jones catheter will continue for now.  Continue pacer wires. Continue prevena wound vac. Place PICC line.   Plan: Remain ICU.           11/03/2019  The patient is post-operative day 3, status-post coronary artery bypass grafting x 3.  Overall the patient is making progress.  Neuro:  The patient is awake alert and oriented x3.  Neuro exam is nonfocal.  Pain is well controlled. Continue tramadol.   Cardiac:  Patient is hemodynamically stable. Increase metoprolol dose. Start amlodipine.   Respiratory: Good sats on nasal cannula. Continue respiratory toilet, continue incentive spirometer. Wean to room air.  GI:  Advance diabetic/cardiac diet as tolerated. Benign abdominal exam. Passing gas, no BM. Add 1 bottle of mag citrate.   Renal:  Good urine output. Cr 0.9. K 3.8. Mag 2.0. Replace K. Replace Mag. Change to PO diuresis.   Heme:  Hct 34.7  Platelet 89. Restart aspirin. Continue to hold Plavix. HIT panel pending. Hematology following.  Id:  WBC 13.8. Tmax  99.4. Will continue to follow.    Endocrine:  Glucose controlled on sliding scale insulin. Increase long acting insulin detemir dose.   Activities: Advance activities as tolerated.   Lines tubes and drains:  Storm catheter will continue for now.  Continue pacer wires. Continue prevena wound vac. Continue PICC line.   Plan: Remain ICU.          11/04/2019  The patient is post-operative day 4, status-post coronary artery bypass grafting x 3.  Overall the patient is making progress.  Neuro:  The patient is awake alert and oriented x3.  Neuro exam is nonfocal.  Pain is well controlled. Continue tramadol.   Cardiac:  Patient is hemodynamically stable. Continue metoprolol dose. Continue amlodipine dose.   Respiratory: Good sats on room air. Continue respiratory toilet, continue incentive spirometer.  GI:  Advance diabetic/cardiac diet as tolerated. Benign abdominal exam. Regular bowel movements.   Renal:  Good urine output. Cr 0.7. K 4.0. Mag 2.3. Replace K. Decrease PO diuresis.   Heme:  Hct 34.4  Platelet 140. Continue aspirin. Restart Plavix. HIT panel pending. Hematology following.  Id:  WBC down to 13.13. Tmax 98. Will continue to follow.    Endocrine:  Glucose controlled on sliding scale insulin. Increased long acting insulin detemir dose.   Activities: Advance activities as tolerated.   Lines tubes and drains:  Discontinued storm catheter. Continue pacer wires. Continue prevena wound vac. Continue PICC line.   Plan: Transfer to telemetry. Anticipate discharge to SNF in 48 hours.         11/05/2019  The patient is post-operative day 5, status-post coronary artery bypass grafting x 3.  Overall the patient is making progress.  Neuro:  The patient is awake alert and oriented x3.  Neuro exam is nonfocal. Patient slow to answer questions, however answers are appropriate.  Pain is well controlled. Continue tramadol PRN.   Cardiac:  Patient is hemodynamically stable. Continue metoprolol dose. Discontinue amlodipine. Start  losartan.    Respiratory: Good sats on room air. Continue respiratory toilet, continue incentive spirometer.  GI:  Advance diabetic/cardiac diet as tolerated. Benign abdominal exam. Regular bowel movements.   Renal:  Good urine output. Cr 0.7. K 4.8. Mag 2.1. Replace Mag. Discontinue PO diuresis.   Heme:  Hct 39.5  Platelet 73. Continue aspirin. Hold Plavix. HIT panel pending. Hematology following.  Id:  WBC is up to 16. Tmax 99. Pan-culture. DC PICC line. Chest xray. Start broad spectrum empiric antibiotics. Will continue to follow.    Endocrine:  Glucose controlled on sliding scale insulin. Decrease long acting insulin detemir dose.   Activities: Advance activities as tolerated.   Lines tubes and drains:  Discontinue pacer wires. Discontinue prevena wound vac. Continue PICC line.   Plan: Anticipate discharge to SNF in 24-48 hours.         11/06/2019  The patient is post-operative day 6, status-post coronary artery bypass grafting x 3. Patient had an episode of decompensation and unresponsiveness yesterday evening. The patient was found unresponsive, cool, and diaphoretic, with agonal breathing. Patient was intubated and transferred to the ICU. Bedside EKG and echocardiogram performed at the bedside showed no significant changes. CT head, chest, abdomen pelvis performed, showed no significant findings.     Neuro:  The patient is intubated and sedated. On Precedex gtt. Begin weaning off sedation.    Cardiac:  Patient is hemodynamically improving. Inotropic agent dependent. Currently on Epinephrine gtt. Start Milrinone gtt. Discontinue metoprolol. Discontinue losartan.    Respiratory: Ventilator dependent. tV 450, RR 20, PEEP 5. Wean down sedation. Wean off ventilator today.    GI:  Begin enteral tube feedings. Benign abdominal exam. Previously having regular bowel movements.   Renal: Adequate urine output. Cr 1.1. K 3.6. Mag 1.7. Replace Mag. Replace K. Discontinue diuresis.   Heme:  Hct 31.9  Platelet 89. Hold  aspirin. Hold Plavix. HIT panel negative. Patient received 1 units PRBC last night. Hematology following.  Id:  WBC is down to 15.5. Tmax 99.9. BC NGTD. Urinalysis negative. Sputum culture negative. Vancomycin and zosyn day 2. Will continue to follow closely.    Endocrine:  Glucose controlled on insulin gtt, continue at this time.   Activities: Intubated and sedated.   Musculoskeletal: Unable to find right radial pulse via doppler. Tips of the fingers of the right hand are mottled, dusky, appear cyanotic. Recent right radial arterial line in place, which has been relocated. US of arteries and veins of the right arm ordered. Consult to vascular surgery placed.    Lines tubes and drains:  Continue swan and cordis. Continue arterial line. Continue storm.    Plan: Remain ICU         11/07/2019  The patient is post-operative day 7, status-post coronary artery bypass grafting x 3. Patient had an episode of decompensation and unresponsiveness the evening of 11/05/2019. The patient was found unresponsive, cool, and diaphoretic, with agonal breathing. Patient was intubated and transferred to the ICU. Bedside EKG and echocardiogram performed at the bedside showed no significant changes. CT head, chest, abdomen pelvis performed, showed no significant findings.     Neuro:  The patient is intubated and sedated. On Precedex gtt. Wean off sedation.    Cardiac:  Patient is hemodynamically improving. Excellent cardiac index. Wean off Inotropic gtts.   Respiratory: Wean towards extubation. tV 400, RR 18, PEEP 5.   GI:  Continue enteral tube feedings while intubated. Benign abdominal exam.  Renal: Adequate urine output. Cr 1.1. K 4.1. Mag 2.8. Start IV diuresis.   Heme:  Hct 24.6  Platelet 60. Hold aspirin. Hold Plavix. HIT panel negative.  Id:  WBC is down to 14.46. Tmax 100.8. BC NGTD. Urinalysis negative. Sputum culture negative. Vancomycin and zosyn day 3. Midline sternal incision appears reddened, with induration at skin edges.  Will continue to follow closely.    Endocrine:  Glucose controlled on sliding scale insulin.   Activities: Intubated and sedated. Moves all 4. Full ROM.    Musculoskeletal: Able to find right radial pulse via doppler. The right hand extending to the tips of the fingers of the right hand are mottled, dusky, appear cyanotic. Recent right radial arterial line placement, which has been relocated. US of arteries and veins of the right arm pending. Vascular surgery following, suggested possible thrombotic etiology, suggested heparin gtt, however this is contraindicated r/t s/p cabg x 3.     Lines tubes and drains:  Continue swan and cordis. Continue arterial line. Continue storm.    Plan: Remain ICU         11/08/2019  The patient is post-operative day 8, status-post coronary artery bypass grafting x 3. Patient had an episode of decompensation and unresponsiveness the evening of 11/05/2019. The patient was found unresponsive, cool, and diaphoretic, with agonal breathing. Patient was intubated and transferred to the ICU. Bedside EKG and echocardiogram performed at the bedside showed no significant changes. CT head, chest, abdomen pelvis performed, showed no significant findings. Suspect cardiogenic shock.      Neuro:  Patients neurologic state is improving. Alert, follows commands, makes eye contact, disoriented x 4. No focal deficits.    Cardiac:  Patient is hemodynamically improving. Off all inotropic gtts. Start low dose metoprolol and amlodipine.   Respiratory: Good sats on low flow nasal cannula. Continue IS. Continue pulmonary toilet. Wean to room air.    GI:  Continue enteral tube feedings at this time r/t disorientation and aspiration risk. Discontinue laxatives. Start metamucil BID, patient has had multiple loose stools.   Renal: Good urine output. Cr 0.8. K 3.6. Mag 2.1. Replace K. Replace Mag. Continue IV diuresis.   Hema:  Hct 26.4  Platelet 45. Hold aspirin. Hold Plavix. HIT panel will be resent as the patient  is a high suspicion for HIT despite previous negative HIT panel. Multiple DVTs from the right IJ down to the brachiocephalic vein. Confirmed via vascular US. Patient started on argatroban yesterday, goal is APTT 2x baseline. Most recent APTT 97.8. Baseline APTT 50.8.  Hematology following. Vascular surgery following.   Id:  WBC is down to 12. Tmax 100.4. BC NGTD. Urinalysis negative. Sputum culture negative. Vancomycin and zosyn day 4. Midline sternal incision appears improved, with minimal redness and induration at skin edges. Will continue to follow closely.    Endocrine:  Glucose controlled on sliding scale insulin.   Activities: Increase activities as tolerated.  Musculoskeletal: ROM 2/5 to the right wrist/hand/fingers, distal from the elbow there is 2+ non pitting edema, the extremity is tender and warm. There is mottling, a dusky appearance, and purple discoloration to the fingertips of the right hand. See above hematology plan.      Lines tubes and drains:  Continue PICC line. Continue storm catheter. Continue NG tube.   Plan: Remain ICU     11/09/2019  The patient is postop day 9.  Status post coronary artery bypass grafting x3.  Postop course was complicated by an episode of hemodynamic instability and decompensation on postop day number 5.  Patient continues to make clinical progress since then.  She is off all drips.  She remains extubated.  Continue ICU care for now.    Neuro:  Patient is awake alert.  She is oriented x2.  She is still confused.  But she is making progress.  Her neuro exam is nonfocal.  She follows all commands.  Cardiac:  Patient continues to be hemodynamically stable.  She is somewhat hypertensive.  Will increase her antihypertensives.  Respiratory:  Patient is good sats on nasal cannula.  She remains extubated.  GI:  Patient was tolerating tube feeds until this morning.  She pulled heart feeding tube. Will get a swallowing evaluation.  Mode of nutrition will depend on results of  swallowing evaluation.  Renal:  Patient has good urine output %period% creatinine is 0.9.  Heme:  Hematocrit is 26 platelet is 46.  Patient is on argatroban  for anticoagulation due to right upper extremity DVTs.  Hypercoagulability workup is in progress.  Appreciate Hematology and vascular surgery input.  Id:  Incision continues to improve.  white count is 17.  Continue broad-spectrum antibiotics.  Endocrine:  Glucose is controlled on its sliding scale insulin.  Activities:  Advance activities as tolerated.  Line tubes and drains.  Patient has a PICC line, Jones catheter.    11/10/2019  The patient is postop day 10.  Status post coronary bypass grafting x3.  Postop course was complicated by an episode of hemodynamic instability and decompensation on postop day 5.  Patient remains hemodynamically stable.    Neuro:  The patient is awake and alert.  She is oriented x1.  She continues to be confused.  Her neuro exam is nonfocal.  Cardiac:  Patient continues to be hemodynamically stable.  His she is hypertensive.  Hypertensive medications will be increased.  Respiratory:  Patient has good sats on nasal cannula.  Continue pulmonary toilet.  GI:  Patient is tolerating a regular diet.  Renal:  Patient has good urine output. Creatinine is 0.7.  Heme: hematocrit is 24 and platelet is 67.  Patient continues on argatroban for right upper extremity DVTs.  Hypercoagulable workup is in progress.  Id:  Patient's white count is up to 20.  Patient is on broad prepped from antibiotics.  Endocrine:  The patient is on long-acting as well as sliding scale insulin.  Blood sugars have been well controlled.  Will decrease dose of long-acting.  Activities: patient has been out of bed to chair.  Advance as tolerated.  Musculoskeletal:  Patient's right upper extremity continues to be swollen  The index finger and thumb continues to be dusky with a bleb near the base of the thumb.  Continue argatroban for anticoagulation.  Continue elevation  of right upper extremity.  Lines tubes and drains: patient has a PICC line.  Will discontinue Jones catheter.        11/11/2019  The patient is post-operative day 11, status-post coronary artery bypass grafting x 3. Post-operative course was complicated by an episode of hemodynamic instability and decompensation on postop day 5.  Patient remains hemodynamically stable.    Neuro:  Patients neurologic state is improving. Oriented x 2. Alert, follows commands, makes eye contact. No focal deficits.    Cardiac:  Patient is hemodynamically improving. Hypertensive. Continue metoprolol, amlodipine, and losartan. Add chlorthalidone.   Respiratory: Good sats on room air. Continue IS. Continue pulmonary toilet.  GI:  Continue cardiac/diabetic diet, modified by puree and thick liquids with swallow precautions. Advance as tolerated. Continue metamucil.  Renal: Good urine output. Cr 0.8. K 3.8. Replace K. Continue IV diuresis. Add chlorthalidone. BMP at noon.   Hema:  Hct 23.8  Platelet 75. Hold aspirin. Hold Plavix. Repeat HIT panel pending. Multiple DVTs from the right IJ down to the brachiocephalic vein. Confirmed via vascular US. Patient on argatroban gtt, goal is APTT 2x baseline. Most recent APTT 83.3. Baseline APTT 50.8.  Hematology following. Vascular surgery following.   Id:  WBC is down to 19. Tmax 98.4. BC NGTD. Vancomycin and zosyn day 7. Discontinue broad spectrum antibiotics. Will continue to follow closely.    Endocrine:  Glucose controlled on sliding scale insulin and long acting insulin detemir.    Activities: Increase activities as tolerated.  Musculoskeletal: The right upper extremity exhibits swelling distal from the elbow. The RUE is tender to palpation and ROM. ROM is diminished to the right hand 2/5. The hand his reddened, swollen, with dusky, mottled, purple discoloration to the right thumb and tip of the right index finger. Blisters have formed on the proximal portion of the thumb and wrist. Severely  diminished capillary refill to the tip of the right thumb and tip of the right index finger. Wound care order placed.      Lines tubes and drains:  Continue PICC line.  Plan: Remain ICU         11/12/2019  The patient is post-operative day 12, status-post coronary artery bypass grafting x 3. Post-operative course was complicated by an episode of hemodynamic instability and decompensation on postop day 5.  Patient remains hemodynamically stable.    Neuro:  Patients neurologic state is improving. Oriented to person, place, situation. Disoriented to time. Alert, follows commands, makes eye contact. No focal deficits.    Cardiac:  Patient is hemodynamically improving. Continue metoprolol, amlodipine, losartan, and chlorthalidone.   Respiratory: Good sats on room air. Continue IS.  GI:  Continue cardiac/diabetic diet, modified by puree and thick liquids with swallow precautions. Advance as tolerated. Continue metamucil.  Renal: Good urine output. Cr 0.8. K 3.9. Replace K. Discontinue lasix. Continue chlorthalidone.  Hema:  Hct 23.1  Platelet 75. Hold aspirin. Hold Plavix. 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 Multiple DVTs from the right IJ down to the brachiocephalic vein. Confirmed via vascular US. Patient on argatroban gtt, goal is APTT 2x baseline. Most recent APTT 84.6. Baseline APTT 50.8.  Hematology following. Vascular surgery following.   Id:  WBC is down to 17.4. A-febrile. Will continue to follow closely.    Endocrine:  Patient had an episode of hypoglycemia last night. Will continue sliding scale insulin. Discontinue long acting insulin. Begin strict calorie count.   Activities: Increase activities as tolerated.  Musculoskeletal: The right upper extremity exhibits swelling distal from the elbow. The RUE is tender to palpation and ROM. ROM is diminished to the right hand 3/5. The hand is reddened, swollen, with dusky, mottled, purple discoloration to the right thumb tip and tip of the right index finger.   Severely diminished capillary refill to the tip of the right thumb and tip of the right index finger. Wound care following. Gauze dressing in place.       Lines tubes and drains:  Continue PICC line.  Plan: Remain ICU. Begin discharge planning for IP rehab.          11/13/2019  The patient is post-operative day 13, status-post coronary artery bypass grafting x 3. Post-operative course was complicated by an episode of hemodynamic instability and decompensation on postop day 5.  Patient remains hemodynamically stable.    Neuro:  Patient is oriented to person, place, situation. Disoriented to time. Alert, follows commands, makes eye contact. No focal deficits.    Cardiac:  Patient is hemodynamically stable. Continue metoprolol, amlodipine, losartan, and chlorthalidone.   Respiratory: Good sats on room air. Continue IS. Use of accessory muscles noted during inspiration. Will increase diuresis.   GI:  Continue cardiac/diabetic diet, modified by puree and thick liquids with swallow precautions. Advance as tolerated. Continue metamucil.  Renal: Good urine output. Cr 0.8. K 4.0. Mag 1.8. Replace Mag. Start lasix IV. Continue chlorthalidone.  Hema:  Hct 23.  Platelet 79. Hold aspirin. Hold Plavix. 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 Multiple DVTs from the right IJ down to the brachiocephalic vein. Confirmed via vascular US. Patient on argatroban gtt, goal is APTT 2x baseline. Most recent APTT 75.6. Baseline APTT 50.8.  Hematology following. Vascular surgery following.   Id:  WBC is down to 12.2. A-febrile. Will continue to follow closely.    Endocrine:  Glucose controlled. Continue sliding scale insulin. Start long acting insulin detemir 5 units daily.     Activities: Increase activities as tolerated.  Musculoskeletal: The right upper extremity exhibits swelling distal from the elbow. The RUE is tender to palpation and ROM. ROM is diminished to the right hand 2/5. The right hand is improving. The dusky, mottled, purple  discoloration is localized to the right thumb tip and tip of the right index finger.  Severely diminished capillary refill to the tip of the right thumb and tip of the right index finger. Wound care following. Gauze dressing in place.    Lines tubes and drains:  Continue PICC line.  Plan: Remain telemetry. Continue discharge planning for IP rehab.          11/14/2019  The patient is post-operative day 14, status-post coronary artery bypass grafting x 3. Post-operative course was complicated by an episode of hemodynamic instability and decompensation on postop day 5.  Patient remains hemodynamically stable.    Neuro:  Patient is oriented to person, place, situation. Disoriented to time. Alert, follows commands, makes eye contact. No focal deficits.    Cardiac:  Patient is hemodynamically stable. Continue metoprolol, amlodipine, losartan, and chlorthalidone.   Respiratory: Good sats on room air. Continue IS. Decreased use of accessory muscles noted during inspiration.   GI:  Continue cardiac/diabetic diet, modified by puree and thick liquids with swallow precautions. Advance as tolerated. Continue metamucil.  Renal: Good urine output. Cr 0.9. K 4.2. Mag 1.9. Replace Mag. Discontinue lasix. Continue chlorthalidone.  Hema:  Hct 21.9.  Platelet 87. Hold aspirin. Hold Plavix. 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 Multiple DVTs from the right IJ down to the brachiocephalic vein. Confirmed via vascular US. As per discussion with hematology and pharmacy, will discontinue argatroban and start apixaban. Transfuse 1 unit PRBC.  Hematology following. Vascular surgery following.   Id:  WBC is down to 11. A-febrile. Will continue to follow closely.    Endocrine:  Continue sliding scale insulin. Increase insulin detemir to 5 units BID.     Activities: Increase activities as tolerated.  Musculoskeletal: The right upper extremity exhibits swelling distal from the elbow. The RUE is tender to palpation and ROM. ROM is diminished to  the right hand 2/5. The right hand is slowly improving. The dusky, mottled, purple discoloration is localized to the right thumb tip and tip of the right index finger.  Severely diminished capillary refill to the tip of the right thumb and tip of the right index finger. Wound care following. Gauze dressing in place.    Lines tubes and drains:  Continue PICC line.  Plan: Remain telemetry. Continue discharge planning for IP rehab.          11/15/2019  The patient is post-operative day 15, status-post coronary artery bypass grafting x 3. Post-operative course was complicated by an episode of hemodynamic instability and decompensation on postop day 5.  Patient remains hemodynamically stable.    Neuro:  Patient is oriented to person, place, situation. Disoriented to time. Alert, follows commands, makes eye contact. No focal deficits.    Cardiac:  Patient is hemodynamically stable. Continue metoprolol, amlodipine, losartan, and chlorthalidone.   Respiratory: Good sats on low flow nasal cannula. Wean to room air. Continue IS.   GI:  Continue cardiac/diabetic diet, modified by puree and thick liquids with swallow precautions. Advance as tolerated. Continue metamucil.  Renal: Good urine output. Cr 0.8. K 3.6. Replace K. Continue chlorthalidone.  Hema:  Hct 26.1. Platelet 99. Start Aspirin. Continue Apixaban 5mg BID.  Hematology following. Vascular surgery following.   Id:  WBC 11. A-febrile. Will continue to follow closely.    Endocrine:  Continue sliding scale insulin. Increase insulin detemir to 5 units AM dose, 10 units PM dose.   Activities: Increase activities as tolerated.  Musculoskeletal: The right upper extremity exhibits swelling distal from the elbow. The RUE is tender to palpation and ROM. ROM is diminished to the right hand 2/5. The right hand is slowly improving. The dusky, mottled, purple discoloration is localized to the right thumb tip and tip of the right index finger.  Severely diminished capillary refill  to the tip of the right thumb and tip of the right index finger. Wound care following. Gauze dressing in place.    Lines tubes and drains:  Continue PICC line.  Plan: Remain telemetry. Begin discharge planning to LTAC, anticipate discharge in 48-72 hours.        11/16/2019  The patient is postop day 16.  Status post coronary artery bypass grafting x3.  Patient's postop course was complicated by an episode of hemodynamically instability and decompensation on postop day 5.  Since then the patient has made much clinical progress.  The patient remains hemodynamically stable.  Anticipate discharge to rehab or LTAC in the next 48-72 hours.    Neuro:  The patient is oriented x3.  She is much less confused.  Her responses to questioning is appropriate.  She moves all 4 she has no focal deficits.  Cardiac:  Patient remains hemodynamically stable.  Systolic blood pressure trending higher.  Will increase losartan.  Continue amlodipine metoprolol and chlorthalidone.  Respiratory:  The patient has good sats on room air.  Continue pulmonary toilet.  GI:  Patient is tolerating a regular the diet.  Renal:  Patient has good urine output.  creatinine is 0.7.  Heme:  Hematocrit is 30.  Platelet count is up to 129.  Continue aspirin and apixaban.  Id:  White count is 10.  The patient is afebrile.  Endocrine:  Glucose is better controlled on long-acting insulin.  Activities patient is out of bed to chair continue increasing activities.  Line tubes and drains:  Patient has a PICC line continue PICC line.  Musculoskeletal:  Perfusion to the patient's right arm is much improved.  Swelling is somewhat decreased.  The area of dusky tissue remained isolated to the tip of the patient's right thumb and index finger.    11/17/2019  The patient is postop day 17.  Status post coronary artery bypass grafting x3.  The patient's postop course was complicated by an episode of hemodynamic instability ankle decompensation on postop day 5.  The patient  is making clinical progress.  She has remained hemodynamically stable.  Anticipate discharge to an LTAC or rehab center in the next 48-72 hours.    Neuro:  The patient is alert and oriented x3.  Her confusion is much improved.  Her responses to all questions appropriately.  She moves all 4 and has no focal deficits.  Cardiac:  The patient remains hemodynamically stable.  She is tolerating losartan amlodipine metoprolol and chlorthalidone.  Respiratory:  The patient has good sats on room air.  Continue pulmonary toilet.  GI:  The patient is tolerating a regular diet.  Renal:  The patient has good urine output and had creatinine 0.7.  Heme:  The patient's hematocrit is 27 and her platelet count is increased to 155.  Continue aspirin and apixaban.  Id:  Patient is afebrile and her white count is 8.  Endocrine high glucose is better controlled on long-acting insulin plus sliding scale.  Activities:  The patient is out of bed to chair.  Continue advancing activities.  Line tubes and drains:  The patient has a PICC line.  Musculoskeletal:  Perfusion to the patient's right and continues to be improved.  However high right am is still swollen.  The area of dusky tissue remained stable.        11/18/2019  The patient is post-operative day 18, status-post coronary artery bypass grafting x 3. Post-operative course was complicated by an episode of hemodynamic instability and decompensation on postop day 5.  Patient remains hemodynamically stable. Patient is ready for discharge to an LTAC facility pending acceptance.     Neuro:  Patient is AAOX3, follows commands, makes eye contact. No focal deficits.    Cardiac:  Patient is hemodynamically stable. Continue metoprolol, amlodipine, losartan, and chlorthalidone.   Respiratory: Good sats on room air Continue IS.   GI:  Continue cardiac/diabetic diet.  Renal: Good urine output. Cr 0.8. K 4.0. Mag 2.0. Replace Mag. Continue chlorthalidone.  Hema:  Hct 27.9. Platelet 185. Continue  Aspirin. Continue Apixaban 5mg BID.   Id:  WBC 10.5. A-febrile. Will continue to follow closely.    Endocrine: Glucose well controlled.  Continue sliding scale insulin. Continue insulin detemir to 5 units AM dose, 10 units PM dose.   Activities: Increase activities as tolerated.  Musculoskeletal: The right upper extremity exhibits swelling distal from the elbow. The RUE continues to improve, coloration of the right hand is improving. Tender to palpation and ROM. ROM is improved but diminished to the right hand 3/5. The dusky, mottled, purple discoloration is localized to the right thumb tip and tip of the right index finger.  Severely diminished capillary refill to the tip of the right thumb and tip of the right index finger. Wound care following. Gauze dressing in place.    Lines tubes and drains:  Continue PICC line.  Plan: Remain telemetry. Patient is ready for discharge to an LTAC facility pending acceptance.

## 2019-11-18 NOTE — ASSESSMENT & PLAN NOTE
--Platelet count 185K  --D/c Argatroban due to unavailability   --please continue on Eliquis 5 mg PO BID.  Plan for anticoagulation for at least 3 months.  --Bleeding precautions  --Hg 8.6.  Transfuse for hemoglobin less than 7.    --Daily CBC

## 2019-11-19 LAB
ANION GAP SERPL CALC-SCNC: 5 MMOL/L (ref 8–16)
BASOPHILS # BLD AUTO: 0.03 K/UL (ref 0–0.2)
BASOPHILS NFR BLD: 0.3 % (ref 0–1.9)
BUN SERPL-MCNC: 22 MG/DL (ref 8–23)
CALCIUM SERPL-MCNC: 8.6 MG/DL (ref 8.7–10.5)
CHLORIDE SERPL-SCNC: 101 MMOL/L (ref 95–110)
CO2 SERPL-SCNC: 28 MMOL/L (ref 23–29)
CREAT SERPL-MCNC: 0.7 MG/DL (ref 0.5–1.4)
DIFFERENTIAL METHOD: ABNORMAL
EOSINOPHIL # BLD AUTO: 0.2 K/UL (ref 0–0.5)
EOSINOPHIL NFR BLD: 2.4 % (ref 0–8)
ERYTHROCYTE [DISTWIDTH] IN BLOOD BY AUTOMATED COUNT: 16 % (ref 11.5–14.5)
EST. GFR  (AFRICAN AMERICAN): >60 ML/MIN/1.73 M^2
EST. GFR  (NON AFRICAN AMERICAN): >60 ML/MIN/1.73 M^2
GLUCOSE SERPL-MCNC: 78 MG/DL (ref 70–110)
HCT VFR BLD AUTO: 26 % (ref 37–48.5)
HGB BLD-MCNC: 8.1 G/DL (ref 12–16)
IMM GRANULOCYTES # BLD AUTO: 0.12 K/UL (ref 0–0.04)
IMM GRANULOCYTES NFR BLD AUTO: 1.2 % (ref 0–0.5)
LYMPHOCYTES # BLD AUTO: 1.1 K/UL (ref 1–4.8)
LYMPHOCYTES NFR BLD: 11.2 % (ref 18–48)
MAGNESIUM SERPL-MCNC: 2.3 MG/DL (ref 1.6–2.6)
MCH RBC QN AUTO: 29 PG (ref 27–31)
MCHC RBC AUTO-ENTMCNC: 31.2 G/DL (ref 32–36)
MCV RBC AUTO: 93 FL (ref 82–98)
MONOCYTES # BLD AUTO: 0.9 K/UL (ref 0.3–1)
MONOCYTES NFR BLD: 9.2 % (ref 4–15)
NEUTROPHILS # BLD AUTO: 7.6 K/UL (ref 1.8–7.7)
NEUTROPHILS NFR BLD: 75.7 % (ref 38–73)
NRBC BLD-RTO: 0 /100 WBC
PLATELET # BLD AUTO: 205 K/UL (ref 150–350)
PMV BLD AUTO: 10 FL (ref 9.2–12.9)
POCT GLUCOSE: 188 MG/DL (ref 70–110)
POCT GLUCOSE: 328 MG/DL (ref 70–110)
POCT GLUCOSE: 79 MG/DL (ref 70–110)
POTASSIUM SERPL-SCNC: 3.9 MMOL/L (ref 3.5–5.1)
RBC # BLD AUTO: 2.79 M/UL (ref 4–5.4)
SODIUM SERPL-SCNC: 134 MMOL/L (ref 136–145)
WBC # BLD AUTO: 10.09 K/UL (ref 3.9–12.7)

## 2019-11-19 PROCEDURE — 25000003 PHARM REV CODE 250: Performed by: FAMILY MEDICINE

## 2019-11-19 PROCEDURE — 63600175 PHARM REV CODE 636 W HCPCS: Performed by: THORACIC SURGERY (CARDIOTHORACIC VASCULAR SURGERY)

## 2019-11-19 PROCEDURE — 25000003 PHARM REV CODE 250: Performed by: NURSE PRACTITIONER

## 2019-11-19 PROCEDURE — 99232 SBSQ HOSP IP/OBS MODERATE 35: CPT | Mod: ,,, | Performed by: INTERNAL MEDICINE

## 2019-11-19 PROCEDURE — 21400001 HC TELEMETRY ROOM

## 2019-11-19 PROCEDURE — 97803 MED NUTRITION INDIV SUBSEQ: CPT

## 2019-11-19 PROCEDURE — 97116 GAIT TRAINING THERAPY: CPT | Performed by: PHYSICAL THERAPIST

## 2019-11-19 PROCEDURE — 85025 COMPLETE CBC W/AUTO DIFF WBC: CPT

## 2019-11-19 PROCEDURE — 25000003 PHARM REV CODE 250: Performed by: THORACIC SURGERY (CARDIOTHORACIC VASCULAR SURGERY)

## 2019-11-19 PROCEDURE — 80048 BASIC METABOLIC PNL TOTAL CA: CPT

## 2019-11-19 PROCEDURE — 99232 PR SUBSEQUENT HOSPITAL CARE,LEVL II: ICD-10-PCS | Mod: ,,, | Performed by: INTERNAL MEDICINE

## 2019-11-19 PROCEDURE — 97530 THERAPEUTIC ACTIVITIES: CPT

## 2019-11-19 PROCEDURE — 80048 BASIC METABOLIC PNL TOTAL CA: CPT | Mod: 91

## 2019-11-19 PROCEDURE — 97530 THERAPEUTIC ACTIVITIES: CPT | Performed by: PHYSICAL THERAPIST

## 2019-11-19 PROCEDURE — 83735 ASSAY OF MAGNESIUM: CPT

## 2019-11-19 RX ADMIN — INSULIN ASPART 2 UNITS: 100 INJECTION, SOLUTION INTRAVENOUS; SUBCUTANEOUS at 11:11

## 2019-11-19 RX ADMIN — NITROGLYCERIN 0.5 INCH: 20 OINTMENT TOPICAL at 09:11

## 2019-11-19 RX ADMIN — FERROUS SULFATE TAB EC 325 MG (65 MG FE EQUIVALENT) 325 MG: 325 (65 FE) TABLET DELAYED RESPONSE at 09:11

## 2019-11-19 RX ADMIN — ATORVASTATIN CALCIUM 80 MG: 40 TABLET, FILM COATED ORAL at 09:11

## 2019-11-19 RX ADMIN — APIXABAN 5 MG: 2.5 TABLET, FILM COATED ORAL at 09:11

## 2019-11-19 RX ADMIN — METOPROLOL TARTRATE 50 MG: 50 TABLET, FILM COATED ORAL at 09:11

## 2019-11-19 RX ADMIN — INSULIN ASPART 4 UNITS: 100 INJECTION, SOLUTION INTRAVENOUS; SUBCUTANEOUS at 05:11

## 2019-11-19 RX ADMIN — PANTOPRAZOLE SODIUM 40 MG: 40 TABLET, DELAYED RELEASE ORAL at 09:11

## 2019-11-19 RX ADMIN — ASPIRIN 81 MG: 81 TABLET ORAL at 09:11

## 2019-11-19 RX ADMIN — INSULIN DETEMIR 5 UNITS: 100 INJECTION, SOLUTION SUBCUTANEOUS at 09:11

## 2019-11-19 RX ADMIN — LOSARTAN POTASSIUM 100 MG: 50 TABLET ORAL at 09:11

## 2019-11-19 RX ADMIN — POTASSIUM CHLORIDE 20 MEQ: 200 INJECTION, SOLUTION INTRAVENOUS at 07:11

## 2019-11-19 RX ADMIN — AMLODIPINE BESYLATE 10 MG: 10 TABLET ORAL at 09:11

## 2019-11-19 RX ADMIN — INSULIN DETEMIR 10 UNITS: 100 INJECTION, SOLUTION SUBCUTANEOUS at 09:11

## 2019-11-19 RX ADMIN — CHLORTHALIDONE 25 MG: 25 TABLET ORAL at 09:11

## 2019-11-19 NOTE — PROGRESS NOTES
"  [facility] -   Adult Nutrition  Progress Note    SUMMARY     Recommendations    Recommendation: 1. Add mechanical soft consistency/texture to current diet order per ST recs. 2. Continue ONS. 3. RD to f/u.    Goals: Initiate nutrition within 48-72 hrs   Nutrition Goal Status: goal met   Communication of RD Recs: (POC, sticky note)    Reason for Assessment    Reason For Assessment: RD f/u   Diagnosis: (CAD)  Relevant Medical History: DM2, HTN, HLD, GERD, anxiety  Interdisciplinary Rounds: attended   General Information Comments:  11.4.19. S/p CABG x 3. Remains in ICU. Currently on diabetic cardiac diet + ONS.  Pt reports fair appetite today, PO intake 75 %. Pt reports good intake PTA & no wt loss. Per epic records, pt weighed 153 lbs on 9/26/19, current wt = 166 lbs (no recent wt loss). NFPE not performed d/t pt w/ no signs of malnutrition. RD provided cardiac and diabetic diet education w/ handouts from the Nutrition Care Manual. RD contact info provided.   11.6.19. RD received consult for "respiratory failure". Pt in ICU. Now intubated & sedated w/ pressor support.   11.11.19. Now extubated. Remains in ICU. Confused. Currently on dysphagia pureed consistent carbohydrate diet + ONS w/ good intake.   11.19.19. Pt remains confused/disoriented. Pt w/ fair appetite (PO intake 75 % this am). ST recs (11/18/19): mechanical soft texture w/ thin liquids. NFPE performed 11/19/19, no muscle wasting/ fat depletion noted.  Nutrition Discharge Planning: diabetic cardiac diet w/ mechanical soft consistency per     Nutrition Risk Screen    Nutrition Risk Screen: no indicators present    Nutrition/Diet History    Spiritual, Cultural Beliefs, Anglican Practices, Values that Affect Care: no  Food Allergies: NKFA    Anthropometrics    Temp: 97.1 °F (36.2 °C)  Height Method: Measured(offcie 9/26/19)  Height: 5' 1" (154.9 cm)  Height (inches): 61 in  Weight Method: Standard Scale(10/29/19)  Weight: 75.3 kg (166 lb 0.1 " oz)  Weight (lb): 166.01 lb  Ideal Body Weight (IBW), Female: 105 lb  % Ideal Body Weight, Female (lb): 158.1 lb  BMI (Calculated): 31.4  BMI Grade: 30 - 34.9- obesity - grade I         Lab/Procedures/Meds    Pertinent Labs Reviewed: reviewed  BMP  Lab Results   Component Value Date     (L) 11/19/2019    K 3.9 11/19/2019     11/19/2019    CO2 28 11/19/2019    BUN 22 11/19/2019    CREATININE 0.7 11/19/2019    CALCIUM 8.6 (L) 11/19/2019    ANIONGAP 5 (L) 11/19/2019    ESTGFRAFRICA >60 11/19/2019    EGFRNONAA >60 11/19/2019       Lab Results   Component Value Date    ALBUMIN 2.3 (L) 11/09/2019       Lab Results   Component Value Date    HGBA1C 8.3 (H) 10/30/2019       Pertinent Medications Reviewed: reviewed      Estimated/Assessed Needs    Weight Used For Calorie Calculations: 75.2 kg (165 lb 12.6 oz)  Energy Calorie Requirements (kcal):  1207  Energy Need Method: Dub[place] St. Jeor  (no AF)   Protein Requirements: 75 g  Weight Used For Protein Calculations:  75.2 kg (165 lb 12.6 oz)  Estimated Fluid Requirement Method: RDA Method or per MD  RDA Method (mL): 1207  CHO Requirement: 150 g      Nutrition Prescription Ordered    Current Diet Order: diabetic cardiac diet + boost glucose control all meals     Evaluation of Received Nutrient/Fluid Intake      Intake/Output Summary (Last 24 hours) at 11/19/2019 1123  Last data filed at 11/18/2019 1925  Gross per 24 hour   Intake 120 ml   Output --   Net 120 ml     % Intake of Estimated Energy Needs: 75 - 100 %  % Meal Intake: 75 - 100 %    Nutrition Risk    1xweekly    Assessment and Plan    Nutrition Problem  Inadequate energy intake     Related to (etiology):   NPO status, no alternative means of nutrition    Signs and Symptoms (as evidenced by):   Meeting < 85 % EEN/EPN     Interventions:  collaboration with other providers    Nutrition Diagnosis Status:   Resolved           Monitor and Evaluation    Food and Nutrient Intake: energy intake  Food and Nutrient  Adminstration: diet order  Anthropometric Measurements: weight  Biochemical Data, Medical Tests and Procedures: electrolyte and renal panel, glucose/endocrine profile, lipid profile  Nutrition-Focused Physical Findings: overall appearance       Nutrition Follow-Up    RD Follow-up?: Yes

## 2019-11-19 NOTE — PLAN OF CARE
Patient disoriented to time. VSS  Patient remained afebrile throughout shift.  Patient remained free of falls this shift  Patient 0/10 of pain this shift  Blood glucose monitored, corrected via SSI.  Plan of care reviewed.  Patient verbalized understanding.  Patient moving/turning with assistance  Frequent weight shifting encouraged.  Patient NSR on monitor  Bed low, side rails up x2, wheels locked, call light in reach.  Bed alarm maintained for safety.  Patient instructed to call for assistance.  Hourly rounding completed.  Will continue to monitor.

## 2019-11-19 NOTE — PT/OT/SLP PROGRESS
Physical Therapy  Treatment    Mateusz Bates   MRN: [id number]   Admitting Diagnosis: Acute deep vein thrombosis (DVT) of right upper extremity    PT Received On: 11/19/19  PT Start Time: 1130     PT Stop Time: 1155    PT Total Time (min): 25 min       Billable Minutes:  Gait Training 10 min and Therapeutic Activity 15 min    Treatment Type: Treatment  PT/PTA: PT     PTA Visit Number: 0       General Precautions: Standard, fall, sternal  Orthopedic Precautions: N/A   Braces: N/A    Spiritual, Cultural Beliefs, Alevism Practices, Values that Affect Care: no    Subjective:  Communicated with Nurse Gallo and epic chart review prior to session.  Pt found supine in bed and agreeable to tx at this time.     Pain/Comfort  Pain Rating 1: 0/10    Objective:   Patient found with: telemetry, oxygen    Functional Mobility:  Therapeutic Activities and Exercises:  Pt unable to recall any sternal precautions at this time. Pt performed supine>sit with mod A x 2 and verbal/ tactile cues, scooted to EOB with CGA, sit>stand with Mod A. Pt stood with Mod A and doffed soiled brief with Total A, t/f to recliner with Mod A. Pt very confused throughout session.      AM-PAC 6 CLICK MOBILITY  How much help from another person does this patient currently need?   1 = Unable, Total/Dependent Assistance  2 = A lot, Maximum/Moderate Assistance  3 = A little, Minimum/Contact Guard/Supervision  4 = None, Modified [place]/Independent    Turning over in bed (including adjusting bedclothes, sheets and blankets)?: 3  Sitting down on and standing up from a chair with arms (e.g., wheelchair, bedside commode, etc.): 2  Moving from lying on back to sitting on the side of the bed?: 3  Moving to and from a bed to a chair (including a wheelchair)?: 2  Need to walk in hospital room?: 2  Climbing 3-5 steps with a railing?: 1  Basic Mobility Total Score: 13    AM-PAC Raw Score CMS G-Code Modifier Level of Impairment Assistance   6 % Total / Unable   7  - 9 CM 80 - 100% Maximal Assist   10 - 14 CL 60 - 80% Moderate Assist   15 - 19 CK 40 - 60% Moderate Assist   20 - 22 CJ 20 - 40% Minimal Assist   23 CI 1-20% SBA / CGA   24 CH 0% Independent/ Mod I     Patient left up in chair with all lines intact, call button in reach, chair alarm on, Nurse Gallo notified and sitter present.    Assessment:  Mateusz Bates is a 69 y.o. female with a medical diagnosis of Acute deep vein thrombosis (DVT) of right upper extremity and presents with impaired functional mobility. Pt will benefit from continued skilled PT in order to address the impairments.     Rehab identified problem list/impairments: Rehab identified problem list/impairments: weakness, impaired endurance, gait instability, impaired functional mobilty, impaired self care skills, impaired balance, decreased lower extremity function, decreased upper extremity function, decreased coordination, decreased safety awareness, pain, other (comment)(Sternal precautions)    Rehab potential is fair.    Activity tolerance: Fair    Discharge recommendations: Discharge Facility/Level of Care Needs: nursing facility, skilled, LTACH (long-term acute care hospital)     Barriers to discharge:      Equipment recommendations:       GOALS:   Multidisciplinary Problems     Physical Therapy Goals        Problem: Physical Therapy Goal    Goal Priority Disciplines Outcome Goal Variances Interventions   Physical Therapy Goal     PT, PT/OT Ongoing, Progressing     Description:  By 11/22/19  1. Patient will perform supine to/from sit CGA x 1 per sternal prec.  2. Patient will perform sit to/from stand CGA a no AD per sternal prec.  3. Patient will amb 100ft no AD min A                      PLAN:    Patient to be seen 5 x/week  to address the above listed problems via gait training, therapeutic activities, therapeutic exercises  Plan of Care expires: 11/25/19  Plan of Care reviewed with: patient    PT G-Codes  Functional Assessment Tool Used: [place]  [facility]  Score: 13    Iza Vieyra, PT/OT  11/19/2019

## 2019-11-19 NOTE — SUBJECTIVE & OBJECTIVE
Interval History:  Bilateral toes are dusky left worse than right.  Pedal pulses bilateral +1.  Patient complains of tenderness of toes when touched.  Sitter at bedside at time of visit.  Patient has periods of confusion.  Able to answer questions appropriately at time of visit.    Oncology Treatment Plan:   [No treatment plan]    Medications:  Continuous Infusions:  Scheduled Meds:   amLODIPine  10 mg Oral Daily    apixaban  5 mg Oral BID    aspirin  81 mg Oral Daily    atorvastatin  80 mg Oral QHS    chlorthalidone  25 mg Oral Daily    ferrous sulfate  325 mg Oral BID    insulin detemir U-100  10 Units Subcutaneous QHS    insulin detemir U-100  5 Units Subcutaneous Daily    losartan  100 mg Oral Daily    metoprolol tartrate  50 mg Oral BID    nitroGLYCERIN 2% TD oint  0.5 inch Topical (Top) Q12H    pantoprazole  40 mg Oral Daily     PRN Meds:albumin human 5%, albuterol sulfate, Dextrose 10% Bolus, Dextrose 10% Bolus, insulin aspart U-100, lactated ringers, magnesium sulfate IVPB, magnesium sulfate IVPB, metoclopramide HCl, ondansetron, potassium chloride in water **AND** potassium chloride in water **AND** potassium chloride in water, traMADol     Review of Systems   Constitutional: Positive for activity change, appetite change and fatigue. Negative for chills, diaphoresis, fever and unexpected weight change.   HENT: Negative for congestion, hearing loss, mouth sores, nosebleeds, postnasal drip and trouble swallowing.    Eyes: Negative for discharge and visual disturbance.   Respiratory: Negative for cough, chest tightness and shortness of breath.    Cardiovascular: Positive for leg swelling. Negative for chest pain and palpitations.   Gastrointestinal: Positive for abdominal distention. Negative for blood in stool, constipation, diarrhea, nausea and vomiting.   Endocrine: Negative for cold intolerance and heat intolerance.   Genitourinary: Negative for difficulty urinating, dyspareunia, flank pain,  hematuria, pelvic pain and urgency.   Musculoskeletal: Negative for arthralgias and myalgias.   Skin: Positive for color change and wound.   Neurological: Positive for weakness. Negative for dizziness, light-headedness and headaches.   Hematological: Negative for adenopathy. Does not bruise/bleed easily.   Psychiatric/Behavioral: Negative for agitation, behavioral problems and confusion. The patient is nervous/anxious.      Objective:     Vital Signs (Most Recent):  Temp: 97.4 °F (36.3 °C) (11/19/19 1008)  Pulse: 95 (11/19/19 1008)  Resp: 20 (11/19/19 1008)  BP: (!) 161/74 (11/19/19 1008)  SpO2: 98 % (11/19/19 1008) Vital Signs (24h Range):  Temp:  [97.3 °F (36.3 °C)-98.1 °F (36.7 °C)] 97.4 °F (36.3 °C)  Pulse:  [76-95] 95  Resp:  [18-20] 20  SpO2:  [95 %-98 %] 98 %  BP: (121-161)/(58-92) 161/74     Weight: 75.5 kg (166 lb 7.2 oz)  Body mass index is 31.45 kg/m².  Body surface area is 1.8 meters squared.      Intake/Output Summary (Last 24 hours) at 11/19/2019 1114  Last data filed at 11/18/2019 1925  Gross per 24 hour   Intake 120 ml   Output --   Net 120 ml       Physical Exam   Constitutional: She is oriented to person, place, and time. She appears well-developed and well-nourished. She appears ill. No distress.   HENT:   Head: Normocephalic and atraumatic.   Right Ear: Hearing and external ear normal.   Left Ear: Hearing and external ear normal.   Nose: No rhinorrhea or sinus tenderness. Right sinus exhibits no maxillary sinus tenderness and no frontal sinus tenderness. Left sinus exhibits no maxillary sinus tenderness and no frontal sinus tenderness.   Mouth/Throat: Uvula is midline, oropharynx is clear and moist and mucous membranes are normal. No oral lesions.   Eyes: Pupils are equal, round, and reactive to light. Conjunctivae are normal. Right eye exhibits no discharge. Left eye exhibits no discharge.   Neck: Normal range of motion. Carotid bruit is not present. No tracheal deviation present. No thyromegaly  present.   Cardiovascular: Normal rate, regular rhythm, S1 normal, S2 normal, normal heart sounds and intact distal pulses.   No murmur heard.  Pulses:       Dorsalis pedis pulses are 1+ on the right side, and 1+ on the left side.   Pulmonary/Chest: Effort normal and breath sounds normal. No respiratory distress.   Abdominal: Soft. She exhibits distension. She exhibits no mass. Bowel sounds are decreased. There is no tenderness.   Musculoskeletal: Normal range of motion. She exhibits no edema.   Feet:   Left Foot:   Skin Integrity: Positive for erythema and warmth.   Lymphadenopathy:     She has no cervical adenopathy.        Right: No supraclavicular adenopathy present.        Left: No supraclavicular adenopathy present.   Neurological: She is alert and oriented to person, place, and time. She has normal strength. No sensory deficit. Coordination and gait normal.   Skin: Skin is warm and dry. Capillary refill takes less than 2 seconds. No rash noted. There is pallor.        Psychiatric: Her speech is normal and behavior is normal. Judgment and thought content normal. Her mood appears anxious. Her affect is labile. Cognition and memory are impaired. She exhibits a depressed mood.   Nursing note and vitals reviewed.      Significant Labs:   CBC:   Recent Labs   Lab 11/18/19  0523 11/19/19  0353   WBC 10.50 10.09   HGB 8.6* 8.1*   HCT 27.9* 26.0*    205    and CMP:   Recent Labs   Lab 11/18/19  0523 11/19/19  0353   * 134*   K 4.0 3.9   CL 99 101   CO2 23 28   * 78   BUN 21 22   CREATININE 0.8 0.7   CALCIUM 8.8 8.6*   ANIONGAP 11 5*   EGFRNONAA >60 >60       Diagnostic Results:  I have reviewed all pertinent imaging results/findings within the past 24 hours.

## 2019-11-19 NOTE — NURSING
Nitroglycerin medication error. 0.5 inch of Nitroglycerin placed on chest instead of Right hand. BP remained stable throughout shift. Most recent BP is 128/65 and HR is 87. Will continue to monitor.

## 2019-11-19 NOTE — NURSING
"Called to room by Keny DISLA. Patient found on the floor in sitting position stating "I slid out the bed to the floor trying to reach my ice water." Patient full body assessment done with Yovani Dobbins NP at the bedside assessing patient as well. No new bruising or bone fractures noted. Patient did not hit head or any extremities on the floor or on the bed or anything surrounding in patient room. Patient neuro status assessed with no change noted. Oriented x3. Charge nurse notified. Will continue to monitor patient closely.  "

## 2019-11-19 NOTE — PLAN OF CARE
Recommendations     Recommendation: 1. Add mechanical soft consistency/texture to current diet order per ST recs. 2. Continue ONS. 3. RD to f/u.    Goals: Initiate nutrition within 48-72 hrs   Nutrition Goal Status: goal met   Communication of RD Recs: (POC, sticky note)

## 2019-11-19 NOTE — SUBJECTIVE & OBJECTIVE
Interval History:  11/19/2019  The patient is post-operative day 19.  Status post coronary artery bypass grafting x3.    ROS  Medications:  Continuous Infusions:  Scheduled Meds:   amLODIPine  10 mg Oral Daily    apixaban  5 mg Oral BID    aspirin  81 mg Oral Daily    atorvastatin  80 mg Oral QHS    chlorthalidone  25 mg Oral Daily    ferrous sulfate  325 mg Oral BID    insulin detemir U-100  10 Units Subcutaneous QHS    insulin detemir U-100  5 Units Subcutaneous Daily    losartan  100 mg Oral Daily    metoprolol tartrate  50 mg Oral BID    nitroGLYCERIN 2% TD oint  0.5 inch Topical (Top) Q12H    pantoprazole  40 mg Oral Daily     PRN Meds:albumin human 5%, albuterol sulfate, Dextrose 10% Bolus, Dextrose 10% Bolus, insulin aspart U-100, lactated ringers, magnesium sulfate IVPB, magnesium sulfate IVPB, metoclopramide HCl, ondansetron, potassium chloride in water **AND** potassium chloride in water **AND** potassium chloride in water, traMADol     Objective:     Vital Signs (Most Recent):  Temp: 97.4 °F (36.3 °C) (11/19/19 1008)  Pulse: 95 (11/19/19 1008)  Resp: 20 (11/19/19 1008)  BP: (!) 161/74 (11/19/19 1008)  SpO2: 98 % (11/19/19 1008) Vital Signs (24h Range):  Temp:  [97.3 °F (36.3 °C)-98.1 °F (36.7 °C)] 97.4 °F (36.3 °C)  Pulse:  [76-95] 95  Resp:  [18-20] 20  SpO2:  [95 %-98 %] 98 %  BP: (121-161)/(58-92) 161/74     Weight: 75.5 kg (166 lb 7.2 oz)  Body mass index is 31.45 kg/m².    SpO2: 98 %  O2 Device (Oxygen Therapy): room air    Intake/Output - Last 3 Shifts       11/17 0700 - 11/18 0659 11/18 0700 - 11/19 0659 11/19 0700 - 11/20 0659    P.O. 840 120     I.V. (mL/kg) 50 (0.7)      IV Piggyback 100      Total Intake(mL/kg) 990 (13) 120 (1.6)     Net +990 +120            Urine Occurrence 3 x 2 x     Stool Occurrence 1 x 2 x           Lines/Drains/Airways     Peripherally Inserted Central Catheter Line                 PICC Double Lumen 11/07/19 1645 left brachial;left basilic 11 days                 Physical Exam   Constitutional: She is oriented to person, place, and time. She appears well-developed and well-nourished. No distress.   HENT:   Head: Normocephalic and atraumatic.   Eyes: Pupils are equal, round, and reactive to light. EOM are normal.   Neck: Normal range of motion. Neck supple. No JVD present.   Cardiovascular: Normal rate, regular rhythm, normal heart sounds and intact distal pulses. Exam reveals no gallop and no friction rub.   No murmur heard.  Pulmonary/Chest: Effort normal and breath sounds normal. No stridor. No respiratory distress. She has no wheezes. She has no rales. She exhibits no tenderness.   Abdominal: Soft. Bowel sounds are normal. She exhibits no distension and no mass. There is no tenderness. There is no rebound and no guarding. No hernia.   Musculoskeletal: She exhibits edema and tenderness. She exhibits no deformity.   The right hand continues to improve, ROM 3/5, color is improving, + tenderness, + edema distal to the right elbow. The left foot has + edema distal from the ankle, redness and warmth noted to the  Left foot generalized with + tenderness.    Neurological: She is alert and oriented to person, place, and time. She displays normal reflexes. No cranial nerve deficit or sensory deficit. She exhibits normal muscle tone. Coordination normal.   Skin: Skin is warm. Capillary refill takes less than 2 seconds. Right index fingertip and right thumb tip are un blanchable. . No rash noted. She is not diaphoretic. There is erythema. No pallor.   + erythema and warmth to the left foot. Mild dusky discoloration to the tips of the left foot.  Surgical incisions CDI.    Psychiatric: She has a normal mood and affect. Her behavior is normal. Judgment and thought content normal.   Nursing note and vitals reviewed.      Significant Labs:  BMP:   Recent Labs   Lab 11/19/19  0353   GLU 78   *   K 3.9      CO2 28   BUN 22   CREATININE 0.7   CALCIUM 8.6*   MG 2.3     CBC:    Recent Labs   Lab 11/19/19  0353   WBC 10.09   RBC 2.79*   HGB 8.1*   HCT 26.0*      MCV 93   MCH 29.0   MCHC 31.2*       Significant Diagnostics:  I have reviewed all pertinent imaging results/findings within the past 24 hours.

## 2019-11-19 NOTE — PROGRESS NOTES
[facility] -   Hematology/Oncology  Progress Note    Patient Name: Mateusz Bates  Admission Date: 10/28/2019  Hospital Length of Stay: 22 days  Code Status: Full Code     Subjective:     HPI:  Mrs. Bates is a 69-year-old female with hypertension, diabetes, hyperlipidemia, obesity, anxiety and depression, iron deficiency anemia, vitamin B12 deficiency, who presented to the emergency room with an NSTEMI.  Workup included a cardiac catheterization that shows severe multivessel coronary artery disease with proximal LAD stenosis of 70% and severely depressed ejection fraction of 20%.  The patient is a candidate for urgent coronary artery bypass grafting. S/p CABG today.  Hematology is consulted for thrombocytopenia.    Interval History:  Bilateral toes are dusky left worse than right.  Pedal pulses bilateral +1.  Patient complains of tenderness of toes when touched.  Sitter at bedside at time of visit.  Patient has periods of confusion.  Able to answer questions appropriately at time of visit.    Oncology Treatment Plan:   [No treatment plan]    Medications:  Continuous Infusions:  Scheduled Meds:   amLODIPine  10 mg Oral Daily    apixaban  5 mg Oral BID    aspirin  81 mg Oral Daily    atorvastatin  80 mg Oral QHS    chlorthalidone  25 mg Oral Daily    ferrous sulfate  325 mg Oral BID    insulin detemir U-100  10 Units Subcutaneous QHS    insulin detemir U-100  5 Units Subcutaneous Daily    losartan  100 mg Oral Daily    metoprolol tartrate  50 mg Oral BID    nitroGLYCERIN 2% TD oint  0.5 inch Topical (Top) Q12H    pantoprazole  40 mg Oral Daily     PRN Meds:albumin human 5%, albuterol sulfate, Dextrose 10% Bolus, Dextrose 10% Bolus, insulin aspart U-100, lactated ringers, magnesium sulfate IVPB, magnesium sulfate IVPB, metoclopramide HCl, ondansetron, potassium chloride in water **AND** potassium chloride in water **AND** potassium chloride in water, traMADol     Review of Systems    Constitutional: Positive for activity change, appetite change and fatigue. Negative for chills, diaphoresis, fever and unexpected weight change.   HENT: Negative for congestion, hearing loss, mouth sores, nosebleeds, postnasal drip and trouble swallowing.    Eyes: Negative for discharge and visual disturbance.   Respiratory: Negative for cough, chest tightness and shortness of breath.    Cardiovascular: Positive for leg swelling. Negative for chest pain and palpitations.   Gastrointestinal: Positive for abdominal distention. Negative for blood in stool, constipation, diarrhea, nausea and vomiting.   Endocrine: Negative for cold intolerance and heat intolerance.   Genitourinary: Negative for difficulty urinating, dyspareunia, flank pain, hematuria, pelvic pain and urgency.   Musculoskeletal: Negative for arthralgias and myalgias.   Skin: Positive for color change and wound.   Neurological: Positive for weakness. Negative for dizziness, light-headedness and headaches.   Hematological: Negative for adenopathy. Does not bruise/bleed easily.   Psychiatric/Behavioral: Negative for agitation, behavioral problems and confusion. The patient is nervous/anxious.      Objective:     Vital Signs (Most Recent):  Temp: 97.4 °F (36.3 °C) (11/19/19 1008)  Pulse: 95 (11/19/19 1008)  Resp: 20 (11/19/19 1008)  BP: (!) 161/74 (11/19/19 1008)  SpO2: 98 % (11/19/19 1008) Vital Signs (24h Range):  Temp:  [97.3 °F (36.3 °C)-98.1 °F (36.7 °C)] 97.4 °F (36.3 °C)  Pulse:  [76-95] 95  Resp:  [18-20] 20  SpO2:  [95 %-98 %] 98 %  BP: (121-161)/(58-92) 161/74     Weight: 75.5 kg (166 lb 7.2 oz)  Body mass index is 31.45 kg/m².  Body surface area is 1.8 meters squared.      Intake/Output Summary (Last 24 hours) at 11/19/2019 1114  Last data filed at 11/18/2019 1925  Gross per 24 hour   Intake 120 ml   Output --   Net 120 ml       Physical Exam   Constitutional: She is oriented to person, place, and time. She appears well-developed and  well-nourished. She appears ill. No distress.   HENT:   Head: Normocephalic and atraumatic.   Right Ear: Hearing and external ear normal.   Left Ear: Hearing and external ear normal.   Nose: No rhinorrhea or sinus tenderness. Right sinus exhibits no maxillary sinus tenderness and no frontal sinus tenderness. Left sinus exhibits no maxillary sinus tenderness and no frontal sinus tenderness.   Mouth/Throat: Uvula is midline, oropharynx is clear and moist and mucous membranes are normal. No oral lesions.   Eyes: Pupils are equal, round, and reactive to light. Conjunctivae are normal. Right eye exhibits no discharge. Left eye exhibits no discharge.   Neck: Normal range of motion. Carotid bruit is not present. No tracheal deviation present. No thyromegaly present.   Cardiovascular: Normal rate, regular rhythm, S1 normal, S2 normal, normal heart sounds and intact distal pulses.   No murmur heard.  Pulses:       Dorsalis pedis pulses are 1+ on the right side, and 1+ on the left side.   Pulmonary/Chest: Effort normal and breath sounds normal. No respiratory distress.   Abdominal: Soft. She exhibits distension. She exhibits no mass. Bowel sounds are decreased. There is no tenderness.   Musculoskeletal: Normal range of motion. She exhibits no edema.   Feet:   Left Foot:   Skin Integrity: Positive for erythema and warmth.   Lymphadenopathy:     She has no cervical adenopathy.        Right: No supraclavicular adenopathy present.        Left: No supraclavicular adenopathy present.   Neurological: She is alert and oriented to person, place, and time. She has normal strength. No sensory deficit. Coordination and gait normal.   Skin: Skin is warm and dry. Capillary refill takes less than 2 seconds. No rash noted. There is pallor.        Psychiatric: Her speech is normal and behavior is normal. Judgment and thought content normal. Her mood appears anxious. Her affect is labile. Cognition and memory are impaired. She exhibits a  depressed mood.   Nursing note and vitals reviewed.      Significant Labs:   CBC:   Recent Labs   Lab 11/18/19  0523 11/19/19  0353   WBC 10.50 10.09   HGB 8.6* 8.1*   HCT 27.9* 26.0*    205    and CMP:   Recent Labs   Lab 11/18/19  0523 11/19/19  0353   * 134*   K 4.0 3.9   CL 99 101   CO2 23 28   * 78   BUN 21 22   CREATININE 0.8 0.7   CALCIUM 8.8 8.6*   ANIONGAP 11 5*   EGFRNONAA >60 >60       Diagnostic Results:  I have reviewed all pertinent imaging results/findings within the past 24 hours.    Assessment/Plan:     * Acute deep vein thrombosis (DVT) of right upper extremity    --D/c Argatroban   --Avoid heparin  --Eliquis 5 mg PO BID  --Daily CBC  --Continue PO anticoagulation for at least 3 months. She will need to f/u in the Heme-Onc clinic following discharge      HIT (heparin-induced thrombocytopenia)  On apixaban plan is for least 3 months of therapy and follow up in outpatient hematology clinic.    Edema of hand       Upper extremity vascular insufficiency:  --Improved apperance  --Vascular surgery following. No surgical intervention recommended. Recommend continue anticoagulation, arm elevation, mild/moderate compression    Thrombocytopenia    --Platelet count 205K  --D/c Argatroban due to unavailability   --please continue on Eliquis 5 mg PO BID.  Plan for anticoagulation for at least 3 months.  --Bleeding precautions  --Hg 8.1.  Transfuse for hemoglobin less than 7.    --Daily CBC    Iron deficiency anemia  Will follow-up after discharge        Thank you for your consult. I will follow-up with patient. Please contact us if you have any additional questions.     Janet Metcalf NP  Hematology/Oncology  [facility]

## 2019-11-19 NOTE — ASSESSMENT & PLAN NOTE
--Platelet count 205K  --D/c Argatroban due to unavailability   --please continue on Eliquis 5 mg PO BID.  Plan for anticoagulation for at least 3 months.  --Bleeding precautions  --Hg 8.1.  Transfuse for hemoglobin less than 7.    --Daily CBC

## 2019-11-19 NOTE — PT/OT/SLP PROGRESS
"Occupational Therapy  Treatment    Mateusz Bates   MRN: [id number]   Admitting Diagnosis: Acute deep vein thrombosis (DVT) of right upper extremity    OT Date of Treatment: 11/19/19   OT Start Time: 1124  OT Stop Time: 1147  OT Total Time (min): 23 min    Billable Minutes:  Therapeutic Activity 23 minutes    General Precautions: Standard, fall, sternal  Orthopedic Precautions: N/A  Braces: N/A         Subjective:  Communicated with nurse and epic chart review prior to session.    Pain/Comfort  Pain Rating 1: 0/10    Objective:  Patient found with: telemetry, oxygen     Functional Mobility:  Bed Mobility:  Ma x a supine<>sit    Transfers:   max a x 2  Step transfer from bed> bed side chair  Functional Ambulation:     Activities of Daily Living:     Feeding adaptive equipment: na     UE adaptive equipment: na  LE adaptive equipment: total a with doff brief  Bathing adaptive equipment: na  Balance:   Static Sit: poor-  Dynamic Sit: poo  Static Stand: poor-  Dynamic stand: poor-      AM-PAC 6 CLICK ADL   How much help from another person does this patient currently need?   1 = Unable, Total/Dependent Assistance  2 = A lot, Maximum/Moderate Assistance  3 = A little, Minimum/Contact Guard/Supervision  4 = None, Modified [place]/Independent    Putting on and taking off regular lower body clothing? : 1  Bathing (including washing, rinsing, drying)?: 1  Toileting, which includes using toilet, bedpan, or urinal? : 1  Putting on and taking off regular upper body clothing?: 1  Taking care of personal grooming such as brushing teeth?: 3  Eating meals?: 4  Daily Activity Total Score: 11     AM-PAC Raw Score CMS "G-Code Modifier Level of Impairment Assistance   6 % Total / Unable   7 - 8 CM 80 - 100% Maximal Assist   9-13 CL 60 - 80% Moderate Assist   14 - 19 CK 40 - 60% Moderate Assist   20 - 22 CJ 20 - 40% Minimal Assist   23 CI 1-20% SBA / CGA   24 CH 0% Independent/ Mod I       Patient left up in chair with all lines " intact, call button in reach, chair alarm on, nurse notified and sitter present    ASSESSMENT:  Mateusz Bates is a 69 y.o. female with a medical diagnosis of Acute deep vein thrombosis (DVT) of right upper extremity and presents with debility and generalized weakness. Pt will continue with POC    Rehab identified problem list/impairments: Rehab identified problem list/impairments: weakness, impaired self care skills, impaired balance, decreased safety awareness, impaired endurance, impaired functional mobilty, gait instability    Rehab potential is good.    Activity tolerance: Good    Discharge recommendations: Discharge Facility/Level of Care Needs: nursing facility, skilled     Barriers to discharge: Barriers to Discharge: Decreased caregiver support    Equipment recommendations: bedside commode, walker, rolling, hospital bed     GOALS:   Multidisciplinary Problems     Occupational Therapy Goals        Problem: Occupational Therapy Goal    Goal Priority Disciplines Outcome Interventions   Occupational Therapy Goal     OT, PT/OT Ongoing, Progressing    Description:  Goals to be met by: 11/20/19     Patient will increase functional independence with ADLs by performing:    UE Dressing with Moderate Assistance.  Grooming while seated with Supervision.  Toileting from bedside commode with Moderate Assistance for hygiene and clothing management.   Toilet transfer to bedside commode with Moderate Assistance.  Upper extremity exercise program x10 reps per handout, with assistance as needed.                       Plan:  Patient to be seen 3 x/week to address the above listed problems via self-care/home management, therapeutic activities, therapeutic exercises  Plan of Care expires: 11/10/19  Plan of Care reviewed with: patient         Susan Joelzier, OT  11/19/2019

## 2019-11-19 NOTE — PROGRESS NOTES
F/U visit with Ms. Bates. Resolving blood blister again noted to left cheek/lip region, left LIONEL. right arm 4+ edema from fingers to elbow. Right thumb, palm, and index finger are black with overlying blisters with surrounding redness and serous fluid filled blisters. Some blisters have ruptured and have revealed areas of partial thickness skin loss with moist pink wound bed. NTG paste already applied per MAR this AM. Re-dressed per order.

## 2019-11-19 NOTE — PROGRESS NOTES
[facility]  Hematology/Oncology  Progress Note    Patient Name: Mateusz Bates  Admission Date: 10/28/2019  Hospital Length of Stay: 22 days  Code Status: Full Code     Subjective:     HPI:  Mrs. Bates is a 69-year-old female with hypertension, diabetes, hyperlipidemia, obesity, anxiety and depression, iron deficiency anemia, vitamin B12 deficiency, who presented to the emergency room with an NSTEMI.  Workup included a cardiac catheterization that shows severe multivessel coronary artery disease with proximal LAD stenosis of 70% and severely depressed ejection fraction of 20%.  The patient is a candidate for urgent coronary artery bypass grafting. S/p CABG today.  Hematology is consulted for thrombocytopenia.      Assessment/Plan:     * Acute deep vein thrombosis (DVT) of right upper extremity    --D/c Argatroban   --Avoid heparin  --Eliquis 5 mg PO BID  --Daily CBC  --Continue PO anticoagulation for at least 3 months. She will need to f/u in the Heme-Onc clinic following discharge      HIT (heparin-induced thrombocytopenia)  On apixaban plan is for least 3 months of therapy and follow up in outpatient hematology clinic.    Edema of hand       Upper extremity vascular insufficiency:  --Improved apperance  --Vascular surgery following. No surgical intervention recommended. Recommend continue anticoagulation, arm elevation, mild/moderate compression    Thrombocytopenia    --Platelet count 205K  --D/c Argatroban due to unavailability   --please continue on Eliquis 5 mg PO BID.  Plan for anticoagulation for at least 3 months.  --Bleeding precautions  --Hg 8.1.  Transfuse for hemoglobin less than 7.    --Daily CBC      Thank you for your consult. I will follow-up with patient. Please contact us if you have any additional questions.     Janet Metcalf NP  Hematology/Oncology  [facility]

## 2019-11-19 NOTE — SUBJECTIVE & OBJECTIVE
Interval History: Plan is for transfer to SNF today.       Oncology Treatment Plan:   [No treatment plan]    Medications:  Continuous Infusions:  Scheduled Meds:   amLODIPine  10 mg Oral Daily    apixaban  5 mg Oral BID    aspirin  81 mg Oral Daily    atorvastatin  80 mg Oral QHS    chlorthalidone  25 mg Oral Daily    ferrous sulfate  325 mg Oral BID    insulin detemir U-100  10 Units Subcutaneous QHS    insulin detemir U-100  5 Units Subcutaneous Daily    losartan  100 mg Oral Daily    metoprolol tartrate  50 mg Oral BID    nitroGLYCERIN 2% TD oint  0.5 inch Topical (Top) Q12H    pantoprazole  40 mg Oral Daily     PRN Meds:albumin human 5%, albuterol sulfate, Dextrose 10% Bolus, Dextrose 10% Bolus, insulin aspart U-100, lactated ringers, magnesium sulfate IVPB, magnesium sulfate IVPB, metoclopramide HCl, ondansetron, potassium chloride in water **AND** potassium chloride in water **AND** potassium chloride in water, traMADol     Review of Systems   Constitutional: Positive for fatigue. Negative for activity change, appetite change, chills, diaphoresis, fever and unexpected weight change.   HENT: Negative for congestion, hearing loss, nosebleeds, postnasal drip and trouble swallowing.    Eyes: Negative for discharge and visual disturbance.   Respiratory: Negative for shortness of breath.    Cardiovascular: Positive for leg swelling. Negative for chest pain and palpitations.   Gastrointestinal: Positive for abdominal distention. Negative for abdominal pain, blood in stool, constipation, diarrhea, nausea and vomiting.   Endocrine: Negative for cold intolerance and heat intolerance.   Genitourinary: Negative for difficulty urinating, dyspareunia, flank pain and hematuria.   Musculoskeletal: Positive for arthralgias and back pain. Negative for myalgias.   Skin: Negative.    Neurological: Negative for dizziness, weakness, light-headedness and headaches.   Hematological: Negative for adenopathy. Does not  bruise/bleed easily.   Psychiatric/Behavioral: Negative for agitation, behavioral problems and confusion. The patient is nervous/anxious.      Objective:     Vital Signs (Most Recent):  Temp: 97.6 °F (36.4 °C) (11/19/19 0436)  Pulse: 89 (11/19/19 0503)  Resp: 18 (11/19/19 0436)  BP: 128/65 (11/19/19 0436)  SpO2: 96 % (11/19/19 0436) Vital Signs (24h Range):  Temp:  [97.3 °F (36.3 °C)-98.1 °F (36.7 °C)] 97.6 °F (36.4 °C)  Pulse:  [76-92] 89  Resp:  [18-20] 18  SpO2:  [95 %-97 %] 96 %  BP: (121-140)/(58-92) 128/65     Weight: 76.7 kg (169 lb 1.5 oz)  Body mass index is 31.95 kg/m².  Body surface area is 1.82 meters squared.      Intake/Output Summary (Last 24 hours) at 11/19/2019 1002  Last data filed at 11/18/2019 1925  Gross per 24 hour   Intake 120 ml   Output --   Net 120 ml       Physical Exam   Constitutional: She is oriented to person, place, and time. She appears well-developed and well-nourished. She appears lethargic. She appears ill. No distress.   HENT:   Head: Normocephalic and atraumatic.   Right Ear: Hearing and external ear normal.   Left Ear: Hearing and external ear normal.   Nose: No rhinorrhea or sinus tenderness. Right sinus exhibits no maxillary sinus tenderness and no frontal sinus tenderness. Left sinus exhibits no maxillary sinus tenderness and no frontal sinus tenderness.   Mouth/Throat: Uvula is midline, oropharynx is clear and moist and mucous membranes are normal. No oral lesions.   Eyes: Pupils are equal, round, and reactive to light. Conjunctivae are normal. Right eye exhibits no discharge. Left eye exhibits no discharge.   Neck: Normal range of motion. Carotid bruit is not present. No tracheal deviation present. No thyromegaly present.   Cardiovascular: Normal rate, regular rhythm, S1 normal, S2 normal, normal heart sounds and intact distal pulses.   No murmur heard.  Pulses:       Dorsalis pedis pulses are 2+ on the right side, and 2+ on the left side.   Pulmonary/Chest: Effort normal.  No respiratory distress. She has decreased breath sounds in the right lower field and the left lower field.   Abdominal: Soft. Bowel sounds are normal. She exhibits distension. She exhibits no mass. There is tenderness.   Musculoskeletal: Normal range of motion. She exhibits edema (generalized).   Lymphadenopathy:     She has no cervical adenopathy.        Right: No supraclavicular adenopathy present.        Left: No supraclavicular adenopathy present.   Neurological: She is oriented to person, place, and time. She has normal strength. She appears lethargic. No sensory deficit. Coordination and gait normal.   Skin: Skin is warm and dry. Capillary refill takes less than 2 seconds. Bruising (generalized) noted. No rash noted.   Psychiatric: Her speech is normal and behavior is normal. Judgment and thought content normal. Her mood appears anxious. Cognition and memory are normal. She does not exhibit a depressed mood.   Nursing note and vitals reviewed.      Significant Labs:   CBC:   Recent Labs   Lab 11/18/19  0523 11/19/19  0353   WBC 10.50 10.09   HGB 8.6* 8.1*   HCT 27.9* 26.0*    205    and CMP:   Recent Labs   Lab 11/18/19  0523 11/19/19  0353   * 134*   K 4.0 3.9   CL 99 101   CO2 23 28   * 78   BUN 21 22   CREATININE 0.8 0.7   CALCIUM 8.8 8.6*   ANIONGAP 11 5*   EGFRNONAA >60 >60       Diagnostic Results:  I have reviewed all pertinent imaging results/findings within the past 24 hours.

## 2019-11-19 NOTE — PROGRESS NOTES
[facility] -   Cardiothoracic Surgery  Progress Note    Patient Name: Mateusz Bates  MRN: [id number]  Admission Date: 10/28/2019  Hospital Length of Stay: 22 days  Code Status: Full Code   Attending Physician: Keny Zaidi MD   Referring Provider: Self, Aaareferral  Principal Problem:Acute deep vein thrombosis (DVT) of right upper extremity            Subjective:     Post-Op Info:  Procedure(s) (LRB):  CORONARY ARTERY BYPASS GRAFT (CABG) (N/A)  ECHOCARDIOGRAM,TRANSESOPHAGEAL (N/A)  BLOCK, NERVE, INTERCOSTAL, 2 OR MORE (N/A)   19 Days Post-Op     Interval History:  11/19/2019  The patient is post-operative day 19.  Status post coronary artery bypass grafting x3.    ROS  Medications:  Continuous Infusions:  Scheduled Meds:   amLODIPine  10 mg Oral Daily    apixaban  5 mg Oral BID    aspirin  81 mg Oral Daily    atorvastatin  80 mg Oral QHS    chlorthalidone  25 mg Oral Daily    ferrous sulfate  325 mg Oral BID    insulin detemir U-100  10 Units Subcutaneous QHS    insulin detemir U-100  5 Units Subcutaneous Daily    losartan  100 mg Oral Daily    metoprolol tartrate  50 mg Oral BID    nitroGLYCERIN 2% TD oint  0.5 inch Topical (Top) Q12H    pantoprazole  40 mg Oral Daily     PRN Meds:albumin human 5%, albuterol sulfate, Dextrose 10% Bolus, Dextrose 10% Bolus, insulin aspart U-100, lactated ringers, magnesium sulfate IVPB, magnesium sulfate IVPB, metoclopramide HCl, ondansetron, potassium chloride in water **AND** potassium chloride in water **AND** potassium chloride in water, traMADol     Objective:     Vital Signs (Most Recent):  Temp: 97.4 °F (36.3 °C) (11/19/19 1008)  Pulse: 95 (11/19/19 1008)  Resp: 20 (11/19/19 1008)  BP: (!) 161/74 (11/19/19 1008)  SpO2: 98 % (11/19/19 1008) Vital Signs (24h Range):  Temp:  [97.3 °F (36.3 °C)-98.1 °F (36.7 °C)] 97.4 °F (36.3 °C)  Pulse:  [76-95] 95  Resp:  [18-20] 20  SpO2:  [95 %-98 %] 98 %  BP: (121-161)/(58-92) 161/74     Weight: 75.5 kg (166 lb  7.2 oz)  Body mass index is 31.45 kg/m².    SpO2: 98 %  O2 Device (Oxygen Therapy): room air    Intake/Output - Last 3 Shifts       11/17 0700 - 11/18 0659 11/18 0700 - 11/19 0659 11/19 0700 - 11/20 0659    P.O. 840 120     I.V. (mL/kg) 50 (0.7)      IV Piggyback 100      Total Intake(mL/kg) 990 (13) 120 (1.6)     Net +990 +120            Urine Occurrence 3 x 2 x     Stool Occurrence 1 x 2 x           Lines/Drains/Airways     Peripherally Inserted Central Catheter Line                 PICC Double Lumen 11/07/19 1645 left brachial;left basilic 11 days                Physical Exam   Constitutional: She is oriented to person, place, and time. She appears well-developed and well-nourished. No distress.   HENT:   Head: Normocephalic and atraumatic.   Eyes: Pupils are equal, round, and reactive to light. EOM are normal.   Neck: Normal range of motion. Neck supple. No JVD present.   Cardiovascular: Normal rate, regular rhythm, normal heart sounds and intact distal pulses. Exam reveals no gallop and no friction rub.   No murmur heard.  Pulmonary/Chest: Effort normal and breath sounds normal. No stridor. No respiratory distress. She has no wheezes. She has no rales. She exhibits no tenderness.   Abdominal: Soft. Bowel sounds are normal. She exhibits no distension and no mass. There is no tenderness. There is no rebound and no guarding. No hernia.   Musculoskeletal: She exhibits edema and tenderness. She exhibits no deformity.   The right hand continues to improve, ROM 3/5, color is improving, + tenderness, + edema distal to the right elbow. The left foot has + edema distal from the ankle, redness and warmth noted to the  Left foot generalized with + tenderness.    Neurological: She is alert and oriented to person, place, and time. She displays normal reflexes. No cranial nerve deficit or sensory deficit. She exhibits normal muscle tone. Coordination normal.   Skin: Skin is warm. Capillary refill takes less than 2 seconds.  Right index fingertip and right thumb tip are un blanchable. . No rash noted. She is not diaphoretic. There is erythema. No pallor.   + erythema and warmth to the left foot. Mild dusky discoloration to the tips of the left foot.  Surgical incisions CDI.    Psychiatric: She has a normal mood and affect. Her behavior is normal. Judgment and thought content normal.   Nursing note and vitals reviewed.      Significant Labs:  BMP:   Recent Labs   Lab 11/19/19  0353   GLU 78   *   K 3.9      CO2 28   BUN 22   CREATININE 0.7   CALCIUM 8.6*   MG 2.3     CBC:   Recent Labs   Lab 11/19/19  0353   WBC 10.09   RBC 2.79*   HGB 8.1*   HCT 26.0*      MCV 93   MCH 29.0   MCHC 31.2*       Significant Diagnostics:  I have reviewed all pertinent imaging results/findings within the past 24 hours.    Assessment/Plan:     S/P CABG x 3       11/01/2019  The patient is post-operative day 1, status-post coronary artery bypass grafting x 3.  Overall the patient is making progress, however IABP and inotropic agents will continue today.   Neuro:  The patient is awake alert and oriented x3.  Neuro exam is nonfocal.  Pain is well controlled with IV pain medication.  Cardiac:  Patient is hemodynamically improving. Patient remains on epinephrine and milrinone gtts, which will be weaned over the next 24 hours.  Cardiac index is improving. IABP will remain in place over the next 24 hours.  Patient will be started on beta blockers once gtts are weaned off.  Respiratory: Good sats on high flow nasal cannula. Continue respiratory toilet, continue incentive spirometer. Wean to low flow nasal cannula.  GI:  Diet: sips of water with medications, will slowly advance diet as tolerated. Benign abdominal exam.  Renal:  Urine output is slowly improving. Cr 0.9. K 4.3. Mag 1.6. Replace mag. Continue IV diuresis. Add metolazone.   Heme:  Hct 34.1  Platelet 82. Continue aspirin. Discontinue Plavix for now.  Id:  WBC 12. Tmax 100.3. Continue  post-operative antibiotics. Will continue to follow.   Endocrine:  Glucose is controlled with insulin gtt.  Continue insulin gtt for now, until weaned off inotropic agents.   Activities:  Patient is currently bed bound due to IABP.  Advance activities as tolerated once IABP is removed.   Lines tubes and drains:  Continue IABP. Continue Bayard and Cordis.  Continue A-line.  Chest tubes will continue. NICOLASA x 2 will continue. Jones catheter will continue.  Continue pacer wires. Continue prevena wound vac.  Plan: Remain ICU.          11/02/2019  The patient is post-operative day 2, status-post coronary artery bypass grafting x 3.  Overall the patient is making progress, IABP removed and inotropic agents discontinued.   Neuro:  The patient is awake alert and oriented x3.  Neuro exam is nonfocal.  Pain is well controlled. Discontinue fentanyl.   Cardiac:  Patient is hemodynamically improving. Inotropic gtts discontinued. Excellent cardiac index. IABP discontinued.  Patient will be started on beta blockers today.  Respiratory: Good sats on high flow nasal cannula. Continue respiratory toilet, continue incentive spirometer. Wean to low flow nasal cannula.  GI:  Advance diabetic/cardiac diet as tolerated. Benign abdominal exam.  Renal:  Urine output is improving. Cr 0.9. K 4.9. Mag 2.2. Continue IV diuresis. Hold AM K.   Heme:  Hct 28.3  Platelet 47. Discontinue aspirin. Discontinue Plavix. HIT panel. Consult hematology r/t thrombocytopenia.   Id:  WBC 10.4. Tmax 99.4. Will continue to follow.   Endocrine:  Discontinue insulin gtt. Start sliding scale insulin. Start long acting insulin detemir.   Activities: Advance activities as tolerated.   Lines tubes and drains:  Discontinue IABP. Discontinue Bayard and Cordis.  Discontinue A-line.  Discontinue chest tubes x 3. Discontinue NICOLASA x 2. Discontinue arterial line. Jones catheter will continue for now.  Continue pacer wires. Continue prevena wound vac. Place PICC line.   Plan: Remain  ICU.           11/03/2019  The patient is post-operative day 3, status-post coronary artery bypass grafting x 3.  Overall the patient is making progress.  Neuro:  The patient is awake alert and oriented x3.  Neuro exam is nonfocal.  Pain is well controlled. Continue tramadol.   Cardiac:  Patient is hemodynamically stable. Increase metoprolol dose. Start amlodipine.   Respiratory: Good sats on nasal cannula. Continue respiratory toilet, continue incentive spirometer. Wean to room air.  GI:  Advance diabetic/cardiac diet as tolerated. Benign abdominal exam. Passing gas, no BM. Add 1 bottle of mag citrate.   Renal:  Good urine output. Cr 0.9. K 3.8. Mag 2.0. Replace K. Replace Mag. Change to PO diuresis.   Heme:  Hct 34.7  Platelet 89. Restart aspirin. Continue to hold Plavix. HIT panel pending. Hematology following.  Id:  WBC 13.8. Tmax 99.4. Will continue to follow.    Endocrine:  Glucose controlled on sliding scale insulin. Increase long acting insulin detemir dose.   Activities: Advance activities as tolerated.   Lines tubes and drains:  Jones catheter will continue for now.  Continue pacer wires. Continue prevena wound vac. Continue PICC line.   Plan: Remain ICU.          11/04/2019  The patient is post-operative day 4, status-post coronary artery bypass grafting x 3.  Overall the patient is making progress.  Neuro:  The patient is awake alert and oriented x3.  Neuro exam is nonfocal.  Pain is well controlled. Continue tramadol.   Cardiac:  Patient is hemodynamically stable. Continue metoprolol dose. Continue amlodipine dose.   Respiratory: Good sats on room air. Continue respiratory toilet, continue incentive spirometer.  GI:  Advance diabetic/cardiac diet as tolerated. Benign abdominal exam. Regular bowel movements.   Renal:  Good urine output. Cr 0.7. K 4.0. Mag 2.3. Replace K. Decrease PO diuresis.   Heme:  Hct 34.4  Platelet 140. Continue aspirin. Restart Plavix. HIT panel pending. Hematology following.  Id:   WBC down to 13.13. Tmax 98. Will continue to follow.    Endocrine:  Glucose controlled on sliding scale insulin. Increased long acting insulin detemir dose.   Activities: Advance activities as tolerated.   Lines tubes and drains:  Discontinued storm catheter. Continue pacer wires. Continue prevena wound vac. Continue PICC line.   Plan: Transfer to telemetry. Anticipate discharge to SNF in 48 hours.         11/05/2019  The patient is post-operative day 5, status-post coronary artery bypass grafting x 3.  Overall the patient is making progress.  Neuro:  The patient is awake alert and oriented x3.  Neuro exam is nonfocal. Patient slow to answer questions, however answers are appropriate.  Pain is well controlled. Continue tramadol PRN.   Cardiac:  Patient is hemodynamically stable. Continue metoprolol dose. Discontinue amlodipine. Start losartan.    Respiratory: Good sats on room air. Continue respiratory toilet, continue incentive spirometer.  GI:  Advance diabetic/cardiac diet as tolerated. Benign abdominal exam. Regular bowel movements.   Renal:  Good urine output. Cr 0.7. K 4.8. Mag 2.1. Replace Mag. Discontinue PO diuresis.   Heme:  Hct 39.5  Platelet 73. Continue aspirin. Hold Plavix. HIT panel pending. Hematology following.  Id:  WBC is up to 16. Tmax 99. Pan-culture. DC PICC line. Chest xray. Start broad spectrum empiric antibiotics. Will continue to follow.    Endocrine:  Glucose controlled on sliding scale insulin. Decrease long acting insulin detemir dose.   Activities: Advance activities as tolerated.   Lines tubes and drains:  Discontinue pacer wires. Discontinue prevena wound vac. Continue PICC line.   Plan: Anticipate discharge to SNF in 24-48 hours.         11/06/2019  The patient is post-operative day 6, status-post coronary artery bypass grafting x 3. Patient had an episode of decompensation and unresponsiveness yesterday evening. The patient was found unresponsive, cool, and diaphoretic, with agonal  breathing. Patient was intubated and transferred to the ICU. Bedside EKG and echocardiogram performed at the bedside showed no significant changes. CT head, chest, abdomen pelvis performed, showed no significant findings.     Neuro:  The patient is intubated and sedated. On Precedex gtt. Begin weaning off sedation.    Cardiac:  Patient is hemodynamically improving. Inotropic agent dependent. Currently on Epinephrine gtt. Start Milrinone gtt. Discontinue metoprolol. Discontinue losartan.    Respiratory: Ventilator dependent. tV 450, RR 20, PEEP 5. Wean down sedation. Wean off ventilator today.    GI:  Begin enteral tube feedings. Benign abdominal exam. Previously having regular bowel movements.   Renal: Adequate urine output. Cr 1.1. K 3.6. Mag 1.7. Replace Mag. Replace K. Discontinue diuresis.   Heme:  Hct 31.9  Platelet 89. Hold aspirin. Hold Plavix. HIT panel negative. Patient received 1 units PRBC last night. Hematology following.  Id:  WBC is down to 15.5. Tmax 99.9. BC NGTD. Urinalysis negative. Sputum culture negative. Vancomycin and zosyn day 2. Will continue to follow closely.    Endocrine:  Glucose controlled on insulin gtt, continue at this time.   Activities: Intubated and sedated.   Musculoskeletal: Unable to find right radial pulse via doppler. Tips of the fingers of the right hand are mottled, dusky, appear cyanotic. Recent right radial arterial line in place, which has been relocated. US of arteries and veins of the right arm ordered. Consult to vascular surgery placed.    Lines tubes and drains:  Continue swan and cordis. Continue arterial line. Continue storm.    Plan: Remain ICU         11/07/2019  The patient is post-operative day 7, status-post coronary artery bypass grafting x 3. Patient had an episode of decompensation and unresponsiveness the evening of 11/05/2019. The patient was found unresponsive, cool, and diaphoretic, with agonal breathing. Patient was intubated and transferred to the  ICU. Bedside EKG and echocardiogram performed at the bedside showed no significant changes. CT head, chest, abdomen pelvis performed, showed no significant findings.     Neuro:  The patient is intubated and sedated. On Precedex gtt. Wean off sedation.    Cardiac:  Patient is hemodynamically improving. Excellent cardiac index. Wean off Inotropic gtts.   Respiratory: Wean towards extubation. tV 400, RR 18, PEEP 5.   GI:  Continue enteral tube feedings while intubated. Benign abdominal exam.  Renal: Adequate urine output. Cr 1.1. K 4.1. Mag 2.8. Start IV diuresis.   Heme:  Hct 24.6  Platelet 60. Hold aspirin. Hold Plavix. HIT panel negative.  Id:  WBC is down to 14.46. Tmax 100.8. BC NGTD. Urinalysis negative. Sputum culture negative. Vancomycin and zosyn day 3. Midline sternal incision appears reddened, with induration at skin edges. Will continue to follow closely.    Endocrine:  Glucose controlled on sliding scale insulin.   Activities: Intubated and sedated. Moves all 4. Full ROM.    Musculoskeletal: Able to find right radial pulse via doppler. The right hand extending to the tips of the fingers of the right hand are mottled, dusky, appear cyanotic. Recent right radial arterial line placement, which has been relocated. US of arteries and veins of the right arm pending. Vascular surgery following, suggested possible thrombotic etiology, suggested heparin gtt, however this is contraindicated r/t s/p cabg x 3.     Lines tubes and drains:  Continue swan and cordis. Continue arterial line. Continue storm.    Plan: Remain ICU         11/08/2019  The patient is post-operative day 8, status-post coronary artery bypass grafting x 3. Patient had an episode of decompensation and unresponsiveness the evening of 11/05/2019. The patient was found unresponsive, cool, and diaphoretic, with agonal breathing. Patient was intubated and transferred to the ICU. Bedside EKG and echocardiogram performed at the bedside showed no  significant changes. CT head, chest, abdomen pelvis performed, showed no significant findings. Suspect cardiogenic shock.      Neuro:  Patients neurologic state is improving. Alert, follows commands, makes eye contact, disoriented x 4. No focal deficits.    Cardiac:  Patient is hemodynamically improving. Off all inotropic gtts. Start low dose metoprolol and amlodipine.   Respiratory: Good sats on low flow nasal cannula. Continue IS. Continue pulmonary toilet. Wean to room air.    GI:  Continue enteral tube feedings at this time r/t disorientation and aspiration risk. Discontinue laxatives. Start metamucil BID, patient has had multiple loose stools.   Renal: Good urine output. Cr 0.8. K 3.6. Mag 2.1. Replace K. Replace Mag. Continue IV diuresis.   Hema:  Hct 26.4  Platelet 45. Hold aspirin. Hold Plavix. HIT panel will be resent as the patient is a high suspicion for HIT despite previous negative HIT panel. Multiple DVTs from the right IJ down to the brachiocephalic vein. Confirmed via vascular US. Patient started on argatroban yesterday, goal is APTT 2x baseline. Most recent APTT 97.8. Baseline APTT 50.8.  Hematology following. Vascular surgery following.   Id:  WBC is down to 12. Tmax 100.4. BC NGTD. Urinalysis negative. Sputum culture negative. Vancomycin and zosyn day 4. Midline sternal incision appears improved, with minimal redness and induration at skin edges. Will continue to follow closely.    Endocrine:  Glucose controlled on sliding scale insulin.   Activities: Increase activities as tolerated.  Musculoskeletal: ROM 2/5 to the right wrist/hand/fingers, distal from the elbow there is 2+ non pitting edema, the extremity is tender and warm. There is mottling, a dusky appearance, and purple discoloration to the fingertips of the right hand. See above hematology plan.      Lines tubes and drains:  Continue PICC line. Continue storm catheter. Continue NG tube.   Plan: Remain ICU     11/09/2019  The patient is  postop day 9.  Status post coronary artery bypass grafting x3.  Postop course was complicated by an episode of hemodynamic instability and decompensation on postop day number 5.  Patient continues to make clinical progress since then.  She is off all drips.  She remains extubated.  Continue ICU care for now.    Neuro:  Patient is awake alert.  She is oriented x2.  She is still confused.  But she is making progress.  Her neuro exam is nonfocal.  She follows all commands.  Cardiac:  Patient continues to be hemodynamically stable.  She is somewhat hypertensive.  Will increase her antihypertensives.  Respiratory:  Patient is good sats on nasal cannula.  She remains extubated.  GI:  Patient was tolerating tube feeds until this morning.  She pulled heart feeding tube. Will get a swallowing evaluation.  Mode of nutrition will depend on results of swallowing evaluation.  Renal:  Patient has good urine output %period% creatinine is 0.9.  Heme:  Hematocrit is 26 platelet is 46.  Patient is on argatroban  for anticoagulation due to right upper extremity DVTs.  Hypercoagulability workup is in progress.  Appreciate Hematology and vascular surgery input.  Id:  Incision continues to improve.  white count is 17.  Continue broad-spectrum antibiotics.  Endocrine:  Glucose is controlled on its sliding scale insulin.  Activities:  Advance activities as tolerated.  Line tubes and drains.  Patient has a PICC line, Jones catheter.    11/10/2019  The patient is postop day 10.  Status post coronary bypass grafting x3.  Postop course was complicated by an episode of hemodynamic instability and decompensation on postop day 5.  Patient remains hemodynamically stable.    Neuro:  The patient is awake and alert.  She is oriented x1.  She continues to be confused.  Her neuro exam is nonfocal.  Cardiac:  Patient continues to be hemodynamically stable.  His she is hypertensive.  Hypertensive medications will be increased.  Respiratory:  Patient has  good sats on nasal cannula.  Continue pulmonary toilet.  GI:  Patient is tolerating a regular diet.  Renal:  Patient has good urine output. Creatinine is 0.7.  Heme: hematocrit is 24 and platelet is 67.  Patient continues on argatroban for right upper extremity DVTs.  Hypercoagulable workup is in progress.  Id:  Patient's white count is up to 20.  Patient is on broad prepped from antibiotics.  Endocrine:  The patient is on long-acting as well as sliding scale insulin.  Blood sugars have been well controlled.  Will decrease dose of long-acting.  Activities: patient has been out of bed to chair.  Advance as tolerated.  Musculoskeletal:  Patient's right upper extremity continues to be swollen  The index finger and thumb continues to be dusky with a bleb near the base of the thumb.  Continue argatroban for anticoagulation.  Continue elevation of right upper extremity.  Lines tubes and drains: patient has a PICC line.  Will discontinue Jones catheter.        11/11/2019  The patient is post-operative day 11, status-post coronary artery bypass grafting x 3. Post-operative course was complicated by an episode of hemodynamic instability and decompensation on postop day 5.  Patient remains hemodynamically stable.    Neuro:  Patients neurologic state is improving. Oriented x 2. Alert, follows commands, makes eye contact. No focal deficits.    Cardiac:  Patient is hemodynamically improving. Hypertensive. Continue metoprolol, amlodipine, and losartan. Add chlorthalidone.   Respiratory: Good sats on room air. Continue IS. Continue pulmonary toilet.  GI:  Continue cardiac/diabetic diet, modified by puree and thick liquids with swallow precautions. Advance as tolerated. Continue metamucil.  Renal: Good urine output. Cr 0.8. K 3.8. Replace K. Continue IV diuresis. Add chlorthalidone. BMP at noon.   Hema:  Hct 23.8  Platelet 75. Hold aspirin. Hold Plavix. Repeat HIT panel pending. Multiple DVTs from the right IJ down to the  brachiocephalic vein. Confirmed via vascular US. Patient on argatroban gtt, goal is APTT 2x baseline. Most recent APTT 83.3. Baseline APTT 50.8.  Hematology following. Vascular surgery following.   Id:  WBC is down to 19. Tmax 98.4. BC NGTD. Vancomycin and zosyn day 7. Discontinue broad spectrum antibiotics. Will continue to follow closely.    Endocrine:  Glucose controlled on sliding scale insulin and long acting insulin detemir.    Activities: Increase activities as tolerated.  Musculoskeletal: The right upper extremity exhibits swelling distal from the elbow. The RUE is tender to palpation and ROM. ROM is diminished to the right hand 2/5. The hand his reddened, swollen, with dusky, mottled, purple discoloration to the right thumb and tip of the right index finger. Blisters have formed on the proximal portion of the thumb and wrist. Severely diminished capillary refill to the tip of the right thumb and tip of the right index finger. Wound care order placed.      Lines tubes and drains:  Continue PICC line.  Plan: Remain ICU         11/12/2019  The patient is post-operative day 12, status-post coronary artery bypass grafting x 3. Post-operative course was complicated by an episode of hemodynamic instability and decompensation on postop day 5.  Patient remains hemodynamically stable.    Neuro:  Patients neurologic state is improving. Oriented to person, place, situation. Disoriented to time. Alert, follows commands, makes eye contact. No focal deficits.    Cardiac:  Patient is hemodynamically improving. Continue metoprolol, amlodipine, losartan, and chlorthalidone.   Respiratory: Good sats on room air. Continue IS.  GI:  Continue cardiac/diabetic diet, modified by puree and thick liquids with swallow precautions. Advance as tolerated. Continue metamucil.  Renal: Good urine output. Cr 0.8. K 3.9. Replace K. Discontinue lasix. Continue chlorthalidone.  Hema:  Hct 23.1  Platelet 75. Hold aspirin. Hold Plavix. 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 Multiple DVTs from the right IJ down to the brachiocephalic vein. Confirmed via vascular US. Patient on argatroban gtt, goal is APTT 2x baseline. Most recent APTT 84.6. Baseline APTT 50.8.  Hematology following. Vascular surgery following.   Id:  WBC is down to 17.4. A-febrile. Will continue to follow closely.    Endocrine:  Patient had an episode of hypoglycemia last night. Will continue sliding scale insulin. Discontinue long acting insulin. Begin strict calorie count.   Activities: Increase activities as tolerated.  Musculoskeletal: The right upper extremity exhibits swelling distal from the elbow. The RUE is tender to palpation and ROM. ROM is diminished to the right hand 3/5. The hand is reddened, swollen, with dusky, mottled, purple discoloration to the right thumb tip and tip of the right index finger.  Severely diminished capillary refill to the tip of the right thumb and tip of the right index finger. Wound care following. Gauze dressing in place.       Lines tubes and drains:  Continue PICC line.  Plan: Remain ICU. Begin discharge planning for IP rehab.          11/13/2019  The patient is post-operative day 13, status-post coronary artery bypass grafting x 3. Post-operative course was complicated by an episode of hemodynamic instability and decompensation on postop day 5.  Patient remains hemodynamically stable.    Neuro:  Patient is oriented to person, place, situation. Disoriented to time. Alert, follows commands, makes eye contact. No focal deficits.    Cardiac:  Patient is hemodynamically stable. Continue metoprolol, amlodipine, losartan, and chlorthalidone.   Respiratory: Good sats on room air. Continue IS. Use of accessory muscles noted during inspiration. Will increase diuresis.   GI:  Continue cardiac/diabetic diet, modified by puree and thick liquids with swallow precautions. Advance as tolerated. Continue metamucil.  Renal: Good urine output. Cr 0.8. K 4.0. Mag  1.8. Replace Mag. Start lasix IV. Continue chlorthalidone.  Hema:  Hct 23.  Platelet 79. Hold aspirin. Hold Plavix. 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 Multiple DVTs from the right IJ down to the brachiocephalic vein. Confirmed via vascular US. Patient on argatroban gtt, goal is APTT 2x baseline. Most recent APTT 75.6. Baseline APTT 50.8.  Hematology following. Vascular surgery following.   Id:  WBC is down to 12.2. A-febrile. Will continue to follow closely.    Endocrine:  Glucose controlled. Continue sliding scale insulin. Start long acting insulin detemir 5 units daily.     Activities: Increase activities as tolerated.  Musculoskeletal: The right upper extremity exhibits swelling distal from the elbow. The RUE is tender to palpation and ROM. ROM is diminished to the right hand 2/5. The right hand is improving. The dusky, mottled, purple discoloration is localized to the right thumb tip and tip of the right index finger.  Severely diminished capillary refill to the tip of the right thumb and tip of the right index finger. Wound care following. Gauze dressing in place.    Lines tubes and drains:  Continue PICC line.  Plan: Remain telemetry. Continue discharge planning for IP rehab.          11/14/2019  The patient is post-operative day 14, status-post coronary artery bypass grafting x 3. Post-operative course was complicated by an episode of hemodynamic instability and decompensation on postop day 5.  Patient remains hemodynamically stable.    Neuro:  Patient is oriented to person, place, situation. Disoriented to time. Alert, follows commands, makes eye contact. No focal deficits.    Cardiac:  Patient is hemodynamically stable. Continue metoprolol, amlodipine, losartan, and chlorthalidone.   Respiratory: Good sats on room air. Continue IS. Decreased use of accessory muscles noted during inspiration.   GI:  Continue cardiac/diabetic diet, modified by puree and thick liquids with swallow precautions. Advance  as tolerated. Continue metamucil.  Renal: Good urine output. Cr 0.9. K 4.2. Mag 1.9. Replace Mag. Discontinue lasix. Continue chlorthalidone.  Hema:  Hct 21.9.  Platelet 87. Hold aspirin. Hold Plavix. 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 Multiple DVTs from the right IJ down to the brachiocephalic vein. Confirmed via vascular US. As per discussion with hematology and pharmacy, will discontinue argatroban and start apixaban. Transfuse 1 unit PRBC.  Hematology following. Vascular surgery following.   Id:  WBC is down to 11. A-febrile. Will continue to follow closely.    Endocrine:  Continue sliding scale insulin. Increase insulin detemir to 5 units BID.     Activities: Increase activities as tolerated.  Musculoskeletal: The right upper extremity exhibits swelling distal from the elbow. The RUE is tender to palpation and ROM. ROM is diminished to the right hand 2/5. The right hand is slowly improving. The dusky, mottled, purple discoloration is localized to the right thumb tip and tip of the right index finger.  Severely diminished capillary refill to the tip of the right thumb and tip of the right index finger. Wound care following. Gauze dressing in place.    Lines tubes and drains:  Continue PICC line.  Plan: Remain telemetry. Continue discharge planning for IP rehab.          11/15/2019  The patient is post-operative day 15, status-post coronary artery bypass grafting x 3. Post-operative course was complicated by an episode of hemodynamic instability and decompensation on postop day 5.  Patient remains hemodynamically stable.    Neuro:  Patient is oriented to person, place, situation. Disoriented to time. Alert, follows commands, makes eye contact. No focal deficits.    Cardiac:  Patient is hemodynamically stable. Continue metoprolol, amlodipine, losartan, and chlorthalidone.   Respiratory: Good sats on low flow nasal cannula. Wean to room air. Continue IS.   GI:  Continue cardiac/diabetic diet, modified by  puree and thick liquids with swallow precautions. Advance as tolerated. Continue metamucil.  Renal: Good urine output. Cr 0.8. K 3.6. Replace K. Continue chlorthalidone.  Hema:  Hct 26.1. Platelet 99. Start Aspirin. Continue Apixaban 5mg BID.  Hematology following. Vascular surgery following.   Id:  WBC 11. A-febrile. Will continue to follow closely.    Endocrine:  Continue sliding scale insulin. Increase insulin detemir to 5 units AM dose, 10 units PM dose.   Activities: Increase activities as tolerated.  Musculoskeletal: The right upper extremity exhibits swelling distal from the elbow. The RUE is tender to palpation and ROM. ROM is diminished to the right hand 2/5. The right hand is slowly improving. The dusky, mottled, purple discoloration is localized to the right thumb tip and tip of the right index finger.  Severely diminished capillary refill to the tip of the right thumb and tip of the right index finger. Wound care following. Gauze dressing in place.    Lines tubes and drains:  Continue PICC line.  Plan: Remain telemetry. Begin discharge planning to LTAC, anticipate discharge in 48-72 hours.        11/16/2019  The patient is postop day 16.  Status post coronary artery bypass grafting x3.  Patient's postop course was complicated by an episode of hemodynamically instability and decompensation on postop day 5.  Since then the patient has made much clinical progress.  The patient remains hemodynamically stable.  Anticipate discharge to rehab or LTAC in the next 48-72 hours.    Neuro:  The patient is oriented x3.  She is much less confused.  Her responses to questioning is appropriate.  She moves all 4 she has no focal deficits.  Cardiac:  Patient remains hemodynamically stable.  Systolic blood pressure trending higher.  Will increase losartan.  Continue amlodipine metoprolol and chlorthalidone.  Respiratory:  The patient has good sats on room air.  Continue pulmonary toilet.  GI:  Patient is tolerating a  regular the diet.  Renal:  Patient has good urine output.  creatinine is 0.7.  Heme:  Hematocrit is 30.  Platelet count is up to 129.  Continue aspirin and apixaban.  Id:  White count is 10.  The patient is afebrile.  Endocrine:  Glucose is better controlled on long-acting insulin.  Activities patient is out of bed to chair continue increasing activities.  Line tubes and drains:  Patient has a PICC line continue PICC line.  Musculoskeletal:  Perfusion to the patient's right arm is much improved.  Swelling is somewhat decreased.  The area of dusky tissue remained isolated to the tip of the patient's right thumb and index finger.    11/17/2019  The patient is postop day 17.  Status post coronary artery bypass grafting x3.  The patient's postop course was complicated by an episode of hemodynamic instability ankle decompensation on postop day 5.  The patient is making clinical progress.  She has remained hemodynamically stable.  Anticipate discharge to an LTAC or rehab center in the next 48-72 hours.    Neuro:  The patient is alert and oriented x3.  Her confusion is much improved.  Her responses to all questions appropriately.  She moves all 4 and has no focal deficits.  Cardiac:  The patient remains hemodynamically stable.  She is tolerating losartan amlodipine metoprolol and chlorthalidone.  Respiratory:  The patient has good sats on room air.  Continue pulmonary toilet.  GI:  The patient is tolerating a regular diet.  Renal:  The patient has good urine output and had creatinine 0.7.  Heme:  The patient's hematocrit is 27 and her platelet count is increased to 155.  Continue aspirin and apixaban.  Id:  Patient is afebrile and her white count is 8.  Endocrine high glucose is better controlled on long-acting insulin plus sliding scale.  Activities:  The patient is out of bed to chair.  Continue advancing activities.  Line tubes and drains:  The patient has a PICC line.  Musculoskeletal:  Perfusion to the patient's right  and continues to be improved.  However high right am is still swollen.  The area of dusky tissue remained stable.        11/18/2019  The patient is post-operative day 18, status-post coronary artery bypass grafting x 3. Post-operative course was complicated by an episode of hemodynamic instability and decompensation on postop day 5.  Patient remains hemodynamically stable. Patient is ready for discharge to an LTAC facility pending acceptance.     Neuro:  Patient is AAOX3, follows commands, makes eye contact. No focal deficits.    Cardiac:  Patient is hemodynamically stable. Continue metoprolol, amlodipine, losartan, and chlorthalidone.   Respiratory: Good sats on room air Continue IS.   GI:  Continue cardiac/diabetic diet.  Renal: Good urine output. Cr 0.8. K 4.0. Mag 2.0. Replace Mag. Continue chlorthalidone.  Hema:  Hct 27.9. Platelet 185. Continue Aspirin. Continue Apixaban 5mg BID.   Id:  WBC 10.5. A-febrile. Will continue to follow closely.    Endocrine: Glucose well controlled.  Continue sliding scale insulin. Continue insulin detemir to 5 units AM dose, 10 units PM dose.   Activities: Increase activities as tolerated.  Musculoskeletal: The right upper extremity exhibits swelling distal from the elbow. The RUE continues to improve, coloration of the right hand is improving. Tender to palpation and ROM. ROM is improved but diminished to the right hand 3/5. The dusky, mottled, purple discoloration is localized to the right thumb tip and tip of the right index finger.  Severely diminished capillary refill to the tip of the right thumb and tip of the right index finger. Wound care following. Gauze dressing in place.    Lines tubes and drains:  Continue PICC line.  Plan: Remain telemetry. Patient is ready for discharge to an LTAC facility pending acceptance.          11/19/2019  The patient is post-operative day 19, status-post coronary artery bypass grafting x 3. Post-operative course was complicated by an episode  of hemodynamic instability and decompensation on postop day 5.  Patient remains hemodynamically stable.     Today, the patient slid out of her bed and onto her buttocks early this morning, no evidence of trauma or injury. Denies head trauma. Patient will have a bedside sitter. Patient has tenderness, erythema, warmth to the left foot with dusky discoloration of the tips of the toes of the left foot. Patient will have an ultrasound and be assessed by vascular surgery today. Patient has [facility] facility acceptance, discharge will likely be postponed today.     Neuro:  Patient is AAOX3, follows commands, makes eye contact. No focal deficits.    Cardiac:  Patient is hemodynamically stable. Continue metoprolol, amlodipine, losartan, and chlorthalidone.   Respiratory: Good sats on room air Continue IS.   GI:  Continue cardiac/diabetic diet.  Renal: Good urine output. Cr 0.7. K 3.9. Mag 2.3. Replace K. Continue chlorthalidone.  Hema:  Hct 26. Platelet 205. Continue Aspirin. Continue Apixaban 5mg BID.   Id:  WBC 10. A-febrile. Will continue to follow closely.    Endocrine: Glucose well controlled.  Continue sliding scale insulin. Continue insulin detemir 5 units AM dose, 10 units PM dose.   Activities: Increase activities as tolerated.  Musculoskeletal: The right upper extremity exhibits swelling distal from the elbow. The RUE continues to improve, coloration of the right hand is improving. Tender to palpation and ROM. ROM continues to be diminished to the right hand 3/5. The dusky, mottled, purple discoloration is localized to the right thumb tip and tip of the right index finger.  Severely diminished capillary refill to the tip of the right thumb and tip of the right index finger. Wound care following. Gauze dressing in place.    Lines tubes and drains:  Continue PICC line.  Plan: Remain telemetry. Patient will have an ultrasound and be assessed by vascular surgery today. Patient has [facility] facility acceptance,  discharge will likely be postponed today.        NSTEMI (non-ST elevated myocardial infarction)  The patient is a 69-year-old female with hypertension, diabetes, hyperlipidemia, obesity, anxiety and depression, iron deficiency anemia, vitamin B12 deficiency, who presented to the emergency room with an NSTEMI.  Workup included a cardiac catheterization that shows severe multivessel coronary artery disease with proximal LAD stenosis of 70% and severely depressed ejection fraction of 20%.  The patient is a candidate for urgent coronary artery bypass grafting.  The risks and benefits of surgery were explained to the patient.  The patient verbalized understanding of the issues discussed.  She a segs the risks of surgery and has agreed to proceed with urgent coronary artery bypass grafting.  She understands that the risk of neurologic complication postoperatively is increased due to the presence of cerebral microvascular disease.        Yovani Dobbins NP  Cardiothoracic Surgery  [facility] [place]

## 2019-11-19 NOTE — ASSESSMENT & PLAN NOTE
11/01/2019  The patient is post-operative day 1, status-post coronary artery bypass grafting x 3.  Overall the patient is making progress, however IABP and inotropic agents will continue today.   Neuro:  The patient is awake alert and oriented x3.  Neuro exam is nonfocal.  Pain is well controlled with IV pain medication.  Cardiac:  Patient is hemodynamically improving. Patient remains on epinephrine and milrinone gtts, which will be weaned over the next 24 hours.  Cardiac index is improving. IABP will remain in place over the next 24 hours.  Patient will be started on beta blockers once gtts are weaned off.  Respiratory: Good sats on high flow nasal cannula. Continue respiratory toilet, continue incentive spirometer. Wean to low flow nasal cannula.  GI:  Diet: sips of water with medications, will slowly advance diet as tolerated. Benign abdominal exam.  Renal:  Urine output is slowly improving. Cr 0.9. K 4.3. Mag 1.6. Replace mag. Continue IV diuresis. Add metolazone.   Heme:  Hct 34.1  Platelet 82. Continue aspirin. Discontinue Plavix for now.  Id:  WBC 12. Tmax 100.3. Continue post-operative antibiotics. Will continue to follow.   Endocrine:  Glucose is controlled with insulin gtt.  Continue insulin gtt for now, until weaned off inotropic agents.   Activities:  Patient is currently bed bound due to IABP.  Advance activities as tolerated once IABP is removed.   Lines tubes and drains:  Continue IABP. Continue [place] and Cord[place].  Continue A-line.  Chest tubes will continue. NICOLASA x 2 will continue. Jones catheter will continue.  Continue pacer wires. Continue prevena wound vac.  Plan: Remain ICU.          11/02/2019  The patient is post-operative day 2, status-post coronary artery bypass grafting x 3.  Overall the patient is making progress, IABP removed and inotropic agents discontinued.   Neuro:  The patient is awake alert and oriented x3.  Neuro exam is nonfocal.  Pain is well controlled. Discontinue fentanyl.    Cardiac:  Patient is hemodynamically improving. Inotropic gtts discontinued. Excellent cardiac index. IABP discontinued.  Patient will be started on beta blockers today.  Respiratory: Good sats on high flow nasal cannula. Continue respiratory toilet, continue incentive spirometer. Wean to low flow nasal cannula.  GI:  Advance diabetic/cardiac diet as tolerated. Benign abdominal exam.  Renal:  Urine output is improving. Cr 0.9. K 4.9. Mag 2.2. Continue IV diuresis. Hold AM K.   Heme:  Hct 28.3  Platelet 47. Discontinue aspirin. Discontinue Plavix. HIT panel. Consult hematology r/t thrombocytopenia.   Id:  WBC 10.4. Tmax 99.4. Will continue to follow.   Endocrine:  Discontinue insulin gtt. Start sliding scale insulin. Start long acting insulin detemir.   Activities: Advance activities as tolerated.   Lines tubes and drains:  Discontinue IABP. Discontinue [place] and Cordis.  Discontinue A-line.  Discontinue chest tubes x 3. Discontinue NICOLASA x 2. Discontinue arterial line. Jones catheter will continue for now.  Continue pacer wires. Continue prevena wound vac. Place PICC line.   Plan: Remain ICU.           11/03/2019  The patient is post-operative day 3, status-post coronary artery bypass grafting x 3.  Overall the patient is making progress.  Neuro:  The patient is awake alert and oriented x3.  Neuro exam is nonfocal.  Pain is well controlled. Continue tramadol.   Cardiac:  Patient is hemodynamically stable. Increase metoprolol dose. Start amlodipine.   Respiratory: Good sats on nasal cannula. Continue respiratory toilet, continue incentive spirometer. Wean to room air.  GI:  Advance diabetic/cardiac diet as tolerated. Benign abdominal exam. Passing gas, no BM. Add 1 bottle of mag citrate.   Renal:  Good urine output. Cr 0.9. K 3.8. Mag 2.0. Replace K. Replace Mag. Change to PO diuresis.   Heme:  Hct 34.7  Platelet 89. Restart aspirin. Continue to hold Plavix. HIT panel pending. Hematology following.  Id:  WBC 13.8. Tmax  99.4. Will continue to follow.    Endocrine:  Glucose controlled on sliding scale insulin. Increase long acting insulin detemir dose.   Activities: Advance activities as tolerated.   Lines tubes and drains:  Storm catheter will continue for now.  Continue pacer wires. Continue prevena wound vac. Continue PICC line.   Plan: Remain ICU.          11/04/2019  The patient is post-operative day 4, status-post coronary artery bypass grafting x 3.  Overall the patient is making progress.  Neuro:  The patient is awake alert and oriented x3.  Neuro exam is nonfocal.  Pain is well controlled. Continue tramadol.   Cardiac:  Patient is hemodynamically stable. Continue metoprolol dose. Continue amlodipine dose.   Respiratory: Good sats on room air. Continue respiratory toilet, continue incentive spirometer.  GI:  Advance diabetic/cardiac diet as tolerated. Benign abdominal exam. Regular bowel movements.   Renal:  Good urine output. Cr 0.7. K 4.0. Mag 2.3. Replace K. Decrease PO diuresis.   Heme:  Hct 34.4  Platelet 140. Continue aspirin. Restart Plavix. HIT panel pending. Hematology following.  Id:  WBC down to 13.13. Tmax 98. Will continue to follow.    Endocrine:  Glucose controlled on sliding scale insulin. Increased long acting insulin detemir dose.   Activities: Advance activities as tolerated.   Lines tubes and drains:  Discontinued storm catheter. Continue pacer wires. Continue prevena wound vac. Continue PICC line.   Plan: Transfer to telemetry. Anticipate discharge to SNF in 48 hours.         11/05/2019  The patient is post-operative day 5, status-post coronary artery bypass grafting x 3.  Overall the patient is making progress.  Neuro:  The patient is awake alert and oriented x3.  Neuro exam is nonfocal. Patient slow to answer questions, however answers are appropriate.  Pain is well controlled. Continue tramadol PRN.   Cardiac:  Patient is hemodynamically stable. Continue metoprolol dose. Discontinue amlodipine. Start  losartan.    Respiratory: Good sats on room air. Continue respiratory toilet, continue incentive spirometer.  GI:  Advance diabetic/cardiac diet as tolerated. Benign abdominal exam. Regular bowel movements.   Renal:  Good urine output. Cr 0.7. K 4.8. Mag 2.1. Replace Mag. Discontinue PO diuresis.   Heme:  Hct 39.5  Platelet 73. Continue aspirin. Hold Plavix. HIT panel pending. Hematology following.  Id:  WBC is up to 16. Tmax 99. Pan-culture. DC PICC line. Chest xray. Start broad spectrum empiric antibiotics. Will continue to follow.    Endocrine:  Glucose controlled on sliding scale insulin. Decrease long acting insulin detemir dose.   Activities: Advance activities as tolerated.   Lines tubes and drains:  Discontinue pacer wires. Discontinue prevena wound vac. Continue PICC line.   Plan: Anticipate discharge to SNF in 24-48 hours.         11/06/2019  The patient is post-operative day 6, status-post coronary artery bypass grafting x 3. Patient had an episode of decompensation and unresponsiveness yesterday evening. The patient was found unresponsive, cool, and diaphoretic, with agonal breathing. Patient was intubated and transferred to the ICU. Bedside EKG and echocardiogram performed at the bedside showed no significant changes. CT head, chest, abdomen pelvis performed, showed no significant findings.     Neuro:  The patient is intubated and sedated. On Precedex gtt. Begin weaning off sedation.    Cardiac:  Patient is hemodynamically improving. Inotropic agent dependent. Currently on Epinephrine gtt. Start Milrinone gtt. Discontinue metoprolol. Discontinue losartan.    Respiratory: Ventilator dependent. tV 450, RR 20, PEEP 5. Wean down sedation. Wean off ventilator today.    GI:  Begin enteral tube feedings. Benign abdominal exam. Previously having regular bowel movements.   Renal: Adequate urine output. Cr 1.1. K 3.6. Mag 1.7. Replace Mag. Replace K. Discontinue diuresis.   Heme:  Hct 31.9  Platelet 89. Hold  aspirin. Hold Plavix. HIT panel negative. Patient received 1 units PRBC last night. Hematology following.  Id:  WBC is down to 15.5. Tmax 99.9. BC NGTD. Urinalysis negative. Sputum culture negative. Vancomycin and zosyn day 2. Will continue to follow closely.    Endocrine:  Glucose controlled on insulin gtt, continue at this time.   Activities: Intubated and sedated.   Musculoskeletal: Unable to find right radial pulse via doppler. Tips of the fingers of the right hand are mottled, dusky, appear cyanotic. Recent right radial arterial line in place, which has been relocated. US of arteries and veins of the right arm ordered. Consult to vascular surgery placed.    Lines tubes and drains:  Continue swan and cordis. Continue arterial line. Continue storm.    Plan: Remain ICU         11/07/2019  The patient is post-operative day 7, status-post coronary artery bypass grafting x 3. Patient had an episode of decompensation and unresponsiveness the evening of 11/05/2019. The patient was found unresponsive, cool, and diaphoretic, with agonal breathing. Patient was intubated and transferred to the ICU. Bedside EKG and echocardiogram performed at the bedside showed no significant changes. CT head, chest, abdomen pelvis performed, showed no significant findings.     Neuro:  The patient is intubated and sedated. On Precedex gtt. Wean off sedation.    Cardiac:  Patient is hemodynamically improving. Excellent cardiac index. Wean off Inotropic gtts.   Respiratory: Wean towards extubation. tV 400, RR 18, PEEP 5.   GI:  Continue enteral tube feedings while intubated. Benign abdominal exam.  Renal: Adequate urine output. Cr 1.1. K 4.1. Mag 2.8. Start IV diuresis.   Heme:  Hct 24.6  Platelet 60. Hold aspirin. Hold Plavix. HIT panel negative.  Id:  WBC is down to 14.46. Tmax 100.8. BC NGTD. Urinalysis negative. Sputum culture negative. Vancomycin and zosyn day 3. Midline sternal incision appears reddened, with induration at skin edges.  Will continue to follow closely.    Endocrine:  Glucose controlled on sliding scale insulin.   Activities: Intubated and sedated. Moves all 4. Full ROM.    Musculoskeletal: Able to find right radial pulse via doppler. The right hand extending to the tips of the fingers of the right hand are mottled, dusky, appear cyanotic. Recent right radial arterial line placement, which has been relocated. US of arteries and veins of the right arm pending. Vascular surgery following, suggested possible thrombotic etiology, suggested heparin gtt, however this is contraindicated r/t s/p cabg x 3.     Lines tubes and drains:  Continue swan and cordis. Continue arterial line. Continue storm.    Plan: Remain ICU         11/08/2019  The patient is post-operative day 8, status-post coronary artery bypass grafting x 3. Patient had an episode of decompensation and unresponsiveness the evening of 11/05/2019. The patient was found unresponsive, cool, and diaphoretic, with agonal breathing. Patient was intubated and transferred to the ICU. Bedside EKG and echocardiogram performed at the bedside showed no significant changes. CT head, chest, abdomen pelvis performed, showed no significant findings. Suspect cardiogenic shock.      Neuro:  Patients neurologic state is improving. Alert, follows commands, makes eye contact, disoriented x 4. No focal deficits.    Cardiac:  Patient is hemodynamically improving. Off all inotropic gtts. Start low dose metoprolol and amlodipine.   Respiratory: Good sats on low flow nasal cannula. Continue IS. Continue pulmonary toilet. Wean to room air.    GI:  Continue enteral tube feedings at this time r/t disorientation and aspiration risk. Discontinue laxatives. Start metamucil BID, patient has had multiple loose stools.   Renal: Good urine output. Cr 0.8. K 3.6. Mag 2.1. Replace K. Replace Mag. Continue IV diuresis.   Hema:  Hct 26.4  Platelet 45. Hold aspirin. Hold Plavix. HIT panel will be resent as the patient  is a high suspicion for HIT despite previous negative HIT panel. Multiple DVTs from the right IJ down to the brachiocephalic vein. Confirmed via vascular US. Patient started on argatroban yesterday, goal is APTT 2x baseline. Most recent APTT 97.8. Baseline APTT 50.8.  Hematology following. Vascular surgery following.   Id:  WBC is down to 12. Tmax 100.4. BC NGTD. Urinalysis negative. Sputum culture negative. Vancomycin and zosyn day 4. Midline sternal incision appears improved, with minimal redness and induration at skin edges. Will continue to follow closely.    Endocrine:  Glucose controlled on sliding scale insulin.   Activities: Increase activities as tolerated.  Musculoskeletal: ROM 2/5 to the right wrist/hand/fingers, distal from the elbow there is 2+ non pitting edema, the extremity is tender and warm. There is mottling, a dusky appearance, and purple discoloration to the fingertips of the right hand. See above hematology plan.      Lines tubes and drains:  Continue PICC line. Continue storm catheter. Continue NG tube.   Plan: Remain ICU     11/09/2019  The patient is postop day 9.  Status post coronary artery bypass grafting x3.  Postop course was complicated by an episode of hemodynamic instability and decompensation on postop day number 5.  Patient continues to make clinical progress since then.  She is off all drips.  She remains extubated.  Continue ICU care for now.    Neuro:  Patient is awake alert.  She is oriented x2.  She is still confused.  But she is making progress.  Her neuro exam is nonfocal.  She follows all commands.  Cardiac:  Patient continues to be hemodynamically stable.  She is somewhat hypertensive.  Will increase her antihypertensives.  Respiratory:  Patient is good sats on nasal cannula.  She remains extubated.  GI:  Patient was tolerating tube feeds until this morning.  She pulled heart feeding tube. Will get a swallowing evaluation.  Mode of nutrition will depend on results of  swallowing evaluation.  Renal:  Patient has good urine output %period% creatinine is 0.9.  Heme:  Hematocrit is 26 platelet is 46.  Patient is on argatroban  for anticoagulation due to right upper extremity DVTs.  Hypercoagulability workup is in progress.  Appreciate Hematology and vascular surgery input.  Id:  Incision continues to improve.  white count is 17.  Continue broad-spectrum antibiotics.  Endocrine:  Glucose is controlled on its sliding scale insulin.  Activities:  Advance activities as tolerated.  Line tubes and drains.  Patient has a PICC line, Jones catheter.    11/10/2019  The patient is postop day 10.  Status post coronary bypass grafting x3.  Postop course was complicated by an episode of hemodynamic instability and decompensation on postop day 5.  Patient remains hemodynamically stable.    Neuro:  The patient is awake and alert.  She is oriented x1.  She continues to be confused.  Her neuro exam is nonfocal.  Cardiac:  Patient continues to be hemodynamically stable.  His she is hypertensive.  Hypertensive medications will be increased.  Respiratory:  Patient has good sats on nasal cannula.  Continue pulmonary toilet.  GI:  Patient is tolerating a regular diet.  Renal:  Patient has good urine output. Creatinine is 0.7.  Heme: hematocrit is 24 and platelet is 67.  Patient continues on argatroban for right upper extremity DVTs.  Hypercoagulable workup is in progress.  Id:  Patient's white count is up to 20.  Patient is on broad prepped from antibiotics.  Endocrine:  The patient is on long-acting as well as sliding scale insulin.  Blood sugars have been well controlled.  Will decrease dose of long-acting.  Activities: patient has been out of bed to chair.  Advance as tolerated.  Musculoskeletal:  Patient's right upper extremity continues to be swollen  The index finger and thumb continues to be dusky with a bleb near the base of the thumb.  Continue argatroban for anticoagulation.  Continue elevation  of right upper extremity.  Lines tubes and drains: patient has a PICC line.  Will discontinue Jones catheter.        11/11/2019  The patient is post-operative day 11, status-post coronary artery bypass grafting x 3. Post-operative course was complicated by an episode of hemodynamic instability and decompensation on postop day 5.  Patient remains hemodynamically stable.    Neuro:  Patients neurologic state is improving. Oriented x 2. Alert, follows commands, makes eye contact. No focal deficits.    Cardiac:  Patient is hemodynamically improving. Hypertensive. Continue metoprolol, amlodipine, and losartan. Add chlorthalidone.   Respiratory: Good sats on room air. Continue IS. Continue pulmonary toilet.  GI:  Continue cardiac/diabetic diet, modified by puree and thick liquids with swallow precautions. Advance as tolerated. Continue metamucil.  Renal: Good urine output. Cr 0.8. K 3.8. Replace K. Continue IV diuresis. Add chlorthalidone. BMP at noon.   Hema:  Hct 23.8  Platelet 75. Hold aspirin. Hold Plavix. Repeat HIT panel pending. Multiple DVTs from the right IJ down to the brachiocephalic vein. Confirmed via vascular US. Patient on argatroban gtt, goal is APTT 2x baseline. Most recent APTT 83.3. Baseline APTT 50.8.  Hematology following. Vascular surgery following.   Id:  WBC is down to 19. Tmax 98.4. BC NGTD. Vancomycin and zosyn day 7. Discontinue broad spectrum antibiotics. Will continue to follow closely.    Endocrine:  Glucose controlled on sliding scale insulin and long acting insulin detemir.    Activities: Increase activities as tolerated.  Musculoskeletal: The right upper extremity exhibits swelling distal from the elbow. The RUE is tender to palpation and ROM. ROM is diminished to the right hand 2/5. The hand his reddened, swollen, with dusky, mottled, purple discoloration to the right thumb and tip of the right index finger. Blisters have formed on the proximal portion of the thumb and wrist. Severely  diminished capillary refill to the tip of the right thumb and tip of the right index finger. Wound care order placed.      Lines tubes and drains:  Continue PICC line.  Plan: Remain ICU         11/12/2019  The patient is post-operative day 12, status-post coronary artery bypass grafting x 3. Post-operative course was complicated by an episode of hemodynamic instability and decompensation on postop day 5.  Patient remains hemodynamically stable.    Neuro:  Patients neurologic state is improving. Oriented to person, place, situation. Disoriented to time. Alert, follows commands, makes eye contact. No focal deficits.    Cardiac:  Patient is hemodynamically improving. Continue metoprolol, amlodipine, losartan, and chlorthalidone.   Respiratory: Good sats on room air. Continue IS.  GI:  Continue cardiac/diabetic diet, modified by puree and thick liquids with swallow precautions. Advance as tolerated. Continue metamucil.  Renal: Good urine output. Cr 0.8. K 3.9. Replace K. Discontinue lasix. Continue chlorthalidone.  Hema:  Hct 23.1  Platelet 75. Hold aspirin. Hold Plavix. 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 Multiple DVTs from the right IJ down to the brachiocephalic vein. Confirmed via vascular US. Patient on argatroban gtt, goal is APTT 2x baseline. Most recent APTT 84.6. Baseline APTT 50.8.  Hematology following. Vascular surgery following.   Id:  WBC is down to 17.4. A-febrile. Will continue to follow closely.    Endocrine:  Patient had an episode of hypoglycemia last night. Will continue sliding scale insulin. Discontinue long acting insulin. Begin strict calorie count.   Activities: Increase activities as tolerated.  Musculoskeletal: The right upper extremity exhibits swelling distal from the elbow. The RUE is tender to palpation and ROM. ROM is diminished to the right hand 3/5. The hand is reddened, swollen, with dusky, mottled, purple discoloration to the right thumb tip and tip of the right index finger.   Severely diminished capillary refill to the tip of the right thumb and tip of the right index finger. Wound care following. Gauze dressing in place.       Lines tubes and drains:  Continue PICC line.  Plan: Remain ICU. Begin discharge planning for IP rehab.          11/13/2019  The patient is post-operative day 13, status-post coronary artery bypass grafting x 3. Post-operative course was complicated by an episode of hemodynamic instability and decompensation on postop day 5.  Patient remains hemodynamically stable.    Neuro:  Patient is oriented to person, place, situation. Disoriented to time. Alert, follows commands, makes eye contact. No focal deficits.    Cardiac:  Patient is hemodynamically stable. Continue metoprolol, amlodipine, losartan, and chlorthalidone.   Respiratory: Good sats on room air. Continue IS. Use of accessory muscles noted during inspiration. Will increase diuresis.   GI:  Continue cardiac/diabetic diet, modified by puree and thick liquids with swallow precautions. Advance as tolerated. Continue metamucil.  Renal: Good urine output. Cr 0.8. K 4.0. Mag 1.8. Replace Mag. Start lasix IV. Continue chlorthalidone.  Hema:  Hct 23.  Platelet 79. Hold aspirin. Hold Plavix. 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 Multiple DVTs from the right IJ down to the brachiocephalic vein. Confirmed via vascular US. Patient on argatroban gtt, goal is APTT 2x baseline. Most recent APTT 75.6. Baseline APTT 50.8.  Hematology following. Vascular surgery following.   Id:  WBC is down to 12.2. A-febrile. Will continue to follow closely.    Endocrine:  Glucose controlled. Continue sliding scale insulin. Start long acting insulin detemir 5 units daily.     Activities: Increase activities as tolerated.  Musculoskeletal: The right upper extremity exhibits swelling distal from the elbow. The RUE is tender to palpation and ROM. ROM is diminished to the right hand 2/5. The right hand is improving. The dusky, mottled, purple  discoloration is localized to the right thumb tip and tip of the right index finger.  Severely diminished capillary refill to the tip of the right thumb and tip of the right index finger. Wound care following. Gauze dressing in place.    Lines tubes and drains:  Continue PICC line.  Plan: Remain telemetry. Continue discharge planning for IP rehab.          11/14/2019  The patient is post-operative day 14, status-post coronary artery bypass grafting x 3. Post-operative course was complicated by an episode of hemodynamic instability and decompensation on postop day 5.  Patient remains hemodynamically stable.    Neuro:  Patient is oriented to person, place, situation. Disoriented to time. Alert, follows commands, makes eye contact. No focal deficits.    Cardiac:  Patient is hemodynamically stable. Continue metoprolol, amlodipine, losartan, and chlorthalidone.   Respiratory: Good sats on room air. Continue IS. Decreased use of accessory muscles noted during inspiration.   GI:  Continue cardiac/diabetic diet, modified by puree and thick liquids with swallow precautions. Advance as tolerated. Continue metamucil.  Renal: Good urine output. Cr 0.9. K 4.2. Mag 1.9. Replace Mag. Discontinue lasix. Continue chlorthalidone.  Hema:  Hct 21.9.  Platelet 87. Hold aspirin. Hold Plavix. 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 Multiple DVTs from the right IJ down to the brachiocephalic vein. Confirmed via vascular US. As per discussion with hematology and pharmacy, will discontinue argatroban and start apixaban. Transfuse 1 unit PRBC.  Hematology following. Vascular surgery following.   Id:  WBC is down to 11. A-febrile. Will continue to follow closely.    Endocrine:  Continue sliding scale insulin. Increase insulin detemir to 5 units BID.     Activities: Increase activities as tolerated.  Musculoskeletal: The right upper extremity exhibits swelling distal from the elbow. The RUE is tender to palpation and ROM. ROM is diminished to  the right hand 2/5. The right hand is slowly improving. The dusky, mottled, purple discoloration is localized to the right thumb tip and tip of the right index finger.  Severely diminished capillary refill to the tip of the right thumb and tip of the right index finger. Wound care following. Gauze dressing in place.    Lines tubes and drains:  Continue PICC line.  Plan: Remain telemetry. Continue discharge planning for IP rehab.          11/15/2019  The patient is post-operative day 15, status-post coronary artery bypass grafting x 3. Post-operative course was complicated by an episode of hemodynamic instability and decompensation on postop day 5.  Patient remains hemodynamically stable.    Neuro:  Patient is oriented to person, place, situation. Disoriented to time. Alert, follows commands, makes eye contact. No focal deficits.    Cardiac:  Patient is hemodynamically stable. Continue metoprolol, amlodipine, losartan, and chlorthalidone.   Respiratory: Good sats on low flow nasal cannula. Wean to room air. Continue IS.   GI:  Continue cardiac/diabetic diet, modified by puree and thick liquids with swallow precautions. Advance as tolerated. Continue metamucil.  Renal: Good urine output. Cr 0.8. K 3.6. Replace K. Continue chlorthalidone.  Hema:  Hct 26.1. Platelet 99. Start Aspirin. Continue Apixaban 5mg BID.  Hematology following. Vascular surgery following.   Id:  WBC 11. A-febrile. Will continue to follow closely.    Endocrine:  Continue sliding scale insulin. Increase insulin detemir to 5 units AM dose, 10 units PM dose.   Activities: Increase activities as tolerated.  Musculoskeletal: The right upper extremity exhibits swelling distal from the elbow. The RUE is tender to palpation and ROM. ROM is diminished to the right hand 2/5. The right hand is slowly improving. The dusky, mottled, purple discoloration is localized to the right thumb tip and tip of the right index finger.  Severely diminished capillary refill  to the tip of the right thumb and tip of the right index finger. Wound care following. Gauze dressing in place.    Lines tubes and drains:  Continue PICC line.  Plan: Remain telemetry. Begin discharge planning to LTAC, anticipate discharge in 48-72 hours.        11/16/2019  The patient is postop day 16.  Status post coronary artery bypass grafting x3.  Patient's postop course was complicated by an episode of hemodynamically instability and decompensation on postop day 5.  Since then the patient has made much clinical progress.  The patient remains hemodynamically stable.  Anticipate discharge to rehab or LTAC in the next 48-72 hours.    Neuro:  The patient is oriented x3.  She is much less confused.  Her responses to questioning is appropriate.  She moves all 4 she has no focal deficits.  Cardiac:  Patient remains hemodynamically stable.  Systolic blood pressure trending higher.  Will increase losartan.  Continue amlodipine metoprolol and chlorthalidone.  Respiratory:  The patient has good sats on room air.  Continue pulmonary toilet.  GI:  Patient is tolerating a regular the diet.  Renal:  Patient has good urine output.  creatinine is 0.7.  Heme:  Hematocrit is 30.  Platelet count is up to 129.  Continue aspirin and apixaban.  Id:  White count is 10.  The patient is afebrile.  Endocrine:  Glucose is better controlled on long-acting insulin.  Activities patient is out of bed to chair continue increasing activities.  Line tubes and drains:  Patient has a PICC line continue PICC line.  Musculoskeletal:  Perfusion to the patient's right arm is much improved.  Swelling is somewhat decreased.  The area of dusky tissue remained isolated to the tip of the patient's right thumb and index finger.    11/17/2019  The patient is postop day 17.  Status post coronary artery bypass grafting x3.  The patient's postop course was complicated by an episode of hemodynamic instability ankle decompensation on postop day 5.  The patient  is making clinical progress.  She has remained hemodynamically stable.  Anticipate discharge to an LTAC or rehab center in the next 48-72 hours.    Neuro:  The patient is alert and oriented x3.  Her confusion is much improved.  Her responses to all questions appropriately.  She moves all 4 and has no focal deficits.  Cardiac:  The patient remains hemodynamically stable.  She is tolerating losartan amlodipine metoprolol and chlorthalidone.  Respiratory:  The patient has good sats on room air.  Continue pulmonary toilet.  GI:  The patient is tolerating a regular diet.  Renal:  The patient has good urine output and had creatinine 0.7.  Heme:  The patient's hematocrit is 27 and her platelet count is increased to 155.  Continue aspirin and apixaban.  Id:  Patient is afebrile and her white count is 8.  Endocrine high glucose is better controlled on long-acting insulin plus sliding scale.  Activities:  The patient is out of bed to chair.  Continue advancing activities.  Line tubes and drains:  The patient has a PICC line.  Musculoskeletal:  Perfusion to the patient's right and continues to be improved.  However high right am is still swollen.  The area of dusky tissue remained stable.        11/18/2019  The patient is post-operative day 18, status-post coronary artery bypass grafting x 3. Post-operative course was complicated by an episode of hemodynamic instability and decompensation on postop day 5.  Patient remains hemodynamically stable. Patient is ready for discharge to an LTAC facility pending acceptance.     Neuro:  Patient is AAOX3, follows commands, makes eye contact. No focal deficits.    Cardiac:  Patient is hemodynamically stable. Continue metoprolol, amlodipine, losartan, and chlorthalidone.   Respiratory: Good sats on room air Continue IS.   GI:  Continue cardiac/diabetic diet.  Renal: Good urine output. Cr 0.8. K 4.0. Mag 2.0. Replace Mag. Continue chlorthalidone.  Hema:  Hct 27.9. Platelet 185. Continue  Aspirin. Continue Apixaban 5mg BID.   Id:  WBC 10.5. A-febrile. Will continue to follow closely.    Endocrine: Glucose well controlled.  Continue sliding scale insulin. Continue insulin detemir to 5 units AM dose, 10 units PM dose.   Activities: Increase activities as tolerated.  Musculoskeletal: The right upper extremity exhibits swelling distal from the elbow. The RUE continues to improve, coloration of the right hand is improving. Tender to palpation and ROM. ROM is improved but diminished to the right hand 3/5. The dusky, mottled, purple discoloration is localized to the right thumb tip and tip of the right index finger.  Severely diminished capillary refill to the tip of the right thumb and tip of the right index finger. Wound care following. Gauze dressing in place.    Lines tubes and drains:  Continue PICC line.  Plan: Remain telemetry. Patient is ready for discharge to an LTAC facility pending acceptance.          11/19/2019  The patient is post-operative day 19, status-post coronary artery bypass grafting x 3. Post-operative course was complicated by an episode of hemodynamic instability and decompensation on postop day 5.  Patient remains hemodynamically stable.     Today, the patient slid out of her bed and onto her buttocks early this morning, no evidence of trauma or injury. Denies head trauma. Patient will have a bedside sitter. Patient has tenderness, erythema, warmth to the left foot with dusky discoloration of the tips of the toes of the left foot. Patient will have an ultrasound and be assessed by vascular surgery today. Patient has [facility] facility acceptance, discharge will likely be postponed today.     Neuro:  Patient is AAOX3, follows commands, makes eye contact. No focal deficits.    Cardiac:  Patient is hemodynamically stable. Continue metoprolol, amlodipine, losartan, and chlorthalidone.   Respiratory: Good sats on room air Continue IS.   GI:  Continue cardiac/diabetic diet.  Renal:  Good urine output. Cr 0.7. K 3.9. Mag 2.3. Replace K. Continue chlorthalidone.  Hema:  Hct 26. Platelet 205. Continue Aspirin. Continue Apixaban 5mg BID.   Id:  WBC 10. A-febrile. Will continue to follow closely.    Endocrine: Glucose well controlled.  Continue sliding scale insulin. Continue insulin detemir 5 units AM dose, 10 units PM dose.   Activities: Increase activities as tolerated.  Musculoskeletal: The right upper extremity exhibits swelling distal from the elbow. The RUE continues to improve, coloration of the right hand is improving. Tender to palpation and ROM. ROM continues to be diminished to the right hand 3/5. The dusky, mottled, purple discoloration is localized to the right thumb tip and tip of the right index finger.  Severely diminished capillary refill to the tip of the right thumb and tip of the right index finger. Wound care following. Gauze dressing in place.    Lines tubes and drains:  Continue PICC line.  Plan: Remain telemetry. Patient will have an ultrasound and be assessed by vascular surgery today. Patient has [facility] facility acceptance, discharge will likely be postponed today.

## 2019-11-20 VITALS
HEIGHT: 61 IN | HEART RATE: 75 BPM | BODY MASS INDEX: 31.67 KG/M2 | DIASTOLIC BLOOD PRESSURE: 57 MMHG | SYSTOLIC BLOOD PRESSURE: 120 MMHG | WEIGHT: 167.75 LBS | TEMPERATURE: 99 F | RESPIRATION RATE: 18 BRPM | OXYGEN SATURATION: 96 %

## 2019-11-20 LAB
ANION GAP SERPL CALC-SCNC: 7 MMOL/L (ref 8–16)
ANION GAP SERPL CALC-SCNC: 9 MMOL/L (ref 8–16)
BASOPHILS # BLD AUTO: 0.03 K/UL (ref 0–0.2)
BASOPHILS NFR BLD: 0.4 % (ref 0–1.9)
BUN SERPL-MCNC: 24 MG/DL (ref 8–23)
BUN SERPL-MCNC: 27 MG/DL (ref 8–23)
CALCIUM SERPL-MCNC: 8.6 MG/DL (ref 8.7–10.5)
CALCIUM SERPL-MCNC: 8.7 MG/DL (ref 8.7–10.5)
CHLORIDE SERPL-SCNC: 97 MMOL/L (ref 95–110)
CHLORIDE SERPL-SCNC: 98 MMOL/L (ref 95–110)
CO2 SERPL-SCNC: 25 MMOL/L (ref 23–29)
CO2 SERPL-SCNC: 26 MMOL/L (ref 23–29)
CREAT SERPL-MCNC: 0.8 MG/DL (ref 0.5–1.4)
CREAT SERPL-MCNC: 0.9 MG/DL (ref 0.5–1.4)
DIFFERENTIAL METHOD: ABNORMAL
EOSINOPHIL # BLD AUTO: 0.1 K/UL (ref 0–0.5)
EOSINOPHIL NFR BLD: 1.3 % (ref 0–8)
ERYTHROCYTE [DISTWIDTH] IN BLOOD BY AUTOMATED COUNT: 15.9 % (ref 11.5–14.5)
EST. GFR  (AFRICAN AMERICAN): >60 ML/MIN/1.73 M^2
EST. GFR  (AFRICAN AMERICAN): >60 ML/MIN/1.73 M^2
EST. GFR  (NON AFRICAN AMERICAN): >60 ML/MIN/1.73 M^2
EST. GFR  (NON AFRICAN AMERICAN): >60 ML/MIN/1.73 M^2
GLUCOSE SERPL-MCNC: 157 MG/DL (ref 70–110)
GLUCOSE SERPL-MCNC: 260 MG/DL (ref 70–110)
HCT VFR BLD AUTO: 25.6 % (ref 37–48.5)
HGB BLD-MCNC: 7.8 G/DL (ref 12–16)
IMM GRANULOCYTES # BLD AUTO: 0.09 K/UL (ref 0–0.04)
IMM GRANULOCYTES NFR BLD AUTO: 1.2 % (ref 0–0.5)
LYMPHOCYTES # BLD AUTO: 1.1 K/UL (ref 1–4.8)
LYMPHOCYTES NFR BLD: 14.3 % (ref 18–48)
MAGNESIUM SERPL-MCNC: 1.9 MG/DL (ref 1.6–2.6)
MCH RBC QN AUTO: 28.4 PG (ref 27–31)
MCHC RBC AUTO-ENTMCNC: 30.5 G/DL (ref 32–36)
MCV RBC AUTO: 93 FL (ref 82–98)
MONOCYTES # BLD AUTO: 0.7 K/UL (ref 0.3–1)
MONOCYTES NFR BLD: 9.2 % (ref 4–15)
NEUTROPHILS # BLD AUTO: 5.5 K/UL (ref 1.8–7.7)
NEUTROPHILS NFR BLD: 73.6 % (ref 38–73)
NRBC BLD-RTO: 0 /100 WBC
PLATELET # BLD AUTO: 266 K/UL (ref 150–350)
PMV BLD AUTO: 11.2 FL (ref 9.2–12.9)
POCT GLUCOSE: 161 MG/DL (ref 70–110)
POCT GLUCOSE: 228 MG/DL (ref 70–110)
POCT GLUCOSE: 348 MG/DL (ref 70–110)
POTASSIUM SERPL-SCNC: 3.9 MMOL/L (ref 3.5–5.1)
POTASSIUM SERPL-SCNC: 4.3 MMOL/L (ref 3.5–5.1)
RBC # BLD AUTO: 2.75 M/UL (ref 4–5.4)
SODIUM SERPL-SCNC: 129 MMOL/L (ref 136–145)
SODIUM SERPL-SCNC: 133 MMOL/L (ref 136–145)
WBC # BLD AUTO: 7.46 K/UL (ref 3.9–12.7)

## 2019-11-20 PROCEDURE — 25000003 PHARM REV CODE 250: Performed by: THORACIC SURGERY (CARDIOTHORACIC VASCULAR SURGERY)

## 2019-11-20 PROCEDURE — 85025 COMPLETE CBC W/AUTO DIFF WBC: CPT

## 2019-11-20 PROCEDURE — 83735 ASSAY OF MAGNESIUM: CPT

## 2019-11-20 PROCEDURE — 99232 SBSQ HOSP IP/OBS MODERATE 35: CPT | Mod: ,,, | Performed by: INTERNAL MEDICINE

## 2019-11-20 PROCEDURE — 63600175 PHARM REV CODE 636 W HCPCS: Performed by: NURSE PRACTITIONER

## 2019-11-20 PROCEDURE — 99232 PR SUBSEQUENT HOSPITAL CARE,LEVL II: ICD-10-PCS | Mod: ,,, | Performed by: INTERNAL MEDICINE

## 2019-11-20 PROCEDURE — 25000003 PHARM REV CODE 250: Performed by: FAMILY MEDICINE

## 2019-11-20 PROCEDURE — 25000003 PHARM REV CODE 250: Performed by: NURSE PRACTITIONER

## 2019-11-20 PROCEDURE — 80048 BASIC METABOLIC PNL TOTAL CA: CPT

## 2019-11-20 RX ORDER — INSULIN ASPART 100 [IU]/ML
4 INJECTION, SOLUTION INTRAVENOUS; SUBCUTANEOUS
Qty: 3.6 ML | Refills: 3 | Status: SHIPPED | OUTPATIENT
Start: 2019-11-20 | End: 2020-11-19

## 2019-11-20 RX ORDER — POTASSIUM CHLORIDE 20 MEQ/1
20 TABLET, EXTENDED RELEASE ORAL ONCE
Status: COMPLETED | OUTPATIENT
Start: 2019-11-20 | End: 2019-11-20

## 2019-11-20 RX ORDER — MAGNESIUM SULFATE HEPTAHYDRATE 40 MG/ML
2 INJECTION, SOLUTION INTRAVENOUS ONCE
Status: COMPLETED | OUTPATIENT
Start: 2019-11-20 | End: 2019-11-20

## 2019-11-20 RX ORDER — CHLORTHALIDONE 25 MG/1
25 TABLET ORAL DAILY
Qty: 90 TABLET | Refills: 3
Start: 2019-11-20 | End: 2021-04-20

## 2019-11-20 RX ADMIN — INSULIN DETEMIR 5 UNITS: 100 INJECTION, SOLUTION SUBCUTANEOUS at 09:11

## 2019-11-20 RX ADMIN — POTASSIUM CHLORIDE 20 MEQ: 20 TABLET, EXTENDED RELEASE ORAL at 09:11

## 2019-11-20 RX ADMIN — LOSARTAN POTASSIUM 100 MG: 50 TABLET ORAL at 09:11

## 2019-11-20 RX ADMIN — APIXABAN 5 MG: 2.5 TABLET, FILM COATED ORAL at 09:11

## 2019-11-20 RX ADMIN — CHLORTHALIDONE 25 MG: 25 TABLET ORAL at 09:11

## 2019-11-20 RX ADMIN — PANTOPRAZOLE SODIUM 40 MG: 40 TABLET, DELAYED RELEASE ORAL at 09:11

## 2019-11-20 RX ADMIN — ASPIRIN 81 MG: 81 TABLET ORAL at 09:11

## 2019-11-20 RX ADMIN — INSULIN ASPART 2 UNITS: 100 INJECTION, SOLUTION INTRAVENOUS; SUBCUTANEOUS at 05:11

## 2019-11-20 RX ADMIN — MAGNESIUM SULFATE IN WATER 2 G: 40 INJECTION, SOLUTION INTRAVENOUS at 09:11

## 2019-11-20 RX ADMIN — AMLODIPINE BESYLATE 10 MG: 10 TABLET ORAL at 09:11

## 2019-11-20 RX ADMIN — NITROGLYCERIN 0.5 INCH: 20 OINTMENT TOPICAL at 09:11

## 2019-11-20 RX ADMIN — INSULIN ASPART 4 UNITS: 100 INJECTION, SOLUTION INTRAVENOUS; SUBCUTANEOUS at 11:11

## 2019-11-20 RX ADMIN — FERROUS SULFATE TAB EC 325 MG (65 MG FE EQUIVALENT) 325 MG: 325 (65 FE) TABLET DELAYED RESPONSE at 09:11

## 2019-11-20 RX ADMIN — METOPROLOL TARTRATE 50 MG: 50 TABLET, FILM COATED ORAL at 09:11

## 2019-11-20 NOTE — PLAN OF CARE
Nov27 Follow up with Keny Zaidi MD   Wednesday Nov 27, 2019  [address] [place] [address] [address]HEZ [id number]   [phone]         11/20/19 1613   Final Note   Assessment Type Final Discharge Note   Anticipated Discharge Disposition LTAC   Hospital Follow Up  Appt(s) scheduled? Yes   Right Care Referral Info   Post Acute Recommendation Other   Facility Name [facility]

## 2019-11-20 NOTE — ASSESSMENT & PLAN NOTE
--Platelet count 266K  --D/c Argatroban due to unavailability   --please continue on Eliquis 5 mg PO BID.  Plan for anticoagulation for at least 3 months.  --Resolved

## 2019-11-20 NOTE — PLAN OF CARE
Number for report is [phone] and transportation time is 12:00.  Information provided to Andreas, primary nurse.    CM notified patient's sister, Christina, that discharge to [facility] would be today.

## 2019-11-20 NOTE — ASSESSMENT & PLAN NOTE
11/01/2019  The patient is post-operative day 1, status-post coronary artery bypass grafting x 3.  Overall the patient is making progress, however IABP and inotropic agents will continue today.   Neuro:  The patient is awake alert and oriented x3.  Neuro exam is nonfocal.  Pain is well controlled with IV pain medication.  Cardiac:  Patient is hemodynamically improving. Patient remains on epinephrine and milrinone gtts, which will be weaned over the next 24 hours.  Cardiac index is improving. IABP will remain in place over the next 24 hours.  Patient will be started on beta blockers once gtts are weaned off.  Respiratory: Good sats on high flow nasal cannula. Continue respiratory toilet, continue incentive spirometer. Wean to low flow nasal cannula.  GI:  Diet: sips of water with medications, will slowly advance diet as tolerated. Benign abdominal exam.  Renal:  Urine output is slowly improving. Cr 0.9. K 4.3. Mag 1.6. Replace mag. Continue IV diuresis. Add metolazone.   Heme:  Hct 34.1  Platelet 82. Continue aspirin. Discontinue Plavix for now.  Id:  WBC 12. Tmax 100.3. Continue post-operative antibiotics. Will continue to follow.   Endocrine:  Glucose is controlled with insulin gtt.  Continue insulin gtt for now, until weaned off inotropic agents.   Activities:  Patient is currently bed bound due to IABP.  Advance activities as tolerated once IABP is removed.   Lines tubes and drains:  Continue IABP. Continue [place] and Cordis.  Continue A-line.  Chest tubes will continue. NICOLASA x 2 will continue. Jones catheter will continue.  Continue pacer wires. Continue prevena wound vac.  Plan: Remain ICU.          11/02/2019  The patient is post-operative day 2, status-post coronary artery bypass grafting x 3.  Overall the patient is making progress, IABP removed and inotropic agents discontinued.   Neuro:  The patient is awake alert and oriented x3.  Neuro exam is nonfocal.  Pain is well controlled. Discontinue fentanyl.    Cardiac:  Patient is hemodynamically improving. Inotropic gtts discontinued. Excellent cardiac index. IABP discontinued.  Patient will be started on beta blockers today.  Respiratory: Good sats on high flow nasal cannula. Continue respiratory toilet, continue incentive spirometer. Wean to low flow nasal cannula.  GI:  Advance diabetic/cardiac diet as tolerated. Benign abdominal exam.  Renal:  Urine output is improving. Cr 0.9. K 4.9. Mag 2.2. Continue IV diuresis. Hold AM K.   Heme:  Hct 28.3  Platelet 47. Discontinue aspirin. Discontinue Plavix. HIT panel. Consult hematology r/t thrombocytopenia.   Id:  WBC 10.4. Tmax 99.4. Will continue to follow.   Endocrine:  Discontinue insulin gtt. Start sliding scale insulin. Start long acting insulin detemir.   Activities: Advance activities as tolerated.   Lines tubes and drains:  Discontinue IABP. Discontinue [place] and Cordis.  Discontinue A-line.  Discontinue chest tubes x 3. Discontinue NICOLASA x 2. Discontinue arterial line. Jones catheter will continue for now.  Continue pacer wires. Continue prevena wound vac. Place PICC line.   Plan: Remain ICU.           11/03/2019  The patient is post-operative day 3, status-post coronary artery bypass grafting x 3.  Overall the patient is making progress.  Neuro:  The patient is awake alert and oriented x3.  Neuro exam is nonfocal.  Pain is well controlled. Continue tramadol.   Cardiac:  Patient is hemodynamically stable. Increase metoprolol dose. Start amlodipine.   Respiratory: Good sats on nasal cannula. Continue respiratory toilet, continue incentive spirometer. Wean to room air.  GI:  Advance diabetic/cardiac diet as tolerated. Benign abdominal exam. Passing gas, no BM. Add 1 bottle of mag citrate.   Renal:  Good urine output. Cr 0.9. K 3.8. Mag 2.0. Replace K. Replace Mag. Change to PO diuresis.   Heme:  Hct 34.7  Platelet 89. Restart aspirin. Continue to hold Plavix. HIT panel pending. Hematology following.  Id:  WBC 13.8. Tmax  99.4. Will continue to follow.    Endocrine:  Glucose controlled on sliding scale insulin. Increase long acting insulin detemir dose.   Activities: Advance activities as tolerated.   Lines tubes and drains:  Storm catheter will continue for now.  Continue pacer wires. Continue prevena wound vac. Continue PICC line.   Plan: Remain ICU.          11/04/2019  The patient is post-operative day 4, status-post coronary artery bypass grafting x 3.  Overall the patient is making progress.  Neuro:  The patient is awake alert and oriented x3.  Neuro exam is nonfocal.  Pain is well controlled. Continue tramadol.   Cardiac:  Patient is hemodynamically stable. Continue metoprolol dose. Continue amlodipine dose.   Respiratory: Good sats on room air. Continue respiratory toilet, continue incentive spirometer.  GI:  Advance diabetic/cardiac diet as tolerated. Benign abdominal exam. Regular bowel movements.   Renal:  Good urine output. Cr 0.7. K 4.0. Mag 2.3. Replace K. Decrease PO diuresis.   Heme:  Hct 34.4  Platelet 140. Continue aspirin. Restart Plavix. HIT panel pending. Hematology following.  Id:  WBC down to 13.13. Tmax 98. Will continue to follow.    Endocrine:  Glucose controlled on sliding scale insulin. Increased long acting insulin detemir dose.   Activities: Advance activities as tolerated.   Lines tubes and drains:  Discontinued storm catheter. Continue pacer wires. Continue prevena wound vac. Continue PICC line.   Plan: Transfer to telemetry. Anticipate discharge to SNF in 48 hours.         11/05/2019  The patient is post-operative day 5, status-post coronary artery bypass grafting x 3.  Overall the patient is making progress.  Neuro:  The patient is awake alert and oriented x3.  Neuro exam is nonfocal. Patient slow to answer questions, however answers are appropriate.  Pain is well controlled. Continue tramadol PRN.   Cardiac:  Patient is hemodynamically stable. Continue metoprolol dose. Discontinue amlodipine. Start  losartan.    Respiratory: Good sats on room air. Continue respiratory toilet, continue incentive spirometer.  GI:  Advance diabetic/cardiac diet as tolerated. Benign abdominal exam. Regular bowel movements.   Renal:  Good urine output. Cr 0.7. K 4.8. Mag 2.1. Replace Mag. Discontinue PO diuresis.   Heme:  Hct 39.5  Platelet 73. Continue aspirin. Hold Plavix. HIT panel pending. Hematology following.  Id:  WBC is up to 16. Tmax 99. Pan-culture. DC PICC line. Chest xray. Start broad spectrum empiric antibiotics. Will continue to follow.    Endocrine:  Glucose controlled on sliding scale insulin. Decrease long acting insulin detemir dose.   Activities: Advance activities as tolerated.   Lines tubes and drains:  Discontinue pacer wires. Discontinue prevena wound vac. Continue PICC line.   Plan: Anticipate discharge to SNF in 24-48 hours.         11/06/2019  The patient is post-operative day 6, status-post coronary artery bypass grafting x 3. Patient had an episode of decompensation and unresponsiveness yesterday evening. The patient was found unresponsive, cool, and diaphoretic, with agonal breathing. Patient was intubated and transferred to the ICU. Bedside EKG and echocardiogram performed at the bedside showed no significant changes. CT head, chest, abdomen pelvis performed, showed no significant findings.     Neuro:  The patient is intubated and sedated. On Precedex gtt. Begin weaning off sedation.    Cardiac:  Patient is hemodynamically improving. Inotropic agent dependent. Currently on Epinephrine gtt. Start Milrinone gtt. Discontinue metoprolol. Discontinue losartan.    Respiratory: Ventilator dependent. tV 450, RR 20, PEEP 5. Wean down sedation. Wean off ventilator today.    GI:  Begin enteral tube feedings. Benign abdominal exam. Previously having regular bowel movements.   Renal: Adequate urine output. Cr 1.1. K 3.6. Mag 1.7. Replace Mag. Replace K. Discontinue diuresis.   Heme:  Hct 31.9  Platelet 89. Hold  aspirin. Hold Plavix. HIT panel negative. Patient received 1 units PRBC last night. Hematology following.  Id:  WBC is down to 15.5. Tmax 99.9. BC NGTD. Urinalysis negative. Sputum culture negative. Vancomycin and zosyn day 2. Will continue to follow closely.    Endocrine:  Glucose controlled on insulin gtt, continue at this time.   Activities: Intubated and sedated.   Musculoskeletal: Unable to find right radial pulse via doppler. Tips of the fingers of the right hand are mottled, dusky, appear cyanotic. Recent right radial arterial line in place, which has been relocated. US of arteries and veins of the right arm ordered. Consult to vascular surgery placed.    Lines tubes and drains:  Continue swan and cordis. Continue arterial line. Continue storm.    Plan: Remain ICU         11/07/2019  The patient is post-operative day 7, status-post coronary artery bypass grafting x 3. Patient had an episode of decompensation and unresponsiveness the evening of 11/05/2019. The patient was found unresponsive, cool, and diaphoretic, with agonal breathing. Patient was intubated and transferred to the ICU. Bedside EKG and echocardiogram performed at the bedside showed no significant changes. CT head, chest, abdomen pelvis performed, showed no significant findings.     Neuro:  The patient is intubated and sedated. On Precedex gtt. Wean off sedation.    Cardiac:  Patient is hemodynamically improving. Excellent cardiac index. Wean off Inotropic gtts.   Respiratory: Wean towards extubation. tV 400, RR 18, PEEP 5.   GI:  Continue enteral tube feedings while intubated. Benign abdominal exam.  Renal: Adequate urine output. Cr 1.1. K 4.1. Mag 2.8. Start IV diuresis.   Heme:  Hct 24.6  Platelet 60. Hold aspirin. Hold Plavix. HIT panel negative.  Id:  WBC is down to 14.46. Tmax 100.8. BC NGTD. Urinalysis negative. Sputum culture negative. Vancomycin and zosyn day 3. Midline sternal incision appears reddened, with induration at skin edges.  Will continue to follow closely.    Endocrine:  Glucose controlled on sliding scale insulin.   Activities: Intubated and sedated. Moves all 4. Full ROM.    Musculoskeletal: Able to find right radial pulse via doppler. The right hand extending to the tips of the fingers of the right hand are mottled, dusky, appear cyanotic. Recent right radial arterial line placement, which has been relocated. US of arteries and veins of the right arm pending. Vascular surgery following, suggested possible thrombotic etiology, suggested heparin gtt, however this is contraindicated r/t s/p cabg x 3.     Lines tubes and drains:  Continue swan and cordis. Continue arterial line. Continue storm.    Plan: Remain ICU         11/08/2019  The patient is post-operative day 8, status-post coronary artery bypass grafting x 3. Patient had an episode of decompensation and unresponsiveness the evening of 11/05/2019. The patient was found unresponsive, cool, and diaphoretic, with agonal breathing. Patient was intubated and transferred to the ICU. Bedside EKG and echocardiogram performed at the bedside showed no significant changes. CT head, chest, abdomen pelvis performed, showed no significant findings. Suspect cardiogenic shock.      Neuro:  Patients neurologic state is improving. Alert, follows commands, makes eye contact, disoriented x 4. No focal deficits.    Cardiac:  Patient is hemodynamically improving. Off all inotropic gtts. Start low dose metoprolol and amlodipine.   Respiratory: Good sats on low flow nasal cannula. Continue IS. Continue pulmonary toilet. Wean to room air.    GI:  Continue enteral tube feedings at this time r/t disorientation and aspiration risk. Discontinue laxatives. Start metamucil BID, patient has had multiple loose stools.   Renal: Good urine output. Cr 0.8. K 3.6. Mag 2.1. Replace K. Replace Mag. Continue IV diuresis.   Hema:  Hct 26.4  Platelet 45. Hold aspirin. Hold Plavix. HIT panel will be resent as the patient  is a high suspicion for HIT despite previous negative HIT panel. Multiple DVTs from the right IJ down to the brachiocephalic vein. Confirmed via vascular US. Patient started on argatroban yesterday, goal is APTT 2x baseline. Most recent APTT 97.8. Baseline APTT 50.8.  Hematology following. Vascular surgery following.   Id:  WBC is down to 12. Tmax 100.4. BC NGTD. Urinalysis negative. Sputum culture negative. Vancomycin and zosyn day 4. Midline sternal incision appears improved, with minimal redness and induration at skin edges. Will continue to follow closely.    Endocrine:  Glucose controlled on sliding scale insulin.   Activities: Increase activities as tolerated.  Musculoskeletal: ROM 2/5 to the right wrist/hand/fingers, distal from the elbow there is 2+ non pitting edema, the extremity is tender and warm. There is mottling, a dusky appearance, and purple discoloration to the fingertips of the right hand. See above hematology plan.      Lines tubes and drains:  Continue PICC line. Continue storm catheter. Continue NG tube.   Plan: Remain ICU     11/09/2019  The patient is postop day 9.  Status post coronary artery bypass grafting x3.  Postop course was complicated by an episode of hemodynamic instability and decompensation on postop day number 5.  Patient continues to make clinical progress since then.  She is off all drips.  She remains extubated.  Continue ICU care for now.    Neuro:  Patient is awake alert.  She is oriented x2.  She is still confused.  But she is making progress.  Her neuro exam is nonfocal.  She follows all commands.  Cardiac:  Patient continues to be hemodynamically stable.  She is somewhat hypertensive.  Will increase her antihypertensives.  Respiratory:  Patient is good sats on nasal cannula.  She remains extubated.  GI:  Patient was tolerating tube feeds until this morning.  She pulled heart feeding tube. Will get a swallowing evaluation.  Mode of nutrition will depend on results of  swallowing evaluation.  Renal:  Patient has good urine output %period% creatinine is 0.9.  Heme:  Hematocrit is 26 platelet is 46.  Patient is on argatroban  for anticoagulation due to right upper extremity DVTs.  Hypercoagulability workup is in progress.  Appreciate Hematology and vascular surgery input.  Id:  Incision continues to improve.  white count is 17.  Continue broad-spectrum antibiotics.  Endocrine:  Glucose is controlled on its sliding scale insulin.  Activities:  Advance activities as tolerated.  Line tubes and drains.  Patient has a PICC line, Jones catheter.    11/10/2019  The patient is postop day 10.  Status post coronary bypass grafting x3.  Postop course was complicated by an episode of hemodynamic instability and decompensation on postop day 5.  Patient remains hemodynamically stable.    Neuro:  The patient is awake and alert.  She is oriented x1.  She continues to be confused.  Her neuro exam is nonfocal.  Cardiac:  Patient continues to be hemodynamically stable.  His she is hypertensive.  Hypertensive medications will be increased.  Respiratory:  Patient has good sats on nasal cannula.  Continue pulmonary toilet.  GI:  Patient is tolerating a regular diet.  Renal:  Patient has good urine output. Creatinine is 0.7.  Heme: hematocrit is 24 and platelet is 67.  Patient continues on argatroban for right upper extremity DVTs.  Hypercoagulable workup is in progress.  Id:  Patient's white count is up to 20.  Patient is on broad prepped from antibiotics.  Endocrine:  The patient is on long-acting as well as sliding scale insulin.  Blood sugars have been well controlled.  Will decrease dose of long-acting.  Activities: patient has been out of bed to chair.  Advance as tolerated.  Musculoskeletal:  Patient's right upper extremity continues to be swollen  The index finger and thumb continues to be dusky with a bleb near the base of the thumb.  Continue argatroban for anticoagulation.  Continue elevation  of right upper extremity.  Lines tubes and drains: patient has a PICC line.  Will discontinue Jones catheter.        11/11/2019  The patient is post-operative day 11, status-post coronary artery bypass grafting x 3. Post-operative course was complicated by an episode of hemodynamic instability and decompensation on postop day 5.  Patient remains hemodynamically stable.    Neuro:  Patients neurologic state is improving. Oriented x 2. Alert, follows commands, makes eye contact. No focal deficits.    Cardiac:  Patient is hemodynamically improving. Hypertensive. Continue metoprolol, amlodipine, and losartan. Add chlorthalidone.   Respiratory: Good sats on room air. Continue IS. Continue pulmonary toilet.  GI:  Continue cardiac/diabetic diet, modified by puree and thick liquids with swallow precautions. Advance as tolerated. Continue metamucil.  Renal: Good urine output. Cr 0.8. K 3.8. Replace K. Continue IV diuresis. Add chlorthalidone. BMP at noon.   Hema:  Hct 23.8  Platelet 75. Hold aspirin. Hold Plavix. Repeat HIT panel pending. Multiple DVTs from the right IJ down to the brachiocephalic vein. Confirmed via vascular US. Patient on argatroban gtt, goal is APTT 2x baseline. Most recent APTT 83.3. Baseline APTT 50.8.  Hematology following. Vascular surgery following.   Id:  WBC is down to 19. Tmax 98.4. BC NGTD. Vancomycin and zosyn day 7. Discontinue broad spectrum antibiotics. Will continue to follow closely.    Endocrine:  Glucose controlled on sliding scale insulin and long acting insulin detemir.    Activities: Increase activities as tolerated.  Musculoskeletal: The right upper extremity exhibits swelling distal from the elbow. The RUE is tender to palpation and ROM. ROM is diminished to the right hand 2/5. The hand his reddened, swollen, with dusky, mottled, purple discoloration to the right thumb and tip of the right index finger. Blisters have formed on the proximal portion of the thumb and wrist. Severely  diminished capillary refill to the tip of the right thumb and tip of the right index finger. Wound care order placed.      Lines tubes and drains:  Continue PICC line.  Plan: Remain ICU         11/12/2019  The patient is post-operative day 12, status-post coronary artery bypass grafting x 3. Post-operative course was complicated by an episode of hemodynamic instability and decompensation on postop day 5.  Patient remains hemodynamically stable.    Neuro:  Patients neurologic state is improving. Oriented to person, place, situation. Disoriented to time. Alert, follows commands, makes eye contact. No focal deficits.    Cardiac:  Patient is hemodynamically improving. Continue metoprolol, amlodipine, losartan, and chlorthalidone.   Respiratory: Good sats on room air. Continue IS.  GI:  Continue cardiac/diabetic diet, modified by puree and thick liquids with swallow precautions. Advance as tolerated. Continue metamucil.  Renal: Good urine output. Cr 0.8. K 3.9. Replace K. Discontinue lasix. Continue chlorthalidone.  Hema:  Hct 23.1  Platelet 75. Hold aspirin. Hold Plavix. 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 Multiple DVTs from the right IJ down to the brachiocephalic vein. Confirmed via vascular US. Patient on argatroban gtt, goal is APTT 2x baseline. Most recent APTT 84.6. Baseline APTT 50.8.  Hematology following. Vascular surgery following.   Id:  WBC is down to 17.4. A-febrile. Will continue to follow closely.    Endocrine:  Patient had an episode of hypoglycemia last night. Will continue sliding scale insulin. Discontinue long acting insulin. Begin strict calorie count.   Activities: Increase activities as tolerated.  Musculoskeletal: The right upper extremity exhibits swelling distal from the elbow. The RUE is tender to palpation and ROM. ROM is diminished to the right hand 3/5. The hand is reddened, swollen, with dusky, mottled, purple discoloration to the right thumb tip and tip of the right index finger.   Severely diminished capillary refill to the tip of the right thumb and tip of the right index finger. Wound care following. Gauze dressing in place.       Lines tubes and drains:  Continue PICC line.  Plan: Remain ICU. Begin discharge planning for IP rehab.          11/13/2019  The patient is post-operative day 13, status-post coronary artery bypass grafting x 3. Post-operative course was complicated by an episode of hemodynamic instability and decompensation on postop day 5.  Patient remains hemodynamically stable.    Neuro:  Patient is oriented to person, place, situation. Disoriented to time. Alert, follows commands, makes eye contact. No focal deficits.    Cardiac:  Patient is hemodynamically stable. Continue metoprolol, amlodipine, losartan, and chlorthalidone.   Respiratory: Good sats on room air. Continue IS. Use of accessory muscles noted during inspiration. Will increase diuresis.   GI:  Continue cardiac/diabetic diet, modified by puree and thick liquids with swallow precautions. Advance as tolerated. Continue metamucil.  Renal: Good urine output. Cr 0.8. K 4.0. Mag 1.8. Replace Mag. Start lasix IV. Continue chlorthalidone.  Hema:  Hct 23.  Platelet 79. Hold aspirin. Hold Plavix. 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 Multiple DVTs from the right IJ down to the brachiocephalic vein. Confirmed via vascular US. Patient on argatroban gtt, goal is APTT 2x baseline. Most recent APTT 75.6. Baseline APTT 50.8.  Hematology following. Vascular surgery following.   Id:  WBC is down to 12.2. A-febrile. Will continue to follow closely.    Endocrine:  Glucose controlled. Continue sliding scale insulin. Start long acting insulin detemir 5 units daily.     Activities: Increase activities as tolerated.  Musculoskeletal: The right upper extremity exhibits swelling distal from the elbow. The RUE is tender to palpation and ROM. ROM is diminished to the right hand 2/5. The right hand is improving. The dusky, mottled, purple  discoloration is localized to the right thumb tip and tip of the right index finger.  Severely diminished capillary refill to the tip of the right thumb and tip of the right index finger. Wound care following. Gauze dressing in place.    Lines tubes and drains:  Continue PICC line.  Plan: Remain telemetry. Continue discharge planning for IP rehab.          11/14/2019  The patient is post-operative day 14, status-post coronary artery bypass grafting x 3. Post-operative course was complicated by an episode of hemodynamic instability and decompensation on postop day 5.  Patient remains hemodynamically stable.    Neuro:  Patient is oriented to person, place, situation. Disoriented to time. Alert, follows commands, makes eye contact. No focal deficits.    Cardiac:  Patient is hemodynamically stable. Continue metoprolol, amlodipine, losartan, and chlorthalidone.   Respiratory: Good sats on room air. Continue IS. Decreased use of accessory muscles noted during inspiration.   GI:  Continue cardiac/diabetic diet, modified by puree and thick liquids with swallow precautions. Advance as tolerated. Continue metamucil.  Renal: Good urine output. Cr 0.9. K 4.2. Mag 1.9. Replace Mag. Discontinue lasix. Continue chlorthalidone.  Hema:  Hct 21.9.  Platelet 87. Hold aspirin. Hold Plavix. 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 Multiple DVTs from the right IJ down to the brachiocephalic vein. Confirmed via vascular US. As per discussion with hematology and pharmacy, will discontinue argatroban and start apixaban. Transfuse 1 unit PRBC.  Hematology following. Vascular surgery following.   Id:  WBC is down to 11. A-febrile. Will continue to follow closely.    Endocrine:  Continue sliding scale insulin. Increase insulin detemir to 5 units BID.     Activities: Increase activities as tolerated.  Musculoskeletal: The right upper extremity exhibits swelling distal from the elbow. The RUE is tender to palpation and ROM. ROM is diminished to  the right hand 2/5. The right hand is slowly improving. The dusky, mottled, purple discoloration is localized to the right thumb tip and tip of the right index finger.  Severely diminished capillary refill to the tip of the right thumb and tip of the right index finger. Wound care following. Gauze dressing in place.    Lines tubes and drains:  Continue PICC line.  Plan: Remain telemetry. Continue discharge planning for IP rehab.          11/15/2019  The patient is post-operative day 15, status-post coronary artery bypass grafting x 3. Post-operative course was complicated by an episode of hemodynamic instability and decompensation on postop day 5.  Patient remains hemodynamically stable.    Neuro:  Patient is oriented to person, place, situation. Disoriented to time. Alert, follows commands, makes eye contact. No focal deficits.    Cardiac:  Patient is hemodynamically stable. Continue metoprolol, amlodipine, losartan, and chlorthalidone.   Respiratory: Good sats on low flow nasal cannula. Wean to room air. Continue IS.   GI:  Continue cardiac/diabetic diet, modified by puree and thick liquids with swallow precautions. Advance as tolerated. Continue metamucil.  Renal: Good urine output. Cr 0.8. K 3.6. Replace K. Continue chlorthalidone.  Hema:  Hct 26.1. Platelet 99. Start Aspirin. Continue Apixaban 5mg BID.  Hematology following. Vascular surgery following.   Id:  WBC 11. A-febrile. Will continue to follow closely.    Endocrine:  Continue sliding scale insulin. Increase insulin detemir to 5 units AM dose, 10 units PM dose.   Activities: Increase activities as tolerated.  Musculoskeletal: The right upper extremity exhibits swelling distal from the elbow. The RUE is tender to palpation and ROM. ROM is diminished to the right hand 2/5. The right hand is slowly improving. The dusky, mottled, purple discoloration is localized to the right thumb tip and tip of the right index finger.  Severely diminished capillary refill  to the tip of the right thumb and tip of the right index finger. Wound care following. Gauze dressing in place.    Lines tubes and drains:  Continue PICC line.  Plan: Remain telemetry. Begin discharge planning to LTAC, anticipate discharge in 48-72 hours.        11/16/2019  The patient is postop day 16.  Status post coronary artery bypass grafting x3.  Patient's postop course was complicated by an episode of hemodynamically instability and decompensation on postop day 5.  Since then the patient has made much clinical progress.  The patient remains hemodynamically stable.  Anticipate discharge to rehab or LTAC in the next 48-72 hours.    Neuro:  The patient is oriented x3.  She is much less confused.  Her responses to questioning is appropriate.  She moves all 4 she has no focal deficits.  Cardiac:  Patient remains hemodynamically stable.  Systolic blood pressure trending higher.  Will increase losartan.  Continue amlodipine metoprolol and chlorthalidone.  Respiratory:  The patient has good sats on room air.  Continue pulmonary toilet.  GI:  Patient is tolerating a regular the diet.  Renal:  Patient has good urine output.  creatinine is 0.7.  Heme:  Hematocrit is 30.  Platelet count is up to 129.  Continue aspirin and apixaban.  Id:  White count is 10.  The patient is afebrile.  Endocrine:  Glucose is better controlled on long-acting insulin.  Activities patient is out of bed to chair continue increasing activities.  Line tubes and drains:  Patient has a PICC line continue PICC line.  Musculoskeletal:  Perfusion to the patient's right arm is much improved.  Swelling is somewhat decreased.  The area of dusky tissue remained isolated to the tip of the patient's right thumb and index finger.    11/17/2019  The patient is postop day 17.  Status post coronary artery bypass grafting x3.  The patient's postop course was complicated by an episode of hemodynamic instability ankle decompensation on postop day 5.  The patient  is making clinical progress.  She has remained hemodynamically stable.  Anticipate discharge to an LTAC or rehab center in the next 48-72 hours.    Neuro:  The patient is alert and oriented x3.  Her confusion is much improved.  Her responses to all questions appropriately.  She moves all 4 and has no focal deficits.  Cardiac:  The patient remains hemodynamically stable.  She is tolerating losartan amlodipine metoprolol and chlorthalidone.  Respiratory:  The patient has good sats on room air.  Continue pulmonary toilet.  GI:  The patient is tolerating a regular diet.  Renal:  The patient has good urine output and had creatinine 0.7.  Heme:  The patient's hematocrit is 27 and her platelet count is increased to 155.  Continue aspirin and apixaban.  Id:  Patient is afebrile and her white count is 8.  Endocrine high glucose is better controlled on long-acting insulin plus sliding scale.  Activities:  The patient is out of bed to chair.  Continue advancing activities.  Line tubes and drains:  The patient has a PICC line.  Musculoskeletal:  Perfusion to the patient's right and continues to be improved.  However high right am is still swollen.  The area of dusky tissue remained stable.        11/18/2019  The patient is post-operative day 18, status-post coronary artery bypass grafting x 3. Post-operative course was complicated by an episode of hemodynamic instability and decompensation on postop day 5.  Patient remains hemodynamically stable. Patient is ready for discharge to an LTAC facility pending acceptance.     Neuro:  Patient is AAOX3, follows commands, makes eye contact. No focal deficits.    Cardiac:  Patient is hemodynamically stable. Continue metoprolol, amlodipine, losartan, and chlorthalidone.   Respiratory: Good sats on room air Continue IS.   GI:  Continue cardiac/diabetic diet.  Renal: Good urine output. Cr 0.8. K 4.0. Mag 2.0. Replace Mag. Continue chlorthalidone.  Hema:  Hct 27.9. Platelet 185. Continue  Aspirin. Continue Apixaban 5mg BID.   Id:  WBC 10.5. A-febrile. Will continue to follow closely.    Endocrine: Glucose well controlled.  Continue sliding scale insulin. Continue insulin detemir to 5 units AM dose, 10 units PM dose.   Activities: Increase activities as tolerated.  Musculoskeletal: The right upper extremity exhibits swelling distal from the elbow. The RUE continues to improve, coloration of the right hand is improving. Tender to palpation and ROM. ROM is improved but diminished to the right hand 3/5. The dusky, mottled, purple discoloration is localized to the right thumb tip and tip of the right index finger.  Severely diminished capillary refill to the tip of the right thumb and tip of the right index finger. Wound care following. Gauze dressing in place.    Lines tubes and drains:  Continue PICC line.  Plan: Remain telemetry. Patient is ready for discharge to an LTAC facility pending acceptance.          11/19/2019  The patient is post-operative day 19, status-post coronary artery bypass grafting x 3. Post-operative course was complicated by an episode of hemodynamic instability and decompensation on postop day 5.  Patient remains hemodynamically stable.     Today, the patient slid out of her bed and onto her buttocks early this morning, no evidence of trauma or injury. Denies head trauma. Patient will have a bedside sitter. Patient has tenderness, erythema, warmth to the left foot with dusky discoloration of the tips of the toes of the left foot. Patient will have an ultrasound and be assessed by vascular surgery today. Patient has [facility] facility acceptance, discharge will likely be postponed today.     Neuro:  Patient is AAOX3, follows commands, makes eye contact. No focal deficits.    Cardiac:  Patient is hemodynamically stable. Continue metoprolol, amlodipine, losartan, and chlorthalidone.   Respiratory: Good sats on room air Continue IS.   GI:  Continue cardiac/diabetic diet.  Renal:  Good urine output. Cr 0.7. K 3.9. Mag 2.3. Replace K. Continue chlorthalidone.  Hema:  Hct 26. Platelet 205. Continue Aspirin. Continue Apixaban 5mg BID.   Id:  WBC 10. A-febrile. Will continue to follow closely.    Endocrine: Glucose well controlled.  Continue sliding scale insulin. Continue insulin detemir 5 units AM dose, 10 units PM dose.   Activities: Increase activities as tolerated.  Musculoskeletal: The right upper extremity exhibits swelling distal from the elbow. The RUE continues to improve, coloration of the right hand is improving. Tender to palpation and ROM. ROM continues to be diminished to the right hand 3/5. The dusky, mottled, purple discoloration is localized to the right thumb tip and tip of the right index finger.  Severely diminished capillary refill to the tip of the right thumb and tip of the right index finger. Wound care following. Gauze dressing in place.    Lines tubes and drains:  Continue PICC line.  Plan: Remain telemetry. Patient will have an ultrasound and be assessed by vascular surgery today. Patient has [facility] facility acceptance, discharge will likely be postponed today.          11/20/2019  The patient is post-operative day 20, status-post coronary artery bypass grafting x 3. Post-operative course was complicated by an episode of hemodynamic instability and decompensation on postop day 5.  Patient remains hemodynamically stable.     Anticipate discharge to [facility] today.     Neuro:  Patient is AAOX2, disoriented to time. Follows commands, makes eye contact. No focal deficits.    Cardiac:  Patient is hemodynamically stable. Continue metoprolol, amlodipine, losartan, and chlorthalidone.   Respiratory: Good sats on room air Continue IS.   GI:  Continue cardiac/diabetic diet.  Renal: Good urine output. Cr 0.8. K 3.9. Mag 1.9. Replace K. Replace Mag. Continue chlorthalidone.  Hema:  Hct 25.6. Platelet 266. Continue Aspirin. Continue Apixaban 5mg BID.   Id:  WBC 10.  A-febrile. Will continue to follow closely.    Endocrine: Glucose controlled. Start 4 units of insulin aspart daily at lunch. Continue insulin detemir 5 units AM dose, 10 units PM dose.   Activities: Increase activities as tolerated.  Musculoskeletal: The RUE continues to improve, coloration of the right hand is improving. Tender to palpation and ROM. ROM improving but diminished to the right hand 4/5. The dusky, mottled, purple discoloration is localized to the right thumb tip and tip of the right index finger.  Severely diminished capillary refill to the tip of the right thumb and tip of the right index finger. Continue daily dressing changes after discharge.    Lines tubes and drains:  Discontinue PICC line prior to discharge.  Plan: Anticipate discharge to [facility] today.

## 2019-11-20 NOTE — HPI
Ms. Bates is a 69 year old female patient whose current medical conditions include HTN, hyperlipidemia, obesity, DM type II, cerebral microvascular disease, FAHAD, and B12 deficiency who was sent to [facility] ED today from her PCP's office due to chest pain and abnormal EKG. Patient complained of substernal chest tightness/heaviness that radiated to her back on Saturday while at rest. Associated symptoms included orthopnea and SOB. Patient denied any associated nausea, vomiting, diaphoresis, palpitations, near syncope, or syncope. Initial workup in ED revealed troponin of 3.665 and BNP of 1305 and patient subsequently admitted for further evaluation and treatment of NSTEMI. Cardiology consulted to assist with management. Patient seen and examined today while in ED. Feels well at time of exam, denies chest pain. She does report similar symptoms over the past few months but states they never lasted as long and were less intense. She reports compliance with her medications. States her father had history of CABG. Chart reviewed. Repeat troponin pending. EKG reviewed and shows SR with new LBBB. 2D echo pending    We were asked to see Mrs. Bates today because of bilateral to lose discoloration with left worse than right. She also has right hand finger distal discoloration as well. Unfortunately she is unable to give accurate history and physical because she recently had emergent cardiovascular bypass surgery because of cardiac failure.

## 2019-11-20 NOTE — PROGRESS NOTES
[facility] -   Hematology/Oncology  Progress Note    Patient Name: Mateusz Bates  Admission Date: 10/28/2019  Hospital Length of Stay: 23 days  Code Status: Full Code     Subjective:     HPI:  Mrs. Bates is a 69-year-old female with hypertension, diabetes, hyperlipidemia, obesity, anxiety and depression, iron deficiency anemia, vitamin B12 deficiency, who presented to the emergency room with an NSTEMI.  Workup included a cardiac catheterization that shows severe multivessel coronary artery disease with proximal LAD stenosis of 70% and severely depressed ejection fraction of 20%.  The patient is a candidate for urgent coronary artery bypass grafting. S/p CABG today.  Hematology is consulted for thrombocytopenia.    Interval History: Distal extremities evaluated by Vascular Surgery, see consult note. Plan is for discharge to [facility] today.     Oncology Treatment Plan:   [No treatment plan]    Medications:  Continuous Infusions:  Scheduled Meds:   amLODIPine  10 mg Oral Daily    apixaban  5 mg Oral BID    aspirin  81 mg Oral Daily    atorvastatin  80 mg Oral QHS    chlorthalidone  25 mg Oral Daily    ferrous sulfate  325 mg Oral BID    insulin detemir U-100  10 Units Subcutaneous QHS    insulin detemir U-100  5 Units Subcutaneous Daily    losartan  100 mg Oral Daily    magnesium sulfate IVPB  2 g Intravenous Once    metoprolol tartrate  50 mg Oral BID    nitroGLYCERIN 2% TD oint  0.5 inch Topical (Top) Q12H    pantoprazole  40 mg Oral Daily     PRN Meds:albumin human 5%, albuterol sulfate, Dextrose 10% Bolus, Dextrose 10% Bolus, insulin aspart U-100, lactated ringers, magnesium sulfate IVPB, magnesium sulfate IVPB, metoclopramide HCl, ondansetron, potassium chloride in water **AND** potassium chloride in water **AND** potassium chloride in water, traMADol     Review of Systems   Constitutional: Positive for activity change, appetite change and fatigue. Negative for chills, diaphoresis, fever  and unexpected weight change.   HENT: Negative for congestion, hearing loss, nosebleeds, postnasal drip and trouble swallowing.    Eyes: Negative for discharge and visual disturbance.   Respiratory: Negative for cough, chest tightness and shortness of breath.    Cardiovascular: Positive for leg swelling. Negative for chest pain and palpitations.   Gastrointestinal: Positive for abdominal distention. Negative for blood in stool, constipation, diarrhea, nausea and vomiting.   Endocrine: Negative for cold intolerance and heat intolerance.   Genitourinary: Negative for difficulty urinating, dyspareunia, flank pain and hematuria.   Musculoskeletal: Positive for arthralgias, back pain, gait problem and myalgias.   Skin: Negative.    Neurological: Negative for dizziness, weakness, light-headedness and headaches.   Hematological: Negative for adenopathy. Does not bruise/bleed easily.   Psychiatric/Behavioral: Positive for confusion. Negative for agitation and behavioral problems. The patient is nervous/anxious.      Objective:     Vital Signs (Most Recent):  Temp: 98.8 °F (37.1 °C) (11/20/19 0726)  Pulse: 95 (11/20/19 0900)  Resp: 18 (11/20/19 0726)  BP: 110/61 (11/20/19 0726)  SpO2: 96 % (11/20/19 0726) Vital Signs (24h Range):  Temp:  [97.3 °F (36.3 °C)-99.1 °F (37.3 °C)] 98.8 °F (37.1 °C)  Pulse:  [] 95  Resp:  [17-20] 18  SpO2:  [92 %-98 %] 96 %  BP: (105-161)/(53-74) 110/61     Weight: 76.1 kg (167 lb 12.3 oz)  Body mass index is 31.7 kg/m².  Body surface area is 1.81 meters squared.      Intake/Output Summary (Last 24 hours) at 11/20/2019 1000  Last data filed at 11/20/2019 0454  Gross per 24 hour   Intake 1060 ml   Output --   Net 1060 ml       Physical Exam   Constitutional: She is oriented to person, place, and time. She appears well-developed and well-nourished. No distress.   HENT:   Head: Normocephalic and atraumatic.   Right Ear: Hearing and external ear normal.   Left Ear: Hearing and external ear  normal.   Nose: No rhinorrhea or sinus tenderness. Right sinus exhibits no maxillary sinus tenderness and no frontal sinus tenderness. Left sinus exhibits no maxillary sinus tenderness and no frontal sinus tenderness.   Mouth/Throat: Uvula is midline, oropharynx is clear and moist and mucous membranes are normal. No oral lesions.   Eyes: Pupils are equal, round, and reactive to light. Conjunctivae are normal. Right eye exhibits no discharge. Left eye exhibits no discharge.   Neck: Normal range of motion. Carotid bruit is not present. No tracheal deviation present. No thyromegaly present.   Cardiovascular: Normal rate, regular rhythm, S1 normal, S2 normal, normal heart sounds and intact distal pulses.   No murmur heard.  Pulses:       Dorsalis pedis pulses are 1+ on the right side, and 1+ on the left side.   Pulmonary/Chest: Effort normal and breath sounds normal. No respiratory distress.   Abdominal: Soft. Bowel sounds are normal. She exhibits distension. She exhibits no mass. There is no tenderness.   Musculoskeletal: Normal range of motion. She exhibits edema.   Feet:   Right Foot:   Skin Integrity: Positive for erythema.   Left Foot:   Skin Integrity: Positive for erythema.   Lymphadenopathy:     She has no cervical adenopathy.        Right: No supraclavicular adenopathy present.        Left: No supraclavicular adenopathy present.   Neurological: She is alert and oriented to person, place, and time. She has normal strength. No sensory deficit. Coordination and gait normal.   Skin: Skin is warm and dry. Capillary refill takes less than 2 seconds. No rash noted. There is pallor.        Psychiatric: Her speech is normal and behavior is normal. Judgment and thought content normal. Her mood appears anxious. Cognition and memory are normal. She does not exhibit a depressed mood.   Nursing note and vitals reviewed.      Significant Labs:   CBC:   Recent Labs   Lab 11/19/19  0353 11/20/19  0354   WBC 10.09 7.46   HGB  8.1* 7.8*   HCT 26.0* 25.6*    266    and CMP:   Recent Labs   Lab 11/19/19  0353 11/19/19  2334 11/20/19  0354   * 129* 133*   K 3.9 4.3 3.9    97 98   CO2 28 25 26   GLU 78 260* 157*   BUN 22 27* 24*   CREATININE 0.7 0.9 0.8   CALCIUM 8.6* 8.6* 8.7   ANIONGAP 5* 7* 9   EGFRNONAA >60 >60 >60       Diagnostic Results:  I have reviewed all pertinent imaging results/findings within the past 24 hours.    Assessment/Plan:     * Acute deep vein thrombosis (DVT) of right upper extremity    --D/c Argatroban   --Avoid heparin  --Eliquis 5 mg PO BID  --Daily CBC  --Continue PO anticoagulation for at least 3 months. She will need to f/u in the Heme-Onc clinic following discharge      HIT (heparin-induced thrombocytopenia)  On apixaban plan is for least 3 months of therapy and follow up in outpatient hematology clinic.    Edema of hand       Upper extremity vascular insufficiency:  --Improved apperance  --Vascular surgery following. No surgical intervention recommended. Recommend continue anticoagulation, arm elevation, mild/moderate compression    Thrombocytopenia    --Platelet count 266K  --D/c Argatroban due to unavailability   --please continue on Eliquis 5 mg PO BID.  Plan for anticoagulation for at least 3 months.  --Resolved    Iron deficiency anemia  Will follow-up after discharge        Thank you for your consult. I will follow-up with patient. Please contact us if you have any additional questions.     Janet Metcalf NP  Hematology/Oncology  [facility]

## 2019-11-20 NOTE — ASSESSMENT & PLAN NOTE
11/01/2019  The patient is post-operative day 1, status-post coronary artery bypass grafting x 3.  Overall the patient is making progress, however IABP and inotropic agents will continue today.   Neuro:  The patient is awake alert and oriented x3.  Neuro exam is nonfocal.  Pain is well controlled with IV pain medication.  Cardiac:  Patient is hemodynamically improving. Patient remains on epinephrine and milrinone gtts, which will be weaned over the next 24 hours.  Cardiac index is improving. IABP will remain in place over the next 24 hours.  Patient will be started on beta blockers once gtts are weaned off.  Respiratory: Good sats on high flow nasal cannula. Continue respiratory toilet, continue incentive spirometer. Wean to low flow nasal cannula.  GI:  Diet: sips of water with medications, will slowly advance diet as tolerated. Benign abdominal exam.  Renal:  Urine output is slowly improving. Cr 0.9. K 4.3. Mag 1.6. Replace mag. Continue IV diuresis. Add metolazone.   Heme:  Hct 34.1  Platelet 82. Continue aspirin. Discontinue Plavix for now.  Id:  WBC 12. Tmax 100.3. Continue post-operative antibiotics. Will continue to follow.   Endocrine:  Glucose is controlled with insulin gtt.  Continue insulin gtt for now, until weaned off inotropic agents.   Activities:  Patient is currently bed bound due to IABP.  Advance activities as tolerated once IABP is removed.   Lines tubes and drains:  Continue IABP. Continue [place] and Cordis.  Continue A-line.  Chest tubes will continue. NICOLASA x 2 will continue. Jones catheter will continue.  Continue pacer wires. Continue prevena wound vac.  Plan: Remain ICU.          11/02/2019  The patient is post-operative day 2, status-post coronary artery bypass grafting x 3.  Overall the patient is making progress, IABP removed and inotropic agents discontinued.   Neuro:  The patient is awake alert and oriented x3.  Neuro exam is nonfocal.  Pain is well controlled. Discontinue fentanyl.    Cardiac:  Patient is hemodynamically improving. Inotropic gtts discontinued. Excellent cardiac index. IABP discontinued.  Patient will be started on beta blockers today.  Respiratory: Good sats on high flow nasal cannula. Continue respiratory toilet, continue incentive spirometer. Wean to low flow nasal cannula.  GI:  Advance diabetic/cardiac diet as tolerated. Benign abdominal exam.  Renal:  Urine output is improving. Cr 0.9. K 4.9. Mag 2.2. Continue IV diuresis. Hold AM K.   Heme:  Hct 28.3  Platelet 47. Discontinue aspirin. Discontinue Plavix. HIT panel. Consult hematology r/t thrombocytopenia.   Id:  WBC 10.4. Tmax 99.4. Will continue to follow.   Endocrine:  Discontinue insulin gtt. Start sliding scale insulin. Start long acting insulin detemir.   Activities: Advance activities as tolerated.   Lines tubes and drains:  Discontinue IABP. Discontinue [place] and Cordis.  Discontinue A-line.  Discontinue chest tubes x 3. Discontinue NICOLASA x 2. Discontinue arterial line. Jones catheter will continue for now.  Continue pacer wires. Continue prevena wound vac. Place PICC line.   Plan: Remain ICU.           11/03/2019  The patient is post-operative day 3, status-post coronary artery bypass grafting x 3.  Overall the patient is making progress.  Neuro:  The patient is awake alert and oriented x3.  Neuro exam is nonfocal.  Pain is well controlled. Continue tramadol.   Cardiac:  Patient is hemodynamically stable. Increase metoprolol dose. Start amlodipine.   Respiratory: Good sats on nasal cannula. Continue respiratory toilet, continue incentive spirometer. Wean to room air.  GI:  Advance diabetic/cardiac diet as tolerated. Benign abdominal exam. Passing gas, no BM. Add 1 bottle of mag citrate.   Renal:  Good urine output. Cr 0.9. K 3.8. Mag 2.0. Replace K. Replace Mag. Change to PO diuresis.   Heme:  Hct 34.7  Platelet 89. Restart aspirin. Continue to hold Plavix. HIT panel pending. Hematology following.  Id:  WBC 13.8. Tmax  99.4. Will continue to follow.    Endocrine:  Glucose controlled on sliding scale insulin. Increase long acting insulin detemir dose.   Activities: Advance activities as tolerated.   Lines tubes and drains:  Storm catheter will continue for now.  Continue pacer wires. Continue prevena wound vac. Continue PICC line.   Plan: Remain ICU.          11/04/2019  The patient is post-operative day 4, status-post coronary artery bypass grafting x 3.  Overall the patient is making progress.  Neuro:  The patient is awake alert and oriented x3.  Neuro exam is nonfocal.  Pain is well controlled. Continue tramadol.   Cardiac:  Patient is hemodynamically stable. Continue metoprolol dose. Continue amlodipine dose.   Respiratory: Good sats on room air. Continue respiratory toilet, continue incentive spirometer.  GI:  Advance diabetic/cardiac diet as tolerated. Benign abdominal exam. Regular bowel movements.   Renal:  Good urine output. Cr 0.7. K 4.0. Mag 2.3. Replace K. Decrease PO diuresis.   Heme:  Hct 34.4  Platelet 140. Continue aspirin. Restart Plavix. HIT panel pending. Hematology following.  Id:  WBC down to 13.13. Tmax 98. Will continue to follow.    Endocrine:  Glucose controlled on sliding scale insulin. Increased long acting insulin detemir dose.   Activities: Advance activities as tolerated.   Lines tubes and drains:  Discontinued storm catheter. Continue pacer wires. Continue prevena wound vac. Continue PICC line.   Plan: Transfer to telemetry. Anticipate discharge to SNF in 48 hours.         11/05/2019  The patient is post-operative day 5, status-post coronary artery bypass grafting x 3.  Overall the patient is making progress.  Neuro:  The patient is awake alert and oriented x3.  Neuro exam is nonfocal. Patient slow to answer questions, however answers are appropriate.  Pain is well controlled. Continue tramadol PRN.   Cardiac:  Patient is hemodynamically stable. Continue metoprolol dose. Discontinue amlodipine. Start  losartan.    Respiratory: Good sats on room air. Continue respiratory toilet, continue incentive spirometer.  GI:  Advance diabetic/cardiac diet as tolerated. Benign abdominal exam. Regular bowel movements.   Renal:  Good urine output. Cr 0.7. K 4.8. Mag 2.1. Replace Mag. Discontinue PO diuresis.   Heme:  Hct 39.5  Platelet 73. Continue aspirin. Hold Plavix. HIT panel pending. Hematology following.  Id:  WBC is up to 16. Tmax 99. Pan-culture. DC PICC line. Chest xray. Start broad spectrum empiric antibiotics. Will continue to follow.    Endocrine:  Glucose controlled on sliding scale insulin. Decrease long acting insulin detemir dose.   Activities: Advance activities as tolerated.   Lines tubes and drains:  Discontinue pacer wires. Discontinue prevena wound vac. Continue PICC line.   Plan: Anticipate discharge to SNF in 24-48 hours.         11/06/2019  The patient is post-operative day 6, status-post coronary artery bypass grafting x 3. Patient had an episode of decompensation and unresponsiveness yesterday evening. The patient was found unresponsive, cool, and diaphoretic, with agonal breathing. Patient was intubated and transferred to the ICU. Bedside EKG and echocardiogram performed at the bedside showed no significant changes. CT head, chest, abdomen pelvis performed, showed no significant findings.     Neuro:  The patient is intubated and sedated. On Precedex gtt. Begin weaning off sedation.    Cardiac:  Patient is hemodynamically improving. Inotropic agent dependent. Currently on Epinephrine gtt. Start Milrinone gtt. Discontinue metoprolol. Discontinue losartan.    Respiratory: Ventilator dependent. tV 450, RR 20, PEEP 5. Wean down sedation. Wean off ventilator today.    GI:  Begin enteral tube feedings. Benign abdominal exam. Previously having regular bowel movements.   Renal: Adequate urine output. Cr 1.1. K 3.6. Mag 1.7. Replace Mag. Replace K. Discontinue diuresis.   Heme:  Hct 31.9  Platelet 89. Hold  aspirin. Hold Plavix. HIT panel negative. Patient received 1 units PRBC last night. Hematology following.  Id:  WBC is down to 15.5. Tmax 99.9. BC NGTD. Urinalysis negative. Sputum culture negative. Vancomycin and zosyn day 2. Will continue to follow closely.    Endocrine:  Glucose controlled on insulin gtt, continue at this time.   Activities: Intubated and sedated.   Musculoskeletal: Unable to find right radial pulse via doppler. Tips of the fingers of the right hand are mottled, dusky, appear cyanotic. Recent right radial arterial line in place, which has been relocated. US of arteries and veins of the right arm ordered. Consult to vascular surgery placed.    Lines tubes and drains:  Continue swan and cordis. Continue arterial line. Continue storm.    Plan: Remain ICU         11/07/2019  The patient is post-operative day 7, status-post coronary artery bypass grafting x 3. Patient had an episode of decompensation and unresponsiveness the evening of 11/05/2019. The patient was found unresponsive, cool, and diaphoretic, with agonal breathing. Patient was intubated and transferred to the ICU. Bedside EKG and echocardiogram performed at the bedside showed no significant changes. CT head, chest, abdomen pelvis performed, showed no significant findings.     Neuro:  The patient is intubated and sedated. On Precedex gtt. Wean off sedation.    Cardiac:  Patient is hemodynamically improving. Excellent cardiac index. Wean off Inotropic gtts.   Respiratory: Wean towards extubation. tV 400, RR 18, PEEP 5.   GI:  Continue enteral tube feedings while intubated. Benign abdominal exam.  Renal: Adequate urine output. Cr 1.1. K 4.1. Mag 2.8. Start IV diuresis.   Heme:  Hct 24.6  Platelet 60. Hold aspirin. Hold Plavix. HIT panel negative.  Id:  WBC is down to 14.46. Tmax 100.8. BC NGTD. Urinalysis negative. Sputum culture negative. Vancomycin and zosyn day 3. Midline sternal incision appears reddened, with induration at skin edges.  Will continue to follow closely.    Endocrine:  Glucose controlled on sliding scale insulin.   Activities: Intubated and sedated. Moves all 4. Full ROM.    Musculoskeletal: Able to find right radial pulse via doppler. The right hand extending to the tips of the fingers of the right hand are mottled, dusky, appear cyanotic. Recent right radial arterial line placement, which has been relocated. US of arteries and veins of the right arm pending. Vascular surgery following, suggested possible thrombotic etiology, suggested heparin gtt, however this is contraindicated r/t s/p cabg x 3.     Lines tubes and drains:  Continue swan and cordis. Continue arterial line. Continue storm.    Plan: Remain ICU         11/08/2019  The patient is post-operative day 8, status-post coronary artery bypass grafting x 3. Patient had an episode of decompensation and unresponsiveness the evening of 11/05/2019. The patient was found unresponsive, cool, and diaphoretic, with agonal breathing. Patient was intubated and transferred to the ICU. Bedside EKG and echocardiogram performed at the bedside showed no significant changes. CT head, chest, abdomen pelvis performed, showed no significant findings. Suspect cardiogenic shock.      Neuro:  Patients neurologic state is improving. Alert, follows commands, makes eye contact, disoriented x 4. No focal deficits.    Cardiac:  Patient is hemodynamically improving. Off all inotropic gtts. Start low dose metoprolol and amlodipine.   Respiratory: Good sats on low flow nasal cannula. Continue IS. Continue pulmonary toilet. Wean to room air.    GI:  Continue enteral tube feedings at this time r/t disorientation and aspiration risk. Discontinue laxatives. Start metamucil BID, patient has had multiple loose stools.   Renal: Good urine output. Cr 0.8. K 3.6. Mag 2.1. Replace K. Replace Mag. Continue IV diuresis.   Hema:  Hct 26.4  Platelet 45. Hold aspirin. Hold Plavix. HIT panel will be resent as the patient  is a high suspicion for HIT despite previous negative HIT panel. Multiple DVTs from the right IJ down to the brachiocephalic vein. Confirmed via vascular US. Patient started on argatroban yesterday, goal is APTT 2x baseline. Most recent APTT 97.8. Baseline APTT 50.8.  Hematology following. Vascular surgery following.   Id:  WBC is down to 12. Tmax 100.4. BC NGTD. Urinalysis negative. Sputum culture negative. Vancomycin and zosyn day 4. Midline sternal incision appears improved, with minimal redness and induration at skin edges. Will continue to follow closely.    Endocrine:  Glucose controlled on sliding scale insulin.   Activities: Increase activities as tolerated.  Musculoskeletal: ROM 2/5 to the right wrist/hand/fingers, distal from the elbow there is 2+ non pitting edema, the extremity is tender and warm. There is mottling, a dusky appearance, and purple discoloration to the fingertips of the right hand. See above hematology plan.      Lines tubes and drains:  Continue PICC line. Continue storm catheter. Continue NG tube.   Plan: Remain ICU     11/09/2019  The patient is postop day 9.  Status post coronary artery bypass grafting x3.  Postop course was complicated by an episode of hemodynamic instability and decompensation on postop day number 5.  Patient continues to make clinical progress since then.  She is off all drips.  She remains extubated.  Continue ICU care for now.    Neuro:  Patient is awake alert.  She is oriented x2.  She is still confused.  But she is making progress.  Her neuro exam is nonfocal.  She follows all commands.  Cardiac:  Patient continues to be hemodynamically stable.  She is somewhat hypertensive.  Will increase her antihypertensives.  Respiratory:  Patient is good sats on nasal cannula.  She remains extubated.  GI:  Patient was tolerating tube feeds until this morning.  She pulled heart feeding tube. Will get a swallowing evaluation.  Mode of nutrition will depend on results of  swallowing evaluation.  Renal:  Patient has good urine output %period% creatinine is 0.9.  Heme:  Hematocrit is 26 platelet is 46.  Patient is on argatroban  for anticoagulation due to right upper extremity DVTs.  Hypercoagulability workup is in progress.  Appreciate Hematology and vascular surgery input.  Id:  Incision continues to improve.  white count is 17.  Continue broad-spectrum antibiotics.  Endocrine:  Glucose is controlled on its sliding scale insulin.  Activities:  Advance activities as tolerated.  Line tubes and drains.  Patient has a PICC line, Jones catheter.    11/10/2019  The patient is postop day 10.  Status post coronary bypass grafting x3.  Postop course was complicated by an episode of hemodynamic instability and decompensation on postop day 5.  Patient remains hemodynamically stable.    Neuro:  The patient is awake and alert.  She is oriented x1.  She continues to be confused.  Her neuro exam is nonfocal.  Cardiac:  Patient continues to be hemodynamically stable.  His she is hypertensive.  Hypertensive medications will be increased.  Respiratory:  Patient has good sats on nasal cannula.  Continue pulmonary toilet.  GI:  Patient is tolerating a regular diet.  Renal:  Patient has good urine output. Creatinine is 0.7.  Heme: hematocrit is 24 and platelet is 67.  Patient continues on argatroban for right upper extremity DVTs.  Hypercoagulable workup is in progress.  Id:  Patient's white count is up to 20.  Patient is on broad prepped from antibiotics.  Endocrine:  The patient is on long-acting as well as sliding scale insulin.  Blood sugars have been well controlled.  Will decrease dose of long-acting.  Activities: patient has been out of bed to chair.  Advance as tolerated.  Musculoskeletal:  Patient's right upper extremity continues to be swollen  The index finger and thumb continues to be dusky with a bleb near the base of the thumb.  Continue argatroban for anticoagulation.  Continue elevation  of right upper extremity.  Lines tubes and drains: patient has a PICC line.  Will discontinue Jones catheter.        11/11/2019  The patient is post-operative day 11, status-post coronary artery bypass grafting x 3. Post-operative course was complicated by an episode of hemodynamic instability and decompensation on postop day 5.  Patient remains hemodynamically stable.    Neuro:  Patients neurologic state is improving. Oriented x 2. Alert, follows commands, makes eye contact. No focal deficits.    Cardiac:  Patient is hemodynamically improving. Hypertensive. Continue metoprolol, amlodipine, and losartan. Add chlorthalidone.   Respiratory: Good sats on room air. Continue IS. Continue pulmonary toilet.  GI:  Continue cardiac/diabetic diet, modified by puree and thick liquids with swallow precautions. Advance as tolerated. Continue metamucil.  Renal: Good urine output. Cr 0.8. K 3.8. Replace K. Continue IV diuresis. Add chlorthalidone. BMP at noon.   Hema:  Hct 23.8  Platelet 75. Hold aspirin. Hold Plavix. Repeat HIT panel pending. Multiple DVTs from the right IJ down to the brachiocephalic vein. Confirmed via vascular US. Patient on argatroban gtt, goal is APTT 2x baseline. Most recent APTT 83.3. Baseline APTT 50.8.  Hematology following. Vascular surgery following.   Id:  WBC is down to 19. Tmax 98.4. BC NGTD. Vancomycin and zosyn day 7. Discontinue broad spectrum antibiotics. Will continue to follow closely.    Endocrine:  Glucose controlled on sliding scale insulin and long acting insulin detemir.    Activities: Increase activities as tolerated.  Musculoskeletal: The right upper extremity exhibits swelling distal from the elbow. The RUE is tender to palpation and ROM. ROM is diminished to the right hand 2/5. The hand his reddened, swollen, with dusky, mottled, purple discoloration to the right thumb and tip of the right index finger. Blisters have formed on the proximal portion of the thumb and wrist. Severely  diminished capillary refill to the tip of the right thumb and tip of the right index finger. Wound care order placed.      Lines tubes and drains:  Continue PICC line.  Plan: Remain ICU         11/12/2019  The patient is post-operative day 12, status-post coronary artery bypass grafting x 3. Post-operative course was complicated by an episode of hemodynamic instability and decompensation on postop day 5.  Patient remains hemodynamically stable.    Neuro:  Patients neurologic state is improving. Oriented to person, place, situation. Disoriented to time. Alert, follows commands, makes eye contact. No focal deficits.    Cardiac:  Patient is hemodynamically improving. Continue metoprolol, amlodipine, losartan, and chlorthalidone.   Respiratory: Good sats on room air. Continue IS.  GI:  Continue cardiac/diabetic diet, modified by puree and thick liquids with swallow precautions. Advance as tolerated. Continue metamucil.  Renal: Good urine output. Cr 0.8. K 3.9. Replace K. Discontinue lasix. Continue chlorthalidone.  Hema:  Hct 23.1  Platelet 75. Hold aspirin. Hold Plavix. 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 Multiple DVTs from the right IJ down to the brachiocephalic vein. Confirmed via vascular US. Patient on argatroban gtt, goal is APTT 2x baseline. Most recent APTT 84.6. Baseline APTT 50.8.  Hematology following. Vascular surgery following.   Id:  WBC is down to 17.4. A-febrile. Will continue to follow closely.    Endocrine:  Patient had an episode of hypoglycemia last night. Will continue sliding scale insulin. Discontinue long acting insulin. Begin strict calorie count.   Activities: Increase activities as tolerated.  Musculoskeletal: The right upper extremity exhibits swelling distal from the elbow. The RUE is tender to palpation and ROM. ROM is diminished to the right hand 3/5. The hand is reddened, swollen, with dusky, mottled, purple discoloration to the right thumb tip and tip of the right index finger.   Severely diminished capillary refill to the tip of the right thumb and tip of the right index finger. Wound care following. Gauze dressing in place.       Lines tubes and drains:  Continue PICC line.  Plan: Remain ICU. Begin discharge planning for IP rehab.          11/13/2019  The patient is post-operative day 13, status-post coronary artery bypass grafting x 3. Post-operative course was complicated by an episode of hemodynamic instability and decompensation on postop day 5.  Patient remains hemodynamically stable.    Neuro:  Patient is oriented to person, place, situation. Disoriented to time. Alert, follows commands, makes eye contact. No focal deficits.    Cardiac:  Patient is hemodynamically stable. Continue metoprolol, amlodipine, losartan, and chlorthalidone.   Respiratory: Good sats on room air. Continue IS. Use of accessory muscles noted during inspiration. Will increase diuresis.   GI:  Continue cardiac/diabetic diet, modified by puree and thick liquids with swallow precautions. Advance as tolerated. Continue metamucil.  Renal: Good urine output. Cr 0.8. K 4.0. Mag 1.8. Replace Mag. Start lasix IV. Continue chlorthalidone.  Hema:  Hct 23.  Platelet 79. Hold aspirin. Hold Plavix. 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 Multiple DVTs from the right IJ down to the brachiocephalic vein. Confirmed via vascular US. Patient on argatroban gtt, goal is APTT 2x baseline. Most recent APTT 75.6. Baseline APTT 50.8.  Hematology following. Vascular surgery following.   Id:  WBC is down to 12.2. A-febrile. Will continue to follow closely.    Endocrine:  Glucose controlled. Continue sliding scale insulin. Start long acting insulin detemir 5 units daily.     Activities: Increase activities as tolerated.  Musculoskeletal: The right upper extremity exhibits swelling distal from the elbow. The RUE is tender to palpation and ROM. ROM is diminished to the right hand 2/5. The right hand is improving. The dusky, mottled, purple  discoloration is localized to the right thumb tip and tip of the right index finger.  Severely diminished capillary refill to the tip of the right thumb and tip of the right index finger. Wound care following. Gauze dressing in place.    Lines tubes and drains:  Continue PICC line.  Plan: Remain telemetry. Continue discharge planning for IP rehab.          11/14/2019  The patient is post-operative day 14, status-post coronary artery bypass grafting x 3. Post-operative course was complicated by an episode of hemodynamic instability and decompensation on postop day 5.  Patient remains hemodynamically stable.    Neuro:  Patient is oriented to person, place, situation. Disoriented to time. Alert, follows commands, makes eye contact. No focal deficits.    Cardiac:  Patient is hemodynamically stable. Continue metoprolol, amlodipine, losartan, and chlorthalidone.   Respiratory: Good sats on room air. Continue IS. Decreased use of accessory muscles noted during inspiration.   GI:  Continue cardiac/diabetic diet, modified by puree and thick liquids with swallow precautions. Advance as tolerated. Continue metamucil.  Renal: Good urine output. Cr 0.9. K 4.2. Mag 1.9. Replace Mag. Discontinue lasix. Continue chlorthalidone.  Hema:  Hct 21.9.  Platelet 87. Hold aspirin. Hold Plavix. 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 Multiple DVTs from the right IJ down to the brachiocephalic vein. Confirmed via vascular US. As per discussion with hematology and pharmacy, will discontinue argatroban and start apixaban. Transfuse 1 unit PRBC.  Hematology following. Vascular surgery following.   Id:  WBC is down to 11. A-febrile. Will continue to follow closely.    Endocrine:  Continue sliding scale insulin. Increase insulin detemir to 5 units BID.     Activities: Increase activities as tolerated.  Musculoskeletal: The right upper extremity exhibits swelling distal from the elbow. The RUE is tender to palpation and ROM. ROM is diminished to  the right hand 2/5. The right hand is slowly improving. The dusky, mottled, purple discoloration is localized to the right thumb tip and tip of the right index finger.  Severely diminished capillary refill to the tip of the right thumb and tip of the right index finger. Wound care following. Gauze dressing in place.    Lines tubes and drains:  Continue PICC line.  Plan: Remain telemetry. Continue discharge planning for IP rehab.          11/15/2019  The patient is post-operative day 15, status-post coronary artery bypass grafting x 3. Post-operative course was complicated by an episode of hemodynamic instability and decompensation on postop day 5.  Patient remains hemodynamically stable.    Neuro:  Patient is oriented to person, place, situation. Disoriented to time. Alert, follows commands, makes eye contact. No focal deficits.    Cardiac:  Patient is hemodynamically stable. Continue metoprolol, amlodipine, losartan, and chlorthalidone.   Respiratory: Good sats on low flow nasal cannula. Wean to room air. Continue IS.   GI:  Continue cardiac/diabetic diet, modified by puree and thick liquids with swallow precautions. Advance as tolerated. Continue metamucil.  Renal: Good urine output. Cr 0.8. K 3.6. Replace K. Continue chlorthalidone.  Hema:  Hct 26.1. Platelet 99. Start Aspirin. Continue Apixaban 5mg BID.  Hematology following. Vascular surgery following.   Id:  WBC 11. A-febrile. Will continue to follow closely.    Endocrine:  Continue sliding scale insulin. Increase insulin detemir to 5 units AM dose, 10 units PM dose.   Activities: Increase activities as tolerated.  Musculoskeletal: The right upper extremity exhibits swelling distal from the elbow. The RUE is tender to palpation and ROM. ROM is diminished to the right hand 2/5. The right hand is slowly improving. The dusky, mottled, purple discoloration is localized to the right thumb tip and tip of the right index finger.  Severely diminished capillary refill  to the tip of the right thumb and tip of the right index finger. Wound care following. Gauze dressing in place.    Lines tubes and drains:  Continue PICC line.  Plan: Remain telemetry. Begin discharge planning to LTAC, anticipate discharge in 48-72 hours.        11/16/2019  The patient is postop day 16.  Status post coronary artery bypass grafting x3.  Patient's postop course was complicated by an episode of hemodynamically instability and decompensation on postop day 5.  Since then the patient has made much clinical progress.  The patient remains hemodynamically stable.  Anticipate discharge to rehab or LTAC in the next 48-72 hours.    Neuro:  The patient is oriented x3.  She is much less confused.  Her responses to questioning is appropriate.  She moves all 4 she has no focal deficits.  Cardiac:  Patient remains hemodynamically stable.  Systolic blood pressure trending higher.  Will increase losartan.  Continue amlodipine metoprolol and chlorthalidone.  Respiratory:  The patient has good sats on room air.  Continue pulmonary toilet.  GI:  Patient is tolerating a regular the diet.  Renal:  Patient has good urine output.  creatinine is 0.7.  Heme:  Hematocrit is 30.  Platelet count is up to 129.  Continue aspirin and apixaban.  Id:  White count is 10.  The patient is afebrile.  Endocrine:  Glucose is better controlled on long-acting insulin.  Activities patient is out of bed to chair continue increasing activities.  Line tubes and drains:  Patient has a PICC line continue PICC line.  Musculoskeletal:  Perfusion to the patient's right arm is much improved.  Swelling is somewhat decreased.  The area of dusky tissue remained isolated to the tip of the patient's right thumb and index finger.    11/17/2019  The patient is postop day 17.  Status post coronary artery bypass grafting x3.  The patient's postop course was complicated by an episode of hemodynamic instability ankle decompensation on postop day 5.  The patient  is making clinical progress.  She has remained hemodynamically stable.  Anticipate discharge to an LTAC or rehab center in the next 48-72 hours.    Neuro:  The patient is alert and oriented x3.  Her confusion is much improved.  Her responses to all questions appropriately.  She moves all 4 and has no focal deficits.  Cardiac:  The patient remains hemodynamically stable.  She is tolerating losartan amlodipine metoprolol and chlorthalidone.  Respiratory:  The patient has good sats on room air.  Continue pulmonary toilet.  GI:  The patient is tolerating a regular diet.  Renal:  The patient has good urine output and had creatinine 0.7.  Heme:  The patient's hematocrit is 27 and her platelet count is increased to 155.  Continue aspirin and apixaban.  Id:  Patient is afebrile and her white count is 8.  Endocrine high glucose is better controlled on long-acting insulin plus sliding scale.  Activities:  The patient is out of bed to chair.  Continue advancing activities.  Line tubes and drains:  The patient has a PICC line.  Musculoskeletal:  Perfusion to the patient's right and continues to be improved.  However high right am is still swollen.  The area of dusky tissue remained stable.        11/18/2019  The patient is post-operative day 18, status-post coronary artery bypass grafting x 3. Post-operative course was complicated by an episode of hemodynamic instability and decompensation on postop day 5.  Patient remains hemodynamically stable. Patient is ready for discharge to an LTAC facility pending acceptance.     Neuro:  Patient is AAOX3, follows commands, makes eye contact. No focal deficits.    Cardiac:  Patient is hemodynamically stable. Continue metoprolol, amlodipine, losartan, and chlorthalidone.   Respiratory: Good sats on room air Continue IS.   GI:  Continue cardiac/diabetic diet.  Renal: Good urine output. Cr 0.8. K 4.0. Mag 2.0. Replace Mag. Continue chlorthalidone.  Hema:  Hct 27.9. Platelet 185. Continue  Aspirin. Continue Apixaban 5mg BID.   Id:  WBC 10.5. A-febrile. Will continue to follow closely.    Endocrine: Glucose well controlled.  Continue sliding scale insulin. Continue insulin detemir to 5 units AM dose, 10 units PM dose.   Activities: Increase activities as tolerated.  Musculoskeletal: The right upper extremity exhibits swelling distal from the elbow. The RUE continues to improve, coloration of the right hand is improving. Tender to palpation and ROM. ROM is improved but diminished to the right hand 3/5. The dusky, mottled, purple discoloration is localized to the right thumb tip and tip of the right index finger.  Severely diminished capillary refill to the tip of the right thumb and tip of the right index finger. Wound care following. Gauze dressing in place.    Lines tubes and drains:  Continue PICC line.  Plan: Remain telemetry. Patient is ready for discharge to an LTAC facility pending acceptance.          11/19/2019  The patient is post-operative day 19, status-post coronary artery bypass grafting x 3. Post-operative course was complicated by an episode of hemodynamic instability and decompensation on postop day 5.  Patient remains hemodynamically stable.     Today, the patient slid out of her bed and onto her buttocks early this morning, no evidence of trauma or injury. Denies head trauma. Patient will have a bedside sitter. Patient has tenderness, erythema, warmth to the left foot with dusky discoloration of the tips of the toes of the left foot. Patient will have an ultrasound and be assessed by vascular surgery today. Patient has [facility] facility acceptance, discharge will likely be postponed today.     Neuro:  Patient is AAOX3, follows commands, makes eye contact. No focal deficits.    Cardiac:  Patient is hemodynamically stable. Continue metoprolol, amlodipine, losartan, and chlorthalidone.   Respiratory: Good sats on room air Continue IS.   GI:  Continue cardiac/diabetic diet.  Renal:  Good urine output. Cr 0.7. K 3.9. Mag 2.3. Replace K. Continue chlorthalidone.  Hema:  Hct 26. Platelet 205. Continue Aspirin. Continue Apixaban 5mg BID.   Id:  WBC 10. A-febrile. Will continue to follow closely.    Endocrine: Glucose well controlled.  Continue sliding scale insulin. Continue insulin detemir 5 units AM dose, 10 units PM dose.   Activities: Increase activities as tolerated.  Musculoskeletal: The right upper extremity exhibits swelling distal from the elbow. The RUE continues to improve, coloration of the right hand is improving. Tender to palpation and ROM. ROM continues to be diminished to the right hand 3/5. The dusky, mottled, purple discoloration is localized to the right thumb tip and tip of the right index finger.  Severely diminished capillary refill to the tip of the right thumb and tip of the right index finger. Wound care following. Gauze dressing in place.    Lines tubes and drains:  Continue PICC line.  Plan: Remain telemetry. Patient will have an ultrasound and be assessed by vascular surgery today. Patient has [facility] facility acceptance, discharge will likely be postponed today.          11/20/2019  The patient is post-operative day 20, status-post coronary artery bypass grafting x 3. Post-operative course was complicated by an episode of hemodynamic instability and decompensation on postop day 5.  Patient remains hemodynamically stable.     Anticipate discharge to [facility] today.     Neuro:  Patient is AAOX2, disoriented to time. Follows commands, makes eye contact. No focal deficits.    Cardiac:  Patient is hemodynamically stable. Continue metoprolol, amlodipine, losartan, and chlorthalidone.   Respiratory: Good sats on room air Continue IS.   GI:  Continue cardiac/diabetic diet.  Renal: Good urine output. Cr 0.8. K 3.9. Mag 1.9. Replace K. Replace Mag. Continue chlorthalidone.  Hema:  Hct 25.6. Platelet 266. Continue Aspirin. Continue Apixaban 5mg BID.   Id:  WBC 10.  A-febrile. Will continue to follow closely.    Endocrine: Glucose controlled. Start 4 units of insulin aspart daily at lunch. Continue insulin detemir 5 units AM dose, 10 units PM dose.   Activities: Increase activities as tolerated.  Musculoskeletal: The RUE continues to improve, coloration of the right hand is improving. Tender to palpation and ROM. ROM improving but diminished to the right hand 4/5. The dusky, mottled, purple discoloration is localized to the right thumb tip and tip of the right index finger.  Severely diminished capillary refill to the tip of the right thumb and tip of the right index finger. Continue daily dressing changes after discharge.    Lines tubes and drains:  Discontinue PICC line prior to discharge.  Plan: Anticipate discharge to [facility] today.

## 2019-11-20 NOTE — PLAN OF CARE
POC reviewed w/ pt; verbalized understanding. D/T fall this morning pt will remain admitted until 11/20/2019. AAOx4 w/ confusion. Up to chair w/ assist; no s/sx of distress. Normal sinus rhythm on tele monitor. Attempting to remove bandage to RUE; PCT reported pt was attempting to bite right thumb. All safety measures in place at this time. In stable condition. No c/o pain/discomfort voiced.    Felicity Haney LPN

## 2019-11-20 NOTE — PROGRESS NOTES
[facility] -   Cardiothoracic Surgery  Progress Note    Patient Name: Mateusz Bates  MRN: [id number]  Admission Date: 10/28/2019  Hospital Length of Stay: 23 days  Code Status: Full Code   Attending Physician: Keny Zaidi MD   Referring Provider: Self, Aaareferral  Principal Problem:Acute deep vein thrombosis (DVT) of right upper extremity            Subjective:     Post-Op Info:  Procedure(s) (LRB):  CORONARY ARTERY BYPASS GRAFT (CABG) (N/A)  ECHOCARDIOGRAM,TRANSESOPHAGEAL (N/A)  BLOCK, NERVE, INTERCOSTAL, 2 OR MORE (N/A)   20 Days Post-Op     Interval History:  11/20/2019  The patient is post-operative day 20.  Status post coronary artery bypass grafting x3.    ROS  Medications:  Continuous Infusions:  Scheduled Meds:   amLODIPine  10 mg Oral Daily    apixaban  5 mg Oral BID    aspirin  81 mg Oral Daily    atorvastatin  80 mg Oral QHS    chlorthalidone  25 mg Oral Daily    ferrous sulfate  325 mg Oral BID    insulin detemir U-100  10 Units Subcutaneous QHS    insulin detemir U-100  5 Units Subcutaneous Daily    losartan  100 mg Oral Daily    magnesium sulfate IVPB  2 g Intravenous Once    metoprolol tartrate  50 mg Oral BID    nitroGLYCERIN 2% TD oint  0.5 inch Topical (Top) Q12H    pantoprazole  40 mg Oral Daily    potassium chloride  20 mEq Oral Once     PRN Meds:albumin human 5%, albuterol sulfate, Dextrose 10% Bolus, Dextrose 10% Bolus, insulin aspart U-100, lactated ringers, magnesium sulfate IVPB, magnesium sulfate IVPB, metoclopramide HCl, ondansetron, potassium chloride in water **AND** potassium chloride in water **AND** potassium chloride in water, traMADol     Objective:     Vital Signs (Most Recent):  Temp: 98.8 °F (37.1 °C) (11/20/19 0726)  Pulse: 94 (11/20/19 0726)  Resp: 18 (11/20/19 0726)  BP: 110/61 (11/20/19 0726)  SpO2: 96 % (11/20/19 0726) Vital Signs (24h Range):  Temp:  [97.3 °F (36.3 °C)-99.1 °F (37.3 °C)] 98.8 °F (37.1 °C)  Pulse:  [] 94  Resp:  [17-20]  18  SpO2:  [92 %-98 %] 96 %  BP: (105-161)/(53-74) 110/61     Weight: 76.1 kg (167 lb 12.3 oz)  Body mass index is 31.7 kg/m².    SpO2: 96 %  O2 Device (Oxygen Therapy): room air    Intake/Output - Last 3 Shifts       11/18 0700 - 11/19 0659 11/19 0700 - 11/20 0659 11/20 0700 - 11/21 0659    P.O. 120 960     I.V. (mL/kg)       IV Piggyback  100     Total Intake(mL/kg) 120 (1.6) 1060 (13.9)     Net +120 +1060            Urine Occurrence 2 x 5 x     Stool Occurrence 3 x 1 x           Lines/Drains/Airways     Peripherally Inserted Central Catheter Line                 PICC Double Lumen 11/07/19 1645 left brachial;left basilic 12 days                Physical Exam   Constitutional: She appears well-developed and well-nourished. No distress.   HENT:   Head: Normocephalic and atraumatic.   Eyes: Pupils are equal, round, and reactive to light. EOM are normal.   Neck: Normal range of motion. Neck supple. No JVD present.   Cardiovascular: Normal rate, regular rhythm, normal heart sounds and intact distal pulses. Exam reveals no gallop and no friction rub.   No murmur heard.  Pulmonary/Chest: Effort normal and breath sounds normal. No stridor. No respiratory distress. She has no wheezes. She has no rales. She exhibits no tenderness.   Abdominal: Soft. Bowel sounds are normal. She exhibits no distension and no mass. There is no tenderness. There is no rebound and no guarding. No hernia.   Musculoskeletal: She exhibits tenderness. She exhibits no edema or deformity.   ROM improving to the right hand 4/5. Tenderness to the toes of the KRISTINE lower extremities, redness and dusky discoloration to the toes generalized.    Neurological: She is alert. She displays normal reflexes. No cranial nerve deficit or sensory deficit. She exhibits normal muscle tone. Coordination normal.   Disoriented to time.      Skin: Skin is warm and dry. Capillary refill takes less than 2 seconds. Right thumb tip and right index finger tip are un blanchable.  . No rash noted. She is not diaphoretic. There is erythema. No pallor.   Psychiatric: She has a normal mood and affect. Her behavior is normal. Judgment and thought content normal.   Nursing note and vitals reviewed.      Significant Labs:  BMP:   Recent Labs   Lab 11/20/19  0354   *   *   K 3.9   CL 98   CO2 26   BUN 24*   CREATININE 0.8   CALCIUM 8.7   MG 1.9     CBC:   Recent Labs   Lab 11/20/19  0354   WBC 7.46   RBC 2.75*   HGB 7.8*   HCT 25.6*      MCV 93   MCH 28.4   MCHC 30.5*       Significant Diagnostics:  I have reviewed all pertinent imaging results/findings within the past 24 hours.    Assessment/Plan:     S/P CABG x 3       11/01/2019  The patient is post-operative day 1, status-post coronary artery bypass grafting x 3.  Overall the patient is making progress, however IABP and inotropic agents will continue today.   Neuro:  The patient is awake alert and oriented x3.  Neuro exam is nonfocal.  Pain is well controlled with IV pain medication.  Cardiac:  Patient is hemodynamically improving. Patient remains on epinephrine and milrinone gtts, which will be weaned over the next 24 hours.  Cardiac index is improving. IABP will remain in place over the next 24 hours.  Patient will be started on beta blockers once gtts are weaned off.  Respiratory: Good sats on high flow nasal cannula. Continue respiratory toilet, continue incentive spirometer. Wean to low flow nasal cannula.  GI:  Diet: sips of water with medications, will slowly advance diet as tolerated. Benign abdominal exam.  Renal:  Urine output is slowly improving. Cr 0.9. K 4.3. Mag 1.6. Replace mag. Continue IV diuresis. Add metolazone.   Heme:  Hct 34.1  Platelet 82. Continue aspirin. Discontinue Plavix for now.  Id:  WBC 12. Tmax 100.3. Continue post-operative antibiotics. Will continue to follow.   Endocrine:  Glucose is controlled with insulin gtt.  Continue insulin gtt for now, until weaned off inotropic agents.   Activities:   Patient is currently bed bound due to IABP.  Advance activities as tolerated once IABP is removed.   Lines tubes and drains:  Continue IABP. Continue [place] and Cord[place].  Continue A-line.  Chest tubes will continue. NICOLASA x 2 will continue. Jones catheter will continue.  Continue pacer wires. Continue prevena wound vac.  Plan: Remain ICU.          11/02/2019  The patient is post-operative day 2, status-post coronary artery bypass grafting x 3.  Overall the patient is making progress, IABP removed and inotropic agents discontinued.   Neuro:  The patient is awake alert and oriented x3.  Neuro exam is nonfocal.  Pain is well controlled. Discontinue fentanyl.   Cardiac:  Patient is hemodynamically improving. Inotropic gtts discontinued. Excellent cardiac index. IABP discontinued.  Patient will be started on beta blockers today.  Respiratory: Good sats on high flow nasal cannula. Continue respiratory toilet, continue incentive spirometer. Wean to low flow nasal cannula.  GI:  Advance diabetic/cardiac diet as tolerated. Benign abdominal exam.  Renal:  Urine output is improving. Cr 0.9. K 4.9. Mag 2.2. Continue IV diuresis. Hold AM K.   Heme:  Hct 28.3  Platelet 47. Discontinue aspirin. Discontinue Plavix. HIT panel. Consult hematology r/t thrombocytopenia.   Id:  WBC 10.4. Tmax 99.4. Will continue to follow.   Endocrine:  Discontinue insulin gtt. Start sliding scale insulin. Start long acting insulin detemir.   Activities: Advance activities as tolerated.   Lines tubes and drains:  Discontinue IABP. Discontinue [place] and Cord[place].  Discontinue A-line.  Discontinue chest tubes x 3. Discontinue NICOLASA x 2. Discontinue arterial line. Jones catheter will continue for now.  Continue pacer wires. Continue prevena wound vac. Place PICC line.   Plan: Remain ICU.           11/03/2019  The patient is post-operative day 3, status-post coronary artery bypass grafting x 3.  Overall the patient is making progress.  Neuro:  The patient is awake  alert and oriented x3.  Neuro exam is nonfocal.  Pain is well controlled. Continue tramadol.   Cardiac:  Patient is hemodynamically stable. Increase metoprolol dose. Start amlodipine.   Respiratory: Good sats on nasal cannula. Continue respiratory toilet, continue incentive spirometer. Wean to room air.  GI:  Advance diabetic/cardiac diet as tolerated. Benign abdominal exam. Passing gas, no BM. Add 1 bottle of mag citrate.   Renal:  Good urine output. Cr 0.9. K 3.8. Mag 2.0. Replace K. Replace Mag. Change to PO diuresis.   Heme:  Hct 34.7  Platelet 89. Restart aspirin. Continue to hold Plavix. HIT panel pending. Hematology following.  Id:  WBC 13.8. Tmax 99.4. Will continue to follow.    Endocrine:  Glucose controlled on sliding scale insulin. Increase long acting insulin detemir dose.   Activities: Advance activities as tolerated.   Lines tubes and drains:  Jones catheter will continue for now.  Continue pacer wires. Continue prevena wound vac. Continue PICC line.   Plan: Remain ICU.          11/04/2019  The patient is post-operative day 4, status-post coronary artery bypass grafting x 3.  Overall the patient is making progress.  Neuro:  The patient is awake alert and oriented x3.  Neuro exam is nonfocal.  Pain is well controlled. Continue tramadol.   Cardiac:  Patient is hemodynamically stable. Continue metoprolol dose. Continue amlodipine dose.   Respiratory: Good sats on room air. Continue respiratory toilet, continue incentive spirometer.  GI:  Advance diabetic/cardiac diet as tolerated. Benign abdominal exam. Regular bowel movements.   Renal:  Good urine output. Cr 0.7. K 4.0. Mag 2.3. Replace K. Decrease PO diuresis.   Heme:  Hct 34.4  Platelet 140. Continue aspirin. Restart Plavix. HIT panel pending. Hematology following.  Id:  WBC down to 13.13. Tmax 98. Will continue to follow.    Endocrine:  Glucose controlled on sliding scale insulin. Increased long acting insulin detemir dose.   Activities: Advance  activities as tolerated.   Lines tubes and drains:  Discontinued storm catheter. Continue pacer wires. Continue prevena wound vac. Continue PICC line.   Plan: Transfer to telemetry. Anticipate discharge to SNF in 48 hours.         11/05/2019  The patient is post-operative day 5, status-post coronary artery bypass grafting x 3.  Overall the patient is making progress.  Neuro:  The patient is awake alert and oriented x3.  Neuro exam is nonfocal. Patient slow to answer questions, however answers are appropriate.  Pain is well controlled. Continue tramadol PRN.   Cardiac:  Patient is hemodynamically stable. Continue metoprolol dose. Discontinue amlodipine. Start losartan.    Respiratory: Good sats on room air. Continue respiratory toilet, continue incentive spirometer.  GI:  Advance diabetic/cardiac diet as tolerated. Benign abdominal exam. Regular bowel movements.   Renal:  Good urine output. Cr 0.7. K 4.8. Mag 2.1. Replace Mag. Discontinue PO diuresis.   Heme:  Hct 39.5  Platelet 73. Continue aspirin. Hold Plavix. HIT panel pending. Hematology following.  Id:  WBC is up to 16. Tmax 99. Pan-culture. DC PICC line. Chest xray. Start broad spectrum empiric antibiotics. Will continue to follow.    Endocrine:  Glucose controlled on sliding scale insulin. Decrease long acting insulin detemir dose.   Activities: Advance activities as tolerated.   Lines tubes and drains:  Discontinue pacer wires. Discontinue prevena wound vac. Continue PICC line.   Plan: Anticipate discharge to SNF in 24-48 hours.         11/06/2019  The patient is post-operative day 6, status-post coronary artery bypass grafting x 3. Patient had an episode of decompensation and unresponsiveness yesterday evening. The patient was found unresponsive, cool, and diaphoretic, with agonal breathing. Patient was intubated and transferred to the ICU. Bedside EKG and echocardiogram performed at the bedside showed no significant changes. CT head, chest, abdomen pelvis  performed, showed no significant findings.     Neuro:  The patient is intubated and sedated. On Precedex gtt. Begin weaning off sedation.    Cardiac:  Patient is hemodynamically improving. Inotropic agent dependent. Currently on Epinephrine gtt. Start Milrinone gtt. Discontinue metoprolol. Discontinue losartan.    Respiratory: Ventilator dependent. tV 450, RR 20, PEEP 5. Wean down sedation. Wean off ventilator today.    GI:  Begin enteral tube feedings. Benign abdominal exam. Previously having regular bowel movements.   Renal: Adequate urine output. Cr 1.1. K 3.6. Mag 1.7. Replace Mag. Replace K. Discontinue diuresis.   Heme:  Hct 31.9  Platelet 89. Hold aspirin. Hold Plavix. HIT panel negative. Patient received 1 units PRBC last night. Hematology following.  Id:  WBC is down to 15.5. Tmax 99.9. BC NGTD. Urinalysis negative. Sputum culture negative. Vancomycin and zosyn day 2. Will continue to follow closely.    Endocrine:  Glucose controlled on insulin gtt, continue at this time.   Activities: Intubated and sedated.   Musculoskeletal: Unable to find right radial pulse via doppler. Tips of the fingers of the right hand are mottled, dusky, appear cyanotic. Recent right radial arterial line in place, which has been relocated. US of arteries and veins of the right arm ordered. Consult to vascular surgery placed.    Lines tubes and drains:  Continue swan and cordis. Continue arterial line. Continue storm.    Plan: Remain ICU         11/07/2019  The patient is post-operative day 7, status-post coronary artery bypass grafting x 3. Patient had an episode of decompensation and unresponsiveness the evening of 11/05/2019. The patient was found unresponsive, cool, and diaphoretic, with agonal breathing. Patient was intubated and transferred to the ICU. Bedside EKG and echocardiogram performed at the bedside showed no significant changes. CT head, chest, abdomen pelvis performed, showed no significant findings.     Neuro:  The  patient is intubated and sedated. On Precedex gtt. Wean off sedation.    Cardiac:  Patient is hemodynamically improving. Excellent cardiac index. Wean off Inotropic gtts.   Respiratory: Wean towards extubation. tV 400, RR 18, PEEP 5.   GI:  Continue enteral tube feedings while intubated. Benign abdominal exam.  Renal: Adequate urine output. Cr 1.1. K 4.1. Mag 2.8. Start IV diuresis.   Heme:  Hct 24.6  Platelet 60. Hold aspirin. Hold Plavix. HIT panel negative.  Id:  WBC is down to 14.46. Tmax 100.8. BC NGTD. Urinalysis negative. Sputum culture negative. Vancomycin and zosyn day 3. Midline sternal incision appears reddened, with induration at skin edges. Will continue to follow closely.    Endocrine:  Glucose controlled on sliding scale insulin.   Activities: Intubated and sedated. Moves all 4. Full ROM.    Musculoskeletal: Able to find right radial pulse via doppler. The right hand extending to the tips of the fingers of the right hand are mottled, dusky, appear cyanotic. Recent right radial arterial line placement, which has been relocated. US of arteries and veins of the right arm pending. Vascular surgery following, suggested possible thrombotic etiology, suggested heparin gtt, however this is contraindicated r/t s/p cabg x 3.     Lines tubes and drains:  Continue swan and cordis. Continue arterial line. Continue storm.    Plan: Remain ICU         11/08/2019  The patient is post-operative day 8, status-post coronary artery bypass grafting x 3. Patient had an episode of decompensation and unresponsiveness the evening of 11/05/2019. The patient was found unresponsive, cool, and diaphoretic, with agonal breathing. Patient was intubated and transferred to the ICU. Bedside EKG and echocardiogram performed at the bedside showed no significant changes. CT head, chest, abdomen pelvis performed, showed no significant findings. Suspect cardiogenic shock.      Neuro:  Patients neurologic state is improving. Alert, follows  commands, makes eye contact, disoriented x 4. No focal deficits.    Cardiac:  Patient is hemodynamically improving. Off all inotropic gtts. Start low dose metoprolol and amlodipine.   Respiratory: Good sats on low flow nasal cannula. Continue IS. Continue pulmonary toilet. Wean to room air.    GI:  Continue enteral tube feedings at this time r/t disorientation and aspiration risk. Discontinue laxatives. Start metamucil BID, patient has had multiple loose stools.   Renal: Good urine output. Cr 0.8. K 3.6. Mag 2.1. Replace K. Replace Mag. Continue IV diuresis.   Hema:  Hct 26.4  Platelet 45. Hold aspirin. Hold Plavix. HIT panel will be resent as the patient is a high suspicion for HIT despite previous negative HIT panel. Multiple DVTs from the right IJ down to the brachiocephalic vein. Confirmed via vascular US. Patient started on argatroban yesterday, goal is APTT 2x baseline. Most recent APTT 97.8. Baseline APTT 50.8.  Hematology following. Vascular surgery following.   Id:  WBC is down to 12. Tmax 100.4. BC NGTD. Urinalysis negative. Sputum culture negative. Vancomycin and zosyn day 4. Midline sternal incision appears improved, with minimal redness and induration at skin edges. Will continue to follow closely.    Endocrine:  Glucose controlled on sliding scale insulin.   Activities: Increase activities as tolerated.  Musculoskeletal: ROM 2/5 to the right wrist/hand/fingers, distal from the elbow there is 2+ non pitting edema, the extremity is tender and warm. There is mottling, a dusky appearance, and purple discoloration to the fingertips of the right hand. See above hematology plan.      Lines tubes and drains:  Continue PICC line. Continue storm catheter. Continue NG tube.   Plan: Remain ICU     11/09/2019  The patient is postop day 9.  Status post coronary artery bypass grafting x3.  Postop course was complicated by an episode of hemodynamic instability and decompensation on postop day number 5.  Patient  continues to make clinical progress since then.  She is off all drips.  She remains extubated.  Continue ICU care for now.    Neuro:  Patient is awake alert.  She is oriented x2.  She is still confused.  But she is making progress.  Her neuro exam is nonfocal.  She follows all commands.  Cardiac:  Patient continues to be hemodynamically stable.  She is somewhat hypertensive.  Will increase her antihypertensives.  Respiratory:  Patient is good sats on nasal cannula.  She remains extubated.  GI:  Patient was tolerating tube feeds until this morning.  She pulled heart feeding tube. Will get a swallowing evaluation.  Mode of nutrition will depend on results of swallowing evaluation.  Renal:  Patient has good urine output %period% creatinine is 0.9.  Heme:  Hematocrit is 26 platelet is 46.  Patient is on argatroban  for anticoagulation due to right upper extremity DVTs.  Hypercoagulability workup is in progress.  Appreciate Hematology and vascular surgery input.  Id:  Incision continues to improve.  white count is 17.  Continue broad-spectrum antibiotics.  Endocrine:  Glucose is controlled on its sliding scale insulin.  Activities:  Advance activities as tolerated.  Line tubes and drains.  Patient has a PICC line, Jones catheter.    11/10/2019  The patient is postop day 10.  Status post coronary bypass grafting x3.  Postop course was complicated by an episode of hemodynamic instability and decompensation on postop day 5.  Patient remains hemodynamically stable.    Neuro:  The patient is awake and alert.  She is oriented x1.  She continues to be confused.  Her neuro exam is nonfocal.  Cardiac:  Patient continues to be hemodynamically stable.  His she is hypertensive.  Hypertensive medications will be increased.  Respiratory:  Patient has good sats on nasal cannula.  Continue pulmonary toilet.  GI:  Patient is tolerating a regular diet.  Renal:  Patient has good urine output. Creatinine is 0.7.  Heme: hematocrit is 24 and  platelet is 67.  Patient continues on argatroban for right upper extremity DVTs.  Hypercoagulable workup is in progress.  Id:  Patient's white count is up to 20.  Patient is on broad prepped from antibiotics.  Endocrine:  The patient is on long-acting as well as sliding scale insulin.  Blood sugars have been well controlled.  Will decrease dose of long-acting.  Activities: patient has been out of bed to chair.  Advance as tolerated.  Musculoskeletal:  Patient's right upper extremity continues to be swollen  The index finger and thumb continues to be dusky with a bleb near the base of the thumb.  Continue argatroban for anticoagulation.  Continue elevation of right upper extremity.  Lines tubes and drains: patient has a PICC line.  Will discontinue Jones catheter.        11/11/2019  The patient is post-operative day 11, status-post coronary artery bypass grafting x 3. Post-operative course was complicated by an episode of hemodynamic instability and decompensation on postop day 5.  Patient remains hemodynamically stable.    Neuro:  Patients neurologic state is improving. Oriented x 2. Alert, follows commands, makes eye contact. No focal deficits.    Cardiac:  Patient is hemodynamically improving. Hypertensive. Continue metoprolol, amlodipine, and losartan. Add chlorthalidone.   Respiratory: Good sats on room air. Continue IS. Continue pulmonary toilet.  GI:  Continue cardiac/diabetic diet, modified by puree and thick liquids with swallow precautions. Advance as tolerated. Continue metamucil.  Renal: Good urine output. Cr 0.8. K 3.8. Replace K. Continue IV diuresis. Add chlorthalidone. BMP at noon.   Hema:  Hct 23.8  Platelet 75. Hold aspirin. Hold Plavix. Repeat HIT panel pending. Multiple DVTs from the right IJ down to the brachiocephalic vein. Confirmed via vascular US. Patient on argatroban gtt, goal is APTT 2x baseline. Most recent APTT 83.3. Baseline APTT 50.8.  Hematology following. Vascular surgery following.    Id:  WBC is down to 19. Tmax 98.4. BC NGTD. Vancomycin and zosyn day 7. Discontinue broad spectrum antibiotics. Will continue to follow closely.    Endocrine:  Glucose controlled on sliding scale insulin and long acting insulin detemir.    Activities: Increase activities as tolerated.  Musculoskeletal: The right upper extremity exhibits swelling distal from the elbow. The RUE is tender to palpation and ROM. ROM is diminished to the right hand 2/5. The hand his reddened, swollen, with dusky, mottled, purple discoloration to the right thumb and tip of the right index finger. Blisters have formed on the proximal portion of the thumb and wrist. Severely diminished capillary refill to the tip of the right thumb and tip of the right index finger. Wound care order placed.      Lines tubes and drains:  Continue PICC line.  Plan: Remain ICU         11/12/2019  The patient is post-operative day 12, status-post coronary artery bypass grafting x 3. Post-operative course was complicated by an episode of hemodynamic instability and decompensation on postop day 5.  Patient remains hemodynamically stable.    Neuro:  Patients neurologic state is improving. Oriented to person, place, situation. Disoriented to time. Alert, follows commands, makes eye contact. No focal deficits.    Cardiac:  Patient is hemodynamically improving. Continue metoprolol, amlodipine, losartan, and chlorthalidone.   Respiratory: Good sats on room air. Continue IS.  GI:  Continue cardiac/diabetic diet, modified by puree and thick liquids with swallow precautions. Advance as tolerated. Continue metamucil.  Renal: Good urine output. Cr 0.8. K 3.9. Replace K. Discontinue lasix. Continue chlorthalidone.  Hema:  Hct 23.1  Platelet 75. Hold aspirin. Hold Plavix. 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 Multiple DVTs from the right IJ down to the brachiocephalic vein. Confirmed via vascular US. Patient on argatroban gtt, goal is APTT 2x baseline. Most recent APTT  84.6. Baseline APTT 50.8.  Hematology following. Vascular surgery following.   Id:  WBC is down to 17.4. A-febrile. Will continue to follow closely.    Endocrine:  Patient had an episode of hypoglycemia last night. Will continue sliding scale insulin. Discontinue long acting insulin. Begin strict calorie count.   Activities: Increase activities as tolerated.  Musculoskeletal: The right upper extremity exhibits swelling distal from the elbow. The RUE is tender to palpation and ROM. ROM is diminished to the right hand 3/5. The hand is reddened, swollen, with dusky, mottled, purple discoloration to the right thumb tip and tip of the right index finger.  Severely diminished capillary refill to the tip of the right thumb and tip of the right index finger. Wound care following. Gauze dressing in place.       Lines tubes and drains:  Continue PICC line.  Plan: Remain ICU. Begin discharge planning for IP rehab.          11/13/2019  The patient is post-operative day 13, status-post coronary artery bypass grafting x 3. Post-operative course was complicated by an episode of hemodynamic instability and decompensation on postop day 5.  Patient remains hemodynamically stable.    Neuro:  Patient is oriented to person, place, situation. Disoriented to time. Alert, follows commands, makes eye contact. No focal deficits.    Cardiac:  Patient is hemodynamically stable. Continue metoprolol, amlodipine, losartan, and chlorthalidone.   Respiratory: Good sats on room air. Continue IS. Use of accessory muscles noted during inspiration. Will increase diuresis.   GI:  Continue cardiac/diabetic diet, modified by puree and thick liquids with swallow precautions. Advance as tolerated. Continue metamucil.  Renal: Good urine output. Cr 0.8. K 4.0. Mag 1.8. Replace Mag. Start lasix IV. Continue chlorthalidone.  Hema:  Hct 23.  Platelet 79. Hold aspirin. Hold Plavix. 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 Multiple DVTs from the right IJ down to  the brachiocephalic vein. Confirmed via vascular US. Patient on argatroban gtt, goal is APTT 2x baseline. Most recent APTT 75.6. Baseline APTT 50.8.  Hematology following. Vascular surgery following.   Id:  WBC is down to 12.2. A-febrile. Will continue to follow closely.    Endocrine:  Glucose controlled. Continue sliding scale insulin. Start long acting insulin detemir 5 units daily.     Activities: Increase activities as tolerated.  Musculoskeletal: The right upper extremity exhibits swelling distal from the elbow. The RUE is tender to palpation and ROM. ROM is diminished to the right hand 2/5. The right hand is improving. The dusky, mottled, purple discoloration is localized to the right thumb tip and tip of the right index finger.  Severely diminished capillary refill to the tip of the right thumb and tip of the right index finger. Wound care following. Gauze dressing in place.    Lines tubes and drains:  Continue PICC line.  Plan: Remain telemetry. Continue discharge planning for IP rehab.          11/14/2019  The patient is post-operative day 14, status-post coronary artery bypass grafting x 3. Post-operative course was complicated by an episode of hemodynamic instability and decompensation on postop day 5.  Patient remains hemodynamically stable.    Neuro:  Patient is oriented to person, place, situation. Disoriented to time. Alert, follows commands, makes eye contact. No focal deficits.    Cardiac:  Patient is hemodynamically stable. Continue metoprolol, amlodipine, losartan, and chlorthalidone.   Respiratory: Good sats on room air. Continue IS. Decreased use of accessory muscles noted during inspiration.   GI:  Continue cardiac/diabetic diet, modified by puree and thick liquids with swallow precautions. Advance as tolerated. Continue metamucil.  Renal: Good urine output. Cr 0.9. K 4.2. Mag 1.9. Replace Mag. Discontinue lasix. Continue chlorthalidone.  Hema:  Hct 21.9.  Platelet 87. Hold aspirin. Hold  Plavix. 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 Multiple DVTs from the right IJ down to the brachiocephalic vein. Confirmed via vascular US. As per discussion with hematology and pharmacy, will discontinue argatroban and start apixaban. Transfuse 1 unit PRBC.  Hematology following. Vascular surgery following.   Id:  WBC is down to 11. A-febrile. Will continue to follow closely.    Endocrine:  Continue sliding scale insulin. Increase insulin detemir to 5 units BID.     Activities: Increase activities as tolerated.  Musculoskeletal: The right upper extremity exhibits swelling distal from the elbow. The RUE is tender to palpation and ROM. ROM is diminished to the right hand 2/5. The right hand is slowly improving. The dusky, mottled, purple discoloration is localized to the right thumb tip and tip of the right index finger.  Severely diminished capillary refill to the tip of the right thumb and tip of the right index finger. Wound care following. Gauze dressing in place.    Lines tubes and drains:  Continue PICC line.  Plan: Remain telemetry. Continue discharge planning for IP rehab.          11/15/2019  The patient is post-operative day 15, status-post coronary artery bypass grafting x 3. Post-operative course was complicated by an episode of hemodynamic instability and decompensation on postop day 5.  Patient remains hemodynamically stable.    Neuro:  Patient is oriented to person, place, situation. Disoriented to time. Alert, follows commands, makes eye contact. No focal deficits.    Cardiac:  Patient is hemodynamically stable. Continue metoprolol, amlodipine, losartan, and chlorthalidone.   Respiratory: Good sats on low flow nasal cannula. Wean to room air. Continue IS.   GI:  Continue cardiac/diabetic diet, modified by puree and thick liquids with swallow precautions. Advance as tolerated. Continue metamucil.  Renal: Good urine output. Cr 0.8. K 3.6. Replace K. Continue chlorthalidone.  Hema:  Hct 26.1. Platelet  99. Start Aspirin. Continue Apixaban 5mg BID.  Hematology following. Vascular surgery following.   Id:  WBC 11. A-febrile. Will continue to follow closely.    Endocrine:  Continue sliding scale insulin. Increase insulin detemir to 5 units AM dose, 10 units PM dose.   Activities: Increase activities as tolerated.  Musculoskeletal: The right upper extremity exhibits swelling distal from the elbow. The RUE is tender to palpation and ROM. ROM is diminished to the right hand 2/5. The right hand is slowly improving. The dusky, mottled, purple discoloration is localized to the right thumb tip and tip of the right index finger.  Severely diminished capillary refill to the tip of the right thumb and tip of the right index finger. Wound care following. Gauze dressing in place.    Lines tubes and drains:  Continue PICC line.  Plan: Remain telemetry. Begin discharge planning to LTAC, anticipate discharge in 48-72 hours.        11/16/2019  The patient is postop day 16.  Status post coronary artery bypass grafting x3.  Patient's postop course was complicated by an episode of hemodynamically instability and decompensation on postop day 5.  Since then the patient has made much clinical progress.  The patient remains hemodynamically stable.  Anticipate discharge to rehab or LTAC in the next 48-72 hours.    Neuro:  The patient is oriented x3.  She is much less confused.  Her responses to questioning is appropriate.  She moves all 4 she has no focal deficits.  Cardiac:  Patient remains hemodynamically stable.  Systolic blood pressure trending higher.  Will increase losartan.  Continue amlodipine metoprolol and chlorthalidone.  Respiratory:  The patient has good sats on room air.  Continue pulmonary toilet.  GI:  Patient is tolerating a regular the diet.  Renal:  Patient has good urine output.  creatinine is 0.7.  Heme:  Hematocrit is 30.  Platelet count is up to 129.  Continue aspirin and apixaban.  Id:  White count is 10.  The  patient is afebrile.  Endocrine:  Glucose is better controlled on long-acting insulin.  Activities patient is out of bed to chair continue increasing activities.  Line tubes and drains:  Patient has a PICC line continue PICC line.  Musculoskeletal:  Perfusion to the patient's right arm is much improved.  Swelling is somewhat decreased.  The area of dusky tissue remained isolated to the tip of the patient's right thumb and index finger.    11/17/2019  The patient is postop day 17.  Status post coronary artery bypass grafting x3.  The patient's postop course was complicated by an episode of hemodynamic instability ankle decompensation on postop day 5.  The patient is making clinical progress.  She has remained hemodynamically stable.  Anticipate discharge to an LTAC or rehab center in the next 48-72 hours.    Neuro:  The patient is alert and oriented x3.  Her confusion is much improved.  Her responses to all questions appropriately.  She moves all 4 and has no focal deficits.  Cardiac:  The patient remains hemodynamically stable.  She is tolerating losartan amlodipine metoprolol and chlorthalidone.  Respiratory:  The patient has good sats on room air.  Continue pulmonary toilet.  GI:  The patient is tolerating a regular diet.  Renal:  The patient has good urine output and had creatinine 0.7.  Heme:  The patient's hematocrit is 27 and her platelet count is increased to 155.  Continue aspirin and apixaban.  Id:  Patient is afebrile and her white count is 8.  Endocrine high glucose is better controlled on long-acting insulin plus sliding scale.  Activities:  The patient is out of bed to chair.  Continue advancing activities.  Line tubes and drains:  The patient has a PICC line.  Musculoskeletal:  Perfusion to the patient's right and continues to be improved.  However high right am is still swollen.  The area of dusky tissue remained stable.        11/18/2019  The patient is post-operative day 18, status-post coronary  artery bypass grafting x 3. Post-operative course was complicated by an episode of hemodynamic instability and decompensation on postop day 5.  Patient remains hemodynamically stable. Patient is ready for discharge to an LTAC facility pending acceptance.     Neuro:  Patient is AAOX3, follows commands, makes eye contact. No focal deficits.    Cardiac:  Patient is hemodynamically stable. Continue metoprolol, amlodipine, losartan, and chlorthalidone.   Respiratory: Good sats on room air Continue IS.   GI:  Continue cardiac/diabetic diet.  Renal: Good urine output. Cr 0.8. K 4.0. Mag 2.0. Replace Mag. Continue chlorthalidone.  Hema:  Hct 27.9. Platelet 185. Continue Aspirin. Continue Apixaban 5mg BID.   Id:  WBC 10.5. A-febrile. Will continue to follow closely.    Endocrine: Glucose well controlled.  Continue sliding scale insulin. Continue insulin detemir to 5 units AM dose, 10 units PM dose.   Activities: Increase activities as tolerated.  Musculoskeletal: The right upper extremity exhibits swelling distal from the elbow. The RUE continues to improve, coloration of the right hand is improving. Tender to palpation and ROM. ROM is improved but diminished to the right hand 3/5. The dusky, mottled, purple discoloration is localized to the right thumb tip and tip of the right index finger.  Severely diminished capillary refill to the tip of the right thumb and tip of the right index finger. Wound care following. Gauze dressing in place.    Lines tubes and drains:  Continue PICC line.  Plan: Remain telemetry. Patient is ready for discharge to an LTAC facility pending acceptance.          11/19/2019  The patient is post-operative day 19, status-post coronary artery bypass grafting x 3. Post-operative course was complicated by an episode of hemodynamic instability and decompensation on postop day 5.  Patient remains hemodynamically stable.     Today, the patient slid out of her bed and onto her buttocks early this morning, no  evidence of trauma or injury. Denies head trauma. Patient will have a bedside sitter. Patient has tenderness, erythema, warmth to the left foot with dusky discoloration of the tips of the toes of the left foot. Patient will have an ultrasound and be assessed by vascular surgery today. Patient has [facility] facility acceptance, discharge will likely be postponed today.     Neuro:  Patient is AAOX3, follows commands, makes eye contact. No focal deficits.    Cardiac:  Patient is hemodynamically stable. Continue metoprolol, amlodipine, losartan, and chlorthalidone.   Respiratory: Good sats on room air Continue IS.   GI:  Continue cardiac/diabetic diet.  Renal: Good urine output. Cr 0.7. K 3.9. Mag 2.3. Replace K. Continue chlorthalidone.  Hema:  Hct 26. Platelet 205. Continue Aspirin. Continue Apixaban 5mg BID.   Id:  WBC 10. A-febrile. Will continue to follow closely.    Endocrine: Glucose well controlled.  Continue sliding scale insulin. Continue insulin detemir 5 units AM dose, 10 units PM dose.   Activities: Increase activities as tolerated.  Musculoskeletal: The right upper extremity exhibits swelling distal from the elbow. The RUE continues to improve, coloration of the right hand is improving. Tender to palpation and ROM. ROM continues to be diminished to the right hand 3/5. The dusky, mottled, purple discoloration is localized to the right thumb tip and tip of the right index finger.  Severely diminished capillary refill to the tip of the right thumb and tip of the right index finger. Wound care following. Gauze dressing in place.    Lines tubes and drains:  Continue PICC line.  Plan: Remain telemetry. Patient will have an ultrasound and be assessed by vascular surgery today. Patient has [facility] facility acceptance, discharge will likely be postponed today.          11/20/2019  The patient is post-operative day 20, status-post coronary artery bypass grafting x 3. Post-operative course was complicated by  an episode of hemodynamic instability and decompensation on postop day 5.  Patient remains hemodynamically stable.     Anticipate discharge to [facility] today.     Neuro:  Patient is AAOX2, disoriented to time. Follows commands, makes eye contact. No focal deficits.    Cardiac:  Patient is hemodynamically stable. Continue metoprolol, amlodipine, losartan, and chlorthalidone.   Respiratory: Good sats on room air Continue IS.   GI:  Continue cardiac/diabetic diet.  Renal: Good urine output. Cr 0.8. K 3.9. Mag 1.9. Replace K. Replace Mag. Continue chlorthalidone.  Hema:  Hct  25.6. Platelet 266. Continue Aspirin. Continue Apixaban 5mg BID.   Id:  WBC 10. A-febrile. Will continue to follow closely.    Endocrine: Glucose controlled. Start 4 units of insulin aspart daily at lunch. Continue insulin detemir 5 units AM dose, 10 units PM dose.   Activities: Increase activities as tolerated.  Musculoskeletal: The RUE continues to improve, coloration of the right hand is improving. Tender to palpation and ROM. ROM improving but diminished to the right hand 4/5. The dusky, mottled, purple discoloration is localized to the right thumb tip and tip of the right index finger.  Severely diminished capillary refill to the tip of the right thumb and tip of the right index finger. Continue daily dressing changes after discharge.    Lines tubes and drains:  Discontinue PICC line prior to discharge.  Plan: Anticipate discharge to [facility] today.        NSTEMI (non-ST elevated myocardial infarction)  The patient is a 69-year-old female with hypertension, diabetes, hyperlipidemia, obesity, anxiety and depression, iron deficiency anemia, vitamin B12 deficiency, who presented to the emergency room with an NSTEMI.  Workup included a cardiac catheterization that shows severe multivessel coronary artery disease with proximal LAD stenosis of 70% and severely depressed ejection fraction of 20%.  The patient is a candidate for urgent  coronary artery bypass grafting.  The risks and benefits of surgery were explained to the patient.  The patient verbalized understanding of the issues discussed.  She a segs the risks of surgery and has agreed to proceed with urgent coronary artery bypass grafting.  She understands that the risk of neurologic complication postoperatively is increased due to the presence of cerebral microvascular disease.        Yovani Dobbins NP  Cardiothoracic Surgery  [facility]

## 2019-11-20 NOTE — ASSESSMENT & PLAN NOTE
After reviewing the arterial ultrasound of the lower extremities there is no arterial peripheral arterial disease nor any venous DVT are insufficiency that we can appreciate.  Both her toes as well as her right hand seems to be from her cardiogenic shock in which there is no vascular procedures that are needed.  At some point she may need toe amputations on the left foot in the very far future but we will defer the right hand to Orthopedics.  From a vascular standpoint, she would heal any type of surgery on the right hand as well as in either lower extremity

## 2019-11-20 NOTE — DISCHARGE SUMMARY
[facility] -   Cardiothoracic Surgery  Discharge Summary      Patient Name: Mateusz Bates  MRN: [id number]  Admission Date: 10/28/2019  Hospital Length of Stay: 23 days  Discharge Date and Time:  11/20/2019 9:53 AM  Attending Physician: Keny Zaidi MD   Discharging Provider: Yovani Dobbins NP  Primary Care Provider: Serenity Kilpatrick MD    HPI:   The patient is a 69-year-old female with hypertension, diabetes, hyperlipidemia, obesity, anxiety and depression, iron deficiency anemia, vitamin B12 deficiency, who presented to the emergency room an NSTEMI.  The patient was in her usual state of health until about a month to 2 months ago when she noted is episodic periods of shortness of breath dyspnea on exertion and chest tightness.  This was most severe about a day or 2 prior to admission.  Has shortness of breath was with mild to moderate exertion and on exposure to cold temperature.  She denies shortness of breath or chest tightness at rest.  She denies any palpitations, dizziness, orthopnea or PND, cough, and ankle swelling. In the emergency room patient had an elevated troponin.  The patient was taken for cardiac catheterization which showed severe multivessel coronary artery disease with a severely depressed ejection fraction and elevated LVEDP.    Procedure(s) (LRB):  CORONARY ARTERY BYPASS GRAFT (CABG) (N/A)  ECHOCARDIOGRAM,TRANSESOPHAGEAL (N/A)  BLOCK, NERVE, INTERCOSTAL, 2 OR MORE (N/A)      Indwelling Lines/Drains at time of discharge:   Lines/Drains/Airways     Peripherally Inserted Central Catheter Line                 PICC Double Lumen 11/07/19 1645 left brachial;left basilic 12 days              Hospital Course: 11/01/2019  The patient is post-operative day 1, status-post coronary artery bypass grafting x 3.     11/02/2019  The patient is post-operative day 2, status-post coronary artery bypass grafting x 3.     11/03/2019  The patient is post-operative day 3, status-post coronary  artery bypass grafting x 3.     11/04/2019  The patient is post-operative day 4, status-post coronary artery bypass grafting x 3.     11/05/2019  The patient is post-operative day 5, status-post coronary artery bypass grafting x 3.     11/06/2019  The patient is post-operative day 6, status-post coronary artery bypass grafting x 3.     11/07/2019  The patient is post-operative day 7, status-post coronary artery bypass grafting x 3.    11/08/2019  The patient is post-operative day 8, status-post coronary artery bypass grafting x 3.    11/09/2019    The patient is postop day 9.  Status post coronary artery bypass grafting x3.    11/10/2019  The patient is postop day 10.  Status post coronary artery bypass grafting x3.    11/11/2019  The patient is post-operative day 11.  Status post coronary artery bypass grafting x3.    11/12/2019  The patient is post-operative day 12.  Status post coronary artery bypass grafting x3.    11/13/2019  The patient is post-operative day 13.  Status post coronary artery bypass grafting x3.    11/14/2019  The patient is post-operative day 14.  Status post coronary artery bypass grafting x3.    11/15/2019  The patient is post-operative day 15.  Status post coronary artery bypass grafting x3.    11/16/2019  The patient is postop day 16.  Status post coronary artery bypass grafting x3.    11/17/2019  The patient is postop day 17.  Status post coronary artery bypass grafting x3    11/18/2019  The patient is post-operative day 18.  Status post coronary artery bypass grafting x3.    11/19/2019  The patient is post-operative day 19.  Status post coronary artery bypass grafting x3.    11/20/2019  The patient is post-operative day 20.  Status post coronary artery bypass grafting x3.    Consults (From admission, onward)        Status Ordering Provider     Consult Case Management/Social Work  Once     Provider:  (Not yet assigned)    Completed LEONA VEGA     Consult to Case Management/Social  Work  Once     Provider:  (Not yet assigned)    Completed LEONA VEGA     Inpatient consult to Cardiology  Once     Provider:  Terry Tarango MD    Completed AMAN LOUIS     Inpatient consult to Hematology/Oncology  Once     Provider:  Dimitri Johnson MD    Acknowledged OSMAN LOUISE     Inpatient consult to PICC team ([facility])  Once     Provider:  (Not yet assigned)    Acknowledged OSMAN LOUIES     Inpatient consult to Pulmonology  Once     Provider:  Ihsan Baez MD    Acknowledged OSMAN LOUISE     Inpatient consult to Pulmonology  Once     Provider:  Ihsan Baez MD    Acknowledged OSMAN LOUISE     Inpatient consult to Registered Dietitian/Nutritionist  Once     Provider:  (Not yet assigned)    Completed LEONA VEGA     Inpatient consult to Registered Dietitian/Nutritionist  Once     Provider:  (Not yet assigned)    Completed YUE WILLIAMSON     Inpatient consult to Social Work  Once     Provider:  (Not yet assigned)    Completed LEONA VEGA     Inpatient consult to Social Work  Once     Provider:  (Not yet assigned)    Completed LEONA VEGA     Inpatient consult to Vascular Surgery  Once     Provider:  (Not yet assigned)    Completed LEONA VEGA     Inpatient consult to Vascular Surgery  Once     Provider:  Timmy Tam MD    Acknowledged LEONA VEGA          Significant Diagnostic Studies: Labs:   BMP:   Recent Labs   Lab 11/19/19  0353 11/19/19  2334 11/20/19  0354   GLU 78 260* 157*   * 129* 133*   K 3.9 4.3 3.9    97 98   CO2 28 25 26   BUN 22 27* 24*   CREATININE 0.7 0.9 0.8   CALCIUM 8.6* 8.6* 8.7   MG 2.3  --  1.9    and CBC   Recent Labs   Lab 11/19/19  0353 11/20/19  0354   WBC 10.09 7.46   HGB 8.1* 7.8*   HCT 26.0* 25.6*    266       Pending Diagnostic Studies:     Procedure Component Value Units Date/Time    APTT [[id number]] Collected:  11/14/19 0715    Order Status:  Sent Lab Status:  In process Updated:   11/14/19 0736    Specimen:  Blood     APTT [[id number]] Collected:  11/14/19 0729    Order Status:  Sent Lab Status:  In process Updated:  11/14/19 0730    Specimen:  Blood     APTT [[id number]] Collected:  11/12/19 1956    Order Status:  Sent Lab Status:  In process Updated:  11/12/19 1956    Specimen:  Blood     APTT [[id number]] Collected:  10/31/19 1532    Order Status:  Sent Lab Status:  In process Updated:  10/31/19 1538    Specimen:  Blood     Basic metabolic panel [[id number]] Collected:  11/16/19 0443    Order Status:  Sent Lab Status:  In process Updated:  11/16/19 0444    Specimen:  Blood     Basic metabolic panel [[id number]] Collected:  11/05/19 0430    Order Status:  Sent Lab Status:  No result     Specimen:  Blood     CBC auto differential [[id number]] Collected:  11/16/19 0443    Order Status:  Sent Lab Status:  In process Updated:  11/16/19 0444    Specimen:  Blood     CBC auto differential [[id number]] Collected:  11/05/19 0430    Order Status:  Sent Lab Status:  No result     Specimen:  Blood     CBC auto differential [[id number]] Collected:  10/31/19 1532    Order Status:  Sent Lab Status:  In process Updated:  10/31/19 1538    Specimen:  Blood     Comprehensive metabolic panel [[id number]] Collected:  10/31/19 1532    Order Status:  Sent Lab Status:  In process Updated:  10/31/19 1538    Specimen:  Blood     Magnesium [[id number]] Collected:  11/16/19 0443    Order Status:  Sent Lab Status:  In process Updated:  11/16/19 0444    Specimen:  Blood     Magnesium [[id number]] Collected:  11/05/19 0430    Order Status:  Sent Lab Status:  No result     Specimen:  Blood     Magnesium [[id number]] Collected:  10/31/19 1532    Order Status:  Sent Lab Status:  In process Updated:  10/31/19 1538    Specimen:  Blood     Phosphorus [[id number]] Collected:  10/31/19 1532    Order Status:  Sent Lab Status:  In process Updated:  10/31/19 1538    Specimen:  Blood     Procalcitonin [[id number]] Collected:  11/05/19 5647     Order Status:  Sent Lab Status:  In process Updated:  11/05/19 1858    Specimen:  Blood           S/P CABG x 3       11/01/2019  The patient is post-operative day 1, status-post coronary artery bypass grafting x 3.  Overall the patient is making progress, however IABP and inotropic agents will continue today.   Neuro:  The patient is awake alert and oriented x3.  Neuro exam is nonfocal.  Pain is well controlled with IV pain medication.  Cardiac:  Patient is hemodynamically improving. Patient remains on epinephrine and milrinone gtts, which will be weaned over the next 24 hours.  Cardiac index is improving. IABP will remain in place over the next 24 hours.  Patient will be started on beta blockers once gtts are weaned off.  Respiratory: Good sats on high flow nasal cannula. Continue respiratory toilet, continue incentive spirometer. Wean to low flow nasal cannula.  GI:  Diet: sips of water with medications, will slowly advance diet as tolerated. Benign abdominal exam.  Renal:  Urine output is slowly improving. Cr 0.9. K 4.3. Mag 1.6. Replace mag. Continue IV diuresis. Add metolazone.   Heme:  Hct 34.1  Platelet 82. Continue aspirin. Discontinue Plavix for now.  Id:  WBC 12. Tmax 100.3. Continue post-operative antibiotics. Will continue to follow.   Endocrine:  Glucose is controlled with insulin gtt.  Continue insulin gtt for now, until weaned off inotropic agents.   Activities:  Patient is currently bed bound due to IABP.  Advance activities as tolerated once IABP is removed.   Lines tubes and drains:  Continue IABP. Continue [place] and Cordis.  Continue A-line.  Chest tubes will continue. NICOLASA x 2 will continue. Jones catheter will continue.  Continue pacer wires. Continue prevena wound vac.  Plan: Remain ICU.          11/02/2019  The patient is post-operative day 2, status-post coronary artery bypass grafting x 3.  Overall the patient is making progress, IABP removed and inotropic agents discontinued.   Neuro:  The  patient is awake alert and oriented x3.  Neuro exam is nonfocal.  Pain is well controlled. Discontinue fentanyl.   Cardiac:  Patient is hemodynamically improving. Inotropic gtts discontinued. Excellent cardiac index. IABP discontinued.  Patient will be started on beta blockers today.  Respiratory: Good sats on high flow nasal cannula. Continue respiratory toilet, continue incentive spirometer. Wean to low flow nasal cannula.  GI:  Advance diabetic/cardiac diet as tolerated. Benign abdominal exam.  Renal:  Urine output is improving. Cr 0.9. K 4.9. Mag 2.2. Continue IV diuresis. Hold AM K.   Heme:  Hct 28.3  Platelet 47. Discontinue aspirin. Discontinue Plavix. HIT panel. Consult hematology r/t thrombocytopenia.   Id:  WBC 10.4. Tmax 99.4. Will continue to follow.   Endocrine:  Discontinue insulin gtt. Start sliding scale insulin. Start long acting insulin detemir.   Activities: Advance activities as tolerated.   Lines tubes and drains:  Discontinue IABP. Discontinue [place] and Cord[place].  Discontinue A-line.  Discontinue chest tubes x 3. Discontinue NICOLASA x 2. Discontinue arterial line. Jones catheter will continue for now.  Continue pacer wires. Continue prevena wound vac. Place PICC line.   Plan: Remain ICU.           11/03/2019  The patient is post-operative day 3, status-post coronary artery bypass grafting x 3.  Overall the patient is making progress.  Neuro:  The patient is awake alert and oriented x3.  Neuro exam is nonfocal.  Pain is well controlled. Continue tramadol.   Cardiac:  Patient is hemodynamically stable. Increase metoprolol dose. Start amlodipine.   Respiratory: Good sats on nasal cannula. Continue respiratory toilet, continue incentive spirometer. Wean to room air.  GI:  Advance diabetic/cardiac diet as tolerated. Benign abdominal exam. Passing gas, no BM. Add 1 bottle of mag citrate.   Renal:  Good urine output. Cr 0.9. K 3.8. Mag 2.0. Replace K. Replace Mag. Change to PO diuresis.   Heme:  Hct 34.7   Platelet 89. Restart aspirin. Continue to hold Plavix. HIT panel pending. Hematology following.  Id:  WBC 13.8. Tmax 99.4. Will continue to follow.    Endocrine:  Glucose controlled on sliding scale insulin. Increase long acting insulin detemir dose.   Activities: Advance activities as tolerated.   Lines tubes and drains:  Storm catheter will continue for now.  Continue pacer wires. Continue prevena wound vac. Continue PICC line.   Plan: Remain ICU.          11/04/2019  The patient is post-operative day 4, status-post coronary artery bypass grafting x 3.  Overall the patient is making progress.  Neuro:  The patient is awake alert and oriented x3.  Neuro exam is nonfocal.  Pain is well controlled. Continue tramadol.   Cardiac:  Patient is hemodynamically stable. Continue metoprolol dose. Continue amlodipine dose.   Respiratory: Good sats on room air. Continue respiratory toilet, continue incentive spirometer.  GI:  Advance diabetic/cardiac diet as tolerated. Benign abdominal exam. Regular bowel movements.   Renal:  Good urine output. Cr 0.7. K 4.0. Mag 2.3. Replace K. Decrease PO diuresis.   Heme:  Hct 34.4  Platelet 140. Continue aspirin. Restart Plavix. HIT panel pending. Hematology following.  Id:  WBC down to 13.13. Tmax 98. Will continue to follow.    Endocrine:  Glucose controlled on sliding scale insulin. Increased long acting insulin detemir dose.   Activities: Advance activities as tolerated.   Lines tubes and drains:  Discontinued storm catheter. Continue pacer wires. Continue prevena wound vac. Continue PICC line.   Plan: Transfer to telemetry. Anticipate discharge to SNF in 48 hours.         11/05/2019  The patient is post-operative day 5, status-post coronary artery bypass grafting x 3.  Overall the patient is making progress.  Neuro:  The patient is awake alert and oriented x3.  Neuro exam is nonfocal. Patient slow to answer questions, however answers are appropriate.  Pain is well controlled. Continue  tramadol PRN.   Cardiac:  Patient is hemodynamically stable. Continue metoprolol dose. Discontinue amlodipine. Start losartan.    Respiratory: Good sats on room air. Continue respiratory toilet, continue incentive spirometer.  GI:  Advance diabetic/cardiac diet as tolerated. Benign abdominal exam. Regular bowel movements.   Renal:  Good urine output. Cr 0.7. K 4.8. Mag 2.1. Replace Mag. Discontinue PO diuresis.   Heme:  Hct 39.5  Platelet 73. Continue aspirin. Hold Plavix. HIT panel pending. Hematology following.  Id:  WBC is up to 16. Tmax 99. Pan-culture. DC PICC line. Chest xray. Start broad spectrum empiric antibiotics. Will continue to follow.    Endocrine:  Glucose controlled on sliding scale insulin. Decrease long acting insulin detemir dose.   Activities: Advance activities as tolerated.   Lines tubes and drains:  Discontinue pacer wires. Discontinue prevena wound vac. Continue PICC line.   Plan: Anticipate discharge to SNF in 24-48 hours.         11/06/2019  The patient is post-operative day 6, status-post coronary artery bypass grafting x 3. Patient had an episode of decompensation and unresponsiveness yesterday evening. The patient was found unresponsive, cool, and diaphoretic, with agonal breathing. Patient was intubated and transferred to the ICU. Bedside EKG and echocardiogram performed at the bedside showed no significant changes. CT head, chest, abdomen pelvis performed, showed no significant findings.     Neuro:  The patient is intubated and sedated. On Precedex gtt. Begin weaning off sedation.    Cardiac:  Patient is hemodynamically improving. Inotropic agent dependent. Currently on Epinephrine gtt. Start Milrinone gtt. Discontinue metoprolol. Discontinue losartan.    Respiratory: Ventilator dependent. tV 450, RR 20, PEEP 5. Wean down sedation. Wean off ventilator today.    GI:  Begin enteral tube feedings. Benign abdominal exam. Previously having regular bowel movements.   Renal: Adequate urine  output. Cr 1.1. K 3.6. Mag 1.7. Replace Mag. Replace K. Discontinue diuresis.   Heme:  Hct 31.9  Platelet 89. Hold aspirin. Hold Plavix. HIT panel negative. Patient received 1 units PRBC last night. Hematology following.  Id:  WBC is down to 15.5. Tmax 99.9. BC NGTD. Urinalysis negative. Sputum culture negative. Vancomycin and zosyn day 2. Will continue to follow closely.    Endocrine:  Glucose controlled on insulin gtt, continue at this time.   Activities: Intubated and sedated.   Musculoskeletal: Unable to find right radial pulse via doppler. Tips of the fingers of the right hand are mottled, dusky, appear cyanotic. Recent right radial arterial line in place, which has been relocated. US of arteries and veins of the right arm ordered. Consult to vascular surgery placed.    Lines tubes and drains:  Continue swan and cordis. Continue arterial line. Continue storm.    Plan: Remain ICU         11/07/2019  The patient is post-operative day 7, status-post coronary artery bypass grafting x 3. Patient had an episode of decompensation and unresponsiveness the evening of 11/05/2019. The patient was found unresponsive, cool, and diaphoretic, with agonal breathing. Patient was intubated and transferred to the ICU. Bedside EKG and echocardiogram performed at the bedside showed no significant changes. CT head, chest, abdomen pelvis performed, showed no significant findings.     Neuro:  The patient is intubated and sedated. On Precedex gtt. Wean off sedation.    Cardiac:  Patient is hemodynamically improving. Excellent cardiac index. Wean off Inotropic gtts.   Respiratory: Wean towards extubation. tV 400, RR 18, PEEP 5.   GI:  Continue enteral tube feedings while intubated. Benign abdominal exam.  Renal: Adequate urine output. Cr 1.1. K 4.1. Mag 2.8. Start IV diuresis.   Heme:  Hct 24.6  Platelet 60. Hold aspirin. Hold Plavix. HIT panel negative.  Id:  WBC is down to 14.46. Tmax 100.8. BC NGTD. Urinalysis negative. Sputum  culture negative. Vancomycin and zosyn day 3. Midline sternal incision appears reddened, with induration at skin edges. Will continue to follow closely.    Endocrine:  Glucose controlled on sliding scale insulin.   Activities: Intubated and sedated. Moves all 4. Full ROM.    Musculoskeletal: Able to find right radial pulse via doppler. The right hand extending to the tips of the fingers of the right hand are mottled, dusky, appear cyanotic. Recent right radial arterial line placement, which has been relocated. US of arteries and veins of the right arm pending. Vascular surgery following, suggested possible thrombotic etiology, suggested heparin gtt, however this is contraindicated r/t s/p cabg x 3.     Lines tubes and drains:  Continue swan and cordis. Continue arterial line. Continue storm.    Plan: Remain ICU         11/08/2019  The patient is post-operative day 8, status-post coronary artery bypass grafting x 3. Patient had an episode of decompensation and unresponsiveness the evening of 11/05/2019. The patient was found unresponsive, cool, and diaphoretic, with agonal breathing. Patient was intubated and transferred to the ICU. Bedside EKG and echocardiogram performed at the bedside showed no significant changes. CT head, chest, abdomen pelvis performed, showed no significant findings. Suspect cardiogenic shock.      Neuro:  Patients neurologic state is improving. Alert, follows commands, makes eye contact, disoriented x 4. No focal deficits.    Cardiac:  Patient is hemodynamically improving. Off all inotropic gtts. Start low dose metoprolol and amlodipine.   Respiratory: Good sats on low flow nasal cannula. Continue IS. Continue pulmonary toilet. Wean to room air.    GI:  Continue enteral tube feedings at this time r/t disorientation and aspiration risk. Discontinue laxatives. Start metamucil BID, patient has had multiple loose stools.   Renal: Good urine output. Cr 0.8. K 3.6. Mag 2.1. Replace K. Replace Mag.  Continue IV diuresis.   Hema:  Hct 26.4  Platelet 45. Hold aspirin. Hold Plavix. HIT panel will be resent as the patient is a high suspicion for HIT despite previous negative HIT panel. Multiple DVTs from the right IJ down to the brachiocephalic vein. Confirmed via vascular US. Patient started on argatroban yesterday, goal is APTT 2x baseline. Most recent APTT 97.8. Baseline APTT 50.8.  Hematology following. Vascular surgery following.   Id:  WBC is down to 12. Tmax 100.4. BC NGTD. Urinalysis negative. Sputum culture negative. Vancomycin and zosyn day 4. Midline sternal incision appears improved, with minimal redness and induration at skin edges. Will continue to follow closely.    Endocrine:  Glucose controlled on sliding scale insulin.   Activities: Increase activities as tolerated.  Musculoskeletal: ROM 2/5 to the right wrist/hand/fingers, distal from the elbow there is 2+ non pitting edema, the extremity is tender and warm. There is mottling, a dusky appearance, and purple discoloration to the fingertips of the right hand. See above hematology plan.      Lines tubes and drains:  Continue PICC line. Continue storm catheter. Continue NG tube.   Plan: Remain ICU     11/09/2019  The patient is postop day 9.  Status post coronary artery bypass grafting x3.  Postop course was complicated by an episode of hemodynamic instability and decompensation on postop day number 5.  Patient continues to make clinical progress since then.  She is off all drips.  She remains extubated.  Continue ICU care for now.    Neuro:  Patient is awake alert.  She is oriented x2.  She is still confused.  But she is making progress.  Her neuro exam is nonfocal.  She follows all commands.  Cardiac:  Patient continues to be hemodynamically stable.  She is somewhat hypertensive.  Will increase her antihypertensives.  Respiratory:  Patient is good sats on nasal cannula.  She remains extubated.  GI:  Patient was tolerating tube feeds until this  morning.  She pulled heart feeding tube. Will get a swallowing evaluation.  Mode of nutrition will depend on results of swallowing evaluation.  Renal:  Patient has good urine output %period% creatinine is 0.9.  Heme:  Hematocrit is 26 platelet is 46.  Patient is on argatroban  for anticoagulation due to right upper extremity DVTs.  Hypercoagulability workup is in progress.  Appreciate Hematology and vascular surgery input.  Id:  Incision continues to improve.  white count is 17.  Continue broad-spectrum antibiotics.  Endocrine:  Glucose is controlled on its sliding scale insulin.  Activities:  Advance activities as tolerated.  Line tubes and drains.  Patient has a PICC line, Jones catheter.    11/10/2019  The patient is postop day 10.  Status post coronary bypass grafting x3.  Postop course was complicated by an episode of hemodynamic instability and decompensation on postop day 5.  Patient remains hemodynamically stable.    Neuro:  The patient is awake and alert.  She is oriented x1.  She continues to be confused.  Her neuro exam is nonfocal.  Cardiac:  Patient continues to be hemodynamically stable.  His she is hypertensive.  Hypertensive medications will be increased.  Respiratory:  Patient has good sats on nasal cannula.  Continue pulmonary toilet.  GI:  Patient is tolerating a regular diet.  Renal:  Patient has good urine output. Creatinine is 0.7.  Heme: hematocrit is 24 and platelet is 67.  Patient continues on argatroban for right upper extremity DVTs.  Hypercoagulable workup is in progress.  Id:  Patient's white count is up to 20.  Patient is on broad prepped from antibiotics.  Endocrine:  The patient is on long-acting as well as sliding scale insulin.  Blood sugars have been well controlled.  Will decrease dose of long-acting.  Activities: patient has been out of bed to chair.  Advance as tolerated.  Musculoskeletal:  Patient's right upper extremity continues to be swollen  The index finger and thumb  continues to be dusky with a bleb near the base of the thumb.  Continue argatroban for anticoagulation.  Continue elevation of right upper extremity.  Lines tubes and drains: patient has a PICC line.  Will discontinue Jones catheter.        11/11/2019  The patient is post-operative day 11, status-post coronary artery bypass grafting x 3. Post-operative course was complicated by an episode of hemodynamic instability and decompensation on postop day 5.  Patient remains hemodynamically stable.    Neuro:  Patients neurologic state is improving. Oriented x 2. Alert, follows commands, makes eye contact. No focal deficits.    Cardiac:  Patient is hemodynamically improving. Hypertensive. Continue metoprolol, amlodipine, and losartan. Add chlorthalidone.   Respiratory: Good sats on room air. Continue IS. Continue pulmonary toilet.  GI:  Continue cardiac/diabetic diet, modified by puree and thick liquids with swallow precautions. Advance as tolerated. Continue metamucil.  Renal: Good urine output. Cr 0.8. K 3.8. Replace K. Continue IV diuresis. Add chlorthalidone. BMP at noon.   Hema:  Hct 23.8  Platelet 75. Hold aspirin. Hold Plavix. Repeat HIT panel pending. Multiple DVTs from the right IJ down to the brachiocephalic vein. Confirmed via vascular US. Patient on argatroban gtt, goal is APTT 2x baseline. Most recent APTT 83.3. Baseline APTT 50.8.  Hematology following. Vascular surgery following.   Id:  WBC is down to 19. Tmax 98.4. BC NGTD. Vancomycin and zosyn day 7. Discontinue broad spectrum antibiotics. Will continue to follow closely.    Endocrine:  Glucose controlled on sliding scale insulin and long acting insulin detemir.    Activities: Increase activities as tolerated.  Musculoskeletal: The right upper extremity exhibits swelling distal from the elbow. The RUE is tender to palpation and ROM. ROM is diminished to the right hand 2/5. The hand his reddened, swollen, with dusky, mottled, purple discoloration to the right  thumb and tip of the right index finger. Blisters have formed on the proximal portion of the thumb and wrist. Severely diminished capillary refill to the tip of the right thumb and tip of the right index finger. Wound care order placed.      Lines tubes and drains:  Continue PICC line.  Plan: Remain ICU         11/12/2019  The patient is post-operative day 12, status-post coronary artery bypass grafting x 3. Post-operative course was complicated by an episode of hemodynamic instability and decompensation on postop day 5.  Patient remains hemodynamically stable.    Neuro:  Patients neurologic state is improving. Oriented to person, place, situation. Disoriented to time. Alert, follows commands, makes eye contact. No focal deficits.    Cardiac:  Patient is hemodynamically improving. Continue metoprolol, amlodipine, losartan, and chlorthalidone.   Respiratory: Good sats on room air. Continue IS.  GI:  Continue cardiac/diabetic diet, modified by puree and thick liquids with swallow precautions. Advance as tolerated. Continue metamucil.  Renal: Good urine output. Cr 0.8. K 3.9. Replace K. Discontinue lasix. Continue chlorthalidone.  Hema:  Hct 23.1  Platelet 75. Hold aspirin. Hold Plavix. 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 Multiple DVTs from the right IJ down to the brachiocephalic vein. Confirmed via vascular US. Patient on argatroban gtt, goal is APTT 2x baseline. Most recent APTT 84.6. Baseline APTT 50.8.  Hematology following. Vascular surgery following.   Id:  WBC is down to 17.4. A-febrile. Will continue to follow closely.    Endocrine:  Patient had an episode of hypoglycemia last night. Will continue sliding scale insulin. Discontinue long acting insulin. Begin strict calorie count.   Activities: Increase activities as tolerated.  Musculoskeletal: The right upper extremity exhibits swelling distal from the elbow. The RUE is tender to palpation and ROM. ROM is diminished to the right hand 3/5. The hand is  reddened, swollen, with dusky, mottled, purple discoloration to the right thumb tip and tip of the right index finger.  Severely diminished capillary refill to the tip of the right thumb and tip of the right index finger. Wound care following. Gauze dressing in place.       Lines tubes and drains:  Continue PICC line.  Plan: Remain ICU. Begin discharge planning for IP rehab.          11/13/2019  The patient is post-operative day 13, status-post coronary artery bypass grafting x 3. Post-operative course was complicated by an episode of hemodynamic instability and decompensation on postop day 5.  Patient remains hemodynamically stable.    Neuro:  Patient is oriented to person, place, situation. Disoriented to time. Alert, follows commands, makes eye contact. No focal deficits.    Cardiac:  Patient is hemodynamically stable. Continue metoprolol, amlodipine, losartan, and chlorthalidone.   Respiratory: Good sats on room air. Continue IS. Use of accessory muscles noted during inspiration. Will increase diuresis.   GI:  Continue cardiac/diabetic diet, modified by puree and thick liquids with swallow precautions. Advance as tolerated. Continue metamucil.  Renal: Good urine output. Cr 0.8. K 4.0. Mag 1.8. Replace Mag. Start lasix IV. Continue chlorthalidone.  Hema:  Hct 23.  Platelet 79. Hold aspirin. Hold Plavix. 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 Multiple DVTs from the right IJ down to the brachiocephalic vein. Confirmed via vascular US. Patient on argatroban gtt, goal is APTT 2x baseline. Most recent APTT 75.6. Baseline APTT 50.8.  Hematology following. Vascular surgery following.   Id:  WBC is down to 12.2. A-febrile. Will continue to follow closely.    Endocrine:  Glucose controlled. Continue sliding scale insulin. Start long acting insulin detemir 5 units daily.     Activities: Increase activities as tolerated.  Musculoskeletal: The right upper extremity exhibits swelling distal from the elbow. The RUE is  tender to palpation and ROM. ROM is diminished to the right hand 2/5. The right hand is improving. The dusky, mottled, purple discoloration is localized to the right thumb tip and tip of the right index finger.  Severely diminished capillary refill to the tip of the right thumb and tip of the right index finger. Wound care following. Gauze dressing in place.    Lines tubes and drains:  Continue PICC line.  Plan: Remain telemetry. Continue discharge planning for IP rehab.          11/14/2019  The patient is post-operative day 14, status-post coronary artery bypass grafting x 3. Post-operative course was complicated by an episode of hemodynamic instability and decompensation on postop day 5.  Patient remains hemodynamically stable.    Neuro:  Patient is oriented to person, place, situation. Disoriented to time. Alert, follows commands, makes eye contact. No focal deficits.    Cardiac:  Patient is hemodynamically stable. Continue metoprolol, amlodipine, losartan, and chlorthalidone.   Respiratory: Good sats on room air. Continue IS. Decreased use of accessory muscles noted during inspiration.   GI:  Continue cardiac/diabetic diet, modified by puree and thick liquids with swallow precautions. Advance as tolerated. Continue metamucil.  Renal: Good urine output. Cr 0.9. K 4.2. Mag 1.9. Replace Mag. Discontinue lasix. Continue chlorthalidone.  Hema:  Hct 21.9.  Platelet 87. Hold aspirin. Hold Plavix. 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 Multiple DVTs from the right IJ down to the brachiocephalic vein. Confirmed via vascular US. As per discussion with hematology and pharmacy, will discontinue argatroban and start apixaban. Transfuse 1 unit PRBC.  Hematology following. Vascular surgery following.   Id:  WBC is down to 11. A-febrile. Will continue to follow closely.    Endocrine:  Continue sliding scale insulin. Increase insulin detemir to 5 units BID.     Activities: Increase activities as tolerated.  Musculoskeletal:  The right upper extremity exhibits swelling distal from the elbow. The RUE is tender to palpation and ROM. ROM is diminished to the right hand 2/5. The right hand is slowly improving. The dusky, mottled, purple discoloration is localized to the right thumb tip and tip of the right index finger.  Severely diminished capillary refill to the tip of the right thumb and tip of the right index finger. Wound care following. Gauze dressing in place.    Lines tubes and drains:  Continue PICC line.  Plan: Remain telemetry. Continue discharge planning for IP rehab.          11/15/2019  The patient is post-operative day 15, status-post coronary artery bypass grafting x 3. Post-operative course was complicated by an episode of hemodynamic instability and decompensation on postop day 5.  Patient remains hemodynamically stable.    Neuro:  Patient is oriented to person, place, situation. Disoriented to time. Alert, follows commands, makes eye contact. No focal deficits.    Cardiac:  Patient is hemodynamically stable. Continue metoprolol, amlodipine, losartan, and chlorthalidone.   Respiratory: Good sats on low flow nasal cannula. Wean to room air. Continue IS.   GI:  Continue cardiac/diabetic diet, modified by puree and thick liquids with swallow precautions. Advance as tolerated. Continue metamucil.  Renal: Good urine output. Cr 0.8. K 3.6. Replace K. Continue chlorthalidone.  Hema:  Hct 26.1. Platelet 99. Start Aspirin. Continue Apixaban 5mg BID.  Hematology following. Vascular surgery following.   Id:  WBC 11. A-febrile. Will continue to follow closely.    Endocrine:  Continue sliding scale insulin. Increase insulin detemir to 5 units AM dose, 10 units PM dose.   Activities: Increase activities as tolerated.  Musculoskeletal: The right upper extremity exhibits swelling distal from the elbow. The RUE is tender to palpation and ROM. ROM is diminished to the right hand 2/5. The right hand is slowly improving. The dusky, mottled,  purple discoloration is localized to the right thumb tip and tip of the right index finger.  Severely diminished capillary refill to the tip of the right thumb and tip of the right index finger. Wound care following. Gauze dressing in place.    Lines tubes and drains:  Continue PICC line.  Plan: Remain telemetry. Begin discharge planning to LTAC, anticipate discharge in 48-72 hours.        11/16/2019  The patient is postop day 16.  Status post coronary artery bypass grafting x3.  Patient's postop course was complicated by an episode of hemodynamically instability and decompensation on postop day 5.  Since then the patient has made much clinical progress.  The patient remains hemodynamically stable.  Anticipate discharge to rehab or LTAC in the next 48-72 hours.    Neuro:  The patient is oriented x3.  She is much less confused.  Her responses to questioning is appropriate.  She moves all 4 she has no focal deficits.  Cardiac:  Patient remains hemodynamically stable.  Systolic blood pressure trending higher.  Will increase losartan.  Continue amlodipine metoprolol and chlorthalidone.  Respiratory:  The patient has good sats on room air.  Continue pulmonary toilet.  GI:  Patient is tolerating a regular the diet.  Renal:  Patient has good urine output.  creatinine is 0.7.  Heme:  Hematocrit is 30.  Platelet count is up to 129.  Continue aspirin and apixaban.  Id:  White count is 10.  The patient is afebrile.  Endocrine:  Glucose is better controlled on long-acting insulin.  Activities patient is out of bed to chair continue increasing activities.  Line tubes and drains:  Patient has a PICC line continue PICC line.  Musculoskeletal:  Perfusion to the patient's right arm is much improved.  Swelling is somewhat decreased.  The area of dusky tissue remained isolated to the tip of the patient's right thumb and index finger.    11/17/2019  The patient is postop day 17.  Status post coronary artery bypass grafting x3.  The  patient's postop course was complicated by an episode of hemodynamic instability ankle decompensation on postop day 5.  The patient is making clinical progress.  She has remained hemodynamically stable.  Anticipate discharge to an LTAC or rehab center in the next 48-72 hours.    Neuro:  The patient is alert and oriented x3.  Her confusion is much improved.  Her responses to all questions appropriately.  She moves all 4 and has no focal deficits.  Cardiac:  The patient remains hemodynamically stable.  She is tolerating losartan amlodipine metoprolol and chlorthalidone.  Respiratory:  The patient has good sats on room air.  Continue pulmonary toilet.  GI:  The patient is tolerating a regular diet.  Renal:  The patient has good urine output and had creatinine 0.7.  Heme:  The patient's hematocrit is 27 and her platelet count is increased to 155.  Continue aspirin and apixaban.  Id:  Patient is afebrile and her white count is 8.  Endocrine high glucose is better controlled on long-acting insulin plus sliding scale.  Activities:  The patient is out of bed to chair.  Continue advancing activities.  Line tubes and drains:  The patient has a PICC line.  Musculoskeletal:  Perfusion to the patient's right and continues to be improved.  However high right am is still swollen.  The area of dusky tissue remained stable.        11/18/2019  The patient is post-operative day 18, status-post coronary artery bypass grafting x 3. Post-operative course was complicated by an episode of hemodynamic instability and decompensation on postop day 5.  Patient remains hemodynamically stable. Patient is ready for discharge to an LTAC facility pending acceptance.     Neuro:  Patient is AAOX3, follows commands, makes eye contact. No focal deficits.    Cardiac:  Patient is hemodynamically stable. Continue metoprolol, amlodipine, losartan, and chlorthalidone.   Respiratory: Good sats on room air Continue IS.   GI:  Continue cardiac/diabetic  diet.  Renal: Good urine output. Cr 0.8. K 4.0. Mag 2.0. Replace Mag. Continue chlorthalidone.  Hema:  Hct 27.9. Platelet 185. Continue Aspirin. Continue Apixaban 5mg BID.   Id:  WBC 10.5. A-febrile. Will continue to follow closely.    Endocrine: Glucose well controlled.  Continue sliding scale insulin. Continue insulin detemir to 5 units AM dose, 10 units PM dose.   Activities: Increase activities as tolerated.  Musculoskeletal: The right upper extremity exhibits swelling distal from the elbow. The RUE continues to improve, coloration of the right hand is improving. Tender to palpation and ROM. ROM is improved but diminished to the right hand 3/5. The dusky, mottled, purple discoloration is localized to the right thumb tip and tip of the right index finger.  Severely diminished capillary refill to the tip of the right thumb and tip of the right index finger. Wound care following. Gauze dressing in place.    Lines tubes and drains:  Continue PICC line.  Plan: Remain telemetry. Patient is ready for discharge to an LTAC facility pending acceptance.          11/19/2019  The patient is post-operative day 19, status-post coronary artery bypass grafting x 3. Post-operative course was complicated by an episode of hemodynamic instability and decompensation on postop day 5.  Patient remains hemodynamically stable.     Today, the patient slid out of her bed and onto her buttocks early this morning, no evidence of trauma or injury. Denies head trauma. Patient will have a bedside sitter. Patient has tenderness, erythema, warmth to the left foot with dusky discoloration of the tips of the toes of the left foot. Patient will have an ultrasound and be assessed by vascular surgery today. Patient has [facility] facility acceptance, discharge will likely be postponed today.     Neuro:  Patient is AAOX3, follows commands, makes eye contact. No focal deficits.    Cardiac:  Patient is hemodynamically stable. Continue metoprolol,  amlodipine, losartan, and chlorthalidone.   Respiratory: Good sats on room air Continue IS.   GI:  Continue cardiac/diabetic diet.  Renal: Good urine output. Cr 0.7. K 3.9. Mag 2.3. Replace K. Continue chlorthalidone.  Hema:  Hct 26. Platelet 205. Continue Aspirin. Continue Apixaban 5mg BID.   Id:  WBC 10. A-febrile. Will continue to follow closely.    Endocrine: Glucose well controlled.  Continue sliding scale insulin. Continue insulin detemir 5 units AM dose, 10 units PM dose.   Activities: Increase activities as tolerated.  Musculoskeletal: The right upper extremity exhibits swelling distal from the elbow. The RUE continues to improve, coloration of the right hand is improving. Tender to palpation and ROM. ROM continues to be diminished to the right hand 3/5. The dusky, mottled, purple discoloration is localized to the right thumb tip and tip of the right index finger.  Severely diminished capillary refill to the tip of the right thumb and tip of the right index finger. Wound care following. Gauze dressing in place.    Lines tubes and drains:  Continue PICC line.  Plan: Remain telemetry. Patient will have an ultrasound and be assessed by vascular surgery today. Patient has [facility] facility acceptance, discharge will likely be postponed today.          11/20/2019  The patient is post-operative day 20, status-post coronary artery bypass grafting x 3. Post-operative course was complicated by an episode of hemodynamic instability and decompensation on postop day 5.  Patient remains hemodynamically stable.     Anticipate discharge to [facility] today.     Neuro:  Patient is AAOX2, disoriented to time. Follows commands, makes eye contact. No focal deficits.    Cardiac:  Patient is hemodynamically stable. Continue metoprolol, amlodipine, losartan, and chlorthalidone.   Respiratory: Good sats on room air Continue IS.   GI:  Continue cardiac/diabetic diet.  Renal: Good urine output. Cr 0.8. K 3.9. Mag 1.9. Replace  K. Replace Mag. Continue chlorthalidone.  Hema:  Hct 25.6. Platelet 266. Continue Aspirin. Continue Apixaban 5mg BID.   Id:  WBC 10. A-febrile. Will continue to follow closely.    Endocrine: Glucose controlled. Start 4 units of insulin aspart daily at lunch. Continue insulin detemir 5 units AM dose, 10 units PM dose.   Activities: Increase activities as tolerated.  Musculoskeletal: The RUE continues to improve, coloration of the right hand is improving. Tender to palpation and ROM. ROM improving but diminished to the right hand 4/5. The dusky, mottled, purple discoloration is localized to the right thumb tip and tip of the right index finger.  Severely diminished capillary refill to the tip of the right thumb and tip of the right index finger. Continue daily dressing changes after discharge.    Lines tubes and drains:  Discontinue PICC line prior to discharge.  Plan: Anticipate discharge to [facility] today.          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]  No    Acute deep vein thrombosis (DVT) of non-extremity vein Right IJ [I82.90] 11/08/2019 No    Critical illness myopathy [G72.81] 11/08/2019 No    Edema of hand [R60.0] 11/07/2019 No    Cardiogenic shock [R57.0] 11/06/2019 No    Thrombocytopenia [D69.6] 11/01/2019 No    CAD, multiple vessel [I25.10] 10/31/2019 Yes    S/P CABG x 3 [Z95.1] 10/31/2019 Not Applicable    Acute combined systolic and diastolic CHF, NYHA class 2 [I50.41] 10/29/2019 Yes    NSTEMI (non-ST elevated myocardial infarction) [I21.4] 10/28/2019 Yes    Abnormal EKG [R94.31] 10/28/2019 Yes    Iron deficiency anemia [D50.9] 04/01/2018 Yes    Hyperlipidemia associated with type 2 diabetes mellitus [E11.69, E78.5] 03/28/2018 Yes    Diabetes mellitus type II, uncontrolled [E11.65]  Yes      Chronic    Obesity (BMI 30.0-34.9) [E66.9]  Yes    Anxiety and depression [F41.9, F32.9] 09/25/2013 Yes      Problems Resolved During this Admission:    Diagnosis Date Noted Date Resolved POA    Hyponatremia [E87.1] 11/07/2019 11/12/2019 No    Septic shock [A41.9, R65.21] 11/05/2019 11/08/2019 No    Nontraumatic hemorrhagic shock [R57.8] 11/05/2019 11/07/2019 No    Lactic acid acidosis [E87.2] 11/05/2019 11/07/2019 No    Metabolic acidemia [E87.2] 11/05/2019 11/07/2019 No    Oliguria [R34] 11/05/2019 11/07/2019 No    Acute hypoxemic respiratory failure [J96.01] 10/31/2019 11/13/2019 No    Acute blood loss anemia [D62] 10/31/2019 11/07/2019 No    Hypertension associated with diabetes [E11.59, I10] 08/13/2013 11/12/2019 Yes      Discharged Condition: good    Disposition: Long Term Care    Follow Up:  Follow-up Information     [facility].    Specialty:  LTAC  Why:  LTAC  Contact information:  [address]  [place] [address]  [phone]             Serenity Kilpatrick MD In 1 week.    Specialty:  Family Medicine  Contact information:  [address] [place] [address] [place] [address]  [phone]             Keny Zaidi MD On 11/27/2019.    Specialty:  Cardiothoracic Surgery  Contact information:  [address] [place] [address] [place] [address]  [phone]             Randolph Lopez MD In 2 weeks.    Specialty:  Cardiology  Contact information:  [address] [facility]  [place] [address]  [phone]             Paolo Dior MD In 1 week.    Specialty:  Vascular Surgery  Contact information:  [address]  [address] [place] [address]  [place] [address]  [phone]             Dimitri Johnson MD In 2 weeks.    Specialty:  Hematology and Oncology  Contact information:  [address]  [place] [address]  [phone]                 Patient Instructions:      Ambulatory Referral to Hematology / Oncology   Referral Priority: Routine Referral Type: Consultation   Referral Reason: Specialty Services  Required   Requested Specialty: Hematology and Oncology   Number of Visits Requested: 1     Ambulatory Referral to Vascular Surgery   Referral Priority: Routine Referral Type: Consultation   Referral Reason: Specialty Services Required   Requested Specialty: Vascular Surgery   Number of Visits Requested: 1     Diet diabetic     Diet Cardiac     Diet Cardiac     Diet diabetic     Lifting restrictions   Order Comments: No lifting more than 10 lbs x 8 weeks.     Other restrictions (specify):   Order Comments: Sternal precautions x 8 weeks.     No driving until:   Order Comments: No driving x 8 weeks.     Notify your health care provider if you experience any of the following:  temperature >100.4     Notify your health care provider if you experience any of the following:  persistent nausea and vomiting or diarrhea     Notify your health care provider if you experience any of the following:  severe uncontrolled pain     Notify your health care provider if you experience any of the following:  redness, tenderness, or signs of infection (pain, swelling, redness, odor or green/yellow discharge around incision site)     Notify your health care provider if you experience any of the following:  difficulty breathing or increased cough     Notify your health care provider if you experience any of the following:  severe persistent headache     Notify your health care provider if you experience any of the following:  worsening rash     Notify your health care provider if you experience any of the following:  persistent dizziness, light-headedness, or visual disturbances     Notify your health care provider if you experience any of the following:  increased confusion or weakness     Notify your health care provider if you experience any of the following:     Change dressing (specify)   Order Comments: Dressing change: Daily, with dry sterile gauze.     Lifting restrictions   Scheduling Instructions: No lifting more than 10 lbs x 8  weeks.     No driving until:   Order Comments: No driiving x 8 weeks.     Notify your health care provider if you experience any of the following:  temperature >100.4     Notify your health care provider if you experience any of the following:  persistent nausea and vomiting or diarrhea     Notify your health care provider if you experience any of the following:  severe uncontrolled pain     Notify your health care provider if you experience any of the following:  redness, tenderness, or signs of infection (pain, swelling, redness, odor or green/yellow discharge around incision site)     Notify your health care provider if you experience any of the following:  difficulty breathing or increased cough     Notify your health care provider if you experience any of the following:  severe persistent headache     Notify your health care provider if you experience any of the following:  worsening rash     Notify your health care provider if you experience any of the following:  persistent dizziness, light-headedness, or visual disturbances     Notify your health care provider if you experience any of the following:  increased confusion or weakness     Notify your health care provider if you experience any of the following:     Change dressing (specify)   Order Comments: Dressing change: Daily, using sterile gauze.     Activity as tolerated   Order Comments: Sternal precautions x 8 weeks.     Activity as tolerated   Order Comments: Sternal precautions x 8 weeks.     Medications:  Reconciled Home Medications:      Medication List      START taking these medications    apixaban 5 mg Tab  Commonly known as:  ELIQUIS  Take 1 tablet (5 mg total) by mouth 2 (two) times daily.     chlorthalidone 25 MG Tab  Commonly known as:  HYGROTEN  Take 1 tablet (25 mg total) by mouth once daily.     ferrous sulfate 325 (65 FE) MG EC tablet  Take 1 tablet (325 mg total) by mouth 2 (two) times daily.     losartan 100 MG tablet  Commonly known  as:  COZAAR  Take 1 tablet (100 mg total) by mouth once daily.     metoprolol tartrate 50 MG tablet  Commonly known as:  LOPRESSOR  Take 1 tablet (50 mg total) by mouth 2 (two) times daily.     nitroGLYCERIN 2% TD oint 2 % ointment  Commonly known as:  NITROGLYN  Apply 0.5 inches topically every 12 (twelve) hours. for 14 days     pantoprazole 40 MG tablet  Commonly known as:  PROTONIX  Take 1 tablet (40 mg total) by mouth once daily.        CHANGE how you take these medications    amLODIPine 10 MG tablet  Commonly known as:  NORVASC  Take 1 tablet (10 mg total) by mouth once daily.  What changed:    · medication strength  · how much to take     aspirin 81 MG EC tablet  Commonly known as:  ECOTRIN  Take 1 tablet (81 mg total) by mouth once daily.  What changed:    · medication strength  · how much to take     atorvastatin 80 MG tablet  Commonly known as:  LIPITOR  Take 1 tablet (80 mg total) by mouth every evening.  What changed:  when to take this     * Contour Next Test Strips Strp  Generic drug:  blood sugar diagnostic  1 strip by Misc.(Non-Drug; Combo Route) route 3 -4 times daily. Conto  What changed:  Another medication with the same name was added. Make sure you understand how and when to take each.     * blood sugar diagnostic Strp  1 strip 4 (four) times daily.  What changed:  Another medication with the same name was added. Make sure you understand how and when to take each.     * Contour Next Test Strips Strp  Generic drug:  blood sugar diagnostic  USE ONE STRIP THREE TIMES A DAY  What changed:  You were already taking a medication with the same name, and this prescription was added. Make sure you understand how and when to take each.     insulin aspart U-100 100 unit/mL injection  Commonly known as:  NovoLOG U-100 Insulin aspart  Inject 4 Units into the skin with lunch.  What changed:    · how much to take  · when to take this  · additional instructions         * This list has 3 medication(s) that are the  "same as other medications prescribed for you. Read the directions carefully, and ask your doctor or other care provider to review them with you.            CONTINUE taking these medications    blood-glucose meter Misc  IDDM 250.00  Insurance or Pt choice     flash glucose scanning reader Misc  Commonly known as:  FreeStyle Ankita 14 Day [place]  1 Device by Misc.(Non-Drug; Combo Route) route continuous.     flash glucose sensor Kit  Commonly known as:  FreeStyle Ankita 14 Day Sensor  Use 1 sensor every 14 days.     lancets Misc  Commonly known as:  Microlet Lancet  1 lancet by Misc.(Non-Drug; Combo Route) route 3 (three) times daily.     pen needle, diabetic 32 gauge x 5/32" Ndle  1 each by Misc.(Non-Drug; Combo Route) route 3 (three) times daily.     syringe with needle 3 mL 25 gauge x 1" Syrg  Commonly known as:  Syringe 3cc/25Gx1"  Use as Directed     venlafaxine 150 MG Cp24  Commonly known as:  EFFEXOR-XR  Take 1 capsule (150 mg total) by mouth once daily.        STOP taking these medications    betamethasone valerate 0.1% 0.1 % Crea  Commonly known as:  VALISONE     clobetasol 0.05 % external solution  Commonly known as:  TEMOVATE     cyanocobalamin 1,000 mcg/mL injection     glyBURIDE 5 MG tablet  Commonly known as:  DIABETA     hydrOXYzine HCl 10 MG Tab  Commonly known as:  ATARAX     insulin glargine (TOUJEO) 300 unit/mL (1.5 mL) Inpn pen  Commonly known as:  Toujeo SoloStar U-300 Insulin     ketoconazole 2 % shampoo  Commonly known as:  NIZORAL     levocetirizine 5 MG tablet  Commonly known as:  Xyzal     metFORMIN 1000 MG tablet  Commonly known as:  GLUCOPHAGE     nystatin powder  Commonly known as:  MYCOSTATIN     sodium,potassium,mag sulfates 17.5-3.13-1.6 gram Solr  Commonly known as:  Suprep Bowel Prep Kit     sub-q insulin device, 20 unit Naty  Commonly known as:  V-GO 20     triamcinolone acetonide 0.1% 0.1 % cream  Commonly known as:  KENALOG          Time spent on the discharge of patient: 30 " minutes    Yovani Dobbins NP  Cardiothoracic Surgery  [facility]

## 2019-11-20 NOTE — SUBJECTIVE & OBJECTIVE
"Medications Prior to Admission   Medication Sig Dispense Refill Last Dose    insulin aspart U-100 (NOVOLOG U-100 INSULIN ASPART) 100 unit/mL injection Inject 60 Units into the skin continuous. Via VGO insulin pump. 2 vial 3 10/28/2019    metFORMIN (GLUCOPHAGE) 1000 MG tablet TAKE 1 TABLET BY MOUTH TWICE A DAY WITH MEALS 180 tablet 0 10/27/2019    [DISCONTINUED] amLODIPine (NORVASC) 2.5 MG tablet TAKE 1 TABLET BY MOUTH EVERY DAY 90 tablet 0 10/27/2019    [DISCONTINUED] atorvastatin (LIPITOR) 80 MG tablet TAKE 1 TABLET BY MOUTH EVERY DAY 90 tablet 0 10/27/2019    [DISCONTINUED] betamethasone valerate 0.1% (VALISONE) 0.1 % Crea Apply topically 2 (two) times daily. 45 g 2 10/27/2019    [DISCONTINUED] cyanocobalamin 1,000 mcg/mL injection Daily for a week then weekly for a month then monthly 12 mL 0 Past Week    blood sugar diagnostic (CONTOUR NEXT TEST STRIPS) Strp 1 strip by Misc.(Non-Drug; Combo Route) route 3 -4 times daily. Conto 100 strip 3 Taking    blood sugar diagnostic Strp 1 strip 4 (four) times daily. 100 strip 11 Taking    blood-glucose meter Misc IDDM 250.00  Insurance or Pt choice 1 each 0 Taking    CONTOUR NEXT TEST STRIPS Strp USE ONE STRIP THREE TIMES A  strip 3     flash glucose scanning reader (FREESTYLE MAR 14 DAY READER) Misc 1 Device by Misc.(Non-Drug; Combo Route) route continuous. 1 each 0 Taking    flash glucose sensor (FREESTYLE MAR 14 DAY SENSOR) Kit Use 1 sensor every 14 days. 2 kit 11 Taking    glyBURIDE (DIABETA) 5 MG tablet Take 2 tablets (10 mg total) by mouth 2 (two) times daily before meals. 360 tablet 1 Unknown    insulin glargine, TOUJEO, (TOUJEO SOLOSTAR U-300 INSULIN) 300 unit/mL (1.5 mL) InPn pen Inject 45 Units into the skin once daily. 4.5 mL 3 Taking    lancets (MICROLET LANCET) Misc 1 lancet by Misc.(Non-Drug; Combo Route) route 3 (three) times daily. 100 each 3 Taking    pen needle, diabetic 32 gauge x 5/32" Ndle 1 each by Misc.(Non-Drug; Combo " "Route) route 3 (three) times daily. 100 each 11     sub-q insulin device, 20 unit (V-GO 20) Naty 1 Device by Misc.(Non-Drug; Combo Route) route once daily. 30 Device 3 Taking    syringe with needle (SYRINGE 3CC/25GX1") 3 mL 25 gauge x 1" Syrg Use as Directed 30 Syringe 0 Taking    venlafaxine (EFFEXOR-XR) 150 MG Cp24 Take 1 capsule (150 mg total) by mouth once daily. 30 capsule 5 Unknown    [DISCONTINUED] aspirin (ECOTRIN) 325 MG EC tablet Take 325 mg by mouth once daily.   Unknown    [DISCONTINUED] clobetasol (TEMOVATE) 0.05 % external solution Apply topically 2 (two) times daily. 50 mL 2 Unknown    [DISCONTINUED] hydrOXYzine HCl (ATARAX) 10 MG Tab TAKE 1 TABLET BY MOUTH TWICE DAILY AS NEEDED (Patient not taking: Reported on 10/28/2019) 60 tablet 0 Unknown    [DISCONTINUED] ketoconazole (NIZORAL) 2 % shampoo Apply topically twice a week. 120 mL 6 Unknown    [DISCONTINUED] levocetirizine (XYZAL) 5 MG tablet Take 1 tablet (5 mg total) by mouth every evening. 30 tablet 3 Taking    [DISCONTINUED] nystatin (MYCOSTATIN) powder Apply topically 4 (four) times daily. 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 11/8 MARY (heparin-induced platelet antibody) 3.53 - very strongly positive; 11/8 JAVIER (serotonin release Assay) positive.    Ace inhibitors Other (See Comments)     Cough         Past Medical History:   Diagnosis Date    Chronic major depressive disorder     Diabetes mellitus type II, uncontrolled 2000    Insulin x 10 years    Eczema     Essential hypertension     Generalized anxiety disorder     GERD (gastroesophageal " reflux disease)     Glaucoma     OD    Hyperlipidemia     Hypertension associated with diabetes 8/13/2013    Obesity      Past Surgical History:   Procedure Laterality Date    APPENDECTOMY      CORONARY ARTERY BYPASS GRAFT (CABG) N/A 10/31/2019    Procedure: CORONARY ARTERY BYPASS GRAFT (CABG);  Surgeon: Keny Zaidi MD;  Location: [facility] OR;  Service: Cardiothoracic;  Laterality: N/A;  3-Vessel, Epi-Aortic Ultrasound    ECTOPIC PREGNANCY SURGERY Left     LSO    INJECTION OF ANESTHETIC AGENT AROUND MULTIPLE INTERCOSTAL NERVES N/A 10/31/2019    Procedure: BLOCK, NERVE, INTERCOSTAL, 2 OR MORE;  Surgeon: Keny Zaidi MD;  Location: [facility] OR;  Service: Cardiothoracic;  Laterality: N/A;  Parasternal    OOPHORECTOMY       Family History     Problem Relation (Age of Onset)    Alcohol abuse Maternal Uncle, Sister    Colon cancer Sister    Depression Maternal Grandmother    Diabetes Sister, Maternal Grandmother, Paternal Aunt    Drug abuse Sister    Glaucoma Paternal Aunt, Paternal Uncle    Heart attack Mother    Heart disease Maternal Grandmother, Paternal Grandmother, Paternal Grandfather    Heart failure Sister    Hepatitis Sister    Hypertension Father, Sister, Sister, Sister    Multiple myeloma Father        Tobacco Use    Smoking status: Never Smoker    Smokeless tobacco: Never Used   Substance and Sexual Activity    Alcohol use: No    Drug use: No    Sexual activity: Never     Review of Systems   Constitutional: Negative.    HENT: Negative.    Eyes: Negative.    Respiratory: Negative.    Cardiovascular: Negative.    Gastrointestinal: Negative.    Endocrine: Negative.    Genitourinary: Negative.    Musculoskeletal: Negative.    Skin: Negative.    Allergic/Immunologic: Negative.    Neurological: Negative.    Hematological: Negative.    Psychiatric/Behavioral: Negative.    All other systems reviewed and are negative.    Objective:     Vital Signs (Most Recent):  Temp: 97.9 °F (36.6 °C) (11/19/19  1540)  Pulse: 89 (11/19/19 1540)  Resp: 18 (11/19/19 1540)  BP: (!) 116/53 (11/19/19 1540)  SpO2: (!) 92 % (11/19/19 1540) Vital Signs (24h Range):  Temp:  [97.3 °F (36.3 °C)-97.9 °F (36.6 °C)] 97.9 °F (36.6 °C)  Pulse:  [80-95] 89  Resp:  [18-20] 18  SpO2:  [92 %-98 %] 92 %  BP: (105-161)/(53-92) 116/53     Weight: 75.5 kg (166 lb 7.2 oz)  Body mass index is 31.45 kg/m².    Physical Exam   Constitutional: She appears well-developed.   Eyes: Pupils are equal, round, and reactive to light.   Neck: Normal range of motion.   Cardiovascular: Normal rate.   Pulmonary/Chest: Effort normal.   Abdominal: Soft.   Musculoskeletal: Normal range of motion.   Plus two bilateral pedal pulses well as +2 bilateral upper extremity pulses. Loose discoloration of the right thumb and 1st digits.  Loose discoloration of all left ft toes with erythema at the base of the toes.   Skin: Skin is warm.   Psychiatric: She has a normal mood and affect.   Nursing note and vitals reviewed.      Significant Labs:  All pertinent labs from the last 24 hours have been reviewed.    Significant Diagnostics:  I have reviewed all pertinent imaging results/findings within the past 24 hours.

## 2019-11-20 NOTE — PLAN OF CARE
Patient is disoriented to time. VSS..  Patient remained afebrile throughout shift.  Patient remained free of falls this shift  Patient 0/10 of pain this shift  Bood glucose monitored, corrected via SSI.  Plan of care reviewed.  Patient verbalized understanding.  Patient moving/turning with assistance.  Sitter at bedside.  Frequent weight shifting encouraged.  Patient NSR-ST on monitor  Bed low, side rails up x2, wheels locked, call light in reach.  Bed alarm maintained for safety.  Patient instructed to call for assistance.  Hourly rounding completed.  Will continue to monitor.

## 2019-11-20 NOTE — SUBJECTIVE & OBJECTIVE
Interval History: Distal extremities evaluated by Vascular Surgery, see consult note. Plan is for discharge to [facility] today.     Oncology Treatment Plan:   [No treatment plan]    Medications:  Continuous Infusions:  Scheduled Meds:   amLODIPine  10 mg Oral Daily    apixaban  5 mg Oral BID    aspirin  81 mg Oral Daily    atorvastatin  80 mg Oral QHS    chlorthalidone  25 mg Oral Daily    ferrous sulfate  325 mg Oral BID    insulin detemir U-100  10 Units Subcutaneous QHS    insulin detemir U-100  5 Units Subcutaneous Daily    losartan  100 mg Oral Daily    magnesium sulfate IVPB  2 g Intravenous Once    metoprolol tartrate  50 mg Oral BID    nitroGLYCERIN 2% TD oint  0.5 inch Topical (Top) Q12H    pantoprazole  40 mg Oral Daily     PRN Meds:albumin human 5%, albuterol sulfate, Dextrose 10% Bolus, Dextrose 10% Bolus, insulin aspart U-100, lactated ringers, magnesium sulfate IVPB, magnesium sulfate IVPB, metoclopramide HCl, ondansetron, potassium chloride in water **AND** potassium chloride in water **AND** potassium chloride in water, traMADol     Review of Systems   Constitutional: Positive for activity change, appetite change and fatigue. Negative for chills, diaphoresis, fever and unexpected weight change.   HENT: Negative for congestion, hearing loss, nosebleeds, postnasal drip and trouble swallowing.    Eyes: Negative for discharge and visual disturbance.   Respiratory: Negative for cough, chest tightness and shortness of breath.    Cardiovascular: Positive for leg swelling. Negative for chest pain and palpitations.   Gastrointestinal: Positive for abdominal distention. Negative for blood in stool, constipation, diarrhea, nausea and vomiting.   Endocrine: Negative for cold intolerance and heat intolerance.   Genitourinary: Negative for difficulty urinating, dyspareunia, flank pain and hematuria.   Musculoskeletal: Positive for arthralgias, back pain, gait problem and myalgias.   Skin: Negative.     Neurological: Negative for dizziness, weakness, light-headedness and headaches.   Hematological: Negative for adenopathy. Does not bruise/bleed easily.   Psychiatric/Behavioral: Positive for confusion. Negative for agitation and behavioral problems. The patient is nervous/anxious.      Objective:     Vital Signs (Most Recent):  Temp: 98.8 °F (37.1 °C) (11/20/19 0726)  Pulse: 95 (11/20/19 0900)  Resp: 18 (11/20/19 0726)  BP: 110/61 (11/20/19 0726)  SpO2: 96 % (11/20/19 0726) Vital Signs (24h Range):  Temp:  [97.3 °F (36.3 °C)-99.1 °F (37.3 °C)] 98.8 °F (37.1 °C)  Pulse:  [] 95  Resp:  [17-20] 18  SpO2:  [92 %-98 %] 96 %  BP: (105-161)/(53-74) 110/61     Weight: 76.1 kg (167 lb 12.3 oz)  Body mass index is 31.7 kg/m².  Body surface area is 1.81 meters squared.      Intake/Output Summary (Last 24 hours) at 11/20/2019 1000  Last data filed at 11/20/2019 0454  Gross per 24 hour   Intake 1060 ml   Output --   Net 1060 ml       Physical Exam   Constitutional: She is oriented to person, place, and time. She appears well-developed and well-nourished. No distress.   HENT:   Head: Normocephalic and atraumatic.   Right Ear: Hearing and external ear normal.   Left Ear: Hearing and external ear normal.   Nose: No rhinorrhea or sinus tenderness. Right sinus exhibits no maxillary sinus tenderness and no frontal sinus tenderness. Left sinus exhibits no maxillary sinus tenderness and no frontal sinus tenderness.   Mouth/Throat: Uvula is midline, oropharynx is clear and moist and mucous membranes are normal. No oral lesions.   Eyes: Pupils are equal, round, and reactive to light. Conjunctivae are normal. Right eye exhibits no discharge. Left eye exhibits no discharge.   Neck: Normal range of motion. Carotid bruit is not present. No tracheal deviation present. No thyromegaly present.   Cardiovascular: Normal rate, regular rhythm, S1 normal, S2 normal, normal heart sounds and intact distal pulses.   No murmur heard.  Pulses:        Dorsalis pedis pulses are 1+ on the right side, and 1+ on the left side.   Pulmonary/Chest: Effort normal and breath sounds normal. No respiratory distress.   Abdominal: Soft. Bowel sounds are normal. She exhibits distension. She exhibits no mass. There is no tenderness.   Musculoskeletal: Normal range of motion. She exhibits edema.   Feet:   Right Foot:   Skin Integrity: Positive for erythema.   Left Foot:   Skin Integrity: Positive for erythema.   Lymphadenopathy:     She has no cervical adenopathy.        Right: No supraclavicular adenopathy present.        Left: No supraclavicular adenopathy present.   Neurological: She is alert and oriented to person, place, and time. She has normal strength. No sensory deficit. Coordination and gait normal.   Skin: Skin is warm and dry. Capillary refill takes less than 2 seconds. No rash noted. There is pallor.        Psychiatric: Her speech is normal and behavior is normal. Judgment and thought content normal. Her mood appears anxious. Cognition and memory are normal. She does not exhibit a depressed mood.   Nursing note and vitals reviewed.      Significant Labs:   CBC:   Recent Labs   Lab 11/19/19  0353 11/20/19  0354   WBC 10.09 7.46   HGB 8.1* 7.8*   HCT 26.0* 25.6*    266    and CMP:   Recent Labs   Lab 11/19/19  0353 11/19/19  2334 11/20/19  0354   * 129* 133*   K 3.9 4.3 3.9    97 98   CO2 28 25 26   GLU 78 260* 157*   BUN 22 27* 24*   CREATININE 0.7 0.9 0.8   CALCIUM 8.6* 8.6* 8.7   ANIONGAP 5* 7* 9   EGFRNONAA >60 >60 >60       Diagnostic Results:  I have reviewed all pertinent imaging results/findings within the past 24 hours.

## 2019-11-20 NOTE — SUBJECTIVE & OBJECTIVE
Interval History:  11/20/2019  The patient is post-operative day 20.  Status post coronary artery bypass grafting x3.    ROS  Medications:  Continuous Infusions:  Scheduled Meds:   amLODIPine  10 mg Oral Daily    apixaban  5 mg Oral BID    aspirin  81 mg Oral Daily    atorvastatin  80 mg Oral QHS    chlorthalidone  25 mg Oral Daily    ferrous sulfate  325 mg Oral BID    insulin detemir U-100  10 Units Subcutaneous QHS    insulin detemir U-100  5 Units Subcutaneous Daily    losartan  100 mg Oral Daily    magnesium sulfate IVPB  2 g Intravenous Once    metoprolol tartrate  50 mg Oral BID    nitroGLYCERIN 2% TD oint  0.5 inch Topical (Top) Q12H    pantoprazole  40 mg Oral Daily    potassium chloride  20 mEq Oral Once     PRN Meds:albumin human 5%, albuterol sulfate, Dextrose 10% Bolus, Dextrose 10% Bolus, insulin aspart U-100, lactated ringers, magnesium sulfate IVPB, magnesium sulfate IVPB, metoclopramide HCl, ondansetron, potassium chloride in water **AND** potassium chloride in water **AND** potassium chloride in water, traMADol     Objective:     Vital Signs (Most Recent):  Temp: 98.8 °F (37.1 °C) (11/20/19 0726)  Pulse: 94 (11/20/19 0726)  Resp: 18 (11/20/19 0726)  BP: 110/61 (11/20/19 0726)  SpO2: 96 % (11/20/19 0726) Vital Signs (24h Range):  Temp:  [97.3 °F (36.3 °C)-99.1 °F (37.3 °C)] 98.8 °F (37.1 °C)  Pulse:  [] 94  Resp:  [17-20] 18  SpO2:  [92 %-98 %] 96 %  BP: (105-161)/(53-74) 110/61     Weight: 76.1 kg (167 lb 12.3 oz)  Body mass index is 31.7 kg/m².    SpO2: 96 %  O2 Device (Oxygen Therapy): room air    Intake/Output - Last 3 Shifts       11/18 0700 - 11/19 0659 11/19 0700 - 11/20 0659 11/20 0700 - 11/21 0659    P.O. 120 960     I.V. (mL/kg)       IV Piggyback  100     Total Intake(mL/kg) 120 (1.6) 1060 (13.9)     Net +120 +1060            Urine Occurrence 2 x 5 x     Stool Occurrence 3 x 1 x           Lines/Drains/Airways     Peripherally Inserted Central Catheter Line                  PICC Double Lumen 11/07/19 1645 left brachial;left basilic 12 days                Physical Exam   Constitutional: She appears well-developed and well-nourished. No distress.   HENT:   Head: Normocephalic and atraumatic.   Eyes: Pupils are equal, round, and reactive to light. EOM are normal.   Neck: Normal range of motion. Neck supple. No JVD present.   Cardiovascular: Normal rate, regular rhythm, normal heart sounds and intact distal pulses. Exam reveals no gallop and no friction rub.   No murmur heard.  Pulmonary/Chest: Effort normal and breath sounds normal. No stridor. No respiratory distress. She has no wheezes. She has no rales. She exhibits no tenderness.   Abdominal: Soft. Bowel sounds are normal. She exhibits no distension and no mass. There is no tenderness. There is no rebound and no guarding. No hernia.   Musculoskeletal: She exhibits tenderness. She exhibits no edema or deformity.   ROM improving to the right hand 4/5. Tenderness to the toes of the KRISTINE lower extremities, redness and dusky discoloration to the toes generalized.    Neurological: She is alert. She displays normal reflexes. No cranial nerve deficit or sensory deficit. She exhibits normal muscle tone. Coordination normal.   Disoriented to time.      Skin: Skin is warm and dry. Capillary refill takes less than 2 seconds. Right thumb tip and right index finger tip are un blanchable. . No rash noted. She is not diaphoretic. There is erythema. No pallor.   Psychiatric: She has a normal mood and affect. Her behavior is normal. Judgment and thought content normal.   Nursing note and vitals reviewed.      Significant Labs:  BMP:   Recent Labs   Lab 11/20/19  0354   *   *   K 3.9   CL 98   CO2 26   BUN 24*   CREATININE 0.8   CALCIUM 8.7   MG 1.9     CBC:   Recent Labs   Lab 11/20/19  0354   WBC 7.46   RBC 2.75*   HGB 7.8*   HCT 25.6*      MCV 93   MCH 28.4   MCHC 30.5*       Significant Diagnostics:  I have reviewed all  pertinent imaging results/findings within the past 24 hours.

## 2019-11-20 NOTE — NURSING
Report called to [facility]. Spoke with primary nurse NIGHAT Dos Santos. Left Picc d/c per MD order. Dressing placed to site. Telemetry monitor returned to to monitor room.

## 2019-11-20 NOTE — CONSULTS
[facility]  Vascular Surgery  Consult Note    Consults Awolesi  Subjective:     Chief Complaint/Reason for Admission:  Hand and foot pain    History of Present Illness: Ms. Bates is a 69 year old female patient whose current medical conditions include HTN, hyperlipidemia, obesity, DM type II, cerebral microvascular disease, FAHAD, and B12 deficiency who was sent to [facility] ED today from her PCP's office due to chest pain and abnormal EKG. Patient complained of substernal chest tightness/heaviness that radiated to her back on Saturday while at rest. Associated symptoms included orthopnea and SOB. Patient denied any associated nausea, vomiting, diaphoresis, palpitations, near syncope, or syncope. Initial workup in ED revealed troponin of 3.665 and BNP of 1305 and patient subsequently admitted for further evaluation and treatment of NSTEMI. Cardiology consulted to assist with management. Patient seen and examined today while in ED. Feels well at time of exam, denies chest pain. She does report similar symptoms over the past few months but states they never lasted as long and were less intense. She reports compliance with her medications. States her father had history of CABG. Chart reviewed. Repeat troponin pending. EKG reviewed and shows SR with new LBBB. 2D echo pending    We were asked to see Mrs. Bates today because of bilateral to lose discoloration with left worse than right. She also has right hand finger distal discoloration as well. Unfortunately she is unable to give accurate history and physical because she recently had emergent cardiovascular bypass surgery because of cardiac failure.    Medications Prior to Admission   Medication Sig Dispense Refill Last Dose    insulin aspart U-100 (NOVOLOG U-100 INSULIN ASPART) 100 unit/mL injection Inject 60 Units into the skin continuous. Via VGO insulin pump. 2 vial 3 10/28/2019    metFORMIN (GLUCOPHAGE) 1000 MG tablet TAKE 1 TABLET BY MOUTH TWICE A  "DAY WITH MEALS 180 tablet 0 10/27/2019    [DISCONTINUED] amLODIPine (NORVASC) 2.5 MG tablet TAKE 1 TABLET BY MOUTH EVERY DAY 90 tablet 0 10/27/2019    [DISCONTINUED] atorvastatin (LIPITOR) 80 MG tablet TAKE 1 TABLET BY MOUTH EVERY DAY 90 tablet 0 10/27/2019    [DISCONTINUED] betamethasone valerate 0.1% (VALISONE) 0.1 % Crea Apply topically 2 (two) times daily. 45 g 2 10/27/2019    [DISCONTINUED] cyanocobalamin 1,000 mcg/mL injection Daily for a week then weekly for a month then monthly 12 mL 0 Past Week    blood sugar diagnostic (CONTOUR NEXT TEST STRIPS) Strp 1 strip by Misc.(Non-Drug; Combo Route) route 3 -4 times daily. Conto 100 strip 3 Taking    blood sugar diagnostic Strp 1 strip 4 (four) times daily. 100 strip 11 Taking    blood-glucose meter Misc IDDM 250.00  Insurance or Pt choice 1 each 0 Taking    CONTOUR NEXT TEST STRIPS Strp USE ONE STRIP THREE TIMES A  strip 3     flash glucose scanning reader (FREESTYLE MAR 14 DAY READER) Misc 1 Device by Misc.(Non-Drug; Combo Route) route continuous. 1 each 0 Taking    flash glucose sensor (FREESTYLE MAR 14 DAY SENSOR) Kit Use 1 sensor every 14 days. 2 kit 11 Taking    glyBURIDE (DIABETA) 5 MG tablet Take 2 tablets (10 mg total) by mouth 2 (two) times daily before meals. 360 tablet 1 Unknown    insulin glargine, TOUJEO, (TOUJEO SOLOSTAR U-300 INSULIN) 300 unit/mL (1.5 mL) InPn pen Inject 45 Units into the skin once daily. 4.5 mL 3 Taking    lancets (MICROLET LANCET) Misc 1 lancet by Misc.(Non-Drug; Combo Route) route 3 (three) times daily. 100 each 3 Taking    pen needle, diabetic 32 gauge x 5/32" Ndle 1 each by Misc.(Non-Drug; Combo Route) route 3 (three) times daily. 100 each 11     sub-q insulin device, 20 unit (V-GO 20) Naty 1 Device by Misc.(Non-Drug; Combo Route) route once daily. 30 Device 3 Taking    syringe with needle (SYRINGE 3CC/25GX1") 3 mL 25 gauge x 1" Syrg Use as Directed 30 Syringe 0 Taking    venlafaxine (EFFEXOR-XR) 150 " MG Cp24 Take 1 capsule (150 mg total) by mouth once daily. 30 capsule 5 Unknown    [DISCONTINUED] aspirin (ECOTRIN) 325 MG EC tablet Take 325 mg by mouth once daily.   Unknown    [DISCONTINUED] clobetasol (TEMOVATE) 0.05 % external solution Apply topically 2 (two) times daily. 50 mL 2 Unknown    [DISCONTINUED] hydrOXYzine HCl (ATARAX) 10 MG Tab TAKE 1 TABLET BY MOUTH TWICE DAILY AS NEEDED (Patient not taking: Reported on 10/28/2019) 60 tablet 0 Unknown    [DISCONTINUED] ketoconazole (NIZORAL) 2 % shampoo Apply topically twice a week. 120 mL 6 Unknown    [DISCONTINUED] levocetirizine (XYZAL) 5 MG tablet Take 1 tablet (5 mg total) by mouth every evening. 30 tablet 3 Taking    [DISCONTINUED] nystatin (MYCOSTATIN) powder Apply topically 4 (four) times daily. 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 11/8 MARY (heparin-induced platelet antibody) 3.53 - very strongly positive; 11/8 JAVIER (serotonin release Assay) positive.    Ace inhibitors Other (See Comments)     Cough         Past Medical History:   Diagnosis Date    Chronic major depressive disorder     Diabetes mellitus type II, uncontrolled 2000    Insulin x 10 years    Eczema     Essential hypertension     Generalized anxiety disorder     GERD (gastroesophageal reflux disease)     Glaucoma     OD    Hyperlipidemia     Hypertension associated with diabetes 8/13/2013    Obesity      Past Surgical History:   Procedure Laterality Date    APPENDECTOMY      CORONARY ARTERY BYPASS GRAFT (CABG) N/A 10/31/2019    Procedure: CORONARY ARTERY BYPASS GRAFT (CABG);  Surgeon: Keny  RIAN Zaidi MD;  Location: [facility] OR;  Service: Cardiothoracic;  Laterality: N/A;  3-Vessel, Epi-Aortic Ultrasound    ECTOPIC PREGNANCY SURGERY Left     LSO    INJECTION OF ANESTHETIC AGENT AROUND MULTIPLE INTERCOSTAL NERVES N/A 10/31/2019    Procedure: BLOCK, NERVE, INTERCOSTAL, 2 OR MORE;  Surgeon: Keny Zaidi MD;  Location: [facility] OR;  Service: Cardiothoracic;  Laterality: N/A;  Parasternal    OOPHORECTOMY       Family History     Problem Relation (Age of Onset)    Alcohol abuse Maternal Uncle, Sister    Colon cancer Sister    Depression Maternal Grandmother    Diabetes Sister, Maternal Grandmother, Paternal Aunt    Drug abuse Sister    Glaucoma Paternal Aunt, Paternal Uncle    Heart attack Mother    Heart disease Maternal Grandmother, Paternal Grandmother, Paternal Grandfather    Heart failure Sister    Hepatitis Sister    Hypertension Father, Sister, Sister, Sister    Multiple myeloma Father        Tobacco Use    Smoking status: Never Smoker    Smokeless tobacco: Never Used   Substance and Sexual Activity    Alcohol use: No    Drug use: No    Sexual activity: Never     Review of Systems   Constitutional: Negative.    HENT: Negative.    Eyes: Negative.    Respiratory: Negative.    Cardiovascular: Negative.    Gastrointestinal: Negative.    Endocrine: Negative.    Genitourinary: Negative.    Musculoskeletal: Negative.    Skin: Negative.    Allergic/Immunologic: Negative.    Neurological: Negative.    Hematological: Negative.    Psychiatric/Behavioral: Negative.    All other systems reviewed and are negative.    Objective:     Vital Signs (Most Recent):  Temp: 97.9 °F (36.6 °C) (11/19/19 1540)  Pulse: 89 (11/19/19 1540)  Resp: 18 (11/19/19 1540)  BP: (!) 116/53 (11/19/19 1540)  SpO2: (!) 92 % (11/19/19 1540) Vital Signs (24h Range):  Temp:  [97.3 °F (36.3 °C)-97.9 °F (36.6 °C)] 97.9 °F (36.6 °C)  Pulse:  [80-95] 89  Resp:  [18-20] 18  SpO2:  [92 %-98 %] 92 %  BP: (105-161)/(53-92) 116/53     Weight: 75.5  kg (166 lb 7.2 oz)  Body mass index is 31.45 kg/m².    Physical Exam   Constitutional: She appears well-developed.   Eyes: Pupils are equal, round, and reactive to light.   Neck: Normal range of motion.   Cardiovascular: Normal rate.   Pulmonary/Chest: Effort normal.   Abdominal: Soft.   Musculoskeletal: Normal range of motion.   Plus two bilateral pedal pulses well as +2 bilateral upper extremity pulses. Loose discoloration of the right thumb and 1st digits.  Loose discoloration of all left ft toes with erythema at the base of the toes.   Skin: Skin is warm.   Psychiatric: She has a normal mood and affect.   Nursing note and vitals reviewed.      Significant Labs:  All pertinent labs from the last 24 hours have been reviewed.    Significant Diagnostics:  I have reviewed all pertinent imaging results/findings within the past 24 hours.    Assessment/Plan:     Edema of hand  After reviewing the arterial ultrasound of the lower extremities there is no arterial peripheral arterial disease nor any venous DVT are insufficiency that we can appreciate.  Both her toes as well as her right hand seems to be from her cardiogenic shock in which there is no vascular procedures that are needed.  At some point she may need toe amputations on the left foot in the very far future but we will defer the right hand to Orthopedics.  From a vascular standpoint, she would heal any type of surgery on the right hand as well as in either lower extremity        Thank you for your consult. I will sign off. Please contact us if you have any additional questions.    Timmy Tam MD  Vascular Surgery  [facility]

## 2019-11-25 NOTE — PROGRESS NOTES
Digital Medicine Program Enrollment  HPI     Several attempts have been made to complete the enrollment and have been unsuccessful. Pt has been discharged and discharge letter sent out.

## 2019-11-26 ENCOUNTER — PATIENT OUTREACH (OUTPATIENT)
Dept: OTHER | Facility: OTHER | Age: 69
End: 2019-11-26

## 2019-11-26 ENCOUNTER — PATIENT OUTREACH (OUTPATIENT)
Dept: ADMINISTRATIVE | Facility: HOSPITAL | Age: 69
End: 2019-11-26

## 2019-11-26 NOTE — LETTER
February 12, 2020     Mateusz Bates  [address] [address]  [place] [address]       Dear Mateusz,    Thank you for your interest in [facility], a clinically-proven program designed to help you manage your chronic condition more conveniently and effectively. [facility] gives you:   Technology that lets you monitor your health at home and send readings directly to your care team at [facility]   Medications managed by a dedicated pharmacist or clinician    A  who calls and helps you take manageable steps towards a healthier lifestyle       We have tried to reach you via phone and [employer] Message to complete your enrollment in the Digital Medicine program. Unfortunately, weve been unable to reach you.     Please call us to complete your enrollment. Our number is [phone]. If we dont hear from you by 3/4/2020, we will be unable to complete your enrollment at this time.     We look forward to hearing from you soon.    Sincerely,     The Digital Medicine Team

## 2019-11-26 NOTE — LETTER
February 12, 2020     Mateusz Bates  [address] [address]  [place] [address]       Dear Mateusz,    Thank you for your interest in [facility], a clinically-proven program designed to help you manage your chronic condition more conveniently and effectively. [facility] gives you:   Technology that lets you monitor your health at home and send readings directly to your care team at [facility]   Medications managed by a dedicated pharmacist or clinician    A  who calls and helps you take manageable steps towards a healthier lifestyle       We have tried to reach you via phone and [employer] Message to complete your enrollment in the Digital Medicine program. Unfortunately, weve been unable to reach you.     Please call us to complete your enrollment. Our number is [phone]. If we dont hear from you by 3/4/2020, we will be unable to complete your enrollment at this time.     We look forward to hearing from you soon.    Sincerely,     The Digital Medicine Team

## 2019-11-26 NOTE — LETTER
March 4, 2020     Mateusz Bates  [address] [address]  [place] [address]       Dear Mateusz,    We have made several attempts to encourage your participation in [facility]. Unfortunately, we have been unsuccessful.     This is an official notice that you are no longer enrolled in the digital medicine program, and thus, we will no longer be managing your disease states. Please note this has no impact on your relationship with [facility] or your providers. Going forward, please reach out to your primary care provider with any questions or concerns regarding your health.    Please contact [phone] if you have any additional questions.    Sincerely,  The [facility]

## 2019-11-26 NOTE — LETTER
March 4, 2020     Mateusz Bates  [address] [address]  [place] [address]       Dear Mateusz,    We have made several attempts to encourage your participation in [facility]. Unfortunately, we have been unsuccessful.     This is an official notice that you are no longer enrolled in the digital medicine program, and thus, we will no longer be managing your disease states. Please note this has no impact on your relationship with [facility] or your providers. Going forward, please reach out to your primary care provider with any questions or concerns regarding your health.    Please contact [phone] if you have any additional questions.    Sincerely,  The [facility]

## 2019-11-27 DIAGNOSIS — E11.9 TYPE 2 DIABETES MELLITUS: ICD-10-CM

## 2019-12-11 ENCOUNTER — TELEPHONE (OUTPATIENT)
Dept: CARDIOTHORACIC SURGERY | Facility: CLINIC | Age: 69
End: 2019-12-11

## 2019-12-11 NOTE — TELEPHONE ENCOUNTER
I received a voicemail message from Jethro Stuart at [facility] , Jethro advised that they were unaware of pt Post Op appt to see Dr Zaidi scheduled for today Wednesday 12/11/2019. Jethro advised in his message that pt's sister called him yesterday to notify I'm of this appt and stated she would not be able to bring pt to this appt either. Jethro asked that I call him to reschedule this appt.     Called and spoke to Jethro, I advised that I called [facility] last Wednesday when the pt missed that appt, to schedule today's appt. Jethro advised me that when [facility] transferred Mrs Bates to [place] they did not notify anyone of her Post Op appt. Pt is now scheduled to see Dr Zaidi for next Wednesday 12/18/2019 @ 1000.  Jethro stated he will fax over all updated medical information on pt for Dr Zaidi to review. I gave Jethro the fax number to send the information to [phone].

## 2019-12-18 ENCOUNTER — OFFICE VISIT (OUTPATIENT)
Dept: CARDIOTHORACIC SURGERY | Facility: CLINIC | Age: 69
End: 2019-12-18
Payer: COMMERCIAL

## 2019-12-18 VITALS
TEMPERATURE: 98 F | SYSTOLIC BLOOD PRESSURE: 94 MMHG | BODY MASS INDEX: 28.62 KG/M2 | HEART RATE: 67 BPM | DIASTOLIC BLOOD PRESSURE: 60 MMHG | WEIGHT: 151.44 LBS

## 2019-12-18 DIAGNOSIS — Z95.1 S/P CABG X 3: Primary | ICD-10-CM

## 2019-12-18 DIAGNOSIS — D75.829 HIT (HEPARIN-INDUCED THROMBOCYTOPENIA): ICD-10-CM

## 2019-12-18 DIAGNOSIS — I82.621 ACUTE DEEP VEIN THROMBOSIS (DVT) OF RIGHT UPPER EXTREMITY, UNSPECIFIED VEIN: ICD-10-CM

## 2019-12-18 PROCEDURE — 99024 POSTOP FOLLOW-UP VISIT: CPT | Mod: S$GLB,,, | Performed by: THORACIC SURGERY (CARDIOTHORACIC VASCULAR SURGERY)

## 2019-12-18 PROCEDURE — 99999 PR PBB SHADOW E&M-EST. PATIENT-LVL III: ICD-10-PCS | Mod: PBBFAC,,, | Performed by: THORACIC SURGERY (CARDIOTHORACIC VASCULAR SURGERY)

## 2019-12-18 PROCEDURE — 99999 PR PBB SHADOW E&M-EST. PATIENT-LVL III: CPT | Mod: PBBFAC,,, | Performed by: THORACIC SURGERY (CARDIOTHORACIC VASCULAR SURGERY)

## 2019-12-18 PROCEDURE — 99024 PR POST-OP FOLLOW-UP VISIT: ICD-10-PCS | Mod: S$GLB,,, | Performed by: THORACIC SURGERY (CARDIOTHORACIC VASCULAR SURGERY)

## 2019-12-18 RX ORDER — ESCITALOPRAM OXALATE 10 MG/1
10 TABLET ORAL DAILY
COMMUNITY
Start: 2019-12-11

## 2019-12-18 RX ORDER — AMLODIPINE BESYLATE 10 MG/1
5 TABLET ORAL DAILY
Qty: 90 TABLET | Refills: 3
Start: 2019-12-18 | End: 2020-12-17

## 2019-12-18 NOTE — PROGRESS NOTES
Mateusz Bates is a 69 y.o. female patient.   1. S/P CABG x 3    2. HIT (heparin-induced thrombocytopenia)    3. Acute deep vein thrombosis (DVT) of right upper extremity, unspecified vein      HPI:  The patient is s/p CABG x 3 on 10/31/2019. The patient's post-operative state was complicated by hemodynamic instability, altered mental status, +HIT, and DVT of the right upper extremity with vascular compromise to the tip of the right index finger and tip of the right thumb. After discharge from the hospital the patient had an extended stay in a LTAC facility ([facility]), and currently resides at [facility]. This is the patient's first post-operative visit. The patient is AAOX3, however denies all memory of her hospitalization and surgery. The patient has a chief complaint of KRISTINE upper posterior back/shoulder pain which has been present since post-op day 1, reports pain is exacerbated upon bending, extending the KRISTINE upper extremities, pain described as a soreness. Patient reports ambulation is improving, visualized patient ambulating from wheel chair to exam room with no assist and steady gait. The patient reports exercise tolerance is improving. Incisions are CDI, and healing very well. Adequate appetite. Patient reports regular bowel movements, however reports black coloration to stools. The tip of the right index finger and the tip of the right thumb is black, well demarcated, dry, with no color change, painless, no sensation. The patient also complains of occasional dizziness/lightheadedness upon standing.  Denies chest pain, SOB, palpitations, fever, malaise, weakness, fatigue, syncope, tremors, falls, abdominal pain, constipation, diarrhea, and edema.       Past Medical History:   Diagnosis Date    Chronic major depressive disorder     Diabetes mellitus type II, uncontrolled 2000    Insulin x 10 years    Eczema     Essential hypertension     Generalized anxiety disorder     GERD (gastroesophageal reflux  disease)     Glaucoma     OD    Hyperlipidemia     Hypertension associated with diabetes 8/13/2013    Obesity      No past surgical history pertinent negatives on file.  Scheduled Meds:  Continuous Infusions:  PRN Meds:    Review of patient's allergies indicates:   Allergen Reactions    Heparin analogues Other (See Comments)     HIT panel confirmed positive. 11/8 MARY (heparin-induced platelet antibody) 3.53 - very strongly positive; 11/8 JAVIER (serotonin release Assay) positive.    Ace inhibitors Other (See Comments)     Cough       There are no hospital problems to display for this patient.    Blood pressure 94/60, pulse 67, temperature 97.6 °F (36.4 °C), temperature source Oral, weight 68.7 kg (151 lb 7.3 oz).    Subjective:   Diet: Adequate intake.  Patient reports no nausea or vomiting.    Activity level: Returning to normal.    Pain control: Well controlled.    Wound: Subjective wound complaints: Incisions CDI and healing very well.       Objective:  Vital signs (most recent): Blood pressure 94/60, pulse 67, temperature 97.6 °F (36.4 °C), temperature source Oral, weight 68.7 kg (151 lb 7.3 oz).  General appearance: Comfortable, well-appearing and in no acute distress.    Lungs:  Normal respiratory rate and normal effort.  She is in no respiratory distress.  Breath sounds normal.  There are no decreased breath sounds, wheezes, rales or rhonchi.    Heart: Normal rate.  Regular rhythm.  S1 normal and S2 normal.  There are no distant heart sounds.  No murmur, gallop or friction rub. No mechanical valve is heard.    Chest: Asymmetric chest wall expansion.  No sternal click or crepitus.    Abdomen: Abdomen is soft.  No distension or ascites.    Bowel sounds:  Bowel sounds are normal.    Tenderness: There is no abdominal tenderness tenderness.  There is no epigastric, periumbilical, right upper quadrant, right lower quadrant, left upper quadrant, left lower quadrant, suprapubic or incisional area tenderness.   There is no rebound tenderness.  There is no guarding.    Wound:  Clean.  There is no dehiscence, decubitis ulcer, hernia or healing ridge.  There is no drainage.    Extremities: There is normal range of motion.  There is no effusion, deformity, venous stasis, dependent edema or local swelling.  (The tip of the right index finger and the tip of the right thumb is black, well demarcated, dry, with no color change, painless, no sensation.).    Neurological: The patient is alert and oriented to person, place and time.  GCS score: 15.  Normal strength.  Pupils are equal, round, and reactive to light.  Pupils are equal.      Assessment & Plan      1. S/P CABG x 3    2. HIT (heparin-induced thrombocytopenia)    3. Acute deep vein thrombosis (DVT) of right upper extremity, unspecified vein      Dizziness with hypotension (94/60) pulse 67. Will decrease amlodipine dose to 5mg daily. Continue metoprool and losartan.  Reports of black stools, ordered occult blood stool sample to be done at [facility].   Continue Apixaban and Aspirin for anticoagulation.   Vascular compromise to right index finger and right thumb, vascular surgery follow-up required.  HIT with DVT in the hospital, hematology follow-up required.  Appointments have been scheduled for hematology, vascular surgery, cardiology, and primary care.   Patient will follow-up in 2 weeks, and PRN.     Yovani Dobbins NP  12/18/2019

## 2019-12-20 ENCOUNTER — TELEPHONE (OUTPATIENT)
Dept: CARDIOTHORACIC SURGERY | Facility: CLINIC | Age: 69
End: 2019-12-20

## 2019-12-20 DIAGNOSIS — Z95.1 HX OF CABG: Primary | ICD-10-CM

## 2019-12-20 LAB — GASTROCULT GAST QL: NORMAL

## 2019-12-20 NOTE — TELEPHONE ENCOUNTER
Per Yovani Dobbins NP, placed orders for pt to have a CBC drawn. Then called and spoke to Jethro Stuart at [facility] [phone] - Advised that pt needed to have a CBC drawn and results faxed to Dr Zaidi at [phone] Attn: Anaya c/o Dr Zaidi. - Jethro stated that he would have the labs drawn today and once the results come in will have them faxed over.

## 2020-01-01 LAB — GASTROCULT GAST QL: NEGATIVE

## 2020-01-03 ENCOUNTER — INITIAL CONSULT (OUTPATIENT)
Dept: HEMATOLOGY/ONCOLOGY | Facility: CLINIC | Age: 70
End: 2020-01-03
Payer: COMMERCIAL

## 2020-01-03 VITALS
HEART RATE: 51 BPM | SYSTOLIC BLOOD PRESSURE: 94 MMHG | DIASTOLIC BLOOD PRESSURE: 58 MMHG | OXYGEN SATURATION: 97 % | BODY MASS INDEX: 27.93 KG/M2 | TEMPERATURE: 99 F | HEIGHT: 61 IN | WEIGHT: 147.94 LBS

## 2020-01-03 DIAGNOSIS — I82.621 ACUTE DEEP VEIN THROMBOSIS (DVT) OF RIGHT UPPER EXTREMITY, UNSPECIFIED VEIN: Primary | ICD-10-CM

## 2020-01-03 DIAGNOSIS — D64.9 ANEMIA, UNSPECIFIED TYPE: ICD-10-CM

## 2020-01-03 PROCEDURE — 99214 PR OFFICE/OUTPT VISIT, EST, LEVL IV, 30-39 MIN: ICD-10-PCS | Mod: S$GLB,,, | Performed by: INTERNAL MEDICINE

## 2020-01-03 PROCEDURE — 3078F PR MOST RECENT DIASTOLIC BLOOD PRESSURE < 80 MM HG: ICD-10-PCS | Mod: CPTII,S$GLB,, | Performed by: INTERNAL MEDICINE

## 2020-01-03 PROCEDURE — 99214 OFFICE O/P EST MOD 30 MIN: CPT | Mod: S$GLB,,, | Performed by: INTERNAL MEDICINE

## 2020-01-03 PROCEDURE — 1126F AMNT PAIN NOTED NONE PRSNT: CPT | Mod: S$GLB,,, | Performed by: INTERNAL MEDICINE

## 2020-01-03 PROCEDURE — 1101F PT FALLS ASSESS-DOCD LE1/YR: CPT | Mod: CPTII,S$GLB,, | Performed by: INTERNAL MEDICINE

## 2020-01-03 PROCEDURE — 1126F PR PAIN SEVERITY QUANTIFIED, NO PAIN PRESENT: ICD-10-PCS | Mod: S$GLB,,, | Performed by: INTERNAL MEDICINE

## 2020-01-03 PROCEDURE — 1159F PR MEDICATION LIST DOCUMENTED IN MEDICAL RECORD: ICD-10-PCS | Mod: S$GLB,,, | Performed by: INTERNAL MEDICINE

## 2020-01-03 PROCEDURE — 1159F MED LIST DOCD IN RCRD: CPT | Mod: S$GLB,,, | Performed by: INTERNAL MEDICINE

## 2020-01-03 PROCEDURE — 3078F DIAST BP <80 MM HG: CPT | Mod: CPTII,S$GLB,, | Performed by: INTERNAL MEDICINE

## 2020-01-03 PROCEDURE — 99999 PR PBB SHADOW E&M-EST. PATIENT-LVL III: ICD-10-PCS | Mod: PBBFAC,,, | Performed by: INTERNAL MEDICINE

## 2020-01-03 PROCEDURE — 1101F PR PT FALLS ASSESS DOC 0-1 FALLS W/OUT INJ PAST YR: ICD-10-PCS | Mod: CPTII,S$GLB,, | Performed by: INTERNAL MEDICINE

## 2020-01-03 PROCEDURE — 3074F SYST BP LT 130 MM HG: CPT | Mod: CPTII,S$GLB,, | Performed by: INTERNAL MEDICINE

## 2020-01-03 PROCEDURE — 3074F PR MOST RECENT SYSTOLIC BLOOD PRESSURE < 130 MM HG: ICD-10-PCS | Mod: CPTII,S$GLB,, | Performed by: INTERNAL MEDICINE

## 2020-01-03 PROCEDURE — 99999 PR PBB SHADOW E&M-EST. PATIENT-LVL III: CPT | Mod: PBBFAC,,, | Performed by: INTERNAL MEDICINE

## 2020-01-03 RX ORDER — HYDROXYZINE PAMOATE 25 MG/1
25 CAPSULE ORAL EVERY 8 HOURS PRN
COMMUNITY

## 2020-01-03 RX ORDER — LACTULOSE 10 G/15ML
20 SOLUTION ORAL; RECTAL DAILY PRN
COMMUNITY

## 2020-01-03 RX ORDER — ACETAMINOPHEN 325 MG/1
325 TABLET ORAL EVERY 6 HOURS PRN
COMMUNITY

## 2020-01-03 NOTE — PROGRESS NOTES
Subjective:      DATE OF VISIT: 1/3/2020   ?   ?   Patient ID:?Mateusz Bates is a 69 y.o. female.?? MR#: [id number]   ?   ?   CHIEF COMPLAINT:  Follow-up after hospitalization for thrombosis on anticoagulation in the setting of heparin-induced thrombocytopenia?????   ?   DIAGNOSIS:  Heparin induced thrombocytopenia, DVT right upper extremity  ?   CURRENT TREATMENT:  Apixaban 5 mg b.i.d.    HPI    I had the pleasure of following up with Ms. Bates who returns after hospital discharge currently in rehabilitation.  She was initially admitted with NSTEMI status post CABG.  Postoperatively she developed thrombocytopenia and was found to have right upper extremity deep venous thrombosis.  Positive serotonin release assay consistent with heparin-induced thrombocytopenia.  She was initiated on argatroban gtt to DOAC, continued on apixaban to present.  She has not had bleeding issues.  She did start on oral iron at rehabilitation in early December with subsequent darkened stools negative for occult blood.  Hemoglobin has significantly improved in the interim since discharge. She notes improvement in energy and exercise tolerance with her rehabilitation.    Review of Systems    ?   A comprehensive 14-point review of systems was reviewed with patient and was negative other than as specified above.   ?     Objective:      Physical Exam      ?   Vitals:    01/03/20 1126   BP: (!) 94/58   Pulse: (!) 51   Temp: 98.5 °F (36.9 °C)      ?   ECOG:?1   General appearance: Generally well appearing, in no acute distress, in which are.   Head, eyes, ears, nose, and throat: Oropharynx clear with moist mucous membranes.   Cardiovascular: Regular rate and rhythm, S1, S2, no audible murmurs.   Respiratory: Lungs clear to auscultation bilaterally.   Abdomen: nontender, nondistended.   Extremities: Warm, without edema, dusky 1st and 2nd fingertips on right hand along with moderate edema and noted numbness.  No other edema.    Neurologic: Alert  and oriented. Grossly normal strength, coordination, and gait.   Skin: No rashes, ecchymoses or petechial lesion.   Psychiatric: normal mood and affect, conversant and appropriate    ?   Laboratory:  ?   No visits with results within 1 Day(s) from this visit.   Latest known visit with results is:   No results displayed because visit has over 200 results.         ?   ?   Assessment/Plan:       1. Acute deep vein thrombosis (DVT) of right upper extremity, unspecified vein    2. Anemia, unspecified type          Plan:     # acute DVT in right upper extremity, heparin-induced thrombocytopenia:  Mild improvement in edema in right upper extremity, doing well on anticoagulation with apixaban without bleeding complication.  Recommend continuation for least 3 months of anticoagulation.  Have arrange for repeat ultrasound of right upper extremity in 1 month and will follow-up at that time.  Recommended continue follow-up with vascular surgery in cardiovascular surgery.    # anemia:  Initiated on oral iron with dark stool, negative for occult blood.  She has had notable improvement in hemoglobin since discharge and will continue to follow up.  Recommended continued oral iron with stool softeners as needed for constipation.      Follow-Up:   - RUE ultrasound in 1 month with labs and RV 1 week later

## 2020-01-03 NOTE — LETTER
January 3, 2020      Yovani Dobbins, RADHA  [address]  [facility] [address]            [facility]  [id number] [place] [address]  Phone: [phone]  Fax: [phone]          Patient: Mateusz Bates   MR Number: [id number]   YOB: 1950   Date of Visit: 1/3/2020       Dear Yovani Dobbins:    Thank you for referring Mateusz Bates to me for evaluation. Attached you will find relevant portions of my assessment and plan of care.    If you have questions, please do not hesitate to call me. I look forward to following Mateusz Bates along with you.    Sincerely,    Shandra Reed MD    Enclosure  CC:  No Recipients    If you would like to receive this communication electronically, please contact [email or web address] or [phone] to request more information on [employer] Link access.    For providers and/or their staff who would like to refer a patient to [facility], please contact us through our one-stop-shop provider referral line, [facility] , at [phone].    If you feel you have received this communication in error or would no longer like to receive these types of communications, please e-mail [email or web address]

## 2020-01-08 ENCOUNTER — OFFICE VISIT (OUTPATIENT)
Dept: CARDIOTHORACIC SURGERY | Facility: CLINIC | Age: 70
End: 2020-01-08
Payer: COMMERCIAL

## 2020-01-08 VITALS
WEIGHT: 146.38 LBS | SYSTOLIC BLOOD PRESSURE: 110 MMHG | TEMPERATURE: 98 F | BODY MASS INDEX: 27.66 KG/M2 | HEART RATE: 60 BPM | DIASTOLIC BLOOD PRESSURE: 66 MMHG

## 2020-01-08 DIAGNOSIS — R57.0 CARDIOGENIC SHOCK: Primary | ICD-10-CM

## 2020-01-08 DIAGNOSIS — Z95.1 S/P CABG X 3: ICD-10-CM

## 2020-01-08 PROCEDURE — 99999 PR PBB SHADOW E&M-EST. PATIENT-LVL III: CPT | Mod: PBBFAC,,, | Performed by: THORACIC SURGERY (CARDIOTHORACIC VASCULAR SURGERY)

## 2020-01-08 PROCEDURE — 99024 PR POST-OP FOLLOW-UP VISIT: ICD-10-PCS | Mod: S$GLB,,, | Performed by: THORACIC SURGERY (CARDIOTHORACIC VASCULAR SURGERY)

## 2020-01-08 PROCEDURE — 99999 PR PBB SHADOW E&M-EST. PATIENT-LVL III: ICD-10-PCS | Mod: PBBFAC,,, | Performed by: THORACIC SURGERY (CARDIOTHORACIC VASCULAR SURGERY)

## 2020-01-08 PROCEDURE — 99024 POSTOP FOLLOW-UP VISIT: CPT | Mod: S$GLB,,, | Performed by: THORACIC SURGERY (CARDIOTHORACIC VASCULAR SURGERY)

## 2020-01-08 NOTE — PROGRESS NOTES
Mateusz Bates is a 69 y.o. female patient.   1. S/P CABG x 3    HPI:  The patient is s/p CABG x 3 on 10/31/2019. The patient's post-operative state was complicated by hemodynamic instability, altered mental status, +HIT, and DVT of the right upper extremity with vascular compromise to the tip of the right index finger and tip of the right thumb. After discharge from the hospital the patient had an extended stay in a LTAC facility ([facility]), and currently resides at [facility]. This is the patient's second post-operative visit. The patient is AAOX3, however denies all memory of her hospitalization and surgery. The patient has no complaints. Patient reports ambulation is improving, visualized patient ambulating from wheel chair to exam room with no assist and steady gait. The patient reports exercise tolerance is improving. Incisions are CDI, and healed. Adequate appetite. Patient reports regular bowel movements, occult stool sample taken at [facility] and results negative. The tip of the right index finger and the tip of the right thumb is black, well demarcated, dry, with no color change, painless, no sensation. The patient denies dizziness/lightheadedness upon standing.  Denies chest pain, SOB, palpitations, fever, malaise, weakness, fatigue, syncope, tremors, falls, abdominal pain, constipation, diarrhea, and edema.       Past Medical History:   Diagnosis Date    Chronic major depressive disorder     Diabetes mellitus type II, uncontrolled 2000    Insulin x 10 years    Eczema     Essential hypertension     Generalized anxiety disorder     GERD (gastroesophageal reflux disease)     Glaucoma     OD    Hyperlipidemia     Hypertension associated with diabetes 8/13/2013    Obesity      No past surgical history pertinent negatives on file.  Scheduled Meds:  Continuous Infusions:  PRN Meds:    Review of patient's allergies indicates:   Allergen Reactions    Heparin analogues Other (See Comments)     HIT panel  confirmed positive. 11/8 MARY (heparin-induced platelet antibody) 3.53 - very strongly positive; 11/8 JAVIER (serotonin release Assay) positive.    Ace inhibitors Other (See Comments)     Cough       There are no hospital problems to display for this patient.    Blood pressure 110/66, pulse 60, temperature 97.9 °F (36.6 °C), temperature source Oral, weight 66.4 kg (146 lb 6.2 oz).    Subjective:   Diet: Adequate intake.  Patient reports no nausea or vomiting.    Activity level: Normal.    Pain control: Well controlled.    Wound: Subjective wound complaints: Surgical incisions CDI.       Objective:  Vital signs (most recent): Blood pressure 110/66, pulse 60, temperature 97.9 °F (36.6 °C), temperature source Oral, weight 66.4 kg (146 lb 6.2 oz).  General appearance: Comfortable, well-appearing, in no acute distress and not in pain.    Lungs:  Normal respiratory rate and normal effort.  She is in no respiratory distress.  Breath sounds normal.  There are no decreased breath sounds, wheezes, rales or rhonchi.    Heart: Normal rate.  Regular rhythm.  S1 normal and S2 normal.  There are no distant heart sounds.  No murmur, gallop or friction rub. No mechanical valve is heard.    Chest: Asymmetric chest wall expansion.  No sternal click or crepitus.    Abdomen: Abdomen is soft.  No distension or ascites.    Bowel sounds:  Bowel sounds are normal.    Tenderness: There is no abdominal tenderness tenderness.  There is no epigastric, periumbilical, right upper quadrant, right lower quadrant, left upper quadrant, left lower quadrant, suprapubic or incisional area tenderness.  There is no rebound tenderness.  There is no guarding.    Wound:  Clean (Surgical incision CDI. healed. ).  There is no dehiscence, decubitis ulcer, hernia or healing ridge.  There is no drainage.    Extremities: There is normal range of motion.  There is no thrill in a-v graft, thrill in a-v fistula, effusion, deformity, venous stasis, dependent edema or  local swelling.  (The tip of the right index finger and the tip of the right thumb is black, well demarcated, dry, with no color change, painless, no sensation.).       Assessment & Plan    1. S/P CABG x 3  Continue metoprolol, amlodipine, and losartan.  Continue Apixaban and Aspirin for anticoagulation.   Vascular compromise to right index finger and right thumb, vascular surgery follow-up will continue.  HIT with DVT in the hospital, hematology follow-up will continue.  The patient has satisfied the post-operative goals set by cardiothoracic surgery. The patient will be discharged from the service and follow-up with cardiology, primary physician, hematology, and vascular surgery.       Yovani Dobbins NP  1/8/2020

## 2020-01-08 NOTE — PROGRESS NOTES
2nd attempt to complete enrollment call. No answer, unable to leave voicemail. Sent portal message.

## 2020-01-14 LAB
A1C EXTERNAL: 7.8
CHOLEST SERPL-MSCNC: 149 MG/DL (ref 0–200)
HDLC SERPL-MCNC: 23 MG/DL
LDLC SERPL CALC-MCNC: 94 MG/DL
NON HDL CHOL. (LDL+VLDL): 126
TRIGL SERPL-MCNC: 162 MG/DL

## 2020-01-17 ENCOUNTER — TELEPHONE (OUTPATIENT)
Dept: ORTHOPEDICS | Facility: CLINIC | Age: 70
End: 2020-01-17

## 2020-01-17 NOTE — TELEPHONE ENCOUNTER
Unable to leave vm in regards to ortho referral from Dr. Dior for the patient to see Yumiko Quintanilla PA-C for her hands and scheduling apt

## 2020-01-21 ENCOUNTER — TELEPHONE (OUTPATIENT)
Dept: ORTHOPEDICS | Facility: CLINIC | Age: 70
End: 2020-01-21

## 2020-01-21 NOTE — TELEPHONE ENCOUNTER
Called in regards to ortho referral and phone was answered by the library. I was unable to leave message.

## 2020-01-23 ENCOUNTER — CLINICAL SUPPORT (OUTPATIENT)
Dept: CARDIOLOGY | Facility: CLINIC | Age: 70
End: 2020-01-23
Payer: COMMERCIAL

## 2020-01-23 ENCOUNTER — TELEPHONE (OUTPATIENT)
Dept: ORTHOPEDICS | Facility: CLINIC | Age: 70
End: 2020-01-23

## 2020-01-23 ENCOUNTER — OFFICE VISIT (OUTPATIENT)
Dept: CARDIOLOGY | Facility: CLINIC | Age: 70
End: 2020-01-23
Payer: COMMERCIAL

## 2020-01-23 VITALS
OXYGEN SATURATION: 98 % | WEIGHT: 149.5 LBS | HEIGHT: 61 IN | DIASTOLIC BLOOD PRESSURE: 52 MMHG | HEART RATE: 48 BPM | BODY MASS INDEX: 28.22 KG/M2 | SYSTOLIC BLOOD PRESSURE: 102 MMHG

## 2020-01-23 DIAGNOSIS — Z95.1 S/P CABG X 3: ICD-10-CM

## 2020-01-23 DIAGNOSIS — M79.641 PAIN OF RIGHT HAND: Primary | ICD-10-CM

## 2020-01-23 DIAGNOSIS — I25.10 CORONARY ARTERY DISEASE, ANGINA PRESENCE UNSPECIFIED, UNSPECIFIED VESSEL OR LESION TYPE, UNSPECIFIED WHETHER NATIVE OR TRANSPLANTED HEART: Primary | ICD-10-CM

## 2020-01-23 DIAGNOSIS — I25.10 CORONARY ARTERY DISEASE, ANGINA PRESENCE UNSPECIFIED, UNSPECIFIED VESSEL OR LESION TYPE, UNSPECIFIED WHETHER NATIVE OR TRANSPLANTED HEART: ICD-10-CM

## 2020-01-23 DIAGNOSIS — Z82.49 FAMILY HISTORY OF ARTERIOSCLEROTIC CARDIOVASCULAR DISEASE: ICD-10-CM

## 2020-01-23 DIAGNOSIS — I82.621 ACUTE DEEP VEIN THROMBOSIS (DVT) OF RIGHT UPPER EXTREMITY, UNSPECIFIED VEIN: ICD-10-CM

## 2020-01-23 DIAGNOSIS — I25.10 CAD, MULTIPLE VESSEL: Primary | ICD-10-CM

## 2020-01-23 DIAGNOSIS — E11.69 HYPERLIPIDEMIA ASSOCIATED WITH TYPE 2 DIABETES MELLITUS: ICD-10-CM

## 2020-01-23 DIAGNOSIS — E78.5 HYPERLIPIDEMIA ASSOCIATED WITH TYPE 2 DIABETES MELLITUS: ICD-10-CM

## 2020-01-23 DIAGNOSIS — I67.89 CEREBRAL MICROVASCULAR DISEASE: ICD-10-CM

## 2020-01-23 DIAGNOSIS — D75.829 HIT (HEPARIN-INDUCED THROMBOCYTOPENIA): ICD-10-CM

## 2020-01-23 DIAGNOSIS — D50.9 IRON DEFICIENCY ANEMIA, UNSPECIFIED IRON DEFICIENCY ANEMIA TYPE: ICD-10-CM

## 2020-01-23 DIAGNOSIS — I50.41 ACUTE COMBINED SYSTOLIC AND DIASTOLIC CHF, NYHA CLASS 2: ICD-10-CM

## 2020-01-23 DIAGNOSIS — E11.65 UNCONTROLLED TYPE 2 DIABETES MELLITUS WITH HYPERGLYCEMIA: Chronic | ICD-10-CM

## 2020-01-23 PROCEDURE — 93010 EKG 12-LEAD: ICD-10-PCS | Mod: S$GLB,,, | Performed by: INTERNAL MEDICINE

## 2020-01-23 PROCEDURE — 93005 ELECTROCARDIOGRAM TRACING: CPT | Mod: S$GLB,,, | Performed by: INTERNAL MEDICINE

## 2020-01-23 PROCEDURE — 3074F SYST BP LT 130 MM HG: CPT | Mod: CPTII,S$GLB,, | Performed by: INTERNAL MEDICINE

## 2020-01-23 PROCEDURE — 1101F PT FALLS ASSESS-DOCD LE1/YR: CPT | Mod: CPTII,S$GLB,, | Performed by: INTERNAL MEDICINE

## 2020-01-23 PROCEDURE — 99999 PR PBB SHADOW E&M-EST. PATIENT-LVL IV: ICD-10-PCS | Mod: PBBFAC,,, | Performed by: INTERNAL MEDICINE

## 2020-01-23 PROCEDURE — 1101F PR PT FALLS ASSESS DOC 0-1 FALLS W/OUT INJ PAST YR: ICD-10-PCS | Mod: CPTII,S$GLB,, | Performed by: INTERNAL MEDICINE

## 2020-01-23 PROCEDURE — 93010 ELECTROCARDIOGRAM REPORT: CPT | Mod: S$GLB,,, | Performed by: INTERNAL MEDICINE

## 2020-01-23 PROCEDURE — 1159F PR MEDICATION LIST DOCUMENTED IN MEDICAL RECORD: ICD-10-PCS | Mod: S$GLB,,, | Performed by: INTERNAL MEDICINE

## 2020-01-23 PROCEDURE — 99999 PR PBB SHADOW E&M-EST. PATIENT-LVL IV: CPT | Mod: PBBFAC,,, | Performed by: INTERNAL MEDICINE

## 2020-01-23 PROCEDURE — 3078F DIAST BP <80 MM HG: CPT | Mod: CPTII,S$GLB,, | Performed by: INTERNAL MEDICINE

## 2020-01-23 PROCEDURE — 3078F PR MOST RECENT DIASTOLIC BLOOD PRESSURE < 80 MM HG: ICD-10-PCS | Mod: CPTII,S$GLB,, | Performed by: INTERNAL MEDICINE

## 2020-01-23 PROCEDURE — 3074F PR MOST RECENT SYSTOLIC BLOOD PRESSURE < 130 MM HG: ICD-10-PCS | Mod: CPTII,S$GLB,, | Performed by: INTERNAL MEDICINE

## 2020-01-23 PROCEDURE — 1126F PR PAIN SEVERITY QUANTIFIED, NO PAIN PRESENT: ICD-10-PCS | Mod: S$GLB,,, | Performed by: INTERNAL MEDICINE

## 2020-01-23 PROCEDURE — 99215 OFFICE O/P EST HI 40 MIN: CPT | Mod: S$GLB,,, | Performed by: INTERNAL MEDICINE

## 2020-01-23 PROCEDURE — 1159F MED LIST DOCD IN RCRD: CPT | Mod: S$GLB,,, | Performed by: INTERNAL MEDICINE

## 2020-01-23 PROCEDURE — 93005 EKG 12-LEAD: ICD-10-PCS | Mod: S$GLB,,, | Performed by: INTERNAL MEDICINE

## 2020-01-23 PROCEDURE — 99215 PR OFFICE/OUTPT VISIT, EST, LEVL V, 40-54 MIN: ICD-10-PCS | Mod: S$GLB,,, | Performed by: INTERNAL MEDICINE

## 2020-01-23 PROCEDURE — 1126F AMNT PAIN NOTED NONE PRSNT: CPT | Mod: S$GLB,,, | Performed by: INTERNAL MEDICINE

## 2020-01-23 NOTE — PROGRESS NOTES
Subjective:   Patient ID:  Mateusz Bates is a 69 y.o. 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 Ha sno leg swelling no shortness of breath no chest she is having still physical therapy she is having fluctuation in her sugar levels. She is compliant with meds. She is a nursing home. Sleeps well at mnite good appetite . Not really diabetic diet.  Past Medical History:   Diagnosis Date    Chronic major depressive disorder     Diabetes mellitus type II, uncontrolled 2000    Insulin x 10 years    Eczema     Essential hypertension     Generalized anxiety disorder     GERD (gastroesophageal reflux disease)     Glaucoma     OD    Hyperlipidemia     Hypertension associated with diabetes 8/13/2013    Obesity        Past Surgical History:   Procedure Laterality Date    APPENDECTOMY      CORONARY ARTERY BYPASS GRAFT (CABG) N/A 10/31/2019    Procedure: CORONARY ARTERY BYPASS GRAFT (CABG);  Surgeon: Keny Zaidi MD;  Location: [facility];  Service: Cardiothoracic;  Laterality: N/A;  3-Vessel, Epi-Aortic Ultrasound    ECTOPIC PREGNANCY SURGERY Left     LSO    INJECTION OF ANESTHETIC AGENT AROUND MULTIPLE INTERCOSTAL NERVES N/A 10/31/2019    Procedure: BLOCK, NERVE, INTERCOSTAL, 2 OR MORE;  Surgeon: Keny Zaidi MD;  Location: [facility] OR;  Service: Cardiothoracic;  Laterality: N/A;  Parasternal    INSERTION OF INTRA-AORTIC BALLOON ASSIST DEVICE N/A 10/30/2019    Procedure: INSERTION, INTRA-AORTIC BALLOON PUMP;  Surgeon: Ingrid Lopez MD;  Location: [facility] CATH LAB;  Service: Cardiology;  Laterality: N/A;    LEFT HEART CATHETERIZATION Left 10/28/2019    Procedure: CATHETERIZATION,  HEART, LEFT;  Surgeon: Ingrid Lopez MD;  Location: [facility] CATH LAB;  Service: Cardiology;  Laterality: Left;    OOPHORECTOMY         Social History     Tobacco Use    Smoking status: Never Smoker    Smokeless tobacco: Never Used   Substance Use Topics    Alcohol use: No    Drug use: No       Family History   Problem Relation Age of Onset    Heart attack Mother     Multiple myeloma Father     Hypertension Father     Colon cancer Sister     Hypertension Sister     Diabetes Sister     Depression Maternal Grandmother     Heart disease Maternal Grandmother     Diabetes Maternal Grandmother     Heart disease Paternal Grandmother     Heart disease Paternal Grandfather     Alcohol abuse Maternal Uncle     Glaucoma Paternal Aunt     Diabetes Paternal Aunt     Glaucoma Paternal Uncle         Great Aunt    Heart failure Sister     Hypertension Sister     Hepatitis Sister     Hypertension Sister     Drug abuse Sister     Alcohol abuse Sister        Current Outpatient Medications   Medication Sig    acetaminophen (TYLENOL) 325 MG tablet Take 325 mg by mouth every 6 (six) hours as needed for Pain.    amLODIPine (NORVASC) 10 MG tablet Take 0.5 tablets (5 mg total) by mouth once daily.    apixaban (ELIQUIS) 5 mg Tab Take 1 tablet (5 mg total) by mouth 2 (two) times daily.    aspirin (ECOTRIN) 81 MG EC tablet Take 1 tablet (81 mg total) by mouth once daily.    atorvastatin (LIPITOR) 80 MG tablet Take 1 tablet (80 mg total) by mouth every evening.    blood sugar diagnostic (CONTOUR NEXT TEST STRIPS) Strp 1 strip by Misc.(Non-Drug; Combo Route) route 3 -4 times daily. Conto    blood sugar diagnostic Strp 1 strip 4 (four) times daily.    blood-glucose meter Misc IDDM 250.00  Insurance or Pt choice    chlorthalidone (HYGROTEN) 25 MG Tab Take 1 tablet (25 mg total) by mouth once daily.    CONTOUR NEXT TEST STRIPS Strp USE ONE STRIP THREE TIMES A DAY    escitalopram oxalate (LEXAPRO) 10 MG tablet Take  "10 mg by mouth once daily.    ferrous sulfate 325 (65 FE) MG EC tablet Take 1 tablet (325 mg total) by mouth 2 (two) times daily.    flash glucose scanning reader (FREESTYLE MAR 14 DAY READER) Misc 1 Device by Misc.(Non-Drug; Combo Route) route continuous.    flash glucose sensor (FREESTYLE MAR 14 DAY SENSOR) Kit Use 1 sensor every 14 days.    hydrOXYzine pamoate (VISTARIL) 25 MG Cap Take 25 mg by mouth every 8 (eight) hours as needed.    insulin aspart U-100 (NOVOLOG U-100 INSULIN ASPART) 100 unit/mL injection Inject 4 Units into the skin with lunch.    insulin detemir U-100 (LEVEMIR) 100 unit/mL injection Inject 20 Units into the skin 2 (two) times daily.     lactulose (CHRONULAC) 10 gram/15 mL solution Take by mouth 3 (three) times daily.    lancets (MICROLET LANCET) Misc 1 lancet by Misc.(Non-Drug; Combo Route) route 3 (three) times daily.    losartan (COZAAR) 100 MG tablet Take 1 tablet (100 mg total) by mouth once daily. (Patient taking differently: Take 50 mg by mouth once daily. )    metoprolol tartrate (LOPRESSOR) 50 MG tablet Take 1 tablet (50 mg total) by mouth 2 (two) times daily.    multivitamin capsule Take 1 capsule by mouth once daily.    pen needle, diabetic 32 gauge x 5/32" Ndle 1 each by Misc.(Non-Drug; Combo Route) route 3 (three) times daily.    syringe with needle (SYRINGE 3CC/25GX1") 3 mL 25 gauge x 1" Syrg Use as Directed    venlafaxine (EFFEXOR-XR) 150 MG Cp24 Take 1 capsule (150 mg total) by mouth once daily.    nitroGLYCERIN 2% TD oint (NITROGLYN) 2 % ointment Apply 0.5 inches topically every 12 (twelve) hours. for 14 days    pantoprazole (PROTONIX) 40 MG tablet Take 1 tablet (40 mg total) by mouth once daily.     No current facility-administered medications for this visit.      Current Outpatient Medications on File Prior to Visit   Medication Sig    acetaminophen (TYLENOL) 325 MG tablet Take 325 mg by mouth every 6 (six) hours as needed for Pain.    amLODIPine " (NORVASC) 10 MG tablet Take 0.5 tablets (5 mg total) by mouth once daily.    apixaban (ELIQUIS) 5 mg Tab Take 1 tablet (5 mg total) by mouth 2 (two) times daily.    aspirin (ECOTRIN) 81 MG EC tablet Take 1 tablet (81 mg total) by mouth once daily.    atorvastatin (LIPITOR) 80 MG tablet Take 1 tablet (80 mg total) by mouth every evening.    blood sugar diagnostic (CONTOUR NEXT TEST STRIPS) Strp 1 strip by Misc.(Non-Drug; Combo Route) route 3 -4 times daily. Conto    blood sugar diagnostic Strp 1 strip 4 (four) times daily.    blood-glucose meter Misc IDDM 250.00  Insurance or Pt choice    chlorthalidone (HYGROTEN) 25 MG Tab Take 1 tablet (25 mg total) by mouth once daily.    CONTOUR NEXT TEST STRIPS Strp USE ONE STRIP THREE TIMES A DAY    escitalopram oxalate (LEXAPRO) 10 MG tablet Take 10 mg by mouth once daily.    ferrous sulfate 325 (65 FE) MG EC tablet Take 1 tablet (325 mg total) by mouth 2 (two) times daily.    flash glucose scanning reader (FREESTYLE MAR 14 DAY READER) Misc 1 Device by Misc.(Non-Drug; Combo Route) route continuous.    flash glucose sensor (FREESTYLE MAR 14 DAY SENSOR) Kit Use 1 sensor every 14 days.    hydrOXYzine pamoate (VISTARIL) 25 MG Cap Take 25 mg by mouth every 8 (eight) hours as needed.    insulin aspart U-100 (NOVOLOG U-100 INSULIN ASPART) 100 unit/mL injection Inject 4 Units into the skin with lunch.    insulin detemir U-100 (LEVEMIR) 100 unit/mL injection Inject 20 Units into the skin 2 (two) times daily.     lactulose (CHRONULAC) 10 gram/15 mL solution Take by mouth 3 (three) times daily.    lancets (MICROLET LANCET) Misc 1 lancet by Misc.(Non-Drug; Combo Route) route 3 (three) times daily.    losartan (COZAAR) 100 MG tablet Take 1 tablet (100 mg total) by mouth once daily. (Patient taking differently: Take 50 mg by mouth once daily. )    metoprolol tartrate (LOPRESSOR) 50 MG tablet Take 1 tablet (50 mg total) by mouth 2 (two) times daily.    multivitamin  "capsule Take 1 capsule by mouth once daily.    pen needle, diabetic 32 gauge x 5/32" Ndle 1 each by Misc.(Non-Drug; Combo Route) route 3 (three) times daily.    syringe with needle (SYRINGE 3CC/25GX1") 3 mL 25 gauge x 1" Syrg Use as Directed    venlafaxine (EFFEXOR-XR) 150 MG Cp24 Take 1 capsule (150 mg total) by mouth once daily.    nitroGLYCERIN 2% TD oint (NITROGLYN) 2 % ointment Apply 0.5 inches topically every 12 (twelve) hours. for 14 days    pantoprazole (PROTONIX) 40 MG tablet Take 1 tablet (40 mg total) by mouth once daily.    [DISCONTINUED] insulin lispro 100 unit/mL injection Inject into the skin. (Patient taking differently: Inject 12 Units into the skin. )     No current facility-administered medications on file prior to visit.      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 11/8 MARY (heparin-induced platelet antibody) 3.53 - very strongly positive; 11/8 JAVIER (serotonin release Assay) positive.    Ace inhibitors Other (See Comments)     Cough       Review of Systems   Constitution: Negative for diaphoresis, malaise/fatigue and weight gain.   HENT: Negative for hoarse voice.    Eyes: Negative for double vision and visual disturbance.   Cardiovascular: Negative for chest pain, claudication, cyanosis, dyspnea on exertion, irregular heartbeat, leg swelling, near-syncope, orthopnea, palpitations, paroxysmal nocturnal dyspnea and syncope.   Respiratory: Negative for cough, hemoptysis, shortness of breath and snoring.    Hematologic/Lymphatic: Negative for bleeding problem. Does not bruise/bleed easily.   Skin: Positive for color change and poor wound healing.   Musculoskeletal: Negative for muscle cramps, muscle weakness and myalgias.   Gastrointestinal: Negative for bloating, abdominal pain, change in bowel habit, diarrhea, heartburn, hematemesis, hematochezia, melena and nausea.   Neurological: Negative " for excessive daytime sleepiness, dizziness, headaches, light-headedness, loss of balance, numbness and weakness.   Psychiatric/Behavioral: Negative for memory loss. The patient does not have insomnia.    Allergic/Immunologic: Negative for hives.       Objective:   Physical Exam   Constitutional: She is oriented to person, place, and time. She appears well-developed and well-nourished. She does not appear ill. No distress.   HENT:   Head: Normocephalic and atraumatic.   Eyes: Pupils are equal, round, and reactive to light. EOM are normal. No scleral icterus.   Neck: Normal range of motion. Neck supple. Normal carotid pulses, no hepatojugular reflux and no JVD present. Carotid bruit is not present. No tracheal deviation present. No thyromegaly present.   Cardiovascular: Regular rhythm, normal heart sounds, intact distal pulses and normal pulses. Bradycardia present. Exam reveals no gallop and no friction rub.   No murmur heard.  Pulses:       Carotid pulses are 2+ on the right side, and 2+ on the left side.       Radial pulses are 2+ on the right side, and 2+ on the left side.        Femoral pulses are 2+ on the right side, and 2+ on the left side.       Popliteal pulses are 2+ on the right side, and 2+ on the left side.        Dorsalis pedis pulses are 2+ on the right side, and 2+ on the left side.        Posterior tibial pulses are 2+ on the right side, and 2+ on the left side.   Pulmonary/Chest: Effort normal and breath sounds normal. No respiratory distress. She has no wheezes. She has no rhonchi. She has no rales. She exhibits no tenderness.   Scar chest incision well healed.    Abdominal: Soft. Normal appearance, normal aorta and bowel sounds are normal. She exhibits no distension, no abdominal bruit, no ascites and no pulsatile midline mass. There is no hepatomegaly. There is no tenderness.   Musculoskeletal: She exhibits no edema.        Right shoulder: She exhibits no deformity.   gangrenous non tender  "distal digits of rt thumb and first finger.   No drainage non smelling.  Scar venous harvest site well healed.    Neurological: She is alert and oriented to person, place, and time. She has normal strength. No cranial nerve deficit. Coordination normal.   Skin: Skin is warm and dry. No rash noted. She is not diaphoretic. No cyanosis or erythema. Nails show no clubbing.   Psychiatric: She has a normal mood and affect. Her speech is normal and behavior is normal.   Nursing note and vitals reviewed.    Vitals:    01/23/20 0933   BP: (!) 102/52   BP Location: Left arm   Patient Position: Sitting   BP Method: Large (Manual)   Pulse: (!) 48   SpO2: 98%   Weight: 67.8 kg (149 lb 7.6 oz)   Height: 5' 1" (1.549 m)     Lab Results   Component Value Date    CHOL 181 10/28/2019    CHOL 189 10/04/2019    CHOL 255 (H) 11/14/2018     Lab Results   Component Value Date    HDL 41 10/28/2019    HDL 42 10/04/2019    HDL 45 11/14/2018     Lab Results   Component Value Date    LDLCALC 124.4 10/28/2019    LDLCALC 123.2 10/04/2019    LDLCALC 171.0 (H) 11/14/2018     Lab Results   Component Value Date    TRIG 78 10/28/2019    TRIG 119 10/04/2019    TRIG 195 (H) 11/14/2018     Lab Results   Component Value Date    CHOLHDL 22.7 10/28/2019    CHOLHDL 22.2 10/04/2019    CHOLHDL 17.6 (L) 11/14/2018       Chemistry        Component Value Date/Time     (L) 11/20/2019 0354    K 3.9 11/20/2019 0354    CL 98 11/20/2019 0354    CO2 26 11/20/2019 0354    BUN 24 (H) 11/20/2019 0354    CREATININE 0.8 11/20/2019 0354     (H) 11/20/2019 0354        Component Value Date/Time    CALCIUM 8.7 11/20/2019 0354    ALKPHOS 77 11/09/2019 0659     (H) 11/09/2019 0659     (H) 11/09/2019 0659    BILITOT 1.1 (H) 11/09/2019 0659    ESTGFRAFRICA >60 11/20/2019 0354    EGFRNONAA >60 11/20/2019 0354        Lab Results   Component Value Date    HGBA1C 8.3 (H) 10/30/2019       Lab Results   Component Value Date    TSH 1.817 11/03/2019     Lab " Results   Component Value Date    INR 4.3 (H) 11/12/2019    INR 1.1 11/01/2019    INR 1.4 (H) 10/31/2019     Lab Results   Component Value Date    WBC 7.46 11/20/2019    HGB 7.8 (L) 11/20/2019    HCT 25.6 (L) 11/20/2019    MCV 93 11/20/2019     11/20/2019     BMP  Sodium   Date Value Ref Range Status   11/20/2019 133 (L) 136 - 145 mmol/L Final     Potassium   Date Value Ref Range Status   11/20/2019 3.9 3.5 - 5.1 mmol/L Final     Chloride   11/20/2019 98 95 - 110 mmol/L Final     CO2   Date Value Ref Range Status   11/20/2019 26 23 - 29 mmol/L Final     BUN, Bld   Date Value Ref Range Status   11/20/2019 24 (H) 8 - 23 mg/dL Final     Creatinine   Date Value Ref Range Status   11/20/2019 0.8 0.5 - 1.4 mg/dL Final     Calcium   Date Value Ref Range Status   11/20/2019 8.7 8.7 - 10.5 mg/dL Final     Anion Gap   Date Value Ref Range Status   11/20/2019 9 8 - 16 mmol/L Final     eGFR if    Date Value Ref Range Status   11/20/2019 >60 >60 mL/min/1.73 m^2 Final     eGFR if non    Date Value Ref Range Status   11/20/2019 >60 >60 mL/min/1.73 m^2 Final     Comment:     Calculation used to obtain the estimated glomerular filtration  rate (eGFR) is the CKD-EPI equation.        CrCl cannot be calculated (Patient's most recent lab result is older than the maximum 7 days allowed.).                Assessment:     1. CAD, multiple vessel    2. Uncontrolled type 2 diabetes mellitus with hyperglycemia    3. Family history of arteriosclerotic cardiovascular disease    4. Hyperlipidemia associated with type 2 diabetes mellitus    5. Iron deficiency anemia, unspecified iron deficiency anemia type    6. Cerebral microvascular disease    7. Acute combined systolic and diastolic CHF, NYHA class 2    8. S/P CABG x 3    9. Acute deep vein thrombosis (DVT) of right upper extremity, unspecified vein    10. HIT (heparin-induced thrombocytopenia)      Appears to be progressing well still weak will continue  physical therapy and reassess in 1 months then plan on cardiac rehab  Gangrenous fingers resulting from dvt will get an ortho consult.  Cad s/p cabg no chf no angina on appropriate meds. The issue is diabetes control and diet in nursing home will make sure it is diabetic low salt diet.  Plan:     Continue current therapy  Cardiac low salt diet.  Risk factor modification and excercise program.  F/u in 1 months with lipid cmp a1c  Ortho referral.

## 2020-01-23 NOTE — LETTER
January 23, 2020      Yovani Dobbins, RADHA  [address]  [facility] [address]           [facility]  [id number] [place] [address]  Phone: [phone]  Fax: [phone]          Patient: Mateusz Bates   MR Number: [id number]   YOB: 1950   Date of Visit: 1/23/2020       Dear Yovani Dobbins:    Thank you for referring Mateusz Bates to me for evaluation. Attached you will find relevant portions of my assessment and plan of care.    If you have questions, please do not hesitate to call me. I look forward to following Mateusz Bates along with you.    Sincerely,    Ingrid Lopez MD    Enclosure  CC:  No Recipients    If you would like to receive this communication electronically, please contact [email or web address] or [phone] to request more information on [employer] Link access.    For providers and/or their staff who would like to refer a patient to [facility], please contact us through our one-stop-shop provider referral line, [facility], at [phone].    If you feel you have received this communication in error or would no longer like to receive these types of communications, please e-mail [email or web address]

## 2020-01-23 NOTE — TELEPHONE ENCOUNTER
Called in regards to pt's ortho referral and spoke with pt's sister. She scheduled apt and was informed to arrive 30 min early for x-ray.

## 2020-01-29 NOTE — PROGRESS NOTES
4th attempt to complete the enrollment call. No answer, unable to leave voicemail. Sent staff message to enrolling provider.

## 2020-02-04 ENCOUNTER — TELEPHONE (OUTPATIENT)
Dept: RADIOLOGY | Facility: HOSPITAL | Age: 70
End: 2020-02-04

## 2020-02-05 ENCOUNTER — HOSPITAL ENCOUNTER (OUTPATIENT)
Dept: RADIOLOGY | Facility: HOSPITAL | Age: 70
Discharge: HOME | End: 2020-02-05
Attending: INTERNAL MEDICINE
Payer: MEDICARE

## 2020-02-05 ENCOUNTER — TELEPHONE (OUTPATIENT)
Dept: CARDIOLOGY | Facility: CLINIC | Age: 70
End: 2020-02-05

## 2020-02-05 DIAGNOSIS — I82.621 ACUTE DEEP VEIN THROMBOSIS (DVT) OF RIGHT UPPER EXTREMITY, UNSPECIFIED VEIN: ICD-10-CM

## 2020-02-05 PROCEDURE — 93971 EXTREMITY STUDY: CPT | Mod: 26,RT,, | Performed by: RADIOLOGY

## 2020-02-05 PROCEDURE — 93971 EXTREMITY STUDY: CPT | Mod: TC,RT

## 2020-02-05 PROCEDURE — 93971 US UPPER EXTREMITY VEINS RIGHT: ICD-10-PCS | Mod: 26,RT,, | Performed by: RADIOLOGY

## 2020-02-05 NOTE — TELEPHONE ENCOUNTER
Patient contacted. Unable to leave message to inform patient to return call to the clinic;    Labs reviewed. CMP stable/normal. Lipids above goal-LDL and triglycerides elevated; make sure she is taking lipitor 80; diabetic diet    Thanks

## 2020-02-11 ENCOUNTER — OFFICE VISIT (OUTPATIENT)
Dept: HEMATOLOGY/ONCOLOGY | Facility: CLINIC | Age: 70
End: 2020-02-11
Payer: MEDICARE

## 2020-02-11 ENCOUNTER — HOSPITAL ENCOUNTER (EMERGENCY)
Facility: HOSPITAL | Age: 70
Discharge: HOME | End: 2020-02-11
Attending: EMERGENCY MEDICINE
Payer: MEDICARE

## 2020-02-11 VITALS
BODY MASS INDEX: 28.8 KG/M2 | DIASTOLIC BLOOD PRESSURE: 57 MMHG | HEART RATE: 52 BPM | OXYGEN SATURATION: 97 % | HEIGHT: 61 IN | SYSTOLIC BLOOD PRESSURE: 102 MMHG | WEIGHT: 152.56 LBS | TEMPERATURE: 97 F

## 2020-02-11 VITALS
BODY MASS INDEX: 28.8 KG/M2 | DIASTOLIC BLOOD PRESSURE: 63 MMHG | TEMPERATURE: 98 F | OXYGEN SATURATION: 100 % | SYSTOLIC BLOOD PRESSURE: 132 MMHG | RESPIRATION RATE: 20 BRPM | HEART RATE: 55 BPM | HEIGHT: 61 IN | WEIGHT: 152.56 LBS

## 2020-02-11 DIAGNOSIS — E16.2 HYPOGLYCEMIA: Primary | ICD-10-CM

## 2020-02-11 DIAGNOSIS — R53.1 WEAKNESS: ICD-10-CM

## 2020-02-11 DIAGNOSIS — I82.621 ACUTE DEEP VEIN THROMBOSIS (DVT) OF RIGHT UPPER EXTREMITY, UNSPECIFIED VEIN: Primary | ICD-10-CM

## 2020-02-11 DIAGNOSIS — D64.9 ANEMIA, UNSPECIFIED TYPE: ICD-10-CM

## 2020-02-11 LAB
ALBUMIN SERPL BCP-MCNC: 3.9 G/DL (ref 3.5–5.2)
ALP SERPL-CCNC: 82 U/L (ref 55–135)
ALT SERPL W/O P-5'-P-CCNC: 20 U/L (ref 10–44)
ANION GAP SERPL CALC-SCNC: 11 MMOL/L (ref 8–16)
AST SERPL-CCNC: 22 U/L (ref 10–40)
BASOPHILS # BLD AUTO: 0.04 K/UL (ref 0–0.2)
BASOPHILS NFR BLD: 0.4 % (ref 0–1.9)
BILIRUB SERPL-MCNC: 0.4 MG/DL (ref 0.1–1)
BNP SERPL-MCNC: 175 PG/ML (ref 0–99)
BUN SERPL-MCNC: 20 MG/DL (ref 8–23)
CALCIUM SERPL-MCNC: 9.7 MG/DL (ref 8.7–10.5)
CHLORIDE SERPL-SCNC: 106 MMOL/L (ref 95–110)
CO2 SERPL-SCNC: 24 MMOL/L (ref 23–29)
CREAT SERPL-MCNC: 0.9 MG/DL (ref 0.5–1.4)
DIFFERENTIAL METHOD: NORMAL
EOSINOPHIL # BLD AUTO: 0.4 K/UL (ref 0–0.5)
EOSINOPHIL NFR BLD: 3.4 % (ref 0–8)
ERYTHROCYTE [DISTWIDTH] IN BLOOD BY AUTOMATED COUNT: 12.9 % (ref 11.5–14.5)
EST. GFR  (AFRICAN AMERICAN): >60 ML/MIN/1.73 M^2
EST. GFR  (NON AFRICAN AMERICAN): >60 ML/MIN/1.73 M^2
GLUCOSE SERPL-MCNC: 29 MG/DL (ref 70–110)
HCT VFR BLD AUTO: 38.2 % (ref 37–48.5)
HGB BLD-MCNC: 12.4 G/DL (ref 12–16)
IMM GRANULOCYTES # BLD AUTO: 0.03 K/UL (ref 0–0.04)
IMM GRANULOCYTES NFR BLD AUTO: 0.3 % (ref 0–0.5)
LYMPHOCYTES # BLD AUTO: 4.2 K/UL (ref 1–4.8)
LYMPHOCYTES NFR BLD: 40.2 % (ref 18–48)
MCH RBC QN AUTO: 29.1 PG (ref 27–31)
MCHC RBC AUTO-ENTMCNC: 32.5 G/DL (ref 32–36)
MCV RBC AUTO: 90 FL (ref 82–98)
MONOCYTES # BLD AUTO: 0.8 K/UL (ref 0.3–1)
MONOCYTES NFR BLD: 8 % (ref 4–15)
NEUTROPHILS # BLD AUTO: 5 K/UL (ref 1.8–7.7)
NEUTROPHILS NFR BLD: 47.7 % (ref 38–73)
NRBC BLD-RTO: 0 /100 WBC
PLATELET # BLD AUTO: 282 K/UL (ref 150–350)
PMV BLD AUTO: 10 FL (ref 9.2–12.9)
POCT GLUCOSE: 103 MG/DL (ref 70–110)
POCT GLUCOSE: 217 MG/DL (ref 70–110)
POCT GLUCOSE: 23 MG/DL (ref 70–110)
POTASSIUM SERPL-SCNC: 4.2 MMOL/L (ref 3.5–5.1)
PROT SERPL-MCNC: 7.7 G/DL (ref 6–8.4)
RBC # BLD AUTO: 4.26 M/UL (ref 4–5.4)
SODIUM SERPL-SCNC: 141 MMOL/L (ref 136–145)
TROPONIN I SERPL DL<=0.01 NG/ML-MCNC: 0.03 NG/ML (ref 0–0.03)
WBC # BLD AUTO: 10.49 K/UL (ref 3.9–12.7)

## 2020-02-11 PROCEDURE — 85025 COMPLETE CBC W/AUTO DIFF WBC: CPT

## 2020-02-11 PROCEDURE — 63600175 PHARM REV CODE 636 W HCPCS: Mod: JG | Performed by: EMERGENCY MEDICINE

## 2020-02-11 PROCEDURE — 84484 ASSAY OF TROPONIN QUANT: CPT

## 2020-02-11 PROCEDURE — 80053 COMPREHEN METABOLIC PANEL: CPT

## 2020-02-11 PROCEDURE — 99999 PR PBB SHADOW E&M-EST. PATIENT-LVL IV: CPT | Mod: PBBFAC,,, | Performed by: INTERNAL MEDICINE

## 2020-02-11 PROCEDURE — 96361 HYDRATE IV INFUSION ADD-ON: CPT

## 2020-02-11 PROCEDURE — 99999 PR PBB SHADOW E&M-EST. PATIENT-LVL IV: ICD-10-PCS | Mod: PBBFAC,,, | Performed by: INTERNAL MEDICINE

## 2020-02-11 PROCEDURE — 96372 THER/PROPH/DIAG INJ SC/IM: CPT | Mod: 59

## 2020-02-11 PROCEDURE — 99213 PR OFFICE/OUTPT VISIT, EST, LEVL III, 20-29 MIN: ICD-10-PCS | Mod: S$PBB,,, | Performed by: INTERNAL MEDICINE

## 2020-02-11 PROCEDURE — 93010 EKG 12-LEAD: ICD-10-PCS | Mod: ,,, | Performed by: INTERNAL MEDICINE

## 2020-02-11 PROCEDURE — 96365 THER/PROPH/DIAG IV INF INIT: CPT

## 2020-02-11 PROCEDURE — 83880 ASSAY OF NATRIURETIC PEPTIDE: CPT

## 2020-02-11 PROCEDURE — 99285 EMERGENCY DEPT VISIT HI MDM: CPT | Mod: 25,27

## 2020-02-11 PROCEDURE — 99214 OFFICE O/P EST MOD 30 MIN: CPT | Mod: PBBFAC,25 | Performed by: INTERNAL MEDICINE

## 2020-02-11 PROCEDURE — 93010 ELECTROCARDIOGRAM REPORT: CPT | Mod: ,,, | Performed by: INTERNAL MEDICINE

## 2020-02-11 PROCEDURE — 25000003 PHARM REV CODE 250: Performed by: EMERGENCY MEDICINE

## 2020-02-11 PROCEDURE — 82962 GLUCOSE BLOOD TEST: CPT | Mod: 91

## 2020-02-11 PROCEDURE — 99213 OFFICE O/P EST LOW 20 MIN: CPT | Mod: S$PBB,,, | Performed by: INTERNAL MEDICINE

## 2020-02-11 PROCEDURE — 93005 ELECTROCARDIOGRAM TRACING: CPT

## 2020-02-11 RX ORDER — DEXTROSE MONOHYDRATE 25 G/50ML
25 INJECTION, SOLUTION INTRAVENOUS
Status: DISCONTINUED | OUTPATIENT
Start: 2020-02-11 | End: 2020-02-11 | Stop reason: RX

## 2020-02-11 RX ORDER — GLUCAGON 1 MG
1 KIT INJECTION
Status: COMPLETED | OUTPATIENT
Start: 2020-02-11 | End: 2020-02-11

## 2020-02-11 RX ADMIN — DEXTROSE 250 ML: 10 SOLUTION INTRAVENOUS at 12:02

## 2020-02-11 RX ADMIN — SODIUM CHLORIDE 1000 ML: 0.9 INJECTION, SOLUTION INTRAVENOUS at 12:02

## 2020-02-11 RX ADMIN — GLUCAGON HYDROCHLORIDE 1 MG: KIT at 11:02

## 2020-02-11 NOTE — PATIENT INSTRUCTIONS
- Advised patient to discontinue apixaban and iron  - note to staff: please CC note to Dr. Dior in vascular center at [facility]

## 2020-02-11 NOTE — ED PROVIDER NOTES
SCRIBE #1 NOTE: I, Darcy Wiley, am scribing for, and in the presence of, Michael Mcdowell Do, MD. I have scribed the entire note.       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 11/8 MARY (heparin-induced platelet antibody) 3.53 - very strongly positive; 11/8 JAVIER (serotonin release Assay) positive.    Ace inhibitors Other (See Comments)     Cough           History of Present Illness     HPI    2/11/2020, 12:04 PM  History obtained from the sister   HPI & ROS limited due to pt's AMS      History of Present Illness: Mateusz Bates is a 69 y.o. female patient with a PMHx of DM2, chronic major depressive disorder, HTN, eczema, glaucoma, GERD, HLD who presents to the Emergency Department for evaluation of AMS which onset PTA. Pt's sister states that pt was shaking in the car, staring and unresponsive, and reaching for things that were not there. Pt resides in a nursing home. According to nursing staff at home, pt ate and received her insulin this morning, but pt told her sister that her blood sugar was low. Symptoms are constant and moderate in severity. No mitigating or exacerbating factors reported. No associated sxs reported. Patient is unable to deny any sxs at this time. No prior Tx reported. No further complaints or concerns at this time.       Arrival mode: Personal vehicle     PCP: Serenity Kilpatrick MD        Past Medical History:  Past Medical History:   Diagnosis Date    Chronic major depressive disorder     Diabetes mellitus type II, uncontrolled 2000    Insulin x 10 years    Eczema     Essential hypertension     Generalized anxiety disorder     GERD (gastroesophageal  reflux disease)     Glaucoma     OD    Hyperlipidemia     Hypertension associated with diabetes 8/13/2013    Obesity        Past Surgical History:  Past Surgical History:   Procedure Laterality Date    APPENDECTOMY      CORONARY ARTERY BYPASS GRAFT (CABG) N/A 10/31/2019    Procedure: CORONARY ARTERY BYPASS GRAFT (CABG);  Surgeon: Keny Zaidi MD;  Location: [facility] OR;  Service: Cardiothoracic;  Laterality: N/A;  3-Vessel, Epi-Aortic Ultrasound    ECTOPIC PREGNANCY SURGERY Left     LSO    INJECTION OF ANESTHETIC AGENT AROUND MULTIPLE INTERCOSTAL NERVES N/A 10/31/2019    Procedure: BLOCK, NERVE, INTERCOSTAL, 2 OR MORE;  Surgeon: Keny Zaidi MD;  Location: [facility] OR;  Service: Cardiothoracic;  Laterality: N/A;  Parasternal    INSERTION OF INTRA-AORTIC BALLOON ASSIST DEVICE N/A 10/30/2019    Procedure: INSERTION, INTRA-AORTIC BALLOON PUMP;  Surgeon: Ingrid Lopez MD;  Location: [facility] CATH LAB;  Service: Cardiology;  Laterality: N/A;    LEFT HEART CATHETERIZATION Left 10/28/2019    Procedure: CATHETERIZATION, HEART, LEFT;  Surgeon: Ingrid Lpoez MD;  Location: [facility] CATH LAB;  Service: Cardiology;  Laterality: Left;    OOPHORECTOMY           Family History:  Family History   Problem Relation Age of Onset    Heart attack Mother     Multiple myeloma Father     Hypertension Father     Colon cancer Sister     Hypertension Sister     Diabetes Sister     Depression Maternal Grandmother     Heart disease Maternal Grandmother     Diabetes Maternal Grandmother     Heart disease Paternal Grandmother     Heart disease Paternal Grandfather     Alcohol abuse Maternal Uncle     Glaucoma Paternal Aunt     Diabetes Paternal Aunt     Glaucoma Paternal Uncle         Great Aunt    Heart failure Sister     Hypertension Sister     Hepatitis Sister     Hypertension Sister     Drug abuse Sister     Alcohol abuse Sister        Social History:  Social History     Tobacco Use    Smoking status: Never Smoker  "   Smokeless tobacco: Never Used   Substance and Sexual Activity    Alcohol use: No    Drug use: No    Sexual activity: Never        Review of Systems     Review of Systems   Unable to perform ROS: Mental status change      Physical Exam     Initial Vitals   BP Pulse Resp Temp SpO2   02/11/20 1154 02/11/20 1142 02/11/20 1142 02/11/20 1142 02/11/20 1142   (!) 137/94 (!) 56 18 97.8 °F (36.6 °C) 96 %      MAP       --                 Physical Exam  Nursing Notes and Vital Signs Reviewed.  Constitutional: Patient is in no apparent distress. Well-developed and well-nourished. Very weak and confused  Head: Atraumatic. Normocephalic.  Eyes: . EOM intact. Conjunctivae are not pale. No scleral icterus.  ENT: Mucous membranes are moist. Oropharynx is clear and symmetric.    Neck: Supple. Full ROM.   Cardiovascular: Regular rate. Regular rhythm. No murmurs, rubs, or gallops. Distal pulses are 2+ and symmetric.  Pulmonary/Chest: No respiratory distress. Clear to auscultation bilaterally. No wheezing or rales.  Abdominal: Soft and non-distended.  There is no tenderness.  No rebound, guarding, or rigidity. Good bowel sounds.  Musculoskeletal: Moves all extremities. No obvious deformities. No edema.   Skin: Warm and dry.  Neurological:  Altered. No acute focal neurological deficits are appreciated. Not following commands  Psychiatric: Confused.eyes open      ED Course   Procedures  ED Vital Signs:  Vitals:    02/11/20 1142 02/11/20 1154 02/11/20 1155 02/11/20 1200   BP:  (!) 137/94  (!) 92/48   Pulse: (!) 56 (!) 56 (!) 55 (!) 54   Resp: 18  17 20   Temp: 97.8 °F (36.6 °C)      TempSrc: Oral      SpO2: 96%  97% 97%   Weight: 69.2 kg (152 lb 8.9 oz)      Height: 5' 1" (1.549 m)       02/11/20 1217 02/11/20 1300   BP: (!) 92/48 (!) 119/58   Pulse: (!) 52 (!) 54   Resp: (!) 23 (!) 23   Temp:     TempSrc:     SpO2: 99% 99%   Weight:     Height:         Abnormal Lab Results:  Labs Reviewed   COMPREHENSIVE METABOLIC PANEL - Abnormal; " Notable for the following components:       Result Value    Glucose 29 (*)     All other components within normal limits    Narrative:       glucose critical result(s) called and verbal readback obtained from   ELEZAAR CONTEH RN by NADINE 02/11/2020 13:04   TROPONIN I - Abnormal; Notable for the following components:    Troponin I 0.028 (*)     All other components within normal limits   B-TYPE NATRIURETIC PEPTIDE - Abnormal; Notable for the following components:     (*)     All other components within normal limits   POCT GLUCOSE - Abnormal; Notable for the following components:    POCT Glucose 23 (*)     All other components within normal limits   POCT GLUCOSE - Abnormal; Notable for the following components:    POCT Glucose 217 (*)     All other components within normal limits   CBC W/ AUTO DIFFERENTIAL   TROPONIN I   URINALYSIS, REFLEX TO URINE CULTURE   POCT GLUCOSE   POCT GLUCOSE MONITORING CONTINUOUS   POCT GLUCOSE MONITORING CONTINUOUS        All Lab Results:  Results for orders placed or performed during the hospital encounter of 02/11/20   CBC auto differential   Result Value Ref Range    WBC 10.49 3.90 - 12.70 K/uL    RBC 4.26 4.00 - 5.40 M/uL    Hemoglobin 12.4 12.0 - 16.0 g/dL    Hematocrit 38.2 37.0 - 48.5 %    Mean Corpuscular Volume 90 82 - 98 fL    Mean Corpuscular Hemoglobin 29.1 27.0 - 31.0 pg    Mean Corpuscular Hemoglobin Conc 32.5 32.0 - 36.0 g/dL    RDW 12.9 11.5 - 14.5 %    Platelets 282 150 - 350 K/uL    MPV 10.0 9.2 - 12.9 fL    Immature Granulocytes 0.3 0.0 - 0.5 %    Gran # (ANC) 5.0 1.8 - 7.7 K/uL    Immature Grans (Abs) 0.03 0.00 - 0.04 K/uL    Lymph # 4.2 1.0 - 4.8 K/uL    Mono # 0.8 0.3 - 1.0 K/uL    Eos # 0.4 0.0 - 0.5 K/uL    Baso # 0.04 0.00 - 0.20 K/uL    nRBC 0 0 /100 WBC    Gran% 47.7 38.0 - 73.0 %    Lymph% 40.2 18.0 - 48.0 %    Mono% 8.0 4.0 - 15.0 %    Eosinophil% 3.4 0.0 - 8.0 %    Basophil% 0.4 0.0 - 1.9 %    Differential Method Automated    Comprehensive metabolic panel    Result Value Ref Range    Sodium 141 136 - 145 mmol/L    Potassium 4.2 3.5 - 5.1 mmol/L    Chloride 106 95 - 110 mmol/L    CO2 24 23 - 29 mmol/L    Glucose 29 (LL) 70 - 110 mg/dL    BUN, Bld 20 8 - 23 mg/dL    Creatinine 0.9 0.5 - 1.4 mg/dL    Calcium 9.7 8.7 - 10.5 mg/dL    Total Protein 7.7 6.0 - 8.4 g/dL    Albumin 3.9 3.5 - 5.2 g/dL    Total Bilirubin 0.4 0.1 - 1.0 mg/dL    Alkaline Phosphatase 82 55 - 135 U/L    AST 22 10 - 40 U/L    ALT 20 10 - 44 U/L    Anion Gap 11 8 - 16 mmol/L    eGFR if African American >60 >60 mL/min/1.73 m^2    eGFR if non African American >60 >60 mL/min/1.73 m^2   Troponin I #1   Result Value Ref Range    Troponin I 0.028 (H) 0.000 - 0.026 ng/mL   B-Type natriuretic peptide (BNP)   Result Value Ref Range     (H) 0 - 99 pg/mL   POCT glucose   Result Value Ref Range    POCT Glucose 23 (LL) 70 - 110 mg/dL   POCT glucose   Result Value Ref Range    POCT Glucose 103 70 - 110 mg/dL   POCT glucose   Result Value Ref Range    POCT Glucose 217 (H) 70 - 110 mg/dL         Imaging Results:  Imaging Results          X-Ray Chest AP Portable (Final result)  Result time 02/11/20 12:36:03    Final result by Richard Whitehead MD (02/11/20 12:36:03)                 Impression:      No acute findings.      Electronically signed by: Richard Whitehead MD  Date:    02/11/2020  Time:    12:36             Narrative:    EXAMINATION:  XR CHEST AP PORTABLE    CLINICAL HISTORY:  Respiratory distress., Weakness;    COMPARISON:  11/12/2019    FINDINGS:  Sternal wires and mediastinal clips are present.  Cardiac size is upper limit of normal.  The aortic arch is calcified.    The lung fields appear clear at this time.                                 The EKG was ordered, reviewed, and independently interpreted by the ED provider.  Interpretation time: 12:14  Rate: 52 BPM  Rhythm: sinus bradycardia  Interpretation: Possible left atrial enlargement. No STEMI.             The Emergency Provider reviewed the  vital signs and test results, which are outlined above.     ED Discussion   12:05 PM: Pt given Glucagon, orange juice, and 1 amp of D50. Pt responding very well and now able to talk and move her arms. Repeat Accu-check at 1:03 P.M.     3:00 PM: Pt went to urinate and ended up having a bowel movement as well, so it was not an accurate specimen. Pt and family do not want to stay and are ready to return to nursing home. Pt has no urinary sxs.     3:13 PM: Reassessed pt at this time.  Pt states her condition has improved at this time. Discussed with pt all pertinent ED information and results. Discussed pt dx and plan of tx. Gave pt all f/u and return to the ED instructions. All questions and concerns were addressed at this time. Pt expresses understanding of information and instructions, and is comfortable with plan to discharge. Pt is stable for discharge.    I discussed with patient and/or family/caretaker that evaluation in the ED does not suggest any emergent or life threatening medical conditions requiring immediate intervention beyond what was provided in the ED, and I believe patient is safe for discharge.  Regardless, an unremarkable evaluation in the ED does not preclude the development or presence of a serious of life threatening condition. As such, patient was instructed to return immediately for any worsening or change in current symptoms.    I discussed with patient and/or family/caretaker that negative X-ray does not rule out occult fracture or other soft tissue injury.  Persistent pain greater than 7-10 days or increased pain requires follow up, specifically with orthopedics.                  ED Medication(s):  Medications   glucagon (human recombinant) injection 1 mg (1 mg Intramuscular Given 2/11/20 1152)   dextrose 10% (D10W) Bolus (0 g Intravenous Stopped 2/11/20 1226)   sodium chloride 0.9% bolus 1,000 mL (1,000 mLs Intravenous New Bag 2/11/20 1230)       New Prescriptions    No medications on file        Follow-up Information     Serenity Kilpatrick MD In 2 days.    Specialty:  Family Medicine  Contact information:  [address] [place] [address] DR Shae SANCHEZ [address] [phone]                       Scribe Attestation:   Scribe #1: I performed the above scribed service and the documentation accurately describes the services I performed. I attest to the accuracy of the note.     Attending:   Physician Attestation Statement for Scribe #1: I, Michael Mcdowell Do, MD, personally performed the services described in this documentation, as scribed by Darcy Wiley, in my presence, and it is both accurate and complete.           Clinical Impression       ICD-10-CM ICD-9-CM   1. Hypoglycemia E16.2 251.2   2. Weakness R53.1 780.79       Disposition:   Disposition: Discharged  Condition: Stable         Michael Mcdowell Do, MD  02/11/20 1541

## 2020-02-11 NOTE — ED NOTES
Patient moved to ED room 09, patient assisted onto stretcher and changed into a gown. Patient placed on cardiac monitor, continuous pulse oximetry and automatic blood pressure cuff. Bed placed in low locked position, side rails up x 2, call light is within reach of patient or family, orientation to room and explanation of wait provided to family and patient, alarms set and turned on for monitor and pulse ox, awaiting MD evaluation and orders, will continue to monitor. Patient's family slow and jerky, AMS after visiting physician's office, patient started having symptoms around 1030. Patient does not know medications she was given to take by nurse at living facility and doesn't remember if she was given insulin before or after she ate.      Patient identifies self as Mateusz Bates      LOC: The patient is awake, alert and aware of environment with an appropriate affect, the patient is oriented x 3 and speaking appropriately.  APPEARANCE: Patient resting comfortably and in no acute distress, patient is clean and well groomed, patient's clothing is properly fastened.  SKIN: The skin is warm and dry, color consistent with ethnicity, patient has normal skin turgor and moist mucus membranes. Patient has necrosis to right thumb and pointer fingers.   MUSCULOSKELETAL: Patient moving all extremities well, no obvious swelling or deformities noted.  RESPIRATORY: Airway is open and patent, respirations are spontaneous, patient has a normal effort and rate, no accessory muscle use noted.  CARDIAC: Patient is bradycardiac, no periphreal edema noted, capillary refill < 3 seconds.  ABDOMEN: Soft and non tender to palpation, no distention noted.  NEUROLOGIC: PERRL, 3 mm bilaterally, eyes open spontaneously, behavior appropriate to situation, follows commands, facial expression symmetrical, bilateral hand grasp equal and even, purposeful motor response noted, normal sensation in all extremities when touched with a finger.

## 2020-02-11 NOTE — PROGRESS NOTES
Subjective:      DATE OF VISIT: 2/11/2020   ?   ?   Patient ID:?Mateusz Bates is a 69 y.o. female.?? MR#: [id number]   ?   ?   CHIEF COMPLAINT:  Follow-up  ?   DIAGNOSIS:  Heparin induced thrombocytopenia, DVT right upper extremity  ?   CURRENT TREATMENT:  Apixaban 5 mg b.i.d.    HPI    I had the pleasure of following up with Ms. Bates who returns with her daughter for follow-up.  She is currently in rehabilitation and doing well.  She has now completed 3 months of anticoagulation on apixaban without bleeding complications.  She had recent follow-up with vascular surgery which she notes no concerns.  She does note continued dark stool with iron supplementation taking 1-2 tabs daily.  Energy and appetite improving.    Review of Systems    ?   A comprehensive 14-point review of systems was reviewed with patient and was negative other than as specified above.   ?     Objective:      Physical Exam      ?   Vitals:    02/11/20 1019   BP: (!) 102/57   Pulse: (!) 52   Temp: 97.4 °F (36.3 °C)      ?   ECOG:?1   General appearance: Generally well appearing, in no acute distress, in wheelchair  Head, eyes, ears, nose, and throat: Oropharynx clear with moist mucous membranes.   Cardiovascular: Regular rate and rhythm, S1, S2, no audible murmurs.   Respiratory: Lungs clear to auscultation bilaterally.   Abdomen: nontender, nondistended.   Extremities: Warm, without edema, gangrene 1st and 2nd fingertips on right hand. Neurologic: Alert and oriented. Grossly normal strength, coordination, and gait.   Skin: No rashes, ecchymoses or petechial lesion.   Psychiatric: normal mood and affect, conversant and appropriate    ?   Laboratory:  ?   No visits with results within 1 Day(s) from this visit.   Latest known visit with results is:   Lab Visit on 02/05/2020   Component Date Value Ref Range Status    WBC 02/05/2020 7.23  3.90 - 12.70 K/uL Final    RBC 02/05/2020 4.19  4.00 - 5.40 M/uL Final    Hemoglobin 02/05/2020 12.4  12.0 -  16.0 g/dL Final    Hematocrit 02/05/2020 37.6  37.0 - 48.5 % Final    Mean Corpuscular Volume 02/05/2020 90  82 - 98 fL Final    Mean Corpuscular Hemoglobin 02/05/2020 29.6  27.0 - 31.0 pg Final    Mean Corpuscular Hemoglobin Conc 02/05/2020 33.0  32.0 - 36.0 g/dL Final    RDW 02/05/2020 13.0  11.5 - 14.5 % Final    Platelets 02/05/2020 257  150 - 350 K/uL Final    MPV 02/05/2020 9.6  9.2 - 12.9 fL Final    Immature Granulocytes 02/05/2020 0.4  0.0 - 0.5 % Final    Gran # (ANC) 02/05/2020 3.8  1.8 - 7.7 K/uL Final    Immature Grans (Abs) 02/05/2020 0.03  0.00 - 0.04 K/uL Final    Lymph # 02/05/2020 2.5  1.0 - 4.8 K/uL Final    Mono # 02/05/2020 0.6  0.3 - 1.0 K/uL Final    Eos # 02/05/2020 0.3  0.0 - 0.5 K/uL Final    Baso # 02/05/2020 0.03  0.00 - 0.20 K/uL Final    nRBC 02/05/2020 0  0 /100 WBC Final    Gran% 02/05/2020 52.5  38.0 - 73.0 % Final    Lymph% 02/05/2020 35.1  18.0 - 48.0 % Final    Mono% 02/05/2020 8.3  4.0 - 15.0 % Final    Eosinophil% 02/05/2020 3.7  0.0 - 8.0 % Final    Basophil% 02/05/2020 0.4  0.0 - 1.9 % Final    Differential Method 02/05/2020 Automated   Final    Sodium 02/05/2020 139  136 - 145 mmol/L Final    Potassium 02/05/2020 4.5  3.5 - 5.1 mmol/L Final    Chloride 02/05/2020 103  95 - 110 mmol/L Final    CO2 02/05/2020 28  23 - 29 mmol/L Final    Glucose 02/05/2020 66* 70 - 110 mg/dL Final    BUN, Bld 02/05/2020 14  8 - 23 mg/dL Final    Creatinine 02/05/2020 0.8  0.5 - 1.4 mg/dL Final    Calcium 02/05/2020 9.8  8.7 - 10.5 mg/dL Final    Total Protein 02/05/2020 7.1  6.0 - 8.4 g/dL Final    Albumin 02/05/2020 3.5  3.5 - 5.2 g/dL Final    Total Bilirubin 02/05/2020 0.3  0.1 - 1.0 mg/dL Final    Alkaline Phosphatase 02/05/2020 81  55 - 135 U/L Final    AST 02/05/2020 21  10 - 40 U/L Final    ALT 02/05/2020 21  10 - 44 U/L Final    Anion Gap 02/05/2020 8  8 - 16 mmol/L Final    eGFR if African American 02/05/2020 >60  >60 mL/min/1.73 m^2 Final    eGFR  if non  02/05/2020 >60  >60 mL/min/1.73 m^2 Final    Ferritin 02/05/2020 235  20.0 - 300.0 ng/mL Final    Iron 02/05/2020 68  30 - 160 ug/dL Final    Transferrin 02/05/2020 202  200 - 375 mg/dL Final    TIBC 02/05/2020 299  250 - 450 ug/dL Final    Saturated Iron 02/05/2020 23  20 - 50 % Final    Sodium 02/05/2020 139  136 - 145 mmol/L Final    Potassium 02/05/2020 4.5  3.5 - 5.1 mmol/L Final    Chloride 02/05/2020 103  95 - 110 mmol/L Final    CO2 02/05/2020 28  23 - 29 mmol/L Final    Glucose 02/05/2020 66* 70 - 110 mg/dL Final    BUN, Bld 02/05/2020 14  8 - 23 mg/dL Final    Creatinine 02/05/2020 0.8  0.5 - 1.4 mg/dL Final    Calcium 02/05/2020 9.8  8.7 - 10.5 mg/dL Final    Total Protein 02/05/2020 7.1  6.0 - 8.4 g/dL Final    Albumin 02/05/2020 3.5  3.5 - 5.2 g/dL Final    Total Bilirubin 02/05/2020 0.3  0.1 - 1.0 mg/dL Final    Alkaline Phosphatase 02/05/2020 81  55 - 135 U/L Final    AST 02/05/2020 21  10 - 40 U/L Final    ALT 02/05/2020 21  10 - 44 U/L Final    Anion Gap 02/05/2020 8  8 - 16 mmol/L Final    eGFR if African American 02/05/2020 >60  >60 mL/min/1.73 m^2 Final    eGFR if non African American 02/05/2020 >60  >60 mL/min/1.73 m^2 Final    Cholesterol 02/05/2020 176  120 - 199 mg/dL Final    Triglycerides 02/05/2020 196* 30 - 150 mg/dL Final    HDL 02/05/2020 33* 40 - 75 mg/dL Final    LDL Cholesterol 02/05/2020 103.8  63.0 - 159.0 mg/dL Final    Hdl/Cholesterol Ratio 02/05/2020 18.8* 20.0 - 50.0 % Final    Total Cholesterol/HDL Ratio 02/05/2020 5.3* 2.0 - 5.0 Final    Non-HDL Cholesterol 02/05/2020 143  mg/dL Final    Hemoglobin A1C 02/05/2020 8.3* 4.0 - 5.6 % Final    Estimated Avg Glucose 02/05/2020 192* 68 - 131 mg/dL Final      ? 2/5/20  US UPPER EXTREMITY VEINS RIGHT    TECHNIQUE:  Duplex and color flow Doppler evaluation and dynamic compression was performed of the right upper extremity veins.    COMPARISON:  None    FINDINGS:  Central veins: The  internal jugular, subclavian, and axillary veins are patent and free of thrombus.    Arm veins: The brachial, basilic, and cephalic veins are patent and compressible.    Contralateral subclavian/internal jugular veins: The left subclavian and internal jugular veins are patent and free of thrombus.    Other findings: None.      Impression       No thrombus in central veins of the right upper extremity.     ?   Assessment/Plan:       1. Acute deep vein thrombosis (DVT) of right upper extremity, unspecified vein    2. Anemia, unspecified type          Plan:     # acute DVT in right upper extremity, heparin-induced thrombocytopenia:  She has completed now 3 months of anticoagulation with apixaban toleraed well without bleeding or other thrombosis.  Resolution of right upper extremity edema. Repeat ultrasound with no residual thrombus.  Discussed risks and benefits of continued anticoagulation and given clinical and imaging resolution do not feel that further anticoagulation is necessary.  I did discuss signs and symptoms of recurrent thrombosis as well as importance to understand allergy to heparin products in the event that she has medical care outside of [facility] system.  She expressed understanding and agreement with plan to discontinue apixaban.  I do recommend she continue to follow with her vascular surgeon.    # Lower blood pressure and heart rate:  Stable from prior visits, appears to be her baseline.  Denies symptoms.  Recommend follow-up with PCP/cardiology.    # anemia:  1-2 tabs daily oral iron since last visit and no notable change in iron indices but resolution of anemia; do suspect no notable iron deficiency and likely anemia of chronic disease.  She does have darker stool likely iron side effect.  Given resolution of anemia and low suspicion for iron deficiency recommended discontinuation of oral iron.  She should follow for any evidence of bleeding, hematochezia or melena in stool.  Will follow-up with  repeat labs in 6 months.        Follow-Up:   - RV in 6 months with labs prior

## 2020-02-12 ENCOUNTER — HOSPITAL ENCOUNTER (OUTPATIENT)
Dept: RADIOLOGY | Facility: HOSPITAL | Age: 70
Discharge: HOME | End: 2020-02-12
Attending: ORTHOPAEDIC SURGERY
Payer: MEDICARE

## 2020-02-12 ENCOUNTER — OFFICE VISIT (OUTPATIENT)
Dept: ORTHOPEDICS | Facility: CLINIC | Age: 70
End: 2020-02-12
Payer: MEDICARE

## 2020-02-12 VITALS
DIASTOLIC BLOOD PRESSURE: 56 MMHG | SYSTOLIC BLOOD PRESSURE: 115 MMHG | HEART RATE: 49 BPM | WEIGHT: 152 LBS | HEIGHT: 61 IN | BODY MASS INDEX: 28.7 KG/M2

## 2020-02-12 DIAGNOSIS — E11.65 UNCONTROLLED TYPE 2 DIABETES MELLITUS WITH HYPERGLYCEMIA: Chronic | ICD-10-CM

## 2020-02-12 DIAGNOSIS — M79.641 PAIN OF RIGHT HAND: ICD-10-CM

## 2020-02-12 DIAGNOSIS — I25.10 CAD, MULTIPLE VESSEL: ICD-10-CM

## 2020-02-12 DIAGNOSIS — I96 ISCHEMIC NECROSIS OF FINGER: Primary | ICD-10-CM

## 2020-02-12 DIAGNOSIS — I82.621 ACUTE DEEP VEIN THROMBOSIS (DVT) OF RIGHT UPPER EXTREMITY, UNSPECIFIED VEIN: ICD-10-CM

## 2020-02-12 PROCEDURE — 99024 POSTOP FOLLOW-UP VISIT: CPT | Mod: POP,,, | Performed by: ORTHOPAEDIC SURGERY

## 2020-02-12 PROCEDURE — 99999 PR PBB SHADOW E&M-EST. PATIENT-LVL III: CPT | Mod: PBBFAC,,, | Performed by: ORTHOPAEDIC SURGERY

## 2020-02-12 PROCEDURE — 73130 X-RAY EXAM OF HAND: CPT | Mod: 26,RT,, | Performed by: RADIOLOGY

## 2020-02-12 PROCEDURE — 99024 PR POST-OP FOLLOW-UP VISIT: ICD-10-PCS | Mod: POP,,, | Performed by: ORTHOPAEDIC SURGERY

## 2020-02-12 PROCEDURE — 73130 XR HAND COMPLETE 3 VIEW RIGHT: ICD-10-PCS | Mod: 26,RT,, | Performed by: RADIOLOGY

## 2020-02-12 PROCEDURE — 99999 PR PBB SHADOW E&M-EST. PATIENT-LVL III: ICD-10-PCS | Mod: PBBFAC,,, | Performed by: ORTHOPAEDIC SURGERY

## 2020-02-12 PROCEDURE — 73130 X-RAY EXAM OF HAND: CPT | Mod: TC,RT

## 2020-02-12 PROCEDURE — 99213 OFFICE O/P EST LOW 20 MIN: CPT | Mod: PBBFAC,25 | Performed by: ORTHOPAEDIC SURGERY

## 2020-02-12 NOTE — H&P (VIEW-ONLY)
Subjective:     Patient ID: Mateusz Bates is a 69 y.o. female.    Chief Complaint: Pain of the Right Hand    The patient is a 69-year-old female who had open heart surgery 10/31/2019 and apparently had a allergic reaction to heparin she says.  She developed apparent emboli and has 2 toes on the right foot with ischemic necrosis and the right index finger and thumb with obvious necrotic tissue on the tip of the distal phalanx right index finger and thumb.      Past Medical History:   Diagnosis Date    Chronic major depressive disorder     Diabetes mellitus type II, uncontrolled 2000    Insulin x 10 years    Eczema     Essential hypertension     Generalized anxiety disorder     GERD (gastroesophageal reflux disease)     Glaucoma     OD    Hyperlipidemia     Hypertension associated with diabetes 8/13/2013    Obesity      Past Surgical History:   Procedure Laterality Date    APPENDECTOMY      CORONARY ARTERY BYPASS GRAFT (CABG) N/A 10/31/2019    Procedure: CORONARY ARTERY BYPASS GRAFT (CABG);  Surgeon: Keny Zaidi MD;  Location: [facility] OR;  Service: Cardiothoracic;  Laterality: N/A;  3-Vessel, Epi-Aortic Ultrasound    ECTOPIC PREGNANCY SURGERY Left     LSO    INJECTION OF ANESTHETIC AGENT AROUND MULTIPLE INTERCOSTAL NERVES N/A 10/31/2019    Procedure: BLOCK, NERVE, INTERCOSTAL, 2 OR MORE;  Surgeon: Keny Zaidi MD;  Location: [facility];  Service: Cardiothoracic;  Laterality: N/A;  Parasternal    INSERTION OF INTRA-AORTIC BALLOON ASSIST DEVICE N/A 10/30/2019    Procedure: INSERTION, INTRA-AORTIC BALLOON PUMP;  Surgeon: Ingrid Lopez MD;  Location: [facility] CATH LAB;  Service: Cardiology;  Laterality: N/A;    LEFT HEART CATHETERIZATION Left 10/28/2019    Procedure: CATHETERIZATION, HEART, LEFT;  Surgeon: Ingrid Lopez MD;  Location: [facility] CATH LAB;  Service: Cardiology;  Laterality: Left;    OOPHORECTOMY       Family History   Problem Relation Age of Onset    Heart attack Mother     Multiple myeloma  Father     Hypertension Father     Colon cancer Sister     Hypertension Sister     Diabetes Sister     Depression Maternal Grandmother     Heart disease Maternal Grandmother     Diabetes Maternal Grandmother     Heart disease Paternal Grandmother     Heart disease Paternal Grandfather     Alcohol abuse Maternal Uncle     Glaucoma Paternal Aunt     Diabetes Paternal Aunt     Glaucoma Paternal Uncle         Great Aunt    Heart failure Sister     Hypertension Sister     Hepatitis Sister     Hypertension Sister     Drug abuse Sister     Alcohol abuse Sister      Social History     Socioeconomic History    Marital status:      Spouse name: Not on file    Number of children: Not on file    Years of education: Not on file    Highest education level: Not on file   Occupational History    Not on file   Social Needs    Financial resource strain: Not on file    Food insecurity:     Worry: Not on file     Inability: Not on file    Transportation needs:     Medical: Not on file     Non-medical: Not on file   Tobacco Use    Smoking status: Never Smoker    Smokeless tobacco: Never Used   Substance and Sexual Activity    Alcohol use: No    Drug use: No    Sexual activity: Never   Lifestyle    Physical activity:     Days per week: Not on file     Minutes per session: Not on file    Stress: Not on file   Relationships    Social connections:     Talks on phone: Not on file     Gets together: Not on file     Attends Hindu service: Not on file     Active member of club or organization: Not on file     Attends meetings of clubs or organizations: Not on file     Relationship status: Not on file   Other Topics Concern    Not on file   Social History Narrative    . Lives alone. Patient works full time at [employer].     Medication List with Changes/Refills   Current Medications    ACETAMINOPHEN (TYLENOL) 325 MG TABLET    Take 325 mg by mouth every 6 (six) hours as needed  for Pain.    AMLODIPINE (NORVASC) 10 MG TABLET    Take 0.5 tablets (5 mg total) by mouth once daily.    ASPIRIN (ECOTRIN) 81 MG EC TABLET    Take 1 tablet (81 mg total) by mouth once daily.    ATORVASTATIN (LIPITOR) 80 MG TABLET    Take 1 tablet (80 mg total) by mouth every evening.    BLOOD SUGAR DIAGNOSTIC (CONTOUR NEXT TEST STRIPS) STRP    1 strip by Misc.(Non-Drug; Combo Route) route 3 -4 times daily. Conto    BLOOD SUGAR DIAGNOSTIC STRP    1 strip 4 (four) times daily.    BLOOD-GLUCOSE METER MISC    IDDM 250.00  Insurance or Pt choice    CHLORTHALIDONE (HYGROTEN) 25 MG TAB    Take 1 tablet (25 mg total) by mouth once daily.    CONTOUR NEXT TEST STRIPS STRP    USE ONE STRIP THREE TIMES A DAY    ESCITALOPRAM OXALATE (LEXAPRO) 10 MG TABLET    Take 10 mg by mouth once daily.    FLASH GLUCOSE SCANNING READER (FREESTYLE MAR 14 DAY READER) MISC    1 Device by Misc.(Non-Drug; Combo Route) route continuous.    FLASH GLUCOSE SENSOR (FREESTYLE MAR 14 DAY SENSOR) KIT    Use 1 sensor every 14 days.    HYDROXYZINE PAMOATE (VISTARIL) 25 MG CAP    Take 25 mg by mouth every 8 (eight) hours as needed.    INSULIN ASPART U-100 (NOVOLOG U-100 INSULIN ASPART) 100 UNIT/ML INJECTION    Inject 4 Units into the skin with lunch.    INSULIN DETEMIR U-100 (LEVEMIR) 100 UNIT/ML INJECTION    Inject 20 Units into the skin 2 (two) times daily.     LACTULOSE (CHRONULAC) 10 GRAM/15 ML SOLUTION    Take by mouth 3 (three) times daily.    LANCETS (MICROLET LANCET) MISC    1 lancet by Misc.(Non-Drug; Combo Route) route 3 (three) times daily.    LOSARTAN (COZAAR) 100 MG TABLET    Take 1 tablet (100 mg total) by mouth once daily.    METOPROLOL TARTRATE (LOPRESSOR) 50 MG TABLET    Take 1 tablet (50 mg total) by mouth 2 (two) times daily.    MULTIVITAMIN CAPSULE    Take 1 capsule by mouth once daily.    NITROGLYCERIN 2% TD OINT (NITROGLYN) 2 % OINTMENT    Apply 0.5 inches topically every 12 (twelve) hours. for 14 days    PANTOPRAZOLE (PROTONIX) 40  "MG TABLET    Take 1 tablet (40 mg total) by mouth once daily.    PEN NEEDLE, DIABETIC 32 GAUGE X 5/32" NDLE    1 each by Misc.(Non-Drug; Combo Route) route 3 (three) times daily.    SYRINGE WITH NEEDLE (SYRINGE 3CC/25GX1") 3 ML 25 GAUGE X 1" SYRG    Use as Directed    VENLAFAXINE (EFFEXOR-XR) 150 MG CP24    Take 1 capsule (150 mg total) by mouth once daily.     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 11/8 MARY (heparin-induced platelet antibody) 3.53 - very strongly positive; 11/8 JAVIER (serotonin release Assay) positive.    Ace inhibitors Other (See Comments)     Cough       Review of Systems   Constitution: Negative for malaise/fatigue.   HENT: Negative for hearing loss.    Eyes: Positive for visual disturbance. Negative for double vision.   Cardiovascular: Positive for chest pain.   Respiratory: Negative for shortness of breath.    Endocrine: Negative for cold intolerance.   Hematologic/Lymphatic: Does not bruise/bleed easily.   Skin: Negative for poor wound healing and suspicious lesions.   Musculoskeletal: Negative for gout, joint pain and joint swelling.   Gastrointestinal: Positive for heartburn. Negative for nausea and vomiting.   Genitourinary: Negative for dysuria.   Neurological: Positive for headaches. Negative for numbness, paresthesias and sensory change.   Psychiatric/Behavioral: Positive for depression and memory loss. Negative for substance abuse. The patient is nervous/anxious.    Allergic/Immunologic: Negative for persistent infections.       Objective:   Body mass index is 28.72 kg/m².  Vitals:    02/12/20 0945   BP: (!) 115/56   Pulse: (!) 49                General    Constitutional: She is oriented to person, place, and time. She appears well-developed and well-nourished. No distress.   HENT:   Head: Normocephalic.   Mouth/Throat: Oropharynx is clear and moist.   Eyes: EOM are normal.   Neck: " Normal range of motion.   Cardiovascular: Normal rate.    Pulmonary/Chest: Effort normal.   Abdominal: Soft.   Neurological: She is alert and oriented to person, place, and time. No cranial nerve deficit.   Psychiatric: She has a normal mood and affect.             Right Hand/Wrist Exam     Inspection   Scars: Hand -  absent  Effusion: Hand -  absent    Pain   Hand - The patient exhibits pain of the thumb IP and index IP.    Other     Neuorologic Exam    Median Distribution: normal  Ulnar Distribution: normal  Radial Distribution: normal    Comments:  The patient has ischemic necrosis right index and thumb.  This is at the area of the sterile matrix of the nail bed trip relatively transversely in the index finger and obliquely toward the radial aspect of the right thumb distal phalanx.  There is no sign of infection.          Vascular Exam       Capillary Refill  Right Hand: normal capillary refill      Relevant imaging results reviewed and interpreted by me, discussed with the patient and / or family today radiographs right hand were normal with no evidence of infection  Assessment:     Encounter Diagnoses   Name Primary?    Uncontrolled type 2 diabetes mellitus with hyperglycemia     CAD, multiple vessel     Acute deep vein thrombosis (DVT) of right upper extremity, unspecified vein     Ischemic necrosis of finger Yes    ischemic necrosis right thumb and index finger    Plan:     The patient was counseled regarding a debridement of necrotic tissue right thumb distal phalanx and right index finger distal phalanx needing an amputation at the level of the necrotic tissue.  She was told I may need to nibble off the bone beneath the soft tissues so she does not have bone exposed. Risk complications and alternatives were discussed including the risk of infection, anesthetic risk, injury to nerves and vessels, loss of motion, and possible need for additional surgeries were discussed.  She seems to understand and  agree that surgery. All questions were answered.                Disclaimer: This note was prepared using a voice recognition system and is likely to have sound alike errors within the text.

## 2020-02-12 NOTE — PROGRESS NOTES
Subjective:     Patient ID: Mateusz Bates is a 69 y.o. female.    Chief Complaint: Pain of the Right Hand    The patient is a 69-year-old female who had open heart surgery 10/31/2019 and apparently had a allergic reaction to heparin she says.  She developed apparent emboli and has 2 toes on the right foot with ischemic necrosis and the right index finger and thumb with obvious necrotic tissue on the tip of the distal phalanx right index finger and thumb.      Past Medical History:   Diagnosis Date    Chronic major depressive disorder     Diabetes mellitus type II, uncontrolled 2000    Insulin x 10 years    Eczema     Essential hypertension     Generalized anxiety disorder     GERD (gastroesophageal reflux disease)     Glaucoma     OD    Hyperlipidemia     Hypertension associated with diabetes 8/13/2013    Obesity      Past Surgical History:   Procedure Laterality Date    APPENDECTOMY      CORONARY ARTERY BYPASS GRAFT (CABG) N/A 10/31/2019    Procedure: CORONARY ARTERY BYPASS GRAFT (CABG);  Surgeon: Keny Zaidi MD;  Location: [facility] OR;  Service: Cardiothoracic;  Laterality: N/A;  3-Vessel, Epi-Aortic Ultrasound    ECTOPIC PREGNANCY SURGERY Left     LSO    INJECTION OF ANESTHETIC AGENT AROUND MULTIPLE INTERCOSTAL NERVES N/A 10/31/2019    Procedure: BLOCK, NERVE, INTERCOSTAL, 2 OR MORE;  Surgeon: Keny Zaidi MD;  Location: [facility] OR;  Service: Cardiothoracic;  Laterality: N/A;  Parasternal    INSERTION OF INTRA-AORTIC BALLOON ASSIST DEVICE N/A 10/30/2019    Procedure: INSERTION, INTRA-AORTIC BALLOON PUMP;  Surgeon: Ingrid Lopez MD;  Location: [facility] CATH LAB;  Service: Cardiology;  Laterality: N/A;    LEFT HEART CATHETERIZATION Left 10/28/2019    Procedure: CATHETERIZATION, HEART, LEFT;  Surgeon: Ingrid Lopez MD;  Location: [facility] CATH LAB;  Service: Cardiology;  Laterality: Left;    OOPHORECTOMY       Family History   Problem Relation Age of Onset    Heart attack Mother     Multiple myeloma  Father     Hypertension Father     Colon cancer Sister     Hypertension Sister     Diabetes Sister     Depression Maternal Grandmother     Heart disease Maternal Grandmother     Diabetes Maternal Grandmother     Heart disease Paternal Grandmother     Heart disease Paternal Grandfather     Alcohol abuse Maternal Uncle     Glaucoma Paternal Aunt     Diabetes Paternal Aunt     Glaucoma Paternal Uncle         Great Aunt    Heart failure Sister     Hypertension Sister     Hepatitis Sister     Hypertension Sister     Drug abuse Sister     Alcohol abuse Sister      Social History     Socioeconomic History    Marital status:      Spouse name: Not on file    Number of children: Not on file    Years of education: Not on file    Highest education level: Not on file   Occupational History    Not on file   Social Needs    Financial resource strain: Not on file    Food insecurity:     Worry: Not on file     Inability: Not on file    Transportation needs:     Medical: Not on file     Non-medical: Not on file   Tobacco Use    Smoking status: Never Smoker    Smokeless tobacco: Never Used   Substance and Sexual Activity    Alcohol use: No    Drug use: No    Sexual activity: Never   Lifestyle    Physical activity:     Days per week: Not on file     Minutes per session: Not on file    Stress: Not on file   Relationships    Social connections:     Talks on phone: Not on file     Gets together: Not on file     Attends Mandaen service: Not on file     Active member of club or organization: Not on file     Attends meetings of clubs or organizations: Not on file     Relationship status: Not on file   Other Topics Concern    Not on file   Social History Narrative    . Lives alone. Patient works full time [employer].     Medication List with Changes/Refills   Current Medications    ACETAMINOPHEN (TYLENOL) 325 MG TABLET    Take 325 mg by mouth every 6 (six) hours as needed  for Pain.    AMLODIPINE (NORVASC) 10 MG TABLET    Take 0.5 tablets (5 mg total) by mouth once daily.    ASPIRIN (ECOTRIN) 81 MG EC TABLET    Take 1 tablet (81 mg total) by mouth once daily.    ATORVASTATIN (LIPITOR) 80 MG TABLET    Take 1 tablet (80 mg total) by mouth every evening.    BLOOD SUGAR DIAGNOSTIC (CONTOUR NEXT TEST STRIPS) STRP    1 strip by Misc.(Non-Drug; Combo Route) route 3 -4 times daily. Conto    BLOOD SUGAR DIAGNOSTIC STRP    1 strip 4 (four) times daily.    BLOOD-GLUCOSE METER MISC    IDDM 250.00  Insurance or Pt choice    CHLORTHALIDONE (HYGROTEN) 25 MG TAB    Take 1 tablet (25 mg total) by mouth once daily.    CONTOUR NEXT TEST STRIPS STRP    USE ONE STRIP THREE TIMES A DAY    ESCITALOPRAM OXALATE (LEXAPRO) 10 MG TABLET    Take 10 mg by mouth once daily.    FLASH GLUCOSE SCANNING READER (FREESTYLE MAR 14 DAY READER) MISC    1 Device by Misc.(Non-Drug; Combo Route) route continuous.    FLASH GLUCOSE SENSOR (FREESTYLE MAR 14 DAY SENSOR) KIT    Use 1 sensor every 14 days.    HYDROXYZINE PAMOATE (VISTARIL) 25 MG CAP    Take 25 mg by mouth every 8 (eight) hours as needed.    INSULIN ASPART U-100 (NOVOLOG U-100 INSULIN ASPART) 100 UNIT/ML INJECTION    Inject 4 Units into the skin with lunch.    INSULIN DETEMIR U-100 (LEVEMIR) 100 UNIT/ML INJECTION    Inject 20 Units into the skin 2 (two) times daily.     LACTULOSE (CHRONULAC) 10 GRAM/15 ML SOLUTION    Take by mouth 3 (three) times daily.    LANCETS (MICROLET LANCET) MISC    1 lancet by Misc.(Non-Drug; Combo Route) route 3 (three) times daily.    LOSARTAN (COZAAR) 100 MG TABLET    Take 1 tablet (100 mg total) by mouth once daily.    METOPROLOL TARTRATE (LOPRESSOR) 50 MG TABLET    Take 1 tablet (50 mg total) by mouth 2 (two) times daily.    MULTIVITAMIN CAPSULE    Take 1 capsule by mouth once daily.    NITROGLYCERIN 2% TD OINT (NITROGLYN) 2 % OINTMENT    Apply 0.5 inches topically every 12 (twelve) hours. for 14 days    PANTOPRAZOLE (PROTONIX) 40  "MG TABLET    Take 1 tablet (40 mg total) by mouth once daily.    PEN NEEDLE, DIABETIC 32 GAUGE X 5/32" NDLE    1 each by Misc.(Non-Drug; Combo Route) route 3 (three) times daily.    SYRINGE WITH NEEDLE (SYRINGE 3CC/25GX1") 3 ML 25 GAUGE X 1" SYRG    Use as Directed    VENLAFAXINE (EFFEXOR-XR) 150 MG CP24    Take 1 capsule (150 mg total) by mouth once daily.     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 11/8 MARY (heparin-induced platelet antibody) 3.53 - very strongly positive; 11/8 JAVIER (serotonin release Assay) positive.    Ace inhibitors Other (See Comments)     Cough       Review of Systems   Constitution: Negative for malaise/fatigue.   HENT: Negative for hearing loss.    Eyes: Positive for visual disturbance. Negative for double vision.   Cardiovascular: Positive for chest pain.   Respiratory: Negative for shortness of breath.    Endocrine: Negative for cold intolerance.   Hematologic/Lymphatic: Does not bruise/bleed easily.   Skin: Negative for poor wound healing and suspicious lesions.   Musculoskeletal: Negative for gout, joint pain and joint swelling.   Gastrointestinal: Positive for heartburn. Negative for nausea and vomiting.   Genitourinary: Negative for dysuria.   Neurological: Positive for headaches. Negative for numbness, paresthesias and sensory change.   Psychiatric/Behavioral: Positive for depression and memory loss. Negative for substance abuse. The patient is nervous/anxious.    Allergic/Immunologic: Negative for persistent infections.       Objective:   Body mass index is 28.72 kg/m².  Vitals:    02/12/20 0945   BP: (!) 115/56   Pulse: (!) 49                General    Constitutional: She is oriented to person, place, and time. She appears well-developed and well-nourished. No distress.   HENT:   Head: Normocephalic.   Mouth/Throat: Oropharynx is clear and moist.   Eyes: EOM are normal.   Neck: " Normal range of motion.   Cardiovascular: Normal rate.    Pulmonary/Chest: Effort normal.   Abdominal: Soft.   Neurological: She is alert and oriented to person, place, and time. No cranial nerve deficit.   Psychiatric: She has a normal mood and affect.             Right Hand/Wrist Exam     Inspection   Scars: Hand -  absent  Effusion: Hand -  absent    Pain   Hand - The patient exhibits pain of the thumb IP and index IP.    Other     Neuorologic Exam    Median Distribution: normal  Ulnar Distribution: normal  Radial Distribution: normal    Comments:  The patient has ischemic necrosis right index and thumb.  This is at the area of the sterile matrix of the nail bed trip relatively transversely in the index finger and obliquely toward the radial aspect of the right thumb distal phalanx.  There is no sign of infection.          Vascular Exam       Capillary Refill  Right Hand: normal capillary refill      Relevant imaging results reviewed and interpreted by me, discussed with the patient and / or family today radiographs right hand were normal with no evidence of infection  Assessment:     Encounter Diagnoses   Name Primary?    Uncontrolled type 2 diabetes mellitus with hyperglycemia     CAD, multiple vessel     Acute deep vein thrombosis (DVT) of right upper extremity, unspecified vein     Ischemic necrosis of finger Yes    ischemic necrosis right thumb and index finger    Plan:     The patient was counseled regarding a debridement of necrotic tissue right thumb distal phalanx and right index finger distal phalanx needing an amputation at the level of the necrotic tissue.  She was told I may need to nibble off the bone beneath the soft tissues so she does not have bone exposed. Risk complications and alternatives were discussed including the risk of infection, anesthetic risk, injury to nerves and vessels, loss of motion, and possible need for additional surgeries were discussed.  She seems to understand and  agree that surgery. All questions were answered.                Disclaimer: This note was prepared using a voice recognition system and is likely to have sound alike errors within the text.

## 2020-02-12 NOTE — LETTER
February 12, 2020      Ingrid Lopez MD  [address]  [place] [address]           '[facility]  [address] [place] [id number]  Phone: [phone]  Fax: [phone]          Patient: Mateusz Bates   MR Number: [id number]   YOB: 1950   Date of Visit: 2/12/2020       Dear Dr. Ingrid Lopez:    Thank you for referring Mateusz Bates to me for evaluation. Attached you will find relevant portions of my assessment and plan of care.    If you have questions, please do not hesitate to call me. I look forward to following Mateusz Bates along with you.    Sincerely,    Richard Rajput MD    Enclosure  CC:  No Recipients    If you would like to receive this communication electronically, please contact [email or web address] or [phone] to request more information on [employer] access.    For providers and/or their staff who would like to refer a patient to [facility], please contact us through our one-stop-shop provider referral line, [facility], at [phone].    If you feel you have received this communication in error or would no longer like to receive these types of communications, please e-mail [email or web address]

## 2020-02-12 NOTE — PROGRESS NOTES
Digital Medicine Program Enrollment  HPI     5th attempt for enrollment call.  No answer, left voicemail.  Also sent incomplete enrollment letter to pt.

## 2020-02-18 ENCOUNTER — HOSPITAL ENCOUNTER (OUTPATIENT)
Dept: PREADMISSION TESTING | Facility: HOSPITAL | Age: 70
Discharge: HOME | End: 2020-02-18
Payer: MEDICARE

## 2020-02-19 ENCOUNTER — HOSPITAL ENCOUNTER (OUTPATIENT)
Dept: PREADMISSION TESTING | Facility: HOSPITAL | Age: 70
Discharge: HOME | End: 2020-02-19
Attending: ORTHOPAEDIC SURGERY
Payer: MEDICARE

## 2020-02-19 ENCOUNTER — ANESTHESIA EVENT (OUTPATIENT)
Dept: SURGERY | Facility: HOSPITAL | Age: 70
End: 2020-02-19
Payer: MEDICARE

## 2020-02-19 VITALS
RESPIRATION RATE: 18 BRPM | TEMPERATURE: 98 F | OXYGEN SATURATION: 97 % | SYSTOLIC BLOOD PRESSURE: 101 MMHG | HEART RATE: 55 BPM | DIASTOLIC BLOOD PRESSURE: 50 MMHG

## 2020-02-19 DIAGNOSIS — R41.3 MEMORY DIFFICULTIES: ICD-10-CM

## 2020-02-19 DIAGNOSIS — D75.829 HIT (HEPARIN-INDUCED THROMBOCYTOPENIA): ICD-10-CM

## 2020-02-19 DIAGNOSIS — G72.81 CRITICAL ILLNESS MYOPATHY: ICD-10-CM

## 2020-02-19 DIAGNOSIS — I50.42 SYSTOLIC AND DIASTOLIC CHF, CHRONIC: ICD-10-CM

## 2020-02-19 DIAGNOSIS — E11.65 UNCONTROLLED TYPE 2 DIABETES MELLITUS WITH HYPERGLYCEMIA: Chronic | ICD-10-CM

## 2020-02-19 DIAGNOSIS — E78.5 HYPERLIPIDEMIA ASSOCIATED WITH TYPE 2 DIABETES MELLITUS: ICD-10-CM

## 2020-02-19 DIAGNOSIS — F32.9 CHRONIC MAJOR DEPRESSIVE DISORDER: Chronic | ICD-10-CM

## 2020-02-19 DIAGNOSIS — Z95.1 S/P CABG X 3: ICD-10-CM

## 2020-02-19 DIAGNOSIS — D50.9 IRON DEFICIENCY ANEMIA, UNSPECIFIED IRON DEFICIENCY ANEMIA TYPE: ICD-10-CM

## 2020-02-19 DIAGNOSIS — E16.2 HYPOGLYCEMIA: ICD-10-CM

## 2020-02-19 DIAGNOSIS — I44.7 LEFT BUNDLE BRANCH BLOCK (LBBB): ICD-10-CM

## 2020-02-19 DIAGNOSIS — I25.5 ISCHEMIC CARDIOMYOPATHY: ICD-10-CM

## 2020-02-19 DIAGNOSIS — I25.10 CAD, MULTIPLE VESSEL: ICD-10-CM

## 2020-02-19 DIAGNOSIS — Z01.818 PREOP TESTING: Primary | ICD-10-CM

## 2020-02-19 DIAGNOSIS — Z86.718 HISTORY OF DVT (DEEP VEIN THROMBOSIS): ICD-10-CM

## 2020-02-19 DIAGNOSIS — I96 NECROSIS OF FINGER: ICD-10-CM

## 2020-02-19 DIAGNOSIS — E11.69 HYPERLIPIDEMIA ASSOCIATED WITH TYPE 2 DIABETES MELLITUS: ICD-10-CM

## 2020-02-19 DIAGNOSIS — I96 NECROTIC TOES: ICD-10-CM

## 2020-02-19 PROBLEM — I82.90 ACUTE DEEP VEIN THROMBOSIS (DVT) OF NON-EXTREMITY VEIN: Status: RESOLVED | Noted: 2019-11-08 | Resolved: 2020-02-19

## 2020-02-19 PROBLEM — R60.0 EDEMA OF HAND: Status: RESOLVED | Noted: 2019-11-07 | Resolved: 2020-02-19

## 2020-02-19 PROBLEM — R07.9 CHEST PAIN: Status: RESOLVED | Noted: 2019-10-28 | Resolved: 2020-02-19

## 2020-02-19 PROBLEM — L29.9 CHRONIC PRURITUS: Status: RESOLVED | Noted: 2019-10-02 | Resolved: 2020-02-19

## 2020-02-19 PROBLEM — R94.31 ABNORMAL EKG: Status: RESOLVED | Noted: 2019-10-28 | Resolved: 2020-02-19

## 2020-02-19 PROBLEM — D69.6 THROMBOCYTOPENIA: Status: RESOLVED | Noted: 2019-11-01 | Resolved: 2020-02-19

## 2020-02-19 RX ORDER — METFORMIN HYDROCHLORIDE 500 MG/1
500 TABLET ORAL 2 TIMES DAILY WITH MEALS
COMMUNITY

## 2020-02-19 RX ORDER — PREGABALIN 50 MG/1
50 CAPSULE ORAL DAILY
COMMUNITY

## 2020-02-19 NOTE — ASSESSMENT & PLAN NOTE
"Appointment made with Podiatry   Pt and family requested appointment after she "recovers" from this surgery   "

## 2020-02-19 NOTE — ASSESSMENT & PLAN NOTE
"The patient is scheduled for amputation of necrotic index finger and thumb of right hand on 2/20/20 by Dr. Rajput     Known risk factors for perioperative complications:     Recent prolonged ICU hospitalization 10/2019 to 11/2019 for NSTEMI/Multivessel CAD s/p CABG, New onset CHF/ICM/Cardiogenic shock s/p IABP and inotropes, HIT, and acute RUE DVT.   IDDM with recent episodes of hypoglycemia     Difficulty with intubation is not anticipated.    Cardiac Risk Estimation: moderate cardiac intraoperative risk     1.) Preoperative workup as follows: none.  2.) Change in medication regimen before surgery: Hold insulin night prior and am of surgery, discontinue Metformin the day of surgery  3.) Prophylaxis for cardiac events with perioperative beta-blockers: Cont Metoprolol .  4.) Invasive hemodynamic monitoring perioperatively: should be considered if using general anesthesia   5.) Deep vein thrombosis prophylaxis postoperatively: regimen to be chosen by surgical team.  6.) Surveillance for postoperative MI with ECG immediately postoperatively and on postoperati ve days 1 and 2 AND troponin levels 24 hours postoperatively and on day 4 or hospital discharge (whichever comes first): not indicated.  7.) Current medications which may produce withdrawal symptoms if withheld perioperatively: none   8.) Other measures: Careful attention to perioperative glycemic control (Accu check pre/post surgery ).   Discussed pt with Dr. Peñaloza with Anesthesia. Plan will be Peripheral nerve block and "light" MAC anesthesia 2/2 elevated cardiac risk  "

## 2020-02-19 NOTE — ASSESSMENT & PLAN NOTE
Last HgB A1c was 8.3 on 2/5/20   Pt had recent hypoglycemic episodes   Hold Insulin night before and am of surgery  Hold metformin am of surgery  Accu check pre/post procedure \  F/U with PCP

## 2020-02-19 NOTE — ASSESSMENT & PLAN NOTE
NSTEMI/ICM/New onset CHF/Cardiogenic shock S/p CABG 10/31/19, Inotropes, and IABP (with prolonged ICU stay)  Stable   Denies any Dizziness, Syncope, CP, SOB since hospitalization. Mild fatigue and JONES with walking more than approx 150 feet  Cont Lopressor, ASA, Losartan, and Statin   F/U with Cardiology

## 2020-02-19 NOTE — ASSESSMENT & PLAN NOTE
RIJ/RUE DVT S/p 3 month treatment with Eliquis  Followed by heme/Onc   Negative repeat RUE U/S on 1/3/20  Completed treatment

## 2020-02-19 NOTE — H&P
Preoperative History and Physical                                                             Hospital Medicine      Chief Complaint: Preoperative evaluation     History of Present Illness:      Mateusz Bates is a 69 y.o. female with ischemic/necrotic right index finger and thumb after a prolonged ICU hospitalization 11/2019 with NSTEMI/Cardiogenic shock/HIT/RUE DVT who presents to the office today for a preoperative consultation at the request of Dr. Rajput  who plans on performing amputation of necrotic index finger and thumb of right hand on 2/20/20.     Functional Status:      The patient is not able to climb a flight of stairs. The patient is able to ambulate approx 150 feet  without difficulty. The patient's functional status is affected by the surgical problem. The patient's functional status is affected by JONES and Fatigue. Pt denies shortness of breath at rest or chest pain  MET score less than 4    Past Medical History:      Past Medical History:   Diagnosis Date    Chronic major depressive disorder     Diabetes mellitus type II, uncontrolled 2000    Insulin x 10 years    Eczema     Essential hypertension     Generalized anxiety disorder     GERD (gastroesophageal reflux disease)     Glaucoma     OD    HIT (heparin-induced thrombocytopenia)     Hyperlipidemia     Hypertension associated with diabetes 8/13/2013    NSTEMI (non-ST elevated myocardial infarction) 10/28/2019    Obesity         Past Surgical History:      Past Surgical History:   Procedure Laterality Date    APPENDECTOMY      CORONARY ARTERY BYPASS GRAFT (CABG) N/A 10/31/2019    Procedure: CORONARY ARTERY BYPASS GRAFT (CABG);  Surgeon: Keny Zaidi MD;  Location: [facility];  Service: Cardiothoracic;  Laterality: N/A;  3-Vessel, Epi-Aortic Ultrasound    ECTOPIC PREGNANCY SURGERY Left     LSO    INJECTION OF ANESTHETIC AGENT AROUND MULTIPLE INTERCOSTAL NERVES N/A 10/31/2019     Procedure: BLOCK, NERVE, INTERCOSTAL, 2 OR MORE;  Surgeon: Keny Zaidi MD;  Location: [facility] OR;  Service: Cardiothoracic;  Laterality: N/A;  Parasternal    INSERTION OF INTRA-AORTIC BALLOON ASSIST DEVICE N/A 10/30/2019    Procedure: INSERTION, INTRA-AORTIC BALLOON PUMP;  Surgeon: Ingrid Lopez MD;  Location: [facility] CATH LAB;  Service: Cardiology;  Laterality: N/A;    LEFT HEART CATHETERIZATION Left 10/28/2019    Procedure: CATHETERIZATION, HEART, LEFT;  Surgeon: Ingrid Lopez MD;  Location: [facility] CATH LAB;  Service: Cardiology;  Laterality: Left;        Social History:      Social History     Socioeconomic History    Marital status:      Spouse name: Not on file    Number of children: Not on file    Years of education: Not on file    Highest education level: Not on file   Occupational History    Not on file   Social Needs    Financial resource strain: Not on file    Food insecurity:     Worry: Not on file     Inability: Not on file    Transportation needs:     Medical: Not on file     Non-medical: Not on file   Tobacco Use    Smoking status: Never Smoker    Smokeless tobacco: Never Used   Substance and Sexual Activity    Alcohol use: Not Currently     Frequency: Never    Drug use: No    Sexual activity: Never   Lifestyle    Physical activity:     Days per week: Not on file     Minutes per session: Not on file    Stress: Not on file   Relationships    Social connections:     Talks on phone: Not on file     Gets together: Not on file     Attends Mandaeism service: Not on file     Active member of club or organization: Not on file     Attends meetings of clubs or organizations: Not on file     Relationship status: Not on file   Other Topics Concern    Not on file   Social History Narrative    . Lives alone. Patient works full time at [employer].        Family History:      Family History   Problem Relation Age of Onset    Heart attack Mother 57    Multiple myeloma  Father 74    Hypertension Father     Heart disease Father 70        s/p CABG    Colon cancer Sister     Hypertension Sister     Diabetes Sister     Depression Maternal Grandmother     Heart disease Maternal Grandmother     Diabetes Maternal Grandmother     Heart disease Paternal Grandmother     Heart disease Paternal Grandfather     Alcohol abuse Maternal Uncle     Glaucoma Paternal Aunt     Diabetes Paternal Aunt     Glaucoma Paternal Uncle         Great Aunt    Heart failure Sister     Hypertension Sister     Hepatitis Sister     Hypertension Sister     Drug abuse Sister     Alcohol abuse Sister        Allergies:      Review of patient's allergies indicates:   Allergen Reactions    Heparin analogues Other (See Comments)     HIT panel confirmed positive. 11/8 MARY (heparin-induced platelet antibody) 3.53 - very strongly positive; 11/8 JAVIER (serotonin release Assay) positive.    Ace inhibitors Other (See Comments)     Cough         Medications:      Current Outpatient Medications   Medication Sig    acetaminophen (TYLENOL) 325 MG tablet Take 325 mg by mouth every 6 (six) hours as needed for Pain.    amLODIPine (NORVASC) 10 MG tablet Take 0.5 tablets (5 mg total) by mouth once daily.    aspirin (ECOTRIN) 81 MG EC tablet Take 1 tablet (81 mg total) by mouth once daily.    atorvastatin (LIPITOR) 80 MG tablet Take 1 tablet (80 mg total) by mouth every evening.    chlorthalidone (HYGROTEN) 25 MG Tab Take 1 tablet (25 mg total) by mouth once daily.    escitalopram oxalate (LEXAPRO) 10 MG tablet Take 10 mg by mouth once daily.    hydrOXYzine pamoate (VISTARIL) 25 MG Cap Take 25 mg by mouth every 8 (eight) hours as needed.    insulin aspart U-100 (NOVOLOG U-100 INSULIN ASPART) 100 unit/mL injection Inject 4 Units into the skin with lunch. (Patient taking differently: Inject into the skin with lunch. Novolog insulin at lunch daily prn per sliding scale if blood sugar over 200)    insulin  detemir U-100 (LEVEMIR) 100 unit/mL injection Inject 15 Units into the skin. 20 units SQ every am and 15 units sq every pm    Hold pm Levemir dose on 2/19/20 and am dose of Levemir on 2/20/20    lactulose (CHRONULAC) 10 gram/15 mL solution Take 20 g by mouth daily as needed.     losartan (COZAAR) 100 MG tablet Take 1 tablet (100 mg total) by mouth once daily.    metFORMIN (GLUCOPHAGE) 500 MG tablet Take 500 mg by mouth 2 (two) times daily with meals. Hold am of surgery    metoprolol tartrate (LOPRESSOR) 50 MG tablet Take 1 tablet (50 mg total) by mouth 2 (two) times daily.    multivitamin capsule Take 1 capsule by mouth once daily.    pantoprazole (PROTONIX) 40 MG tablet Take 1 tablet (40 mg total) by mouth once daily.    pregabalin (LYRICA) 50 MG capsule Take 50 mg by mouth once daily.    nitroGLYCERIN 2% TD oint (NITROGLYN) 2 % ointment Apply 0.5 inches topically every 12 (twelve) hours. for 14 days     No current facility-administered medications for this encounter.        Vitals:      Vitals:    02/19/20 1355   BP: (!) 101/50   Pulse: (!) 55   Resp: 18   Temp: 97.9 °F (36.6 °C)       Review of Systems:        Constitutional: +fatigue Negative for fever, chills, weight loss, malaise/fatigue and diaphoresis.   HENT: Negative for hearing loss, ear pain, nosebleeds, congestion, sore throat, neck pain, tinnitus and ear discharge.    Eyes: Negative for blurred vision, double vision, photophobia, pain, discharge and redness.   Respiratory: Negative for cough, hemoptysis, sputum production, shortness of breath, wheezing and stridor.    Cardiovascular: +JONES after approx 150 feet. Negative for chest pain, palpitations, orthopnea, claudication, leg swelling and PND.   Gastrointestinal: Negative for heartburn, nausea, vomiting, abdominal pain, diarrhea, constipation, blood in stool and melena.   Genitourinary: Negative for dysuria, urgency, frequency, hematuria and flank pain.   Musculoskeletal: +finger and toe pain  Negative for myalgias, back pain, joint pain and falls.   Skin: +black finger and toes Negative for itching and rash.   Neurological: + generalized weakness Negative for dizziness, tingling, tremors, sensory change, speech change, focal weakness, seizures, loss of consciousness, and headaches.   Endo/Heme/Allergies: Negative for environmental allergies and polydipsia. Does not bruise/bleed easily.   Psychiatric/Behavioral: +memory deficits Negative for depression, suicidal ideas, hallucinations,  and substance abuse. The patient is not nervous/anxious and does not have insomnia.    All 14 systems reviewed and negative except as noted above.    Physical Exam:      Constitutional: Appears well-developed, well-nourished and in no acute distress.  Patient is oriented to person, place, and time.   Head: Normocephalic and atraumatic. Mucous membranes moist.  Neck: Neck supple no mass.   Cardiovascular: Bradycardia  and regular rhythm.  S1 S2 appreciated by ascultation.  Pulmonary/Chest: Effort normal and clear to auscultation bilaterally. No respiratory distress.   Abdomen: Soft. Non-tender and non-distended. Bowel sounds are normal.   Neurological: Patient is alert and oriented to person, place and time. Moves all extremities.  Skin: Warm and dry. +necrotic right index finger and thumb, necrotic area to left toes x 2   Extremities: No clubbing, cyanosis or edema.    Laboratory data:      Reviewed and noted in plan where applicable. Please see chart for full laboratory data.    No results for input(s): CPK, CPKMB, TROPONINI, MB in the last 24 hours. No results for input(s): POCTGLUCOSE in the last 24 hours.     Lab Results   Component Value Date    INR 4.3 (H) 11/12/2019    INR 1.1 11/01/2019    INR 1.4 (H) 10/31/2019       Lab Results   Component Value Date    WBC 10.49 02/11/2020    HGB 12.4 02/11/2020    HCT 38.2 02/11/2020    MCV 90 02/11/2020     02/11/2020       No results for input(s): GLU, NA, K, CL, CO2,  BUN, CREATININE, CALCIUM, MG in the last 24 hours.    Predictors of intubation difficulty:       Morbid obesity? no   Anatomically abnormal facies? no   Prominent incisors? no   Receding mandible? no   Short, thick neck? no   Neck range of motion: normal   Dentition: Missing teeth (a few missing teeth)    Cardiographics:      EC20  Sinus bradycardia  Possible Left atrial enlargement  Left bundle branch block  Abnormal ECG  When compared with ECG of 2020 10:04,  No significant change was found  Confirmed by DIANN MEYER MD ([id number]) on 2020 6:21:27 PM    Echocardiogram: 19  CONCLUSIONS     1 - Concentric hypertrophy.     2 - Wall motion abnormalities.     3 - Moderately depressed left ventricular systolic function (EF 30-35%).     4 - Impaired LV relaxation, normal LAP (grade 1 diastolic dysfunction).     5 - Normal right ventricular systolic function .     6 - The estimated PA systolic pressure is 33 mmHg    Imaging:      Chest x-ray: 20  Sternal wires and mediastinal clips are present.  Cardiac size is upper limit of normal.  The aortic arch is calcified.The lung fields appear clear at this time.     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 Rajput     Known risk factors for perioperative complications:     Recent prolonged ICU hospitalization 10/2019 to 2019 for NSTEMI/Multivessel CAD s/p CABG, New onset CHF/ICM/Cardiogenic shock s/p IABP and inotropes, HIT, and acute RUE DVT.   IDDM with recent episodes of hypoglycemia     Difficulty with intubation is not anticipated.    Cardiac Risk Estimation: moderate cardiac intraoperative risk     1.) Preoperative workup as follows: none.  2.) Change in medication regimen before surgery: Hold insulin night prior and am of surgery, discontinue Metformin the day of surgery  3.) Prophylaxis for cardiac events with  "perioperative beta-blockers: Cont Metoprolol .  4.) Invasive hemodynamic monitoring perioperatively: should be considered if using general anesthesia   5.) Deep vein thrombosis prophylaxis postoperatively: regimen to be chosen by surgical team.  6.) Surveillance for postoperative MI with ECG immediately postoperatively and on postoperati ve days 1 and 2 AND troponin levels 24 hours postoperatively and on day 4 or hospital discharge (whichever comes first): not indicated.  7.) Current medications which may produce withdrawal symptoms if withheld perioperatively: none   8.) Other measures: Careful attention to perioperative glycemic control (Accu check pre/post surgery ).   Discussed pt with Dr. Peñaloza with Anesthesia. Plan will be Peripheral nerve block and "light" MAC anesthesia 2/2 elevated cardiac risk    Necrotic left toes  Arterial U/S 11/19/20 showed No hemodynamically significant stenosis demonstrated in the right or left lower extremity arterial system  Venous U/S 11/19/20 showed no DVT  Necrosis likely secondary to Cardiogenic Shock/Inotropes/HIT Appointment made with Podiatry   Pt and family requested appointment after she "recovers" from this surgery     CAD, multiple vessel  NSTEMI/ICM/New onset CHF/Cardiogenic shock S/p CABG 10/31/19, Inotropes, and IABP (with prolonged ICU stay)  Stable   Denies any Dizziness, Syncope, CP, SOB since hospitalization. Mild fatigue and JONES with walking more than approx 150 feet  Cont Lopressor, ASA, Losartan, and Statin   F/U with Cardiology     S/P CABG x 3  See above     LBBB  See above     Systolic and diastolic CHF, chronic  LVEF 30% on Echo 11/6/19  Compensated   Cont BB, ARB, and Chlorthalidone     Ischemic cardiomyopathy  See above   F/u with Cardiology     HIT (heparin-induced thrombocytopenia)  While hospitalized 10/2019-11/2019  Avoid heparin   F/u with hematology     History of DVT (deep vein thrombosis)  RIJ/VASILIY DVT in 11/2019 S/p 3 month treatment with " Eliquis  Followed by Heme/Onc   Negative repeat RUE U/S on 1/3/20  Completed treatment     Diabetes mellitus type II, uncontrolled  Last HgB A1c was 8.3 on 2/5/20   Pt had recent hypoglycemic episodes   Hold Insulin night before and am of surgery  Hold metformin am of surgery  Accu check pre/post procedure \  F/U with PCP    Hypoglycemia  See above     Iron deficiency anemia  Improved   F/u with PCP    Chronic major depressive disorder  Stable   Cont Lexapro    Memory difficulties  Supportive care  F/u with neurology     Hyperlipidemia associated with type 2 diabetes mellitus  Cont Statin     Critical illness myopathy  Cont Rehab at [facility]

## 2020-02-19 NOTE — DISCHARGE INSTRUCTIONS
To confirm, Your doctor has instructed you that surgery is scheduled for 2/20/2020.       Please report to [facility].  Pre admit office will call afternoon prior to surgery with final arrival time    INSTRUCTIONS IMPORTANT!!!   Do not eat, drink, or smoke after 12 midnight-including water. OK to brush teeth, no gum, candy or mints!    ¨ Take only these medicines with a small swallow of water-morning of surgery.  Lyrica, Amlodipine, Aspirin, Lipitor, Chlorthalidone, Lexapro, Losartan, Protonix, Metoprolol    Pre operative instructions:  Please review the Pre-Operative Instruction booklet that you were given.        Bathing Instructions--See page 6 in the Pre-operative booklet.      Prevention of surgical site infections:     -Keep incisions clean and dry.   -Do not soak/submerge incisions in water until completely healed.   -Do not apply lotions, powders, creams, or deodorants to site.   -Always make sure hands are cleaned with antibacterial soap/ alcohol-based  prior to touching the surgical site.  (This includes doctors, nurses, staff, and yourself.)    Signs and symptoms:   -Redness and pain around the area where you had surgery   -Drainage of cloudy fluid from your surgical wound   -Fever over 100.4       I have read or had read and explained to me, and understand the above information.  Additional comments or instructions:  Received a copy of Pre-operative instructions booklet, FAQ surgical site infection sheet, and packets of hibiclens (if indicated).

## 2020-02-19 NOTE — ANESTHESIA PREPROCEDURE EVALUATION
02/19/2020  Mateusz Bates is a 69 y.o., female.    Anesthesia Evaluation    I have reviewed the Patient Summary Reports.    I have reviewed the Nursing Notes.   I have reviewed the Medications.     Review of Systems  Anesthesia Hx:  No problems with previous Anesthesia  Denies Family Hx of Anesthesia complications.   Denies Personal Hx of Anesthesia complications.   Social:  No Alcohol Use, Non-Smoker    Hematology/Oncology:  Hematology Normal      Hematology Comments: HIT.     Cardiovascular:   Hypertension Past MI CAD  CABG/stent  CHF PVD JONES ECG has been reviewed. CAD:  Nstemi 10/2019, from cath lab to OR with IABP,   CABG x 3.  Postop complicated by acute upper extrem DVT.  F/U ECHO:     CONCLUSIONS     1 - Concentric hypertrophy.     2 - Wall motion abnormalities.     3 - Moderately depressed left ventricular systolic function (EF 30-35%).     4 - Impaired LV relaxation, normal LAP (grade 1 diastolic dysfunction).     5 - Normal right ventricular systolic function .     6 - The estimated PA systolic pressure is 33 mmHg.    Pulmonary:  Pulmonary Normal    Renal/:  Renal/ Normal     Hepatic/GI:  Hepatic/GI Normal GERD    Neurological:   Neuromuscular Disease, Headaches Critical illness nyopathy.   Endocrine:   Diabetes, type 2    Psych:   Psychiatric History anxiety depression          Physical Exam  General:  Well nourished    Airway/Jaw/Neck:  Airway Findings: Mouth Opening: Normal Tongue: Normal  General Airway Assessment: Adult  Mallampati: II  TM Distance: Normal, at least 6 cm  Jaw/Neck Findings:  Neck ROM: Normal ROM  Neck Findings:     Eyes/Ears/Nose:  Eyes/Ears/Nose Findings:    Dental:  Dental Findings: In tact   Chest/Lungs:  Chest/Lungs Findings: Clear to auscultation, Normal Respiratory Rate     Heart/Vascular:  Heart Findings: Rate: Normal  Rhythm: Regular Rhythm  Sounds: Normal  Heart  murmur: negative Vascular Findings: Normal    Abdomen:  Abdomen Findings: Normal    Musculoskeletal:  Musculoskeletal Findings: Pt uses walker due to critical illness myopathy. Improving.      Skin:  Skin Findings: Normal    Mental Status:  Mental Status Findings:  Alert and Oriented         Anesthesia Plan  Type of Anesthesia, risks & benefits discussed:  Anesthesia Type:  general, MAC  Patient's Preference:   Intra-op Monitoring Plan: standard ASA monitors  Intra-op Monitoring Plan Comments:   Post Op Pain Control Plan: per primary service following discharge from PACU, multimodal analgesia and peripheral nerve block  Post Op Pain Control Plan Comments:   Induction:   IV  Beta Blocker:  Patient is not currently on a Beta-Blocker (No further documentation required).       Informed Consent: Patient understands risks and agrees with Anesthesia plan.  Questions answered. Anesthesia consent signed with patient.  ASA Score: 3     Day of Surgery Review of History & Physical:    H&P update referred to the surgeon.         Ready For Surgery From Anesthesia Perspective.

## 2020-02-20 ENCOUNTER — HOSPITAL ENCOUNTER (OUTPATIENT)
Facility: HOSPITAL | Age: 70
Discharge: HOME | End: 2020-02-20
Attending: ORTHOPAEDIC SURGERY | Admitting: ORTHOPAEDIC SURGERY
Payer: MEDICARE

## 2020-02-20 ENCOUNTER — ANESTHESIA (OUTPATIENT)
Dept: SURGERY | Facility: HOSPITAL | Age: 70
End: 2020-02-20
Payer: MEDICARE

## 2020-02-20 DIAGNOSIS — Z01.818 PREOP TESTING: ICD-10-CM

## 2020-02-20 DIAGNOSIS — I96 ISCHEMIC NECROSIS OF FINGER: Primary | ICD-10-CM

## 2020-02-20 LAB
POCT GLUCOSE: 101 MG/DL (ref 70–110)
POCT GLUCOSE: 98 MG/DL (ref 70–110)

## 2020-02-20 PROCEDURE — 63600175 PHARM REV CODE 636 W HCPCS: Performed by: ANESTHESIOLOGY

## 2020-02-20 PROCEDURE — D9220A PRA ANESTHESIA: ICD-10-PCS | Mod: ANES,,, | Performed by: ANESTHESIOLOGY

## 2020-02-20 PROCEDURE — 64417 NJX AA&/STRD AX NERVE IMG: CPT | Mod: 59,RT,, | Performed by: ANESTHESIOLOGY

## 2020-02-20 PROCEDURE — 76942 PR U/S GUIDANCE FOR NEEDLE GUIDANCE: ICD-10-PCS | Mod: 26,,, | Performed by: ANESTHESIOLOGY

## 2020-02-20 PROCEDURE — D9220A PRA ANESTHESIA: Mod: ANES,,, | Performed by: ANESTHESIOLOGY

## 2020-02-20 PROCEDURE — 71000033 HC RECOVERY, INTIAL HOUR: Performed by: ORTHOPAEDIC SURGERY

## 2020-02-20 PROCEDURE — 76942 ECHO GUIDE FOR BIOPSY: CPT | Mod: 26,,, | Performed by: ANESTHESIOLOGY

## 2020-02-20 PROCEDURE — 36000706: Performed by: ORTHOPAEDIC SURGERY

## 2020-02-20 PROCEDURE — 37000008 HC ANESTHESIA 1ST 15 MINUTES: Performed by: ORTHOPAEDIC SURGERY

## 2020-02-20 PROCEDURE — D9220A PRA ANESTHESIA: ICD-10-PCS | Mod: CRNA,,, | Performed by: NURSE ANESTHETIST, CERTIFIED REGISTERED

## 2020-02-20 PROCEDURE — 71000015 HC POSTOP RECOV 1ST HR: Performed by: ORTHOPAEDIC SURGERY

## 2020-02-20 PROCEDURE — 88305 TISSUE EXAM BY PATHOLOGIST: CPT | Performed by: PATHOLOGY

## 2020-02-20 PROCEDURE — 26951 AMPUTATION OF FINGER/THUMB: CPT | Mod: F5,,, | Performed by: ORTHOPAEDIC SURGERY

## 2020-02-20 PROCEDURE — 11042 PR DEBRIDEMENT, SKIN, SUB-Q TISSUE,=<20 SQ CM: ICD-10-PCS | Mod: 59,51,F6, | Performed by: ORTHOPAEDIC SURGERY

## 2020-02-20 PROCEDURE — 25000003 PHARM REV CODE 250: Performed by: NURSE ANESTHETIST, CERTIFIED REGISTERED

## 2020-02-20 PROCEDURE — 11042 DBRDMT SUBQ TIS 1ST 20SQCM/<: CPT | Mod: 59,51,F6, | Performed by: ORTHOPAEDIC SURGERY

## 2020-02-20 PROCEDURE — 88305 TISSUE EXAM BY PATHOLOGIST: CPT | Mod: 26,,, | Performed by: PATHOLOGY

## 2020-02-20 PROCEDURE — 63600175 PHARM REV CODE 636 W HCPCS: Performed by: NURSE ANESTHETIST, CERTIFIED REGISTERED

## 2020-02-20 PROCEDURE — D9220A PRA ANESTHESIA: Mod: CRNA,,, | Performed by: NURSE ANESTHETIST, CERTIFIED REGISTERED

## 2020-02-20 PROCEDURE — 26951 PR AMPUTATION FINGER/THUMB: ICD-10-PCS | Mod: F5,,, | Performed by: ORTHOPAEDIC SURGERY

## 2020-02-20 PROCEDURE — 64417 NJX AA&/STRD AX NERVE IMG: CPT | Performed by: ORTHOPAEDIC SURGERY

## 2020-02-20 PROCEDURE — 76942 ECHO GUIDE FOR BIOPSY: CPT | Performed by: ANESTHESIOLOGY

## 2020-02-20 PROCEDURE — 37000009 HC ANESTHESIA EA ADD 15 MINS: Performed by: ORTHOPAEDIC SURGERY

## 2020-02-20 PROCEDURE — 64417 PR NERVE BLOCK INJ, ANES/STEROID, AXILLARY, INCL IMAG GUIDANCE: ICD-10-PCS | Mod: 59,RT,, | Performed by: ANESTHESIOLOGY

## 2020-02-20 PROCEDURE — 88305 TISSUE EXAM BY PATHOLOGIST: ICD-10-PCS | Mod: 26,,, | Performed by: PATHOLOGY

## 2020-02-20 PROCEDURE — 64415 NJX AA&/STRD BRCH PLXS IMG: CPT | Performed by: ANESTHESIOLOGY

## 2020-02-20 PROCEDURE — 36000707: Performed by: ORTHOPAEDIC SURGERY

## 2020-02-20 RX ORDER — ONDANSETRON 2 MG/ML
4 INJECTION INTRAMUSCULAR; INTRAVENOUS ONCE AS NEEDED
Status: DISCONTINUED | OUTPATIENT
Start: 2020-02-20 | End: 2020-02-20 | Stop reason: HOSPADM

## 2020-02-20 RX ORDER — FENTANYL CITRATE 50 UG/ML
25 INJECTION, SOLUTION INTRAMUSCULAR; INTRAVENOUS EVERY 5 MIN PRN
Status: DISCONTINUED | OUTPATIENT
Start: 2020-02-20 | End: 2020-02-20 | Stop reason: HOSPADM

## 2020-02-20 RX ORDER — SILVER NITRATE 38.21; 12.74 MG/1; MG/1
STICK TOPICAL
Status: DISCONTINUED
Start: 2020-02-20 | End: 2020-02-20 | Stop reason: HOSPADM

## 2020-02-20 RX ORDER — LIDOCAINE HYDROCHLORIDE 20 MG/ML
INJECTION, SOLUTION INFILTRATION; PERINEURAL
Status: DISCONTINUED
Start: 2020-02-20 | End: 2020-02-20 | Stop reason: HOSPADM

## 2020-02-20 RX ORDER — LIDOCAINE HYDROCHLORIDE 10 MG/ML
1 INJECTION, SOLUTION EPIDURAL; INFILTRATION; INTRACAUDAL; PERINEURAL ONCE
Status: ACTIVE | OUTPATIENT
Start: 2020-02-20

## 2020-02-20 RX ORDER — LIDOCAINE HYDROCHLORIDE 20 MG/ML
INJECTION INTRAVENOUS
Status: DISCONTINUED | OUTPATIENT
Start: 2020-02-20 | End: 2020-02-20

## 2020-02-20 RX ORDER — FENTANYL CITRATE 50 UG/ML
INJECTION, SOLUTION INTRAMUSCULAR; INTRAVENOUS
Status: DISCONTINUED | OUTPATIENT
Start: 2020-02-20 | End: 2020-02-20

## 2020-02-20 RX ORDER — ALBUTEROL SULFATE 0.83 MG/ML
2.5 SOLUTION RESPIRATORY (INHALATION) EVERY 4 HOURS PRN
Status: DISCONTINUED | OUTPATIENT
Start: 2020-02-20 | End: 2020-02-20 | Stop reason: HOSPADM

## 2020-02-20 RX ORDER — CHLORHEXIDINE GLUCONATE ORAL RINSE 1.2 MG/ML
10 SOLUTION DENTAL 2 TIMES DAILY
Status: DISCONTINUED | OUTPATIENT
Start: 2020-02-20 | End: 2020-02-20 | Stop reason: HOSPADM

## 2020-02-20 RX ORDER — MIDAZOLAM HYDROCHLORIDE 1 MG/ML
INJECTION, SOLUTION INTRAMUSCULAR; INTRAVENOUS
Status: DISCONTINUED | OUTPATIENT
Start: 2020-02-20 | End: 2020-02-20

## 2020-02-20 RX ORDER — CHLORHEXIDINE GLUCONATE ORAL RINSE 1.2 MG/ML
10 SOLUTION DENTAL
Status: DISCONTINUED | OUTPATIENT
Start: 2020-02-20 | End: 2020-02-20 | Stop reason: HOSPADM

## 2020-02-20 RX ORDER — HYDROCODONE BITARTRATE AND ACETAMINOPHEN 5; 325 MG/1; MG/1
1 TABLET ORAL
Status: DISCONTINUED | OUTPATIENT
Start: 2020-02-20 | End: 2020-02-20 | Stop reason: HOSPADM

## 2020-02-20 RX ORDER — MEPERIDINE HYDROCHLORIDE 25 MG/ML
12.5 INJECTION INTRAMUSCULAR; INTRAVENOUS; SUBCUTANEOUS ONCE
Status: DISCONTINUED | OUTPATIENT
Start: 2020-02-20 | End: 2020-02-20 | Stop reason: HOSPADM

## 2020-02-20 RX ORDER — PROPOFOL 10 MG/ML
VIAL (ML) INTRAVENOUS CONTINUOUS PRN
Status: DISCONTINUED | OUTPATIENT
Start: 2020-02-20 | End: 2020-02-20

## 2020-02-20 RX ORDER — DIPHENHYDRAMINE HYDROCHLORIDE 50 MG/ML
25 INJECTION INTRAMUSCULAR; INTRAVENOUS EVERY 6 HOURS PRN
Status: DISCONTINUED | OUTPATIENT
Start: 2020-02-20 | End: 2020-02-20 | Stop reason: HOSPADM

## 2020-02-20 RX ORDER — SODIUM CHLORIDE, SODIUM LACTATE, POTASSIUM CHLORIDE, CALCIUM CHLORIDE 600; 310; 30; 20 MG/100ML; MG/100ML; MG/100ML; MG/100ML
INJECTION, SOLUTION INTRAVENOUS CONTINUOUS
Status: ACTIVE | OUTPATIENT
Start: 2020-02-20

## 2020-02-20 RX ORDER — ROPIVACAINE HYDROCHLORIDE 5 MG/ML
INJECTION, SOLUTION EPIDURAL; INFILTRATION; PERINEURAL
Status: COMPLETED | OUTPATIENT
Start: 2020-02-20 | End: 2020-02-20

## 2020-02-20 RX ORDER — GLYCOPYRROLATE 0.2 MG/ML
INJECTION INTRAMUSCULAR; INTRAVENOUS
Status: DISCONTINUED | OUTPATIENT
Start: 2020-02-20 | End: 2020-02-20

## 2020-02-20 RX ORDER — HYDROCODONE BITARTRATE AND ACETAMINOPHEN 5; 325 MG/1; MG/1
1 TABLET ORAL EVERY 6 HOURS PRN
Qty: 15 TABLET | Refills: 0 | Status: SHIPPED | OUTPATIENT
Start: 2020-02-20

## 2020-02-20 RX ADMIN — MIDAZOLAM 1 MG: 1 INJECTION INTRAMUSCULAR; INTRAVENOUS at 10:02

## 2020-02-20 RX ADMIN — FENTANYL CITRATE 50 MCG: 50 INJECTION, SOLUTION INTRAMUSCULAR; INTRAVENOUS at 10:02

## 2020-02-20 RX ADMIN — PROPOFOL 20 MCG/KG/MIN: 10 INJECTION, EMULSION INTRAVENOUS at 11:02

## 2020-02-20 RX ADMIN — FENTANYL CITRATE 25 MCG: 50 INJECTION, SOLUTION INTRAMUSCULAR; INTRAVENOUS at 11:02

## 2020-02-20 RX ADMIN — SODIUM CHLORIDE, SODIUM LACTATE, POTASSIUM CHLORIDE, AND CALCIUM CHLORIDE: 600; 310; 30; 20 INJECTION, SOLUTION INTRAVENOUS at 10:02

## 2020-02-20 RX ADMIN — ROPIVACAINE HYDROCHLORIDE 20 ML: 5 INJECTION, SOLUTION EPIDURAL; INFILTRATION; PERINEURAL at 10:02

## 2020-02-20 RX ADMIN — MIDAZOLAM 0.5 MG: 1 INJECTION INTRAMUSCULAR; INTRAVENOUS at 11:02

## 2020-02-20 RX ADMIN — Medication 40 MG: at 11:02

## 2020-02-20 RX ADMIN — GLYCOPYRROLATE 0.2 MG: 0.2 INJECTION INTRAMUSCULAR; INTRAVENOUS at 11:02

## 2020-02-20 RX ADMIN — DEXTROSE 2 G: 50 INJECTION, SOLUTION INTRAVENOUS at 11:02

## 2020-02-20 RX ADMIN — SODIUM CHLORIDE, SODIUM LACTATE, POTASSIUM CHLORIDE, AND CALCIUM CHLORIDE: 600; 310; 30; 20 INJECTION, SOLUTION INTRAVENOUS at 11:02

## 2020-02-20 NOTE — INTERVAL H&P NOTE
The patient has been examined and the H&P has been reviewed:    I concur with the findings and no changes have occurred since H&P was written.    Anesthesia/Surgery risks, benefits and alternative options discussed and understood by patient/family.          Active Hospital Problems    Diagnosis  POA    Ischemic necrosis of finger [I96]  Yes      Resolved Hospital Problems   No resolved problems to display.

## 2020-02-20 NOTE — PLAN OF CARE
Reviewed and completed all PACU orders. Printed AVS for post-op nurse. I encouraged questions, answered them thoroughly, and evaluated my instructions via teach-back method. I have disconnected patient from monitoring equipment and prepared them for the next phase of care. Patient has met all PACU discharge criteria at this point. Awaiting Discharge orders.

## 2020-02-20 NOTE — TRANSFER OF CARE
"Anesthesia Transfer of Care Note    Patient: Mateusz Bates    Procedure(s) Performed: Procedure(s) (LRB):  Debridement of necrotic tissue right thumb distal phalanx and right index finger distal phalanx needing an amputation at the level of the necrotic tissue. (Right)    Patient location: PACU    Anesthesia Type: MAC    Transport from OR: Transported from OR on 2-3 L/min O2 by NC with adequate spontaneous ventilation    Post pain: adequate analgesia    Post assessment: no apparent anesthetic complications    Post vital signs: stable    Level of consciousness: awake    Nausea/Vomiting: no nausea/vomiting    Complications: none    Transfer of care protocol was followed      Last vitals:   Visit Vitals  /61   Pulse (!) 53   Temp 36.5 °C (97.7 °F) (Temporal)   Resp 20   Ht 5' 1.5" (1.562 m)   Wt 69.7 kg (153 lb 12.3 oz)   SpO2 100%   Breastfeeding? No   BMI 28.58 kg/m²     "

## 2020-02-20 NOTE — ANESTHESIA PROCEDURE NOTES
Peripheral Block    Patient location during procedure: pre-op   Block not for primary anesthetic.  Reason for block: at surgeon's request and post-op pain management   Post-op Pain Location: Rigth hand  Timeout: 2/20/2020 10:37 AM   End time: 2/20/2020 10:52 AM    Staffing  Authorizing Provider: Joellen Peñaloza MD  Performing Provider: Joellen Peñaloza MD    Preanesthetic Checklist  Completed: patient identified, site marked, surgical consent, pre-op evaluation, timeout performed, IV checked, risks and benefits discussed and monitors and equipment checked  Peripheral Block  Patient position: supine  Prep: ChloraPrep  Patient monitoring: heart rate, cardiac monitor, continuous pulse ox, continuous capnometry and frequent blood pressure checks  Block type: axillary  Laterality: right  Injection technique: single shot  Needle  Needle type: Stimuplex   Needle gauge: 21 G  Needle length: 4 in  Needle localization: anatomical landmarks, ultrasound guidance and nerve stimulator   -ultrasound image captured on disc.  Assessment  Injection assessment: negative aspiration, negative parasthesia and local visualized surrounding nerve  Paresthesia pain: none  Heart rate change: no  Slow fractionated injection: yes  Additional Notes  VSS.  [facility] RN monitoring vitals throughout procedure.  Patient tolerated procedure well.

## 2020-02-20 NOTE — OP NOTE
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 Satisfactory anesthesia had been achieved the right arm was prepped and draped usual sterile fashion.  Using a rongeur and the necrotic distal phalanx of the right thumb was removed. This was at the level of the interphalangeal joint. The flexor pollicis longus tendon was sharply incised and tenotomy was performed. Nice pink healthy granulation tissue was identified to the level of the interphalangeal joint there was no bone exposed. At this time attention to the right index finger showed necrotic tissue just distal to the level of the germinal matrix of the nail bed. All necrotic tissue was removed with the rongeur. Nice granulation tissue was identified of the thumb and index finger.  This was cauterized with silver nitrate.  No sutures were used. Xeroform gauze 4x4s and 2 in gauze dressing was applied index finger and thumb.  The patient tolerated the procedure well and was transferred to the recovery room in satisfactory condition    Significant Surgical Tasks Conducted by the Assistant(s), if Applicable:  No assistant    Complications:  None     Estimated Blood Loss (EBL):  5 mL           Implants: None    Specimens: Ischemic necrosis  distal phalanx right index finger and thumb right hand           Condition: Good    Disposition: PACU - hemodynamically stable.    Attestation: I was present and scrubbed for the entire procedure.

## 2020-02-20 NOTE — DISCHARGE SUMMARY
[facility]  Discharge Note  Short Stay    OUTCOME: Patient tolerated treatment/procedure well without complication and is now ready for discharge.    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

## 2020-02-20 NOTE — ANESTHESIA POSTPROCEDURE EVALUATION
Anesthesia Post Evaluation    Patient: Mateusz Bates    Procedure(s) Performed: Procedure(s) (LRB):  Debridement of necrotic tissue right thumb distal phalanx and right index finger distal phalanx needing an amputation at the level of the necrotic tissue. (Right)    Final Anesthesia Type: MAC    Patient location during evaluation: PACU  Patient participation: Yes- Able to Participate  Level of consciousness: awake and alert and oriented  Post-procedure vital signs: reviewed and stable  Pain management: adequate  Airway patency: patent    PONV status at discharge: No PONV  Anesthetic complications: no      Cardiovascular status: hemodynamically stable  Respiratory status: unassisted  Hydration status: euvolemic  Follow-up not needed.          Vitals Value Taken Time   /60 2/20/2020 12:24 PM   Temp 36.5 °C (97.7 °F) 2/20/2020 12:30 PM   Pulse 60 2/20/2020 12:30 PM   Resp 18 2/20/2020 12:30 PM   SpO2 96 % 2/20/2020 12:30 PM   Vitals shown include unvalidated device data.      Event Time     Out of Recovery 12:15:00          Pain/Porfirio Score: Porfirio Score: 10 (2/20/2020  1:00 PM)

## 2020-02-24 ENCOUNTER — OFFICE VISIT (OUTPATIENT)
Dept: ORTHOPEDICS | Facility: CLINIC | Age: 70
End: 2020-02-24
Payer: MEDICARE

## 2020-02-24 VITALS
SYSTOLIC BLOOD PRESSURE: 89 MMHG | WEIGHT: 153 LBS | BODY MASS INDEX: 28.89 KG/M2 | HEIGHT: 61 IN | HEART RATE: 56 BPM | DIASTOLIC BLOOD PRESSURE: 53 MMHG

## 2020-02-24 DIAGNOSIS — E11.65 UNCONTROLLED TYPE 2 DIABETES MELLITUS WITH HYPERGLYCEMIA: ICD-10-CM

## 2020-02-24 DIAGNOSIS — I96 ISCHEMIC NECROSIS OF FINGER: Primary | ICD-10-CM

## 2020-02-24 DIAGNOSIS — M79.641 PAIN OF RIGHT HAND: ICD-10-CM

## 2020-02-24 DIAGNOSIS — Z09 POSTOP CHECK: ICD-10-CM

## 2020-02-24 PROCEDURE — 99999 PR PBB SHADOW E&M-EST. PATIENT-LVL IV: CPT | Mod: PBBFAC,,, | Performed by: PHYSICIAN ASSISTANT

## 2020-02-24 PROCEDURE — 99214 OFFICE O/P EST MOD 30 MIN: CPT | Mod: PBBFAC | Performed by: PHYSICIAN ASSISTANT

## 2020-02-24 PROCEDURE — 99024 POSTOP FOLLOW-UP VISIT: CPT | Mod: POP,,, | Performed by: PHYSICIAN ASSISTANT

## 2020-02-24 PROCEDURE — 99024 PR POST-OP FOLLOW-UP VISIT: ICD-10-PCS | Mod: POP,,, | Performed by: PHYSICIAN ASSISTANT

## 2020-02-24 PROCEDURE — 99999 PR PBB SHADOW E&M-EST. PATIENT-LVL IV: ICD-10-PCS | Mod: PBBFAC,,, | Performed by: PHYSICIAN ASSISTANT

## 2020-02-24 NOTE — PROGRESS NOTES
Patient ID: Mateusz Bates is a 69 y.o. female.    Chief Complaint: Pain and Post-op Evaluation of the Right Hand      HPI: Mateusz Bates  is a 69 y.o. female who c/o Pain and Post-op Evaluation of the Right Hand   for duration of 4 days.  She underwent revision amputation right thumb and right index finger for ischemic necrosis by Dr. Rajput.  She comes in today for 1st postoperative evaluation.  Pain level 0/10.  She has some aching pain. She is still taking some pain medication.  She denies fevers.      Surgery:  Revision amputation right thumb and right index finger for ischemic necrosis  DOS:  02/20/2020    Past Medical History:   Diagnosis Date    Cardiogenic shock     during hospitalization after NSTEMI 10/2019    Chronic combined systolic and diastolic CHF (congestive heart failure)     Chronic major depressive disorder     Diabetes mellitus type II, uncontrolled 2000    Insulin x 10 years    Eczema     Essential hypertension     Generalized anxiety disorder     GERD (gastroesophageal reflux disease)     Glaucoma     OD    HIT (heparin-induced thrombocytopenia)     Hyperlipidemia     Hypertension associated with diabetes 8/13/2013    Ischemic cardiomyopathy     NSTEMI (non-ST elevated myocardial infarction) 10/28/2019    Obesity      Past Surgical History:   Procedure Laterality Date    APPENDECTOMY      CORONARY ARTERY BYPASS GRAFT (CABG) N/A 10/31/2019    Procedure: CORONARY ARTERY BYPASS GRAFT (CABG);  Surgeon: Keny Zaidi MD;  Location: [facility] OR;  Service: Cardiothoracic;  Laterality: N/A;  3-Vessel, Epi-Aortic Ultrasound    ECTOPIC PREGNANCY SURGERY Left     LSO    FINGER AMPUTATION Right 2/20/2020    Procedure: AMPUTATION, FINGER;  Surgeon: Richard Rajput MD;  Location: [facility] OR;  Service: Orthopedics;  Laterality: Right;  Amputation revision right index and thumb with a thumb at the level of the interphalangeal joint and the index finger just distal to the germinal  matrix of the nail bed    INJECTION OF ANESTHETIC AGENT AROUND MULTIPLE INTERCOSTAL NERVES N/A 10/31/2019    Procedure: BLOCK, NERVE, INTERCOSTAL, 2 OR MORE;  Surgeon: Keny Zaidi MD;  Location: [facility] OR;  Service: Cardiothoracic;  Laterality: N/A;  Parasternal    INSERTION OF INTRA-AORTIC BALLOON ASSIST DEVICE N/A 10/30/2019    Procedure: INSERTION, INTRA-AORTIC BALLOON PUMP;  Surgeon: Ingrid Lopez MD;  Location: [facility] CATH LAB;  Service: Cardiology;  Laterality: N/A;    LEFT HEART CATHETERIZATION Left 10/28/2019    Procedure: CATHETERIZATION, HEART, LEFT;  Surgeon: Ingrid Lopez MD;  Location: [facility] CATH LAB;  Service: Cardiology;  Laterality: Left;     Family History   Problem Relation Age of Onset    Heart attack Mother 57    Multiple myeloma Father 74    Hypertension Father     Heart disease Father 70        s/p CABG    Colon cancer Sister     Hypertension Sister     Diabetes Sister     Depression Maternal Grandmother     Heart disease Maternal Grandmother     Diabetes Maternal Grandmother     Heart disease Paternal Grandmother     Heart disease Paternal Grandfather     Alcohol abuse Maternal Uncle     Glaucoma Paternal Aunt     Diabetes Paternal Aunt     Glaucoma Paternal Uncle         Great Aunt    Heart failure Sister     Hypertension Sister     Hepatitis Sister     Hypertension Sister     Drug abuse Sister     Alcohol abuse Sister      Social History     Socioeconomic History    Marital status:      Spouse name: Not on file    Number of children: Not on file    Years of education: Not on file    Highest education level: Not on file   Occupational History    Not on file   Social Needs    Financial resource strain: Not on file    Food insecurity:     Worry: Not on file     Inability: Not on file    Transportation needs:     Medical: Not on file     Non-medical: Not on file   Tobacco Use    Smoking status: Never Smoker    Smokeless tobacco: Never Used    Substance and Sexual Activity    Alcohol use: Not Currently     Frequency: Never    Drug use: No    Sexual activity: Never   Lifestyle    Physical activity:     Days per week: Not on file     Minutes per session: Not on file    Stress: Not on file   Relationships    Social connections:     Talks on phone: Not on file     Gets together: Not on file     Attends [employer] service: Not on file     Active member of club or organization: Not on file     Attends meetings of clubs or organizations: Not on file     Relationship status: Not on file   Other Topics Concern    Not on file   Social History Narrative    . Lives alone. Patient works full time at [employer].     Medication List with Changes/Refills   Current Medications    ACETAMINOPHEN (TYLENOL) 325 MG TABLET    Take 325 mg by mouth every 6 (six) hours as needed for Pain.    AMLODIPINE (NORVASC) 10 MG TABLET    Take 0.5 tablets (5 mg total) by mouth once daily.    ASPIRIN (ECOTRIN) 81 MG EC TABLET    Take 1 tablet (81 mg total) by mouth once daily.    ATORVASTATIN (LIPITOR) 80 MG TABLET    Take 1 tablet (80 mg total) by mouth every evening.    CHLORTHALIDONE (HYGROTEN) 25 MG TAB    Take 1 tablet (25 mg total) by mouth once daily.    ESCITALOPRAM OXALATE (LEXAPRO) 10 MG TABLET    Take 10 mg by mouth once daily.    HYDROCODONE-ACETAMINOPHEN (NORCO) 5-325 MG PER TABLET    Take 1 tablet by mouth every 6 (six) hours as needed for Pain.    HYDROXYZINE PAMOATE (VISTARIL) 25 MG CAP    Take 25 mg by mouth every 8 (eight) hours as needed.    INSULIN ASPART U-100 (NOVOLOG U-100 INSULIN ASPART) 100 UNIT/ML INJECTION    Inject 4 Units into the skin with lunch.    INSULIN DETEMIR U-100 (LEVEMIR) 100 UNIT/ML INJECTION    Inject 15 Units into the skin. 20 units SQ every am and 15 units sq every pm    Hold pm Levemir dose on 2/19/20 and am dose of Levemir on 2/20/20    LACTULOSE (CHRONULAC) 10 GRAM/15 ML SOLUTION    Take 20 g by mouth daily as needed.      LOSARTAN (COZAAR) 100 MG TABLET    Take 1 tablet (100 mg total) by mouth once daily.    METFORMIN (GLUCOPHAGE) 500 MG TABLET    Take 500 mg by mouth 2 (two) times daily with meals. Hold am of surgery    METOPROLOL TARTRATE (LOPRESSOR) 50 MG TABLET    Take 1 tablet (50 mg total) by mouth 2 (two) times daily.    MULTIVITAMIN CAPSULE    Take 1 capsule by mouth once daily.    NITROGLYCERIN 2% TD OINT (NITROGLYN) 2 % OINTMENT    Apply 0.5 inches topically every 12 (twelve) hours. for 14 days    PANTOPRAZOLE (PROTONIX) 40 MG TABLET    Take 1 tablet (40 mg total) by mouth once daily.    PREGABALIN (LYRICA) 50 MG CAPSULE    Take 50 mg by mouth once daily.     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 11/8 MARY (heparin-induced platelet antibody) 3.53 - very strongly positive; 11/8 JAVIER (serotonin release Assay) positive.    Ace inhibitors Other (See Comments)     Cough         Objective:                    Right Hand/Wrist Exam     Comments:  Pulse intact   No purulence  No erythema  Comp soft  Sensation intact                  Assessment:       Encounter Diagnoses   Name Primary?    Ischemic necrosis of finger Yes    Pain of right hand     Uncontrolled type 2 diabetes mellitus with hyperglycemia     Postop check           Plan:       Mateusz was seen today for pain and post-op evaluation.    Diagnoses and all orders for this visit:    Ischemic necrosis of finger    Pain of right hand    Uncontrolled type 2 diabetes mellitus with hyperglycemia    Postop check        Mateusz NELLY Bates is an established pt here for postop follow-up.  I have saw cleaned the surgical sites with peroxide, normal saline. I have a applied Xeroform and a dry sterile dressing utilizing to gauze.  I have instructed her to keep the dressings in place until follow-up with Dr. Rajput next week.  I will review her examination today with Dr. Rajput to  [place].  If she has any problems prior to follow-up, she should notify the office.  She is should avoid getting the wounds wet at this time.  They verbalized understanding and agree.    Follow up in about 1 week (around 3/2/2020).    The patient understands, chooses and consents to this plan and accepts all   the risks which include but are not limited to the risks mentioned above.     Disclaimer: This note was prepared using a voice recognition system and is likely to have sound alike errors within the text.

## 2020-02-26 LAB
FINAL PATHOLOGIC DIAGNOSIS: NORMAL
GROSS: NORMAL

## 2020-02-28 ENCOUNTER — PATIENT OUTREACH (OUTPATIENT)
Dept: ADMINISTRATIVE | Facility: OTHER | Age: 70
End: 2020-02-28

## 2020-02-28 LAB — POCT GLUCOSE: 175 MG/DL (ref 70–110)

## 2020-03-03 ENCOUNTER — OFFICE VISIT (OUTPATIENT)
Dept: ORTHOPEDICS | Facility: CLINIC | Age: 70
End: 2020-03-03
Payer: MEDICARE

## 2020-03-03 VITALS
DIASTOLIC BLOOD PRESSURE: 53 MMHG | WEIGHT: 153 LBS | HEART RATE: 50 BPM | HEIGHT: 61 IN | SYSTOLIC BLOOD PRESSURE: 117 MMHG | BODY MASS INDEX: 28.89 KG/M2

## 2020-03-03 DIAGNOSIS — S68.119S AMPUTATION FINGER, SEQUELA: Primary | ICD-10-CM

## 2020-03-03 PROCEDURE — 99213 OFFICE O/P EST LOW 20 MIN: CPT | Mod: PBBFAC | Performed by: ORTHOPAEDIC SURGERY

## 2020-03-03 PROCEDURE — 99024 PR POST-OP FOLLOW-UP VISIT: ICD-10-PCS | Mod: POP,,, | Performed by: ORTHOPAEDIC SURGERY

## 2020-03-03 PROCEDURE — 99999 PR PBB SHADOW E&M-EST. PATIENT-LVL III: CPT | Mod: PBBFAC,,, | Performed by: ORTHOPAEDIC SURGERY

## 2020-03-03 PROCEDURE — 99999 PR PBB SHADOW E&M-EST. PATIENT-LVL III: ICD-10-PCS | Mod: PBBFAC,,, | Performed by: ORTHOPAEDIC SURGERY

## 2020-03-03 PROCEDURE — 99024 POSTOP FOLLOW-UP VISIT: CPT | Mod: POP,,, | Performed by: ORTHOPAEDIC SURGERY

## 2020-03-03 RX ORDER — TRAMADOL HYDROCHLORIDE 50 MG/1
50 TABLET ORAL EVERY 6 HOURS PRN
COMMUNITY

## 2020-03-03 RX ORDER — MULTIVITAMIN
1 TABLET ORAL
COMMUNITY

## 2020-03-03 NOTE — PROGRESS NOTES
Subjective:     Patient ID: Mateusz Bates is a 69 y.o. female.    Chief Complaint: Post-op Evaluation of the Right Hand    The patient is a 69-year-old female seen in follow-up after a amputation right index finger and thumb distal phalanx from ischemic necrosis due to heparin induced thrombocytopenia.  Date of surgery was 02/20/2020.      Past Medical History:   Diagnosis Date    Cardiogenic shock     during hospitalization after NSTEMI 10/2019    Chronic combined systolic and diastolic CHF (congestive heart failure)     Chronic major depressive disorder     Diabetes mellitus type II, uncontrolled 2000    Insulin x 10 years    Eczema     Essential hypertension     Generalized anxiety disorder     GERD (gastroesophageal reflux disease)     Glaucoma     OD    HIT (heparin-induced thrombocytopenia)     Hyperlipidemia     Hypertension associated with diabetes 8/13/2013    Ischemic cardiomyopathy     NSTEMI (non-ST elevated myocardial infarction) 10/28/2019    Obesity      Past Surgical History:   Procedure Laterality Date    APPENDECTOMY      CORONARY ARTERY BYPASS GRAFT (CABG) N/A 10/31/2019    Procedure: CORONARY ARTERY BYPASS GRAFT (CABG);  Surgeon: Keny Zaidi MD;  Location: [facility] OR;  Service: Cardiothoracic;  Laterality: N/A;  3-Vessel, Epi-Aortic Ultrasound    ECTOPIC PREGNANCY SURGERY Left     LSO    FINGER AMPUTATION Right 2/20/2020    Procedure: AMPUTATION, FINGER;  Surgeon: Richard Rajput MD;  Location: [facility] OR;  Service: Orthopedics;  Laterality: Right;  Amputation revision right index and thumb with a thumb at the level of the interphalangeal joint and the index finger just distal to the germinal matrix of the nail bed    INJECTION OF ANESTHETIC AGENT AROUND MULTIPLE INTERCOSTAL NERVES N/A 10/31/2019    Procedure: BLOCK, NERVE, INTERCOSTAL, 2 OR MORE;  Surgeon: Keny Zaidi MD;  Location: [facility] OR;  Service: Cardiothoracic;  Laterality: N/A;  Parasternal    INSERTION OF  INTRA-AORTIC BALLOON ASSIST DEVICE N/A 10/30/2019    Procedure: INSERTION, INTRA-AORTIC BALLOON PUMP;  Surgeon: Ingrid Lopez MD;  Location: [facility] CATH LAB;  Service: Cardiology;  Laterality: N/A;    LEFT HEART CATHETERIZATION Left 10/28/2019    Procedure: CATHETERIZATION, HEART, LEFT;  Surgeon: Ingrid Lopez MD;  Location: [facility] CATH LAB;  Service: Cardiology;  Laterality: Left;     Family History   Problem Relation Age of Onset    Heart attack Mother 57    Multiple myeloma Father 74    Hypertension Father     Heart disease Father 70        s/p CABG    Colon cancer Sister     Hypertension Sister     Diabetes Sister     Depression Maternal Grandmother     Heart disease Maternal Grandmother     Diabetes Maternal Grandmother     Heart disease Paternal Grandmother     Heart disease Paternal Grandfather     Alcohol abuse Maternal Uncle     Glaucoma Paternal Aunt     Diabetes Paternal Aunt     Glaucoma Paternal Uncle         Great Aunt    Heart failure Sister     Hypertension Sister     Hepatitis Sister     Hypertension Sister     Drug abuse Sister     Alcohol abuse Sister      Social History     Socioeconomic History    Marital status:      Spouse name: Not on file    Number of children: Not on file    Years of education: Not on file    Highest education level: Not on file   Occupational History    Not on file   Social Needs    Financial resource strain: Not on file    Food insecurity:     Worry: Not on file     Inability: Not on file    Transportation needs:     Medical: Not on file     Non-medical: Not on file   Tobacco Use    Smoking status: Never Smoker    Smokeless tobacco: Never Used   Substance and Sexual Activity    Alcohol use: Not Currently     Frequency: Never    Drug use: No    Sexual activity: Never   Lifestyle    Physical activity:     Days per week: Not on file     Minutes per session: Not on file    Stress: Not on file   Relationships    Social  connections:     Talks on phone: Not on file     Gets together: Not on file     Attends [employer] service: Not on file     Active member of club or organization: Not on file     Attends meetings of clubs or organizations: Not on file     Relationship status: Not on file   Other Topics Concern    Not on file   Social History Narrative    . Lives alone. Patient works full time at [employer].     Medication List with Changes/Refills   Current Medications    ACETAMINOPHEN (TYLENOL) 325 MG TABLET    Take 325 mg by mouth every 6 (six) hours as needed for Pain.    AMLODIPINE (NORVASC) 10 MG TABLET    Take 0.5 tablets (5 mg total) by mouth once daily.    ASPIRIN (ECOTRIN) 81 MG EC TABLET    Take 1 tablet (81 mg total) by mouth once daily.    ATORVASTATIN (LIPITOR) 80 MG TABLET    Take 1 tablet (80 mg total) by mouth every evening.    CHLORTHALIDONE (HYGROTEN) 25 MG TAB    Take 1 tablet (25 mg total) by mouth once daily.    ESCITALOPRAM OXALATE (LEXAPRO) 10 MG TABLET    Take 10 mg by mouth once daily.    HYDROCODONE-ACETAMINOPHEN (NORCO) 5-325 MG PER TABLET    Take 1 tablet by mouth every 6 (six) hours as needed for Pain.    HYDROXYZINE PAMOATE (VISTARIL) 25 MG CAP    Take 25 mg by mouth every 8 (eight) hours as needed.    INSULIN ASPART U-100 (NOVOLOG U-100 INSULIN ASPART) 100 UNIT/ML INJECTION    Inject 4 Units into the skin with lunch.    INSULIN DETEMIR U-100 (LEVEMIR) 100 UNIT/ML INJECTION    Inject 15 Units into the skin. 20 units SQ every am and 15 units sq every pm    Hold pm Levemir dose on 2/19/20 and am dose of Levemir on 2/20/20    LACTULOSE (CHRONULAC) 10 GRAM/15 ML SOLUTION    Take 20 g by mouth daily as needed.     LOSARTAN (COZAAR) 100 MG TABLET    Take 1 tablet (100 mg total) by mouth once daily.    METFORMIN (GLUCOPHAGE) 500 MG TABLET    Take 500 mg by mouth 2 (two) times daily with meals. Hold am of surgery    METOPROLOL TARTRATE (LOPRESSOR) 50 MG TABLET    Take 1 tablet (50 mg  total) by mouth 2 (two) times daily.    MULTIVITAMIN CAPSULE    Take 1 capsule by mouth once daily.    MULTIVITAMIN WITH FOLIC ACID 400 MCG TAB    Take 1 tablet by mouth.    NITROGLYCERIN 2% TD OINT (NITROGLYN) 2 % OINTMENT    Apply 0.5 inches topically every 12 (twelve) hours. for 14 days    PANTOPRAZOLE (PROTONIX) 40 MG TABLET    Take 1 tablet (40 mg total) by mouth once daily.    PREGABALIN (LYRICA) 50 MG CAPSULE    Take 50 mg by mouth once daily.    TRAMADOL (ULTRAM) 50 MG TABLET    Take 50 mg by mouth every 6 (six) hours as needed for Pain (as needed).     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 11/8 MARY (heparin-induced platelet antibody) 3.53 - very strongly positive; 11/8 JAVIER (serotonin release Assay) positive.    Ace inhibitors Other (See Comments)     Cough       Review of Systems   Constitution: Negative for malaise/fatigue.   HENT: Negative for hearing loss.    Eyes: Positive for visual disturbance. Negative for double vision.   Cardiovascular: Negative for chest pain.   Respiratory: Negative for shortness of breath.    Endocrine: Negative for cold intolerance.   Hematologic/Lymphatic: Does not bruise/bleed easily.   Skin: Negative for poor wound healing and suspicious lesions.   Musculoskeletal: Negative for gout, joint pain and joint swelling.   Gastrointestinal: Positive for heartburn. Negative for nausea and vomiting.   Genitourinary: Negative for dysuria.   Neurological: Positive for headaches. Negative for numbness, paresthesias and sensory change.   Psychiatric/Behavioral: Positive for depression and memory loss. Negative for substance abuse. The patient is nervous/anxious.    Allergic/Immunologic: Negative for persistent infections.       Objective:   Body mass index is 28.91 kg/m².  Vitals:    03/03/20 1413   BP: (!) 117/53   Pulse: (!) 50                General    Constitutional: She is  oriented to person, place, and time. She appears well-developed and well-nourished. No distress.   HENT:   Head: Normocephalic.   Eyes: EOM are normal.   Neck: Normal range of motion.   Pulmonary/Chest: Effort normal.   Neurological: She is oriented to person, place, and time.   Psychiatric: She has a normal mood and affect.             Right Hand/Wrist Exam     Inspection   Scars: Wrist - present Hand -  present    Other     Neuorologic Exam    Median Distribution: normal  Ulnar Distribution: normal  Radial Distribution: normal    Comments:  The right index and thumb stump looked good distal phalanx.  There is no bone exposed.  There is no sign of infection.  There are no motor or sensory deficits.          Vascular Exam       Capillary Refill  Right Hand: normal capillary refill         radiographs were not obtained today  Assessment:     Encounter Diagnosis   Name Primary?    Amputation finger, sequela Yes    amputation right thumb and index finger distal phalanx    Plan:     The patient's wounds look good.  There is no sign of infection.  There are no motor sensory deficits.  Band-Aids were applied.  Wound care was discussed.  She will return on a as needed basis.                Disclaimer: This note was prepared using a voice recognition system and is likely to have sound alike errors within the text.

## 2020-03-04 ENCOUNTER — OFFICE VISIT (OUTPATIENT)
Dept: CARDIOLOGY | Facility: CLINIC | Age: 70
End: 2020-03-04
Payer: MEDICARE

## 2020-03-04 VITALS
HEART RATE: 56 BPM | WEIGHT: 159.19 LBS | BODY MASS INDEX: 30.06 KG/M2 | SYSTOLIC BLOOD PRESSURE: 98 MMHG | HEIGHT: 61 IN | DIASTOLIC BLOOD PRESSURE: 46 MMHG

## 2020-03-04 DIAGNOSIS — Z86.718 HISTORY OF DVT (DEEP VEIN THROMBOSIS): ICD-10-CM

## 2020-03-04 DIAGNOSIS — Z95.1 S/P CABG X 3: ICD-10-CM

## 2020-03-04 DIAGNOSIS — E78.5 HYPERLIPIDEMIA ASSOCIATED WITH TYPE 2 DIABETES MELLITUS: ICD-10-CM

## 2020-03-04 DIAGNOSIS — Z82.49 FAMILY HISTORY OF ARTERIOSCLEROTIC CARDIOVASCULAR DISEASE: ICD-10-CM

## 2020-03-04 DIAGNOSIS — D50.9 IRON DEFICIENCY ANEMIA, UNSPECIFIED IRON DEFICIENCY ANEMIA TYPE: ICD-10-CM

## 2020-03-04 DIAGNOSIS — E11.65 UNCONTROLLED TYPE 2 DIABETES MELLITUS WITH HYPERGLYCEMIA: Chronic | ICD-10-CM

## 2020-03-04 DIAGNOSIS — I67.89 CEREBRAL MICROVASCULAR DISEASE: ICD-10-CM

## 2020-03-04 DIAGNOSIS — I25.5 ISCHEMIC CARDIOMYOPATHY: ICD-10-CM

## 2020-03-04 DIAGNOSIS — I96 NECROSIS OF FINGER: ICD-10-CM

## 2020-03-04 DIAGNOSIS — I25.10 CAD, MULTIPLE VESSEL: Primary | ICD-10-CM

## 2020-03-04 DIAGNOSIS — E66.9 OBESITY (BMI 30.0-34.9): ICD-10-CM

## 2020-03-04 DIAGNOSIS — I50.42 SYSTOLIC AND DIASTOLIC CHF, CHRONIC: ICD-10-CM

## 2020-03-04 DIAGNOSIS — E11.69 HYPERLIPIDEMIA ASSOCIATED WITH TYPE 2 DIABETES MELLITUS: ICD-10-CM

## 2020-03-04 DIAGNOSIS — D75.829 HIT (HEPARIN-INDUCED THROMBOCYTOPENIA): ICD-10-CM

## 2020-03-04 DIAGNOSIS — R41.3 MEMORY DIFFICULTIES: ICD-10-CM

## 2020-03-04 DIAGNOSIS — I44.7 LEFT BUNDLE BRANCH BLOCK (LBBB): ICD-10-CM

## 2020-03-04 DIAGNOSIS — I96 ISCHEMIC NECROSIS OF FINGER: ICD-10-CM

## 2020-03-04 DIAGNOSIS — I21.4 NSTEMI (NON-ST ELEVATED MYOCARDIAL INFARCTION): ICD-10-CM

## 2020-03-04 PROCEDURE — 99214 PR OFFICE/OUTPT VISIT, EST, LEVL IV, 30-39 MIN: ICD-10-PCS | Mod: S$PBB,,, | Performed by: INTERNAL MEDICINE

## 2020-03-04 PROCEDURE — 99999 PR PBB SHADOW E&M-EST. PATIENT-LVL III: CPT | Mod: PBBFAC,,, | Performed by: INTERNAL MEDICINE

## 2020-03-04 PROCEDURE — 99213 OFFICE O/P EST LOW 20 MIN: CPT | Mod: PBBFAC | Performed by: INTERNAL MEDICINE

## 2020-03-04 PROCEDURE — 99214 OFFICE O/P EST MOD 30 MIN: CPT | Mod: S$PBB,,, | Performed by: INTERNAL MEDICINE

## 2020-03-04 PROCEDURE — 99999 PR PBB SHADOW E&M-EST. PATIENT-LVL III: ICD-10-PCS | Mod: PBBFAC,,, | Performed by: INTERNAL MEDICINE

## 2020-03-04 RX ORDER — EZETIMIBE 10 MG/1
10 TABLET ORAL DAILY
Qty: 90 TABLET | Refills: 3 | OUTPATIENT
Start: 2020-03-04 | End: 2021-03-04

## 2020-03-04 RX ORDER — AMLODIPINE BESYLATE 5 MG/1
5 TABLET ORAL DAILY
COMMUNITY

## 2020-03-04 RX ORDER — EZETIMIBE 10 MG/1
10 TABLET ORAL DAILY
Qty: 90 TABLET | Refills: 3 | OUTPATIENT
Start: 2020-03-04 | End: 2020-03-04 | Stop reason: SDUPTHER

## 2020-03-04 NOTE — PROGRESS NOTES
6th attempt to complete the enrollment call. No answer, left voicemail. Removing patient from digital medicine registry due to non-compliance. Sent discharge letter. Resolved episode.     Tracking # [id number]

## 2020-03-04 NOTE — PROGRESS NOTES
Subjective:   Patient ID:  Mateusz Bates is a 69 y.o. female who presents for follow up of Coronary Artery Disease (f/u) and Congestive Heart Failure      HPI  A 70 yo female with diabetes cad ischemic cardiomyopathy lbbb s/p nstemi s/p cabg dvt heparin induced thrombocytopenia with hand gangrene and ischemic fingers had amputation of her  Index and thumb of the rt hand is here for f /u she has gained some weight she is doing physical therapy she is at [facility]. Has noc hest pain shortness of breath or leg swelling. Has no angina no syncope near syncope.her a1c is elevated her lipids are improved but not on target. She is not compliant with diet veryw ell. Will benefit from adding zetia and increased physical activity.   Past Medical History:   Diagnosis Date    Acute coronary syndrome     Cardiogenic shock     during hospitalization after NSTEMI 10/2019    Chronic combined systolic and diastolic CHF (congestive heart failure)     Chronic major depressive disorder     Coronary artery disease     Diabetes mellitus type II, uncontrolled 2000    Insulin x 10 years    Eczema     Essential hypertension     Generalized anxiety disorder     GERD (gastroesophageal reflux disease)     Glaucoma     OD    HIT (heparin-induced thrombocytopenia)     Hyperlipidemia     Hypertension associated with diabetes 8/13/2013    Ischemic cardiomyopathy     NSTEMI (non-ST elevated myocardial infarction) 10/28/2019    Obesity        Past Surgical History:   Procedure Laterality Date    APPENDECTOMY      CORONARY ARTERY BYPASS GRAFT  10/31/2019    CORONARY ARTERY BYPASS GRAFT (CABG) N/A 10/31/2019    Procedure: CORONARY ARTERY BYPASS GRAFT (CABG);  Surgeon: Keny Zaidi MD;  Location: [facility];  Service: Cardiothoracic;  Laterality: N/A;  3-Vessel, Epi-Aortic Ultrasound    ECTOPIC PREGNANCY SURGERY Left     LSO    FINGER AMPUTATION Right 2/20/2020    Procedure: AMPUTATION, FINGER;  Surgeon: Richard Rajput MD;   Location: [facility] OR;  Service: Orthopedics;  Laterality: Right;  Amputation revision right index and thumb with a thumb at the level of the interphalangeal joint and the index finger just distal to the germinal matrix of the nail bed    INJECTION OF ANESTHETIC AGENT AROUND MULTIPLE INTERCOSTAL NERVES N/A 10/31/2019    Procedure: BLOCK, NERVE, INTERCOSTAL, 2 OR MORE;  Surgeon: Keny Zaidi MD;  Location: [facility] OR;  Service: Cardiothoracic;  Laterality: N/A;  Parasternal    INSERTION OF INTRA-AORTIC BALLOON ASSIST DEVICE N/A 10/30/2019    Procedure: INSERTION, INTRA-AORTIC BALLOON PUMP;  Surgeon: Ingrid Lopez MD;  Location: [facility] CATH LAB;  Service: Cardiology;  Laterality: N/A;    LEFT HEART CATHETERIZATION Left 10/28/2019    Procedure: CATHETERIZATION, HEART, LEFT;  Surgeon: Ingrid Lopez MD;  Location: [facility] CATH LAB;  Service: Cardiology;  Laterality: Left;       Social History     Tobacco Use    Smoking status: Never Smoker    Smokeless tobacco: Never Used   Substance Use Topics    Alcohol use: Not Currently     Frequency: Never    Drug use: No       Family History   Problem Relation Age of Onset    Heart attack Mother 57    Multiple myeloma Father 74    Hypertension Father     Heart disease Father 70        s/p CABG    Colon cancer Sister     Hypertension Sister     Diabetes Sister     Depression Maternal Grandmother     Heart disease Maternal Grandmother     Diabetes Maternal Grandmother     Heart disease Paternal Grandmother     Heart disease Paternal Grandfather     Alcohol abuse Maternal Uncle     Glaucoma Paternal Aunt     Diabetes Paternal Aunt     Glaucoma Paternal Uncle         Great Aunt    Heart failure Sister     Hypertension Sister     Hepatitis Sister     Hypertension Sister     Drug abuse Sister     Alcohol abuse Sister        Current Outpatient Medications   Medication Sig    acetaminophen (TYLENOL) 325 MG tablet Take 325 mg by mouth every 6 (six) hours as needed  for Pain.    amLODIPine (NORVASC) 5 MG tablet Take 5 mg by mouth once daily.    aspirin (ECOTRIN) 81 MG EC tablet Take 1 tablet (81 mg total) by mouth once daily.    atorvastatin (LIPITOR) 80 MG tablet Take 1 tablet (80 mg total) by mouth every evening.    chlorthalidone (HYGROTEN) 25 MG Tab Take 1 tablet (25 mg total) by mouth once daily.    escitalopram oxalate (LEXAPRO) 10 MG tablet Take 10 mg by mouth once daily.    insulin aspart U-100 (NOVOLOG U-100 INSULIN ASPART) 100 unit/mL injection Inject 4 Units into the skin with lunch. (Patient taking differently: Inject into the skin with lunch. Novolog insulin at lunch daily prn per sliding scale if blood sugar over 200)    insulin detemir U-100 (LEVEMIR) 100 unit/mL injection Inject 20 Units into the skin 2 (two) times daily. 20 units SQ every am and 15 units at night  Hold pm Levemir dose on 2/19/20 and am dose of Levemir on 2/20/20    lactulose (CHRONULAC) 10 gram/15 mL solution Take 20 g by mouth daily as needed.     losartan (COZAAR) 100 MG tablet Take 1 tablet (100 mg total) by mouth once daily.    metFORMIN (GLUCOPHAGE) 500 MG tablet Take 500 mg by mouth 2 (two) times daily with meals. Hold am of surgery    metoprolol tartrate (LOPRESSOR) 50 MG tablet Take 1 tablet (50 mg total) by mouth 2 (two) times daily.    multivitamin with folic acid 400 mcg Tab Take 1 tablet by mouth.    pantoprazole (PROTONIX) 40 MG tablet Take 1 tablet (40 mg total) by mouth once daily.    pregabalin (LYRICA) 50 MG capsule Take 50 mg by mouth once daily.    traMADol (ULTRAM) 50 mg tablet Take 50 mg by mouth every 6 (six) hours as needed for Pain (as needed).    amLODIPine (NORVASC) 10 MG tablet Take 0.5 tablets (5 mg total) by mouth once daily.    HYDROcodone-acetaminophen (NORCO) 5-325 mg per tablet Take 1 tablet by mouth every 6 (six) hours as needed for Pain. (Patient not taking: Reported on 3/3/2020)    hydrOXYzine pamoate (VISTARIL) 25 MG Cap Take 25 mg by mouth  every 8 (eight) hours as needed.    multivitamin capsule Take 1 capsule by mouth once daily.    nitroGLYCERIN 2% TD oint (NITROGLYN) 2 % ointment Apply 0.5 inches topically every 12 (twelve) hours. for 14 days     No current facility-administered medications for this visit.      Facility-Administered Medications Ordered in Other Visits   Medication    lactated ringers infusion    lidocaine (PF) 10 mg/ml (1%) injection 10 mg     Current Outpatient Medications on File Prior to Visit   Medication Sig    acetaminophen (TYLENOL) 325 MG tablet Take 325 mg by mouth every 6 (six) hours as needed for Pain.    amLODIPine (NORVASC) 5 MG tablet Take 5 mg by mouth once daily.    aspirin (ECOTRIN) 81 MG EC tablet Take 1 tablet (81 mg total) by mouth once daily.    atorvastatin (LIPITOR) 80 MG tablet Take 1 tablet (80 mg total) by mouth every evening.    chlorthalidone (HYGROTEN) 25 MG Tab Take 1 tablet (25 mg total) by mouth once daily.    escitalopram oxalate (LEXAPRO) 10 MG tablet Take 10 mg by mouth once daily.    insulin aspart U-100 (NOVOLOG U-100 INSULIN ASPART) 100 unit/mL injection Inject 4 Units into the skin with lunch. (Patient taking differently: Inject into the skin with lunch. Novolog insulin at lunch daily prn per sliding scale if blood sugar over 200)    insulin detemir U-100 (LEVEMIR) 100 unit/mL injection Inject 20 Units into the skin 2 (two) times daily. 20 units SQ every am and 15 units at night  Hold pm Levemir dose on 2/19/20 and am dose of Levemir on 2/20/20    lactulose (CHRONULAC) 10 gram/15 mL solution Take 20 g by mouth daily as needed.     losartan (COZAAR) 100 MG tablet Take 1 tablet (100 mg total) by mouth once daily.    metFORMIN (GLUCOPHAGE) 500 MG tablet Take 500 mg by mouth 2 (two) times daily with meals. Hold am of surgery    metoprolol tartrate (LOPRESSOR) 50 MG tablet Take 1 tablet (50 mg total) by mouth 2 (two) times daily.    multivitamin with folic acid 400 mcg Tab Take 1  tablet by mouth.    pantoprazole (PROTONIX) 40 MG tablet Take 1 tablet (40 mg total) by mouth once daily.    pregabalin (LYRICA) 50 MG capsule Take 50 mg by mouth once daily.    traMADol (ULTRAM) 50 mg tablet Take 50 mg by mouth every 6 (six) hours as needed for Pain (as needed).    amLODIPine (NORVASC) 10 MG tablet Take 0.5 tablets (5 mg total) by mouth once daily.    HYDROcodone-acetaminophen (NORCO) 5-325 mg per tablet Take 1 tablet by mouth every 6 (six) hours as needed for Pain. (Patient not taking: Reported on 3/3/2020)    hydrOXYzine pamoate (VISTARIL) 25 MG Cap Take 25 mg by mouth every 8 (eight) hours as needed.    multivitamin capsule Take 1 capsule by mouth once daily.    nitroGLYCERIN 2% TD oint (NITROGLYN) 2 % ointment Apply 0.5 inches topically every 12 (twelve) hours. for 14 days    [DISCONTINUED] insulin lispro 100 unit/mL injection Inject into the skin. (Patient taking differently: Inject 12 Units into the skin. )     Current Facility-Administered Medications on File Prior to Visit   Medication    lactated ringers infusion    lidocaine (PF) 10 mg/ml (1%) injection 10 mg     Review of patient's allergies indicates:   Allergen Reactions    Heparin analogues Other (See Comments)     HIT panel confirmed positive. 11/8 MARY (heparin-induced platelet antibody) 3.53 - very strongly positive; 11/8 JAVIER (serotonin release Assay) positive.    Ace inhibitors Other (See Comments)     Cough       Review of Systems   Constitution: Negative for diaphoresis, malaise/fatigue and weight gain.   HENT: Negative for hoarse voice.    Eyes: Negative for double vision and visual disturbance.   Cardiovascular: Negative for chest pain, claudication, cyanosis, dyspnea on exertion, irregular heartbeat, leg swelling, near-syncope, orthopnea, palpitations, paroxysmal nocturnal dyspnea and syncope.   Respiratory: Negative for cough, hemoptysis, shortness of breath and snoring.    Hematologic/Lymphatic: Negative for  "bleeding problem. Does not bruise/bleed easily.   Skin: Positive for color change, nail changes and suspicious lesions. Negative for poor wound healing.   Musculoskeletal: Negative for muscle cramps, muscle weakness and myalgias.   Gastrointestinal: Negative for bloating, abdominal pain, change in bowel habit, diarrhea, heartburn, hematemesis, hematochezia, melena and nausea.   Neurological: Negative for excessive daytime sleepiness, dizziness, headaches, light-headedness, loss of balance, numbness and weakness.   Psychiatric/Behavioral: Negative for memory loss. The patient does not have insomnia.    Allergic/Immunologic: Negative for hives.       Objective:   Physical Exam  Vitals:    03/04/20 1117   BP: (!) 98/46   BP Method: Small (Manual)   Pulse: (!) 56   Weight: 72.2 kg (159 lb 2.8 oz)   Height: 5' 1" (1.549 m)   Constitutional: She is oriented to person, place, and time. She appears well-developed and well-nourished. She does not appear ill. No distress.   HENT:   Head: Normocephalic and atraumatic.   Eyes: Pupils are equal, round, and reactive to light. EOM are normal. No scleral icterus.   Neck: Normal range of motion. Neck supple. Normal carotid pulses, no hepatojugular reflux and no JVD present. Carotid bruit is not present. No tracheal deviation present. No thyromegaly present.   Cardiovascular: Regular rhythm, normal heart sounds, intact distal pulses and normal pulses. Bradycardia present. Exam reveals no gallop and no friction rub.   No murmur heard.  Pulses:       Carotid pulses are 2+ on the right side, and 2+ on the left side.       Radial pulses are 2+ on the right side, and 2+ on the left side.        Femoral pulses are 2+ on the right side, and 2+ on the left side.       Popliteal pulses are 2+ on the right side, and 2+ on the left side.        Dorsalis pedis pulses are 2+ on the right side, and 2+ on the left side.        Posterior tibial pulses are 2+ on the right side, and 2+ on the left " side.   Pulmonary/Chest: Effort normal and breath sounds normal. No respiratory distress. She has no wheezes. She has no rhonchi. She has no rales. She exhibits no tenderness.   Scar chest incision well healed.    Abdominal: Soft. Normal appearance, normal aorta and bowel sounds are normal. She exhibits no distension, no abdominal bruit, no ascites and no pulsatile midline mass. There is no hepatomegaly. There is no tenderness.   Musculoskeletal: She exhibits no edema.   Scar venous harvest site well healed.amputation site in rt thumb and rt index well healed.     Neurological: She is alert and oriented to person, place, and time. She has normal strength. No cranial nerve deficit. Coordination normal.   Skin: Skin is warm and dry. No rash noted. She is not diaphoretic. No cyanosis or erythema. Nails show no clubbing.   Psychiatric: She has a normal mood and affect. Her speech is normal and behavior is normal.   Nursing note and vitals reviewed  Lab Results   Component Value Date    CHOL 176 02/05/2020    CHOL 181 10/28/2019    CHOL 189 10/04/2019     Lab Results   Component Value Date    HDL 33 (L) 02/05/2020    HDL 41 10/28/2019    HDL 42 10/04/2019     Lab Results   Component Value Date    LDLCALC 103.8 02/05/2020    LDLCALC 124.4 10/28/2019    LDLCALC 123.2 10/04/2019     Lab Results   Component Value Date    TRIG 196 (H) 02/05/2020    TRIG 78 10/28/2019    TRIG 119 10/04/2019     Lab Results   Component Value Date    CHOLHDL 18.8 (L) 02/05/2020    CHOLHDL 22.7 10/28/2019    CHOLHDL 22.2 10/04/2019       Chemistry        Component Value Date/Time     02/11/2020 1202    K 4.2 02/11/2020 1202     02/11/2020 1202    CO2 24 02/11/2020 1202    BUN 20 02/11/2020 1202    CREATININE 0.9 02/11/2020 1202    GLU 29 (LL) 02/11/2020 1202        Component Value Date/Time    CALCIUM 9.7 02/11/2020 1202    ALKPHOS 82 02/11/2020 1202    AST 22 02/11/2020 1202    ALT 20 02/11/2020 1202    BILITOT 0.4 02/11/2020 1202     ESTGFRAFRICA >60 02/11/2020 1202    EGFRNONAA >60 02/11/2020 1202        Lab Results   Component Value Date    HGBA1C 8.3 (H) 02/05/2020       Lab Results   Component Value Date    TSH 1.817 11/03/2019     Lab Results   Component Value Date    INR 4.3 (H) 11/12/2019    INR 1.1 11/01/2019    INR 1.4 (H) 10/31/2019     Lab Results   Component Value Date    WBC 10.49 02/11/2020    HGB 12.4 02/11/2020    HCT 38.2 02/11/2020    MCV 90 02/11/2020     02/11/2020     BMP  Sodium   Date Value Ref Range Status   02/11/2020 141 136 - 145 mmol/L Final     Potassium   Date Value Ref Range Status   02/11/2020 4.2 3.5 - 5.1 mmol/L Final     Chloride   Date Value Ref Range Status   02/11/2020 106 95 - 110 mmol/L Final     CO2   Date Value Ref Range Status   02/11/2020 24 23 - 29 mmol/L Final     BUN, Bld   Date Value Ref Range Status   02/11/2020 20 8 - 23 mg/dL Final     Creatinine   Date Value Ref Range Status   02/11/2020 0.9 0.5 - 1.4 mg/dL Final     Calcium   Date Value Ref Range Status   02/11/2020 9.7 8.7 - 10.5 mg/dL Final     Anion Gap   Date Value Ref Range Status   02/11/2020 11 8 - 16 mmol/L Final     eGFR if    Date Value Ref Range Status   02/11/2020 >60 >60 mL/min/1.73 m^2 Final     eGFR if non    Date Value Ref Range Status   02/11/2020 >60 >60 mL/min/1.73 m^2 Final     Comment:     Calculation used to obtain the estimated glomerular filtration  rate (eGFR) is the CKD-EPI equation.        CrCl cannot be calculated (Patient's most recent lab result is older than the maximum 7 days allowed.).    Assessment:     1. CAD, multiple vessel    2. Uncontrolled type 2 diabetes mellitus with hyperglycemia    3. Obesity (BMI 30.0-34.9)    4. Family history of arteriosclerotic cardiovascular disease    5. Hyperlipidemia associated with type 2 diabetes mellitus    6. Iron deficiency anemia, unspecified iron deficiency anemia type    7. Memory difficulties    8. Cerebral microvascular disease     9. Systolic and diastolic CHF, chronic    10. NSTEMI (non-ST elevated myocardial infarction)    11. History of DVT (deep vein thrombosis)    12. HIT (heparin-induced thrombocytopenia)    13. 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 Ischemic cardiomyopathy    15. Left bundle branch block (LBBB)    16. Ischemic necrosis of finger    17. S/P CABG x 3      stable cardiac wise still recovering . Issues at hand are diet complianec diabetes control and lipids control. Discussed rf modification diet compliance exerciser . Will add zetia 10 mg po daily.    Plan:   zetia 10 mg po daily  Continue current therapy  Cardiac low salt diet.  Risk factor modification and excercise program./weight loss  F/u in 6 months with lipid cmp a1c

## 2020-03-09 VITALS
BODY MASS INDEX: 28.29 KG/M2 | HEART RATE: 60 BPM | RESPIRATION RATE: 18 BRPM | DIASTOLIC BLOOD PRESSURE: 69 MMHG | SYSTOLIC BLOOD PRESSURE: 151 MMHG | WEIGHT: 153.75 LBS | TEMPERATURE: 98 F | OXYGEN SATURATION: 96 % | HEIGHT: 62 IN

## 2020-05-11 ENCOUNTER — PATIENT OUTREACH (OUTPATIENT)
Dept: ADMINISTRATIVE | Facility: HOSPITAL | Age: 70
End: 2020-05-11

## 2020-07-01 ENCOUNTER — TELEPHONE (OUTPATIENT)
Dept: INTERNAL MEDICINE | Facility: CLINIC | Age: 70
End: 2020-07-01

## 2020-07-01 NOTE — TELEPHONE ENCOUNTER
Spoke with pts sister states she needs critical care forms filled out for insurance since pt had a heart attack in October.   Notified she should contacts surgeon or cardiologist.

## 2020-07-01 NOTE — TELEPHONE ENCOUNTER
----- Message from Shantelle Gonzalez sent at 7/1/2020  3:52 PM CDT -----  Regarding: Paperwork (Sister-christina)  Christina is requesting call back in regards to questions about bringing in paperwork for insurance purposes.            [facility] call back at [phone]

## 2020-07-01 NOTE — TELEPHONE ENCOUNTER
----- Message from Angie Hawthorne sent at 7/1/2020  4:17 PM CDT -----  Contact: Christina Rodarte  Type:  Patient Returning Call    Who Called:Mateusz Bates's sister (Christina Hernandez)  Who Left Message for Patient:???  Does the patient know what this is regarding?: Power -of- Insurance paperwork  Would the patient rather a call back or a response via [facility]? Call back  Best Call Back Number: [phone] (cell)   Additional Information: Patient's sister is requesting for a nurse with Dr. Kilpatrick's office to please leave a detailed message if she is unavailable on her voicemail

## 2020-07-08 ENCOUNTER — TELEPHONE (OUTPATIENT)
Dept: CARDIOTHORACIC SURGERY | Facility: CLINIC | Age: 70
End: 2020-07-08

## 2020-07-08 NOTE — TELEPHONE ENCOUNTER
Gave pt our fax number [phone] to fax over her FMLA forms for Dr Zaidi - Advised Dr Zaidi will only be in clinic for about an hour today starting at 9 - Advised if she is able to fax them over to me by then I can give them to him top fill out - Otherwise it will be next Wednesday before I will see him again - Pt verbalized understanding     ----- Message from Colette Mukherjee sent at 7/8/2020  7:42 AM CDT -----  Contact: self- [phone]  Would like to consult with the nurse, patient has some papers that the Dr needs to fill out, patient would like to speak with the nurse concerning this, please call back at [phone], thanks sj

## 2020-07-14 LAB — A1C EXTERNAL: 7.3

## 2020-07-17 DIAGNOSIS — E11.9 TYPE 2 DIABETES MELLITUS WITHOUT COMPLICATION: ICD-10-CM

## 2020-09-14 ENCOUNTER — PATIENT OUTREACH (OUTPATIENT)
Dept: ADMINISTRATIVE | Facility: HOSPITAL | Age: 70
End: 2020-09-14

## 2020-09-14 NOTE — PROGRESS NOTES
Working Due A1C Report.    Lab abstracted/entered from care everywhere.  A1c 7.3 July entered/from [facility]     LM to schedule annual exam, follow up diabetes and mammo

## 2020-09-15 NOTE — PROGRESS NOTES
Ms Vasquez returned call, saying Ms Bates is currently in a NH so is unable to go out to appts. States she does want her to come in and get checked, but will need to wait until things get straight with the covid 19. She gets seen by in house dr for now. States will call in the future to schedule appts.

## 2020-10-01 ENCOUNTER — PATIENT MESSAGE (OUTPATIENT)
Dept: OTHER | Facility: OTHER | Age: 70
End: 2020-10-01

## 2020-10-05 ENCOUNTER — PATIENT OUTREACH (OUTPATIENT)
Dept: ADMINISTRATIVE | Facility: OTHER | Age: 70
End: 2020-10-05

## 2020-10-05 DIAGNOSIS — Z12.31 ENCOUNTER FOR SCREENING MAMMOGRAM FOR MALIGNANT NEOPLASM OF BREAST: Primary | ICD-10-CM

## 2020-10-05 NOTE — PROGRESS NOTES
Health Maintenance Due   Topic Date Due    Mammogram  04/16/2019    DEXA SCAN  10/05/2019    Colorectal Cancer Screening  01/02/2020    Urine Microalbumin  04/24/2020    Influenza Vaccine (1) 08/01/2020    Foot Exam  10/28/2020     Updates were requested from care everywhere.  Chart was reviewed for overdue Proactive [facility] Encounters ([facility]) topics (CRS, Breast Cancer Screening, Eye exam)  Health Maintenance has been updated.  LINKS immunization registry triggered.  Immunizations were reconciled.

## 2020-10-06 ENCOUNTER — OFFICE VISIT (OUTPATIENT)
Dept: OPHTHALMOLOGY | Facility: CLINIC | Age: 70
End: 2020-10-06
Payer: MEDICARE

## 2020-10-06 ENCOUNTER — PATIENT MESSAGE (OUTPATIENT)
Dept: ADMINISTRATIVE | Facility: HOSPITAL | Age: 70
End: 2020-10-06

## 2020-10-06 DIAGNOSIS — H52.03 HYPEROPIA, BILATERAL: ICD-10-CM

## 2020-10-06 DIAGNOSIS — E11.36 DIABETIC CATARACT: ICD-10-CM

## 2020-10-06 DIAGNOSIS — E11.9 DIABETES MELLITUS TYPE 2 WITHOUT RETINOPATHY: Primary | ICD-10-CM

## 2020-10-06 DIAGNOSIS — H52.4 BILATERAL PRESBYOPIA: ICD-10-CM

## 2020-10-06 PROCEDURE — 92015 DETERMINE REFRACTIVE STATE: CPT | Mod: ,,, | Performed by: OPTOMETRIST

## 2020-10-06 PROCEDURE — 92015 PR REFRACTION: ICD-10-PCS | Mod: ,,, | Performed by: OPTOMETRIST

## 2020-10-06 PROCEDURE — 92014 COMPRE OPH EXAM EST PT 1/>: CPT | Mod: S$PBB,,, | Performed by: OPTOMETRIST

## 2020-10-06 PROCEDURE — 99999 PR PBB SHADOW E&M-EST. PATIENT-LVL III: CPT | Mod: PBBFAC,,, | Performed by: OPTOMETRIST

## 2020-10-06 PROCEDURE — 99213 OFFICE O/P EST LOW 20 MIN: CPT | Mod: PBBFAC | Performed by: OPTOMETRIST

## 2020-10-06 PROCEDURE — 99999 PR PBB SHADOW E&M-EST. PATIENT-LVL III: ICD-10-PCS | Mod: PBBFAC,,, | Performed by: OPTOMETRIST

## 2020-10-06 PROCEDURE — 92014 PR EYE EXAM, EST PATIENT,COMPREHESV: ICD-10-PCS | Mod: S$PBB,,, | Performed by: OPTOMETRIST

## 2020-10-06 NOTE — PROGRESS NOTES
HPI     Diabetic Eye Exam      Additional comments: yearly              Comments     IDDM  Last Exam w/ TRF 10/01/2019  No Visual Complaints  No Pain  Last A1C 7.3  7/14/2020          Last edited by Cathie Mclaughlin on 10/6/2020  7:57 AM. (History)            Assessment /Plan     For exam results, see Encounter Report.    Diabetes mellitus type 2 without retinopathy    Diabetic cataract    Hyperopia, bilateral    Bilateral presbyopia      No Background Diabetic Retinopathy    Moderate cataracts OU, not surgical  Worsening    Dispense Final Rx for glasses or may use OTC glasses.  RTC 1 year  Discussed above and answered questions.

## 2020-10-07 NOTE — PROGRESS NOTES
PATIENT IS IN A NURSING HOME.  PATIENT'S SISTER WILL INFORM  THE HOME TO MAKE THE APPOINTMENT, WITH THE PANDEMIC THE HOME HAS TO MAKE THE APPOINTMENT AND BRING HER IN.

## 2020-10-08 ENCOUNTER — TELEPHONE (OUTPATIENT)
Dept: CARDIOLOGY | Facility: CLINIC | Age: 70
End: 2020-10-08

## 2020-10-09 ENCOUNTER — TELEPHONE (OUTPATIENT)
Dept: CARDIOLOGY | Facility: CLINIC | Age: 70
End: 2020-10-09

## 2020-10-09 NOTE — TELEPHONE ENCOUNTER
Placed call to Rochelle at [facility] . She requested lab orders to be faxed and she will fax results back to us.    ----- Message from Eloy Gates sent at 10/8/2020  5:22 PM CDT -----  Regarding: labs  Contact: Bethany Byrd from the [facility] Is requesting a call regarding labs  Rochelle states that the labs can be faxed over to her and she can send results back to the clinic    Rochelle can be reached at [phone]

## 2020-10-12 ENCOUNTER — TELEPHONE (OUTPATIENT)
Dept: CARDIOLOGY | Facility: CLINIC | Age: 70
End: 2020-10-12

## 2020-10-12 NOTE — TELEPHONE ENCOUNTER
Confirmed appointment is for 2:00PM tomorrow.    ----- Message from Tiki Steiner sent at 10/12/2020 11:48 AM CDT -----  Contact: Eric MENON  Type:  Patient Returning Call    Who Called:Naveed  Who Left Message for Patient:  Does the patient know what this is regarding?:  Would the patient rather a call back or a response via [facility]? call  Best Call Back Number:[phone]  Additional Information:

## 2020-10-13 ENCOUNTER — OFFICE VISIT (OUTPATIENT)
Dept: CARDIOLOGY | Facility: CLINIC | Age: 70
End: 2020-10-13
Payer: MEDICARE

## 2020-10-13 VITALS
HEIGHT: 61 IN | SYSTOLIC BLOOD PRESSURE: 108 MMHG | HEART RATE: 64 BPM | DIASTOLIC BLOOD PRESSURE: 62 MMHG | BODY MASS INDEX: 33.14 KG/M2 | OXYGEN SATURATION: 99 % | WEIGHT: 175.5 LBS

## 2020-10-13 DIAGNOSIS — E78.5 HYPERLIPIDEMIA ASSOCIATED WITH TYPE 2 DIABETES MELLITUS: ICD-10-CM

## 2020-10-13 DIAGNOSIS — D75.829 HIT (HEPARIN-INDUCED THROMBOCYTOPENIA): ICD-10-CM

## 2020-10-13 DIAGNOSIS — I21.4 NSTEMI (NON-ST ELEVATED MYOCARDIAL INFARCTION): ICD-10-CM

## 2020-10-13 DIAGNOSIS — I50.42 SYSTOLIC AND DIASTOLIC CHF, CHRONIC: ICD-10-CM

## 2020-10-13 DIAGNOSIS — Z86.718 HISTORY OF DVT (DEEP VEIN THROMBOSIS): ICD-10-CM

## 2020-10-13 DIAGNOSIS — F32.9 CHRONIC MAJOR DEPRESSIVE DISORDER: Chronic | ICD-10-CM

## 2020-10-13 DIAGNOSIS — Z95.1 S/P CABG X 3: ICD-10-CM

## 2020-10-13 DIAGNOSIS — E11.65 UNCONTROLLED TYPE 2 DIABETES MELLITUS WITH HYPERGLYCEMIA: Chronic | ICD-10-CM

## 2020-10-13 DIAGNOSIS — R41.3 MEMORY DIFFICULTIES: ICD-10-CM

## 2020-10-13 DIAGNOSIS — E66.9 OBESITY (BMI 30.0-34.9): ICD-10-CM

## 2020-10-13 DIAGNOSIS — I44.7 LEFT BUNDLE BRANCH BLOCK (LBBB): ICD-10-CM

## 2020-10-13 DIAGNOSIS — E11.69 HYPERLIPIDEMIA ASSOCIATED WITH TYPE 2 DIABETES MELLITUS: ICD-10-CM

## 2020-10-13 DIAGNOSIS — I25.5 ISCHEMIC CARDIOMYOPATHY: Primary | ICD-10-CM

## 2020-10-13 DIAGNOSIS — R07.89 ATYPICAL CHEST PAIN: ICD-10-CM

## 2020-10-13 DIAGNOSIS — I67.89 CEREBRAL MICROVASCULAR DISEASE: ICD-10-CM

## 2020-10-13 DIAGNOSIS — I25.10 CAD, MULTIPLE VESSEL: ICD-10-CM

## 2020-10-13 PROCEDURE — 93005 ELECTROCARDIOGRAM TRACING: CPT

## 2020-10-13 PROCEDURE — 93010 ELECTROCARDIOGRAM REPORT: CPT | Mod: ,,, | Performed by: INTERNAL MEDICINE

## 2020-10-13 PROCEDURE — 99215 OFFICE O/P EST HI 40 MIN: CPT | Mod: PBBFAC | Performed by: PHYSICIAN ASSISTANT

## 2020-10-13 PROCEDURE — 99999 PR PBB SHADOW E&M-EST. PATIENT-LVL V: CPT | Mod: PBBFAC,,, | Performed by: PHYSICIAN ASSISTANT

## 2020-10-13 PROCEDURE — 99214 OFFICE O/P EST MOD 30 MIN: CPT | Mod: S$PBB,25,, | Performed by: PHYSICIAN ASSISTANT

## 2020-10-13 PROCEDURE — 99214 PR OFFICE/OUTPT VISIT, EST, LEVL IV, 30-39 MIN: ICD-10-PCS | Mod: S$PBB,25,, | Performed by: PHYSICIAN ASSISTANT

## 2020-10-13 PROCEDURE — 99999 PR PBB SHADOW E&M-EST. PATIENT-LVL V: ICD-10-PCS | Mod: PBBFAC,,, | Performed by: PHYSICIAN ASSISTANT

## 2020-10-13 PROCEDURE — 93010 EKG 12-LEAD: ICD-10-PCS | Mod: ,,, | Performed by: INTERNAL MEDICINE

## 2020-10-13 NOTE — PROGRESS NOTES
"Subjective:    Patient ID:  Mateusz Bates is a 70 y.o. female who presents for follow-up of CAD,       HPI   Ms. 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 Patient returns today and states she is doing well overall. Still residing at the [facility]. CV wise, appears stable. She does report she had some right-sided "chest soreness" approximately one week ago that was worse with movement and lying on her right side. This has since resolved. Denies any milan heaviness, tightness, or pressure. No exertional symptoms, although she admits she is somewhat sedentary. No SOB/JONES. No PND, orthopnea, LE edema. No lightheadedness, dizziness, palpitations, near syncope, or syncope. BP controlled. Patient is compliant with her medications. Previously taken off of Eliquis after completion of therapy by heme/onc. EKG today shows no acute changes.     Labs reviewed. A1C too high, 8.2. Lipids-total 184, trig-199; LDL-106; CMP stable/WNL. Patient admits she is eating lots of sweets. She was counseled on dietary modification and increasing her activity level.    Review of Systems   Constitution: Positive for malaise/fatigue. Negative for chills, decreased appetite and fever.   HENT: Negative for congestion, hoarse voice and sore throat.    Eyes: Negative for blurred vision and discharge.   Cardiovascular: Positive for chest pain (MSK in nature has resolved). Negative for claudication, cyanosis, dyspnea on exertion, irregular heartbeat, leg swelling, near-syncope, orthopnea, palpitations and paroxysmal nocturnal dyspnea.   Respiratory: Negative for cough, hemoptysis, shortness of breath, snoring, sputum production and wheezing.    Endocrine: Negative for cold intolerance and heat intolerance.   Hematologic/Lymphatic: Negative for bleeding problem. Bruises/bleeds easily. " "  Skin: Negative for rash.   Musculoskeletal: Positive for arthritis and joint pain. Negative for back pain, joint swelling, muscle cramps, muscle weakness and myalgias.   Gastrointestinal: Negative for abdominal pain, constipation, diarrhea, heartburn, melena and nausea.   Genitourinary: Negative for hematuria.   Neurological: Negative for dizziness, focal weakness, headaches, light-headedness, loss of balance, numbness, paresthesias, seizures and weakness.   Psychiatric/Behavioral: Negative for memory loss. The patient does not have insomnia.    Allergic/Immunologic: Negative for hives.     /62 (BP Location: Left arm, Patient Position: Sitting, BP Method: Medium (Manual))   Pulse 64   Ht 5' 1" (1.549 m)   Wt 79.6 kg (175 lb 7.8 oz)   SpO2 99%   BMI 33.16 kg/m²     Objective:    Physical Exam   Constitutional: She is oriented to person, place, and time. She appears well-developed and well-nourished. No distress.   In wheelchair   HENT:   Head: Normocephalic and atraumatic.   Eyes: Pupils are equal, round, and reactive to light. Right eye exhibits no discharge. Left eye exhibits no discharge.   Neck: Neck supple. No JVD present. No tracheal deviation present.   Cardiovascular: Normal rate, regular rhythm, S1 normal, S2 normal and normal heart sounds.   No murmur heard.  Pulmonary/Chest: Effort normal and breath sounds normal. No respiratory distress. She has no wheezes. She has no rales.   Sternotomy site well-healed   Abdominal: Soft. She exhibits no distension.   Musculoskeletal:         General: No edema.   Neurological: She is alert and oriented to person, place, and time.   Skin: Skin is warm and dry. She is not diaphoretic. No erythema.   Psychiatric: She has a normal mood and affect. Her behavior is normal. Thought content normal.   Nursing note and vitals reviewed.    Echo CONCLUSIONS     1 - Concentric hypertrophy.     2 - Wall motion abnormalities.     3 - Moderately depressed left ventricular " systolic function (EF 30-35%).     4 - Impaired LV relaxation, normal LAP (grade 1 diastolic dysfunction).     5 - Normal right ventricular systolic function .     6 - The estimated PA systolic pressure is 33 mmHg.     Labs reviewed/notated above  Assessment:       1. Ischemic cardiomyopathy    2. Atypical chest pain    3. Hyperlipidemia associated with type 2 diabetes mellitus    4. CAD, multiple vessel    5. S/P CABG x 3    6. Uncontrolled type 2 diabetes mellitus with hyperglycemia    7. Memory difficulties    8. Cerebral microvascular disease    9. Chronic major depressive disorder    10. Left bundle branch block (LBBB)    11. NSTEMI (non-ST elevated myocardial infarction)    12. History of DVT (deep vein thrombosis)    13. HIT (heparin-induced thrombocytopenia)    14. Systolic and diastolic CHF, chronic    15. Obesity (BMI 30.0-34.9)       Patient presents for f/u. Overall doing well. No anginal symptoms. Needs to be more active and was counseled on dietary modification (cut back on sweets). She needs repeat echo to assess EF. Continue same CV meds in interim.  Plan:   -Echo next few weeks with phone review  -Continue same CV meds for now  -Cardiac low salt diet  -Increase activity level  -F/u TBA pending review of echo

## 2020-10-14 ENCOUNTER — TELEPHONE (OUTPATIENT)
Dept: CARDIOLOGY | Facility: CLINIC | Age: 70
End: 2020-10-14

## 2020-10-14 DIAGNOSIS — I25.5 ISCHEMIC CARDIOMYOPATHY: Primary | ICD-10-CM

## 2020-10-14 NOTE — TELEPHONE ENCOUNTER
Please phone patient (she resides at the [facility]). She needs an echo in next two weeks to reassess her EF.    Please arrange.    Thanks

## 2020-10-21 ENCOUNTER — TELEPHONE (OUTPATIENT)
Dept: CARDIOTHORACIC SURGERY | Facility: CLINIC | Age: 70
End: 2020-10-21

## 2020-10-21 ENCOUNTER — TELEPHONE (OUTPATIENT)
Dept: CARDIOLOGY | Facility: CLINIC | Age: 70
End: 2020-10-21

## 2020-10-21 NOTE — TELEPHONE ENCOUNTER
Called patient back. No answer.Phone just rings    ----- Message from Francine Sargent sent at 10/21/2020  4:02 PM CDT -----  Pt is calling to speak with the nurse or PA in office. Did not stat what was needed. Please call back at [phone] can leave an voicemail

## 2020-10-21 NOTE — TELEPHONE ENCOUNTER
Left voicemail message advising pt that her FMLA forms have been completed and ready for  - Left my name and call back number [phone]

## 2020-10-23 ENCOUNTER — TELEPHONE (OUTPATIENT)
Dept: CARDIOLOGY | Facility: CLINIC | Age: 70
End: 2020-10-23

## 2020-10-23 ENCOUNTER — TELEPHONE (OUTPATIENT)
Dept: CARDIOTHORACIC SURGERY | Facility: CLINIC | Age: 70
End: 2020-10-23

## 2020-10-23 NOTE — TELEPHONE ENCOUNTER
Returned call but no answer.    ----- Message from Lyubov Bowman sent at 10/23/2020  4:32 PM CDT -----  Regarding: PT Advice-Pt's sister Christina Hernandez  The patient is calling in regards to checking on the status of a fax sent over from her insurance company and needing the forms completed as soon as possible , please advise # [phone]

## 2020-10-23 NOTE — TELEPHONE ENCOUNTER
Spoke to pt - Stated she was just letting me know she was on her way to get her completed forms - Advised to ask for me at the  on the 2nd floor [facility] - PT verbalized understanding    ----- Message from Diana Maloney sent at 10/23/2020 10:55 AM CDT -----  Type:  Patient Returning Call    Who Called: Pt  Mateusz   Who Left Message for Patient:  Anaya with Dr Zaidi   Does the patient know what this is regarding?:   Would the patient rather a call back or a response via [facility]?   Call back   Best Call Back Number:  [phone]  Additional Information:  States she is returning your call//please call again//thanks/gladys

## 2020-10-23 NOTE — TELEPHONE ENCOUNTER
Spoke to patients sister Christina regarding getting a claim  Form completed regarding heart attack patient had last year. Gave her the fax number and asked her to fax forma and I will speak with Dr. Lopez about completing it.    ----- Message from Noelle Calderon sent at 10/23/2020  9:07 AM CDT -----  Contact: [phone] self  Would like to consult with nurse regarding picking up insurance claim that needed to be filled out. Please call back at [phone]. Thanks

## 2020-10-29 ENCOUNTER — HOSPITAL ENCOUNTER (OUTPATIENT)
Dept: CARDIOLOGY | Facility: HOSPITAL | Age: 70
Discharge: HOME | End: 2020-10-29
Attending: PHYSICIAN ASSISTANT
Payer: MEDICARE

## 2020-10-29 VITALS
WEIGHT: 175 LBS | DIASTOLIC BLOOD PRESSURE: 62 MMHG | BODY MASS INDEX: 33.04 KG/M2 | HEIGHT: 61 IN | SYSTOLIC BLOOD PRESSURE: 108 MMHG | HEART RATE: 65 BPM

## 2020-10-29 DIAGNOSIS — I25.5 ISCHEMIC CARDIOMYOPATHY: ICD-10-CM

## 2020-10-29 LAB
ASCENDING AORTA: 2.7 CM
AV INDEX (PROSTH): 0.53
AV MEAN GRADIENT: 7 MMHG
AV PEAK GRADIENT: 13 MMHG
AV VALVE AREA: 1.52 CM2
AV VELOCITY RATIO: 0.55
BSA FOR ECHO PROCEDURE: 1.85 M2
CV ECHO LV RWT: 0.56 CM
DOP CALC AO PEAK VEL: 1.82 M/S
DOP CALC AO VTI: 41.17 CM
DOP CALC LVOT AREA: 2.9 CM2
DOP CALC LVOT DIAMETER: 1.92 CM
DOP CALC LVOT PEAK VEL: 1 M/S
DOP CALC LVOT STROKE VOLUME: 62.77 CM3
DOP CALC RVOT PEAK VEL: 0.8 M/S
DOP CALC RVOT VTI: 16.74 CM
DOP CALCLVOT PEAK VEL VTI: 21.69 CM
E WAVE DECELERATION TIME: 284.78 MSEC
E/A RATIO: 0.7
ECHO LV POSTERIOR WALL: 1.1 CM (ref 0.6–1.1)
FRACTIONAL SHORTENING: 32 % (ref 28–44)
INTERVENTRICULAR SEPTUM: 1.1 CM (ref 0.6–1.1)
IVRT: 117.03 MSEC
LA MAJOR: 5.02 CM
LA MINOR: 4.41 CM
LA WIDTH: 3.2 CM
LEFT ATRIUM SIZE: 3.31 CM
LEFT ATRIUM VOLUME INDEX: 23.7 ML/M2
LEFT ATRIUM VOLUME: 42.27 CM3
LEFT INTERNAL DIMENSION IN SYSTOLE: 2.66 CM (ref 2.1–4)
LEFT VENTRICLE DIASTOLIC VOLUME INDEX: 36.99 ML/M2
LEFT VENTRICLE DIASTOLIC VOLUME: 66.02 ML
LEFT VENTRICLE MASS INDEX: 78 G/M2
LEFT VENTRICLE SYSTOLIC VOLUME INDEX: 14.6 ML/M2
LEFT VENTRICLE SYSTOLIC VOLUME: 25.98 ML
LEFT VENTRICULAR INTERNAL DIMENSION IN DIASTOLE: 3.9 CM (ref 3.5–6)
LEFT VENTRICULAR MASS: 140.09 G
MV PEAK A VEL: 1.19 M/S
MV PEAK E VEL: 0.83 M/S
MV STENOSIS PRESSURE HALF TIME: 82.59 MS
MV VALVE AREA P 1/2 METHOD: 2.66 CM2
PISA TR MAX VEL: 2.6 M/S
PULM VEIN S/D RATIO: 1.5
PV MEAN GRADIENT: 1.26 MMHG
PV PEAK D VEL: 0.3 M/S
PV PEAK S VEL: 0.45 M/S
PV PEAK VELOCITY: 0.97 CM/S
RA MAJOR: 4.7 CM
RA PRESSURE: 3 MMHG
RA WIDTH: 2.75 CM
RIGHT VENTRICULAR END-DIASTOLIC DIMENSION: 3.02 CM
SINUS: 2.98 CM
STJ: 2.45 CM
TR MAX PG: 27 MMHG
TV REST PULMONARY ARTERY PRESSURE: 30 MMHG

## 2020-10-29 PROCEDURE — 93306 ECHO (CUPID ONLY): ICD-10-PCS | Mod: 26,,, | Performed by: INTERNAL MEDICINE

## 2020-10-29 PROCEDURE — 93306 TTE W/DOPPLER COMPLETE: CPT

## 2020-10-29 PROCEDURE — 93306 TTE W/DOPPLER COMPLETE: CPT | Mod: 26,,, | Performed by: INTERNAL MEDICINE

## 2020-10-30 ENCOUNTER — TELEPHONE (OUTPATIENT)
Dept: CARDIOLOGY | Facility: HOSPITAL | Age: 70
End: 2020-10-30

## 2020-10-30 NOTE — TELEPHONE ENCOUNTER
Please phone patient. Echo reviewed. EF has now normalized (55%). Great news!    Continue same mgmt and f/u as scheduled    Thanks

## 2021-02-22 ENCOUNTER — PATIENT OUTREACH (OUTPATIENT)
Dept: ADMINISTRATIVE | Facility: HOSPITAL | Age: 71
End: 2021-02-22

## 2021-02-26 ENCOUNTER — PATIENT OUTREACH (OUTPATIENT)
Dept: ADMINISTRATIVE | Facility: HOSPITAL | Age: 71
End: 2021-02-26

## 2021-04-20 ENCOUNTER — OFFICE VISIT (OUTPATIENT)
Dept: CARDIOLOGY | Facility: CLINIC | Age: 71
End: 2021-04-20
Payer: MEDICARE

## 2021-04-20 VITALS
DIASTOLIC BLOOD PRESSURE: 62 MMHG | OXYGEN SATURATION: 98 % | WEIGHT: 157.5 LBS | HEART RATE: 69 BPM | SYSTOLIC BLOOD PRESSURE: 114 MMHG | HEIGHT: 61 IN | BODY MASS INDEX: 29.74 KG/M2

## 2021-04-20 DIAGNOSIS — E11.69 HYPERLIPIDEMIA ASSOCIATED WITH TYPE 2 DIABETES MELLITUS: ICD-10-CM

## 2021-04-20 DIAGNOSIS — R41.89 PSEUDODEMENTIA: ICD-10-CM

## 2021-04-20 DIAGNOSIS — E78.5 HYPERLIPIDEMIA ASSOCIATED WITH TYPE 2 DIABETES MELLITUS: ICD-10-CM

## 2021-04-20 DIAGNOSIS — I44.7 LEFT BUNDLE BRANCH BLOCK (LBBB): ICD-10-CM

## 2021-04-20 DIAGNOSIS — I25.5 ISCHEMIC CARDIOMYOPATHY: ICD-10-CM

## 2021-04-20 DIAGNOSIS — I67.89 CEREBRAL MICROVASCULAR DISEASE: ICD-10-CM

## 2021-04-20 DIAGNOSIS — I50.42 SYSTOLIC AND DIASTOLIC CHF, CHRONIC: ICD-10-CM

## 2021-04-20 DIAGNOSIS — Z95.1 S/P CABG X 3: ICD-10-CM

## 2021-04-20 DIAGNOSIS — Z82.49 FAMILY HISTORY OF ARTERIOSCLEROTIC CARDIOVASCULAR DISEASE: ICD-10-CM

## 2021-04-20 DIAGNOSIS — D75.829 HIT (HEPARIN-INDUCED THROMBOCYTOPENIA): ICD-10-CM

## 2021-04-20 DIAGNOSIS — I96 ISCHEMIC NECROSIS OF FINGER: ICD-10-CM

## 2021-04-20 DIAGNOSIS — Z86.718 HISTORY OF DVT (DEEP VEIN THROMBOSIS): ICD-10-CM

## 2021-04-20 DIAGNOSIS — E11.65 UNCONTROLLED TYPE 2 DIABETES MELLITUS WITH HYPERGLYCEMIA: Chronic | ICD-10-CM

## 2021-04-20 DIAGNOSIS — I25.10 CAD, MULTIPLE VESSEL: Primary | ICD-10-CM

## 2021-04-20 PROCEDURE — 99214 OFFICE O/P EST MOD 30 MIN: CPT | Mod: S$PBB,,, | Performed by: PHYSICIAN ASSISTANT

## 2021-04-20 PROCEDURE — 99214 OFFICE O/P EST MOD 30 MIN: CPT | Mod: PBBFAC | Performed by: PHYSICIAN ASSISTANT

## 2021-04-20 PROCEDURE — 99999 PR PBB SHADOW E&M-EST. PATIENT-LVL IV: ICD-10-PCS | Mod: PBBFAC,,, | Performed by: PHYSICIAN ASSISTANT

## 2021-04-20 PROCEDURE — 99214 PR OFFICE/OUTPT VISIT, EST, LEVL IV, 30-39 MIN: ICD-10-PCS | Mod: S$PBB,,, | Performed by: PHYSICIAN ASSISTANT

## 2021-04-20 PROCEDURE — 99999 PR PBB SHADOW E&M-EST. PATIENT-LVL IV: CPT | Mod: PBBFAC,,, | Performed by: PHYSICIAN ASSISTANT

## 2021-05-07 ENCOUNTER — PATIENT OUTREACH (OUTPATIENT)
Dept: ADMINISTRATIVE | Facility: HOSPITAL | Age: 71
End: 2021-05-07

## 2021-08-04 ENCOUNTER — PATIENT MESSAGE (OUTPATIENT)
Dept: ADMINISTRATIVE | Facility: HOSPITAL | Age: 71
End: 2021-08-04

## 2021-09-22 NOTE — PROGRESS NOTES
[facility] -   Hematology/Oncology  Progress Note    Patient Name: Mateusz Bates  Admission Date: 10/28/2019  Hospital Length of Stay: 14 days  Code Status: Full Code     Subjective:     HPI:  Mrs. Bates is a 69-year-old female with hypertension, diabetes, hyperlipidemia, obesity, anxiety and depression, iron deficiency anemia, vitamin B12 deficiency, who presented to the emergency room with an NSTEMI.  Workup included a cardiac catheterization that shows severe multivessel coronary artery disease with proximal LAD stenosis of 70% and severely depressed ejection fraction of 20%.  The patient is a candidate for urgent coronary artery bypass grafting. S/p CABG today.  Hematology is consulted for thrombocytopenia.    Interval History: Patient sitting in chair at time of visit. Oriented to person/place. No acute events overnight.     Oncology Treatment Plan:   [No treatment plan]    Medications:  Continuous Infusions:   argatroban in 0.9 % sod chlor 3.5 mcg/kg/min (11/11/19 0600)     Scheduled Meds:   amLODIPine  10 mg Oral Daily    chlorhexidine  15 mL Mouth/Throat BID    chlorthalidone  25 mg Oral Daily    famotidine (PF)  20 mg Intravenous BID    ferrous sulfate  325 mg Oral BID    furosemide  20 mg Intravenous BID    insulin detemir U-100  15 Units Subcutaneous Daily    lactated ringers  1,000 mL Intravenous Once    losartan  50 mg Oral Daily    metoprolol tartrate  50 mg Oral BID    nitroGLYCERIN 2% TD oint  0.5 inch Topical (Top) Q12H    potassium chloride  10 mEq Oral BID    psyllium husk  1 packet Oral BID    sodium chloride  2 g Oral Q6H     PRN Meds:albumin human 5%, albuterol sulfate, Dextrose 10% Bolus, Dextrose 10% Bolus, glucagon (human recombinant), hydrALAZINE, insulin aspart U-100, iohexol, lactated ringers, magnesium sulfate IVPB, magnesium sulfate IVPB, metoclopramide HCl, ondansetron, potassium chloride in water **AND** potassium chloride in water **AND** potassium  chloride in water, traMADol     Review of Systems   Constitutional: Positive for activity change and fatigue. Negative for appetite change, chills, diaphoresis, fever and unexpected weight change.   HENT: Negative for congestion, mouth sores, nosebleeds, sore throat, trouble swallowing and voice change.    Eyes: Negative for photophobia and visual disturbance.   Respiratory: Positive for shortness of breath. Negative for cough, chest tightness and wheezing.    Cardiovascular: Negative for chest pain, palpitations and leg swelling.   Gastrointestinal: Negative for abdominal distention, abdominal pain, blood in stool, constipation, diarrhea, nausea and vomiting.   Genitourinary: Negative for difficulty urinating, dysuria and hematuria.   Musculoskeletal: Negative for arthralgias, back pain and myalgias.   Skin: Positive for color change. Negative for pallor, rash and wound.   Neurological: Positive for weakness. Negative for dizziness, syncope and headaches.   Hematological: Negative for adenopathy. Bruises/bleeds easily.   Psychiatric/Behavioral: The patient is nervous/anxious.      Objective:     Vital Signs (Most Recent):  Temp: 97.6 °F (36.4 °C) (11/11/19 0800)  Pulse: 87 (11/11/19 0942)  Resp: 20 (11/11/19 0927)  BP: (!) 167/90 (11/11/19 0942)  SpO2: 100 % (11/11/19 0927) Vital Signs (24h Range):  Temp:  [97.4 °F (36.3 °C)-98.2 °F (36.8 °C)] 97.6 °F (36.4 °C)  Pulse:  [70-91] 87  Resp:  [18-31] 20  SpO2:  [94 %-100 %] 100 %  BP: (106-167)/(39-90) 167/90     Weight: 74.5 kg (164 lb 3.9 oz)  Body mass index is 31.03 kg/m².  Body surface area is 1.79 meters squared.      Intake/Output Summary (Last 24 hours) at 11/11/2019 1244  Last data filed at 11/11/2019 0600  Gross per 24 hour   Intake 1127.51 ml   Output 1290 ml   Net -162.49 ml       Physical Exam   Constitutional: She is oriented to person, place, and time. She appears well-developed. She is cooperative. She has a sickly appearance. She appears ill. Nasal  cannula in place.   HENT:   Head: Normocephalic.   Right Ear: External ear normal.   Left Ear: External ear normal.   Nose: Nose normal.   Mouth/Throat: Oropharynx is clear and moist.   Eyes: Conjunctivae, EOM and lids are normal. Right eye exhibits no discharge. Left eye exhibits no discharge. No scleral icterus.   Neck: Normal range of motion. No thyroid mass present.   Cardiovascular: Normal rate, regular rhythm and normal heart sounds.   No murmur heard.  Pulmonary/Chest: Effort normal. No respiratory distress. She has decreased breath sounds. She has no wheezes. She has no rales.   Abdominal: Soft. She exhibits no distension. Bowel sounds are decreased. There is no tenderness.   Genitourinary:   Genitourinary Comments: deferred   Musculoskeletal: Normal range of motion. She exhibits edema and tenderness.   Lymphadenopathy:        Head (right side): No submandibular, no preauricular and no posterior auricular adenopathy present.        Head (left side): No submandibular, no preauricular and no posterior auricular adenopathy present.        Right cervical: No superficial cervical adenopathy present.       Left cervical: No superficial cervical adenopathy present.   Neurological: She is alert and oriented to person, place, and time.   Skin: Skin is warm and dry.        Psychiatric: She has a normal mood and affect. Her speech is normal and behavior is normal. Thought content normal.   Vitals reviewed.      Significant Labs:   BMP:   Recent Labs   Lab 11/10/19  0305 11/11/19  0400 11/11/19  0759 11/11/19  1144   GLU 97 185*  --  430*    137  --  132*   K 3.6 3.8  --  4.8    105  --  101   CO2 24 23  --  20*   BUN 17 16  --  17   CREATININE 0.7 0.8  --  0.9   CALCIUM 8.6* 8.5*  --  8.8   MG 1.9  --  1.9  --    , CBC:   Recent Labs   Lab 11/10/19  0305 11/10/19  1631 11/11/19  0400   WBC 20.50* 20.48* 19.05*   HGB 7.6* 7.7* 7.5*   HCT 24.0* 24.4* 23.8*   PLT 67* 60* 75*   , LFTs: No results for input(s):  ALT, AST, ALKPHOS, BILITOT, PROT, ALBUMIN in the last 48 hours. and All pertinent labs from the last 24 hours have been reviewed.    Diagnostic Results:  I have reviewed all pertinent imaging results/findings within the past 24 hours.    Assessment/Plan:     * Acute deep vein thrombosis (DVT) of right upper extremity  November 11, 2019  --continue Agatroban; avoid Heparin products  --Will need to discharge on coumadin or direct oral anticoagulant such as Eliquis.  Recommend at least 3 months of anticoagulation  --continued Hematology follow up in outpatient setting    Edema of hand  November 11, 2019     Upper extremity vascular insufficiency:  - Vascular surgery following. No surgical intervention recommended. Recommend continue anticoagulation, arm elevation, mild/moderate compression    Thrombocytopenia  November 11, 2019  --Platelet count 75, improving. Transfuse if < 10   --No S&S active bleed. Hemoglobin remains stable. Hg 7.5. No urgent need for blood transfusion. Monitor  --suspect HIT versus TTP  -Awaiting results of Sahra HIT test and [id number]  --Continue Argatroban gtt  --avoid heparin containing products.  --Check Vitamin B 12 level to r/o B 12 deficiency as etiology of decreased mental status  --Check LDH/Haptologlobin  to rule out intravascular hemolysis  --Supportive care            Thank you for your consult. I will follow-up with patient. Please contact us if you have any additional questions.     Carrol Brand NP  Hematology/Oncology  [facility]     General Sunscreen Counseling: I recommended a broad spectrum sunscreen with a SPF of 30 or higher.  I explained that SPF 30 sunscreens block approximately 97 percent of the sun's harmful rays.  Sunscreens should be applied at least 15 minutes prior to expected sun exposure and then every 2 hours after that as long as sun exposure continues. If swimming or exercising sunscreen should be reapplied every 45 minutes to an hour after getting wet or sweating.  One ounce, or the equivalent of a shot glass full of sunscreen, is adequate to protect the skin not covered by a bathing suit. I also recommended a lip balm with a sunscreen as well. Sun protective clothing can be used in lieu of sunscreen but must be worn the entire time you are exposed to the sun's rays. Detail Level: Generalized

## 2021-09-29 ENCOUNTER — PATIENT OUTREACH (OUTPATIENT)
Dept: ADMINISTRATIVE | Facility: HOSPITAL | Age: 71
End: 2021-09-29

## 2022-02-23 DIAGNOSIS — D84.9 IMMUNOSUPPRESSED STATUS: ICD-10-CM

## 2022-04-12 NOTE — ASSESSMENT & PLAN NOTE
Per Cards and CVT surgery  S/P 3-vessel CABG   POD# 9  Aspirin on Hold, Atorvastatin on Hold  Lopressor: 12.5 mg      Pt scheduled for 4/25 @12:40

## 2022-07-15 NOTE — ASSESSMENT & PLAN NOTE
11/01/2019  The patient is post-operative day 1, status-post coronary artery bypass grafting x 3.  Overall the patient is making progress, however IABP and inotropic agents will continue today.   Neuro:  The patient is awake alert and oriented x3.  Neuro exam is nonfocal.  Pain is well controlled with IV pain medication.  Cardiac:  Patient is hemodynamically improving. Patient remains on epinephrine and milrinone gtts, which will be weaned over the next 24 hours.  Cardiac index is improving. IABP will remain in place over the next 24 hours.  Patient will be started on beta blockers once gtts are weaned off.  Respiratory: Good sats on high flow nasal cannula. Continue respiratory toilet, continue incentive spirometer. Wean to low flow nasal cannula.  GI:  Diet: sips of water with medications, will slowly advance diet as tolerated. Benign abdominal exam.  Renal:  Urine output is slowly improving. Cr 0.9. K 4.3. Mag 1.6. Replace mag. Continue IV diuresis. Add metolazone.   Heme:  Hct 34.1  Platelet 82. Continue aspirin. Discontinue Plavix for now.  Id:  WBC 12. Tmax 100.3. Continue post-operative antibiotics. Will continue to follow.   Endocrine:  Glucose is controlled with insulin gtt.  Continue insulin gtt for now, until weaned off inotropic agents.   Activities:  Patient is currently bed bound due to IABP.  Advance activities as tolerated once IABP is removed.   Lines tubes and drains:  Continue IABP. Continue [place] and Cordis.  Continue A-line.  Chest tubes will continue. NICOLASA x 2 will continue. Jones catheter will continue.  Continue pacer wires. Continue prevena wound vac.  Plan: Remain ICU.          11/02/2019  The patient is post-operative day 2, status-post coronary artery bypass grafting x 3.  Overall the patient is making progress, IABP removed and inotropic agents discontinued.   Neuro:  The patient is awake alert and oriented x3.  Neuro exam is nonfocal.  Pain is well controlled. Discontinue fentanyl.    Cardiac:  Patient is hemodynamically improving. Inotropic gtts discontinued. Excellent cardiac index. IABP discontinued.  Patient will be started on beta blockers today.  Respiratory: Good sats on high flow nasal cannula. Continue respiratory toilet, continue incentive spirometer. Wean to low flow nasal cannula.  GI:  Advance diabetic/cardiac diet as tolerated. Benign abdominal exam.  Renal:  Urine output is improving. Cr 0.9. K 4.9. Mag 2.2. Continue IV diuresis. Hold AM K.   Heme:  Hct 28.3  Platelet 47. Discontinue aspirin. Discontinue Plavix. HIT panel. Consult hematology r/t thrombocytopenia.   Id:  WBC 10.4. Tmax 99.4. Will continue to follow.   Endocrine:  Discontinue insulin gtt. Start sliding scale insulin. Start long acting insulin detemir.   Activities: Advance activities as tolerated.   Lines tubes and drains:  Discontinue IABP. Discontinue [place] and Cordis.  Discontinue A-line.  Discontinue chest tubes x 3. Discontinue NICOLASA x 2. Discontinue arterial line. Jones catheter will continue for now.  Continue pacer wires. Continue prevena wound vac. Place PICC line.   Plan: Remain ICU.           11/03/2019  The patient is post-operative day 3, status-post coronary artery bypass grafting x 3.  Overall the patient is making progress.  Neuro:  The patient is awake alert and oriented x3.  Neuro exam is nonfocal.  Pain is well controlled. Continue tramadol.   Cardiac:  Patient is hemodynamically stable. Increase metoprolol dose. Start amlodipine.   Respiratory: Good sats on nasal cannula. Continue respiratory toilet, continue incentive spirometer. Wean to room air.  GI:  Advance diabetic/cardiac diet as tolerated. Benign abdominal exam. Passing gas, no BM. Add 1 bottle of mag citrate.   Renal:  Good urine output. Cr 0.9. K 3.8. Mag 2.0. Replace K. Replace Mag. Change to PO diuresis.   Heme:  Hct 34.7  Platelet 89. Restart aspirin. Continue to hold Plavix. HIT panel pending. Hematology following.  Id:  WBC 13.8. Tmax  99.4. Will continue to follow.    Endocrine:  Glucose controlled on sliding scale insulin. Increase long acting insulin detemir dose.   Activities: Advance activities as tolerated.   Lines tubes and drains:  Jones catheter will continue for now.  Continue pacer wires. Continue prevena wound vac. Continue PICC line.   Plan: Remain ICU.           No

## 2023-06-18 NOTE — PROGRESS NOTES
CBG remains > 100, see lab results.  More awake and alert - interacting with RN, answering questions. Orients to person.  Reoriented to time, place and situation.     Patient calls stating that she needs more medication that Dr. Rea prescribed to her on 6/13/2023 & that this is emergent and she needs someone to call her back, thanks much.

## 2024-10-16 NOTE — PATIENT INSTRUCTIONS
Vitamin B-12  Does this test have other names?  VB12, serum cobalamin  What is this test?  This test measures the level of vitamin B-12 in your blood. You need this vitamin to make red blood cells and for your nervous system to function as it should.  You get vitamin B-12 from eating foods that come from animals, such as meat, eggs, and dairy products. Vitamin B-12 is also added to some cereals. You can also take this vitamin as a supplement in pill form.  Why do I need this test?  You may need this test if your healthcare provider suspects that your vitamin B-12 level is low. A low level of vitamin B-12 is called vitamin B-12 deficiency. You are more likely to have vitamin deficiency if you are an older adult, have a digestive disorder called malabsorption, have had gastrointestinal surgery, or eat a vegan diet. Women who are pregnant or breastfeeding and eat a vegetarian-type diet are at high risk for this deficiency.  You may also need this test if you've been diagnosed with or your healthcare provider suspects a disease called pernicious anemia. Pernicious anemia affects the lining of your stomach and makes it hard to absorb vitamin B-12.  These are common symptoms of vitamin B-12 deficiency:  · Fatigue  · Weakness  · Weight loss  · Tingling or numbness of the hands and feet  · Constipation  · Poor balance  · Confusion  · Depression  · Memory loss  · Soreness of the mouth or tongue  What other tests might I have along with this test?  Your healthcare provider may order other tests to help find out the cause of your vitamin B-12 deficiency. These tests may include:  · Complete blood count  · Peripheral blood smear, which involves looking at your blood cells under a microscope  · Folic acid level. This vitamin is also important for red blood cell production.  What do my test results mean?  Many things may affect your lab test results. These include the method each lab uses to do the test. Even if your test  Right pinkie finger was jammed into a door.  Laceration to tip of finger.     results are different from the normal value, you may not have a problem. To learn what the results mean for you, talk with your healthcare provider.  Vitamin B-12 is measured in picograms per milliliter (pg/mL). Normal results are:  · 200 to 835 pg/mL for adults  · 160 to 1,300 pg/mL for newborns  If your results are low, you may have:  · Pernicious anemia  · Malabsorption from inflammatory bowel disease or other causes  · Poor absorption because of surgery  · Tapeworm infection  · Too little intake of animal protein  · Folic acid deficiency  · Iron deficiency  If your levels are high, you may have:  · Liver or kidney disease  · Diabetes  · Obesity  · White blood cell cancer  High levels may also mean that you have chronic obstructive pulmonary disease , congestive heart failure, or a thickening of the blood called polycythemia vera.  How is this test done?  The test requires a blood sample, which is drawn through a needle from a vein in your arm.  Does this test pose any risks?  Taking a blood sample with a needle carries risks that include bleeding, infection, bruising, or feeling dizzy. When the needle pricks your arm, you may feel a slight stinging sensation or pain. Afterward, the site may be slightly sore.  What might affect my test results?  Certain conditions may affect your test results. These include:  · Pregnancy  · Recent blood transfusions  · Smoking  Medicines in general may also affect your results. Specific medicines include supplements of vitamin A or C and birth control pills.  How do I get ready for this test?  You must fast before this test. You may be able to drink water, but you should not eat or drink anything else after midnight on the night before the test. If you are not having the test in the morning, ask your healthcare provider how long you need to fast before the test. You should not have a vitamin B-12 injection before the test. Also be sure your provider knows about all medicines,  herbs, vitamins, and supplements you are taking. This includes medicines that don't need a prescription and any illicit drugs you may use.  © 7855-0917 [employer] [address] [place] [address], [place] [address]. All rights reserved. This information is not intended as a substitute for professional medical care. Always follow your healthcare professional's instructions.

## 2025-05-09 NOTE — ASSESSMENT & PLAN NOTE
-See plan under NSTEMI   Thank you for choosing [facility] for your Health Care needs.  Also, thank you for allowing us to take you and your families preferences into account when determining your discharge plan.  Stay well!     You may receive a survey in the mail within the next couple weeks. Please take the time to complete it and return it. Your input is ALWAYS important to us. Thank you!  Your Care Transition Team - Dilcia Montanez  & Cap[place]      For questions about your medications listed on your discharge instructions, please call the Nurses Station at [phone].

## (undated) DEVICE — SUT PROLENE 4-0 RB-1 BL MO

## (undated) DEVICE — DRESSING TRANS 4X10 TEGADERM

## (undated) DEVICE — GLOVE BIOGEL SENSOR SZ 6.5

## (undated) DEVICE — SUT PROLENE 4-0 SH BLU 36IN

## (undated) DEVICE — CLIPS LIGATING TITANIUM WDE SM

## (undated) DEVICE — BANDAGE CONFORM 3IN STRL

## (undated) DEVICE — SUT VICRYL 1 OB 36 CTX

## (undated) DEVICE — TUBING HEATED INSUFFLATOR

## (undated) DEVICE — BLADE KNIFE UNITOME 4.0MM

## (undated) DEVICE — CONTAINER SPECIMEN STRL 4OZ

## (undated) DEVICE — SUT SILK 1 6-30 LABYRINTH

## (undated) DEVICE — UNDERGLOVES BIOGEL PI SIZE 7.5

## (undated) DEVICE — SEE MEDLINE ITEM 152739

## (undated) DEVICE — CANNULA VSL W/DUCKBILL

## (undated) DEVICE — GLOVE BIOGEL SZ 8 1/2

## (undated) DEVICE — BLOWER MISTER

## (undated) DEVICE — ALCOHOL 70% ISOP RUBBING 4OZ

## (undated) DEVICE — ADHESIVE MASTISOL VIAL 48/BX

## (undated) DEVICE — SUT PROLENE 6-0 C-1 30IN BL

## (undated) DEVICE — DRESSING MEPORE 3.6 X 10

## (undated) DEVICE — SET PERFUSION

## (undated) DEVICE — PACK OPEN HEART BR

## (undated) DEVICE — DRAPE STERI INSTRUMENT 1018

## (undated) DEVICE — CANNULA VENOUS MC2X 29FR

## (undated) DEVICE — SEE MEDLINE ITEM 147518

## (undated) DEVICE — APPLICATOR CHLORAPREP ORN 26ML

## (undated) DEVICE — SYR 20ML BLUE LUER LOCK

## (undated) DEVICE — MANIFOLD 4 PORT

## (undated) DEVICE — UNDERGLOVES BIOGEL PI SIZE 8.5

## (undated) DEVICE — GAUZE SPONGE 4X4 12PLY

## (undated) DEVICE — KIT PROBE COVER WITH GEL

## (undated) DEVICE — SCRUB HIBICLENS 4% CHG 4OZ

## (undated) DEVICE — CANNULA AG CARDPLG 14G FLANGE

## (undated) DEVICE — COUNTER BDL BLADEGUARD LI DBL

## (undated) DEVICE — THERMOWRAP CARDIAC

## (undated) DEVICE — GOWN SURG 2XL DISP TIE BACK

## (undated) DEVICE — Device

## (undated) DEVICE — SUT VICRYL 2-0 36 CT-1

## (undated) DEVICE — RETRACTOR OCTOBASE INSERT HOLD

## (undated) DEVICE — INSERTS STEALTH FIBRA SIZE 5

## (undated) DEVICE — SYR 30CC LUER LOCK

## (undated) DEVICE — BELLOW CANN HEMOBLAST 1.65GR

## (undated) DEVICE — DRESSING TRANS 2X2 TEGADERM

## (undated) DEVICE — BLADE SCALP OPHTL BEVEL STR

## (undated) DEVICE — CATH ALL PUR URTHL RR 18FR

## (undated) DEVICE — CABLE PACEMAKER CARD 6FT BLUE

## (undated) DEVICE — SET SUCTION ANTICOAGULANT

## (undated) DEVICE — SOL NS 1000CC

## (undated) DEVICE — SHEET DRAPE FAN-FOLDED 3/4

## (undated) DEVICE — DRAIN CHEST DRY SUCTION

## (undated) DEVICE — KIT PREVENA PLUS

## (undated) DEVICE — SUT 8/0 24IN PROLENE BL MON

## (undated) DEVICE — SEE MEDLINE ITEM 157144

## (undated) DEVICE — DRAPE CARDIO FED 108X140

## (undated) DEVICE — CANNULA 21FR 7MM SOFT FLOW EXT

## (undated) DEVICE — KIT ANTIFOG

## (undated) DEVICE — SEE MEDLINE ITEM 152622

## (undated) DEVICE — SEE MEDLINE ITEM 157131

## (undated) DEVICE — BRA MAMMARY SUPPORT XLARGE

## (undated) DEVICE — CLIP LIGATING MEDIUM

## (undated) DEVICE — DRESSING MEPORE ISLAND 31/2X4

## (undated) DEVICE — DRAIN CHAN RND HUBLS 8MM 24FR

## (undated) DEVICE — SEE MEDLINE ITEM 146372

## (undated) DEVICE — CLOSURE SKIN 1/4INX1-1/2IN

## (undated) DEVICE — SOL WATER STRL IRR 1000ML

## (undated) DEVICE — CLIP STEALTH LATIS 1/4 FORCE 6

## (undated) DEVICE — SUT MONOCRYL 4-0 PS-1 UND

## (undated) DEVICE — SUT STEEL 7 MONO B&S18 CCS

## (undated) DEVICE — BOOT SUTURE AID

## (undated) DEVICE — DRAPE SLUSH WARMER WITH DISC

## (undated) DEVICE — PATCH SEALANT TACHOSIL 4.8X4.8

## (undated) DEVICE — SEE MEDLINE ITEM 157117

## (undated) DEVICE — COVER LIGHT HANDLE 80/CA

## (undated) DEVICE — DRESSING TRANS 4X4 TEGADERM

## (undated) DEVICE — SOL 9P NACL IRR PIC IL

## (undated) DEVICE — SOL ISOLYTE S PH 7.4 1000ML

## (undated) DEVICE — SEE MEDLINE ITEM 146347

## (undated) DEVICE — TUBING PRESS MON M/M 84IN

## (undated) DEVICE — DEV-O-LOOPS MAXI RED

## (undated) DEVICE — SET DECANTER MEDICHOICE

## (undated) DEVICE — STOPCOCK DISCOFIX 3 WAY

## (undated) DEVICE — TOWEL WHITE OR DISP

## (undated) DEVICE — PUNCH AORTIC SHORT 4.4MM

## (undated) DEVICE — SUT SILK 2-0 STRANDS 30IN

## (undated) DEVICE — DRAIN CHANNEL ROUND 19FR

## (undated) DEVICE — CONNECTOR STRAIGHT 1/4X1/4IN

## (undated) DEVICE — NDL SAFETY 25G X 1.5 ECLIPSE

## (undated) DEVICE — SUT PERMA HAND SILK BLK 0-0

## (undated) DEVICE — SEE MEDLINE ITEM 146231

## (undated) DEVICE — GLOVE BIOGEL PI MICRO SZ 6

## (undated) DEVICE — SEE MEDLINE ITEM 157027

## (undated) DEVICE — ELECTRODE BLADE E-Z CLEAN 4IN

## (undated) DEVICE — TAPE MEDIPORE 4IN X 2YDS

## (undated) DEVICE — SUT PLEDGET LG SOFT

## (undated) DEVICE — BLADE SURG CARBON STEEL SZ11

## (undated) DEVICE — SPONGE LAP 18X18 PREWASHED

## (undated) DEVICE — DRESSING XEROFORM FOIL PK 1X8

## (undated) DEVICE — GLOVE BIOGEL 7.5

## (undated) DEVICE — CONNECTOR 1/2X3/8 STERILE

## (undated) DEVICE — GLOVE SURGICAL LATEX SZ 7

## (undated) DEVICE — TUBING PRESSURE MALE/FEMALE

## (undated) DEVICE — CELL SAVER 4/CASE

## (undated) DEVICE — DRESSING MEPORE ADH 3.5X12

## (undated) DEVICE — SUT 7/0 24IN PROLENE BL MO

## (undated) DEVICE — NDL SAFETY 22G X 1.5 ECLIPSE

## (undated) DEVICE — SYS VIRTUOSAPH PLUS EVM

## (undated) DEVICE — ELECTRODE REM PLYHSV RETURN 9

## (undated) DEVICE — WIRE TEMP PACING 0 SH SKS-3

## (undated) DEVICE — ORGNZR TUBING CLR W/CLIPS

## (undated) DEVICE — ELECTRODE PENCIL W/ROCKER NDL

## (undated) DEVICE — SEE MEDLINE ITEM 146417

## (undated) DEVICE — EVACUATOR WOUND BULB 100CC

## (undated) DEVICE — CANNULA IMA 1MM

## (undated) DEVICE — SEE MEDLINE ITEM 146308

## (undated) DEVICE — SUT SILK 2-0 SH 18IN BLACK

## (undated) DEVICE — COVER OVERHEAD SURG LT BLUE

## (undated) DEVICE — DRAPE INCISE IOBAN 2 23X17IN

## (undated) DEVICE — COVER CAMERA OPERATING ROOM

## (undated) DEVICE — TOURNIQUET SB QC DP 18X4IN

## (undated) DEVICE — CANNULA PERF RCSP 15FR SINUS

## (undated) DEVICE — TRAY FOLEY SURESTEP 16FR